# Patient Record
Sex: MALE | Race: ASIAN | NOT HISPANIC OR LATINO | URBAN - METROPOLITAN AREA
[De-identification: names, ages, dates, MRNs, and addresses within clinical notes are randomized per-mention and may not be internally consistent; named-entity substitution may affect disease eponyms.]

---

## 2019-09-01 ENCOUNTER — OUTPATIENT (OUTPATIENT)
Dept: OUTPATIENT SERVICES | Facility: HOSPITAL | Age: 54
LOS: 1 days | End: 2019-09-01
Payer: MEDICARE

## 2019-09-01 PROCEDURE — G9001: CPT

## 2019-09-06 ENCOUNTER — INPATIENT (INPATIENT)
Facility: HOSPITAL | Age: 54
LOS: 2 days | Discharge: HOME HEALTH | End: 2019-09-09
Attending: HOSPITALIST | Admitting: HOSPITALIST
Payer: MEDICARE

## 2019-09-06 VITALS
HEART RATE: 88 BPM | RESPIRATION RATE: 18 BRPM | SYSTOLIC BLOOD PRESSURE: 125 MMHG | OXYGEN SATURATION: 98 % | TEMPERATURE: 98 F | DIASTOLIC BLOOD PRESSURE: 77 MMHG

## 2019-09-06 DIAGNOSIS — I95.1 ORTHOSTATIC HYPOTENSION: ICD-10-CM

## 2019-09-06 DIAGNOSIS — J90 PLEURAL EFFUSION, NOT ELSEWHERE CLASSIFIED: ICD-10-CM

## 2019-09-06 DIAGNOSIS — K66.8 OTHER SPECIFIED DISORDERS OF PERITONEUM: ICD-10-CM

## 2019-09-06 DIAGNOSIS — I10 ESSENTIAL (PRIMARY) HYPERTENSION: ICD-10-CM

## 2019-09-06 DIAGNOSIS — I25.10 ATHEROSCLEROTIC HEART DISEASE OF NATIVE CORONARY ARTERY WITHOUT ANGINA PECTORIS: ICD-10-CM

## 2019-09-06 DIAGNOSIS — Z95.1 PRESENCE OF AORTOCORONARY BYPASS GRAFT: Chronic | ICD-10-CM

## 2019-09-06 DIAGNOSIS — N18.6 END STAGE RENAL DISEASE: ICD-10-CM

## 2019-09-06 DIAGNOSIS — E11.9 TYPE 2 DIABETES MELLITUS WITHOUT COMPLICATIONS: ICD-10-CM

## 2019-09-06 DIAGNOSIS — D64.9 ANEMIA, UNSPECIFIED: ICD-10-CM

## 2019-09-06 DIAGNOSIS — R42 DIZZINESS AND GIDDINESS: ICD-10-CM

## 2019-09-06 DIAGNOSIS — R74.8 ABNORMAL LEVELS OF OTHER SERUM ENZYMES: ICD-10-CM

## 2019-09-06 DIAGNOSIS — Z29.9 ENCOUNTER FOR PROPHYLACTIC MEASURES, UNSPECIFIED: ICD-10-CM

## 2019-09-06 LAB
ALBUMIN SERPL ELPH-MCNC: 3.4 G/DL — SIGNIFICANT CHANGE UP (ref 3.3–5)
ALP SERPL-CCNC: 52 U/L — SIGNIFICANT CHANGE UP (ref 40–120)
ALT FLD-CCNC: 12 U/L — SIGNIFICANT CHANGE UP (ref 4–41)
ANION GAP SERPL CALC-SCNC: 17 MMO/L — HIGH (ref 7–14)
APTT BLD: 36.1 SEC — SIGNIFICANT CHANGE UP (ref 27.5–36.3)
AST SERPL-CCNC: 24 U/L — SIGNIFICANT CHANGE UP (ref 4–40)
BASE EXCESS BLDV CALC-SCNC: 2.5 MMOL/L — SIGNIFICANT CHANGE UP
BILIRUB SERPL-MCNC: 0.4 MG/DL — SIGNIFICANT CHANGE UP (ref 0.2–1.2)
BLOOD GAS VENOUS - CREATININE: 9.67 MG/DL — HIGH (ref 0.5–1.3)
BLOOD GAS VENOUS - FIO2: 21 — SIGNIFICANT CHANGE UP
BUN SERPL-MCNC: 49 MG/DL — HIGH (ref 7–23)
CALCIUM SERPL-MCNC: 9.6 MG/DL — SIGNIFICANT CHANGE UP (ref 8.4–10.5)
CHLORIDE BLDV-SCNC: 101 MMOL/L — SIGNIFICANT CHANGE UP (ref 96–108)
CHLORIDE SERPL-SCNC: 98 MMOL/L — SIGNIFICANT CHANGE UP (ref 98–107)
CO2 SERPL-SCNC: 23 MMOL/L — SIGNIFICANT CHANGE UP (ref 22–31)
CREAT SERPL-MCNC: 9.68 MG/DL — HIGH (ref 0.5–1.3)
GAS PNL BLDV: 137 MMOL/L — SIGNIFICANT CHANGE UP (ref 136–146)
GLUCOSE BLDV-MCNC: 57 MG/DL — LOW (ref 70–99)
GLUCOSE SERPL-MCNC: 84 MG/DL — SIGNIFICANT CHANGE UP (ref 70–99)
HCO3 BLDV-SCNC: 25 MMOL/L — SIGNIFICANT CHANGE UP (ref 20–27)
HCT VFR BLD CALC: 32.1 % — LOW (ref 39–50)
HCT VFR BLDV CALC: 31.2 % — LOW (ref 39–51)
HGB BLD-MCNC: 10.1 G/DL — LOW (ref 13–17)
HGB BLDV-MCNC: 10.1 G/DL — LOW (ref 13–17)
INR BLD: 1.08 — SIGNIFICANT CHANGE UP (ref 0.88–1.17)
LACTATE BLDV-MCNC: 1.1 MMOL/L — SIGNIFICANT CHANGE UP (ref 0.5–2)
MAGNESIUM SERPL-MCNC: 2.3 MG/DL — SIGNIFICANT CHANGE UP (ref 1.6–2.6)
MCHC RBC-ENTMCNC: 28.9 PG — SIGNIFICANT CHANGE UP (ref 27–34)
MCHC RBC-ENTMCNC: 31.5 % — LOW (ref 32–36)
MCV RBC AUTO: 91.7 FL — SIGNIFICANT CHANGE UP (ref 80–100)
NRBC # FLD: 0 K/UL — SIGNIFICANT CHANGE UP (ref 0–0)
PCO2 BLDV: 49 MMHG — SIGNIFICANT CHANGE UP (ref 41–51)
PH BLDV: 7.36 PH — SIGNIFICANT CHANGE UP (ref 7.32–7.43)
PLATELET # BLD AUTO: 336 K/UL — SIGNIFICANT CHANGE UP (ref 150–400)
PMV BLD: 9.6 FL — SIGNIFICANT CHANGE UP (ref 7–13)
PO2 BLDV: 28 MMHG — LOW (ref 35–40)
POTASSIUM BLDV-SCNC: 3.5 MMOL/L — SIGNIFICANT CHANGE UP (ref 3.4–4.5)
POTASSIUM SERPL-MCNC: 3.9 MMOL/L — SIGNIFICANT CHANGE UP (ref 3.5–5.3)
POTASSIUM SERPL-SCNC: 3.9 MMOL/L — SIGNIFICANT CHANGE UP (ref 3.5–5.3)
PROT SERPL-MCNC: 7.3 G/DL — SIGNIFICANT CHANGE UP (ref 6–8.3)
PROTHROM AB SERPL-ACNC: 12 SEC — SIGNIFICANT CHANGE UP (ref 9.8–13.1)
RBC # BLD: 3.5 M/UL — LOW (ref 4.2–5.8)
RBC # FLD: 15.6 % — HIGH (ref 10.3–14.5)
SAO2 % BLDV: 41.9 % — LOW (ref 60–85)
SODIUM SERPL-SCNC: 138 MMOL/L — SIGNIFICANT CHANGE UP (ref 135–145)
TROPONIN T, HIGH SENSITIVITY: 275 NG/L — CRITICAL HIGH (ref ?–14)
TROPONIN T, HIGH SENSITIVITY: 286 NG/L — CRITICAL HIGH (ref ?–14)
WBC # BLD: 9.65 K/UL — SIGNIFICANT CHANGE UP (ref 3.8–10.5)
WBC # FLD AUTO: 9.65 K/UL — SIGNIFICANT CHANGE UP (ref 3.8–10.5)

## 2019-09-06 PROCEDURE — 71250 CT THORAX DX C-: CPT | Mod: 26

## 2019-09-06 PROCEDURE — 71045 X-RAY EXAM CHEST 1 VIEW: CPT | Mod: 26

## 2019-09-06 PROCEDURE — 99223 1ST HOSP IP/OBS HIGH 75: CPT | Mod: GC

## 2019-09-06 RX ORDER — FERROUS SULFATE 325(65) MG
325 TABLET ORAL DAILY
Refills: 0 | Status: DISCONTINUED | OUTPATIENT
Start: 2019-09-06 | End: 2019-09-09

## 2019-09-06 RX ORDER — SEVELAMER CARBONATE 2400 MG/1
800 POWDER, FOR SUSPENSION ORAL
Refills: 0 | Status: DISCONTINUED | OUTPATIENT
Start: 2019-09-06 | End: 2019-09-06

## 2019-09-06 RX ORDER — INFLUENZA VIRUS VACCINE 15; 15; 15; 15 UG/.5ML; UG/.5ML; UG/.5ML; UG/.5ML
0.5 SUSPENSION INTRAMUSCULAR ONCE
Refills: 0 | Status: DISCONTINUED | OUTPATIENT
Start: 2019-09-06 | End: 2019-09-09

## 2019-09-06 RX ORDER — DEXTROSE 50 % IN WATER 50 %
12.5 SYRINGE (ML) INTRAVENOUS ONCE
Refills: 0 | Status: DISCONTINUED | OUTPATIENT
Start: 2019-09-06 | End: 2019-09-09

## 2019-09-06 RX ORDER — ASPIRIN/CALCIUM CARB/MAGNESIUM 324 MG
81 TABLET ORAL DAILY
Refills: 0 | Status: DISCONTINUED | OUTPATIENT
Start: 2019-09-06 | End: 2019-09-09

## 2019-09-06 RX ORDER — SODIUM CHLORIDE 9 MG/ML
1000 INJECTION, SOLUTION INTRAVENOUS
Refills: 0 | Status: DISCONTINUED | OUTPATIENT
Start: 2019-09-06 | End: 2019-09-09

## 2019-09-06 RX ORDER — ATORVASTATIN CALCIUM 80 MG/1
40 TABLET, FILM COATED ORAL DAILY
Refills: 0 | Status: DISCONTINUED | OUTPATIENT
Start: 2019-09-06 | End: 2019-09-06

## 2019-09-06 RX ORDER — DEXTROSE 50 % IN WATER 50 %
15 SYRINGE (ML) INTRAVENOUS ONCE
Refills: 0 | Status: DISCONTINUED | OUTPATIENT
Start: 2019-09-06 | End: 2019-09-09

## 2019-09-06 RX ORDER — SEVELAMER CARBONATE 2400 MG/1
800 POWDER, FOR SUSPENSION ORAL
Refills: 0 | Status: DISCONTINUED | OUTPATIENT
Start: 2019-09-06 | End: 2019-09-09

## 2019-09-06 RX ORDER — FENOFIBRATE,MICRONIZED 130 MG
145 CAPSULE ORAL DAILY
Refills: 0 | Status: DISCONTINUED | OUTPATIENT
Start: 2019-09-06 | End: 2019-09-09

## 2019-09-06 RX ORDER — GLUCAGON INJECTION, SOLUTION 0.5 MG/.1ML
1 INJECTION, SOLUTION SUBCUTANEOUS ONCE
Refills: 0 | Status: DISCONTINUED | OUTPATIENT
Start: 2019-09-06 | End: 2019-09-09

## 2019-09-06 RX ORDER — INSULIN LISPRO 100/ML
VIAL (ML) SUBCUTANEOUS
Refills: 0 | Status: DISCONTINUED | OUTPATIENT
Start: 2019-09-06 | End: 2019-09-07

## 2019-09-06 RX ORDER — DEXTROSE 50 % IN WATER 50 %
25 SYRINGE (ML) INTRAVENOUS ONCE
Refills: 0 | Status: DISCONTINUED | OUTPATIENT
Start: 2019-09-06 | End: 2019-09-09

## 2019-09-06 RX ORDER — FOLIC ACID 0.8 MG
1 TABLET ORAL DAILY
Refills: 0 | Status: DISCONTINUED | OUTPATIENT
Start: 2019-09-06 | End: 2019-09-09

## 2019-09-06 RX ORDER — ACETAMINOPHEN 500 MG
650 TABLET ORAL EVERY 4 HOURS
Refills: 0 | Status: DISCONTINUED | OUTPATIENT
Start: 2019-09-06 | End: 2019-09-09

## 2019-09-06 RX ORDER — SODIUM CHLORIDE 9 MG/ML
500 INJECTION, SOLUTION INTRAVENOUS ONCE
Refills: 0 | Status: COMPLETED | OUTPATIENT
Start: 2019-09-06 | End: 2019-09-06

## 2019-09-06 RX ORDER — DEXTROSE 50 % IN WATER 50 %
50 SYRINGE (ML) INTRAVENOUS ONCE
Refills: 0 | Status: COMPLETED | OUTPATIENT
Start: 2019-09-06 | End: 2019-09-06

## 2019-09-06 RX ORDER — INSULIN GLARGINE 100 [IU]/ML
70 INJECTION, SOLUTION SUBCUTANEOUS AT BEDTIME
Refills: 0 | Status: DISCONTINUED | OUTPATIENT
Start: 2019-09-06 | End: 2019-09-06

## 2019-09-06 RX ORDER — CARVEDILOL PHOSPHATE 80 MG/1
6.25 CAPSULE, EXTENDED RELEASE ORAL EVERY 12 HOURS
Refills: 0 | Status: DISCONTINUED | OUTPATIENT
Start: 2019-09-06 | End: 2019-09-09

## 2019-09-06 RX ORDER — NORTRIPTYLINE HYDROCHLORIDE 10 MG/1
25 CAPSULE ORAL AT BEDTIME
Refills: 0 | Status: DISCONTINUED | OUTPATIENT
Start: 2019-09-06 | End: 2019-09-09

## 2019-09-06 RX ORDER — CALCITRIOL 0.5 UG/1
0.5 CAPSULE ORAL DAILY
Refills: 0 | Status: DISCONTINUED | OUTPATIENT
Start: 2019-09-06 | End: 2019-09-09

## 2019-09-06 RX ORDER — HEPARIN SODIUM 5000 [USP'U]/ML
5000 INJECTION INTRAVENOUS; SUBCUTANEOUS EVERY 12 HOURS
Refills: 0 | Status: DISCONTINUED | OUTPATIENT
Start: 2019-09-06 | End: 2019-09-07

## 2019-09-06 RX ORDER — ATORVASTATIN CALCIUM 80 MG/1
40 TABLET, FILM COATED ORAL AT BEDTIME
Refills: 0 | Status: DISCONTINUED | OUTPATIENT
Start: 2019-09-06 | End: 2019-09-09

## 2019-09-06 RX ADMIN — ATORVASTATIN CALCIUM 40 MILLIGRAM(S): 80 TABLET, FILM COATED ORAL at 23:36

## 2019-09-06 RX ADMIN — NORTRIPTYLINE HYDROCHLORIDE 25 MILLIGRAM(S): 10 CAPSULE ORAL at 23:37

## 2019-09-06 RX ADMIN — Medication 50 MILLILITER(S): at 19:29

## 2019-09-06 RX ADMIN — SODIUM CHLORIDE 500 MILLILITER(S): 9 INJECTION, SOLUTION INTRAVENOUS at 16:05

## 2019-09-06 RX ADMIN — SODIUM CHLORIDE 500 MILLILITER(S): 9 INJECTION, SOLUTION INTRAVENOUS at 17:25

## 2019-09-06 NOTE — H&P ADULT - PROBLEM SELECTOR PLAN 1
BPs on admission: 135/82 BPs on admission: (Supine) 135/82 HR 90s; (Sitting) 132/77 HR 90s; (Standing) 83/59. Drop of >20 systolic consistent with orthostatic hypotension. Started after being discharged from [facility] after CABG. DDx includes volume depletion vs medications (unclear why pt was prescribed 2 Beta blockers) vs autonomic failure (pt states he has been eating less than usual) vs fluid shifts from PD vs less likely endocrine etiology such as low cortisol or thyroid dysfunction. If no improvement in BPs, can consider trial of midodrine or fludrocortisone.  - Will monitor on Telemetry and obtain repeat orthostatic BP measurements. EKG with no ischemic changes. Trop T 275--->286 likely 2/2 demand ischemia.  - Given pleural effusions on CT chest, judicious use of fluids  - Holding home labetalol, coreg, norvasc  - obtain TSH, AM cortisol  - encourage leg elevation BPs on admission: (Supine) 135/82 HR 90s; (Sitting) 132/77 HR 90s; (Standing) 83/59. Drop of >20 systolic consistent with orthostatic hypotension. Started after being discharged from [facility] after CABG. DDx includes volume depletion vs medications (unclear why pt was prescribed 2 Beta blockers) vs autonomic failure vs fluid shifts from PD vs less likely endocrine etiology such as low cortisol or thyroid dysfunction. If no improvement in BPs, can consider trial of midodrine or fludrocortisone.  - Will monitor on Telemetry and obtain repeat orthostatic BP measurements. EKG with no ischemic changes. Trop T 275--->286 likely 2/2 demand ischemia.  - Renal c/s'ed for management of PD while inpatient  - Given small pleural effusions on CT chest, judicious use of fluids.  - Holding home labetalol, coreg, norvasc  - obtain TSH, AM cortisol  - encourage leg elevation BPs on admission: (Supine) 135/82 HR 90s; (Sitting) 132/77 HR 90s; (Standing) 83/59. Drop of >20 systolic consistent with orthostatic hypotension. Started after being discharged from [facility] after CABG. DDx includes volume depletion vs medications (unclear why pt was prescribed 2 Beta blockers and nortriptyline is known to cause orthostatic hypotension however has been on this medication for a while) vs autonomic failure vs fluid shifts from PD vs less likely endocrine etiology such as low cortisol or thyroid dysfunction. If no improvement in BPs, can consider trial of midodrine or fludrocortisone.  - Will monitor on Telemetry and obtain repeat orthostatic BP measurements. EKG with no ischemic changes. Trop T 275--->286 likely 2/2 demand ischemia.  - Renal c/s'ed for management of PD while inpatient  - Given small pleural effusions on CT chest, judicious use of fluids.  - Holding home labetalol, coreg, norvasc  - obtain TSH, AM cortisol  - encourage leg elevation BPs on admission: (Supine) 135/82 HR 90s; (Sitting) 132/77 HR 90s; (Standing) 83/59. Drop of >20 systolic consistent with orthostatic hypotension. Started after being discharged from [facility] after CABG. DDx includes volume depletion vs medications (unclear why pt was prescribed 2 Beta blockers and nortriptyline is known to cause orthostatic hypotension however has been on this medication for a while) vs autonomic failure vs fluid shifts from PD vs less likely endocrine etiology such as low cortisol or thyroid dysfunction. If no improvement in BPs, can consider trial of midodrine or fludrocortisone. Likely 2/2 deconditioning.  - Will monitor on Telemetry and obtain repeat orthostatic BP measurements. EKG with no ischemic changes.   - Renal c/s'ed for management of PD while inpatient  - Given small pleural effusions on CT chest, judicious use of fluids.  - Holding home labetalol and norvasc  - obtain TSH, AM cortisol  - encourage leg elevation BPs on admission: (Supine) 135/82 HR 90s; (Sitting) 132/77 HR 90s; (Standing) 83/59. Drop of >20 systolic consistent with orthostatic hypotension. dizziness started after discharged from [facility] after CABG. DDx includes  medication induced hypotension (unclear why on 2 Beta blockers) vs volume depletion vs nortriptyline side-effect (however has been on this medication for a while) vs fluid shifts from PD vs less likely endocrine etiology such as low cortisol or thyroid dysfunction.   - Will monitor on Telemetry r/o arrhythmia  obtain repeat orthostatic BP measurements  - EKG with no acute changes  - hold Amlodipine 10mg and hold Labetalol  - reduce Carvedilol from 12.5mg bid to 6.25mg bid - increase if BP permits   - obtain TSH, AM cortisol  - leg elevation

## 2019-09-06 NOTE — H&P ADULT - PROBLEM SELECTOR PLAN 3
ESRD on PD daily.  - Sevelamer 800mg PO TID  - Calcitriol 0.5 mg qD ESRD on PD daily. Nephrology to see patient.  - Sevelamer 800mg PO TID  - Calcitriol 0.5 mg qD ESRD on PD daily  - Renal c/s'ed for management of PD while inpatient  - Given small pleural effusions on CT chest, judicious use of fluids.  - Sevelamer 800mg PO TID  - Calcitriol 0.5 mg qD

## 2019-09-06 NOTE — H&P ADULT - PROBLEM SELECTOR PLAN 4
- WIll hold Labetalol, norvasc, and coreg - WIll hold Labetalol, norvasc,   - Coreg 6.125mg PO BID (half dose) Trop T 275--->286. Likely 2/2 ESRD. There is also the possibility of stress cardiomyopathy.  - obtain TTE to evaluate for post-operative changes since CABG

## 2019-09-06 NOTE — ED PROVIDER NOTE - NS ED ROS FT
Gen: No fever, normal appetite  Eyes: No eye irritation or discharge  ENT: No ear pain, congestion, sore throat  Resp: No cough or trouble breathing  Cardiovascular: No chest pain or palpitation, + orthostatic hypotn   Gastroenteric: No nausea/vomiting, diarrhea, constipation  :  No change in urine output; no dysuria  MS: No joint or muscle pain  Skin: No rashes  Neuro: No headache; no abnormal movements  Remainder negative, except as per the HPI

## 2019-09-06 NOTE — H&P ADULT - PROBLEM SELECTOR PROBLEM 6
CAD, multiple vessel Type 2 diabetes mellitus without complication, with long-term current use of insulin HTN (hypertension)

## 2019-09-06 NOTE — H&P ADULT - PROBLEM SELECTOR PROBLEM 5
Type 2 diabetes mellitus CAD, multiple vessel Type 2 diabetes mellitus with hypoglycemia without coma, with long-term current use of insulin

## 2019-09-06 NOTE — H&P ADULT - PROBLEM SELECTOR PLAN 6
- s/p CABG  - c/w asa - s/p CABG Aug/2019  - c/w asa  - holding BBs As per patient, takes Lantus 80 units qHS.  - Will hold Lantus dose tonight and check FS in the AM.   - Endocrine c/s in the AM regarding high dose insulin. - WIll hold Labetalol, norvasc as above  - Coreg 6.125mg PO BID (half dose for now  -BP checks q4h

## 2019-09-06 NOTE — H&P ADULT - NSICDXPASTMEDICALHX_GEN_ALL_CORE_FT
PAST MEDICAL HISTORY:  CAD, multiple vessel     Diabetes     ESRD (end stage renal disease) on dialysis     HTN (hypertension)

## 2019-09-06 NOTE — ED ADULT NURSE REASSESSMENT NOTE - NS ED NURSE REASSESS COMMENT FT1
Pt appeared diaphoretic and pale upon checking vital signs. FS 37. Pt given 1 amp dextrose and tray of food. Repeat

## 2019-09-06 NOTE — ED PROVIDER NOTE - PHYSICAL EXAMINATION
Gen: AAOX3, NAD   Head: NCAT  ENT: Airway patent, moist mucous membranes, nasal passageways clear, no pharyngeal erythema or exudates, uvula midline, no cervical lymphadenopathy  Cardiac: Normal rate, normal rhythm, no murmurs/rubs/gallops appreciated  Respiratory: Lungs CTA B/L  Gastrointestinal: +BS, Abdomen soft, nontender, nondistended, no rebound, no guarding, PD catheter in place   MSK: No gross abnormalities, FROM of all four extremities, no edema  HEME: Extremities warm, pulses intact and symmetrical in all four extremities  Skin: No rashes, no lesions  Neuro: No gross neurologic deficits, CN II-XII intact, no facial asymmetry, no dysarthria, dysdiadochokinesia, strength equal in all four extremities, no gait abnormality Gen: AAOX3, NAD   Head: NCAT  ENT: Airway patent, moist mucous membranes, nasal passageways clear, no JVD  Cardiac: Normal rate, normal rhythm, no murmurs/rubs/gallops appreciated  Respiratory: Lungs CTA B/L  Gastrointestinal: Abdomen soft, nontender, nondistended, no rebound, no guarding, PD catheter in place   MSK: No gross abnormalities, FROM of all four extremities, no edema  HEME: Extremities warm, pulses intact and symmetrical in all four extremities  Skin: No rashes, no lesions  Neuro: No gross neurologic deficits

## 2019-09-06 NOTE — H&P ADULT - NSHPREVIEWOFSYSTEMS_GEN_ALL_CORE
CONSTITUTIONAL: No weakness, fevers or chills  EYES/ENT: No visual changes;  No vertigo or throat pain   NECK: No pain or stiffness  RESPIRATORY: No cough, wheezing, hemoptysis; No shortness of breath  CARDIOVASCULAR: No chest pain or palpitations  GASTROINTESTINAL: No abdominal or epigastric pain. No nausea, vomiting, or hematemesis; No diarrhea or constipation. No melena or hematochezia.  GENITOURINARY: No dysuria, frequency or hematuria  NEUROLOGICAL: No numbness or weakness  SKIN: No itching, rashes CONSTITUTIONAL: +Dizziness  EYES/ENT: No visual changes;  No vertigo or throat pain   NECK: No pain or stiffness  RESPIRATORY: No cough, wheezing, hemoptysis; No shortness of breath  CARDIOVASCULAR: No chest pain or palpitations  GASTROINTESTINAL: No abdominal or epigastric pain. No nausea, vomiting, or hematemesis; No diarrhea or constipation. No melena or hematochezia.  GENITOURINARY: No dysuria, frequency or hematuria  NEUROLOGICAL: No numbness or weakness  SKIN: No itching, rashes CONSTITUTIONAL: +Dizziness  EYES/ENT: No visual changes;  No vertigo or throat pain   NECK: No pain or stiffness  RESPIRATORY: No cough, wheezing, hemoptysis; No shortness of breath  CARDIOVASCULAR: No chest pain or palpitations, (+) light-headedness   GASTROINTESTINAL: No abdominal or epigastric pain. No nausea, vomiting, or hematemesis; No diarrhea or constipation. No melena or hematochezia.  GENITOURINARY: No dysuria, frequency or hematuria  NEUROLOGICAL: No numbness or weakness  SKIN: No itching, rashes

## 2019-09-06 NOTE — ED ADULT NURSE NOTE - CHIEF COMPLAINT QUOTE
Patient brought to ER from home by EMS for intermittent orthostatic changes in blood pressure noted by VNS. Pt had CABG last month in [facility]. Pt is not c/o chest pain or shortness of breath.  Upgraded after EKG Reading by MD Attending: Abnormal EKG

## 2019-09-06 NOTE — ED ADULT NURSE NOTE - OBJECTIVE STATEMENT
54yom A&ox4 amb presents to ed c/o low BP. pt is 1 month s/p CABG, reports since yesterday pts BP has been systolic 80s. pt states when it is this low he feels dizzy. pt states he only feels dizzy when he gets up and walks, feels better once he sits down. pt currently normotensive in ED. denies cp, sob, dizziness, lightheadedness, n/v/d, fever/chills. CABG site healing well. no drainage noted. breathing even/unlabored. abdomen soft, nontender, nondistended. skin warm, dry, and intact. 20g iv lock placed to R hand. labs sent. nsr on cardiac monitor.

## 2019-09-06 NOTE — H&P ADULT - LYMPHATIC
posterior cervical R/anterior cervical R/anterior cervical L/supraclavicular L/supraclavicular R/posterior cervical L

## 2019-09-06 NOTE — ED PROVIDER NOTE - CLINICAL SUMMARY MEDICAL DECISION MAKING FREE TEXT BOX
54M s/p cabg, recent discharge, orthostatic hypotension, likely medication related, will eval for signs of infection though no clinical signs of infection.

## 2019-09-06 NOTE — H&P ADULT - PROBLEM SELECTOR PLAN 9
AOCD, MCV 91.7, hb 10.1  - c/w iron supplementation AOCD, MCV 91.7, hb 10.1, likely multifactorial due to recent blood loss and ESRD  monitor Hgb daily  -avoid Heparin sc for dvt ppx given h/o recent transfusion  - c/w iron supplementation  - outpt f/u

## 2019-09-06 NOTE — ED PROVIDER NOTE - PMH
CAD, multiple vessel    Diabetes    ESRD (end stage renal disease) on dialysis    HTN (hypertension)

## 2019-09-06 NOTE — H&P ADULT - HISTORY OF PRESENT ILLNESS
57 YOM with PMHx of HTN, T2DM, ESRD on PD daily, recent CABG on 8/9/19 at [place]. Pt states he was admitted at [facility] for 20 days after getting CABG procedure and said it was uneventful with no complications. He was then discharged home. About 3-4 days ago, he started getting dizzy/lightheaded and measured his BP which was in the 80s/60s. The next day, it was even lower at 70s/60s (measured by sitting down). He lives with his wife and daughter and does not work any longer. He denies CP, SOB, headaches. No fevers or chills. He has been on PD for the past 2 years and does not recall having any issues with his BP and has been tolerating it well.    VS:  98.7 F, HR 82, /84, RR 18, SpO2 100% on RA  Given D50x1, LR 500cc x2 56yo Male with MHx of HTN, Type 2 DM, ESRD on PD daily, recent CABG on 8/9/19 at [place]. Pt states he was admitted at [place] for 20 days after getting CABG procedure and said it was uneventful with no complications. He was then discharged home. About 3-4 days ago, he started getting dizzy/lightheaded and measured his BP which was in the 80s/60s. The next day, it was even lower at 70s/60s (measured by sitting down). He lives with his wife and daughter and does not work any longer. He denies CP, SOB, headaches. No fevers or chills. He has been on PD for the past 2 years and does not recall having any issues with his BP and has been tolerating it well.    VS:  98.7 F, HR 82, /84, RR 18, SpO2 100% on RA  Given D50x1, LR 500cc x2 56yo Male, ambulatory with a walker post CABG, with MHx of HTN, Type 2 DM, ESRD on PD daily, recent CABG on 8/9/19 at [place] - c/b acute anemia was transfused "5 bags of blood", had EGD - "nothing" was found. Pt states he was admitted at [place] for 20 days after getting CABG procedure and said it was uneventful with no complications. He was then discharged home. About 3-4 days ago, he started getting dizzy/lightheaded and measured his BP which was in the 80s/60s. The next day, it was even lower at 70s/60s (measured by sitting down). He lives with his wife and daughter and does not work any longer. He denies CP, SOB, headaches. No fevers or chills. He has been on PD for the past 2 years and does not recall having any issues with his BP and has been tolerating it well.    VS:  98.7 F, HR 82, /84, RR 18, SpO2 100% on RA  Given D50x1, LR 500cc x2 56yo Male, ambulatory with a walker post CABG, with MHx of HTN, Type 2 DM, ESRD on PD daily, recent CABG on 8/9/19 at [place] - c/b acute anemia was transfused "5 bags of blood", had EGD - "nothing" was found. Pt states he was admitted at [place] for 20 days after getting CABG procedure and said it was uneventful with no complications. He was then discharged home. About 3-4 days ago, he started getting dizzy/lightheaded and measured his BP which was in the 80s/60s. The next day, it was even lower at 70s/60s (measured by sitting down). He lives with his wife and daughter and does not work any longer. Says that his BP medications were changed recently, however cannot recall which one or which one was added; He denies CP, SOB, headaches. No fevers or chills. He has been on PD for the past 2 years and does not recall having any issues with his BP and has been tolerating it well. Of note, recently a trial of lower Lantus regimen was tried, 20units BID, (instead of 80units daily), however, resulted in BG in 350s range and the pt returned to 80 units daily;    VS:  98.7 F, HR 82, /84, RR 18, SpO2 100% on RA  Given D50x1, LR 500cc x2

## 2019-09-06 NOTE — ED ADULT TRIAGE NOTE - CHIEF COMPLAINT QUOTE
Patient brought to ER from home by EMS for intermittent orthostatic changes in blood pressure noted by VNS. Pt had CABG last month in [facility]. Pt is not c/o chest pain or shortness of breath. Patient brought to ER from home by EMS for intermittent orthostatic changes in blood pressure noted by VNS. Pt had CABG last month in [facility]. Pt is not c/o chest pain or shortness of breath.  Upgraded after EKG Reading by MD Attending: Abnormal EKG

## 2019-09-06 NOTE — ED PROVIDER NOTE - ATTENDING CONTRIBUTION TO CARE
54M hx HTN, DM, ESRD on PD daily, CABG at [place] 8/9/19 p/w orthostatic hypotension, noted x 2 days at home, where he noted weakness and dizziness benjamin on standing and noted pressures drop to 80s when he is sitting up. Sent to ED by home care nurse. he denies fever, vomiting, SOB, CP, abdo pains, His PD has gone normally with clear fluid return. On exam: + orthostatic BP drops from 127 systolic to 80s systolic on standing, afebrile rectally, lungs clear, heart sounds normal, abdomen soft non tender, ext normal, neuro non focal. IMP: Orthostatic hypotension in 54M hx HTN, DM, ESRD on PD daily, CABG at [place] 8/9/19. ? etiology ? dehydration ? infectious, ? med related, ? cardiac. EKG shows SR with ST depression laterally. No old EKG available. Plan: EKG Monitor, labs, UA, CXR, careful hydration. Signed over to Dr Cross at 4:10pm

## 2019-09-06 NOTE — H&P ADULT - ASSESSMENT
57 YOM with PMHx of HTN, T2DM, ESRD on PD daily, recent CABG on 8/9/19 coming in with orthostatic hypotension 58yo Male, ambulatory with a walker post CABG, with MHx of HTN, Type 2 DM, ESRD on PD daily, recent CABG on 8/9/19 at [place] - c/b acute blood loss anemia requring transfusion PRBC; Pt a/w orthostatic hypotension and an episode of hypoglycemia in ED to 30s;

## 2019-09-06 NOTE — H&P ADULT - PROBLEM SELECTOR PLAN 7
AOCD, MCV 91.7, hb 10.1  - c/w iron supplementation Trop T 275--->286. Likely 2/2 ESRD. There is also the possibility of stress cardiomyopathy.  - obtain TTE to evaluate for post-operative changes since CABG - s/p CABG Aug/2019  - c/w asa  - holding Labetalol and Norvasc

## 2019-09-06 NOTE — H&P ADULT - NSHPLABSRESULTS_GEN_ALL_CORE
10.1   9.65  )-----------( 336      ( 06 Sep 2019 15:30 )             32.1     09-06    138  |  98  |  49<H>  ----------------------------<  84  3.9   |  23  |  9.68<H>    Ca    9.6      06 Sep 2019 15:30  Mg     2.3     09-06    TPro  7.3  /  Alb  3.4  /  TBili  0.4  /  DBili  x   /  AST  24  /  ALT  12  /  AlkPhos  52  09-06    Trop T: 275-->286    < from: Xray Chest 1 View AP/PA (09.06.19 @ 15:21) >    IMPRESSION:  Underinflated lungs. Appearance of vague increased markings and patchy   opacity in left base suggesting underlying airspace consolidation,   however, component of infection/pneumonia in the proper clinical context   cannot be excluded, PA and lateral views of the chest in deep inspiration   and with more optimal positioning suggested for follow-up when clinically   feasible.    Clear visualized lungs. No pleural effusions or pneumothorax.    Sternal wires and multiple mediastinal surgical clips present. Slightly   prominent appearing cardiac and mediastinal silhouettes however   inaccurately assessed on this projection.    Trachea midline.    Unremarkable osseous structures.    < from: CT Chest No Cont (09.06.19 @ 18:03) >    IMPRESSION:     Bilateral pleural effusions with associated atelectasis.    Pneumoperitoneum with ascites.    EKG: NSR, T wave inversion in lead II, no prior EKG to compare 10.1   9.65  )-----------( 336      ( 06 Sep 2019 15:30 )             32.1     09-06    138  |  98  |  49<H>  ----------------------------<  84  3.9   |  23  |  9.68<H>    Ca    9.6      06 Sep 2019 15:30  Mg     2.3     09-06    TPro  7.3  /  Alb  3.4  /  TBili  0.4  /  DBili  x   /  AST  24  /  ALT  12  /  AlkPhos  52  09-06    Trop T: 275-->286      XR CHEST AP OR PA 1V   PROCEDURE DATE: Sep 6 2019   INTERPRETATION: CLINICAL INDICATION: chest pain   EXAM:   Portable upright frontal chest from 9/6/2019 at 1521. No prior chest x-ray   available at this institution for comparison.   Rotated left.   IMPRESSION:   Underinflated lungs. Appearance of vague increased markings and patchy   opacity in left base suggesting underlying airspace consolidation, however,   component of infection/pneumonia in the proper clinical context cannot be   excluded, PA and lateral views of the chest in deep inspiration and with   more optimal positioning suggested for follow-up when clinically feasible.   Clear visualized lungs. No pleural effusions or pneumothorax.   Sternal wires and multiple mediastinal surgical clips present. Slightly   prominent appearing cardiac and mediastinal silhouettes however inaccurately   assessed on this projection.   Trachea midline.   Unremarkable osseous structures.         CT CHEST   PROCEDURE DATE: Sep 6 2019   INTERPRETATION: CLINICAL INFORMATION: Chest pain. Evaluate left lower lung   opacity. Status post CABG on 8/9/2019.   COMPARISON: Correlate with same-day chest x-ray   PROCEDURE:   CT of the Chest was performed without intravenous contrast.   Sagittal and coronal reformats were performed.   FINDINGS:   LUNGS AND AIRWAYS: Bilateral lower lobe subsegmental compressive   atelectasis. Patent central airways.   PLEURA: Bilateral, left greater than right, small pleural effusions.   MEDIASTINUM AND RYAN: No lymphadenopathy.   VESSELS: Atherosclerotic calcification of the aorta.   HEART: Status post CABG. Heart size is enlarged. No pericardial effusion.   CHEST WALL AND LOWER NECK: Sternotomy. Thyroid gland is within normal limits.   VISUALIZED UPPER ABDOMEN: Pneumoperitoneum is present. Partially imaged   perihepatic and perisplenic ascites. Thickened appearance of the stomach due   to underdistention. Layering biliary sludge versus small stones in the   gallbladder. Scattered hepatic calcified granulomas. Punctate non-obstructing   left renal calculi.   BONES: Degenerative changes of the spine.   IMPRESSION:   Bilateral pleural effusions with associated atelectasis.   Pneumoperitoneum with ascites.     EKG, 9/6, NSR, 90bpm, T wave inversion in lead I, aVL, V5-6, no acute Tw changes - my reading

## 2019-09-06 NOTE — H&P ADULT - PROBLEM SELECTOR PLAN 8
Diet: Renal  DVT: Heparin subQ    Polo Collins MD PGY-2  Department of Internal Medicine  Pager: [phone] (NS) /[phone] (JACK) Diet: Renal restrictions  DVT: Heparin subQ    Polo Collins MD PGY-2  Department of Internal Medicine  Pager: [phone] (NS) /[phone] (JACK) On chest CT, b/l small pleural effusions with atelectasis likely 2/2 deconditioning from prolonged hospital course at [place].  - PT eval

## 2019-09-06 NOTE — H&P ADULT - NSHPSOCIALHISTORY_GEN_ALL_CORE
Lives at home with wife and daughter, no longer working. No EtOH or cigarette use history. Lives at home with wife and daughter, no longer working. No EtOH use   Former smoker, 1PPD x 5 years

## 2019-09-06 NOTE — H&P ADULT - PROBLEM SELECTOR PLAN 5
As per patient, takes Lantus 80 units qHS  - Will give 70 units given episode of hypoglycemia  - low SSI - s/p CABG Aug/2019  - c/w asa  - holding Labetalol and Norvasc As per patient, takes Lantus 80 units qHS. BG in ED dropped to 30s;  - Will hold Lantus dose tonight and check FS in the AM.   - Endocrine c/s in the AM regarding high dose insulin.  - would restart at 60-70units daily given prior h/o hyperglycemia while on 20u BID As per patient, takes Lantus 80 units qHS. BG in ED dropped to 30s;  - Will hold home dose of Lantus dose tonight   - Endocrine c/s in the AM regarding high dose insulin.  - will restart at 30units bid given prior h/o hyperglycemia while on 20u BID  -moderate SS

## 2019-09-06 NOTE — ED PROVIDER NOTE - OBJECTIVE STATEMENT
54M hx HTN, DM, ESRD on PD daily, CABG at [facility] 8/9/19 p/w orthostatic hypotension, noted x 2 days at home, wherein pressures drop to 80s when he is sitting up. No other 54M hx HTN, DM, ESRD on PD daily, CABG at [facility] 8/9/19 p/w orthostatic hypotension, noted x 2 days at home, wherein pressures drop to 80s when he is sitting up. No other complaints, no chest pain, sob , diarrhea, abd pain or changes in diasylate color, no fevers or cough. Notes he was discharged from [facility] on tuesday, denies complications during admission at [facility] such as fever or difficulty breathing 54M hx HTN, DM, ESRD on PD daily, CABG at [facility] 8/9/19 p/w orthostatic hypotension, noted x 2 days at home, wherein pressures drop to 80s when he is sitting up. No other complaints, no chest pain, sob , diarrhea, abd pain or changes in diasylate color, no fevers or cough. Notes he was discharged from [facility] on tuesday, denies complications during admission at [facility] such as fever or difficulty breathing. He held his labetalol dose today and was still symptomatic.

## 2019-09-06 NOTE — ED PROVIDER NOTE - PROGRESS NOTE DETAILS
[facility]agan PGY3: Orthostatic hypotn: supine 135/82 HR90s, sitting 132/77 HR 90s, standing 83/59 [place] AGUILLON: Patient signed out by Dr. Dale pending labs and likely admission. Patient with DM, HTN, ESRD hx of recent CABG here for orthostatic hypotension. CT Chest done and shows bilateral pleural effusions with associated atelectasis and Pneumoperitoneum with ascites. (Patient with daily PD). Patient to be admitted for dizziness, orthostatic hypotension. Of note, patient was noted to be hypoglycemic while in ED, was given 1 amp of D50 and then food to eat. On my reassessment, he stated he was feeling better. Hagan PGY3: was called by radiology- pt with pneumoperitoneum and ascites on ct, correlates with daily PD use; pt without abd pain, comfortable, abd soft, ntnd without rebound or guarding on reassessment.

## 2019-09-06 NOTE — H&P ADULT - NSHPPHYSICALEXAM_GEN_ALL_CORE
Vital Signs Last 24 Hrs  T(C): 36.7 (06 Sep 2019 15:59), Max: 37.1 (06 Sep 2019 15:33)  T(F): 98 (06 Sep 2019 15:59), Max: 98.7 (06 Sep 2019 15:33)  HR: 87 (06 Sep 2019 19:23) (82 - 88)  BP: 165/84 (06 Sep 2019 19:23) (117/69 - 165/84)  RR: 18 (06 Sep 2019 19:23) (16 - 18)  SpO2: 100% (06 Sep 2019 19:23) (98% - 100%)    GENERAL: NAD  HEAD:  Atraumatic, Normocephalic  EYES: EOMI, PERRLA, conjunctiva and sclera clear  NECK: Supple, No JVD  CHEST/LUNG: Decreased BS in b/l lower lung fields  HEART: Regular rate and rhythm; normal S1 S2,  No murmurs, rubs, or gallops  ABDOMEN: Soft, Nontender, Nondistended; Bowel sounds normal  EXTREMITIES:  2+ Peripheral Pulses, No clubbing, cyanosis, or edema  PSYCH: AAOx3  NEUROLOGY: non-focal  SKIN: chronic venous stasis in b/l LE Vital Signs Last 24 Hrs  T(C): 36.7 (06 Sep 2019 15:59), Max: 37.1 (06 Sep 2019 15:33)  T(F): 98 (06 Sep 2019 15:59), Max: 98.7 (06 Sep 2019 15:33)  HR: 87 (06 Sep 2019 19:23) (82 - 88)  BP: 165/84 (06 Sep 2019 19:23) (117/69 - 165/84)  RR: 18 (06 Sep 2019 19:23) (16 - 18)  SpO2: 100% (06 Sep 2019 19:23) (98% - 100%)    GENERAL: NAD  HEAD:  Atraumatic, Normocephalic  EYES: EOMI, PERRLA, conjunctiva and sclera clear  NECK: Supple, No JVD  CHEST/LUNG: Surgical scars in the chest wall; decreased BS in b/l lower lung fields  HEART: Regular rate and rhythm; normal S1 S2,  No murmurs, rubs, or gallops  ABDOMEN: Soft, Nontender, Nondistended; Bowel sounds normal  EXTREMITIES:  2+ Peripheral Pulses, No clubbing, cyanosis, or edema  PSYCH: AAOx3  NEUROLOGY: non-focal  SKIN: chronic venous stasis in b/l LE Vital Signs Last 24 Hrs  T(C): 36.7 (06 Sep 2019 15:59), Max: 37.1 (06 Sep 2019 15:33)  T(F): 98 (06 Sep 2019 15:59), Max: 98.7 (06 Sep 2019 15:33)  HR: 87 (06 Sep 2019 19:23) (82 - 88)  BP: 165/84 (06 Sep 2019 19:23) (117/69 - 165/84)  RR: 18 (06 Sep 2019 19:23) (16 - 18)  SpO2: 100% (06 Sep 2019 19:23) (98% - 100%)

## 2019-09-06 NOTE — H&P ADULT - PROBLEM SELECTOR PLAN 2
CT chest showing b/l pleural effusions w/atelectasis and pneumoperitoneum with ascites. Can be 2/2 recent CABG procedure.  - Low threshold for surgery c/s if HD unstable CT chest showing b/l pleural effusions w/atelectasis and pneumoperitoneum with ascites. Can be 2/2 recent CABG procedure.  - Surgery consulted given pneumoperitoneum on CT scan CT chest showing b/l pleural effusions w/atelectasis and pneumoperitoneum with ascites. Can be 2/2 recent CABG procedure vs PD catheter.  - Surgery consulted given pneumoperitoneum on CT scan. They think it is related to the daily PD catheter use.

## 2019-09-07 DIAGNOSIS — D64.9 ANEMIA, UNSPECIFIED: ICD-10-CM

## 2019-09-07 DIAGNOSIS — E78.5 HYPERLIPIDEMIA, UNSPECIFIED: ICD-10-CM

## 2019-09-07 DIAGNOSIS — I10 ESSENTIAL (PRIMARY) HYPERTENSION: ICD-10-CM

## 2019-09-07 DIAGNOSIS — E11.649 TYPE 2 DIABETES MELLITUS WITH HYPOGLYCEMIA WITHOUT COMA: ICD-10-CM

## 2019-09-07 LAB
ANION GAP SERPL CALC-SCNC: 16 MMO/L — HIGH (ref 7–14)
BUN SERPL-MCNC: 51 MG/DL — HIGH (ref 7–23)
CALCIUM SERPL-MCNC: 9.8 MG/DL — SIGNIFICANT CHANGE UP (ref 8.4–10.5)
CHLORIDE SERPL-SCNC: 95 MMOL/L — LOW (ref 98–107)
CO2 SERPL-SCNC: 27 MMOL/L — SIGNIFICANT CHANGE UP (ref 22–31)
CORTIS SERPL-MCNC: 29.8 UG/DL — HIGH (ref 2.7–18.4)
CREAT SERPL-MCNC: 9.72 MG/DL — HIGH (ref 0.5–1.3)
GLUCOSE BLDC GLUCOMTR-MCNC: 282 MG/DL — HIGH (ref 70–99)
GLUCOSE SERPL-MCNC: 309 MG/DL — HIGH (ref 70–99)
HBA1C BLD-MCNC: 6.1 % — HIGH (ref 4–5.6)
HBV SURFACE AG SER-ACNC: NEGATIVE — SIGNIFICANT CHANGE UP
HCT VFR BLD CALC: 33.8 % — LOW (ref 39–50)
HCV AB S/CO SERPL IA: 0.23 S/CO — SIGNIFICANT CHANGE UP (ref 0–0.99)
HCV AB SERPL-IMP: SIGNIFICANT CHANGE UP
HGB BLD-MCNC: 10.7 G/DL — LOW (ref 13–17)
MCHC RBC-ENTMCNC: 29 PG — SIGNIFICANT CHANGE UP (ref 27–34)
MCHC RBC-ENTMCNC: 31.7 % — LOW (ref 32–36)
MCV RBC AUTO: 91.6 FL — SIGNIFICANT CHANGE UP (ref 80–100)
NRBC # FLD: 0 K/UL — SIGNIFICANT CHANGE UP (ref 0–0)
PLATELET # BLD AUTO: 362 K/UL — SIGNIFICANT CHANGE UP (ref 150–400)
PMV BLD: 9.6 FL — SIGNIFICANT CHANGE UP (ref 7–13)
POTASSIUM SERPL-MCNC: 4.3 MMOL/L — SIGNIFICANT CHANGE UP (ref 3.5–5.3)
POTASSIUM SERPL-SCNC: 4.3 MMOL/L — SIGNIFICANT CHANGE UP (ref 3.5–5.3)
RBC # BLD: 3.69 M/UL — LOW (ref 4.2–5.8)
RBC # FLD: 15.3 % — HIGH (ref 10.3–14.5)
SODIUM SERPL-SCNC: 138 MMOL/L — SIGNIFICANT CHANGE UP (ref 135–145)
TSH SERPL-MCNC: 2.79 UIU/ML — SIGNIFICANT CHANGE UP (ref 0.27–4.2)
WBC # BLD: 7.74 K/UL — SIGNIFICANT CHANGE UP (ref 3.8–10.5)
WBC # FLD AUTO: 7.74 K/UL — SIGNIFICANT CHANGE UP (ref 3.8–10.5)

## 2019-09-07 PROCEDURE — 90947 DIALYSIS REPEATED EVAL: CPT | Mod: GC

## 2019-09-07 PROCEDURE — 99233 SBSQ HOSP IP/OBS HIGH 50: CPT

## 2019-09-07 PROCEDURE — 99222 1ST HOSP IP/OBS MODERATE 55: CPT

## 2019-09-07 RX ORDER — INSULIN LISPRO 100/ML
VIAL (ML) SUBCUTANEOUS
Refills: 0 | Status: DISCONTINUED | OUTPATIENT
Start: 2019-09-07 | End: 2019-09-07

## 2019-09-07 RX ORDER — INSULIN LISPRO 100/ML
VIAL (ML) SUBCUTANEOUS AT BEDTIME
Refills: 0 | Status: DISCONTINUED | OUTPATIENT
Start: 2019-09-07 | End: 2019-09-07

## 2019-09-07 RX ORDER — DEXTROSE 50 % IN WATER 50 %
25 SYRINGE (ML) INTRAVENOUS ONCE
Refills: 0 | Status: COMPLETED | OUTPATIENT
Start: 2019-09-07 | End: 2019-09-07

## 2019-09-07 RX ORDER — INSULIN GLARGINE 100 [IU]/ML
20 INJECTION, SOLUTION SUBCUTANEOUS AT BEDTIME
Refills: 0 | Status: DISCONTINUED | OUTPATIENT
Start: 2019-09-07 | End: 2019-09-07

## 2019-09-07 RX ORDER — INSULIN GLARGINE 100 [IU]/ML
30 INJECTION, SOLUTION SUBCUTANEOUS
Refills: 0 | Status: DISCONTINUED | OUTPATIENT
Start: 2019-09-07 | End: 2019-09-07

## 2019-09-07 RX ORDER — INSULIN LISPRO 100/ML
VIAL (ML) SUBCUTANEOUS
Refills: 0 | Status: DISCONTINUED | OUTPATIENT
Start: 2019-09-07 | End: 2019-09-09

## 2019-09-07 RX ORDER — INSULIN LISPRO 100/ML
VIAL (ML) SUBCUTANEOUS AT BEDTIME
Refills: 0 | Status: DISCONTINUED | OUTPATIENT
Start: 2019-09-07 | End: 2019-09-09

## 2019-09-07 RX ORDER — GENTAMICIN SULFATE 0.1 %
1 OINTMENT (GRAM) TOPICAL ONCE
Refills: 0 | Status: COMPLETED | OUTPATIENT
Start: 2019-09-07 | End: 2019-09-09

## 2019-09-07 RX ADMIN — ATORVASTATIN CALCIUM 40 MILLIGRAM(S): 80 TABLET, FILM COATED ORAL at 21:17

## 2019-09-07 RX ADMIN — NORTRIPTYLINE HYDROCHLORIDE 25 MILLIGRAM(S): 10 CAPSULE ORAL at 21:17

## 2019-09-07 RX ADMIN — Medication 4: at 13:34

## 2019-09-07 RX ADMIN — CALCITRIOL 0.5 MICROGRAM(S): 0.5 CAPSULE ORAL at 13:33

## 2019-09-07 RX ADMIN — Medication 1 MILLIGRAM(S): at 13:33

## 2019-09-07 RX ADMIN — Medication 3: at 17:40

## 2019-09-07 RX ADMIN — Medication 1 TABLET(S): at 13:32

## 2019-09-07 RX ADMIN — Medication 81 MILLIGRAM(S): at 13:33

## 2019-09-07 RX ADMIN — Medication 145 MILLIGRAM(S): at 13:33

## 2019-09-07 RX ADMIN — SEVELAMER CARBONATE 800 MILLIGRAM(S): 2400 POWDER, FOR SUSPENSION ORAL at 18:52

## 2019-09-07 RX ADMIN — Medication 325 MILLIGRAM(S): at 13:33

## 2019-09-07 RX ADMIN — CARVEDILOL PHOSPHATE 6.25 MILLIGRAM(S): 80 CAPSULE, EXTENDED RELEASE ORAL at 06:24

## 2019-09-07 RX ADMIN — Medication 25 GRAM(S): at 09:00

## 2019-09-07 RX ADMIN — CARVEDILOL PHOSPHATE 6.25 MILLIGRAM(S): 80 CAPSULE, EXTENDED RELEASE ORAL at 18:52

## 2019-09-07 RX ADMIN — SEVELAMER CARBONATE 800 MILLIGRAM(S): 2400 POWDER, FOR SUSPENSION ORAL at 09:09

## 2019-09-07 RX ADMIN — SEVELAMER CARBONATE 800 MILLIGRAM(S): 2400 POWDER, FOR SUSPENSION ORAL at 13:32

## 2019-09-07 NOTE — CONSULT NOTE ADULT - PROBLEM SELECTOR RECOMMENDATION 3
Patient with anemia in the setting of ESRD. Hemoglobin at target range. Will need to determine if patient receives SHANIKA. Monitor hemoglobin.

## 2019-09-07 NOTE — PHYSICAL THERAPY INITIAL EVALUATION ADULT - PATIENT PROFILE REVIEW, REHAB EVAL
yes/PT orders received: ambulate with assistance. Consult with DINORA JONES, pt may participate in PT evaluation.

## 2019-09-07 NOTE — PROVIDER CONTACT NOTE (OTHER) - ASSESSMENT
pt given 4Oz apple juice, test repeated bg now 60 but pt becoming diaphoretic and more lethargic, PA advised

## 2019-09-07 NOTE — PROGRESS NOTE ADULT - SUBJECTIVE AND OBJECTIVE BOX
Patient is a 54y old  Male who presents with a chief complaint of Dizziness, hypotension (07 Sep 2019 11:15)      SUBJECTIVE / OVERNIGHT EVENTS: No acute events overnight. No events on tele. Denies chest pain, SOB, abd pain, N/V/D. Dizziness improved.    MEDICATIONS  (STANDING):  aspirin  chewable 81 milliGRAM(s) Oral daily  atorvastatin 40 milliGRAM(s) Oral at bedtime  calcitriol   Capsule 0.5 MICROGram(s) Oral daily  carvedilol 6.25 milliGRAM(s) Oral every 12 hours  dextrose 5%. 1000 milliLiter(s) (50 mL/Hr) IV Continuous <Continuous>  dextrose 50% Injectable 12.5 Gram(s) IV Push once  dextrose 50% Injectable 25 Gram(s) IV Push once  dextrose 50% Injectable 25 Gram(s) IV Push once  fenofibrate Tablet 145 milliGRAM(s) Oral daily  ferrous    sulfate 325 milliGRAM(s) Oral daily  folic acid 1 milliGRAM(s) Oral daily  gentamicin 0.1% Cream 1 Application(s) Topical once  influenza   Vaccine 0.5 milliLiter(s) IntraMuscular once  insulin glargine Injectable (LANTUS) 20 Unit(s) SubCutaneous at bedtime  insulin lispro (HumaLOG) corrective regimen sliding scale   SubCutaneous three times a day before meals  insulin lispro (HumaLOG) corrective regimen sliding scale   SubCutaneous at bedtime  multivitamin 1 Tablet(s) Oral daily  nortriptyline 25 milliGRAM(s) Oral at bedtime  sevelamer carbonate 800 milliGRAM(s) Oral three times a day with meals    MEDICATIONS  (PRN):  acetaminophen   Tablet .. 650 milliGRAM(s) Oral every 4 hours PRN Mild Pain (1 - 3)  dextrose 40% Gel 15 Gram(s) Oral once PRN Blood Glucose LESS THAN 70 milliGRAM(s)/deciliter  glucagon  Injectable 1 milliGRAM(s) IntraMuscular once PRN Glucose LESS THAN 70 milligrams/deciliter      T(C): 36.8 (09-07-19 @ 14:26), Max: 37.1 (09-06-19 @ 15:33)  HR: 96 (09-07-19 @ 14:26) (82 - 98)  BP: 135/87 (09-07-19 @ 14:26) (117/69 - 165/84)  RR: 18 (09-07-19 @ 14:26) (16 - 19)  SpO2: 98% (09-07-19 @ 10:23) (98% - 100%)  CAPILLARY BLOOD GLUCOSE      POCT Blood Glucose.: 204 mg/dL (07 Sep 2019 13:05)  POCT Blood Glucose.: 181 mg/dL (07 Sep 2019 10:07)  POCT Blood Glucose.: 60 mg/dL (07 Sep 2019 09:00)  POCT Blood Glucose.: 44 mg/dL (07 Sep 2019 08:39)  POCT Blood Glucose.: 39 mg/dL (07 Sep 2019 08:36)  POCT Blood Glucose.: 198 mg/dL (06 Sep 2019 22:52)  POCT Blood Glucose.: 178 mg/dL (06 Sep 2019 21:08)  POCT Blood Glucose.: 133 mg/dL (06 Sep 2019 19:45)  POCT Blood Glucose.: 37 mg/dL (06 Sep 2019 19:24)    I&O's Summary    07 Sep 2019 07:01  -  07 Sep 2019 14:33  --------------------------------------------------------  IN: 0 mL / OUT: 475 mL / NET: -475 mL        PHYSICAL EXAM:  GENERAL: no apparent distress, on room air  EYES: sclera clear b/l  CHEST/LUNG: Clear to auscultation bilaterally; No wheezing or crackles  HEART: s1/s2, RRR, no murmurs appreciated  ABDOMEN: Soft, Nontender, distended; + PD catheter in lower abd C/D/I  EXTREMITIES:  WWP, no edema, dark scaly skin lower legs (per patient from his renal failure)  NEUROLOGY: awake, alert, responds to Qs appropriately, no sensory or motor deficits, 5/5 muscle strength equal in all extremities, CN 2-12 grossly intact  PSYCH: calm, cooperative  SKIN: No rashes or lesions    LABS:                        10.1   9.65  )-----------( 336      ( 06 Sep 2019 15:30 )             32.1     09-06    138  |  98  |  49<H>  ----------------------------<  84  3.9   |  23  |  9.68<H>    Ca    9.6      06 Sep 2019 15:30  Mg     2.3     09-06    TPro  7.3  /  Alb  3.4  /  TBili  0.4  /  DBili  x   /  AST  24  /  ALT  12  /  AlkPhos  52  09-06    PT/INR - ( 06 Sep 2019 15:30 )   PT: 12.0 SEC;   INR: 1.08          PTT - ( 06 Sep 2019 15:30 )  PTT:36.1 SEC          RADIOLOGY & ADDITIONAL TESTS:

## 2019-09-07 NOTE — CONSULT NOTE ADULT - ATTENDING COMMENTS
Patient examined and ROS reviewed. A case of ESRD admitted with orthostatic hypotension, anemia and confusion. Advised to initiate PD,

## 2019-09-07 NOTE — CONSULT NOTE ADULT - SUBJECTIVE AND OBJECTIVE BOX
[facility] DIVISION OF KIDNEY DISEASES AND HYPERTENSION -- [phone]  -- INITIAL CONSULT NOTE  --------------------------------------------------------------------------------  HPI: 57-year-old male with medical history of HTN, Type 2 DM, ESRD on PD daily, recent CABG on 8/9/19 at [facility] admitted with orthostatic hypotension. Nephrology consulted for ESRD management. Pt. was seen and examined at bedside. Pt. seems confused and unable to provide ROS. History obtain over phone from wife. Pt. ESRD on PD since 1.5 year ago, CCPD 8 hr, 2.5% and 2 L fill. Pt. goes to [facility] and follows with Dr Rodriguez. Pt. noted to be confused, awake, offers no complains.     PAST HISTORY  --------------------------------------------------------------------------------  PAST MEDICAL & SURGICAL HISTORY:  HTN (hypertension)  ESRD (end stage renal disease) on dialysis  CAD, multiple vessel  Diabetes  S/P CABG (coronary artery bypass graft)    FAMILY HISTORY:  FH: diabetes mellitus: father  FHx: lung cancer: bother    PAST SOCIAL HISTORY:  Unable to obtain    ALLERGIES & MEDICATIONS  --------------------------------------------------------------------------------  Allergies    No Known Allergies    Intolerances      Standing Inpatient Medications  aspirin  chewable 81 milliGRAM(s) Oral daily  atorvastatin 40 milliGRAM(s) Oral at bedtime  calcitriol   Capsule 0.5 MICROGram(s) Oral daily  carvedilol 6.25 milliGRAM(s) Oral every 12 hours  dextrose 5%. 1000 milliLiter(s) IV Continuous <Continuous>  dextrose 50% Injectable 12.5 Gram(s) IV Push once  dextrose 50% Injectable 25 Gram(s) IV Push once  dextrose 50% Injectable 25 Gram(s) IV Push once  fenofibrate Tablet 145 milliGRAM(s) Oral daily  ferrous    sulfate 325 milliGRAM(s) Oral daily  folic acid 1 milliGRAM(s) Oral daily  gentamicin 0.1% Cream 1 Application(s) Topical once  influenza   Vaccine 0.5 milliLiter(s) IntraMuscular once  insulin glargine Injectable (LANTUS) 20 Unit(s) SubCutaneous at bedtime  insulin lispro (HumaLOG) corrective regimen sliding scale   SubCutaneous three times a day before meals  insulin lispro (HumaLOG) corrective regimen sliding scale   SubCutaneous at bedtime  multivitamin 1 Tablet(s) Oral daily  nortriptyline 25 milliGRAM(s) Oral at bedtime  sevelamer carbonate 800 milliGRAM(s) Oral three times a day with meals    PRN Inpatient Medications  acetaminophen   Tablet .. 650 milliGRAM(s) Oral every 4 hours PRN  dextrose 40% Gel 15 Gram(s) Oral once PRN  glucagon  Injectable 1 milliGRAM(s) IntraMuscular once PRN      REVIEW OF SYSTEMS  --------------------------------------------------------------------------------  Gen: No fevers/chills  Skin: No rashes  Head/Eyes/Ears: Normal hearing,   Respiratory: No dyspnea, cough  CV: No chest pain  GI: No abdominal pain, diarrhea  : No dysuria, hematuria  MSK: No  edema  Heme: No easy bruising or bleeding  Psych: No significant depression    All other systems were reviewed and are negative, except as noted.    VITALS/PHYSICAL EXAM  --------------------------------------------------------------------------------  T(C): 36.7 (09-07-19 @ 06:24), Max: 37.1 (09-06-19 @ 15:33)  HR: 89 (09-07-19 @ 06:24) (82 - 98)  BP: 155/90 (09-07-19 @ 06:24) (117/69 - 165/84)  RR: 19 (09-07-19 @ 06:24) (16 - 19)  SpO2: 99% (09-07-19 @ 06:24) (98% - 100%)  Wt(kg): --  Height (cm): 170.2 (09-06-19 @ 22:24)      Physical Exam:  	Gen: NAD  	HEENT: MMM  	Pulm: CTA B/L  	CV: S1S2  	Abd: Soft, +BS  PD catheter + LQ  	Ext: No LE edema B/L  	Neuro: Awake  	Skin: Warm and dry    LABS/STUDIES  --------------------------------------------------------------------------------              10.1   9.65  >-----------<  336      [09-06-19 @ 15:30]              32.1     138  |  98  |  49  ----------------------------<  84      [09-06-19 @ 15:30]  3.9   |  23  |  9.68        Ca     9.6     [09-06-19 @ 15:30]      Mg     2.3     [09-06-19 @ 15:30]    TPro  7.3  /  Alb  3.4  /  TBili  0.4  /  DBili  x   /  AST  24  /  ALT  12  /  AlkPhos  52  [09-06-19 @ 15:30]    PT/INR: PT 12.0 , INR 1.08       [09-06-19 @ 15:30]  PTT: 36.1       [09-06-19 @ 15:30]      Creatinine Trend:  SCr 9.68 [09-06 @ 15:30]        HbA1c 6.1      [09-07-19 @ 06:00]  TSH 2.79      [09-07-19 @ 06:00]

## 2019-09-07 NOTE — PROGRESS NOTE ADULT - PROBLEM SELECTOR PLAN 3
CT chest showing b/l pleural effusions w/atelectasis and pneumoperitoneum with ascites. Can be 2/2 recent CABG procedure vs PD catheter.  - Surgery consulted given pneumoperitoneum on CT scan. They think it is related to the daily PD catheter use.

## 2019-09-07 NOTE — CONSULT NOTE ADULT - PROBLEM SELECTOR RECOMMENDATION 9
Pt. with ESRD on CCPD at home. Pt. clinically orthostatic on admission. Pt. currently on 8 hr, 2.5% and 2 L fill. Will do CAPD with 3 exchanges 2 L with 1.5 % dianeal today. Monitor BP and labs.

## 2019-09-07 NOTE — CONSULT NOTE ADULT - SUBJECTIVE AND OBJECTIVE BOX
HPI:  58yo Male, ambulatory with a walker post CABG, with MHx of HTN, Type 2 DM, ESRD on PD daily, recent CABG on 8/9/19 at [place] - c/b acute anemia was transfused "5 bags of blood", had EGD - "nothing" was found. Pt states he was admitted at [facility] for 20 days after getting CABG procedure and said it was uneventful with no complications. He was then discharged home. About 3-4 days ago, he started getting dizzy/lightheaded and measured his BP which was in the 80s/60s. The next day, it was even lower at 70s/60s (measured by sitting down). He lives with his wife and daughter and does not work any longer. Says that his BP medications were changed recently, however cannot recall which one or which one was added; He denies CP, SOB, headaches. No fevers or chills. He has been on PD for the past 2 years and does not recall having any issues with his BP and has been tolerating it well.    Endo consulted for DM2 w/ hypoglycemia. He was dx w/ DM2 about 2 years while applying for insurance. He follows [facility] w/ an endo in [place] (?Dr. Villegas). Last appt. last week. Home regimen is Toujeo 80u qhs. After recent hospitalization it was decreased to 20u but he states he increased it back because FS's were running high. On 80u, he reports FS's range from 120-180. Last hypoglycemic episode >1 mth ago. He watches his diet and states he eats mainly fish and vegetables. He has complications of retinopathy and neuropathy and follows up w/ optho and podiatry.      PAST MEDICAL & SURGICAL HISTORY:  HTN (hypertension)  ESRD (end stage renal disease) on dialysis  CAD, multiple vessel  Diabetes  S/P CABG (coronary artery bypass graft)      FAMILY HISTORY:  FH: diabetes mellitus: father  FHx: lung cancer: bother      Social History:  No toxic habits    Outpatient Medications:  Toujeo 90u qhs    MEDICATIONS  (STANDING):  aspirin  chewable 81 milliGRAM(s) Oral daily  atorvastatin 40 milliGRAM(s) Oral at bedtime  calcitriol   Capsule 0.5 MICROGram(s) Oral daily  carvedilol 6.25 milliGRAM(s) Oral every 12 hours  dextrose 5%. 1000 milliLiter(s) (50 mL/Hr) IV Continuous <Continuous>  dextrose 50% Injectable 12.5 Gram(s) IV Push once  dextrose 50% Injectable 25 Gram(s) IV Push once  dextrose 50% Injectable 25 Gram(s) IV Push once  fenofibrate Tablet 145 milliGRAM(s) Oral daily  ferrous    sulfate 325 milliGRAM(s) Oral daily  folic acid 1 milliGRAM(s) Oral daily  gentamicin 0.1% Cream 1 Application(s) Topical once  influenza   Vaccine 0.5 milliLiter(s) IntraMuscular once  insulin glargine Injectable (LANTUS) 20 Unit(s) SubCutaneous at bedtime  insulin lispro (HumaLOG) corrective regimen sliding scale   SubCutaneous three times a day before meals  insulin lispro (HumaLOG) corrective regimen sliding scale   SubCutaneous at bedtime  multivitamin 1 Tablet(s) Oral daily  nortriptyline 25 milliGRAM(s) Oral at bedtime  sevelamer carbonate 800 milliGRAM(s) Oral three times a day with meals    MEDICATIONS  (PRN):  acetaminophen   Tablet .. 650 milliGRAM(s) Oral every 4 hours PRN Mild Pain (1 - 3)  dextrose 40% Gel 15 Gram(s) Oral once PRN Blood Glucose LESS THAN 70 milliGRAM(s)/deciliter  glucagon  Injectable 1 milliGRAM(s) IntraMuscular once PRN Glucose LESS THAN 70 milligrams/deciliter      Allergies    No Known Allergies    Review of Systems:  Constitutional: No fever,+dizziness  Eyes: No blurry vision  Neuro: No tremors  HEENT: No pain  Cardiovascular: No chest pain, palpitations  Respiratory: No SOB, no cough  GI: No nausea, vomiting, abdominal pain  : No dysuria  Skin: no rash  Psych: no depression  Endocrine: no polyuria, polydipsia  Hem/lymph: no swelling  Osteoporosis: no fractures    PHYSICAL EXAM:  VITALS: T(C): 36.8 (09-07-19 @ 14:26)  T(F): 98.2 (09-07-19 @ 14:26), Max: 98.4 (09-07-19 @ 10:23)  HR: 96 (09-07-19 @ 14:26) (87 - 98)  BP: 135/87 (09-07-19 @ 14:26) (135/87 - 165/84)  RR:  (18 - 19)  SpO2:  (98% - 100%)  Wt(kg): --  GENERAL: NAD, well-groomed, well-developed  EYES: No proptosis, anicteric  HEENT:  Atraumatic, Normocephalic, moist mucous membranes  RESPIRATORY: Clear to auscultation bilaterally  CARDIOVASCULAR: Regular rate and rhythm  GI: Soft, nontender, non distended, normal bowel sounds  SKIN: Dry, intact  MUSCULOSKELETAL: Full range of motion  NEURO: no tremor  PSYCH: Alert and oriented x 3, normal affect, normal mood    POCT Blood Glucose.: 204 mg/dL (09-07-19 @ 13:05)  POCT Blood Glucose.: 181 mg/dL (09-07-19 @ 10:07)  POCT Blood Glucose.: 60 mg/dL (09-07-19 @ 09:00)  POCT Blood Glucose.: 44 mg/dL (09-07-19 @ 08:39)  POCT Blood Glucose.: 39 mg/dL (09-07-19 @ 08:36)  POCT Blood Glucose.: 198 mg/dL (09-06-19 @ 22:52)  POCT Blood Glucose.: 178 mg/dL (09-06-19 @ 21:08)  POCT Blood Glucose.: 133 mg/dL (09-06-19 @ 19:45)  POCT Blood Glucose.: 37 mg/dL (09-06-19 @ 19:24)                            10.1   9.65  )-----------( 336      ( 06 Sep 2019 15:30 )             32.1       09-06    138  |  98  |  49<H>  ----------------------------<  84  3.9   |  23  |  9.68<H>    EGFR if : 6   EGFR if non : 5     Ca    9.6      09-06  Mg     2.3     09-06    TPro  7.3  /  Alb  3.4  /  TBili  0.4  /  DBili  x   /  AST  24  /  ALT  12  /  AlkPhos  52  09-06      Thyroid Function Tests:  09-07 @ 06:00 TSH 2.79 FreeT4 -- T3 -- Anti TPO -- Anti Thyroglobulin Ab -- TSI --      Hemoglobin A1C, Whole Blood: 6.1 % <H> [4.0 - 5.6] (09-07-19 @ 06:00)          Radiology:

## 2019-09-07 NOTE — PHYSICAL THERAPY INITIAL EVALUATION ADULT - PERTINENT HX OF CURRENT PROBLEM, REHAB EVAL
Patient is a 54 year old male admitted to [facility] on 9/6 with hypotension and dizziness. PMH: HTN, Type 2 DM, ESRD on PD daily, recent CABG on 8/9/19 at [place] - c/b acute anemia.

## 2019-09-07 NOTE — PHYSICAL THERAPY INITIAL EVALUATION ADULT - ADDITIONAL COMMENTS
Patient reports he lives with his wife and daughter in a private house, 4-5 steps to enter. Patient reports he was previously independent in all ADLs and ambulated with a cane.    Patient was left sitting at the edge of the bed, all lines/tubes intact and call ta within reach, DINORA ayala

## 2019-09-07 NOTE — PROGRESS NOTE ADULT - PROBLEM SELECTOR PLAN 1
A1C: 6.1, As per patient, takes Lantus 80 U qHS. Still hypoglycemic this AM  Check CORAZON Ab, Cpeptide r/o type 1 diabetes  Decrease Lantus to 20 U qhs, low correction SSI, Endo consult

## 2019-09-07 NOTE — PROGRESS NOTE ADULT - ASSESSMENT
58yo Male, ambulatory with a walker post CABG, PMHx of HTN, Type 2 DM, ESRD on PD daily, recent CABG on 8/9/19 at [place] c/b acute blood loss anemia requiring PRBC. A/w orthostatic hypotension and an episode of hypoglycemia in ED to 30s;

## 2019-09-07 NOTE — CONSULT NOTE ADULT - PROBLEM SELECTOR RECOMMENDATION 9
Hba1c 6.1, although pt. s/p blood transfusions over past mth so unreliable.  He has had recurrent hypoglycemia. Admits to poor PO intake because he does not like the hospital food.  For now would d/c all insulin and monitor on low dose SS only. If hypoglycemia recurs off insulin >24-36h, will consider further w/u. (BP currently stable, Na 138).

## 2019-09-07 NOTE — CONSULT NOTE ADULT - ASSESSMENT
58 yo man w/ DM2, CAD s/p CABG,  HTN, ESRD on PD daily, a/w dizziness/lightheadedness due to hypotension, also w/ episodes of hypoglycemia.

## 2019-09-08 LAB
ANION GAP SERPL CALC-SCNC: 17 MMO/L — HIGH (ref 7–14)
BASOPHILS # BLD AUTO: 0.07 K/UL — SIGNIFICANT CHANGE UP (ref 0–0.2)
BASOPHILS NFR BLD AUTO: 0.8 % — SIGNIFICANT CHANGE UP (ref 0–2)
BUN SERPL-MCNC: 52 MG/DL — HIGH (ref 7–23)
CALCIUM SERPL-MCNC: 8.9 MG/DL — SIGNIFICANT CHANGE UP (ref 8.4–10.5)
CHLORIDE SERPL-SCNC: 98 MMOL/L — SIGNIFICANT CHANGE UP (ref 98–107)
CO2 SERPL-SCNC: 24 MMOL/L — SIGNIFICANT CHANGE UP (ref 22–31)
CREAT SERPL-MCNC: 9.09 MG/DL — HIGH (ref 0.5–1.3)
EOSINOPHIL # BLD AUTO: 0.62 K/UL — HIGH (ref 0–0.5)
EOSINOPHIL NFR BLD AUTO: 7.2 % — HIGH (ref 0–6)
GLUCOSE BLDC GLUCOMTR-MCNC: 151 MG/DL — HIGH (ref 70–99)
GLUCOSE BLDC GLUCOMTR-MCNC: 171 MG/DL — HIGH (ref 70–99)
GLUCOSE BLDC GLUCOMTR-MCNC: 175 MG/DL — HIGH (ref 70–99)
GLUCOSE BLDC GLUCOMTR-MCNC: 212 MG/DL — HIGH (ref 70–99)
GLUCOSE SERPL-MCNC: 206 MG/DL — HIGH (ref 70–99)
HCT VFR BLD CALC: 29.3 % — LOW (ref 39–50)
HGB BLD-MCNC: 9.2 G/DL — LOW (ref 13–17)
IMM GRANULOCYTES NFR BLD AUTO: 0.3 % — SIGNIFICANT CHANGE UP (ref 0–1.5)
LYMPHOCYTES # BLD AUTO: 1.82 K/UL — SIGNIFICANT CHANGE UP (ref 1–3.3)
LYMPHOCYTES # BLD AUTO: 21 % — SIGNIFICANT CHANGE UP (ref 13–44)
MAGNESIUM SERPL-MCNC: 2.1 MG/DL — SIGNIFICANT CHANGE UP (ref 1.6–2.6)
MCHC RBC-ENTMCNC: 29.1 PG — SIGNIFICANT CHANGE UP (ref 27–34)
MCHC RBC-ENTMCNC: 31.4 % — LOW (ref 32–36)
MCV RBC AUTO: 92.7 FL — SIGNIFICANT CHANGE UP (ref 80–100)
MONOCYTES # BLD AUTO: 0.95 K/UL — HIGH (ref 0–0.9)
MONOCYTES NFR BLD AUTO: 11 % — SIGNIFICANT CHANGE UP (ref 2–14)
NEUTROPHILS # BLD AUTO: 5.17 K/UL — SIGNIFICANT CHANGE UP (ref 1.8–7.4)
NEUTROPHILS NFR BLD AUTO: 59.7 % — SIGNIFICANT CHANGE UP (ref 43–77)
NRBC # FLD: 0 K/UL — SIGNIFICANT CHANGE UP (ref 0–0)
PLATELET # BLD AUTO: 318 K/UL — SIGNIFICANT CHANGE UP (ref 150–400)
PMV BLD: 9.8 FL — SIGNIFICANT CHANGE UP (ref 7–13)
POTASSIUM SERPL-MCNC: 3.8 MMOL/L — SIGNIFICANT CHANGE UP (ref 3.5–5.3)
POTASSIUM SERPL-SCNC: 3.8 MMOL/L — SIGNIFICANT CHANGE UP (ref 3.5–5.3)
RBC # BLD: 3.16 M/UL — LOW (ref 4.2–5.8)
RBC # FLD: 15 % — HIGH (ref 10.3–14.5)
SODIUM SERPL-SCNC: 139 MMOL/L — SIGNIFICANT CHANGE UP (ref 135–145)
WBC # BLD: 8.66 K/UL — SIGNIFICANT CHANGE UP (ref 3.8–10.5)
WBC # FLD AUTO: 8.66 K/UL — SIGNIFICANT CHANGE UP (ref 3.8–10.5)

## 2019-09-08 PROCEDURE — 99233 SBSQ HOSP IP/OBS HIGH 50: CPT

## 2019-09-08 PROCEDURE — 99232 SBSQ HOSP IP/OBS MODERATE 35: CPT | Mod: GC

## 2019-09-08 RX ORDER — AMLODIPINE BESYLATE 2.5 MG/1
5 TABLET ORAL DAILY
Refills: 0 | Status: DISCONTINUED | OUTPATIENT
Start: 2019-09-08 | End: 2019-09-09

## 2019-09-08 RX ORDER — INSULIN GLARGINE 100 [IU]/ML
20 INJECTION, SOLUTION SUBCUTANEOUS AT BEDTIME
Refills: 0 | Status: DISCONTINUED | OUTPATIENT
Start: 2019-09-08 | End: 2019-09-09

## 2019-09-08 RX ADMIN — SEVELAMER CARBONATE 800 MILLIGRAM(S): 2400 POWDER, FOR SUSPENSION ORAL at 09:04

## 2019-09-08 RX ADMIN — Medication 145 MILLIGRAM(S): at 12:13

## 2019-09-08 RX ADMIN — Medication 1: at 12:58

## 2019-09-08 RX ADMIN — SEVELAMER CARBONATE 800 MILLIGRAM(S): 2400 POWDER, FOR SUSPENSION ORAL at 12:13

## 2019-09-08 RX ADMIN — NORTRIPTYLINE HYDROCHLORIDE 25 MILLIGRAM(S): 10 CAPSULE ORAL at 21:43

## 2019-09-08 RX ADMIN — Medication 1: at 09:04

## 2019-09-08 RX ADMIN — SEVELAMER CARBONATE 800 MILLIGRAM(S): 2400 POWDER, FOR SUSPENSION ORAL at 18:01

## 2019-09-08 RX ADMIN — Medication 1 MILLIGRAM(S): at 12:13

## 2019-09-08 RX ADMIN — Medication 325 MILLIGRAM(S): at 12:13

## 2019-09-08 RX ADMIN — CARVEDILOL PHOSPHATE 6.25 MILLIGRAM(S): 80 CAPSULE, EXTENDED RELEASE ORAL at 06:42

## 2019-09-08 RX ADMIN — ATORVASTATIN CALCIUM 40 MILLIGRAM(S): 80 TABLET, FILM COATED ORAL at 21:43

## 2019-09-08 RX ADMIN — CARVEDILOL PHOSPHATE 6.25 MILLIGRAM(S): 80 CAPSULE, EXTENDED RELEASE ORAL at 18:01

## 2019-09-08 RX ADMIN — Medication 1 TABLET(S): at 12:13

## 2019-09-08 RX ADMIN — INSULIN GLARGINE 20 UNIT(S): 100 INJECTION, SOLUTION SUBCUTANEOUS at 21:43

## 2019-09-08 RX ADMIN — Medication 81 MILLIGRAM(S): at 12:13

## 2019-09-08 RX ADMIN — CALCITRIOL 0.5 MICROGRAM(S): 0.5 CAPSULE ORAL at 12:13

## 2019-09-08 NOTE — PROGRESS NOTE ADULT - PROBLEM SELECTOR PLAN 3
Patient with anemia in the setting of ESRD. Hemoglobin below target range. Will need to determine if patient receives SHANIKA. Monitor hemoglobin.

## 2019-09-08 NOTE — PROGRESS NOTE ADULT - SUBJECTIVE AND OBJECTIVE BOX
Follow-up on: T2DM follow up    Subjective:    MEDICATIONS  (STANDING):  amLODIPine   Tablet 5 milliGRAM(s) Oral daily  aspirin  chewable 81 milliGRAM(s) Oral daily  atorvastatin 40 milliGRAM(s) Oral at bedtime  calcitriol   Capsule 0.5 MICROGram(s) Oral daily  carvedilol 6.25 milliGRAM(s) Oral every 12 hours  dextrose 5%. 1000 milliLiter(s) (50 mL/Hr) IV Continuous <Continuous>  dextrose 50% Injectable 12.5 Gram(s) IV Push once  dextrose 50% Injectable 25 Gram(s) IV Push once  dextrose 50% Injectable 25 Gram(s) IV Push once  fenofibrate Tablet 145 milliGRAM(s) Oral daily  ferrous    sulfate 325 milliGRAM(s) Oral daily  folic acid 1 milliGRAM(s) Oral daily  gentamicin 0.1% Cream 1 Application(s) Topical once  influenza   Vaccine 0.5 milliLiter(s) IntraMuscular once  insulin lispro (HumaLOG) corrective regimen sliding scale   SubCutaneous three times a day before meals  insulin lispro (HumaLOG) corrective regimen sliding scale   SubCutaneous at bedtime  multivitamin 1 Tablet(s) Oral daily  nortriptyline 25 milliGRAM(s) Oral at bedtime  sevelamer carbonate 800 milliGRAM(s) Oral three times a day with meals    MEDICATIONS  (PRN):  acetaminophen   Tablet .. 650 milliGRAM(s) Oral every 4 hours PRN Mild Pain (1 - 3)  dextrose 40% Gel 15 Gram(s) Oral once PRN Blood Glucose LESS THAN 70 milliGRAM(s)/deciliter  glucagon  Injectable 1 milliGRAM(s) IntraMuscular once PRN Glucose LESS THAN 70 milligrams/deciliter      PHYSICAL EXAM:  VITALS: T(C): 36.7 (09-08-19 @ 13:02)  T(F): 98 (09-08-19 @ 13:02), Max: 98.7 (09-08-19 @ 03:00)  HR: 93 (09-08-19 @ 13:02) (93 - 100)  BP: 146/93 (09-08-19 @ 13:02) (135/87 - 172/96)  RR:  (17 - 19)  SpO2:  (98% - 100%)  Wt(kg): --  GENERAL: NAD, well-groomed, well-developed  RESPIRATORY: Clear to auscultation bilaterally; No rales, rhonchi, wheezing, or rubs  CARDIOVASCULAR: Regular rate and rhythm; No murmurs; no peripheral edema  GI: Soft, nontender, non distended, normal bowel sounds  Skin: chronic skin changes in bilateral lower extremities.       POCT Blood Glucose.: 151 mg/dL (09-08-19 @ 12:51)  POCT Blood Glucose.: 175 mg/dL (09-08-19 @ 08:55)  POCT Blood Glucose.: 212 mg/dL (09-08-19 @ 06:53)  POCT Blood Glucose.: 282 mg/dL (09-07-19 @ 17:32)  POCT Blood Glucose.: 204 mg/dL (09-07-19 @ 13:05)  POCT Blood Glucose.: 181 mg/dL (09-07-19 @ 10:07)  POCT Blood Glucose.: 60 mg/dL (09-07-19 @ 09:00)  POCT Blood Glucose.: 44 mg/dL (09-07-19 @ 08:39)  POCT Blood Glucose.: 39 mg/dL (09-07-19 @ 08:36)  POCT Blood Glucose.: 198 mg/dL (09-06-19 @ 22:52)  POCT Blood Glucose.: 178 mg/dL (09-06-19 @ 21:08)  POCT Blood Glucose.: 133 mg/dL (09-06-19 @ 19:45)  POCT Blood Glucose.: 37 mg/dL (09-06-19 @ 19:24)    09-08    139  |  98  |  52<H>  ----------------------------<  206<H>  3.8   |  24  |  9.09<H>    EGFR if : 7   EGFR if non : 6     Ca    8.9      09-08  Mg     2.1     09-08    TPro  7.3  /  Alb  3.4  /  TBili  0.4  /  DBili  x   /  AST  24  /  ALT  12  /  AlkPhos  52  09-06          Thyroid Function Tests:  09-07 @ 06:00 TSH 2.79       Hemoglobin A1C, Whole Blood: 6.1 % <H> [4.0 - 5.6] (09-07-19 @ 06:00) Follow-up on: T2DM follow up    Subjective: Patient says doing fine. Appetite is good. Denies any complaints.     MEDICATIONS  (STANDING):  amLODIPine   Tablet 5 milliGRAM(s) Oral daily  aspirin  chewable 81 milliGRAM(s) Oral daily  atorvastatin 40 milliGRAM(s) Oral at bedtime  calcitriol   Capsule 0.5 MICROGram(s) Oral daily  carvedilol 6.25 milliGRAM(s) Oral every 12 hours  dextrose 5%. 1000 milliLiter(s) (50 mL/Hr) IV Continuous <Continuous>  dextrose 50% Injectable 12.5 Gram(s) IV Push once  dextrose 50% Injectable 25 Gram(s) IV Push once  dextrose 50% Injectable 25 Gram(s) IV Push once  fenofibrate Tablet 145 milliGRAM(s) Oral daily  ferrous    sulfate 325 milliGRAM(s) Oral daily  folic acid 1 milliGRAM(s) Oral daily  gentamicin 0.1% Cream 1 Application(s) Topical once  influenza   Vaccine 0.5 milliLiter(s) IntraMuscular once  insulin lispro (HumaLOG) corrective regimen sliding scale   SubCutaneous three times a day before meals  insulin lispro (HumaLOG) corrective regimen sliding scale   SubCutaneous at bedtime  multivitamin 1 Tablet(s) Oral daily  nortriptyline 25 milliGRAM(s) Oral at bedtime  sevelamer carbonate 800 milliGRAM(s) Oral three times a day with meals    MEDICATIONS  (PRN):  acetaminophen   Tablet .. 650 milliGRAM(s) Oral every 4 hours PRN Mild Pain (1 - 3)  dextrose 40% Gel 15 Gram(s) Oral once PRN Blood Glucose LESS THAN 70 milliGRAM(s)/deciliter  glucagon  Injectable 1 milliGRAM(s) IntraMuscular once PRN Glucose LESS THAN 70 milligrams/deciliter      PHYSICAL EXAM:  VITALS: T(C): 36.7 (09-08-19 @ 13:02)  T(F): 98 (09-08-19 @ 13:02), Max: 98.7 (09-08-19 @ 03:00)  HR: 93 (09-08-19 @ 13:02) (93 - 100)  BP: 146/93 (09-08-19 @ 13:02) (135/87 - 172/96)  RR:  (17 - 19)  SpO2:  (98% - 100%)  Wt(kg): --  GENERAL: NAD, well-groomed, well-developed  RESPIRATORY: Clear to auscultation bilaterally; No rales, rhonchi, wheezing, or rubs  CARDIOVASCULAR: Regular rate and rhythm; No murmurs; no peripheral edema  GI: Soft, nontender, non distended, normal bowel sounds  Skin: chronic skin changes in bilateral lower extremities.       POCT Blood Glucose.: 151 mg/dL (09-08-19 @ 12:51)  POCT Blood Glucose.: 175 mg/dL (09-08-19 @ 08:55)  POCT Blood Glucose.: 212 mg/dL (09-08-19 @ 06:53)  POCT Blood Glucose.: 282 mg/dL (09-07-19 @ 17:32)  POCT Blood Glucose.: 204 mg/dL (09-07-19 @ 13:05)  POCT Blood Glucose.: 181 mg/dL (09-07-19 @ 10:07)  POCT Blood Glucose.: 60 mg/dL (09-07-19 @ 09:00)  POCT Blood Glucose.: 44 mg/dL (09-07-19 @ 08:39)  POCT Blood Glucose.: 39 mg/dL (09-07-19 @ 08:36)  POCT Blood Glucose.: 198 mg/dL (09-06-19 @ 22:52)  POCT Blood Glucose.: 178 mg/dL (09-06-19 @ 21:08)  POCT Blood Glucose.: 133 mg/dL (09-06-19 @ 19:45)  POCT Blood Glucose.: 37 mg/dL (09-06-19 @ 19:24)    09-08    139  |  98  |  52<H>  ----------------------------<  206<H>  3.8   |  24  |  9.09<H>    EGFR if : 7   EGFR if non : 6     Ca    8.9      09-08  Mg     2.1     09-08    TPro  7.3  /  Alb  3.4  /  TBili  0.4  /  DBili  x   /  AST  24  /  ALT  12  /  AlkPhos  52  09-06          Thyroid Function Tests:  09-07 @ 06:00 TSH 2.79       Hemoglobin A1C, Whole Blood: 6.1 % <H> [4.0 - 5.6] (09-07-19 @ 06:00)

## 2019-09-08 NOTE — PROGRESS NOTE ADULT - SUBJECTIVE AND OBJECTIVE BOX
[facility] DIVISION OF KIDNEY DISEASES AND HYPERTENSION -- FOLLOW UP NOTE  --------------------------------------------------------------------------------  HPI: 57-year-old male with medical history of HTN, Type 2 DM, ESRD on PD daily, recent CABG on 8/9/19 at [place] admitted with orthostatic hypotension. Nephrology consulted for ESRD management.     Pt. was seen and examined at bedside. Tolerated PD well yesterday.     PAST HISTORY  --------------------------------------------------------------------------------  No significant changes to PMH, PSH, FHx, SHx, unless otherwise noted    ALLERGIES & MEDICATIONS  --------------------------------------------------------------------------------  Allergies    No Known Allergies    Intolerances      Standing Inpatient Medications  amLODIPine   Tablet 5 milliGRAM(s) Oral daily  aspirin  chewable 81 milliGRAM(s) Oral daily  atorvastatin 40 milliGRAM(s) Oral at bedtime  calcitriol   Capsule 0.5 MICROGram(s) Oral daily  carvedilol 6.25 milliGRAM(s) Oral every 12 hours  dextrose 5%. 1000 milliLiter(s) IV Continuous <Continuous>  dextrose 50% Injectable 12.5 Gram(s) IV Push once  dextrose 50% Injectable 25 Gram(s) IV Push once  dextrose 50% Injectable 25 Gram(s) IV Push once  fenofibrate Tablet 145 milliGRAM(s) Oral daily  ferrous    sulfate 325 milliGRAM(s) Oral daily  folic acid 1 milliGRAM(s) Oral daily  gentamicin 0.1% Cream 1 Application(s) Topical once  influenza   Vaccine 0.5 milliLiter(s) IntraMuscular once  insulin lispro (HumaLOG) corrective regimen sliding scale   SubCutaneous three times a day before meals  insulin lispro (HumaLOG) corrective regimen sliding scale   SubCutaneous at bedtime  multivitamin 1 Tablet(s) Oral daily  nortriptyline 25 milliGRAM(s) Oral at bedtime  sevelamer carbonate 800 milliGRAM(s) Oral three times a day with meals    PRN Inpatient Medications  acetaminophen   Tablet .. 650 milliGRAM(s) Oral every 4 hours PRN  dextrose 40% Gel 15 Gram(s) Oral once PRN  glucagon  Injectable 1 milliGRAM(s) IntraMuscular once PRN      REVIEW OF SYSTEMS  --------------------------------------------------------------------------------  Gen: No fevers/chills  Skin: No rashes  Head/Eyes/Ears: Normal hearing,   Respiratory: No dyspnea, cough  CV: No chest pain  GI: No abdominal pain, diarrhea  : No dysuria, hematuria  MSK: No  edema  Heme: No easy bruising or bleeding  Psych: No significant depression      All other systems were reviewed and are negative, except as noted.    VITALS/PHYSICAL EXAM  --------------------------------------------------------------------------------  T(C): 36.6 (09-08-19 @ 09:06), Max: 37.1 (09-08-19 @ 03:00)  HR: 94 (09-08-19 @ 09:06) (94 - 100)  BP: 172/96 (09-08-19 @ 05:16) (135/87 - 172/96)  RR: 18 (09-08-19 @ 09:06) (17 - 19)  SpO2: 98% (09-08-19 @ 09:06) (98% - 100%)  Wt(kg): --  Height (cm): 170.2 (09-06-19 @ 22:24)  Weight (kg): 77.1 (09-06-19 @ 22:24)  BMI (kg/m2): 26.6 (09-06-19 @ 22:24)  BSA (m2): 1.89 (09-06-19 @ 22:24)      09-07-19 @ 07:01  -  09-08-19 @ 07:00  --------------------------------------------------------  IN: 6000 mL / OUT: 8175 mL / NET: -2175 mL        Physical Exam:  	Gen: NAD  	HEENT: MMM  	Pulm: CTA B/L  	CV: S1S2  	Abd: Soft, +BS   	Ext: No LE edema B/L  	Neuro: Awake  	Skin: Warm and dry  	Vascular access:      LABS/STUDIES  --------------------------------------------------------------------------------              9.2    8.66  >-----------<  318      [09-08-19 @ 06:07]              29.3     139  |  98  |  52  ----------------------------<  206      [09-08-19 @ 06:07]  3.8   |  24  |  9.09        Ca     8.9     [09-08-19 @ 06:07]      Mg     2.1     [09-08-19 @ 06:07]    TPro  7.3  /  Alb  3.4  /  TBili  0.4  /  DBili  x   /  AST  24  /  ALT  12  /  AlkPhos  52  [09-06-19 @ 15:30]    PT/INR: PT 12.0 , INR 1.08       [09-06-19 @ 15:30]  PTT: 36.1       [09-06-19 @ 15:30]      Creatinine Trend:  SCr 9.09 [09-08 @ 06:07]  SCr 9.72 [09-07 @ 16:00]  SCr 9.68 [09-06 @ 15:30] [facility] DIVISION OF KIDNEY DISEASES AND HYPERTENSION -- FOLLOW UP NOTE  --------------------------------------------------------------------------------  HPI: 57-year-old male with medical history of HTN, Type 2 DM, ESRD on PD daily, recent CABG on 8/9/19 at [place] admitted with orthostatic hypotension. Nephrology consulted for ESRD management.     Pt. was seen and examined at bedside. Tolerated PD well yesterday. No complains today. Persistently orthostatic.     PAST HISTORY  --------------------------------------------------------------------------------  No significant changes to PMH, PSH, FHx, SHx, unless otherwise noted    ALLERGIES & MEDICATIONS  --------------------------------------------------------------------------------  Allergies    No Known Allergies    Intolerances      Standing Inpatient Medications  amLODIPine   Tablet 5 milliGRAM(s) Oral daily  aspirin  chewable 81 milliGRAM(s) Oral daily  atorvastatin 40 milliGRAM(s) Oral at bedtime  calcitriol   Capsule 0.5 MICROGram(s) Oral daily  carvedilol 6.25 milliGRAM(s) Oral every 12 hours  dextrose 5%. 1000 milliLiter(s) IV Continuous <Continuous>  dextrose 50% Injectable 12.5 Gram(s) IV Push once  dextrose 50% Injectable 25 Gram(s) IV Push once  dextrose 50% Injectable 25 Gram(s) IV Push once  fenofibrate Tablet 145 milliGRAM(s) Oral daily  ferrous    sulfate 325 milliGRAM(s) Oral daily  folic acid 1 milliGRAM(s) Oral daily  gentamicin 0.1% Cream 1 Application(s) Topical once  influenza   Vaccine 0.5 milliLiter(s) IntraMuscular once  insulin lispro (HumaLOG) corrective regimen sliding scale   SubCutaneous three times a day before meals  insulin lispro (HumaLOG) corrective regimen sliding scale   SubCutaneous at bedtime  multivitamin 1 Tablet(s) Oral daily  nortriptyline 25 milliGRAM(s) Oral at bedtime  sevelamer carbonate 800 milliGRAM(s) Oral three times a day with meals    PRN Inpatient Medications  acetaminophen   Tablet .. 650 milliGRAM(s) Oral every 4 hours PRN  dextrose 40% Gel 15 Gram(s) Oral once PRN  glucagon  Injectable 1 milliGRAM(s) IntraMuscular once PRN      REVIEW OF SYSTEMS  --------------------------------------------------------------------------------  Gen: No fevers/chills  Skin: No rashes  Head/Eyes/Ears: Normal hearing,   Respiratory: No dyspnea, cough  CV: No chest pain  GI: No abdominal pain, diarrhea  : No dysuria, hematuria  MSK: No  edema  Heme: No easy bruising or bleeding  Psych: No significant depression      All other systems were reviewed and are negative, except as noted.    VITALS/PHYSICAL EXAM  --------------------------------------------------------------------------------  T(C): 36.6 (09-08-19 @ 09:06), Max: 37.1 (09-08-19 @ 03:00)  HR: 94 (09-08-19 @ 09:06) (94 - 100)  BP: 172/96 (09-08-19 @ 05:16) (135/87 - 172/96)  RR: 18 (09-08-19 @ 09:06) (17 - 19)  SpO2: 98% (09-08-19 @ 09:06) (98% - 100%)  Wt(kg): --  Height (cm): 170.2 (09-06-19 @ 22:24)  Weight (kg): 77.1 (09-06-19 @ 22:24)  BMI (kg/m2): 26.6 (09-06-19 @ 22:24)  BSA (m2): 1.89 (09-06-19 @ 22:24)      09-07-19 @ 07:01  -  09-08-19 @ 07:00  --------------------------------------------------------  IN: 6000 mL / OUT: 8175 mL / NET: -2175 mL        Physical Exam:  	Gen: NAD  	HEENT: MMM  	Pulm: CTA B/L  	CV: S1S2  	Abd: Soft, +BS  PD catheter + LQ  	Ext: No LE edema B/L  	Neuro: Awake  	Skin: Warm and dry    LABS/STUDIES  --------------------------------------------------------------------------------              9.2    8.66  >-----------<  318      [09-08-19 @ 06:07]              29.3     139  |  98  |  52  ----------------------------<  206      [09-08-19 @ 06:07]  3.8   |  24  |  9.09        Ca     8.9     [09-08-19 @ 06:07]      Mg     2.1     [09-08-19 @ 06:07]    TPro  7.3  /  Alb  3.4  /  TBili  0.4  /  DBili  x   /  AST  24  /  ALT  12  /  AlkPhos  52  [09-06-19 @ 15:30]    PT/INR: PT 12.0 , INR 1.08       [09-06-19 @ 15:30]  PTT: 36.1       [09-06-19 @ 15:30]      Creatinine Trend:  SCr 9.09 [09-08 @ 06:07]  SCr 9.72 [09-07 @ 16:00]  SCr 9.68 [09-06 @ 15:30]

## 2019-09-08 NOTE — PROGRESS NOTE ADULT - SUBJECTIVE AND OBJECTIVE BOX
Patient is a 54y old  Male who presents with a chief complaint of Dizziness, hypotension (07 Sep 2019 15:43)      SUBJECTIVE / OVERNIGHT EVENTS: No acute events overnight. Denies chest pain, dizziness, N/V/D    MEDICATIONS  (STANDING):  aspirin  chewable 81 milliGRAM(s) Oral daily  atorvastatin 40 milliGRAM(s) Oral at bedtime  calcitriol   Capsule 0.5 MICROGram(s) Oral daily  carvedilol 6.25 milliGRAM(s) Oral every 12 hours  dextrose 5%. 1000 milliLiter(s) (50 mL/Hr) IV Continuous <Continuous>  dextrose 50% Injectable 12.5 Gram(s) IV Push once  dextrose 50% Injectable 25 Gram(s) IV Push once  dextrose 50% Injectable 25 Gram(s) IV Push once  fenofibrate Tablet 145 milliGRAM(s) Oral daily  ferrous    sulfate 325 milliGRAM(s) Oral daily  folic acid 1 milliGRAM(s) Oral daily  gentamicin 0.1% Cream 1 Application(s) Topical once  influenza   Vaccine 0.5 milliLiter(s) IntraMuscular once  insulin lispro (HumaLOG) corrective regimen sliding scale   SubCutaneous three times a day before meals  insulin lispro (HumaLOG) corrective regimen sliding scale   SubCutaneous at bedtime  multivitamin 1 Tablet(s) Oral daily  nortriptyline 25 milliGRAM(s) Oral at bedtime  sevelamer carbonate 800 milliGRAM(s) Oral three times a day with meals    MEDICATIONS  (PRN):  acetaminophen   Tablet .. 650 milliGRAM(s) Oral every 4 hours PRN Mild Pain (1 - 3)  dextrose 40% Gel 15 Gram(s) Oral once PRN Blood Glucose LESS THAN 70 milliGRAM(s)/deciliter  glucagon  Injectable 1 milliGRAM(s) IntraMuscular once PRN Glucose LESS THAN 70 milligrams/deciliter      T(C): 36.6 (09-08-19 @ 09:06), Max: 37.1 (09-08-19 @ 03:00)  HR: 94 (09-08-19 @ 09:06) (94 - 100)  BP: 172/96 (09-08-19 @ 05:16) (135/87 - 172/96)  RR: 18 (09-08-19 @ 09:06) (17 - 19)  SpO2: 98% (09-08-19 @ 09:06) (98% - 100%)  CAPILLARY BLOOD GLUCOSE      POCT Blood Glucose.: 175 mg/dL (08 Sep 2019 08:55)  POCT Blood Glucose.: 212 mg/dL (08 Sep 2019 06:53)  POCT Blood Glucose.: 282 mg/dL (07 Sep 2019 17:32)  POCT Blood Glucose.: 204 mg/dL (07 Sep 2019 13:05)    I&O's Summary    07 Sep 2019 07:01  -  08 Sep 2019 07:00  --------------------------------------------------------  IN: 6000 mL / OUT: 8175 mL / NET: -2175 mL        PHYSICAL EXAM:  GENERAL: no apparent distress, on room air, eating breakfast  EYES: sclera clear b/l  CHEST/LUNG: Clear to auscultation bilaterally; No wheezing or crackles  HEART: s1/s2, RRR, no murmurs appreciated  ABDOMEN: Soft, Nontender, distended; + PD catheter in lower abd C/D/I  EXTREMITIES:  WWP, no edema, dark scaly skin lower legs (per patient from his renal failure)  NEUROLOGY: awake, alert, responds to Qs appropriately, no gross deficits  PSYCH: calm, cooperative  SKIN: dry scaly skin lower legs b/l    LABS:                        9.2    8.66  )-----------( 318      ( 08 Sep 2019 06:07 )             29.3     09-08    139  |  98  |  52<H>  ----------------------------<  206<H>  3.8   |  24  |  9.09<H>    Ca    8.9      08 Sep 2019 06:07  Mg     2.1     09-08    TPro  7.3  /  Alb  3.4  /  TBili  0.4  /  DBili  x   /  AST  24  /  ALT  12  /  AlkPhos  52  09-06    PT/INR - ( 06 Sep 2019 15:30 )   PT: 12.0 SEC;   INR: 1.08          PTT - ( 06 Sep 2019 15:30 )  PTT:36.1 SEC          RADIOLOGY & ADDITIONAL TESTS:

## 2019-09-08 NOTE — PROGRESS NOTE ADULT - ASSESSMENT
56 yo man w/ DM2, CAD s/p CABG,  HTN, ESRD on PD daily, a/w dizziness/lightheadedness due to hypotension, also w/ episodes of hypoglycemia.

## 2019-09-08 NOTE — PROGRESS NOTE ADULT - PROBLEM SELECTOR PLAN 1
Pt. with ESRD on CCPD at home. Pt. clinically orthostatic. Tolerated PD well yesterday. No plan for PD today. Will resume PD tomorrow. Monitor BP and labs.

## 2019-09-08 NOTE — PROGRESS NOTE ADULT - PROBLEM SELECTOR PLAN 1
- Hba1c 6.1, but unreliable given history of anemia and recent blood transfusions over past month  - No more hypoglycemia today.   - AM  off basal insulin, patient is on Toujeo 80 units qhs at home, would recommend to start Lantus 20 units at bedtime today  - Continue with mild Humalog correctional scale and low bedtime scale.   - Monitor FS before meals and at bedtime  - Goal -180  - Discharge plan: TBD based on the hospital course. - Hba1c 6.1, but unreliable given history of anemia and recent blood transfusions over past month  - No more hypoglycemia today.   - AM  off basal insulin, patient is on Toujeo 80 units qhs at home, would recommend to start Lantus 20 units at bedtime today. Discussed with the PA.   - Continue with mild Humalog correctional scale and low bedtime scale.   - Monitor FS before meals and at bedtime  - Goal -180  - Discharge plan: TBD based on the hospital course.

## 2019-09-08 NOTE — PROGRESS NOTE ADULT - ASSESSMENT
58yo Male, ambulatory with a walker post CABG, PMHx of HTN, Type 2 DM, ESRD on PD daily, recent CABG on 8/9/19 at [place] c/b acute blood loss anemia requiring PRBC. A/w orthostatic hypotension and an episode of hypoglycemia in ED to 30s;

## 2019-09-09 ENCOUNTER — TRANSCRIPTION ENCOUNTER (OUTPATIENT)
Age: 54
End: 2019-09-09

## 2019-09-09 VITALS
DIASTOLIC BLOOD PRESSURE: 92 MMHG | RESPIRATION RATE: 18 BRPM | HEART RATE: 98 BPM | TEMPERATURE: 98 F | OXYGEN SATURATION: 100 % | SYSTOLIC BLOOD PRESSURE: 158 MMHG

## 2019-09-09 LAB
ANION GAP SERPL CALC-SCNC: 16 MMO/L — HIGH (ref 7–14)
BASOPHILS # BLD AUTO: 0.06 K/UL — SIGNIFICANT CHANGE UP (ref 0–0.2)
BASOPHILS NFR BLD AUTO: 0.7 % — SIGNIFICANT CHANGE UP (ref 0–2)
BUN SERPL-MCNC: 59 MG/DL — HIGH (ref 7–23)
CALCIUM SERPL-MCNC: 9.4 MG/DL — SIGNIFICANT CHANGE UP (ref 8.4–10.5)
CHLORIDE SERPL-SCNC: 97 MMOL/L — LOW (ref 98–107)
CO2 SERPL-SCNC: 25 MMOL/L — SIGNIFICANT CHANGE UP (ref 22–31)
CREAT SERPL-MCNC: 9.93 MG/DL — HIGH (ref 0.5–1.3)
EOSINOPHIL # BLD AUTO: 0.85 K/UL — HIGH (ref 0–0.5)
EOSINOPHIL NFR BLD AUTO: 9.5 % — HIGH (ref 0–6)
GLUCOSE BLDC GLUCOMTR-MCNC: 367 MG/DL — HIGH (ref 70–99)
GLUCOSE SERPL-MCNC: 178 MG/DL — HIGH (ref 70–99)
HCT VFR BLD CALC: 30.4 % — LOW (ref 39–50)
HGB BLD-MCNC: 9.6 G/DL — LOW (ref 13–17)
IMM GRANULOCYTES NFR BLD AUTO: 0.2 % — SIGNIFICANT CHANGE UP (ref 0–1.5)
LYMPHOCYTES # BLD AUTO: 1.52 K/UL — SIGNIFICANT CHANGE UP (ref 1–3.3)
LYMPHOCYTES # BLD AUTO: 16.9 % — SIGNIFICANT CHANGE UP (ref 13–44)
MAGNESIUM SERPL-MCNC: 2.2 MG/DL — SIGNIFICANT CHANGE UP (ref 1.6–2.6)
MCHC RBC-ENTMCNC: 29.2 PG — SIGNIFICANT CHANGE UP (ref 27–34)
MCHC RBC-ENTMCNC: 31.6 % — LOW (ref 32–36)
MCV RBC AUTO: 92.4 FL — SIGNIFICANT CHANGE UP (ref 80–100)
MONOCYTES # BLD AUTO: 1.1 K/UL — HIGH (ref 0–0.9)
MONOCYTES NFR BLD AUTO: 12.2 % — SIGNIFICANT CHANGE UP (ref 2–14)
NEUTROPHILS # BLD AUTO: 5.43 K/UL — SIGNIFICANT CHANGE UP (ref 1.8–7.4)
NEUTROPHILS NFR BLD AUTO: 60.5 % — SIGNIFICANT CHANGE UP (ref 43–77)
NRBC # FLD: 0 K/UL — SIGNIFICANT CHANGE UP (ref 0–0)
PLATELET # BLD AUTO: 318 K/UL — SIGNIFICANT CHANGE UP (ref 150–400)
PMV BLD: 9.4 FL — SIGNIFICANT CHANGE UP (ref 7–13)
POTASSIUM SERPL-MCNC: 4.6 MMOL/L — SIGNIFICANT CHANGE UP (ref 3.5–5.3)
POTASSIUM SERPL-SCNC: 4.6 MMOL/L — SIGNIFICANT CHANGE UP (ref 3.5–5.3)
RBC # BLD: 3.29 M/UL — LOW (ref 4.2–5.8)
RBC # FLD: 14.6 % — HIGH (ref 10.3–14.5)
SODIUM SERPL-SCNC: 138 MMOL/L — SIGNIFICANT CHANGE UP (ref 135–145)
WBC # BLD: 8.98 K/UL — SIGNIFICANT CHANGE UP (ref 3.8–10.5)
WBC # FLD AUTO: 8.98 K/UL — SIGNIFICANT CHANGE UP (ref 3.8–10.5)

## 2019-09-09 PROCEDURE — 99232 SBSQ HOSP IP/OBS MODERATE 35: CPT

## 2019-09-09 PROCEDURE — 99239 HOSP IP/OBS DSCHRG MGMT >30: CPT

## 2019-09-09 PROCEDURE — 93306 TTE W/DOPPLER COMPLETE: CPT | Mod: 26

## 2019-09-09 PROCEDURE — 90945 DIALYSIS ONE EVALUATION: CPT | Mod: GC

## 2019-09-09 RX ORDER — AMLODIPINE BESYLATE 2.5 MG/1
1 TABLET ORAL
Qty: 0 | Refills: 0 | DISCHARGE

## 2019-09-09 RX ORDER — NORTRIPTYLINE HYDROCHLORIDE 10 MG/1
1 CAPSULE ORAL
Qty: 0 | Refills: 0 | DISCHARGE
Start: 2019-09-09

## 2019-09-09 RX ORDER — FENOFIBRATE,MICRONIZED 130 MG
1 CAPSULE ORAL
Qty: 0 | Refills: 0 | DISCHARGE

## 2019-09-09 RX ORDER — AMLODIPINE BESYLATE 2.5 MG/1
1 TABLET ORAL
Qty: 30 | Refills: 0
Start: 2019-09-09 | End: 2019-10-08

## 2019-09-09 RX ORDER — INSULIN GLARGINE 100 [IU]/ML
60 INJECTION, SOLUTION SUBCUTANEOUS
Qty: 0 | Refills: 0 | DISCHARGE
Start: 2019-09-09 | End: 2019-10-08

## 2019-09-09 RX ORDER — INSULIN GLARGINE 100 [IU]/ML
40 INJECTION, SOLUTION SUBCUTANEOUS AT BEDTIME
Refills: 0 | Status: DISCONTINUED | OUTPATIENT
Start: 2019-09-09 | End: 2019-09-09

## 2019-09-09 RX ORDER — INSULIN GLARGINE 100 [IU]/ML
80 INJECTION, SOLUTION SUBCUTANEOUS
Qty: 0 | Refills: 0 | DISCHARGE
Start: 2019-09-09 | End: 2019-10-08

## 2019-09-09 RX ORDER — LABETALOL HCL 100 MG
1 TABLET ORAL
Qty: 0 | Refills: 0 | DISCHARGE

## 2019-09-09 RX ORDER — CARVEDILOL PHOSPHATE 80 MG/1
1 CAPSULE, EXTENDED RELEASE ORAL
Qty: 60 | Refills: 0
Start: 2019-09-09 | End: 2019-10-08

## 2019-09-09 RX ORDER — NORTRIPTYLINE HYDROCHLORIDE 10 MG/1
1 CAPSULE ORAL
Qty: 0 | Refills: 0 | DISCHARGE

## 2019-09-09 RX ORDER — INSULIN GLARGINE 100 [IU]/ML
50 INJECTION, SOLUTION SUBCUTANEOUS
Qty: 1 | Refills: 0
Start: 2019-09-09 | End: 2019-10-08

## 2019-09-09 RX ORDER — INSULIN GLARGINE 100 [IU]/ML
80 INJECTION, SOLUTION SUBCUTANEOUS
Qty: 0 | Refills: 0 | DISCHARGE

## 2019-09-09 RX ORDER — FENOFIBRATE,MICRONIZED 130 MG
1 CAPSULE ORAL
Qty: 30 | Refills: 0
Start: 2019-09-09 | End: 2019-10-08

## 2019-09-09 RX ORDER — CARVEDILOL PHOSPHATE 80 MG/1
1 CAPSULE, EXTENDED RELEASE ORAL
Qty: 0 | Refills: 0 | DISCHARGE

## 2019-09-09 RX ADMIN — Medication 145 MILLIGRAM(S): at 11:00

## 2019-09-09 RX ADMIN — CALCITRIOL 0.5 MICROGRAM(S): 0.5 CAPSULE ORAL at 11:00

## 2019-09-09 RX ADMIN — AMLODIPINE BESYLATE 5 MILLIGRAM(S): 2.5 TABLET ORAL at 05:56

## 2019-09-09 RX ADMIN — Medication 1: at 13:36

## 2019-09-09 RX ADMIN — Medication 1 TABLET(S): at 11:00

## 2019-09-09 RX ADMIN — Medication 1 APPLICATION(S): at 13:03

## 2019-09-09 RX ADMIN — SEVELAMER CARBONATE 800 MILLIGRAM(S): 2400 POWDER, FOR SUSPENSION ORAL at 13:36

## 2019-09-09 RX ADMIN — CARVEDILOL PHOSPHATE 6.25 MILLIGRAM(S): 80 CAPSULE, EXTENDED RELEASE ORAL at 05:55

## 2019-09-09 RX ADMIN — Medication 1: at 18:24

## 2019-09-09 RX ADMIN — NORTRIPTYLINE HYDROCHLORIDE 25 MILLIGRAM(S): 10 CAPSULE ORAL at 21:19

## 2019-09-09 RX ADMIN — ATORVASTATIN CALCIUM 40 MILLIGRAM(S): 80 TABLET, FILM COATED ORAL at 21:19

## 2019-09-09 RX ADMIN — Medication 1 MILLIGRAM(S): at 11:00

## 2019-09-09 RX ADMIN — Medication 325 MILLIGRAM(S): at 11:00

## 2019-09-09 RX ADMIN — SEVELAMER CARBONATE 800 MILLIGRAM(S): 2400 POWDER, FOR SUSPENSION ORAL at 08:43

## 2019-09-09 RX ADMIN — Medication 5: at 08:43

## 2019-09-09 RX ADMIN — Medication 81 MILLIGRAM(S): at 11:00

## 2019-09-09 RX ADMIN — SEVELAMER CARBONATE 800 MILLIGRAM(S): 2400 POWDER, FOR SUSPENSION ORAL at 18:23

## 2019-09-09 RX ADMIN — CARVEDILOL PHOSPHATE 6.25 MILLIGRAM(S): 80 CAPSULE, EXTENDED RELEASE ORAL at 18:23

## 2019-09-09 NOTE — ADVANCED PRACTICE NURSE CONSULT - RECOMMEDATIONS
Topical Recommendations     Left anterior lower leg ulcer: Clean with NS. Pat dry. Apply Liquid barrier film to periwound skin. Cover with silicone foam with borders. Change every other day.     Post wound care: Apply Lac Hydrin cream (Attractain) to lower legs, apply daily.     Please contact Wound Care Service Line if we can be of further assistance (ext [phone]).

## 2019-09-09 NOTE — PROGRESS NOTE ADULT - PROBLEM SELECTOR PLAN 3
Patient with anemia in the setting of ESRD. Hemoglobin below target range. Will need to determine if patient receives SHANIKA. Monitor hemoglobin Patient with anemia in the setting of ESRD. Hemoglobin below target range but acceptable

## 2019-09-09 NOTE — PROGRESS NOTE ADULT - PROBLEM SELECTOR PLAN 8
On chest CT, b/l small pleural effusions with atelectasis likely 2/2 deconditioning from prolonged hospital course at [place].  - PT eval, incentive spirometry
On chest CT, b/l small pleural effusions with atelectasis likely 2/2 deconditioning from prolonged hospital course at [place].  - PT eval, incentive spirometry
On chest CT, b/l small pleural effusions with atelectasis likely 2/2 deconditioning from prolonged hospital course at [place].  - PT eval, incentive spirometry

## 2019-09-09 NOTE — DISCHARGE NOTE PROVIDER - HOSPITAL COURSE
58 y/o male, ambulatory with a walker post CABG, PMHx of HTN, Type 2 DM, ESRD on PD daily, recent CABG on 8/9/19 at [facility] complicated by acute blood loss anemia requiring PRBC. Patient admitted with orthostatic hypotension and an episode of hypoglycemia in ED to 30s.         1. DM2: A1C of 6.1 without hypoglycemia episodes during inpatient stay. Seen by endocrinology who recommended changing regimen to Toujeo at bedtime.         , FS elevated.     f/u Endo recs for discharge.    f/u CORAZON Ab, Cpeptide.          Problem/Plan - 2:    ·  Problem: Orthostatic hypotension.  Plan: medication induced hypotension (was on 2 Beta blockers) vs volume depletion vs nortriptyline side-effect (however has been on this medication for a while) vs fluid shifts from PD vs less likely endocrine etiology such as low cortisol or thyroid dysfunction.     -tele monitoring, repeat orthostatic BP measurements: still orthostatic: asymptomatic today, pt refusing compression stockings despite explaining benefits    - EKG with no acute changes, hold Labetalol    - reduced Carvedilol from 12.5mg bid to 6.25mg bid.    - TSH WNL, cortisol elevated.          Problem/Plan - 3:    ·  Problem: Pneumoperitoneum of unknown etiology.  Plan: CT chest showing b/l pleural effusions w/atelectasis and pneumoperitoneum with ascites. Can be 2/2 recent CABG procedure vs PD catheter.    - Surgery consulted given pneumoperitoneum on CT scan. They think it is related to the daily PD catheter use.          Problem/Plan - 4:    ·  Problem: ESRD (end stage renal disease) on dialysis.  Plan: ESRD on PD daily, renal consulted with skin changes of lower legs    - Given small pleural effusions on CT chest, judicious use of fluids.    - Sevelamer 800mg PO TID, Calcitriol 0.5 mg qD.          Problem/Plan - 5:    ·  Problem: Troponin level elevated.  Plan: Trop T 275--->286. Likely 2/2 ESRD, ?stress cardiomyopathy.    - obtain TTE to evaluate for post-operative changes since CABG, If no acute findings, will dc and have him follow up with cardiology outpt.          Problem/Plan - 6:    Problem: HTN (hypertension). Plan: - WIll hold Labetalol, norvasc 5mg    - Coreg 6.125mg PO BID.         Problem/Plan - 7:    ·  Problem: CAD, multiple vessel.  Plan: - s/p CABG Aug/2019, c/w asa    - holding Labetalol.          Problem/Plan - 8:    ·  Problem: Bilateral pleural effusion.  Plan: On chest CT, b/l small pleural effusions with atelectasis likely 2/2 deconditioning from prolonged hospital course at [place].    - PT eval, incentive spirometry.          Problem/Plan - 9:    ·  Problem: Normocytic anemia.  Plan: AOCD, likely multifactorial due to recent blood loss and ESRD    - c/w iron supplementation.          Problem/Plan - 10:    Problem: Preventive measure. Plan; Diet: Renal restrictions    DVT: SCD, no Heparin due to recent blood loss anemia req. likely 5 units of blood    PT eval: no needs.        1. Mitral annular calcification, otherwise normal mitral    valve. Mild mitral regurgitation.    2. Calcified trileaflet aortic valve with normal opening.    Mild aortic regurgitation.    3. Increased relative wall thickness with normal left    ventricular mass index, consistent with concentric left    ventricular remodeling.    4. Mild segmental left ventricular systolic dysfunction.    Inferior wall hypokinesis. Septal motion is consistent with    cardiac surgery.    5. Normal right ventricular size and function. 56 y/o male, ambulatory with a walker post CABG, PMHx of HTN, Type 2 DM, ESRD on PD daily, recent CABG on 8/9/19 at [place] complicated by acute blood loss anemia requiring PRBC. Patient admitted with orthostatic hypotension and an episode of hypoglycemia in ED to 30s.         1. DM2: A1C of 6.1 without hypoglycemia episodes during inpatient stay. Seen by endocrinology who recommended changing regimen to Toujeo at bedtime.         2. Orthostatic hypotension: Patient was admitted with orthostatic hypotension thought to be due to medication induced vs volume depletion vs fluid shifts from PD. Patient asymptomatic, will send home on compression stockings. EKG without acute changes, Labetalol held. Coreg dose adjusted. TSH normal.         3. Pneumoperitoneum: CT chest showing b/l pleural effusions w/atelectasis and pneumoperitoneum with ascites. Surgery consulted and believe pneumoperitoneum likely due to daily PD catheter use. No acute intervention.         4. ESRD: On daily PD. Continue home regimen.          5. Elevated troponin: Underwent echo which showed: 1. Mitral annular calcification, otherwise normal mitral valve. Mild mitral regurgitation. 2. Calcified trileaflet aortic valve with normal opening. Mild aortic regurgitation. 3. Increased relative wall thickness with normal left ventricular mass index, consistent with concentric left ventricular remodeling. 4. Mild segmental left ventricular systolic dysfunction. Inferior wall hypokinesis. Septal motion is consistent with cardiac surgery. 5. Normal right ventricular size and function. Patient to follow up with outpatient cardiologist.         6. HTN: Continue medications at adjusted dates.         7. CAD: s/p CABG Aug/2019, continue aspirin         8. Bilateral pleural effusions: On chest CT, b/l small pleural effusions with atelectasis likely 2/2 deconditioning from prolonged hospital course at [facility]. Continue incentive spirometry.         9. Anemia: Likely multifactorial due to recent blood loss and ESRD, continue iron supplementation.         Patient seen and evaluated, no acute somatic complaints. Reviewed discharge medications with patient; All new medications requiring new prescriptions were sent to the pharmacy of patient's choice. Reviewed need for prescription for previous home medications and new prescriptions sent if requested. Medically cleared/stable for discharge as per Dr. Blanco with close outpatient follow up within 1-2 weeks of discharge. Patient understands and agrees with plan of care.

## 2019-09-09 NOTE — PROGRESS NOTE ADULT - SUBJECTIVE AND OBJECTIVE BOX
Patient is a 54y old  Male who presents with a chief complaint of Dizziness, hypotension (08 Sep 2019 13:47)      SUBJECTIVE / OVERNIGHT EVENTS: Pt denies dizziness or lightheadedness No CP or SOB    MEDICATIONS  (STANDING):  amLODIPine   Tablet 5 milliGRAM(s) Oral daily  aspirin  chewable 81 milliGRAM(s) Oral daily  atorvastatin 40 milliGRAM(s) Oral at bedtime  calcitriol   Capsule 0.5 MICROGram(s) Oral daily  carvedilol 6.25 milliGRAM(s) Oral every 12 hours  dextrose 5%. 1000 milliLiter(s) (50 mL/Hr) IV Continuous <Continuous>  dextrose 50% Injectable 12.5 Gram(s) IV Push once  dextrose 50% Injectable 25 Gram(s) IV Push once  dextrose 50% Injectable 25 Gram(s) IV Push once  fenofibrate Tablet 145 milliGRAM(s) Oral daily  ferrous    sulfate 325 milliGRAM(s) Oral daily  folic acid 1 milliGRAM(s) Oral daily  gentamicin 0.1% Cream 1 Application(s) Topical once  influenza   Vaccine 0.5 milliLiter(s) IntraMuscular once  insulin glargine Injectable (LANTUS) 20 Unit(s) SubCutaneous at bedtime  insulin lispro (HumaLOG) corrective regimen sliding scale   SubCutaneous three times a day before meals  insulin lispro (HumaLOG) corrective regimen sliding scale   SubCutaneous at bedtime  multivitamin 1 Tablet(s) Oral daily  nortriptyline 25 milliGRAM(s) Oral at bedtime  sevelamer carbonate 800 milliGRAM(s) Oral three times a day with meals    MEDICATIONS  (PRN):  acetaminophen   Tablet .. 650 milliGRAM(s) Oral every 4 hours PRN Mild Pain (1 - 3)  dextrose 40% Gel 15 Gram(s) Oral once PRN Blood Glucose LESS THAN 70 milliGRAM(s)/deciliter  glucagon  Injectable 1 milliGRAM(s) IntraMuscular once PRN Glucose LESS THAN 70 milligrams/deciliter      T(C): 37.1 (19 @ 08:00), Max: 37.1 (19 @ 08:00)  HR: 92 (19 @ 08:00) (92 - 94)  BP: 159/93 (19 @ 08:00) (141/74 - 159/93)  RR: 18 (19 @ 08:00) (17 - 18)  SpO2: 100% (19 @ 05:52) (98% - 100%)  CAPILLARY BLOOD GLUCOSE      POCT Blood Glucose.: 367 mg/dL (09 Sep 2019 08:36)  POCT Blood Glucose.: 171 mg/dL (08 Sep 2019 21:42)  POCT Blood Glucose.: 130 mg/dL (08 Sep 2019 17:44)  POCT Blood Glucose.: 151 mg/dL (08 Sep 2019 12:51)    I&O's Summary    08 Sep 2019 07:01  -  09 Sep 2019 07:00  --------------------------------------------------------  IN: 0 mL / OUT: 300 mL / NET: -300 mL    09 Sep 2019 07:01  -  09 Sep 2019 11:36  --------------------------------------------------------  IN: 2000 mL / OUT: 500 mL / NET: 1500 mL        PHYSICAL EXAM:  GENERAL: NAD,   HEAD:  Normocephalic  EYES: EOMI,  conjunctiva and sclera clear  NECK: Supple,   CHEST/LUNG: Clear to auscultation bilaterally; No wheeze  HEART:  s1 s2 + Regular rate and rhythm;   ABDOMEN: Soft, Nontender, Nondistended; Bowel sounds present  EXTREMITIES:   No clubbing, cyanosis  NEURO: AAOx3      LABS:                        9.6    8.98  )-----------( 318      ( 09 Sep 2019 08:52 )             30.4     09    138  |  97<L>  |  59<H>  ----------------------------<  178<H>  4.6   |  25  |  9.93<H>    Ca    9.4      09 Sep 2019 08:52  Mg     2.2                 Jason Blanco MD  Hospitalist  P/ [phone]

## 2019-09-09 NOTE — PROGRESS NOTE ADULT - PROBLEM SELECTOR PLAN 10
Diet: Renal restrictions  DVT: SCD, no Heparin due to recent blood loss anemia req. likely 5 units of blood  PT eval
Diet: Renal restrictions  DVT: SCD, no Heparin due to recent blood loss anemia req. likely 5 units of blood  PT eval: no needs
Diet: Renal restrictions  DVT: SCD, no Heparin due to recent blood loss anemia req. likely 5 units of blood  PT eval: no needs

## 2019-09-09 NOTE — ADVANCED PRACTICE NURSE CONSULT - ASSESSMENT
A&Ox4, out of bed with one person assist, scattered areas of hyperpigmentation and hypopigmentation in all extremities, scattered areas of ecchymosis on bilateral upper extremities.    Bilateral lower extremities with scattered areas of hyper and hypopigmentation. Lichenification. Severe Dry, flaky skin. Thin, shiny, taught skin. Good toes hygiene. 2+ DP/PT pulses. Biphasic doppler sounds. Scattered intact scabs in left lower leg.     Left anterior lower leg with a open ulcer (patient reports from scratching) measuring 1.5cmx0.6cmx0.1cm. Tissue type exposing 100% pink dermis. Scant serous drainage. No odor. Periwound skin with hyperpigmentation and dry flaky skin. No increased warmth, no erythema no induration. Goals of care: moisturize skin, maintain a moist environment for wound healing. A&Ox4, out of bed with one person assist, scattered areas of hyperpigmentation and hypopigmentation in all extremities, scattered areas of ecchymosis on bilateral upper extremities.    Vascular: Bilateral lower extremities with scattered areas of hyper and hypopigmentation. Lichenification. Severe Dry, flaky skin. Thin, shiny, taught skin. No edema. Good toes hygiene. 2+ DP/PT pulses. Biphasic doppler sounds. Scattered intact scabs in left lower leg.     Left anterior lower leg with a open ulcer (patient reports from scratching) measuring 1.5cmx0.6cmx0.1cm. Tissue type exposing 100% pink dermis. Scant serous drainage. No odor. Periwound skin with hyperpigmentation and dry flaky skin. No increased warmth, no erythema no induration. Goals of care: moisturize skin, maintain a moist environment for wound healing.

## 2019-09-09 NOTE — PROGRESS NOTE ADULT - PROBLEM SELECTOR PLAN 5
Trop T 275--->286. Likely 2/2 ESRD, ?stress cardiomyopathy.  - obtain TTE to evaluate for post-operative changes since CABG
Trop T 275--->286. Likely 2/2 ESRD, ?stress cardiomyopathy.  - obtain TTE to evaluate for post-operative changes since CABG
Trop T 275--->286. Likely 2/2 ESRD, ?stress cardiomyopathy.  - obtain TTE to evaluate for post-operative changes since CABG, If no acute findings, will dc and have him follow up with cardiology outpt

## 2019-09-09 NOTE — DISCHARGE NOTE PROVIDER - CARE PROVIDER_API CALL
Mey Jimenez)  EndocrinologyMetabDiabetes; Internal Medicine  [address] [place] [address]  Phone: [phone]  Fax: [phone]  Follow Up Time:     DR CADENA (CARDIOLOGIST),   Phone: (   )    -  Fax: (   )    -  Follow Up Time:     Jeremy Green)  Internal Medicine; Nephrology  [address] [facility], [address] [place] [address]  Phone: [phone]  Fax: [phone]  Follow Up Time:

## 2019-09-09 NOTE — PROGRESS NOTE ADULT - PROBLEM SELECTOR PLAN 4
ESRD on PD daily, renal consulted with skin changes of lower legs  - Given small pleural effusions on CT chest, judicious use of fluids.  - Sevelamer 800mg PO TID, Calcitriol 0.5 mg qD

## 2019-09-09 NOTE — DISCHARGE NOTE PROVIDER - NSDCCPCAREPLAN_GEN_ALL_CORE_FT
PRINCIPAL DISCHARGE DIAGNOSIS  Diagnosis: Dizziness  Assessment and Plan of Treatment: Patient was admitted with orthostatic hypotension thought to be due to medication induced vs volume depletion vs fluid shifts from PD. Patient asymptomatic, will send home on compression stockings. EKG without acute changes, Labetalol held. Coreg dose adjusted. TSH normal.      SECONDARY DISCHARGE DIAGNOSES  Diagnosis: Bilateral pleural effusion  Assessment and Plan of Treatment: On chest CT, b/l small pleural effusions with atelectasis likely 2/2 deconditioning from prolonged hospital course at [place]. Continue incentive spirometry.    Diagnosis: Elevated troponin  Assessment and Plan of Treatment: Underwent echo which showed: 1. Mitral annular calcification, otherwise normal mitral valve. Mild mitral regurgitation. 2. Calcified trileaflet aortic valve with normal opening. Mild aortic regurgitation. 3. Increased relative wall thickness with normal left ventricular mass index, consistent with concentric left ventricular remodeling. 4. Mild segmental left ventricular systolic dysfunction. Inferior wall hypokinesis. Septal motion is consistent with cardiac surgery. 5. Normal right ventricular size and function. Patient to follow up with outpatient cardiologist.    Diagnosis: Essential hypertension  Assessment and Plan of Treatment: Low sodium and fat diet, continue anti-hypertensive medications, and follow up with primary care physician.    Diagnosis: CAD, multiple vessel  Assessment and Plan of Treatment: s/p CABG Aug/2019, continue aspirin    Diagnosis: ESRD (end stage renal disease) on dialysis  Assessment and Plan of Treatment: Please follow up with your nephrologist for management, and continue your schedule hemodialysis.    Diagnosis: Pneumoperitoneum of unknown etiology  Assessment and Plan of Treatment: CT chest showing b/l pleural effusions w/atelectasis and pneumoperitoneum with ascites. Surgery consulted and believe pneumoperitoneum likely due to daily PD catheter use. No acute intervention.    Diagnosis: Anemia  Assessment and Plan of Treatment: Likely multifactorial due to recent blood loss and ESRD, continue iron supplementation.    Diagnosis: Type 2 diabetes mellitus with hypoglycemia without coma, with long-term current use of insulin  Assessment and Plan of Treatment: A1C of 6.1 without hypoglycemia episodes during inpatient stay. Seen by endocrinology who recommended changing regimen to Toujeo at bedtime.

## 2019-09-09 NOTE — PROGRESS NOTE ADULT - PROBLEM SELECTOR PLAN 6
- WIll hold Labetalol, norvasc as above  - Coreg 6.125mg PO BID
- WIll hold Labetalol, norvasc 5mg  - Coreg 6.125mg PO BID
- WIll hold Labetalol, norvasc as above  - Coreg 6.125mg PO BID

## 2019-09-09 NOTE — ADVANCED PRACTICE NURSE CONSULT - REASON FOR CONSULT
Patient seen on skin care rounds after wound care referral received for assessment of skin impairment and recommendations of topical management. Chart reviewed: Jono 21, WBC 8.98, Hemoglobin A1C 6.1%, BMI 26.6kg/m2. Patient reports he sees a dermatologist for "severe dry skin" in legs. Patient reports pruritus and scratching sometimes. Patient H/O of ambulatory with a walker post CABG, with MHx of HTN, Type 2 DM, ESRD on PD daily, recent CABG on 8/9/19 at [place] - c/b acute anemia was transfused "5 bags of blood", had EGD - "nothing" was found. Pt states he was admitted at [facility] for 20 days after getting CABG procedure and said it was uneventful with no complications. He was then discharged home. About 3-4 days ago, he started getting dizzy/lightheaded and measured his BP which was in the 80s/60s. The next day, it was even lower at 70s/60s (measured by sitting down). He lives with his wife and daughter and does not work any longer. Says that his BP medications were changed recently, however cannot recall which one or which one was added; He denies CP, SOB, headaches. No fevers or chills. He has been on PD for the past 2 years and does not recall having any issues with his BP and has been tolerating it well. Of note, recently a trial of lower Lantus regimen was tried, 20units BID, (instead of 80units daily), however, resulted in BG in 350s range and the pt returned to 80 units daily. Patient has being seen by endocrine for Tyoe 2 DM with hypoglycemia and nephrology for CKD.

## 2019-09-09 NOTE — PROGRESS NOTE ADULT - PROBLEM SELECTOR PLAN 2
medication induced hypotension (unclear why on 2 Beta blockers) vs volume depletion vs nortriptyline side-effect (however has been on this medication for a while) vs fluid shifts from PD vs less likely endocrine etiology such as low cortisol or thyroid dysfunction.   -tele monitoring, repeat orthostatic BP measurements  - EKG with no acute changes, holding Amlodipine 10mg and hold Labetalol  - reduced Carvedilol from 12.5mg bid to 6.25mg bid  - TSH WNL, cortisol elevated  - compression stockings
Pt.  with persistent orthostatic hypotension. Consider holding amlodipine. Repeat orthostatic blood pressure measurement later this afternoon.
medication induced hypotension (was on 2 Beta blockers) vs volume depletion vs nortriptyline side-effect (however has been on this medication for a while) vs fluid shifts from PD vs less likely endocrine etiology such as low cortisol or thyroid dysfunction.   -tele monitoring, repeat orthostatic BP measurements: still orthostatic: asymptomatic today  - EKG with no acute changes, holding Amlodipine 10mg and hold Labetalol  - reduced Carvedilol from 12.5mg bid to 6.25mg bid. AM . Will start Norvasc 5mg  - TSH WNL, cortisol elevated  - compression stockings
medication induced hypotension (was on 2 Beta blockers) vs volume depletion vs nortriptyline side-effect (however has been on this medication for a while) vs fluid shifts from PD vs less likely endocrine etiology such as low cortisol or thyroid dysfunction.   -tele monitoring, repeat orthostatic BP measurements: still orthostatic: asymptomatic today, pt refusing compression stockings despite explaining benefits  - EKG with no acute changes, hold Labetalol  - reduced Carvedilol from 12.5mg bid to 6.25mg bid.  - TSH WNL, cortisol elevated
Pt.  with persistent orthostatic hypotension. Consider holding amlodipine.

## 2019-09-09 NOTE — PROGRESS NOTE ADULT - PROBLEM SELECTOR PROBLEM 1
ESRD (end stage renal disease) on dialysis
Type 2 diabetes mellitus with hypoglycemia without coma, with long-term current use of insulin
ESRD (end stage renal disease) on dialysis
Type 2 diabetes mellitus with hypoglycemia without coma, with long-term current use of insulin
Type 2 diabetes mellitus with hypoglycemia without coma, with long-term current use of insulin

## 2019-09-09 NOTE — PROGRESS NOTE ADULT - PROBLEM SELECTOR PLAN 1
Pt. with ESRD on CCPD at home. Pt. clinically remains orthostatic. Tolerated PD well on 9/7/19. Plan to resume CAPD today with 1.5% dianeal solution and no overnight fill. Monitor BP and labs

## 2019-09-09 NOTE — PROGRESS NOTE ADULT - SUBJECTIVE AND OBJECTIVE BOX
[facility] DIVISION OF KIDNEY DISEASES AND HYPERTENSION -- FOLLOW UP NOTE  --------------------------------------------------------------------------------  HPI: 57-year-old male with medical history of HTN, Type 2 DM, ESRD on PD daily, recent CABG on 8/9/19 at [place] admitted with orthostatic hypotension. Nephrology consulted for ESRD management.     Pt. was seen and examined at bedside. Tolerated PD well on 9/7/19. Remains orthostatic, however without complaints.      PAST HISTORY  --------------------------------------------------------------------------------  No significant changes to PMH, PSH, FHx, SHx, unless otherwise noted    ALLERGIES & MEDICATIONS  --------------------------------------------------------------------------------  Allergies    No Known Allergies    Intolerances    Standing Inpatient Medications  amLODIPine   Tablet 5 milliGRAM(s) Oral daily  aspirin  chewable 81 milliGRAM(s) Oral daily  atorvastatin 40 milliGRAM(s) Oral at bedtime  calcitriol   Capsule 0.5 MICROGram(s) Oral daily  carvedilol 6.25 milliGRAM(s) Oral every 12 hours  dextrose 5%. 1000 milliLiter(s) IV Continuous <Continuous>  dextrose 50% Injectable 12.5 Gram(s) IV Push once  dextrose 50% Injectable 25 Gram(s) IV Push once  dextrose 50% Injectable 25 Gram(s) IV Push once  fenofibrate Tablet 145 milliGRAM(s) Oral daily  ferrous    sulfate 325 milliGRAM(s) Oral daily  folic acid 1 milliGRAM(s) Oral daily  gentamicin 0.1% Cream 1 Application(s) Topical once  influenza   Vaccine 0.5 milliLiter(s) IntraMuscular once  insulin glargine Injectable (LANTUS) 20 Unit(s) SubCutaneous at bedtime  insulin lispro (HumaLOG) corrective regimen sliding scale   SubCutaneous three times a day before meals  insulin lispro (HumaLOG) corrective regimen sliding scale   SubCutaneous at bedtime  multivitamin 1 Tablet(s) Oral daily  nortriptyline 25 milliGRAM(s) Oral at bedtime  sevelamer carbonate 800 milliGRAM(s) Oral three times a day with meals    REVIEW OF SYSTEMS  --------------------------------------------------------------------------------  Gen: No fevers/chills  Skin: No rashes  Head/Eyes/Ears: Normal hearing,   Respiratory: No dyspnea, cough  CV: No chest pain  GI: No abdominal pain, diarrhea  : No dysuria, hematuria  MSK: No  edema  Heme: No easy bruising or bleeding  Psych: No significant depression    All other systems were reviewed and are negative, except as noted.    VITALS/PHYSICAL EXAM  --------------------------------------------------------------------------------  T(C): 37.1 (09-09-19 @ 08:00), Max: 37.1 (09-09-19 @ 08:00)  HR: 92 (09-09-19 @ 08:00) (92 - 94)  BP: 159/93 (09-09-19 @ 08:00) (141/74 - 159/93)  RR: 18 (09-09-19 @ 08:00) (17 - 18)  SpO2: 100% (09-09-19 @ 05:52) (98% - 100%)  Wt(kg): --    09-08-19 @ 07:01  -  09-09-19 @ 07:00  --------------------------------------------------------  IN: 0 mL / OUT: 300 mL / NET: -300 mL    09-09-19 @ 07:01  -  09-09-19 @ 12:11  --------------------------------------------------------  IN: 2000 mL / OUT: 500 mL / NET: 1500 mL    Physical Exam:  	Gen: NAD  	HEENT: MMM  	Pulm: CTA B/L  	CV: S1S2  	Abd: Soft, +BS  PD catheter + LQ  	Ext: No LE edema B/L  	Neuro: Awake  	Skin: Warm and dry    LABS/STUDIES  --------------------------------------------------------------------------------              9.6    8.98  >-----------<  318      [09-09-19 @ 08:52]              30.4     138  |  97  |  59  ----------------------------<  178      [09-09-19 @ 08:52]  4.6   |  25  |  9.93        Ca     9.4     [09-09-19 @ 08:52]      Mg     2.2     [09-09-19 @ 08:52]    Creatinine Trend:  SCr 9.93 [09-09 @ 08:52]  SCr 9.09 [09-08 @ 06:07]  SCr 9.72 [09-07 @ 16:00]  SCr 9.68 [09-06 @ 15:30]

## 2019-09-09 NOTE — PROGRESS NOTE ADULT - PROBLEM SELECTOR PLAN 9
AOCD, likely multifactorial due to recent blood loss and ESRD  - c/w iron supplementation  - check CBC today
AOCD, likely multifactorial due to recent blood loss and ESRD  - c/w iron supplementation
AOCD, likely multifactorial due to recent blood loss and ESRD  - c/w iron supplementation

## 2019-09-09 NOTE — DISCHARGE NOTE NURSING/CASE MANAGEMENT/SOCIAL WORK - PATIENT PORTAL LINK FT
You can access the FollowMyHealth Patient Portal offered by [facility] by registering at the following website: [email or web address]. By joining [facility]’s FollowMyHealth portal, you will also be able to view your health information using other applications (apps) compatible with our system.

## 2019-09-09 NOTE — PROGRESS NOTE ADULT - PROBLEM SELECTOR PROBLEM 2
Orthostatic hypotension
Essential hypertension
Orthostatic hypotension
Orthostatic hypotension
Essential hypertension

## 2019-09-09 NOTE — DISCHARGE NOTE PROVIDER - NSDCFUADDAPPT_GEN_ALL_CORE_FT
Follow up with PCP within 1-2 weeks of discharge.   Follow up with endocrinology within 1-2 weeks of discharge.   Follow up with cardiology within 1-2 weeks of discharge.   Follow up with nephrology within 1-2 weeks of discharge.

## 2019-09-09 NOTE — DISCHARGE NOTE PROVIDER - PROVIDER TOKENS
PROVIDER:[TOKEN:[[id number]]],FREE:[LAST:[DR CADENA (CARDIOLOGIST)],PHONE:[(   )    -],FAX:[(   )    -]],PROVIDER:[TOKEN:[[id number]]]

## 2019-09-09 NOTE — PROGRESS NOTE ADULT - REASON FOR ADMISSION
Dizziness, hypotension

## 2019-09-09 NOTE — PROGRESS NOTE ADULT - PROBLEM SELECTOR PLAN 7
- s/p CABG Aug/2019, c/w asa  - holding Labetalol and Norvasc
- s/p CABG Aug/2019, c/w asa  - holding Labetalol
- s/p CABG Aug/2019, c/w asa  - holding Labetalol

## 2019-09-09 NOTE — DISCHARGE NOTE PROVIDER - CARE PROVIDERS DIRECT ADDRESSES
,[email or web address]

## 2019-09-09 NOTE — PROGRESS NOTE ADULT - PROBLEM SELECTOR PROBLEM 3
Pneumoperitoneum of unknown etiology
Anemia
Pneumoperitoneum of unknown etiology
Pneumoperitoneum of unknown etiology
Anemia

## 2019-09-09 NOTE — PROGRESS NOTE ADULT - ASSESSMENT
56yo Male, ambulatory with a walker post CABG, PMHx of HTN, Type 2 DM, ESRD on PD daily, recent CABG on 8/9/19 at [facility] c/b acute blood loss anemia requiring PRBC. A/w orthostatic hypotension and an episode of hypoglycemia in ED to 30s;

## 2019-09-09 NOTE — PROGRESS NOTE ADULT - ASSESSMENT
56 yo man w/ DM2, CAD s/p CABG,  HTN, ESRD on PD daily, a/w dizziness/lightheadedness due to hypotension, also w/ episodes of hypoglycemia.    1) DM2    - Hba1c 6.1, but unreliable given history of anemia and recent blood transfusions over past month  - No more hypoglycemia inpatient.   - For discharge home today.  Patient reports hypoglycemia at home after taking Toujeo 80 units qhs and not eating in AM. But also previously had hyperglycemia when taking Toujeo 20 units qhs at home and his outside endocrinologist had recommended resuming 80 units. Now admitted for hypoglycemia after taking 80 units. It seems that 80 units is too much and 20 units is too little.  Will plan for discharge on Toujeo 50 units qhs. Follow up with Dr. Villegas, outside endocrinologist.  Also discussed with patient alernative option of basal bolus plan which he will discuss with his doctor as outpatient if unable to control glucose with once daily basal insulin.    If patient remains inpatient can order Lantus 40 units qhs and continue Humalog low correction scales.  BG target 100-180 mg/dl. 58 yo man w/ DM2, CAD s/p CABG,  HTN, ESRD on PD daily, a/w dizziness/lightheadedness due to hypotension, also w/ episodes of hypoglycemia.    1) DM2    - Hba1c 6.1, but unreliable given history of anemia and recent blood transfusions over past month  - No more hypoglycemia inpatient.   - For discharge home today.  Patient reports hypoglycemia at home after taking Toujeo 80 units qhs and not eating in AM. But also previously had hyperglycemia when taking Toujeo 20 units qhs at home and his outside endocrinologist had recommended resuming 80 units. Now admitted for hypoglycemia after taking 80 units. It seems that 80 units is too much and 20 units is too little.  Will plan for discharge on Toujeo 50 units qhs. Follow up with Dr. Villegas, outside endocrinologist.  Also discussed with patient alernative option of basal bolus plan which he will discuss with his doctor as outpatient if unable to control glucose with once daily basal insulin.    If patient remains inpatient can order Lantus 40 units qhs and continue Humalog low correction scales.  BG target 100-180 mg/dl.  Also recommend change diet to include carb consistent diet.

## 2019-09-09 NOTE — PROGRESS NOTE ADULT - SUBJECTIVE AND OBJECTIVE BOX
Chief Complaint: Hypoglycemia    History: Tolerating po. No hypoglycemia.    MEDICATIONS  (STANDING):  amLODIPine   Tablet 5 milliGRAM(s) Oral daily  aspirin  chewable 81 milliGRAM(s) Oral daily  atorvastatin 40 milliGRAM(s) Oral at bedtime  calcitriol   Capsule 0.5 MICROGram(s) Oral daily  carvedilol 6.25 milliGRAM(s) Oral every 12 hours  dextrose 5%. 1000 milliLiter(s) (50 mL/Hr) IV Continuous <Continuous>  dextrose 50% Injectable 12.5 Gram(s) IV Push once  dextrose 50% Injectable 25 Gram(s) IV Push once  dextrose 50% Injectable 25 Gram(s) IV Push once  fenofibrate Tablet 145 milliGRAM(s) Oral daily  ferrous    sulfate 325 milliGRAM(s) Oral daily  folic acid 1 milliGRAM(s) Oral daily  influenza   Vaccine 0.5 milliLiter(s) IntraMuscular once  insulin glargine Injectable (LANTUS) 20 Unit(s) SubCutaneous at bedtime  insulin lispro (HumaLOG) corrective regimen sliding scale   SubCutaneous three times a day before meals  insulin lispro (HumaLOG) corrective regimen sliding scale   SubCutaneous at bedtime  multivitamin 1 Tablet(s) Oral daily  nortriptyline 25 milliGRAM(s) Oral at bedtime  sevelamer carbonate 800 milliGRAM(s) Oral three times a day with meals    MEDICATIONS  (PRN):  acetaminophen   Tablet .. 650 milliGRAM(s) Oral every 4 hours PRN Mild Pain (1 - 3)  dextrose 40% Gel 15 Gram(s) Oral once PRN Blood Glucose LESS THAN 70 milliGRAM(s)/deciliter  glucagon  Injectable 1 milliGRAM(s) IntraMuscular once PRN Glucose LESS THAN 70 milligrams/deciliter      Allergies    No Known Allergies    Intolerances      Review of Systems:    ALL OTHER SYSTEMS REVIEWED AND NEGATIVE      PHYSICAL EXAM:  VITALS: T(C): 36.7 (09-09-19 @ 12:50)  T(F): 98.1 (09-09-19 @ 12:50), Max: 98.8 (09-09-19 @ 08:00)  HR: 92 (09-09-19 @ 12:50) (92 - 94)  BP: 161/94 (09-09-19 @ 12:50) (141/74 - 161/94)  RR:  (16 - 18)  SpO2:  (98% - 100%)  Wt(kg): --  GENERAL: NAD, well-groomed, well-developed  EYES: No proptosis, no lid lag, anicteric  HEENT:  Atraumatic, Normocephalic, moist mucous membranes  NEURO: extraocular movements intact, no tremor  PSYCH: Alert and oriented x 3, normal affect, normal mood      CAPILLARY BLOOD GLUCOSE      POCT Blood Glucose.: 177 mg/dL (09 Sep 2019 13:35)  POCT Blood Glucose.: 367 mg/dL (09 Sep 2019 08:36)  POCT Blood Glucose.: 171 mg/dL (08 Sep 2019 21:42)  POCT Blood Glucose.: 130 mg/dL (08 Sep 2019 17:44)      09-09    138  |  97<L>  |  59<H>  ----------------------------<  178<H>  4.6   |  25  |  9.93<H>    EGFR if : 6   EGFR if non : 5     Ca    9.4      09-09  Mg     2.2     09-09    TPro  7.3  /  Alb  3.4  /  TBili  0.4  /  DBili  x   /  AST  24  /  ALT  12  /  AlkPhos  52  09-06          Thyroid Function Tests:  09-07 @ 06:00 TSH 2.79 FreeT4 -- T3 -- Anti TPO -- Anti Thyroglobulin Ab -- TSI --      Hemoglobin A1C, Whole Blood: 6.1 % <H> [4.0 - 5.6] (09-07-19 @ 06:00)

## 2019-09-10 ENCOUNTER — INBOUND DOCUMENT (OUTPATIENT)
Age: 54
End: 2019-09-10

## 2019-09-10 DIAGNOSIS — Z71.89 OTHER SPECIFIED COUNSELING: ICD-10-CM

## 2019-09-10 PROBLEM — Z00.00 ENCOUNTER FOR PREVENTIVE HEALTH EXAMINATION: Status: ACTIVE | Noted: 2019-09-10

## 2019-09-12 LAB — GAD65 AB SER-MCNC: 0.17 NMOL/L — HIGH

## 2019-09-17 LAB — SULFONYLUREA SERPL-MCNC: SIGNIFICANT CHANGE UP

## 2020-01-01 ENCOUNTER — TRANSCRIPTION ENCOUNTER (OUTPATIENT)
Age: 55
End: 2020-01-01

## 2020-01-01 ENCOUNTER — APPOINTMENT (OUTPATIENT)
Dept: CARDIOTHORACIC SURGERY | Facility: CLINIC | Age: 55
End: 2020-01-01
Payer: MEDICARE

## 2020-01-01 ENCOUNTER — RESULT REVIEW (OUTPATIENT)
Age: 55
End: 2020-01-01

## 2020-01-01 ENCOUNTER — NON-APPOINTMENT (OUTPATIENT)
Age: 55
End: 2020-01-01

## 2020-01-01 ENCOUNTER — APPOINTMENT (OUTPATIENT)
Dept: CARDIOTHORACIC SURGERY | Facility: HOSPITAL | Age: 55
End: 2020-01-01

## 2020-01-01 ENCOUNTER — INPATIENT (INPATIENT)
Facility: HOSPITAL | Age: 55
LOS: 9 days | Discharge: HOME | DRG: 314 | End: 2021-01-08
Attending: INTERNAL MEDICINE | Admitting: INTERNAL MEDICINE
Payer: MEDICARE

## 2020-01-01 ENCOUNTER — INPATIENT (INPATIENT)
Facility: HOSPITAL | Age: 55
LOS: 7 days | Discharge: HOME HEALTH | DRG: 314 | End: 2020-12-22
Attending: INTERNAL MEDICINE | Admitting: INTERNAL MEDICINE
Payer: MEDICARE

## 2020-01-01 ENCOUNTER — APPOINTMENT (OUTPATIENT)
Dept: DISASTER EMERGENCY | Facility: CLINIC | Age: 55
End: 2020-01-01

## 2020-01-01 ENCOUNTER — APPOINTMENT (OUTPATIENT)
Dept: CARDIOTHORACIC SURGERY | Facility: CLINIC | Age: 55
End: 2020-01-01

## 2020-01-01 ENCOUNTER — APPOINTMENT (OUTPATIENT)
Dept: CARDIOLOGY | Facility: CLINIC | Age: 55
End: 2020-01-01
Payer: MEDICARE

## 2020-01-01 ENCOUNTER — FORM ENCOUNTER (OUTPATIENT)
Age: 55
End: 2020-01-01

## 2020-01-01 ENCOUNTER — INPATIENT (INPATIENT)
Facility: HOSPITAL | Age: 55
LOS: 10 days | Discharge: HOME | DRG: 268 | End: 2020-12-11
Attending: THORACIC SURGERY (CARDIOTHORACIC VASCULAR SURGERY) | Admitting: THORACIC SURGERY (CARDIOTHORACIC VASCULAR SURGERY)
Payer: MEDICARE

## 2020-01-01 VITALS
HEART RATE: 83 BPM | TEMPERATURE: 98 F | SYSTOLIC BLOOD PRESSURE: 104 MMHG | OXYGEN SATURATION: 97 % | DIASTOLIC BLOOD PRESSURE: 68 MMHG

## 2020-01-01 VITALS
HEIGHT: 67 IN | DIASTOLIC BLOOD PRESSURE: 71 MMHG | RESPIRATION RATE: 16 BRPM | SYSTOLIC BLOOD PRESSURE: 102 MMHG | WEIGHT: 167.99 LBS | OXYGEN SATURATION: 99 % | HEART RATE: 98 BPM | TEMPERATURE: 100 F

## 2020-01-01 VITALS
WEIGHT: 179.9 LBS | HEART RATE: 80 BPM | SYSTOLIC BLOOD PRESSURE: 99 MMHG | OXYGEN SATURATION: 100 % | TEMPERATURE: 98 F | HEIGHT: 67 IN | DIASTOLIC BLOOD PRESSURE: 71 MMHG | RESPIRATION RATE: 16 BRPM

## 2020-01-01 VITALS
SYSTOLIC BLOOD PRESSURE: 125 MMHG | BODY MASS INDEX: 27.32 KG/M2 | WEIGHT: 170 LBS | HEIGHT: 66 IN | OXYGEN SATURATION: 100 % | HEART RATE: 77 BPM | DIASTOLIC BLOOD PRESSURE: 78 MMHG

## 2020-01-01 VITALS
OXYGEN SATURATION: 98 % | SYSTOLIC BLOOD PRESSURE: 135 MMHG | HEIGHT: 66.93 IN | WEIGHT: 171.96 LBS | TEMPERATURE: 98.7 F | HEART RATE: 80 BPM | BODY MASS INDEX: 26.99 KG/M2 | DIASTOLIC BLOOD PRESSURE: 80 MMHG

## 2020-01-01 VITALS
DIASTOLIC BLOOD PRESSURE: 86 MMHG | OXYGEN SATURATION: 98 % | HEART RATE: 112 BPM | TEMPERATURE: 98 F | RESPIRATION RATE: 16 BRPM | SYSTOLIC BLOOD PRESSURE: 162 MMHG

## 2020-01-01 VITALS
OXYGEN SATURATION: 100 % | RESPIRATION RATE: 18 BRPM | SYSTOLIC BLOOD PRESSURE: 139 MMHG | HEART RATE: 87 BPM | TEMPERATURE: 98 F | DIASTOLIC BLOOD PRESSURE: 84 MMHG

## 2020-01-01 DIAGNOSIS — E11.22 TYPE 2 DIABETES MELLITUS WITH DIABETIC CHRONIC KIDNEY DISEASE: ICD-10-CM

## 2020-01-01 DIAGNOSIS — I25.10 ATHEROSCLEROTIC HEART DISEASE OF NATIVE CORONARY ARTERY WITHOUT ANGINA PECTORIS: ICD-10-CM

## 2020-01-01 DIAGNOSIS — Z01.818 ENCOUNTER FOR OTHER PREPROCEDURAL EXAMINATION: ICD-10-CM

## 2020-01-01 DIAGNOSIS — E11.65 TYPE 2 DIABETES MELLITUS WITH HYPERGLYCEMIA: ICD-10-CM

## 2020-01-01 DIAGNOSIS — I10 ESSENTIAL (PRIMARY) HYPERTENSION: ICD-10-CM

## 2020-01-01 DIAGNOSIS — Z95.1 PRESENCE OF AORTOCORONARY BYPASS GRAFT: Chronic | ICD-10-CM

## 2020-01-01 DIAGNOSIS — R50.9 FEVER, UNSPECIFIED: ICD-10-CM

## 2020-01-01 DIAGNOSIS — E78.3 HYPERCHYLOMICRONEMIA: ICD-10-CM

## 2020-01-01 DIAGNOSIS — Z87.891 PERSONAL HISTORY OF NICOTINE DEPENDENCE: ICD-10-CM

## 2020-01-01 DIAGNOSIS — I95.9 HYPOTENSION, UNSPECIFIED: ICD-10-CM

## 2020-01-01 DIAGNOSIS — Z80.1 FAMILY HISTORY OF MALIGNANT NEOPLASM OF TRACHEA, BRONCHUS AND LUNG: ICD-10-CM

## 2020-01-01 DIAGNOSIS — E11.628 TYPE 2 DIABETES MELLITUS WITH OTHER SKIN COMPLICATIONS: ICD-10-CM

## 2020-01-01 DIAGNOSIS — E78.5 HYPERLIPIDEMIA, UNSPECIFIED: ICD-10-CM

## 2020-01-01 DIAGNOSIS — Z29.9 ENCOUNTER FOR PROPHYLACTIC MEASURES, UNSPECIFIED: ICD-10-CM

## 2020-01-01 DIAGNOSIS — Z78.9 OTHER SPECIFIED HEALTH STATUS: ICD-10-CM

## 2020-01-01 DIAGNOSIS — N18.6 END STAGE RENAL DISEASE: ICD-10-CM

## 2020-01-01 DIAGNOSIS — Z86.79 PERSONAL HISTORY OF OTHER DISEASES OF THE CIRCULATORY SYSTEM: ICD-10-CM

## 2020-01-01 DIAGNOSIS — E78.49 OTHER HYPERLIPIDEMIA: ICD-10-CM

## 2020-01-01 DIAGNOSIS — E11.9 TYPE 2 DIABETES MELLITUS WITHOUT COMPLICATIONS: ICD-10-CM

## 2020-01-01 LAB
A1C WITH ESTIMATED AVERAGE GLUCOSE RESULT: 7.8 % — HIGH (ref 4–5.6)
ALBUMIN SERPL ELPH-MCNC: 2.9 G/DL — LOW (ref 3.3–5)
ALBUMIN SERPL ELPH-MCNC: 3.3 G/DL — SIGNIFICANT CHANGE UP (ref 3.3–5)
ALBUMIN SERPL ELPH-MCNC: 3.4 G/DL — SIGNIFICANT CHANGE UP (ref 3.3–5)
ALBUMIN SERPL ELPH-MCNC: 3.4 G/DL — SIGNIFICANT CHANGE UP (ref 3.3–5)
ALBUMIN SERPL ELPH-MCNC: 3.7 G/DL — SIGNIFICANT CHANGE UP (ref 3.3–5)
ALP SERPL-CCNC: 45 U/L — SIGNIFICANT CHANGE UP (ref 40–120)
ALP SERPL-CCNC: 45 U/L — SIGNIFICANT CHANGE UP (ref 40–120)
ALP SERPL-CCNC: 46 U/L — SIGNIFICANT CHANGE UP (ref 40–120)
ALP SERPL-CCNC: 53 U/L — SIGNIFICANT CHANGE UP (ref 40–120)
ALP SERPL-CCNC: 54 U/L — SIGNIFICANT CHANGE UP (ref 40–120)
ALP SERPL-CCNC: 55 U/L — SIGNIFICANT CHANGE UP (ref 40–120)
ALP SERPL-CCNC: 74 U/L — SIGNIFICANT CHANGE UP (ref 40–120)
ALP SERPL-CCNC: 88 U/L — SIGNIFICANT CHANGE UP (ref 40–120)
ALT FLD-CCNC: 10 U/L — SIGNIFICANT CHANGE UP (ref 10–45)
ALT FLD-CCNC: 101 U/L — HIGH (ref 10–45)
ALT FLD-CCNC: 101 U/L — HIGH (ref 10–45)
ALT FLD-CCNC: 13 U/L — SIGNIFICANT CHANGE UP (ref 10–45)
ALT FLD-CCNC: 13 U/L — SIGNIFICANT CHANGE UP (ref 10–45)
ALT FLD-CCNC: 17 U/L — SIGNIFICANT CHANGE UP (ref 10–45)
ALT FLD-CCNC: 28 U/L — SIGNIFICANT CHANGE UP (ref 10–45)
ALT FLD-CCNC: 93 U/L — HIGH (ref 10–45)
ANION GAP SERPL CALC-SCNC: 15 MMOL/L — SIGNIFICANT CHANGE UP (ref 5–17)
ANION GAP SERPL CALC-SCNC: 15 MMOL/L — SIGNIFICANT CHANGE UP (ref 5–17)
ANION GAP SERPL CALC-SCNC: 17 MMOL/L — SIGNIFICANT CHANGE UP (ref 5–17)
ANION GAP SERPL CALC-SCNC: 18 MMOL/L — HIGH (ref 5–17)
ANION GAP SERPL CALC-SCNC: 19 MMOL/L — HIGH (ref 5–17)
ANION GAP SERPL CALC-SCNC: 20 MMOL/L — HIGH (ref 5–17)
ANION GAP SERPL CALC-SCNC: 21 MMOL/L — HIGH (ref 5–17)
ANION GAP SERPL CALC-SCNC: 22 MMOL/L — HIGH (ref 5–17)
ANION GAP SERPL CALC-SCNC: 22 MMOL/L — HIGH (ref 5–17)
ANION GAP SERPL CALC-SCNC: 23 MMOL/L — HIGH (ref 5–17)
ANISOCYTOSIS BLD QL: SIGNIFICANT CHANGE UP
ANISOCYTOSIS BLD QL: SLIGHT — SIGNIFICANT CHANGE UP
APTT BLD: 29.9 SEC — SIGNIFICANT CHANGE UP (ref 27.5–35.5)
APTT BLD: 33 SEC — SIGNIFICANT CHANGE UP (ref 27.5–35.5)
APTT BLD: 33.5 SEC — SIGNIFICANT CHANGE UP (ref 27.5–35.5)
APTT BLD: 36.3 SEC — HIGH (ref 27.5–35.5)
AST SERPL-CCNC: 120 U/L — HIGH (ref 10–40)
AST SERPL-CCNC: 19 U/L — SIGNIFICANT CHANGE UP (ref 10–40)
AST SERPL-CCNC: 26 U/L — SIGNIFICANT CHANGE UP (ref 10–40)
AST SERPL-CCNC: 40 U/L — SIGNIFICANT CHANGE UP (ref 10–40)
AST SERPL-CCNC: 50 U/L — HIGH (ref 10–40)
AST SERPL-CCNC: 50 U/L — HIGH (ref 10–40)
AST SERPL-CCNC: 53 U/L — HIGH (ref 10–40)
AST SERPL-CCNC: 86 U/L — HIGH (ref 10–40)
B PERT IGG+IGM PNL SER: CLEAR — SIGNIFICANT CHANGE UP
BASE EXCESS BLDV CALC-SCNC: -2.3 MMOL/L — LOW (ref -2–2)
BASE EXCESS BLDV CALC-SCNC: -2.5 MMOL/L — LOW (ref -2–2)
BASE EXCESS BLDV CALC-SCNC: -4.1 MMOL/L — LOW (ref -2–2)
BASE EXCESS BLDV CALC-SCNC: -4.1 MMOL/L — LOW (ref -2–2)
BASE EXCESS BLDV CALC-SCNC: -4.5 MMOL/L — LOW (ref -2–2)
BASE EXCESS BLDV CALC-SCNC: -5.4 MMOL/L — LOW (ref -2–2)
BASE EXCESS BLDV CALC-SCNC: 2.7 MMOL/L — HIGH (ref -2–2)
BASE EXCESS BLDV CALC-SCNC: 3.4 MMOL/L — HIGH (ref -2–2)
BASE EXCESS BLDV CALC-SCNC: 3.7 MMOL/L — HIGH (ref -2–2)
BASE EXCESS BLDV CALC-SCNC: 3.9 MMOL/L — HIGH (ref -2–2)
BASOPHILS # BLD AUTO: 0 K/UL — SIGNIFICANT CHANGE UP (ref 0–0.2)
BASOPHILS # BLD AUTO: 0.04 K/UL — SIGNIFICANT CHANGE UP (ref 0–0.2)
BASOPHILS # BLD AUTO: 0.06 K/UL — SIGNIFICANT CHANGE UP (ref 0–0.2)
BASOPHILS NFR BLD AUTO: 0 % — SIGNIFICANT CHANGE UP (ref 0–2)
BASOPHILS NFR BLD AUTO: 0.2 % — SIGNIFICANT CHANGE UP (ref 0–2)
BASOPHILS NFR BLD AUTO: 0.6 % — SIGNIFICANT CHANGE UP (ref 0–2)
BILIRUB SERPL-MCNC: 0.3 MG/DL — SIGNIFICANT CHANGE UP (ref 0.2–1.2)
BILIRUB SERPL-MCNC: 0.4 MG/DL — SIGNIFICANT CHANGE UP (ref 0.2–1.2)
BILIRUB SERPL-MCNC: 0.5 MG/DL — SIGNIFICANT CHANGE UP (ref 0.2–1.2)
BILIRUB SERPL-MCNC: 0.6 MG/DL — SIGNIFICANT CHANGE UP (ref 0.2–1.2)
BLD GP AB SCN SERPL QL: NEGATIVE — SIGNIFICANT CHANGE UP
BUN SERPL-MCNC: 40 MG/DL — HIGH (ref 7–23)
BUN SERPL-MCNC: 44 MG/DL — HIGH (ref 7–23)
BUN SERPL-MCNC: 44 MG/DL — HIGH (ref 7–23)
BUN SERPL-MCNC: 48 MG/DL — HIGH (ref 7–23)
BUN SERPL-MCNC: 48 MG/DL — HIGH (ref 7–23)
BUN SERPL-MCNC: 52 MG/DL — HIGH (ref 7–23)
BUN SERPL-MCNC: 53 MG/DL — HIGH (ref 7–23)
BUN SERPL-MCNC: 53 MG/DL — HIGH (ref 7–23)
BUN SERPL-MCNC: 55 MG/DL — HIGH (ref 7–23)
BUN SERPL-MCNC: 56 MG/DL — HIGH (ref 7–23)
BUN SERPL-MCNC: 57 MG/DL — HIGH (ref 7–23)
BUN SERPL-MCNC: 57 MG/DL — HIGH (ref 7–23)
BUN SERPL-MCNC: 58 MG/DL — HIGH (ref 7–23)
BUN SERPL-MCNC: 62 MG/DL — HIGH (ref 7–23)
BUN SERPL-MCNC: 63 MG/DL — HIGH (ref 7–23)
BUN SERPL-MCNC: 63 MG/DL — HIGH (ref 7–23)
BUN SERPL-MCNC: 64 MG/DL — HIGH (ref 7–23)
BUN SERPL-MCNC: 65 MG/DL — HIGH (ref 7–23)
BUN SERPL-MCNC: 66 MG/DL — HIGH (ref 7–23)
BUN SERPL-MCNC: 67 MG/DL — HIGH (ref 7–23)
BUN SERPL-MCNC: 71 MG/DL — HIGH (ref 7–23)
BUN SERPL-MCNC: 74 MG/DL — HIGH (ref 7–23)
BUN SERPL-MCNC: 75 MG/DL — HIGH (ref 7–23)
BUN SERPL-MCNC: 76 MG/DL — HIGH (ref 7–23)
BURR CELLS BLD QL SMEAR: PRESENT — SIGNIFICANT CHANGE UP
CA-I SERPL-SCNC: 0.92 MMOL/L — LOW (ref 1.12–1.3)
CA-I SERPL-SCNC: 1.04 MMOL/L — LOW (ref 1.12–1.3)
CA-I SERPL-SCNC: 1.04 MMOL/L — LOW (ref 1.12–1.3)
CA-I SERPL-SCNC: 1.08 MMOL/L — LOW (ref 1.12–1.3)
CA-I SERPL-SCNC: 1.09 MMOL/L — LOW (ref 1.12–1.3)
CA-I SERPL-SCNC: 1.11 MMOL/L — LOW (ref 1.12–1.3)
CA-I SERPL-SCNC: 1.12 MMOL/L — SIGNIFICANT CHANGE UP (ref 1.12–1.3)
CALCIUM SERPL-MCNC: 8 MG/DL — LOW (ref 8.4–10.5)
CALCIUM SERPL-MCNC: 8 MG/DL — LOW (ref 8.4–10.5)
CALCIUM SERPL-MCNC: 8.1 MG/DL — LOW (ref 8.4–10.5)
CALCIUM SERPL-MCNC: 8.1 MG/DL — LOW (ref 8.4–10.5)
CALCIUM SERPL-MCNC: 8.3 MG/DL — LOW (ref 8.4–10.5)
CALCIUM SERPL-MCNC: 8.4 MG/DL — SIGNIFICANT CHANGE UP (ref 8.4–10.5)
CALCIUM SERPL-MCNC: 8.5 MG/DL — SIGNIFICANT CHANGE UP (ref 8.4–10.5)
CALCIUM SERPL-MCNC: 8.6 MG/DL — SIGNIFICANT CHANGE UP (ref 8.4–10.5)
CALCIUM SERPL-MCNC: 8.6 MG/DL — SIGNIFICANT CHANGE UP (ref 8.4–10.5)
CALCIUM SERPL-MCNC: 8.7 MG/DL — SIGNIFICANT CHANGE UP (ref 8.4–10.5)
CALCIUM SERPL-MCNC: 8.8 MG/DL — SIGNIFICANT CHANGE UP (ref 8.4–10.5)
CALCIUM SERPL-MCNC: 9 MG/DL — SIGNIFICANT CHANGE UP (ref 8.4–10.5)
CALCIUM SERPL-MCNC: 9.1 MG/DL — SIGNIFICANT CHANGE UP (ref 8.4–10.5)
CALCIUM SERPL-MCNC: 9.2 MG/DL — SIGNIFICANT CHANGE UP (ref 8.4–10.5)
CALCIUM SERPL-MCNC: 9.3 MG/DL — SIGNIFICANT CHANGE UP (ref 8.4–10.5)
CALCIUM SERPL-MCNC: 9.5 MG/DL — SIGNIFICANT CHANGE UP (ref 8.4–10.5)
CHLORIDE BLDV-SCNC: 95 MMOL/L — LOW (ref 96–108)
CHLORIDE BLDV-SCNC: 95 MMOL/L — LOW (ref 96–108)
CHLORIDE BLDV-SCNC: 96 MMOL/L — SIGNIFICANT CHANGE UP (ref 96–108)
CHLORIDE BLDV-SCNC: 97 MMOL/L — SIGNIFICANT CHANGE UP (ref 96–108)
CHLORIDE BLDV-SCNC: 97 MMOL/L — SIGNIFICANT CHANGE UP (ref 96–108)
CHLORIDE BLDV-SCNC: 98 MMOL/L — SIGNIFICANT CHANGE UP (ref 96–108)
CHLORIDE BLDV-SCNC: 98 MMOL/L — SIGNIFICANT CHANGE UP (ref 96–108)
CHLORIDE SERPL-SCNC: 89 MMOL/L — LOW (ref 96–108)
CHLORIDE SERPL-SCNC: 89 MMOL/L — LOW (ref 96–108)
CHLORIDE SERPL-SCNC: 90 MMOL/L — LOW (ref 96–108)
CHLORIDE SERPL-SCNC: 90 MMOL/L — LOW (ref 96–108)
CHLORIDE SERPL-SCNC: 91 MMOL/L — LOW (ref 96–108)
CHLORIDE SERPL-SCNC: 92 MMOL/L — LOW (ref 96–108)
CHLORIDE SERPL-SCNC: 93 MMOL/L — LOW (ref 96–108)
CHLORIDE SERPL-SCNC: 94 MMOL/L — LOW (ref 96–108)
CHLORIDE SERPL-SCNC: 95 MMOL/L — LOW (ref 96–108)
CHLORIDE SERPL-SCNC: 97 MMOL/L — SIGNIFICANT CHANGE UP (ref 96–108)
CHLORIDE SERPL-SCNC: 97 MMOL/L — SIGNIFICANT CHANGE UP (ref 96–108)
CHLORIDE SERPL-SCNC: 98 MMOL/L — SIGNIFICANT CHANGE UP (ref 96–108)
CK MB BLD-MCNC: 11.5 % — HIGH (ref 0–3.5)
CK MB CFR SERPL CALC: 3.3 NG/ML — SIGNIFICANT CHANGE UP (ref 0–6.7)
CK MB CFR SERPL CALC: 3.4 NG/ML — SIGNIFICANT CHANGE UP (ref 0–6.7)
CK MB CFR SERPL CALC: 55.8 NG/ML — HIGH (ref 0–6.7)
CK SERPL-CCNC: 48 U/L — SIGNIFICANT CHANGE UP (ref 30–200)
CK SERPL-CCNC: 487 U/L — HIGH (ref 30–200)
CK SERPL-CCNC: 50 U/L — SIGNIFICANT CHANGE UP (ref 30–200)
CO2 BLDV-SCNC: 21 MMOL/L — LOW (ref 22–30)
CO2 BLDV-SCNC: 22 MMOL/L — SIGNIFICANT CHANGE UP (ref 22–30)
CO2 BLDV-SCNC: 24 MMOL/L — SIGNIFICANT CHANGE UP (ref 22–30)
CO2 BLDV-SCNC: 24 MMOL/L — SIGNIFICANT CHANGE UP (ref 22–30)
CO2 BLDV-SCNC: 29 MMOL/L — SIGNIFICANT CHANGE UP (ref 22–30)
CO2 BLDV-SCNC: 30 MMOL/L — SIGNIFICANT CHANGE UP (ref 22–30)
CO2 BLDV-SCNC: 30 MMOL/L — SIGNIFICANT CHANGE UP (ref 22–30)
CO2 BLDV-SCNC: 31 MMOL/L — HIGH (ref 22–30)
CO2 SERPL-SCNC: 17 MMOL/L — LOW (ref 22–31)
CO2 SERPL-SCNC: 18 MMOL/L — LOW (ref 22–31)
CO2 SERPL-SCNC: 19 MMOL/L — LOW (ref 22–31)
CO2 SERPL-SCNC: 19 MMOL/L — LOW (ref 22–31)
CO2 SERPL-SCNC: 20 MMOL/L — LOW (ref 22–31)
CO2 SERPL-SCNC: 21 MMOL/L — LOW (ref 22–31)
CO2 SERPL-SCNC: 22 MMOL/L — SIGNIFICANT CHANGE UP (ref 22–31)
CO2 SERPL-SCNC: 23 MMOL/L — SIGNIFICANT CHANGE UP (ref 22–31)
CO2 SERPL-SCNC: 24 MMOL/L — SIGNIFICANT CHANGE UP (ref 22–31)
CO2 SERPL-SCNC: 25 MMOL/L — SIGNIFICANT CHANGE UP (ref 22–31)
CO2 SERPL-SCNC: 26 MMOL/L — SIGNIFICANT CHANGE UP (ref 22–31)
CO2 SERPL-SCNC: 27 MMOL/L — SIGNIFICANT CHANGE UP (ref 22–31)
COLOR FLD: SIGNIFICANT CHANGE UP
CORTIS AM PEAK SERPL-MCNC: 10.3 UG/DL — SIGNIFICANT CHANGE UP (ref 6–18.4)
CREAT SERPL-MCNC: 10.3 MG/DL — HIGH (ref 0.5–1.3)
CREAT SERPL-MCNC: 10.67 MG/DL — HIGH (ref 0.5–1.3)
CREAT SERPL-MCNC: 10.84 MG/DL — HIGH (ref 0.5–1.3)
CREAT SERPL-MCNC: 11.67 MG/DL — HIGH (ref 0.5–1.3)
CREAT SERPL-MCNC: 11.72 MG/DL — HIGH (ref 0.5–1.3)
CREAT SERPL-MCNC: 11.85 MG/DL — HIGH (ref 0.5–1.3)
CREAT SERPL-MCNC: 11.92 MG/DL — HIGH (ref 0.5–1.3)
CREAT SERPL-MCNC: 12.02 MG/DL — HIGH (ref 0.5–1.3)
CREAT SERPL-MCNC: 12.19 MG/DL — HIGH (ref 0.5–1.3)
CREAT SERPL-MCNC: 12.26 MG/DL — HIGH (ref 0.5–1.3)
CREAT SERPL-MCNC: 12.41 MG/DL — HIGH (ref 0.5–1.3)
CREAT SERPL-MCNC: 12.61 MG/DL — HIGH (ref 0.5–1.3)
CREAT SERPL-MCNC: 12.64 MG/DL — HIGH (ref 0.5–1.3)
CREAT SERPL-MCNC: 13.08 MG/DL — HIGH (ref 0.5–1.3)
CREAT SERPL-MCNC: 13.17 MG/DL — HIGH (ref 0.5–1.3)
CREAT SERPL-MCNC: 13.74 MG/DL — HIGH (ref 0.5–1.3)
CREAT SERPL-MCNC: 13.75 MG/DL — HIGH (ref 0.5–1.3)
CREAT SERPL-MCNC: 14.31 MG/DL — HIGH (ref 0.5–1.3)
CREAT SERPL-MCNC: 7.24 MG/DL — HIGH (ref 0.5–1.3)
CREAT SERPL-MCNC: 7.26 MG/DL — HIGH (ref 0.5–1.3)
CREAT SERPL-MCNC: 8.19 MG/DL — HIGH (ref 0.5–1.3)
CREAT SERPL-MCNC: 8.57 MG/DL — HIGH (ref 0.5–1.3)
CREAT SERPL-MCNC: 9.51 MG/DL — HIGH (ref 0.5–1.3)
CREAT SERPL-MCNC: 9.59 MG/DL — HIGH (ref 0.5–1.3)
CREAT SERPL-MCNC: 9.79 MG/DL — HIGH (ref 0.5–1.3)
CREAT SERPL-MCNC: 9.9 MG/DL — HIGH (ref 0.5–1.3)
CRP SERPL-MCNC: 1.75 MG/DL — HIGH (ref 0–0.4)
CULTURE RESULTS: SIGNIFICANT CHANGE UP
DACRYOCYTES BLD QL SMEAR: SLIGHT — SIGNIFICANT CHANGE UP
DACRYOCYTES BLD QL SMEAR: SLIGHT — SIGNIFICANT CHANGE UP
ELLIPTOCYTES BLD QL SMEAR: SLIGHT — SIGNIFICANT CHANGE UP
ELLIPTOCYTES BLD QL SMEAR: SLIGHT — SIGNIFICANT CHANGE UP
EOSINOPHIL # BLD AUTO: 0.11 K/UL — SIGNIFICANT CHANGE UP (ref 0–0.5)
EOSINOPHIL # BLD AUTO: 0.13 K/UL — SIGNIFICANT CHANGE UP (ref 0–0.5)
EOSINOPHIL # BLD AUTO: 0.44 K/UL — SIGNIFICANT CHANGE UP (ref 0–0.5)
EOSINOPHIL NFR BLD AUTO: 0.8 % — SIGNIFICANT CHANGE UP (ref 0–6)
EOSINOPHIL NFR BLD AUTO: 0.9 % — SIGNIFICANT CHANGE UP (ref 0–6)
EOSINOPHIL NFR BLD AUTO: 4.1 % — SIGNIFICANT CHANGE UP (ref 0–6)
ESTIMATED AVERAGE GLUCOSE: 177 MG/DL — HIGH (ref 68–114)
FIBRINOGEN PPP-MCNC: 604 MG/DL — HIGH (ref 290–520)
FLUID INTAKE SUBSTANCE CLASS: SIGNIFICANT CHANGE UP
FLUID SEGMENTED GRANULOCYTES: 16 % — SIGNIFICANT CHANGE UP
GAS PNL BLDA: SIGNIFICANT CHANGE UP
GAS PNL BLDV: 128 MMOL/L — LOW (ref 135–145)
GAS PNL BLDV: 133 MMOL/L — LOW (ref 135–145)
GAS PNL BLDV: 133 MMOL/L — LOW (ref 135–145)
GAS PNL BLDV: 135 MMOL/L — SIGNIFICANT CHANGE UP (ref 135–145)
GAS PNL BLDV: 135 MMOL/L — SIGNIFICANT CHANGE UP (ref 135–145)
GAS PNL BLDV: 136 MMOL/L — SIGNIFICANT CHANGE UP (ref 135–145)
GAS PNL BLDV: 136 MMOL/L — SIGNIFICANT CHANGE UP (ref 135–145)
GAS PNL BLDV: SIGNIFICANT CHANGE UP
GLUCOSE BLDC GLUCOMTR-MCNC: 101 MG/DL — HIGH (ref 70–99)
GLUCOSE BLDC GLUCOMTR-MCNC: 102 MG/DL — HIGH (ref 70–99)
GLUCOSE BLDC GLUCOMTR-MCNC: 103 MG/DL — HIGH (ref 70–99)
GLUCOSE BLDC GLUCOMTR-MCNC: 104 MG/DL — HIGH (ref 70–99)
GLUCOSE BLDC GLUCOMTR-MCNC: 105 MG/DL — HIGH (ref 70–99)
GLUCOSE BLDC GLUCOMTR-MCNC: 109 MG/DL — HIGH (ref 70–99)
GLUCOSE BLDC GLUCOMTR-MCNC: 110 MG/DL — HIGH (ref 70–99)
GLUCOSE BLDC GLUCOMTR-MCNC: 111 MG/DL — HIGH (ref 70–99)
GLUCOSE BLDC GLUCOMTR-MCNC: 112 MG/DL — HIGH (ref 70–99)
GLUCOSE BLDC GLUCOMTR-MCNC: 115 MG/DL — HIGH (ref 70–99)
GLUCOSE BLDC GLUCOMTR-MCNC: 116 MG/DL — HIGH (ref 70–99)
GLUCOSE BLDC GLUCOMTR-MCNC: 117 MG/DL — HIGH (ref 70–99)
GLUCOSE BLDC GLUCOMTR-MCNC: 118 MG/DL — HIGH (ref 70–99)
GLUCOSE BLDC GLUCOMTR-MCNC: 120 MG/DL — HIGH (ref 70–99)
GLUCOSE BLDC GLUCOMTR-MCNC: 122 MG/DL — HIGH (ref 70–99)
GLUCOSE BLDC GLUCOMTR-MCNC: 124 MG/DL — HIGH (ref 70–99)
GLUCOSE BLDC GLUCOMTR-MCNC: 124 MG/DL — HIGH (ref 70–99)
GLUCOSE BLDC GLUCOMTR-MCNC: 128 MG/DL — HIGH (ref 70–99)
GLUCOSE BLDC GLUCOMTR-MCNC: 128 MG/DL — HIGH (ref 70–99)
GLUCOSE BLDC GLUCOMTR-MCNC: 132 MG/DL — HIGH (ref 70–99)
GLUCOSE BLDC GLUCOMTR-MCNC: 134 MG/DL — HIGH (ref 70–99)
GLUCOSE BLDC GLUCOMTR-MCNC: 134 MG/DL — HIGH (ref 70–99)
GLUCOSE BLDC GLUCOMTR-MCNC: 136 MG/DL — HIGH (ref 70–99)
GLUCOSE BLDC GLUCOMTR-MCNC: 138 MG/DL — HIGH (ref 70–99)
GLUCOSE BLDC GLUCOMTR-MCNC: 138 MG/DL — HIGH (ref 70–99)
GLUCOSE BLDC GLUCOMTR-MCNC: 139 MG/DL — HIGH (ref 70–99)
GLUCOSE BLDC GLUCOMTR-MCNC: 139 MG/DL — HIGH (ref 70–99)
GLUCOSE BLDC GLUCOMTR-MCNC: 140 MG/DL — HIGH (ref 70–99)
GLUCOSE BLDC GLUCOMTR-MCNC: 141 MG/DL — HIGH (ref 70–99)
GLUCOSE BLDC GLUCOMTR-MCNC: 143 MG/DL — HIGH (ref 70–99)
GLUCOSE BLDC GLUCOMTR-MCNC: 145 MG/DL — HIGH (ref 70–99)
GLUCOSE BLDC GLUCOMTR-MCNC: 146 MG/DL — HIGH (ref 70–99)
GLUCOSE BLDC GLUCOMTR-MCNC: 147 MG/DL — HIGH (ref 70–99)
GLUCOSE BLDC GLUCOMTR-MCNC: 149 MG/DL — HIGH (ref 70–99)
GLUCOSE BLDC GLUCOMTR-MCNC: 152 MG/DL — HIGH (ref 70–99)
GLUCOSE BLDC GLUCOMTR-MCNC: 153 MG/DL — HIGH (ref 70–99)
GLUCOSE BLDC GLUCOMTR-MCNC: 154 MG/DL — HIGH (ref 70–99)
GLUCOSE BLDC GLUCOMTR-MCNC: 156 MG/DL — HIGH (ref 70–99)
GLUCOSE BLDC GLUCOMTR-MCNC: 156 MG/DL — HIGH (ref 70–99)
GLUCOSE BLDC GLUCOMTR-MCNC: 159 MG/DL — HIGH (ref 70–99)
GLUCOSE BLDC GLUCOMTR-MCNC: 160 MG/DL — HIGH (ref 70–99)
GLUCOSE BLDC GLUCOMTR-MCNC: 162 MG/DL — HIGH (ref 70–99)
GLUCOSE BLDC GLUCOMTR-MCNC: 162 MG/DL — HIGH (ref 70–99)
GLUCOSE BLDC GLUCOMTR-MCNC: 165 MG/DL — HIGH (ref 70–99)
GLUCOSE BLDC GLUCOMTR-MCNC: 166 MG/DL — HIGH (ref 70–99)
GLUCOSE BLDC GLUCOMTR-MCNC: 168 MG/DL — HIGH (ref 70–99)
GLUCOSE BLDC GLUCOMTR-MCNC: 168 MG/DL — HIGH (ref 70–99)
GLUCOSE BLDC GLUCOMTR-MCNC: 169 MG/DL — HIGH (ref 70–99)
GLUCOSE BLDC GLUCOMTR-MCNC: 171 MG/DL — HIGH (ref 70–99)
GLUCOSE BLDC GLUCOMTR-MCNC: 177 MG/DL — HIGH (ref 70–99)
GLUCOSE BLDC GLUCOMTR-MCNC: 179 MG/DL — HIGH (ref 70–99)
GLUCOSE BLDC GLUCOMTR-MCNC: 181 MG/DL — HIGH (ref 70–99)
GLUCOSE BLDC GLUCOMTR-MCNC: 181 MG/DL — HIGH (ref 70–99)
GLUCOSE BLDC GLUCOMTR-MCNC: 182 MG/DL — HIGH (ref 70–99)
GLUCOSE BLDC GLUCOMTR-MCNC: 182 MG/DL — HIGH (ref 70–99)
GLUCOSE BLDC GLUCOMTR-MCNC: 183 MG/DL — HIGH (ref 70–99)
GLUCOSE BLDC GLUCOMTR-MCNC: 183 MG/DL — HIGH (ref 70–99)
GLUCOSE BLDC GLUCOMTR-MCNC: 185 MG/DL — HIGH (ref 70–99)
GLUCOSE BLDC GLUCOMTR-MCNC: 187 MG/DL — HIGH (ref 70–99)
GLUCOSE BLDC GLUCOMTR-MCNC: 188 MG/DL — HIGH (ref 70–99)
GLUCOSE BLDC GLUCOMTR-MCNC: 189 MG/DL — HIGH (ref 70–99)
GLUCOSE BLDC GLUCOMTR-MCNC: 190 MG/DL — HIGH (ref 70–99)
GLUCOSE BLDC GLUCOMTR-MCNC: 194 MG/DL — HIGH (ref 70–99)
GLUCOSE BLDC GLUCOMTR-MCNC: 194 MG/DL — HIGH (ref 70–99)
GLUCOSE BLDC GLUCOMTR-MCNC: 195 MG/DL — HIGH (ref 70–99)
GLUCOSE BLDC GLUCOMTR-MCNC: 195 MG/DL — HIGH (ref 70–99)
GLUCOSE BLDC GLUCOMTR-MCNC: 199 MG/DL — HIGH (ref 70–99)
GLUCOSE BLDC GLUCOMTR-MCNC: 205 MG/DL — HIGH (ref 70–99)
GLUCOSE BLDC GLUCOMTR-MCNC: 208 MG/DL — HIGH (ref 70–99)
GLUCOSE BLDC GLUCOMTR-MCNC: 210 MG/DL — HIGH (ref 70–99)
GLUCOSE BLDC GLUCOMTR-MCNC: 212 MG/DL — HIGH (ref 70–99)
GLUCOSE BLDC GLUCOMTR-MCNC: 216 MG/DL — HIGH (ref 70–99)
GLUCOSE BLDC GLUCOMTR-MCNC: 217 MG/DL — HIGH (ref 70–99)
GLUCOSE BLDC GLUCOMTR-MCNC: 219 MG/DL — HIGH (ref 70–99)
GLUCOSE BLDC GLUCOMTR-MCNC: 219 MG/DL — HIGH (ref 70–99)
GLUCOSE BLDC GLUCOMTR-MCNC: 220 MG/DL — HIGH (ref 70–99)
GLUCOSE BLDC GLUCOMTR-MCNC: 223 MG/DL — HIGH (ref 70–99)
GLUCOSE BLDC GLUCOMTR-MCNC: 226 MG/DL — HIGH (ref 70–99)
GLUCOSE BLDC GLUCOMTR-MCNC: 231 MG/DL — HIGH (ref 70–99)
GLUCOSE BLDC GLUCOMTR-MCNC: 234 MG/DL — HIGH (ref 70–99)
GLUCOSE BLDC GLUCOMTR-MCNC: 238 MG/DL — HIGH (ref 70–99)
GLUCOSE BLDC GLUCOMTR-MCNC: 253 MG/DL — HIGH (ref 70–99)
GLUCOSE BLDC GLUCOMTR-MCNC: 259 MG/DL — HIGH (ref 70–99)
GLUCOSE BLDC GLUCOMTR-MCNC: 267 MG/DL — HIGH (ref 70–99)
GLUCOSE BLDC GLUCOMTR-MCNC: 270 MG/DL — HIGH (ref 70–99)
GLUCOSE BLDC GLUCOMTR-MCNC: 277 MG/DL — HIGH (ref 70–99)
GLUCOSE BLDC GLUCOMTR-MCNC: 280 MG/DL — HIGH (ref 70–99)
GLUCOSE BLDC GLUCOMTR-MCNC: 280 MG/DL — HIGH (ref 70–99)
GLUCOSE BLDC GLUCOMTR-MCNC: 309 MG/DL — HIGH (ref 70–99)
GLUCOSE BLDC GLUCOMTR-MCNC: 323 MG/DL — HIGH (ref 70–99)
GLUCOSE BLDC GLUCOMTR-MCNC: 389 MG/DL — HIGH (ref 70–99)
GLUCOSE BLDC GLUCOMTR-MCNC: 49 MG/DL — CRITICAL LOW (ref 70–99)
GLUCOSE BLDC GLUCOMTR-MCNC: 53 MG/DL — CRITICAL LOW (ref 70–99)
GLUCOSE BLDC GLUCOMTR-MCNC: 56 MG/DL — LOW (ref 70–99)
GLUCOSE BLDC GLUCOMTR-MCNC: 56 MG/DL — LOW (ref 70–99)
GLUCOSE BLDC GLUCOMTR-MCNC: 57 MG/DL — LOW (ref 70–99)
GLUCOSE BLDC GLUCOMTR-MCNC: 60 MG/DL — LOW (ref 70–99)
GLUCOSE BLDC GLUCOMTR-MCNC: 64 MG/DL — LOW (ref 70–99)
GLUCOSE BLDC GLUCOMTR-MCNC: 65 MG/DL — LOW (ref 70–99)
GLUCOSE BLDC GLUCOMTR-MCNC: 67 MG/DL — LOW (ref 70–99)
GLUCOSE BLDC GLUCOMTR-MCNC: 73 MG/DL — SIGNIFICANT CHANGE UP (ref 70–99)
GLUCOSE BLDC GLUCOMTR-MCNC: 74 MG/DL — SIGNIFICANT CHANGE UP (ref 70–99)
GLUCOSE BLDC GLUCOMTR-MCNC: 75 MG/DL — SIGNIFICANT CHANGE UP (ref 70–99)
GLUCOSE BLDC GLUCOMTR-MCNC: 85 MG/DL — SIGNIFICANT CHANGE UP (ref 70–99)
GLUCOSE BLDC GLUCOMTR-MCNC: 91 MG/DL — SIGNIFICANT CHANGE UP (ref 70–99)
GLUCOSE BLDC GLUCOMTR-MCNC: 91 MG/DL — SIGNIFICANT CHANGE UP (ref 70–99)
GLUCOSE BLDC GLUCOMTR-MCNC: 93 MG/DL — SIGNIFICANT CHANGE UP (ref 70–99)
GLUCOSE BLDC GLUCOMTR-MCNC: 94 MG/DL — SIGNIFICANT CHANGE UP (ref 70–99)
GLUCOSE BLDC GLUCOMTR-MCNC: 94 MG/DL — SIGNIFICANT CHANGE UP (ref 70–99)
GLUCOSE BLDC GLUCOMTR-MCNC: 95 MG/DL — SIGNIFICANT CHANGE UP (ref 70–99)
GLUCOSE BLDC GLUCOMTR-MCNC: 96 MG/DL — SIGNIFICANT CHANGE UP (ref 70–99)
GLUCOSE BLDC GLUCOMTR-MCNC: 97 MG/DL — SIGNIFICANT CHANGE UP (ref 70–99)
GLUCOSE BLDC GLUCOMTR-MCNC: 98 MG/DL — SIGNIFICANT CHANGE UP (ref 70–99)
GLUCOSE BLDC GLUCOMTR-MCNC: 98 MG/DL — SIGNIFICANT CHANGE UP (ref 70–99)
GLUCOSE BLDV-MCNC: 166 MG/DL — HIGH (ref 70–99)
GLUCOSE BLDV-MCNC: 208 MG/DL — HIGH (ref 70–99)
GLUCOSE BLDV-MCNC: 228 MG/DL — HIGH (ref 70–99)
GLUCOSE BLDV-MCNC: 234 MG/DL — HIGH (ref 70–99)
GLUCOSE BLDV-MCNC: 243 MG/DL — HIGH (ref 70–99)
GLUCOSE BLDV-MCNC: 260 MG/DL — HIGH (ref 70–99)
GLUCOSE BLDV-MCNC: 65 MG/DL — LOW (ref 70–99)
GLUCOSE BLDV-MCNC: 67 MG/DL — LOW (ref 70–99)
GLUCOSE SERPL-MCNC: 100 MG/DL — HIGH (ref 70–99)
GLUCOSE SERPL-MCNC: 101 MG/DL — HIGH (ref 70–99)
GLUCOSE SERPL-MCNC: 115 MG/DL — HIGH (ref 70–99)
GLUCOSE SERPL-MCNC: 115 MG/DL — HIGH (ref 70–99)
GLUCOSE SERPL-MCNC: 122 MG/DL — HIGH (ref 70–99)
GLUCOSE SERPL-MCNC: 123 MG/DL — HIGH (ref 70–99)
GLUCOSE SERPL-MCNC: 134 MG/DL — HIGH (ref 70–99)
GLUCOSE SERPL-MCNC: 135 MG/DL — HIGH (ref 70–99)
GLUCOSE SERPL-MCNC: 151 MG/DL — HIGH (ref 70–99)
GLUCOSE SERPL-MCNC: 168 MG/DL — HIGH (ref 70–99)
GLUCOSE SERPL-MCNC: 171 MG/DL — HIGH (ref 70–99)
GLUCOSE SERPL-MCNC: 176 MG/DL — HIGH (ref 70–99)
GLUCOSE SERPL-MCNC: 180 MG/DL — HIGH (ref 70–99)
GLUCOSE SERPL-MCNC: 184 MG/DL — HIGH (ref 70–99)
GLUCOSE SERPL-MCNC: 208 MG/DL — HIGH (ref 70–99)
GLUCOSE SERPL-MCNC: 211 MG/DL — HIGH (ref 70–99)
GLUCOSE SERPL-MCNC: 216 MG/DL — HIGH (ref 70–99)
GLUCOSE SERPL-MCNC: 220 MG/DL — HIGH (ref 70–99)
GLUCOSE SERPL-MCNC: 221 MG/DL — HIGH (ref 70–99)
GLUCOSE SERPL-MCNC: 232 MG/DL — HIGH (ref 70–99)
GLUCOSE SERPL-MCNC: 254 MG/DL — HIGH (ref 70–99)
GLUCOSE SERPL-MCNC: 268 MG/DL — HIGH (ref 70–99)
GLUCOSE SERPL-MCNC: 38 MG/DL — CRITICAL LOW (ref 70–99)
GLUCOSE SERPL-MCNC: 49 MG/DL — CRITICAL LOW (ref 70–99)
GLUCOSE SERPL-MCNC: 68 MG/DL — LOW (ref 70–99)
GLUCOSE SERPL-MCNC: 97 MG/DL — SIGNIFICANT CHANGE UP (ref 70–99)
GRAM STN FLD: SIGNIFICANT CHANGE UP
HAV IGM SER-ACNC: SIGNIFICANT CHANGE UP
HBV CORE IGM SER-ACNC: SIGNIFICANT CHANGE UP
HBV SURFACE AG SER-ACNC: SIGNIFICANT CHANGE UP
HCO3 BLDV-SCNC: 20 MMOL/L — LOW (ref 21–29)
HCO3 BLDV-SCNC: 20 MMOL/L — LOW (ref 21–29)
HCO3 BLDV-SCNC: 21 MMOL/L — SIGNIFICANT CHANGE UP (ref 21–29)
HCO3 BLDV-SCNC: 21 MMOL/L — SIGNIFICANT CHANGE UP (ref 21–29)
HCO3 BLDV-SCNC: 23 MMOL/L — SIGNIFICANT CHANGE UP (ref 21–29)
HCO3 BLDV-SCNC: 23 MMOL/L — SIGNIFICANT CHANGE UP (ref 21–29)
HCO3 BLDV-SCNC: 28 MMOL/L — SIGNIFICANT CHANGE UP (ref 21–29)
HCO3 BLDV-SCNC: 28 MMOL/L — SIGNIFICANT CHANGE UP (ref 21–29)
HCO3 BLDV-SCNC: 29 MMOL/L — SIGNIFICANT CHANGE UP (ref 21–29)
HCO3 BLDV-SCNC: 29 MMOL/L — SIGNIFICANT CHANGE UP (ref 21–29)
HCT VFR BLD CALC: 27.2 % — LOW (ref 39–50)
HCT VFR BLD CALC: 28.2 % — LOW (ref 39–50)
HCT VFR BLD CALC: 28.4 % — LOW (ref 39–50)
HCT VFR BLD CALC: 29.3 % — LOW (ref 39–50)
HCT VFR BLD CALC: 29.6 % — LOW (ref 39–50)
HCT VFR BLD CALC: 29.8 % — LOW (ref 39–50)
HCT VFR BLD CALC: 30.2 % — LOW (ref 39–50)
HCT VFR BLD CALC: 30.4 % — LOW (ref 39–50)
HCT VFR BLD CALC: 30.9 % — LOW (ref 39–50)
HCT VFR BLD CALC: 32.2 % — LOW (ref 39–50)
HCT VFR BLD CALC: 32.6 % — LOW (ref 39–50)
HCT VFR BLD CALC: 33.4 % — LOW (ref 39–50)
HCT VFR BLD CALC: 33.4 % — LOW (ref 39–50)
HCT VFR BLD CALC: 33.5 % — LOW (ref 39–50)
HCT VFR BLD CALC: 33.9 % — LOW (ref 39–50)
HCT VFR BLD CALC: 34.2 % — LOW (ref 39–50)
HCT VFR BLD CALC: 34.3 % — LOW (ref 39–50)
HCT VFR BLD CALC: 34.5 % — LOW (ref 39–50)
HCT VFR BLD CALC: 34.6 % — LOW (ref 39–50)
HCT VFR BLD CALC: 34.7 % — LOW (ref 39–50)
HCT VFR BLD CALC: 35.4 % — LOW (ref 39–50)
HCT VFR BLD CALC: 36.1 % — LOW (ref 39–50)
HCT VFR BLD CALC: 36.7 % — LOW (ref 39–50)
HCT VFR BLDA CALC: 19 % — CRITICAL LOW (ref 39–50)
HCT VFR BLDA CALC: 30 % — LOW (ref 39–50)
HCT VFR BLDA CALC: 31 % — LOW (ref 39–50)
HCT VFR BLDA CALC: 31 % — LOW (ref 39–50)
HCT VFR BLDA CALC: 32 % — LOW (ref 39–50)
HCT VFR BLDA CALC: 32 % — LOW (ref 39–50)
HCT VFR BLDA CALC: 36 % — LOW (ref 39–50)
HCV AB S/CO SERPL IA: 0.06 S/CO — SIGNIFICANT CHANGE UP (ref 0–0.99)
HCV AB SERPL-IMP: SIGNIFICANT CHANGE UP
HGB BLD CALC-MCNC: 10 G/DL — LOW (ref 13–17)
HGB BLD CALC-MCNC: 10.1 G/DL — LOW (ref 13–17)
HGB BLD CALC-MCNC: 10.2 G/DL — LOW (ref 13–17)
HGB BLD CALC-MCNC: 10.4 G/DL — LOW (ref 13–17)
HGB BLD CALC-MCNC: 11.6 G/DL — LOW (ref 13–17)
HGB BLD CALC-MCNC: 6.1 G/DL — CRITICAL LOW (ref 13–17)
HGB BLD CALC-MCNC: 9.7 G/DL — LOW (ref 13–17)
HGB BLD-MCNC: 10.1 G/DL — LOW (ref 13–17)
HGB BLD-MCNC: 10.2 G/DL — LOW (ref 13–17)
HGB BLD-MCNC: 10.2 G/DL — LOW (ref 13–17)
HGB BLD-MCNC: 10.4 G/DL — LOW (ref 13–17)
HGB BLD-MCNC: 10.4 G/DL — LOW (ref 13–17)
HGB BLD-MCNC: 10.6 G/DL — LOW (ref 13–17)
HGB BLD-MCNC: 10.7 G/DL — LOW (ref 13–17)
HGB BLD-MCNC: 10.8 G/DL — LOW (ref 13–17)
HGB BLD-MCNC: 10.8 G/DL — LOW (ref 13–17)
HGB BLD-MCNC: 11.1 G/DL — LOW (ref 13–17)
HGB BLD-MCNC: 11.2 G/DL — LOW (ref 13–17)
HGB BLD-MCNC: 8.7 G/DL — LOW (ref 13–17)
HGB BLD-MCNC: 8.8 G/DL — LOW (ref 13–17)
HGB BLD-MCNC: 9.1 G/DL — LOW (ref 13–17)
HGB BLD-MCNC: 9.1 G/DL — LOW (ref 13–17)
HGB BLD-MCNC: 9.3 G/DL — LOW (ref 13–17)
HGB BLD-MCNC: 9.5 G/DL — LOW (ref 13–17)
HGB BLD-MCNC: 9.5 G/DL — LOW (ref 13–17)
HGB BLD-MCNC: 9.6 G/DL — LOW (ref 13–17)
HGB BLD-MCNC: 9.6 G/DL — LOW (ref 13–17)
HGB BLD-MCNC: 9.9 G/DL — LOW (ref 13–17)
HIV 1 & 2 AB SERPL IA.RAPID: SIGNIFICANT CHANGE UP
HIV 1+2 AB+HIV1 P24 AG SERPL QL IA: SIGNIFICANT CHANGE UP
HOROWITZ INDEX BLDV+IHG-RTO: 32 — SIGNIFICANT CHANGE UP
HOROWITZ INDEX BLDV+IHG-RTO: 40 — SIGNIFICANT CHANGE UP
HYPOCHROMIA BLD QL: SLIGHT — SIGNIFICANT CHANGE UP
IMM GRANULOCYTES NFR BLD AUTO: 1 % — SIGNIFICANT CHANGE UP (ref 0–1.5)
IMM GRANULOCYTES NFR BLD AUTO: 1.4 % — SIGNIFICANT CHANGE UP (ref 0–1.5)
INR BLD: 1.11 RATIO — SIGNIFICANT CHANGE UP (ref 0.88–1.16)
INR BLD: 1.17 RATIO — HIGH (ref 0.88–1.16)
INR BLD: 1.18 RATIO — HIGH (ref 0.88–1.16)
INR BLD: 1.2 RATIO — HIGH (ref 0.88–1.16)
LACTATE BLDV-MCNC: 1.5 MMOL/L — SIGNIFICANT CHANGE UP (ref 0.7–2)
LACTATE BLDV-MCNC: 1.7 MMOL/L — SIGNIFICANT CHANGE UP (ref 0.7–2)
LACTATE BLDV-MCNC: 2.1 MMOL/L — HIGH (ref 0.7–2)
LACTATE BLDV-MCNC: 2.6 MMOL/L — HIGH (ref 0.7–2)
LACTATE BLDV-MCNC: 2.7 MMOL/L — HIGH (ref 0.7–2)
LACTATE SERPL-SCNC: 1 MMOL/L — SIGNIFICANT CHANGE UP (ref 0.7–2)
LACTATE SERPL-SCNC: 2.3 MMOL/L — HIGH (ref 0.7–2)
LYMPHOCYTES # BLD AUTO: 0.96 K/UL — LOW (ref 1–3.3)
LYMPHOCYTES # BLD AUTO: 1.45 K/UL — SIGNIFICANT CHANGE UP (ref 1–3.3)
LYMPHOCYTES # BLD AUTO: 1.56 K/UL — SIGNIFICANT CHANGE UP (ref 1–3.3)
LYMPHOCYTES # BLD AUTO: 13.6 % — SIGNIFICANT CHANGE UP (ref 13–44)
LYMPHOCYTES # BLD AUTO: 7.9 % — LOW (ref 13–44)
LYMPHOCYTES # BLD AUTO: 9.5 % — LOW (ref 13–44)
LYMPHOCYTES # FLD: 21 % — SIGNIFICANT CHANGE UP
MACROCYTES BLD QL: SIGNIFICANT CHANGE UP
MACROCYTES BLD QL: SLIGHT — SIGNIFICANT CHANGE UP
MAGNESIUM SERPL-MCNC: 2.3 MG/DL — SIGNIFICANT CHANGE UP (ref 1.6–2.6)
MAGNESIUM SERPL-MCNC: 2.4 MG/DL — SIGNIFICANT CHANGE UP (ref 1.6–2.6)
MAGNESIUM SERPL-MCNC: 2.7 MG/DL — HIGH (ref 1.6–2.6)
MAGNESIUM SERPL-MCNC: 2.8 MG/DL — HIGH (ref 1.6–2.6)
MAGNESIUM SERPL-MCNC: 2.9 MG/DL — HIGH (ref 1.6–2.6)
MAGNESIUM SERPL-MCNC: 3 MG/DL — HIGH (ref 1.6–2.6)
MAGNESIUM SERPL-MCNC: 3.3 MG/DL — HIGH (ref 1.6–2.6)
MANUAL SMEAR VERIFICATION: SIGNIFICANT CHANGE UP
MANUAL SMEAR VERIFICATION: SIGNIFICANT CHANGE UP
MCHC RBC-ENTMCNC: 28 PG — SIGNIFICANT CHANGE UP (ref 27–34)
MCHC RBC-ENTMCNC: 28.4 PG — SIGNIFICANT CHANGE UP (ref 27–34)
MCHC RBC-ENTMCNC: 28.6 PG — SIGNIFICANT CHANGE UP (ref 27–34)
MCHC RBC-ENTMCNC: 28.6 PG — SIGNIFICANT CHANGE UP (ref 27–34)
MCHC RBC-ENTMCNC: 28.8 PG — SIGNIFICANT CHANGE UP (ref 27–34)
MCHC RBC-ENTMCNC: 28.9 PG — SIGNIFICANT CHANGE UP (ref 27–34)
MCHC RBC-ENTMCNC: 28.9 PG — SIGNIFICANT CHANGE UP (ref 27–34)
MCHC RBC-ENTMCNC: 29.1 PG — SIGNIFICANT CHANGE UP (ref 27–34)
MCHC RBC-ENTMCNC: 29.2 PG — SIGNIFICANT CHANGE UP (ref 27–34)
MCHC RBC-ENTMCNC: 29.2 PG — SIGNIFICANT CHANGE UP (ref 27–34)
MCHC RBC-ENTMCNC: 29.3 PG — SIGNIFICANT CHANGE UP (ref 27–34)
MCHC RBC-ENTMCNC: 29.5 PG — SIGNIFICANT CHANGE UP (ref 27–34)
MCHC RBC-ENTMCNC: 29.6 PG — SIGNIFICANT CHANGE UP (ref 27–34)
MCHC RBC-ENTMCNC: 29.6 PG — SIGNIFICANT CHANGE UP (ref 27–34)
MCHC RBC-ENTMCNC: 29.7 PG — SIGNIFICANT CHANGE UP (ref 27–34)
MCHC RBC-ENTMCNC: 29.8 PG — SIGNIFICANT CHANGE UP (ref 27–34)
MCHC RBC-ENTMCNC: 29.9 GM/DL — LOW (ref 32–36)
MCHC RBC-ENTMCNC: 29.9 PG — SIGNIFICANT CHANGE UP (ref 27–34)
MCHC RBC-ENTMCNC: 29.9 PG — SIGNIFICANT CHANGE UP (ref 27–34)
MCHC RBC-ENTMCNC: 30.1 GM/DL — LOW (ref 32–36)
MCHC RBC-ENTMCNC: 30.1 PG — SIGNIFICANT CHANGE UP (ref 27–34)
MCHC RBC-ENTMCNC: 30.2 GM/DL — LOW (ref 32–36)
MCHC RBC-ENTMCNC: 30.4 GM/DL — LOW (ref 32–36)
MCHC RBC-ENTMCNC: 30.4 GM/DL — LOW (ref 32–36)
MCHC RBC-ENTMCNC: 30.5 GM/DL — LOW (ref 32–36)
MCHC RBC-ENTMCNC: 30.7 GM/DL — LOW (ref 32–36)
MCHC RBC-ENTMCNC: 30.9 GM/DL — LOW (ref 32–36)
MCHC RBC-ENTMCNC: 30.9 GM/DL — LOW (ref 32–36)
MCHC RBC-ENTMCNC: 31.1 GM/DL — LOW (ref 32–36)
MCHC RBC-ENTMCNC: 31.5 GM/DL — LOW (ref 32–36)
MCHC RBC-ENTMCNC: 31.6 GM/DL — LOW (ref 32–36)
MCHC RBC-ENTMCNC: 31.7 GM/DL — LOW (ref 32–36)
MCHC RBC-ENTMCNC: 31.7 GM/DL — LOW (ref 32–36)
MCHC RBC-ENTMCNC: 31.8 GM/DL — LOW (ref 32–36)
MCHC RBC-ENTMCNC: 31.9 GM/DL — LOW (ref 32–36)
MCHC RBC-ENTMCNC: 31.9 GM/DL — LOW (ref 32–36)
MCHC RBC-ENTMCNC: 32 GM/DL — SIGNIFICANT CHANGE UP (ref 32–36)
MCHC RBC-ENTMCNC: 32.4 GM/DL — SIGNIFICANT CHANGE UP (ref 32–36)
MCV RBC AUTO: 91.3 FL — SIGNIFICANT CHANGE UP (ref 80–100)
MCV RBC AUTO: 91.6 FL — SIGNIFICANT CHANGE UP (ref 80–100)
MCV RBC AUTO: 91.6 FL — SIGNIFICANT CHANGE UP (ref 80–100)
MCV RBC AUTO: 91.8 FL — SIGNIFICANT CHANGE UP (ref 80–100)
MCV RBC AUTO: 92.8 FL — SIGNIFICANT CHANGE UP (ref 80–100)
MCV RBC AUTO: 93 FL — SIGNIFICANT CHANGE UP (ref 80–100)
MCV RBC AUTO: 93.4 FL — SIGNIFICANT CHANGE UP (ref 80–100)
MCV RBC AUTO: 93.6 FL — SIGNIFICANT CHANGE UP (ref 80–100)
MCV RBC AUTO: 93.7 FL — SIGNIFICANT CHANGE UP (ref 80–100)
MCV RBC AUTO: 93.7 FL — SIGNIFICANT CHANGE UP (ref 80–100)
MCV RBC AUTO: 94.1 FL — SIGNIFICANT CHANGE UP (ref 80–100)
MCV RBC AUTO: 94.1 FL — SIGNIFICANT CHANGE UP (ref 80–100)
MCV RBC AUTO: 94.2 FL — SIGNIFICANT CHANGE UP (ref 80–100)
MCV RBC AUTO: 94.2 FL — SIGNIFICANT CHANGE UP (ref 80–100)
MCV RBC AUTO: 94.7 FL — SIGNIFICANT CHANGE UP (ref 80–100)
MCV RBC AUTO: 95.1 FL — SIGNIFICANT CHANGE UP (ref 80–100)
MCV RBC AUTO: 95.6 FL — SIGNIFICANT CHANGE UP (ref 80–100)
MCV RBC AUTO: 95.7 FL — SIGNIFICANT CHANGE UP (ref 80–100)
MCV RBC AUTO: 95.8 FL — SIGNIFICANT CHANGE UP (ref 80–100)
MCV RBC AUTO: 96 FL — SIGNIFICANT CHANGE UP (ref 80–100)
MCV RBC AUTO: 96.1 FL — SIGNIFICANT CHANGE UP (ref 80–100)
MCV RBC AUTO: 96.5 FL — SIGNIFICANT CHANGE UP (ref 80–100)
MCV RBC AUTO: 96.6 FL — SIGNIFICANT CHANGE UP (ref 80–100)
MICROCYTES BLD QL: SLIGHT — SIGNIFICANT CHANGE UP
MICROCYTES BLD QL: SLIGHT — SIGNIFICANT CHANGE UP
MONOCYTES # BLD AUTO: 1.34 K/UL — HIGH (ref 0–0.9)
MONOCYTES # BLD AUTO: 1.49 K/UL — HIGH (ref 0–0.9)
MONOCYTES # BLD AUTO: 1.59 K/UL — HIGH (ref 0–0.9)
MONOCYTES NFR BLD AUTO: 12.3 % — SIGNIFICANT CHANGE UP (ref 2–14)
MONOCYTES NFR BLD AUTO: 15 % — HIGH (ref 2–14)
MONOCYTES NFR BLD AUTO: 8.2 % — SIGNIFICANT CHANGE UP (ref 2–14)
MONOS+MACROS # FLD: 63 % — SIGNIFICANT CHANGE UP
NEUTROPHILS # BLD AUTO: 13.06 K/UL — HIGH (ref 1.8–7.4)
NEUTROPHILS # BLD AUTO: 6.98 K/UL — SIGNIFICANT CHANGE UP (ref 1.8–7.4)
NEUTROPHILS # BLD AUTO: 9.55 K/UL — HIGH (ref 1.8–7.4)
NEUTROPHILS NFR BLD AUTO: 65.7 % — SIGNIFICANT CHANGE UP (ref 43–77)
NEUTROPHILS NFR BLD AUTO: 78.9 % — HIGH (ref 43–77)
NEUTROPHILS NFR BLD AUTO: 79.9 % — HIGH (ref 43–77)
NRBC # BLD: 0 /100 WBCS — SIGNIFICANT CHANGE UP (ref 0–0)
OTHER CELLS CSF MANUAL: 10 ML/DL — LOW (ref 18–22)
OTHER CELLS CSF MANUAL: 10 ML/DL — LOW (ref 18–22)
OTHER CELLS CSF MANUAL: 7 ML/DL — LOW (ref 18–22)
PA ADP PRP-ACNC: 302 PRU — SIGNIFICANT CHANGE UP (ref 194–417)
PCO2 BLDV: 39 MMHG — SIGNIFICANT CHANGE UP (ref 35–50)
PCO2 BLDV: 39 MMHG — SIGNIFICANT CHANGE UP (ref 35–50)
PCO2 BLDV: 41 MMHG — SIGNIFICANT CHANGE UP (ref 35–50)
PCO2 BLDV: 42 MMHG — SIGNIFICANT CHANGE UP (ref 35–50)
PCO2 BLDV: 43 MMHG — SIGNIFICANT CHANGE UP (ref 35–50)
PCO2 BLDV: 44 MMHG — SIGNIFICANT CHANGE UP (ref 35–50)
PCO2 BLDV: 44 MMHG — SIGNIFICANT CHANGE UP (ref 35–50)
PCO2 BLDV: 47 MMHG — SIGNIFICANT CHANGE UP (ref 35–50)
PCO2 BLDV: 48 MMHG — SIGNIFICANT CHANGE UP (ref 35–50)
PCO2 BLDV: 52 MMHG — HIGH (ref 35–50)
PH BLDV: 7.32 — LOW (ref 7.35–7.45)
PH BLDV: 7.32 — LOW (ref 7.35–7.45)
PH BLDV: 7.33 — LOW (ref 7.35–7.45)
PH BLDV: 7.34 — LOW (ref 7.35–7.45)
PH BLDV: 7.37 — SIGNIFICANT CHANGE UP (ref 7.35–7.45)
PH BLDV: 7.39 — SIGNIFICANT CHANGE UP (ref 7.35–7.45)
PH BLDV: 7.4 — SIGNIFICANT CHANGE UP (ref 7.35–7.45)
PH BLDV: 7.42 — SIGNIFICANT CHANGE UP (ref 7.35–7.45)
PHOSPHATE SERPL-MCNC: 4.9 MG/DL — HIGH (ref 2.5–4.5)
PHOSPHATE SERPL-MCNC: 6.1 MG/DL — HIGH (ref 2.5–4.5)
PHOSPHATE SERPL-MCNC: 6.3 MG/DL — HIGH (ref 2.5–4.5)
PHOSPHATE SERPL-MCNC: 6.4 MG/DL — HIGH (ref 2.5–4.5)
PHOSPHATE SERPL-MCNC: 6.6 MG/DL — HIGH (ref 2.5–4.5)
PHOSPHATE SERPL-MCNC: 8.5 MG/DL — HIGH (ref 2.5–4.5)
PHOSPHATE SERPL-MCNC: 8.6 MG/DL — HIGH (ref 2.5–4.5)
PLAT MORPH BLD: NORMAL — SIGNIFICANT CHANGE UP
PLAT MORPH BLD: NORMAL — SIGNIFICANT CHANGE UP
PLATELET # BLD AUTO: 128 K/UL — LOW (ref 150–400)
PLATELET # BLD AUTO: 131 K/UL — LOW (ref 150–400)
PLATELET # BLD AUTO: 131 K/UL — LOW (ref 150–400)
PLATELET # BLD AUTO: 139 K/UL — LOW (ref 150–400)
PLATELET # BLD AUTO: 150 K/UL — SIGNIFICANT CHANGE UP (ref 150–400)
PLATELET # BLD AUTO: 158 K/UL — SIGNIFICANT CHANGE UP (ref 150–400)
PLATELET # BLD AUTO: 174 K/UL — SIGNIFICANT CHANGE UP (ref 150–400)
PLATELET # BLD AUTO: 176 K/UL — SIGNIFICANT CHANGE UP (ref 150–400)
PLATELET # BLD AUTO: 187 K/UL — SIGNIFICANT CHANGE UP (ref 150–400)
PLATELET # BLD AUTO: 205 K/UL — SIGNIFICANT CHANGE UP (ref 150–400)
PLATELET # BLD AUTO: 207 K/UL — SIGNIFICANT CHANGE UP (ref 150–400)
PLATELET # BLD AUTO: 209 K/UL — SIGNIFICANT CHANGE UP (ref 150–400)
PLATELET # BLD AUTO: 216 K/UL — SIGNIFICANT CHANGE UP (ref 150–400)
PLATELET # BLD AUTO: 216 K/UL — SIGNIFICANT CHANGE UP (ref 150–400)
PLATELET # BLD AUTO: 220 K/UL — SIGNIFICANT CHANGE UP (ref 150–400)
PLATELET # BLD AUTO: 225 K/UL — SIGNIFICANT CHANGE UP (ref 150–400)
PLATELET # BLD AUTO: 225 K/UL — SIGNIFICANT CHANGE UP (ref 150–400)
PLATELET # BLD AUTO: 234 K/UL — SIGNIFICANT CHANGE UP (ref 150–400)
PLATELET # BLD AUTO: 240 K/UL — SIGNIFICANT CHANGE UP (ref 150–400)
PLATELET # BLD AUTO: 301 K/UL — SIGNIFICANT CHANGE UP (ref 150–400)
PLATELET # BLD AUTO: 319 K/UL — SIGNIFICANT CHANGE UP (ref 150–400)
PLATELET # BLD AUTO: 338 K/UL — SIGNIFICANT CHANGE UP (ref 150–400)
PLATELET # BLD AUTO: SIGNIFICANT CHANGE UP K/UL (ref 150–400)
PLATELET CLUMP BLD QL SMEAR: ABNORMAL
PLATELET MAPPING ACTF MAX AMPLITUDE: 29.9 MM (ref 2–19)
PLATELET MAPPING ADP MAXIMUM AMPLITUDE: 66.7 MM — SIGNIFICANT CHANGE UP (ref 45–69)
PLATELET MAPPING ADP PERCENT INHIBITION: 9.6 % — SIGNIFICANT CHANGE UP (ref 0–17)
PLATELET MAPPING HKH MAXIMUM AMPLITUDE: 70.6 MM (ref 53–68)
PO2 BLDV: 24 MMHG — LOW (ref 25–45)
PO2 BLDV: 26 MMHG — SIGNIFICANT CHANGE UP (ref 25–45)
PO2 BLDV: 28 MMHG — SIGNIFICANT CHANGE UP (ref 25–45)
PO2 BLDV: 32 MMHG — SIGNIFICANT CHANGE UP (ref 25–45)
PO2 BLDV: 42 MMHG — SIGNIFICANT CHANGE UP (ref 25–45)
PO2 BLDV: 43 MMHG — SIGNIFICANT CHANGE UP (ref 25–45)
PO2 BLDV: 44 MMHG — SIGNIFICANT CHANGE UP (ref 25–45)
PO2 BLDV: 46 MMHG — HIGH (ref 25–45)
PO2 BLDV: 47 MMHG — HIGH (ref 25–45)
PO2 BLDV: 58 MMHG — HIGH (ref 25–45)
POIKILOCYTOSIS BLD QL AUTO: SLIGHT — SIGNIFICANT CHANGE UP
POLYCHROMASIA BLD QL SMEAR: SLIGHT — SIGNIFICANT CHANGE UP
POLYCHROMASIA BLD QL SMEAR: SLIGHT — SIGNIFICANT CHANGE UP
POTASSIUM BLDV-SCNC: 3.3 MMOL/L — LOW (ref 3.5–5.3)
POTASSIUM BLDV-SCNC: 3.4 MMOL/L — LOW (ref 3.5–5.3)
POTASSIUM BLDV-SCNC: 3.7 MMOL/L — SIGNIFICANT CHANGE UP (ref 3.5–5.3)
POTASSIUM BLDV-SCNC: 3.7 MMOL/L — SIGNIFICANT CHANGE UP (ref 3.5–5.3)
POTASSIUM BLDV-SCNC: 4.1 MMOL/L — SIGNIFICANT CHANGE UP (ref 3.5–5.3)
POTASSIUM BLDV-SCNC: 5 MMOL/L — SIGNIFICANT CHANGE UP (ref 3.5–5.3)
POTASSIUM BLDV-SCNC: 5 MMOL/L — SIGNIFICANT CHANGE UP (ref 3.5–5.3)
POTASSIUM BLDV-SCNC: 8.4 MMOL/L — CRITICAL HIGH (ref 3.5–5.3)
POTASSIUM SERPL-MCNC: 3.5 MMOL/L — SIGNIFICANT CHANGE UP (ref 3.5–5.3)
POTASSIUM SERPL-MCNC: 3.6 MMOL/L — SIGNIFICANT CHANGE UP (ref 3.5–5.3)
POTASSIUM SERPL-MCNC: 3.7 MMOL/L — SIGNIFICANT CHANGE UP (ref 3.5–5.3)
POTASSIUM SERPL-MCNC: 3.7 MMOL/L — SIGNIFICANT CHANGE UP (ref 3.5–5.3)
POTASSIUM SERPL-MCNC: 3.8 MMOL/L — SIGNIFICANT CHANGE UP (ref 3.5–5.3)
POTASSIUM SERPL-MCNC: 3.9 MMOL/L — SIGNIFICANT CHANGE UP (ref 3.5–5.3)
POTASSIUM SERPL-MCNC: 4.1 MMOL/L — SIGNIFICANT CHANGE UP (ref 3.5–5.3)
POTASSIUM SERPL-MCNC: 4.2 MMOL/L — SIGNIFICANT CHANGE UP (ref 3.5–5.3)
POTASSIUM SERPL-MCNC: 4.4 MMOL/L — SIGNIFICANT CHANGE UP (ref 3.5–5.3)
POTASSIUM SERPL-MCNC: 4.4 MMOL/L — SIGNIFICANT CHANGE UP (ref 3.5–5.3)
POTASSIUM SERPL-MCNC: 4.5 MMOL/L — SIGNIFICANT CHANGE UP (ref 3.5–5.3)
POTASSIUM SERPL-MCNC: 4.6 MMOL/L — SIGNIFICANT CHANGE UP (ref 3.5–5.3)
POTASSIUM SERPL-MCNC: 4.6 MMOL/L — SIGNIFICANT CHANGE UP (ref 3.5–5.3)
POTASSIUM SERPL-MCNC: 4.7 MMOL/L — SIGNIFICANT CHANGE UP (ref 3.5–5.3)
POTASSIUM SERPL-MCNC: 4.8 MMOL/L — SIGNIFICANT CHANGE UP (ref 3.5–5.3)
POTASSIUM SERPL-MCNC: 4.8 MMOL/L — SIGNIFICANT CHANGE UP (ref 3.5–5.3)
POTASSIUM SERPL-MCNC: 4.9 MMOL/L — SIGNIFICANT CHANGE UP (ref 3.5–5.3)
POTASSIUM SERPL-MCNC: 5.3 MMOL/L — SIGNIFICANT CHANGE UP (ref 3.5–5.3)
POTASSIUM SERPL-MCNC: 5.4 MMOL/L — HIGH (ref 3.5–5.3)
POTASSIUM SERPL-MCNC: 5.7 MMOL/L — HIGH (ref 3.5–5.3)
POTASSIUM SERPL-MCNC: 6.3 MMOL/L — CRITICAL HIGH (ref 3.5–5.3)
POTASSIUM SERPL-MCNC: 7.1 MMOL/L — CRITICAL HIGH (ref 3.5–5.3)
POTASSIUM SERPL-SCNC: 3.5 MMOL/L — SIGNIFICANT CHANGE UP (ref 3.5–5.3)
POTASSIUM SERPL-SCNC: 3.6 MMOL/L — SIGNIFICANT CHANGE UP (ref 3.5–5.3)
POTASSIUM SERPL-SCNC: 3.7 MMOL/L — SIGNIFICANT CHANGE UP (ref 3.5–5.3)
POTASSIUM SERPL-SCNC: 3.7 MMOL/L — SIGNIFICANT CHANGE UP (ref 3.5–5.3)
POTASSIUM SERPL-SCNC: 3.8 MMOL/L — SIGNIFICANT CHANGE UP (ref 3.5–5.3)
POTASSIUM SERPL-SCNC: 3.9 MMOL/L — SIGNIFICANT CHANGE UP (ref 3.5–5.3)
POTASSIUM SERPL-SCNC: 4.1 MMOL/L — SIGNIFICANT CHANGE UP (ref 3.5–5.3)
POTASSIUM SERPL-SCNC: 4.2 MMOL/L — SIGNIFICANT CHANGE UP (ref 3.5–5.3)
POTASSIUM SERPL-SCNC: 4.4 MMOL/L — SIGNIFICANT CHANGE UP (ref 3.5–5.3)
POTASSIUM SERPL-SCNC: 4.4 MMOL/L — SIGNIFICANT CHANGE UP (ref 3.5–5.3)
POTASSIUM SERPL-SCNC: 4.5 MMOL/L — SIGNIFICANT CHANGE UP (ref 3.5–5.3)
POTASSIUM SERPL-SCNC: 4.6 MMOL/L — SIGNIFICANT CHANGE UP (ref 3.5–5.3)
POTASSIUM SERPL-SCNC: 4.6 MMOL/L — SIGNIFICANT CHANGE UP (ref 3.5–5.3)
POTASSIUM SERPL-SCNC: 4.7 MMOL/L — SIGNIFICANT CHANGE UP (ref 3.5–5.3)
POTASSIUM SERPL-SCNC: 4.8 MMOL/L — SIGNIFICANT CHANGE UP (ref 3.5–5.3)
POTASSIUM SERPL-SCNC: 4.8 MMOL/L — SIGNIFICANT CHANGE UP (ref 3.5–5.3)
POTASSIUM SERPL-SCNC: 4.9 MMOL/L — SIGNIFICANT CHANGE UP (ref 3.5–5.3)
POTASSIUM SERPL-SCNC: 5.3 MMOL/L — SIGNIFICANT CHANGE UP (ref 3.5–5.3)
POTASSIUM SERPL-SCNC: 5.4 MMOL/L — HIGH (ref 3.5–5.3)
POTASSIUM SERPL-SCNC: 5.7 MMOL/L — HIGH (ref 3.5–5.3)
POTASSIUM SERPL-SCNC: 6.3 MMOL/L — CRITICAL HIGH (ref 3.5–5.3)
POTASSIUM SERPL-SCNC: 7.1 MMOL/L — CRITICAL HIGH (ref 3.5–5.3)
PROT SERPL-MCNC: 5.1 G/DL — LOW (ref 6–8.3)
PROT SERPL-MCNC: 5.5 G/DL — LOW (ref 6–8.3)
PROT SERPL-MCNC: 5.5 G/DL — LOW (ref 6–8.3)
PROT SERPL-MCNC: 5.6 G/DL — LOW (ref 6–8.3)
PROT SERPL-MCNC: 5.7 G/DL — LOW (ref 6–8.3)
PROT SERPL-MCNC: 5.8 G/DL — LOW (ref 6–8.3)
PROT SERPL-MCNC: 6.2 G/DL — SIGNIFICANT CHANGE UP (ref 6–8.3)
PROT SERPL-MCNC: 7 G/DL — SIGNIFICANT CHANGE UP (ref 6–8.3)
PROTHROM AB SERPL-ACNC: 13.3 SEC — SIGNIFICANT CHANGE UP (ref 10.6–13.6)
PROTHROM AB SERPL-ACNC: 13.9 SEC — HIGH (ref 10.6–13.6)
PROTHROM AB SERPL-ACNC: 14 SEC — HIGH (ref 10.6–13.6)
PROTHROM AB SERPL-ACNC: 14.3 SEC — HIGH (ref 10.6–13.6)
RAPID RVP RESULT: SIGNIFICANT CHANGE UP
RBC # BLD: 2.97 M/UL — LOW (ref 4.2–5.8)
RBC # BLD: 3.04 M/UL — LOW (ref 4.2–5.8)
RBC # BLD: 3.08 M/UL — LOW (ref 4.2–5.8)
RBC # BLD: 3.11 M/UL — LOW (ref 4.2–5.8)
RBC # BLD: 3.19 M/UL — LOW (ref 4.2–5.8)
RBC # BLD: 3.19 M/UL — LOW (ref 4.2–5.8)
RBC # BLD: 3.2 M/UL — LOW (ref 4.2–5.8)
RBC # BLD: 3.23 M/UL — LOW (ref 4.2–5.8)
RBC # BLD: 3.3 M/UL — LOW (ref 4.2–5.8)
RBC # BLD: 3.42 M/UL — LOW (ref 4.2–5.8)
RBC # BLD: 3.46 M/UL — LOW (ref 4.2–5.8)
RBC # BLD: 3.48 M/UL — LOW (ref 4.2–5.8)
RBC # BLD: 3.5 M/UL — LOW (ref 4.2–5.8)
RBC # BLD: 3.53 M/UL — LOW (ref 4.2–5.8)
RBC # BLD: 3.55 M/UL — LOW (ref 4.2–5.8)
RBC # BLD: 3.57 M/UL — LOW (ref 4.2–5.8)
RBC # BLD: 3.57 M/UL — LOW (ref 4.2–5.8)
RBC # BLD: 3.64 M/UL — LOW (ref 4.2–5.8)
RBC # BLD: 3.65 M/UL — LOW (ref 4.2–5.8)
RBC # BLD: 3.77 M/UL — LOW (ref 4.2–5.8)
RBC # BLD: 3.78 M/UL — LOW (ref 4.2–5.8)
RBC # BLD: 3.84 M/UL — LOW (ref 4.2–5.8)
RBC # BLD: 3.88 M/UL — LOW (ref 4.2–5.8)
RBC # FLD: 15.5 % — HIGH (ref 10.3–14.5)
RBC # FLD: 15.7 % — HIGH (ref 10.3–14.5)
RBC # FLD: 15.8 % — HIGH (ref 10.3–14.5)
RBC # FLD: 16.1 % — HIGH (ref 10.3–14.5)
RBC # FLD: 16.1 % — HIGH (ref 10.3–14.5)
RBC # FLD: 16.3 % — HIGH (ref 10.3–14.5)
RBC # FLD: 16.4 % — HIGH (ref 10.3–14.5)
RBC # FLD: 16.6 % — HIGH (ref 10.3–14.5)
RBC # FLD: 16.7 % — HIGH (ref 10.3–14.5)
RBC # FLD: 16.8 % — HIGH (ref 10.3–14.5)
RBC # FLD: 16.8 % — HIGH (ref 10.3–14.5)
RBC # FLD: 16.9 % — HIGH (ref 10.3–14.5)
RBC # FLD: 17 % — HIGH (ref 10.3–14.5)
RBC # FLD: 17.2 % — HIGH (ref 10.3–14.5)
RBC # FLD: 17.2 % — HIGH (ref 10.3–14.5)
RBC # FLD: 17.8 % — HIGH (ref 10.3–14.5)
RBC # FLD: 18.2 % — HIGH (ref 10.3–14.5)
RBC # FLD: 18.6 % — HIGH (ref 10.3–14.5)
RBC BLD AUTO: ABNORMAL
RBC BLD AUTO: ABNORMAL
RCV VOL RI: 9 /UL — HIGH (ref 0–0)
RH IG SCN BLD-IMP: POSITIVE — SIGNIFICANT CHANGE UP
SAO2 % BLDV: 32 % — LOW (ref 67–88)
SAO2 % BLDV: 36 % — LOW (ref 67–88)
SAO2 % BLDV: 44 % — LOW (ref 67–88)
SAO2 % BLDV: 52 % — LOW (ref 67–88)
SAO2 % BLDV: 68 % — SIGNIFICANT CHANGE UP (ref 67–88)
SAO2 % BLDV: 72 % — SIGNIFICANT CHANGE UP (ref 67–88)
SAO2 % BLDV: 74 % — SIGNIFICANT CHANGE UP (ref 67–88)
SAO2 % BLDV: 75 % — SIGNIFICANT CHANGE UP (ref 67–88)
SAO2 % BLDV: 76 % — SIGNIFICANT CHANGE UP (ref 67–88)
SAO2 % BLDV: 88 % — SIGNIFICANT CHANGE UP (ref 67–88)
SARS-COV-2 IGG SERPL QL IA: NEGATIVE — SIGNIFICANT CHANGE UP
SARS-COV-2 IGM SERPL IA-ACNC: 0.06 INDEX — SIGNIFICANT CHANGE UP
SARS-COV-2 N GENE NPH QL NAA+PROBE: NOT DETECTED
SARS-COV-2 RNA SPEC QL NAA+PROBE: SIGNIFICANT CHANGE UP
SCHISTOCYTES BLD QL AUTO: SLIGHT — SIGNIFICANT CHANGE UP
SMUDGE CELLS # BLD: PRESENT — SIGNIFICANT CHANGE UP
SODIUM SERPL-SCNC: 130 MMOL/L — LOW (ref 135–145)
SODIUM SERPL-SCNC: 130 MMOL/L — LOW (ref 135–145)
SODIUM SERPL-SCNC: 131 MMOL/L — LOW (ref 135–145)
SODIUM SERPL-SCNC: 132 MMOL/L — LOW (ref 135–145)
SODIUM SERPL-SCNC: 132 MMOL/L — LOW (ref 135–145)
SODIUM SERPL-SCNC: 133 MMOL/L — LOW (ref 135–145)
SODIUM SERPL-SCNC: 134 MMOL/L — LOW (ref 135–145)
SODIUM SERPL-SCNC: 135 MMOL/L — SIGNIFICANT CHANGE UP (ref 135–145)
SODIUM SERPL-SCNC: 137 MMOL/L — SIGNIFICANT CHANGE UP (ref 135–145)
SODIUM SERPL-SCNC: 137 MMOL/L — SIGNIFICANT CHANGE UP (ref 135–145)
SODIUM SERPL-SCNC: 138 MMOL/L — SIGNIFICANT CHANGE UP (ref 135–145)
SODIUM SERPL-SCNC: 139 MMOL/L — SIGNIFICANT CHANGE UP (ref 135–145)
SODIUM SERPL-SCNC: 139 MMOL/L — SIGNIFICANT CHANGE UP (ref 135–145)
SPECIMEN SOURCE: SIGNIFICANT CHANGE UP
SURGICAL PATHOLOGY STUDY: SIGNIFICANT CHANGE UP
TARGETS BLD QL SMEAR: SLIGHT — SIGNIFICANT CHANGE UP
TOTAL NUCLEATED CELL COUNT, BODY FLUID: 14 /UL — SIGNIFICANT CHANGE UP
TROPONIN T, HIGH SENSITIVITY RESULT: 1366 NG/L — HIGH (ref 0–51)
TROPONIN T, HIGH SENSITIVITY RESULT: 796 NG/L — HIGH (ref 0–51)
TROPONIN T, HIGH SENSITIVITY RESULT: 796 NG/L — HIGH (ref 0–51)
TROPONIN T, HIGH SENSITIVITY RESULT: 812 NG/L — HIGH (ref 0–51)
TROPONIN T, HIGH SENSITIVITY RESULT: 852 NG/L — HIGH (ref 0–51)
TUBE TYPE: SIGNIFICANT CHANGE UP
VANCOMYCIN FLD-MCNC: 16.6 UG/ML — SIGNIFICANT CHANGE UP
WBC # BLD: 10.63 K/UL — HIGH (ref 3.8–10.5)
WBC # BLD: 10.77 K/UL — HIGH (ref 3.8–10.5)
WBC # BLD: 10.78 K/UL — HIGH (ref 3.8–10.5)
WBC # BLD: 11.65 K/UL — HIGH (ref 3.8–10.5)
WBC # BLD: 12.11 K/UL — HIGH (ref 3.8–10.5)
WBC # BLD: 12.78 K/UL — HIGH (ref 3.8–10.5)
WBC # BLD: 12.99 K/UL — HIGH (ref 3.8–10.5)
WBC # BLD: 13.46 K/UL — HIGH (ref 3.8–10.5)
WBC # BLD: 13.81 K/UL — HIGH (ref 3.8–10.5)
WBC # BLD: 13.81 K/UL — HIGH (ref 3.8–10.5)
WBC # BLD: 14.26 K/UL — HIGH (ref 3.8–10.5)
WBC # BLD: 14.37 K/UL — HIGH (ref 3.8–10.5)
WBC # BLD: 15.63 K/UL — HIGH (ref 3.8–10.5)
WBC # BLD: 16.36 K/UL — HIGH (ref 3.8–10.5)
WBC # BLD: 16.46 K/UL — HIGH (ref 3.8–10.5)
WBC # BLD: 16.73 K/UL — HIGH (ref 3.8–10.5)
WBC # BLD: 17.55 K/UL — HIGH (ref 3.8–10.5)
WBC # BLD: 18.17 K/UL — HIGH (ref 3.8–10.5)
WBC # BLD: 19.7 K/UL — HIGH (ref 3.8–10.5)
WBC # BLD: 7.49 K/UL — SIGNIFICANT CHANGE UP (ref 3.8–10.5)
WBC # BLD: 8.48 K/UL — SIGNIFICANT CHANGE UP (ref 3.8–10.5)
WBC # BLD: 9.21 K/UL — SIGNIFICANT CHANGE UP (ref 3.8–10.5)
WBC # BLD: 9.98 K/UL — SIGNIFICANT CHANGE UP (ref 3.8–10.5)
WBC # FLD AUTO: 10.63 K/UL — HIGH (ref 3.8–10.5)
WBC # FLD AUTO: 10.77 K/UL — HIGH (ref 3.8–10.5)
WBC # FLD AUTO: 10.78 K/UL — HIGH (ref 3.8–10.5)
WBC # FLD AUTO: 11.65 K/UL — HIGH (ref 3.8–10.5)
WBC # FLD AUTO: 12.11 K/UL — HIGH (ref 3.8–10.5)
WBC # FLD AUTO: 12.78 K/UL — HIGH (ref 3.8–10.5)
WBC # FLD AUTO: 12.99 K/UL — HIGH (ref 3.8–10.5)
WBC # FLD AUTO: 13.46 K/UL — HIGH (ref 3.8–10.5)
WBC # FLD AUTO: 13.81 K/UL — HIGH (ref 3.8–10.5)
WBC # FLD AUTO: 13.81 K/UL — HIGH (ref 3.8–10.5)
WBC # FLD AUTO: 14.26 K/UL — HIGH (ref 3.8–10.5)
WBC # FLD AUTO: 14.37 K/UL — HIGH (ref 3.8–10.5)
WBC # FLD AUTO: 15.63 K/UL — HIGH (ref 3.8–10.5)
WBC # FLD AUTO: 16.36 K/UL — HIGH (ref 3.8–10.5)
WBC # FLD AUTO: 16.46 K/UL — HIGH (ref 3.8–10.5)
WBC # FLD AUTO: 16.73 K/UL — HIGH (ref 3.8–10.5)
WBC # FLD AUTO: 17.55 K/UL — HIGH (ref 3.8–10.5)
WBC # FLD AUTO: 18.17 K/UL — HIGH (ref 3.8–10.5)
WBC # FLD AUTO: 19.7 K/UL — HIGH (ref 3.8–10.5)
WBC # FLD AUTO: 7.49 K/UL — SIGNIFICANT CHANGE UP (ref 3.8–10.5)
WBC # FLD AUTO: 8.48 K/UL — SIGNIFICANT CHANGE UP (ref 3.8–10.5)
WBC # FLD AUTO: 9.21 K/UL — SIGNIFICANT CHANGE UP (ref 3.8–10.5)
WBC # FLD AUTO: 9.98 K/UL — SIGNIFICANT CHANGE UP (ref 3.8–10.5)

## 2020-01-01 PROCEDURE — 99291 CRITICAL CARE FIRST HOUR: CPT

## 2020-01-01 PROCEDURE — 85610 PROTHROMBIN TIME: CPT

## 2020-01-01 PROCEDURE — 99232 SBSQ HOSP IP/OBS MODERATE 35: CPT | Mod: GC

## 2020-01-01 PROCEDURE — 99204 OFFICE O/P NEW MOD 45 MIN: CPT

## 2020-01-01 PROCEDURE — 93306 TTE W/DOPPLER COMPLETE: CPT | Mod: 26

## 2020-01-01 PROCEDURE — 99232 SBSQ HOSP IP/OBS MODERATE 35: CPT

## 2020-01-01 PROCEDURE — P9047: CPT

## 2020-01-01 PROCEDURE — 88300 SURGICAL PATH GROSS: CPT | Mod: 26

## 2020-01-01 PROCEDURE — 99153 MOD SED SAME PHYS/QHP EA: CPT

## 2020-01-01 PROCEDURE — 93455 CORONARY ART/GRFT ANGIO S&I: CPT | Mod: 26,59

## 2020-01-01 PROCEDURE — 93010 ELECTROCARDIOGRAM REPORT: CPT

## 2020-01-01 PROCEDURE — 73620 X-RAY EXAM OF FOOT: CPT | Mod: 26,LT

## 2020-01-01 PROCEDURE — 99261: CPT

## 2020-01-01 PROCEDURE — 88300 SURGICAL PATH GROSS: CPT

## 2020-01-01 PROCEDURE — 86923 COMPATIBILITY TEST ELECTRIC: CPT

## 2020-01-01 PROCEDURE — 99223 1ST HOSP IP/OBS HIGH 75: CPT

## 2020-01-01 PROCEDURE — 99285 EMERGENCY DEPT VISIT HI MDM: CPT | Mod: CS,GC

## 2020-01-01 PROCEDURE — 85576 BLOOD PLATELET AGGREGATION: CPT

## 2020-01-01 PROCEDURE — 99291 CRITICAL CARE FIRST HOUR: CPT | Mod: 25

## 2020-01-01 PROCEDURE — 99222 1ST HOSP IP/OBS MODERATE 55: CPT

## 2020-01-01 PROCEDURE — 85014 HEMATOCRIT: CPT

## 2020-01-01 PROCEDURE — 85025 COMPLETE CBC W/AUTO DIFF WBC: CPT

## 2020-01-01 PROCEDURE — 86703 HIV-1/HIV-2 1 RESULT ANTBDY: CPT

## 2020-01-01 PROCEDURE — 82330 ASSAY OF CALCIUM: CPT

## 2020-01-01 PROCEDURE — 71045 X-RAY EXAM CHEST 1 VIEW: CPT | Mod: 26

## 2020-01-01 PROCEDURE — 86891 AUTOLOGOUS BLOOD OP SALVAGE: CPT

## 2020-01-01 PROCEDURE — 82435 ASSAY OF BLOOD CHLORIDE: CPT

## 2020-01-01 PROCEDURE — 93000 ELECTROCARDIOGRAM COMPLETE: CPT

## 2020-01-01 PROCEDURE — 83605 ASSAY OF LACTIC ACID: CPT

## 2020-01-01 PROCEDURE — 70450 CT HEAD/BRAIN W/O DYE: CPT | Mod: 26

## 2020-01-01 PROCEDURE — 85027 COMPLETE CBC AUTOMATED: CPT

## 2020-01-01 PROCEDURE — 85018 HEMOGLOBIN: CPT

## 2020-01-01 PROCEDURE — 82553 CREATINE MB FRACTION: CPT

## 2020-01-01 PROCEDURE — 99223 1ST HOSP IP/OBS HIGH 75: CPT | Mod: GC

## 2020-01-01 PROCEDURE — C9600: CPT | Mod: LC

## 2020-01-01 PROCEDURE — 85396 CLOTTING ASSAY WHOLE BLOOD: CPT

## 2020-01-01 PROCEDURE — 92928 PRQ TCAT PLMT NTRAC ST 1 LES: CPT | Mod: LC

## 2020-01-01 PROCEDURE — 80048 BASIC METABOLIC PNL TOTAL CA: CPT

## 2020-01-01 PROCEDURE — P9016: CPT

## 2020-01-01 PROCEDURE — C1894: CPT

## 2020-01-01 PROCEDURE — C1725: CPT

## 2020-01-01 PROCEDURE — 97116 GAIT TRAINING THERAPY: CPT

## 2020-01-01 PROCEDURE — 99222 1ST HOSP IP/OBS MODERATE 55: CPT | Mod: GC

## 2020-01-01 PROCEDURE — 33315 EXPLORATORY HEART SURGERY: CPT

## 2020-01-01 PROCEDURE — 80053 COMPREHEN METABOLIC PANEL: CPT

## 2020-01-01 PROCEDURE — C1887: CPT

## 2020-01-01 PROCEDURE — 85730 THROMBOPLASTIN TIME PARTIAL: CPT

## 2020-01-01 PROCEDURE — 87635 SARS-COV-2 COVID-19 AMP PRB: CPT

## 2020-01-01 PROCEDURE — 94002 VENT MGMT INPAT INIT DAY: CPT

## 2020-01-01 PROCEDURE — 99233 SBSQ HOSP IP/OBS HIGH 50: CPT

## 2020-01-01 PROCEDURE — 84132 ASSAY OF SERUM POTASSIUM: CPT

## 2020-01-01 PROCEDURE — 97530 THERAPEUTIC ACTIVITIES: CPT

## 2020-01-01 PROCEDURE — C1751: CPT

## 2020-01-01 PROCEDURE — 97112 NEUROMUSCULAR REEDUCATION: CPT

## 2020-01-01 PROCEDURE — P9045: CPT

## 2020-01-01 PROCEDURE — 86900 BLOOD TYPING SEROLOGIC ABO: CPT

## 2020-01-01 PROCEDURE — C1884: CPT

## 2020-01-01 PROCEDURE — 80074 ACUTE HEPATITIS PANEL: CPT

## 2020-01-01 PROCEDURE — 36430 TRANSFUSION BLD/BLD COMPNT: CPT

## 2020-01-01 PROCEDURE — 84484 ASSAY OF TROPONIN QUANT: CPT

## 2020-01-01 PROCEDURE — 71250 CT THORAX DX C-: CPT | Mod: 26

## 2020-01-01 PROCEDURE — 93010 ELECTROCARDIOGRAM REPORT: CPT | Mod: GC

## 2020-01-01 PROCEDURE — 82803 BLOOD GASES ANY COMBINATION: CPT

## 2020-01-01 PROCEDURE — C1889: CPT

## 2020-01-01 PROCEDURE — C1769: CPT

## 2020-01-01 PROCEDURE — 85384 FIBRINOGEN ACTIVITY: CPT

## 2020-01-01 PROCEDURE — 99214 OFFICE O/P EST MOD 30 MIN: CPT

## 2020-01-01 PROCEDURE — 93010 ELECTROCARDIOGRAM REPORT: CPT | Mod: 77

## 2020-01-01 PROCEDURE — 86901 BLOOD TYPING SEROLOGIC RH(D): CPT

## 2020-01-01 PROCEDURE — C1874: CPT

## 2020-01-01 PROCEDURE — 99152 MOD SED SAME PHYS/QHP 5/>YRS: CPT

## 2020-01-01 PROCEDURE — 82947 ASSAY GLUCOSE BLOOD QUANT: CPT

## 2020-01-01 PROCEDURE — 97162 PT EVAL MOD COMPLEX 30 MIN: CPT

## 2020-01-01 PROCEDURE — 97165 OT EVAL LOW COMPLEX 30 MIN: CPT

## 2020-01-01 PROCEDURE — 82962 GLUCOSE BLOOD TEST: CPT

## 2020-01-01 PROCEDURE — 83036 HEMOGLOBIN GLYCOSYLATED A1C: CPT

## 2020-01-01 PROCEDURE — 93455 CORONARY ART/GRFT ANGIO S&I: CPT | Mod: 59

## 2020-01-01 PROCEDURE — 84100 ASSAY OF PHOSPHORUS: CPT

## 2020-01-01 PROCEDURE — 36800 INSERTION OF CANNULA: CPT

## 2020-01-01 PROCEDURE — 87389 HIV-1 AG W/HIV-1&-2 AB AG IA: CPT

## 2020-01-01 PROCEDURE — 83735 ASSAY OF MAGNESIUM: CPT

## 2020-01-01 PROCEDURE — 82550 ASSAY OF CK (CPK): CPT

## 2020-01-01 PROCEDURE — 71045 X-RAY EXAM CHEST 1 VIEW: CPT

## 2020-01-01 PROCEDURE — 93005 ELECTROCARDIOGRAM TRACING: CPT

## 2020-01-01 PROCEDURE — 86850 RBC ANTIBODY SCREEN: CPT

## 2020-01-01 PROCEDURE — 99285 EMERGENCY DEPT VISIT HI MDM: CPT | Mod: CS

## 2020-01-01 PROCEDURE — 84295 ASSAY OF SERUM SODIUM: CPT

## 2020-01-01 RX ORDER — ATORVASTATIN CALCIUM 80 MG/1
1 TABLET, FILM COATED ORAL
Qty: 0 | Refills: 0 | DISCHARGE

## 2020-01-01 RX ORDER — CHLORHEXIDINE GLUCONATE 213 G/1000ML
5 SOLUTION TOPICAL
Refills: 0 | Status: DISCONTINUED | OUTPATIENT
Start: 2020-01-01 | End: 2020-01-01

## 2020-01-01 RX ORDER — INSULIN LISPRO 100/ML
4 VIAL (ML) SUBCUTANEOUS
Refills: 0 | Status: DISCONTINUED | OUTPATIENT
Start: 2020-01-01 | End: 2020-01-01

## 2020-01-01 RX ORDER — ALBUMIN HUMAN 25 %
250 VIAL (ML) INTRAVENOUS ONCE
Refills: 0 | Status: COMPLETED | OUTPATIENT
Start: 2020-01-01 | End: 2020-01-01

## 2020-01-01 RX ORDER — INSULIN GLARGINE 100 [IU]/ML
12 INJECTION, SOLUTION SUBCUTANEOUS AT BEDTIME
Refills: 0 | Status: DISCONTINUED | OUTPATIENT
Start: 2020-01-01 | End: 2020-01-01

## 2020-01-01 RX ORDER — INSULIN LISPRO 100/ML
VIAL (ML) SUBCUTANEOUS
Refills: 0 | Status: DISCONTINUED | OUTPATIENT
Start: 2020-01-01 | End: 2020-01-01

## 2020-01-01 RX ORDER — NORTRIPTYLINE HYDROCHLORIDE 10 MG/1
1 CAPSULE ORAL
Qty: 0 | Refills: 0 | DISCHARGE
Start: 2020-01-01

## 2020-01-01 RX ORDER — DEXTROSE 50 % IN WATER 50 %
15 SYRINGE (ML) INTRAVENOUS ONCE
Refills: 0 | Status: DISCONTINUED | OUTPATIENT
Start: 2020-01-01 | End: 2020-01-01

## 2020-01-01 RX ORDER — INSULIN GLARGINE 100 [IU]/ML
6 INJECTION, SOLUTION SUBCUTANEOUS AT BEDTIME
Refills: 0 | Status: DISCONTINUED | OUTPATIENT
Start: 2020-01-01 | End: 2020-01-01

## 2020-01-01 RX ORDER — LOSARTAN POTASSIUM 25 MG/1
25 TABLET, FILM COATED ORAL DAILY
Qty: 90 | Refills: 0 | Status: DISCONTINUED | COMMUNITY
Start: 2020-01-01 | End: 2020-01-01

## 2020-01-01 RX ORDER — LIRAGLUTIDE 6 MG/ML
18 INJECTION SUBCUTANEOUS DAILY
Refills: 0 | Status: ACTIVE | COMMUNITY
Start: 2020-01-01

## 2020-01-01 RX ORDER — DEXTROSE 50 % IN WATER 50 %
12.5 SYRINGE (ML) INTRAVENOUS ONCE
Refills: 0 | Status: DISCONTINUED | OUTPATIENT
Start: 2020-01-01 | End: 2020-01-01

## 2020-01-01 RX ORDER — PANTOPRAZOLE 40 MG/1
40 TABLET, DELAYED RELEASE ORAL DAILY
Qty: 30 | Refills: 0 | Status: ACTIVE | COMMUNITY
Start: 2020-01-01

## 2020-01-01 RX ORDER — INSULIN LISPRO 100/ML
9 VIAL (ML) SUBCUTANEOUS ONCE
Refills: 0 | Status: COMPLETED | OUTPATIENT
Start: 2020-01-01 | End: 2020-01-01

## 2020-01-01 RX ORDER — SODIUM CHLORIDE 9 MG/ML
1000 INJECTION, SOLUTION INTRAVENOUS
Refills: 0 | Status: DISCONTINUED | OUTPATIENT
Start: 2020-01-01 | End: 2021-01-01

## 2020-01-01 RX ORDER — DEXTROSE 50 % IN WATER 50 %
25 SYRINGE (ML) INTRAVENOUS ONCE
Refills: 0 | Status: DISCONTINUED | OUTPATIENT
Start: 2020-01-01 | End: 2020-01-01

## 2020-01-01 RX ORDER — DEXTROSE 50 % IN WATER 50 %
50 SYRINGE (ML) INTRAVENOUS ONCE
Refills: 0 | Status: COMPLETED | OUTPATIENT
Start: 2020-01-01 | End: 2020-01-01

## 2020-01-01 RX ORDER — SODIUM CHLORIDE 9 MG/ML
500 INJECTION INTRAMUSCULAR; INTRAVENOUS; SUBCUTANEOUS ONCE
Refills: 0 | Status: COMPLETED | OUTPATIENT
Start: 2020-01-01 | End: 2020-01-01

## 2020-01-01 RX ORDER — INFLUENZA VIRUS VACCINE 15; 15; 15; 15 UG/.5ML; UG/.5ML; UG/.5ML; UG/.5ML
0.5 SUSPENSION INTRAMUSCULAR ONCE
Refills: 0 | Status: DISCONTINUED | OUTPATIENT
Start: 2020-01-01 | End: 2020-01-01

## 2020-01-01 RX ORDER — INSULIN LISPRO 100/ML
16 VIAL (ML) SUBCUTANEOUS
Refills: 0 | Status: DISCONTINUED | OUTPATIENT
Start: 2020-01-01 | End: 2020-01-01

## 2020-01-01 RX ORDER — CADEXOMER IODINE 0.9 %
1 PADS, MEDICATED (EA) TOPICAL DAILY
Refills: 0 | Status: DISCONTINUED | OUTPATIENT
Start: 2020-01-01 | End: 2021-01-01

## 2020-01-01 RX ORDER — CHLORHEXIDINE GLUCONATE 4 %
325 (65 FE) LIQUID (ML) TOPICAL 3 TIMES DAILY
Qty: 9 | Refills: 5 | Status: ACTIVE | COMMUNITY

## 2020-01-01 RX ORDER — INSULIN LISPRO 100/ML
7 VIAL (ML) SUBCUTANEOUS
Refills: 0 | Status: DISCONTINUED | OUTPATIENT
Start: 2020-01-01 | End: 2020-01-01

## 2020-01-01 RX ORDER — GABAPENTIN 400 MG/1
100 CAPSULE ORAL EVERY 8 HOURS
Refills: 0 | Status: DISCONTINUED | OUTPATIENT
Start: 2020-01-01 | End: 2020-01-01

## 2020-01-01 RX ORDER — GENTAMICIN SULFATE 0.1 %
1 OINTMENT (GRAM) TOPICAL
Qty: 1 | Refills: 0

## 2020-01-01 RX ORDER — METOPROLOL TARTRATE 50 MG
0.5 TABLET ORAL
Qty: 30 | Refills: 0

## 2020-01-01 RX ORDER — PANTOPRAZOLE SODIUM 20 MG/1
40 TABLET, DELAYED RELEASE ORAL
Refills: 0 | Status: DISCONTINUED | OUTPATIENT
Start: 2020-01-01 | End: 2020-01-01

## 2020-01-01 RX ORDER — SEVELAMER CARBONATE 2400 MG/1
1 POWDER, FOR SUSPENSION ORAL
Qty: 0 | Refills: 0 | DISCHARGE
Start: 2020-01-01

## 2020-01-01 RX ORDER — DOBUTAMINE HCL 250MG/20ML
2.5 VIAL (ML) INTRAVENOUS
Qty: 500 | Refills: 0 | Status: DISCONTINUED | OUTPATIENT
Start: 2020-01-01 | End: 2020-01-01

## 2020-01-01 RX ORDER — DEXTROSE 50 % IN WATER 50 %
15 SYRINGE (ML) INTRAVENOUS ONCE
Refills: 0 | Status: COMPLETED | OUTPATIENT
Start: 2020-01-01 | End: 2020-01-01

## 2020-01-01 RX ORDER — FERROUS SULFATE 325(65) MG
1 TABLET ORAL
Qty: 0 | Refills: 0 | DISCHARGE

## 2020-01-01 RX ORDER — CEFEPIME 1 G/1
1000 INJECTION, POWDER, FOR SOLUTION INTRAMUSCULAR; INTRAVENOUS EVERY 24 HOURS
Refills: 0 | Status: DISCONTINUED | OUTPATIENT
Start: 2020-01-01 | End: 2020-01-01

## 2020-01-01 RX ORDER — OXYCODONE HYDROCHLORIDE 5 MG/1
1 TABLET ORAL
Qty: 0 | Refills: 0 | DISCHARGE

## 2020-01-01 RX ORDER — INSULIN LISPRO 100/ML
12 VIAL (ML) SUBCUTANEOUS
Refills: 0 | Status: DISCONTINUED | OUTPATIENT
Start: 2020-01-01 | End: 2020-01-01

## 2020-01-01 RX ORDER — DEXTROSE 50 % IN WATER 50 %
25 SYRINGE (ML) INTRAVENOUS
Refills: 0 | Status: DISCONTINUED | OUTPATIENT
Start: 2020-01-01 | End: 2020-01-01

## 2020-01-01 RX ORDER — GLUCAGON INJECTION, SOLUTION 0.5 MG/.1ML
1 INJECTION, SOLUTION SUBCUTANEOUS ONCE
Refills: 0 | Status: DISCONTINUED | OUTPATIENT
Start: 2020-01-01 | End: 2021-01-01

## 2020-01-01 RX ORDER — ERYTHROPOIETIN 10000 [IU]/ML
10000 INJECTION, SOLUTION INTRAVENOUS; SUBCUTANEOUS
Qty: 0 | Refills: 0 | DISCHARGE
Start: 2020-01-01

## 2020-01-01 RX ORDER — SEVELAMER CARBONATE 2400 MG/1
800 POWDER, FOR SUSPENSION ORAL
Refills: 0 | Status: DISCONTINUED | OUTPATIENT
Start: 2020-01-01 | End: 2020-01-01

## 2020-01-01 RX ORDER — MUPIROCIN 20 MG/G
1 OINTMENT TOPICAL ONCE
Refills: 0 | Status: COMPLETED | OUTPATIENT
Start: 2020-01-01 | End: 2020-01-01

## 2020-01-01 RX ORDER — MIDODRINE HYDROCHLORIDE 2.5 MG/1
5 TABLET ORAL THREE TIMES A DAY
Refills: 0 | Status: DISCONTINUED | OUTPATIENT
Start: 2020-01-01 | End: 2020-01-01

## 2020-01-01 RX ORDER — INSULIN GLARGINE 100 [IU]/ML
16 INJECTION, SOLUTION SUBCUTANEOUS AT BEDTIME
Refills: 0 | Status: DISCONTINUED | OUTPATIENT
Start: 2020-01-01 | End: 2020-01-01

## 2020-01-01 RX ORDER — CLOPIDOGREL BISULFATE 75 MG/1
75 TABLET, FILM COATED ORAL DAILY
Refills: 0 | Status: DISCONTINUED | OUTPATIENT
Start: 2020-01-01 | End: 2020-01-01

## 2020-01-01 RX ORDER — INSULIN LISPRO 100/ML
10 VIAL (ML) SUBCUTANEOUS
Qty: 0 | Refills: 0 | DISCHARGE

## 2020-01-01 RX ORDER — CALCIUM GLUCONATE 100 MG/ML
1 VIAL (ML) INTRAVENOUS ONCE
Refills: 0 | Status: COMPLETED | OUTPATIENT
Start: 2020-01-01 | End: 2020-01-01

## 2020-01-01 RX ORDER — FOLIC ACID 0.8 MG
1 TABLET ORAL DAILY
Refills: 0 | Status: DISCONTINUED | OUTPATIENT
Start: 2020-01-01 | End: 2021-01-01

## 2020-01-01 RX ORDER — ACETAMINOPHEN 500 MG
975 TABLET ORAL ONCE
Refills: 0 | Status: COMPLETED | OUTPATIENT
Start: 2020-01-01 | End: 2020-01-01

## 2020-01-01 RX ORDER — FOLIC ACID 0.8 MG
1 TABLET ORAL
Qty: 0 | Refills: 0 | DISCHARGE

## 2020-01-01 RX ORDER — MIDODRINE HYDROCHLORIDE 2.5 MG/1
10 TABLET ORAL ONCE
Refills: 0 | Status: COMPLETED | OUTPATIENT
Start: 2020-01-01 | End: 2020-01-01

## 2020-01-01 RX ORDER — ASPIRIN/CALCIUM CARB/MAGNESIUM 324 MG
81 TABLET ORAL DAILY
Refills: 0 | Status: DISCONTINUED | OUTPATIENT
Start: 2020-01-01 | End: 2020-01-01

## 2020-01-01 RX ORDER — INSULIN LISPRO 100/ML
6 VIAL (ML) SUBCUTANEOUS
Refills: 0 | Status: DISCONTINUED | OUTPATIENT
Start: 2020-01-01 | End: 2020-01-01

## 2020-01-01 RX ORDER — FOLIC ACID 0.8 MG
1 TABLET ORAL DAILY
Refills: 0 | Status: DISCONTINUED | OUTPATIENT
Start: 2020-01-01 | End: 2020-01-01

## 2020-01-01 RX ORDER — INSULIN GLARGINE 100 [IU]/ML
5 INJECTION, SOLUTION SUBCUTANEOUS ONCE
Refills: 0 | Status: COMPLETED | OUTPATIENT
Start: 2020-01-01 | End: 2020-01-01

## 2020-01-01 RX ORDER — LACTULOSE 10 G/15ML
20 SOLUTION ORAL
Refills: 0 | Status: DISCONTINUED | COMMUNITY
Start: 2020-01-01 | End: 2020-01-01

## 2020-01-01 RX ORDER — TICAGRELOR 90 MG/1
1 TABLET ORAL
Qty: 60 | Refills: 0
Start: 2020-01-01 | End: 2021-01-01

## 2020-01-01 RX ORDER — ACETAMINOPHEN 500 MG
650 TABLET ORAL EVERY 6 HOURS
Refills: 0 | Status: DISCONTINUED | OUTPATIENT
Start: 2020-01-01 | End: 2020-01-01

## 2020-01-01 RX ORDER — METOPROLOL TARTRATE 50 MG
50 TABLET ORAL DAILY
Refills: 0 | Status: DISCONTINUED | OUTPATIENT
Start: 2020-01-01 | End: 2020-01-01

## 2020-01-01 RX ORDER — POLYETHYLENE GLYCOL 3350 17 G/17G
17 POWDER, FOR SOLUTION ORAL DAILY
Refills: 0 | Status: DISCONTINUED | OUTPATIENT
Start: 2020-01-01 | End: 2020-01-01

## 2020-01-01 RX ORDER — INSULIN LISPRO 100/ML
VIAL (ML) SUBCUTANEOUS AT BEDTIME
Refills: 0 | Status: DISCONTINUED | OUTPATIENT
Start: 2020-01-01 | End: 2020-01-01

## 2020-01-01 RX ORDER — DEXTROSE 50 % IN WATER 50 %
12.5 SYRINGE (ML) INTRAVENOUS ONCE
Refills: 0 | Status: DISCONTINUED | OUTPATIENT
Start: 2020-01-01 | End: 2021-01-01

## 2020-01-01 RX ORDER — HEPARIN SODIUM 5000 [USP'U]/ML
5000 INJECTION INTRAVENOUS; SUBCUTANEOUS EVERY 12 HOURS
Refills: 0 | Status: DISCONTINUED | OUTPATIENT
Start: 2020-01-01 | End: 2020-01-01

## 2020-01-01 RX ORDER — SEVELAMER CARBONATE 2400 MG/1
1 POWDER, FOR SUSPENSION ORAL
Qty: 0 | Refills: 0 | DISCHARGE

## 2020-01-01 RX ORDER — CHOLECALCIFEROL (VITAMIN D3) 125 MCG
1 CAPSULE ORAL
Qty: 0 | Refills: 0 | DISCHARGE

## 2020-01-01 RX ORDER — DEXTROSE 50 % IN WATER 50 %
25 SYRINGE (ML) INTRAVENOUS ONCE
Refills: 0 | Status: DISCONTINUED | OUTPATIENT
Start: 2020-01-01 | End: 2021-01-01

## 2020-01-01 RX ORDER — MIDODRINE HYDROCHLORIDE 2.5 MG/1
20 TABLET ORAL THREE TIMES A DAY
Refills: 0 | Status: DISCONTINUED | OUTPATIENT
Start: 2020-01-01 | End: 2021-01-01

## 2020-01-01 RX ORDER — CALCITRIOL 0.5 UG/1
1 CAPSULE ORAL
Qty: 0 | Refills: 0 | DISCHARGE

## 2020-01-01 RX ORDER — INSULIN GLARGINE 100 [IU]/ML
18 INJECTION, SOLUTION SUBCUTANEOUS AT BEDTIME
Refills: 0 | Status: DISCONTINUED | OUTPATIENT
Start: 2020-01-01 | End: 2020-01-01

## 2020-01-01 RX ORDER — METOPROLOL TARTRATE 50 MG
2.5 TABLET ORAL EVERY 6 HOURS
Refills: 0 | Status: DISCONTINUED | OUTPATIENT
Start: 2020-01-01 | End: 2020-01-01

## 2020-01-01 RX ORDER — GABAPENTIN 400 MG/1
1 CAPSULE ORAL
Qty: 30 | Refills: 0
Start: 2020-01-01 | End: 2021-01-01

## 2020-01-01 RX ORDER — HEPARIN SODIUM 5000 [USP'U]/ML
5000 INJECTION INTRAVENOUS; SUBCUTANEOUS EVERY 8 HOURS
Refills: 0 | Status: DISCONTINUED | OUTPATIENT
Start: 2020-01-01 | End: 2020-01-01

## 2020-01-01 RX ORDER — NORTRIPTYLINE HYDROCHLORIDE 10 MG/1
25 CAPSULE ORAL AT BEDTIME
Refills: 0 | Status: DISCONTINUED | OUTPATIENT
Start: 2020-01-01 | End: 2020-01-01

## 2020-01-01 RX ORDER — ASPIRIN ENTERIC COATED TABLETS 81 MG 81 MG/1
81 TABLET, DELAYED RELEASE ORAL
Qty: 30 | Refills: 0 | Status: ACTIVE | COMMUNITY

## 2020-01-01 RX ORDER — DEXMEDETOMIDINE HYDROCHLORIDE IN 0.9% SODIUM CHLORIDE 4 UG/ML
0.25 INJECTION INTRAVENOUS
Qty: 200 | Refills: 0 | Status: DISCONTINUED | OUTPATIENT
Start: 2020-01-01 | End: 2020-01-01

## 2020-01-01 RX ORDER — SODIUM CHLORIDE 9 MG/ML
1000 INJECTION INTRAMUSCULAR; INTRAVENOUS; SUBCUTANEOUS
Refills: 0 | Status: DISCONTINUED | OUTPATIENT
Start: 2020-01-01 | End: 2021-01-01

## 2020-01-01 RX ORDER — PANTOPRAZOLE SODIUM 20 MG/1
40 TABLET, DELAYED RELEASE ORAL DAILY
Refills: 0 | Status: DISCONTINUED | OUTPATIENT
Start: 2020-01-01 | End: 2020-01-01

## 2020-01-01 RX ORDER — CEFEPIME 1 G/1
INJECTION, POWDER, FOR SOLUTION INTRAMUSCULAR; INTRAVENOUS
Refills: 0 | Status: DISCONTINUED | OUTPATIENT
Start: 2020-01-01 | End: 2020-01-01

## 2020-01-01 RX ORDER — PANTOPRAZOLE SODIUM 20 MG/1
1 TABLET, DELAYED RELEASE ORAL
Qty: 30 | Refills: 0
Start: 2020-01-01 | End: 2021-01-01

## 2020-01-01 RX ORDER — GABAPENTIN 100 MG/1
100 CAPSULE ORAL
Refills: 0 | Status: ACTIVE | COMMUNITY
Start: 2020-01-01

## 2020-01-01 RX ORDER — OXYCODONE HYDROCHLORIDE 5 MG/1
1 TABLET ORAL
Qty: 20 | Refills: 0
Start: 2020-01-01 | End: 2020-01-01

## 2020-01-01 RX ORDER — ACETAMINOPHEN 500 MG
1000 TABLET ORAL ONCE
Refills: 0 | Status: COMPLETED | OUTPATIENT
Start: 2020-01-01 | End: 2020-01-01

## 2020-01-01 RX ORDER — METOPROLOL TARTRATE 50 MG
1 TABLET ORAL
Qty: 0 | Refills: 0 | DISCHARGE

## 2020-01-01 RX ORDER — ERYTHROPOIETIN 10000 [IU]/ML
10000 INJECTION, SOLUTION INTRAVENOUS; SUBCUTANEOUS ONCE
Refills: 0 | Status: COMPLETED | OUTPATIENT
Start: 2020-01-01 | End: 2020-01-01

## 2020-01-01 RX ORDER — DEXTROSE 50 % IN WATER 50 %
12.5 SYRINGE (ML) INTRAVENOUS ONCE
Refills: 0 | Status: COMPLETED | OUTPATIENT
Start: 2020-01-01 | End: 2020-01-01

## 2020-01-01 RX ORDER — FERROUS SULFATE 325(65) MG
325 TABLET ORAL DAILY
Refills: 0 | Status: DISCONTINUED | OUTPATIENT
Start: 2020-01-01 | End: 2021-01-01

## 2020-01-01 RX ORDER — ATORVASTATIN CALCIUM 80 MG/1
80 TABLET, FILM COATED ORAL AT BEDTIME
Refills: 0 | Status: DISCONTINUED | OUTPATIENT
Start: 2020-01-01 | End: 2021-01-01

## 2020-01-01 RX ORDER — SODIUM CHLORIDE 9 MG/ML
250 INJECTION INTRAMUSCULAR; INTRAVENOUS; SUBCUTANEOUS
Refills: 0 | Status: COMPLETED | OUTPATIENT
Start: 2020-01-01 | End: 2020-01-01

## 2020-01-01 RX ORDER — ERYTHROPOIETIN 10000 [IU]/ML
10000 INJECTION, SOLUTION INTRAVENOUS; SUBCUTANEOUS
Refills: 0 | Status: DISCONTINUED | OUTPATIENT
Start: 2020-01-01 | End: 2020-01-01

## 2020-01-01 RX ORDER — TICAGRELOR 60 MG/1
60 TABLET ORAL TWICE DAILY
Refills: 0 | Status: ACTIVE | COMMUNITY
Start: 2020-01-01

## 2020-01-01 RX ORDER — ASPIRIN/CALCIUM CARB/MAGNESIUM 324 MG
300 TABLET ORAL ONCE
Refills: 0 | Status: COMPLETED | OUTPATIENT
Start: 2020-01-01 | End: 2020-01-01

## 2020-01-01 RX ORDER — VANCOMYCIN HCL 1 G
1000 VIAL (EA) INTRAVENOUS ONCE
Refills: 0 | Status: COMPLETED | OUTPATIENT
Start: 2020-01-01 | End: 2020-01-01

## 2020-01-01 RX ORDER — INSULIN GLARGINE 100 [IU]/ML
20 INJECTION, SOLUTION SUBCUTANEOUS AT BEDTIME
Refills: 0 | Status: DISCONTINUED | OUTPATIENT
Start: 2020-01-01 | End: 2020-01-01

## 2020-01-01 RX ORDER — ATORVASTATIN CALCIUM 80 MG/1
80 TABLET, FILM COATED ORAL AT BEDTIME
Refills: 0 | Status: DISCONTINUED | OUTPATIENT
Start: 2020-01-01 | End: 2020-01-01

## 2020-01-01 RX ORDER — OXYCODONE HYDROCHLORIDE 5 MG/1
10 TABLET ORAL EVERY 6 HOURS
Refills: 0 | Status: DISCONTINUED | OUTPATIENT
Start: 2020-01-01 | End: 2020-01-01

## 2020-01-01 RX ORDER — SODIUM CHLORIDE 9 MG/ML
1000 INJECTION, SOLUTION INTRAVENOUS
Refills: 0 | Status: DISCONTINUED | OUTPATIENT
Start: 2020-01-01 | End: 2020-01-01

## 2020-01-01 RX ORDER — LIRAGLUTIDE 6 MG/ML
1.2 INJECTION SUBCUTANEOUS
Qty: 36 | Refills: 0
Start: 2020-01-01 | End: 2021-01-01

## 2020-01-01 RX ORDER — PANTOPRAZOLE SODIUM 20 MG/1
40 TABLET, DELAYED RELEASE ORAL
Refills: 0 | Status: DISCONTINUED | OUTPATIENT
Start: 2020-01-01 | End: 2021-01-01

## 2020-01-01 RX ORDER — HEPARIN SODIUM 5000 [USP'U]/ML
5000 INJECTION INTRAVENOUS; SUBCUTANEOUS EVERY 12 HOURS
Refills: 0 | Status: DISCONTINUED | OUTPATIENT
Start: 2020-01-01 | End: 2021-01-01

## 2020-01-01 RX ORDER — GLUCAGON INJECTION, SOLUTION 0.5 MG/.1ML
1 INJECTION, SOLUTION SUBCUTANEOUS ONCE
Refills: 0 | Status: DISCONTINUED | OUTPATIENT
Start: 2020-01-01 | End: 2020-01-01

## 2020-01-01 RX ORDER — TICAGRELOR 90 MG/1
90 TABLET ORAL EVERY 12 HOURS
Refills: 0 | Status: DISCONTINUED | OUTPATIENT
Start: 2020-01-01 | End: 2020-01-01

## 2020-01-01 RX ORDER — INSULIN GLARGINE 100 [IU]/ML
2 INJECTION, SOLUTION SUBCUTANEOUS ONCE
Refills: 0 | Status: COMPLETED | OUTPATIENT
Start: 2020-01-01 | End: 2020-01-01

## 2020-01-01 RX ORDER — INSULIN LISPRO 100/ML
VIAL (ML) SUBCUTANEOUS EVERY 6 HOURS
Refills: 0 | Status: DISCONTINUED | OUTPATIENT
Start: 2020-01-01 | End: 2020-01-01

## 2020-01-01 RX ORDER — ASPIRIN/CALCIUM CARB/MAGNESIUM 324 MG
1 TABLET ORAL
Qty: 0 | Refills: 0 | DISCHARGE

## 2020-01-01 RX ORDER — PANTOPRAZOLE SODIUM 20 MG/1
1 TABLET, DELAYED RELEASE ORAL
Qty: 0 | Refills: 0 | DISCHARGE

## 2020-01-01 RX ORDER — SODIUM CHLORIDE 9 MG/ML
1000 INJECTION INTRAMUSCULAR; INTRAVENOUS; SUBCUTANEOUS
Refills: 0 | Status: DISCONTINUED | OUTPATIENT
Start: 2020-01-01 | End: 2020-01-01

## 2020-01-01 RX ORDER — ONDANSETRON 8 MG/1
4 TABLET, FILM COATED ORAL ONCE
Refills: 0 | Status: COMPLETED | OUTPATIENT
Start: 2020-01-01 | End: 2020-01-01

## 2020-01-01 RX ORDER — LIRAGLUTIDE 6 MG/ML
1.2 INJECTION SUBCUTANEOUS
Qty: 36 | Refills: 0

## 2020-01-01 RX ORDER — INSULIN GLARGINE 100 [IU]/ML
10 INJECTION, SOLUTION SUBCUTANEOUS AT BEDTIME
Refills: 0 | Status: DISCONTINUED | OUTPATIENT
Start: 2020-01-01 | End: 2020-01-01

## 2020-01-01 RX ORDER — NORTRIPTYLINE HYDROCHLORIDE 10 MG/1
1 CAPSULE ORAL
Qty: 0 | Refills: 0 | DISCHARGE

## 2020-01-01 RX ORDER — INSULIN LISPRO 100/ML
18 VIAL (ML) SUBCUTANEOUS
Refills: 0 | Status: DISCONTINUED | OUTPATIENT
Start: 2020-01-01 | End: 2020-01-01

## 2020-01-01 RX ORDER — INSULIN HUMAN 100 [IU]/ML
10 INJECTION, SOLUTION SUBCUTANEOUS ONCE
Refills: 0 | Status: COMPLETED | OUTPATIENT
Start: 2020-01-01 | End: 2020-01-01

## 2020-01-01 RX ORDER — VANCOMYCIN HCL 1 G
750 VIAL (EA) INTRAVENOUS ONCE
Refills: 0 | Status: COMPLETED | OUTPATIENT
Start: 2020-01-01 | End: 2020-01-01

## 2020-01-01 RX ORDER — INSULIN LISPRO 100/ML
VIAL (ML) SUBCUTANEOUS AT BEDTIME
Refills: 0 | Status: DISCONTINUED | OUTPATIENT
Start: 2020-01-01 | End: 2021-01-01

## 2020-01-01 RX ORDER — DEXTROSE 50 % IN WATER 50 %
15 SYRINGE (ML) INTRAVENOUS ONCE
Refills: 0 | Status: DISCONTINUED | OUTPATIENT
Start: 2020-01-01 | End: 2021-01-01

## 2020-01-01 RX ORDER — DEXTROSE 50 % IN WATER 50 %
50 SYRINGE (ML) INTRAVENOUS
Refills: 0 | Status: DISCONTINUED | OUTPATIENT
Start: 2020-01-01 | End: 2020-01-01

## 2020-01-01 RX ORDER — GENTAMICIN SULFATE 0.1 %
1 OINTMENT (GRAM) TOPICAL
Refills: 0 | Status: DISCONTINUED | OUTPATIENT
Start: 2020-01-01 | End: 2020-01-01

## 2020-01-01 RX ORDER — CEFEPIME 1 G/1
500 INJECTION, POWDER, FOR SOLUTION INTRAMUSCULAR; INTRAVENOUS EVERY 24 HOURS
Refills: 0 | Status: DISCONTINUED | OUTPATIENT
Start: 2020-01-01 | End: 2020-01-01

## 2020-01-01 RX ORDER — ACETAMINOPHEN 500 MG
650 TABLET ORAL ONCE
Refills: 0 | Status: COMPLETED | OUTPATIENT
Start: 2020-01-01 | End: 2020-01-01

## 2020-01-01 RX ORDER — INSULIN GLARGINE 100 [IU]/ML
80 INJECTION, SOLUTION SUBCUTANEOUS
Qty: 0 | Refills: 0 | DISCHARGE

## 2020-01-01 RX ORDER — INSULIN LISPRO 100/ML
VIAL (ML) SUBCUTANEOUS
Refills: 0 | Status: DISCONTINUED | OUTPATIENT
Start: 2020-01-01 | End: 2021-01-01

## 2020-01-01 RX ORDER — METOPROLOL TARTRATE 50 MG
12.5 TABLET ORAL
Refills: 0 | Status: DISCONTINUED | OUTPATIENT
Start: 2020-01-01 | End: 2020-01-01

## 2020-01-01 RX ORDER — ERYTHROPOIETIN 10000 [IU]/ML
0 INJECTION, SOLUTION INTRAVENOUS; SUBCUTANEOUS
Qty: 0 | Refills: 0 | DISCHARGE

## 2020-01-01 RX ORDER — INSULIN LISPRO 100/ML
10 VIAL (ML) SUBCUTANEOUS
Refills: 0 | Status: DISCONTINUED | OUTPATIENT
Start: 2020-01-01 | End: 2020-01-01

## 2020-01-01 RX ORDER — FERROUS SULFATE 325(65) MG
325 TABLET ORAL THREE TIMES A DAY
Refills: 0 | Status: DISCONTINUED | OUTPATIENT
Start: 2020-01-01 | End: 2020-01-01

## 2020-01-01 RX ORDER — INSULIN GLARGINE 300 U/ML
300 INJECTION, SOLUTION SUBCUTANEOUS
Refills: 0 | Status: ACTIVE | COMMUNITY

## 2020-01-01 RX ORDER — LIRAGLUTIDE 6 MG/ML
18 INJECTION SUBCUTANEOUS
Qty: 5 | Refills: 3 | Status: DISCONTINUED | COMMUNITY
End: 2020-01-01

## 2020-01-01 RX ORDER — CEFEPIME 1 G/1
500 INJECTION, POWDER, FOR SOLUTION INTRAMUSCULAR; INTRAVENOUS ONCE
Refills: 0 | Status: COMPLETED | OUTPATIENT
Start: 2020-01-01 | End: 2020-01-01

## 2020-01-01 RX ORDER — INSULIN GLARGINE 100 [IU]/ML
70 INJECTION, SOLUTION SUBCUTANEOUS
Qty: 0 | Refills: 0 | DISCHARGE

## 2020-01-01 RX ORDER — GENTAMICIN SULFATE 1 MG/G
0.1 CREAM TOPICAL TWICE DAILY
Refills: 0 | Status: ACTIVE | COMMUNITY
Start: 2020-01-01

## 2020-01-01 RX ORDER — TICAGRELOR 90 MG/1
90 TABLET ORAL EVERY 12 HOURS
Refills: 0 | Status: DISCONTINUED | OUTPATIENT
Start: 2020-01-01 | End: 2021-01-01

## 2020-01-01 RX ORDER — ATORVASTATIN CALCIUM 80 MG/1
1 TABLET, FILM COATED ORAL
Qty: 30 | Refills: 0
Start: 2020-01-01 | End: 2021-01-01

## 2020-01-01 RX ORDER — ASPIRIN/CALCIUM CARB/MAGNESIUM 324 MG
81 TABLET ORAL DAILY
Refills: 0 | Status: DISCONTINUED | OUTPATIENT
Start: 2020-01-01 | End: 2021-01-01

## 2020-01-01 RX ORDER — TICAGRELOR 90 MG/1
1 TABLET ORAL
Qty: 60 | Refills: 0

## 2020-01-01 RX ORDER — INSULIN LISPRO 100/ML
11 VIAL (ML) SUBCUTANEOUS
Refills: 0 | Status: DISCONTINUED | OUTPATIENT
Start: 2020-01-01 | End: 2020-01-01

## 2020-01-01 RX ORDER — INSULIN LISPRO 100 [IU]/ML
100 INJECTION, SOLUTION INTRAVENOUS; SUBCUTANEOUS TWICE DAILY
Refills: 0 | Status: DISCONTINUED | COMMUNITY
Start: 2020-01-01 | End: 2020-01-01

## 2020-01-01 RX ORDER — CEFUROXIME AXETIL 250 MG
1500 TABLET ORAL EVERY 24 HOURS
Refills: 0 | Status: COMPLETED | OUTPATIENT
Start: 2020-01-01 | End: 2020-01-01

## 2020-01-01 RX ORDER — FERROUS SULFATE 325(65) MG
325 TABLET ORAL DAILY
Refills: 0 | Status: DISCONTINUED | OUTPATIENT
Start: 2020-01-01 | End: 2020-01-01

## 2020-01-01 RX ORDER — INSULIN GLARGINE 100 [IU]/ML
100 INJECTION, SOLUTION SUBCUTANEOUS AT BEDTIME
Refills: 0 | Status: DISCONTINUED | COMMUNITY
End: 2020-01-01

## 2020-01-01 RX ORDER — MIDODRINE HYDROCHLORIDE 2.5 MG/1
10 TABLET ORAL THREE TIMES A DAY
Refills: 0 | Status: DISCONTINUED | OUTPATIENT
Start: 2020-01-01 | End: 2020-01-01

## 2020-01-01 RX ORDER — METOPROLOL TARTRATE 50 MG
12.5 TABLET ORAL
Qty: 750 | Refills: 0
Start: 2020-01-01 | End: 2021-01-01

## 2020-01-01 RX ORDER — ERGOCALCIFEROL 1.25 MG/1
1.25 MG CAPSULE, LIQUID FILLED ORAL
Qty: 8 | Refills: 0 | Status: ACTIVE | COMMUNITY

## 2020-01-01 RX ORDER — INSULIN LISPRO 100/ML
14 VIAL (ML) SUBCUTANEOUS
Refills: 0 | Status: DISCONTINUED | OUTPATIENT
Start: 2020-01-01 | End: 2020-01-01

## 2020-01-01 RX ORDER — SODIUM CHLORIDE 9 MG/ML
250 INJECTION INTRAMUSCULAR; INTRAVENOUS; SUBCUTANEOUS ONCE
Refills: 0 | Status: COMPLETED | OUTPATIENT
Start: 2020-01-01 | End: 2020-01-01

## 2020-01-01 RX ORDER — METOPROLOL TARTRATE 50 MG
25 TABLET ORAL
Refills: 0 | Status: DISCONTINUED | OUTPATIENT
Start: 2020-01-01 | End: 2020-01-01

## 2020-01-01 RX ORDER — GENTAMICIN SULFATE 0.1 %
1 OINTMENT (GRAM) TOPICAL ONCE
Refills: 0 | Status: DISCONTINUED | OUTPATIENT
Start: 2020-01-01 | End: 2020-01-01

## 2020-01-01 RX ORDER — SODIUM BICARBONATE 1 MEQ/ML
50 SYRINGE (ML) INTRAVENOUS ONCE
Refills: 0 | Status: COMPLETED | OUTPATIENT
Start: 2020-01-01 | End: 2020-01-01

## 2020-01-01 RX ORDER — OXYCODONE HYDROCHLORIDE 5 MG/1
5 TABLET ORAL EVERY 6 HOURS
Refills: 0 | Status: DISCONTINUED | OUTPATIENT
Start: 2020-01-01 | End: 2020-01-01

## 2020-01-01 RX ORDER — GABAPENTIN 400 MG/1
1 CAPSULE ORAL
Qty: 0 | Refills: 0 | DISCHARGE

## 2020-01-01 RX ORDER — SENNA PLUS 8.6 MG/1
2 TABLET ORAL AT BEDTIME
Refills: 0 | Status: DISCONTINUED | OUTPATIENT
Start: 2020-01-01 | End: 2020-01-01

## 2020-01-01 RX ORDER — SEVELAMER CARBONATE 2400 MG/1
800 POWDER, FOR SUSPENSION ORAL
Refills: 0 | Status: DISCONTINUED | OUTPATIENT
Start: 2020-01-01 | End: 2021-01-01

## 2020-01-01 RX ORDER — PIPERACILLIN AND TAZOBACTAM 4; .5 G/20ML; G/20ML
3.38 INJECTION, POWDER, LYOPHILIZED, FOR SOLUTION INTRAVENOUS ONCE
Refills: 0 | Status: COMPLETED | OUTPATIENT
Start: 2020-01-01 | End: 2020-01-01

## 2020-01-01 RX ORDER — ATORVASTATIN CALCIUM 80 MG/1
0.5 TABLET, FILM COATED ORAL
Qty: 0 | Refills: 0 | DISCHARGE

## 2020-01-01 RX ORDER — GABAPENTIN 400 MG/1
100 CAPSULE ORAL DAILY
Refills: 0 | Status: DISCONTINUED | OUTPATIENT
Start: 2020-01-01 | End: 2020-01-01

## 2020-01-01 RX ORDER — INSULIN HUMAN 100 [IU]/ML
2 INJECTION, SOLUTION SUBCUTANEOUS
Qty: 100 | Refills: 0 | Status: DISCONTINUED | OUTPATIENT
Start: 2020-01-01 | End: 2020-01-01

## 2020-01-01 RX ORDER — HUMAN INSULIN 100 [IU]/ML
12 INJECTION, SUSPENSION SUBCUTANEOUS AT BEDTIME
Refills: 0 | Status: DISCONTINUED | OUTPATIENT
Start: 2020-01-01 | End: 2020-01-01

## 2020-01-01 RX ORDER — MEPERIDINE HYDROCHLORIDE 50 MG/ML
25 INJECTION INTRAMUSCULAR; INTRAVENOUS; SUBCUTANEOUS ONCE
Refills: 0 | Status: DISCONTINUED | OUTPATIENT
Start: 2020-01-01 | End: 2020-01-01

## 2020-01-01 RX ORDER — NOREPINEPHRINE BITARTRATE/D5W 8 MG/250ML
0.01 PLASTIC BAG, INJECTION (ML) INTRAVENOUS
Qty: 8 | Refills: 0 | Status: DISCONTINUED | OUTPATIENT
Start: 2020-01-01 | End: 2020-01-01

## 2020-01-01 RX ORDER — INSULIN GLARGINE 100 [IU]/ML
8 INJECTION, SOLUTION SUBCUTANEOUS AT BEDTIME
Refills: 0 | Status: DISCONTINUED | OUTPATIENT
Start: 2020-01-01 | End: 2021-01-01

## 2020-01-01 RX ORDER — CHLORHEXIDINE GLUCONATE 213 G/1000ML
15 SOLUTION TOPICAL EVERY 12 HOURS
Refills: 0 | Status: DISCONTINUED | OUTPATIENT
Start: 2020-01-01 | End: 2020-01-01

## 2020-01-01 RX ORDER — TICAGRELOR 90 MG/1
60 TABLET ORAL EVERY 12 HOURS
Refills: 0 | Status: DISCONTINUED | OUTPATIENT
Start: 2020-01-01 | End: 2020-01-01

## 2020-01-01 RX ORDER — INSULIN LISPRO 100/ML
4 VIAL (ML) SUBCUTANEOUS ONCE
Refills: 0 | Status: COMPLETED | OUTPATIENT
Start: 2020-01-01 | End: 2020-01-01

## 2020-01-01 RX ORDER — ERYTHROPOIETIN 10000 [IU]/ML
4000 INJECTION, SOLUTION INTRAVENOUS; SUBCUTANEOUS ONCE
Refills: 0 | Status: DISCONTINUED | OUTPATIENT
Start: 2020-01-01 | End: 2020-01-01

## 2020-01-01 RX ORDER — NORTRIPTYLINE HYDROCHLORIDE 25 MG/1
25 CAPSULE ORAL
Refills: 0 | Status: ACTIVE | COMMUNITY

## 2020-01-01 RX ADMIN — TICAGRELOR 60 MILLIGRAM(S): 90 TABLET ORAL at 06:21

## 2020-01-01 RX ADMIN — Medication 325 MILLIGRAM(S): at 13:00

## 2020-01-01 RX ADMIN — Medication 325 MILLIGRAM(S): at 12:46

## 2020-01-01 RX ADMIN — Medication 125 MILLILITER(S): at 19:49

## 2020-01-01 RX ADMIN — PANTOPRAZOLE SODIUM 40 MILLIGRAM(S): 20 TABLET, DELAYED RELEASE ORAL at 05:49

## 2020-01-01 RX ADMIN — INSULIN HUMAN 10 UNIT(S): 100 INJECTION, SOLUTION SUBCUTANEOUS at 03:34

## 2020-01-01 RX ADMIN — Medication 325 MILLIGRAM(S): at 13:23

## 2020-01-01 RX ADMIN — Medication 2: at 11:58

## 2020-01-01 RX ADMIN — Medication 6 UNIT(S): at 13:54

## 2020-01-01 RX ADMIN — Medication 4: at 21:21

## 2020-01-01 RX ADMIN — HEPARIN SODIUM 5000 UNIT(S): 5000 INJECTION INTRAVENOUS; SUBCUTANEOUS at 05:03

## 2020-01-01 RX ADMIN — Medication 2: at 17:22

## 2020-01-01 RX ADMIN — HEPARIN SODIUM 5000 UNIT(S): 5000 INJECTION INTRAVENOUS; SUBCUTANEOUS at 17:32

## 2020-01-01 RX ADMIN — SEVELAMER CARBONATE 800 MILLIGRAM(S): 2400 POWDER, FOR SUSPENSION ORAL at 17:22

## 2020-01-01 RX ADMIN — Medication 2: at 17:57

## 2020-01-01 RX ADMIN — Medication 2: at 07:54

## 2020-01-01 RX ADMIN — INSULIN GLARGINE 6 UNIT(S): 100 INJECTION, SOLUTION SUBCUTANEOUS at 22:15

## 2020-01-01 RX ADMIN — INSULIN GLARGINE 18 UNIT(S): 100 INJECTION, SOLUTION SUBCUTANEOUS at 21:20

## 2020-01-01 RX ADMIN — Medication 1 MILLIGRAM(S): at 12:00

## 2020-01-01 RX ADMIN — MIDODRINE HYDROCHLORIDE 5 MILLIGRAM(S): 2.5 TABLET ORAL at 12:59

## 2020-01-01 RX ADMIN — Medication 1 APPLICATION(S): at 06:19

## 2020-01-01 RX ADMIN — HEPARIN SODIUM 5000 UNIT(S): 5000 INJECTION INTRAVENOUS; SUBCUTANEOUS at 21:13

## 2020-01-01 RX ADMIN — GABAPENTIN 100 MILLIGRAM(S): 400 CAPSULE ORAL at 12:28

## 2020-01-01 RX ADMIN — PANTOPRAZOLE SODIUM 40 MILLIGRAM(S): 20 TABLET, DELAYED RELEASE ORAL at 05:58

## 2020-01-01 RX ADMIN — Medication 12.5 MILLIGRAM(S): at 18:23

## 2020-01-01 RX ADMIN — ATORVASTATIN CALCIUM 80 MILLIGRAM(S): 80 TABLET, FILM COATED ORAL at 21:12

## 2020-01-01 RX ADMIN — Medication 325 MILLIGRAM(S): at 21:58

## 2020-01-01 RX ADMIN — GABAPENTIN 100 MILLIGRAM(S): 400 CAPSULE ORAL at 11:41

## 2020-01-01 RX ADMIN — Medication 1000 MILLIGRAM(S): at 03:15

## 2020-01-01 RX ADMIN — CEFEPIME 100 MILLIGRAM(S): 1 INJECTION, POWDER, FOR SOLUTION INTRAMUSCULAR; INTRAVENOUS at 18:48

## 2020-01-01 RX ADMIN — CEFEPIME 100 MILLIGRAM(S): 1 INJECTION, POWDER, FOR SOLUTION INTRAMUSCULAR; INTRAVENOUS at 17:34

## 2020-01-01 RX ADMIN — Medication 25 MILLIGRAM(S): at 05:41

## 2020-01-01 RX ADMIN — Medication 325 MILLIGRAM(S): at 14:00

## 2020-01-01 RX ADMIN — Medication 11 UNIT(S): at 11:47

## 2020-01-01 RX ADMIN — Medication 1 MILLIGRAM(S): at 12:50

## 2020-01-01 RX ADMIN — Medication 2: at 21:24

## 2020-01-01 RX ADMIN — Medication 12 UNIT(S): at 17:34

## 2020-01-01 RX ADMIN — Medication 81 MILLIGRAM(S): at 18:29

## 2020-01-01 RX ADMIN — Medication 1 MILLIGRAM(S): at 12:33

## 2020-01-01 RX ADMIN — Medication 325 MILLIGRAM(S): at 13:04

## 2020-01-01 RX ADMIN — Medication 12.5 MILLIGRAM(S): at 05:00

## 2020-01-01 RX ADMIN — Medication 7 UNIT(S): at 07:46

## 2020-01-01 RX ADMIN — Medication 4: at 21:58

## 2020-01-01 RX ADMIN — HEPARIN SODIUM 5000 UNIT(S): 5000 INJECTION INTRAVENOUS; SUBCUTANEOUS at 05:00

## 2020-01-01 RX ADMIN — Medication 81 MILLIGRAM(S): at 12:21

## 2020-01-01 RX ADMIN — Medication 18 UNIT(S): at 12:32

## 2020-01-01 RX ADMIN — Medication 81 MILLIGRAM(S): at 12:50

## 2020-01-01 RX ADMIN — INSULIN GLARGINE 10 UNIT(S): 100 INJECTION, SOLUTION SUBCUTANEOUS at 22:46

## 2020-01-01 RX ADMIN — ATORVASTATIN CALCIUM 80 MILLIGRAM(S): 80 TABLET, FILM COATED ORAL at 21:13

## 2020-01-01 RX ADMIN — SEVELAMER CARBONATE 800 MILLIGRAM(S): 2400 POWDER, FOR SUSPENSION ORAL at 10:17

## 2020-01-01 RX ADMIN — Medication 7 UNIT(S): at 07:54

## 2020-01-01 RX ADMIN — SEVELAMER CARBONATE 800 MILLIGRAM(S): 2400 POWDER, FOR SUSPENSION ORAL at 17:31

## 2020-01-01 RX ADMIN — SEVELAMER CARBONATE 800 MILLIGRAM(S): 2400 POWDER, FOR SUSPENSION ORAL at 17:14

## 2020-01-01 RX ADMIN — HEPARIN SODIUM 5000 UNIT(S): 5000 INJECTION INTRAVENOUS; SUBCUTANEOUS at 21:33

## 2020-01-01 RX ADMIN — GABAPENTIN 100 MILLIGRAM(S): 400 CAPSULE ORAL at 12:59

## 2020-01-01 RX ADMIN — SENNA PLUS 2 TABLET(S): 8.6 TABLET ORAL at 21:26

## 2020-01-01 RX ADMIN — SEVELAMER CARBONATE 800 MILLIGRAM(S): 2400 POWDER, FOR SUSPENSION ORAL at 11:58

## 2020-01-01 RX ADMIN — HEPARIN SODIUM 5000 UNIT(S): 5000 INJECTION INTRAVENOUS; SUBCUTANEOUS at 05:05

## 2020-01-01 RX ADMIN — ATORVASTATIN CALCIUM 80 MILLIGRAM(S): 80 TABLET, FILM COATED ORAL at 21:20

## 2020-01-01 RX ADMIN — Medication 2: at 08:15

## 2020-01-01 RX ADMIN — SODIUM CHLORIDE 250 MILLILITER(S): 9 INJECTION INTRAMUSCULAR; INTRAVENOUS; SUBCUTANEOUS at 23:44

## 2020-01-01 RX ADMIN — GABAPENTIN 100 MILLIGRAM(S): 400 CAPSULE ORAL at 13:18

## 2020-01-01 RX ADMIN — Medication 1 MILLIGRAM(S): at 12:35

## 2020-01-01 RX ADMIN — PANTOPRAZOLE SODIUM 40 MILLIGRAM(S): 20 TABLET, DELAYED RELEASE ORAL at 05:03

## 2020-01-01 RX ADMIN — TICAGRELOR 90 MILLIGRAM(S): 90 TABLET ORAL at 12:35

## 2020-01-01 RX ADMIN — Medication 6 UNIT(S): at 10:07

## 2020-01-01 RX ADMIN — SODIUM CHLORIDE 500 MILLILITER(S): 9 INJECTION INTRAMUSCULAR; INTRAVENOUS; SUBCUTANEOUS at 11:00

## 2020-01-01 RX ADMIN — HEPARIN SODIUM 5000 UNIT(S): 5000 INJECTION INTRAVENOUS; SUBCUTANEOUS at 13:09

## 2020-01-01 RX ADMIN — TICAGRELOR 60 MILLIGRAM(S): 90 TABLET ORAL at 18:27

## 2020-01-01 RX ADMIN — HEPARIN SODIUM 5000 UNIT(S): 5000 INJECTION INTRAVENOUS; SUBCUTANEOUS at 13:23

## 2020-01-01 RX ADMIN — Medication 1 APPLICATION(S): at 06:21

## 2020-01-01 RX ADMIN — GABAPENTIN 100 MILLIGRAM(S): 400 CAPSULE ORAL at 07:56

## 2020-01-01 RX ADMIN — HEPARIN SODIUM 5000 UNIT(S): 5000 INJECTION INTRAVENOUS; SUBCUTANEOUS at 18:42

## 2020-01-01 RX ADMIN — HEPARIN SODIUM 5000 UNIT(S): 5000 INJECTION INTRAVENOUS; SUBCUTANEOUS at 06:20

## 2020-01-01 RX ADMIN — Medication 11 UNIT(S): at 11:57

## 2020-01-01 RX ADMIN — SEVELAMER CARBONATE 800 MILLIGRAM(S): 2400 POWDER, FOR SUSPENSION ORAL at 10:22

## 2020-01-01 RX ADMIN — SODIUM CHLORIDE 500 MILLILITER(S): 9 INJECTION INTRAMUSCULAR; INTRAVENOUS; SUBCUTANEOUS at 13:11

## 2020-01-01 RX ADMIN — INSULIN GLARGINE 16 UNIT(S): 100 INJECTION, SOLUTION SUBCUTANEOUS at 21:39

## 2020-01-01 RX ADMIN — GABAPENTIN 100 MILLIGRAM(S): 400 CAPSULE ORAL at 12:33

## 2020-01-01 RX ADMIN — ATORVASTATIN CALCIUM 80 MILLIGRAM(S): 80 TABLET, FILM COATED ORAL at 21:39

## 2020-01-01 RX ADMIN — NORTRIPTYLINE HYDROCHLORIDE 25 MILLIGRAM(S): 10 CAPSULE ORAL at 22:04

## 2020-01-01 RX ADMIN — Medication 100 GRAM(S): at 02:00

## 2020-01-01 RX ADMIN — ATORVASTATIN CALCIUM 80 MILLIGRAM(S): 80 TABLET, FILM COATED ORAL at 22:31

## 2020-01-01 RX ADMIN — HEPARIN SODIUM 5000 UNIT(S): 5000 INJECTION INTRAVENOUS; SUBCUTANEOUS at 04:53

## 2020-01-01 RX ADMIN — HEPARIN SODIUM 5000 UNIT(S): 5000 INJECTION INTRAVENOUS; SUBCUTANEOUS at 17:55

## 2020-01-01 RX ADMIN — SEVELAMER CARBONATE 800 MILLIGRAM(S): 2400 POWDER, FOR SUSPENSION ORAL at 12:34

## 2020-01-01 RX ADMIN — ERYTHROPOIETIN 10000 UNIT(S): 10000 INJECTION, SOLUTION INTRAVENOUS; SUBCUTANEOUS at 12:58

## 2020-01-01 RX ADMIN — HEPARIN SODIUM 5000 UNIT(S): 5000 INJECTION INTRAVENOUS; SUBCUTANEOUS at 05:30

## 2020-01-01 RX ADMIN — Medication 1 MILLIGRAM(S): at 12:59

## 2020-01-01 RX ADMIN — SEVELAMER CARBONATE 800 MILLIGRAM(S): 2400 POWDER, FOR SUSPENSION ORAL at 11:33

## 2020-01-01 RX ADMIN — Medication 12.5 MILLIGRAM(S): at 05:48

## 2020-01-01 RX ADMIN — Medication 325 MILLIGRAM(S): at 21:32

## 2020-01-01 RX ADMIN — Medication 50 MILLIGRAM(S): at 05:03

## 2020-01-01 RX ADMIN — ATORVASTATIN CALCIUM 80 MILLIGRAM(S): 80 TABLET, FILM COATED ORAL at 21:21

## 2020-01-01 RX ADMIN — Medication 81 MILLIGRAM(S): at 12:33

## 2020-01-01 RX ADMIN — Medication 2: at 21:41

## 2020-01-01 RX ADMIN — GABAPENTIN 100 MILLIGRAM(S): 400 CAPSULE ORAL at 10:27

## 2020-01-01 RX ADMIN — Medication 81 MILLIGRAM(S): at 12:00

## 2020-01-01 RX ADMIN — HEPARIN SODIUM 5000 UNIT(S): 5000 INJECTION INTRAVENOUS; SUBCUTANEOUS at 06:11

## 2020-01-01 RX ADMIN — Medication 2: at 13:53

## 2020-01-01 RX ADMIN — NORTRIPTYLINE HYDROCHLORIDE 25 MILLIGRAM(S): 10 CAPSULE ORAL at 21:21

## 2020-01-01 RX ADMIN — HEPARIN SODIUM 5000 UNIT(S): 5000 INJECTION INTRAVENOUS; SUBCUTANEOUS at 17:31

## 2020-01-01 RX ADMIN — Medication 81 MILLIGRAM(S): at 08:00

## 2020-01-01 RX ADMIN — INSULIN GLARGINE 18 UNIT(S): 100 INJECTION, SOLUTION SUBCUTANEOUS at 22:11

## 2020-01-01 RX ADMIN — TICAGRELOR 60 MILLIGRAM(S): 90 TABLET ORAL at 05:48

## 2020-01-01 RX ADMIN — Medication 325 MILLIGRAM(S): at 12:28

## 2020-01-01 RX ADMIN — Medication 12.5 MILLIGRAM(S): at 05:29

## 2020-01-01 RX ADMIN — Medication 975 MILLIGRAM(S): at 10:58

## 2020-01-01 RX ADMIN — GABAPENTIN 100 MILLIGRAM(S): 400 CAPSULE ORAL at 12:00

## 2020-01-01 RX ADMIN — PANTOPRAZOLE SODIUM 40 MILLIGRAM(S): 20 TABLET, DELAYED RELEASE ORAL at 05:05

## 2020-01-01 RX ADMIN — Medication 4: at 09:36

## 2020-01-01 RX ADMIN — Medication 325 MILLIGRAM(S): at 21:12

## 2020-01-01 RX ADMIN — SEVELAMER CARBONATE 800 MILLIGRAM(S): 2400 POWDER, FOR SUSPENSION ORAL at 11:48

## 2020-01-01 RX ADMIN — PANTOPRAZOLE SODIUM 40 MILLIGRAM(S): 20 TABLET, DELAYED RELEASE ORAL at 06:21

## 2020-01-01 RX ADMIN — Medication 4: at 07:45

## 2020-01-01 RX ADMIN — SEVELAMER CARBONATE 800 MILLIGRAM(S): 2400 POWDER, FOR SUSPENSION ORAL at 11:51

## 2020-01-01 RX ADMIN — Medication 25 MILLIGRAM(S): at 06:09

## 2020-01-01 RX ADMIN — HEPARIN SODIUM 5000 UNIT(S): 5000 INJECTION INTRAVENOUS; SUBCUTANEOUS at 18:29

## 2020-01-01 RX ADMIN — Medication 81 MILLIGRAM(S): at 12:46

## 2020-01-01 RX ADMIN — MIDODRINE HYDROCHLORIDE 5 MILLIGRAM(S): 2.5 TABLET ORAL at 06:25

## 2020-01-01 RX ADMIN — MIDODRINE HYDROCHLORIDE 20 MILLIGRAM(S): 2.5 TABLET ORAL at 17:31

## 2020-01-01 RX ADMIN — NORTRIPTYLINE HYDROCHLORIDE 25 MILLIGRAM(S): 10 CAPSULE ORAL at 21:41

## 2020-01-01 RX ADMIN — Medication 25 MILLIGRAM(S): at 06:55

## 2020-01-01 RX ADMIN — Medication 81 MILLIGRAM(S): at 11:34

## 2020-01-01 RX ADMIN — Medication 2: at 11:50

## 2020-01-01 RX ADMIN — HEPARIN SODIUM 5000 UNIT(S): 5000 INJECTION INTRAVENOUS; SUBCUTANEOUS at 22:31

## 2020-01-01 RX ADMIN — Medication 12.5 GRAM(S): at 22:04

## 2020-01-01 RX ADMIN — Medication 1 MILLIGRAM(S): at 07:47

## 2020-01-01 RX ADMIN — SEVELAMER CARBONATE 800 MILLIGRAM(S): 2400 POWDER, FOR SUSPENSION ORAL at 16:45

## 2020-01-01 RX ADMIN — Medication 81 MILLIGRAM(S): at 13:19

## 2020-01-01 RX ADMIN — GABAPENTIN 100 MILLIGRAM(S): 400 CAPSULE ORAL at 11:51

## 2020-01-01 RX ADMIN — HEPARIN SODIUM 5000 UNIT(S): 5000 INJECTION INTRAVENOUS; SUBCUTANEOUS at 13:04

## 2020-01-01 RX ADMIN — Medication 1: at 12:46

## 2020-01-01 RX ADMIN — Medication 4 UNIT(S): at 18:56

## 2020-01-01 RX ADMIN — Medication 1 MILLIGRAM(S): at 18:29

## 2020-01-01 RX ADMIN — SEVELAMER CARBONATE 800 MILLIGRAM(S): 2400 POWDER, FOR SUSPENSION ORAL at 13:18

## 2020-01-01 RX ADMIN — SEVELAMER CARBONATE 800 MILLIGRAM(S): 2400 POWDER, FOR SUSPENSION ORAL at 13:22

## 2020-01-01 RX ADMIN — PANTOPRAZOLE SODIUM 40 MILLIGRAM(S): 20 TABLET, DELAYED RELEASE ORAL at 06:11

## 2020-01-01 RX ADMIN — Medication 325 MILLIGRAM(S): at 05:41

## 2020-01-01 RX ADMIN — TICAGRELOR 90 MILLIGRAM(S): 90 TABLET ORAL at 05:05

## 2020-01-01 RX ADMIN — MIDODRINE HYDROCHLORIDE 10 MILLIGRAM(S): 2.5 TABLET ORAL at 14:40

## 2020-01-01 RX ADMIN — CEFEPIME 100 MILLIGRAM(S): 1 INJECTION, POWDER, FOR SOLUTION INTRAMUSCULAR; INTRAVENOUS at 17:32

## 2020-01-01 RX ADMIN — Medication 25 MILLIGRAM(S): at 13:37

## 2020-01-01 RX ADMIN — SEVELAMER CARBONATE 800 MILLIGRAM(S): 2400 POWDER, FOR SUSPENSION ORAL at 17:34

## 2020-01-01 RX ADMIN — Medication 4: at 16:08

## 2020-01-01 RX ADMIN — Medication 9 UNIT(S): at 12:49

## 2020-01-01 RX ADMIN — HEPARIN SODIUM 5000 UNIT(S): 5000 INJECTION INTRAVENOUS; SUBCUTANEOUS at 13:38

## 2020-01-01 RX ADMIN — GABAPENTIN 100 MILLIGRAM(S): 400 CAPSULE ORAL at 12:13

## 2020-01-01 RX ADMIN — Medication 6 UNIT(S): at 10:08

## 2020-01-01 RX ADMIN — Medication 15 GRAM(S): at 21:31

## 2020-01-01 RX ADMIN — TICAGRELOR 60 MILLIGRAM(S): 90 TABLET ORAL at 18:24

## 2020-01-01 RX ADMIN — ERYTHROPOIETIN 10000 UNIT(S): 10000 INJECTION, SOLUTION INTRAVENOUS; SUBCUTANEOUS at 10:54

## 2020-01-01 RX ADMIN — Medication 250 MILLIGRAM(S): at 18:30

## 2020-01-01 RX ADMIN — MIDODRINE HYDROCHLORIDE 20 MILLIGRAM(S): 2.5 TABLET ORAL at 12:24

## 2020-01-01 RX ADMIN — TICAGRELOR 90 MILLIGRAM(S): 90 TABLET ORAL at 05:08

## 2020-01-01 RX ADMIN — HEPARIN SODIUM 5000 UNIT(S): 5000 INJECTION INTRAVENOUS; SUBCUTANEOUS at 21:39

## 2020-01-01 RX ADMIN — TICAGRELOR 60 MILLIGRAM(S): 90 TABLET ORAL at 17:31

## 2020-01-01 RX ADMIN — Medication 6 UNIT(S): at 21:17

## 2020-01-01 RX ADMIN — MIDODRINE HYDROCHLORIDE 5 MILLIGRAM(S): 2.5 TABLET ORAL at 18:45

## 2020-01-01 RX ADMIN — Medication 1 MILLIGRAM(S): at 12:20

## 2020-01-01 RX ADMIN — OXYCODONE HYDROCHLORIDE 10 MILLIGRAM(S): 5 TABLET ORAL at 21:35

## 2020-01-01 RX ADMIN — SEVELAMER CARBONATE 800 MILLIGRAM(S): 2400 POWDER, FOR SUSPENSION ORAL at 17:08

## 2020-01-01 RX ADMIN — MIDODRINE HYDROCHLORIDE 5 MILLIGRAM(S): 2.5 TABLET ORAL at 18:23

## 2020-01-01 RX ADMIN — PIPERACILLIN AND TAZOBACTAM 200 GRAM(S): 4; .5 INJECTION, POWDER, LYOPHILIZED, FOR SOLUTION INTRAVENOUS at 14:51

## 2020-01-01 RX ADMIN — HEPARIN SODIUM 5000 UNIT(S): 5000 INJECTION INTRAVENOUS; SUBCUTANEOUS at 13:19

## 2020-01-01 RX ADMIN — MIDODRINE HYDROCHLORIDE 20 MILLIGRAM(S): 2.5 TABLET ORAL at 22:35

## 2020-01-01 RX ADMIN — Medication 650 MILLIGRAM(S): at 00:13

## 2020-01-01 RX ADMIN — Medication 1 MILLIGRAM(S): at 12:13

## 2020-01-01 RX ADMIN — HEPARIN SODIUM 5000 UNIT(S): 5000 INJECTION INTRAVENOUS; SUBCUTANEOUS at 05:48

## 2020-01-01 RX ADMIN — Medication 325 MILLIGRAM(S): at 14:23

## 2020-01-01 RX ADMIN — SENNA PLUS 2 TABLET(S): 8.6 TABLET ORAL at 21:53

## 2020-01-01 RX ADMIN — ATORVASTATIN CALCIUM 80 MILLIGRAM(S): 80 TABLET, FILM COATED ORAL at 21:02

## 2020-01-01 RX ADMIN — HEPARIN SODIUM 5000 UNIT(S): 5000 INJECTION INTRAVENOUS; SUBCUTANEOUS at 21:18

## 2020-01-01 RX ADMIN — Medication 325 MILLIGRAM(S): at 05:08

## 2020-01-01 RX ADMIN — OXYCODONE HYDROCHLORIDE 5 MILLIGRAM(S): 5 TABLET ORAL at 00:20

## 2020-01-01 RX ADMIN — Medication 325 MILLIGRAM(S): at 12:50

## 2020-01-01 RX ADMIN — SODIUM CHLORIDE 500 MILLILITER(S): 9 INJECTION INTRAMUSCULAR; INTRAVENOUS; SUBCUTANEOUS at 20:48

## 2020-01-01 RX ADMIN — NORTRIPTYLINE HYDROCHLORIDE 25 MILLIGRAM(S): 10 CAPSULE ORAL at 21:59

## 2020-01-01 RX ADMIN — Medication 2: at 17:28

## 2020-01-01 RX ADMIN — Medication 7 UNIT(S): at 16:44

## 2020-01-01 RX ADMIN — PANTOPRAZOLE SODIUM 40 MILLIGRAM(S): 20 TABLET, DELAYED RELEASE ORAL at 06:55

## 2020-01-01 RX ADMIN — SEVELAMER CARBONATE 800 MILLIGRAM(S): 2400 POWDER, FOR SUSPENSION ORAL at 18:27

## 2020-01-01 RX ADMIN — Medication 1 MILLIGRAM(S): at 13:23

## 2020-01-01 RX ADMIN — NORTRIPTYLINE HYDROCHLORIDE 25 MILLIGRAM(S): 10 CAPSULE ORAL at 21:12

## 2020-01-01 RX ADMIN — Medication 18 UNIT(S): at 07:57

## 2020-01-01 RX ADMIN — HEPARIN SODIUM 5000 UNIT(S): 5000 INJECTION INTRAVENOUS; SUBCUTANEOUS at 17:58

## 2020-01-01 RX ADMIN — Medication 12.5 MILLIGRAM(S): at 18:42

## 2020-01-01 RX ADMIN — OXYCODONE HYDROCHLORIDE 5 MILLIGRAM(S): 5 TABLET ORAL at 04:00

## 2020-01-01 RX ADMIN — ATORVASTATIN CALCIUM 80 MILLIGRAM(S): 80 TABLET, FILM COATED ORAL at 21:53

## 2020-01-01 RX ADMIN — Medication 6 UNIT(S): at 18:27

## 2020-01-01 RX ADMIN — Medication 5.78 MICROGRAM(S)/KG/MIN: at 20:32

## 2020-01-01 RX ADMIN — Medication 1 MILLIGRAM(S): at 07:56

## 2020-01-01 RX ADMIN — Medication 1 MILLIGRAM(S): at 11:34

## 2020-01-01 RX ADMIN — Medication 50 MILLILITER(S): at 02:00

## 2020-01-01 RX ADMIN — HEPARIN SODIUM 5000 UNIT(S): 5000 INJECTION INTRAVENOUS; SUBCUTANEOUS at 14:22

## 2020-01-01 RX ADMIN — Medication 1 APPLICATION(S): at 18:37

## 2020-01-01 RX ADMIN — CHLORHEXIDINE GLUCONATE 5 MILLILITER(S): 213 SOLUTION TOPICAL at 01:30

## 2020-01-01 RX ADMIN — Medication 81 MILLIGRAM(S): at 12:13

## 2020-01-01 RX ADMIN — HUMAN INSULIN 12 UNIT(S): 100 INJECTION, SUSPENSION SUBCUTANEOUS at 21:29

## 2020-01-01 RX ADMIN — HEPARIN SODIUM 5000 UNIT(S): 5000 INJECTION INTRAVENOUS; SUBCUTANEOUS at 13:51

## 2020-01-01 RX ADMIN — Medication 25 MILLIGRAM(S): at 05:57

## 2020-01-01 RX ADMIN — HEPARIN SODIUM 5000 UNIT(S): 5000 INJECTION INTRAVENOUS; SUBCUTANEOUS at 05:08

## 2020-01-01 RX ADMIN — TICAGRELOR 60 MILLIGRAM(S): 90 TABLET ORAL at 06:11

## 2020-01-01 RX ADMIN — Medication 2: at 12:57

## 2020-01-01 RX ADMIN — INSULIN GLARGINE 12 UNIT(S): 100 INJECTION, SOLUTION SUBCUTANEOUS at 22:05

## 2020-01-01 RX ADMIN — TICAGRELOR 90 MILLIGRAM(S): 90 TABLET ORAL at 06:08

## 2020-01-01 RX ADMIN — Medication 12 UNIT(S): at 08:24

## 2020-01-01 RX ADMIN — Medication 81 MILLIGRAM(S): at 11:51

## 2020-01-01 RX ADMIN — NORTRIPTYLINE HYDROCHLORIDE 25 MILLIGRAM(S): 10 CAPSULE ORAL at 21:26

## 2020-01-01 RX ADMIN — Medication 1 MILLIGRAM(S): at 12:25

## 2020-01-01 RX ADMIN — SEVELAMER CARBONATE 800 MILLIGRAM(S): 2400 POWDER, FOR SUSPENSION ORAL at 08:24

## 2020-01-01 RX ADMIN — PANTOPRAZOLE SODIUM 40 MILLIGRAM(S): 20 TABLET, DELAYED RELEASE ORAL at 06:08

## 2020-01-01 RX ADMIN — OXYCODONE HYDROCHLORIDE 5 MILLIGRAM(S): 5 TABLET ORAL at 23:30

## 2020-01-01 RX ADMIN — SEVELAMER CARBONATE 800 MILLIGRAM(S): 2400 POWDER, FOR SUSPENSION ORAL at 17:15

## 2020-01-01 RX ADMIN — INSULIN GLARGINE 12 UNIT(S): 100 INJECTION, SOLUTION SUBCUTANEOUS at 21:53

## 2020-01-01 RX ADMIN — Medication 325 MILLIGRAM(S): at 06:08

## 2020-01-01 RX ADMIN — Medication 6: at 12:49

## 2020-01-01 RX ADMIN — Medication 11 UNIT(S): at 07:54

## 2020-01-01 RX ADMIN — Medication 325 MILLIGRAM(S): at 05:46

## 2020-01-01 RX ADMIN — Medication 6 UNIT(S): at 09:36

## 2020-01-01 RX ADMIN — HEPARIN SODIUM 5000 UNIT(S): 5000 INJECTION INTRAVENOUS; SUBCUTANEOUS at 13:22

## 2020-01-01 RX ADMIN — SEVELAMER CARBONATE 800 MILLIGRAM(S): 2400 POWDER, FOR SUSPENSION ORAL at 08:16

## 2020-01-01 RX ADMIN — Medication 2: at 10:06

## 2020-01-01 RX ADMIN — TICAGRELOR 90 MILLIGRAM(S): 90 TABLET ORAL at 16:52

## 2020-01-01 RX ADMIN — Medication 325 MILLIGRAM(S): at 12:57

## 2020-01-01 RX ADMIN — SEVELAMER CARBONATE 800 MILLIGRAM(S): 2400 POWDER, FOR SUSPENSION ORAL at 09:32

## 2020-01-01 RX ADMIN — SEVELAMER CARBONATE 800 MILLIGRAM(S): 2400 POWDER, FOR SUSPENSION ORAL at 08:19

## 2020-01-01 RX ADMIN — Medication 12.5 MILLIGRAM(S): at 17:31

## 2020-01-01 RX ADMIN — MIDODRINE HYDROCHLORIDE 5 MILLIGRAM(S): 2.5 TABLET ORAL at 12:28

## 2020-01-01 RX ADMIN — Medication 7 UNIT(S): at 16:08

## 2020-01-01 RX ADMIN — MIDODRINE HYDROCHLORIDE 5 MILLIGRAM(S): 2.5 TABLET ORAL at 06:20

## 2020-01-01 RX ADMIN — PANTOPRAZOLE SODIUM 40 MILLIGRAM(S): 20 TABLET, DELAYED RELEASE ORAL at 05:08

## 2020-01-01 RX ADMIN — Medication 11 UNIT(S): at 08:05

## 2020-01-01 RX ADMIN — SODIUM CHLORIDE 500 MILLILITER(S): 9 INJECTION INTRAMUSCULAR; INTRAVENOUS; SUBCUTANEOUS at 16:20

## 2020-01-01 RX ADMIN — Medication 25 MILLIGRAM(S): at 06:11

## 2020-01-01 RX ADMIN — GABAPENTIN 100 MILLIGRAM(S): 400 CAPSULE ORAL at 11:34

## 2020-01-01 RX ADMIN — INSULIN GLARGINE 8 UNIT(S): 100 INJECTION, SOLUTION SUBCUTANEOUS at 22:41

## 2020-01-01 RX ADMIN — Medication 12.5 MILLIGRAM(S): at 06:21

## 2020-01-01 RX ADMIN — Medication 1: at 06:19

## 2020-01-01 RX ADMIN — MIDODRINE HYDROCHLORIDE 5 MILLIGRAM(S): 2.5 TABLET ORAL at 11:34

## 2020-01-01 RX ADMIN — HEPARIN SODIUM 5000 UNIT(S): 5000 INJECTION INTRAVENOUS; SUBCUTANEOUS at 05:37

## 2020-01-01 RX ADMIN — TICAGRELOR 60 MILLIGRAM(S): 90 TABLET ORAL at 18:42

## 2020-01-01 RX ADMIN — Medication 6 UNIT(S): at 09:32

## 2020-01-01 RX ADMIN — Medication 4 UNIT(S): at 22:03

## 2020-01-01 RX ADMIN — Medication 0: at 21:18

## 2020-01-01 RX ADMIN — ATORVASTATIN CALCIUM 80 MILLIGRAM(S): 80 TABLET, FILM COATED ORAL at 22:04

## 2020-01-01 RX ADMIN — Medication 125 MILLILITER(S): at 20:32

## 2020-01-01 RX ADMIN — SEVELAMER CARBONATE 800 MILLIGRAM(S): 2400 POWDER, FOR SUSPENSION ORAL at 17:33

## 2020-01-01 RX ADMIN — MIDODRINE HYDROCHLORIDE 5 MILLIGRAM(S): 2.5 TABLET ORAL at 05:29

## 2020-01-01 RX ADMIN — Medication 81 MILLIGRAM(S): at 12:57

## 2020-01-01 RX ADMIN — Medication 2: at 11:47

## 2020-01-01 RX ADMIN — Medication 4 UNIT(S): at 13:17

## 2020-01-01 RX ADMIN — Medication 25 MILLIGRAM(S): at 17:08

## 2020-01-01 RX ADMIN — SODIUM CHLORIDE 10 MILLILITER(S): 9 INJECTION INTRAMUSCULAR; INTRAVENOUS; SUBCUTANEOUS at 19:25

## 2020-01-01 RX ADMIN — CHLORHEXIDINE GLUCONATE 5 MILLILITER(S): 213 SOLUTION TOPICAL at 18:05

## 2020-01-01 RX ADMIN — GABAPENTIN 100 MILLIGRAM(S): 400 CAPSULE ORAL at 18:29

## 2020-01-01 RX ADMIN — Medication 81 MILLIGRAM(S): at 11:41

## 2020-01-01 RX ADMIN — Medication 100 MILLIGRAM(S): at 14:52

## 2020-01-01 RX ADMIN — Medication 4: at 11:38

## 2020-01-01 RX ADMIN — Medication 3: at 08:41

## 2020-01-01 RX ADMIN — Medication 25 MILLIGRAM(S): at 05:08

## 2020-01-01 RX ADMIN — Medication 18 UNIT(S): at 17:07

## 2020-01-01 RX ADMIN — SODIUM CHLORIDE 500 MILLILITER(S): 9 INJECTION INTRAMUSCULAR; INTRAVENOUS; SUBCUTANEOUS at 10:59

## 2020-01-01 RX ADMIN — Medication 18 UNIT(S): at 18:38

## 2020-01-01 RX ADMIN — Medication 16 UNIT(S): at 11:57

## 2020-01-01 RX ADMIN — Medication 1 APPLICATION(S): at 17:59

## 2020-01-01 RX ADMIN — Medication 1: at 11:58

## 2020-01-01 RX ADMIN — Medication 325 MILLIGRAM(S): at 06:11

## 2020-01-01 RX ADMIN — ATORVASTATIN CALCIUM 80 MILLIGRAM(S): 80 TABLET, FILM COATED ORAL at 21:58

## 2020-01-01 RX ADMIN — HEPARIN SODIUM 5000 UNIT(S): 5000 INJECTION INTRAVENOUS; SUBCUTANEOUS at 06:04

## 2020-01-01 RX ADMIN — TICAGRELOR 60 MILLIGRAM(S): 90 TABLET ORAL at 17:34

## 2020-01-01 RX ADMIN — Medication 100 MILLIGRAM(S): at 15:04

## 2020-01-01 RX ADMIN — Medication 325 MILLIGRAM(S): at 13:39

## 2020-01-01 RX ADMIN — Medication 325 MILLIGRAM(S): at 13:35

## 2020-01-01 RX ADMIN — Medication 1: at 17:52

## 2020-01-01 RX ADMIN — MIDODRINE HYDROCHLORIDE 20 MILLIGRAM(S): 2.5 TABLET ORAL at 04:53

## 2020-01-01 RX ADMIN — SEVELAMER CARBONATE 800 MILLIGRAM(S): 2400 POWDER, FOR SUSPENSION ORAL at 09:36

## 2020-01-01 RX ADMIN — OXYCODONE HYDROCHLORIDE 10 MILLIGRAM(S): 5 TABLET ORAL at 13:55

## 2020-01-01 RX ADMIN — Medication 3: at 17:06

## 2020-01-01 RX ADMIN — SEVELAMER CARBONATE 800 MILLIGRAM(S): 2400 POWDER, FOR SUSPENSION ORAL at 08:00

## 2020-01-01 RX ADMIN — HEPARIN SODIUM 5000 UNIT(S): 5000 INJECTION INTRAVENOUS; SUBCUTANEOUS at 13:47

## 2020-01-01 RX ADMIN — TICAGRELOR 60 MILLIGRAM(S): 90 TABLET ORAL at 06:20

## 2020-01-01 RX ADMIN — Medication 1 MILLIGRAM(S): at 21:12

## 2020-01-01 RX ADMIN — NORTRIPTYLINE HYDROCHLORIDE 25 MILLIGRAM(S): 10 CAPSULE ORAL at 22:37

## 2020-01-01 RX ADMIN — Medication 11 UNIT(S): at 16:54

## 2020-01-01 RX ADMIN — Medication 16 UNIT(S): at 08:40

## 2020-01-01 RX ADMIN — TICAGRELOR 90 MILLIGRAM(S): 90 TABLET ORAL at 05:47

## 2020-01-01 RX ADMIN — SEVELAMER CARBONATE 800 MILLIGRAM(S): 2400 POWDER, FOR SUSPENSION ORAL at 12:50

## 2020-01-01 RX ADMIN — Medication 1 MILLIGRAM(S): at 11:52

## 2020-01-01 RX ADMIN — Medication 50 MILLIGRAM(S): at 06:11

## 2020-01-01 RX ADMIN — INSULIN GLARGINE 5 UNIT(S): 100 INJECTION, SOLUTION SUBCUTANEOUS at 22:33

## 2020-01-01 RX ADMIN — SEVELAMER CARBONATE 800 MILLIGRAM(S): 2400 POWDER, FOR SUSPENSION ORAL at 12:59

## 2020-01-01 RX ADMIN — PANTOPRAZOLE SODIUM 40 MILLIGRAM(S): 20 TABLET, DELAYED RELEASE ORAL at 04:53

## 2020-01-01 RX ADMIN — INSULIN GLARGINE 10 UNIT(S): 100 INJECTION, SOLUTION SUBCUTANEOUS at 21:45

## 2020-01-01 RX ADMIN — HEPARIN SODIUM 5000 UNIT(S): 5000 INJECTION INTRAVENOUS; SUBCUTANEOUS at 06:56

## 2020-01-01 RX ADMIN — HEPARIN SODIUM 5000 UNIT(S): 5000 INJECTION INTRAVENOUS; SUBCUTANEOUS at 17:34

## 2020-01-01 RX ADMIN — ATORVASTATIN CALCIUM 80 MILLIGRAM(S): 80 TABLET, FILM COATED ORAL at 21:26

## 2020-01-01 RX ADMIN — HEPARIN SODIUM 5000 UNIT(S): 5000 INJECTION INTRAVENOUS; SUBCUTANEOUS at 05:41

## 2020-01-01 RX ADMIN — Medication 1 APPLICATION(S): at 18:24

## 2020-01-01 RX ADMIN — Medication 1 MILLIGRAM(S): at 12:28

## 2020-01-01 RX ADMIN — GABAPENTIN 100 MILLIGRAM(S): 400 CAPSULE ORAL at 13:23

## 2020-01-01 RX ADMIN — ATORVASTATIN CALCIUM 80 MILLIGRAM(S): 80 TABLET, FILM COATED ORAL at 22:16

## 2020-01-01 RX ADMIN — SEVELAMER CARBONATE 800 MILLIGRAM(S): 2400 POWDER, FOR SUSPENSION ORAL at 12:24

## 2020-01-01 RX ADMIN — HEPARIN SODIUM 5000 UNIT(S): 5000 INJECTION INTRAVENOUS; SUBCUTANEOUS at 21:58

## 2020-01-01 RX ADMIN — PANTOPRAZOLE SODIUM 40 MILLIGRAM(S): 20 TABLET, DELAYED RELEASE ORAL at 18:26

## 2020-01-01 RX ADMIN — INSULIN GLARGINE 2 UNIT(S): 100 INJECTION, SOLUTION SUBCUTANEOUS at 22:03

## 2020-01-01 RX ADMIN — Medication 25 MILLIGRAM(S): at 21:18

## 2020-01-01 RX ADMIN — Medication 6: at 09:32

## 2020-01-01 RX ADMIN — INSULIN GLARGINE 12 UNIT(S): 100 INJECTION, SOLUTION SUBCUTANEOUS at 00:25

## 2020-01-01 RX ADMIN — TICAGRELOR 90 MILLIGRAM(S): 90 TABLET ORAL at 06:54

## 2020-01-01 RX ADMIN — Medication 1 MILLIGRAM(S): at 13:17

## 2020-01-01 RX ADMIN — Medication 6 UNIT(S): at 12:57

## 2020-01-01 RX ADMIN — Medication 25 MILLIGRAM(S): at 21:22

## 2020-01-01 RX ADMIN — GABAPENTIN 100 MILLIGRAM(S): 400 CAPSULE ORAL at 12:57

## 2020-01-01 RX ADMIN — PANTOPRAZOLE SODIUM 40 MILLIGRAM(S): 20 TABLET, DELAYED RELEASE ORAL at 13:22

## 2020-01-01 RX ADMIN — SEVELAMER CARBONATE 800 MILLIGRAM(S): 2400 POWDER, FOR SUSPENSION ORAL at 12:13

## 2020-01-01 RX ADMIN — Medication 8: at 16:44

## 2020-01-01 RX ADMIN — Medication 25 MILLIGRAM(S): at 19:03

## 2020-01-01 RX ADMIN — SEVELAMER CARBONATE 800 MILLIGRAM(S): 2400 POWDER, FOR SUSPENSION ORAL at 07:47

## 2020-01-01 RX ADMIN — TICAGRELOR 90 MILLIGRAM(S): 90 TABLET ORAL at 04:53

## 2020-01-01 RX ADMIN — ATORVASTATIN CALCIUM 80 MILLIGRAM(S): 80 TABLET, FILM COATED ORAL at 22:35

## 2020-01-01 RX ADMIN — Medication 400 MILLIGRAM(S): at 03:00

## 2020-01-01 RX ADMIN — TICAGRELOR 60 MILLIGRAM(S): 90 TABLET ORAL at 22:16

## 2020-01-01 RX ADMIN — ATORVASTATIN CALCIUM 80 MILLIGRAM(S): 80 TABLET, FILM COATED ORAL at 21:04

## 2020-01-01 RX ADMIN — SEVELAMER CARBONATE 800 MILLIGRAM(S): 2400 POWDER, FOR SUSPENSION ORAL at 08:43

## 2020-01-01 RX ADMIN — Medication 18 UNIT(S): at 12:20

## 2020-01-01 RX ADMIN — Medication 12 UNIT(S): at 17:15

## 2020-01-01 RX ADMIN — TICAGRELOR 90 MILLIGRAM(S): 90 TABLET ORAL at 05:57

## 2020-01-01 RX ADMIN — Medication 12.5 MILLIGRAM(S): at 17:34

## 2020-01-01 RX ADMIN — Medication 81 MILLIGRAM(S): at 13:23

## 2020-01-01 RX ADMIN — Medication 1 MILLIGRAM(S): at 12:57

## 2020-01-01 RX ADMIN — SODIUM CHLORIDE 500 MILLILITER(S): 9 INJECTION INTRAMUSCULAR; INTRAVENOUS; SUBCUTANEOUS at 09:17

## 2020-01-01 RX ADMIN — SEVELAMER CARBONATE 800 MILLIGRAM(S): 2400 POWDER, FOR SUSPENSION ORAL at 17:51

## 2020-01-01 RX ADMIN — Medication 4: at 17:34

## 2020-01-01 RX ADMIN — Medication 250 MILLIGRAM(S): at 22:39

## 2020-01-01 RX ADMIN — Medication 325 MILLIGRAM(S): at 12:21

## 2020-01-01 RX ADMIN — SODIUM CHLORIDE 1000 MILLILITER(S): 9 INJECTION INTRAMUSCULAR; INTRAVENOUS; SUBCUTANEOUS at 17:31

## 2020-01-01 RX ADMIN — Medication 2: at 10:08

## 2020-01-01 RX ADMIN — INSULIN HUMAN 2 UNIT(S)/HR: 100 INJECTION, SOLUTION SUBCUTANEOUS at 20:00

## 2020-01-01 RX ADMIN — ERYTHROPOIETIN 10000 UNIT(S): 10000 INJECTION, SOLUTION INTRAVENOUS; SUBCUTANEOUS at 18:57

## 2020-01-01 RX ADMIN — CEFEPIME 100 MILLIGRAM(S): 1 INJECTION, POWDER, FOR SOLUTION INTRAMUSCULAR; INTRAVENOUS at 22:35

## 2020-01-01 RX ADMIN — MIDODRINE HYDROCHLORIDE 5 MILLIGRAM(S): 2.5 TABLET ORAL at 18:27

## 2020-01-01 RX ADMIN — SEVELAMER CARBONATE 800 MILLIGRAM(S): 2400 POWDER, FOR SUSPENSION ORAL at 10:07

## 2020-01-01 RX ADMIN — MIDODRINE HYDROCHLORIDE 5 MILLIGRAM(S): 2.5 TABLET ORAL at 12:57

## 2020-01-01 RX ADMIN — Medication 125 MILLILITER(S): at 23:15

## 2020-01-01 RX ADMIN — Medication 12.5 MILLIGRAM(S): at 06:20

## 2020-01-01 RX ADMIN — Medication 1 MILLIGRAM(S): at 11:41

## 2020-01-01 RX ADMIN — Medication 81 MILLIGRAM(S): at 12:28

## 2020-01-01 RX ADMIN — SEVELAMER CARBONATE 800 MILLIGRAM(S): 2400 POWDER, FOR SUSPENSION ORAL at 18:40

## 2020-01-01 RX ADMIN — CEFEPIME 100 MILLIGRAM(S): 1 INJECTION, POWDER, FOR SOLUTION INTRAMUSCULAR; INTRAVENOUS at 18:30

## 2020-01-01 RX ADMIN — CHLORHEXIDINE GLUCONATE 15 MILLILITER(S): 213 SOLUTION TOPICAL at 01:30

## 2020-01-01 RX ADMIN — MIDODRINE HYDROCHLORIDE 5 MILLIGRAM(S): 2.5 TABLET ORAL at 06:21

## 2020-01-01 RX ADMIN — Medication 1 APPLICATION(S): at 12:25

## 2020-01-01 RX ADMIN — Medication 7 UNIT(S): at 11:39

## 2020-01-01 RX ADMIN — HEPARIN SODIUM 5000 UNIT(S): 5000 INJECTION INTRAVENOUS; SUBCUTANEOUS at 18:23

## 2020-01-01 RX ADMIN — Medication 12.5 MILLIGRAM(S): at 18:27

## 2020-01-01 RX ADMIN — HEPARIN SODIUM 5000 UNIT(S): 5000 INJECTION INTRAVENOUS; SUBCUTANEOUS at 18:27

## 2020-01-01 RX ADMIN — ATORVASTATIN CALCIUM 80 MILLIGRAM(S): 80 TABLET, FILM COATED ORAL at 22:09

## 2020-01-01 RX ADMIN — Medication 3: at 17:15

## 2020-01-01 RX ADMIN — SEVELAMER CARBONATE 800 MILLIGRAM(S): 2400 POWDER, FOR SUSPENSION ORAL at 09:27

## 2020-01-01 RX ADMIN — Medication 325 MILLIGRAM(S): at 21:20

## 2020-01-01 RX ADMIN — CLOPIDOGREL BISULFATE 75 MILLIGRAM(S): 75 TABLET, FILM COATED ORAL at 11:41

## 2020-01-01 RX ADMIN — TICAGRELOR 90 MILLIGRAM(S): 90 TABLET ORAL at 17:22

## 2020-01-01 RX ADMIN — Medication 1 APPLICATION(S): at 18:45

## 2020-01-01 RX ADMIN — Medication 325 MILLIGRAM(S): at 11:35

## 2020-01-01 RX ADMIN — Medication 1 APPLICATION(S): at 05:29

## 2020-01-01 RX ADMIN — TICAGRELOR 90 MILLIGRAM(S): 90 TABLET ORAL at 17:08

## 2020-01-01 RX ADMIN — Medication 325 MILLIGRAM(S): at 13:09

## 2020-01-01 RX ADMIN — SEVELAMER CARBONATE 800 MILLIGRAM(S): 2400 POWDER, FOR SUSPENSION ORAL at 08:05

## 2020-01-01 RX ADMIN — Medication 18 UNIT(S): at 12:15

## 2020-01-01 RX ADMIN — Medication 325 MILLIGRAM(S): at 21:13

## 2020-01-01 RX ADMIN — Medication 7 UNIT(S): at 17:29

## 2020-01-01 RX ADMIN — HEPARIN SODIUM 5000 UNIT(S): 5000 INJECTION INTRAVENOUS; SUBCUTANEOUS at 21:11

## 2020-01-01 RX ADMIN — TICAGRELOR 90 MILLIGRAM(S): 90 TABLET ORAL at 17:13

## 2020-01-01 RX ADMIN — SEVELAMER CARBONATE 800 MILLIGRAM(S): 2400 POWDER, FOR SUSPENSION ORAL at 12:57

## 2020-01-01 RX ADMIN — CEFEPIME 100 MILLIGRAM(S): 1 INJECTION, POWDER, FOR SOLUTION INTRAMUSCULAR; INTRAVENOUS at 22:33

## 2020-01-01 RX ADMIN — Medication 325 MILLIGRAM(S): at 13:47

## 2020-01-01 RX ADMIN — INSULIN GLARGINE 12 UNIT(S): 100 INJECTION, SOLUTION SUBCUTANEOUS at 21:24

## 2020-01-01 RX ADMIN — Medication 325 MILLIGRAM(S): at 21:02

## 2020-01-01 RX ADMIN — GABAPENTIN 100 MILLIGRAM(S): 400 CAPSULE ORAL at 12:21

## 2020-01-01 RX ADMIN — Medication 325 MILLIGRAM(S): at 05:57

## 2020-01-01 RX ADMIN — TICAGRELOR 60 MILLIGRAM(S): 90 TABLET ORAL at 17:58

## 2020-01-01 RX ADMIN — MUPIROCIN 1 APPLICATION(S): 20 OINTMENT TOPICAL at 18:33

## 2020-01-01 RX ADMIN — Medication 325 MILLIGRAM(S): at 05:05

## 2020-01-01 RX ADMIN — Medication 325 MILLIGRAM(S): at 13:18

## 2020-01-01 RX ADMIN — Medication 81 MILLIGRAM(S): at 13:17

## 2020-01-01 RX ADMIN — Medication 16 UNIT(S): at 17:22

## 2020-01-01 RX ADMIN — INSULIN GLARGINE 8 UNIT(S): 100 INJECTION, SOLUTION SUBCUTANEOUS at 22:08

## 2020-01-01 RX ADMIN — ATORVASTATIN CALCIUM 80 MILLIGRAM(S): 80 TABLET, FILM COATED ORAL at 21:41

## 2020-01-01 RX ADMIN — Medication 25 MILLIGRAM(S): at 17:15

## 2020-01-01 RX ADMIN — Medication 6 UNIT(S): at 12:46

## 2020-01-01 RX ADMIN — Medication 325 MILLIGRAM(S): at 22:31

## 2020-01-01 RX ADMIN — PANTOPRAZOLE SODIUM 40 MILLIGRAM(S): 20 TABLET, DELAYED RELEASE ORAL at 05:48

## 2020-01-01 RX ADMIN — HEPARIN SODIUM 5000 UNIT(S): 5000 INJECTION INTRAVENOUS; SUBCUTANEOUS at 17:33

## 2020-01-01 RX ADMIN — ERYTHROPOIETIN 10000 UNIT(S): 10000 INJECTION, SOLUTION INTRAVENOUS; SUBCUTANEOUS at 18:26

## 2020-01-01 RX ADMIN — Medication 16 UNIT(S): at 11:51

## 2020-01-01 RX ADMIN — HEPARIN SODIUM 5000 UNIT(S): 5000 INJECTION INTRAVENOUS; SUBCUTANEOUS at 06:09

## 2020-01-01 RX ADMIN — Medication 125 MILLILITER(S): at 20:35

## 2020-01-01 RX ADMIN — Medication 81 MILLIGRAM(S): at 12:24

## 2020-01-01 RX ADMIN — ATORVASTATIN CALCIUM 80 MILLIGRAM(S): 80 TABLET, FILM COATED ORAL at 21:33

## 2020-01-01 RX ADMIN — INSULIN GLARGINE 12 UNIT(S): 100 INJECTION, SOLUTION SUBCUTANEOUS at 22:31

## 2020-01-01 RX ADMIN — Medication 325 MILLIGRAM(S): at 12:25

## 2020-01-01 RX ADMIN — OXYCODONE HYDROCHLORIDE 5 MILLIGRAM(S): 5 TABLET ORAL at 04:30

## 2020-01-01 RX ADMIN — SODIUM CHLORIDE 500 MILLILITER(S): 9 INJECTION INTRAMUSCULAR; INTRAVENOUS; SUBCUTANEOUS at 17:52

## 2020-01-01 RX ADMIN — Medication 1.45 MICROGRAM(S)/KG/MIN: at 19:24

## 2020-01-01 RX ADMIN — SEVELAMER CARBONATE 800 MILLIGRAM(S): 2400 POWDER, FOR SUSPENSION ORAL at 10:09

## 2020-01-01 RX ADMIN — NORTRIPTYLINE HYDROCHLORIDE 25 MILLIGRAM(S): 10 CAPSULE ORAL at 22:16

## 2020-01-01 RX ADMIN — HEPARIN SODIUM 5000 UNIT(S): 5000 INJECTION INTRAVENOUS; SUBCUTANEOUS at 21:20

## 2020-01-01 RX ADMIN — SEVELAMER CARBONATE 800 MILLIGRAM(S): 2400 POWDER, FOR SUSPENSION ORAL at 16:55

## 2020-01-01 RX ADMIN — Medication 2: at 21:17

## 2020-01-01 RX ADMIN — NORTRIPTYLINE HYDROCHLORIDE 25 MILLIGRAM(S): 10 CAPSULE ORAL at 21:53

## 2020-01-01 RX ADMIN — OXYCODONE HYDROCHLORIDE 10 MILLIGRAM(S): 5 TABLET ORAL at 21:02

## 2020-01-01 RX ADMIN — INSULIN GLARGINE 20 UNIT(S): 100 INJECTION, SOLUTION SUBCUTANEOUS at 21:18

## 2020-01-01 RX ADMIN — Medication 2: at 14:47

## 2020-01-01 RX ADMIN — MIDODRINE HYDROCHLORIDE 10 MILLIGRAM(S): 2.5 TABLET ORAL at 12:35

## 2020-01-01 RX ADMIN — TICAGRELOR 60 MILLIGRAM(S): 90 TABLET ORAL at 05:29

## 2020-01-01 RX ADMIN — INSULIN GLARGINE 12 UNIT(S): 100 INJECTION, SOLUTION SUBCUTANEOUS at 22:37

## 2020-01-01 RX ADMIN — Medication 25 MILLIGRAM(S): at 17:10

## 2020-01-01 RX ADMIN — Medication 2: at 18:55

## 2020-01-01 RX ADMIN — Medication 1: at 12:24

## 2020-01-01 RX ADMIN — Medication 325 MILLIGRAM(S): at 06:04

## 2020-01-01 RX ADMIN — Medication 2: at 12:53

## 2020-01-01 RX ADMIN — TICAGRELOR 90 MILLIGRAM(S): 90 TABLET ORAL at 17:15

## 2020-01-01 RX ADMIN — PANTOPRAZOLE SODIUM 40 MILLIGRAM(S): 20 TABLET, DELAYED RELEASE ORAL at 06:20

## 2020-01-01 RX ADMIN — HEPARIN SODIUM 5000 UNIT(S): 5000 INJECTION INTRAVENOUS; SUBCUTANEOUS at 05:46

## 2020-01-01 RX ADMIN — HEPARIN SODIUM 5000 UNIT(S): 5000 INJECTION INTRAVENOUS; SUBCUTANEOUS at 05:58

## 2020-01-01 RX ADMIN — SENNA PLUS 2 TABLET(S): 8.6 TABLET ORAL at 22:05

## 2020-01-01 RX ADMIN — CLOPIDOGREL BISULFATE 75 MILLIGRAM(S): 75 TABLET, FILM COATED ORAL at 07:47

## 2020-01-01 RX ADMIN — SEVELAMER CARBONATE 800 MILLIGRAM(S): 2400 POWDER, FOR SUSPENSION ORAL at 11:54

## 2020-01-01 RX ADMIN — Medication 25 MILLIGRAM(S): at 05:47

## 2020-01-01 RX ADMIN — Medication 6 UNIT(S): at 17:35

## 2020-01-01 RX ADMIN — MIDODRINE HYDROCHLORIDE 10 MILLIGRAM(S): 2.5 TABLET ORAL at 21:04

## 2020-01-01 RX ADMIN — Medication 81 MILLIGRAM(S): at 07:46

## 2020-01-01 RX ADMIN — TICAGRELOR 90 MILLIGRAM(S): 90 TABLET ORAL at 22:35

## 2020-01-01 RX ADMIN — OXYCODONE HYDROCHLORIDE 5 MILLIGRAM(S): 5 TABLET ORAL at 23:50

## 2020-01-01 RX ADMIN — TICAGRELOR 90 MILLIGRAM(S): 90 TABLET ORAL at 16:55

## 2020-01-01 RX ADMIN — Medication 25 MILLIGRAM(S): at 17:13

## 2020-01-01 RX ADMIN — ATORVASTATIN CALCIUM 80 MILLIGRAM(S): 80 TABLET, FILM COATED ORAL at 21:18

## 2020-01-01 RX ADMIN — GABAPENTIN 100 MILLIGRAM(S): 400 CAPSULE ORAL at 12:51

## 2020-01-01 RX ADMIN — Medication 25 MILLIGRAM(S): at 05:05

## 2020-01-01 RX ADMIN — TICAGRELOR 90 MILLIGRAM(S): 90 TABLET ORAL at 17:31

## 2020-01-01 RX ADMIN — TICAGRELOR 60 MILLIGRAM(S): 90 TABLET ORAL at 05:03

## 2020-01-01 RX ADMIN — PANTOPRAZOLE SODIUM 40 MILLIGRAM(S): 20 TABLET, DELAYED RELEASE ORAL at 13:19

## 2020-01-01 RX ADMIN — OXYCODONE HYDROCHLORIDE 5 MILLIGRAM(S): 5 TABLET ORAL at 23:00

## 2020-01-01 RX ADMIN — HEPARIN SODIUM 5000 UNIT(S): 5000 INJECTION INTRAVENOUS; SUBCUTANEOUS at 21:02

## 2020-01-01 RX ADMIN — CLOPIDOGREL BISULFATE 75 MILLIGRAM(S): 75 TABLET, FILM COATED ORAL at 09:49

## 2020-01-01 RX ADMIN — Medication 325 MILLIGRAM(S): at 21:18

## 2020-01-01 RX ADMIN — SEVELAMER CARBONATE 800 MILLIGRAM(S): 2400 POWDER, FOR SUSPENSION ORAL at 18:42

## 2020-01-01 RX ADMIN — GABAPENTIN 100 MILLIGRAM(S): 400 CAPSULE ORAL at 07:47

## 2020-01-01 RX ADMIN — Medication 300 MILLIGRAM(S): at 02:05

## 2020-01-01 RX ADMIN — TICAGRELOR 60 MILLIGRAM(S): 90 TABLET ORAL at 05:00

## 2020-01-01 RX ADMIN — ONDANSETRON 4 MILLIGRAM(S): 8 TABLET, FILM COATED ORAL at 08:21

## 2020-01-01 RX ADMIN — SEVELAMER CARBONATE 800 MILLIGRAM(S): 2400 POWDER, FOR SUSPENSION ORAL at 07:56

## 2020-01-01 RX ADMIN — Medication 1: at 17:13

## 2020-01-01 RX ADMIN — SEVELAMER CARBONATE 800 MILLIGRAM(S): 2400 POWDER, FOR SUSPENSION ORAL at 12:20

## 2020-01-01 RX ADMIN — SODIUM CHLORIDE 100 MILLILITER(S): 9 INJECTION INTRAMUSCULAR; INTRAVENOUS; SUBCUTANEOUS at 13:50

## 2020-01-01 RX ADMIN — MIDODRINE HYDROCHLORIDE 5 MILLIGRAM(S): 2.5 TABLET ORAL at 17:34

## 2020-01-01 RX ADMIN — Medication 325 MILLIGRAM(S): at 21:39

## 2020-01-01 RX ADMIN — Medication 50 MILLIEQUIVALENT(S): at 02:00

## 2020-01-01 RX ADMIN — Medication 1: at 17:48

## 2020-01-01 RX ADMIN — Medication 325 MILLIGRAM(S): at 06:55

## 2020-01-01 RX ADMIN — Medication 4 UNIT(S): at 10:23

## 2020-01-01 RX ADMIN — Medication 81 MILLIGRAM(S): at 12:59

## 2020-01-01 RX ADMIN — HEPARIN SODIUM 5000 UNIT(S): 5000 INJECTION INTRAVENOUS; SUBCUTANEOUS at 05:29

## 2020-01-01 RX ADMIN — TICAGRELOR 60 MILLIGRAM(S): 90 TABLET ORAL at 17:33

## 2020-01-01 RX ADMIN — Medication 14 UNIT(S): at 09:26

## 2020-01-01 RX ADMIN — Medication 1 APPLICATION(S): at 18:22

## 2020-01-01 RX ADMIN — PANTOPRAZOLE SODIUM 40 MILLIGRAM(S): 20 TABLET, DELAYED RELEASE ORAL at 05:30

## 2020-01-01 RX ADMIN — Medication 1: at 12:31

## 2020-01-01 RX ADMIN — Medication 2: at 09:03

## 2020-01-01 RX ADMIN — HEPARIN SODIUM 5000 UNIT(S): 5000 INJECTION INTRAVENOUS; SUBCUTANEOUS at 13:39

## 2020-01-01 RX ADMIN — Medication 325 MILLIGRAM(S): at 12:32

## 2020-01-01 RX ADMIN — Medication 325 MILLIGRAM(S): at 18:29

## 2020-01-01 RX ADMIN — Medication 650 MILLIGRAM(S): at 00:45

## 2020-01-01 RX ADMIN — OXYCODONE HYDROCHLORIDE 10 MILLIGRAM(S): 5 TABLET ORAL at 14:15

## 2020-07-31 NOTE — PROGRESS NOTE ADULT - PROBLEM SELECTOR PLAN 3
Awake alert oriented NAD    Denies CNS ENT CP GI  RHEUM OR DERM SX  Past Medical History:   Diagnosis Date    Diabetes mellitus     Hypertension      Past Surgical History:   Procedure Laterality Date    INCISION AND DRAINAGE Left 7/25/2020    Procedure: INCISION AND DRAINAGE;  Surgeon: Lion Durham MD;  Location: [facility];  Service: General;  Laterality: Left;  perineal and upper thigh     Review of patient's allergies indicates:  No Known Allergies    Current Facility-Administered Medications   Medication    0.45% NaCl infusion    acetaminophen tablet 650 mg    amLODIPine tablet 10 mg    atorvastatin tablet 20 mg    ceFAZolin (ANCEF) 1 gram in dextrose 5 % 50 mL IVPB (premix)    cloNIDine tablet 0.2 mg    dextrose 50% injection 12.5 g    dextrose 50% injection 25 g    diphenhydrAMINE capsule 25 mg    famotidine tablet 20 mg    glucagon (human recombinant) injection 1 mg    glucose chewable tablet 16 g    glucose chewable tablet 24 g    heparin (porcine) injection 5,000 Units    insulin aspart U-100 pen 0-5 Units    insulin aspart U-100 pen 10 Units    insulin detemir U-100 pen 30 Units    morphine injection 2 mg    ondansetron injection 4 mg    oxyCODONE-acetaminophen 5-325 mg per tablet 1 tablet    pantoprazole EC tablet 40 mg    promethazine (PHENERGAN) 6.25 mg in dextrose 5 % 50 mL IVPB    senna-docusate 8.6-50 mg per tablet 1 tablet    sodium chloride 0.9% flush 10 mL    sodium chloride 0.9% flush 10 mL       LABS    Recent Results (from the past 24 hour(s))   POCT glucose    Collection Time: 07/30/20 11:34 AM   Result Value Ref Range    POCT Glucose 116 (H) 70 - 110 mg/dL   POCT glucose    Collection Time: 07/30/20  5:02 PM   Result Value Ref Range    POCT Glucose 186 (H) 70 - 110 mg/dL   POCT glucose    Collection Time: 07/30/20  8:23 PM   Result Value Ref Range    POCT Glucose 229 (H) 70 - 110 mg/dL   CBC auto differential    Collection Time: 07/31/20  3:17 AM   Result  Value Ref Range    WBC 13.06 (H) 3.90 - 12.70 K/uL    RBC 3.33 (L) 4.60 - 6.20 M/uL    Hemoglobin 9.3 (L) 14.0 - 18.0 g/dL    Hematocrit 27.6 (L) 40.0 - 54.0 %    Mean Corpuscular Volume 83 82 - 98 fL    Mean Corpuscular Hemoglobin 27.9 27.0 - 31.0 pg    Mean Corpuscular Hemoglobin Conc 33.7 32.0 - 36.0 g/dL    RDW 12.8 11.5 - 14.5 %    Platelets 422 (H) 150 - 350 K/uL    MPV 10.0 9.2 - 12.9 fL    Immature Granulocytes 1.9 (H) 0.0 - 0.5 %    Gran # (ANC) 7.8 (H) 1.8 - 7.7 K/uL    Immature Grans (Abs) 0.25 (H) 0.00 - 0.04 K/uL    Lymph # 3.4 1.0 - 4.8 K/uL    Mono # 1.3 (H) 0.3 - 1.0 K/uL    Eos # 0.3 0.0 - 0.5 K/uL    Baso # 0.04 0.00 - 0.20 K/uL    nRBC 0 0 /100 WBC    Gran% 60.0 38.0 - 73.0 %    Lymph% 26.1 18.0 - 48.0 %    Mono% 9.6 4.0 - 15.0 %    Eosinophil% 2.1 0.0 - 8.0 %    Basophil% 0.3 0.0 - 1.9 %    Differential Method Automated    Basic metabolic panel    Collection Time: 07/31/20  3:17 AM   Result Value Ref Range    Sodium 139 136 - 145 mmol/L    Potassium 3.7 3.5 - 5.1 mmol/L    Chloride 111 (H) 95 - 110 mmol/L    CO2 21 (L) 23 - 29 mmol/L    Glucose 259 (H) 70 - 110 mg/dL    BUN, Bld 41 (H) 6 - 20 mg/dL    Creatinine 2.9 (H) 0.5 - 1.4 mg/dL    Calcium 7.9 (L) 8.7 - 10.5 mg/dL    Anion Gap 7 (L) 8 - 16 mmol/L    eGFR if African American 31 (A) >60 mL/min/1.73 m^2    eGFR if non African American 27 (A) >60 mL/min/1.73 m^2   POCT glucose    Collection Time: 07/31/20  7:06 AM   Result Value Ref Range    POCT Glucose 259 (H) 70 - 110 mg/dL   ]    I/O last 3 completed shifts:  In: 4220.4 [P.O.:720; I.V.:3300.4; IV Piggyback:200]  Out: 4250 [Urine:4250]    Vitals:    07/31/20 0216 07/31/20 0310 07/31/20 0709 07/31/20 0910   BP:  (!) 154/83 131/72    Pulse:  99 85    Resp: 20 19 18 18   Temp:  96.9 °F (36.1 °C) 97.9 °F (36.6 °C)    TempSrc:  Oral Oral    SpO2:  98% 100%    Weight:       Height:           No Jvd, Thyromegaly or Lymphadenopathy  Lungs: Fairly clear anteriorly and laterally  Cor: RRR no G or  rubs  Abd: Soft benign good bowel sounds non tender  Ext: No E C C    A)    NORAH secondary to multiple causes: Out pt aggressive use of NSAIDs, infection, CT abd/pelvis with IV contrast, dm, htn  Creat finally down some more, great uo     Renal wise ok to dc      DM  HTN  L thigh infection     P)     Renal Diet  Adjust all meds to the degree of renal fx  Close follow up I/O and weights  Maintain Hydration   Out pt follow up in 1-2 weeks   CT chest showing b/l pleural effusions w/atelectasis and pneumoperitoneum with ascites. Can be 2/2 recent CABG procedure vs PD catheter.  - Surgery consulted given pneumoperitoneum on CT scan. They think it is related to the daily PD catheter use.

## 2020-08-25 PROBLEM — I25.10 ATHEROSCLEROTIC HEART DISEASE OF NATIVE CORONARY ARTERY WITHOUT ANGINA PECTORIS: Chronic | Status: ACTIVE | Noted: 2019-09-06

## 2020-08-25 PROBLEM — N18.6 END STAGE RENAL DISEASE: Chronic | Status: ACTIVE | Noted: 2019-09-06

## 2020-08-25 PROBLEM — I10 ESSENTIAL (PRIMARY) HYPERTENSION: Chronic | Status: ACTIVE | Noted: 2019-09-06

## 2020-08-25 PROBLEM — E11.9 TYPE 2 DIABETES MELLITUS WITHOUT COMPLICATIONS: Chronic | Status: ACTIVE | Noted: 2019-09-06

## 2020-09-08 PROBLEM — Z87.891 FORMER SMOKER: Status: ACTIVE | Noted: 2020-01-01

## 2020-09-08 PROBLEM — Z78.9 SOCIAL ALCOHOL USE: Status: ACTIVE | Noted: 2020-01-01

## 2020-09-08 PROBLEM — Z86.79 HISTORY OF HYPERTENSION: Status: RESOLVED | Noted: 2020-01-01 | Resolved: 2020-01-01

## 2020-09-09 PROBLEM — Z80.1 FAMILY HISTORY OF LUNG CANCER: Status: ACTIVE | Noted: 2020-01-01

## 2020-09-09 PROBLEM — E78.5 HYPERLIPIDEMIA: Status: ACTIVE | Noted: 2020-01-01

## 2020-09-18 NOTE — HISTORY OF PRESENT ILLNESS
[FreeTextEntry1] : This is a 55 year old male with past medical history of ESRD on PD for 3 years ,  T2DM, CAD S/P CABG X 4 ( LIMA to LAD, SVG to Diag, OM1,PDA) on 8/8/19 in [facility] ( prolonged hospital course for anemia, all work up negative ) with Dr. Rawls , Hypertension, Hyperlipidemia with complaints of fatigue, occasional dyspnea on exertion, PND  . He can walk only up to 0.5 a block and need to stop due to shortness of breath. He uses cane to walk due to weakness and balance issues on lower extremities . His cardiologist did a nuclear stress test which was abnormal. 8/24/20 Cardiac catheterization revealed distal Lt main 50% stenosis, proximal to mid % stenosis, proximal first diagonal 80% stenosis, mid OM2 70% stenosis, proximal Ramus 70% stenosis , proximal % stenosis . Coronary grafts: LIMA artery graft attached to distal LAD is patent. SVG graft originating from aorta to distal RCA totally occluded. SVG to OM-1 totally occluded, SVG to first diagonal totally occluded.  He is here for surgical consultation and management for CAD. Denies any chest pain, palpitations, Dizziness or pedal edema. \par

## 2020-09-18 NOTE — PHYSICAL EXAM
[General Appearance - Alert] : alert [General Appearance - Well Nourished] : well nourished [General Appearance - In No Acute Distress] : in no acute distress [General Appearance - Well Developed] : well developed [Sclera] : the sclera and conjunctiva were normal [PERRL With Normal Accommodation] : pupils were equal in size, round, and reactive to light [Hearing Threshold Finger Rub Not [place]] : hearing was normal [Outer Ear] : the ears and nose were normal in appearance [Neck Appearance] : the appearance of the neck was normal [Both Tympanic Membranes Were Examined] : both tympanic membranes were normal [] : the neck was supple [Respiration, Rhythm And Depth] : normal respiratory rhythm and effort [Auscultation Breath Sounds / Voice Sounds] : lungs were clear to auscultation bilaterally [Apical Impulse] : the apical impulse was normal [Heart Rate And Rhythm] : heart rate was normal and rhythm regular [Heart Sounds] : normal S1 and S2 [Examination Of The Chest] : the chest was normal in appearance [2+] : right 2+ [Breast Appearance] : normal in appearance [Bowel Sounds] : normal bowel sounds [Abdomen Soft] : soft [No CVA Tenderness] : no ~M costovertebral angle tenderness [Abnormal Walk] : normal gait [Involuntary Movements] : no involuntary movements were seen [Skin Color & Pigmentation] : normal skin color and pigmentation [Skin Turgor] : normal skin turgor [No Focal Deficits] : no focal deficits [Oriented To Time, Place, And Person] : oriented to person, place, and time [Impaired Insight] : insight and judgment were intact [Mood] : the mood was normal [Affect] : the affect was normal [Memory Recent] : recent memory was not impaired [Memory Remote] : remote memory was not impaired [FreeTextEntry1] : Deferred

## 2020-09-18 NOTE — DATA REVIEWED
[FreeTextEntry1] : 8/24/20 Cardiac catheterization revealed distal Lt main 50% stenosis, proximal to mid % stenosis, proximal first diagonal 80% stenosis, mid OM2 70% stenosis, proximal Ramus 70% stenosis , proximal % stenosis .  \par -Coronary grafts: LIMA artery graft attached to distal LAD is patent. SVG graft originating from aorta to distal RCA totally occluded. SVG to OM-1 totally occluded, SVG to first diagonal totally occluded. \par \par 7/8/20 Nuclear stress test revealed a large transmural lateral wall defect from apex to the basal inferior wall with minimal byron-infarct ischemia. Wall motion study shows inferior wall akinesis, apical severe hypokinesis , septal severe hypokinesis and severe lateral hypokinesis. EF 27%. Large lateral wall transmural myocardial infarction versus hibernating myocardium with minimal byron-infarct ischemia. \par

## 2020-09-18 NOTE — REVIEW OF SYSTEMS
[Feeling Tired] : feeling tired [Shortness Of Breath] : shortness of breath [SOB on Exertion] : shortness of breath during exertion [Negative] : Endocrine [PND] : PND [Chest Pain] : no chest pain [Palpitations] : no palpitations [Lower Ext Edema] : no extremity edema [Dizziness] : no dizziness [Fainting] : no fainting [Easy Bleeding] : no tendency for easy bleeding [Easy Bruising] : no tendency for easy bruising

## 2020-09-18 NOTE — ASSESSMENT
[FreeTextEntry1] : This is a 55 year old male with past medical history of ESRD on PD for 3 years ,  T2DM, CAD S/P CABG X 4 ( LIMA to LAD, SVG to Diag, OM1,PDA) on 8/8/19 in [facility] ( prolonged hospital course for anemia, all work up negative ) with Dr. Rawls , Hypertension, Hyperlipidemia with complaints of fatigue, occasional dyspnea on exertion, PND  . He can walk only up to 0.5 a block and need to stop due to shortness of breath. He uses cane to walk due to weakness and balance issues on lower extremities . His cardiologist did a nuclear stress test which was abnormal. 8/24/20 Cardiac catheterization revealed distal Lt main 50% stenosis, proximal to mid % stenosis, proximal first diagonal 80% stenosis, mid OM2 70% stenosis, proximal Ramus 70% stenosis , proximal % stenosis . Coronary grafts: LIMA artery graft attached to distal LAD is patent. SVG graft originating from aorta to distal RCA totally occluded. SVG to OM-1 totally occluded, SVG to first diagonal totally occluded.  He is here for surgical consultation and management for CAD. Denies any chest pain, palpitations, Dizziness or pedal edema. \par \par I have reviewed the cardiac imaging and explained to the patient. 8/24/20 Cardiac catheterization revealed distal Lt main 50% stenosis, proximal to mid % stenosis, proximal first diagonal 80% stenosis, mid OM2 70% stenosis, proximal Ramus 70% stenosis , proximal % stenosis .  \par -Coronary grafts: LIMA artery graft attached to distal LAD is patent. SVG graft originating from aorta to distal RCA totally occluded. SVG to OM-1 totally occluded, SVG to first diagonal totally occluded. \par \par 7/8/20 Nuclear stress test revealed a large transmural lateral wall defect from apex to the basal inferior wall with minimal byron-infarct ischemia. Wall motion study shows inferior wall akinesis, apical severe hypokinesis , septal severe hypokinesis and severe lateral hypokinesis. EF 27%. Large lateral wall transmural myocardial infarction versus hibernating myocardium with minimal byron-infarct ischemia. \par \par He is a high risk candidate for open heart surgery. He may be a candidate for PCI intervention. I will discuss with Cardiologist regarding the PCI. \par \par Plan:\par 1. Will discuss with Interventional cardiologist regarding PCI and will call back with the recommendations\par 2. Continue current medication regimen\par 3. Follow up with Cardiologist and PCP \par 5. May return to clinic on PRN basis\par \par ADDENDUM--I have discussed cardiac cath findings in detail.  I think redo CABG would be at increased risk with questionable benefit.  Will have cath reviewed by interventional cardiology for possible PCI.

## 2020-09-18 NOTE — CONSULT LETTER
[Please see my note below.] : Please see my note below. [Courtesy Letter:] : I had the pleasure of seeing your patient, [unfilled], in my office today. [Consult Closing:] : Thank you very much for allowing me to participate in the care of this patient.  If you have any questions, please do not hesitate to contact me. [Sincerely,] : Sincerely, [FreeTextEntry2] : Dr. FRANK SQUIRES [FreeTextEntry3] : Raffi Butterfield MD\par Director\par [facility]\par Professor \par Cardiovascular & Thoracic Surgery\par [employer].\par \par

## 2020-09-30 NOTE — PHYSICAL EXAM
[Normal Appearance] : normal appearance [Well Groomed] : well groomed [No Deformities] : no deformities [Normal Conjunctiva] : the conjunctiva exhibited no abnormalities [Eyelids - No Xanthelasma] : the eyelids demonstrated no xanthelasmas [Normal Oral Mucosa] : normal oral mucosa [No Oral Pallor] : no oral pallor [No Oral Cyanosis] : no oral cyanosis [Normal Jugular Venous A Waves Present] : normal jugular venous A waves present [Normal Jugular Venous V Waves Present] : normal jugular venous V waves present [No Jugular Venous [place] A Waves] : no jugular venous [place] A waves [Exaggerated Use Of Accessory Muscles For Inspiration] : no accessory muscle use [Murmurs] : no murmurs present [Abdomen Tenderness] : non-tender [Abdomen Mass (___ Cm)] : no abdominal mass palpated [Gait - Sufficient For Exercise Testing] : the gait was sufficient for exercise testing [Nail Clubbing] : no clubbing of the fingernails [Cyanosis, Localized] : no localized cyanosis [Petechial Hemorrhages (___cm)] : no petechial hemorrhages [General Appearance - Alert] : alert [General Appearance - In No Acute Distress] : in no acute distress [General Appearance - Well Nourished] : well nourished [General Appearance - Well Developed] : well developed [Sclera] : the sclera and conjunctiva were normal [PERRL With Normal Accommodation] : pupils were equal in size, round, and reactive to light [Outer Ear] : the ears and nose were normal in appearance [Hearing Threshold Finger Rub Not [place]] : hearing was normal [Both Tympanic Membranes Were Examined] : both tympanic membranes were normal [Neck Appearance] : the appearance of the neck was normal [] : no respiratory distress [Respiration, Rhythm And Depth] : normal respiratory rhythm and effort [Auscultation Breath Sounds / Voice Sounds] : lungs were clear to auscultation bilaterally [Apical Impulse] : the apical impulse was normal [Heart Rate And Rhythm] : heart rate was normal and rhythm regular [Heart Sounds] : normal S1 and S2 [Examination Of The Chest] : the chest was normal in appearance [2+] : left 2+ [Breast Appearance] : normal in appearance [Bowel Sounds] : normal bowel sounds [Abdomen Soft] : soft [FreeTextEntry1] : Deferred [No CVA Tenderness] : no ~M costovertebral angle tenderness [Abnormal Walk] : normal gait [Involuntary Movements] : no involuntary movements were seen [Skin Color & Pigmentation] : normal skin color and pigmentation [Skin Turgor] : normal skin turgor [No Focal Deficits] : no focal deficits [Oriented To Time, Place, And Person] : oriented to person, place, and time [Impaired Insight] : insight and judgment were intact [Affect] : the affect was normal [Mood] : the mood was normal [Memory Recent] : recent memory was not impaired [Memory Remote] : remote memory was not impaired

## 2020-09-30 NOTE — CONSULT LETTER
[Dear  ___] : Dear  [unfilled], [Courtesy Letter:] : I had the pleasure of seeing your patient, [unfilled], in my office today. [Please see my note below.] : Please see my note below. [Consult Closing:] : Thank you very much for allowing me to participate in the care of this patient.  If you have any questions, please do not hesitate to contact me. [Sincerely,] : Sincerely, [FreeTextEntry2] : Dr. FRANK SQUIRES [FreeTextEntry3] : Raffi Butterfield MD\par Director\par [facility]\par Professor \par Cardiovascular & Thoracic Surgery\par [employer].\par \par

## 2020-09-30 NOTE — REASON FOR VISIT
[Follow-Up - Clinic] : a clinic follow-up of [Coronary Artery Disease] : coronary artery disease [CABG Follow-up] : bypass graft [Consultation] : a consultation visit [FreeTextEntry1] : CAD

## 2020-09-30 NOTE — HISTORY OF PRESENT ILLNESS
[FreeTextEntry1] : This is a 55 year old male with past medical history of ESRD on PD for 3 years ,  T2DM, CAD S/P CABG X 4 ( LIMA to LAD, SVG to Diag, OM1,PDA) on 8/8/19 in [facility] ( prolonged hospital course for anemia, all work up negative ) with Dr. Rawls , Hypertension, Hyperlipidemia with complaints of fatigue, occasional dyspnea on exertion, PND  . He can walk only up to 0.5 a block and need to stop due to shortness of breath. He uses cane to walk due to weakness and balance issues on lower extremities . His cardiologist did a nuclear stress test which was abnormal. 8/24/20 Cardiac catheterization revealed distal Lt main 50% stenosis, proximal to mid % stenosis, proximal first diagonal 80% stenosis, mid OM2 70% stenosis, proximal Ramus 70% stenosis , proximal % stenosis . Coronary grafts: LIMA artery graft attached to distal LAD is patent. SVG graft originating from aorta to distal RCA totally occluded. SVG to OM-1 totally occluded, SVG to first diagonal totally occluded.   \par \par Saw film with Dr. Butterfield and will attempt to stent the native.. \par

## 2020-09-30 NOTE — REVIEW OF SYSTEMS
[Feeling Tired] : feeling tired [Chest Pain] : no chest pain [Palpitations] : no palpitations [Lower Ext Edema] : no extremity edema [Shortness Of Breath] : shortness of breath [SOB on Exertion] : shortness of breath during exertion [PND] : PND [Dizziness] : no dizziness [Fainting] : no fainting [Easy Bleeding] : no tendency for easy bleeding [Easy Bruising] : no tendency for easy bruising [Negative] : Heme/Lymph

## 2020-11-26 PROBLEM — Z01.818 PREOP TESTING: Status: ACTIVE | Noted: 2020-01-01

## 2020-11-30 NOTE — BRIEF OPERATIVE NOTE - OPERATION/FINDINGS
Right axillary and right femoral cannulation. Redo sternotomy. Recover of fractured left heart catheterization angioplasty balloon and wire via transverse aortotomy.

## 2020-11-30 NOTE — PROGRESS NOTE ADULT - SUBJECTIVE AND OBJECTIVE BOX
BELLA MARTINS  MRN-[id number]  Patient is a 55y old  Male who presents with a chief complaint of CAD.  HPI:  Narrative: This is a 54y/o male with no known implantable devices noted and COVID 19 negative with PMHX of  CAD, s/p CABG x 4 ( LIMA to LAD, SVG to Diagonal , OM1 , PDA on 8/8/19 , HTN, HLD, ESRD on PD for 3 years, and T2DM on insulin compliant with meds uncomplicated does not recall last hgb AIC + diabetic neuropathy with PVD  . Pt presents with complaints of increasing SOB , dizziness and mild chest pain with exertion .  Pt had nuclear stress test which was abnormal. Cardiac catheterization revealed distal left main 50% stenosis, Proximal to mid % stenosis, proximal first diagonal 80% stenosis, mid OM2 70& stenosis, proximal Ramus 70% stenosis, Prox % stenosis, Coronary grafts LIMA artery graft attached to distal LAD is patent . SVG graft originating from aorta to distal RCA totally occluded SVG to Om1 totally occluded, SVG to first diagonal totally occluded.  Film reviewed with Dr. Butterfield . Pt presents  today for C and attempt to stent the native. Currently Cp free no sob no lightheadedness or dizziness noted. Pt uses cane to walk due to weakness and balance issues on lower extremities.    Hospital course:  11/30 [facility] with stent deployed. Recover of fractured left heart catheterization angioplasty balloon and wire via transverse aortotomy.    Today:    REVIEW OF SYSTEMS:  Unable to obtain, pt is intubated.     Physical Exam:  Vital Signs Last 24 Hrs  T(C): 34.9 (30 Nov 2020 19:10), Max: 36.8 (30 Nov 2020 08:10)  T(F): 94.8 (30 Nov 2020 19:10), Max: 98.2 (30 Nov 2020 08:10)  HR: 100 (30 Nov 2020 22:00) (88 - 112)  BP: 138/82 (30 Nov 2020 14:25) (132/88 - 162/86)  BP(mean): 111 (30 Nov 2020 08:28) (111 - 111)  RR: 0 (30 Nov 2020 22:00) (0 - 19)  SpO2: 100% (30 Nov 2020 22:00) (95% - 100%)  Gen: NAD, intubated  CNS: non focal 	  Neck: no JVD  RES : + ETT midline. clear , no wheezing    Chest:   + chest tubes                     CVS: Regular  rhythm. Normal S1/S2  Abd: Soft, non-distended. Bowel sounds present.  Skin: No rash.  Ext:  no edema, A Line    ============================I/O===========================   I&O's Detail    30 Nov 2020 07:01  -  30 Nov 2020 22:01  --------------------------------------------------------  IN:    Albumin 5%  - 250 mL: 750 mL    DOBUTamine: 11.6 mL    Insulin: 13 mL    IV PiggyBack: 100 mL    sodium chloride 0.9%: 30 mL  Total IN: 904.6 mL    OUT:    Chest Tube (mL): 20 mL    Chest Tube (mL): 50 mL    Norepinephrine: 0 mL  Total OUT: 70 mL    Total NET: 834.6 mL        ============================ LABS =========================                        9.6    16.36 )-----------( 150      ( 30 Nov 2020 19:49 )             30.2     11-30    137  |  98  |  57<H>  ----------------------------<  232<H>  3.9   |  20<L>  |  10.84<H>    Ca    9.3      30 Nov 2020 19:50  Phos  6.4     11-30  Mg     3.3     11-30    TPro  5.1<L>  /  Alb  2.9<L>  /  TBili  0.5  /  DBili  x   /  AST  40  /  ALT  13  /  AlkPhos  54  11-30    LIVER FUNCTIONS - ( 30 Nov 2020 19:50 )  Alb: 2.9 g/dL / Pro: 5.1 g/dL / ALK PHOS: 54 U/L / ALT: 13 U/L / AST: 40 U/L / GGT: x           PT/INR - ( 30 Nov 2020 19:49 )   PT: 14.3 sec;   INR: 1.20 ratio         PTT - ( 30 Nov 2020 19:49 )  PTT:33.5 sec  ABG - ( 30 Nov 2020 21:02 )  pH, Arterial: 7.36  pH, Blood: x     /  pCO2: 33    /  pO2: 109   / HCO3: 18    / Base Excess: -5.8  /  SaO2: 98        ======================Micro/Rad/Cardio=================  Culture: Reviewed   CXR: Reviewed  Echo: Reviewed  ======================================================  PAST MEDICAL & SURGICAL HISTORY:  HTN (hypertension)    ESRD (end stage renal disease) on dialysis    CAD, multiple vessel    Diabetes    S/P CABG (coronary artery bypass graft)      ====================ASSESSMENT ==============  CAD S/P LHC with stent deployed on 11/30  S/P Recover of fractured left heart catheterization angioplasty balloon and wire via transverse aortotomy  Acute blood loss anemia     Plan:  ====================== NEUROLOGY=====================  Off sedation, assess neuro status per ICU protocol.   Analgesics as below;    acetaminophen  IVPB .. 1000 milliGRAM(s) IV Intermittent once  aspirin Suppository 300 milliGRAM(s) Rectal once  meperidine     Injectable 25 milliGRAM(s) IV Push once  ==================== RESPIRATORY======================  On full vent support, requiring close monitoring of respiratory rate, breathing pattern, pulse oximetry monitoring, and intermittent blood gas analysis. Will consider weaning once patient is more stable.     Mechanical Ventilation:  Mode: AC/ CMV (Assist Control/ Continuous Mandatory Ventilation)  RR (machine): 18  TV (machine): 500  FiO2: 40  PEEP: 5  ITime: 1  MAP: 12  PIP: 27      ====================CARDIOVASCULAR==================  LHC 11/30: LM:   --  Distal left main: There was a 70 % stenosis. LAD:   --  Mid LAD: There was a 100 % stenosis. CX:   --  OM1: There was a 90 % stenosis. RCA:   --  Ostial RCA: There was a 100 % stenosis. The stent was deployed without difficulty. On removal of the balloon, the shaft broke and the tip of the balloon remained in the vessel. Several attempts were made to snare the balloon unsuccessfully.  S/P Recover of fractured left heart catheterization angioplasty balloon and wire via transverse aortotomy  Inotropic support with IV Dobutamine infusion  Pressor support with IV Levophed infusion for hypotension  Monitor hemodynamics, assess perfusion indices  Monitor continuous telemetry    DOBUTamine Infusion 2.5 MICROgram(s)/kG/Min (5.78 mL/Hr) IV Continuous <Continuous>  norepinephrine Infusion 0.01 MICROgram(s)/kG/Min (1.45 mL/Hr) IV Continuous <Continuous>  ===================HEMATOLOGIC/ONC ===================  Acute blood loss anemia S/P 2u PRBC transfused intraoperatively  Monitor H/H, PLTs closely. Transfuse PRN. Monitor chest tube outputs.    ===================== RENAL =========================  Continue to monitor I/Os, BUN/Creatinine, and urine output.   Goal net even fluid balance. Replete lytes PRN. Keep K> 4 and Mg >2.     ==================== GASTROINTESTINAL===================  NPO after recent procedure, advance diet as tolerated.     =======================    ENDOCRINE  =====================  History of Type 2 Diabetes Mellitus, requiring glucose control with insulin gtt, titrate as per insulin drip protocol along with hourly glucose checks.     insulin regular Infusion 2 Unit(s)/Hr (2 mL/Hr) IV Continuous <Continuous>  ========================INFECTIOUS DISEASE================  Afebrile. Leukocytosis with WBC 16.36, likely reactive.  Monitor for fever, trend WBC. Monitor off abx.       Patient requires continuous monitoring with bedside rhythm monitoring, arterial line, pulse oximetry, ventilator monitoring; intermittent blood gas analysis. Care plan discussed with ICU care team. Patient remains critical; required more than usual ICU care.     By signing my name below, I, Misti Valencia, attest that this documentation has been prepared under the direction and in the presence of Jon Roy MD.  Electronically signed: Baron James, 11-30-20 @ 22:01    I, Jon oRy, personally performed the services described in this documentation. all medical record entries made by the baron were at my direction and in my presence. I have reviewed the chart and agree that the record reflects my personal performance and is accurate and complete  Electronically signed: Misti Valencia 11-30-20 @ 22:01

## 2020-11-30 NOTE — H&P CARDIOLOGY - FAMILY HISTORY
Sibling  Still living? No  FHx: lung cancer, Age at diagnosis: Age Unknown  FH: diabetes mellitus, Age at diagnosis: Age Unknown

## 2020-11-30 NOTE — PRE-ANESTHESIA EVALUATION ADULT - NSDENTALSD_ENT_ALL_CORE
bloody mouth and teeth, missing several teeth lower right row, poor dentition, denies loose teeth/missing teeth missing teeth/bloody mouth and teeth, missing several teeth lower right row & top right row, poor dentition, denies loose teeth

## 2020-11-30 NOTE — PRE-ANESTHESIA EVALUATION ADULT - NSANTHADDINFOFT_GEN_ALL_CORE
I have spoken with Dr. Arango who describes an inflated angioplasty balloon in the patient circumflex artery.  This is an emergency and must be done asap.  I have spoke with the patient in his current state after his procedure.  I explained the risks and procedures and obtained consent and the patient will be having this emergency surgery.

## 2020-11-30 NOTE — H&P CARDIOLOGY - CIGARETTE, PACK YRS
MEDICATION PRIOR AUTHORIZATION NEEDED:    1. Name of Medication: Breo ellipta 200-25 mcg    2. Requested By (Name of Pharmacy): [facility]     3. Is insurance on file current? yes    4. What is the name & phone number of the 3rd party payor? Lauren [phone]   6

## 2020-11-30 NOTE — PRE-ANESTHESIA EVALUATION ADULT - LAST ECHOCARDIOGRAM
Calcified mitral valve with minimal MR. Minimal AI. LVH, concentric LV remodelling. Segmental LV WMA. Inferior wall hypokinetic. See EMR for full report.

## 2020-11-30 NOTE — PRE-ANESTHESIA EVALUATION ADULT - NSANTHPMHFT_GEN_ALL_CORE
This is a 56y/o male with no known implantable devices noted and COVID 19 negative with PMHX of  CAD, s/p CABG x 4 ( LIMA to LAD, SVG to Diagonal , OM1 , PDA on 8/8/19 at [facility]) , HTN, HLD, ESRD on PD for 3 years, and T2DM on insulin compliant with meds(does not recall last hgb AIC +) c/b diabetic neuropathy with PVD. Pt uses cane to walk due to weakness and balance issues on lower extremities.   Pt presents with complaints of increasing SOB , dizziness and mild chest pain with exertion .  Pt had nuclear stress test which was abnormal. Cardiac catheterization revealed distal left main 50% stenosis, Proximal to mid % stenosis, proximal first diagonal 80% stenosis, mid OM2 70& stenosis, proximal Ramus 70% stenosis, Prox % stenosis, Coronary grafts LIMA artery graft attached to distal LAD is patent . SVG graft originating from aorta to distal RCA totally occluded SVG to Om1 totally occluded, SVG to first diagonal totally occluded.  Film reviewed with Dr. Butterfield . Pt presents  today for LHC and attempt to stent the native. Cath c/b rupture of angioplasty balloon and retained foreign body (balloon) in coronaries.   Pt **emergently** transferred to OR for sternotomy for removal of foreign body and possible revascularization/CABG.

## 2020-11-30 NOTE — PRE-ANESTHESIA EVALUATION ADULT - NSANTHAIRWAYFT_ENT_ALL_CORE
MP 3, large tongue, adequate mouth opening, thick neck, short TMD, neck extension wnl. mouth very bloody; pt unsure why. MP 3, large tongue, adequate mouth opening, thick neck, short TMD, neck extension wnl. mouth very bloody; pt unsure why.    bloody mouth and teeth, missing several teeth lower right row & top right row, poor dentition

## 2020-12-01 NOTE — AIRWAY REMOVAL NOTE  ADULT & PEDS - ARTIFICAL AIRWAY REMOVAL COMMENTS
Written order for extubation verified. The patient was identified by full name and birth date compared to the identification band. Present during the procedure was DINORA March

## 2020-12-01 NOTE — CONSULT NOTE ADULT - ASSESSMENT
54y/o male with no known implantable devices noted and COVID 19 negative with PMHX of  CAD, s/p CABG x 4 ( LIMA to LAD, SVG to Diagonal , OM1 , PDA on 19 , HTN, HLD, ESRD on PD for 3 years sp fractured balloon sp aortotomy   Mild metabolic acidosis     1 Renal - He is on  gtt and will need to be transitioned to PD   He does not seem to be overtly volume overloaded at present   I suspect will only need to 2-3 HD sessions and then will transition back to PD    2 Anemia- Retacrit at HD    3 [facility]S- gtt at present and this can likely be dc'd soon    [facility]   [phone]

## 2020-12-01 NOTE — PROCEDURE NOTE - NSPROCDETAILS_GEN_ALL_CORE
lumen(s) aspirated and flushed/guidewire recovered/sterile dressing applied/ultrasound guidance/sterile technique, catheter placed

## 2020-12-01 NOTE — CONSULT NOTE ADULT - SUBJECTIVE AND OBJECTIVE BOX
NEPHROLOGY - NSN    Patient seen and examined.    HPI:  Narrative: This is a 56y/o male with no known implantable devices noted and COVID 19 negative with PMHX of  CAD, s/p CABG x 4 ( LIMA to LAD, SVG to Diagonal , OM1 , PDA on 8/8/19 , HTN, HLD, ESRD on PD for 3 years, and T2DM on insulin compliant with meds uncomplicated does not recall last hgb AIC + diabetic neuropathy with PVD  . Pt presents with complaints of increasing SOB , dizziness and mild chest pain with exertion .  Pt had nuclear stress test which was abnormal. Cardiac catheterization revealed distal left main 50% stenosis, Proximal to mid % stenosis, proximal first diagonal 80% stenosis, mid OM2 70& stenosis, proximal Ramus 70% stenosis, Prox % stenosis, Coronary grafts LIMA artery graft attached to distal LAD is patent . SVG graft originating from aorta to distal RCA totally occluded SVG to Om1 totally occluded, SVG to first diagonal totally occluded.  Film reviewed with Dr. Butterfield . Pt presents  today for LHC and attempt to stent the native. Currently Cp free no sob no lightheadedness or dizziness noted. Pt uses cane to walk due to weakness and balance issues on lower extremities.  CAD S/P LHC with stent deployed on 11/30  S/P Recover of fractured left heart catheterization angioplasty balloon and wire via transverse aortotomy    Symptoms: chest pain        Angina (Class): II       Ischemic Symptoms:     Heart Failure:        Systolic/Diastolic/Combined:        NYHA Class (within 2 weeks):     Assessment of LVEF:< from: Transthoracic Echocardiogram (09.09.19 @ 12:33) >  CONCLUSIONS:  1. Mitral annular calcification, otherwise normal mitral  valve. Mild mitral regurgitation.  2. Calcified trileaflet aortic valve with normal opening.  Mild aortic regurgitation.  3. Increased relative wall thickness with normal left  ventricular mass index, consistent with concentric left  ventricular remodeling.  4. Mild segmental left ventricular systolic dysfunction.  Inferior wall hypokinesis. Septal motion is consistent with  cardiac surgery.  5. Normal right ventricular size and function.  ------------------------------------------------------------------------    < end of copied text >         EF:        Assessed by:        Date:     Prior Cardiac Interventions:       PCI's:        CABG: x4 8/8/2019     Noninvasive Testing:   Stress Test: Date:        Protocol:        Duration of Exercise:        Symptoms:        EKG Changes:        DTS:        Myocardial Imaging:        Risk Assessment:     Antianginal Therapies:        Beta Blockers:  Metoprolol ER       Calcium Channel Blockers:        Long Acting Nitrates:        Ranexa:     Associated Risk Factors:        Cerebrovascular Disease: N/A       Chronic Lung Disease: N/A       Peripheral Arterial Disease: N/A       Chronic Kidney Disease (if yes, what is GFR): YES        Uncontrolled Diabetes (if yes, what is HgbA1C or FBS): does not recall last hgb AIC        Poorly Controlled Hypertension (if yes, what is SBP): N/A       Morbid Obesity (if yes, what is BMI): N/A       History of Recent Ventricular Arrhythmia: N/A       Inability to Ambulate Safely: N/A       Need for Therapeutic Anticoagulation: N/A       Antiplatelet or Contrast Allergy: N/A (30 Nov 2020 08:28)      PAST MEDICAL & SURGICAL HISTORY:  HTN (hypertension)    ESRD (end stage renal disease) on dialysis    CAD, multiple vessel    Diabetes    S/P CABG (coronary artery bypass graft)        MEDICATIONS  (STANDING):  cefuroxime  IVPB 1500 milliGRAM(s) IV Intermittent every 24 hours  chlorhexidine 0.12% Liquid 5 milliLiter(s) Oral Mucosa two times a day  dextrose 50% Injectable 50 milliLiter(s) IV Push every 15 minutes  dextrose 50% Injectable 25 milliLiter(s) IV Push every 15 minutes  DOBUTamine Infusion 2.5 MICROgram(s)/kG/Min (5.78 mL/Hr) IV Continuous <Continuous>  insulin regular Infusion 2 Unit(s)/Hr (2 mL/Hr) IV Continuous <Continuous>  meperidine     Injectable 25 milliGRAM(s) IV Push once  norepinephrine Infusion 0.01 MICROgram(s)/kG/Min (1.45 mL/Hr) IV Continuous <Continuous>  sodium chloride 0.9%. 1000 milliLiter(s) (10 mL/Hr) IV Continuous <Continuous>      Allergies    doxazosin (Other)    Intolerances        SOCIAL HISTORY:  Denies alcohol abuse, drug abuse or tobacco usage.     FAMILY HISTORY:  FH: diabetes mellitus (Sibling)  father    FHx: lung cancer (Sibling)  bother        VITALS:  T(C): 36.1 (12-01-20 @ 07:00), Max: 36.7 (11-30-20 @ 13:30)  HR: 103 (12-01-20 @ 09:00) (88 - 112)  BP: 138/82 (11-30-20 @ 14:25) (132/88 - 138/82)  RR: 17 (12-01-20 @ 09:00) (0 - 22)  SpO2: 100% (12-01-20 @ 09:00) (95% - 100%)    REVIEW OF SYSTEMS:  Denies any nausea, vomiting, diarrhea, fever or chills. Denies chest pain, SOB, focal weakness, hematuria or dysuria. Good oral intake and denies fatigue or weakness. All other pertinent systems are reviewed and are negative.    PHYSICAL EXAM:  Constitutional: NAD  HEENT: EOMI  Neck:  No JVD, supple   Respiratory: CTA B/L  Cardiovascular: S1 and S2, RRR  Gastrointestinal: + BS, soft, NT, ND  Extremities: No peripheral edema, + peripheral pulses  Neurological: A/O x 3, CN2-12 intact  Psychiatric: Normal mood, normal affect  : No Johnson  Skin: No rashes, C/D/I  Access: Not applicable    I and O's:    11-30 @ 07:01  -  12-01 @ 07:00  --------------------------------------------------------  IN: 1982 mL / OUT: 160 mL / NET: 1822 mL    12-01 @ 07:01  -  12-01 @ 09:29  --------------------------------------------------------  IN: 30.8 mL / OUT: 20 mL / NET: 10.8 mL      Height (cm): 170.2 (11-30 @ 14:25)  Weight (kg): 77.1 (11-30 @ 14:25)  BMI (kg/m2): 26.6 (11-30 @ 14:25)  BSA (m2): 1.89 (11-30 @ 14:25)    LABS:                        9.3    9.21  )-----------( 131      ( 01 Dec 2020 01:04 )             29.3     12-01    134<L>  |  94<L>  |  55<H>  ----------------------------<  184<H>  4.8   |  17<L>  |  10.30<H>    Ca    8.8      01 Dec 2020 01:04  Phos  4.9     12-01  Mg     3.0     12-01    TPro  5.6<L>  /  Alb  3.7  /  TBili  0.6  /  DBili  x   /  AST  50<H>  /  ALT  13  /  AlkPhos  45  12-01      URINE:      RADIOLOGY & ADDITIONAL STUDIES:   < from: Xray Chest 1 View- PORTABLE-Routine (12.01.20 @ 03:34) >    EXAM:  XR CHEST PORTABLE ROUTINE 1V                            PROCEDURE DATE:  12/01/2020            INTERPRETATION:  A single chest x-ray was obtained on December 1, 2020.    Indication: Status post cardiac surgery.    Impression:    The heart isenlarged. The lungs are clear. No pleural effusion. No pneumothorax. All life supporting devices are in good position and unchanged when compared to previous study done November 30, 2020. At 6:48 PM. Status post sternotomy.                SHIRLEY LAMA MD; Attending Radiologist  This document has been electronically signed. Dec  1 2020  8:51AM    < end of copied text >   NEPHROLOGY - NSN    Patient seen and examined.    HPI:  Narrative: This is a 54y/o male with no known implantable devices noted and COVID 19 negative with PMHX of  CAD, s/p CABG x 4 ( LIMA to LAD, SVG to Diagonal , OM1 , PDA on 19 , HTN, HLD, ESRD on PD for 3 years, and T2DM on insulin compliant with meds uncomplicated does not recall last hgb AIC + diabetic neuropathy with PVD  . Pt presents with complaints of increasing SOB , dizziness and mild chest pain with exertion .  Pt had nuclear stress test which was abnormal. Cardiac catheterization revealed distal left main 50% stenosis, Proximal to mid % stenosis, proximal first diagonal 80% stenosis, mid OM2 70& stenosis, proximal Ramus 70% stenosis, Prox % stenosis, Coronary grafts LIMA artery graft attached to distal LAD is patent . SVG graft originating from aorta to distal RCA totally occluded SVG to Om1 totally occluded, SVG to first diagonal totally occluded.  Film reviewed with Dr. Butterfield . Pt presents  today for LHC and attempt to stent the native. Currently Cp free no sob no lightheadedness or dizziness noted. Pt uses cane to walk due to weakness and balance issues on lower extremities.  CAD S/P LHC with stent deployed on   S/P Recover of fractured left heart catheterization angioplasty balloon and wire via transverse aortotomy  Pt is currently on  gtt at present     Symptoms: chest pain        Angina (Class): II       Ischemic Symptoms:     Heart Failure:        Systolic/Diastolic/Combined:        NYHA Class (within 2 weeks):     Assessment of LVEF:< from: Transthoracic Echocardiogram (19 @ 12:33) >  CONCLUSIONS:  1. Mitral annular calcification, otherwise normal mitral  valve. Mild mitral regurgitation.  2. Calcified trileaflet aortic valve with normal opening.  Mild aortic regurgitation.  3. Increased relative wall thickness with normal left  ventricular mass index, consistent with concentric left  ventricular remodeling.  4. Mild segmental left ventricular systolic dysfunction.  Inferior wall hypokinesis. Septal motion is consistent with  cardiac surgery.  5. Normal right ventricular size and function.  ------------------------------------------------------------------------    < end of copied text >         EF:        Assessed by:        Date:     Prior Cardiac Interventions:       PCI's:        CABG: x4 2019     Noninvasive Testing:   Stress Test: Date:        Protocol:        Duration of Exercise:        Symptoms:        EKG Changes:        DTS:        Myocardial Imaging:        Risk Assessment:     Antianginal Therapies:        Beta Blockers:  Metoprolol ER       Calcium Channel Blockers:        Long Acting Nitrates:        Ranexa:     Associated Risk Factors:        Cerebrovascular Disease: N/A       Chronic Lung Disease: N/A       Peripheral Arterial Disease: N/A       Chronic Kidney Disease (if yes, what is GFR): YES        Uncontrolled Diabetes (if yes, what is HgbA1C or FBS): does not recall last hgb AIC        Poorly Controlled Hypertension (if yes, what is SBP): N/A       Morbid Obesity (if yes, what is BMI): N/A       History of Recent Ventricular Arrhythmia: N/A       Inability to Ambulate Safely: N/A       Need for Therapeutic Anticoagulation: N/A       Antiplatelet or Contrast Allergy: N/A (2020 08:28)      PAST MEDICAL & SURGICAL HISTORY:  HTN (hypertension)    ESRD (end stage renal disease) on dialysis    CAD, multiple vessel    Diabetes    S/P CABG (coronary artery bypass graft)        MEDICATIONS  (STANDING):  cefuroxime  IVPB 1500 milliGRAM(s) IV Intermittent every 24 hours  chlorhexidine 0.12% Liquid 5 milliLiter(s) Oral Mucosa two times a day  dextrose 50% Injectable 50 milliLiter(s) IV Push every 15 minutes  dextrose 50% Injectable 25 milliLiter(s) IV Push every 15 minutes  DOBUTamine Infusion 2.5 MICROgram(s)/kG/Min (5.78 mL/Hr) IV Continuous <Continuous>  insulin regular Infusion 2 Unit(s)/Hr (2 mL/Hr) IV Continuous <Continuous>  meperidine     Injectable 25 milliGRAM(s) IV Push once  norepinephrine Infusion 0.01 MICROgram(s)/kG/Min (1.45 mL/Hr) IV Continuous <Continuous>  sodium chloride 0.9%. 1000 milliLiter(s) (10 mL/Hr) IV Continuous <Continuous>      Allergies    doxazosin (Other)    Intolerances        SOCIAL HISTORY:  Denies alcohol abuse, drug abuse or tobacco usage.     FAMILY HISTORY:  FH: diabetes mellitus (Sibling)  father    FHx: lung cancer (Sibling)  bother        VITALS:  T(C): 36.1 (20 @ 07:00), Max: 36.7 (20 @ 13:30)  HR: 103 (20 @ 09:00) (88 - 112)  BP: 138/82 (20 @ 14:25) (132/88 - 138/82)  RR: 17 (20 @ 09:00) (0 - 22)  SpO2: 100% (20 @ 09:00) (95% - 100%)    REVIEW OF SYSTEMS:  Denies any nausea, vomiting, diarrhea, fever or chills  All other pertinent systems are reviewed and are negative.    PHYSICAL EXAM:  Constitutional: NAD  HEENT: EOMI  Neck:  No JVD, supple   Respiratory: CTA B/L  Cardiovascular: S1 and S2, RRR  Gastrointestinal: + BS, soft, NT, ND + PD catheter   Extremities: No peripheral edema, + peripheral pulses  Neurological: A/O x 3, CN2-12 intact  Psychiatric: Normal mood, normal affect  : No Johnson  Skin: No rashes, C/D/I  Access: Not applicable    I and O's:     @ :  -   @ 07:00  --------------------------------------------------------  IN: 1982 mL / OUT: 160 mL / NET: 1822 mL     @ :  -   @ 09:29  --------------------------------------------------------  IN: 30.8 mL / OUT: 20 mL / NET: 10.8 mL      Height (cm): 170.2 ( 14:25)  Weight (kg): 77.1 ( 14:25)  BMI (kg/m2): 26.6 ( 14:25)  BSA (m2): 1.89 ( 14:25)    LABS:                        9.3    9.21  )-----------( 131      ( 01 Dec 2020 01:04 )             29.3     12-    134<L>  |  94<L>  |  55<H>  ----------------------------<  184<H>  4.8   |  17<L>  |  10.30<H>    Ca    8.8      01 Dec 2020 01:04  Phos  4.9     12  Mg     3.0         TPro  5.6<L>  /  Alb  3.7  /  TBili  0.6  /  DBili  x   /  AST  50<H>  /  ALT  13  /  AlkPhos  45        URINE:      RADIOLOGY & ADDITIONAL STUDIES:   < from: Xray Chest 1 View- PORTABLE-Routine (20 @ 03:34) >    EXAM:  XR CHEST PORTABLE ROUTINE 1V                            PROCEDURE DATE:  2020            INTERPRETATION:  A single chest x-ray was obtained on 2020.    Indication: Status post cardiac surgery.    Impression:    The heart isenlarged. The lungs are clear. No pleural effusion. No pneumothorax. All life supporting devices are in good position and unchanged when compared to previous study done 2020. At 6:48 PM. Status post sternotomy.                SHIRLEY LAMA MD; Attending Radiologist  This document has been electronically signed. Dec  1 2020  8:51AM    < end of copied text >

## 2020-12-01 NOTE — PROGRESS NOTE ADULT - SUBJECTIVE AND OBJECTIVE BOX
BELLA MARTINS  MRN-[id number]  Patient is a 55y old  Male who presents with a chief complaint of CAD (30 Nov 2020 22:01)    HPI:  This is a 54y/o male with no known implantable devices noted and COVID 19 negative with PMHX of  CAD, s/p CABG x 4 ( LIMA to LAD, SVG to Diagonal , OM1 , PDA on 8/8/19 , HTN, HLD, ESRD on PD for 3 years, and T2DM on insulin compliant with meds uncomplicated does not recall last hgb AIC + diabetic neuropathy with PVD  . Pt presents with complaints of increasing SOB , dizziness and mild chest pain with exertion .  Pt had nuclear stress test which was abnormal. Cardiac catheterization revealed distal left main 50% stenosis, Proximal to mid % stenosis, proximal first diagonal 80% stenosis, mid OM2 70& stenosis, proximal Ramus 70% stenosis, Prox % stenosis, Coronary grafts LIMA artery graft attached to distal LAD is patent . SVG graft originating from aorta to distal RCA totally occluded SVG to Om1 totally occluded, SVG to first diagonal totally occluded.  Film reviewed with Dr. Butterfield . Pt presents  today for [facility] and attempt to stent the native. Currently Cp free no sob no lightheadedness or dizziness noted. Pt uses cane to walk due to weakness and balance issues on lower extremities.(30 Nov 2020 08:28)      Surgery/Hospital Course:  11/30 [facility] with stent deployed. Recover of fractured left heart catheterization angioplasty balloon and wire via transverse aortotomy.    Today:  Extubated overnight     ICU Vital Signs Last 24 Hrs  T(C): 36.1 (01 Dec 2020 07:00), Max: 36.8 (30 Nov 2020 08:10)  T(F): 97 (01 Dec 2020 07:00), Max: 98.2 (30 Nov 2020 08:10)  HR: 100 (01 Dec 2020 07:15) (88 - 112)  BP: 138/82 (30 Nov 2020 14:25) (132/88 - 162/86)  BP(mean): 111 (30 Nov 2020 08:28) (111 - 111)  ABP: 139/84 (01 Dec 2020 07:15) (112/81 - 156/74)  ABP(mean): 103 (01 Dec 2020 07:15) (77 - 122)  RR: 16 (01 Dec 2020 07:15) (0 - 22)  SpO2: 100% (01 Dec 2020 07:15) (95% - 100%)      Physical Exam:  Gen:  Awake, alert   CNS: non focal 	  Neck: no JVD  RES : clear , no wheezing              CVS: Regular  rhythm. Normal S1/S2  Chest: +chest tubes   Abd: Soft, non-distended. Bowel sounds present.  Skin: No rash.  Ext:  no edema    ============================I/O===========================   I&O's Detail    30 Nov 2020 07:01  -  01 Dec 2020 07:00  --------------------------------------------------------  IN:    Albumin 5%  - 250 mL: 1250 mL    DOBUTamine: 58 mL    Insulin: 54 mL    IV PiggyBack: 200 mL    PRBCs (Packed Red Blood Cells): 300 mL    sodium chloride 0.9%: 120 mL  Total IN: 1982 mL    OUT:    Chest Tube (mL): 60 mL    Chest Tube (mL): 100 mL    Norepinephrine: 0 mL  Total OUT: 160 mL    Total NET: 1822 mL        ============================ LABS =========================                        9.3    9.21  )-----------( 131      ( 01 Dec 2020 01:04 )             29.3     12-01    134<L>  |  94<L>  |  55<H>  ----------------------------<  184<H>  4.8   |  17<L>  |  10.30<H>    Ca    8.8      01 Dec 2020 01:04  Phos  4.9     12-01  Mg     3.0     12-01    TPro  5.6<L>  /  Alb  3.7  /  TBili  0.6  /  DBili  x   /  AST  50<H>  /  ALT  13  /  AlkPhos  45  12-01    LIVER FUNCTIONS - ( 01 Dec 2020 01:04 )  Alb: 3.7 g/dL / Pro: 5.6 g/dL / ALK PHOS: 45 U/L / ALT: 13 U/L / AST: 50 U/L / GGT: x           PT/INR - ( 30 Nov 2020 19:49 )   PT: 14.3 sec;   INR: 1.20 ratio         PTT - ( 30 Nov 2020 19:49 )  PTT:33.5 sec  ABG - ( 01 Dec 2020 06:03 )  pH, Arterial: 7.31  pH, Blood: x     /  pCO2: 43    /  pO2: 111   / HCO3: 21    / Base Excess: -4.4  /  SaO2: 98                  ======================Micro/Rad/Cardio=================  CXR: Reviewed  Echo:Reviewed  ======================================================  PAST MEDICAL & SURGICAL HISTORY:  HTN (hypertension)    ESRD (end stage renal disease) on dialysis    CAD, multiple vessel    Diabetes    S/P CABG (coronary artery bypass graft)      ====================ASSESSMENT ==============  CAD S/P LHC with stent deployed on 11/30  S/P Recover of fractured left heart catheterization angioplasty balloon and wire via transverse aortotomy  Acute blood loss anemia   ESRD   Diabetes mellitus type 2     Plan:  ====================== NEUROLOGY=====================  Continue monitoring neuro status   Analgesia with Meperidine     meperidine     Injectable 25 milliGRAM(s) IV Push once    ==================== RESPIRATORY======================  Extubated overnight, comfortable on RA   Encourage incentive spirometry, continue pulse ox monitoring, follow ABGs     ====================CARDIOVASCULAR==================  [facility] 11/30: LM:   --  Distal left main: There was a 70 % stenosis. LAD:   --  Mid LAD: There was a 100 % stenosis. CX:   --  OM1: There was a 90 % stenosis. RCA:   --  Ostial RCA: There was a 100 % stenosis.   The stent was deployed without difficulty. On removal of the balloon, the shaft broke and the tip of the balloon remained in the vessel. Several attempts were made to snare the balloon unsuccessfully.  S/P Recover of fractured left heart catheterization angioplasty balloon and wire via transverse aortotomy  IV Dobutamine for inotropic support   On pressor support with IV Levophed   Continue invasive hemodynamic monitoring     DOBUTamine Infusion 2.5 MICROgram(s)/kG/Min (5.78 mL/Hr) IV Continuous <Continuous>  norepinephrine Infusion 0.01 MICROgram(s)/kG/Min (1.45 mL/Hr) IV Continuous <Continuous>    ===================HEMATOLOGIC/ONC ===================  Acute blood loss anemia, monitor H&H/Plts and transfuse prn   Maintain and monitor chest tube output      ===================== RENAL =========================  Hx of ESRD, continue monitoring urine output, I&OS, BUN/Cr   Monitor and replete lytes prn     ==================== GASTROINTESTINAL===================  Tolerating regular PO diet     sodium chloride 0.9%. 1000 milliLiter(s) (10 mL/Hr) IV Continuous <Continuous>    =======================    ENDOCRINE  =====================  Hx of DM2, continue glycemic control with insulin drip     dextrose 50% Injectable 50 milliLiter(s) IV Push every 15 minutes  dextrose 50% Injectable 25 milliLiter(s) IV Push every 15 minutes  insulin regular Infusion 2 Unit(s)/Hr (2 mL/Hr) IV Continuous <Continuous>    ========================INFECTIOUS DISEASE================  Perioperative coverage with Cefuroxime     cefuroxime  IVPB 1500 milliGRAM(s) IV Intermittent every 24 hours      Patient requires continuous monitoring with bedside rhythm monitoring, pulse ox monitoring, and intermittent blood gas analysis. Care plan discussed with ICU care team. Patient remained critical and at risk for life threatening decompensation.     By signing my name below, I, Valery Tavares, attest that this documentation has been prepared under the direction and in the presence of Sebastian Samson MD   Electronically signed: Chauncey Vee, 12-01-20 @ 07:24    I, Sebastian Samson, personally performed the services described in this documentation. all medical record entries made by the scribe were at my direction and in my presence. I have reviewed the chart and agree that the record reflects my personal performance and is accurate and complete  Electronically signed: Sebastian Samson MD

## 2020-12-01 NOTE — PROGRESS NOTE ADULT - SUBJECTIVE AND OBJECTIVE BOX
BELLA MARTINS  MRN-[id number]  Patient is a 55y old  Male who presents with a chief complaint of HD (01 Dec 2020 09:28)    HPI:  Narrative: This is a 56y/o male with no known implantable devices noted and COVID 19 negative with PMHX of  CAD, s/p CABG x 4 ( LIMA to LAD, SVG to Diagonal , OM1 , PDA on 8/8/19 , HTN, HLD, ESRD on PD for 3 years, and T2DM on insulin compliant with meds uncomplicated does not recall last hgb AIC + diabetic neuropathy with PVD  . Pt presents with complaints of increasing SOB , dizziness and mild chest pain with exertion .  Pt had nuclear stress test which was abnormal. Cardiac catheterization revealed distal left main 50% stenosis, Proximal to mid % stenosis, proximal first diagonal 80% stenosis, mid OM2 70& stenosis, proximal Ramus 70% stenosis, Prox % stenosis, Coronary grafts LIMA artery graft attached to distal LAD is patent . SVG graft originating from aorta to distal RCA totally occluded SVG to Om1 totally occluded, SVG to first diagonal totally occluded.  Film reviewed with Dr. Butterfield . Pt presents  today for [facility] and attempt to stent the native. Currently Cp free no sob no lightheadedness or dizziness noted. Pt uses cane to walk due to weakness and balance issues on lower extremities. (30 Nov 2020 08:28)      Surgery/Hospital course:  11/30 [facility] with stent deployed. Recover of fractured left heart catheterization angioplasty balloon and wire via transverse aortotomy.   12/1 Extubated    Today/Overnight:  Extubated    Vital Signs Last 24 Hrs  T(C): 36.5 (01 Dec 2020 18:20), Max: 36.5 (01 Dec 2020 18:20)  T(F): 97.7 (01 Dec 2020 18:20), Max: 97.7 (01 Dec 2020 18:20)  HR: 84 (01 Dec 2020 19:00) (77 - 105)  BP: --  BP(mean): --  RR: 16 (01 Dec 2020 19:00) (0 - 124)  SpO2: 100% (01 Dec 2020 19:00) (96% - 100%)  ============================I/O===========================  I&O's Detail    30 Nov 2020 07:01  -  01 Dec 2020 07:00  --------------------------------------------------------  IN:    Albumin 5%  - 250 mL: 1250 mL    DOBUTamine: 58 mL    Insulin: 54 mL    IV PiggyBack: 200 mL    PRBCs (Packed Red Blood Cells): 300 mL    sodium chloride 0.9%: 120 mL  Total IN: 1982 mL    OUT:    Chest Tube (mL): 60 mL    Chest Tube (mL): 100 mL    Norepinephrine: 0 mL  Total OUT: 160 mL    Total NET: 1822 mL      01 Dec 2020 07:01  -  01 Dec 2020 19:50  --------------------------------------------------------  IN:    DOBUTamine: 8.7 mL    Insulin: 18 mL    IV PiggyBack: 100 mL    Oral Fluid: 270 mL    sodium chloride 0.9%: 130 mL  Total IN: 526.7 mL    OUT:    Chest Tube (mL): 90 mL    Chest Tube (mL): 65 mL    Norepinephrine: 0 mL  Total OUT: 155 mL    Total NET: 371.7 mL        ============================ LABS =========================                        9.3    9.21  )-----------( 131      ( 01 Dec 2020 01:04 )             29.3     12-01    134<L>  |  94<L>  |  55<H>  ----------------------------<  184<H>  4.8   |  17<L>  |  10.30<H>    Ca    8.8      01 Dec 2020 01:04  Phos  4.9     12-01  Mg     3.0     12-01    TPro  5.6<L>  /  Alb  3.7  /  TBili  0.6  /  DBili  x   /  AST  50<H>  /  ALT  13  /  AlkPhos  45  12-01    LIVER FUNCTIONS - ( 01 Dec 2020 01:04 )  Alb: 3.7 g/dL / Pro: 5.6 g/dL / ALK PHOS: 45 U/L / ALT: 13 U/L / AST: 50 U/L / GGT: x           PT/INR - ( 30 Nov 2020 19:49 )   PT: 14.3 sec;   INR: 1.20 ratio         PTT - ( 30 Nov 2020 19:49 )  PTT:33.5 sec  ABG - ( 01 Dec 2020 06:03 )  pH, Arterial: 7.31  pH, Blood: x     /  pCO2: 43    /  pO2: 111   / HCO3: 21    / Base Excess: -4.4  /  SaO2: 98          ======================Micro/Rad/Cardio=================  CXR: Reviewed  Echo: Reviewed  ======================================================  PAST MEDICAL & SURGICAL HISTORY:  HTN (hypertension)    ESRD (end stage renal disease) on dialysis    CAD, multiple vessel    Diabetes    S/P CABG (coronary artery bypass graft)      ========================ASSESSMENT ================  CAD S/P LHC with stent deployed on 11/30  S/P Recover of fractured left heart catheterization angioplasty balloon and wire via transverse aortotomy  Acute blood loss anemia   ESRD   Diabetes mellitus type 2     Plan:  ====================== NEUROLOGY=====================  Assess and monitor neuro status as per protocol  Continue with PO Nortriptyline     nortriptyline 25 milliGRAM(s) Oral at bedtime    ==================== RESPIRATORY======================  Extubated today, on supplemental O2 via 3L NC, SpO2 100%  Continue to monitor SpO2 via pulse oximetry, follow ABGs    ====================CARDIOVASCULAR==================  S/P recovery of fractured left heart catheterization angioplasty balloon and wire via transverse aortotomy  Inotropic support with IV Dobutamine gtt  ASA/Lipitor for history CAD  Blood pressure support with PO Metoprolol  Continue monitoring hemodynamics    aspirin  chewable 81 milliGRAM(s) Oral daily  atorvastatin 80 milliGRAM(s) Oral at bedtime  DOBUTamine Infusion 2.5 MICROgram(s)/kG/Min (5.78 mL/Hr) IV Continuous <Continuous>  metoprolol tartrate 25 milliGRAM(s) Oral two times a day    ===================HEMATOLOGIC/ONC ===================  Anemia, H/H 9.3/29.3.   Thrombocytopenia with platelet count at 131k.   Continue monitoring H&H, PLTs.     VTE prophylaxis, heparin   Injectable 5000 Unit(s) SubCutaneous every 8 hours    ===================== RENAL =========================  History of ESRD  Continue to monitor electrolytes, I/Os, BUN/Cr, and urine output.     ==================== GASTROINTESTINAL===================  Tolerating regular diet.     ferrous    sulfate 325 milliGRAM(s) Oral three times a day  folic acid 1 milliGRAM(s) Oral daily  GI prophylaxis, pantoprazole    Tablet 40 milliGRAM(s) Oral before breakfast  sodium chloride 0.9%. 1000 milliLiter(s) (10 mL/Hr) IV Continuous <Continuous>    =======================    ENDOCRINE  =====================  History of type 2 diabetes mellitus, requiring glucose control with IV Insulin infusion, Admelog sliding scale and Glucagon  Continue to monitor blood glucose levels    dextrose 40% Gel 15 Gram(s) Oral once  dextrose 50% Injectable 50 milliLiter(s) IV Push every 15 minutes  dextrose 50% Injectable 25 milliLiter(s) IV Push every 15 minutes  dextrose 50% Injectable 25 Gram(s) IV Push once  dextrose 50% Injectable 12.5 Gram(s) IV Push once  dextrose 50% Injectable 25 Gram(s) IV Push once  glucagon  Injectable 1 milliGRAM(s) IntraMuscular once  insulin lispro (ADMELOG) corrective regimen sliding scale   SubCutaneous Before meals and at bedtime  insulin regular Infusion 2 Unit(s)/Hr (2 mL/Hr) IV Continuous <Continuous>    ========================INFECTIOUS DISEASE================  Continue Cefuroxime for perioperative antibiotic coverage.    cefuroxime  IVPB 1500 milliGRAM(s) IV Intermittent every 24 hours      Patient requires continuous monitoring with bedside rhythm monitoring, pulse oximetry monitoring, and continuous central venous and arterial pressure monitoring; and intermittent blood gas analysis.  Care plan discussed with ICU care team.    Patient remained critical, at risk for life threatening decompensation.   I have spent 35 minutes providing acute care with multiple re-evaluations throughout the evening.     By signing my name below, I, Ciro Gautam, attest that this documentation has been prepared under the direction and in the presence of Fred Lemon NP.  Electronically signed: Baron Alcala, 12-01-20 @ 19:50    I, Fred Lemon NP, personally performed the services described in this documentation. All medical record entries made by the baron were at my direction and in my presence. I have reviewed the chart and agree that the record reflects my personal performance and is accurate and complete  Electronically signed: Fred Lemon NP, 12-01-20 @ 19:50       BELLA MARTINS  MRN-[id number]  Patient is a 55y old  Male who presents with a chief complaint of HD (01 Dec 2020 09:28)    HPI:  Narrative: This is a 54y/o male with no known implantable devices noted and COVID 19 negative with PMHX of  CAD, s/p CABG x 4 ( LIMA to LAD, SVG to Diagonal , OM1 , PDA on 8/8/19 , HTN, HLD, ESRD on PD for 3 years, and T2DM on insulin compliant with meds uncomplicated does not recall last hgb AIC + diabetic neuropathy with PVD  . Pt presents with complaints of increasing SOB , dizziness and mild chest pain with exertion .  Pt had nuclear stress test which was abnormal. Cardiac catheterization revealed distal left main 50% stenosis, Proximal to mid % stenosis, proximal first diagonal 80% stenosis, mid OM2 70& stenosis, proximal Ramus 70% stenosis, Prox % stenosis, Coronary grafts LIMA artery graft attached to distal LAD is patent . SVG graft originating from aorta to distal RCA totally occluded SVG to Om1 totally occluded, SVG to first diagonal totally occluded.  Film reviewed with Dr. Butterfield . Pt presents  today for [facility] and attempt to stent the native. Currently Cp free no sob no lightheadedness or dizziness noted. Pt uses cane to walk due to weakness and balance issues on lower extremities. (30 Nov 2020 08:28)      Surgery/Hospital course:  11/30 [facility] with stent deployed. Recover of fractured left heart catheterization angioplasty balloon and wire via transverse aortotomy.   12/1 Extubated    Today/Overnight:  pt dialysised for 1000cc, he is weaned off pressors ,   chest tube with min output,   he is started on Lopressor and Hemodynamics stable.   tele stable , no arrythmia     Vital Signs Last 24 Hrs  T(C): 36.5 (01 Dec 2020 18:20), Max: 36.5 (01 Dec 2020 18:20)  T(F): 97.7 (01 Dec 2020 18:20), Max: 97.7 (01 Dec 2020 18:20)  HR: 84 (01 Dec 2020 19:00) (77 - 105)  BP: --  BP(mean): --  RR: 16 (01 Dec 2020 19:00) (0 - 124)  SpO2: 100% (01 Dec 2020 19:00) (96% - 100%)  ============================I/O===========================  I&O's Detail    30 Nov 2020 07:01  -  01 Dec 2020 07:00  --------------------------------------------------------  IN:    Albumin 5%  - 250 mL: 1250 mL    DOBUTamine: 58 mL    Insulin: 54 mL    IV PiggyBack: 200 mL    PRBCs (Packed Red Blood Cells): 300 mL    sodium chloride 0.9%: 120 mL  Total IN: 1982 mL    OUT:    Chest Tube (mL): 60 mL    Chest Tube (mL): 100 mL    Norepinephrine: 0 mL  Total OUT: 160 mL    Total NET: 1822 mL      01 Dec 2020 07:01  -  01 Dec 2020 19:50  --------------------------------------------------------  IN:    DOBUTamine: 8.7 mL    Insulin: 18 mL    IV PiggyBack: 100 mL    Oral Fluid: 270 mL    sodium chloride 0.9%: 130 mL  Total IN: 526.7 mL    OUT:    Chest Tube (mL): 90 mL    Chest Tube (mL): 65 mL    Norepinephrine: 0 mL  Total OUT: 155 mL    Total NET: 371.7 mL        ============================ LABS =========================                        9.3    9.21  )-----------( 131      ( 01 Dec 2020 01:04 )             29.3     12-01    134<L>  |  94<L>  |  55<H>  ----------------------------<  184<H>  4.8   |  17<L>  |  10.30<H>    Ca    8.8      01 Dec 2020 01:04  Phos  4.9     12-01  Mg     3.0     12-01    TPro  5.6<L>  /  Alb  3.7  /  TBili  0.6  /  DBili  x   /  AST  50<H>  /  ALT  13  /  AlkPhos  45  12-01    LIVER FUNCTIONS - ( 01 Dec 2020 01:04 )  Alb: 3.7 g/dL / Pro: 5.6 g/dL / ALK PHOS: 45 U/L / ALT: 13 U/L / AST: 50 U/L / GGT: x           PT/INR - ( 30 Nov 2020 19:49 )   PT: 14.3 sec;   INR: 1.20 ratio         PTT - ( 30 Nov 2020 19:49 )  PTT:33.5 sec  ABG - ( 01 Dec 2020 06:03 )  pH, Arterial: 7.31  pH, Blood: x     /  pCO2: 43    /  pO2: 111   / HCO3: 21    / Base Excess: -4.4  /  SaO2: 98          ======================Micro/Rad/Cardio=================  CXR: Reviewed  Echo: Reviewed  ======================================================  PAST MEDICAL & SURGICAL HISTORY:  HTN (hypertension)    ESRD (end stage renal disease) on dialysis    CAD, multiple vessel    Diabetes    S/P CABG (coronary artery bypass graft)      ========================ASSESSMENT ================  CAD S/P LHC with stent deployed on 11/30  S/P Recover of fractured left heart catheterization angioplasty balloon and wire via transverse aortotomy  Acute blood loss anemia   ESRD   Diabetes mellitus type 2     Plan:  ====================== NEUROLOGY=====================  Assess and monitor neuro status as per protocol  Continue with PO Nortriptyline     nortriptyline 25 milliGRAM(s) Oral at bedtime    ==================== RESPIRATORY======================  Extubated today, on supplemental O2 via 3L NC, SpO2 100%  Continue to monitor SpO2 via pulse oximetry, follow ABGs    ====================CARDIOVASCULAR==================  S/P recovery of fractured left heart catheterization angioplasty balloon and wire via transverse aortotomy  Inotropic support with IV Dobutamine gtt  ASA/Lipitor for history CAD  Blood pressure support with PO Metoprolol  Continue monitoring hemodynamics    aspirin  chewable 81 milliGRAM(s) Oral daily  atorvastatin 80 milliGRAM(s) Oral at bedtime  DOBUTamine Infusion 2.5 MICROgram(s)/kG/Min (5.78 mL/Hr) IV Continuous <Continuous>  metoprolol tartrate 25 milliGRAM(s) Oral two times a day    ===================HEMATOLOGIC/ONC ===================  Anemia, H/H 9.3/29.3.   Thrombocytopenia with platelet count at 131k.   Continue monitoring H&H, PLTs.     VTE prophylaxis, heparin   Injectable 5000 Unit(s) SubCutaneous every 8 hours    ===================== RENAL =========================  History of ESRD  Continue to monitor electrolytes, I/Os, BUN/Cr, and urine output.     ==================== GASTROINTESTINAL===================  Tolerating regular diet.     ferrous    sulfate 325 milliGRAM(s) Oral three times a day  folic acid 1 milliGRAM(s) Oral daily  GI prophylaxis, pantoprazole    Tablet 40 milliGRAM(s) Oral before breakfast  sodium chloride 0.9%. 1000 milliLiter(s) (10 mL/Hr) IV Continuous <Continuous>    =======================    ENDOCRINE  =====================  History of type 2 diabetes mellitus, requiring glucose control with IV Insulin infusion, Admelog sliding scale and Glucagon  Continue to monitor blood glucose levels    dextrose 40% Gel 15 Gram(s) Oral once  dextrose 50% Injectable 50 milliLiter(s) IV Push every 15 minutes  dextrose 50% Injectable 25 milliLiter(s) IV Push every 15 minutes  dextrose 50% Injectable 25 Gram(s) IV Push once  dextrose 50% Injectable 12.5 Gram(s) IV Push once  dextrose 50% Injectable 25 Gram(s) IV Push once  glucagon  Injectable 1 milliGRAM(s) IntraMuscular once  insulin lispro (ADMELOG) corrective regimen sliding scale   SubCutaneous Before meals and at bedtime  insulin regular Infusion 2 Unit(s)/Hr (2 mL/Hr) IV Continuous <Continuous>    ========================INFECTIOUS DISEASE================  Continue Cefuroxime for perioperative antibiotic coverage.    cefuroxime  IVPB 1500 milliGRAM(s) IV Intermittent every 24 hours      Patient requires continuous monitoring with bedside rhythm monitoring, pulse oximetry monitoring, and continuous central venous and arterial pressure monitoring; and intermittent blood gas analysis.  Care plan discussed with ICU care team.    Patient remained critical, at risk for life threatening decompensation.   I have spent 35 minutes providing acute care with multiple re-evaluations throughout the evening.     By signing my name below, I, Ciro Gautam, attest that this documentation has been prepared under the direction and in the presence of Fred Lemon NP.  Electronically signed: Chauncey Alcala, 12-01-20 @ 19:50    I, Fred Lemon NP, personally performed the services described in this documentation. All medical record entries made by the kingaibtayo were at my direction and in my presence. I have reviewed the chart and agree that the record reflects my personal performance and is accurate and complete  Electronically signed: Fred Lemon NP, 12-01-20 @ 19:50

## 2020-12-02 NOTE — PROGRESS NOTE ADULT - SUBJECTIVE AND OBJECTIVE BOX
BELLA MARTINS  MRN-[id number]  Patient is a 55y old  Male who presents with a chief complaint of CAD (01 Dec 2020 19:49)    HPI:  Narrative: This is a 54y/o male with no known implantable devices noted and COVID 19 negative with PMHX of  CAD, s/p CABG x 4 ( LIMA to LAD, SVG to Diagonal , OM1 , PDA on 8/8/19 , HTN, HLD, ESRD on PD for 3 years, and T2DM on insulin compliant with meds uncomplicated does not recall last hgb AIC + diabetic neuropathy with PVD  . Pt presents with complaints of increasing SOB , dizziness and mild chest pain with exertion .  Pt had nuclear stress test which was abnormal. Cardiac catheterization revealed distal left main 50% stenosis, Proximal to mid % stenosis, proximal first diagonal 80% stenosis, mid OM2 70& stenosis, proximal Ramus 70% stenosis, Prox % stenosis, Coronary grafts LIMA artery graft attached to distal LAD is patent . SVG graft originating from aorta to distal RCA totally occluded SVG to Om1 totally occluded, SVG to first diagonal totally occluded.  Film reviewed with Dr. Butterfield . Pt presents  today for [facility] and attempt to stent the native. Currently Cp free no sob no lightheadedness or dizziness noted. Pt uses cane to walk due to weakness and balance issues on lower extremities.(30 Nov 2020 08:28)      Surgery/Hospital Course:  11/30 [facility] with stent deployed. Recover of fractured left heart catheterization angioplasty balloon and wire via transverse aortotomy.  12/1 extubated     Today:  No acute events     ICU Vital Signs Last 24 Hrs  T(C): 37.1 (02 Dec 2020 04:00), Max: 37.1 (01 Dec 2020 20:20)  T(F): 98.8 (02 Dec 2020 04:00), Max: 98.8 (02 Dec 2020 00:00)  HR: 74 (02 Dec 2020 06:00) (73 - 105)  BP: 96/69 (02 Dec 2020 07:00) (96/69 - 103/58)  BP(mean): 79 (02 Dec 2020 07:00) (70 - 79)  ABP: 95/52 (02 Dec 2020 03:00) (95/52 - 168/88)  ABP(mean): 66 (02 Dec 2020 03:00) (66 - 117)  RR: 24 (02 Dec 2020 06:00) (11 - 124)  SpO2: 99% (02 Dec 2020 06:00) (96% - 100%)      Physical Exam:  Gen:  Awake, alert   CNS: non focal, lightly sedated  	  Neck: no JVD  RES : clear , no wheezing              CVS: Regular  rhythm. Normal S1/S2  Chest: +chest tubes   Abd: Soft, non-distended. Bowel sounds present.  Skin: No rash.  Ext:  no edema      ============================I/O===========================   I&O's Detail    01 Dec 2020 07:01  -  02 Dec 2020 07:00  --------------------------------------------------------  IN:    Dexmedetomidine: 202.3 mL    DOBUTamine: 8.7 mL    Insulin: 18 mL    IV PiggyBack: 100 mL    Oral Fluid: 330 mL    sodium chloride 0.9%: 230 mL  Total IN: 889 mL    OUT:    Chest Tube (mL): 100 mL    Chest Tube (mL): 75 mL    Norepinephrine: 0 mL    Other (mL): 0 mL  Total OUT: 175 mL    Total NET: 714 mL        ============================ LABS =========================                        9.1    18.17 )-----------( 128      ( 02 Dec 2020 00:21 )             28.4     12-02    134<L>  |  97  |  57<H>  ----------------------------<  176<H>  5.7<H>   |  17<L>  |  9.79<H>    Ca    8.6      02 Dec 2020 00:21  Phos  6.1     12-02  Mg     2.8     12-02    TPro  5.5<L>  /  Alb  3.4  /  TBili  0.5  /  DBili  x   /  AST  50<H>  /  ALT  17  /  AlkPhos  45  12-02    LIVER FUNCTIONS - ( 02 Dec 2020 00:21 )  Alb: 3.4 g/dL / Pro: 5.5 g/dL / ALK PHOS: 45 U/L / ALT: 17 U/L / AST: 50 U/L / GGT: x           PT/INR - ( 02 Dec 2020 00:21 )   PT: 13.9 sec;   INR: 1.17 ratio         PTT - ( 02 Dec 2020 00:21 )  PTT:29.9 sec  ABG - ( 02 Dec 2020 00:14 )  pH, Arterial: 7.43  pH, Blood: x     /  pCO2: 29    /  pO2: 104   / HCO3: 19    / Base Excess: -3.9  /  SaO2: 98                  ======================Micro/Rad/Cardio=================  CXR: Reviewed  Echo:Reviewed  ======================================================  PAST MEDICAL & SURGICAL HISTORY:  HTN (hypertension)    ESRD (end stage renal disease) on dialysis    CAD, multiple vessel    Diabetes    S/P CABG (coronary artery bypass graft)      ====================ASSESSMENT ==============  CAD S/P LHC with stent deployed on 11/30  S/P Recover of fractured left heart catheterization angioplasty balloon and wire via transverse aortotomy  Acute blood loss anemia   ESRD   Diabetes mellitus type 2     Plan:  ====================== NEUROLOGY=====================  Lightly sedated with IV Precedex   C/w Pamelor     dexMEDEtomidine Infusion 0.25 MICROgram(s)/kG/Hr (4.82 mL/Hr) IV Continuous <Continuous>  nortriptyline 25 milliGRAM(s) Oral at bedtime    ==================== RESPIRATORY======================  Stable on RA   Encourage incentive spirometry, continue pulse ox monitoring, follow ABGs     ====================CARDIOVASCULAR==================  S/P recovery of fractured left heart catheterization angioplasty balloon and wire via transverse aortotomy  Continue invasive hemodynamic monitoring   Inotropic support with IV Dobutamine   Lopressor for blood pressure support   ASA/Lipitor for CAD     aspirin  chewable 81 milliGRAM(s) Oral daily  atorvastatin 80 milliGRAM(s) Oral at bedtime  DOBUTamine Infusion 2.5 MICROgram(s)/kG/Min (5.78 mL/Hr) IV Continuous <Continuous>  metoprolol tartrate 25 milliGRAM(s) Oral two times a day    ===================HEMATOLOGIC/ONC ===================  Anemia, monitor H&H/Plts     VTE prophylaxis, heparin   Injectable 5000 Unit(s) SubCutaneous every 8 hours    ===================== RENAL =========================  Hx of ESRD, on PD, s/p dialysis yesterday   Continue monitoring urine output, I&OS, BUN/Cr     ==================== GASTROINTESTINAL===================  Tolerating regular PO diet     ferrous    sulfate 325 milliGRAM(s) Oral three times a day  folic acid 1 milliGRAM(s) Oral daily  GI prophylaxis, pantoprazole    Tablet 40 milliGRAM(s) Oral before breakfast  sodium chloride 0.9%. 1000 milliLiter(s) (10 mL/Hr) IV Continuous <Continuous>    =======================    ENDOCRINE  =====================  Hx of DM2, continue glucose control with insulin drip, admelog sliding scale, and glucagon     glucagon  Injectable 1 milliGRAM(s) IntraMuscular once  insulin lispro (ADMELOG) corrective regimen sliding scale   SubCutaneous Before meals and at bedtime  insulin regular Infusion 2 Unit(s)/Hr (2 mL/Hr) IV Continuous <Continuous>    ========================INFECTIOUS DISEASE================  Perioperative coverage with cefuroxime     cefuroxime  IVPB 1500 milliGRAM(s) IV Intermittent every 24 hours      Patient requires continuous monitoring with bedside rhythm monitoring, pulse ox monitoring, and intermittent blood gas analysis. Care plan discussed with ICU care team. Patient remained critical and at risk for life threatening decompensation.     By signing my name below, I, Valery Tavares, attest that this documentation has been prepared under the direction and in the presence of Sebastian Samson MD   Electronically signed: Chauncey Vee, 12-02-20 @ 07:27    I, Sebastian Samson, personally performed the services described in this documentation. all medical record entries made by the kingaibtayo were at my direction and in my presence. I have reviewed the chart and agree that the record reflects my personal performance and is accurate and complete  Electronically signed: Sebastian Sasmon MD

## 2020-12-02 NOTE — DIETITIAN INITIAL EVALUATION ADULT. - ETIOLOGY
inability to meet estimated needs 2/2 altered mental status increased physiological demand to promote wound healing

## 2020-12-02 NOTE — PROGRESS NOTE ADULT - ASSESSMENT
56y/o male with no known implantable devices noted and COVID 19 negative with PMHX of  CAD, s/p CABG x 4 ( LIMA to LAD, SVG to Diagonal , OM1 , PDA on 19 , HTN, HLD, ESRD on PD for 3 years sp fractured balloon sp aortotomy   Mild metabolic acidosis   Hyperkalemia     1 Renal -HD today which will correct potassium and acidosis   He does not seem to be overtly volume overloaded at present   I suspect will only need to 2-3 HD sessions and then will transition back to PD    2 Anemia- Retacrit at HD    3 CVS- gtt at present and this can likely be dc'd soon    Sayed [facility]   [phone] 54y/o male with no known implantable devices noted and COVID 19 negative with PMHX of  CAD, s/p CABG x 4 ( LIMA to LAD, SVG to Diagonal , OM1 , PDA on 19 , HTN, HLD, ESRD on PD for 3 years sp fractured balloon sp aortotomy   Mild metabolic acidosis   Hyperkalemia     1 Renal -HD today which will correct potassium and acidosis   He does not seem to be overtly volume overloaded at present   I suspect will only need to 2-3 HD sessions and then will transition back to PD    2 Anemia- Retacrit at HD    3 CVS-Off  gtt    4 Psych- On Precedex at present and likely sundowning     Sayed [facility]   [phone]

## 2020-12-02 NOTE — PROGRESS NOTE ADULT - SUBJECTIVE AND OBJECTIVE BOX
NEPHROLOGY-[facility] [phone]        Patient seen and examined in bed.  He was in good spirits   On  gtt         MEDICATIONS  (STANDING):  aspirin  chewable 81 milliGRAM(s) Oral daily  atorvastatin 80 milliGRAM(s) Oral at bedtime  cefuroxime  IVPB 1500 milliGRAM(s) IV Intermittent every 24 hours  chlorhexidine 0.12% Liquid 5 milliLiter(s) Oral Mucosa two times a day  dexMEDEtomidine Infusion 0.25 MICROgram(s)/kG/Hr (4.82 mL/Hr) IV Continuous <Continuous>  DOBUTamine Infusion 2.5 MICROgram(s)/kG/Min (5.78 mL/Hr) IV Continuous <Continuous>  ferrous    sulfate 325 milliGRAM(s) Oral three times a day  folic acid 1 milliGRAM(s) Oral daily  glucagon  Injectable 1 milliGRAM(s) IntraMuscular once  heparin   Injectable 5000 Unit(s) SubCutaneous every 8 hours  insulin lispro (ADMELOG) corrective regimen sliding scale   SubCutaneous Before meals and at bedtime  insulin regular Infusion 2 Unit(s)/Hr (2 mL/Hr) IV Continuous <Continuous>  metoprolol tartrate 25 milliGRAM(s) Oral two times a day  nortriptyline 25 milliGRAM(s) Oral at bedtime  pantoprazole    Tablet 40 milliGRAM(s) Oral before breakfast  sodium chloride 0.9%. 1000 milliLiter(s) (10 mL/Hr) IV Continuous <Continuous>      VITAL:  T(C): , Max: 37.1 (20 @ 20:20)  T(F): , Max: 98.8 (20 @ 00:00)  HR: 69 (20 @ 09:00)  BP: 95/68 (20 @ 08:00)  BP(mean): 77 (20 @ 08:00)  RR: 15 (20 @ 09:00)  SpO2: 100% (20 @ 09:00)  Wt(kg): --    I and O's:     @ 07:01  -   @ 07:00  --------------------------------------------------------  IN: 889 mL / OUT: 175 mL / NET: 714 mL     @ 07:01  -   @ 09:55  --------------------------------------------------------  IN: 48.2 mL / OUT: 30 mL / NET: 18.2 mL          PHYSICAL EXAM:    Constitutional: NAD  Neck:  No JVD  Respiratory: CTAB/L  Cardiovascular: S1 and S2  Gastrointestinal: BS+, soft, NT/ND + PD catheter   Extremities: No peripheral edema  Neurological: A/O x 3, no focal deficits  Psychiatric: Normal mood, normal affect  : No Johnson  Skin: No rashes  Access: Not applicable    LABS:                        9.1    18.17 )-----------( 128      ( 02 Dec 2020 00:21 )             28.4         134<L>  |  97  |  57<H>  ----------------------------<  176<H>  5.7<H>   |  17<L>  |  9.79<H>    Ca    8.6      02 Dec 2020 00:21  Phos  6.1       Mg     2.8         TPro  5.5<L>  /  Alb  3.4  /  TBili  0.5  /  DBili  x   /  AST  50<H>  /  ALT  17  /  AlkPhos  45            Urine Studies:          RADIOLOGY & ADDITIONAL STUDIES:             [facility]ROLOGY[facility] [phone]        Patient seen and examined in bed.  He was in good spirits   Off   gtt but he is confused and on a 1:1   on precedex       MEDICATIONS  (STANDING):  aspirin  chewable 81 milliGRAM(s) Oral daily  atorvastatin 80 milliGRAM(s) Oral at bedtime  cefuroxime  IVPB 1500 milliGRAM(s) IV Intermittent every 24 hours  chlorhexidine 0.12% Liquid 5 milliLiter(s) Oral Mucosa two times a day  dexMEDEtomidine Infusion 0.25 MICROgram(s)/kG/Hr (4.82 mL/Hr) IV Continuous <Continuous>  DOBUTamine Infusion 2.5 MICROgram(s)/kG/Min (5.78 mL/Hr) IV Continuous <Continuous>  ferrous    sulfate 325 milliGRAM(s) Oral three times a day  folic acid 1 milliGRAM(s) Oral daily  glucagon  Injectable 1 milliGRAM(s) IntraMuscular once  heparin   Injectable 5000 Unit(s) SubCutaneous every 8 hours  insulin lispro (ADMELOG) corrective regimen sliding scale   SubCutaneous Before meals and at bedtime  insulin regular Infusion 2 Unit(s)/Hr (2 mL/Hr) IV Continuous <Continuous>  metoprolol tartrate 25 milliGRAM(s) Oral two times a day  nortriptyline 25 milliGRAM(s) Oral at bedtime  pantoprazole    Tablet 40 milliGRAM(s) Oral before breakfast  sodium chloride 0.9%. 1000 milliLiter(s) (10 mL/Hr) IV Continuous <Continuous>      VITAL:  T(C): , Max: 37.1 (20 @ 20:20)  T(F): , Max: 98.8 (20 @ 00:00)  HR: 69 (20 @ 09:00)  BP: 95/68 (20 @ 08:00)  BP(mean): 77 (20 @ 08:00)  RR: 15 (20 @ 09:00)  SpO2: 100% (20 @ 09:00)  Wt(kg): --    I and O's:     @ 07:01  -   @ 07:00  --------------------------------------------------------  IN: 889 mL / OUT: 175 mL / NET: 714 mL     @ 07:01  -   @ 09:55  --------------------------------------------------------  IN: 48.2 mL / OUT: 30 mL / NET: 18.2 mL          PHYSICAL EXAM:    Constitutional: confused   Neck:  No JVD  Respiratory: CTAB/L  Cardiovascular: S1 and S2  Gastrointestinal: BS+, soft, NT/ND + PD catheter   Extremities: No peripheral edema  Neurological: A/O x 3, no focal deficits  Psychiatric: confused   : No Johnson  Skin: No rashes  Access: Not applicable    LABS:                        9.1    18.17 )-----------( 128      ( 02 Dec 2020 00:21 )             28.4         134<L>  |  97  |  57<H>  ----------------------------<  176<H>  5.7<H>   |  17<L>  |  9.79<H>    Ca    8.6      02 Dec 2020 00:21  Phos  6.1       Mg     2.8         TPro  5.5<L>  /  Alb  3.4  /  TBili  0.5  /  DBili  x   /  AST  50<H>  /  ALT  17  /  AlkPhos  45            Urine Studies:          RADIOLOGY & ADDITIONAL STUDIES:

## 2020-12-02 NOTE — DIETITIAN INITIAL EVALUATION ADULT. - ORAL INTAKE PTA/DIET HISTORY
Per chart, pt is confused and disoriented. Pt seen sleeping, per PCA, RD interview inappropriate at time of visit. Unable to obtain subjective information to assess nutrition/weight history at this time. Per chart, pt with hx of DM and ESRD on PD for 3 years. Per chart, DM is medically managed with Toujeo (80 units at bedtime), Humalog (10 units twice daily). Recent HbA1c of 7.8% (12/1) indicates poorly controlled sugars PTA.

## 2020-12-02 NOTE — PHYSICAL THERAPY INITIAL EVALUATION ADULT - PLANNED THERAPY INTERVENTIONS, PT EVAL
bed mobility training/gait training/1. GOAL: In 4 weeks, pt will be able to megan 4 steps w/ CGA./transfer training

## 2020-12-02 NOTE — DIETITIAN INITIAL EVALUATION ADULT. - OTHER INFO
Per chart, NKFA and PTA vitamin supplementation includes vitamin D3, multivitamin, folic acid, ferrous sulfate. Weight history unavailable; current weight 77.1 kg (11/30). Per PCA, pt with minimal PO intake today, 0-25% of meals. Flowsheets indicate 25% of 1 meal since admission. No nausea/vomiting, diarrhea/constipation noted today. Last BM 11/29 (3 days ago). Chewing/swallowing ability unclear per PCA.

## 2020-12-02 NOTE — DIETITIAN INITIAL EVALUATION ADULT. - PERTINENT MEDS FT
MEDICATIONS  (STANDING):  aspirin  chewable 81 milliGRAM(s) Oral daily  atorvastatin 80 milliGRAM(s) Oral at bedtime  cefuroxime  IVPB 1500 milliGRAM(s) IV Intermittent every 24 hours  chlorhexidine 0.12% Liquid 5 milliLiter(s) Oral Mucosa two times a day  dexMEDEtomidine Infusion 0.25 MICROgram(s)/kG/Hr (4.82 mL/Hr) IV Continuous <Continuous>  DOBUTamine Infusion 2.5 MICROgram(s)/kG/Min (5.78 mL/Hr) IV Continuous <Continuous>  ferrous    sulfate 325 milliGRAM(s) Oral three times a day  folic acid 1 milliGRAM(s) Oral daily  glucagon  Injectable 1 milliGRAM(s) IntraMuscular once  heparin   Injectable 5000 Unit(s) SubCutaneous every 8 hours  insulin lispro (ADMELOG) corrective regimen sliding scale   SubCutaneous Before meals and at bedtime  insulin regular Infusion 2 Unit(s)/Hr (2 mL/Hr) IV Continuous <Continuous>  metoprolol tartrate 25 milliGRAM(s) Oral two times a day  nortriptyline 25 milliGRAM(s) Oral at bedtime  pantoprazole    Tablet 40 milliGRAM(s) Oral before breakfast  sodium chloride 0.9%. 1000 milliLiter(s) (10 mL/Hr) IV Continuous <Continuous>    MEDICATIONS  (PRN):

## 2020-12-02 NOTE — DIETITIAN INITIAL EVALUATION ADULT. - PERTINENT LABORATORY DATA
Labs: 12-02 @ 00:21: Sodium 134<L>, Potassium 5.7<H>, Calcium 8.6, Magnesium 2.8<H>, Phosphorus 6.1<H>, BUN 57<H>, Creatinine 9.79<H>, Glucose 176<H>, Alk Phos 45, ALT/SGPT 17, AST/SGOT 50<H>, Albumin 3.4, Prealbumin --, Total Bilirubin 0.5, Hemoglobin 9.1<L>, Hematocrit 28.4<L>, Ferritin --, C-Reactive Protein --, Creatine Kinase <<27>        POCT Blood Glucose.: 194 mg/dL (12-02-20 @ 12:39)  POCT Blood Glucose.: 183 mg/dL (12-02-20 @ 08:23)  POCT Blood Glucose.: 171 mg/dL (12-01-20 @ 21:18)  POCT Blood Glucose.: 111 mg/dL (12-01-20 @ 16:38)  POCT Blood Glucose.: 136 mg/dL (12-01-20 @ 14:07)    HBA1c 7.8% (12/1)

## 2020-12-02 NOTE — PHYSICAL THERAPY INITIAL EVALUATION ADULT - ADDITIONAL COMMENTS
Pt lives in a pvt home w/ spouse & daughters, 4 steps to enter. PTA pt reports being independent w/ ambulation & owns cane & RW. Per chart: HHA 4 hr/ 4 days a week to assist w/ ADL's as needed.

## 2020-12-02 NOTE — PHYSICAL THERAPY INITIAL EVALUATION ADULT - PERTINENT HX OF CURRENT PROBLEM, REHAB EVAL
55 y.o. M PMHX CAD, s/p CABG x 4 (8/8/19) , HTN, HLD, ESRD & T2DM, + diabetic neuropathy w/ PVD. Pt c/o inc'ing SOB , dizziness & mild CP w/ exertion.  Nuclear stress test which was abnormal. Cardiac catheterization revealed SVG graft originating from aorta to distal RCA totally occluded SVG to Om1 totally occluded, SVG to first diagonal totally occluded. Presents for LHC & attempt to stent the native.

## 2020-12-02 NOTE — DIETITIAN INITIAL EVALUATION ADULT. - REASON FOR ADMISSION
Per chart, pt is a 55 year old male with PMH of CAD, s/p CABGx4, HTN, HLD, ESRD on PD for 3 years, T2DM on insulin, presented for [facility]. S/P recovery of fractured left heart catheterization angioplasty balloon and wire via transverse aortotomy. Requiring dobutamine at this time. On 1:1 for confusion. Receiving dialysis at time of visit. Per chart, pt is a 55 year old male with PMH of CAD, s/p CABGx4, HTN, HLD, ESRD on PD for 3 years, T2DM on insulin, presented for [facility]. S/P recovery of fractured left heart catheterization angioplasty balloon and wire via transverse aortotomy. Requiring dobutamine at this time. On 1:1. Receiving dialysis at time of visit. Blood sugars presently addressed with insulin gtt. Noted elevated potassium/phosphorus.

## 2020-12-02 NOTE — PHYSICAL THERAPY INITIAL EVALUATION ADULT - DID THE PATIENT HAVE SURGERY?
LHC with stent deployed. Recover of fractured left heart catheterization angioplasty balloon and wire via transverse aortotomy/yes

## 2020-12-02 NOTE — DIETITIAN INITIAL EVALUATION ADULT. - ADD RECOMMEND
Change diet to Consistent Carbohydrate, Renal with Nepro supplement 1x/day. RD to obtain subjective information/provide nutrition education as able/appropriate. Monitor tolerance to diet prescription, nutritional intake, GI function, weight trends, labs and skin integrity.

## 2020-12-03 NOTE — PROGRESS NOTE ADULT - SUBJECTIVE AND OBJECTIVE BOX
CRITICAL CARE ATTENDING - CTICU    MEDICATIONS  (STANDING):  aspirin  chewable 81 milliGRAM(s) Oral daily  atorvastatin 80 milliGRAM(s) Oral at bedtime  dexMEDEtomidine Infusion 0.25 MICROgram(s)/kG/Hr (4.82 mL/Hr) IV Continuous <Continuous>  ferrous    sulfate 325 milliGRAM(s) Oral three times a day  folic acid 1 milliGRAM(s) Oral daily  glucagon  Injectable 1 milliGRAM(s) IntraMuscular once  heparin   Injectable 5000 Unit(s) SubCutaneous every 8 hours  insulin lispro (ADMELOG) corrective regimen sliding scale   SubCutaneous Before meals and at bedtime  insulin regular Infusion 2 Unit(s)/Hr (2 mL/Hr) IV Continuous <Continuous>  metoprolol tartrate 25 milliGRAM(s) Oral two times a day  nortriptyline 25 milliGRAM(s) Oral at bedtime  pantoprazole  Injectable 40 milliGRAM(s) IV Push daily  sodium chloride 0.9%. 1000 milliLiter(s) (10 mL/Hr) IV Continuous <Continuous>                                    9.6    16.46 )-----------( clumped.    ( 03 Dec 2020 00:22 )             30.4       12    135  |  95<L>  |  71<H>  ----------------------------<  211<H>  6.3<HH>   |  19<L>  |  9.90<H>    Ca    8.8      03 Dec 2020 02:20  Phos  8.6     12  Mg     2.9         TPro  5.8<L>  /  Alb  3.4  /  TBili  0.4  /  DBili  x   /  AST  86<H>  /  ALT  101<H>  /  AlkPhos  55  12      PT/INR - ( 02 Dec 2020 00:21 )   PT: 13.9 sec;   INR: 1.17 ratio         PTT - ( 02 Dec 2020 00:21 )  PTT:29.9 sec        Daily     Daily Weight in k.8 (03 Dec 2020 05:20)       @ 07:01  -   @ 07:00  --------------------------------------------------------  IN: 1188.5 mL / OUT: 1920 mL / NET: -731.5 mL        Critically Ill patient  : [ ] preoperative ,   [x ] post operative    Requires :  [x ] Arterial Line   [ x] Central Line  [ ] PA catheter  [ ] IABP  [ ] ECMO  [ ] LVAD  [ ] Ventilator  [x ] pacemaker [ ] Impella.                      [ x] ABG's     [x ] Pulse Oxymetry Monitoring  Bedside evaluation , monitoring , treatment of hemodynamics , fluids , IVP/ IVCD meds.        Diagnosis:     POD 3 - LHC with stent deployed. Recover of fractured left heart catheterization angioplasty balloon and wire via transverse aortotomy    Chest tube drainage     Requires chest PT, pulmonary toilet, suctioning to maintain SaO2,  patent airway and treat atelectasis.     Sedated with IVCD Precedex     Temporary pacemaker (TPM) interrogation and setting.     DM type 2 - IVCD insulin     ESRD     ECG         By signing my name below, I, Valery Tavares, attest that this documentation has been prepared under the direction and in the presence of Deni Melendrez MD.   Electronically Signed: Chauncey Vee. 20 @ 07:22    Discussed with CT surgeon, Physician's Assistant - Nurse Practitioner- Critical care medicine team.   Dicussed at  AM / PM rounds.   Chart, labs , films reviewed.    Total Time: CRITICAL CARE ATTENDING - CTICU    MEDICATIONS  (STANDING):  aspirin  chewable 81 milliGRAM(s) Oral daily  atorvastatin 80 milliGRAM(s) Oral at bedtime  dexMEDEtomidine Infusion 0.25 MICROgram(s)/kG/Hr (4.82 mL/Hr) IV Continuous <Continuous>  ferrous    sulfate 325 milliGRAM(s) Oral three times a day  folic acid 1 milliGRAM(s) Oral daily  glucagon  Injectable 1 milliGRAM(s) IntraMuscular once  heparin   Injectable 5000 Unit(s) SubCutaneous every 8 hours  insulin lispro (ADMELOG) corrective regimen sliding scale   SubCutaneous Before meals and at bedtime  insulin regular Infusion 2 Unit(s)/Hr (2 mL/Hr) IV Continuous <Continuous>  metoprolol tartrate 25 milliGRAM(s) Oral two times a day  nortriptyline 25 milliGRAM(s) Oral at bedtime  pantoprazole  Injectable 40 milliGRAM(s) IV Push daily  sodium chloride 0.9%. 1000 milliLiter(s) (10 mL/Hr) IV Continuous <Continuous>                                    9.6    16.46 )-----------( clumped.    ( 03 Dec 2020 00:22 )             30.4       12    135  |  95<L>  |  71<H>  ----------------------------<  211<H>  6.3<HH>   |  19<L>  |  9.90<H>    Ca    8.8      03 Dec 2020 02:20  Phos  8.6     12  Mg     2.9         TPro  5.8<L>  /  Alb  3.4  /  TBili  0.4  /  DBili  x   /  AST  86<H>  /  ALT  101<H>  /  AlkPhos  55  12      PT/INR - ( 02 Dec 2020 00:21 )   PT: 13.9 sec;   INR: 1.17 ratio         PTT - ( 02 Dec 2020 00:21 )  PTT:29.9 sec        Daily     Daily Weight in k.8 (03 Dec 2020 05:20)       @ 07:01  -   @ 07:00  --------------------------------------------------------  IN: 1188.5 mL / OUT: 1920 mL / NET: -731.5 mL        Critically Ill patient  : [ ] preoperative ,   [x ] post operative    Requires :  [x ] Arterial Line   [ x] Central Line  [ ] PA catheter  [ ] IABP  [ ] ECMO  [ ] LVAD  [ ] Ventilator  [ ] pacemaker [ ] Impella.                      [ x] ABG's     [x ] Pulse Oxymetry Monitoring  Bedside evaluation , monitoring , treatment of hemodynamics , fluids , IVP/ IVCD meds.        Diagnosis:     POD 3 - LHC with stent deployed. Recover of fractured left heart catheterization angioplasty balloon and wire via transverse aortotomy             - Sternotomy     Chest tube drainage     Requires chest PT, pulmonary toilet, suctioning to maintain SaO2,  patent airway and treat atelectasis.     Sedated with IVCD Precedex      DM type 2 - IVCD insulin     ESRD - emergent Dialysis last night     Hyperkalemia    ECG     CHF- acute [ x]   chronic [x ]    systolic [ x]   diatolic [ ]          - Echo- EF -  25%           [ ] RV dysfunction          - Cxr-cardiomegally, edema          - Clinical-  [ ]inotropes   [x ]pressors   [ ]diuresis   [ ]IABP   [ ]ECMO   [ ]LVAD   [ ]Respiratory Failure                    -         By signing my name below, I, Valery Tavares, attest that this documentation has been prepared under the direction and in the presence of Deni Melendrez MD.   Electronically Signed: Chauncey Vee. 20 @ 07:22    I, Deni Melendrez, personally performed the services described in this documentation. All medical record entries made by the scribe were at my direction and in my presence. I have reviewed the chart and agree that the record reflects my personal performance and is accurate and complete.   Deni Melendrez MD.     Discussed with CT surgeon, Physician's Assistant - Nurse Practitioner- Critical care medicine team.   Dicussed at  AM / PM rounds.   Chart, labs , films reviewed.    Total Time: 30 min

## 2020-12-03 NOTE — PROGRESS NOTE ADULT - SUBJECTIVE AND OBJECTIVE BOX
NEPHROLOGY-[facility] [phone]        Patient seen and examined in bed.  He was in good spirits and more alert  HD this am for hyperkalemia         MEDICATIONS  (STANDING):  aspirin  chewable 81 milliGRAM(s) Oral daily  atorvastatin 80 milliGRAM(s) Oral at bedtime  clopidogrel Tablet 75 milliGRAM(s) Oral daily  dexMEDEtomidine Infusion 0.25 MICROgram(s)/kG/Hr (4.82 mL/Hr) IV Continuous <Continuous>  ferrous    sulfate 325 milliGRAM(s) Oral three times a day  folic acid 1 milliGRAM(s) Oral daily  gabapentin 100 milliGRAM(s) Oral daily  glucagon  Injectable 1 milliGRAM(s) IntraMuscular once  heparin   Injectable 5000 Unit(s) SubCutaneous every 8 hours  insulin glargine Injectable (LANTUS) 10 Unit(s) SubCutaneous at bedtime  insulin lispro (ADMELOG) corrective regimen sliding scale   SubCutaneous Before meals and at bedtime  insulin lispro Injectable (ADMELOG) 7 Unit(s) SubCutaneous three times a day before meals  metoprolol tartrate 25 milliGRAM(s) Oral two times a day  nortriptyline 25 milliGRAM(s) Oral at bedtime  pantoprazole    Tablet 40 milliGRAM(s) Oral before breakfast  sodium chloride 0.9%. 1000 milliLiter(s) (10 mL/Hr) IV Continuous <Continuous>      VITAL:  T(C): , Max: 37.1 (12-03-20 @ 07:50)  T(F): , Max: 98.8 (12-03-20 @ 07:50)  HR: 115 (12-03-20 @ 11:17)  BP: 134/78 (12-03-20 @ 11:17)  BP(mean): 99 (12-03-20 @ 11:17)  RR: 18 (12-03-20 @ 11:17)  SpO2: 100% (12-03-20 @ 11:17)  Wt(kg): --    I and O's:    12-02 @ 07:01  -  12-03 @ 07:00  --------------------------------------------------------  IN: 1188.5 mL / OUT: 1920 mL / NET: -731.5 mL    12-03 @ 07:01  -  12-03 @ 11:27  --------------------------------------------------------  IN: 0 mL / OUT: 10 mL / NET: -10 mL          PHYSICAL EXAM:    Constitutional: NAD  Neck:  No JVD  Respiratory: CTAB/L  Cardiovascular: S1 and S2  Gastrointestinal: BS+, soft, NT/ND + PD catheter   Extremities: No peripheral edema  Neurological: A/O x 3, no focal deficits  Psychiatric: Normal mood, normal affect  : No Johnson  Skin: No rashes  Access: [facility]     LABS:                        10.2   19.70 )-----------( x        ( 03 Dec 2020 11:00 )             33.5     12-03    133<L>  |  92<L>  |  48<H>  ----------------------------<  208<H>  4.5   |  22  |  7.24<H>    Ca    9.0      03 Dec 2020 11:01  Phos  8.6     12-03  Mg     2.9     12-03    TPro  5.8<L>  /  Alb  3.4  /  TBili  0.4  /  DBili  x   /  AST  86<H>  /  ALT  101<H>  /  AlkPhos  55  12-03          Urine Studies:          RADIOLOGY & ADDITIONAL STUDIES:

## 2020-12-03 NOTE — PROGRESS NOTE ADULT - ASSESSMENT
56y/o male with no known implantable devices noted and COVID 19 negative with PMHX of  CAD, s/p CABG x 4 ( LIMA to LAD, SVG to Diagonal , OM1 , PDA on 19 , HTN, HLD, ESRD on PD for 3 years sp fractured balloon sp aortotomy   Mild metabolic acidosis   Hyperkalemia     1 Renal -HD done earlier and another session in am    I suspect   will transition back to PD in 24-48 hours   Start Renvela again for increased Phos     2 Anemia- Retacrit at HD    3 CVS-Off  gtt    4 Psych- Improved mental status     Sayed [facility]   [phone]

## 2020-12-04 NOTE — PROGRESS NOTE ADULT - SUBJECTIVE AND OBJECTIVE BOX
Subjective:    Telemetry:      Vital Signs Last 24 Hrs  T(C): 36.5 (20 @ 13:20), Max: 36.7 (20 @ 00:00)  T(F): 97.7 (20 @ 13:20), Max: 98 (20 @ 00:00)  HR: 82 (20 @ 17:41) (72 - 98)  BP: 125/82 (20 @ 17:41) (92/55 - 125/82)  RR: 11 (20 @ 17:41) (11 - 26)  SpO2: 100% (20 @ 17:41) (82% - 100%)                    @ 07:01  -   @ 07:00  --------------------------------------------------------  IN: 436.4 mL / OUT: 60 mL / NET: 376.4 mL     07:01  -   @ 17:54  --------------------------------------------------------  IN: 200 mL / OUT: 1000 mL / NET: -800 mL          Daily     Daily Weight in k.1 (04 Dec 2020 13:20)        CAPILLARY BLOOD GLUCOSE  159 (04 Dec 2020 17:00)  166 (04 Dec 2020 15:00)  162 (03 Dec 2020 21:00)      POCT Blood Glucose.: 159 mg/dL (04 Dec 2020 16:56)  POCT Blood Glucose.: 166 mg/dL (04 Dec 2020 15:15)  POCT Blood Glucose.: 94 mg/dL (04 Dec 2020 11:22)  POCT Blood Glucose.: 128 mg/dL (04 Dec 2020 07:50)  POCT Blood Glucose.: 162 mg/dL (03 Dec 2020 21:32)          Drains:     MS         [  ] Drainage:                 L Pleural  [  ]  Drainage:                R Pleural  [  ]  Drainage:    Pacing Wires        [  ]   Settings:                                  Isolated  [  ]    Coumadin    [ ] YES          [  ]      NO         Reason:                                 9.9    16.73 )-----------( 131      ( 04 Dec 2020 03:36 )             32.2       12-04    135  |  95<L>  |  53<H>  ----------------------------<  135<H>  4.9   |  25  |  8.19<H>    Ca    8.7      04 Dec 2020 03:36  Phos  6.3     12-  Mg     2.7         TPro  5.5<L>  /  Alb  3.3  /  TBili  0.3  /  DBili  x   /  AST  53<H>  /  ALT  93<H>  /  AlkPhos  53            PHYSICAL EXAM:    Neurology: alert and oriented x 3, nonfocal, no gross deficits    CV:    Sternal Wound: CDI, Stable    Lungs:    Abdomen: soft, nontender, nondistended, (+) bowel sounds, (+) BM    :               Extremities:               aspirin  chewable 81 milliGRAM(s) Oral daily  atorvastatin 80 milliGRAM(s) Oral at bedtime  clopidogrel Tablet 75 milliGRAM(s) Oral daily  ferrous    sulfate 325 milliGRAM(s) Oral three times a day  folic acid 1 milliGRAM(s) Oral daily  gabapentin 100 milliGRAM(s) Oral daily  glucagon  Injectable 1 milliGRAM(s) IntraMuscular once  heparin   Injectable 5000 Unit(s) SubCutaneous every 8 hours  insulin glargine Injectable (LANTUS) 10 Unit(s) SubCutaneous at bedtime  insulin lispro (ADMELOG) corrective regimen sliding scale   SubCutaneous Before meals and at bedtime  insulin lispro Injectable (ADMELOG) 7 Unit(s) SubCutaneous three times a day before meals  metoprolol tartrate 25 milliGRAM(s) Oral two times a day  oxyCODONE    IR 5 milliGRAM(s) Oral every 6 hours PRN  oxyCODONE    IR 10 milliGRAM(s) Oral every 6 hours PRN  pantoprazole    Tablet 40 milliGRAM(s) Oral before breakfast  sevelamer carbonate 800 milliGRAM(s) Oral three times a day with meals  sodium chloride 0.9%. 1000 milliLiter(s) IV Continuous <Continuous>                    Physical Therapy Rec:   Home  [  ]   Home w/ PT  [  ]  Rehab  [  ]    Discussed with Cardiothoracic Team at AM rounds. Subjective: "I'm okay." Denies chest pain, SOB, palpitations, headache, dizziness, fever, chills or n/v/d.     Telemetry: SR 80s     Vital Signs Last 24 Hrs  T(C): 36.5 (20 @ 13:20), Max: 36.7 (20 @ 00:00)  T(F): 97.7 (20 @ 13:20), Max: 98 (20 @ 00:00)  HR: 82 (20 @ 17:41) (72 - 98)  BP: 125/82 (20 @ 17:41) (92/55 - 125/82)  RR: 11 (20 @ 17:41) (11 - 26)  SpO2: 100% (20 @ 17:41) (82% - 100%)              @ 07:01  -   @ 07:00  --------------------------------------------------------  IN: 436.4 mL / OUT: 60 mL / NET: 376.4 mL     07:01  -   @ 17:54  --------------------------------------------------------  IN: 200 mL / OUT: 1000 mL / NET: -800 mL      Daily Weight in k.1 (04 Dec 2020 13:20)      CAPILLARY BLOOD GLUCOSE  159 (04 Dec 2020 17:00)  166 (04 Dec 2020 15:00)  162 (03 Dec 2020 21:00)      POCT Blood Glucose.: 159 mg/dL (04 Dec 2020 16:56)  POCT Blood Glucose.: 166 mg/dL (04 Dec 2020 15:15)  POCT Blood Glucose.: 94 mg/dL (04 Dec 2020 11:22)  POCT Blood Glucose.: 128 mg/dL (04 Dec 2020 07:50)  POCT Blood Glucose.: 162 mg/dL (03 Dec 2020 21:32)                             9.9    16.73 )-----------( 131      ( 04 Dec 2020 03:36 )             32.2     12-04    135  |  95<L>  |  53<H>  ----------------------------<  135<H>  4.9   |  25  |  8.19<H>    Ca    8.7      04 Dec 2020 03:36  Phos  6.3     12-04  Mg     2.7     12-04    TPro  5.5<L>  /  Alb  3.3  /  TBili  0.3  /  DBili  x   /  AST  53<H>  /  ALT  93<H>  /  AlkPhos  53  1204      PHYSICAL EXAM:    Neurology: alert and oriented x 3, nonfocal, no gross deficits    CV: RRR, +S1/S2, no murmur appreciated     Sternal Wound: MSI w/ Prevena in place - CDI, Stable    Lungs: CTA b/l, no wheezes, rales or rhonchi     Abdomen: (+) peritoneal dialysis catheter w/ dressing in place - CDI. abd softly distended, nontender, (+) bowel sounds, (-) BM, (+) flatus     : anuric on dialysis               Extremities: L femoral shiley removal site w/ minimal oozing - sandbag in place, soft, no hematoma.            aspirin  chewable 81 milliGRAM(s) Oral daily  atorvastatin 80 milliGRAM(s) Oral at bedtime  clopidogrel Tablet 75 milliGRAM(s) Oral daily  ferrous    sulfate 325 milliGRAM(s) Oral three times a day  folic acid 1 milliGRAM(s) Oral daily  gabapentin 100 milliGRAM(s) Oral daily  glucagon  Injectable 1 milliGRAM(s) IntraMuscular once  heparin   Injectable 5000 Unit(s) SubCutaneous every 8 hours  insulin glargine Injectable (LANTUS) 10 Unit(s) SubCutaneous at bedtime  insulin lispro (ADMELOG) corrective regimen sliding scale   SubCutaneous Before meals and at bedtime  insulin lispro Injectable (ADMELOG) 7 Unit(s) SubCutaneous three times a day before meals  metoprolol tartrate 25 milliGRAM(s) Oral two times a day  oxyCODONE    IR 5 milliGRAM(s) Oral every 6 hours PRN  oxyCODONE    IR 10 milliGRAM(s) Oral every 6 hours PRN  pantoprazole    Tablet 40 milliGRAM(s) Oral before breakfast  sevelamer carbonate 800 milliGRAM(s) Oral three times a day with meals  sodium chloride 0.9%. 1000 milliLiter(s) IV Continuous <Continuous>                    Physical Therapy Rec:   Home  [  ]   Home w/ PT  [  ]  Rehab  [  ]    Discussed with Cardiothoracic Team at AM rounds. Subjective: "I'm okay." Denies chest pain, SOB, palpitations, headache, dizziness, fever, chills or n/v/d.     Telemetry: SR 80s     Vital Signs Last 24 Hrs  T(C): 36.5 (20 @ 13:20), Max: 36.7 (20 @ 00:00)  T(F): 97.7 (20 @ 13:20), Max: 98 (20 @ 00:00)  HR: 82 (20 @ 17:41) (72 - 98)  BP: 125/82 (20 @ 17:41) (92/55 - 125/82)  RR: 11 (20 @ 17:41) (11 - 26)  SpO2: 100% (20 @ 17:41) (82% - 100%)              @ 07:01  -   @ 07:00  --------------------------------------------------------  IN: 436.4 mL / OUT: 60 mL / NET: 376.4 mL     07:01  -   @ 17:54  --------------------------------------------------------  IN: 200 mL / OUT: 1000 mL / NET: -800 mL      Daily Weight in k.1 (04 Dec 2020 13:20)      CAPILLARY BLOOD GLUCOSE  159 (04 Dec 2020 17:00)  166 (04 Dec 2020 15:00)  162 (03 Dec 2020 21:00)      POCT Blood Glucose.: 159 mg/dL (04 Dec 2020 16:56)  POCT Blood Glucose.: 166 mg/dL (04 Dec 2020 15:15)  POCT Blood Glucose.: 94 mg/dL (04 Dec 2020 11:22)  POCT Blood Glucose.: 128 mg/dL (04 Dec 2020 07:50)  POCT Blood Glucose.: 162 mg/dL (03 Dec 2020 21:32)                             9.9    16.73 )-----------( 131      ( 04 Dec 2020 03:36 )             32.2     12-04    135  |  95<L>  |  53<H>  ----------------------------<  135<H>  4.9   |  25  |  8.19<H>    Ca    8.7      04 Dec 2020 03:36  Phos  6.3     12-04  Mg     2.7     12-04    TPro  5.5<L>  /  Alb  3.3  /  TBili  0.3  /  DBili  x   /  AST  53<H>  /  ALT  93<H>  /  AlkPhos  53  1204      PHYSICAL EXAM:    Neurology: alert and oriented x 3, nonfocal, no gross deficits    CV: RRR, +S1/S2, no murmur appreciated     Sternal Wound: MSI w/ Prevena in place - CDI, Stable    Lungs: CTA b/l, no wheezes, rales or rhonchi     Abdomen: (+) peritoneal dialysis catheter w/ dressing in place - CDI. abd softly distended, nontender, (+) bowel sounds, (-) BM, (+) flatus     : anuric on dialysis               Extremities: L femoral shiley removal site w/ minimal oozing - sandbag in place, soft, no hematoma.            aspirin  chewable 81 milliGRAM(s) Oral daily  atorvastatin 80 milliGRAM(s) Oral at bedtime  clopidogrel Tablet 75 milliGRAM(s) Oral daily  ferrous sulfate 325 milliGRAM(s) Oral three times a day  folic acid 1 milliGRAM(s) Oral daily  gabapentin 100 milliGRAM(s) Oral daily  glucagon  Injectable 1 milliGRAM(s) IntraMuscular once  heparin Injectable 5000 Unit(s) SubCutaneous every 8 hours  insulin glargine Injectable (LANTUS) 10 Unit(s) SubCutaneous at bedtime  insulin lispro (ADMELOG) corrective regimen sliding scale   SubCutaneous Before meals and at bedtime  insulin lispro Injectable (ADMELOG) 7 Unit(s) SubCutaneous three times a day before meals  metoprolol tartrate 25 milliGRAM(s) Oral two times a day  oxyCODONE IR 5 milliGRAM(s) Oral every 6 hours PRN  oxyCODONE IR 10 milliGRAM(s) Oral every 6 hours PRN  pantoprazole Tablet 40 milliGRAM(s) Oral before breakfast  sevelamer carbonate 800 milliGRAM(s) Oral three times a day with meals  sodium chloride 0.9%. 1000 milliLiter(s) IV Continuous <Continuous>      Physical Therapy Rec:  TBD    Discussed with Cardiothoracic Team at AM rounds.

## 2020-12-04 NOTE — PROGRESS NOTE ADULT - ASSESSMENT
56y/o male with no known implantable devices noted and COVID 19 negative with PMHX of  CAD, s/p CABG x 4 ( LIMA to LAD, SVG to Diagonal , OM1 , PDA on 19 , HTN, HLD, ESRD on PD for 3 years sp fractured balloon sp aortotomy   Mild metabolic acidosis   Hyperkalemia     1 Renal -HD today and then dc the shiley and return to PD for sat ;  Renvela again for increased Phos     2 Anemia- Retacrit at HD    3 CVS-Off  gtt    4 Psych- Improved mental status     Sayed [facility]   [phone]

## 2020-12-04 NOTE — PROGRESS NOTE ADULT - SUBJECTIVE AND OBJECTIVE BOX
NEPHROLOGY-[facility] [phone]        Patient seen and examined in bed.  He had an uneventful night         MEDICATIONS  (STANDING):  aspirin  chewable 81 milliGRAM(s) Oral daily  atorvastatin 80 milliGRAM(s) Oral at bedtime  clopidogrel Tablet 75 milliGRAM(s) Oral daily  epoetin martine-epbx (RETACRIT) Injectable 72344 Unit(s) IV Push once  ferrous    sulfate 325 milliGRAM(s) Oral three times a day  folic acid 1 milliGRAM(s) Oral daily  gabapentin 100 milliGRAM(s) Oral daily  glucagon  Injectable 1 milliGRAM(s) IntraMuscular once  heparin   Injectable 5000 Unit(s) SubCutaneous every 8 hours  insulin glargine Injectable (LANTUS) 10 Unit(s) SubCutaneous at bedtime  insulin lispro (ADMELOG) corrective regimen sliding scale   SubCutaneous Before meals and at bedtime  insulin lispro Injectable (ADMELOG) 7 Unit(s) SubCutaneous three times a day before meals  metoprolol tartrate 25 milliGRAM(s) Oral two times a day  pantoprazole    Tablet 40 milliGRAM(s) Oral before breakfast  sodium chloride 0.9%. 1000 milliLiter(s) (10 mL/Hr) IV Continuous <Continuous>      VITAL:  T(C): , Max: 37.2 (12-03-20 @ 16:00)  T(F): , Max: 99 (12-03-20 @ 16:00)  HR: 81 (12-04-20 @ 08:00)  BP: 104/70 (12-04-20 @ 08:00)  BP(mean): 80 (12-04-20 @ 08:00)  RR: 16 (12-04-20 @ 08:00)  SpO2: 99% (12-04-20 @ 08:00)  Wt(kg): --    I and O's:    12-03 @ 07:01  -  12-04 @ 07:00  --------------------------------------------------------  IN: 436.4 mL / OUT: 60 mL / NET: 376.4 mL    12-04 @ 07:01  -  12-04 @ 09:06  --------------------------------------------------------  IN: 100 mL / OUT: 0 mL / NET: 100 mL          PHYSICAL EXAM:    Constitutional: NAD  Neck:  No JVD  Respiratory: CTAB/L  Cardiovascular: S1 and S2  Gastrointestinal: BS+, soft, NT/ND  Extremities: No peripheral edema  Neurological: A/O x 3, no focal deficits  Psychiatric: Normal mood, normal affect  : No Johnson  Skin: No rashes  Access: [facility]     LABS:                        9.9    16.73 )-----------( 131      ( 04 Dec 2020 03:36 )             32.2     12-04    135  |  95<L>  |  53<H>  ----------------------------<  135<H>  4.9   |  25  |  8.19<H>    Ca    8.7      04 Dec 2020 03:36  Phos  6.3     12-04  Mg     2.7     12-04    TPro  5.5<L>  /  Alb  3.3  /  TBili  0.3  /  DBili  x   /  AST  53<H>  /  ALT  93<H>  /  AlkPhos  53  12-04          Urine Studies:          RADIOLOGY & ADDITIONAL STUDIES:

## 2020-12-04 NOTE — PROGRESS NOTE ADULT - PROBLEM SELECTOR PLAN 1
s/p recovery of fractured [facility] angioplasty balloon and fractured wire   Continue post-op care   Maintain Prevena dressing to MSI   Chest PT, encourage incentive spirometry   Increase activity as tolerated, encourage ambulation  Pain control, wound care

## 2020-12-04 NOTE — PROGRESS NOTE ADULT - PROBLEM SELECTOR PLAN 3
Renal following   HD today for 1000cc, L jasmyn jacinto d/c'd   Pt to be resumed as per renal  Continue Renvela 800 mg TID   Continue Retacrit w/ HD  Check lytes daily

## 2020-12-04 NOTE — PROGRESS NOTE ADULT - PROBLEM SELECTOR PLAN 2
s/p stent to OM1 11/30   Continue ASA 81 mg QD, Plavix 75 mg QD, Lopressor 25 mg BID & Atorvastatin 80 s/p stent 11/30   Continue ASA 81 mg QD, Plavix 75 mg QD, Lopressor 25 mg BID & Atorvastatin 80

## 2020-12-04 NOTE — PROGRESS NOTE ADULT - ASSESSMENT
54 y/o M w/ PMHX of CAD s/p CABG x 4 (LIMA to LAD, SVG to Diagonal, OM1, PDA on 8/8/19), HTN, HLD, ESRD on PD for 3 years, T2DM on insulin and diabetic neuropathy with PVD  . Pt presents with complaints of increasing SOB, dizziness and mild chest pain with exertion.  Pt had abnormal nuclear stress test and subsequent cardiac catheterization revealed distal left main 50% stenosis, Proximal to mid % stenosis, proximal first diagonal 80% stenosis, mid OM2 70& stenosis, proximal Ramus 70% stenosis, Prox % stenosis. Film was reviewed with Dr. Butterfield and pt presented for [facility] on 11/30 to attempt to stent the LAD. The LAD   the angioplasty balloon and wire  left heart catheterization angioplasty balloon and wire fractured 56 y/o M w/ PMHX of CAD s/p CABG x 4 (LIMA to LAD, SVG to Diagonal, OM1, PDA on 19), HTN, HLD, ESRD on PD for 3 years, T2DM on insulin and diabetic neuropathy with PVD  . Pt presents with complaints of increasing SOB, dizziness and mild chest pain with exertion.  Pt had abnormal nuclear stress test and subsequent cardiac catheterization revealed multivessel CAD with in-graft re-stenosis. Film was reviewed with Dr. Butterfield and pt presented for [facility] on  to attempt to stent the native arteries. OM1 was successfully stented, but on removal of the balloon, the wire fractured and the balloon remained stuck in the L main. Pt was subsequently taken to the OR for removal of the angioplasty balloon and fractured wire via transverse aortotomy.     : s/p recovery of fractured [facility] angioplasty balloon and fractured sire via transverse aortotomy. Received 2u PRBC intra-op. Required  and Levo gtts post-op.   : Extubated 0420. Pressors and inotropes weaned off. Dialyzed for 1000cc. Chest tubes with minimal output. Started on Lopressor. HD stable.   :   12/3: HD for hyperkalemia.   : R pleural and med CTs d/c'd - post-removal CXR showed no PTX. L groin shiley d/c'd as pt is to resume PD on Monday    56 y/o M w/ PMHX of CAD s/p CABG x 4 (LIMA to LAD, SVG to Diagonal, OM1, PDA on 19), HTN, HLD, ESRD on PD for 3 years, T2DM on insulin and diabetic neuropathy with PVD  . Pt presents with complaints of increasing SOB, dizziness and mild chest pain with exertion.  Pt had abnormal nuclear stress test and subsequent cardiac catheterization revealed multivessel CAD with in-graft re-stenosis. Film was reviewed with Dr. Butterfield and pt presented for [facility] on  to attempt to stent the native arteries. OM1 was successfully stented, but on removal of the balloon, the wire fractured and the balloon remained stuck in the L main. Pt was subsequently taken to the OR for removal of the angioplasty balloon and fractured wire via transverse aortotomy.     : s/p recovery of fractured [facility] angioplasty balloon and fractured sire via transverse aortotomy. Received 2u PRBC intra-op. Required  and Levo gtts post-op.   : Extubated 0420. Pressors and inotropes weaned off. Dialyzed for 1000cc. Chest tubes with minimal output. Started on Lopressor. HD stable.   : Dialyzed for 1000cc.   12/3: Plavix initiated.   : R pleural and med CTs d/c'd - post-removal CXR showed no PTX. L groin shiley d/c'd as pt is to resume PD on Monday - minimal oozing from site, resolved s/p sandbag, no hematoma noted, will continue to monitor. Will continue current medication regimen.    54 y/o M w/ PMHX of CAD s/p CABG x 4 (LIMA to LAD, SVG to Diagonal, OM1, PDA on 19), HTN, HLD, ESRD on PD for 3 years, T2DM on insulin and diabetic neuropathy with PVD  . Pt presents with complaints of increasing SOB, dizziness and mild chest pain with exertion.  Pt had abnormal nuclear stress test and subsequent cardiac catheterization revealed multivessel CAD with in-graft re-stenosis. Film was reviewed with Dr. Butterfield and pt presented for [facility] on  to attempt to stent the native arteries. OM1 was successfully stented, but on removal of the balloon, the wire fractured and the balloon remained stuck in the L main. Pt was subsequently taken to the OR for removal of the angioplasty balloon and fractured wire via transverse aortotomy.     : s/p recovery of fractured [facility] angioplasty balloon and fractured wire via transverse aortotomy. Received 2u PRBC intra-op. Required  and Levo gtts post-op.   : Extubated 0420. Pressors and inotropes weaned off. Dialyzed for 1000cc. Chest tubes with minimal output. Started on Lopressor. HD stable.   : Dialyzed for 1000cc.   12/3: Plavix initiated.   : R pleural and med CTs d/c'd - post-removal CXR showed no PTX. HD today for 1000cc. L groin shiley d/c'd as pt is to resume PD - minimal oozing from site, resolved s/p sandbag, no hematoma noted, will continue to monitor. Will continue current medication regimen.    54 y/o M w/ PMHX of CAD s/p CABG x 4 (LIMA to LAD, SVG to Diagonal, OM1, PDA on 19), HTN, HLD, ESRD on PD for 3 years, T2DM on insulin and diabetic neuropathy with PVD  . Pt presents with complaints of increasing SOB, dizziness and mild chest pain with exertion.  Pt had abnormal nuclear stress test and subsequent cardiac catheterization revealed multivessel CAD with in-graft re-stenosis. Film was reviewed with Dr. Butterfield and pt presented for [facility] on  to attempt to stent the native arteries. Stent was successfully deployed, but on removal of the balloon, the wire fractured and the balloon remained stuck in the L main. Pt was subsequently taken to the OR for removal of the angioplasty balloon and fractured wire via transverse aortotomy.     : s/p recovery of fractured [facility] angioplasty balloon and fractured wire via transverse aortotomy. Received 2u PRBC intra-op. Required  and Levo gtts post-op.   : Extubated 0420. Pressors and inotropes weaned off. Dialyzed for 1000cc. Chest tubes with minimal output. Started on Lopressor. HD stable.   : Dialyzed for 1000cc.   12/3: Plavix initiated.   : R pleural and med CTs d/c'd - post-removal CXR showed no PTX. HD today for 1000cc. L groin shiley d/c'd as pt is to resume PD - minimal oozing from site, resolved s/p sandbag, no hematoma noted, will continue to monitor. Will continue current medication regimen.

## 2020-12-04 NOTE — PROGRESS NOTE ADULT - SUBJECTIVE AND OBJECTIVE BOX
CRITICAL CARE ATTENDING - CTICU    MEDICATIONS  (STANDING):  aspirin  chewable 81 milliGRAM(s) Oral daily  atorvastatin 80 milliGRAM(s) Oral at bedtime  clopidogrel Tablet 75 milliGRAM(s) Oral daily  epoetin martine-epbx (RETACRIT) Injectable 84739 Unit(s) IV Push once  ferrous    sulfate 325 milliGRAM(s) Oral three times a day  folic acid 1 milliGRAM(s) Oral daily  gabapentin 100 milliGRAM(s) Oral daily  glucagon  Injectable 1 milliGRAM(s) IntraMuscular once  heparin   Injectable 5000 Unit(s) SubCutaneous every 8 hours  insulin glargine Injectable (LANTUS) 10 Unit(s) SubCutaneous at bedtime  insulin lispro (ADMELOG) corrective regimen sliding scale   SubCutaneous Before meals and at bedtime  insulin lispro Injectable (ADMELOG) 7 Unit(s) SubCutaneous three times a day before meals  metoprolol tartrate 25 milliGRAM(s) Oral two times a day  pantoprazole    Tablet 40 milliGRAM(s) Oral before breakfast  sodium chloride 0.9%. 1000 milliLiter(s) (10 mL/Hr) IV Continuous <Continuous>                                    9.9    16.73 )-----------( 131      ( 04 Dec 2020 03:36 )             32.2       12-04    135  |  95<L>  |  53<H>  ----------------------------<  135<H>  4.9   |  25  |  8.19<H>    Ca    8.7      04 Dec 2020 03:36  Phos  6.3     12  Mg     2.7         TPro  5.5<L>  /  Alb  3.3  /  TBili  0.3  /  DBili  x   /  AST  53<H>  /  ALT  93<H>  /  AlkPhos  53                Daily     Daily Weight in k.1 (04 Dec 2020 00:00)      -03 @ 07:01  -   @ 07:00  --------------------------------------------------------  IN: 436.4 mL / OUT: 60 mL / NET: 376.4 mL        Critically Ill patient  : [ ] preoperative ,   [x ] post operative    Requires :  [x ] Arterial Line   [ x] Central Line  [ ] PA catheter  [ ] IABP  [ ] ECMO  [ ] LVAD  [ ] Ventilator  [ ] pacemaker [ ] Impella.                      [ x] ABG's     [x ] Pulse Oxymetry Monitoring  Bedside evaluation , monitoring , treatment of hemodynamics , fluids , IVP/ IVCD meds.        Diagnosis:     POD 4 - LHC with stent deployed. Recover of fractured left heart catheterization angioplasty balloon and wire via transverse aortotomy             - Sternotomy     Chest tube drainage     Requires chest PT, pulmonary toilet, suctioning to maintain SaO2,  patent airway and treat atelectasis.     DM type 2     ESRD     [ ] Hemodialysis [ ] Hemofiltration:    [ ] negative fluid balance [ ] even fluid balance [ ] positive fluid balance, in a hemodynamically unstable patient.     Hyperkalemia    ECG     CHF- acute [ x]   chronic [x ]    systolic [ x]   diatolic [ ]          - Echo- EF -  25%           [ ] RV dysfunction          - Cxr-cardiomegally, edema          - Clinical-  [ ]inotropes   [ ]pressors   [ ]diuresis   [ ]IABP   [ ]ECMO   [ ]LVAD   [ ]Respiratory Failure      Thrombocytopenia             By signing my name below, I, Valery Tavares, attest that this documentation has been prepared under the direction and in the presence of Deni Melendrez MD.   Electronically Signed: Chauncey Vee. 20 @ 07:25    Discussed with CT surgeon, Physician's Assistant - Nurse Practitioner- Critical care medicine team.   Dicussed at  AM / PM rounds.   Chart, labs , films reviewed.    Total Time: CRITICAL CARE ATTENDING - CTICU    MEDICATIONS  (STANDING):  aspirin  chewable 81 milliGRAM(s) Oral daily  atorvastatin 80 milliGRAM(s) Oral at bedtime  clopidogrel Tablet 75 milliGRAM(s) Oral daily  epoetin martine-epbx (RETACRIT) Injectable 99073 Unit(s) IV Push once  ferrous    sulfate 325 milliGRAM(s) Oral three times a day  folic acid 1 milliGRAM(s) Oral daily  gabapentin 100 milliGRAM(s) Oral daily  glucagon  Injectable 1 milliGRAM(s) IntraMuscular once  heparin   Injectable 5000 Unit(s) SubCutaneous every 8 hours  insulin glargine Injectable (LANTUS) 10 Unit(s) SubCutaneous at bedtime  insulin lispro (ADMELOG) corrective regimen sliding scale   SubCutaneous Before meals and at bedtime  insulin lispro Injectable (ADMELOG) 7 Unit(s) SubCutaneous three times a day before meals  metoprolol tartrate 25 milliGRAM(s) Oral two times a day  pantoprazole    Tablet 40 milliGRAM(s) Oral before breakfast  sodium chloride 0.9%. 1000 milliLiter(s) (10 mL/Hr) IV Continuous <Continuous>                                    9.9    16.73 )-----------( 131      ( 04 Dec 2020 03:36 )             32.2       12-04    135  |  95<L>  |  53<H>  ----------------------------<  135<H>  4.9   |  25  |  8.19<H>    Ca    8.7      04 Dec 2020 03:36  Phos  6.3     12  Mg     2.7         TPro  5.5<L>  /  Alb  3.3  /  TBili  0.3  /  DBili  x   /  AST  53<H>  /  ALT  93<H>  /  AlkPhos  53                Daily     Daily Weight in k.1 (04 Dec 2020 00:00)      -03 @ 07:01  -   @ 07:00  --------------------------------------------------------  IN: 436.4 mL / OUT: 60 mL / NET: 376.4 mL        Critically Ill patient  : [ ] preoperative ,   [x ] post operative    Requires :  [x ] Arterial Line   [ x] Central Line  [ ] PA catheter  [ ] IABP  [ ] ECMO  [ ] LVAD  [ ] Ventilator  [ ] pacemaker [ ] Impella.                      [ x] ABG's     [x ] Pulse Oxymetry Monitoring  Bedside evaluation , monitoring , treatment of hemodynamics , fluids , IVP/ IVCD meds.        Diagnosis:     POD 4 - LHC with stent deployed. Recover of fractured left heart catheterization angioplasty balloon and wire via transverse aortotomy             - Sternotomy     Chest tube drainage     Requires chest PT, pulmonary toilet, suctioning to maintain SaO2,  patent airway and treat atelectasis.     DM type 2     ESRD     [x ] Hemodialysis [ ] Hemofiltration:    [x ] negative fluid balance [ ] even fluid balance [ ] positive fluid balance, in a hemodynamically unstable patient.     Hyperkalemia    ECG     CHF- acute [ x]   chronic [x ]    systolic [ x]   diatolic [ ]          - Echo- EF -  25%           [ ] RV dysfunction          - Cxr-cardiomegally, edema          - Clinical-  [ ]inotropes   [ ]pressors   [ ]diuresis   [ ]IABP   [ ]ECMO   [ ]LVAD   [ ]Respiratory Failure      Thrombocytopenia     Stents    DM - Lantus + Admelog                By signing my name below, I, Valery Tavares, attest that this documentation has been prepared under the direction and in the presence of Deni Melendrez MD.   Electronically Signed: Chauncey Vee. 20 @ 07:25    IDeni, personally performed the services described in this documentation. All medical record entries made by the scribe were at my direction and in my presence. I have reviewed the chart and agree that the record reflects my personal performance and is accurate and complete.   Deni Melendrez MD.     Discussed with CT surgeon, Physician's Assistant - Nurse Practitioner- Critical care medicine team.   Dicussed at  AM / PM rounds.   Chart, labs , films reviewed.    Total Time: 20 min

## 2020-12-04 NOTE — PROGRESS NOTE ADULT - PROBLEM SELECTOR PLAN 4
Called patient to set up Botox.  She reports she works 10 days on and 10 days off.  While attempting to schedule patient reports she will call back to schedule as she would like this in [facility] however dates offered in February were not working with her schedule.    Continue Lantus 10u qhs & Admelog 7u TID premeal   Check FS TID premeal/qhs and cover w/ ISS prn   Consistent carbohydrate diet

## 2020-12-05 NOTE — PROGRESS NOTE ADULT - ASSESSMENT
54 y/o M w/ PMHX of CAD s/p CABG x 4 (LIMA to LAD, SVG to Diagonal, OM1, PDA on 19), HTN, HLD, ESRD on PD for 3 years, T2DM on insulin and diabetic neuropathy with PVD  . Pt presents with complaints of increasing SOB, dizziness and mild chest pain with exertion.  Pt had abnormal nuclear stress test and subsequent cardiac catheterization revealed multivessel CAD with in-graft re-stenosis. Film was reviewed with Dr. Butterfield and pt presented for [facility] on  to attempt to stent the native arteries. Stent was successfully deployed, but on removal of the balloon, the wire fractured and the balloon remained stuck in the L main. Pt was subsequently taken to the OR for removal of the angioplasty balloon and fractured wire via transverse aortotomy.     : s/p recovery of fractured [facility] angioplasty balloon and fractured wire via transverse aortotomy. Received 2u PRBC intra-op. Required  and Levo gtts post-op.   : Extubated 0420. Pressors and inotropes weaned off. Dialyzed for 1000cc. Chest tubes with minimal output. Started on Lopressor. HD stable.   : Dialyzed for 1000cc.   12/3: Plavix initiated.   : R pleural and med CTs d/c'd - post-removal CXR showed no PTX. HD today for 1000cc. L groin shiley d/c'd as pt is to resume PD - minimal oozing from site, resolved s/p sandbag, no hematoma noted, will continue to monitor. Will continue current medication regimen.    54 y/o M w/ PMHX of CAD s/p CABG x 4 (LIMA to LAD, SVG to Diagonal, OM1, PDA on 19), HTN, HLD, ESRD on PD for 3 years, T2DM on insulin and diabetic neuropathy with PVD  . Pt presents with complaints of increasing SOB, dizziness and mild chest pain with exertion.  Pt had abnormal nuclear stress test and subsequent cardiac catheterization revealed multivessel CAD with in-graft re-stenosis. Film was reviewed with Dr. Butterfield and pt presented for [facility] on  to attempt to stent the native arteries. Stent was successfully deployed, but on removal of the balloon, the wire fractured and the balloon remained stuck in the L main. Pt was subsequently taken to the OR for removal of the angioplasty balloon and fractured wire via transverse aortotomy.   : s/p recovery of fractured [facility] angioplasty balloon and fractured wire via transverse aortotomy. Received 2u PRBC intra-op. Required  and Levo gtts post-op.   : Extubated 0420. Pressors and inotropes weaned off. Dialyzed for 1000cc. Chest tubes with minimal output. Started on Lopressor. HD stable.   : Dialyzed for 1000cc.   12/3: Plavix initiated.   : R pleural and med CTs d/c'd - post-removal CXR showed no PTX. HD today for 1000cc. L groin shiley d/c'd as pt is to resume PD - minimal oozing from site, resolved s/p sandbag, no hematoma noted, will continue to monitor. Will continue current medication regimen.    VSS: PD today as per nephrology; left groin cdi armen- neg bleedin/ hematoma noted at site; endo consulted for uncontrolled dm; p2y12 302- d/w Dr. Butterfield d/c plavix and change to brilinta

## 2020-12-05 NOTE — PATIENT PROFILE ADULT - TOBACCO USE
Assessment/Plan:    Abdominal pain, chronic, right lower quadrant    Ultrasound CT were both negative  Does have an appointment with Urology and is going be seeing Gastroenterology another 2 weeks  Has tried Bentyl and simethicone without relief  He has also tried naproxen not helped  Will try Pepcid today due to the fact that he is having epigastric tenderness as well and does have a history of hoarse voice which she is seeing GI for period also due to complaints increased gas and bloating will try this         Problem List Items Addressed This Visit        Cardiovascular and Mediastinum    Benign essential hypertension    Relevant Orders    Lipid panel    Comprehensive metabolic panel       Other    Enlarged prostate without lower urinary tract symptoms (luts)    Relevant Orders    PSA Total, Diagnostic    Abdominal pain, chronic, right lower quadrant       Ultrasound CT were both negative  Does have an appointment with Urology and is going be seeing Gastroenterology another 2 weeks  Has tried Bentyl and simethicone without relief  He has also tried naproxen not helped  Will try Pepcid today due to the fact that he is having epigastric tenderness as well and does have a history of hoarse voice which she is seeing GI for period also due to complaints increased gas and bloating will try this           Other Visit Diagnoses     Needs flu shot    -  Primary    Relevant Orders    influenza vaccine, [id number], quadrivalent, recombinant, PF, 0 5 mL, for patients 18 yr+ (FLUBLOK) (Completed)    Epigastric pain        Relevant Medications    famotidine (PEPCID) 20 mg tablet    Bloating        Relevant Medications    famotidine (PEPCID) 20 mg tablet    Right lower quadrant abdominal pain        Relevant Medications    famotidine (PEPCID) 20 mg tablet            Subjective:      Patient ID: Daisy Rey is a 60 y o  male  HPI  59 y/o M here for right lower quadrant pain  He has been having it for last 6 months  He has been seen and   In the emergency department for it  He was recently seen at [facility] last month and did have a CT which was unremarkable  He did have a trace amount of blood in his urine  He does have an appointment with Urology tomorrow and does have a consultation with Gastroenterology in another 2 weeks  He denies any changes with bowel movement  Denies any problems urinating  No fevers or chills  He denies any acid reflux  He states that the symptoms do get worse with food he does get lot of gases  Cannot say which specific foods have caused the symptoms  The following portions of the patient's history were reviewed and updated as appropriate:   He  has a past medical history of History of chronic constipation, History of neck pain (11/21/2017), and Hypertension  He   Patient Active Problem List    Diagnosis Date Noted    Abdominal pain, chronic, right lower quadrant 05/13/2019    Right-sided low back pain with right-sided sciatica 06/28/2018    Hoarseness of voice 05/17/2018    Acute pain of both knees 01/24/2018    Chronic tension headache 01/24/2018    Left shoulder pain 06/07/2017    Benign colon polyp 03/24/2017    Enlarged prostate without lower urinary tract symptoms (luts) 07/18/2016    Varicocele 01/12/2016    Mild vitamin D deficiency 06/10/2015    Impingement syndrome, shoulder, left 05/16/2014    Pain in joint of right hip 02/04/2014    Microscopic hematuria 11/19/2013    Renal calculus, right 11/19/2013    Atypical chest pain 06/19/2013    Benign essential hypertension 10/10/2012    Abnormal glucose 10/10/2012     He  has a past surgical history that includes Cystoscopy (05/29/2014) and Tooth extraction  His family history includes Arthritis in his family; Cancer in his family and sister; Diabetes in his family and paternal grandmother  He  reports that he has quit smoking  His smoking use included cigarettes   He has never used smokeless tobacco  He reports that he drinks alcohol  He reports that he does not use drugs  Current Outpatient Medications   Medication Sig Dispense Refill    lisinopril (ZESTRIL) 5 mg tablet TAKE 1 TABLET BY MOUTH EVERY DAY 90 tablet 1    naproxen (NAPROSYN) 500 mg tablet Take 1 tablet (500 mg total) by mouth 2 (two) times a day with meals 10 tablet 0    famotidine (PEPCID) 20 mg tablet Take 1 tablet (20 mg total) by mouth 2 (two) times a day 30 tablet 0    tamsulosin (FLOMAX) 0 4 mg Take 1 capsule (0 4 mg total) by mouth daily with dinner (Patient not taking: Reported on 10/22/2019) 90 capsule 0     No current facility-administered medications for this visit  Current Outpatient Medications on File Prior to Visit   Medication Sig    lisinopril (ZESTRIL) 5 mg tablet TAKE 1 TABLET BY MOUTH EVERY DAY    naproxen (NAPROSYN) 500 mg tablet Take 1 tablet (500 mg total) by mouth 2 (two) times a day with meals    tamsulosin (FLOMAX) 0 4 mg Take 1 capsule (0 4 mg total) by mouth daily with dinner (Patient not taking: Reported on 10/22/2019)    [DISCONTINUED] acetaminophen (TYLENOL) 650 mg CR tablet Take 1 tablet (650 mg total) by mouth every 8 (eight) hours as needed for mild pain or moderate pain (Patient not taking: Reported on 6/10/2019)    [DISCONTINUED] dicyclomine (BENTYL) 10 mg capsule Take 1 capsule (10 mg total) by mouth 4 (four) times a day (before meals and at bedtime) (Patient not taking: Reported on 6/10/2019)     No current facility-administered medications on file prior to visit  He has No Known Allergies       Review of Systems   Constitutional: Negative for activity change, appetite change, fatigue, fever and unexpected weight change  HENT: Positive for voice change  Negative for ear pain, hearing loss and sore throat  Eyes: Negative for visual disturbance  Respiratory: Negative for cough and wheezing  Cardiovascular: Negative for chest pain     Gastrointestinal: Positive for abdominal distention and abdominal pain  Negative for constipation, diarrhea and vomiting  Genitourinary: Negative for difficulty urinating, dysuria, flank pain and frequency  Musculoskeletal: Negative for arthralgias, back pain and myalgias  Skin: Negative for rash  Neurological: Negative for dizziness and headaches  Psychiatric/Behavioral: Negative for behavioral problems  Objective:      /72 (BP Location: Right arm, Patient Position: Sitting, Cuff Size: Standard)   Pulse 72   Temp 98 °F (36 7 °C) (Temporal)   Resp 18   Ht 5' 3" (1 6 m)   Wt 70 3 kg (155 lb)   SpO2 99%   BMI 27 46 kg/m²          Physical Exam   Constitutional: He is oriented to person, place, and time  He appears well-developed and well-nourished  HENT:   Head: Normocephalic and atraumatic  Right Ear: External ear normal    Left Ear: External ear normal    Nose: Nose normal    Mouth/Throat: Oropharynx is clear and moist    Eyes: Conjunctivae are normal    Neck: Normal range of motion  Neck supple  No thyromegaly present  Cardiovascular: Normal rate, regular rhythm and normal heart sounds  No murmur heard  Pulmonary/Chest: Effort normal and breath sounds normal  No respiratory distress  Abdominal: Soft  Bowel sounds are normal  He exhibits no mass  There is no tenderness (epigastric area and RLQ mildly)  There is no guarding  Musculoskeletal: Normal range of motion  Lymphadenopathy:     He has no cervical adenopathy  Neurological: He is alert and oriented to person, place, and time  Skin: Skin is warm  Psychiatric: He has a normal mood and affect  His behavior is normal    Nursing note and vitals reviewed  Former smoker

## 2020-12-05 NOTE — PROGRESS NOTE ADULT - SUBJECTIVE AND OBJECTIVE BOX
VITAL SIGNS    Telemetry:    Vital Signs Last 24 Hrs  T(C): 36.4 (20 @ 07:06), Max: 36.7 (20 @ 10:20)  T(F): 97.5 (20 @ 07:06), Max: 98 (20 @ 10:20)  HR: 73 (20 @ 07:06) (73 - 83)  BP: 109/64 (20 @ 07:06) (92/55 - 141/87)  RR: 18 (20 @ 07:06) (11 - 26)  SpO2: 100% (20 @ 07:06) (82% - 100%)             07:01  -   07:00  --------------------------------------------------------  IN: 200 mL / OUT: 1000 mL / NET: -800 mL       Daily     Daily Weight in k.7 (05 Dec 2020 05:00)  Admit Wt: Drug Dosing Weight  Height (cm): 170.2 (2020 14:25)  Weight (kg): 77.1 (2020 14:25)  BMI (kg/m2): 26.6 (2020 14:25)  BSA (m2): 1.89 (2020 14:25)      CAPILLARY BLOOD GLUCOSE  159 (04 Dec 2020 17:00)  166 (04 Dec 2020 15:00)      POCT Blood Glucose.: 234 mg/dL (05 Dec 2020 07:24)  POCT Blood Glucose.: 177 mg/dL (04 Dec 2020 21:25)  POCT Blood Glucose.: 159 mg/dL (04 Dec 2020 16:56)  POCT Blood Glucose.: 166 mg/dL (04 Dec 2020 15:15)  POCT Blood Glucose.: 94 mg/dL (04 Dec 2020 11:22)          aspirin  chewable 81 milliGRAM(s) Oral daily  atorvastatin 80 milliGRAM(s) Oral at bedtime  clopidogrel Tablet 75 milliGRAM(s) Oral daily  ferrous    sulfate 325 milliGRAM(s) Oral three times a day  folic acid 1 milliGRAM(s) Oral daily  gabapentin 100 milliGRAM(s) Oral daily  gentamicin 0.1% Ointment 1 Application(s) Topical once  glucagon  Injectable 1 milliGRAM(s) IntraMuscular once  heparin   Injectable 5000 Unit(s) SubCutaneous every 8 hours  insulin glargine Injectable (LANTUS) 10 Unit(s) SubCutaneous at bedtime  insulin lispro (ADMELOG) corrective regimen sliding scale   SubCutaneous Before meals and at bedtime  insulin lispro Injectable (ADMELOG) 7 Unit(s) SubCutaneous three times a day before meals  metoprolol tartrate 25 milliGRAM(s) Oral two times a day  oxyCODONE    IR 5 milliGRAM(s) Oral every 6 hours PRN  oxyCODONE    IR 10 milliGRAM(s) Oral every 6 hours PRN  pantoprazole    Tablet 40 milliGRAM(s) Oral before breakfast  sevelamer carbonate 800 milliGRAM(s) Oral three times a day with meals  sodium chloride 0.9%. 1000 milliLiter(s) IV Continuous <Continuous>      PHYSICAL EXAM    Subjective: "Hi.   Neurology: alert and oriented x 3, nonfocal, no gross deficits  CV : tele:  RSR  Sternal Wound :  CDI with dressing , Stable  Lungs: clear. RR easy, unlabored   Abdomen: soft, nontender, nondistended, positive bowel sounds, bowel movement   Neg N/V/D   :  pt voiding without difficulty   Extremities:   MASON; edema, neg calf tenderness.   PPP bilaterally      PW:  Chest tubes:                 VITAL SIGNS    Telemetry:  rsr 1st 70-80  Vital Signs Last 24 Hrs  T(C): 36.4 (20 @ 07:06), Max: 36.7 (20 @ 10:20)  T(F): 97.5 (20 @ 07:06), Max: 98 (20 @ 10:20)  HR: 73 (20 @ 07:06) (73 - 83)  BP: 109/64 (20 @ 07:06) (92/55 - 141/87)  RR: 18 (20 @ 07:06) (11 - 26)  SpO2: 100% (20 @ 07:06) (82% - 100%)             07:01  -   07:00  --------------------------------------------------------  IN: 200 mL / OUT: 1000 mL / NET: -800 mL       Daily     Daily Weight in k.7 (05 Dec 2020 05:00)  Admit Wt: Drug Dosing Weight  Height (cm): 170.2 (2020 14:25)  Weight (kg): 77.1 (2020 14:25)  BMI (kg/m2): 26.6 (2020 14:25)  BSA (m2): 1.89 (2020 14:25)      CAPILLARY BLOOD GLUCOSE  159 (04 Dec 2020 17:00)  166 (04 Dec 2020 15:00)      POCT Blood Glucose.: 234 mg/dL (05 Dec 2020 07:24)  POCT Blood Glucose.: 177 mg/dL (04 Dec 2020 21:25)  POCT Blood Glucose.: 159 mg/dL (04 Dec 2020 16:56)  POCT Blood Glucose.: 166 mg/dL (04 Dec 2020 15:15)  POCT Blood Glucose.: 94 mg/dL (04 Dec 2020 11:22)          aspirin  chewable 81 milliGRAM(s) Oral daily  atorvastatin 80 milliGRAM(s) Oral at bedtime  clopidogrel Tablet 75 milliGRAM(s) Oral daily  ferrous    sulfate 325 milliGRAM(s) Oral three times a day  folic acid 1 milliGRAM(s) Oral daily  gabapentin 100 milliGRAM(s) Oral daily  gentamicin 0.1% Ointment 1 Application(s) Topical once  glucagon  Injectable 1 milliGRAM(s) IntraMuscular once  heparin   Injectable 5000 Unit(s) SubCutaneous every 8 hours  insulin glargine Injectable (LANTUS) 10 Unit(s) SubCutaneous at bedtime  insulin lispro (ADMELOG) corrective regimen sliding scale   SubCutaneous Before meals and at bedtime  insulin lispro Injectable (ADMELOG) 7 Unit(s) SubCutaneous three times a day before meals  metoprolol tartrate 25 milliGRAM(s) Oral two times a day  oxyCODONE    IR 5 milliGRAM(s) Oral every 6 hours PRN  oxyCODONE    IR 10 milliGRAM(s) Oral every 6 hours PRN  pantoprazole    Tablet 40 milliGRAM(s) Oral before breakfast  sevelamer carbonate 800 milliGRAM(s) Oral three times a day with meals  sodium chloride 0.9%. 1000 milliLiter(s) IV Continuous <Continuous>      PHYSICAL EXAM    Subjective: "Hi."   Neurology: alert and oriented x 3, nonfocal, no gross deficits  CV : tele:  RSR 1st 70-80   Sternal Wound :  CDI w/ prevena   Lungs: clear. RR easy, unlabored   Abdomen: soft, nontender, nondistended, positive bowel sounds, + bowel movement   Neg N/V/D  :  +PD catheter cdi with dressing  Extremities:  MASON; trace LE edema, neg calf tenderness.   PPP bilaterally; bilateral groin sites cdi armen- neg hematoma/ bleeding noted

## 2020-12-05 NOTE — CHART NOTE - NSCHARTNOTEFT_GEN_A_CORE
54y/o male with no known implantable devices noted and COVID 19 negative with PMHX of  CAD, s/p CABG x 4 ( LIMA to LAD, SVG to Diagonal , OM1 , PDA on 8/8/19 , HTN, HLD, ESRD on PD for 3 years, and T2DM on insulin compliant with meds uncomplicated does not recall last hgb AIC + diabetic neuropathy with PVD  . Pt presents with complaints of increasing SOB , dizziness and mild chest pain with exertion.       Recommend increase Lantus to 18units qhs (patient will likely need more basal insulin he is on tuojeo 80units qhs at home, however as patient is on dialysis will cautiously titrate insulin)   Can give Lantus 20units qhs instead if qh BG>300  Recommend in Admelog to 11units premeal   Recommend low correctional premeal and qhs  FS goal 140-180  FS premeal and qhs   Please check 2am FS and cover with low correctional scale     official consult to follow tmw    Tre Aguilar MD  Endocrine Fellow   Pager

## 2020-12-05 NOTE — PROGRESS NOTE ADULT - PROBLEM SELECTOR PLAN 4
Continue Lantus 10u qhs & Admelog 7u TID premeal   Check FS TID premeal/qhs and cover w/ ISS prn   Consistent carbohydrate diet Continue Lantus 10u qhs & Admelog 7u TID premeal   Check FS TID premeal/qhs and cover w/ ISS prn   Consistent carbohydrate diet  endo consulted today

## 2020-12-05 NOTE — PROVIDER CONTACT NOTE (OTHER) - BACKGROUND
pt w/ PMH HTN, HLD, ESRD on PD for 3 years, PVD, and T2DM on insulin, A1C 7.8pt s/p LHC, ruptured balloon, RTOR to recover fractured [facility] angioplasty balloon and wire via aortotomy/sternotomy. pt w/ PMH HTN, HLD, ESRD on PD for 3 years, PVD, and T2DM on insulin, A1C 7.8. pt s/p LHC, ruptured balloon, RTOR to recover fractured C angioplasty balloon and wire via aortotomy/sternotomy.

## 2020-12-05 NOTE — PROVIDER CONTACT NOTE (OTHER) - RECOMMENDATIONS
per endocrine note, give 20 units langus if FS > 300. per endocrine note, give 20 units lantus if FS > 300.

## 2020-12-05 NOTE — PROGRESS NOTE ADULT - PROBLEM SELECTOR PLAN 2
s/p stent 11/30   Continue ASA 81 mg QD, Plavix 75 mg QD, Lopressor 25 mg BID & Atorvastatin 80 s/p stent 11/30   Continue ASA 81 mg QD, Lopressor 25 mg BID & Atorvastatin 80  p2y12 302- d/w Dr. gregg; d/c plavix and change to brilinta

## 2020-12-05 NOTE — PROGRESS NOTE ADULT - PROBLEM SELECTOR PLAN 1
s/p recovery of fractured [facility] angioplasty balloon and fractured wire   Continue post-op care   Maintain Prevena dressing to MSI   Chest PT, encourage incentive spirometry   Increase activity as tolerated, encourage ambulation  Pain control, wound care s/p recovery of fractured [facility] angioplasty balloon and fractured wire   Continue post-op care   Maintain Prevena dressing to MSI   Chest PT, encourage incentive spirometry   Increase activity as tolerated, encourage ambulation  Pain control, wound care  PD as per renal  Discharge planning - home when stable

## 2020-12-05 NOTE — PROGRESS NOTE ADULT - PROBLEM SELECTOR PLAN 3
Renal following   HD today for 1000cc, L jasmyn jacinto d/c'd   Pt to be resumed as per renal  Continue Renvela 800 mg TID   Continue Retacrit w/ HD  Check lytes daily Renal following   HD today for 1000cc, L jasmyn jacinto d/c'd 12/4 in ctu   Continue Renvela 800 mg TID   Continue Retacrit w/ HD  Check bmp today  PD today 12/5 as per renal

## 2020-12-05 NOTE — PROGRESS NOTE ADULT - SUBJECTIVE AND OBJECTIVE BOX
Patient seen and examined  No complaints  Seen during PD fluid administration    REVIEW OF SYSTEMS:  As per HPI, otherwise 8 full 10 ROS were unremarkable    MEDICATIONS  (STANDING):  aspirin  chewable 81 milliGRAM(s) Oral daily  atorvastatin 80 milliGRAM(s) Oral at bedtime  clopidogrel Tablet 75 milliGRAM(s) Oral daily  ferrous    sulfate 325 milliGRAM(s) Oral three times a day  folic acid 1 milliGRAM(s) Oral daily  gabapentin 100 milliGRAM(s) Oral daily  gentamicin 0.1% Ointment 1 Application(s) Topical once  glucagon  Injectable 1 milliGRAM(s) IntraMuscular once  heparin   Injectable 5000 Unit(s) SubCutaneous every 8 hours  influenza   Vaccine 0.5 milliLiter(s) IntraMuscular once  insulin glargine Injectable (LANTUS) 10 Unit(s) SubCutaneous at bedtime  insulin lispro (ADMELOG) corrective regimen sliding scale   SubCutaneous Before meals and at bedtime  insulin lispro Injectable (ADMELOG) 7 Unit(s) SubCutaneous three times a day before meals  metoprolol tartrate 25 milliGRAM(s) Oral two times a day  pantoprazole    Tablet 40 milliGRAM(s) Oral before breakfast  sevelamer carbonate 800 milliGRAM(s) Oral three times a day with meals  sodium chloride 0.9%. 1000 milliLiter(s) (10 mL/Hr) IV Continuous <Continuous>      VITAL:  T(C): , Max: 36.7 (12-04-20 @ 17:46)  T(F): , Max: 98 (12-04-20 @ 17:46)  HR: 73 (12-05-20 @ 07:06)  BP: 109/64 (12-05-20 @ 07:06)  BP(mean): 82 (12-05-20 @ 07:06)  RR: 18 (12-05-20 @ 07:06)  SpO2: 100% (12-05-20 @ 07:06)  Wt(kg): --    I and O's:    12-04 @ 07:01  -  12-05 @ 07:00  --------------------------------------------------------  IN: 200 mL / OUT: 1000 mL / NET: -800 mL          PHYSICAL EXAM:    Constitutional: NAD  HEENT: PERRLA, EOMI,  MMM  Neck: No LAD, No JVD  Respiratory: CTAB  Cardiovascular: S1 and S2  Gastrointestinal: BS+, soft, NT/ND  Extremities: No peripheral edema  Neurological: A/O x 3, no focal deficits  Psychiatric: Normal mood, normal affect  : No Johnson  Skin: No rashes  Access: PD catheter, shiley is out     LABS:                        10.6   13.46 )-----------( 158      ( 05 Dec 2020 08:30 )             34.5     12-05    131<L>  |  92<L>  |  63<H>  ----------------------------<  220<H>  5.3   |  21<L>  |  8.57<H>    Ca    8.4      05 Dec 2020 08:30  Phos  6.3     12-04  Mg     2.8     12-05    TPro  5.5<L>  /  Alb  3.3  /  TBili  0.3  /  DBili  x   /  AST  53<H>  /  ALT  93<H>  /  AlkPhos  53  12-04      Assessment and Plan:   · Assessment	   56y/o male with no known implantable devices noted and COVID 19 negative with PMHX of  CAD, s/p CABG x 4 ( LIMA to LAD, SVG to Diagonal , OM1 , PDA on 8/8/19 , HTN, HLD, ESRD on PD for 3 years sp fractured balloon sp aortotomy   Mild metabolic acidosis   Hyperkalemia     1 Renal -To start PD today. shiley catheter is out.  Renvela again for increased Phos     2 Anemia- Retacrit at HD    4 Psych- Improved mental status     Kriss Salas MD  [facility]  Cell: [phone]

## 2020-12-06 NOTE — PROGRESS NOTE ADULT - PROBLEM SELECTOR PLAN 3
Renal following   HD today for 1000cc, L jasmyn jacinto d/c'd 12/4 in ctu   Continue Renvela 800 mg TID   Continue Retacrit w/ HD  Check bmp today  PD today 12/5 as per renal

## 2020-12-06 NOTE — PROGRESS NOTE ADULT - PROBLEM SELECTOR PLAN 1
s/p recovery of fractured [facility] angioplasty balloon and fractured wire   Continue post-op care   Maintain Prevena dressing to MSI   Chest PT, encourage incentive spirometry   Increase activity as tolerated, encourage ambulation  Pain control, wound care  PD as per renal  Discharge planning - home when stable

## 2020-12-06 NOTE — PROGRESS NOTE ADULT - PROBLEM SELECTOR PLAN 4
increase Lantus 18u qhs & Admelog 1u TID premeal   Check FS TID premeal/qhs and cover w/ ISS prn   Consistent carbohydrate diet  endo consulted today

## 2020-12-06 NOTE — PROGRESS NOTE ADULT - PROBLEM SELECTOR PLAN 2
s/p stent 11/30   Continue ASA 81 mg QD, Lopressor 25 mg BID & Atorvastatin 80  p2y12 302- d/w Dr. gregg; d/c plavix and change to brilinta

## 2020-12-06 NOTE — PROGRESS NOTE ADULT - SUBJECTIVE AND OBJECTIVE BOX
VITAL SIGNS-Telemetry:  SR 78 (60-80)  Vital Signs Last 24 Hrs  T(C): 36.7 (20 @ 02:54), Max: 36.8 (20 @ 15:25)  T(F): 98.1 (20 @ 02:54), Max: 98.2 (20 @ 15:25)  HR: 76 (20 @ 04:00) (64 - 78)  BP: 116/75 (20 @ 04:00) (92/57 - 135/79)  RR: 18 (20 @ 04:00) (16 - 18)  SpO2: 100% (20 @ 04:00) (95% - 100%)          @ 07:01  -   @ 07:00  --------------------------------------------------------  IN: 200 mL / OUT: 1000 mL / NET: -800 mL     @ 07:01  -   @ 06:32  --------------------------------------------------------  IN: 39359 mL / OUT: 84743 mL / NET: 500 mL    Daily     Daily Weight in k.3 (05 Dec 2020 18:20)    CAPILLARY BLOOD GLUCOSE  POCT Blood Glucose.: 219 mg/dL (06 Dec 2020 02:34)  POCT Blood Glucose.: 323 mg/dL (05 Dec 2020 21:18)  POCT Blood Glucose.: 309 mg/dL (05 Dec 2020 16:39)  POCT Blood Glucose.: 238 mg/dL (05 Dec 2020 11:25)  POCT Blood Glucose.: 234 mg/dL (05 Dec 2020 07:24)     PHYSICAL EXAM:  Neurology: alert and oriented x 3, nonfocal, no gross deficits  CV : S1S2  Sternal Wound :  CDI , Stable  Lungs: CTA  Abdomen: soft, nontender, nondistended, positive bowel sounds, last bowel movement         Extremities:     no edema, no calf tenderness- l groin ecchymotic    aspirin  chewable 81 milliGRAM(s) Oral daily  atorvastatin 80 milliGRAM(s) Oral at bedtime  ferrous    sulfate 325 milliGRAM(s) Oral three times a day  folic acid 1 milliGRAM(s) Oral daily  gabapentin 100 milliGRAM(s) Oral daily  gentamicin 0.1% Ointment 1 Application(s) Topical once  glucagon  Injectable 1 milliGRAM(s) IntraMuscular once  heparin   Injectable 5000 Unit(s) SubCutaneous every 8 hours  influenza   Vaccine 0.5 milliLiter(s) IntraMuscular once  insulin glargine Injectable (LANTUS) 18 Unit(s) SubCutaneous at bedtime  insulin lispro (ADMELOG) corrective regimen sliding scale   SubCutaneous three times a day before meals  insulin lispro Injectable (ADMELOG) 11 Unit(s) SubCutaneous three times a day before meals  metoprolol tartrate 25 milliGRAM(s) Oral two times a day  oxyCODONE    IR 5 milliGRAM(s) Oral every 6 hours PRN  oxyCODONE    IR 10 milliGRAM(s) Oral every 6 hours PRN  pantoprazole    Tablet 40 milliGRAM(s) Oral before breakfast  sevelamer carbonate 800 milliGRAM(s) Oral three times a day with meals  sodium chloride 0.9%. 1000 milliLiter(s) IV Continuous <Continuous>  ticagrelor 90 milliGRAM(s) Oral every 12 hours    Physical Therapy Rec:   Home  [ x ]   Home w/ PT  [  ]  Rehab  [  ]  Discussed with Cardiothoracic Team at AM rounds. VITAL SIGNS-Telemetry:  SR 78 (60-80)  Vital Signs Last 24 Hrs  T(C): 36.7 (20 @ 02:54), Max: 36.8 (20 @ 15:25)  T(F): 98.1 (20 @ 02:54), Max: 98.2 (20 @ 15:25)  HR: 76 (20 @ 04:00) (64 - 78)  BP: 116/75 (20 @ 04:00) (92/57 - 135/79)  RR: 18 (20 @ 04:00) (16 - 18)  SpO2: 100% (20 @ 04:00) (95% - 100%)          @ 07:01  -   @ 07:00  --------------------------------------------------------  IN: 200 mL / OUT: 1000 mL / NET: -800 mL     @ 07:01  -   @ 06:32  --------------------------------------------------------  IN: 94687 mL / OUT: 08569 mL / NET: 500 mL    Daily     Daily Weight in k.3 (05 Dec 2020 18:20)    CAPILLARY BLOOD GLUCOSE  POCT Blood Glucose.: 219 mg/dL (06 Dec 2020 02:34)  POCT Blood Glucose.: 323 mg/dL (05 Dec 2020 21:18)  POCT Blood Glucose.: 309 mg/dL (05 Dec 2020 16:39)  POCT Blood Glucose.: 238 mg/dL (05 Dec 2020 11:25)  POCT Blood Glucose.: 234 mg/dL (05 Dec 2020 07:24)     PHYSICAL EXAM:  Neurology: alert and oriented x 3, nonfocal, no gross deficits  CV : S1S2  Sternal Wound :  CDI , Stable PREVENA DRESSING  in place  Lungs: CTA  Abdomen: soft, nontender, nondistended, positive bowel sounds, last bowel movement       bm x 2 PD cath dressing cdi  Extremities:     no edema, no calf tenderness- l groin ecchymotic -hyperpigmentation of LE's     aspirin  chewable 81 milliGRAM(s) Oral daily  atorvastatin 80 milliGRAM(s) Oral at bedtime  ferrous    sulfate 325 milliGRAM(s) Oral three times a day  folic acid 1 milliGRAM(s) Oral daily  gabapentin 100 milliGRAM(s) Oral daily  gentamicin 0.1% Ointment 1 Application(s) Topical once  glucagon  Injectable 1 milliGRAM(s) IntraMuscular once  heparin   Injectable 5000 Unit(s) SubCutaneous every 8 hours  influenza   Vaccine 0.5 milliLiter(s) IntraMuscular once  insulin glargine Injectable (LANTUS) 18 Unit(s) SubCutaneous at bedtime  insulin lispro (ADMELOG) corrective regimen sliding scale   SubCutaneous three times a day before meals  insulin lispro Injectable (ADMELOG) 11 Unit(s) SubCutaneous three times a day before meals  metoprolol tartrate 25 milliGRAM(s) Oral two times a day  oxyCODONE    IR 5 milliGRAM(s) Oral every 6 hours PRN  oxyCODONE    IR 10 milliGRAM(s) Oral every 6 hours PRN  pantoprazole    Tablet 40 milliGRAM(s) Oral before breakfast  sevelamer carbonate 800 milliGRAM(s) Oral three times a day with meals  sodium chloride 0.9%. 1000 milliLiter(s) IV Continuous <Continuous>  ticagrelor 90 milliGRAM(s) Oral every 12 hours    Physical Therapy Rec:   Home  [ x ]   Home w/ PT  [  ]  Rehab  [  ]  Discussed with Cardiothoracic Team at AM rounds.

## 2020-12-06 NOTE — CONSULT NOTE ADULT - SUBJECTIVE AND OBJECTIVE BOX
HPI: 54yo M with history of CAD, s/p CABG x 4 ( LIMA to LAD, SVG to Diagonal , OM1 , PDA on 8/8/19 , HTN, HLD, and T2DM c/b ESRD on PD, DM retinopathy, diabetic neuropathy with PVD presented with complaints of increasing SOB , dizziness and mild chest pain with exertion. Stent was successfully deployed, but on removal of the balloon, the wire fractured and the balloon remained stuck in the L main. Pt was subsequently taken to the OR for removal of the angioplasty balloon and fractured wire via transverse aortotomy.   Endocrine consult requested for management of DM2    Endocrine History:  Diagnosed with DM2 about 5 years ago when applying for life insurance.  Reports following with outpt endo Dr Figueroa, does not know most recent A1c.  Patient reports current regimen is Tuojeo 80units qhs and Victoza daily.   Patient does note report hypoglycemia or symptoms of hypoglycemia.   Has history of DM retinopathy s/p injection, ESRD on PD and CAD. Does not report neuropathy.       PAST MEDICAL & SURGICAL HISTORY:  HTN (hypertension)  ESRD (end stage renal disease) on dialysis  CAD, multiple vessel  Diabetes Mellitus 2  S/P CABG (coronary artery bypass graft)      FAMILY HISTORY:  FH: diabetes mellitus in father    Social History:  History of alcohol use. Does not report alcohol use.     Outpatient Medications:  Home Medications:  aspirin 81 mg oral tablet: 1 tab(s) orally once a day (30 Nov 2020 08:38)  atorvastatin 40 mg oral tablet: 0.5 tab(s) orally once a day (30 Nov 2020 14:46)  ferrous sulfate 324 mg (65 mg elemental iron) oral tablet: 1 tab(s) orally 3 times a day (30 Nov 2020 14:46)  folic acid 1 mg oral tablet: 1 tab(s) orally once a day (30 Nov 2020 08:38)  HumaLOG KwikPen 100 units/mL injectable solution: 10 unit(s) injectable 2 times a day (30 Nov 2020 14:46)  metoprolol succinate 50 mg oral tablet, extended release: 1 tab(s) orally once a day (30 Nov 2020 14:46)  Multiple Vitamins oral tablet: 1 tab(s) orally once a day (30 Nov 2020 14:46)  nortriptyline 25 mg oral capsule: 1 cap(s) orally once a day (at bedtime) (30 Nov 2020 08:38)  sevelamer hydrochloride 800 mg oral tablet: 1 tab(s) orally 3 times a day (with meals) (30 Nov 2020 08:38)  Toujeo SoloStar 300 units/mL subcutaneous solution: 80 unit(s) subcutaneous once a day (at bedtime) (30 Nov 2020 14:46)  Vitamin D3 50,000 intl units (1250 mcg) oral capsule: 1 tab(s) orally once a week (30 Nov 2020 14:46)    MEDICATIONS  (STANDING):  aspirin  chewable 81 milliGRAM(s) Oral daily  atorvastatin 80 milliGRAM(s) Oral at bedtime  ferrous    sulfate 325 milliGRAM(s) Oral three times a day  folic acid 1 milliGRAM(s) Oral daily  gabapentin 100 milliGRAM(s) Oral daily  gentamicin 0.1% Ointment 1 Application(s) Topical once  glucagon  Injectable 1 milliGRAM(s) IntraMuscular once  heparin   Injectable 5000 Unit(s) SubCutaneous every 8 hours  influenza   Vaccine 0.5 milliLiter(s) IntraMuscular once  insulin glargine Injectable (LANTUS) 18 Unit(s) SubCutaneous at bedtime  insulin lispro (ADMELOG) corrective regimen sliding scale   SubCutaneous three times a day before meals  insulin lispro Injectable (ADMELOG) 11 Unit(s) SubCutaneous three times a day before meals  metoprolol tartrate 25 milliGRAM(s) Oral two times a day  pantoprazole    Tablet 40 milliGRAM(s) Oral before breakfast  sevelamer carbonate 800 milliGRAM(s) Oral three times a day with meals  sodium chloride 0.9%. 1000 milliLiter(s) (10 mL/Hr) IV Continuous <Continuous>  ticagrelor 90 milliGRAM(s) Oral every 12 hours    MEDICATIONS  (PRN):  oxyCODONE    IR 5 milliGRAM(s) Oral every 6 hours PRN Moderate Pain (4 - 6)  oxyCODONE    IR 10 milliGRAM(s) Oral every 6 hours PRN Severe Pain (7 - 10)    Allergies  doxazosin (Other)    Review of Systems:  Constitutional: No fever  Eyes: No blurry vision  Neuro: No tremors  HEENT: No pain  Cardiovascular: + chest pain, palpitations  Respiratory: +SOB, no cough  GI: No nausea, vomiting, abdominal pain  : No dysuria  Skin: no rash  Psych: no depression  Endocrine: no polyuria, polydipsia  Hem/lymph: no swelling  Osteoporosis: no fractures    ALL OTHER SYSTEMS REVIEWED AND NEGATIVE      PHYSICAL EXAM:  VITALS: T(C): 36.7 (12-06-20 @ 11:08)  T(F): 98 (12-06-20 @ 11:08), Max: 98.2 (12-05-20 @ 15:25)  HR: 67 (12-06-20 @ 11:08) (64 - 78)  BP: 105/58 (12-06-20 @ 11:08) (92/57 - 135/79)  RR:  (16 - 18)  SpO2:  (96% - 100%)  Wt(kg): 77kg   GENERAL: NAD, well-groomed, well-developed  EYES: No proptosis, anicteric  HEENT:  Atraumatic, Normocephalic, moist mucous membranes  THYROID: Normal size, no palpable nodules, nontender   RESPIRATORY: Clear to auscultation bilaterally; No rales, rhonchi, wheezing  CARDIOVASCULAR: Regular rate and rhythm; No murmurs; no peripheral edema, insertion site coved with gauze and dressing   GI: Soft, nontender, non distended, normal bowel sounds  SKIN: Dry, intact, No rashes or lesions  MUSCULOSKELETAL: Full range of motion, normal strength  NEURO: sensation intact, extraocular movements intact, no tremor  PSYCH: Alert and oriented x 3,  reactive affect     POCT Blood Glucose.: 195 mg/dL (12-06-20 @ 11:29)A11 +2  POCT Blood Glucose.: 181 mg/dL (12-06-20 @ 07:36) A11+2  POCT Blood Glucose.: 219 mg/dL (12-06-20 @ 02:34)  POCT Blood Glucose.: 323 mg/dL (12-05-20 @ 21:18) L20 H4   POCT Blood Glucose.: 309 mg/dL (12-05-20 @ 16:39) A7+8  POCT Blood Glucose.: 238 mg/dL (12-05-20 @ 11:25) A7+4  POCT Blood Glucose.: 234 mg/dL (12-05-20 @ 07:24) A7+4  POCT Blood Glucose.: 177 mg/dL (12-04-20 @ 21:25)  POCT Blood Glucose.: 159 mg/dL (12-04-20 @ 16:56)  POCT Blood Glucose.: 166 mg/dL (12-04-20 @ 15:15)  POCT Blood Glucose.: 94 mg/dL (12-04-20 @ 11:22)  POCT Blood Glucose.: 128 mg/dL (12-04-20 @ 07:50)  POCT Blood Glucose.: 162 mg/dL (12-03-20 @ 21:32)  POCT Blood Glucose.: 205 mg/dL (12-03-20 @ 16:02)                            10.4   13.81 )-----------( 174      ( 06 Dec 2020 06:05 )             32.6       12-06    135  |  94<L>  |  64<H>  ----------------------------<  180<H>  4.8   |  23  |  9.51<H>    EGFR if : 6<L>  EGFR if non : 6<L>    Ca    8.5      12-06  Mg     2.8     12-05  Phos  6.3     12-04    TPro  5.5<L>  /  Alb  3.3  /  TBili  0.3  /  DBili  x   /  AST  53<H>  /  ALT  93<H>  /  AlkPhos  53  12-04

## 2020-12-06 NOTE — PROGRESS NOTE ADULT - ASSESSMENT
54 y/o M w/ PMHX of CAD s/p CABG x 4 (LIMA to LAD, SVG to Diagonal, OM1, PDA on 19), HTN, HLD, ESRD on PD for 3 years, T2DM on insulin and diabetic neuropathy with PVD  . Pt presents with complaints of increasing SOB, dizziness and mild chest pain with exertion.  Pt had abnormal nuclear stress test and subsequent cardiac catheterization revealed multivessel CAD with in-graft re-stenosis. Film was reviewed with Dr. Butterfield and pt presented for [facility] on  to attempt to stent the native arteries. Stent was successfully deployed, but on removal of the balloon, the wire fractured and the balloon remained stuck in the L main. Pt was subsequently taken to the OR for removal of the angioplasty balloon and fractured wire via transverse aortotomy.   : s/p recovery of fractured [facility] angioplasty balloon and fractured wire via transverse aortotomy. Received 2u PRBC intra-op. Required  and Levo gtts post-op.   : Extubated 0420. Pressors and inotropes weaned off. Dialyzed for 1000cc. Chest tubes with minimal output. Started on Lopressor. HD stable.   : Dialyzed for 1000cc.   12/3: Plavix initiated.   : R pleural and med CTs d/c'd - post-removal CXR showed no PTX. HD today for 1000cc. L groin shiley d/c'd as pt is to resume PD - minimal oozing from site, resolved s/p sandbag, no hematoma noted, will continue to monitor. Will continue current medication regimen.    VSS: PD today as per nephrology; left groin cdi armen- neg bleedin/ hematoma noted at site; endo consulted for uncontrolled dm; p2y12 302- d/w Dr. Butterfield d/c plavix and change to brilinta    VSS PD . hyperglycemic last evening - lantus & pre meal increased - will monitor closely

## 2020-12-06 NOTE — CONSULT NOTE ADULT - ASSESSMENT
6yo M with history of CAD, s/p CABG x 4 ( LIMA to LAD, SVG to Diagonal , OM1 , PDA on 8/8/19 , HTN, HLD, and T2DM c/b ESRD on PD, DM retinopathy, diabetic neuropathy with PVD presented with complaints of increasing SOB , dizziness and mild chest pain with exertion. Endocrine Consult requested for management of DM2.       uncontrolled DM2   A1c 7.8% in setting of ESRD and anemia less reliable, however patient reports elevated AM fasting BG at home   Patient reports poor appetite as the food is not appetizing   FS goal 100-180  FS premeal and qhs   Recommend Lantus 20units qhs   Recommend Admelog 11units premeal, hold if NPO  Recommend low correctional premeal and qhs   Recommend follow up with outpt Endo on Discharge. Continue basal/bolus insulin on discharge, will reassess dose     HTN   will defer management to primary team     HLD   Continue statin       Discussed with Dr Magdaleno Trevino-Marilynn Aguilar MD  Endocrine Fellow   Pager        6yo M with history of CAD, s/p CABG x 4 ( LIMA to LAD, SVG to Diagonal , OM1 , PDA on 8/8/19 , HTN, HLD, and T2DM c/b ESRD on PD, DM retinopathy, diabetic neuropathy with PVD presented with complaints of increasing SOB , dizziness and mild chest pain with exertion. Endocrine Consult requested for management of DM2.       uncontrolled DM2   A1c 7.8% in setting of ESRD and anemia less reliable, however patient reports elevated AM fasting BG at home   Patient reports poor appetite as the food is not appetizing   FS goal 100-180  FS premeal and qhs   Recommend Lantus 20units qhs   Recommend Admelog 11units premeal, hold if NPO  Recommend mod correctional premeal and qhs   Recommend follow up with outpt Endo on Discharge. Continue basal/bolus insulin on discharge, will reassess dose     HTN   will defer management to primary team     HLD   Continue statin       Discussed with Dr Magdaleno Trevino-Marilynn Aguilar MD  Endocrine Fellow   Pager

## 2020-12-06 NOTE — PROGRESS NOTE ADULT - ASSESSMENT
Denies sob, pain, n/v/d.  PD yesterday with net 500ml fluid removal    Review of systems: AS per HPI, otherwise ROS unremarkable.     aspirin  chewable 81 milliGRAM(s) Oral daily  atorvastatin 80 milliGRAM(s) Oral at bedtime  ferrous    sulfate 325 milliGRAM(s) Oral three times a day  folic acid 1 milliGRAM(s) Oral daily  gabapentin 100 milliGRAM(s) Oral daily  gentamicin 0.1% Ointment 1 Application(s) Topical once  glucagon  Injectable 1 milliGRAM(s) IntraMuscular once  heparin   Injectable 5000 Unit(s) SubCutaneous every 8 hours  influenza   Vaccine 0.5 milliLiter(s) IntraMuscular once  insulin glargine Injectable (LANTUS) 20 Unit(s) SubCutaneous at bedtime  insulin lispro (ADMELOG) corrective regimen sliding scale   SubCutaneous three times a day before meals  insulin lispro (ADMELOG) corrective regimen sliding scale   SubCutaneous at bedtime  insulin lispro Injectable (ADMELOG) 11 Unit(s) SubCutaneous three times a day before meals  metoprolol tartrate 25 milliGRAM(s) Oral two times a day  oxyCODONE    IR 5 milliGRAM(s) Oral every 6 hours PRN  oxyCODONE    IR 10 milliGRAM(s) Oral every 6 hours PRN  pantoprazole    Tablet 40 milliGRAM(s) Oral before breakfast  sevelamer carbonate 800 milliGRAM(s) Oral three times a day with meals  sodium chloride 0.9%. 1000 milliLiter(s) IV Continuous <Continuous>  ticagrelor 90 milliGRAM(s) Oral every 12 hours      VITAL:  T(C): , Max: 36.7 (12-05-20 @ 23:09)  T(F): , Max: 98.1 (12-06-20 @ 02:54)  HR: 68 (12-06-20 @ 15:12)  BP: 111/64 (12-06-20 @ 15:12)  BP(mean): 82 (12-06-20 @ 15:12)  RR: 19 (12-06-20 @ 15:12)  SpO2: 100% (12-06-20 @ 15:12)  Wt(kg): --    12-05-20 @ 07:01  -  12-06-20 @ 07:00  --------------------------------------------------------  IN: 52914 mL / OUT: 37887 mL / NET: 500 mL    12-06-20 @ 07:01  -  12-06-20 @ 18:11  --------------------------------------------------------  IN: 480 mL / OUT: 0 mL / NET: 480 mL        PHYSICAL EXAM:  Constitutional: NAD  HEENT: PERRLA, EOMI,  MMM  Neck: No LAD, No JVD  Respiratory: CTAB  Cardiovascular: S1 and S2  Gastrointestinal: BS+, soft, NT/ND  Extremities: No peripheral edema  Neurological: A/O x 3, no focal deficits  Psychiatric: Normal mood, normal affect  : No Johnson  Skin: No rashes  Access: PD catheter    LABS:                          10.4   13.81 )-----------( 174      ( 06 Dec 2020 06:05 )             32.6     Na(135)/K(4.8)/Cl(94)/HCO3(23)/BUN(64)/Cr(9.51)Glu(180)/Ca(8.5)/Mg(--)/PO4(--)    12-06 @ 06:05  Na(131)/K(5.3)/Cl(92)/HCO3(21)/BUN(63)/Cr(8.57)Glu(220)/Ca(8.4)/Mg(2.8)/PO4(--)    12-05 @ 08:30  Na(135)/K(4.9)/Cl(95)/HCO3(25)/BUN(53)/Cr(8.19)Glu(135)/Ca(8.7)/Mg(2.7)/PO4(6.3)    12-04 @ 03:36            ASSESSMENT/PLAN     56y/o male with no known implantable devices noted and COVID 19 negative with PMHX of  CAD, s/p CABG x 4 ( LIMA to LAD, SVG to Diagonal , OM1 , PDA on 8/8/19 , HTN, HLD, ESRD on PD for 3 years sp fractured balloon sp aortotomy   Mild metabolic acidosis - resolving/ resolved s/p PD yesterday  Hyperkalemia     1 Renal - PD yesterday with 500ml fluid reoved .  s/p  removal of shiley     2 Hyperkalemia- resolving    3 Hyperphosphatemia - Renvela 800mg TID with meals    4 Anemia-  hgb slight trending down s/p Retacrit with PD    5 Lytes- controlled    6  Psych- Improved mental status     Modesto Wayne NP-BC  [employer]  [phone]

## 2020-12-07 NOTE — PROGRESS NOTE ADULT - PROBLEM SELECTOR PLAN 1
FS premeal and qhs   Change Lantus to 18units qhs   Change Admelog 12units premeal, hold if NPO  C/w mod correctional premeal and qhs since pt getting PD during the day for now  Please review use pof insulin pen with pt.   Discharge:   Recommend follow up with outpt Endo on Discharge. Dr Villegas in [place]  Continue basal/bolus insulin on discharge, will reassess doses depending on PD timing and BG at time of discharge.   -Plan discussed with pt/team.  Contact info: [phone] (24/7). pager [phone] FS premeal and qhs   Change Lantus to 18units qhs   Change Admelog 12units premeal, hold if NPO  C/w low dose Admelog correctional premeal and qhs for now  Please review use pof insulin pen with pt.   Discharge:   Recommend follow up with outpt Endo on Discharge. Dr Villegas in [place]  Continue basal/bolus insulin on discharge versus basal/ Victoza daily >explained to pt need to take it every day.  will reassess doses depending on PD timing and BG at time of discharge.   -Plan discussed with pt/team.  Contact info: [phone] (24/7). pager [phone] FS premeal and qhs and 2am   Change Lantus to 18units qhs   Change Admelog 12units premeal, hold if NPO  C/w low dose Admelog correctional premeal and qhs for now  Please review use pof insulin pen with pt.   Discharge:   Recommend follow up with outpt Endo on Discharge. Dr Villegas in [place]  Continue basal/bolus insulin on discharge versus basal/ Victoza daily >explained to pt need to take it every day.  will reassess doses depending on PD timing and BG at time of discharge.   -Plan discussed with pt/team.  Contact info: [phone] (24/7). pager [phone]

## 2020-12-07 NOTE — PROGRESS NOTE ADULT - SUBJECTIVE AND OBJECTIVE BOX
DIABETES FOLLOW UP NOTE: Saw pt earlier today  INTERVAL HX: 56yo M w/h/o uncontrolled T2DM on Toujeo and PRN Victoza(only takes it if FBG >200s). DM c/b CAD, s/p CABG x 4,  ESRD on PD, DM retinopathy, diabetic neuropathy with PVD. Also h/o HTN, HLD. Here with SOB , dizziness and mild chest pain with exertion. S/p HC with stent placement on OM with wire fractured when trying to remove balloon. Pt was subsequently taken to the OR for removal of the angioplasty balloon and fractured wire via transverse aortotomy. Endocrine consult requested for management of DM2. Pt reports tolerating POs with variable BG while on present insulin doses. Noted  by POC but 68 by BMP earlier today and then hyperglycemic ac lunch after first PD exchange. Denies s/sx of hypoglycemia. Pt reports getting PD overnight at home but here getting it during the day. Pt confirms taking Toujeo 80 units q hs at initiation of PD at home. Here requiring lesser amount since pt is not getting PD at  night. Feels well and has no complaints.        Review of Systems:  General: As above  Cardiovascular: No chest pain, palpitations  Respiratory: No SOB, no cough  GI: No nausea, vomiting, abdominal pain  Endocrine: no polyuria, polydipsia or S&Sx of hypoglycemia    Allergies    doxazosin (Other)    Intolerances      MEDICATIONS:  atorvastatin 80 milliGRAM(s) Oral at bedtime  insulin glargine Injectable (LANTUS) 18 Unit(s) SubCutaneous at bedtime  insulin lispro (ADMELOG) corrective regimen sliding scale   SubCutaneous at bedtime  insulin lispro (ADMELOG) corrective regimen sliding scale   SubCutaneous three times a day before meals  insulin lispro Injectable (ADMELOG) 11 Unit(s) SubCutaneous three times a day before meals    PHYSICAL EXAM:  VITALS: T(C): 36.3 (12-07-20 @ 15:05)  T(F): 97.3 (12-07-20 @ 15:05), Max: 98 (12-06-20 @ 19:15)  HR: 69 (12-07-20 @ 15:05) (64 - 77)  BP: 108/63 (12-07-20 @ 15:05) (106/62 - 127/73)  RR:  (17 - 19)  SpO2:  (94% - 100%)  Wt(kg): --  GENERAL: Male laying in bed in NAD  Chest: Sternal incision with dressing D&I  Abdomen: Soft, nontender, non distended. PD cath in place D&I  Extremities: Warm, trace edema in LEs  NEURO: A&O X3    LABS:  POCT Blood Glucose.: 220 mg/dL (12-07-20 @ 11:23)  POCT Blood Glucose.: 104 mg/dL (12-07-20 @ 07:35)  POCT Blood Glucose.: 122 mg/dL (12-06-20 @ 21:17)  POCT Blood Glucose.: 139 mg/dL (12-06-20 @ 16:50)  POCT Blood Glucose.: 195 mg/dL (12-06-20 @ 11:29)  POCT Blood Glucose.: 181 mg/dL (12-06-20 @ 07:36)  POCT Blood Glucose.: 219 mg/dL (12-06-20 @ 02:34)  POCT Blood Glucose.: 323 mg/dL (12-05-20 @ 21:18)  POCT Blood Glucose.: 309 mg/dL (12-05-20 @ 16:39)  POCT Blood Glucose.: 238 mg/dL (12-05-20 @ 11:25)  POCT Blood Glucose.: 234 mg/dL (12-05-20 @ 07:24)  POCT Blood Glucose.: 177 mg/dL (12-04-20 @ 21:25)  POCT Blood Glucose.: 159 mg/dL (12-04-20 @ 16:56)                            10.4   15.63 )-----------( 176      ( 07 Dec 2020 06:28 )             34.2       12-07    132<L>  |  95<L>  |  76<H>  ----------------------------<  68<L>  5.4<H>   |  17<L>  |  10.67<H>    EGFR if non : 5<L>    Ca    8.3<L>      12-07  Mg     2.8     12-05       A1C with Estimated Average Glucose Result: 7.8 % (12-01-20 @ 03:13) Skewed due to anemia of chronic disease      Estimated Average Glucose: 177 mg/dL (12-01-20 @ 03:13)

## 2020-12-07 NOTE — PROGRESS NOTE ADULT - PROBLEM SELECTOR PLAN 1
s/p recovery of fractured [facility] angioplasty balloon and fractured wire   Continue post-op care   Maintain Prevena dressing to MSI   Chest PT, encourage incentive spirometry   Increase activity as tolerated, encourage ambulation  Pain control, wound care  PD as per renal  Discharge planning - home when stable

## 2020-12-07 NOTE — PROGRESS NOTE ADULT - ASSESSMENT
56y/o male with no known implantable devices noted and COVID 19 negative with PMHX of  CAD, s/p CABG x 4 ( LIMA to LAD, SVG to Diagonal , OM1 , PDA on 8/8/19 , HTN, HLD, ESRD on PD for 3 years sp fractured balloon sp aortotomy   Mild metabolic acidosis -    Hyperkalemia     1 Renal - PD to resume today which will correct the potassium and HCO3     2 Hyperphosphatemia - Renvela 800mg TID with meals    3 Anemia-  hgb slight trending down s/p Retacrit with PD    4  Psych- Improved mental status     DC planning for am ?    Sayed Suyapa   [employer]  [phone]

## 2020-12-07 NOTE — PROGRESS NOTE ADULT - SUBJECTIVE AND OBJECTIVE BOX
NEPHROLOGY[facility] [phone]        Patient seen and examined         MEDICATIONS  (STANDING):  aspirin  chewable 81 milliGRAM(s) Oral daily  atorvastatin 80 milliGRAM(s) Oral at bedtime  ferrous    sulfate 325 milliGRAM(s) Oral three times a day  folic acid 1 milliGRAM(s) Oral daily  gabapentin 100 milliGRAM(s) Oral daily  gentamicin 0.1% Ointment 1 Application(s) Topical once  glucagon  Injectable 1 milliGRAM(s) IntraMuscular once  heparin   Injectable 5000 Unit(s) SubCutaneous every 8 hours  influenza   Vaccine 0.5 milliLiter(s) IntraMuscular once  insulin glargine Injectable (LANTUS) 18 Unit(s) SubCutaneous at bedtime  insulin lispro (ADMELOG) corrective regimen sliding scale   SubCutaneous at bedtime  insulin lispro (ADMELOG) corrective regimen sliding scale   SubCutaneous three times a day before meals  insulin lispro Injectable (ADMELOG) 12 Unit(s) SubCutaneous three times a day before meals  metoprolol tartrate 25 milliGRAM(s) Oral two times a day  pantoprazole    Tablet 40 milliGRAM(s) Oral before breakfast  sevelamer carbonate 800 milliGRAM(s) Oral three times a day with meals  sodium chloride 0.9%. 1000 milliLiter(s) (10 mL/Hr) IV Continuous <Continuous>  ticagrelor 90 milliGRAM(s) Oral every 12 hours      VITAL:  T(C): , Max: 37 (12-07-20 @ 16:15)  T(F): , Max: 98.6 (12-07-20 @ 16:15)  HR: 68 (12-07-20 @ 16:32)  BP: 125/72 (12-07-20 @ 16:32)  BP(mean): 92 (12-07-20 @ 16:32)  RR: 18 (12-07-20 @ 16:30)  SpO2: 100% (12-07-20 @ 16:32)  Wt(kg): --    I and O's:    12-06 @ 07:01  -  12-07 @ 07:00  --------------------------------------------------------  IN: 700 mL / OUT: 0 mL / NET: 700 mL    12-07 @ 07:01  -  12-07 @ 17:39  --------------------------------------------------------  IN: 7980 mL / OUT: 6151 mL / NET: 1829 mL          PHYSICAL EXAM:    Constitutional: NAD  Neck:  No JVD  Respiratory: CTAB/L  Cardiovascular: S1 and S2  Gastrointestinal: BS+, soft, NT/ND  Extremities: No peripheral edema  Neurological: A/O x 3, no focal deficits  Psychiatric: Normal mood, normal affect  : No Johnson  Skin: No rashes  Access: Not applicable    LABS:                        10.4   15.63 )-----------( 176      ( 07 Dec 2020 06:28 )             34.2     12-07    132<L>  |  95<L>  |  76<H>  ----------------------------<  68<L>  5.4<H>   |  17<L>  |  10.67<H>    Ca    8.3<L>      07 Dec 2020 06:28            Urine Studies:          RADIOLOGY & ADDITIONAL STUDIES:

## 2020-12-07 NOTE — PROGRESS NOTE ADULT - PROBLEM SELECTOR PLAN 3
Renal following   HD for 1000cc, L femoral ophelia d/c'd 12/4 in ctu -now on PD  Continue Renvela 800 mg TID   Continue Retacrit w/ HD  Check bmp today  PD today 12/5 as per renal

## 2020-12-07 NOTE — PROGRESS NOTE ADULT - PROBLEM SELECTOR PLAN 4
increase Lantus 18u qhs & Admelog 1u TID premeal   Check FS TID premeal/qhs and cover w/ ISS prn   Consistent carbohydrate diet  endo consulted

## 2020-12-07 NOTE — PROGRESS NOTE ADULT - SUBJECTIVE AND OBJECTIVE BOX
im ok    VITAL SIGNS    Telemetry:  nsr 60-80    Vital Signs Last 24 Hrs  T(C): 36.6 (12-07-20 @ 07:08), Max: 36.7 (12-06-20 @ 11:08)  T(F): 97.8 (12-07-20 @ 07:08), Max: 98 (12-06-20 @ 11:08)  HR: 64 (12-07-20 @ 07:08) (64 - 73)  BP: 106/62 (12-07-20 @ 07:08) (105/58 - 127/73)  RR: 18 (12-07-20 @ 07:08) (18 - 19)  SpO2: 100% (12-07-20 @ 07:08) (100% - 100%)                   12-06 @ 07:01  -  12-07 @ 07:00  --------------------------------------------------------  IN: 700 mL / OUT: 0 mL / NET: 700 mL          Daily     Daily             CAPILLARY BLOOD GLUCOSE      POCT Blood Glucose.: 104 mg/dL (07 Dec 2020 07:35)  POCT Blood Glucose.: 122 mg/dL (06 Dec 2020 21:17)  POCT Blood Glucose.: 139 mg/dL (06 Dec 2020 16:50)  POCT Blood Glucose.: 195 mg/dL (06 Dec 2020 11:29)                    Coumadin    [ ] YES          [ x ]      NO                                   PHYSICAL EXAM        Neurology: alert and oriented x 3, nonfocal, no gross deficits  CV : s1 s2 RRR  Sternal Wound :  CDI , Stable  Lungs: cta  Abdomen: soft, nontender, nondistended, positive bowel sounds, last bowel movement                      :    voiding    Extremities:    -  edema   /  -   calve tenderness ,   bilat groin dressings cdi. LE pigmentation changes            aspirin  chewable 81 milliGRAM(s) Oral daily  atorvastatin 80 milliGRAM(s) Oral at bedtime  ferrous    sulfate 325 milliGRAM(s) Oral three times a day  folic acid 1 milliGRAM(s) Oral daily  gabapentin 100 milliGRAM(s) Oral daily  gentamicin 0.1% Ointment 1 Application(s) Topical once  glucagon  Injectable 1 milliGRAM(s) IntraMuscular once  heparin   Injectable 5000 Unit(s) SubCutaneous every 8 hours  influenza   Vaccine 0.5 milliLiter(s) IntraMuscular once  insulin glargine Injectable (LANTUS) 20 Unit(s) SubCutaneous at bedtime  insulin lispro (ADMELOG) corrective regimen sliding scale   SubCutaneous three times a day before meals  insulin lispro (ADMELOG) corrective regimen sliding scale   SubCutaneous at bedtime  insulin lispro Injectable (ADMELOG) 11 Unit(s) SubCutaneous three times a day before meals  metoprolol tartrate 25 milliGRAM(s) Oral two times a day  oxyCODONE    IR 5 milliGRAM(s) Oral every 6 hours PRN  oxyCODONE    IR 10 milliGRAM(s) Oral every 6 hours PRN  pantoprazole    Tablet 40 milliGRAM(s) Oral before breakfast  sevelamer carbonate 800 milliGRAM(s) Oral three times a day with meals  sodium chloride 0.9%. 1000 milliLiter(s) IV Continuous <Continuous>  ticagrelor 90 milliGRAM(s) Oral every 12 hours                    Physical Therapy Rec:   Home  [  ]   Home w/ PT  [  ]  Rehab  [  ]  Discussed with Cardiothoracic Team at AM rounds.

## 2020-12-07 NOTE — PROGRESS NOTE ADULT - ASSESSMENT
56yo M w/h/o uncontrolled T2DM on Toujeo and PRN Victoza(only takes it if FBG >200s). DM c/b CAD, s/p CABG x 4,  ESRD on PD, DM retinopathy, diabetic neuropathy with PVD. Also h/o HTN, HLD. Here with SOB , dizziness and mild chest pain with exertion. S/p HC with stent placement on OM with wire fractured when trying to remove balloon. Pt was subsequently taken to the OR for removal of the angioplasty balloon and fractured wire via transverse aortotomy. Endocrine consult requested for management of DM2. Pt reports tolerating POs with variable BG while on present insulin doses. Fasting BG <100s and then rebound hyperglycemia after 1st PD exchange. Pt getting PD during the day while in hospital and at night at home requiring high basal insulin doses at home (80 units). Will decrease Lantus dose and increase premeal insulin doses to keep BG at goal of 100s to 180s while in hospital.    Pt denies having any hypoglycemia at home while on Toujeo 80 units.     Spent 25 minutes assessing pt/labs/meds and discussing plan of care with primary team. Moderate complexity encounter on pt with uncontrolled T2DM and multiple DM complications and comorbidities.

## 2020-12-07 NOTE — PROGRESS NOTE ADULT - ASSESSMENT
56 y/o M w/ PMHX of CAD s/p CABG x 4 (LIMA to LAD, SVG to Diagonal, OM1, PDA on 19), HTN, HLD, ESRD on PD for 3 years, T2DM on insulin and diabetic neuropathy with PVD  . Pt presents with complaints of increasing SOB, dizziness and mild chest pain with exertion.  Pt had abnormal nuclear stress test and subsequent cardiac catheterization revealed multivessel CAD with in-graft re-stenosis. Film was reviewed with Dr. Butterfield and pt presented for [facility] on  to attempt to stent the native arteries. Stent was successfully deployed, but on removal of the balloon, the wire fractured and the balloon remained stuck in the L main. Pt was subsequently taken to the OR for removal of the angioplasty balloon and fractured wire via transverse aortotomy.   : s/p Right axillary and right femoral cannulation. Redo sternotomy. Recover of fractured left heart catheterization angioplasty balloon and wire via transverse aortotomy.   Received 2u PRBC intra-op. Required  and Levo gtts post-op.   : Extubated 0420. Pressors and inotropes weaned off. Dialyzed for 1000cc. Chest tubes with minimal output. Started on Lopressor. HD stable.   : Dialyzed for 1000cc.   12/3: Plavix initiated.   : R pleural and med CTs d/c'd - post-removal CXR showed no PTX. HD today for 1000cc. L groin shiley d/c'd as pt is to resume PD - minimal oozing from site, resolved s/p sandbag, no hematoma noted, will continue to monitor. Will continue current medication regimen.    VSS: PD today as per nephrology; left groin cdi armen- neg bleedin/ hematoma noted at site; endo consulted for uncontrolled dm; p2y12 302- d/w Dr. Butterfield d/c plavix and change to brilinta    VSS PD . hyperglycemic last evening - lantus & pre meal increased - will monitor closely   PD this am.  Glucose control improving, he was on insulin preoperatively  Transfer to floor

## 2020-12-08 NOTE — PROGRESS NOTE ADULT - ASSESSMENT
56yo M w/h/o uncontrolled T2DM on Toujeo and PRN Victoza(only takes it if FBG >200s). DM c/b CAD, s/p CABG x 4,  ESRD on PD, DM retinopathy, diabetic neuropathy with PVD. Also h/o HTN, HLD. Here with SOB , dizziness and mild chest pain with exertion. S/p HC with stent placement on OM with wire fractured when trying to remove balloon. Pt was subsequently taken to the OR for removal of the angioplasty balloon and fractured wire via transverse aortotomy. Endocrine consult requested for management of DM2.Tolerating POs with daytime hyperglycemia while on PD exchanges. Noted BG in 90s overnight. No hypoglycemia. Will continue to decrease Lantus dose at night and increase day time Admelog when pt is getting PD exchanges.  BG at goal of 100s to 180s while in hospital.    Pt denies having any hypoglycemia at home while on Toujeo 80 units q hs because he does his PD overnight.     Spent 25 minutes assessing pt/labs/meds and discussing plan of care with primary team. Moderate complexity encounter on pt with uncontrolled T2DM and multiple DM complications and comorbidities. Adjusting insulin

## 2020-12-08 NOTE — PROGRESS NOTE ADULT - ASSESSMENT
54y/o male with no known implantable devices noted and COVID 19 negative with PMHX of  CAD, s/p CABG x 4 ( LIMA to LAD, SVG to Diagonal , OM1 , PDA on 8/8/19 , HTN, HLD, ESRD on PD for 3 years sp fractured balloon sp aortotomy   Mild metabolic acidosis -    Hyperkalemia     1 Renal - PD to resume today and he will go back to the cycler if he leaves today   May leave him full in the night for more clearances    2 Hyperphosphatemia - Renvela 800mg TID with meals    3 Anemia-  at goal     4  Psych- Improved mental status     DC planning to subacute rehab possibly    Sayed Zazom  [phone]

## 2020-12-08 NOTE — PROGRESS NOTE ADULT - ASSESSMENT
54 y/o M w/ PMHX of CAD s/p CABG x 4 (LIMA to LAD, SVG to Diagonal, OM1, PDA on 19), HTN, HLD, ESRD on PD for 3 years, T2DM on insulin and diabetic neuropathy with PVD  . Pt presents with complaints of increasing SOB, dizziness and mild chest pain with exertion.  Pt had abnormal nuclear stress test and subsequent cardiac catheterization revealed multivessel CAD with in-graft re-stenosis. Film was reviewed with Dr. Butterfield and pt presented for [facility] on  to attempt to stent the native arteries. Stent was successfully deployed, but on removal of the balloon, the wire fractured and the balloon remained stuck in the L main. Pt was subsequently taken to the OR for removal of the angioplasty balloon and fractured wire via transverse aortotomy.   : s/p Right axillary and right femoral cannulation. Redo sternotomy. Recover of fractured left heart catheterization angioplasty balloon and wire via transverse aortotomy.   Received 2u PRBC intra-op. Required  and Levo gtts post-op.   : Extubated 0420. Pressors and inotropes weaned off. Dialyzed for 1000cc. Chest tubes with minimal output. Started on Lopressor. HD stable.   : Dialyzed for 1000cc.   12/3: Plavix initiated.   : R pleural and med CTs d/c'd - post-removal CXR showed no PTX. HD today for 1000cc. L groin shiley d/c'd as pt is to resume PD - minimal oozing from site, resolved s/p sandbag, no hematoma noted, will continue to monitor. Will continue current medication regimen.    VSS: PD today as per nephrology; left groin cdi armen- neg bleedin/ hematoma noted at site; endo consulted for uncontrolled dm; p2y12 302- d/w Dr. Butterfield d/c plavix and change to brilinta    VSS PD . hyperglycemic last evening - lantus & pre meal increased - will monitor closely   PD this am.  Glucose control improving, he was on insulin preoperatively  Transfer to floor   Daily PD.  Physical therapy-subacute rehab planning

## 2020-12-08 NOTE — PROGRESS NOTE ADULT - SUBJECTIVE AND OBJECTIVE BOX
NEPHROLOGY-[facility] [phone]        Patient seen and examined in bed.  He was in good spirits         MEDICATIONS  (STANDING):  aspirin  chewable 81 milliGRAM(s) Oral daily  atorvastatin 80 milliGRAM(s) Oral at bedtime  ferrous    sulfate 325 milliGRAM(s) Oral three times a day  folic acid 1 milliGRAM(s) Oral daily  gabapentin 100 milliGRAM(s) Oral daily  gentamicin 0.1% Ointment 1 Application(s) Topical once  glucagon  Injectable 1 milliGRAM(s) IntraMuscular once  heparin   Injectable 5000 Unit(s) SubCutaneous every 8 hours  influenza   Vaccine 0.5 milliLiter(s) IntraMuscular once  insulin glargine Injectable (LANTUS) 18 Unit(s) SubCutaneous at bedtime  insulin lispro (ADMELOG) corrective regimen sliding scale   SubCutaneous at bedtime  insulin lispro (ADMELOG) corrective regimen sliding scale   SubCutaneous three times a day before meals  insulin lispro Injectable (ADMELOG) 12 Unit(s) SubCutaneous three times a day before meals  metoprolol tartrate 25 milliGRAM(s) Oral two times a day  pantoprazole    Tablet 40 milliGRAM(s) Oral before breakfast  sevelamer carbonate 800 milliGRAM(s) Oral three times a day with meals  sodium chloride 0.9%. 1000 milliLiter(s) (10 mL/Hr) IV Continuous <Continuous>  ticagrelor 90 milliGRAM(s) Oral every 12 hours      VITAL:  T(C): , Max: 37 (12-07-20 @ 16:15)  T(F): , Max: 98.6 (12-07-20 @ 16:15)  HR: 76 (12-08-20 @ 08:20)  BP: 123/61 (12-08-20 @ 08:20)  BP(mean): 98 (12-08-20 @ 05:38)  RR: 18 (12-08-20 @ 08:20)  SpO2: 96% (12-08-20 @ 08:20)  Wt(kg): --    I and O's:    12-07 @ 07:01  -  12-08 @ 07:00  --------------------------------------------------------  IN: 7980 mL / OUT: 9151 mL / NET: -1171 mL          PHYSICAL EXAM:    Constitutional: NAD  Neck:  No JVD  Respiratory: CTAB/L  Cardiovascular: S1 and S2  Gastrointestinal: BS+, soft, NT/ND  Extremities: No peripheral edema  Neurological: A/O x 3, no focal deficits  Psychiatric: Normal mood, normal affect  : No Johnson  Skin: No rashes  Access: Not applicable    LABS:                        10.6   14.26 )-----------( 209      ( 08 Dec 2020 06:59 )             33.4     12-08    133<L>  |  94<L>  |  74<H>  ----------------------------<  97  4.4   |  19<L>  |  11.67<H>    Ca    8.3<L>      08 Dec 2020 06:59            Urine Studies:          RADIOLOGY & ADDITIONAL STUDIES:

## 2020-12-08 NOTE — PROGRESS NOTE ADULT - SUBJECTIVE AND OBJECTIVE BOX
VITAL SIGNS    Telemetry:      Vital Signs Last 24 Hrs  T(C): 36.7 (20 @ 03:06), Max: 37 (20 @ 16:15)  T(F): 98.1 (20 @ 03:06), Max: 98.6 (20 @ 16:15)  HR: 79 (20 @ 05:38) (66 - 79)  BP: 132/76 (20 @ 05:38) (108/63 - 132/76)  RR: 18 (20 @ 05:38) (17 - 18)  SpO2: 99% (20 @ 05:38) (94% - 100%)                    07:01  -   07:00  --------------------------------------------------------  IN: 7980 mL / OUT: 9151 mL / NET: -1171 mL          Daily     Daily Weight in k (07 Dec 2020 19:50)            CAPILLARY BLOOD GLUCOSE      POCT Blood Glucose.: 91 mg/dL (08 Dec 2020 02:11)  POCT Blood Glucose.: 154 mg/dL (07 Dec 2020 21:45)  POCT Blood Glucose.: 259 mg/dL (07 Dec 2020 16:57)  POCT Blood Glucose.: 389 mg/dL (07 Dec 2020 16:55)  POCT Blood Glucose.: 220 mg/dL (07 Dec 2020 11:23)            Drains:     MS         [  ] Drainage:                 L Pleural  [  ]  Drainage:                R Pleural  [  ]  Drainage:    Pacing Wires        [  ]   Settings:                                  Isolated  [  ]    Coumadin    [ ] YES          [  ]      NO                                   PHYSICAL EXAM        Neurology: alert and oriented x 3, nonfocal, no gross deficits  CV : s1 s2 RRR  Sternal Wound :  CDI , Stable  Lungs: cta  Abdomen: soft, nontender, nondistended, positive bowel sounds, last bowel movement                       chest tubes  :    voiding / keys - sbd         Extremities:      edema   /  -   calve tenderness ,    L leg  /  R leg  incisions cdi          aspirin  chewable 81 milliGRAM(s) Oral daily  atorvastatin 80 milliGRAM(s) Oral at bedtime  ferrous    sulfate 325 milliGRAM(s) Oral three times a day  folic acid 1 milliGRAM(s) Oral daily  gabapentin 100 milliGRAM(s) Oral daily  gentamicin 0.1% Ointment 1 Application(s) Topical once  glucagon  Injectable 1 milliGRAM(s) IntraMuscular once  heparin   Injectable 5000 Unit(s) SubCutaneous every 8 hours  influenza   Vaccine 0.5 milliLiter(s) IntraMuscular once  insulin glargine Injectable (LANTUS) 18 Unit(s) SubCutaneous at bedtime  insulin lispro (ADMELOG) corrective regimen sliding scale   SubCutaneous at bedtime  insulin lispro (ADMELOG) corrective regimen sliding scale   SubCutaneous three times a day before meals  insulin lispro Injectable (ADMELOG) 12 Unit(s) SubCutaneous three times a day before meals  metoprolol tartrate 25 milliGRAM(s) Oral two times a day  oxyCODONE    IR 5 milliGRAM(s) Oral every 6 hours PRN  oxyCODONE    IR 10 milliGRAM(s) Oral every 6 hours PRN  pantoprazole    Tablet 40 milliGRAM(s) Oral before breakfast  sevelamer carbonate 800 milliGRAM(s) Oral three times a day with meals  sodium chloride 0.9%. 1000 milliLiter(s) IV Continuous <Continuous>  ticagrelor 90 milliGRAM(s) Oral every 12 hours                    Physical Therapy Rec:   Home  [  ]   Home w/ PT  [  ]  Rehab  [  ]  Discussed with Cardiothoracic Team at AM rounds. im ok    VITAL SIGNS    Telemetry:  nsr 60-80    Vital Signs Last 24 Hrs  T(C): 36.7 (20 @ 03:06), Max: 37 (20 @ 16:15)  T(F): 98.1 (20 @ 03:06), Max: 98.6 (20 @ 16:15)  HR: 79 (20 @ 05:38) (66 - 79)  BP: 132/76 (20 @ 05:38) (108/63 - 132/76)  RR: 18 (20 @ 05:38) (17 - 18)  SpO2: 99% (20 @ 05:38) (94% - 100%)                    @ 07:01  -   07:00  --------------------------------------------------------  IN: 7980 mL / OUT: 9151 mL / NET: -1171 mL          Daily     Daily Weight in k (07 Dec 2020 19:50)            CAPILLARY BLOOD GLUCOSE      POCT Blood Glucose.: 91 mg/dL (08 Dec 2020 02:11)  POCT Blood Glucose.: 154 mg/dL (07 Dec 2020 21:45)  POCT Blood Glucose.: 259 mg/dL (07 Dec 2020 16:57)  POCT Blood Glucose.: 389 mg/dL (07 Dec 2020 16:55)  POCT Blood Glucose.: 220 mg/dL (07 Dec 2020 11:23)            Drains:       Pacing Wires         Coumadin    [ ] YES          [n  ]      NO                                   PHYSICAL EXAM        Neurology: alert and oriented x 3, nonfocal, no gross deficits  CV : s1 s2 RRR  Sternal Wound :  CDI  provena  R sc inc staples cdi  Lungs: cta  Abdomen: soft, nontender, nondistended, positive bowel sounds, last bowel movement +, dialysis catheter dressing cdi                     :    voiding / PD  Extremities:    trace  edema   /  -   calve tenderness ,    Bilateral groins cdi, R groin stitch in place  Le pigmentation changes        aspirin  chewable 81 milliGRAM(s) Oral daily  atorvastatin 80 milliGRAM(s) Oral at bedtime  ferrous    sulfate 325 milliGRAM(s) Oral three times a day  folic acid 1 milliGRAM(s) Oral daily  gabapentin 100 milliGRAM(s) Oral daily  gentamicin 0.1% Ointment 1 Application(s) Topical once  glucagon  Injectable 1 milliGRAM(s) IntraMuscular once  heparin   Injectable 5000 Unit(s) SubCutaneous every 8 hours  influenza   Vaccine 0.5 milliLiter(s) IntraMuscular once  insulin glargine Injectable (LANTUS) 18 Unit(s) SubCutaneous at bedtime  insulin lispro (ADMELOG) corrective regimen sliding scale   SubCutaneous at bedtime  insulin lispro (ADMELOG) corrective regimen sliding scale   SubCutaneous three times a day before meals  insulin lispro Injectable (ADMELOG) 12 Unit(s) SubCutaneous three times a day before meals  metoprolol tartrate 25 milliGRAM(s) Oral two times a day  oxyCODONE    IR 5 milliGRAM(s) Oral every 6 hours PRN  oxyCODONE    IR 10 milliGRAM(s) Oral every 6 hours PRN  pantoprazole    Tablet 40 milliGRAM(s) Oral before breakfast  sevelamer carbonate 800 milliGRAM(s) Oral three times a day with meals  sodium chloride 0.9%. 1000 milliLiter(s) IV Continuous <Continuous>  ticagrelor 90 milliGRAM(s) Oral every 12 hours                    Physical Therapy Rec:   Home  [  ]   Home w/ PT  [  ]  Rehab  [  ]  Discussed with Cardiothoracic Team at AM rounds.

## 2020-12-08 NOTE — PROGRESS NOTE ADULT - SUBJECTIVE AND OBJECTIVE BOX
DIABETES FOLLOW UP NOTE: Saw pt earlier today  INTERVAL HX: 56yo M w/h/o uncontrolled T2DM on Toujeo and PRN Victoza(only takes it if FBG >200s). DM c/b CAD, s/p CABG x 4,  ESRD on PD, DM retinopathy, diabetic neuropathy with PVD. Also h/o HTN, HLD. Here with SOB, dizziness and mild chest pain with exertion. S/p HC with stent placement on OM with wire fractured when trying to remove balloon. Pt was subsequently taken to the OR for removal of the angioplasty balloon and fractured wire via transverse aortotomy. Endocrine consult requested for management of DM2. Tolerating POs with daytime hyperglycemia while on PD exchanges. Noted BG in 90s overnight. No hypoglycemia.       Review of Systems:  General: As above  Cardiovascular: No chest pain, palpitations  Respiratory: No SOB, no cough  GI: No nausea, vomiting, abdominal pain  Endocrine: No polyuria, polydipsia or S&Sx of hypoglycemia    Allergies    doxazosin (Other)    Intolerances      MEDICATIONS:  atorvastatin 80 milliGRAM(s) Oral at bedtime  insulin glargine Injectable (LANTUS) 16 Unit(s) SubCutaneous at bedtime  insulin lispro (ADMELOG) corrective regimen sliding scale   SubCutaneous at bedtime  insulin lispro (ADMELOG) corrective regimen sliding scale   SubCutaneous three times a day before meals  insulin lispro Injectable (ADMELOG) 16 Unit(s) SubCutaneous three times a day before meals      PHYSICAL EXAM:  VITALS: T(C): 36.6 (12-08-20 @ 15:03)  T(F): 97.8 (12-08-20 @ 15:03), Max: 98.6 (12-07-20 @ 16:15)  HR: 69 (12-08-20 @ 15:03) (66 - 79)  BP: 110/68 (12-08-20 @ 15:03) (104/60 - 132/76)  RR:  (18 - 18)  SpO2:  (96% - 100%)  Wt(kg): --  GENERAL: Male sitting in chair in NAD  Abdomen: Soft, nontender, non distended, central adiposity  Extremities: Warm, trace edema in LEs, + chronic vascular changes in LEs noted darker discoloration and skin dryness.    NEURO: A&O X3    LABS:  POCT Blood Glucose.: 195 mg/dL (12-08-20 @ 11:34)  POCT Blood Glucose.: 110 mg/dL (12-08-20 @ 08:02)  POCT Blood Glucose.: 91 mg/dL (12-08-20 @ 02:11)  POCT Blood Glucose.: 154 mg/dL (12-07-20 @ 21:45)  POCT Blood Glucose.: 259 mg/dL (12-07-20 @ 16:57)  POCT Blood Glucose.: 389 mg/dL (12-07-20 @ 16:55)  POCT Blood Glucose.: 220 mg/dL (12-07-20 @ 11:23)  POCT Blood Glucose.: 104 mg/dL (12-07-20 @ 07:35)  POCT Blood Glucose.: 122 mg/dL (12-06-20 @ 21:17)  POCT Blood Glucose.: 139 mg/dL (12-06-20 @ 16:50)  POCT Blood Glucose.: 195 mg/dL (12-06-20 @ 11:29)  POCT Blood Glucose.: 181 mg/dL (12-06-20 @ 07:36)  POCT Blood Glucose.: 219 mg/dL (12-06-20 @ 02:34)  POCT Blood Glucose.: 323 mg/dL (12-05-20 @ 21:18)  POCT Blood Glucose.: 309 mg/dL (12-05-20 @ 16:39)                            10.6   14.26 )-----------( 209      ( 08 Dec 2020 06:59 )             33.4       12-08    133<L>  |  94<L>  |  74<H>  ----------------------------<  97  4.4   |  19<L>  |  11.67<H>    EGFR if non : 4<L>    Ca    8.3<L>      12-08    A1C with Estimated Average Glucose Result: 7.8 % (12-01-20 @ 03:13)      Estimated Average Glucose: 177 mg/dL (12-01-20 @ 03:13)

## 2020-12-08 NOTE — PROGRESS NOTE ADULT - PROBLEM SELECTOR PLAN 1
s/p recovery of fractured [facility] angioplasty balloon and fractured wire   Continue post-op care   Maintain Prevena dressing to MSI   Chest PT, encourage incentive spirometry   Increase activity as tolerated, encourage ambulation  Pain control, wound care  PD as per renal  Discharge planning - home when stable

## 2020-12-08 NOTE — PROGRESS NOTE ADULT - PROBLEM SELECTOR PLAN 1
FS premeal and qhs and 2am   Change Lantus to 16 units qhs   Change Admelog 16 units premeal, hold if NPO  C/w low dose Admelog correctional premeal and qhs for now  Please review use pof insulin pen with pt.   Discharge:   Recommend follow up with outpt Endo on Discharge. Dr Villegas in [place]  Continue basal/bolus insulin on discharge versus basal/ Victoza daily >explained to pt need to take it every day (GLP1r agonist had not been study on pts with PD but benefits had been documented on pts with ESRD) . Will reassess Toujeo doses depending on PD timing and BG at time of discharge.   -Plan discussed with pt/team.  Contact info: [phone] (24/7). pager [phone]

## 2020-12-09 NOTE — PROVIDER CONTACT NOTE (CRITICAL VALUE NOTIFICATION) - BACKGROUND
56 y/o M w/ PMHX of CAD s/p CABG x 4 (LIMA to LAD, SVG to Diagonal, OM1, PDA on 8/8/19), HTN, HLD, ESRD on PD for 3 years, T2DM on insulin and diabetic neuropathy with PVD s/p.  removal of the angioplasty balloon and fractured wire via transverse aortotomy.

## 2020-12-09 NOTE — PROGRESS NOTE ADULT - ASSESSMENT
56yo M w/h/o uncontrolled T2DM on Toujeo and PRN Victoza(only takes it if FBG >200s). DM c/b CAD, s/p CABG x 4,  ESRD on PD, DM retinopathy, diabetic neuropathy with PVD. Also h/o HTN, HLD. Here with SOB , dizziness and mild chest pain with exertion. S/p HC with stent placement on OM with wire fractured when trying to remove balloon. Pt was subsequently taken to the OR for removal of the angioplasty balloon and fractured wire via transverse aortotomy. Endocrine consult requested for management of DM2. Tolerating POs with daytime hyperglycemia while on PD exchanges. Pt with hypoglycemia this am while on lower Lantus dose. Will continue to decrease Lantus dose at night and increase day time Admelog as needed while on daytime PD exchanges.  BG at goal of 100s to 180s while in hospital.    Pt denies having any hypoglycemia at home while on Toujeo 80 units q hs because he does his PD overnight.     Spent 25 minutes assessing pt/labs/meds and discussing plan of care with primary team. Moderate complexity encounter on pt with uncontrolled T2DM and multiple DM complications and comorbidities. Adjusting insulin

## 2020-12-09 NOTE — PROGRESS NOTE ADULT - ASSESSMENT
56 y/o M w/ PMHX of CAD s/p CABG x 4 (LIMA to LAD, SVG to Diagonal, OM1, PDA on 19), HTN, HLD, ESRD on PD for 3 years, T2DM on insulin and diabetic neuropathy with PVD  . Pt presents with complaints of increasing SOB, dizziness and mild chest pain with exertion.  Pt had abnormal nuclear stress test and subsequent cardiac catheterization revealed multivessel CAD with in-graft re-stenosis. Film was reviewed with Dr. Butterfield and pt presented for [facility] on  to attempt to stent the native arteries. Stent was successfully deployed, but on removal of the balloon, the wire fractured and the balloon remained stuck in the L main. Pt was subsequently taken to the OR for removal of the angioplasty balloon and fractured wire via transverse aortotomy.   : s/p Right axillary and right femoral cannulation. Redo sternotomy. Recover of fractured left heart catheterization angioplasty balloon and wire via transverse aortotomy.   Received 2u PRBC intra-op. Required  and Levo gtts post-op.   : Extubated 0420. Pressors and inotropes weaned off. Dialyzed for 1000cc. Chest tubes with minimal output. Started on Lopressor. HD stable.   : Dialyzed for 1000cc.   12/3: Plavix initiated.   : R pleural and med CTs d/c'd - post-removal CXR showed no PTX. HD today for 1000cc. L groin shiley d/c'd as pt is to resume PD - minimal oozing from site, resolved s/p sandbag, no hematoma noted, will continue to monitor. Will continue current medication regimen.    VSS: PD today as per nephrology; left groin cdi armen- neg bleedin/ hematoma noted at site; endo consulted for uncontrolled dm; p2y12 302- d/w Dr. Butterfield d/c plavix and change to brilinta    VSS PD . hyperglycemic last evening - lantus & pre meal increased - will monitor closely   PD this am.  Glucose control improving, he was on insulin preoperatively  Transfer to floor   Daily PD.  Physical therapy-subacute rehab planning   Pt is not eligible for rehab placement d/t PD.  Daily PT, OT consult.  D/c planning for Friday.

## 2020-12-09 NOTE — OCCUPATIONAL THERAPY INITIAL EVALUATION ADULT - PERTINENT HX OF CURRENT PROBLEM, REHAB EVAL
54y/o male with no known implantable devices noted and COVID 19 negative with PMHX of  CAD, s/p CABG x 4 ( LIMA to LAD, SVG to Diagonal , OM1 , PDA on 8/8/19 , HTN, HLD, ESRD on PD for 3 years, and T2DM on insulin compliant with meds uncomplicated does not recall last hgb AIC + diabetic neuropathy with PVD.

## 2020-12-09 NOTE — OCCUPATIONAL THERAPY INITIAL EVALUATION ADULT - ASR WT BEARING STATUS EVAL
no weight-bearing restrictions/Con't: Pt presents with complaints of increasing SOB , dizziness and mild chest pain with exertion .  Pt had nuclear stress test which was abnormal. Cardiac catheterization revealed distal left main 50% stenosis, Proximal to mid % stenosis, proximal first diagonal 80% stenosis, mid OM2 70& stenosis, proximal Ramus 70% stenosis, Prox % stenosis, Coronary grafts LIMA artery graft attached to distal LAD is patent . SVG graft originating from aorta to distal RCA totally occluded SVG to Om1 totally occluded, SVG to first diagonal totally occluded.  Film reviewed with Dr. Butterfield. Pt presents  today for [facility] and attempt to stent the native.  Pt uses cane to walk due to weakness and balance issues on lower extremities. Xray Chest(12/5):(-) ECG(12/3): ST & T WAVE ABNORMALITY, CONSIDER LATERAL ISCHEMIA Brief Op(11/30): Right axillary and right femoral cannulation. Redo sternotomy. Recover of fractured left heart catheterization angioplasty balloon and wire via transverse aortotomy.

## 2020-12-09 NOTE — PROGRESS NOTE ADULT - SUBJECTIVE AND OBJECTIVE BOX
im ok  VITAL SIGNS    Telemetry:  nsr 70    Vital Signs Last 24 Hrs  T(C): 36.7 (20 @ 10:03), Max: 36.8 (20 @ 09:40)  T(F): 98 (20 @ 10:03), Max: 98.2 (20 @ 09:40)  HR: 93 (20 @ 10:03) (69 - 93)  BP: 113/89 (20 @ 10:03) (98/59 - 131/79)  RR: 18 (20 @ 10:03) (16 - 18)  SpO2: 100% (20 @ 10:03) (96% - 100%)                    @ 07:01  -   @ 07:00  --------------------------------------------------------  IN: 63250 mL / OUT: 9600 mL / NET: 820 mL     @ 07:01  -   @ 11:12  --------------------------------------------------------  IN: 2500 mL / OUT: 0 mL / NET: 2500 mL          Daily     Daily Weight in k.2 (09 Dec 2020 10:10)            CAPILLARY BLOOD GLUCOSE      POCT Blood Glucose.: 208 mg/dL (09 Dec 2020 11:04)  POCT Blood Glucose.: 280 mg/dL (09 Dec 2020 08:36)  POCT Blood Glucose.: 98 mg/dL (09 Dec 2020 08:08)  POCT Blood Glucose.: 60 mg/dL (09 Dec 2020 07:50)  POCT Blood Glucose.: 67 mg/dL (09 Dec 2020 07:49)  POCT Blood Glucose.: 145 mg/dL (09 Dec 2020 02:19)  POCT Blood Glucose.: 212 mg/dL (08 Dec 2020 21:30)  POCT Blood Glucose.: 226 mg/dL (08 Dec 2020 16:53)  POCT Blood Glucose.: 195 mg/dL (08 Dec 2020 11:34)            Drains:         Pacing Wires            Coumadin    [ ] YES          [ x ]      NO                                   PHYSICAL EXAM  Neurology: alert and oriented x 3, nonfocal, no gross deficits  CV : s1 s2 RRR  Sternal Wound :  CDI  provena  R sc inc staples cdi  Lungs: cta  Abdomen: soft, nontender, nondistended, positive bowel sounds, last bowel movement +, dialysis catheter dressing cdi                     :    voiding / PD  Extremities:    trace  edema   /  -   calve tenderness ,    Bilateral groins cdi, R groin stitch in place  Le pigmentation changes              aspirin  chewable 81 milliGRAM(s) Oral daily  atorvastatin 80 milliGRAM(s) Oral at bedtime  ferrous    sulfate 325 milliGRAM(s) Oral three times a day  folic acid 1 milliGRAM(s) Oral daily  gabapentin 100 milliGRAM(s) Oral daily  gentamicin 0.1% Ointment 1 Application(s) Topical once  glucagon  Injectable 1 milliGRAM(s) IntraMuscular once  heparin   Injectable 5000 Unit(s) SubCutaneous every 8 hours  influenza   Vaccine 0.5 milliLiter(s) IntraMuscular once  insulin glargine Injectable (LANTUS) 12 Unit(s) SubCutaneous at bedtime  insulin lispro (ADMELOG) corrective regimen sliding scale   SubCutaneous at bedtime  insulin lispro (ADMELOG) corrective regimen sliding scale   SubCutaneous three times a day before meals  insulin lispro Injectable (ADMELOG) 16 Unit(s) SubCutaneous three times a day before meals  metoprolol tartrate 25 milliGRAM(s) Oral two times a day  oxyCODONE    IR 5 milliGRAM(s) Oral every 6 hours PRN  oxyCODONE    IR 10 milliGRAM(s) Oral every 6 hours PRN  pantoprazole    Tablet 40 milliGRAM(s) Oral before breakfast  sevelamer carbonate 800 milliGRAM(s) Oral three times a day with meals  sodium chloride 0.9%. 1000 milliLiter(s) IV Continuous <Continuous>  ticagrelor 90 milliGRAM(s) Oral every 12 hours                    Physical Therapy Rec:   Home  [  ]   Home w/ PT  [  ]  Rehab  [  ]  Discussed with Cardiothoracic Team at AM rounds.

## 2020-12-09 NOTE — PROGRESS NOTE ADULT - SUBJECTIVE AND OBJECTIVE BOX
DIABETES FOLLOW UP NOTE: Saw pt earlier today  INTERVAL HX: 56yo M w/h/o uncontrolled T2DM on Toujeo and PRN Victoza(only takes it if FBG >200s). DM c/b CAD, s/p CABG x 4,  ESRD on PD, DM retinopathy, diabetic neuropathy with PVD. Also h/o HTN, HLD. Here with SOB , dizziness and mild chest pain with exertion. S/p HC with stent placement on OM with wire fractured when trying to remove balloon. Pt was subsequently taken to the OR for removal of the angioplasty balloon and fractured wire via transverse aortotomy. Endocrine consult requested for management of DM2. Tolerating POs with daytime hyperglycemia while on PD exchanges. Pt with BG in 60s by POC and 40s by BMP this am. Pt feeling tired and sleepy but no other symptoms.     Review of Systems:  General: As above  Cardiovascular: No chest pain, palpitations  Respiratory: No SOB, no cough  GI: No nausea, vomiting, abdominal pain  Endocrine: Hypoglycemia as above    Allergies    doxazosin (Other)    Intolerances      MEDICATIONS :  atorvastatin 80 milliGRAM(s) Oral at bedtime  insulin glargine Injectable (LANTUS) 12 Unit(s) SubCutaneous at bedtime  insulin lispro (ADMELOG) corrective regimen sliding scale   SubCutaneous at bedtime  insulin lispro (ADMELOG) corrective regimen sliding scale   SubCutaneous three times a day before meals  insulin lispro Injectable (ADMELOG) 16 Unit(s) SubCutaneous three times a day before meals      PHYSICAL EXAM:  VITALS: T(C): 36.7 (12-09-20 @ 10:03)  T(F): 98 (12-09-20 @ 10:03), Max: 98.2 (12-09-20 @ 09:40)  HR: 94 (12-09-20 @ 11:40) (69 - 97)  BP: 90/57 (12-09-20 @ 11:06) (90/57 - 130/73)  RR:  (16 - 18)  SpO2:  (96% - 100%)  Wt(kg): --  GENERAL: Male laying in bed in NAD  Abdomen: Soft, nontender, non distended, central adiposity. PD in place D&I  Extremities: Warm, trace edema in LEs, + chronic vascular changes in LEs noted darker discoloration and skin dryness.    NEURO: A&O X3      LABS:  POCT Blood Glucose.: 208 mg/dL (12-09-20 @ 11:04)  POCT Blood Glucose.: 280 mg/dL (12-09-20 @ 08:36)  POCT Blood Glucose.: 98 mg/dL (12-09-20 @ 08:08)  POCT Blood Glucose.: 60 mg/dL (12-09-20 @ 07:50)  POCT Blood Glucose.: 67 mg/dL (12-09-20 @ 07:49)  POCT Blood Glucose.: 145 mg/dL (12-09-20 @ 02:19)  POCT Blood Glucose.: 212 mg/dL (12-08-20 @ 21:30)  POCT Blood Glucose.: 226 mg/dL (12-08-20 @ 16:53)  POCT Blood Glucose.: 195 mg/dL (12-08-20 @ 11:34)  POCT Blood Glucose.: 110 mg/dL (12-08-20 @ 08:02)  POCT Blood Glucose.: 91 mg/dL (12-08-20 @ 02:11)  POCT Blood Glucose.: 154 mg/dL (12-07-20 @ 21:45)  POCT Blood Glucose.: 259 mg/dL (12-07-20 @ 16:57)  POCT Blood Glucose.: 389 mg/dL (12-07-20 @ 16:55)  POCT Blood Glucose.: 220 mg/dL (12-07-20 @ 11:23)  POCT Blood Glucose.: 104 mg/dL (12-07-20 @ 07:35)  POCT Blood Glucose.: 122 mg/dL (12-06-20 @ 21:17)  POCT Blood Glucose.: 139 mg/dL (12-06-20 @ 16:50)                            10.8   14.37 )-----------( 207      ( 09 Dec 2020 06:38 )             34.3       12-09    138  |  94<L>  |  75<H>  ----------------------------<  49<LL>  3.8   |  22  |  11.92<H>      EGFR if non : 4<L>    Ca    8.1<L>      12-09      A1C with Estimated Average Glucose Result: 7.8 % (12-01-20 @ 03:13)      Estimated Average Glucose: 177 mg/dL (12-01-20 @ 03:13)

## 2020-12-09 NOTE — OCCUPATIONAL THERAPY INITIAL EVALUATION ADULT - LIVES WITH, PROFILE
Pt lives in a pvt home w/ spouse & daughters +4STE. Pt was independent w/ ADLs and IADLS PTA, has a walk-in shower w/ no DME, owns a cane & RW. As per chart, pt has a HHA 4 hr/ 4 days a week to assist w/ ADL's as needed.

## 2020-12-09 NOTE — PROGRESS NOTE ADULT - PROBLEM SELECTOR PLAN 1
FS premeal and qhs and 2am   Changed Lantus to 12 units qhs   Change Admelog to 18 units premeal, hold if NPO or not getting daytime PD  C/w low dose Admelog correctional premeal and qhs for now  Please review use pof insulin pen with pt.   Discharge:   Recommend follow up with outpt Endo on Discharge. Dr Villegas in [place]  Continue basal/bolus insulin on discharge versus basal/ Victoza daily >explained to pt need to take it every day (GLP1r agonist had not been studied on pts with PD but benefits had been documented on pts with ESRD) . Will reassess Toujeo doses depending on PD timing and BG at time of discharge.   -Plan discussed with pt/team.  Contact info: [phone] (24/7). pager [phone]

## 2020-12-09 NOTE — PROGRESS NOTE ADULT - SUBJECTIVE AND OBJECTIVE BOX
[facility] [phone]        Patient seen and examined in bed.  He was in great spirits         MEDICATIONS  (STANDING):  aspirin  chewable 81 milliGRAM(s) Oral daily  atorvastatin 80 milliGRAM(s) Oral at bedtime  ferrous    sulfate 325 milliGRAM(s) Oral three times a day  folic acid 1 milliGRAM(s) Oral daily  gabapentin 100 milliGRAM(s) Oral daily  gentamicin 0.1% Ointment 1 Application(s) Topical once  glucagon  Injectable 1 milliGRAM(s) IntraMuscular once  heparin   Injectable 5000 Unit(s) SubCutaneous every 8 hours  influenza   Vaccine 0.5 milliLiter(s) IntraMuscular once  insulin glargine Injectable (LANTUS) 12 Unit(s) SubCutaneous at bedtime  insulin lispro (ADMELOG) corrective regimen sliding scale   SubCutaneous at bedtime  insulin lispro (ADMELOG) corrective regimen sliding scale   SubCutaneous three times a day before meals  insulin lispro Injectable (ADMELOG) 16 Unit(s) SubCutaneous three times a day before meals  metoprolol tartrate 25 milliGRAM(s) Oral two times a day  pantoprazole    Tablet 40 milliGRAM(s) Oral before breakfast  sevelamer carbonate 800 milliGRAM(s) Oral three times a day with meals  sodium chloride 0.9%. 1000 milliLiter(s) (10 mL/Hr) IV Continuous <Continuous>  ticagrelor 90 milliGRAM(s) Oral every 12 hours      VITAL:  T(C): , Max: 36.8 (12-08-20 @ 11:00)  T(F): , Max: 98.2 (12-08-20 @ 11:00)  HR: 93 (12-09-20 @ 10:03)  BP: 113/89 (12-09-20 @ 10:03)  BP(mean): 95 (12-09-20 @ 03:39)  RR: 18 (12-09-20 @ 10:03)  SpO2: 100% (12-09-20 @ 10:03)  Wt(kg): --    I and O's:    12-08 @ 07:01  -  12-09 @ 07:00  --------------------------------------------------------  IN: 90849 mL / OUT: 9600 mL / NET: 820 mL    12-09 @ 07:01  -  12-09 @ 10:51  --------------------------------------------------------  IN: 2500 mL / OUT: 0 mL / NET: 2500 mL          PHYSICAL EXAM:    Constitutional: NAD  Neck:  No JVD  Respiratory: CTAB/L  Cardiovascular: S1 and S2  Gastrointestinal: BS+, soft, NT/ND + PD   Extremities: No peripheral edema  Neurological: A/O x 3, no focal deficits  Psychiatric: Normal mood, normal affect  : No Johnson  Skin: No rashes  Access: Not applicable    LABS:                        10.8   14.37 )-----------( 207      ( 09 Dec 2020 06:38 )             34.3     12-09    138  |  94<L>  |  75<H>  ----------------------------<  49<LL>  3.8   |  22  |  11.92<H>    Ca    8.1<L>      09 Dec 2020 06:39            Urine Studies:          RADIOLOGY & ADDITIONAL STUDIES:

## 2020-12-09 NOTE — CHART NOTE - NSCHARTNOTEFT_GEN_A_CORE
Pt seen for consult: diet education. Pt reports fair-good po intake at this time, able to eat most of meal but admits he has an appetite mostly or starch and veggies and has lost his taste for meats and fish. He also reports avoiding eggs from his diet 2/2 cholesterol content. Diet recall reveals pt has 1-2 larger meals in the day of noodles and veggie in broth or rice with veggie (bok marj), snacks on fruits (banana, orange or apple)but diet overall lacking in high biological protein content and lacks variety in general. Pt aware of basic PD education and states he has a list of foods at home that he is allowed to eat or should avoid. Knows to limit beans in diet for phosphorous content but also states he is limiting broccoli intake as well. Pt reports inconsistent timing of his meals at home 2/2 his wife's schedule. He also voiced concerns re: glycemic management and asks if he should eat less to help control blood sugar.    Pt is a 55 year old male with [facility] Nephrology following. On Renvela 800mg TID with meals for hyperphosphatemia.    Source: Patient [x]    Family [ ]     other [x] EMR    Diet:     Patient reports [ ] nausea  [ ] vomiting [ ] diarrhea [ ] constipation  [ ]chewing problems [ ] swallowing issues  [ ] other:     PO intake:  < 50% [ ] 50-75% [ ]   % [ ]  other :    Source for PO intake [ ] Patient [ ] family [ ] chart [ ] staff [ ] other    Enteral /Parenteral Nutrition:       Current Weight: Weight (kg): 77.4 (12-06 @ 07:17)  % Weight Change    Pertinent Medications: MEDICATIONS  (STANDING):  aspirin  chewable 81 milliGRAM(s) Oral daily  atorvastatin 80 milliGRAM(s) Oral at bedtime  ferrous    sulfate 325 milliGRAM(s) Oral three times a day  folic acid 1 milliGRAM(s) Oral daily  gabapentin 100 milliGRAM(s) Oral daily  gentamicin 0.1% Ointment 1 Application(s) Topical once  glucagon  Injectable 1 milliGRAM(s) IntraMuscular once  heparin   Injectable 5000 Unit(s) SubCutaneous every 8 hours  influenza   Vaccine 0.5 milliLiter(s) IntraMuscular once  insulin glargine Injectable (LANTUS) 12 Unit(s) SubCutaneous at bedtime  insulin lispro (ADMELOG) corrective regimen sliding scale   SubCutaneous at bedtime  insulin lispro (ADMELOG) corrective regimen sliding scale   SubCutaneous three times a day before meals  insulin lispro Injectable (ADMELOG) 16 Unit(s) SubCutaneous three times a day before meals  metoprolol tartrate 25 milliGRAM(s) Oral two times a day  pantoprazole    Tablet 40 milliGRAM(s) Oral before breakfast  sevelamer carbonate 800 milliGRAM(s) Oral three times a day with meals  sodium chloride 0.9%. 1000 milliLiter(s) (10 mL/Hr) IV Continuous <Continuous>  ticagrelor 90 milliGRAM(s) Oral every 12 hours    MEDICATIONS  (PRN):  oxyCODONE    IR 5 milliGRAM(s) Oral every 6 hours PRN Moderate Pain (4 - 6)  oxyCODONE    IR 10 milliGRAM(s) Oral every 6 hours PRN Severe Pain (7 - 10)    Pertinent Labs:  12-09 Na138 mmol/L Glu 49 mg/dL<LL> K+ 3.8 mmol/L Cr  11.92 mg/dL<H> BUN 75 mg/dL<H> 12-04 Phos 6.3 mg/dL<H> 12-04 Alb 3.3 g/dL      Skin:     Estimated Needs:   [ ] no change since previous assessment  [ ] recalculated:       Previous Nutrition Diagnosis:     [ ] Inadequate Energy Intake [ ]Inadequate Oral Intake [ ] Excessive Energy Intake     [ ] Underweight [ ] Increased Nutrient Needs [ ] Overweight/Obesity     [ ] Altered GI Function [ ] Unintended Weight Loss [ ] Food & Nutrition Related Knowledge Deficit [ ] Malnutrition          Nutrition Diagnosis is [ ] ongoing  [ ] resolved [ ] not applicable          New Nutrition Diagnosis: [ ] not applicable    [ ] Inadequate Protein Energy Intake [ ]Inadequate Oral Intake [ ] Excessive Energy Intake     [ ] Underweight [ ] Increased Nutrient Needs [ ] Overweight/Obesity     [ ] Altered GI Function [ ] Unintended Weight Loss [ ] Food & Nutrition Related Knowledge Deficit[ ] Limited Adherence to nutrition related recommendations [ ] Malnutrition  [ ] other: Free text       Related to:      As evidenced by:      Interventions:     Recommend    [ ] Change Diet To:    [ ] Nutrition Supplement    [ ] Nutrition Support    [ ] Other:        Monitoring and Evaluation:     [ ] PO intake [ ] Tolerance to diet prescription [ ] weights [ ] follow up per protocol    [ ] other: Pt seen for consult: diet education, son request to be educated as well. Pt reports fair-good po intake at this time, able to eat most of meal but admits he has an appetite mostly or starch and veggies and has lost his taste for meats and fish. He also reports avoiding eggs from his diet 2/2 cholesterol content. Diet recall reveals pt has 1-2 larger meals in the day of noodles and veggie in broth or rice with veggie (bok marj), snacks on fruits (orange or apple) but diet overall lacking in high biological protein content and lacks variety in general. Pt aware of basic PD education and states he has a list of foods at home that he is allowed to eat or should avoid. Knows to limit beans in diet for phosphorous content but also states he is limiting broccoli and banana intake as well. Pt reports inconsistent timing of his meals at home 2/2 his wife's schedule. He also voiced concerns re: glycemic management and asks if he should eat less to help control blood sugar.    Pt is a 55 year old male with PMH CAD s/p CABG x 4 (LIMA to LAD, SVG to Diagonal, OM1, PDA on 8/8/19), HTN, HLD, ESRD on PD for 3 years, T2DM on insulin and diabetic neuropathy with PVD. Pt presents with complaints of increasing SOB, dizziness and mild chest pain with exertion.  Pt had abnormal nuclear stress test and subsequent cardiac catheterization revealed multivessel CAD. Now s/p stent 11/30. Nephrology following. On Renvela 800mg TID with meals for hyperphosphatemia. Hypoglycemic noted. Endo following for glycemic control, insulin adjusted.     Source: Patient [x]    Family [ ]     other [x] EMR    Diet: Consistent CHO Renal, No Snacks, DASH/TLC  Nepro x 2 daily (850 kcal, 38 grams)    Patient reports [ ] nausea  [ ] vomiting [ ] diarrhea [ ] constipation  [ ]chewing problems [ ] swallowing issues  [x] other: aversion to meats/chicken/fish    PO intake:  < 50% [ ] 50-75% [x]   % [ ]  other :    Source for PO intake [x] Patient [ ] family [ ] chart [ ] staff [ ] other    Enteral /Parenteral Nutrition: n/a    Current Weight: 165.7 pounds (current, standing, +1 generalized edema, +2 B/L foot edema). UBW per pt 170 pounds with no recent significant changes that he is aware of.    Pertinent Medications: MEDICATIONS  (STANDING):  aspirin  chewable 81 milliGRAM(s) Oral daily  atorvastatin 80 milliGRAM(s) Oral at bedtime  ferrous    sulfate 325 milliGRAM(s) Oral three times a day  folic acid 1 milliGRAM(s) Oral daily  gabapentin 100 milliGRAM(s) Oral daily  gentamicin 0.1% Ointment 1 Application(s) Topical once  glucagon  Injectable 1 milliGRAM(s) IntraMuscular once  heparin   Injectable 5000 Unit(s) SubCutaneous every 8 hours  influenza   Vaccine 0.5 milliLiter(s) IntraMuscular once  insulin glargine Injectable (LANTUS) 12 Unit(s) SubCutaneous at bedtime  insulin lispro (ADMELOG) corrective regimen sliding scale   SubCutaneous at bedtime  insulin lispro (ADMELOG) corrective regimen sliding scale   SubCutaneous three times a day before meals  insulin lispro Injectable (ADMELOG) 16 Unit(s) SubCutaneous three times a day before meals  metoprolol tartrate 25 milliGRAM(s) Oral two times a day  pantoprazole    Tablet 40 milliGRAM(s) Oral before breakfast  sevelamer carbonate 800 milliGRAM(s) Oral three times a day with meals  sodium chloride 0.9%. 1000 milliLiter(s) (10 mL/Hr) IV Continuous <Continuous>  ticagrelor 90 milliGRAM(s) Oral every 12 hours    MEDICATIONS  (PRN):  oxyCODONE    IR 5 milliGRAM(s) Oral every 6 hours PRN Moderate Pain (4 - 6)  oxyCODONE    IR 10 milliGRAM(s) Oral every 6 hours PRN Severe Pain (7 - 10)    Pertinent Labs:  12-09 Na138 mmol/L Glu 49 mg/dL<LL> K+ 3.8 mmol/L Cr  11.92 mg/dL<H> BUN 75 mg/dL<H> 12-04 Phos 6.3 mg/dL<H> 12-04 Alb 3.3 g/dL  CAPILLARY BLOOD GLUCOSE  POCT Blood Glucose.: 208 mg/dL (09 Dec 2020 11:04)  POCT Blood Glucose.: 280 mg/dL (09 Dec 2020 08:36)  POCT Blood Glucose.: 98 mg/dL (09 Dec 2020 08:08)  POCT Blood Glucose.: 60 mg/dL (09 Dec 2020 07:50)  POCT Blood Glucose.: 67 mg/dL (09 Dec 2020 07:49)  POCT Blood Glucose.: 145 mg/dL (09 Dec 2020 02:19)  POCT Blood Glucose.: 212 mg/dL (08 Dec 2020 21:30)  POCT Blood Glucose.: 226 mg/dL (08 Dec 2020 16:53)  POCT Blood Glucose.: 195 mg/dL (08 Dec 2020 11:34)      Skin: No pressure injuries       Estimated Needs:   [x] no change since previous assessment  [ ] recalculated:       Previous Nutrition Diagnosis: Inadequate protein-energy intake, Increased nutrient needs         Nutrition Diagnosis is [x] ongoing  [ ] resolved [ ] not applicable          New Nutrition Diagnosis: [x] not applicable        Interventions:     1) Recommend liberalize diet to low phosphorous as K+ and Na not typically restricted in PD patients  2) Educated pt on PD and DM diet basics. Expressed concern that pt is not consuming enough high biological value proteins. Discussed more mixed macronutrient meals to promote adequate protein intake, discussed health benefits of eggs and less of a concern for impact on cholesterol; pt amenable to include eggs in diet. Clarified PD dietary recommendations and encouraged pt to consume more varied vegetables and fruits that contain potassium, encouraged pt to consume more regular meal times 2/2 hypoglycemia risk with meal skipping. Attempted to call son but he did not answer, left message.  3) Endo and Nephrology continue to follow     Monitoring and Evaluation:     [x] PO intake [x] Tolerance to diet prescription [x] weights [x] follow up per protocol    [x] other: RD remains available: Esther Denis MS, RDN, CDN, CDE, [facility] #[phone] Pt seen for consult: diet education, son request to be educated as well. Pt reports fair-good po intake at this time, able to eat most of meal but admits he has an appetite mostly for starch and veggies and has lost his taste for meats and fish. He also reports avoiding eggs from his diet 2/2 cholesterol content. Diet recall reveals pt has 1-2 larger meals in the day of noodles and veggie in broth or rice with veggie (bok marj), snacks on fruits (orange or apple) but diet overall lacking in high biological protein content and lacks variety in general. Pt aware of basic PD education and states he has a list of foods at home that he is allowed to eat or should avoid. Knows to limit beans in diet for phosphorous content but also states he is limiting broccoli and banana intake as well. Pt reports inconsistent timing of his meals at home 2/2 his wife's schedule. He also voiced concerns re: glycemic management and asks if he should eat less to help control blood sugar.    Pt is a 55 year old male with PMH CAD s/p CABG x 4 (LIMA to LAD, SVG to Diagonal, OM1, PDA on 8/8/19), HTN, HLD, ESRD on PD for 3 years, T2DM on insulin and diabetic neuropathy with PVD. Pt presents with complaints of increasing SOB, dizziness and mild chest pain with exertion.  Pt had abnormal nuclear stress test and subsequent cardiac catheterization revealed multivessel CAD. Now s/p stent 11/30. Nephrology following. On Renvela 800mg TID with meals for hyperphosphatemia. Hypoglycemic noted. Endo following for glycemic control, insulin adjusted.     Source: Patient [x]    Family [ ]     other [x] EMR    Diet: Consistent CHO Renal, No Snacks, DASH/TLC  Nepro x 2 daily (850 kcal, 38 grams)    Patient reports [ ] nausea  [ ] vomiting [ ] diarrhea [ ] constipation  [ ]chewing problems [ ] swallowing issues  [x] other: aversion to meats/chicken/fish    PO intake meals:  < 50% [ ] 50-75% [x]   % [ ]  other :  PO intake supplements: inconsistent intake of Nepro, ~1 container daily    Source for PO intake [x] Patient [ ] family [ ] chart [ ] staff [ ] other    Enteral /Parenteral Nutrition: n/a    Current Weight: 165.7 pounds (current, standing, +1 generalized edema, +2 B/L foot edema). UBW per pt 170 pounds with no recent significant changes that he is aware of.    Pertinent Medications: MEDICATIONS  (STANDING):  aspirin  chewable 81 milliGRAM(s) Oral daily  atorvastatin 80 milliGRAM(s) Oral at bedtime  ferrous    sulfate 325 milliGRAM(s) Oral three times a day  folic acid 1 milliGRAM(s) Oral daily  gabapentin 100 milliGRAM(s) Oral daily  gentamicin 0.1% Ointment 1 Application(s) Topical once  glucagon  Injectable 1 milliGRAM(s) IntraMuscular once  heparin   Injectable 5000 Unit(s) SubCutaneous every 8 hours  influenza   Vaccine 0.5 milliLiter(s) IntraMuscular once  insulin glargine Injectable (LANTUS) 12 Unit(s) SubCutaneous at bedtime  insulin lispro (ADMELOG) corrective regimen sliding scale   SubCutaneous at bedtime  insulin lispro (ADMELOG) corrective regimen sliding scale   SubCutaneous three times a day before meals  insulin lispro Injectable (ADMELOG) 16 Unit(s) SubCutaneous three times a day before meals  metoprolol tartrate 25 milliGRAM(s) Oral two times a day  pantoprazole    Tablet 40 milliGRAM(s) Oral before breakfast  sevelamer carbonate 800 milliGRAM(s) Oral three times a day with meals  sodium chloride 0.9%. 1000 milliLiter(s) (10 mL/Hr) IV Continuous <Continuous>  ticagrelor 90 milliGRAM(s) Oral every 12 hours    MEDICATIONS  (PRN):  oxyCODONE    IR 5 milliGRAM(s) Oral every 6 hours PRN Moderate Pain (4 - 6)  oxyCODONE    IR 10 milliGRAM(s) Oral every 6 hours PRN Severe Pain (7 - 10)    Pertinent Labs:  12-09 Na138 mmol/L Glu 49 mg/dL<LL> K+ 3.8 mmol/L Cr  11.92 mg/dL<H> BUN 75 mg/dL<H> 12-04 Phos 6.3 mg/dL<H> 12-04 Alb 3.3 g/dL  CAPILLARY BLOOD GLUCOSE  POCT Blood Glucose.: 208 mg/dL (09 Dec 2020 11:04)  POCT Blood Glucose.: 280 mg/dL (09 Dec 2020 08:36)  POCT Blood Glucose.: 98 mg/dL (09 Dec 2020 08:08)  POCT Blood Glucose.: 60 mg/dL (09 Dec 2020 07:50)  POCT Blood Glucose.: 67 mg/dL (09 Dec 2020 07:49)  POCT Blood Glucose.: 145 mg/dL (09 Dec 2020 02:19)  POCT Blood Glucose.: 212 mg/dL (08 Dec 2020 21:30)  POCT Blood Glucose.: 226 mg/dL (08 Dec 2020 16:53)  POCT Blood Glucose.: 195 mg/dL (08 Dec 2020 11:34)      Skin: No pressure injuries       Estimated Needs:   [x] no change since previous assessment  [ ] recalculated:       Previous Nutrition Diagnosis: Inadequate protein-energy intake, Increased nutrient needs         Nutrition Diagnosis is [x] ongoing  [ ] resolved [ ] not applicable          New Nutrition Diagnosis: [x] not applicable        Interventions:     1) Recommend liberalize diet to low phosphorous as K+ and Na not typically restricted in PD patients  -May continue nepro to supplement meals  2) Educated pt on PD and DM diet basics. Expressed concern that pt is not consuming enough high biological value proteins. Discussed more mixed macronutrient meals to promote adequate protein intake, discussed health benefits of eggs and less of a concern for impact on cholesterol; pt amenable to include eggs in diet. Clarified PD dietary recommendations and encouraged pt to consume more varied vegetables and fruits that contain potassium, encouraged pt to consume more regular meal times 2/2 hypoglycemia risk with meal skipping. Attempted to call son but he did not answer, left message.  3) Endo and Nephrology continue to follow     Monitoring and Evaluation:     [x] PO intake [x] Tolerance to diet prescription [x] weights [x] follow up per protocol    [x] other: RD remains available: Esther Denis MS, RDN, CDN, CDE, [facility]. #[phone] Pt seen for consult: diet education, son request to be educated as well. Pt reports fair-good po intake at this time, able to eat most of meal but admits he has an appetite mostly for starch and veggies and has lost his taste for meats and fish. He also reports avoiding eggs from his diet 2/2 cholesterol content. Diet recall reveals pt has 1-2 larger meals in the day of noodles and veggie in broth or rice with veggie (bok marj), snacks on fruits (orange or apple) but diet overall lacking in high biological protein content and lacks variety in general. Pt aware of basic PD education and states he has a list of foods at home that he is allowed to eat or should avoid. Knows to limit beans in diet for phosphorous content but also states he is limiting broccoli and banana intake as well. Pt reports inconsistent timing of his meals at home 2/2 his wife's schedule. He also voiced concerns re: glycemic management and asks if he should eat less to help control blood sugar.    Pt is a 55 year old male with PMH CAD s/p CABG x 4 (LIMA to LAD, SVG to Diagonal, OM1, PDA on 8/8/19), HTN, HLD, ESRD on PD for 3 years, T2DM on insulin and diabetic neuropathy with PVD. Pt presents with complaints of increasing SOB, dizziness and mild chest pain with exertion.  Pt had abnormal nuclear stress test and subsequent cardiac catheterization revealed multivessel CAD. Now s/p stent 11/30. Nephrology following. On Renvela 800mg TID with meals for hyperphosphatemia. Hypoglycemic noted. Endo following for glycemic control, insulin adjusted.     Source: Patient [x]    Family [ ]     other [x] EMR    Diet: Consistent CHO Renal, No Snacks, DASH/TLC  Nepro x 2 daily (850 kcal, 38 grams)    Patient reports [ ] nausea  [ ] vomiting [ ] diarrhea [ ] constipation  [ ]chewing problems [ ] swallowing issues  [x] other: aversion to meats/chicken/fish    PO intake meals:  < 50% [ ] 50-75% [x]   % [ ]  other :  PO intake supplements: inconsistent intake of Nepro, ~1 container daily    Source for PO intake [x] Patient [ ] family [ ] chart [ ] staff [ ] other    Enteral /Parenteral Nutrition: n/a    Current Weight: 165.7 pounds (current, standing, +1 generalized edema, +2 B/L foot edema). UBW per pt 170 pounds with no recent significant changes that he is aware of.    Pertinent Medications: MEDICATIONS  (STANDING):  aspirin  chewable 81 milliGRAM(s) Oral daily  atorvastatin 80 milliGRAM(s) Oral at bedtime  ferrous    sulfate 325 milliGRAM(s) Oral three times a day  folic acid 1 milliGRAM(s) Oral daily  gabapentin 100 milliGRAM(s) Oral daily  gentamicin 0.1% Ointment 1 Application(s) Topical once  glucagon  Injectable 1 milliGRAM(s) IntraMuscular once  heparin   Injectable 5000 Unit(s) SubCutaneous every 8 hours  influenza   Vaccine 0.5 milliLiter(s) IntraMuscular once  insulin glargine Injectable (LANTUS) 12 Unit(s) SubCutaneous at bedtime  insulin lispro (ADMELOG) corrective regimen sliding scale   SubCutaneous at bedtime  insulin lispro (ADMELOG) corrective regimen sliding scale   SubCutaneous three times a day before meals  insulin lispro Injectable (ADMELOG) 16 Unit(s) SubCutaneous three times a day before meals  metoprolol tartrate 25 milliGRAM(s) Oral two times a day  pantoprazole    Tablet 40 milliGRAM(s) Oral before breakfast  sevelamer carbonate 800 milliGRAM(s) Oral three times a day with meals  sodium chloride 0.9%. 1000 milliLiter(s) (10 mL/Hr) IV Continuous <Continuous>  ticagrelor 90 milliGRAM(s) Oral every 12 hours    MEDICATIONS  (PRN):  oxyCODONE    IR 5 milliGRAM(s) Oral every 6 hours PRN Moderate Pain (4 - 6)  oxyCODONE    IR 10 milliGRAM(s) Oral every 6 hours PRN Severe Pain (7 - 10)    Pertinent Labs:  12-09 Na138 mmol/L Glu 49 mg/dL<LL> K+ 3.8 mmol/L Cr  11.92 mg/dL<H> BUN 75 mg/dL<H> 12-04 Phos 6.3 mg/dL<H> 12-04 Alb 3.3 g/dL  CAPILLARY BLOOD GLUCOSE  POCT Blood Glucose.: 208 mg/dL (09 Dec 2020 11:04)  POCT Blood Glucose.: 280 mg/dL (09 Dec 2020 08:36)  POCT Blood Glucose.: 98 mg/dL (09 Dec 2020 08:08)  POCT Blood Glucose.: 60 mg/dL (09 Dec 2020 07:50)  POCT Blood Glucose.: 67 mg/dL (09 Dec 2020 07:49)  POCT Blood Glucose.: 145 mg/dL (09 Dec 2020 02:19)  POCT Blood Glucose.: 212 mg/dL (08 Dec 2020 21:30)  POCT Blood Glucose.: 226 mg/dL (08 Dec 2020 16:53)  POCT Blood Glucose.: 195 mg/dL (08 Dec 2020 11:34)      Skin: No pressure injuries       Estimated Needs:   [x] no change since previous assessment  [ ] recalculated:       Previous Nutrition Diagnosis: Inadequate protein-energy intake, Increased nutrient needs         Nutrition Diagnosis is [x] ongoing  [ ] resolved [ ] not applicable          New Nutrition Diagnosis: [x] not applicable        Interventions:     1) Recommend liberalize diet to low phosphorous as K+ and Na not typically restricted in PD patients  -May continue Nepro to supplement meals  2) Educated pt on PD and DM diet basics. Expressed concern that pt is not consuming enough high biological value proteins. Discussed more mixed macronutrient meals to promote adequate protein intake, discussed health benefits of eggs and less of a concern for impact on cholesterol; pt amenable to include eggs in diet. Clarified PD dietary recommendations and encouraged pt to consume more varied vegetables and fruits that contain potassium, encouraged pt to consume more regular meal times 2/2 hypoglycemia risk with meal skipping. Attempted to call son but he did not answer, left message.  Addendum 12/9/20 at 1:35 pm - son and wife called back, educated pt and spouce re: above  3) Endo and Nephrology continue to follow       Monitoring and Evaluation:     [x] PO intake [x] Tolerance to diet prescription [x] weights [x] follow up per protocol    [x] other: RD remains available: Esther Denis MS, RDN, CDN, CDE, [facility]. #[phone]

## 2020-12-09 NOTE — PROGRESS NOTE ADULT - PROBLEM SELECTOR PLAN 1
s/p recovery of fractured [facility] angioplasty balloon and fractured wire   Continue post-op care   Maintain Prevena dressing to MSI   Chest PT, encourage incentive spirometry   Increase activity as tolerated, encourage ambulation  Pain control, wound care  PD as per renal  Discharge planning - home when stable

## 2020-12-10 NOTE — PROGRESS NOTE ADULT - PROBLEM SELECTOR PLAN 1
s/p recovery of fractured [facility] angioplasty balloon and fractured wire   Continue post-op care   Continue with Chest PT, encourage incentive spirometry   Increase activity as tolerated, encourage ambulation  Pain control, wound care  PD as per renal  Discharge planning - home when stable

## 2020-12-10 NOTE — PROGRESS NOTE ADULT - PROBLEM SELECTOR PLAN 5
DVT ppx w/ Heparin SQ   GI ppx w/ Protonix

## 2020-12-10 NOTE — PROGRESS NOTE ADULT - ASSESSMENT
54yo M w/h/o uncontrolled T2DM on Toujeo and PRN Victoza(only takes it if FBG >200s). DM c/b CAD, s/p CABG x 4,  ESRD on PD, DM retinopathy, diabetic neuropathy with PVD. Also h/o HTN, HLD. Here with SOB , dizziness and mild chest pain with exertion. S/p HC with stent placement on OM with wire fractured when trying to remove balloon. Pt was subsequently taken to the OR for removal of the angioplasty balloon and fractured wire via transverse aortotomy. Endocrine consult requested for management of DM2. BG values tightly controlled today. BG values variable depending on PO intake and timing of PD exchanges. BG goal (100-180mg/dl).

## 2020-12-10 NOTE — PROGRESS NOTE ADULT - SUBJECTIVE AND OBJECTIVE BOX
NEPHROLOGY-NS[facility] [phone]        Patient seen and examined in bed.  He was dwelling this am from last night         MEDICATIONS  (STANDING):  aspirin  chewable 81 milliGRAM(s) Oral daily  atorvastatin 80 milliGRAM(s) Oral at bedtime  ferrous    sulfate 325 milliGRAM(s) Oral three times a day  folic acid 1 milliGRAM(s) Oral daily  gabapentin 100 milliGRAM(s) Oral daily  gentamicin 0.1% Ointment 1 Application(s) Topical once  glucagon  Injectable 1 milliGRAM(s) IntraMuscular once  heparin   Injectable 5000 Unit(s) SubCutaneous every 8 hours  influenza   Vaccine 0.5 milliLiter(s) IntraMuscular once  insulin glargine Injectable (LANTUS) 12 Unit(s) SubCutaneous at bedtime  insulin lispro (ADMELOG) corrective regimen sliding scale   SubCutaneous at bedtime  insulin lispro (ADMELOG) corrective regimen sliding scale   SubCutaneous three times a day before meals  insulin lispro Injectable (ADMELOG) 18 Unit(s) SubCutaneous three times a day before meals  metoprolol tartrate 25 milliGRAM(s) Oral two times a day  pantoprazole    Tablet 40 milliGRAM(s) Oral before breakfast  sevelamer carbonate 800 milliGRAM(s) Oral three times a day with meals  sodium chloride 0.9%. 1000 milliLiter(s) (10 mL/Hr) IV Continuous <Continuous>  ticagrelor 90 milliGRAM(s) Oral every 12 hours      VITAL:  T(C): , Max: 36.8 (12-10-20 @ 09:00)  T(F): , Max: 98.2 (12-10-20 @ 09:00)  HR: 75 (12-10-20 @ 09:35)  BP: 100/67 (12-10-20 @ 09:35)  BP(mean): 68 (12-09-20 @ 11:06)  RR: 18 (12-10-20 @ 09:00)  SpO2: 99% (12-10-20 @ 09:35)  Wt(kg): --    I and O's:    12-09 @ 07:01  -  12-10 @ 07:00  --------------------------------------------------------  IN: 26339 mL / OUT: 9000 mL / NET: 4240 mL    12-10 @ 07:01  -  12-10 @ 10:33  --------------------------------------------------------  IN: 2760 mL / OUT: 3200 mL / NET: -440 mL          PHYSICAL EXAM:    Constitutional: NAD  Neck:  No JVD  Respiratory: CTAB/L  Cardiovascular: S1 and S2  Gastrointestinal: BS+, soft, NT/ND  Extremities: No peripheral edema  Neurological: A/O x 3, no focal deficits  Psychiatric: Normal mood, normal affect  : No Johnson  Skin: No rashes  Access: Not applicable    LABS:                        11.2   12.78 )-----------( 205      ( 10 Dec 2020 06:14 )             36.7     12-10    139  |  95<L>  |  66<H>  ----------------------------<  115<H>  4.6   |  22  |  11.72<H>    Ca    8.4      10 Dec 2020 06:14            Urine Studies:          RADIOLOGY & ADDITIONAL STUDIES:          < from: Xray Chest 1 View- PORTABLE-Routine (Xray Chest 1 View- PORTABLE-Routine in AM.) (12.05.20 @ 09:08) >    EXAM:  XR CHEST PORTABLE ROUTINE 1V                            PROCEDURE DATE:  12/05/2020            INTERPRETATION:  Chest radiograph (one view)     CPT 49694    CLINICAL INFORMATION:  Status post cardiothoracic surgery.  Follow-up. No additionalinformation is provided.    TECHNIQUE:  Single frontal view of the chest was obtained.    FINDINGS:  Prior study dated 12/4/2020 was available for review.    The lungs are clear.  No pleural abnormality is seen.    The heart is prominent in size. Thepatient is status post median sternotomy and coronary artery bypass graft. Surgical staples are seen overlying the right axillary region.      IMPRESSION: No gross consolidation is seen. Status post CABG.                LUCIA KRUGER MD; Attending Radiologist  This document has been electronically signed. Dec  5 2020  1:56PM    < end of copied text >

## 2020-12-10 NOTE — PROGRESS NOTE ADULT - ASSESSMENT
54y/o male with no known implantable devices noted and COVID 19 negative with PMHX of  CAD, s/p CABG x 4 ( LIMA to LAD, SVG to Diagonal , OM1 , PDA on 8/8/19 , HTN, HLD, ESRD on PD for 3 years sp fractured balloon sp aortotomy   Hypotension - new     1 Renal - Hold PD this am and may resume but with 1.5% instead of 2.5%     2 Hyperphosphatemia - Renvela 800mg TID with meals    3 Anemia-  at goal     4  CVS- NS Bolus 500cc X 1 now over an hour and then repeat the BP        Sayed [facility]   [place]  [phone]

## 2020-12-10 NOTE — PROGRESS NOTE ADULT - PROBLEM SELECTOR PLAN 2
C/w atorvastatin 80 milliGRAM(s) Oral at bedtime  F/u levels as out pt.     pager: [phone]   office:  [phone] (M-F 9a-5pm)               [phone] (nights/weekends)    -I spent a total time of 25 mins with the patient at bedside/on unit of which more than 50% of time was spent on counseling/coordination of care.

## 2020-12-10 NOTE — PROGRESS NOTE ADULT - SUBJECTIVE AND OBJECTIVE BOX
VITAL SIGNS    Subjective: "I'm feeling ok." Denies CP, palpitation, SOB, ROLDAN, HA, dizziness, N/V/D, fever or chills.  No acute event noted overnight.     Telemetry: NSR 70's       Vital Signs Last 24 Hrs  T(C): 36.4 (12-10-20 @ 14:40), Max: 36.8 (12-10-20 @ 09:00)  T(F): 97.5 (12-10-20 @ 14:40), Max: 98.2 (12-10-20 @ 09:00)  HR: 88 (12-10-20 @ 14:40) (75 - 108)  BP: 97/58 (12-10-20 @ 14:40) (88/58 - 118/81)  RR: 18 (12-10-20 @ 14:40) (18 - 98)  SpO2: 97% (12-10-20 @ 14:40) (95% - 100%)            07:01  -  12-10 @ 07:00  --------------------------------------------------------  IN: 84807 mL / OUT: 9000 mL / NET: 4240 mL    12-10 @ 07:01  -  12-10 @ 15:29  --------------------------------------------------------  IN: 6000 mL / OUT: 6000 mL / NET: 0 mL    Daily     Daily Weight in k.9 (10 Dec 2020 07:19)    CAPILLARY BLOOD GLUCOSE    POCT Blood Glucose.: 74 mg/dL (10 Dec 2020 11:44)  POCT Blood Glucose.: 138 mg/dL (10 Dec 2020 07:55)  POCT Blood Glucose.: 219 mg/dL (10 Dec 2020 01:49)  POCT Blood Glucose.: 166 mg/dL (09 Dec 2020 22:18)  POCT Blood Glucose.: 253 mg/dL (09 Dec 2020 16:39)      PHYSICAL EXAM    Neurology: alert and oriented x 3, nonfocal, no gross deficits    CV: (+) S1 and S2, No murmurs, rubs, gallops or clicks     Sternal Wound: MSI -->CDI , sternum stable; Right mid axillary incision with staples --> YOSEF C/D/I     Lungs: CTA B/L     Abdomen: soft, nontender, (+) distended, positive bowel sounds, (+) Flatus; (+) BM; peritoneal HD cath with occlusive dressing --> C/D/I      : Bladder non tender; non palpable              Extremities:  B/L LE (+) trace  edema; negative calf tenderness; (+) 2 DP palpable        aspirin chewable 81 milliGRAM(s) Oral daily  atorvastatin 80 milliGRAM(s) Oral at bedtime  ferrous sulfate 325 milliGRAM(s) Oral three times a day  folic acid 1 milliGRAM(s) Oral daily  gabapentin 100 milliGRAM(s) Oral daily  gentamicin 0.1% Ointment 1 Application(s) Topical once  glucagon  Injectable 1 milliGRAM(s) IntraMuscular once  heparin Injectable 5000 Unit(s) SubCutaneous every 8 hours  influenza  Vaccine 0.5 milliLiter(s) IntraMuscular once  insulin glargine Injectable (LANTUS) 12 Unit(s) SubCutaneous at bedtime  insulin lispro (ADMELOG) corrective regimen sliding scale SubCutaneous at bedtime  insulin lispro (ADMELOG) corrective regimen sliding scale SubCutaneous three times a day before meals  metoprolol tartrate 25 milliGRAM(s) Oral two times a day  oxyCODONE IR 5 milliGRAM(s) Oral every 6 hours PRN  oxyCODONE IR 10 milliGRAM(s) Oral every 6 hours PRN  pantoprazole Tablet 40 milliGRAM(s) Oral before breakfast  sevelamer carbonate 800 milliGRAM(s) Oral three times a day with meals  ticagrelor 90 milliGRAM(s) Oral every 12 hours    Physical Therapy Rec:   Home  [  ]   Home w/ PT  [ X ]  Rehab  [  ]    Discussed with Cardiothoracic Team at AM rounds.

## 2020-12-10 NOTE — DISCHARGE NOTE NURSING/CASE MANAGEMENT/SOCIAL WORK - PATIENT PORTAL LINK FT
You can access the FollowMyHealth Patient Portal offered by [facility] by registering at the following website: [email or web address]. By joining [employer]’s FollowMyHealth portal, you will also be able to view your health information using other applications (apps) compatible with our system.

## 2020-12-10 NOTE — PROVIDER CONTACT NOTE (OTHER) - ACTION/TREATMENT ORDERED:
cc bolus
Premeal insulin & sliding scale given. Continue to monitor. No new orders @ this time.
additional 2 units lantus ordered, additional 4 units admelog. given per order. per MEGA Hoffmann, recheck FS after peritoneal dialysis & assess need for insulin gtt.

## 2020-12-10 NOTE — PROGRESS NOTE ADULT - SUBJECTIVE AND OBJECTIVE BOX
Diabetes Follow up note:    Chief complaint: T2DM    Interval Hx: Pre-lunch glucose 74mg/dl today. Pt seen at bedside during lunchtime. Reports good appetite but does not eat all of his tray. Independent with insulin use at home.     Review of Systems:  General: no complaints.   GI: Tolerating POs. Denies N/V/D/Abd pain  CV: Denies CP/SOB  ENDO: No S&Sx of hypoglycemia    MEDS:  atorvastatin 80 milliGRAM(s) Oral at bedtime  insulin glargine Injectable (LANTUS) 12 Unit(s) SubCutaneous at bedtime  insulin lispro (ADMELOG) corrective regimen sliding scale   SubCutaneous at bedtime  insulin lispro (ADMELOG) corrective regimen sliding scale   SubCutaneous three times a day before meals  insulin lispro Injectable (ADMELOG) 18 Unit(s) SubCutaneous three times a day before meals      Allergies    doxazosin (Other)      PE:  General: Male sitting in chair. NAD.   Vital Signs Last 24 Hrs  T(C): 36.3 (10 Dec 2020 12:57), Max: 36.8 (10 Dec 2020 09:00)  T(F): 97.4 (10 Dec 2020 12:57), Max: 98.2 (10 Dec 2020 09:00)  HR: 84 (10 Dec 2020 12:57) (75 - 108)  BP: 98/66 (10 Dec 2020 12:57) (88/58 - 118/81)  BP(mean): 77 (10 Dec 2020 12:57) (77 - 77)  RR: 18 (10 Dec 2020 12:57) (18 - 18)  SpO2: 100% (10 Dec 2020 12:57) (95% - 100%)  Abd: Soft, NT,ND, PD in place D&I  Extremities: Warm. Trace edema in LEs  Neuro: A&O X3    LABS:  POCT Blood Glucose.: 74 mg/dL (12-10-20 @ 11:44)  POCT Blood Glucose.: 138 mg/dL (12-10-20 @ 07:55)  POCT Blood Glucose.: 219 mg/dL (12-10-20 @ 01:49)  POCT Blood Glucose.: 166 mg/dL (12-09-20 @ 22:18)  POCT Blood Glucose.: 253 mg/dL (12-09-20 @ 16:39)  POCT Blood Glucose.: 208 mg/dL (12-09-20 @ 11:04)  POCT Blood Glucose.: 280 mg/dL (12-09-20 @ 08:36)  POCT Blood Glucose.: 98 mg/dL (12-09-20 @ 08:08)  POCT Blood Glucose.: 60 mg/dL (12-09-20 @ 07:50)  POCT Blood Glucose.: 67 mg/dL (12-09-20 @ 07:49)  POCT Blood Glucose.: 145 mg/dL (12-09-20 @ 02:19)  POCT Blood Glucose.: 212 mg/dL (12-08-20 @ 21:30)  POCT Blood Glucose.: 226 mg/dL (12-08-20 @ 16:53)  POCT Blood Glucose.: 195 mg/dL (12-08-20 @ 11:34)  POCT Blood Glucose.: 110 mg/dL (12-08-20 @ 08:02)  POCT Blood Glucose.: 91 mg/dL (12-08-20 @ 02:11)  POCT Blood Glucose.: 154 mg/dL (12-07-20 @ 21:45)  POCT Blood Glucose.: 259 mg/dL (12-07-20 @ 16:57)  POCT Blood Glucose.: 389 mg/dL (12-07-20 @ 16:55)                            11.2   12.78 )-----------( 205      ( 10 Dec 2020 06:14 )             36.7       12-10    139  |  95<L>  |  66<H>  ----------------------------<  115<H>  4.6   |  22  |  11.72<H>    Ca    8.4      10 Dec 2020 06:14      A1C with Estimated Average Glucose Result: 7.8 % (12-01-20 @ 03:13)          Contact number: darren [phone] or [phone]

## 2020-12-10 NOTE — PROGRESS NOTE ADULT - PROBLEM SELECTOR PLAN 1
-test BG AC/HS/2AM  -c/w Lantus 12 units QHS  -Adjust Admelog 14-18-18 w/meals. Can reduce dose if BG less than 100mg/dl  C/w low dose Admelog correctional premeal and qhs for now  Please review use of insulin pen with pt.   Discharge:   Recommend follow up with outpt Endo on Discharge. Dr Villegas in [place]  Continue basal/bolus insulin on discharge versus basal/ Victoza daily >explained to pt need to take it every day (GLP1r agonist had not been studied on pts with PD but benefits had been documented on pts with ESRD) . Will reassess Toujeo doses depending on PD timing and BG at time of discharge.

## 2020-12-10 NOTE — PROGRESS NOTE ADULT - ASSESSMENT
54 y/o M w/ PMHX of CAD s/p CABG x 4 (LIMA to LAD, SVG to Diagonal, OM1, PDA on 19), HTN, HLD, ESRD on PD for 3 years, T2DM on insulin and diabetic neuropathy with PVD  . Pt presents with complaints of increasing SOB, dizziness and mild chest pain with exertion.  Pt had abnormal nuclear stress test and subsequent cardiac catheterization revealed multivessel CAD with in-graft re-stenosis. Film was reviewed with Dr. Butterfield and pt presented for [facility] on  to attempt to stent the native arteries. Stent was successfully deployed, but on removal of the balloon, the wire fractured and the balloon remained stuck in the L main. Pt was subsequently taken to the OR for removal of the angioplasty balloon and fractured wire via transverse aortotomy.   : s/p Right axillary and right femoral cannulation. Redo sternotomy. Recover of fractured left heart catheterization angioplasty balloon and wire via transverse aortotomy.   Received 2u PRBC intra-op. Required  and Levo gtts post-op.   : Extubated 0420. Pressors and inotropes weaned off. Dialyzed for 1000cc. Chest tubes with minimal output. Started on Lopressor. HD stable.   : Dialyzed for 1000cc.   12/3: Plavix initiated.   : R pleural and med CTs d/c'd - post-removal CXR showed no PTX. HD today for 1000cc. L groin shiley d/c'd as pt is to resume PD - minimal oozing from site, resolved s/p sandbag, no hematoma noted, will continue to monitor. Will continue current medication regimen.    VSS: PD today as per nephrology; left groin cdi armen- neg bleeding / hematoma noted at site; endo consulted for uncontrolled dm; p2y12 302- d/w Dr. Butterfield d/c Plavix and change to brilinta    VSS PD . hyperglycemic last evening - Lantus & pre meal increased - will monitor closely   PD this am.  Glucose control improving, he was on insulin preoperatively  Transfer to floor   Daily PD.  Physical therapy-subacute rehab planning   Pt is not eligible for rehab placement d/t PD.  Daily PT, OT consult.  D/c planning for Friday.  12/10 Hypotensive this morning during PD.  cc IVB given --> now normotensive.  Continue with current medication regimen. Plan to D/C home with PT in AM.

## 2020-12-11 NOTE — PROGRESS NOTE ADULT - ASSESSMENT
54y/o male with no known implantable devices noted and COVID 19 negative with PMHX of  CAD, s/p CABG x 4 ( LIMA to LAD, SVG to Diagonal , OM1 , PDA on 8/8/19 , HTN, HLD, ESRD on PD for 3 years sp fractured balloon sp aortotomy       1 Renal - Drain PD fluid this am and keep dry.  When pt goes home he will start his cycler at night      2 Hyperphosphatemia - Renvela 800mg TID with meals    3 Anemia-  at goal     4  CVS- BP stable at present     Home with Home PT        Sayed [facility]  [phone]

## 2020-12-11 NOTE — DISCHARGE NOTE PROVIDER - NSDCFUADDAPPT_GEN_ALL_CORE_FT
1. Follow up with Dr. Arango on Thursday 12/17/20 at 2:00 pm, please call the [facility] office to confirm your appointment at [phone].   2. Please schedule an appointment with your PCP Dr. Villegas in 1-2 weeks for post op follow up and blood glucose management, call the office to schedule an appointment.  3. Please schedule an appointment with your Cardiologist in 1-2 weeks, please call the office to schedule an appointment.    1. Follow up with Dr. Arango on Thursday 12/17/20 at 2:00 pm, please call the [facility] office to confirm your appointment at [phone].   2. Please schedule an appointment with your PCP Dr. Villegas in 1-2 weeks for post op follow up and blood glucose management, call the office to schedule an appointment.  3. Please schedule an appointment with your Cardiologist Dr. Edward in 1-2 weeks, please call the office to schedule an appointment.

## 2020-12-11 NOTE — DISCHARGE NOTE PROVIDER - CARE PROVIDERS DIRECT ADDRESSES
,[email or web address]

## 2020-12-11 NOTE — DISCHARGE NOTE PROVIDER - PROVIDER TOKENS
PROVIDER:[TOKEN:[[id number]],SCHEDULEDAPPT:[12/17/2020],SCHEDULEDAPPTTIME:[02:00 PM]],PROVIDER:[TOKEN:[[id number]],FOLLOWUP:[Routine]] PROVIDER:[TOKEN:[[id number]],SCHEDULEDAPPT:[12/17/2020],SCHEDULEDAPPTTIME:[02:00 PM]],PROVIDER:[TOKEN:[[id number]],FOLLOWUP:[Routine]],PROVIDER:[TOKEN:[[id number]],FOLLOWUP:[1 week],ESTABLISHEDPATIENT:[T]] PROVIDER:[TOKEN:[[id number]],SCHEDULEDAPPT:[12/17/2020],SCHEDULEDAPPTTIME:[02:00 PM]],PROVIDER:[TOKEN:[[id number]],FOLLOWUP:[Routine]],PROVIDER:[TOKEN:[[id number]],FOLLOWUP:[1 week],ESTABLISHEDPATIENT:[T]],PROVIDER:[TOKEN:[[id number]],FOLLOWUP:[1 week]]

## 2020-12-11 NOTE — PROGRESS NOTE ADULT - PROBLEM SELECTOR PROBLEM 2
CAD, multiple vessel
Hyperlipidemia, unspecified hyperlipidemia type
CAD, multiple vessel

## 2020-12-11 NOTE — DISCHARGE NOTE PROVIDER - NSDCMRMEDTOKEN_GEN_ALL_CORE_FT
aspirin 81 mg oral tablet: 1 tab(s) orally once a day  atorvastatin 80 mg oral tablet: 1 tab(s) orally once a day (at bedtime)  ferrous sulfate 324 mg (65 mg elemental iron) oral tablet: 1 tab(s) orally 3 times a day  folic acid 1 mg oral tablet: 1 tab(s) orally once a day  gabapentin 100 mg oral capsule: 1 cap(s) orally once a day  HumaLOG KwikPen 100 units/mL injectable solution: 10 unit(s) injectable 2 times a day  metoprolol succinate 50 mg oral tablet, extended release: 1 tab(s) orally once a day  Multiple Vitamins oral tablet: 1 tab(s) orally once a day  nortriptyline 25 mg oral capsule: 1 cap(s) orally once a day (at bedtime)  oxyCODONE 10 mg oral tablet: 1 tab(s) orally every 6 hours, As needed, Severe Pain (7 - 10) MDD:4  pantoprazole 40 mg oral delayed release tablet: 1 tab(s) orally once a day (before a meal)  sevelamer hydrochloride 800 mg oral tablet: 1 tab(s) orally 3 times a day (with meals)  ticagrelor 90 mg oral tablet: 1 tab(s) orally every 12 hours  Toujeo SoloStar 300 units/mL subcutaneous solution: 80 unit(s) subcutaneous once a day (at bedtime)  Vitamin D3 50,000 intl units (1250 mcg) oral capsule: 1 tab(s) orally once a week   aspirin 81 mg oral tablet: 1 tab(s) orally once a day  atorvastatin 80 mg oral tablet: 1 tab(s) orally once a day (at bedtime)  ferrous sulfate 324 mg (65 mg elemental iron) oral tablet: 1 tab(s) orally 3 times a day  folic acid 1 mg oral tablet: 1 tab(s) orally once a day  gabapentin 100 mg oral capsule: 1 cap(s) orally once a day  metoprolol succinate 50 mg oral tablet, extended release: 1 tab(s) orally once a day  Multiple Vitamins oral tablet: 1 tab(s) orally once a day  nortriptyline 25 mg oral capsule: 1 cap(s) orally once a day (at bedtime)  oxyCODONE 10 mg oral tablet: 1 tab(s) orally every 6 hours, As needed, Severe Pain (7 - 10) MDD:4  pantoprazole 40 mg oral delayed release tablet: 1 tab(s) orally once a day (before a meal)  sevelamer hydrochloride 800 mg oral tablet: 1 tab(s) orally 3 times a day (with meals)  ticagrelor 90 mg oral tablet: 1 tab(s) orally every 12 hours  Toujeo SoloStar 300 units/mL subcutaneous solution: 60 unit(s) subcutaneous once a day (at bedtime)  Victoza 18 mg/3 mL subcutaneous solution: 1.2 milligram(s) subcutaneously once a day   Vitamin D3 50,000 intl units (1250 mcg) oral capsule: 1 tab(s) orally once a week

## 2020-12-11 NOTE — PROGRESS NOTE ADULT - PROBLEM SELECTOR PLAN 1
-test BG AC/HS/2AM  -c/w Lantus 12 units QHS  -Adjust Admelog 10-18-18 w/meals. Can reduce dose if BG less than 100mg/dl  C/w low dose Admelog correctional premeal and qhs for now  Please review use of insulin pen with pt.   Discharge:   Recommend follow up with outpt Endo on Discharge. Dr Villegas in [place]  C/w Toujeo insulin but decrease home dose from 80 units to 60 units at night. Pt using around 60 units dose during the day while getting PD at daytime in hospital. Pt instructed to check BG at 2am tonight. If hyperglycemic while on new Toujeo dose can go back to preadmission dosing.   -C/w Victoza 1.2mg daily >explained to pt need to take it every day (GLP1r agonist had not been studied on pts with PD but benefits had been documented on pts with ESRD) .   -pt to f/u with his endocrinologist upon discharge  -Plan discussed with pt/team.  Contact info: [phone] (24/7). pager [phone]

## 2020-12-11 NOTE — DISCHARGE NOTE PROVIDER - CARE PROVIDER_API CALL
Pillo Arango)  Surgery; Thoracic and Cardiac Surgery  [address] [place] [address]  Phone: [phone]  Fax: [phone]  Scheduled Appointment: 12/17/2020 02:00 PM    Danyel Dale)  Internal Medicine; Nephrology  [address] [facility], [address]  [place] [address]  Phone: [phone]  Fax: [phone]  Follow Up Time: Routine   Pillo Arango)  Surgery; Thoracic and Cardiac Surgery  [address] [place] [address]  Phone: [phone]  Fax: [phone]  Scheduled Appointment: 12/17/2020 02:00 PM    Danyel Dale)  Internal Medicine; Nephrology  [address] [facility], [address]  [place] [address]  Phone: [phone]  Fax: [phone]  Follow Up Time: Routine    BRENDA OCHOA  Internal Medicine  [address] [place] [address]  Phone: [phone]  Fax: [phone]  Established Patient  Follow Up Time: 1 week   Pillo Arango)  Surgery; Thoracic and Cardiac Surgery  [address] [place] [address]  Phone: [phone]  Fax: [phone]  Scheduled Appointment: 12/17/2020 02:00 PM    Danyel Dale)  Internal Medicine; Nephrology  [address] [place] [address], [address]  [place] [address]  Phone: [phone]  Fax: [phone]  Follow Up Time: Routine    BRENDA OCHOA  Internal Medicine  [address] [place] [address]  Phone: [phone]  Fax: [phone]  Established Patient  Follow Up Time: 1 week    George Edward  CARDIOVASCULAR DISEASE  [address] [place] [address]  Phone: [phone]  Fax: [phone]  Follow Up Time: 1 week

## 2020-12-11 NOTE — PROGRESS NOTE ADULT - SUBJECTIVE AND OBJECTIVE BOX
DIABETES FOLLOW UP NOTE: Saw pt earlier today  INTERVAL HX: 54yo M w/h/o uncontrolled T2DM on Toujeo and PRN Victoza(only takes it if FBG >200s). DM c/b CAD, s/p CABG x 4,  ESRD on PD, DM retinopathy, diabetic neuropathy with PVD. Also h/o HTN, HLD. Here with SOB , dizziness and mild chest pain with exertion. S/p HC with stent placement on OM with wire fractured when trying to remove balloon. Pt was subsequently taken to the OR for removal of the angioplasty balloon and fractured wire via transverse aortotomy. Endocrine consult requested for management of DM2. BG values remain variable between 70s to low 200s in the last 24 hours. FBG >200s today and low 100s yesterday even though pt received same insulin doses. No hypoglycemia and reports tolerating POs. Premeal insulin not given this am with BG still at goal ac lunch. Getting PD exchanges throughout the day. Per primary team pt going home today.     Review of Systems:  General: As above  Cardiovascular: No chest pain, palpitations  Respiratory: No SOB, no cough  GI: No nausea, vomiting, abdominal pain  Endocrine: No polyuria, polydipsia or S&Sx of hypoglycemia    Allergies    doxazosin (Other)    Intolerances      MEDICATIONS:  atorvastatin 80 milliGRAM(s) Oral at bedtime  insulin glargine Injectable (LANTUS) 12 Unit(s) SubCutaneous at bedtime  insulin lispro (ADMELOG) corrective regimen sliding scale   SubCutaneous at bedtime  insulin lispro (ADMELOG) corrective regimen sliding scale   SubCutaneous three times a day before meals  insulin lispro Injectable (ADMELOG) 14 Unit(s) SubCutaneous before breakfast  insulin lispro Injectable (ADMELOG) 18 Unit(s) SubCutaneous before lunch  insulin lispro Injectable (ADMELOG) 18 Unit(s) SubCutaneous before dinner    PHYSICAL EXAM:  VITALS: T(C): 36.8 (12-11-20 @ 12:36)  T(F): 98.2 (12-11-20 @ 12:36), Max: 98.8 (12-11-20 @ 09:21)  HR: 83 (12-11-20 @ 12:36) (74 - 94)  BP: 104/68 (12-11-20 @ 12:36) (96/69 - 116/74)  RR:  (16 - 18)  SpO2:  (97% - 100%)  Wt(kg): --  GENERAL: Male laying in bed in NAD  Chest: Sternal incision haling. R upper chest incision with staples D&I  Abdomen: Soft, nontender, non distended, central adiposity. PD in place D&I  Extremities: Warm, trace edema in LEs, + chronic vascular changes in LEs noted darker discoloration and skin dryness.    NEURO: A&O X3    LABS:  POCT Blood Glucose.: 182 mg/dL (12-11-20 @ 11:37)  POCT Blood Glucose.: 223 mg/dL (12-11-20 @ 07:57)  POCT Blood Glucose.: 185 mg/dL (12-11-20 @ 03:34)  POCT Blood Glucose.: 188 mg/dL (12-10-20 @ 21:27)  POCT Blood Glucose.: 199 mg/dL (12-10-20 @ 16:29)  POCT Blood Glucose.: 74 mg/dL (12-10-20 @ 11:44)  POCT Blood Glucose.: 138 mg/dL (12-10-20 @ 07:55)  POCT Blood Glucose.: 219 mg/dL (12-10-20 @ 01:49)  POCT Blood Glucose.: 166 mg/dL (12-09-20 @ 22:18)  POCT Blood Glucose.: 253 mg/dL (12-09-20 @ 16:39)  POCT Blood Glucose.: 208 mg/dL (12-09-20 @ 11:04)  POCT Blood Glucose.: 280 mg/dL (12-09-20 @ 08:36)  POCT Blood Glucose.: 98 mg/dL (12-09-20 @ 08:08)  POCT Blood Glucose.: 60 mg/dL (12-09-20 @ 07:50)  POCT Blood Glucose.: 67 mg/dL (12-09-20 @ 07:49)  POCT Blood Glucose.: 145 mg/dL (12-09-20 @ 02:19)  POCT Blood Glucose.: 212 mg/dL (12-08-20 @ 21:30)  POCT Blood Glucose.: 226 mg/dL (12-08-20 @ 16:53)                            10.1   11.65 )-----------( 187      ( 11 Dec 2020 07:30 )             33.4       12-11    135  |  93<L>  |  63<H>  ----------------------------<  151<H>  4.1   |  25  |  12.26<H>      EGFR if non : 4<L>    Ca    8.0<L>      12-11     A1C with Estimated Average Glucose Result: 7.8 % (12-01-20 @ 03:13)      Estimated Average Glucose: 177 mg/dL (12-01-20 @ 03:13)

## 2020-12-11 NOTE — DISCHARGE NOTE PROVIDER - HOSPITAL COURSE
54 y/o M w/ PMHX of CAD s/p CABG x 4 (LIMA to LAD, SVG to Diagonal, OM1, PDA on 19), HTN, HLD, ESRD on PD for 3 years, T2DM on insulin and diabetic neuropathy with PVD  . Pt presents with complaints of increasing SOB, dizziness and mild chest pain with exertion.  Pt had abnormal nuclear stress test and subsequent cardiac catheterization revealed multivessel CAD with in-graft re-stenosis. Film was reviewed with Dr. Butterfield and pt presented for [facility] on  to attempt to stent the native arteries. Stent was successfully deployed, but on removal of the balloon, the wire fractured and the balloon remained stuck in the L main. Pt was subsequently taken to the OR for removal of the angioplasty balloon and fractured wire via transverse aortotomy.   : s/p Right axillary and right femoral cannulation. Redo sternotomy. Recover of fractured left heart catheterization angioplasty balloon and wire via transverse aortotomy.   Received 2u PRBC intra-op. Required  and Levo gtts post-op.   : Extubated 0420. Pressors and inotropes weaned off. Dialyzed for 1000cc. Chest tubes with minimal output. Started on Lopressor. HD stable.   : Dialyzed for 1000cc.   12/3: Plavix initiated.   : R pleural and med CTs d/c'd - post-removal CXR showed no PTX. HD today for 1000cc. L groin shiley d/c'd as pt is to resume PD - minimal oozing from site, resolved s/p sandbag, no hematoma noted, will continue to monitor. Will continue current medication regimen.    VSS: PD today as per nephrology; left groin cdi armen- neg bleeding / hematoma noted at site; endo consulted for uncontrolled dm; p2y12 302- d/w Dr. Butterfield d/c Plavix and change to brilinta    VSS PD . hyperglycemic last evening - Lantus & pre meal increased - will monitor closely   PD this am.  Glucose control improving, he was on insulin preoperatively  Transfer to floor   Daily PD.  Physical therapy-subacute rehab planning   Pt is not eligible for rehab placement d/t PD.  Daily PT, OT consult.  D/c planning for Friday.  12/10 Hypotensive this morning during PD.  cc IVB given --> now normotensive.  Continue with current medication regimen. Plan to D/C home with PT in AM.    VVS; Patient medically cleared for discharge home with PT today per Dr. Arango

## 2020-12-11 NOTE — DISCHARGE NOTE PROVIDER - NSDCPNSUBOBJ_GEN_ALL_CORE
VITAL SIGNS    Subjective: Denies CP, palpitation, SOB, ROLDAN, HA, dizziness, N/V/D, fever or chills.  No acute event noted overnight.     Telemetry: NSR       Vital Signs Last 24 Hrs  T(C): 37.1 (20 @ 09:21), Max: 37.1 (20 @ 09:21)  T(F): 98.8 (20:21), Max: 98.8 (20 09:21)  HR: 80 (20:32) (74 - 94)  BP: 100/66 (20:32) (96/69 - 116/74)  RR: 18 (20:21) (16 - 98)  SpO2: 100% (20:21) (95% - 100%)           12-10 @ 07:01  -   @ 07:00  --------------------------------------------------------  IN: 8500 mL / OUT: 6401 mL / NET: 2099 mL     @ 07:01  -   @ 10:03  --------------------------------------------------------  IN: 260 mL / OUT: 2000 mL / NET: -1740 mL    Daily     Daily Weight in k.6 (11 Dec 2020 07:15)    CAPILLARY BLOOD GLUCOSE    POCT Blood Glucose.: 223 mg/dL (11 Dec 2020 07:57)  POCT Blood Glucose.: 185 mg/dL (11 Dec 2020 03:34)  POCT Blood Glucose.: 188 mg/dL (10 Dec 2020 21:27)  POCT Blood Glucose.: 199 mg/dL (10 Dec 2020 16:29)  POCT Blood Glucose.: 74 mg/dL (10 Dec 2020 11:44)          PHYSICAL EXAM    Neurology: alert and oriented x 3, nonfocal, no gross deficits    CV: (+) S1 and S2, No murmurs, rubs, gallops or clicks     Sternal Wound: MSI -->CDI, sternum stable    Lungs: CTA B/L     Abdomen: soft, nontender, distended, positive bowel sounds, (+) Flatus; (+) BM; peritoneal HD Cath --> C/D/I      :  Bladder non palpable; non distended                Extremities:  B/L LE (+) 1 edema; negative calf tenderness; (+) 2 DP palpable        aspirin  chewable 81 milliGRAM(s) Oral daily  atorvastatin 80 milliGRAM(s) Oral at bedtime  ferrous sulfate 325 milliGRAM(s) Oral three times a day  folic acid 1 milliGRAM(s) Oral daily  gabapentin 100 milliGRAM(s) Oral daily  gentamicin 0.1% Ointment 1 Application(s) Topical once  glucagon  Injectable 1 milliGRAM(s) IntraMuscular once  heparin   Injectable 5000 Unit(s) SubCutaneous every 8 hours  influenza   Vaccine 0.5 milliLiter(s) IntraMuscular once  insulin glargine Injectable (LANTUS) 12 Unit(s) SubCutaneous at bedtime  insulin lispro (ADMELOG) corrective regimen sliding scale   SubCutaneous at bedtime  insulin lispro (ADMELOG) corrective regimen sliding scale   SubCutaneous three times a day before meals  insulin lispro Injectable (ADMELOG) 14 Unit(s) SubCutaneous before breakfast  insulin lispro Injectable (ADMELOG) 18 Unit(s) SubCutaneous before lunch  insulin lispro Injectable (ADMELOG) 18 Unit(s) SubCutaneous before dinner  metoprolol tartrate 25 milliGRAM(s) Oral two times a day  oxyCODONE IR 5 milliGRAM(s) Oral every 6 hours PRN  oxyCODONE  IR 10 milliGRAM(s) Oral every 6 hours PRN  pantoprazole Tablet 40 milliGRAM(s) Oral before breakfast  sevelamer carbonate 800 milliGRAM(s) Oral three times a day with meals  sodium chloride 0.9%. 1000 milliLiter(s) IV Continuous <Continuous>  ticagrelor 90 milliGRAM(s) Oral every 12 hours    Spent 45 min face to face encounter with patient and discharge note.

## 2020-12-11 NOTE — DISCHARGE NOTE PROVIDER - NSDCACTIVITY_GEN_ALL_CORE
Walking - Outdoors allowed/Stairs allowed/Walking - Indoors allowed/No heavy lifting/straining/Do not drive or operate machinery/Sex allowed/Showering allowed/Do not make important decisions

## 2020-12-11 NOTE — PROGRESS NOTE ADULT - REASON FOR ADMISSION
CAD
SOB
recovery of fractured C angioplasty balloon and wire
CP/SOB
CP/SOB
SOB/CP
SOB/CP
Cardiac surgery
sob

## 2020-12-11 NOTE — PROGRESS NOTE ADULT - PROBLEM SELECTOR PROBLEM 1
Foreign body, intracardiac
Uncontrolled type 2 diabetes mellitus with ESRD (end-stage renal disease)
Foreign body, intracardiac

## 2020-12-11 NOTE — DISCHARGE NOTE PROVIDER - NSDCHHHOMEBOUNDOTHER_GEN_ALL_CORE_FT
s/p 11/30  Right axillary and right femoral cannulation. Redo sternotomy. Recover of fractured left heart catheterization angioplasty balloon and wire via transverse aortotomy.

## 2020-12-11 NOTE — PROGRESS NOTE ADULT - SUBJECTIVE AND OBJECTIVE BOX
NEPHROLOGY-[facility] [phone]        Patient seen and examined in the chair.  He felt well         MEDICATIONS  (STANDING):  aspirin  chewable 81 milliGRAM(s) Oral daily  atorvastatin 80 milliGRAM(s) Oral at bedtime  ferrous    sulfate 325 milliGRAM(s) Oral three times a day  folic acid 1 milliGRAM(s) Oral daily  gabapentin 100 milliGRAM(s) Oral daily  gentamicin 0.1% Ointment 1 Application(s) Topical once  glucagon  Injectable 1 milliGRAM(s) IntraMuscular once  heparin   Injectable 5000 Unit(s) SubCutaneous every 8 hours  influenza   Vaccine 0.5 milliLiter(s) IntraMuscular once  insulin glargine Injectable (LANTUS) 12 Unit(s) SubCutaneous at bedtime  insulin lispro (ADMELOG) corrective regimen sliding scale   SubCutaneous at bedtime  insulin lispro (ADMELOG) corrective regimen sliding scale   SubCutaneous three times a day before meals  metoprolol tartrate 25 milliGRAM(s) Oral two times a day  pantoprazole    Tablet 40 milliGRAM(s) Oral before breakfast  sevelamer carbonate 800 milliGRAM(s) Oral three times a day with meals  sodium chloride 0.9%. 1000 milliLiter(s) (10 mL/Hr) IV Continuous <Continuous>  ticagrelor 90 milliGRAM(s) Oral every 12 hours      VITAL:  T(C): , Max: 37 (12-11-20 @ 04:50)  T(F): , Max: 98.6 (12-11-20 @ 04:50)  HR: 94 (12-11-20 @ 04:50)  BP: 116/74 (12-11-20 @ 04:50)  BP(mean): 87 (12-10-20 @ 20:56)  RR: 18 (12-11-20 @ 04:50)  SpO2: 99% (12-11-20 @ 04:50)  Wt(kg): --    I and O's:    12-10 @ 07:01  -  12-11 @ 07:00  --------------------------------------------------------  IN: 8500 mL / OUT: 6401 mL / NET: 2099 mL          PHYSICAL EXAM:    Constitutional: NAD  Neck:  No JVD  Respiratory: CTAB/L  Cardiovascular: S1 and S2  Gastrointestinal: BS+, soft, NT/ND  Extremities: No peripheral edema  Neurological: A/O x 3, no focal deficits  Psychiatric: Normal mood, normal affect  : No Johnson  Skin: No rashes  Access: Not applicable    LABS:                        11.2   12.78 )-----------( 205      ( 10 Dec 2020 06:14 )             36.7     12-10    139  |  95<L>  |  66<H>  ----------------------------<  115<H>  4.6   |  22  |  11.72<H>    Ca    8.4      10 Dec 2020 06:14            Urine Studies:          RADIOLOGY & ADDITIONAL STUDIES:

## 2020-12-11 NOTE — DISCHARGE NOTE PROVIDER - NSDCCPCAREPLAN_GEN_ALL_CORE_FT
PRINCIPAL DISCHARGE DIAGNOSIS  Diagnosis: Status post cardiac surgery  Assessment and Plan of Treatment: s/p 11/30 Right axillary and right femoral cannulation. Redo sternotomy. Recover of fractured left heart catheterization angioplasty balloon and wire via transverse aortotomy.   1. Daily Shower  2. Weight yourself daily and notify any weight gain greater than 2-3 pounds in 24 hours.  3. Regular Renal / diabetic diet - low fat, low cholesterol, no added salt.  4. Cleanse Midsternal incision and leg incision daily while showering with warm water and mild soap, pat dry and maintain open to air.   5. Follow Cardiac Surgery Do's and Don'ts discharge instructions.   6. No driving until cleared by MD.   7. No heavy lifting nothing greater than 5 pounds until cleared by MD.   8. Call / Notify MD any fever greater than 101.0  9. Increase Activity as tolerated.  10. Continue on current medication regimen as prescribed      SECONDARY DISCHARGE DIAGNOSES  Diagnosis: DM type 2, not at goal  Assessment and Plan of Treatment:   1. Recommend follow up with outpt Endo on Discharge. Dr Villegas in [place]  2. Continue basal/bolus insulin on discharge   3. Accuchecks 4 times a day before meals and at bedtime. Please maintain record log and bring log to follow up appointment with Dr. Villegas      Diagnosis: ESRD on peritoneal dialysis  Assessment and Plan of Treatment:   1. Follow up with Nephrologist per routine   2. Renal - Drain PD fluid this am and keep dry.  When pt goes home he will start his cycler at night   3. Continue on Renvela 800 mg TID with meals       PRINCIPAL DISCHARGE DIAGNOSIS  Diagnosis: Status post cardiac surgery  Assessment and Plan of Treatment: s/p 11/30 Right axillary and right femoral cannulation. Redo sternotomy. Recover of fractured left heart catheterization angioplasty balloon and wire via transverse aortotomy.   1. Daily Shower  2. Weight yourself daily and notify any weight gain greater than 2-3 pounds in 24 hours.  3. Regular Renal / diabetic diet - low fat, low cholesterol, no added salt.  4. Cleanse Midsternal incision and leg incision daily while showering with warm water and mild soap, pat dry and maintain open to air.   5. Follow Cardiac Surgery Do's and Don'ts discharge instructions.   6. No driving until cleared by MD.   7. No heavy lifting nothing greater than 5 pounds until cleared by MD.   8. Call / Notify MD any fever greater than 101.0  9. Increase Activity as tolerated.  10. Continue on current medication regimen as prescribed      SECONDARY DISCHARGE DIAGNOSES  Diagnosis: DM type 2, not at goal  Assessment and Plan of Treatment: 1. Recommend follow up with outpt Endo on Discharge. Dr Villegas in [place]  2. Continue Victoza to be taken daily and Toujeo start with 60 units at bedtime.   3. Accuchecks 4 times a day before meals and at bedtime. Please maintain record log and bring log to follow up appointment with Dr. Villegas      Diagnosis: ESRD on peritoneal dialysis  Assessment and Plan of Treatment:   1. Follow up with Nephrologist per routine   2. Renal - Drain PD fluid this am and keep dry.  When pt goes home he will start his cycler at night   3. Continue on Renvela 800 mg TID with meals

## 2020-12-14 NOTE — CONSULT NOTE ADULT - ASSESSMENT
54 yo male patient with stable left hallux laceration wound to subQ  - Pt seen and evaluated   - Afebrile, WBC 12  - Left dorsal hallux IPJ laceration wound to subQ not probing to bone stable without any drainage or purulence, no signs of acute infection.   - Tendon intact and neurovascular intact  - Applied mupirocin daily  - Stable from podiatry standpoint and follow up info with Dr. Fátima Mcrae in office upon discharge in paperwork  - Discussed with attending

## 2020-12-14 NOTE — CHART NOTE - NSCHARTNOTEFT_GEN_A_CORE
54 y/o M w/ PMHX of CAD s/p CABG x 4 (LIMA to LAD, SVG to Diagonal, OM1, PDA on 19), HTN, HLD, ESRD on PD for 3 years, T2DM on insulin and diabetic neuropathy with PVD. Pt presented for LHC on  to attempt to stent the native arteries. Stent was successfully deployed, but on removal of the balloon, the wire fractured and the balloon remained stuck in the L main. Pt was subsequently taken to the OR for removal of the angioplasty balloon and fractured wire via transverse aortotomy. : s/p Right axillary and right femoral cannulation. Redo sternotomy. Recover of fractured left heart catheterization angioplasty balloon and wire via transverse aortotomy. Received 2u PRBC intra-op. Required  and Levo gtts post-op. Patient was discharged on 20.     Patient now presents to the ED with due to episode of fever this morning Tm 101.5F with dizziness upon standing/sitting.   Patient states he felt cold and lightheaded, wife noted low BP. Denies any HA, cp, cough, abd pain, n/v/d. Denies sick contacts. Last PD last night  In the ED, patient was pan cultured, CXR done, Limited bedside echo r/o pericardial effusion. Small fluid bolus given. CT surgery was consulted given recent history of open sternotomy. COVID PCR pending.     Patient seen in the ED lying comfortably in bed, without complaints of chest pain, SOB, chills, cough, headache, or n/v/d abdominal pain.   Patient non-septic looking, MSI and R axillary cannulation sites look c/d/i no signs of drainage or infection. Patient vitals are stable, SR 90's, /80, saturating well O2 98% on RA, with low grade temp of 100.8. Blood cultures sent. Patient states he feels okay when lying down but gets dizzy if standing up or sitting up.     ICU Vital Signs Last 24 Hrs  T(C): 38.2 (14 Dec 2020 10:30), Max: 38.2 (14 Dec 2020 10:30)  T(F): 100.8 (14 Dec 2020 10:30), Max: 100.8 (14 Dec 2020 10:30)  HR: 94 (14 Dec 2020 12:36) (94 - 98)  BP: 108/73 (14 Dec 2020 12:36) (102/71 - 125/78)  BP(mean): --  ABP: --  ABP(mean): --  RR: 16 (14 Dec 2020 12:36) (16 - 16)  SpO2: 100% (14 Dec 2020 12:36) (97% - 100%)                          10.2   12.11 )-----------( 216      ( 14 Dec 2020 10:54 )             33.9   12-14    137  |  93<L>  |  52<H>  ----------------------------<  254<H>  4.1   |  25  |  12.41<H>    Ca    8.8      14 Dec 2020 10:54    TPro  6.2  /  Alb  3.3  /  TBili  0.5  /  DBili  x   /  AST  26  /  ALT  28  /  AlkPhos  74  12-14      General: WN/WD NAD  Neurology: A&Ox3, nonfocal, MASON x 4  Head:  Normocephalic, atraumatic  ENT:  Mucosa moist, no ulcerations  Neck:  Supple, no sinuses or palpable masses  Lymphatic:  No palpable cervical, supraclavicular, axillary or inguinal adenopathy  Respiratory: CTA B/L  CV: RRR, S1S2, no murmur  Abdominal: Soft, NT, ND no palpable mass  MSK: No edema, + peripheral pulses, FROM all 4 extremity  Incisions: intact, no erythema or drainage    Plan:   - Orthostatic BP   - Renal re-consult   - F/u pan cultures, R/O COVID   - Admit to Medicine as per CT surgeon Dr. Arango 54 y/o M w/ PMHX of CAD s/p CABG x 4 (LIMA to LAD, SVG to Diagonal, OM1, PDA on 19), HTN, HLD, ESRD on PD for 3 years, T2DM on insulin and diabetic neuropathy with PVD. Pt presented for LHC on  to attempt to stent the native arteries. Stent was successfully deployed, but on removal of the balloon, the wire fractured and the balloon remained stuck in the L main. Pt was subsequently taken to the OR for removal of the angioplasty balloon and fractured wire via transverse aortotomy. : s/p Right axillary and right femoral cannulation. Redo sternotomy. Recover of fractured left heart catheterization angioplasty balloon and wire via transverse aortotomy. Received 2u PRBC intra-op. Required  and Levo gtts post-op. Patient was discharged on 20.     Patient now presents to the ED with due to episode of fever this morning Tm 101.5F with dizziness upon standing/sitting.   Patient states he felt cold and lightheaded, wife noted low BP. Denies any HA, cp, cough, abd pain, n/v/d. Denies sick contacts. Last PD last night  In the ED, patient was pan cultured, CXR done, Limited bedside echo r/o pericardial effusion. Small fluid bolus given. CT surgery was consulted given recent history of open sternotomy. COVID PCR pending.     Patient seen in the ED lying comfortably in bed, without complaints of chest pain, SOB, chills, cough, headache, or n/v/d abdominal pain.   Patient non-septic looking, MSI and R axillary cannulation sites look c/d/i no signs of drainage or infection. Patient vitals are stable, SR 90's, /80, saturating well O2 98% on RA, with low grade temp of 100.8. Blood cultures sent. Patient states he feels okay when lying down but gets dizzy if standing up or sitting up.     ICU Vital Signs Last 24 Hrs  T(C): 38.2 (14 Dec 2020 10:30), Max: 38.2 (14 Dec 2020 10:30)  T(F): 100.8 (14 Dec 2020 10:30), Max: 100.8 (14 Dec 2020 10:30)  HR: 94 (14 Dec 2020 12:36) (94 - 98)  BP: 108/73 (14 Dec 2020 12:36) (102/71 - 125/78)  BP(mean): --  ABP: --  ABP(mean): --  RR: 16 (14 Dec 2020 12:36) (16 - 16)  SpO2: 100% (14 Dec 2020 12:36) (97% - 100%)                          10.2   12.11 )-----------( 216      ( 14 Dec 2020 10:54 )             33.9   12-14    137  |  93<L>  |  52<H>  ----------------------------<  254<H>  4.1   |  25  |  12.41<H>    Ca    8.8      14 Dec 2020 10:54    TPro  6.2  /  Alb  3.3  /  TBili  0.5  /  DBili  x   /  AST  26  /  ALT  28  /  AlkPhos  74  12-14      General: WN/WD NAD  Neurology: A&Ox3, nonfocal, MASON x 4  Head:  Normocephalic, atraumatic  ENT:  Mucosa moist, no ulcerations  Neck:  Supple, no sinuses or palpable masses  Lymphatic:  No palpable cervical, supraclavicular, axillary or inguinal adenopathy  Respiratory: CTA B/L  CV: RRR, S1S2, no murmur  Abdominal: Soft, NT, ND no palpable mass  MSK: No edema, + peripheral pulses, FROM all 4 extremity  Incisions: intact, no erythema or drainage    Plan:   - Orthostatic BP   - Renal re-consult   - F/u pan cultures, R/O COVID   - Admit to Medicine as per CT surgeon Dr. Arango  - CTS to follow 56 y/o M w/ PMHX of CAD s/p CABG x 4 (LIMA to LAD, SVG to Diagonal, OM1, PDA on 19), HTN, HLD, ESRD on PD for 3 years, T2DM on insulin and diabetic neuropathy with PVD. Pt presented for LHC on  to attempt to stent the native arteries. Stent was successfully deployed, but on removal of the balloon, the wire fractured and the balloon remained stuck in the L main. Pt was subsequently taken to the OR for removal of the angioplasty balloon and fractured wire via transverse aortotomy. : s/p Right axillary and right femoral cannulation. Redo sternotomy. Recover of fractured left heart catheterization angioplasty balloon and wire via transverse aortotomy. Received 2u PRBC intra-op. Required  and Levo gtts post-op. Patient was discharged on 20.     Patient now presents to the ED with episode of fever this morning Tm 101.5F with dizziness upon standing/sitting.   Patient states he felt cold and lightheaded, wife noted low BP. Denies any HA, cp, cough, abd pain, n/v/d. Denies sick contacts. Last PD last night  In the ED, patient was pan cultured, CXR done, Limited bedside echo r/o pericardial effusion. Small fluid bolus given. CT surgery was consulted given recent history of open sternotomy. COVID PCR pending.     Patient seen in the ED lying comfortably in bed, without complaints of chest pain, SOB, chills, cough, headache, or n/v/d abdominal pain.   Patient non-septic looking, MSI and R axillary cannulation sites look c/d/i no signs of drainage or infection. Patient vitals are stable, SR 90's, /80, saturating well O2 98% on RA, with low grade temp of 100.8. Blood cultures sent. Patient states he feels okay when lying down but gets dizzy if standing up or sitting up.     ICU Vital Signs Last 24 Hrs  T(C): 38.2 (14 Dec 2020 10:30), Max: 38.2 (14 Dec 2020 10:30)  T(F): 100.8 (14 Dec 2020 10:30), Max: 100.8 (14 Dec 2020 10:30)  HR: 94 (14 Dec 2020 12:36) (94 - 98)  BP: 108/73 (14 Dec 2020 12:36) (102/71 - 125/78)  BP(mean): --  ABP: --  ABP(mean): --  RR: 16 (14 Dec 2020 12:36) (16 - 16)  SpO2: 100% (14 Dec 2020 12:36) (97% - 100%)                          10.2   12.11 )-----------( 216      ( 14 Dec 2020 10:54 )             33.9   12-14    137  |  93<L>  |  52<H>  ----------------------------<  254<H>  4.1   |  25  |  12.41<H>    Ca    8.8      14 Dec 2020 10:54    TPro  6.2  /  Alb  3.3  /  TBili  0.5  /  DBili  x   /  AST  26  /  ALT  28  /  AlkPhos  74  12-14      General: WN/WD NAD  Neurology: A&Ox3, nonfocal, MASON x 4  Head:  Normocephalic, atraumatic  ENT:  Mucosa moist, no ulcerations  Neck:  Supple, no sinuses or palpable masses  Lymphatic:  No palpable cervical, supraclavicular, axillary or inguinal adenopathy  Respiratory: CTA B/L  CV: RRR, S1S2, no murmur  Abdominal: Soft, NT, ND no palpable mass  MSK: No edema, + peripheral pulses, FROM all 4 extremity  Incisions: intact, no erythema or drainage    Plan:   - Orthostatic BP   - Renal re-consult   - F/u pan cultures, R/O COVID   - Admit to Medicine as per CT surgeon Dr. Arango  - CTS to follow

## 2020-12-14 NOTE — ED CLERICAL - NS ED CLERK NOTE PRE-ARRIVAL INFORMATION; ADDITIONAL PRE-ARRIVAL INFORMATION
This patient is enrolled in the [facility] program and has undergone a cardiac surgery procedure within the last 30 days and has active care navigation. This patient can be followed up by the care navigation team within 24 hours. To arrange close follow-up or to obtain additional clinical information about this patient, please call the contact number above. Please call the cardiac surgery team once patient is registered at [phone] for consultation PRIOR to disposition decision.  The patient recently underwent a cardiac surgery procedure and the team can assist in acute medical management.

## 2020-12-14 NOTE — H&P ADULT - NSHPREVIEWOFSYSTEMS_GEN_ALL_CORE
REVIEW OF SYSTEMS:    CONSTITUTIONAL: + weakness, + fevers +no chills  EYES/ENT: No visual changes;  No vertigo or throat pain   NECK: No pain or stiffness  RESPIRATORY: No cough, wheezing, hemoptysis; No shortness of breath  CARDIOVASCULAR: No chest pain or palpitations  GASTROINTESTINAL: No abdominal or epigastric pain. No nausea, vomiting, or hematemesis; No diarrhea or constipation. No melena or hematochezia.  GENITOURINARY: No dysuria, frequency or hematuria  NEUROLOGICAL: No numbness or weakness  SKIN: No itching, burning, rashes, or lesions   All other review of systems is negative unless indicated above.

## 2020-12-14 NOTE — CONSULT NOTE ADULT - SUBJECTIVE AND OBJECTIVE BOX
Podiatry pager #: [phone]/ [phone]    Patient is a 55y old  Male who presents with a chief complaint of fever (14 Dec 2020 15:41)      HPI:    54 y/o M w/ PMHX of CAD s/p CABG x 4 (LIMA to LAD, SVG to Diagonal, OM1, PDA on 8/8/19), HTN, HLD, ESRD on PD for 3 years, T2DM on insulin had abnormal stress test and subsequent cardiac catheterization revealed multivessel CAD with in-graft re-stenosis. Pt had LHC on 11/30 Stent was successfully deployed, but on removal of the balloon, the wire fractured and the balloon remained stuck in the L main. Pt was subsequently taken to the OR for removal of the angioplasty balloon and fractured wire via transverse aortotomy. On 11/30: s/p Right axillary and right femoral cannulation. Redo sternotomy. Recover of fractured left heart catheterization angioplasty balloon and wire via transverse aortotomy. Patient today presents with fever and dizziness this morning. States he felt cold and lightheaded, wife noted low BP. Denies any HA, cp, abd pain, n/v/d.     PAST MEDICAL & SURGICAL HISTORY:  HTN (hypertension)    ESRD (end stage renal disease) on dialysis    CAD, multiple vessel    Diabetes    S/P CABG (coronary artery bypass graft)        MEDICATIONS  (STANDING):  cefepime   IVPB 1000 milliGRAM(s) IV Intermittent every 24 hours  vancomycin  IVPB 1000 milliGRAM(s) IV Intermittent once    MEDICATIONS  (PRN):      Allergies    doxazosin (Other)    Intolerances        VITALS:    Vital Signs Last 24 Hrs  T(C): 36.8 (14 Dec 2020 16:41), Max: 38.2 (14 Dec 2020 10:30)  T(F): 98.2 (14 Dec 2020 16:41), Max: 100.8 (14 Dec 2020 10:30)  HR: 95 (14 Dec 2020 16:41) (93 - 98)  BP: 119/76 (14 Dec 2020 16:41) (102/71 - 125/78)  BP(mean): --  RR: 18 (14 Dec 2020 16:41) (16 - 18)  SpO2: 99% (14 Dec 2020 16:41) (97% - 100%)    LABS:                          10.2   12.11 )-----------( 216      ( 14 Dec 2020 10:54 )             33.9       12-14    137  |  93<L>  |  52<H>  ----------------------------<  254<H>  4.1   |  25  |  12.41<H>    Ca    8.8      14 Dec 2020 10:54    TPro  6.2  /  Alb  3.3  /  TBili  0.5  /  DBili  x   /  AST  26  /  ALT  28  /  AlkPhos  74  12-14      CAPILLARY BLOOD GLUCOSE          PT/INR - ( 14 Dec 2020 10:54 )   PT: 13.3 sec;   INR: 1.11 ratio         PTT - ( 14 Dec 2020 10:54 )  PTT:33.0 sec    LOWER EXTREMITY PHYSICAL EXAM:    Vasular: DP/PT 2/4, B/L, CFT <3 seconds B/L, Temperature gradient WNL B/L.   Neuro: Epicritic sensation diminished to the level of hallux  Musculoskeletal/Ortho: Left hallux DF/PF ROM intact  Skin:  Left dorsal hallux IPJ laceration wound to subQ not probing to bone stable without any drainage or purulence, no signs of acute infection.     RADIOLOGY & ADDITIONAL STUDIES:

## 2020-12-14 NOTE — DISCHARGE NOTE PROVIDER - NSDCMRMEDTOKEN_GEN_ALL_CORE_FT
aspirin 81 mg oral tablet: 1 tab(s) orally once a day    Note:Recent D/C 3 days ago med on discharge paper but unable to confirm with family.  atorvastatin 80 mg oral tablet: 1 tab(s) orally once a day (at bedtime)    Note:Recent D/C 3 days ago med on discharge paper but unable to confirm with family.  ferrous sulfate 324 mg (65 mg elemental iron) oral tablet: 1 tab(s) orally 3 times a day    Note:Recent D/C 3 days ago med on discharge paper but unable to confirm with family.  folic acid 1 mg oral tablet: 1 tab(s) orally once a day    Note:Recent D/C 3 days ago med on discharge paper but unable to confirm with family. No record with pharmacy.  gabapentin 100 mg oral capsule: 1 cap(s) orally once a day  metoprolol succinate 50 mg oral tablet, extended release: 1 tab(s) orally once a day    Note:Recent D/C 3 days ago med on discharge paper but unable to confirm with family.  Nephplex Rx oral tablet: 1 tab(s) orally once a day    Note:Recent D/C 3 days ago med on discharge paper but unable to confirm with family.  nortriptyline 25 mg oral capsule: 1 cap(s) orally once a day (at bedtime)    Note:Recent D/C 3 days ago med on discharge paper but unable to confirm with family.  oxyCODONE 10 mg oral tablet: 1 tab(s) orally every 6 hours, As needed, Severe Pain (7 - 10) MDD:4    Note:Recent D/C 3 days ago med on discharge paper but unable to confirm with family.  pantoprazole 40 mg oral delayed release tablet: 1 tab(s) orally once a day (before a meal)    Note:Recent D/C 3 days ago med on discharge paper but unable to confirm with family.  sevelamer carbonate 800 mg oral tablet: 1 tab(s) orally 3 times a day (with meals)    Note:Recent D/C 3 days ago med on discharge paper but unable to confirm with family.  ticagrelor 90 mg oral tablet: 1 tab(s) orally every 12 hours    Note:Recent D/C 3 days ago med on discharge paper but unable to confirm with family.  Toujeo SoloStar 300 units/mL subcutaneous solution: 60 unit(s) subcutaneous once a day (at bedtime)    Note:Recent D/C 3 days ago med on discharge paper but unable to confirm with family.  Victoza 18 mg/3 mL subcutaneous solution: 1.2 milligram(s) subcutaneously once a day   Vitamin D3 50,000 intl units (1250 mcg) oral capsule: 1 tab(s) orally once a week    Note:no record with  Recent D/C 3 days ago med on discharge paper but unable to confirm with family.   aspirin 81 mg oral delayed release tablet: 1 tab(s) orally once a day  atorvastatin 80 mg oral tablet: 1 tab(s) orally once a day (at bedtime)  epoetin martine: 92976 unit(s) subcutaneous once a weekby Nephrologist  ferrous sulfate 325 mg (65 mg elemental iron) oral tablet: 1 tab(s) orally once a day  folic acid 1 mg oral tablet: 1 tab(s) orally once a day  gabapentin 100 mg oral capsule: 1 cap(s) orally once a day  gentamicin 0.1% topical cream: 1 application topically 2 times a day  metoprolol: 12.5 milligram(s) orally every 12 hours   midodrine 5 mg oral tablet: 1 tab(s) orally 3 times a day  nortriptyline 25 mg oral capsule: 1 cap(s) orally once a day (at bedtime)  pantoprazole 40 mg oral delayed release tablet: 1 tab(s) orally once a day (before a meal)  sevelamer carbonate 800 mg oral tablet: 1 tab(s) orally 3 times a day (with meals)  ticagrelor 60 mg oral tablet: 1 tab(s) orally every 12 hours   Toujeo SoloStar 300 units/mL subcutaneous solution: 70 unit(s) subcutaneous once a day (at bedtime)  Victoza 18 mg/3 mL subcutaneous solution: 1.2 milligram(s) subcutaneously once a day

## 2020-12-14 NOTE — ED PROVIDER NOTE - PHYSICAL EXAMINATION
RGUJRAL   Patient is awake, alert and oriented x 3.  Patient is well appearing and in no acute distress.  NCAT, PERRL 3mm b/l   Neck is supple, No LAD.  Lungs are CTA B/L,+S1S2 no murmurs,  Sternotomy site intact no discharge  PD catheter no surrounding erythema.   Abdomen:Soft nd/nt+bs no rebound or guarding.  Extremity no edema or calf tender.  Skin with no rash.  Neuro CN3-12 intact. Strength 5/5 in upper and lower extremities. Nml Sensation.

## 2020-12-14 NOTE — DISCHARGE NOTE PROVIDER - HOSPITAL COURSE
54 yo M hx ESRD, DM, on PD , CAD, underwent cardiac cath and stent and wire broke in heart, s/p sternotomy, discharge last week, readmitted with dizziness, transient fever,   PLaced on Cefepime cultures remained negative , remained HD stable and asymptomatic discharged home   s 56 yo M hx ESRD, DM, on PD , CAD, underwent cardiac cath and stent and wire broke in heart, s/p sternotomy, discharge last week, readmitted with dizziness, transient fever,   PLaced on Cefepime cultures remained negative , remained HD stable and asymptomatic. Cards/EP  consulted for new findings on echo.  Question of ICD eval for primary prevention in setting of worsening LV function (EF 32% here, known to be reduced last month during cath/cardiac surgery admission; worsened compared to 2019). He does not report issues with ADL's at home prior to being admitted here at this time or prior to his last admission.  ********* 56 yo M hx ESRD, DM, on PD , CAD, underwent cardiac cath and stent and wire broke in heart, s/p sternotomy, discharge last week, readmitted with dizziness, transient fever,   PLaced on Cefepime cultures remained negative , remained HD stable and asymptomatic. Cards/EP  consulted for new findings on echo.  Question of ICD eval for primary prevention in setting of worsening LV function (EF 32% here, known to be reduced last month during cath/cardiac surgery admission; worsened compared to 2019). He does not report issues with ADL's at home prior to being admitted here at this time or prior to his last admission. Eval by EP indicates no need for AICD at this time and cleared for D/C. ID eval no signs of infection, cleared for D/C off antibiotics. Followed by Nephrology Dr Danyel Dale to continue home PD. Seen by Podiatry for Left dorsal hallux IPJ laceration wound to subQ not probing to bone stable without any drainage or purulence, no signs of acute infection. Tendon intact and neurovascular intact, Applied mupirocin daily with bandaid, Stable from podiatry standpoint. Followed by [place] [facility] for management of DMT2.    Stable for D/C home with F/U with PMD Dr Franklin Villegas within 1 week. F/U with Cardiology Dr Best within 2 weeks. F/U with Nephrology Dr Dale within 1 week. F/U with Podiatry Dr. Thor LEE within 1 week of discharge. F/U with Endocrine within 2 weeks of discharge.

## 2020-12-14 NOTE — ED PROVIDER NOTE - CLINICAL SUMMARY MEDICAL DECISION MAKING FREE TEXT BOX
54 y/o M w/ PMHX of CAD s/p CABG x 4 (LIMA to LAD, SVG to Diagonal, OM1, PDA on 8/8/19), HTN, HLD, ESRD on PD for 3 years, T2DM on insulin had abnormal stress test and subsequent cardiac catheterization revealed multivessel CAD with in-graft re-stenosis. Pt had LHC on 11/30 Stent was successfully deployed, but on removal of the balloon, the wire fractured and the balloon remained stuck in the L main. Pt was subsequently taken to the OR for removal of the angioplasty balloon and fractured wire via transverse aortotomy. On 11/30: s/p Right axillary and right femoral cannulation. Redo sternotomy. Recover of fractured left heart catheterization angioplasty balloon and wire via transverse aortotomy. Patient today presents with fever and dizziness this morning. States he felt cold and lightheaded, wife noted low BP. Denies any HA, cp, abd pain, n/v/d.   Check labs, pan culture, xray to eval for symptoms. Small fluid bolus w re eval due to recent history. Last PD last night. CT surgery consulted.

## 2020-12-14 NOTE — DISCHARGE NOTE PROVIDER - NSDCCPCAREPLAN_GEN_ALL_CORE_FT
PRINCIPAL DISCHARGE DIAGNOSIS  Diagnosis: Febrile illness  Assessment and Plan of Treatment: resolved    blood c/s negative      SECONDARY DISCHARGE DIAGNOSES  Diagnosis: CAD (coronary artery disease)  Assessment and Plan of Treatment: Coronary artery disease is a condition where the arteries the supply the heart muscle get clogges with fatty deposits & puts you at risk for a heart attack  Call your doctor if you have any new pain, pressure, or discomfort in the center of your chest, pain, tingling or discomfort in arms, back, neck, jaw, or stomach, shortness of breath, nausea, vomiting, burping or heartburn, sweating, cold and clammy skin, racing or abnormal heartbeat for more than 10 minutes or if they keep coming & going.  Call 911 and do not tr to get to hospital by care  You can help yourself with lefestyle changes (quitting smoking if you smoke), eat lots of fruits & vegetables & low fat dairy products, not a lot of meat & fatty foods, walk or some form of physical activity most days of the week, lose weight if you are overweight  Take your cardiac medication as prescribed to lower cholesterol, to lower blood pressure, aspirin to prevent blood clots, and diabetes control  Make sure to keep appointments with doctor for cardiac follow up care      Diagnosis: Essential hypertension  Assessment and Plan of Treatment: Follow up with your medical doctor to establish long term blood pressure treatment goals.      Diagnosis: Type 2 diabetes mellitus with hyperglycemia, with long-term current use of insulin  Assessment and Plan of Treatment: HgA1C this admission 7.8  Make sure you get your HgA1c checked every three months.  If you take oral diabetes medications, check your blood glucose two times a day.  If you take insulin, check your blood glucose before meals and at bedtime.  It's important not to skip any meals.  Keep a log of your blood glucose results and always take it with you to your doctor appointments.  Keep a list of your current medications including injectables and over the counter medications and bring this medication list with you to all your doctor appointments.  If you have not seen your opthalmologist this year call for appointment.  Check your feet daily for redness, sores, or openings. Do not self treat. If no improvement in two days call your primary care physician for an appointment.  Low blood sugar (hypoglycemia) is a blood sugar below 70mg/dl. Check your blood sugar if you feel signs/symptoms of hypoglycemia. If your blood sugar is below 70 take 15 grams of carbohydrates (ex 4 oz of apple juice, 3-4 glucosr tablets, or 4-6 oz of regular soda) wait 15 minutes and repeat blood sugar to make sure it comes up above 70.  If your blood sugar is above 70 and you are due for a meal, have a meal.  If you are not due for a meal have a snack.  This snack helps keeps your blood sugar at a safe range.       PRINCIPAL DISCHARGE DIAGNOSIS  Diagnosis: Febrile illness  Assessment and Plan of Treatment: resolved    blood c/s negative      SECONDARY DISCHARGE DIAGNOSES  Diagnosis: CAD (coronary artery disease)  Assessment and Plan of Treatment: Coronary artery disease is a condition where the arteries the supply the heart muscle get clogges with fatty deposits & puts you at risk for a heart attack  Call your doctor if you have any new pain, pressure, or discomfort in the center of your chest, pain, tingling or discomfort in arms, back, neck, jaw, or stomach, shortness of breath, nausea, vomiting, burping or heartburn, sweating, cold and clammy skin, racing or abnormal heartbeat for more than 10 minutes or if they keep coming & going.  Call 911 and do not tr to get to hospital by care  You can help yourself with lefestyle changes (quitting smoking if you smoke), eat lots of fruits & vegetables & low fat dairy products, not a lot of meat & fatty foods, walk or some form of physical activity most days of the week, lose weight if you are overweight  Take your cardiac medication as prescribed to lower cholesterol, to lower blood pressure, aspirin to prevent blood clots, and diabetes control  Follow up with your Primary MD and your Cardiologist within 1 week. Call for appointments.  Make sure to keep appointments with doctor for cardiac follow up care      Diagnosis: Essential hypertension  Assessment and Plan of Treatment: Follow up with your medical doctor to establish long term blood pressure treatment goals.      Diagnosis: Type 2 diabetes mellitus with hyperglycemia, with long-term current use of insulin  Assessment and Plan of Treatment: HgA1C this admission 7.8  Make sure you get your HgA1c checked every three months.  If you take oral diabetes medications, check your blood glucose two times a day.  If you take insulin, check your blood glucose before meals and at bedtime.  It's important not to skip any meals.  Keep a log of your blood glucose results and always take it with you to your doctor appointments.  Keep a list of your current medications including injectables and over the counter medications and bring this medication list with you to all your doctor appointments.  If you have not seen your opthalmologist this year call for appointment.  Check your feet daily for redness, sores, or openings. Do not self treat. If no improvement in two days call your primary care physician for an appointment.  Low blood sugar (hypoglycemia) is a blood sugar below 70mg/dl. Check your blood sugar if you feel signs/symptoms of hypoglycemia. If your blood sugar is below 70 take 15 grams of carbohydrates (ex 4 oz of apple juice, 3-4 glucosr tablets, or 4-6 oz of regular soda) wait 15 minutes and repeat blood sugar to make sure it comes up above 70.  If your blood sugar is above 70 and you are due for a meal, have a meal.  If you are not due for a meal have a snack.  This snack helps keeps your blood sugar at a safe range.  Follow up with your Endocrinologist Dr Villegas within 1-2 weeks. Call for appointment

## 2020-12-14 NOTE — H&P ADULT - HISTORY OF PRESENT ILLNESS
55y male with esrd on PD for 5 years, dm poorly controlled, cad prior cabg. He underwent cardiac cath and angioplasty about 2 weeks ago due to cp and abnormal ETT with cad. He had a break of the wire so underwent emergency sternotomy, axilla and groin cannulation and the wire was recovered. He had HD briefly then back to PD of late. He was sent home on 12/11 feeling fine. Today he returns for dizziness upon standing, vomited once and fever. He felt fine yesterday, the PD fluid is clear and catheter works well, no cp , sob, cough, wound drainage, abdo pain, back pain, ha, sore throat. No sick contacts. He hit his left great toe recently and has a wound there.

## 2020-12-14 NOTE — CONSULT NOTE ADULT - SUBJECTIVE AND OBJECTIVE BOX
Patient is a 55y old  Male who presents with a chief complaint of fever, weakness (14 Dec 2020 16:57)      HPI:   55y male with esrd on PD for 5 years, dm poorly controlled, cad prior cabg. He underwent cardiac cath and angioplasty about 2 weeks ago due to cp and abnormal ETT with cad. He had a break of the wire so underwent emergency sternotomy, axilla and groin cannulation and the wire was recovered. He had HD briefly then back to PD of late. He was sent home on 12/11 feeling fine. Today he returns for dizziness upon standing, vomited once and fever. He hit his left great toe recently and has a wound there.    We are consulted for PD maintenance. Patient is currently on CCPD doing  2.5 % dianeal exchanges at home. He filled fluid in his abdomen last night and has not drained since then. The PD fluid is clear, not cloudy and catheter works well, no cp, sob, cough, wound drainage, abdo pain, back pain, ha, sore throat. No sick contacts.      PAST MEDICAL & SURGICAL HISTORY:  HTN (hypertension)    ESRD (end stage renal disease) on dialysis    CAD, multiple vessel    Diabetes    S/P CABG (coronary artery bypass graft)        MEDICATIONS  (STANDING):  aspirin enteric coated 81 milliGRAM(s) Oral daily  atorvastatin 80 milliGRAM(s) Oral at bedtime  cefepime   IVPB 1000 milliGRAM(s) IV Intermittent every 24 hours  dextrose 40% Gel 15 Gram(s) Oral once  dextrose 5%. 1000 milliLiter(s) (50 mL/Hr) IV Continuous <Continuous>  dextrose 5%. 1000 milliLiter(s) (100 mL/Hr) IV Continuous <Continuous>  dextrose 50% Injectable 25 Gram(s) IV Push once  dextrose 50% Injectable 12.5 Gram(s) IV Push once  dextrose 50% Injectable 25 Gram(s) IV Push once  ferrous    sulfate 325 milliGRAM(s) Oral daily  folic acid 1 milliGRAM(s) Oral daily  gabapentin 100 milliGRAM(s) Oral daily  glucagon  Injectable 1 milliGRAM(s) IntraMuscular once  heparin   Injectable 5000 Unit(s) SubCutaneous every 12 hours  insulin lispro (ADMELOG) corrective regimen sliding scale   SubCutaneous three times a day before meals  insulin lispro (ADMELOG) corrective regimen sliding scale   SubCutaneous at bedtime  insulin lispro Injectable (ADMELOG) 14 Unit(s) SubCutaneous before breakfast  insulin lispro Injectable (ADMELOG) 18 Unit(s) SubCutaneous before lunch  insulin lispro Injectable (ADMELOG) 18 Unit(s) SubCutaneous before dinner  insulin NPH human recombinant 12 Unit(s) SubCutaneous at bedtime  metoprolol succinate ER 50 milliGRAM(s) Oral daily  mupirocin 2% Ointment 1 Application(s) Topical once  nortriptyline 25 milliGRAM(s) Oral at bedtime  pantoprazole    Tablet 40 milliGRAM(s) Oral before breakfast  sevelamer carbonate 800 milliGRAM(s) Oral three times a day with meals  ticagrelor 60 milliGRAM(s) Oral every 12 hours  vancomycin  IVPB 1000 milliGRAM(s) IV Intermittent once      Allergies    doxazosin (Other)    Intolerances        SOCIAL HISTORY:  Denies ETOh,Smoking,     FAMILY HISTORY:  FH: diabetes mellitus (Sibling)  father    FHx: lung cancer (Sibling)  bother        REVIEW OF SYSTEMS:  CONSTITUTIONAL: No weakness, fevers or chills  EYES/ENT: No visual changes;  No vertigo or throat pain   NECK: No pain or stiffness  RESPIRATORY: No cough, wheezing, hemoptysis; No shortness of breath  CARDIOVASCULAR: No chest pain or palpitations  GASTROINTESTINAL: No abdominal or epigastric pain. No nausea, vomiting, or hematemesis; No diarrhea or constipation. No melena or hematochezia.  GENITOURINARY: No dysuria, frequency or hematuria  NEUROLOGICAL: No numbness or weakness  SKIN: No itching, burning, rashes, or lesions   All other review of systems is negative unless indicated above.    VITAL:  T(C): , Max: 38.2 (12-14-20 @ 10:30)  T(F): , Max: 100.8 (12-14-20 @ 10:30)  HR: 95 (12-14-20 @ 16:41)  BP: 119/76 (12-14-20 @ 16:41)  BP(mean): --  RR: 18 (12-14-20 @ 16:41)  SpO2: 99% (12-14-20 @ 16:41)  Wt(kg): --    PHYSICAL EXAM:  Constitutional: NAD, Alert  HEENT: NCAT, MMM  Neck: Supple, No JVD  Respiratory: CTA-b/l  Cardiovascular: RRR s1s2, no m/r/g  Gastrointestinal: soft -  PD catheter in situ with crusting, scab formation  Extremities: No peripheral edema b/l      LABS:                        10.2   12.11 )-----------( 216      ( 14 Dec 2020 10:54 )             33.9     Na(137)/K(4.1)/Cl(93)/HCO3(25)/BUN(52)/Cr(12.41)Glu(254)/Ca(8.8)/Mg(--)/PO4(--)    12-14 @ 10:54            IMAGING:    ASSESSMENT:   55y male with esrd on PD for 5 years, dm poorly controlled, cad prior cabg presents with dizziness and found to be hypotensive     ESRD on CCPD at home   Anemia of ESRD  Hypotension  Mild leukocytosis  Lytes acceptable  Left foot wound     PLan   Will resume manual exchanges in hospital - 4-   1.5 %  exchanges Q6 H ( given low blood pressures now)   Send peritoneal fluid for cell count and cytology   Exit site care with mupirocin   Bowel regimen   Dose med for Cr Cl< 10 ml/min  Renvela 800 mg TID with meals   s/p vanco and zosyn  in the ER    Thank you for involving [facility] in this patient's care.    With warm regards,    Samia Dangelo MD   [facility]  Office: [phone]           Patient is a 55y old  Male who presents with a chief complaint of fever, weakness (14 Dec 2020 16:57)      HPI:   55y male with esrd on PD for 5 years, dm poorly controlled, cad prior cabg. He underwent cardiac cath and angioplasty about 2 weeks ago due to cp and abnormal ETT with cad. He had a break of the wire so underwent emergency sternotomy, axilla and groin cannulation and the wire was recovered. He had HD briefly then back to PD of late. He was sent home on 12/11 feeling fine. Today he returns for dizziness upon standing, vomited once and fever. He hit his left great toe recently and has a wound there.    We are consulted for PD maintenance. Patient is currently on CCPD doing  2.5 % dianeal exchanges at home. He filled fluid in his abdomen last night and has not drained since then. The PD fluid is clear, not cloudy and catheter works well, no cp, sob, cough, wound drainage, abdo pain, back pain, ha, sore throat. No sick contacts.      PAST MEDICAL & SURGICAL HISTORY:  HTN (hypertension)    ESRD (end stage renal disease) on dialysis    CAD, multiple vessel    Diabetes    S/P CABG (coronary artery bypass graft)        MEDICATIONS  (STANDING):  aspirin enteric coated 81 milliGRAM(s) Oral daily  atorvastatin 80 milliGRAM(s) Oral at bedtime  cefepime   IVPB 1000 milliGRAM(s) IV Intermittent every 24 hours  dextrose 40% Gel 15 Gram(s) Oral once  dextrose 5%. 1000 milliLiter(s) (50 mL/Hr) IV Continuous <Continuous>  dextrose 5%. 1000 milliLiter(s) (100 mL/Hr) IV Continuous <Continuous>  dextrose 50% Injectable 25 Gram(s) IV Push once  dextrose 50% Injectable 12.5 Gram(s) IV Push once  dextrose 50% Injectable 25 Gram(s) IV Push once  ferrous    sulfate 325 milliGRAM(s) Oral daily  folic acid 1 milliGRAM(s) Oral daily  gabapentin 100 milliGRAM(s) Oral daily  glucagon  Injectable 1 milliGRAM(s) IntraMuscular once  heparin   Injectable 5000 Unit(s) SubCutaneous every 12 hours  insulin lispro (ADMELOG) corrective regimen sliding scale   SubCutaneous three times a day before meals  insulin lispro (ADMELOG) corrective regimen sliding scale   SubCutaneous at bedtime  insulin lispro Injectable (ADMELOG) 14 Unit(s) SubCutaneous before breakfast  insulin lispro Injectable (ADMELOG) 18 Unit(s) SubCutaneous before lunch  insulin lispro Injectable (ADMELOG) 18 Unit(s) SubCutaneous before dinner  insulin NPH human recombinant 12 Unit(s) SubCutaneous at bedtime  metoprolol succinate ER 50 milliGRAM(s) Oral daily  mupirocin 2% Ointment 1 Application(s) Topical once  nortriptyline 25 milliGRAM(s) Oral at bedtime  pantoprazole    Tablet 40 milliGRAM(s) Oral before breakfast  sevelamer carbonate 800 milliGRAM(s) Oral three times a day with meals  ticagrelor 60 milliGRAM(s) Oral every 12 hours  vancomycin  IVPB 1000 milliGRAM(s) IV Intermittent once      Allergies    doxazosin (Other)    Intolerances        SOCIAL HISTORY:  Denies ETOh,Smoking,     FAMILY HISTORY:  FH: diabetes mellitus (Sibling)  father    FHx: lung cancer (Sibling)  bother        REVIEW OF SYSTEMS:  CONSTITUTIONAL: No weakness, fevers or chills  EYES/ENT: No visual changes;  No vertigo or throat pain   NECK: No pain or stiffness  RESPIRATORY: No cough, wheezing, hemoptysis; No shortness of breath  CARDIOVASCULAR: No chest pain or palpitations  GASTROINTESTINAL: No abdominal or epigastric pain. No nausea, vomiting, or hematemesis; No diarrhea or constipation. No melena or hematochezia.  GENITOURINARY: No dysuria, frequency or hematuria  NEUROLOGICAL: No numbness or weakness  SKIN: No itching, burning, rashes, or lesions   All other review of systems is negative unless indicated above.    VITAL:  T(C): , Max: 38.2 (12-14-20 @ 10:30)  T(F): , Max: 100.8 (12-14-20 @ 10:30)  HR: 95 (12-14-20 @ 16:41)  BP: 119/76 (12-14-20 @ 16:41)  BP(mean): --  RR: 18 (12-14-20 @ 16:41)  SpO2: 99% (12-14-20 @ 16:41)  Wt(kg): --    PHYSICAL EXAM:  Constitutional: NAD, Alert  HEENT: NCAT, MMM  Neck: Supple, No JVD  Respiratory: CTA-b/l  Cardiovascular: RRR s1s2, no m/r/g  Gastrointestinal: soft -  PD catheter in situ with crusting, scab formation  Extremities: No peripheral edema b/l      LABS:                        10.2   12.11 )-----------( 216      ( 14 Dec 2020 10:54 )             33.9     Na(137)/K(4.1)/Cl(93)/HCO3(25)/BUN(52)/Cr(12.41)Glu(254)/Ca(8.8)/Mg(--)/PO4(--)    12-14 @ 10:54            IMAGING:    ASSESSMENT:   55y male with esrd on PD for 5 years, dm poorly controlled, cad prior cabg presents with dizziness and found to be hypotensive     ESRD on CCPD at home   Anemia of ESRD  Hypotension  Mild leukocytosis  Lytes acceptable  Left foot wound     PLan   s/p NS in ER  Will resume manual exchanges in hospital -4 exchanges- Dianeal   1.5 %  exchange Q6 H ( given low blood pressures now)    Send peritoneal fluid for cell count, culture, gram stain  Exit site care with mupirocin   Bowel regimen   Dose med for Cr Cl< 10 ml/min  Renvela 800 mg TID with meals   s/p vanco and zosyn  in the ER    Thank you for involving [facility] in this patient's care.    With warm regards,    Samia Dangelo MD   [facility]  Office: [phone]

## 2020-12-14 NOTE — ED ADULT NURSE NOTE - OBJECTIVE STATEMENT
54 y/o male PMHX, HTN, CKD, Peritoneal Dialysis, CAD, CABG open heart surgery DCed 11/30. BIBA, as per EMS pt wife states pt is hypotensive and dizzy. pt states he feels dizzy, oral temp 100.1, rectal 100.8 pt states he did not take anything for fever. pt denies any other symptoms other than feeling dizzy. pt denies any CP or SOB, chest rise symmetrical, breathing unlabored, denies abdominal tenderness, denies any urinary symptoms, denies chills, nausea, vomiting, diarrhea.  pt has skin discoloration on bilateral lower extremities, pt states he feels dizzy when sitting up and would rather lay down flat. pt placed on cardiac monitor, call bell in reach, safety precautions in place.

## 2020-12-14 NOTE — H&P ADULT - NSICDXFAMILYHX_GEN_ALL_CORE_FT
FAMILY HISTORY:  Sibling  Still living? No  FH: diabetes mellitus, Age at diagnosis: Age Unknown  FHx: lung cancer, Age at diagnosis: Age Unknown

## 2020-12-14 NOTE — ED ADULT NURSE NOTE - NSIMPLEMENTINTERV_GEN_ALL_ED
Implemented All Fall Risk Interventions:  [place] to call system. Call bell, personal items and telephone within reach. Instruct patient to call for assistance. Room bathroom lighting operational. Non-slip footwear when patient is off stretcher. Physically safe environment: no spills, clutter or unnecessary equipment. Stretcher in lowest position, wheels locked, appropriate side rails in place. Provide visual cue, wrist band, yellow gown, etc. Monitor gait and stability. Monitor for mental status changes and reorient to person, place, and time. Review medications for side effects contributing to fall risk. Reinforce activity limits and safety measures with patient and family.

## 2020-12-14 NOTE — CONSULT NOTE ADULT - SUBJECTIVE AND OBJECTIVE BOX
HPI:   Patient is a 55y male with esrd on PD for 5 years, dm poorly controlled, cad prior cabg. He underwent cardiac cath and angioplasty about 2 weeks ago due to cp and abnormal ETT with cad. He had a break of the wire so underwent emergency sternotomy, axilla and groin cannulation and the wire was recovered. He had HD briefly then back to PD of late. He was sent home on 12/11 feeling fine. Today he returns for dizziness upon standing, vomited once and fever. He felt fine yesterday, the PD fluid is clear and catheter works well, no cp , sob, cough, wound drainage, abdo pain, back pain, ha, sore throat. No sick contacts. He hit his left great toe recently and has a wound there.     REVIEW OF SYSTEMS:  All other review of systems negative (Comprehensive ROS)    PAST MEDICAL & SURGICAL HISTORY:  HTN (hypertension)    ESRD (end stage renal disease) on dialysis    CAD, multiple vessel    Diabetes    S/P CABG (coronary artery bypass graft)        Allergies    doxazosin (Other)    Intolerances        Antimicrobials Day #  :1  vancomycin  IVPB 1000 milliGRAM(s) IV Intermittent once    Other Medications:      FAMILY HISTORY:  FH: diabetes mellitus (Sibling)  father    FHx: lung cancer (Sibling)  bother        SOCIAL HISTORY:  Smoking: [ ]Yes [ ]No  ETOH: [ ]Yes [ ]No  Drug Use: [ ]Yes [ ]No   [ ] Single[ ]    T(F): 100.8 (12-14-20 @ 10:30), Max: 100.8 (12-14-20 @ 10:30)  HR: 93 (12-14-20 @ 14:45)  BP: 113/78 (12-14-20 @ 14:45)  RR: 18 (12-14-20 @ 14:45)  SpO2: 100% (12-14-20 @ 14:45)  Wt(kg): --    PHYSICAL EXAM:  General: alert, no acute distress, chronically ill appearing  Eyes:  anicteric, no conjunctival injection, no discharge  Oropharynx: no lesions or injection 	  Neck: supple, without adenopathy  Lungs: basilar rales to auscultation    sternal wound is healing, bone stable, no drainage  right chest wound clean  Heart: regular rate and rhythm; no murmur, rubs or gallops  Abdomen: soft, nondistended, nontender, without mass or organomegaly. PD catheter site no redness , no drainage, no tenderness  Skin: no lesions  Extremities: right distal 1/2 forefoot is red and warm, left great toe with purulent wound and red no odor, no crepitus  back no ulcers  scrotum no swelling  Neurologic: alert, oriented, moves all extremities    LAB RESULTS:                        10.2   12.11 )-----------( 216      ( 14 Dec 2020 10:54 )             33.9     12-14    137  |  93<L>  |  52<H>  ----------------------------<  254<H>  4.1   |  25  |  12.41<H>    Ca    8.8      14 Dec 2020 10:54    TPro  6.2  /  Alb  3.3  /  TBili  0.5  /  DBili  x   /  AST  26  /  ALT  28  /  AlkPhos  74  12-14    LIVER FUNCTIONS - ( 14 Dec 2020 10:54 )  Alb: 3.3 g/dL / Pro: 6.2 g/dL / ALK PHOS: 74 U/L / ALT: 28 U/L / AST: 26 U/L / GGT: x               MICROBIOLOGY:  RECENT CULTURES:  iSARS-CoV-2: NotDetec: This Respiratory Panel uses polymerase chain reaction (PCR) to detect for < from: Xray Chest 1 View AP/PA (12.14.20 @ 11:26) >  EXAM:  XR CHEST AP OR PA 1V                            PROCEDURE DATE:  12/14/2020            INTERPRETATION:  A single chest x-ray was obtained on December 14, 2020.    Indication: Fever. Rule out pneumonia.    Impression:    The heart is enlarged. The lungs are clear. No pleural effusion. No pneumothorax. Status post sternotomy. Metallic clips are seen in the soft tissue of the area of the scapula.    < end of copied text >          Impression:  Patient with dm, cad, esrd on PD , recent cath and the wire broke, had sternotomy and axilla and groin cannulated and wire removed 2 weeks ago. Did well post op. SUgars not well controlled. Went home 3 days ago. Returns today with dizziness, vomited once, fever. He does have an infected looking left great toe and some early cellulitis of the right foot as possible sources. Could have catheter associated peritonitis though no rico symptoms or cloudy urine. Has some rales so pneumonia possible too. His sternum and axilla and groin wound look clean. No other source is overtly apparent.     Recommendations:  continue zosyn, vanco by level  f/u cultures  podiatry evaluation of feet  send PD fluid for culture and cell count  ct chest  further w/u and tx to be determined

## 2020-12-14 NOTE — DISCHARGE NOTE PROVIDER - CARE PROVIDERS DIRECT ADDRESSES
,DirectAddress_Unknown ,DirectAddress_Unknown,[email or web address],DirectAddress_Unknown,DirectAddress_Unknown

## 2020-12-14 NOTE — DISCHARGE NOTE PROVIDER - PROVIDER TOKENS
PROVIDER:[TOKEN:[[id number]]] PROVIDER:[TOKEN:[[id number]]],PROVIDER:[TOKEN:[[id number]],FOLLOWUP:[2 weeks]],FREE:[LAST:[Dr Mcrae],PHONE:[(   )    -],FAX:[(   )    -],ADDRESS:[Podiatry  [place]: [phone]  OR  [place] [phone]],FOLLOWUP:[1 week]],PROVIDER:[TOKEN:[[id number]],FOLLOWUP:[2 weeks]]

## 2020-12-14 NOTE — DISCHARGE NOTE PROVIDER - CARE PROVIDER_API CALL
GABRIELA Leburn  Internal Medicine  [address] [place] [address], [place] [address]  Phone: [phone]  Fax: [phone]  Follow Up Time:    BRENDA OCHOA  Internal Medicine  [address] [address]  Phone: [phone]  Fax: [phone]  Follow Up Time:     Danyel Dale (MD)  Internal Medicine; Nephrology  [address] [facility], [address]  [place] [address]  Phone: [phone]  Fax: [phone]  Follow Up Time: 2 weeks    Dr Mcrae,   Podiatry  [place]: [phone]  OR  [place] [phone]  Phone: (   )    -  Fax: (   )    -  Follow Up Time: 1 week    Beltran Best  CARDIOVASCULAR DISEASE  [address] [facility], [address]  [place] [address]  Phone: [phone]  Fax: [phone]  Follow Up Time: 2 weeks

## 2020-12-14 NOTE — DISCHARGE NOTE PROVIDER - INSTRUCTIONS
Diabetic consistent carbohydrates with evening snack. Low salt. Renal restrictions no concentrated potassium no concentrated Phos

## 2020-12-14 NOTE — H&P ADULT - NSHPPHYSICALEXAM_GEN_ALL_CORE
PHYSICAL EXAMINATION:  Vital Signs Last 24 Hrs  T(C): 36.8 (14 Dec 2020 16:41), Max: 38.2 (14 Dec 2020 10:30)  T(F): 98.2 (14 Dec 2020 16:41), Max: 100.8 (14 Dec 2020 10:30)  HR: 95 (14 Dec 2020 16:41) (93 - 98)  BP: 119/76 (14 Dec 2020 16:41) (102/71 - 125/78)  BP(mean): --  RR: 18 (14 Dec 2020 16:41) (16 - 18)  SpO2: 99% (14 Dec 2020 16:41) (97% - 100%)  CAPILLARY BLOOD GLUCOSE          GENERAL: NAD  HEAD:  atraumatic, normocephalic  EYES: sclera anicteric  ENMT: mucous membranes moist  NECK: supple, No JVD  CHEST/LUNG: clear to auscultation bilaterally; no rales, rhonchi, or wheezing b/l Wound healing  HEART: normal S1, S2  ABDOMEN: BS+, soft, ND, NT PD catheter  EXTREMITIES: wound L foot   NEURO: awake, alert, interactive; moves all extremities  SKIN: no rashes or lesions

## 2020-12-14 NOTE — H&P ADULT - NSHPLABSRESULTS_GEN_ALL_CORE
10.2   12.11 )-----------( 216      ( 14 Dec 2020 10:54 )             33.9       12-14    137  |  93<L>  |  52<H>  ----------------------------<  254<H>  4.1   |  25  |  12.41<H>    Ca    8.8      14 Dec 2020 10:54    TPro  6.2  /  Alb  3.3  /  TBili  0.5  /  DBili  x   /  AST  26  /  ALT  28  /  AlkPhos  74  12-14                  PT/INR - ( 14 Dec 2020 10:54 )   PT: 13.3 sec;   INR: 1.11 ratio         PTT - ( 14 Dec 2020 10:54 )  PTT:33.0 sec      < from: Xray Chest 1 View AP/PA (12.14.20 @ 11:26) >    Impression:    The heart is enlarged. The lungs are clear. No pleural effusion. No pneumothorax. Status post sternotomy. Metallic clips are seen in the soft tissue of the area of the scapula.    < end of copied text >

## 2020-12-14 NOTE — DISCHARGE NOTE PROVIDER - NSDCFUADDINST_GEN_ALL_CORE_FT
Podiatry Discharge Instructions:  Follow up: Please follow up with Dr. Fátima Mcrae within 1 week of discharge from the hospital, please call [phone] for appointment and discuss that you recently were seen in the hospital.  Wound Care: Please make daily dressing change to left hallux wound with mupirocin and bandaid.  Antibiotics: Please continue as instructed.

## 2020-12-14 NOTE — H&P ADULT - ASSESSMENT
55 m with    Fever  - panculture  - empiric antibiotics  - ID evaluation  - CT chest  - Peritoneal fluid culture and cell count    Foot wound  - local care  - Podiatry evaluation    Diabetes Mellitus  - BS control  - ADA diet     CAD  - continue Rx    Depression  - continue Rx    ESRD  - PD  - Nephrology evaluation    DVT prophylaxis     Further action as per clinical course     Pipe Beck MD pager [phone]

## 2020-12-14 NOTE — H&P ADULT - NSHPSOCIALHISTORY_GEN_ALL_CORE
Social History:    Marital Status:  ( x  )    (   ) Single    (   )    (  )   Occupation:   Lives with: (  ) alone  (  ) children   (  x) spouse   (  ) parents  (  ) other    Substance Use (street drugs): ( x ) never used  (  ) other:  Tobacco Usage:  ( x  ) never smoked   (   ) former smoker   (   ) current smoker  (     ) pack years  (        ) last cigarette date  Alcohol Usage: denies    (     ) Advanced Directives: (     ) None    (      ) DNR    (     ) DNI    (     ) Health Care Proxy:

## 2020-12-15 NOTE — PROGRESS NOTE ADULT - SUBJECTIVE AND OBJECTIVE BOX
Podiatry pager #: [phone]/ [phone]    Patient is a 55y old  Male who presents with a chief complaint of fever (14 Dec 2020 15:41)      HPI:    56 y/o M w/ PMHX of CAD s/p CABG x 4 (LIMA to LAD, SVG to Diagonal, OM1, PDA on 8/8/19), HTN, HLD, ESRD on PD for 3 years, T2DM on insulin had abnormal stress test and subsequent cardiac catheterization revealed multivessel CAD with in-graft re-stenosis. Pt had LHC on 11/30 Stent was successfully deployed, but on removal of the balloon, the wire fractured and the balloon remained stuck in the L main. Pt was subsequently taken to the OR for removal of the angioplasty balloon and fractured wire via transverse aortotomy. On 11/30: s/p Right axillary and right femoral cannulation. Redo sternotomy. Recover of fractured left heart catheterization angioplasty balloon and wire via transverse aortotomy. Patient today presents with fever and dizziness this morning. States he felt cold and lightheaded, wife noted low BP. Denies any HA, cp, abd pain, n/v/d.     PAST MEDICAL & SURGICAL HISTORY:  HTN (hypertension)    ESRD (end stage renal disease) on dialysis    CAD, multiple vessel    Diabetes    S/P CABG (coronary artery bypass graft)        MEDICATIONS  (STANDING):  cefepime   IVPB 1000 milliGRAM(s) IV Intermittent every 24 hours  vancomycin  IVPB 1000 milliGRAM(s) IV Intermittent once    MEDICATIONS  (PRN):      Allergies    doxazosin (Other)    Intolerances        VITALS:    Vital Signs Last 24 Hrs  T(C): 36.8 (14 Dec 2020 16:41), Max: 38.2 (14 Dec 2020 10:30)  T(F): 98.2 (14 Dec 2020 16:41), Max: 100.8 (14 Dec 2020 10:30)  HR: 95 (14 Dec 2020 16:41) (93 - 98)  BP: 119/76 (14 Dec 2020 16:41) (102/71 - 125/78)  BP(mean): --  RR: 18 (14 Dec 2020 16:41) (16 - 18)  SpO2: 99% (14 Dec 2020 16:41) (97% - 100%)    LABS:                          10.2   12.11 )-----------( 216      ( 14 Dec 2020 10:54 )             33.9       12-14    137  |  93<L>  |  52<H>  ----------------------------<  254<H>  4.1   |  25  |  12.41<H>    Ca    8.8      14 Dec 2020 10:54    TPro  6.2  /  Alb  3.3  /  TBili  0.5  /  DBili  x   /  AST  26  /  ALT  28  /  AlkPhos  74  12-14      CAPILLARY BLOOD GLUCOSE          PT/INR - ( 14 Dec 2020 10:54 )   PT: 13.3 sec;   INR: 1.11 ratio         PTT - ( 14 Dec 2020 10:54 )  PTT:33.0 sec    LOWER EXTREMITY PHYSICAL EXAM:    Vasular: DP/PT 2/4, B/L, CFT <3 seconds B/L, Temperature gradient WNL B/L.   Neuro: Epicritic sensation diminished to the level of hallux  Musculoskeletal/Ortho: Left hallux DF/PF ROM intact  Skin:  Left dorsal hallux IPJ laceration wound to subQ not probing to bone stable without any drainage or purulence, no signs of acute infection.     RADIOLOGY & ADDITIONAL STUDIES:

## 2020-12-15 NOTE — CONSULT NOTE ADULT - ASSESSMENT
54 y/o M w/ PMHX of CAD s/p CABG x 4 2019, HTN, HLD, ESRD on PD for 3 years, T2DM on insulin and diabetic neuropathy with PVD, recent admission for PCI complicated by broken wire during removal of balloon s/p sternotomy and successful retrieval and CTU stay requiring pressor, discharged to home now presenting with likely sepsis/unclear source, on empiric abx. MICU consulted for hypotension.    RECOMMENDATIONS:  -Agree with cautious fluid resuscitation. Would give second bolus of 250cc IVF (total of 500cc in two boluses).  -Would give 250cc 5% albumin   -Reasonable to give midodrine 10mg x1 although patient's BP appears to have recovered after IVF    Patient does not require MICU at this time.    Please reconsult PRN.

## 2020-12-15 NOTE — CONSULT NOTE ADULT - SUBJECTIVE AND OBJECTIVE BOX
HPI: INCOMPLETE NOTE.   55y male with esrd on PD for 5 years, dm poorly controlled, cad prior cabg. He underwent cardiac cath and angioplasty about 2 weeks ago due to cp and abnormal ETT with cad. He had a break of the wire so underwent emergency sternotomy, axilla and groin cannulation and the wire was recovered. He had HD briefly then back to PD of late. He was sent home on 12/11 feeling fine. Today he returns for dizziness upon standing, vomited once and fever. He felt fine yesterday, the PD fluid is clear and catheter works well, no cp , sob, cough, wound drainage, abdo pain, back pain, ha, sore throat. No sick contacts. He hit his left great toe recently and has a wound there.  (14 Dec 2020 16:57)    ROS: All negative except as mentioned in HPI.    PAST MEDICAL & SURGICAL HISTORY:  HTN (hypertension)    ESRD (end stage renal disease) on dialysis    CAD, multiple vessel    Diabetes    S/P CABG (coronary artery bypass graft)    MEDICATIONS  (STANDING):  aspirin enteric coated 81 milliGRAM(s) Oral daily  atorvastatin 80 milliGRAM(s) Oral at bedtime  cefepime   IVPB 1000 milliGRAM(s) IV Intermittent every 24 hours  dextrose 40% Gel 15 Gram(s) Oral once  dextrose 5%. 1000 milliLiter(s) (50 mL/Hr) IV Continuous <Continuous>  dextrose 5%. 1000 milliLiter(s) (100 mL/Hr) IV Continuous <Continuous>  dextrose 50% Injectable 25 Gram(s) IV Push once  dextrose 50% Injectable 12.5 Gram(s) IV Push once  dextrose 50% Injectable 25 Gram(s) IV Push once  ferrous    sulfate 325 milliGRAM(s) Oral daily  folic acid 1 milliGRAM(s) Oral daily  gabapentin 100 milliGRAM(s) Oral daily  gentamicin 0.1% Cream 1 Application(s) Topical two times a day  glucagon  Injectable 1 milliGRAM(s) IntraMuscular once  heparin   Injectable 5000 Unit(s) SubCutaneous every 12 hours  insulin lispro (ADMELOG) corrective regimen sliding scale   SubCutaneous three times a day before meals  insulin lispro (ADMELOG) corrective regimen sliding scale   SubCutaneous at bedtime  insulin lispro Injectable (ADMELOG) 14 Unit(s) SubCutaneous before breakfast  insulin lispro Injectable (ADMELOG) 18 Unit(s) SubCutaneous before lunch  insulin lispro Injectable (ADMELOG) 18 Unit(s) SubCutaneous before dinner  insulin NPH human recombinant 12 Unit(s) SubCutaneous at bedtime  metoprolol succinate ER 50 milliGRAM(s) Oral daily  midodrine. 10 milliGRAM(s) Oral once  nortriptyline 25 milliGRAM(s) Oral at bedtime  pantoprazole    Tablet 40 milliGRAM(s) Oral before breakfast  sevelamer carbonate 800 milliGRAM(s) Oral three times a day with meals  sodium chloride 0.9% Bolus 250 milliLiter(s) IV Bolus once  ticagrelor 60 milliGRAM(s) Oral every 12 hours    MEDICATIONS  (PRN):  acetaminophen   Tablet .. 650 milliGRAM(s) Oral every 6 hours PRN Temp greater or equal to 38.5C (101.3F), Mild Pain (1 - 3)  oxyCODONE    IR 10 milliGRAM(s) Oral every 6 hours PRN Severe Pain (7 - 10)    Allergies    doxazosin (Other)    Vital Signs Last 24 Hrs  T(C): 37.4 (15 Dec 2020 18:50), Max: 37.4 (15 Dec 2020 18:50)  T(F): 99.4 (15 Dec 2020 18:50), Max: 99.4 (15 Dec 2020 18:50)  HR: 90 (15 Dec 2020 17:30) (84 - 106)  BP: 104/64 (15 Dec 2020 18:35) (90/52 - 138/86)  RR: 18 (15 Dec 2020 17:30) (16 - 18)  SpO2: 98% (15 Dec 2020 17:30) (97% - 100%)    Exam:   MS:   CN:    Motor:   Sensory:   Reflexes:  Coordination:  Gait: Deferred.    LABS:                          10.2   12.11 )-----------( 216      ( 14 Dec 2020 10:54 )             33.9     12-15    135  |  95<L>  |  56<H>  ----------------------------<  122<H>  4.1   |  23  |  12.64<H>    Ca    8.3<L>      15 Dec 2020 06:21    TPro  6.2  /  Alb  3.3  /  TBili  0.5  /  DBili  x   /  AST  26  /  ALT  28  /  AlkPhos  74  12-14    PT/INR - ( 14 Dec 2020 10:54 )   PT: 13.3 sec;   INR: 1.11 ratio       PTT - ( 14 Dec 2020 10:54 )  PTT:33.0 sec           HPI: 56 yo male with a PMHx of ESRD (on PD for 5 years), uncontrolled DM, CAD (s/p CABG), and s/p recent cardiac cath with angioplasty 2 weeks ago due to chest pain and abnormal exercise treadmill test c/b break of the wire requiring emergency sternotomy with axilla and groin cannulation with recovery of the wire. He was discharged home on 12/11, returning to [facility] on 12/14 for dizziness upon standing, nausea/vomiting, and fever. Neurology was consulted by primary team for transient episode of lethargy today. Primary team reported patient had episode of hypotension at the time. Documented BP at 1800 is 90/52. Primary team reports patient was still following simple commands and had no focal neurologic deficits. Patient was given IVF NS 250ml bolus by primary team. After 250ml bolus BP improved to 104/64. On exam, patient states he is tired but is alert and oriented to self, location, month, year, president, and situation. Exam is nonfocal. Creatinine 12.64. Patient is anuric. Patient denies HA, dizziness, numbness/tingling, weakness.    ROS: All negative except as mentioned in HPI.    PAST MEDICAL & SURGICAL HISTORY:  HTN (hypertension)    ESRD (end stage renal disease) on dialysis    CAD, multiple vessel    Diabetes    S/P CABG (coronary artery bypass graft)    MEDICATIONS  (STANDING):  aspirin enteric coated 81 milliGRAM(s) Oral daily  atorvastatin 80 milliGRAM(s) Oral at bedtime  cefepime   IVPB 1000 milliGRAM(s) IV Intermittent every 24 hours  dextrose 40% Gel 15 Gram(s) Oral once  dextrose 5%. 1000 milliLiter(s) (50 mL/Hr) IV Continuous <Continuous>  dextrose 5%. 1000 milliLiter(s) (100 mL/Hr) IV Continuous <Continuous>  dextrose 50% Injectable 25 Gram(s) IV Push once  dextrose 50% Injectable 12.5 Gram(s) IV Push once  dextrose 50% Injectable 25 Gram(s) IV Push once  ferrous    sulfate 325 milliGRAM(s) Oral daily  folic acid 1 milliGRAM(s) Oral daily  gabapentin 100 milliGRAM(s) Oral daily  gentamicin 0.1% Cream 1 Application(s) Topical two times a day  glucagon  Injectable 1 milliGRAM(s) IntraMuscular once  heparin   Injectable 5000 Unit(s) SubCutaneous every 12 hours  insulin lispro (ADMELOG) corrective regimen sliding scale   SubCutaneous three times a day before meals  insulin lispro (ADMELOG) corrective regimen sliding scale   SubCutaneous at bedtime  insulin lispro Injectable (ADMELOG) 14 Unit(s) SubCutaneous before breakfast  insulin lispro Injectable (ADMELOG) 18 Unit(s) SubCutaneous before lunch  insulin lispro Injectable (ADMELOG) 18 Unit(s) SubCutaneous before dinner  insulin NPH human recombinant 12 Unit(s) SubCutaneous at bedtime  metoprolol succinate ER 50 milliGRAM(s) Oral daily  midodrine. 10 milliGRAM(s) Oral once  nortriptyline 25 milliGRAM(s) Oral at bedtime  pantoprazole    Tablet 40 milliGRAM(s) Oral before breakfast  sevelamer carbonate 800 milliGRAM(s) Oral three times a day with meals  sodium chloride 0.9% Bolus 250 milliLiter(s) IV Bolus once  ticagrelor 60 milliGRAM(s) Oral every 12 hours    MEDICATIONS  (PRN):  acetaminophen   Tablet .. 650 milliGRAM(s) Oral every 6 hours PRN Temp greater or equal to 38.5C (101.3F), Mild Pain (1 - 3)  oxyCODONE    IR 10 milliGRAM(s) Oral every 6 hours PRN Severe Pain (7 - 10)    Allergies    doxazosin (Other)    Vital Signs Last 24 Hrs  T(C): 37.4 (15 Dec 2020 18:50), Max: 37.4 (15 Dec 2020 18:50)  T(F): 99.4 (15 Dec 2020 18:50), Max: 99.4 (15 Dec 2020 18:50)  HR: 90 (15 Dec 2020 17:30) (84 - 106)  BP: 104/64 (15 Dec 2020 18:35) (90/52 - 138/86)  RR: 18 (15 Dec 2020 17:30) (16 - 18)  SpO2: 98% (15 Dec 2020 17:30) (97% - 100%)    General Exam:  Constitutional: Alert, lying in bed. NAD. Tired, but not lethargic.    Neuro Exam:   MS: Alert, eyes open spontaneously. Oriented to self, location, month, year, president, situation. Able to repeat and name objects. Follows all commands.  CN: PERRL. BTT intact b/l. EOMI, no nystagmus. Face symmetric. Tongue midline.  Motor: All extremities antigravity, no drift.  Sensory: Intact to LT x4.  Coordination: No dysmetria on FTN or HTS b/l.  Gait: Deferred.    LABS:                          10.2   12.11 )-----------( 216      ( 14 Dec 2020 10:54 )             33.9     12-15    135  |  95<L>  |  56<H>  ----------------------------<  122<H>  4.1   |  23  |  12.64<H>    Ca    8.3<L>      15 Dec 2020 06:21    TPro  6.2  /  Alb  3.3  /  TBili  0.5  /  DBili  x   /  AST  26  /  ALT  28  /  AlkPhos  74  12-14    PT/INR - ( 14 Dec 2020 10:54 )   PT: 13.3 sec;   INR: 1.11 ratio       PTT - ( 14 Dec 2020 10:54 )  PTT:33.0 sec

## 2020-12-15 NOTE — PROGRESS NOTE ADULT - ASSESSMENT
55 m with    Fever  - empiric antibiotics  - ID evaluation  - CT chest  - Peritoneal fluid culture and cell count    Foot wound  - local care  - Podiatry evaluation    Diabetes Mellitus  - BS control  - ADA diet     CAD  - continue Rx    Depression  - continue Rx    ESRD  - PD  - Nephrology evaluation    DVT prophylaxis     Pipe Beck MD pager [phone]

## 2020-12-15 NOTE — CONSULT NOTE ADULT - ASSESSMENT
Assessment: INCOMPLETE NOTE.    Recommendations:  [] Would cautiously provide fluids to address hypotension as patient became lethargic when BP dropped.  [] Sepsis workup: would send STAT CBC, CMP, Lactate, Ammonia, CXR   Assessment: 54 yo male with a PMHx of ESRD (on PD for 5 years), uncontrolled DM, CAD (s/p CABG), and s/p recent cardiac cath with angioplasty 2 weeks ago due to chest pain and abnormal exercise treadmill test c/b break of the wire requiring emergency sternotomy with axilla and groin cannulation with recovery of the wire. He was discharged home on 12/11, returning to [facility] on 12/14 for dizziness upon standing, nausea/vomiting, and fever. Neurology was consulted by primary team for transient episode of lethargy today. Primary team reported patient had episode of hypotension at the time. Documented BP at 1800 is 90/52. After 250ml bolus BP improved to 104/64. On exam, patient states he is tired but is alert and oriented to self, location, month, year, president, and situation. Exam is nonfocal. Episode of lethargy likely multifactorial in this patient with hypotensive episode, ESRD with Cr 12.64, and likely sepsis of unclear source. Low suspicion for primary neurologic cause.    Recommendations:  [] Would cautiously provide fluids to address hypotensive episodes. Would not exceed total of 500ml IVF resuscitation at this time.  [] Continue with sepsis workup: would send CBC, CMP, Lactate, Ammonia, CXR.  [] Would not obtain neuro imaging at this time. HOWEVER IF PATIENT DEVELOPS NEW FOCAL NEUROLOGIC DEFICITS, THEN WOULD OBTAIN STAT CTH W/O CONTRAST.  [] PD per nephrology.  [] Rest of care per primary team.  [] Neurology to sign off in AM. Please reconsult PRN or call [place] [phone] with any questions.    Case to be discussed with neurology attending Dr. Navas.

## 2020-12-15 NOTE — CHART NOTE - NSCHARTNOTEFT_GEN_A_CORE
From day team, patient hypotensive and moderately lethargic. S/p 250 NS bolus with mild elevation of BP. /64 and maintaining.   Pt seen at bedside with cold extremities and lethargic. AXOX3. However, able to speak without slurred speech and able to follow commands.     Vital Signs Last 24 Hrs  T(C): 36.6 (15 Dec 2020 21:00), Max: 37.4 (15 Dec 2020 18:50)  T(F): 97.8 (15 Dec 2020 21:00), Max: 99.4 (15 Dec 2020 18:50)  HR: 73 (15 Dec 2020 21:00) (73 - 106)  BP: 94/62 (15 Dec 2020 21:00) (90/52 - 138/86)  BP(mean): --  RR: 18 (15 Dec 2020 21:00) (16 - 18)  SpO2: 100% (15 Dec 2020 21:00) (97% - 100%)                          9.1    17.55 )-----------( 225      ( 15 Dec 2020 20:50 )             29.6   12-15    135  |  95<L>  |  56<H>  ----------------------------<  122<H>  4.1   |  23  |  12.64<H>    Ca    8.3<L>      15 Dec 2020 06:21    TPro  6.2  /  Alb  3.3  /  TBili  0.5  /  DBili  x   /  AST  26  /  ALT  28  /  AlkPhos  74  12-14      < from: Xray Chest 1 View AP/PA (12.14.20 @ 11:26) >    Impression:    The heart is enlarged. The lungs are clear. No pleural effusion. No pneumothorax. Status post sternotomy. Metallic clips are seen in the soft tissue of the area of the scapula.    < end of copied text >      < from: CT Chest No Cont (12.14.20 @ 18:21) >    IMPRESSION:    1.  No pneumonia    2.  Posterior to the posterior table of the sternum, at the level of the third sternal wire is a 3.0 x 1.5 cm fluid collection (50 Hounsfield units) (3, 56) containing a small focus of air.    < end of copied text >          HPI:  56 y/o M w/ PMHX of CAD s/p CABG x 4 2019, HTN, HLD, ESRD on PD for 3 years, T2DM on insulin and diabetic neuropathy with PVD, recent admission for PCI complicated by broken wire during removal of balloon s/p sternotomy and successful retrieval and CTU stay requiring pressor, discharged to home now presenting with likely sepsis/unclear source, on empiric abx.      A/P  1)Hypotension  - MICU recommends additional 250 bolus, midodrine 10mg x1, and 250cc albumin x1  - Scheduled to receive vanco after NS and albumin boluses  - Attempting 2nd IV access in meantime  - will c/w monitoring overnight     Karissa Tiwari PA-C  [id number]

## 2020-12-15 NOTE — PROGRESS NOTE ADULT - SUBJECTIVE AND OBJECTIVE BOX
Patient is a 55y old  Male who presents with a chief complaint of fever, weakness (15 Dec 2020 16:13)      SUBJECTIVE / OVERNIGHT EVENTS: No new complaints.   Review of Systems  chest pain no  palpitations no  sob no  nausea no  headache no    MEDICATIONS  (STANDING):  aspirin enteric coated 81 milliGRAM(s) Oral daily  atorvastatin 80 milliGRAM(s) Oral at bedtime  cefepime   IVPB 1000 milliGRAM(s) IV Intermittent every 24 hours  dextrose 40% Gel 15 Gram(s) Oral once  dextrose 5%. 1000 milliLiter(s) (50 mL/Hr) IV Continuous <Continuous>  dextrose 5%. 1000 milliLiter(s) (100 mL/Hr) IV Continuous <Continuous>  dextrose 50% Injectable 25 Gram(s) IV Push once  dextrose 50% Injectable 12.5 Gram(s) IV Push once  dextrose 50% Injectable 25 Gram(s) IV Push once  ferrous    sulfate 325 milliGRAM(s) Oral daily  folic acid 1 milliGRAM(s) Oral daily  gabapentin 100 milliGRAM(s) Oral daily  gentamicin 0.1% Cream 1 Application(s) Topical two times a day  glucagon  Injectable 1 milliGRAM(s) IntraMuscular once  heparin   Injectable 5000 Unit(s) SubCutaneous every 12 hours  insulin lispro (ADMELOG) corrective regimen sliding scale   SubCutaneous three times a day before meals  insulin lispro (ADMELOG) corrective regimen sliding scale   SubCutaneous at bedtime  insulin lispro Injectable (ADMELOG) 14 Unit(s) SubCutaneous before breakfast  insulin lispro Injectable (ADMELOG) 18 Unit(s) SubCutaneous before lunch  insulin lispro Injectable (ADMELOG) 18 Unit(s) SubCutaneous before dinner  insulin NPH human recombinant 12 Unit(s) SubCutaneous at bedtime  metoprolol succinate ER 50 milliGRAM(s) Oral daily  nortriptyline 25 milliGRAM(s) Oral at bedtime  pantoprazole    Tablet 40 milliGRAM(s) Oral before breakfast  sevelamer carbonate 800 milliGRAM(s) Oral three times a day with meals  ticagrelor 60 milliGRAM(s) Oral every 12 hours    MEDICATIONS  (PRN):  acetaminophen   Tablet .. 650 milliGRAM(s) Oral every 6 hours PRN Temp greater or equal to 38.5C (101.3F), Mild Pain (1 - 3)  oxyCODONE    IR 10 milliGRAM(s) Oral every 6 hours PRN Severe Pain (7 - 10)      Vital Signs Last 24 Hrs  T(C): 37.4 (15 Dec 2020 18:50), Max: 37.4 (15 Dec 2020 18:50)  T(F): 99.4 (15 Dec 2020 18:50), Max: 99.4 (15 Dec 2020 18:50)  HR: 90 (15 Dec 2020 17:30) (84 - 106)  BP: 104/64 (15 Dec 2020 18:35) (90/52 - 138/86)  BP(mean): --  RR: 18 (15 Dec 2020 17:30) (16 - 18)  SpO2: 98% (15 Dec 2020 17:30) (97% - 100%)    PHYSICAL EXAM:  GENERAL: NAD, well-developed  HEAD:  Atraumatic, Normocephalic  EYES: EOMI, PERRLA, conjunctiva and sclera clear  NECK: Supple, No JVD  CHEST/LUNG: Clear to auscultation bilaterally; No wheeze Wound healing  HEART: Regular rate and rhythm; No murmurs, rubs, or gallops  ABDOMEN: Soft, Nontender, Nondistended; Bowel sounds present PD cath  EXTREMITIES:  2+ Peripheral Pulses, No clubbing, cyanosis, or edema  PSYCH: AAOx3  NEUROLOGY: non-focal  SKIN: No rashes or lesions    LABS:                        10.2   12.11 )-----------( 216      ( 14 Dec 2020 10:54 )             33.9     12-15    135  |  95<L>  |  56<H>  ----------------------------<  122<H>  4.1   |  23  |  12.64<H>    Ca    8.3<L>      15 Dec 2020 06:21    TPro  6.2  /  Alb  3.3  /  TBili  0.5  /  DBili  x   /  AST  26  /  ALT  28  /  AlkPhos  74  12-14    PT/INR - ( 14 Dec 2020 10:54 )   PT: 13.3 sec;   INR: 1.11 ratio         PTT - ( 14 Dec 2020 10:54 )  PTT:33.0 sec          Culture - Blood (collected 14 Dec 2020 16:33)  Source: .Blood Blood-Peripheral  Preliminary Report (15 Dec 2020 17:02):    No growth to date.    Culture - Blood (collected 14 Dec 2020 16:33)  Source: .Blood Blood-Peripheral  Preliminary Report (15 Dec 2020 17:02):    No growth to date.        RADIOLOGY & ADDITIONAL TESTS:    Imaging Personally Reviewed:    Consultant(s) Notes Reviewed:      Care Discussed with Consultants/Other Providers:

## 2020-12-15 NOTE — PROGRESS NOTE ADULT - SUBJECTIVE AND OBJECTIVE BOX
Dizziness much improved this am  Eating breakfast   PD fluid negative for peritonitis   Resumed manual exchanges  last night     VITAL:  T(C): , Max: 37.2 (12-15-20 @ 01:00)  T(F): , Max: 98.9 (12-15-20 @ 01:00)  HR: 96 (12-15-20 @ 13:00)  BP: 131/82 (12-15-20 @ 13:00)  BP(mean): --  RR: 18 (12-15-20 @ 13:00)  SpO2: 99% (12-15-20 @ 13:00)  Wt(kg): --      PHYSICAL EXAM:  General: Alerted, oriented  HEENT: MMM  Lungs:CTA-b/l  Heart: RRR S1S2  Abdomen: Soft, NTND  Ext: No  pedal edema b/l, r leg bandages   : no keys      LABS:                        10.2   12.11 )-----------( 216      ( 14 Dec 2020 10:54 )             33.9     Na(135)/K(4.1)/Cl(95)/HCO3(23)/BUN(56)/Cr(12.64)Glu(122)/Ca(8.3)/Mg(--)/PO4(--)    12-15 @ 06:21  Na(137)/K(4.1)/Cl(93)/HCO3(25)/BUN(52)/Cr(12.41)Glu(254)/Ca(8.8)/Mg(--)/PO4(--)    12-14 @ 10:54       55y male with esrd on PD for 5 years, dm poorly controlled, cad prior cabg presents with dizziness and found to be hypotensive     ESRD on CCPD at home   Anemia of ESRD  Hypotension- resolved   Mild leukocytosis  Lytes acceptable  Left foot wound     PLan   Defer fluids today   Stop manual PD exchanges. Resume PD via the cycler- Called patients PD nurse for PD prescription   PD prescription - Dianeal 2.5% 5000 ml, Total fill volume 10L, each fill 2 L, 4 exchanges over 8 hours   Exit site care with mupirocin   Bowel regimen   Dose med for Cr Cl< 10 ml/min  Renvela 800 mg TID with meals  Blood cultures       Samia Dangelo MD  [facility]  Office: [phone]

## 2020-12-15 NOTE — PROGRESS NOTE ADULT - ASSESSMENT
54 yo male patient with stable left hallux laceration wound to subQ  - Pt seen and evaluated   - Left dorsal hallux IPJ laceration wound to subQ not probing to bone stable without any drainage or purulence, no signs of acute infection.   - Tendon intact and neurovascular intact  - Applied mupirocin daily with bandaid  - Stable from podiatry standpoint    Follow up with Dr. Thor LEE within 1 week of discharge.  Call for appointment   [place] office: [phone]  [place] office: [phone]

## 2020-12-15 NOTE — CONSULT NOTE ADULT - SUBJECTIVE AND OBJECTIVE BOX
Author:   Pranay High MD  Internal Medicine, PGY2  [phone]    Patient:  BELLA MARTINS  [id number]    CHIEF COMPLAINT:    HPI:  55y male with esrd on PD for 5 years, dm poorly controlled, cad prior cabg. He underwent cardiac cath and angioplasty about 2 weeks ago due to cp and abnormal ETT with cad. He had a break of the wire so underwent emergency sternotomy, axilla and groin cannulation and the wire was recovered. He had HD briefly then back to PD of late. He was sent home on 12/11 feeling fine. Today he returns for dizziness upon standing, vomited once and fever. He felt fine yesterday, the PD fluid is clear and catheter works well, no cp , sob, cough, wound drainage, abdo pain, back pain, ha, sore throat. No sick contacts. He hit his left great toe recently and has a wound there.  (14 Dec 2020 16:57)  Patient became hypotensive to 90s/60s, with lethargy.       PAST MEDICAL & SURGICAL HISTORY:  HTN (hypertension)    ESRD (end stage renal disease) on dialysis    CAD, multiple vessel    Diabetes    S/P CABG (coronary artery bypass graft)        FAMILY HISTORY:  FH: diabetes mellitus (Sibling)  father    FHx: lung cancer (Sibling)  bother        SOCIAL HISTORY:    Allergies    doxazosin (Other)    Intolerances        HOME MEDICATIONS:    REVIEW OF SYSTEMS:  [ ] Unable to assess ROS because ______  CONSTITUTIONAL: No fever, chills, night sweats, or fatigue  EYES: No eye pain, visual disturbances, or discharge  ENMT:  No difficulty hearing, tinnitus, vertigo; No sinus or throat pain  NECK: No pain or stiffness  BREASTS: No pain, masses, or nipple discharge  RESPIRATORY: No cough, wheezing, or hemoptysis; No shortness of breath  CARDIOVASCULAR: No chest pain, palpitations, dizziness, or leg swelling  GASTROINTESTINAL: No abdominal or epigastric pain. No nausea, vomiting, or hematemesis; No diarrhea or constipation. No melena or hematochezia.  GENITOURINARY: No dysuria, frequency, hematuria, or incontinence  NEUROLOGICAL: No headaches, memory loss, loss of strength, numbness, or tremors  SKIN: No itching, burning, rashes, or lesions   LYMPH NODES: No enlarged glands  ENDOCRINE: No heat or cold intolerance; No hair loss  MUSCULOSKELETAL: No joint pain or swelling; No muscle, back, or extremity pain  PSYCHIATRIC: No depression, anxiety, mood swings, or difficulty sleeping  HEME/LYMPH: No easy bruising, or bleeding gums  ALLERGY AND IMMUNOLOGIC: No hives or eczema    OBJECTIVE:  T(F): 99.4 (12-15-20 @ 18:50), Max: 99.4 (12-15-20 @ 18:50)  HR: 90 (12-15-20 @ 17:30) (84 - 106)  BP: 104/64 (12-15-20 @ 18:35) (90/52 - 138/86)  BP(mean): --  ABP: --  ABP(mean): --  RR: 18 (12-15-20 @ 17:30) (16 - 18)  SpO2: 98% (12-15-20 @ 17:30) (97% - 100%)    I/O Summary 24H    IN: 240 mL / OUT: 0 mL / NET: 240 mL      CAPILLARY BLOOD GLUCOSE      POCT Blood Glucose.: 139 mg/dL (15 Dec 2020 17:34)      PHYSICAL EXAM:  GENERAL: NAD, lying in bed comfortably  HEAD:  Atraumatic, Normocephalic  EYES: EOMI, PERRLA, conjunctiva and sclera clear  ENT: Moist mucous membranes  NECK: Supple, No JVD  CHEST/LUNG: Clear to auscultation bilaterally; No rales, rhonchi, wheezing, or rubs. Unlabored respirations; Multiple well healed incisions  HEART: Regular rate and rhythm; No murmurs, rubs, or gallops  ABDOMEN: Bowel sounds present; Soft, Nontender, Nondistended. No hepatomegaly, peritoneal dialysis catheter in place  EXTREMITIES:  2+ Peripheral Pulses, brisk capillary refill. No clubbing, cyanosis, or edema  NERVOUS SYSTEM:  Alert & Oriented X4, lethargic but arousable to voice, follows commands  MSK: FROM all 4 extremities, full and equal strength  SKIN: No rashes or lesions    HOSPITAL MEDICATIONS:  MEDICATIONS  (STANDING):  albumin human  5% IVPB 250 milliLiter(s) IV Intermittent once  aspirin enteric coated 81 milliGRAM(s) Oral daily  atorvastatin 80 milliGRAM(s) Oral at bedtime  cefepime   IVPB 1000 milliGRAM(s) IV Intermittent every 24 hours  dextrose 40% Gel 15 Gram(s) Oral once  dextrose 5%. 1000 milliLiter(s) (50 mL/Hr) IV Continuous <Continuous>  dextrose 5%. 1000 milliLiter(s) (100 mL/Hr) IV Continuous <Continuous>  dextrose 50% Injectable 25 Gram(s) IV Push once  dextrose 50% Injectable 12.5 Gram(s) IV Push once  dextrose 50% Injectable 25 Gram(s) IV Push once  ferrous    sulfate 325 milliGRAM(s) Oral daily  folic acid 1 milliGRAM(s) Oral daily  gabapentin 100 milliGRAM(s) Oral daily  gentamicin 0.1% Cream 1 Application(s) Topical two times a day  glucagon  Injectable 1 milliGRAM(s) IntraMuscular once  heparin   Injectable 5000 Unit(s) SubCutaneous every 12 hours  insulin lispro (ADMELOG) corrective regimen sliding scale   SubCutaneous three times a day before meals  insulin lispro (ADMELOG) corrective regimen sliding scale   SubCutaneous at bedtime  insulin lispro Injectable (ADMELOG) 14 Unit(s) SubCutaneous before breakfast  insulin lispro Injectable (ADMELOG) 18 Unit(s) SubCutaneous before lunch  insulin lispro Injectable (ADMELOG) 18 Unit(s) SubCutaneous before dinner  insulin NPH human recombinant 12 Unit(s) SubCutaneous at bedtime  metoprolol succinate ER 50 milliGRAM(s) Oral daily  midodrine. 10 milliGRAM(s) Oral once  nortriptyline 25 milliGRAM(s) Oral at bedtime  pantoprazole    Tablet 40 milliGRAM(s) Oral before breakfast  sevelamer carbonate 800 milliGRAM(s) Oral three times a day with meals  sodium chloride 0.9% Bolus 250 milliLiter(s) IV Bolus once  ticagrelor 60 milliGRAM(s) Oral every 12 hours  vancomycin  IVPB 750 milliGRAM(s) IV Intermittent once    MEDICATIONS  (PRN):  acetaminophen   Tablet .. 650 milliGRAM(s) Oral every 6 hours PRN Temp greater or equal to 38.5C (101.3F), Mild Pain (1 - 3)  oxyCODONE    IR 10 milliGRAM(s) Oral every 6 hours PRN Severe Pain (7 - 10)      LABS:  CBC 12-14-20 @ 10:54                        10.2   12.11 )-----------( 216                   33.9       Hgb trend: 10.2 <--   WBC trend: 12.11 <--     CMP 12-15-20 @ 06:21    135  |  95<L>  |  56<H>  ----------------------------<  122<H>  4.1   |  23  |  12.64<H>    Ca    8.3<L>      12-15-20 @ 06:21    TPro  6.2  /  Alb  3.3  /  TBili  0.5  /  DBili  x   /  AST  26  /  ALT  28  /  AlkPhos  74  12-14      Serum Cr trend: 12.64 <-- , 12.41 <--   PT/INR - ( 14 Dec 2020 10:54 )   PT: 13.3 sec;   INR: 1.11 ratio         PTT - ( 14 Dec 2020 10:54 ):33.0 sec    ABG Trend:         MICROBIOLOGY:       RADIOLOGY:  [ ] Reviewed and interpreted by me    EKG:

## 2020-12-15 NOTE — CHART NOTE - NSCHARTNOTEFT_GEN_A_CORE
MEDICINE NP     BELLA MARTINS  55y Male  Patient is a 55y old  Male who presents with a chief complaint of fever, weakness (15 Dec 2020 16:13)       Event Summary:  Patient seen at bedside with staff RN for episode of hypotension, Patient lethargic but can follow simple commands  BP 92/60  Temp 99 rectal       Vital Signs Last 24 Hrs  T(C): 36.7 (15 Dec 2020 17:30), Max: 37.2 (15 Dec 2020 01:00)  T(F): 98 (15 Dec 2020 17:30), Max: 98.9 (15 Dec 2020 01:00)  HR: 90 (15 Dec 2020 17:30) (84 - 106)  BP: 100/80 (15 Dec 2020 17:30) (100/80 - 138/86)  BP(mean): --  RR: 18 (15 Dec 2020 17:30) (16 - 18)  SpO2: 98% (15 Dec 2020 17:30) (97% - 100%)     55y male with ESRD on PD for 5 years, poorly controlled, DMT2, CAD s/p CABG, presents with c/o dizziness and found to be hypotensive.  Patient was recently discharged from hospital 3 days ago.   He underwent cardiac cath and angioplasty about 2 weeks ago due to cp and abnormal ETT with cad.   He had a break of the wire so underwent emergency sternotomy, axilla and groin cannulation and the wire was recovered. He had HD briefly then back to PD of late.   Patient was discharged in stable condition on 12/11.     1. Hypotension   - NS bolus of 250 cc   - patient on PD - per nephrology will not drain at this time    2. Mental status changes   - Neuro consult   - CT head     Discussed with Dr. Kiran Macedo, DNP-C  Medicine Department MEDICINE NP     BELLA MARTINS  55y Male  Patient is a 55y old  Male who presents with a chief complaint of fever, weakness (15 Dec 2020 16:13)       Event Summary:  Patient seen at bedside with staff RN for episode of hypotension, Patient lethargic but can follow simple commands  BP 92/60  Temp 99 rectal       Vital Signs Last 24 Hrs  T(C): 36.7 (15 Dec 2020 17:30), Max: 37.2 (15 Dec 2020 01:00)  T(F): 98 (15 Dec 2020 17:30), Max: 98.9 (15 Dec 2020 01:00)  HR: 90 (15 Dec 2020 17:30) (84 - 106)  BP: 100/80 (15 Dec 2020 17:30) (100/80 - 138/86)  BP(mean): --  RR: 18 (15 Dec 2020 17:30) (16 - 18)  SpO2: 98% (15 Dec 2020 17:30) (97% - 100%)    Neuro: lethargic , following simple command. EMIGDIO  Resp:  Lungs with decrease BS at bases   CV:  S1S2   GI :  soft , + BS non tender nondistended , PD catheter for PD   : on PD      55y male with ESRD on PD for 5 years, poorly controlled, DMT2, CAD s/p CABG, presents with c/o dizziness and found to be hypotensive.  Patient was recently discharged from hospital 3 days ago.   He underwent cardiac cath and angioplasty about 2 weeks ago due to cp and abnormal ETT with cad.   He had a break of the wire so underwent emergency sternotomy, axilla and groin cannulation and the wire was recovered. He had HD briefly then back to PD of late.   Patient was discharged in stable condition on 12/11.     1. Hypotension   - NS bolus of 250 cc   - patient on PD - per nephrology will not drain at this time  - place pt on telemetry   - CBC stat    2. Mental status changes   - Neuro consult   - CT head to r/o CVA    BP after NS bolus: 104/64,    Discussed with Dr. Beck the above agree with neuro consult and head CT, as well as to call CTS.   CT surgery call spoke with PA, states team has signed off should call MICU consult.  Spoke with neurology above assessment, patient with MS changes due to hypotension, suggest getting Chest xray, BMP, Lactate, CBC, and states CT of head would be of no use in this situation   best to control hypotension.    Spoke with MICU consult, reviewed case with MICU Resident, suggest starting Midodrine 10 mg po x1, given another 250 cc NS bolus, goal to increase BP, patient on Cefepime IVSS,  will give Vancomycin 750 mg IVSS x 1     Report given to oncoming team     Maura Macedo, DNP-C  Medicine Department

## 2020-12-15 NOTE — PROGRESS NOTE ADULT - SUBJECTIVE AND OBJECTIVE BOX
CC: f/u for fever    Patient reports feels poorly    REVIEW OF SYSTEMS:  All other review of systems negative (Comprehensive ROS)    Antimicrobials Day #  :2  cefepime   IVPB 1000 milliGRAM(s) IV Intermittent every 24 hours  vancomycin  IVPB 750 milliGRAM(s) IV Intermittent once    Other Medications Reviewed    T(F): 99.4 (12-15-20 @ 18:50), Max: 99.4 (12-15-20 @ 18:50)  HR: 90 (12-15-20 @ 17:30)  BP: 104/64 (12-15-20 @ 18:35)  RR: 18 (12-15-20 @ 17:30)  SpO2: 98% (12-15-20 @ 17:30)  Wt(kg): --    PHYSICAL EXAM:  General: alert, no acute distress, chronically ill appearing  Eyes:  anicteric, no conjunctival injection, no discharge  Oropharynx: no lesions or injection 	  Neck: supple, without adenopathy  Lungs: clear to auscultation  Heart: regular rate and rhythm; no murmur, rubs or gallops  Abdomen: soft, nondistended, nontender, without mass or organomegaly  Skin: no lesions  Extremities: no clubbing, cyanosis, or edema. toe wound is dry  Neurologic: alert, oriented, moves all extremities    LAB RESULTS:                        10.2   12.11 )-----------( 216      ( 14 Dec 2020 10:54 )             33.9     12-15    135  |  95<L>  |  56<H>  ----------------------------<  122<H>  4.1   |  23  |  12.64<H>    Ca    8.3<L>      15 Dec 2020 06:21    TPro  6.2  /  Alb  3.3  /  TBili  0.5  /  DBili  x   /  AST  26  /  ALT  28  /  AlkPhos  74  12-14    LIVER FUNCTIONS - ( 14 Dec 2020 10:54 )  Alb: 3.3 g/dL / Pro: 6.2 g/dL / ALK PHOS: 74 U/L / ALT: 28 U/L / AST: 26 U/L / GGT: x             Cell Count, Body Fluid (12.15.20 @ 02:20)   Monocyte/Macrophage Count - Body Fluid: 63 %   Fluid Segmented Granulocytes: 16 %   Fluid Type: Peritoneal fl   Body Fluid Appearance: Clear   BF Lymphocytes: 21 %   Tube Type: Lavender   Color - Body Fluid: Straw   Total Nucleated Cell Count, Body Fluid: 14: Peritoneal/Pleural/ Pericardial Body Fluid Types   Total Nucleated cells: <500/uL   Neutrophils: <25%   Synovial Body Fluid Type   Total Nucleated cells: <150/uL   Neutrophils: <25%   Lymphocytes: <75%   Moncytes/Macrophages:   Please note reference range updated effective Nov 26, 2019 /uL   Total RBC Count: 9 /uL MICROBIOLOGY:  RECENT CULTURES:  12-15 @ 04:26 .Smear Other       No polymorphonuclear leukocytes seen per low power field  No organisms seen per oil power field    12-14 @ 16:33 .Blood Blood-Peripheral     No growth to date.          RADIOLOGY REVIEWED:    < from: CT Chest No Cont (12.14.20 @ 18:21) >  EXAM:  CT CHEST                            PROCEDURE DATE:  12/14/2020            INTERPRETATION:  CLINICAL INFORMATION: Fever, recent sternotomy, status post cardiac cath and angioplasty about 2 weeks ago due to chest pain, complicated by a break of the wire and subsequent emergency sternotomy,  axilla and groin cannulation and the wire was recovered.    COMPARISON: Chest CT 9/6/2019 chest radiograph 12/5/2020    TECHNIQUE: Unenhanced helical images were obtained of the chest. Coronal and sagittal images were reconstructed. Maximum intensity projection images were generated.    FINDINGS:    Lungs And Airways: Small secretions within the trachea.  Scattered areas of linear atelectasis. Subsegmental dependent atelectasis.    Pleura:No pneumothorax.  Trace right pleural effusion.    Mediastinum: A 1.0 cm right upper paratracheal lymph node. The visualized portion of the thyroid gland is unremarkable.    Heart and Vasculature: There is cardiomegaly.  There is trace pericardial fluid. Status post CABG. Coronary artery disease with calcified plaque involving the left anterior descending, ICA. RCA stent.    The main pulmonary artery is normal in course and caliber.  Left-sided aortic arch and left-sided descending thoracic aorta. The ascending aorta and descending aorta is normal in course and caliber. Atheromatous disease of the aorta.    Upper Abdomen: Small ascites. Scattered calcified hepatic granulomas    Bones And Soft Tissues: Status post median sternotomy. The sternotomy has likely been revised since 9/6/2019. There are 5 sternal wires. Posterior to the posterior table of the sternum, at the level of the third sternal wire is a 3.0 x 1.5 cm fluid collection (50 Hounsfield units) (3, 56) containing a small focus of air.    There is no distraction of the sternotomy margins. No fracture sternal wire.  Degenerative change of the spine.    IMPRESSION:    1.  No pneumonia    2.  Posterior to the posterior table of the sternum, at the level of the third sternal wire is a 3.0 x 1.5 cm fluid collection (50 Hounsfield units) (3, 56) containing a small focus of air.        < end of copied text >            Assessment:  Patient with esrd on PD , dm, cad, had cardiac cath and wire for stent broke in vessel s/p sternotomy, returns within a couple of days for fever, hypotension, fever, so far no source of intection apparent. Podiatry eval of left great toe lac appreciated. So far bc no growth. Sternum looks good, ct chest looks typical post op sternotomy. PD fluid does not look infected by cell count but await cultures  Plan:  continue cefepime and vanco by level  f/u cultures  check am cortisol level  supportive care

## 2020-12-16 NOTE — PROGRESS NOTE ADULT - SUBJECTIVE AND OBJECTIVE BOX
CC: f/u for fever    Patient reports feels better today  REVIEW OF SYSTEMS:  All other review of systems negative (Comprehensive ROS)    Antimicrobials Day #  :3  cefepime   IVPB 1000 milliGRAM(s) IV Intermittent every 24 hours    Other Medications Reviewed    T(F): 97.9 (12-16-20 @ 17:00), Max: 99.4 (12-16-20 @ 13:00)  HR: 79 (12-16-20 @ 18:30)  BP: 100/67 (12-16-20 @ 18:30)  RR: 18 (12-16-20 @ 17:00)  SpO2: 99% (12-16-20 @ 17:00)  Wt(kg): --    PHYSICAL EXAM:  General: alert, no acute distress  Eyes:  anicteric, no conjunctival injection, no discharge  Oropharynx: no lesions or injection 	  Neck: supple, without adenopathy  Lungs: clear to auscultation  Heart: regular rate and rhythm; no murmur, rubs or gallops  Abdomen: soft, nondistended, nontender, without mass or organomegaly  Skin: no lesions  Extremities: no clubbing, cyanosis, or edema  Neurologic: alert, oriented, moves all extremities. left toe wound clean and dry, faint red right forefoot  sternal wound intact and clean, chest wound clean and healed  LAB RESULTS:                        9.5    13.81 )-----------( 220      ( 16 Dec 2020 06:14 )             29.8     12-16    130<L>  |  89<L>  |  52<H>  ----------------------------<  268<H>  4.1   |  23  |  11.85<H>    Ca    8.0<L>      16 Dec 2020 06:14  Phos  6.6     12-16  Mg     2.3     12-16          MICROBIOLOGY:  RECENT CULTURES:  12-15 @ 04:26 .Smear Other     No growth to date.    No polymorphonuclear leukocytes seen per low power field  No organisms seen per oil power field    12-14 @ 16:33 .Blood Blood-Peripheral     No growth to date.          RADIOLOGY REVIEWED:    < from: CT Chest No Cont (12.14.20 @ 18:21) >  EXAM:  CT CHEST                            PROCEDURE DATE:  12/14/2020            INTERPRETATION:  CLINICAL INFORMATION: Fever, recent sternotomy, status post cardiac cath and angioplasty about 2 weeks ago due to chest pain, complicated by a break of the wire and subsequent emergency sternotomy,  axilla and groin cannulation and the wire was recovered.    COMPARISON: Chest CT 9/6/2019 chest radiograph 12/5/2020    TECHNIQUE: Unenhanced helical images were obtained of the chest. Coronal and sagittal images were reconstructed. Maximum intensity projection images were generated.    FINDINGS:    Lungs And Airways: Small secretions within the trachea.  Scattered areas of linear atelectasis. Subsegmental dependent atelectasis.    Pleura:No pneumothorax.  Trace right pleural effusion.    Mediastinum: A 1.0 cm right upper paratracheal lymph node. The visualized portion of the thyroid gland is unremarkable.    Heart and Vasculature: There is cardiomegaly.  There is trace pericardial fluid. Status post CABG. Coronary artery disease with calcified plaque involving the left anterior descending, ICA. RCA stent.    The main pulmonary artery is normal in course and caliber.  Left-sided aortic arch and left-sided descending thoracic aorta. The ascending aorta and descending aorta is normal in course and caliber. Atheromatous disease of the aorta.    Upper Abdomen: Small ascites. Scattered calcified hepatic granulomas    Bones And Soft Tissues: Status post median sternotomy. The sternotomy has likely been revised since 9/6/2019. There are 5 sternal wires. Posterior to the posterior table of the sternum, at the level of the third sternal wire is a 3.0 x 1.5 cm fluid collection (50 Hounsfield units) (3, 56) containing a small focus of air.    There is no distraction of the sternotomy margins. No fracture sternal wire.  Degenerative change of the spine.    IMPRESSION:    1.  No pneumonia    2.  Posterior to the posterior table of the sternum, at the level of the third sternal wire is a 3.0 x 1.5 cm fluid collection (50 Hounsfield units) (3, 56) containing a small focus of air.              KATIE HAQ MD; Resident Radiology  This document has been electronically signed.    < end of copied text >            Assessment:  ESRD, DM, on PD , CAD, underwent cardiac cath and stent and wire broke in heart, s/p sternotomy, discharge last week, back within a couple of days with dizziness. had a transient fever, so far pd fluid and bc neg, any redness of feet and wounds are now clean. Cause of presentation not totally clear. cortisol level nl  Plan:  will complete 5 d of empiric cefepime for sepsis of unclear etiology so far  f/u final cultures  local wound care to toes

## 2020-12-16 NOTE — CONSULT NOTE ADULT - PROBLEM SELECTOR RECOMMENDATION 9
T2DM uncontrolled. Suspect frequent hypoglycemic events at home due to aggressive sliding scale. Variable glucoses here while on PD.   -Recommend lantus 12 units qhs   -recommend decreasing admelog to 6 units tidac   -CC diet     discharge  Basal+victoza   f/u Dr. Villegas

## 2020-12-16 NOTE — CONSULT NOTE ADULT - SUBJECTIVE AND OBJECTIVE BOX
HPI:   55y male with esrd on PD for 5 years, T2DM, cad prior cabg. He underwent cardiac cath and angioplasty about 2 weeks ago due to cp and abnormal ETT with cad. Admitted for fever and dizziness.     Endocrine History:  Diagnosed with DM2 about 2015. Hgb a1c of 7.8 on toujeo 60 units qhs and humalog sliding scale (150-200 - 8units,  200-250 -14 units, >250 - 20 units). Did not start victoza at home. Variable sugars at home from 100s-300s. No hypoglycemic events   Reports following with outpt endo Dr Figueroa. Has history of DM retinopathy s/p injection, ESRD on PD and CAD. Does not report neuropathy.     PAST MEDICAL & SURGICAL HISTORY:  HTN (hypertension)    ESRD (end stage renal disease) on dialysis    CAD, multiple vessel    Diabetes    S/P CABG (coronary artery bypass graft)        FAMILY HISTORY:  FH: diabetes mellitus (Sibling)  father    FHx: lung cancer (Sibling)  bother        Social History:    Outpatient Medications: Home Medications:  aspirin 81 mg oral tablet: 1 tab(s) orally once a day    Note:Recent D/C 3 days ago med on discharge paper but unable to confirm with family. (14 Dec 2020 15:23)  ferrous sulfate 324 mg (65 mg elemental iron) oral tablet: 1 tab(s) orally 3 times a day    Note:Recent D/C 3 days ago med on discharge paper but unable to confirm with family. (14 Dec 2020 15:23)  folic acid 1 mg oral tablet: 1 tab(s) orally once a day    Note:Recent D/C 3 days ago med on discharge paper but unable to confirm with family. No record with pharmacy. (14 Dec 2020 15:24)  metoprolol succinate 50 mg oral tablet, extended release: 1 tab(s) orally once a day    Note:Recent D/C 3 days ago med on discharge paper but unable to confirm with family. (14 Dec 2020 15:24)  Nephplex Rx oral tablet: 1 tab(s) orally once a day    Note:Recent D/C 3 days ago med on discharge paper but unable to confirm with family. (14 Dec 2020 15:32)  nortriptyline 25 mg oral capsule: 1 cap(s) orally once a day (at bedtime)    Note:Recent D/C 3 days ago med on discharge paper but unable to confirm with family. (14 Dec 2020 15:27)  sevelamer carbonate 800 mg oral tablet: 1 tab(s) orally 3 times a day (with meals)    Note:Recent D/C 3 days ago med on discharge paper but unable to confirm with family. (14 Dec 2020 15:32)  Toujeo SoloStar 300 units/mL subcutaneous solution: 60 unit(s) subcutaneous once a day (at bedtime)    Note:Recent D/C 3 days ago med on discharge paper but unable to confirm with family. (14 Dec 2020 15:30)  Vitamin D3 50,000 intl units (1250 mcg) oral capsule: 1 tab(s) orally once a week    Note:no record with  Recent D/C 3 days ago med on discharge paper but unable to confirm with family. (14 Dec 2020 15:31)      MEDICATIONS  (STANDING):  aspirin enteric coated 81 milliGRAM(s) Oral daily  atorvastatin 80 milliGRAM(s) Oral at bedtime  cefepime   IVPB 1000 milliGRAM(s) IV Intermittent every 24 hours  dextrose 40% Gel 15 Gram(s) Oral once  dextrose 5%. 1000 milliLiter(s) (50 mL/Hr) IV Continuous <Continuous>  dextrose 5%. 1000 milliLiter(s) (100 mL/Hr) IV Continuous <Continuous>  dextrose 50% Injectable 25 Gram(s) IV Push once  dextrose 50% Injectable 12.5 Gram(s) IV Push once  dextrose 50% Injectable 25 Gram(s) IV Push once  ferrous    sulfate 325 milliGRAM(s) Oral daily  folic acid 1 milliGRAM(s) Oral daily  gabapentin 100 milliGRAM(s) Oral daily  gentamicin 0.1% Cream 1 Application(s) Topical two times a day  glucagon  Injectable 1 milliGRAM(s) IntraMuscular once  heparin   Injectable 5000 Unit(s) SubCutaneous every 12 hours  insulin glargine Injectable (LANTUS) 12 Unit(s) SubCutaneous at bedtime  insulin lispro (ADMELOG) corrective regimen sliding scale   SubCutaneous three times a day before meals  insulin lispro (ADMELOG) corrective regimen sliding scale   SubCutaneous at bedtime  insulin lispro Injectable (ADMELOG) 14 Unit(s) SubCutaneous before breakfast  insulin lispro Injectable (ADMELOG) 18 Unit(s) SubCutaneous before lunch  insulin lispro Injectable (ADMELOG) 12 Unit(s) SubCutaneous before dinner  metoprolol tartrate 12.5 milliGRAM(s) Oral two times a day  nortriptyline 25 milliGRAM(s) Oral at bedtime  pantoprazole    Tablet 40 milliGRAM(s) Oral before breakfast  senna 2 Tablet(s) Oral at bedtime  sevelamer carbonate 800 milliGRAM(s) Oral three times a day with meals  ticagrelor 60 milliGRAM(s) Oral every 12 hours    MEDICATIONS  (PRN):  acetaminophen   Tablet .. 650 milliGRAM(s) Oral every 6 hours PRN Temp greater or equal to 38.5C (101.3F), Mild Pain (1 - 3)  bisacodyl 5 milliGRAM(s) Oral every 12 hours PRN Constipation  oxyCODONE    IR 10 milliGRAM(s) Oral every 6 hours PRN Severe Pain (7 - 10)  polyethylene glycol 3350 17 Gram(s) Oral daily PRN Constipation      Allergies    doxazosin (Other)    Intolerances      Review of Systems:  Constitutional: No fever, chills   Neuro: No tremors, headache   Cardiovascular: No chest pain, palpitations  Respiratory: No SOB, no cough  GI: No nausea, vomiting, abdominal pain  : No dysuria, polyuria   Skin: no rash, ulcers   Psych: no depression, anxiety   Endocrine: no polyphagia, polydipsia     ALL OTHER SYSTEMS REVIEWED AND NEGATIVE        PHYSICAL EXAM:  VITALS: T(C): 36.6 (12-16-20 @ 17:00)  T(F): 97.9 (12-16-20 @ 17:00), Max: 99.4 (12-15-20 @ 18:50)  HR: 78 (12-16-20 @ 17:20) (73 - 90)  BP: 93/64 (12-16-20 @ 17:20) (93/64 - 133/83)  RR:  (16 - 18)  SpO2:  (99% - 100%)  Wt(kg): --  GENERAL: NAD, well-groomed, well-developed  EYES: No proptosis, anicteric  HEENT:  Atraumatic, Normocephalic, moist mucous membranes  THYROID: Normal size, no palpable nodules  RESPIRATORY: Clear to auscultation bilaterally; No rales, rhonchi, wheezing  CARDIOVASCULAR: Regular rate and rhythm; No murmurs  GI: Soft, nontender, non distended, normal bowel sounds  SKIN: Dry, intact, No rashes or lesions  NEURO: AOx3, moves all extremities spontaneously   PSYCH: Reactive affect, euthymic mood    POCT Blood Glucose.: 210 mg/dL (12-16-20 @ 16:50)H12+4  POCT Blood Glucose.: 280 mg/dL (12-16-20 @ 12:27)H+6  POCT Blood Glucose.: 267 mg/dL (12-16-20 @ 08:41)H0  POCT Blood Glucose.: 277 mg/dL (12-16-20 @ 01:20)  POCT Blood Glucose.: 101 mg/dL (12-15-20 @ 22:16)  POCT Blood Glucose.: 53 mg/dL (12-15-20 @ 21:57)  POCT Blood Glucose.: 57 mg/dL (12-15-20 @ 21:50)  POCT Blood Glucose.: 56 mg/dL (12-15-20 @ 21:49)  POCT Blood Glucose.: 73 mg/dL (12-15-20 @ 21:46)  POCT Blood Glucose.: 95 mg/dL (12-15-20 @ 21:42)  POCT Blood Glucose.: 181 mg/dL (12-15-20 @ 21:00)  POCT Blood Glucose.: 139 mg/dL (12-15-20 @ 17:34)H0  POCT Blood Glucose.: 112 mg/dL (12-15-20 @ 12:03)H18   POCT Blood Glucose.: 152 mg/dL (12-15-20 @ 09:23)H14  POCT Blood Glucose.: 105 mg/dL (12-15-20 @ 08:45)  POCT Blood Glucose.: 102 mg/dL (12-14-20 @ 23:18)N12  POCT Blood Glucose.: 64 mg/dL (12-14-20 @ 22:52)  POCT Blood Glucose.: 65 mg/dL (12-14-20 @ 22:51)  POCT Blood Glucose.: 128 mg/dL (12-14-20 @ 21:14)  POCT Blood Glucose.: 143 mg/dL (12-14-20 @ 18:36)                            9.5    13.81 )-----------( 220      ( 16 Dec 2020 06:14 )             29.8       12-16    130<L>  |  89<L>  |  52<H>  ----------------------------<  268<H>  4.1   |  23  |  11.85<H>    EGFR if : 5<L>  EGFR if non : 4<L>    Ca    8.0<L>      12-16  Mg     2.3     12-16  Phos  6.6     12-16    TPro  6.2  /  Alb  3.3  /  TBili  0.5  /  DBili  x   /  AST  26  /  ALT  28  /  AlkPhos  74  12-14      Thyroid Function Tests:          Hemoglobin A1C     Radiology:

## 2020-12-16 NOTE — CONSULT NOTE ADULT - PROBLEM SELECTOR RECOMMENDATION 3
continue lipitor. CINDY Chong MD, Endocrine Fellow   Pager  from 9-5PM. After hours and on weekends please call [phone]

## 2020-12-16 NOTE — PROGRESS NOTE ADULT - SUBJECTIVE AND OBJECTIVE BOX
More alert this am  Unable to start PD via cycler due to hypotension  Continuing manual PD exchanges       VITAL:  T(C): , Max: 37.4 (12-15-20 @ 18:50)  T(F): , Max: 99.4 (12-15-20 @ 18:50)  HR: 88 (12-16-20 @ 13:00)  BP: 126/83 (12-16-20 @ 13:00)  BP(mean): --  RR: 18 (12-16-20 @ 13:00)  SpO2: 100% (12-16-20 @ 13:00)  Wt(kg): --      PHYSICAL EXAM:  General: Alerted, oriented  HEENT: MMM  Lungs:CTA-b/l  Heart: RRR S1S2  Abdomen: Soft, PD catheter in situ   Ext: no pedal edema b/l  : no keys      LABS:                        9.5    13.81 )-----------( 220      ( 16 Dec 2020 06:14 )             29.8     Na(130)/K(4.1)/Cl(89)/HCO3(23)/BUN(52)/Cr(11.85)Glu(268)/Ca(8.0)/Mg(2.3)/PO4(6.6)    12-16 @ 06:14  Na(133)/K(3.6)/Cl(92)/HCO3(23)/BUN(52)/Cr(12.19)Glu(38)/Ca(8.4)/Mg(--)/PO4(--)    12-15 @ 20:50  Na(135)/K(4.1)/Cl(95)/HCO3(23)/BUN(56)/Cr(12.64)Glu(122)/Ca(8.3)/Mg(--)/PO4(--)    12-15 @ 06:21  Na(137)/K(4.1)/Cl(93)/HCO3(25)/BUN(52)/Cr(12.41)Glu(254)/Ca(8.8)/Mg(--)/PO4(--)    12-14 @ 10:54      55y male with esrd on PD for 5 years, dm poorly controlled, cad prior cabg presents with dizziness and found to be hypotensive     ESRD on CCPD at home now on manual exchanges due to hypotension  Anemia of ESRD  Hypotension-   Mild leukocytosis  Lytes acceptable  Left foot wound     PLan   Defer fluids today   Continue manual PD exchanges 1.5 % - 4 exchanges Q6 H   Exit site care with mupirocin   Bowel regimen   Dose med for Cr Cl< 10 ml/min  Renvela 800 mg TID with meals  PLease document daily weights   Blood and dialysate cultures negative so far       Samia Dangelo MD  [facility]  Office: [phone]

## 2020-12-16 NOTE — PROGRESS NOTE ADULT - ASSESSMENT
55 m with    Fever  - empiric antibiotics  - ID follow  - CT surgery evaluation for fluid collection at sternal wound  - Peritoneal fluid culture and cell count    Foot wound  - local care  - Podiatry evaluation    Diabetes Mellitus / Hypoglycemia   - BS control  - ADA diet   - Endocrine evaluation     CAD  - continue Rx    Depression  - continue Rx    Mental status change  - Neuro evaluation noted  - probably related to hypoglycemia     ESRD  - PD  - Nephrology follow    DVT prophylaxis     Pipe Beck MD pager [phone]

## 2020-12-16 NOTE — CONSULT NOTE ADULT - ASSESSMENT
55y male with esrd on PD for 5 years, T2DM, cad prior cabg. He underwent cardiac cath and angioplasty about 2 weeks ago due to cp and abnormal ETT with cad. Admitted for fever and dizziness.

## 2020-12-16 NOTE — PROGRESS NOTE ADULT - SUBJECTIVE AND OBJECTIVE BOX
Patient is a 55y old  Male who presents with a chief complaint of fever, weakness (15 Dec 2020 20:56)      SUBJECTIVE / OVERNIGHT EVENTS: Comfortable without new complaints. Events noted. Feels cold.  Review of Systems  chest pain no  palpitations no  sob no  nausea no  headache no    MEDICATIONS  (STANDING):  aspirin enteric coated 81 milliGRAM(s) Oral daily  atorvastatin 80 milliGRAM(s) Oral at bedtime  cefepime   IVPB 1000 milliGRAM(s) IV Intermittent every 24 hours  dextrose 40% Gel 15 Gram(s) Oral once  dextrose 5%. 1000 milliLiter(s) (50 mL/Hr) IV Continuous <Continuous>  dextrose 5%. 1000 milliLiter(s) (100 mL/Hr) IV Continuous <Continuous>  dextrose 50% Injectable 25 Gram(s) IV Push once  dextrose 50% Injectable 12.5 Gram(s) IV Push once  dextrose 50% Injectable 25 Gram(s) IV Push once  ferrous    sulfate 325 milliGRAM(s) Oral daily  folic acid 1 milliGRAM(s) Oral daily  gabapentin 100 milliGRAM(s) Oral daily  gentamicin 0.1% Cream 1 Application(s) Topical two times a day  glucagon  Injectable 1 milliGRAM(s) IntraMuscular once  heparin   Injectable 5000 Unit(s) SubCutaneous every 12 hours  insulin lispro (ADMELOG) corrective regimen sliding scale   SubCutaneous three times a day before meals  insulin lispro (ADMELOG) corrective regimen sliding scale   SubCutaneous at bedtime  insulin lispro Injectable (ADMELOG) 14 Unit(s) SubCutaneous before breakfast  insulin lispro Injectable (ADMELOG) 18 Unit(s) SubCutaneous before lunch  insulin lispro Injectable (ADMELOG) 18 Unit(s) SubCutaneous before dinner  insulin NPH human recombinant 12 Unit(s) SubCutaneous at bedtime  metoprolol succinate ER 50 milliGRAM(s) Oral daily  nortriptyline 25 milliGRAM(s) Oral at bedtime  pantoprazole    Tablet 40 milliGRAM(s) Oral before breakfast  sevelamer carbonate 800 milliGRAM(s) Oral three times a day with meals  ticagrelor 60 milliGRAM(s) Oral every 12 hours    MEDICATIONS  (PRN):  acetaminophen   Tablet .. 650 milliGRAM(s) Oral every 6 hours PRN Temp greater or equal to 38.5C (101.3F), Mild Pain (1 - 3)  oxyCODONE    IR 10 milliGRAM(s) Oral every 6 hours PRN Severe Pain (7 - 10)      Vital Signs Last 24 Hrs  T(C): 37.4 (16 Dec 2020 13:00), Max: 37.4 (15 Dec 2020 18:50)  T(F): 99.4 (16 Dec 2020 13:00), Max: 99.4 (15 Dec 2020 18:50)  HR: 88 (16 Dec 2020 13:00) (73 - 90)  BP: 126/83 (16 Dec 2020 13:00) (90/52 - 133/83)  BP(mean): --  RR: 18 (16 Dec 2020 13:00) (16 - 18)  SpO2: 100% (16 Dec 2020 13:00) (98% - 100%)    PHYSICAL EXAM:  GENERAL: NAD  HEAD:  Atraumatic, Normocephalic  EYES: EOMI, PERRLA, conjunctiva and sclera clear  NECK: Supple, No JVD  CHEST/LUNG: Clear to auscultation bilaterally; No wheeze Chest wound healing.  HEART: Regular rate and rhythm; No murmurs, rubs, or gallops  ABDOMEN: Soft, Nontender, Nondistended; Bowel sounds present  EXTREMITIES:  2+ Peripheral Pulses, No clubbing, cyanosis, or edema  PSYCH: AAOx3  NEUROLOGY: non-focal  SKIN: No rashes or lesions    LABS:                        9.5    13.81 )-----------( 220      ( 16 Dec 2020 06:14 )             29.8     12-16    130<L>  |  89<L>  |  52<H>  ----------------------------<  268<H>  4.1   |  23  |  11.85<H>    Ca    8.0<L>      16 Dec 2020 06:14  Phos  6.6     12-16  Mg     2.3     12-16                Culture - Dialysis Fluid (collected 15 Dec 2020 04:26)  Source: .Peritoneal Dialysis Fluid Peritoneal Dialysis Fluid  Preliminary Report (16 Dec 2020 08:20):    No growth to date.    Culture - Blood (collected 14 Dec 2020 16:33)  Source: .Blood Blood-Peripheral  Preliminary Report (15 Dec 2020 17:02):    No growth to date.    Culture - Blood (collected 14 Dec 2020 16:33)  Source: .Blood Blood-Peripheral  Preliminary Report (15 Dec 2020 17:02):    No growth to date.        RADIOLOGY & ADDITIONAL TESTS:    Imaging Personally Reviewed:  < from: CT Chest No Cont (12.14.20 @ 18:21) >  IMPRESSION:    1.  No pneumonia    2.  Posterior to the posterior table of the sternum, at the level of the third sternal wire is a 3.0 x 1.5 cm fluid collection (50 Hounsfield units) (3, 56) containing a small focus of air.    < end of copied text >    Consultant(s) Notes Reviewed:      Care Discussed with Consultants/Other Providers:

## 2020-12-17 NOTE — PROGRESS NOTE ADULT - SUBJECTIVE AND OBJECTIVE BOX
Chief Complaint: T2DM     History: PD held today for hypotension.     MEDICATIONS  (STANDING):  aspirin enteric coated 81 milliGRAM(s) Oral daily  atorvastatin 80 milliGRAM(s) Oral at bedtime  cefepime   IVPB 1000 milliGRAM(s) IV Intermittent every 24 hours  dextrose 40% Gel 15 Gram(s) Oral once  dextrose 5%. 1000 milliLiter(s) (50 mL/Hr) IV Continuous <Continuous>  dextrose 5%. 1000 milliLiter(s) (100 mL/Hr) IV Continuous <Continuous>  dextrose 50% Injectable 25 Gram(s) IV Push once  dextrose 50% Injectable 12.5 Gram(s) IV Push once  dextrose 50% Injectable 25 Gram(s) IV Push once  epoetin martine-epbx (RETACRIT) Injectable 35963 Unit(s) IV Push every 7 days  ferrous    sulfate 325 milliGRAM(s) Oral daily  folic acid 1 milliGRAM(s) Oral daily  gabapentin 100 milliGRAM(s) Oral daily  gentamicin 0.1% Cream 1 Application(s) Topical two times a day  glucagon  Injectable 1 milliGRAM(s) IntraMuscular once  heparin   Injectable 5000 Unit(s) SubCutaneous every 12 hours  insulin glargine Injectable (LANTUS) 6 Unit(s) SubCutaneous at bedtime  insulin lispro (ADMELOG) corrective regimen sliding scale   SubCutaneous three times a day before meals  insulin lispro (ADMELOG) corrective regimen sliding scale   SubCutaneous at bedtime  insulin lispro Injectable (ADMELOG) 6 Unit(s) SubCutaneous three times a day before meals  metoprolol tartrate 12.5 milliGRAM(s) Oral two times a day  nortriptyline 25 milliGRAM(s) Oral at bedtime  pantoprazole    Tablet 40 milliGRAM(s) Oral before breakfast  senna 2 Tablet(s) Oral at bedtime  sevelamer carbonate 800 milliGRAM(s) Oral three times a day with meals  ticagrelor 60 milliGRAM(s) Oral every 12 hours    MEDICATIONS  (PRN):  acetaminophen   Tablet .. 650 milliGRAM(s) Oral every 6 hours PRN Temp greater or equal to 38.5C (101.3F), Mild Pain (1 - 3)  bisacodyl 5 milliGRAM(s) Oral every 12 hours PRN Constipation  oxyCODONE    IR 10 milliGRAM(s) Oral every 6 hours PRN Severe Pain (7 - 10)  polyethylene glycol 3350 17 Gram(s) Oral daily PRN Constipation      Allergies    doxazosin (Other)    Intolerances    ROS: All other systems reviewed and negative    PHYSICAL EXAM:  VITALS: T(C): 36.6 (12-17-20 @ 12:49)  T(F): 97.8 (12-17-20 @ 12:49), Max: 98.6 (12-17-20 @ 09:00)  HR: 79 (12-17-20 @ 12:49) (77 - 86)  BP: 111/74 (12-17-20 @ 12:49) (93/64 - 126/82)  RR:  (16 - 18)  SpO2:  (95% - 100%)  Wt(kg): --  GENERAL: NAD, well-groomed, well-developed  EYES: No proptosis,  anicteric  HEENT:  Atraumatic, Normocephalic, moist mucous membranes  RESPIRATORY: Clear to auscultation bilaterally; No rales, rhonchi, wheezing, or rubs  CARDIOVASCULAR: Regular rate and rhythm; No murmurs  GI: Soft, nontender, non distended, normal bowel sounds  SKIN: Dry, intact, No rashes or lesions  MUSCULOSKELETAL: Full range of motion, normal strength  NEURO: AOx3, moves all extremities spontaenuously   PSYCH:  reactive affect, euthymic mood      POCT Blood Glucose.: 103 mg/dL (12-17-20 @ 14:54)  POCT Blood Glucose.: 216 mg/dL (12-17-20 @ 09:24)6+4  POCT Blood Glucose.: 112 mg/dL (12-17-20 @ 01:54)  POCT Blood Glucose.: 109 mg/dL (12-16-20 @ 21:51)  POCT Blood Glucose.: 74 mg/dL (12-16-20 @ 21:50)  POCT Blood Glucose.: 74 mg/dL (12-16-20 @ 21:14)L5  POCT Blood Glucose.: 210 mg/dL (12-16-20 @ 16:50)H12+4  POCT Blood Glucose.: 280 mg/dL (12-16-20 @ 12:27)H+6  POCT Blood Glucose.: 267 mg/dL (12-16-20 @ 08:41)H0  POCT Blood Glucose.: 277 mg/dL (12-16-20 @ 01:20)  POCT Blood Glucose.: 101 mg/dL (12-15-20 @ 22:16)  POCT Blood Glucose.: 53 mg/dL (12-15-20 @ 21:57)  POCT Blood Glucose.: 57 mg/dL (12-15-20 @ 21:50)  POCT Blood Glucose.: 56 mg/dL (12-15-20 @ 21:49)  POCT Blood Glucose.: 73 mg/dL (12-15-20 @ 21:46)  POCT Blood Glucose.: 95 mg/dL (12-15-20 @ 21:42)  POCT Blood Glucose.: 181 mg/dL (12-15-20 @ 21:00)  POCT Blood Glucose.: 139 mg/dL (12-15-20 @ 17:34)H0  POCT Blood Glucose.: 112 mg/dL (12-15-20 @ 12:03)H18   POCT Blood Glucose.: 152 mg/dL (12-15-20 @ 09:23)H14  POCT Blood Glucose.: 105 mg/dL (12-15-20 @ 08:45)  POCT Blood Glucose.: 102 mg/dL (12-14-20 @ 23:18)N12      12-17    134<L>  |  94<L>  |  53<H>  ----------------------------<  134<H>  4.2   |  22  |  12.61<H>    EGFR if : 5<L>  EGFR if non : 4<L>    Ca    8.1<L>      12-17  Mg     2.4     12-17  Phos  6.6     12-16            Thyroid Function Tests:

## 2020-12-17 NOTE — PROGRESS NOTE ADULT - SUBJECTIVE AND OBJECTIVE BOX
Hypotensive overnight requiring fluid bolus   PD held        VITAL:  T(C): , Max: 37.4 (12-16-20 @ 13:00)  T(F): , Max: 99.4 (12-16-20 @ 13:00)  HR: 77 (12-17-20 @ 10:45)  BP: 114/75 (12-17-20 @ 10:45)  BP(mean): --  RR: 16 (12-17-20 @ 10:45)  SpO2: 97% (12-17-20 @ 10:45)  Wt(kg): --      PHYSICAL EXAM:  General: Alerted, oriented  HEENT: MMM  Lungs:CTA-b/l  Heart: RRR S1S2  Abdomen: Soft, PD cathter in situ   Ext: no pedal edema b/l  : no keys      LABS:                        8.8    10.78 )-----------( 240      ( 17 Dec 2020 06:16 )             27.2     Na(134)/K(4.2)/Cl(94)/HCO3(22)/BUN(53)/Cr(12.61)Glu(134)/Ca(8.1)/Mg(2.4)/PO4(--)    12-17 @ 06:16  Na(130)/K(4.1)/Cl(89)/HCO3(23)/BUN(52)/Cr(11.85)Glu(268)/Ca(8.0)/Mg(2.3)/PO4(6.6)    12-16 @ 06:14  Na(133)/K(3.6)/Cl(92)/HCO3(23)/BUN(52)/Cr(12.19)Glu(38)/Ca(8.4)/Mg(--)/PO4(--)    12-15 @ 20:50  Na(135)/K(4.1)/Cl(95)/HCO3(23)/BUN(56)/Cr(12.64)Glu(122)/Ca(8.3)/Mg(--)/PO4(--)    12-15 @ 06:21      55y male with esrd on PD for 5 years, dm poorly controlled, cad prior cabg presents with dizziness and found to be hypotensive     ESRD on CCPD at home now on manual exchanges due to hypotension  Anemia of ESRD  Hypotension- unclear etiology   Mild leukocytosis  Lytes acceptable  Left foot wound   Blood and dialysate cultures negative so far     PLan   Defer fluids today   Continue holding PD for today  Exit site care with mupirocin   Bowel regimen   Dose med for Cr Cl< 10 ml/min  Renvela 800 mg TID with meals  PLease document daily weights   Check Iron studies, ferritin  Start Retacrit 10K units once weekly   Repeat ECHO   Could Mental status changes- lethargy be related to cefepime??       Samia Dangelo MD  [facility]  Office: [phone]

## 2020-12-17 NOTE — PHYSICAL THERAPY INITIAL EVALUATION ADULT - ADDITIONAL COMMENTS
Patient lives in pvt house with spouse, 4 steps to enter.  Can function on one level. Patient ambulated with straight cane or rw independent.

## 2020-12-17 NOTE — PROGRESS NOTE ADULT - ASSESSMENT
55y male with esrd on PD for 5 years, T2DM, cad prior cabg. He underwent cardiac cath and angioplasty about 2 weeks ago due to cp and abnormal ETT with cad. Admitted for fever and dizziness.     Problem/Recommendation - 1:  Problem: Type 2 diabetes mellitus with hyperglycemia, with long-term current use of insulin. Recommendation: T2DM uncontrolled. Suspect frequent hypoglycemic events at home due to aggressive sliding scale. Variable glucoses here while on PD, now pd taken off    -Adjust lantus to 6 units qhs   -Decrease admelog to 4 units tidac   -CC diet     Discharge  Basal+victoza   f/u Dr. Villegas.    Problem/Recommendation - 2:  Problem: Essential hypertension.  Recommendation: hypotensive, sepsis w/u.     Problem/Recommendation - 3:  Problem: Other hyperlipidemia.  Recommendation: continue lipitor. LDL     Sukhi Chong MD, Endocrine Fellow   Pager  from 9-5PM. After hours and on weekends please call [phone]

## 2020-12-17 NOTE — PROGRESS NOTE ADULT - ASSESSMENT
55 m with    Fever  - empiric antibiotics Cefepime 4/5  - ID follow  - CT surgery evaluation for fluid collection at sternal wound   - Peritoneal fluid culture and cell count    Foot wound  - local care  - Podiatry evaluation    Diabetes Mellitus / Hypoglycemia   - BS control  - ADA diet   - Endocrine evaluation     CAD  - continue Rx    Depression  - continue Rx    Mental status change  - Neuro evaluation noted  - probably related to hypoglycemia     ESRD  - PD  - Nephrology follow    DVT prophylaxis     Pipe Beck MD pager [phone]

## 2020-12-17 NOTE — PROGRESS NOTE ADULT - ASSESSMENT
56 yo M hx ESRD, DM, on PD , CAD, underwent cardiac cath and stent and wire broke in heart, s/p sternotomy, discharge last week, readmitted with dizziness, transient fever, so far pd fluid and bc neg  Plan:  will complete 5 d of empiric cefepime as planned, day 4/5  f/u final cultures  local wound care to toes

## 2020-12-17 NOTE — PHYSICAL THERAPY INITIAL EVALUATION ADULT - PLANNED THERAPY INTERVENTIONS, PT EVAL
GOAL: Pt will negotiate 5 steps  up and down using HR support, independent  within 2-3 weeks./balance training/bed mobility training/gait training/strengthening/transfer training

## 2020-12-17 NOTE — PROGRESS NOTE ADULT - SUBJECTIVE AND OBJECTIVE BOX
CC: f/u for    Patient reports    REVIEW OF SYSTEMS: no fevers, no chills, no nausea, no vomiting, no abd pain, no resp symptoms  All other review of systems negative (Comprehensive ROS)    Antimicrobials Day #    cefepime   IVPB 1000 milliGRAM(s) IV Intermittent every 24 hours    Other Medications Reviewed    T(F): 98.6 (12-17-20 @ 09:00), Max: 99.4 (12-16-20 @ 13:00)  HR: 77 (12-17-20 @ 10:45)  BP: 114/75 (12-17-20 @ 10:45)  RR: 16 (12-17-20 @ 10:45)  SpO2: 97% (12-17-20 @ 10:45)    PHYSICAL EXAM:  General: alert, no acute distress  Eyes:  anicteric, no conjunctival injection, no discharge  Oropharynx: no lesions or injection 	  Neck: supple, without adenopathy  Lungs: clear to auscultation  Heart: regular rate and rhythm; no murmur, rubs or gallops  Abdomen: soft, nondistended, nontender, without mass or organomegaly  Skin: no lesions  Extremities: no clubbing, cyanosis, or edema  Neurologic: alert, oriented, moves all extremities    LAB RESULTS:                        8.8    10.78 )-----------( 240      ( 17 Dec 2020 06:16 )             27.2     12-17    134<L>  |  94<L>  |  53<H>  ----------------------------<  134<H>  4.2   |  22  |  12.61<H>    Ca    8.1<L>      17 Dec 2020 06:16  Phos  6.6     12-16  Mg     2.4     12-17            MICROBIOLOGY REVIEWED:  Culture - Dialysis Fluid . (12.15.20 @ 04:26)   Specimen Source: .Peritoneal Dialysis Fluid Peritoneal Dialysis Fluid   Culture Results:   No growth to date. Culture - Blood (12.14.20 @ 16:33)   Specimen Source: .Blood Blood-Peripheral   Culture Results:   No growth to dateCulture - Blood (12.14.20 @ 16:33)   Specimen Source: .Blood Blood-Peripheral   Culture Results:   No growth to date  RADIOLOGY REVIEWED:     CC: f/u for fever    Patient reports feels better, no new c/o    REVIEW OF SYSTEMS: no fevers, no chills, no nausea, no vomiting, no abd pain, no resp symptoms  All other review of systems negative (Comprehensive ROS)    Antimicrobials Day # 4  cefepime   IVPB 1000 milliGRAM(s) IV Intermittent every 24 hours    Other Medications Reviewed    T(F): 98.6 (12-17-20 @ 09:00), Max: 99.4 (12-16-20 @ 13:00)  HR: 77 (12-17-20 @ 10:45)  BP: 114/75 (12-17-20 @ 10:45)  RR: 16 (12-17-20 @ 10:45)  SpO2: 97% (12-17-20 @ 10:45)`    PHYSICAL EXAM:  General: alert, no acute distress  Eyes:  anicteric, no conjunctival injection, no discharge  Oropharynx: no lesions or injection 	  Neck: supple, without adenopathy  Lungs: clear to auscultation  Heart: regular rate and rhythm; no murmur, rubs or gallops  Abdomen: soft, nondistended, nontender, without mass or organomegaly  Skin: no lesions  Extremities: no clubbing, cyanosis, or edema  Neurologic: alert, oriented, moves all extremities. left toe wound clean and dry, faint red right forefoot  sternal wound intact and clean, chest wound clean and healed    LAB RESULTS:                        8.8    10.78 )-----------( 240      ( 17 Dec 2020 06:16 )             27.2     12-17    134<L>  |  94<L>  |  53<H>  ----------------------------<  134<H>  4.2   |  22  |  12.61<H>    Ca    8.1<L>      17 Dec 2020 06:16  Phos  6.6     12-16  Mg     2.4     12-17            MICROBIOLOGY REVIEWED:  Culture - Dialysis Fluid . (12.15.20 @ 04:26)   Specimen Source: .Peritoneal Dialysis Fluid Peritoneal Dialysis Fluid   Culture Results:   No growth to date. Culture - Blood (12.14.20 @ 16:33)   Specimen Source: .Blood Blood-Peripheral   Culture Results:   No growth to dateCulture - Blood (12.14.20 @ 16:33)   Specimen Source: .Blood Blood-Peripheral   Culture Results:   No growth to date  RADIOLOGY REVIEWED:

## 2020-12-17 NOTE — PROGRESS NOTE ADULT - SUBJECTIVE AND OBJECTIVE BOX
Patient is a 55y old  Male who presents with a chief complaint of fever, weakness (17 Dec 2020 14:53)      SUBJECTIVE / OVERNIGHT EVENTS: feels better  Review of Systems  chest pain no  palpitations no  sob no  nausea no  headache no    MEDICATIONS  (STANDING):  aspirin enteric coated 81 milliGRAM(s) Oral daily  atorvastatin 80 milliGRAM(s) Oral at bedtime  cefepime   IVPB 1000 milliGRAM(s) IV Intermittent every 24 hours  dextrose 40% Gel 15 Gram(s) Oral once  dextrose 5%. 1000 milliLiter(s) (50 mL/Hr) IV Continuous <Continuous>  dextrose 5%. 1000 milliLiter(s) (100 mL/Hr) IV Continuous <Continuous>  dextrose 50% Injectable 25 Gram(s) IV Push once  dextrose 50% Injectable 12.5 Gram(s) IV Push once  dextrose 50% Injectable 25 Gram(s) IV Push once  epoetin martine-epbx (RETACRIT) Injectable 43859 Unit(s) IV Push every 7 days  ferrous    sulfate 325 milliGRAM(s) Oral daily  folic acid 1 milliGRAM(s) Oral daily  gabapentin 100 milliGRAM(s) Oral daily  gentamicin 0.1% Cream 1 Application(s) Topical two times a day  glucagon  Injectable 1 milliGRAM(s) IntraMuscular once  heparin   Injectable 5000 Unit(s) SubCutaneous every 12 hours  insulin glargine Injectable (LANTUS) 6 Unit(s) SubCutaneous at bedtime  insulin lispro (ADMELOG) corrective regimen sliding scale   SubCutaneous three times a day before meals  insulin lispro (ADMELOG) corrective regimen sliding scale   SubCutaneous at bedtime  insulin lispro Injectable (ADMELOG) 4 Unit(s) SubCutaneous three times a day before meals  metoprolol tartrate 12.5 milliGRAM(s) Oral two times a day  nortriptyline 25 milliGRAM(s) Oral at bedtime  pantoprazole    Tablet 40 milliGRAM(s) Oral before breakfast  senna 2 Tablet(s) Oral at bedtime  sevelamer carbonate 800 milliGRAM(s) Oral three times a day with meals  ticagrelor 60 milliGRAM(s) Oral every 12 hours    MEDICATIONS  (PRN):  acetaminophen   Tablet .. 650 milliGRAM(s) Oral every 6 hours PRN Temp greater or equal to 38.5C (101.3F), Mild Pain (1 - 3)  bisacodyl 5 milliGRAM(s) Oral every 12 hours PRN Constipation  oxyCODONE    IR 10 milliGRAM(s) Oral every 6 hours PRN Severe Pain (7 - 10)  polyethylene glycol 3350 17 Gram(s) Oral daily PRN Constipation      Vital Signs Last 24 Hrs  T(C): 36.2 (17 Dec 2020 17:00), Max: 37 (17 Dec 2020 09:00)  T(F): 97.2 (17 Dec 2020 17:00), Max: 98.6 (17 Dec 2020 09:00)  HR: 87 (17 Dec 2020 17:00) (77 - 90)  BP: 123/81 (17 Dec 2020 17:00) (99/62 - 126/82)  BP(mean): --  RR: 18 (17 Dec 2020 17:00) (16 - 18)  SpO2: 100% (17 Dec 2020 17:00) (95% - 100%)    PHYSICAL EXAM:  GENERAL: NAD  HEAD:  Atraumatic, Normocephalic  EYES: EOMI, PERRLA, conjunctiva and sclera clear  NECK: Supple, No JVD  CHEST/LUNG: Clear to auscultation bilaterally; No wheeze Wound clean  HEART: Regular rate and rhythm; No murmurs, rubs, or gallops  ABDOMEN: Soft, Nontender, Nondistended; Bowel sounds present PD cath  EXTREMITIES:  2+ Peripheral Pulses, No clubbing, cyanosis, or edema  PSYCH: AAOx3  NEUROLOGY: non-focal  SKIN: No rashes or lesions    LABS:                        8.8    10.78 )-----------( 240      ( 17 Dec 2020 06:16 )             27.2     12-17    134<L>  |  94<L>  |  53<H>  ----------------------------<  134<H>  4.2   |  22  |  12.61<H>    Ca    8.1<L>      17 Dec 2020 06:16  Phos  6.6     12-16  Mg     2.4     12-17                Culture - Dialysis Fluid (collected 15 Dec 2020 04:26)  Source: .Peritoneal Dialysis Fluid Peritoneal Dialysis Fluid  Preliminary Report (16 Dec 2020 08:20):    No growth to date.        RADIOLOGY & ADDITIONAL TESTS:    Imaging Personally Reviewed:    Consultant(s) Notes Reviewed:      Care Discussed with Consultants/Other Providers:

## 2020-12-17 NOTE — DISCHARGE NOTE NURSING/CASE MANAGEMENT/SOCIAL WORK - PATIENT PORTAL LINK FT
You can access the FollowMyHealth Patient Portal offered by [facility] by registering at the following website: [email or web address]. By joining [employer]’s FollowMyHealth portal, you will also be able to view your health information using other applications (apps) compatible with our system.

## 2020-12-17 NOTE — PHYSICAL THERAPY INITIAL EVALUATION ADULT - DID THE PATIENT HAVE SURGERY?
Blepharitis  Introduction  Blepharitis means swollen eyelids.  Follow these instructions at home:  Pay attention to any changes in how you look or feel. Follow these instructions to help with your condition:  Keeping Clean  · Wash your hands often.  · Wash your eyelids with warm water, or wash them with warm water that is mixed with little bit of baby shampoo. Do this 2 or more times per day.  · Wash your face and eyebrows at least once a day.  · Use a clean towel each time you dry your eyelids. Do not use the towel to clean or dry other areas of your body. Do not share your towel with anyone.  General instructions  · Avoid wearing makeup until you get better. Do not share makeup with anyone.  · Avoid rubbing your eyes.  · Put a warm compress on your eyes 2 times per day for 10 minutes at a time or as told by your doctor.  · If you were told to use an medicated cream or eye drops, use the medicine as told by your doctor. Do not stop using the medicine even if you feel better.  · Keep all follow-up visits as told by your doctor. This is important.  Contact a doctor if:  · Your eyelids feel hot.  · You have blisters on your eyelids.  · You have a rash on your eyelids.  · The swelling does not go away in 2-4 days.  · The swelling gets worse.  Get help right away if:  · You have pain that gets worse.  · You have pain that spreads to other parts of your face.  · You have redness that gets worse.  · You have redness that spreads to other parts of your face.  · Your vision changes.  · You have pain when you look at lights or things that move.  · You have a fever.  This information is not intended to replace advice given to you by your health care provider. Make sure you discuss any questions you have with your health care provider.  Document Released: 09/26/2009 Document Revised: 05/25/2017 Document Reviewed: 04/11/2016  © 2017 [employer]    
n/a

## 2020-12-17 NOTE — PHYSICAL THERAPY INITIAL EVALUATION ADULT - PERTINENT HX OF CURRENT PROBLEM, REHAB EVAL
55y old  Male who presents with a chief complaint of fever, weakness. PMHX of CAD s/p CABG x 4 (LIMA to LAD, SVG to Diagonal, OM1, PDA on 8/8/19), HTN, HLD, ESRD on PD for 3 years, 55y male with esrd on PD for 5 years, dm poorly controlled, cad prior cabg. He underwent cardiac cath and angioplasty about 2 weeks ago due to cp and abnormal ETT with cad. He had a break of the wire so underwent emergency sternotomy, axilla and groin cannulation and the wire was recovered. He had HD briefly then back to PD of late. He was sent home on 12/11 feeling fine. 12/14 he returns for dizziness upon standing, vomited once and fever.

## 2020-12-18 NOTE — CONSULT NOTE ADULT - SUBJECTIVE AND OBJECTIVE BOX
CARDIOLOGY CONSULT - Dr. Best         HPI:  55y male with esrd on PD for 5 years, dm poorly controlled, CAD, CABG () presented to the ED for dizziness, fever, and weakness. Pt presented for LHC on  to attempt to stent the native arteries. Stent was successfully deployed, but on removal of the balloon, the wire fractured and the balloon remained stuck in the L main. S/P OR for removal of the angioplasty balloon and fractured wire via transverse aortotomy. : s/p Right axillary and right femoral cannulation. Redo sternotomy. Recover of fractured left heart catheterization angioplasty balloon and wire via transverse aortotomy. Received 2u PRBC intra-op. Required  and Levo gtts post-op.  Pt admitted for sepsis w hypotension. Cardiology consult for new findings on echo.       PAST MEDICAL & SURGICAL HISTORY:  HTN (hypertension)  ESRD (end stage renal disease) on dialysis  CAD, multiple vessel  Diabetes  S/P CABG (coronary artery bypass graft)        PREVIOUS DIAGNOSTIC TESTING:    [x ] Echocardiogram:  Transthoracic Echocardiogram (20 @ 12:31)   EF (Sarmiento Rule): 32 %Doppler Peak Velocity (m/sec):  AoV=1.6  ------------------------------------------------------------------------  Observations:  Mitral Valve: Mitral annular calcification.   Tethered  mitral valve leaflets. Moderate mitral regurgitation. Peak  mitral valve gradient equals 8 mm Hg, mean transmitral  valve gradient equals 3 mm Hg, consistent with mild mitral  stenosis.  Aortic Valve/Aorta: Calcified trileaflet aortic valve with  normal opening. Peak transaortic valve gradient equals 10  mm Hg, mean transaortic valve gradient equals 7 mm Hg,  aortic valve velocity time integral equals 29 cm, estimated  aortic valve area equals 2.2 sqcm. Mild aortic  regurgitation.  Peak left ventricular outflow tract  gradient equals 3 mm Hg, mean gradient is equal to 1 mm Hg,  LVOT velocity time integral equals 13 cm.  Aortic Root: 3.4 cm.  LVOT diameter: 2.5 cm.  Left Atrium: Severely dilated left atrium.  LA volume index  = 54 cc/m2.  Left Ventricle: Severe global left ventricular systolic  dysfunction.   Endocardial visualization enhanced with  intravenous injection of Ultrasonic Enhancing Agent  (Definity). No left ventricular thrombus. Mild left  ventricular enlargement.  Right Heart: Normal right atrium. Normal right ventricular  size with decreased right ventricular systolic function.  Normal tricuspid valve. Mild-moderate tricuspid  regurgitation. Normal pulmonic valve.  Pericardium/Pleura: Normal pericardium with no pericardial  effusion.  Hemodynamic: Estimated right ventricular systolic pressure  equals 53 mm Hg, assuming right atrial pressure equals 12  mm Hg, consistent with moderate pulmonary hypertension.  ------------------------------------------------------------------------  Conclusions:  1. Moderate mitral regurgitation.  2. Severely dilated left atrium.  LA volume index = 54  cc/m2.  3. Mild left ventricular enlargement.  4. Severe global left ventricular systolic dysfunction.  Endocardial visualization enhanced with intravenous  injection of Ultrasonic Enhancing Agent (Definity). No left  ventricular thrombus.  5. Estimated pulmonary artery systolic pressure equals 53  mm Hg, assuming right atrial pressure equals 12 mm Hg,  consistent with moderate pulmonary pressures.    [x ]  Catheterization:  Cardiac Cath Lab - Adult (20 @ 09:43)   CORONARY VESSELS: The coronary circulation is right dominant.  LM:   --  Distal left main: There was a 70 % stenosis.  LAD:   --  Mid LAD: There was a 100 % stenosis.  CX:   --  OM1: There was a 90 % stenosis.  RCA:   --  Ostial RCA: There was a 100 % stenosis.  GRAFTS:   --  Graft to the mid LAD: The graft was a LIMA. Graft angiography  showed minor luminal irregularities.  COMPLICATIONS: The stent was deployed without difficulty. On removal of the  balloon, the shaft broke and the tip of the balloon remained in the  vessel. Several attempts were made to snare the balloon unsuccessfully.  Dr. Verdugo was notified who will take the patient to the operating room.    [ ] Stress Test:  	      MEDICATIONS:  Home Medications:  aspirin 81 mg oral tablet: 1 tab(s) orally once a day    Note:Recent D/C 3 days ago med on discharge paper but unable to confirm with family. (14 Dec 2020 15:23)  ferrous sulfate 324 mg (65 mg elemental iron) oral tablet: 1 tab(s) orally 3 times a day    Note:Recent D/C 3 days ago med on discharge paper but unable to confirm with family. (14 Dec 2020 15:23)  folic acid 1 mg oral tablet: 1 tab(s) orally once a day    Note:Recent D/C 3 days ago med on discharge paper but unable to confirm with family. No record with pharmacy. (14 Dec 2020 15:24)  metoprolol succinate 50 mg oral tablet, extended release: 1 tab(s) orally once a day    Note:Recent D/C 3 days ago med on discharge paper but unable to confirm with family. (14 Dec 2020 15:24)  Nephplex Rx oral tablet: 1 tab(s) orally once a day    Note:Recent D/C 3 days ago med on discharge paper but unable to confirm with family. (14 Dec 2020 15:32)  nortriptyline 25 mg oral capsule: 1 cap(s) orally once a day (at bedtime)    Note:Recent D/C 3 days ago med on discharge paper but unable to confirm with family. (14 Dec 2020 15:27)  sevelamer carbonate 800 mg oral tablet: 1 tab(s) orally 3 times a day (with meals)    Note:Recent D/C 3 days ago med on discharge paper but unable to confirm with family. (14 Dec 2020 15:32)  Toujeo SoloStar 300 units/mL subcutaneous solution: 60 unit(s) subcutaneous once a day (at bedtime)    Note:Recent D/C 3 days ago med on discharge paper but unable to confirm with family. (14 Dec 2020 15:30)  Vitamin D3 50,000 intl units (1250 mcg) oral capsule: 1 tab(s) orally once a week    Note:no record with  Recent D/C 3 days ago med on discharge paper but unable to confirm with family. (14 Dec 2020 15:31)      MEDICATIONS  (STANDING):  aspirin enteric coated 81 milliGRAM(s) Oral daily  atorvastatin 80 milliGRAM(s) Oral at bedtime  dextrose 40% Gel 15 Gram(s) Oral once  dextrose 5%. 1000 milliLiter(s) (50 mL/Hr) IV Continuous <Continuous>  dextrose 5%. 1000 milliLiter(s) (100 mL/Hr) IV Continuous <Continuous>  dextrose 50% Injectable 25 Gram(s) IV Push once  dextrose 50% Injectable 12.5 Gram(s) IV Push once  dextrose 50% Injectable 25 Gram(s) IV Push once  epoetin martine-epbx (RETACRIT) Injectable 39045 Unit(s) SubCutaneous every 7 days  ferrous    sulfate 325 milliGRAM(s) Oral daily  folic acid 1 milliGRAM(s) Oral daily  gabapentin 100 milliGRAM(s) Oral daily  gentamicin 0.1% Cream 1 Application(s) Topical two times a day  glucagon  Injectable 1 milliGRAM(s) IntraMuscular once  heparin   Injectable 5000 Unit(s) SubCutaneous every 12 hours  insulin glargine Injectable (LANTUS) 12 Unit(s) SubCutaneous at bedtime  insulin lispro (ADMELOG) corrective regimen sliding scale   SubCutaneous three times a day before meals  insulin lispro (ADMELOG) corrective regimen sliding scale   SubCutaneous at bedtime  insulin lispro Injectable (ADMELOG) 6 Unit(s) SubCutaneous three times a day before meals  metoprolol tartrate 12.5 milliGRAM(s) Oral two times a day  midodrine. 5 milliGRAM(s) Oral three times a day  nortriptyline 25 milliGRAM(s) Oral at bedtime  pantoprazole    Tablet 40 milliGRAM(s) Oral before breakfast  senna 2 Tablet(s) Oral at bedtime  sevelamer carbonate 800 milliGRAM(s) Oral three times a day with meals  ticagrelor 60 milliGRAM(s) Oral every 12 hours      FAMILY HISTORY:  FH: diabetes mellitus (Sibling)  father    FHx: lung cancer (Sibling)  bother        SOCIAL HISTORY:    [x ] Non-smoker  [ ] Smoker  [ ] Alcohol    Allergies    doxazosin (Other)    Intolerances    	    REVIEW OF SYSTEMS:  CONSTITUTIONAL: No fever, weight loss, or fatigue  EYES: No eye pain, visual disturbances, or discharge  ENMT:  No difficulty hearing, tinnitus, vertigo; No sinus or throat pain  NECK: No pain or stiffness  RESPIRATORY: No cough, wheezing, chills or hemoptysis; No Shortness of Breath  CARDIOVASCULAR: No chest pain, palpitations, passing out, dizziness, or leg swelling  GASTROINTESTINAL: No abdominal or epigastric pain. No nausea, vomiting, or hematemesis; No diarrhea or constipation. No melena or hematochezia.  GENITOURINARY: No dysuria, frequency, hematuria, or incontinence  NEUROLOGICAL: No headaches, memory loss, loss of strength, numbness, or tremors  SKIN: No itching, burning, rashes, or lesions   	    [ x] All others negative	see hpi   [ ] Unable to obtain    PHYSICAL EXAM:  T(C): 36.9 (20 @ 12:49), Max: 37.4 (20 @ 01:00)  HR: 93 (20 @ 12:49) (73 - 95)  BP: 130/81 (20 @ 12:49) (108/71 - 130/81)  RR: 18 (20 @ 12:49) (18 - 18)  SpO2: 100% (20 @ 12:49) (98% - 100%)  Wt(kg): --  I&O's Summary    17 Dec 2020 07:01  -  18 Dec 2020 07:00  --------------------------------------------------------  IN: 530 mL / OUT: 0 mL / NET: 530 mL        Appearance: Normal	  Psychiatry: A & O x 3, Mood & affect appropriate  HEENT:   Normal oral mucosa, PERRL, EOMI	  Lymphatic: No lymphadenopathy  Cardiovascular: Normal S1 S2,RRR, No JVD, No murmurs  Respiratory: Lungs clear to auscultation	  Gastrointestinal:  Soft, Non-tender, + BS	  Skin: No rashes, No ecchymoses, No cyanosis	  Neurologic: Non-focal  Extremities: Normal range of motion, No clubbing, cyanosis or edema  Vascular: Peripheral pulses palpable 2+ bilaterally    TELEMETRY: SR 1st AVB 70-90s	    ECG:  	  RADIOLOGY:  Xray Chest 1 View AP/PA (20 @ 11:26)   Impression:  The heart is enlarged. The lungs are clear. No pleural effusion. No pneumothorax. Status post sternotomy. Metallic clips are seen in the soft tissue of the area of the scapula.    CT Chest No Cont (20 @ 18:21)   FINDINGS:  Lungs And Airways: Small secretions within the trachea.  Scattered areas of linear atelectasis. Subsegmental dependent atelectasis.  Pleura:No pneumothorax.  Trace right pleural effusion.  Mediastinum: A 1.0 cm right upper paratracheal lymph node. The visualized portion of the thyroid gland is unremarkable.  Heart and Vasculature: There is cardiomegaly.  There is trace pericardial fluid. Status post CABG. Coronary artery disease with calcified plaque involving the left anterior descending, ICA. RCA stent.  The main pulmonary artery is normal in course and caliber.  Left-sided aortic arch and left-sided descending thoracic aorta. The ascending aorta and descending aorta is normal in course and caliber. Atheromatous disease of the aorta.  Upper Abdomen: Small ascites. Scattered calcified hepatic granulomas  Bones And Soft Tissues: Status post median sternotomy. The sternotomy has likely been revised since 2019. There are 5 sternal wires. Posterior to the posterior table of the sternum, at the level of the third sternal wire is a 3.0 x 1.5 cm fluid collection (50 Hounsfield units) (3, 56) containing a small focus of air.  There is no distraction of the sternotomy margins. No fracture sternal wire.  Degenerative change of the spine.    IMPRESSION:  1.  No pneumonia  2.  Posterior to the posterior table of the sternum, at the level of the third sternal wire is a 3.0 x 1.5 cm fluid collection (50 Hounsfield units) (3, 56) containing a small focus of air.    OTHER: 	  	  LABS:	 	    CARDIAC MARKERS:                                  9.5    8.48  )-----------( 234      ( 18 Dec 2020 06:18 )             30.9     12-18    130<L>  |  91<L>  |  65<H>  ----------------------------<  115<H>  4.7   |  21<L>  |  13.75<H>    Ca    8.6      18 Dec 2020 06:17  Mg     2.4     12-17        proBNP:   Lipid Profile:   HgA1c:   TSH:

## 2020-12-18 NOTE — PROGRESS NOTE ADULT - ASSESSMENT
55y male with esrd on PD for 5 years, T2DM, cad prior cabg. He underwent cardiac cath and angioplasty about 2 weeks ago due to cp and abnormal ETT with cad. Admitted for fever and dizziness.     Problem/Recommendation - 1:  Problem: Type 2 diabetes mellitus with hyperglycemia, with long-term current use of insulin. Recommendation: T2DM uncontrolled. Suspect frequent hypoglycemic events at home due to aggressive sliding scale. Variable glucoses here while on PD, now pd taken off    -Adjust lantus to 12 units qhs   -Decrease admelog to 6 units tidac   -CC diet     Discharge  Basal+victoza   f/u Dr. Villegas.    Problem/Recommendation - 2:  Problem: Essential hypertension.  Recommendation: hypotensive, sepsis w/u.     Problem/Recommendation - 3:  Problem: Other hyperlipidemia.  Recommendation: continue lipitor. LDL     Sukhi Chong MD, Endocrine Fellow   Pager  from 9-5PM. After hours and on weekends please call [phone]      55y male with esrd on PD for 5 years, T2DM, cad prior cabg. He underwent cardiac cath and angioplasty about 2 weeks ago due to cp and abnormal ETT with cad. Admitted for fever and dizziness.     Problem/Recommendation - 1:  Problem: Type 2 diabetes mellitus with hyperglycemia, with long-term current use of insulin. Recommendation: T2DM uncontrolled. Suspect frequent hypoglycemic events at home due to aggressive sliding scale. Variable glucoses here while on PD, now pd back to q6h manual with plan to make it gradually to o/n cycling which is his home regimen.   -Adjust lantus to 12 units qhs   -adjust admelog to 6 units tidac   -CC diet     Discharge  Basal+victoza   f/u Dr. Villegas.    Problem/Recommendation - 2:  Problem: Essential hypertension.  Recommendation: hypotensive, sepsis w/u.     Problem/Recommendation - 3:  Problem: Other hyperlipidemia.  Recommendation: continue lipitor. CINDY Chong MD, Endocrine Fellow   Pager  from 9-5PM. After hours and on weekends please call [phone]

## 2020-12-18 NOTE — CONSULT NOTE ADULT - ASSESSMENT
echo 12/17/20: EF 32%, mod MR, Severe global left ventricular systolic dysfunction, moderate pulmonary pressures    a/p  55y male with esrd on PD for 5 years, dm poorly controlled, CAD, CABG (2020) presented to the ED for dizziness, fever, and weakness. Cardiology c/s for new findings on echo    1. Ischemic Cardiomyopathy  -CV stable - no active cp, sob  -Echo with severe global lv sys dysfx, ef 32%  -c/w bb to improve LV outflow, mido for bp support  -ace/arb when ok with renal    2. CAD  -CV stable  -c/w asa, Brilinta, bb, Lipitor     3. Fever r/o sepsis  -iv abx per id  -CT chest noted  with fluid collection - cts eval     4. ESRD  -on PD  -renal f/u    dvt ppx

## 2020-12-18 NOTE — PROGRESS NOTE ADULT - ASSESSMENT
56 yo M hx ESRD, DM, on PD , CAD, underwent cardiac cath and stent and wire broke in heart, s/p sternotomy, discharge last week, readmitted with dizziness, transient fever, so far pd fluid and bc neg  Plan:  cefepime dc/d  f/u final cultures--no growth to date  local wound care to toes, podiatry f/u appreciated  d/w renal attending

## 2020-12-18 NOTE — PROGRESS NOTE ADULT - SUBJECTIVE AND OBJECTIVE BOX
CC: f/u for fever    Patient reports feels better, no new c/o    REVIEW OF SYSTEMS: no fevers, no chills, no nausea, no vomiting, no abd pain, no resp symptoms  All other review of systems negative (Comprehensive ROS)    Antimicrobials Day #d/cd    Other Medications Reviewed    Vital Signs Last 24 Hrs  T(C): 36.9 (18 Dec 2020 12:49), Max: 37.4 (18 Dec 2020 01:00)  T(F): 98.4 (18 Dec 2020 12:49), Max: 99.4 (18 Dec 2020 01:00)  HR: 93 (18 Dec 2020 12:49) (73 - 95)  BP: 130/81 (18 Dec 2020 12:49) (108/71 - 130/81)  BP(mean): --  ABP: --  ABP(mean): --  RR: 18 (18 Dec 2020 12:49) (18 - 18)  SpO2: 100% (18 Dec 2020 12:49) (98% - 100%)      PHYSICAL EXAM:  General: alert, no acute distress  Eyes:  anicteric, no conjunctival injection, no discharge  Oropharynx: no lesions or injection 	  Neck: supple, without adenopathy  Lungs: clear to auscultation  Heart: regular rate and rhythm; no murmur, rubs or gallops  Abdomen: soft, nondistended, nontender, without mass or organomegaly  Skin: no lesions  Extremities: no clubbing, cyanosis, or edema  Neurologic: alert, oriented, moves all extremities. left toe wound clean and dry, faint red right forefoot  sternal wound intact and clean, chest wound clean and healed    LAB RESULTS:                                   9.5    8.48  )-----------( 234      ( 18 Dec 2020 06:18 )             30.9   12-18    130<L>  |  91<L>  |  65<H>  ----------------------------<  115<H>  4.7   |  21<L>  |  13.75<H>    Ca    8.6      18 Dec 2020 06:17  Mg     2.4     12-17                MICROBIOLOGY REVIEWED:      Culture - Dialysis Fluid . (12.15.20 @ 04:26)   Specimen Source: .Peritoneal Dialysis Fluid Peritoneal Dialysis Fluid   Culture Results:   No growth to date. Culture - Blood (12.14.20 @ 16:33)   Specimen Source: .Blood Blood-Peripheral   Culture Results:   No growth to dateCulture - Blood (12.14.20 @ 16:33)   Specimen Source: .Blood Blood-Peripheral   Culture Results:   No growth to date  RADIOLOGY REVIEWED:

## 2020-12-18 NOTE — PROGRESS NOTE ADULT - ASSESSMENT
55 m with    Fever  - empiric antibiotics Cefepime 5/5  - ID follow  - CT surgery evaluation for fluid collection at sternal wound   - Peritoneal fluid culture and cell count    Foot wound  - local care  - Podiatry evaluation    Diabetes Mellitus / Hypoglycemia   - BS control  - ADA diet   - Endocrine evaluation      CAD  - continue Rx    Depression  - continue Rx    Mental status change  - Neuro evaluation noted  - probably related to hypoglycemia     ESRD  - PD  - Nephrology follow    DVT prophylaxis     PT    DCP home    Pipe Beck MD pager [phone]

## 2020-12-18 NOTE — PROGRESS NOTE ADULT - SUBJECTIVE AND OBJECTIVE BOX
Chief Complaint: T2DM     History: Endorses good appetite , PD restarted today q6h    MEDICATIONS  (STANDING):  aspirin enteric coated 81 milliGRAM(s) Oral daily  atorvastatin 80 milliGRAM(s) Oral at bedtime  dextrose 40% Gel 15 Gram(s) Oral once  dextrose 5%. 1000 milliLiter(s) (50 mL/Hr) IV Continuous <Continuous>  dextrose 5%. 1000 milliLiter(s) (100 mL/Hr) IV Continuous <Continuous>  dextrose 50% Injectable 25 Gram(s) IV Push once  dextrose 50% Injectable 12.5 Gram(s) IV Push once  dextrose 50% Injectable 25 Gram(s) IV Push once  epoetin martine-epbx (RETACRIT) Injectable 92746 Unit(s) SubCutaneous every 7 days  ferrous    sulfate 325 milliGRAM(s) Oral daily  folic acid 1 milliGRAM(s) Oral daily  gabapentin 100 milliGRAM(s) Oral daily  gentamicin 0.1% Cream 1 Application(s) Topical two times a day  glucagon  Injectable 1 milliGRAM(s) IntraMuscular once  heparin   Injectable 5000 Unit(s) SubCutaneous every 12 hours  insulin glargine Injectable (LANTUS) 6 Unit(s) SubCutaneous at bedtime  insulin lispro (ADMELOG) corrective regimen sliding scale   SubCutaneous three times a day before meals  insulin lispro (ADMELOG) corrective regimen sliding scale   SubCutaneous at bedtime  insulin lispro Injectable (ADMELOG) 4 Unit(s) SubCutaneous three times a day before meals  metoprolol tartrate 12.5 milliGRAM(s) Oral two times a day  midodrine. 5 milliGRAM(s) Oral three times a day  nortriptyline 25 milliGRAM(s) Oral at bedtime  pantoprazole    Tablet 40 milliGRAM(s) Oral before breakfast  senna 2 Tablet(s) Oral at bedtime  sevelamer carbonate 800 milliGRAM(s) Oral three times a day with meals  ticagrelor 60 milliGRAM(s) Oral every 12 hours    MEDICATIONS  (PRN):  acetaminophen   Tablet .. 650 milliGRAM(s) Oral every 6 hours PRN Temp greater or equal to 38.5C (101.3F), Mild Pain (1 - 3)  bisacodyl 5 milliGRAM(s) Oral every 12 hours PRN Constipation  oxyCODONE    IR 10 milliGRAM(s) Oral every 6 hours PRN Severe Pain (7 - 10)  polyethylene glycol 3350 17 Gram(s) Oral daily PRN Constipation      Allergies    doxazosin (Other)    Intolerances        ROS: All other systems reviewed and negative    PHYSICAL EXAM:  VITALS: T(C): 36.9 (12-18-20 @ 12:49)  T(F): 98.4 (12-18-20 @ 12:49), Max: 99.4 (12-18-20 @ 01:00)  HR: 93 (12-18-20 @ 12:49) (73 - 95)  BP: 130/81 (12-18-20 @ 12:49) (108/71 - 130/81)  RR:  (18 - 18)  SpO2:  (98% - 100%)  Wt(kg): --  GENERAL: NAD, well-groomed, well-developed  EYES: No proptosis,  anicteric  HEENT:  Atraumatic, Normocephalic, moist mucous membranes  RESPIRATORY: Clear to auscultation bilaterally; No rales, rhonchi, wheezing, or rubs  CARDIOVASCULAR: Regular rate and rhythm; No murmurs  NEURO: AOx3, moves all extremities spontaenuously   PSYCH:  reactive affect, euthymic mood      POCT Blood Glucose.: 149 mg/dL (12-18-20 @ 12:37)H4+0  POCT Blood Glucose.: 118 mg/dL (12-18-20 @ 10:21)H4+0  POCT Blood Glucose.: 132 mg/dL (12-17-20 @ 21:53)L6  POCT Blood Glucose.: 169 mg/dL (12-17-20 @ 18:54)H4+2  POCT Blood Glucose.: 103 mg/dL (12-17-20 @ 14:54)0  POCT Blood Glucose.: 216 mg/dL (12-17-20 @ 09:24)H6+4  POCT Blood Glucose.: 112 mg/dL (12-17-20 @ 01:54)  POCT Blood Glucose.: 109 mg/dL (12-16-20 @ 21:51)  POCT Blood Glucose.: 74 mg/dL (12-16-20 @ 21:50)  POCT Blood Glucose.: 74 mg/dL (12-16-20 @ 21:14)L5  POCT Blood Glucose.: 210 mg/dL (12-16-20 @ 16:50)H12+4  POCT Blood Glucose.: 280 mg/dL (12-16-20 @ 12:27)H+6  POCT Blood Glucose.: 267 mg/dL (12-16-20 @ 08:41)H0  POCT Blood Glucose.: 277 mg/dL (12-16-20 @ 01:20)  POCT Blood Glucose.: 101 mg/dL (12-15-20 @ 22:16)  POCT Blood Glucose.: 53 mg/dL (12-15-20 @ 21:57)  POCT Blood Glucose.: 57 mg/dL (12-15-20 @ 21:50)  POCT Blood Glucose.: 56 mg/dL (12-15-20 @ 21:49)  POCT Blood Glucose.: 73 mg/dL (12-15-20 @ 21:46)  POCT Blood Glucose.: 95 mg/dL (12-15-20 @ 21:42)  POCT Blood Glucose.: 181 mg/dL (12-15-20 @ 21:00)  POCT Blood Glucose.: 139 mg/dL (12-15-20 @ 17:34)H0  POCT Blood Glucose.: 112 mg/dL (12-15-20 @ 12:03)H18   POCT Blood Glucose.: 152 mg/dL (12-15-20 @ 09:23)H14  POCT Blood Glucose.: 105 mg/dL (12-15-20 @ 08:45)  POCT Blood Glucose.: 102 mg/dL (12-14-20 @ 23:18)N12    12-18    130<L>  |  91<L>  |  65<H>  ----------------------------<  115<H>  4.7   |  21<L>  |  13.75<H>    EGFR if : 4<L>  EGFR if non : 4<L>    Ca    8.6      12-18  Mg     2.4     12-17  Phos  6.6     12-16            Thyroid Function Tests:

## 2020-12-18 NOTE — CONSULT NOTE ADULT - ATTENDING COMMENTS
55M Hx ESRD on PD x 3 yr, T2DM on Insulin, Diabetic Neuropathy, PVD, CAD s/p CABG x 4, recent Post Cardiac Angioplasty & Stents @[facility] 11/20 complicated with Broken Balloon Cathter Tip s/p Emergent Sternotomy Retrival, Prolonged CTU Stay and DC home admitted yesterday for Febrile Sepsis and Hypotension.   - Altered Mental Status and Lethargy but A&O x 3 Non-Focal Finding pending CTH  - Sepsis Associated Hypotension responsive to 250cc IVF to continue IVF resuscitation   - Likely Hypovolemia and Sepsis rather than Cardiac/ ACS event   - Agreeable with [employer] ABx Coverage pending C&S   - PD Fluid Negative Study   - 5% Albumin 250-500cc IV Bolus and add Midodrine 10 mg TID
Agree with above NP note.  Patient is a 55 year old man with esrd on PD for 5 years, dm poorly controlled, CAD, CABG, recent pci to om complicated by retained stent balloon requiring CTS for removal now returns with dizziness, fever, and weakness. Cardiology c/s for new findings on echo    #Sepsis, fver  -iv abx per medicine  -surgery eval for chest collection    #CAD, s/p CABG, s/p PCI  -stable, no acs  -cont asa, brilinta    #Chronic systolic HF, ischemic cmp  -known severe LV dysfxn, similar during last admit with intraop eleonora  -cont bb as ordered  -low dose ace/arb if no renal CI  -re eval ef in 2 mos to help guide ICD therapy  -volume removal with PD
Pt is a 56 yo male with a PMHx of ESRD (on PD for 5 years), uncontrolled DM, CAD (s/p CABG), and s/p recent cardiac cath with angioplasty 2 weeks ago requiring emergency sternotomy for wire recovery. He returned to [facility] on 12/14 after discharge on 12/11 due to dizziness upon standing, nausea/vomiting, and fever and lethargy likely associated with hypotension. BP fluctuant with low <90/55. After 250ml bolus BP improved to 104/64 with improvement in lethargy by report.   On exam, patient is alert and oriented to self, location, month, year, president, and situation.   Exam is nonfocal.     Assessment:  Episode of lethargy likely multifactorial in this patient with hypotensive episode, ESRD with Cr 12.64, and likely sepsis of unclear source.     continue BP control, treatment of ESRD and infection as per primary team.  hold on further imaging or eeg at this time.
Agree with assessment and plan as above by Dr. Chong. Reviewed all pertinent labs, glucose values, and imaging studies. Modifications made as indicated above. Insulin doses and requirements fluctuating likely due to renal disease and intermittent PD with dextrose load. Decrease Admelog to 6 units given low glucose values and change from NPH to basal insulin as PD is throughout the day. Once we have a better assessment a]of PD timing can adjust regimen further.    Juliocesar Cohen D.O  [phone]

## 2020-12-18 NOTE — PROGRESS NOTE ADULT - SUBJECTIVE AND OBJECTIVE BOX
Podiatry pager #: [phone]/ [phone]    Patient is a 55y old  Male who presents with a chief complaint of fever (14 Dec 2020 15:41)      HPI:    54 y/o M w/ PMHX of CAD s/p CABG x 4 (LIMA to LAD, SVG to Diagonal, OM1, PDA on 8/8/19), HTN, HLD, ESRD on PD for 3 years, T2DM on insulin had abnormal stress test and subsequent cardiac catheterization revealed multivessel CAD with in-graft re-stenosis. Pt had LHC on 11/30 Stent was successfully deployed, but on removal of the balloon, the wire fractured and the balloon remained stuck in the L main. Pt was subsequently taken to the OR for removal of the angioplasty balloon and fractured wire via transverse aortotomy. On 11/30: s/p Right axillary and right femoral cannulation. Redo sternotomy. Recover of fractured left heart catheterization angioplasty balloon and wire via transverse aortotomy. Patient today presents with fever and dizziness this morning. States he felt cold and lightheaded, wife noted low BP. Denies any HA, cp, abd pain, n/v/d.     PAST MEDICAL & SURGICAL HISTORY:  HTN (hypertension)    ESRD (end stage renal disease) on dialysis    CAD, multiple vessel    Diabetes    S/P CABG (coronary artery bypass graft)        MEDICATIONS  (STANDING):  cefepime   IVPB 1000 milliGRAM(s) IV Intermittent every 24 hours  vancomycin  IVPB 1000 milliGRAM(s) IV Intermittent once    MEDICATIONS  (PRN):      Allergies    doxazosin (Other)    Intolerances        VITALS:    Vital Signs Last 24 Hrs  T(C): 36.8 (14 Dec 2020 16:41), Max: 38.2 (14 Dec 2020 10:30)  T(F): 98.2 (14 Dec 2020 16:41), Max: 100.8 (14 Dec 2020 10:30)  HR: 95 (14 Dec 2020 16:41) (93 - 98)  BP: 119/76 (14 Dec 2020 16:41) (102/71 - 125/78)  BP(mean): --  RR: 18 (14 Dec 2020 16:41) (16 - 18)  SpO2: 99% (14 Dec 2020 16:41) (97% - 100%)    LABS:                          10.2   12.11 )-----------( 216      ( 14 Dec 2020 10:54 )             33.9       12-14    137  |  93<L>  |  52<H>  ----------------------------<  254<H>  4.1   |  25  |  12.41<H>    Ca    8.8      14 Dec 2020 10:54    TPro  6.2  /  Alb  3.3  /  TBili  0.5  /  DBili  x   /  AST  26  /  ALT  28  /  AlkPhos  74  12-14      CAPILLARY BLOOD GLUCOSE          PT/INR - ( 14 Dec 2020 10:54 )   PT: 13.3 sec;   INR: 1.11 ratio         PTT - ( 14 Dec 2020 10:54 )  PTT:33.0 sec    LOWER EXTREMITY PHYSICAL EXAM:    Vasular: DP/PT 2/4, B/L, CFT <3 seconds B/L, Temperature gradient WNL B/L.   Neuro: Epicritic sensation diminished to the level of hallux  Musculoskeletal/Ortho: Left hallux DF/PF ROM intact  Skin:  Left dorsal hallux IPJ laceration wound to subQ not probing to bone stable without any drainage or purulence, no signs of acute infection.     RADIOLOGY & ADDITIONAL STUDIES:

## 2020-12-18 NOTE — PROGRESS NOTE ADULT - SUBJECTIVE AND OBJECTIVE BOX
Feels better, will resume PD       VITAL:  T(C): , Max: 37.4 (12-18-20 @ 01:00)  T(F): , Max: 99.4 (12-18-20 @ 01:00)  HR: 93 (12-18-20 @ 12:49)  BP: 130/81 (12-18-20 @ 12:49)  BP(mean): --  RR: 18 (12-18-20 @ 12:49)  SpO2: 100% (12-18-20 @ 12:49)  Wt(kg): --      PHYSICAL EXAM:  General: Alerted, oriented  HEENT: MMM  Lungs:CTA-b/l  Heart: RRR S1S2  Abdomen: Soft, NTND  Ext: no pedal edema b/l  : no keys      LABS:                        9.5    8.48  )-----------( 234      ( 18 Dec 2020 06:18 )             30.9     Na(130)/K(4.7)/Cl(91)/HCO3(21)/BUN(65)/Cr(13.75)Glu(115)/Ca(8.6)/Mg(--)/PO4(--)    12-18 @ 06:17  Na(134)/K(4.2)/Cl(94)/HCO3(22)/BUN(53)/Cr(12.61)Glu(134)/Ca(8.1)/Mg(2.4)/PO4(--)    12-17 @ 06:16  Na(130)/K(4.1)/Cl(89)/HCO3(23)/BUN(52)/Cr(11.85)Glu(268)/Ca(8.0)/Mg(2.3)/PO4(6.6)    12-16 @ 06:14  Na(133)/K(3.6)/Cl(92)/HCO3(23)/BUN(52)/Cr(12.19)Glu(38)/Ca(8.4)/Mg(--)/PO4(--)    12-15 @ 20:50      55y male with esrd on PD for 5 years, dm poorly controlled, cad prior cabg presents with dizziness and found to be hypotensive     ESRD on CCPD at home now on manual exchanges due to hypotension  Anemia of ESRD  Hypotension- unclear etiology   Mild leukocytosis  Lytes acceptable  Left foot wound   Blood and dialysate cultures negative so far   Could Mental status changes- lethargy be related to cefepime?? now discontinued    PLan   Resume PD -   1.5% Q6 H manual exchanges today   Please dont drain him if BP < 90 systolic   Midodrine 5 mg TID   Exit site care with mupirocin   Bowel regimen   Dose med for Cr Cl< 10 ml/min  Renvela 800 mg TID with meals  PLease document daily weights    Retacrit 10K units once weekly   cefepime discontinued   Cardiology input for hypotension       Samia Dangelo MD  [facility]  Office: [phone]

## 2020-12-18 NOTE — PROGRESS NOTE ADULT - ASSESSMENT
56 yo male patient with stable left hallux laceration wound to subQ  - Pt seen and evaluated   - Left dorsal hallux IPJ laceration wound to subQ not probing to bone stable without any drainage or purulence, no signs of acute infection.   - Tendon intact and neurovascular intact  - Applied mupirocin daily with bandaid  - Stable from podiatry standpoint    Follow up with Dr. Thor LEE within 1 week of discharge.  Call for appointment   [place] office: [phone]  [place] office: [phone]

## 2020-12-18 NOTE — PROGRESS NOTE ADULT - SUBJECTIVE AND OBJECTIVE BOX
Patient is a 55y old  Male who presents with a chief complaint of fever, weakness (18 Dec 2020 14:59)      SUBJECTIVE / OVERNIGHT EVENTS: No new complaints.   Review of Systems  chest pain no  palpitations no  sob no  nausea no  headache no    MEDICATIONS  (STANDING):  aspirin enteric coated 81 milliGRAM(s) Oral daily  atorvastatin 80 milliGRAM(s) Oral at bedtime  dextrose 40% Gel 15 Gram(s) Oral once  dextrose 5%. 1000 milliLiter(s) (50 mL/Hr) IV Continuous <Continuous>  dextrose 5%. 1000 milliLiter(s) (100 mL/Hr) IV Continuous <Continuous>  dextrose 50% Injectable 25 Gram(s) IV Push once  dextrose 50% Injectable 12.5 Gram(s) IV Push once  dextrose 50% Injectable 25 Gram(s) IV Push once  epoetin martine-epbx (RETACRIT) Injectable 42814 Unit(s) SubCutaneous every 7 days  ferrous    sulfate 325 milliGRAM(s) Oral daily  folic acid 1 milliGRAM(s) Oral daily  gabapentin 100 milliGRAM(s) Oral daily  gentamicin 0.1% Cream 1 Application(s) Topical two times a day  glucagon  Injectable 1 milliGRAM(s) IntraMuscular once  heparin   Injectable 5000 Unit(s) SubCutaneous every 12 hours  insulin glargine Injectable (LANTUS) 12 Unit(s) SubCutaneous at bedtime  insulin lispro (ADMELOG) corrective regimen sliding scale   SubCutaneous three times a day before meals  insulin lispro (ADMELOG) corrective regimen sliding scale   SubCutaneous at bedtime  insulin lispro Injectable (ADMELOG) 6 Unit(s) SubCutaneous three times a day before meals  metoprolol tartrate 12.5 milliGRAM(s) Oral two times a day  midodrine. 5 milliGRAM(s) Oral three times a day  nortriptyline 25 milliGRAM(s) Oral at bedtime  pantoprazole    Tablet 40 milliGRAM(s) Oral before breakfast  senna 2 Tablet(s) Oral at bedtime  sevelamer carbonate 800 milliGRAM(s) Oral three times a day with meals  ticagrelor 60 milliGRAM(s) Oral every 12 hours    MEDICATIONS  (PRN):  acetaminophen   Tablet .. 650 milliGRAM(s) Oral every 6 hours PRN Temp greater or equal to 38.5C (101.3F), Mild Pain (1 - 3)  bisacodyl 5 milliGRAM(s) Oral every 12 hours PRN Constipation  oxyCODONE    IR 10 milliGRAM(s) Oral every 6 hours PRN Severe Pain (7 - 10)  polyethylene glycol 3350 17 Gram(s) Oral daily PRN Constipation      Vital Signs Last 24 Hrs  T(C): 36.7 (18 Dec 2020 21:14), Max: 37.4 (18 Dec 2020 01:00)  T(F): 98 (18 Dec 2020 21:14), Max: 99.4 (18 Dec 2020 01:00)  HR: 88 (18 Dec 2020 21:14) (73 - 95)  BP: 135/78 (18 Dec 2020 21:14) (108/71 - 135/78)  BP(mean): --  RR: 18 (18 Dec 2020 21:14) (18 - 18)  SpO2: 94% (18 Dec 2020 21:14) (94% - 100%)    PHYSICAL EXAM:  GENERAL: NAD  HEAD:  Atraumatic, Normocephalic  EYES: EOMI, PERRLA, conjunctiva and sclera clear  NECK: Supple, No JVD  CHEST/LUNG: Clear to auscultation bilaterally; No wheeze Sternal wound clean healing.  HEART: Regular rate and rhythm; No murmurs, rubs, or gallops  ABDOMEN: Soft, Nontender, Nondistended; Bowel sounds present  EXTREMITIES:  2+ Peripheral Pulses, No clubbing, cyanosis, or edema  PSYCH: AAOx3  NEUROLOGY: non-focal  SKIN: No rashes or lesions    LABS:                        9.5    8.48  )-----------( 234      ( 18 Dec 2020 06:18 )             30.9     12-18    130<L>  |  91<L>  |  65<H>  ----------------------------<  115<H>  4.7   |  21<L>  |  13.75<H>    Ca    8.6      18 Dec 2020 06:17  Mg     2.4     12-17                  RADIOLOGY & ADDITIONAL TESTS:    Imaging Personally Reviewed:    Consultant(s) Notes Reviewed:      Care Discussed with Consultants/Other Providers:

## 2020-12-19 NOTE — PROGRESS NOTE ADULT - ASSESSMENT
55 m with    Fever  - s/p empiric antibiotics Cefepime 5/5  - ID follow  - CT surgery evaluation for fluid collection at sternal wound     Foot wound  - local care    Diabetes Mellitus / Hypoglycemia   - BS control  - ADA diet   - Endocrine follow    CAD  - continue Rx    Depression  - continue Rx    Mental status change  - Neuro evaluation noted  - probably related to hypoglycemia     ESRD  - PD  - Nephrology follow    DVT prophylaxis     PT    DCP home in progress    Pipe Beck MD pager [phone]

## 2020-12-19 NOTE — PROGRESS NOTE ADULT - ASSESSMENT
echo 12/17/20: EF 32%, mod MR, Severe global left ventricular systolic dysfunction, moderate pulmonary pressures    a/p  55y male with esrd on PD for 5 years, dm poorly controlled, CAD, s/p CABG, s/p PCI to OM c/b by failure to remove stent balloon requiring open heart surgery for removal, admitted with dizziness, fever, and weakness. Cardiology c/s for new findings on echo    1. Ischemic Cardiomyopathy. chronic systolic HF  -cv stable  -volume status stable   -Echo with severe global lv sys dysfx, ef 32%  -not new, intraop eleonora with similar LV dysfxn  -continue metoprolol as ordered  -contineu midodrine as needed for bp support  -eventual low dose ace/arb when ok with renal  -eps eval called for icd assessment      2. CAD, s/p CABG, s/p PCI, s/p redo open heart for stent balloon retrieval   -CV stable  -c/w asa, Brilinta, bb, Lipitor     3. Fever r/o sepsis  -iv abx per id  -CT chest noted  with fluid collection - cts eval   -f/u cultures    4. ESRD  -on PD  -renal f/u    dvt ppx

## 2020-12-19 NOTE — PROGRESS NOTE ADULT - SUBJECTIVE AND OBJECTIVE BOX
CC: f/u for  fever  Patient reports he wants to go home on monday    REVIEW OF SYSTEMS:  All other review of systems negative (Comprehensive ROS)    Antimicrobials Day #  :    Other Medications Reviewed    T(F): 98.5 (12-19-20 @ 21:05), Max: 98.6 (12-19-20 @ 14:42)  HR: 86 (12-19-20 @ 21:05)  BP: 106/68 (12-19-20 @ 21:05)  RR: 18 (12-19-20 @ 21:05)  SpO2: 100% (12-19-20 @ 21:05)  Wt(kg): --    PHYSICAL EXAM:  General: alert, no acute distress  Eyes:  anicteric, no conjunctival injection, no discharge  Oropharynx: no lesions or injection 	  Neck: supple, without adenopathy  Lungs: clear to auscultation  Heart: regular rate and rhythm; no murmur, rubs or gallops  Abdomen: soft, nondistended, nontender, without mass or organomegaly  Skin: no lesions  Extremities: no clubbing, cyanosis, or edema  Neurologic: alert, oriented, moves all extremities  sternotomy intact   LAB RESULTS:                        8.7    7.49  )-----------( 216      ( 19 Dec 2020 06:26 )             28.2     12-19    131<L>  |  89<L>  |  62<H>  ----------------------------<  123<H>  4.6   |  22  |  13.08<H>    Ca    8.5      19 Dec 2020 06:26          MICROBIOLOGY:  RECENT CULTURES:  12-15 @ 04:26 .Smear Other     No growth to date.    No polymorphonuclear leukocytes seen per low power field  No organisms seen per oil power field        RADIOLOGY REVIEWED:      < from: CT Chest No Cont (12.14.20 @ 18:21) >    1.  No pneumonia    2.  Posterior to the posterior table of the sternum, at the level of the third sternal wire is a 3.0 x 1.5 cm fluid collection (50 Hounsfield units) (3, 56) containing a small focus of air.            < end of copied text >          Assessment:  patient s/p recent cath and stent, wire broke, removed via sternotomy, went home , came back within a couple of days with fever. Is on PD, fluid not infected, blood cx neg, ct chest unrevealing except for small post op collection as expected. No infection  apparent, fever resolved.   Plan:  monitor off antibiotics  no ID objection to discharge home

## 2020-12-19 NOTE — PROGRESS NOTE ADULT - SUBJECTIVE AND OBJECTIVE BOX
Much more alert today, sitting on the chair  Manual PD 1.5% q6 H       VITAL:  T(C): , Max: 37 (12-19-20 @ 14:42)  T(F): , Max: 98.6 (12-19-20 @ 14:42)  HR: 86 (12-19-20 @ 17:00)  BP: 109/70 (12-19-20 @ 17:00)  BP(mean): --  RR: 18 (12-19-20 @ 17:00)  SpO2: 98% (12-19-20 @ 17:00)  Wt(kg): --      PHYSICAL EXAM:  General: Alerted, oriented  HEENT: MMM  Lungs:CTA-b/l  Heart: RRR S1S2  Abdomen: Soft, NTND, PD catheter in situ   Ext: no pedal edema b/l  : no keys      LABS:                        8.7    7.49  )-----------( 216      ( 19 Dec 2020 06:26 )             28.2     Na(131)/K(4.6)/Cl(89)/HCO3(22)/BUN(62)/Cr(13.08)Glu(123)/Ca(8.5)/Mg(--)/PO4(--)    12-19 @ 06:26  Na(130)/K(4.7)/Cl(91)/HCO3(21)/BUN(65)/Cr(13.75)Glu(115)/Ca(8.6)/Mg(--)/PO4(--)    12-18 @ 06:17  Na(134)/K(4.2)/Cl(94)/HCO3(22)/BUN(53)/Cr(12.61)Glu(134)/Ca(8.1)/Mg(2.4)/PO4(--)    12-17 @ 06:16        55y male with esrd on PD for 5 years, dm poorly controlled, cad prior cabg presents with dizziness and found to be hypotensive     ESRD on CCPD at home now on manual exchanges due to hypotension  Anemia of ESRD  Hypotension- unclear etiology   Mild leukocytosis  Lytes acceptable  Left foot wound   Blood and dialysate cultures negative so far   Could Mental status changes- lethargy be related to cefepime?? now discontinued  schemic Cardiomyopathy. chronic systolic HF  -CT chest noted  with fluid collection    PLan   Continue 1.5% Q6 H manual exchanges today   Please dont drain him if BP < 90 systolic   Midodrine 5 mg TID   Exit site care with mupirocin   Bowel regimen   Dose med for Cr Cl< 10 ml/min  Renvela 800 mg TID with meals  PLease document daily weights    Retacrit 10K units once weekly   cefepime discontinued       Samia Dangelo MD  [facility]  Office: [phone]

## 2020-12-19 NOTE — PROGRESS NOTE ADULT - SUBJECTIVE AND OBJECTIVE BOX
Patient is a 55y old  Male who presents with a chief complaint of fever, weakness (19 Dec 2020 11:16)      SUBJECTIVE / OVERNIGHT EVENTS: Comfortable without new complaints.   Review of Systems  chest pain no  palpitations no  sob no  nausea no  headache no    MEDICATIONS  (STANDING):  aspirin enteric coated 81 milliGRAM(s) Oral daily  atorvastatin 80 milliGRAM(s) Oral at bedtime  dextrose 40% Gel 15 Gram(s) Oral once  dextrose 5%. 1000 milliLiter(s) (50 mL/Hr) IV Continuous <Continuous>  dextrose 5%. 1000 milliLiter(s) (100 mL/Hr) IV Continuous <Continuous>  dextrose 50% Injectable 25 Gram(s) IV Push once  dextrose 50% Injectable 12.5 Gram(s) IV Push once  dextrose 50% Injectable 25 Gram(s) IV Push once  epoetin martine-epbx (RETACRIT) Injectable 70688 Unit(s) SubCutaneous every 7 days  ferrous    sulfate 325 milliGRAM(s) Oral daily  folic acid 1 milliGRAM(s) Oral daily  gabapentin 100 milliGRAM(s) Oral daily  gentamicin 0.1% Cream 1 Application(s) Topical two times a day  glucagon  Injectable 1 milliGRAM(s) IntraMuscular once  heparin   Injectable 5000 Unit(s) SubCutaneous every 12 hours  insulin glargine Injectable (LANTUS) 12 Unit(s) SubCutaneous at bedtime  insulin lispro (ADMELOG) corrective regimen sliding scale   SubCutaneous at bedtime  insulin lispro (ADMELOG) corrective regimen sliding scale   SubCutaneous three times a day before meals  insulin lispro Injectable (ADMELOG) 6 Unit(s) SubCutaneous three times a day before meals  metoprolol tartrate 12.5 milliGRAM(s) Oral two times a day  midodrine. 5 milliGRAM(s) Oral three times a day  nortriptyline 25 milliGRAM(s) Oral at bedtime  pantoprazole    Tablet 40 milliGRAM(s) Oral before breakfast  senna 2 Tablet(s) Oral at bedtime  sevelamer carbonate 800 milliGRAM(s) Oral three times a day with meals  ticagrelor 60 milliGRAM(s) Oral every 12 hours    MEDICATIONS  (PRN):  acetaminophen   Tablet .. 650 milliGRAM(s) Oral every 6 hours PRN Temp greater or equal to 38.5C (101.3F), Mild Pain (1 - 3)  bisacodyl 5 milliGRAM(s) Oral every 12 hours PRN Constipation  oxyCODONE    IR 10 milliGRAM(s) Oral every 6 hours PRN Severe Pain (7 - 10)  polyethylene glycol 3350 17 Gram(s) Oral daily PRN Constipation      Vital Signs Last 24 Hrs  T(C): 36.8 (19 Dec 2020 17:00), Max: 37 (19 Dec 2020 14:42)  T(F): 98.3 (19 Dec 2020 17:00), Max: 98.6 (19 Dec 2020 14:42)  HR: 86 (19 Dec 2020 17:00) (86 - 96)  BP: 109/70 (19 Dec 2020 17:00) (109/70 - 135/78)  BP(mean): --  RR: 18 (19 Dec 2020 17:00) (18 - 18)  SpO2: 98% (19 Dec 2020 17:00) (94% - 100%)    PHYSICAL EXAM:  GENERAL: NAD, well-developed  HEAD:  Atraumatic, Normocephalic  EYES: EOMI, PERRLA, conjunctiva and sclera clear  NECK: Supple, No JVD  CHEST/LUNG: Clear to auscultation bilaterally; No wheeze Sternal incision healing  HEART: Regular rate and rhythm; No murmurs, rubs, or gallops  ABDOMEN: Soft, Nontender, Nondistended; Bowel sounds present PD catheter.  EXTREMITIES:  2+ Peripheral Pulses, No clubbing, cyanosis, or edema  PSYCH: AAOx3  NEUROLOGY: non-focal  SKIN: No rashes or lesions    LABS:                        8.7    7.49  )-----------( 216      ( 19 Dec 2020 06:26 )             28.2     12-19    131<L>  |  89<L>  |  62<H>  ----------------------------<  123<H>  4.6   |  22  |  13.08<H>    Ca    8.5      19 Dec 2020 06:26                  RADIOLOGY & ADDITIONAL TESTS:    Imaging Personally Reviewed:    Consultant(s) Notes Reviewed:      Care Discussed with Consultants/Other Providers:

## 2020-12-20 NOTE — PROVIDER CONTACT NOTE (OTHER) - BACKGROUND
Pt in for hypotension and fevers, history of diabetes and esrd
Admitted for sepsis w/ hypotension, AMS, & hypoglycemia PMH: ESRD on Peritoneal Dialysis. last manual exchange done 12/16/20 5 PM.
Pt admitted with fevers due to unspecified condition.
pt p/w hypotension, fevers  PMHx DM, ESRD on PD, CAD
pt p/w hypotension, fevers  PMHx DM, ESRD on PD, CAD

## 2020-12-20 NOTE — PROGRESS NOTE ADULT - SUBJECTIVE AND OBJECTIVE BOX
CARDIOLOGY FOLLOW UP NOTE - DR. SMITH    Subjective:    denies chest pain, dyspnea, palpitations, dizziness  ROS: otherwise negative   overnight events:      PHYSICAL EXAM:  T(C): 36.8 (20 @ 12:45), Max: 37.3 (20 @ 09:00)  HR: 84 (20 @ 12:45) (84 - 100)  BP: 105/67 (20 @ 12:45) (90/55 - 116/73)  RR: 18 (20 @ 12:45) (18 - 18)  SpO2: 100% (20 @ 12:45) (97% - 100%)  Wt(kg): --  I&O's Summary    19 Dec 2020 07:  -  20 Dec 2020 07:00  --------------------------------------------------------  IN: 1200 mL / OUT: 0 mL / NET: 1200 mL    20 Dec 2020 07:01  -  20 Dec 2020 14:51  --------------------------------------------------------  IN: 2120 mL / OUT: 2400 mL / NET: -280 mL      Daily     Daily Weight in k (20 Dec 2020 12:45)    Appearance: Normal	  Cardiovascular: Normal S1 S2,RRR, No JVD, No murmurs  Respiratory: Lungs clear to auscultation	  Gastrointestinal:  Soft, Non-tender, + BS	  Extremities: Normal range of motion, No clubbing, cyanosis or edema      Home Medications:  aspirin 81 mg oral tablet: 1 tab(s) orally once a day    Note:Recent D/C 3 days ago med on discharge paper but unable to confirm with family. (14 Dec 2020 15:23)  ferrous sulfate 324 mg (65 mg elemental iron) oral tablet: 1 tab(s) orally 3 times a day    Note:Recent D/C 3 days ago med on discharge paper but unable to confirm with family. (14 Dec 2020 15:23)  folic acid 1 mg oral tablet: 1 tab(s) orally once a day    Note:Recent D/C 3 days ago med on discharge paper but unable to confirm with family. No record with pharmacy. (14 Dec 2020 15:24)  metoprolol succinate 50 mg oral tablet, extended release: 1 tab(s) orally once a day    Note:Recent D/C 3 days ago med on discharge paper but unable to confirm with family. (14 Dec 2020 15:24)  Nephplex Rx oral tablet: 1 tab(s) orally once a day    Note:Recent D/C 3 days ago med on discharge paper but unable to confirm with family. (14 Dec 2020 15:32)  nortriptyline 25 mg oral capsule: 1 cap(s) orally once a day (at bedtime)    Note:Recent D/C 3 days ago med on discharge paper but unable to confirm with family. (14 Dec 2020 15:27)  sevelamer carbonate 800 mg oral tablet: 1 tab(s) orally 3 times a day (with meals)    Note:Recent D/C 3 days ago med on discharge paper but unable to confirm with family. (14 Dec 2020 15:32)  Toujeo SoloStar 300 units/mL subcutaneous solution: 60 unit(s) subcutaneous once a day (at bedtime)    Note:Recent D/C 3 days ago med on discharge paper but unable to confirm with family. (14 Dec 2020 15:30)  Vitamin D3 50,000 intl units (1250 mcg) oral capsule: 1 tab(s) orally once a week    Note:no record with  Recent D/C 3 days ago med on discharge paper but unable to confirm with family. (14 Dec 2020 15:31)      MEDICATIONS  (STANDING):  aspirin enteric coated 81 milliGRAM(s) Oral daily  atorvastatin 80 milliGRAM(s) Oral at bedtime  dextrose 40% Gel 15 Gram(s) Oral once  dextrose 5%. 1000 milliLiter(s) (50 mL/Hr) IV Continuous <Continuous>  dextrose 5%. 1000 milliLiter(s) (100 mL/Hr) IV Continuous <Continuous>  dextrose 50% Injectable 25 Gram(s) IV Push once  dextrose 50% Injectable 12.5 Gram(s) IV Push once  dextrose 50% Injectable 25 Gram(s) IV Push once  epoetin martine-epbx (RETACRIT) Injectable 02407 Unit(s) SubCutaneous every 7 days  ferrous    sulfate 325 milliGRAM(s) Oral daily  folic acid 1 milliGRAM(s) Oral daily  gabapentin 100 milliGRAM(s) Oral daily  gentamicin 0.1% Cream 1 Application(s) Topical two times a day  glucagon  Injectable 1 milliGRAM(s) IntraMuscular once  heparin   Injectable 5000 Unit(s) SubCutaneous every 12 hours  insulin glargine Injectable (LANTUS) 12 Unit(s) SubCutaneous at bedtime  insulin lispro (ADMELOG) corrective regimen sliding scale   SubCutaneous three times a day before meals  insulin lispro (ADMELOG) corrective regimen sliding scale   SubCutaneous at bedtime  insulin lispro Injectable (ADMELOG) 6 Unit(s) SubCutaneous three times a day before meals  metoprolol tartrate 12.5 milliGRAM(s) Oral two times a day  midodrine. 5 milliGRAM(s) Oral three times a day  nortriptyline 25 milliGRAM(s) Oral at bedtime  pantoprazole    Tablet 40 milliGRAM(s) Oral before breakfast  senna 2 Tablet(s) Oral at bedtime  sevelamer carbonate 800 milliGRAM(s) Oral three times a day with meals  ticagrelor 60 milliGRAM(s) Oral every 12 hours      TELEMETRY: 	    ECG:  	  RADIOLOGY:   DIAGNOSTIC TESTING:  [ ] Echocardiogram:  [ ] Catheterization:  [ ] Stress Test:    OTHER: 	    LABS:	 	    CARDIAC MARKERS:                                8.7    7.49  )-----------( 216      ( 19 Dec 2020 06:26 )             28.2         131<L>  |  89<L>  |  62<H>  ----------------------------<  123<H>  4.6   |  22  |  13.08<H>    Ca    8.5      19 Dec 2020 06:26      proBNP:     Lipid Profile:   HgA1c:     Creatinine, Serum: 13.08 mg/dL (20 @ 06:26)  Creatinine, Serum: 13.75 mg/dL (20 @ 06:17)

## 2020-12-20 NOTE — PROGRESS NOTE ADULT - SUBJECTIVE AND OBJECTIVE BOX
Patient is a 55y old  Male who presents with a chief complaint of fever, weakness (20 Dec 2020 14:51)      SUBJECTIVE / OVERNIGHT EVENTS: Comfortable without new complaints.   Review of Systems  chest pain no  palpitations no  sob no  nausea no  headache no    MEDICATIONS  (STANDING):  aspirin enteric coated 81 milliGRAM(s) Oral daily  atorvastatin 80 milliGRAM(s) Oral at bedtime  dextrose 40% Gel 15 Gram(s) Oral once  dextrose 5%. 1000 milliLiter(s) (50 mL/Hr) IV Continuous <Continuous>  dextrose 5%. 1000 milliLiter(s) (100 mL/Hr) IV Continuous <Continuous>  dextrose 50% Injectable 25 Gram(s) IV Push once  dextrose 50% Injectable 12.5 Gram(s) IV Push once  dextrose 50% Injectable 25 Gram(s) IV Push once  epoetin martine-epbx (RETACRIT) Injectable 85023 Unit(s) SubCutaneous every 7 days  ferrous    sulfate 325 milliGRAM(s) Oral daily  folic acid 1 milliGRAM(s) Oral daily  gabapentin 100 milliGRAM(s) Oral daily  gentamicin 0.1% Cream 1 Application(s) Topical two times a day  glucagon  Injectable 1 milliGRAM(s) IntraMuscular once  heparin   Injectable 5000 Unit(s) SubCutaneous every 12 hours  insulin glargine Injectable (LANTUS) 12 Unit(s) SubCutaneous at bedtime  insulin lispro (ADMELOG) corrective regimen sliding scale   SubCutaneous three times a day before meals  insulin lispro (ADMELOG) corrective regimen sliding scale   SubCutaneous at bedtime  insulin lispro Injectable (ADMELOG) 6 Unit(s) SubCutaneous three times a day before meals  metoprolol tartrate 12.5 milliGRAM(s) Oral two times a day  midodrine. 5 milliGRAM(s) Oral three times a day  nortriptyline 25 milliGRAM(s) Oral at bedtime  pantoprazole    Tablet 40 milliGRAM(s) Oral before breakfast  senna 2 Tablet(s) Oral at bedtime  sevelamer carbonate 800 milliGRAM(s) Oral three times a day with meals  ticagrelor 60 milliGRAM(s) Oral every 12 hours    MEDICATIONS  (PRN):  acetaminophen   Tablet .. 650 milliGRAM(s) Oral every 6 hours PRN Temp greater or equal to 38.5C (101.3F), Mild Pain (1 - 3)  bisacodyl 5 milliGRAM(s) Oral every 12 hours PRN Constipation  oxyCODONE    IR 10 milliGRAM(s) Oral every 6 hours PRN Severe Pain (7 - 10)  polyethylene glycol 3350 17 Gram(s) Oral daily PRN Constipation      Vital Signs Last 24 Hrs  T(C): 36.6 (20 Dec 2020 17:14), Max: 37.3 (20 Dec 2020 09:00)  T(F): 97.9 (20 Dec 2020 17:14), Max: 99.2 (20 Dec 2020 09:00)  HR: 83 (20 Dec 2020 17:14) (83 - 100)  BP: 102/65 (20 Dec 2020 17:14) (90/55 - 116/73)  BP(mean): 80 (20 Dec 2020 12:45) (66 - 87)  RR: 18 (20 Dec 2020 17:14) (18 - 18)  SpO2: 99% (20 Dec 2020 17:14) (97% - 100%)    PHYSICAL EXAM:  GENERAL: NAD, well-developed  HEAD:  Atraumatic, Normocephalic  EYES: EOMI, PERRLA, conjunctiva and sclera clear  NECK: Supple, No JVD  CHEST/LUNG: Clear to auscultation bilaterally; No wheeze Wound healing.  HEART: Regular rate and rhythm; No murmurs, rubs, or gallops  ABDOMEN: Soft, Nontender, Nondistended; Bowel sounds present  EXTREMITIES:  2+ Peripheral Pulses, No clubbing, cyanosis, or edema  PSYCH: AAOx3  NEUROLOGY: non-focal  SKIN: No rashes or lesions    LABS:                        8.7    7.49  )-----------( 216      ( 19 Dec 2020 06:26 )             28.2     12-19    131<L>  |  89<L>  |  62<H>  ----------------------------<  123<H>  4.6   |  22  |  13.08<H>    Ca    8.5      19 Dec 2020 06:26                  RADIOLOGY & ADDITIONAL TESTS:    Imaging Personally Reviewed:    Consultant(s) Notes Reviewed:      Care Discussed with Consultants/Other Providers:

## 2020-12-20 NOTE — PROGRESS NOTE ADULT - ASSESSMENT
55 m with    Fever  - s/p empiric antibiotics Cefepime 5/5  - ID follow    Foot wound  - local care    Diabetes Mellitus / Hypoglycemia   - BS control  - ADA diet   - Endocrine follow    CAD  - continue Rx    Depression  - continue Rx    Mental status change  - Neuro evaluation noted  - probably related to hypoglycemia     ESRD  - PD  - Nephrology follow    DVT prophylaxis     PT    DCP home in progress    Pipe Beck MD pager [phone]

## 2020-12-20 NOTE — PROVIDER CONTACT NOTE (OTHER) - ACTION/TREATMENT ORDERED:
Continue same dialyses order from yesterday. MD to evaluate pt at later time and place dialyses order into system when by a computer.
Drain pt's PD fluid and leave empty. Hold PD for today, continue to monitor.
hold off on any pd exchanges tonight will re-eval tomorrow in the morning during rounds, will contiune to monitor
OK to drain patient now, put pt on cycler tonight
give 250cc bolus, PD per nephrology

## 2020-12-20 NOTE — PROGRESS NOTE ADULT - SUBJECTIVE AND OBJECTIVE BOX
Patient seen and examined    REVIEW OF SYSTEMS:  As per HPI, otherwise 8 full 10 ROS were unremarkable    MEDICATIONS  (STANDING):  aspirin enteric coated 81 milliGRAM(s) Oral daily  atorvastatin 80 milliGRAM(s) Oral at bedtime  dextrose 40% Gel 15 Gram(s) Oral once  dextrose 5%. 1000 milliLiter(s) (50 mL/Hr) IV Continuous <Continuous>  dextrose 5%. 1000 milliLiter(s) (100 mL/Hr) IV Continuous <Continuous>  dextrose 50% Injectable 25 Gram(s) IV Push once  dextrose 50% Injectable 12.5 Gram(s) IV Push once  dextrose 50% Injectable 25 Gram(s) IV Push once  epoetin martine-epbx (RETACRIT) Injectable 15436 Unit(s) SubCutaneous every 7 days  ferrous    sulfate 325 milliGRAM(s) Oral daily  folic acid 1 milliGRAM(s) Oral daily  gabapentin 100 milliGRAM(s) Oral daily  gentamicin 0.1% Cream 1 Application(s) Topical two times a day  glucagon  Injectable 1 milliGRAM(s) IntraMuscular once  heparin   Injectable 5000 Unit(s) SubCutaneous every 12 hours  insulin glargine Injectable (LANTUS) 12 Unit(s) SubCutaneous at bedtime  insulin lispro (ADMELOG) corrective regimen sliding scale   SubCutaneous three times a day before meals  insulin lispro (ADMELOG) corrective regimen sliding scale   SubCutaneous at bedtime  insulin lispro Injectable (ADMELOG) 6 Unit(s) SubCutaneous three times a day before meals  metoprolol tartrate 12.5 milliGRAM(s) Oral two times a day  midodrine. 5 milliGRAM(s) Oral three times a day  nortriptyline 25 milliGRAM(s) Oral at bedtime  pantoprazole    Tablet 40 milliGRAM(s) Oral before breakfast  senna 2 Tablet(s) Oral at bedtime  sevelamer carbonate 800 milliGRAM(s) Oral three times a day with meals  ticagrelor 60 milliGRAM(s) Oral every 12 hours      VITAL:  T(C): , Max: 37.3 (12-20-20 @ 09:00)  T(F): , Max: 99.2 (12-20-20 @ 09:00)  HR: 100 (12-20-20 @ 09:00)  BP: 116/73 (12-20-20 @ 09:00)  BP(mean): 87 (12-20-20 @ 09:00)  RR: 18 (12-20-20 @ 09:00)  SpO2: 99% (12-20-20 @ 09:00)  Wt(kg): --    I and O's:    12-19 @ 07:01  -  12-20 @ 07:00  --------------------------------------------------------  IN: 1200 mL / OUT: 0 mL / NET: 1200 mL          PHYSICAL EXAM:    Constitutional: NAD  HEENT: PERRLA, EOMI,  MMM  Neck: No LAD, No JVD  Respiratory: CTAB  Cardiovascular: S1 and S2  Gastrointestinal: BS+, soft, NT/ND  Extremities: No peripheral edema  Neurological: A/O x 3, no focal deficits  Psychiatric: Normal mood, normal affect  : No Johnson  Skin: No rashes  Access: Not applicable    LABS:                        8.7    7.49  )-----------( 216      ( 19 Dec 2020 06:26 )             28.2     12-19    131<L>  |  89<L>  |  62<H>  ----------------------------<  123<H>  4.6   |  22  |  13.08<H>    Ca    8.5      19 Dec 2020 06:26        Assessment:  55y male with esrd on PD for 5 years, dm poorly controlled, cad prior cabg presents with dizziness and found to be hypotensive     ESRD on CCPD at home now on manual exchanges due to hypotension  Anemia of ESRD; Hgb trending down though stable as of late  Hypotension- unclear etiology:  BPs acceptable   Lytes acceptable as of late; repeat pending    Could Mental status changes- lethargy be related to cefepime?? now discontinued  Ischemic Cardiomyopathy. chronic systolic HF      Plan   - Continue 1.5% Q6 H manual exchanges today              -Please dont drain him if BP < 90 systolic   - Midodrine 5 mg TID for hemodynamic support  Exit site care with mupirocin   Bowel regimen   Dose med for Cr Cl< 10 ml/min  Renvela 800 mg TID with meals  PLease document daily weights    Retacrit 10K units once weekly   cefepime discontinued      Patient seen and examined in bed, no new events.  Manual PD 1.5% q6 H     REVIEW OF SYSTEMS:  As per HPI, otherwise 8 full 10 ROS were unremarkable    MEDICATIONS  (STANDING):  aspirin enteric coated 81 milliGRAM(s) Oral daily  atorvastatin 80 milliGRAM(s) Oral at bedtime  dextrose 40% Gel 15 Gram(s) Oral once  dextrose 5%. 1000 milliLiter(s) (50 mL/Hr) IV Continuous <Continuous>  dextrose 5%. 1000 milliLiter(s) (100 mL/Hr) IV Continuous <Continuous>  dextrose 50% Injectable 25 Gram(s) IV Push once  dextrose 50% Injectable 12.5 Gram(s) IV Push once  dextrose 50% Injectable 25 Gram(s) IV Push once  epoetin martine-epbx (RETACRIT) Injectable 07803 Unit(s) SubCutaneous every 7 days  ferrous    sulfate 325 milliGRAM(s) Oral daily  folic acid 1 milliGRAM(s) Oral daily  gabapentin 100 milliGRAM(s) Oral daily  gentamicin 0.1% Cream 1 Application(s) Topical two times a day  glucagon  Injectable 1 milliGRAM(s) IntraMuscular once  heparin   Injectable 5000 Unit(s) SubCutaneous every 12 hours  insulin glargine Injectable (LANTUS) 12 Unit(s) SubCutaneous at bedtime  insulin lispro (ADMELOG) corrective regimen sliding scale   SubCutaneous three times a day before meals  insulin lispro (ADMELOG) corrective regimen sliding scale   SubCutaneous at bedtime  insulin lispro Injectable (ADMELOG) 6 Unit(s) SubCutaneous three times a day before meals  metoprolol tartrate 12.5 milliGRAM(s) Oral two times a day  midodrine. 5 milliGRAM(s) Oral three times a day  nortriptyline 25 milliGRAM(s) Oral at bedtime  pantoprazole    Tablet 40 milliGRAM(s) Oral before breakfast  senna 2 Tablet(s) Oral at bedtime  sevelamer carbonate 800 milliGRAM(s) Oral three times a day with meals  ticagrelor 60 milliGRAM(s) Oral every 12 hours      VITAL:  T(C): , Max: 37.3 (12-20-20 @ 09:00)  T(F): , Max: 99.2 (12-20-20 @ 09:00)  HR: 100 (12-20-20 @ 09:00)  BP: 116/73 (12-20-20 @ 09:00)  BP(mean): 87 (12-20-20 @ 09:00)  RR: 18 (12-20-20 @ 09:00)  SpO2: 99% (12-20-20 @ 09:00)  Wt(kg): --    I and O's:    12-19 @ 07:01  -  12-20 @ 07:00  --------------------------------------------------------  IN: 1200 mL / OUT: 0 mL / NET: 1200 mL          PHYSICAL EXAM:    Constitutional: NAD  HEENT: PERRLA, EOMI,  MMM  Neck: No LAD, No JVD  Respiratory: CTAB  Cardiovascular: S1 and S2  Gastrointestinal: BS+, soft, NT/ND  Extremities: No peripheral edema  Neurological: A/O x 3, no focal deficits  Psychiatric: Normal mood, normal affect  : No Johnson  Skin: No rashes  Access: PD catheter     LABS:                        8.7    7.49  )-----------( 216      ( 19 Dec 2020 06:26 )             28.2     12-19    131<L>  |  89<L>  |  62<H>  ----------------------------<  123<H>  4.6   |  22  |  13.08<H>    Ca    8.5      19 Dec 2020 06:26        Assessment:  55y male with esrd on PD for 5 years, dm poorly controlled, cad prior cabg presents with dizziness and found to be hypotensive     ESRD on CCPD at home now on manual exchanges due to hypotension  Anemia of ESRD; Hgb trending down though stable as of late  Hypotension- unclear etiology:  BPs acceptable   Lytes acceptable as of late; repeat pending  Ischemic Cardiomyopathy. chronic systolic HF; weight trending down     Plan   - Continue 1.5% Q6 H manual exchanges today              -Please dont drain him if BP < 90 systolic   - Midodrine 5 mg TID for hemodynamic support  - CT chest noted with fluid collection; would favor CTS eval (Discussed with Dr. Dangelo)  - Renvela 800 mg TID with meals  - Retacrit 10K units once weekly   - Exit site care with mupirocin   - Bowel regimen per primary team  - Daily weights   - Dose med for Cr Cl< 10 ml/min

## 2020-12-20 NOTE — PROVIDER CONTACT NOTE (OTHER) - RECOMMENDATIONS
Need PD order for today, will continue to monitor
Place new dialyses order with today's date into system.
drain patient now, put pt on cycler tonight at [phone]
give bolus
hold off on pd

## 2020-12-20 NOTE — PROVIDER CONTACT NOTE (OTHER) - SITUATION
PD order
pt filled w/ 2L 2.5% @ 1230 - to go on cycler tonight. should pt be drained now?
pt lethargic - able to awaken long enough to follow commands however falls back asleep right after. , /70 electronic, 92/60 manual - pt states they feel like they have low blood pressure
Pt new dialyses order needed for today's dialyses.
pt in for hypotension fevers, on manual pd for hypotension, was as low as 93/63 for exchange new order to re-eval at 9pm for pd, new BP was 99/62 after a 250cc bolus

## 2020-12-20 NOTE — PROVIDER CONTACT NOTE (OTHER) - ASSESSMENT
VS as charted.
Pt A&Ox4, VSS as charted.
pt A/Ox4, VS as charted.
pt alert and orientated BP 99/62
pt lethargic but arousable for short period, states they feel like they have low BP. VS as charted. pt due to be drained to be put on cycler tonight - nephrology called

## 2020-12-20 NOTE — PROVIDER CONTACT NOTE (OTHER) - REASON
pt filled w/ 2L 2.5% @ 1230 - to go on cycler tonight. should pt be drained now?
pt lethargic - able to awaken to follow commands however falls back asleep
Pt PD order for tonight
New Dialyses order needed for today 12/20/2020
PD order

## 2020-12-21 NOTE — PROGRESS NOTE ADULT - ASSESSMENT
54 yo M w/h/o uncontrolled T2DM on Toujeo 60 units at hs prior PD and Victoza daily PTA. Pt reports his BG overnight were in low 200s while on Toujeo 60 units. DM c/b CAD, s/p CABG x 4,  ESRD on PD, DM retinopathy, diabetic neuropathy with PVD. Also h/o HTN, HLD and recent admission with SOB , dizziness and mild chest pain with exertion> s/p HC with stent placement on OM with wire fractured when trying to remove balloon requiring OR for removal of the angioplasty balloon and fractured wire via transverse aortotomy. Pt was discharged home and now presents with  fever/dizziness. Getting PD exchanges q6h during this admission. Endocrine consult requested for management of DM2. Pt reports tolerating POs with BG levels variable due to variable PO intake as well as timing of PD exchanges. No hypoglycemia. BG 90s to 200s in last 24hours. Will increase basal insulin slowly since pt is not getting PD over night but q6h exchanges. BG goal 100s to 180s     Spent > 25 minutes assessing pt/labs/meds and discussing plan of care with primary team. Moderate complexity encounter on pt with uncontrolled T2DM and multiple DM complications and comorbidities. Adjusting insulin

## 2020-12-21 NOTE — CONSULT NOTE ADULT - ASSESSMENT
55M w/ prior CABG, recent LHC w/ PCI to OM1 requiring cardiac surgery due to balloon shaft fracture (11/30), ESRD on PD, DM. Patient admitted for dizziness, emesis and fever. EP called for question of ICD for primary prevention in setting of severely reduced EF and ischemic cardiomyopathy. Given that he is currently HDS, tele showing sinus w/ narrow QRS 55M w/ prior CABG, recent C w/ PCI to OM1 requiring cardiac surgery due to balloon shaft fracture (11/30), ESRD on PD, DM. Patient admitted for dizziness, emesis and fever. EP called for question of ICD for primary prevention in setting of severely reduced EF and ischemic cardiomyopathy. Given that he is currently HDS, tele showing sinus w/ narrow QRS, had revascularization less than 90 days ago, and does not have NYHA class II or III symptoms, he will likely first benefit from GDMT prior to ICD.  - continue heart failure management per primary cardiologist  - keep NPO tonight  - will discuss w/ EP attending    Channing Harris MD  Cardiology Fellow PGY-4  [facility]    Notes are not final until signed by attending  For all consults and questions:  [email or web address]   Login: [email or web address]   55M w/ prior CABG, recent LHC w/ PCI to OM1 requiring cardiac surgery due to balloon shaft fracture (11/30), ESRD on PD, DM. Patient admitted for dizziness, emesis and fever. EP called for question of ICD for primary prevention in setting of severely reduced EF and ischemic cardiomyopathy. Given that he is currently HDS, tele showing sinus w/ narrow QRS, had revascularization less than 90 days ago, and does not have NYHA class II or III symptoms, he will likely first benefit from GDMT prior to ICD.  - continue heart failure management per primary cardiologist  - no role for ICD at this time    Channing Harris MD  Cardiology Fellow PGY-4  [facility]    Notes are not final until signed by attending  For all consults and questions:  [email or web address]   Login: [email or web address]

## 2020-12-21 NOTE — DIETITIAN INITIAL EVALUATION ADULT. - REASON FOR ADMISSION
55y old Male with pmhx of PD x5 years, DM2, HTN, s/p CABG, who presents with a chief complaint of fever and weakness.

## 2020-12-21 NOTE — CONSULT NOTE ADULT - CONSULT REASON
ESRD on PD
Hypotension
Left hallux laceration wound
Lethargy
fever
ICD eval
ischemic cardiomyopathy
T2DM

## 2020-12-21 NOTE — CONSULT NOTE ADULT - CONSULT REQUESTED DATE/TIME
14-Dec-2020 15:42
14-Dec-2020 17:09
14-Dec-2020 18:20
15-Dec-2020 19:30
15-Dec-2020 20:40
18-Dec-2020 15:00
21-Dec-2020 18:15
16-Dec-2020 18:28

## 2020-12-21 NOTE — DIETITIAN INITIAL EVALUATION ADULT. - PHYSCIAL ASSESSMENT
overweight Nutrition focused physical exam not indicated.  Skin: free of pressure injuries, noted ulcers on right 5th finger, and left 2nd, 3rd, and 5th finger.

## 2020-12-21 NOTE — DIETITIAN INITIAL EVALUATION ADULT. - ADD RECOMMEND
1. Continue current diet order of Renal with no protein restriction, Consistent CHO with evening snack 2. Recommend changing diet consistency to soft as pt reported difficulty chewing hard/tough foods 3. Provided verbal education on Consistent carbohydrate diet, portion sizes, and increased protein/energy needs for PD, and heart healthy diet 4. Pt made aware that RD remains available for further questions.

## 2020-12-21 NOTE — CONSULT NOTE ADULT - REASON FOR ADMISSION
fever, weakness
fever
fever, weakness

## 2020-12-21 NOTE — PROGRESS NOTE ADULT - SUBJECTIVE AND OBJECTIVE BOX
CARDIOLOGY FOLLOW UP - Dr. Best    CC no cp or sob   + cough      PHYSICAL EXAM:  T(C): 36.8 (12-21-20 @ 12:00), Max: 36.9 (12-20-20 @ 18:30)  HR: 82 (12-21-20 @ 12:00) (77 - 89)  BP: 125/77 (12-21-20 @ 12:00) (102/65 - 125/77)  RR: 18 (12-21-20 @ 12:00) (18 - 18)  SpO2: 100% (12-21-20 @ 12:00) (96% - 100%)  Wt(kg): --  I&O's Summary    20 Dec 2020 07:01  -  21 Dec 2020 07:00  --------------------------------------------------------  IN: 4720 mL / OUT: 4700 mL / NET: 20 mL    21 Dec 2020 07:01  -  21 Dec 2020 12:27  --------------------------------------------------------  IN: 120 mL / OUT: 0 mL / NET: 120 mL        Appearance: Normal	  Cardiovascular: Normal S1 S2,RRR,  Respiratory:  crackles at base   Gastrointestinal:  Soft, Non-tender, + BS	  Extremities: Normal range of motion, No clubbing, cyanosis or edema      Home Medications:  aspirin 81 mg oral tablet: 1 tab(s) orally once a day    Note:Recent D/C 3 days ago med on discharge paper but unable to confirm with family. (14 Dec 2020 15:23)  ferrous sulfate 324 mg (65 mg elemental iron) oral tablet: 1 tab(s) orally 3 times a day    Note:Recent D/C 3 days ago med on discharge paper but unable to confirm with family. (14 Dec 2020 15:23)  folic acid 1 mg oral tablet: 1 tab(s) orally once a day    Note:Recent D/C 3 days ago med on discharge paper but unable to confirm with family. No record with pharmacy. (14 Dec 2020 15:24)  metoprolol succinate 50 mg oral tablet, extended release: 1 tab(s) orally once a day    Note:Recent D/C 3 days ago med on discharge paper but unable to confirm with family. (14 Dec 2020 15:24)  Nephplex Rx oral tablet: 1 tab(s) orally once a day    Note:Recent D/C 3 days ago med on discharge paper but unable to confirm with family. (14 Dec 2020 15:32)  nortriptyline 25 mg oral capsule: 1 cap(s) orally once a day (at bedtime)    Note:Recent D/C 3 days ago med on discharge paper but unable to confirm with family. (14 Dec 2020 15:27)  sevelamer carbonate 800 mg oral tablet: 1 tab(s) orally 3 times a day (with meals)    Note:Recent D/C 3 days ago med on discharge paper but unable to confirm with family. (14 Dec 2020 15:32)  Toujeo SoloStar 300 units/mL subcutaneous solution: 60 unit(s) subcutaneous once a day (at bedtime)    Note:Recent D/C 3 days ago med on discharge paper but unable to confirm with family. (14 Dec 2020 15:30)  Vitamin D3 50,000 intl units (1250 mcg) oral capsule: 1 tab(s) orally once a week    Note:no record with  Recent D/C 3 days ago med on discharge paper but unable to confirm with family. (14 Dec 2020 15:31)      MEDICATIONS  (STANDING):  aspirin enteric coated 81 milliGRAM(s) Oral daily  atorvastatin 80 milliGRAM(s) Oral at bedtime  dextrose 40% Gel 15 Gram(s) Oral once  dextrose 5%. 1000 milliLiter(s) (50 mL/Hr) IV Continuous <Continuous>  dextrose 5%. 1000 milliLiter(s) (100 mL/Hr) IV Continuous <Continuous>  dextrose 50% Injectable 25 Gram(s) IV Push once  dextrose 50% Injectable 12.5 Gram(s) IV Push once  dextrose 50% Injectable 25 Gram(s) IV Push once  epoetin martine-epbx (RETACRIT) Injectable 25525 Unit(s) SubCutaneous every 7 days  ferrous    sulfate 325 milliGRAM(s) Oral daily  folic acid 1 milliGRAM(s) Oral daily  gabapentin 100 milliGRAM(s) Oral daily  gentamicin 0.1% Cream 1 Application(s) Topical two times a day  glucagon  Injectable 1 milliGRAM(s) IntraMuscular once  heparin   Injectable 5000 Unit(s) SubCutaneous every 12 hours  insulin glargine Injectable (LANTUS) 12 Unit(s) SubCutaneous at bedtime  insulin lispro (ADMELOG) corrective regimen sliding scale   SubCutaneous three times a day before meals  insulin lispro (ADMELOG) corrective regimen sliding scale   SubCutaneous at bedtime  insulin lispro Injectable (ADMELOG) 6 Unit(s) SubCutaneous three times a day before meals  metoprolol tartrate 12.5 milliGRAM(s) Oral two times a day  midodrine. 5 milliGRAM(s) Oral three times a day  nortriptyline 25 milliGRAM(s) Oral at bedtime  pantoprazole    Tablet 40 milliGRAM(s) Oral before breakfast  senna 2 Tablet(s) Oral at bedtime  sevelamer carbonate 800 milliGRAM(s) Oral three times a day with meals  ticagrelor 60 milliGRAM(s) Oral every 12 hours      TELEMETRY: 	    ECG:  	  RADIOLOGY:   DIAGNOSTIC TESTING:  [ ] Echocardiogram:  [ ]  Catheterization:  [ ] Stress Test:    OTHER: 	    CT Chest No Cont (12.14.20 @ 18:21) >    IMPRESSION:    1.  No pneumonia    2.  Posterior to the posterior table of the sternum, at the level of the third sternal wire is a 3.0 x 1.5 cm fluid collection (50 Hounsfield units) (3, 56) containing a small focus of air.            LABS:

## 2020-12-21 NOTE — PROGRESS NOTE ADULT - SUBJECTIVE AND OBJECTIVE BOX
DIABETES FOLLOW UP NOTE: Saw pt earlier today  INTERVAL HX: 54 yo M w/h/o uncontrolled T2DM on Toujeo 60 units at hs prior PD and Victoza daily PTA. Pt reports his BG overnight were in low 200s while on Toujeo 60 units. DM c/b CAD, s/p CABG x 4,  ESRD on PD, DM retinopathy, diabetic neuropathy with PVD. Also h/o HTN, HLD and recent admission with SOB , dizziness and mild chest pain with exertion> s/p HC with stent placement on OM with wire fractured when trying to remove balloon requiring OR for removal of the angioplasty balloon and fractured wire via transverse aortotomy. Pt was discharged home and now presents with  fever/dizziness. Getting PD exchanges q6h during this admission. Endocrine consult requested for management of DM2. Pt reports tolerating POs with BG levels variable due to variable PO intake as well as timing of PD exchanges. No hypoglycemia. BG 90s to 2oos in last 24hours.        Review of Systems:  General: As above  Cardiovascular: No chest pain, palpitations  Respiratory: No SOB, no cough  GI: No nausea, vomiting, abdominal pain  Endocrine: no  S&Sx of hypoglycemia    Allergies    doxazosin (Other)    Intolerances      MEDICATIONS:  atorvastatin 80 milliGRAM(s) Oral at bedtime  insulin glargine Injectable (LANTUS) 12 Unit(s) SubCutaneous at bedtime  insulin lispro (ADMELOG) corrective regimen sliding scale   SubCutaneous three times a day before meals  insulin lispro (ADMELOG) corrective regimen sliding scale   SubCutaneous at bedtime  insulin lispro Injectable (ADMELOG) 6 Unit(s) SubCutaneous three times a day before meals    PHYSICAL EXAM:  VITALS: T(C): 36.8 (12-21-20 @ 12:00)  T(F): 98.2 (12-21-20 @ 12:00), Max: 98.4 (12-20-20 @ 18:30)  HR: 82 (12-21-20 @ 12:00) (77 - 89)  BP: 125/77 (12-21-20 @ 12:00) (102/65 - 125/77)  RR:  (18 - 18)  SpO2:  (96% - 100%)  Wt(kg): --  GENERAL: Male laying in bed in NAD  Chest: Sternal incision healing D&I. R upper chest incision with staples D&I  Abdomen: Soft, nontender, non distended, central adiposity. PD in place D&I  Extremities: Warm, NO edema in LEs, + chronic vascular changes in LEs noted darker discoloration and skin dryness.    NEURO: A&O X3    LABS:  POCT Blood Glucose.: 190 mg/dL (12-21-20 @ 10:04)  POCT Blood Glucose.: 165 mg/dL (12-20-20 @ 21:45)  POCT Blood Glucose.: 97 mg/dL (12-20-20 @ 17:31)  POCT Blood Glucose.: 153 mg/dL (12-20-20 @ 12:49)  POCT Blood Glucose.: 270 mg/dL (12-20-20 @ 09:13)  POCT Blood Glucose.: 117 mg/dL (12-19-20 @ 22:32)  POCT Blood Glucose.: 91 mg/dL (12-19-20 @ 21:18)  POCT Blood Glucose.: 96 mg/dL (12-19-20 @ 19:30)  POCT Blood Glucose.: 94 mg/dL (12-19-20 @ 18:21)  POCT Blood Glucose.: 182 mg/dL (12-19-20 @ 13:51)  POCT Blood Glucose.: 194 mg/dL (12-19-20 @ 09:47)  POCT Blood Glucose.: 179 mg/dL (12-18-20 @ 21:15)                            8.7    7.49  )-----------( 216      ( 19 Dec 2020 06:26 )             28.2       12-19    131<L>  |  89<L>  |  62<H>  ----------------------------<  123<H>  4.6   |  22  |  13.08<H>    EGFR if non : 4<L>    Ca    8.5      12-19    A1C with Estimated Average Glucose Result: 7.8 % (12-01-20 @ 03:13)      Estimated Average Glucose: 177 mg/dL (12-01-20 @ 03:13)

## 2020-12-21 NOTE — PROGRESS NOTE ADULT - ASSESSMENT
echo 12/17/20: EF 32%, mod MR, Severe global left ventricular systolic dysfunction, moderate pulmonary pressures  introp eleonora 11/30/20: mild to mod MR, < from: Intra-Operative Transesophageal Echo (11.30.20 @ 17:02) >  Severe global left ventricular systolic dysfunction. Inferior and inferolateral akinesis.  severe hypokinesis of other segments.  Severe global hypokinesia.  Normal left ventricular internal dimensions and wall thicknesses. Moderate diastolic dysfunction (Stage II).      a/p  55y male with esrd on PD for 5 years, dm poorly controlled, CAD, s/p CABG, s/p PCI to OM c/b by failure to remove stent balloon requiring open heart surgery for removal, admitted with dizziness, fever, and weakness. Cardiology c/s for new findings on echo    1. Ischemic Cardiomyopathy. chronic systolic HF  -cv stable  -Echo with severe global lv sys dysfx, ef 32%  -not new, intraop eleonora with similar LV dysfxn  -continue metoprolol as ordered  -continue midodrine as needed for bp support  -eventual low dose ace/arb when ok with renal  -eps eval for icd assessment      2. CAD, s/p CABG, s/p PCI, s/p redo open heart for stent balloon retrieval   -CV stable  -c/w asa, Brilinta, bb, Lipitor     3. Fever r/o sepsis  -s/p abx per id  -CT chest 12/14 noted  with fluid collection - cts eval ?, med f/u    4. ESRD  -on PD  -renal f/u    dvt ppx

## 2020-12-21 NOTE — DIETITIAN INITIAL EVALUATION ADULT. - PERTINENT MEDS FT
Heparin, Lantus, Insulin Lispro, Dulcolax, Miralax, Senna, Ferrous Sulfate, Folic Acid, Protonix, Renvela

## 2020-12-21 NOTE — DIETITIAN INITIAL EVALUATION ADULT. - OTHER INFO
Pt reports NKFA. Denies swallowing difficulties, however has trouble chewing hard/tough foods. Reports good appetite at home. States no "specific diet" restrictions PTA. Diet recall shows diet which includes a lot of CHO, fried foods, moderate amounts of salt, and moderate amounts of protein, and minimal amounts of vegetables. Pt denies any snacking and reports not eating any sweets. Fluid intake consists mainly of water and one black coffee in the AM. Pt checks BGL at home which he states is "usually high," but would not specify numbers. A1C of 7.8 per chart.     Pt denies any N/V/C/D. Last BM 12/17. Noted "not so good" appetite since admission, however observed empty breakfast tray during interview. Pt reported decreased appetite may be 2/2 not liking the food here.  Discussed importance of proper glycemic control, including portion size and consistent carbohydrate diet. Spoke with pt about the importance of protein and adequate calorie intake on PD. Also discussed importance of a heart healthy diet 2/2 s/p CABG. Pt stated he has no desire to change his eating habits at home. Offered to provide written material, pt rejected offer as he "has the papers at home."  Pt with no nutritional questions or concerns at this time. Pt made aware that RD remains available for further questions. Pt reports NKFA. Denies swallowing difficulties, however has trouble chewing hard/tough foods. Reports good appetite at home. States no "specific diet" restrictions PTA. Diet recall shows diet which includes a lot of CHO, fried foods, moderate amounts of salt, and moderate amounts of protein, and minimal amounts of vegetables. Pt denies any snacking and reports not eating any sweets. Fluid intake consists mainly of water and one black coffee in the AM. Pt checks BG levels at home which he states is "usually high," but would not specify numbers. A1C of 7.8 per chart.     Pt denied any recent weight change. Most recent weight as per chart on 12/20 is 167.9 pounds. Last admission (12/02) pt weighed 169.2 pounds. Pt standing weight during admission are relatively stable.      Pt denies any N/V/C/D. Last BM 12/17. Noted "not so good" appetite since admission, however observed empty breakfast tray during interview. Pt reported decreased appetite may be 2/2 not liking the food here.    Discussed importance of proper glycemic control, including portion size and consistent carbohydrate diet. Spoke with pt about the importance of protein and adequate calorie intake on PD. Also discussed importance of a heart healthy diet 2/2 s/p CABG. Pt stated he has no desire to change his eating habits at home. Offered to provide written material, pt rejected offer as he "has the papers at home."  Pt with no nutritional questions or concerns at this time. Pt made aware that RD remains available for further questions.

## 2020-12-21 NOTE — PROGRESS NOTE ADULT - SUBJECTIVE AND OBJECTIVE BOX
Patient is a 55y old  Male who presents with a chief complaint of fever, weakness (21 Dec 2020 12:27)      SUBJECTIVE / OVERNIGHT EVENTS: Comfortable without new complaints.   Review of Systems  chest pain no  palpitations no  sob no  nausea no  headache no    MEDICATIONS  (STANDING):  aspirin enteric coated 81 milliGRAM(s) Oral daily  atorvastatin 80 milliGRAM(s) Oral at bedtime  dextrose 40% Gel 15 Gram(s) Oral once  dextrose 5%. 1000 milliLiter(s) (50 mL/Hr) IV Continuous <Continuous>  dextrose 5%. 1000 milliLiter(s) (100 mL/Hr) IV Continuous <Continuous>  dextrose 50% Injectable 25 Gram(s) IV Push once  dextrose 50% Injectable 12.5 Gram(s) IV Push once  dextrose 50% Injectable 25 Gram(s) IV Push once  epoetin martine-epbx (RETACRIT) Injectable 05227 Unit(s) SubCutaneous every 7 days  ferrous    sulfate 325 milliGRAM(s) Oral daily  folic acid 1 milliGRAM(s) Oral daily  gabapentin 100 milliGRAM(s) Oral daily  gentamicin 0.1% Cream 1 Application(s) Topical two times a day  glucagon  Injectable 1 milliGRAM(s) IntraMuscular once  heparin   Injectable 5000 Unit(s) SubCutaneous every 12 hours  insulin glargine Injectable (LANTUS) 12 Unit(s) SubCutaneous at bedtime  insulin lispro (ADMELOG) corrective regimen sliding scale   SubCutaneous three times a day before meals  insulin lispro (ADMELOG) corrective regimen sliding scale   SubCutaneous at bedtime  insulin lispro Injectable (ADMELOG) 6 Unit(s) SubCutaneous three times a day before meals  metoprolol tartrate 12.5 milliGRAM(s) Oral two times a day  midodrine. 5 milliGRAM(s) Oral three times a day  nortriptyline 25 milliGRAM(s) Oral at bedtime  pantoprazole    Tablet 40 milliGRAM(s) Oral before breakfast  senna 2 Tablet(s) Oral at bedtime  sevelamer carbonate 800 milliGRAM(s) Oral three times a day with meals  ticagrelor 60 milliGRAM(s) Oral every 12 hours    MEDICATIONS  (PRN):  acetaminophen   Tablet .. 650 milliGRAM(s) Oral every 6 hours PRN Temp greater or equal to 38.5C (101.3F), Mild Pain (1 - 3)  bisacodyl 5 milliGRAM(s) Oral every 12 hours PRN Constipation  oxyCODONE    IR 10 milliGRAM(s) Oral every 6 hours PRN Severe Pain (7 - 10)  polyethylene glycol 3350 17 Gram(s) Oral daily PRN Constipation      Vital Signs Last 24 Hrs  T(C): 36.8 (21 Dec 2020 12:00), Max: 36.9 (20 Dec 2020 18:30)  T(F): 98.2 (21 Dec 2020 12:00), Max: 98.4 (20 Dec 2020 18:30)  HR: 82 (21 Dec 2020 12:00) (77 - 89)  BP: 125/77 (21 Dec 2020 12:00) (102/65 - 125/77)  BP(mean): 92 (20 Dec 2020 19:15) (88 - 92)  RR: 18 (21 Dec 2020 12:00) (18 - 18)  SpO2: 100% (21 Dec 2020 12:00) (96% - 100%)    PHYSICAL EXAM:  GENERAL: NAD, well-developed  HEAD:  Atraumatic, Normocephalic  EYES: EOMI, PERRLA, conjunctiva and sclera clear  NECK: Supple, No JVD  CHEST/LUNG: Clear to auscultation bilaterally; No wheeze Sternal wound healing.  HEART: Regular rate and rhythm; No murmurs, rubs, or gallops  ABDOMEN: Soft, Nontender, Nondistended; Bowel sounds present  EXTREMITIES:  2+ Peripheral Pulses, No clubbing, cyanosis, or edema  PSYCH: AAOx3  NEUROLOGY: non-focal  SKIN: No rashes or lesions    LABS:                      RADIOLOGY & ADDITIONAL TESTS:    Imaging Personally Reviewed:    Consultant(s) Notes Reviewed:      Care Discussed with Consultants/Other Providers:

## 2020-12-21 NOTE — PROGRESS NOTE ADULT - ASSESSMENT
55y male with esrd on PD for 5 years, dm poorly controlled, cad prior cabg presents with dizziness     ESRD on CCPD at home now on manual exchanges due to hypotension  Anemia of ESRD; Hgb trending down though stable as of late  Hypotension- unclear etiology:  BPs acceptable   Lytes acceptable as of late; repeat pending  Ischemic Cardiomyopathy. chronic systolic HF; weight trending down     Plan   - Continue 1.5% Q6 H manual exchanges today   - Midodrine 5 mg TID for hemodynamic support  - Renvela 800 mg TID with meals  - Retacrit 10K units once weekly   - Exit site care with mupirocin   - Bowel regimen per primary team  - Daily weights   - Dose med for Cr Cl< 10 ml/min  -CTS eval noted.      [place] planning      [employer]   [phone]

## 2020-12-21 NOTE — DIETITIAN INITIAL EVALUATION ADULT. - DIET TYPE
soft/consistent carbohydrate (evening snack)/renal replacement pts:no protein restr,no conc K & phos, low sodium

## 2020-12-21 NOTE — CHART NOTE - NSCHARTNOTEFT_GEN_A_CORE
55y male with esrd on PD for 5 years, dm poorly controlled,CAD s/p CABG x 4 8/8/19  He underwent cardiac cath and angioplasty about 2 weeks ago due to cp and abnormal ETT with cad. Stent was successfully deployed, but on removal of the balloon, the wire fractured and the balloon remained stuck in the L main. Pt was subsequently taken to the OR for removal of the angioplasty balloon and fractured wire via transverse aortotomy. 11/30: s/p Right axillary and right femoral cannulation. Redo sternotomy. Recover of fractured left heart catheterization angioplasty balloon and wire via transverse aortotomy. . He was sent home on 12/11 feeling fine. Readmitted 12/14  for dizziness upon standing, vomited once and fever. CTS called to evaluate CT findings of suspected collection parasternum  Dr Solorzano reviewed images, no surgical indications, exam unremarkable. Discussed with Dr Kuhn Cleared for discharge from CTS perspective

## 2020-12-21 NOTE — CONSULT NOTE ADULT - SUBJECTIVE AND OBJECTIVE BOX
Patient seen and evaluated at bedside    Reason for consult: ICD eval    HPI:  55M w/ prior CABG, recent LHC w/ PCI to OM1 requiring cardiac surgery due to balloon shaft fracture (11/30), ESRD on PD, DM. He was sent home on 12/11, but returned on returned for dizziness, emesis and fever. He has been managed for sepsis here. EP was called for question of ICD eval for primary prevention in setting of worsening LV function (EF 32% here, known to be reduced last month during cath/cardiac surgery admission; worsened compared to 2019). He does not have is    PMHx:   HTN (hypertension)    ESRD (end stage renal disease) on dialysis    CAD, multiple vessel    Diabetes        PSHx:   S/P CABG (coronary artery bypass graft)    No significant past surgical history        Allergies:  doxazosin (Other)      Home Meds:    Current Medications:   acetaminophen   Tablet .. 650 milliGRAM(s) Oral every 6 hours PRN  aspirin enteric coated 81 milliGRAM(s) Oral daily  atorvastatin 80 milliGRAM(s) Oral at bedtime  bisacodyl 5 milliGRAM(s) Oral every 12 hours PRN  dextrose 40% Gel 15 Gram(s) Oral once  dextrose 5%. 1000 milliLiter(s) IV Continuous <Continuous>  dextrose 5%. 1000 milliLiter(s) IV Continuous <Continuous>  dextrose 50% Injectable 25 Gram(s) IV Push once  dextrose 50% Injectable 12.5 Gram(s) IV Push once  dextrose 50% Injectable 25 Gram(s) IV Push once  epoetin martine-epbx (RETACRIT) Injectable 26227 Unit(s) SubCutaneous every 7 days  ferrous    sulfate 325 milliGRAM(s) Oral daily  folic acid 1 milliGRAM(s) Oral daily  gabapentin 100 milliGRAM(s) Oral daily  gentamicin 0.1% Cream 1 Application(s) Topical two times a day  glucagon  Injectable 1 milliGRAM(s) IntraMuscular once  heparin   Injectable 5000 Unit(s) SubCutaneous every 12 hours  insulin glargine Injectable (LANTUS) 12 Unit(s) SubCutaneous at bedtime  insulin lispro (ADMELOG) corrective regimen sliding scale   SubCutaneous three times a day before meals  insulin lispro (ADMELOG) corrective regimen sliding scale   SubCutaneous at bedtime  insulin lispro Injectable (ADMELOG) 6 Unit(s) SubCutaneous three times a day before meals  metoprolol tartrate 12.5 milliGRAM(s) Oral two times a day  midodrine. 5 milliGRAM(s) Oral three times a day  nortriptyline 25 milliGRAM(s) Oral at bedtime  oxyCODONE    IR 10 milliGRAM(s) Oral every 6 hours PRN  pantoprazole    Tablet 40 milliGRAM(s) Oral before breakfast  polyethylene glycol 3350 17 Gram(s) Oral daily PRN  senna 2 Tablet(s) Oral at bedtime  sevelamer carbonate 800 milliGRAM(s) Oral three times a day with meals  ticagrelor 60 milliGRAM(s) Oral every 12 hours      FAMILY HISTORY:  FH: diabetes mellitus (Sibling)  father    FHx: lung cancer (Sibling)  bother        Social History:  Smoking History:  Alcohol Use:  Drug Use:    Review of Systems:  REVIEW OF SYSTEMS:  CONSTITUTIONAL: No weakness, fevers or chills  EYES/ENT: No visual changes;  No dysphagia  NECK: No pain or stiffness  RESPIRATORY: No cough, wheezing, hemoptysis; No shortness of breath  CARDIOVASCULAR: No chest pain or palpitations; No lower extremity edema  GASTROINTESTINAL: No abdominal or epigastric pain. No nausea, vomiting, or hematemesis; No diarrhea or constipation. No melena or hematochezia.  BACK: No back pain  GENITOURINARY: No dysuria, frequency or hematuria  NEUROLOGICAL: No numbness or weakness  SKIN: No itching, burning, rashes, or lesions   All other review of systems is negative unless indicated above.    [ ] All other systems negative  [ ] Unable to assess ROS due to    Physical Exam:  T(F): 98.1 (12-21), Max: 98.4 (12-20)  HR: 85 (12-21) (77 - 89)  BP: 105/64 (12-21) (105/64 - 125/77)  RR: 18 (12-21)  SpO2: 98% (12-21)  GENERAL: No acute distress, well-developed  HEAD:  Atraumatic, Normocephalic  ENT: EOMI, PERRLA, conjunctiva and sclera clear, Neck supple, No JVD, moist mucosa  CHEST/LUNG: Clear to auscultation bilaterally; No wheeze, equal breath sounds bilaterally   BACK: No spinal tenderness  HEART: Regular rate and rhythm; No murmurs, rubs, or gallops  ABDOMEN: Soft, Nontender, Nondistended; Bowel sounds present  EXTREMITIES:  No clubbing, cyanosis, or edema  PSYCH: Nl behavior, nl affect  NEUROLOGY: AAOx3, non-focal, cranial nerves intact  SKIN: Normal color, No rashes or lesions  LINES:    Cardiovascular Diagnostic Testing:    ECG: Personally reviewed:    Echo: Personally reviewed:    Stress Testing:    Cath:    Imaging:    CXR: Personally reviewed    Labs: Personally reviewed                   Patient seen and evaluated at bedside    Reason for consult: ICD eval    HPI:  55M w/ prior CABG, recent LHC w/ PCI to OM1 requiring cardiac surgery due to balloon shaft fracture (), ESRD on PD, DM. He was sent home on , but returned on returned for dizziness, emesis and fever. He has been managed for sepsis here. EP was called for question of ICD eval for primary prevention in setting of worsening LV function (EF 32% here, known to be reduced last month during cath/cardiac surgery admission; worsened compared to 2019). He does not report issues with ADL's at home prior to being admitted here at this time or prior to his last admission. He denied cp, sob, lightheadedness, palpitations, PND or syncope. However, he currently has diffuse weakness and needs assistance with ADLs.    PMHx:   HTN (hypertension)    ESRD (end stage renal disease) on dialysis    CAD, multiple vessel    Diabetes        PSHx:   S/P CABG (coronary artery bypass graft)    Open heart surgery for balloon retrieval       Allergies:  doxazosin (Other)      Home Meds:    Current Medications:   acetaminophen   Tablet .. 650 milliGRAM(s) Oral every 6 hours PRN  aspirin enteric coated 81 milliGRAM(s) Oral daily  atorvastatin 80 milliGRAM(s) Oral at bedtime  bisacodyl 5 milliGRAM(s) Oral every 12 hours PRN  dextrose 40% Gel 15 Gram(s) Oral once  dextrose 5%. 1000 milliLiter(s) IV Continuous <Continuous>  dextrose 5%. 1000 milliLiter(s) IV Continuous <Continuous>  dextrose 50% Injectable 25 Gram(s) IV Push once  dextrose 50% Injectable 12.5 Gram(s) IV Push once  dextrose 50% Injectable 25 Gram(s) IV Push once  epoetin martine-epbx (RETACRIT) Injectable 68624 Unit(s) SubCutaneous every 7 days  ferrous    sulfate 325 milliGRAM(s) Oral daily  folic acid 1 milliGRAM(s) Oral daily  gabapentin 100 milliGRAM(s) Oral daily  gentamicin 0.1% Cream 1 Application(s) Topical two times a day  glucagon  Injectable 1 milliGRAM(s) IntraMuscular once  heparin   Injectable 5000 Unit(s) SubCutaneous every 12 hours  insulin glargine Injectable (LANTUS) 12 Unit(s) SubCutaneous at bedtime  insulin lispro (ADMELOG) corrective regimen sliding scale   SubCutaneous three times a day before meals  insulin lispro (ADMELOG) corrective regimen sliding scale   SubCutaneous at bedtime  insulin lispro Injectable (ADMELOG) 6 Unit(s) SubCutaneous three times a day before meals  metoprolol tartrate 12.5 milliGRAM(s) Oral two times a day  midodrine. 5 milliGRAM(s) Oral three times a day  nortriptyline 25 milliGRAM(s) Oral at bedtime  oxyCODONE    IR 10 milliGRAM(s) Oral every 6 hours PRN  pantoprazole    Tablet 40 milliGRAM(s) Oral before breakfast  polyethylene glycol 3350 17 Gram(s) Oral daily PRN  senna 2 Tablet(s) Oral at bedtime  sevelamer carbonate 800 milliGRAM(s) Oral three times a day with meals  ticagrelor 60 milliGRAM(s) Oral every 12 hours      FAMILY HISTORY:  FH: diabetes mellitus (Sibling)  father    FHx: lung cancer (Sibling)  bother        Social History:  Smoking History: NA  Alcohol Use:  Drug Use:    Review of Systems:  REVIEW OF SYSTEMS:  CONSTITUTIONAL: No weakness, fevers or chills  EYES/ENT: No visual changes;  No dysphagia  NECK: No pain or stiffness  RESPIRATORY: No cough, wheezing, hemoptysis; No shortness of breath  CARDIOVASCULAR: No chest pain or palpitations; No lower extremity edema  GASTROINTESTINAL: No abdominal or epigastric pain. No nausea, vomiting, or hematemesis; No diarrhea or constipation. No melena or hematochezia.  BACK: No back pain  GENITOURINARY: No dysuria, frequency or hematuria  NEUROLOGICAL: No numbness or weakness  SKIN: No itching, burning, rashes, or lesions   All other review of systems is negative unless indicated above.    [ x] All other systems negative  [ ] Unable to assess ROS due to    Physical Exam:  T(F): 98.1 (12-21), Max: 98.4 (12-20)  HR: 85 (12-21) (77 - 89)  BP: 105/64 (12-21) (105/64 - 125/77)  RR: 18 (12-21)  SpO2: 98% (12-21)  GENERAL: No acute distress, well-developed  HEAD:  Atraumatic, Normocephalic  ENT: EOMI, PERRLA, conjunctiva and sclera clear, Neck supple, No JVD, moist mucosa  CHEST/LUNG: Clear to auscultation bilaterally; No wheeze, equal breath sounds bilaterally; sternotomy wound c/d/i  BACK: No spinal tenderness  HEART: Regular rate and rhythm; No murmurs, rubs, or gallops  ABDOMEN: Soft, Nontender, Nondistended; Bowel sounds present; PD site covered in dressing, but area around c/d/i  EXTREMITIES:  No clubbing, cyanosis, or edema  PSYCH: Nl behavior, nl affect  NEUROLOGY: AAOx3, non-focal, cranial nerves intact  SKIN: Normal color, No rashes or lesions  LINES:    Cardiovascular Diagnostic Testing:    ECG: Personally reviewed:  2020: sinus w/ lateral ST depressions  2020: junctional  tele: sinus    < from: Transthoracic Echocardiogram (20 @ 12:31) >  Conclusions:  1. Moderate mitral regurgitation.  2. Severely dilated left atrium.  LA volume index = 54  cc/m2.  3. Mild left ventricular enlargement.  4. Severe global left ventricular systolic dysfunction.  Endocardial visualization enhanced with intravenous  injection of Ultrasonic Enhancing Agent (Definity). No left  ventricular thrombus.  5. Estimated pulmonary artery systolic pressure equals 53  mm Hg, assuming right atrial pressure equals 12 mm Hg,  consistent with moderate pulmonary pressures.  *** No previous Echo exam.  ------------------------------------------------------------------------  Confirmed on  2020 - 15:36:17 by JANN Boyce    < end of copied text >  < from: Cardiac Cath Lab - Adult (20 @ 09:43) >  PROCEDURE:  --  Left coronary angiography.  --  Right coronary angiography.  --  Bypass graft angiography.  --  Sonosite - Diagnostic.  --  Sheath Exchange for Intervention.  --  Intervention on OM1: drug-eluting stent.  TECHNIQUE: Oxygen 2 L/min. The risks and alternatives of the procedures and  conscious sedation were explained to the patient and informed consent was  obtained. Cardiac catheterization performed electively. Coronary  intervention performed electively.  Local anesthetic given. Right femoral artery access. Left coronary artery  angiography. The vessel was injected utilizing a catheter. Right coronary  artery angiography. The vessel was injected utilizing a catheter. Graft  angiography. The vessel was injected utilizing a catheter. Sonosite -  Diagnostic. RADIATION EXPOSURE: 7.4 min. A successful drug-eluting stent  was performed on the 90 % lesion in the 1st obtuse marginal. Following  intervention there was a 20 % residual stenosis. According to the ACC/AHA  classification system, this lesion was a type C lesion. There was SHAMIKA 3  flow before the procedure and SHAMIKA 3 flow after the procedure. There was  no dissection. Vessel setup was performed. A 6FR EBU 3.5 LAUNCHER guiding  catheter was used to intubate the vessel. Vessel setup was performed. A  WHISPER 190CM WIRE wire was used to cross the lesion. Vessel setup was  performed. A WIGGLE 300CM WIRE wire was used to cross the lesion. Balloon  angioplasty was performed, using a 1.5 X 10 SPRINTER OTW balloon, with 6  inflations and a maximum inflation pressure of 20 keren. Balloon angioplasty  was performed, using a 2.0 X 20 EUPHORA balloon, with 3 inflations and a  maximum inflation pressure of 20 keren. Balloon angioplasty was performed,  using a 2.5 X 30 EUPHORA balloon, with 2 inflations and a maximum  inflation pressure of 20 keren. A 2.75 X 38 MICHAEL drug-eluting stent was  placed across the lesion and deployed at a maximum inflation pressure of  20 keren. Balloon angioplasty was performed, using a 1.5 X 20 EUPHORA  balloon, with 1 inflations and a maximum inflation pressure of 22 keren.  Sheath Exchange for Intervention.  CONTRAST GIVEN: Omnipaque 120 ml. Omnipaque 150 ml.  MEDICATIONS GIVEN: Fentanyl, 25 mcg, IV. Midazolam, 1 mg, IV. Midazolam, 1  mg, IV. Fentanyl, 25 mcg, IV. Heparin, 7000 units, IV. Heparin, 2000  units, IV. Heparin, 2000 units, IV. Heparin, 2000 units, IV.  CORONARY VESSELS: The coronary circulation is right dominant.  LM:   --  Distal left main: There was a 70 % stenosis.  LAD:   --  Mid LAD: There was a 100 % stenosis.  CX:   --  OM1: There was a 90 % stenosis.  RCA:   --  Ostial RCA: There was a 100 % stenosis.  GRAFTS:   --  Graft to the mid LAD: The graft was a LIMA. Graft angiography  showed minor luminal irregularities.  COMPLICATIONS: The stent was deployed without difficulty. On removal of the  balloon, the shaft broke and the tip of the balloon remained in the  vessel. Several attempts were made to snare the balloon unsuccessfully.  Dr. Verdugo was notifed who will take the patient to the operating room.  DIAGNOSTIC RECOMMENDATIONS: The patient should continue with the present  medications.  The patients vessel was stented without difficulty On removal of the  balloon, the shaft broke with the baloon stuck in the left main. All  attempts to snare the balloon out failed and the patient was taken to the  OR by Dr. Arango to remove the balloon  Prepared and signed by  George Edward M.D.  Signed 2020 10:57:46  HEMODYNAMIC TABLES  Pressures:  Baseline  Pressures:  - HR: 102  Pressures:  - Rhythm:  Pressures:  -- Aortic Pressure (S/D/M): 122/78/97  Pressures:  Intervention  Pressures:  -HR: 102  Pressures:  - Rhythm:  Pressures:  -- Aortic Pressure (S/D/M): 131/75/98  Outputs:  Baseline  Outputs:  -- CALCULATIONS[age]  Outputs:  -- CALCULATIONS: Body Surface Area: 1.89  Outputs:  -- CALCULATIONS: Height in cm: 170.00  Outputs:  -- CALCULATIONS: Sex: Male  Outputs:  -- CALCULATIONS: Weight in k.10    < end of copied text >    Labs: Personally reviewed                   Patient seen and evaluated at bedside    Reason for consult: ICD eval    HPI:  55M w/ prior CABG, recent LHC w/ PCI to OM1 requiring cardiac surgery due to balloon shaft fracture (), ESRD on PD, DM. He was sent home on , but returned on returned for dizziness, emesis and fever. He has been managed for sepsis here. EP was called for question of ICD eval for primary prevention in setting of worsening LV function (EF 32% here, known to be reduced last month during cath/cardiac surgery admission; worsened compared to 2019). He does not report issues with ADL's at home prior to being admitted here at this time or prior to his last admission. He denied cp, sob, lightheadedness, palpitations, PND or syncope. However, he currently has diffuse weakness and needs assistance with ADLs.    PMHx:   HTN (hypertension)    ESRD (end stage renal disease) on dialysis    CAD, multiple vessel    Diabetes        PSHx:   S/P CABG (coronary artery bypass graft)    Open heart surgery for balloon retrieval       Allergies:  doxazosin (Other)      Home Meds:    Current Medications:   acetaminophen   Tablet .. 650 milliGRAM(s) Oral every 6 hours PRN  aspirin enteric coated 81 milliGRAM(s) Oral daily  atorvastatin 80 milliGRAM(s) Oral at bedtime  bisacodyl 5 milliGRAM(s) Oral every 12 hours PRN  dextrose 40% Gel 15 Gram(s) Oral once  dextrose 5%. 1000 milliLiter(s) IV Continuous <Continuous>  dextrose 5%. 1000 milliLiter(s) IV Continuous <Continuous>  dextrose 50% Injectable 25 Gram(s) IV Push once  dextrose 50% Injectable 12.5 Gram(s) IV Push once  dextrose 50% Injectable 25 Gram(s) IV Push once  epoetin martine-epbx (RETACRIT) Injectable 57106 Unit(s) SubCutaneous every 7 days  ferrous    sulfate 325 milliGRAM(s) Oral daily  folic acid 1 milliGRAM(s) Oral daily  gabapentin 100 milliGRAM(s) Oral daily  gentamicin 0.1% Cream 1 Application(s) Topical two times a day  glucagon  Injectable 1 milliGRAM(s) IntraMuscular once  heparin   Injectable 5000 Unit(s) SubCutaneous every 12 hours  insulin glargine Injectable (LANTUS) 12 Unit(s) SubCutaneous at bedtime  insulin lispro (ADMELOG) corrective regimen sliding scale   SubCutaneous three times a day before meals  insulin lispro (ADMELOG) corrective regimen sliding scale   SubCutaneous at bedtime  insulin lispro Injectable (ADMELOG) 6 Unit(s) SubCutaneous three times a day before meals  metoprolol tartrate 12.5 milliGRAM(s) Oral two times a day  midodrine. 5 milliGRAM(s) Oral three times a day  nortriptyline 25 milliGRAM(s) Oral at bedtime  oxyCODONE    IR 10 milliGRAM(s) Oral every 6 hours PRN  pantoprazole    Tablet 40 milliGRAM(s) Oral before breakfast  polyethylene glycol 3350 17 Gram(s) Oral daily PRN  senna 2 Tablet(s) Oral at bedtime  sevelamer carbonate 800 milliGRAM(s) Oral three times a day with meals  ticagrelor 60 milliGRAM(s) Oral every 12 hours      FAMILY HISTORY:  FH: diabetes mellitus (Sibling)  father    FHx: lung cancer (Sibling)  bother        Social History:  Smoking History: NA  Alcohol Use:  Drug Use:    Review of Systems:  REVIEW OF SYSTEMS:  CONSTITUTIONAL: No weakness, fevers or chills  EYES/ENT: No visual changes;  No dysphagia  NECK: No pain or stiffness  RESPIRATORY: No cough, wheezing, hemoptysis; No shortness of breath  CARDIOVASCULAR: No chest pain or palpitations; No lower extremity edema  GASTROINTESTINAL: No abdominal or epigastric pain. No nausea, vomiting, or hematemesis; No diarrhea or constipation. No melena or hematochezia.  BACK: No back pain  GENITOURINARY: No dysuria, frequency or hematuria  NEUROLOGICAL: No numbness or weakness  SKIN: No itching, burning, rashes, or lesions   All other review of systems is negative unless indicated above.    [ x] All other systems negative  [ ] Unable to assess ROS due to    Physical Exam:  T(F): 98.1 (12-21), Max: 98.4 (12-20)  HR: 85 (12-21) (77 - 89)  BP: 105/64 (12-21) (105/64 - 125/77)  RR: 18 (12-21)  SpO2: 98% (12-21)  GENERAL: No acute distress, well-developed  HEAD:  Atraumatic, Normocephalic  ENT: EOMI, PERRLA, conjunctiva and sclera clear, Neck supple, No JVD, moist mucosa  CHEST/LUNG: Clear to auscultation bilaterally; No wheeze, equal breath sounds bilaterally; sternotomy wound c/d/i  BACK: No spinal tenderness  HEART: Regular rate and rhythm; No murmurs, rubs, or gallops  ABDOMEN: Soft, Nontender, Nondistended; Bowel sounds present; PD site covered in dressing, but area around c/d/i  EXTREMITIES:  No clubbing, cyanosis, or edema  PSYCH: Nl behavior, nl affect  NEUROLOGY: AAOx3, non-focal, cranial nerves intact  SKIN: Normal color, No rashes or lesions  LINES:    Cardiovascular Diagnostic Testing:    ECG: Personally reviewed:  2020: sinus w/ lateral ST depressions  2020: junctional;   tele: sinus    < from: Transthoracic Echocardiogram (20 @ 12:31) >  Conclusions:  1. Moderate mitral regurgitation.  2. Severely dilated left atrium.  LA volume index = 54  cc/m2.  3. Mild left ventricular enlargement.  4. Severe global left ventricular systolic dysfunction.  Endocardial visualization enhanced with intravenous  injection of Ultrasonic Enhancing Agent (Definity). No left  ventricular thrombus.  5. Estimated pulmonary artery systolic pressure equals 53  mm Hg, assuming right atrial pressure equals 12 mm Hg,  consistent with moderate pulmonary pressures.  *** No previous Echo exam.  ------------------------------------------------------------------------  Confirmed on  2020 - 15:36:17 by JANN Boyce    < end of copied text >  < from: Cardiac Cath Lab - Adult (20 @ 09:43) >  PROCEDURE:  --  Left coronary angiography.  --  Right coronary angiography.  --  Bypass graft angiography.  --  Sonosite - Diagnostic.  --  Sheath Exchange for Intervention.  --  Intervention on OM1: drug-eluting stent.  TECHNIQUE: Oxygen 2 L/min. The risks and alternatives of the procedures and  conscious sedation were explained to the patient and informed consent was  obtained. Cardiac catheterization performed electively. Coronary  intervention performed electively.  Local anesthetic given. Right femoral artery access. Left coronary artery  angiography. The vessel was injected utilizing a catheter. Right coronary  artery angiography. The vessel was injected utilizing a catheter. Graft  angiography. The vessel was injected utilizing a catheter. Sonosite -  Diagnostic. RADIATION EXPOSURE: 7.4 min. A successful drug-eluting stent  was performed on the 90 % lesion in the 1st obtuse marginal. Following  intervention there was a 20 % residual stenosis. According to the ACC/AHA  classification system, this lesion was a type C lesion. There was SHAMIKA 3  flow before the procedure and SHAMIKA 3 flow after the procedure. There was  no dissection. Vessel setup was performed. A 6FR EBU 3.5 LAUNCHER guiding  catheter was used to intubate the vessel. Vessel setup was performed. A  WHISPER 190CM WIRE wire was used to cross the lesion. Vessel setup was  performed. A WIGGLE 300CM WIRE wire was used to cross the lesion. Balloon  angioplasty was performed, using a 1.5 X 10 SPRINTER OTW balloon, with 6  inflations and a maximum inflation pressure of 20 keren. Balloon angioplasty  was performed, using a 2.0 X 20 EUPHORA balloon, with 3 inflations and a  maximum inflation pressure of 20 keren. Balloon angioplasty was performed,  using a 2.5 X 30 EUPHORA balloon, with 2 inflations and a maximum  inflation pressure of 20 keren. A 2.75 X 38 MICHAEL drug-eluting stent was  placed across the lesion and deployed at a maximum inflation pressure of  20 keren. Balloon angioplasty was performed, using a 1.5 X 20 EUPHORA  balloon, with 1 inflations and a maximum inflation pressure of 22 keren.  Sheath Exchange for Intervention.  CONTRAST GIVEN: Omnipaque 120 ml. Omnipaque 150 ml.  MEDICATIONS GIVEN: Fentanyl, 25 mcg, IV. Midazolam, 1 mg, IV. Midazolam, 1  mg, IV. Fentanyl, 25 mcg, IV. Heparin, 7000 units, IV. Heparin, 2000  units, IV. Heparin, 2000 units, IV. Heparin, 2000 units, IV.  CORONARY VESSELS: The coronary circulation is right dominant.  LM:   --  Distal left main: There was a 70 % stenosis.  LAD:   --  Mid LAD: There was a 100 % stenosis.  CX:   --  OM1: There was a 90 % stenosis.  RCA:   --  Ostial RCA: There was a 100 % stenosis.  GRAFTS:   --  Graft to the mid LAD: The graft was a LIMA. Graft angiography  showed minor luminal irregularities.  COMPLICATIONS: The stent was deployed without difficulty. On removal of the  balloon, the shaft broke and the tip of the balloon remained in the  vessel. Several attempts were made to snare the balloon unsuccessfully.  Dr. Verdugo was notifed who will take the patient to the operating room.  DIAGNOSTIC RECOMMENDATIONS: The patient should continue with the present  medications.  The patients vessel was stented without difficulty On removal of the  balloon, the shaft broke with the baloon stuck in the left main. All  attempts to snare the balloon out failed and the patient was taken to the  OR by Dr. Arango to remove the balloon  Prepared and signed by  George Edward M.D.  Signed 2020 10:57:46  HEMODYNAMIC TABLES  Pressures:  Baseline  Pressures:  - HR: 102  Pressures:  - Rhythm:  Pressures:  -- Aortic Pressure (S/D/M): 122/78/97  Pressures:  Intervention  Pressures:  -HR: 102  Pressures:  - Rhythm:  Pressures:  -- Aortic Pressure (S/D/M): 131/75/98  Outputs:  Baseline  Outputs:  -- CALCULATIONS[age]  Outputs:  -- CALCULATIONS: Body Surface Area: 1.89  Outputs:  -- CALCULATIONS: Height in cm: 170.00  Outputs:  -- CALCULATIONS: Sex: Male  Outputs:  -- CALCULATIONS: Weight in k.10    < end of copied text >    Labs: Personally reviewed                   Patient seen and evaluated at bedside    Reason for consult: ICD eval    HPI:  55M w/ prior CABG, recent LHC w/ PCI to OM1 requiring cardiac surgery due to balloon shaft fracture (), ESRD on PD, DM. He was sent home on , but returned on returned for dizziness, emesis and fever. He has been managed for sepsis here. EP was called for question of ICD eval for primary prevention in setting of worsening LV function (EF 32% here, known to be reduced last month during cath/cardiac surgery admission; worsened compared to 2019). He does not report issues with ADL's at home prior to being admitted here at this time or prior to his last admission. He denied cp, sob, lightheadedness, palpitations, PND or syncope. However, he currently has diffuse weakness and needs assistance with ADLs.    PMHx:   HTN (hypertension)    ESRD (end stage renal disease) on dialysis    CAD, multiple vessel    Diabetes        PSHx:   S/P CABG (coronary artery bypass graft)    Open heart surgery for balloon retrieval       Allergies:  doxazosin (Other)      Home Meds:    Current Medications:   acetaminophen   Tablet .. 650 milliGRAM(s) Oral every 6 hours PRN  aspirin enteric coated 81 milliGRAM(s) Oral daily  atorvastatin 80 milliGRAM(s) Oral at bedtime  bisacodyl 5 milliGRAM(s) Oral every 12 hours PRN  dextrose 40% Gel 15 Gram(s) Oral once  dextrose 5%. 1000 milliLiter(s) IV Continuous <Continuous>  dextrose 5%. 1000 milliLiter(s) IV Continuous <Continuous>  dextrose 50% Injectable 25 Gram(s) IV Push once  dextrose 50% Injectable 12.5 Gram(s) IV Push once  dextrose 50% Injectable 25 Gram(s) IV Push once  epoetin martine-epbx (RETACRIT) Injectable 50605 Unit(s) SubCutaneous every 7 days  ferrous    sulfate 325 milliGRAM(s) Oral daily  folic acid 1 milliGRAM(s) Oral daily  gabapentin 100 milliGRAM(s) Oral daily  gentamicin 0.1% Cream 1 Application(s) Topical two times a day  glucagon  Injectable 1 milliGRAM(s) IntraMuscular once  heparin   Injectable 5000 Unit(s) SubCutaneous every 12 hours  insulin glargine Injectable (LANTUS) 12 Unit(s) SubCutaneous at bedtime  insulin lispro (ADMELOG) corrective regimen sliding scale   SubCutaneous three times a day before meals  insulin lispro (ADMELOG) corrective regimen sliding scale   SubCutaneous at bedtime  insulin lispro Injectable (ADMELOG) 6 Unit(s) SubCutaneous three times a day before meals  metoprolol tartrate 12.5 milliGRAM(s) Oral two times a day  midodrine. 5 milliGRAM(s) Oral three times a day  nortriptyline 25 milliGRAM(s) Oral at bedtime  oxyCODONE    IR 10 milliGRAM(s) Oral every 6 hours PRN  pantoprazole    Tablet 40 milliGRAM(s) Oral before breakfast  polyethylene glycol 3350 17 Gram(s) Oral daily PRN  senna 2 Tablet(s) Oral at bedtime  sevelamer carbonate 800 milliGRAM(s) Oral three times a day with meals  ticagrelor 60 milliGRAM(s) Oral every 12 hours      FAMILY HISTORY:  FH: diabetes mellitus (Sibling)  father    FHx: lung cancer (Sibling)  bother        Social History:  Smoking History: NA  Alcohol Use:  Drug Use:    Review of Systems:  REVIEW OF SYSTEMS:  CONSTITUTIONAL: No weakness, fevers or chills  EYES/ENT: No visual changes;  No dysphagia  NECK: No pain or stiffness  RESPIRATORY: No cough, wheezing, hemoptysis; No shortness of breath  CARDIOVASCULAR: No chest pain or palpitations; No lower extremity edema  GASTROINTESTINAL: No abdominal or epigastric pain. No nausea, vomiting, or hematemesis; No diarrhea or constipation. No melena or hematochezia.  BACK: No back pain  GENITOURINARY: No dysuria, frequency or hematuria  NEUROLOGICAL: No numbness or weakness  SKIN: No itching, burning, rashes, or lesions   All other review of systems is negative unless indicated above.    [ x] All other systems negative  [ ] Unable to assess ROS due to    Physical Exam:  T(F): 98.1 (12-21), Max: 98.4 (12-20)  HR: 85 (12-21) (77 - 89)  BP: 105/64 (12-21) (105/64 - 125/77)  RR: 18 (12-21)  SpO2: 98% (12-21)  GENERAL: No acute distress, well-developed  HEAD:  Atraumatic, Normocephalic  ENT: EOMI, PERRLA, conjunctiva and sclera clear, Neck supple, No JVD, moist mucosa  CHEST/LUNG: Clear to auscultation bilaterally; No wheeze, equal breath sounds bilaterally; sternotomy wound c/d/i  BACK: No spinal tenderness  HEART: Regular rate and rhythm; No murmurs, rubs, or gallops  ABDOMEN: Soft, Nontender, Nondistended; Bowel sounds present; PD site covered in dressing, but area around c/d/i  EXTREMITIES:  No clubbing, cyanosis, or edema  PSYCH: Nl behavior, nl affect  NEUROLOGY: AAOx3, non-focal, cranial nerves intact  SKIN: Normal color, No rashes or lesions  LINES:    Cardiovascular Diagnostic Testing:    ECG: Personally reviewed:  2020: sinus w/ lateral ST depressions (V4-V6)  2020: junctional;   tele: sinus    < from: Transthoracic Echocardiogram (20 @ 12:31) >  Conclusions:  1. Moderate mitral regurgitation.  2. Severely dilated left atrium.  LA volume index = 54  cc/m2.  3. Mild left ventricular enlargement.  4. Severe global left ventricular systolic dysfunction.  Endocardial visualization enhanced with intravenous  injection of Ultrasonic Enhancing Agent (Definity). No left  ventricular thrombus.  5. Estimated pulmonary artery systolic pressure equals 53  mm Hg, assuming right atrial pressure equals 12 mm Hg,  consistent with moderate pulmonary pressures.  *** No previous Echo exam.  ------------------------------------------------------------------------  Confirmed on  2020 - 15:36:17 by JANN Boyce    < end of copied text >  < from: Cardiac Cath Lab - Adult (20 @ 09:43) >  PROCEDURE:  --  Left coronary angiography.  --  Right coronary angiography.  --  Bypass graft angiography.  --  Sonosite - Diagnostic.  --  Sheath Exchange for Intervention.  --  Intervention on OM1: drug-eluting stent.  TECHNIQUE: Oxygen 2 L/min. The risks and alternatives of the procedures and  conscious sedation were explained to the patient and informed consent was  obtained. Cardiac catheterization performed electively. Coronary  intervention performed electively.  Local anesthetic given. Right femoral artery access. Left coronary artery  angiography. The vessel was injected utilizing a catheter. Right coronary  artery angiography. The vessel was injected utilizing a catheter. Graft  angiography. The vessel was injected utilizing a catheter. Sonosite -  Diagnostic. RADIATION EXPOSURE: 7.4 min. A successful drug-eluting stent  was performed on the 90 % lesion in the 1st obtuse marginal. Following  intervention there was a 20 % residual stenosis. According to the ACC/AHA  classification system, this lesion was a type C lesion. There was SHAMIKA 3  flow before the procedure and SHAMIKA 3 flow after the procedure. There was  no dissection. Vessel setup was performed. A 6FR EBU 3.5 LAUNCHER guiding  catheter was used to intubate the vessel. Vessel setup was performed. A  WHISPER 190CM WIRE wire was used to cross the lesion. Vessel setup was  performed. A WIGGLE 300CM WIRE wire was used to cross the lesion. Balloon  angioplasty was performed, using a 1.5 X 10 SPRINTER OTW balloon, with 6  inflations and a maximum inflation pressure of 20 keren. Balloon angioplasty  was performed, using a 2.0 X 20 EUPHORA balloon, with 3 inflations and a  maximum inflation pressure of 20 keren. Balloon angioplasty was performed,  using a 2.5 X 30 EUPHORA balloon, with 2 inflations and a maximum  inflation pressure of 20 keren. A 2.75 X 38 MICHAEL drug-eluting stent was  placed across the lesion and deployed at a maximum inflation pressure of  20 keren. Balloon angioplasty was performed, using a 1.5 X 20 EUPHORA  balloon, with 1 inflations and a maximum inflation pressure of 22 keren.  Sheath Exchange for Intervention.  CONTRAST GIVEN: Omnipaque 120 ml. Omnipaque 150 ml.  MEDICATIONS GIVEN: Fentanyl, 25 mcg, IV. Midazolam, 1 mg, IV. Midazolam, 1  mg, IV. Fentanyl, 25 mcg, IV. Heparin, 7000 units, IV. Heparin, 2000  units, IV. Heparin, 2000 units, IV. Heparin, 2000 units, IV.  CORONARY VESSELS: The coronary circulation is right dominant.  LM:   --  Distal left main: There was a 70 % stenosis.  LAD:   --  Mid LAD: There was a 100 % stenosis.  CX:   --  OM1: There was a 90 % stenosis.  RCA:   --  Ostial RCA: There was a 100 % stenosis.  GRAFTS:   --  Graft to the mid LAD: The graft was a LIMA. Graft angiography  showed minor luminal irregularities.  COMPLICATIONS: The stent was deployed without difficulty. On removal of the  balloon, the shaft broke and the tip of the balloon remained in the  vessel. Several attempts were made to snare the balloon unsuccessfully.  Dr. Verdugo was notifed who will take the patient to the operating room.  DIAGNOSTIC RECOMMENDATIONS: The patient should continue with the present  medications.  The patients vessel was stented without difficulty On removal of the  balloon, the shaft broke with the baloon stuck in the left main. All  attempts to snare the balloon out failed and the patient was taken to the  OR by Dr. Arango to remove the balloon  Prepared and signed by  George Edward M.D.  Signed 2020 10:57:46  HEMODYNAMIC TABLES  Pressures:  Baseline  Pressures:  - HR: 102  Pressures:  - Rhythm:  Pressures:  -- Aortic Pressure (S/D/M): 122/78/97  Pressures:  Intervention  Pressures:  -HR: 102  Pressures:  - Rhythm:  Pressures:  -- Aortic Pressure (S/D/M): 131/75/98  Outputs:  Baseline  Outputs:  -- CALCULATIONS[age]  Outputs:  -- CALCULATIONS: Body Surface Area: 1.89  Outputs:  -- CALCULATIONS: Height in cm: 170.00  Outputs:  -- CALCULATIONS: Sex: Male  Outputs:  -- CALCULATIONS: Weight in k.10    < end of copied text >    Labs: Personally reviewed

## 2020-12-21 NOTE — PROGRESS NOTE ADULT - SUBJECTIVE AND OBJECTIVE BOX
[facility] [phone]        Patient seen and examined in bed.  He was in good spirits         MEDICATIONS  (STANDING):  aspirin enteric coated 81 milliGRAM(s) Oral daily  atorvastatin 80 milliGRAM(s) Oral at bedtime  dextrose 40% Gel 15 Gram(s) Oral once  dextrose 5%. 1000 milliLiter(s) (50 mL/Hr) IV Continuous <Continuous>  dextrose 5%. 1000 milliLiter(s) (100 mL/Hr) IV Continuous <Continuous>  dextrose 50% Injectable 25 Gram(s) IV Push once  dextrose 50% Injectable 12.5 Gram(s) IV Push once  dextrose 50% Injectable 25 Gram(s) IV Push once  epoetin martine-epbx (RETACRIT) Injectable 39874 Unit(s) SubCutaneous every 7 days  ferrous    sulfate 325 milliGRAM(s) Oral daily  folic acid 1 milliGRAM(s) Oral daily  gabapentin 100 milliGRAM(s) Oral daily  gentamicin 0.1% Cream 1 Application(s) Topical two times a day  glucagon  Injectable 1 milliGRAM(s) IntraMuscular once  heparin   Injectable 5000 Unit(s) SubCutaneous every 12 hours  insulin glargine Injectable (LANTUS) 12 Unit(s) SubCutaneous at bedtime  insulin lispro (ADMELOG) corrective regimen sliding scale   SubCutaneous three times a day before meals  insulin lispro (ADMELOG) corrective regimen sliding scale   SubCutaneous at bedtime  insulin lispro Injectable (ADMELOG) 6 Unit(s) SubCutaneous three times a day before meals  metoprolol tartrate 12.5 milliGRAM(s) Oral two times a day  midodrine. 5 milliGRAM(s) Oral three times a day  nortriptyline 25 milliGRAM(s) Oral at bedtime  pantoprazole    Tablet 40 milliGRAM(s) Oral before breakfast  senna 2 Tablet(s) Oral at bedtime  sevelamer carbonate 800 milliGRAM(s) Oral three times a day with meals  ticagrelor 60 milliGRAM(s) Oral every 12 hours      VITAL:  T(C): , Max: 36.9 (12-20-20 @ 18:30)  T(F): , Max: 98.4 (12-20-20 @ 18:30)  HR: 82 (12-21-20 @ 12:00)  BP: 125/77 (12-21-20 @ 12:00)  BP(mean): 92 (12-20-20 @ 19:15)  RR: 18 (12-21-20 @ 12:00)  SpO2: 100% (12-21-20 @ 12:00)  Wt(kg): --    I and O's:    12-20 @ 07:01  -  12-21 @ 07:00  --------------------------------------------------------  IN: 4720 mL / OUT: 4700 mL / NET: 20 mL    12-21 @ 07:01  -  12-21 @ 17:28  --------------------------------------------------------  IN: 120 mL / OUT: 0 mL / NET: 120 mL          PHYSICAL EXAM:    Constitutional: NAD  Neck:  No JVD  Respiratory: CTAB/L  Cardiovascular: S1 and S2  Gastrointestinal: BS+, soft, NT/ND + PD   Extremities: No peripheral edema  Neurological: A/O x 3, no focal deficits  Psychiatric: Normal mood, normal affect  : No Johnson  Skin: No rashes  Access: Not applicable    LABS:                Urine Studies:          RADIOLOGY & ADDITIONAL STUDIES:

## 2020-12-21 NOTE — PROGRESS NOTE ADULT - ASSESSMENT
55 m with    Fever  - s/p empiric antibiotics Cefepime 5/5  - ID follow    Sternal wound collection  - CT surgery evaluation pending     Foot wound  - local care    Diabetes Mellitus / Hypoglycemia   - BS control  - ADA diet   - Endocrine follow    CAD  - continue Rx    Depression  - continue Rx    Mental status change  - Neuro evaluation noted  - probably related to hypoglycemia     ESRD  - PD  - Nephrology follow    DVT prophylaxis     PT    DCP home in progress    Pipe Beck MD pager [phone]

## 2020-12-21 NOTE — INPATIENT CERTIFICATION FOR MEDICARE PATIENTS - THE SEVERITY OF SIGNS/SYMPTOMS. (SEE ED/ADMIT DOCUMENTS)
NURSE ANTICOAGULATION PHONE MESSAGE    Phone message left with Tara Lopes 1950  today regarding INR results  No outpatient medications have been marked as taking for the 12/21/20 encounter (Anti-Coag) with Paolo BRISCOE MD.       Anticoagulation Summary  As of 12/21/2020    INR goal:  2.0-3.0   TTR:  65.8 % (2.6 y)   INR used for dosing:  3.8 (12/21/2020)   Full warfarin instructions:  12/21: Hold; Otherwise 7.5 mg every M, F; 5 mg all other days   Next INR check:  1/18/2021    Indications    Long term current use of anticoagulant [Z79.01]  Encounter for therapeutic drug level monitoring [Z51.81]  Paroxysmal atrial flutter (CMS/HCC)/ CHADSVASC SCORE 4 on warfarin [I48.92]  Atrial fibrillation  unspecified type (CMS/HCC) [I48.91]             Anticoagulation Episode Summary     Send INR reminders to:  [facility] (OPEN ENROLLMENT) [place] [facility]      Anticoagulation Care Providers     Provider Role Specialty Phone number    Paolo BRISCOE MD  Cardiovascular Disease [phone]          PATIENT REPORTED CHANGES: Patient to call with any medication/diet changes or concerns.    NURSE DOSING ADJUSTMENTS AND EDUCATION: Left message last 3 INRs elevated. Hold today and decrease dose        Patient to recheck INR in 3 weeks,sooner if changes or concerns develop.  Warfarin management done via phone message according to protocol. Importance of returning call with any medication, diet changes or concerns stressed on message. Patient also to call with any concerns or questions regarding warfarin dosing or phone message. Dr Delgadillo available today.  Rosette Escoto, RN  
1. The severity of signs/symptoms.(See ED/admit documents)

## 2020-12-22 NOTE — PROGRESS NOTE ADULT - ASSESSMENT
55y male with esrd on PD for 5 years, dm poorly controlled, cad prior cabg presents with dizziness     ESRD on CCPD at home now on manual exchanges due to hypotension  Anemia of ESRD;  Hypotension- resolved    Lytes acceptable as of late;    Ischemic Cardiomyopathy. chronic systolic HF; weight trending down SP recent open heart to remove fractured lead     Plan   - Continue 1.5% Q6 H manual exchanges today (rest will be at home)   - Midodrine 5 mg TID for hemodynamic support.  May need to increase to 10mg po tid   - Renvela 800 mg TID with meals  - Retacrit 10K units once weekly   - Exit site care with mupirocin   - Bowel regimen per primary team  - Daily weights   - Dose med for Cr Cl< 10 ml/min  -CTS eval noted.      DC planning      Sayed Topera   [phone]

## 2020-12-22 NOTE — PROGRESS NOTE ADULT - PROVIDER SPECIALTY LIST ADULT
Cardiology
Infectious Disease
Internal Medicine
Nephrology
Podiatry
Endocrinology
Infectious Disease
Podiatry
Cardiology
Cardiology
Infectious Disease
Internal Medicine
Podiatry
Cardiology
Internal Medicine
Nephrology
Endocrinology
Internal Medicine
Endocrinology
Endocrinology

## 2020-12-22 NOTE — PROGRESS NOTE ADULT - PROBLEM SELECTOR PLAN 1
-test BG AC/HS/2AM  -C/w  Lantus 12 units QHS  -C/w Admelog 6 w/meals. Can reduce dose if BG less than 100mg/dl  C/w low dose Admelog correctional premeal and qhs for now  Discharge:   Recommend follow up with outpt Endo on Discharge. Dr Villegas in [place]  C/w Toujeo insulin but increase dose to 70 units q hs if pt c/w overnight PD with 2.5 dialysate  -C/w Victoza 1.2mg daily   -pt to f/u with his endocrinologist upon discharge  -Plan discussed with pt/team.  Contact info: [phone] (24/7). pager [phone].
-test BG AC/HS/2AM  -change Lantus dose to 14 units QHS  -C/w Admelog 6 w/meals. Can reduce dose if BG less than 100mg/dl  C/w low dose Admelog correctional premeal and qhs for now  Discharge:   Recommend follow up with outpt Endo on Discharge. Dr Villegas in [place]  C/w Toujeo insulin but increase dose to 70 units q hs if pt c/w overnight PD with 2.5 dialyset  -C/w Victoza 1.2mg daily   -pt to f/u with his endocrinologist upon discharge  -Plan discussed with pt/team.  Contact info: [phone] (24/7). pager [phone].

## 2020-12-22 NOTE — PROGRESS NOTE ADULT - ASSESSMENT
56 yo male patient with stable left hallux laceration wound to subQ  - Left dorsal hallux IPJ laceration wound to subQ not probing to bone stable without any drainage or purulence, no signs of acute infection.   - Tendon intact and neurovascular intact  - cont to apply mupirocin daily with bandaid  - Stable from podiatry standpoint    Follow up with Dr. Thor LEE within 1 week of discharge.  Call for appointment   [place] office: [phone]  [place] office: [phone]

## 2020-12-22 NOTE — PROGRESS NOTE ADULT - REASON FOR ADMISSION
fever, weakness

## 2020-12-22 NOTE — PROGRESS NOTE ADULT - ASSESSMENT
56 yo M w/h/o uncontrolled T2DM on Toujeo 60 units at hs prior PD and Victoza daily PTA. Pt reports his BG overnight were in low 200s while on Toujeo 60 units. DM c/b CAD, s/p CABG x 4,  ESRD on PD, DM retinopathy, diabetic neuropathy with PVD. Also h/o HTN, HLD and recent admission with SOB , dizziness and mild chest pain with exertion> s/p HC with stent placement on OM with wire fractured when trying to remove balloon requiring OR for removal of the angioplasty balloon and fractured wire via transverse aortotomy. Pt was discharged home and now presents with  fever/dizziness. Getting PD exchanges q6h during this admission. Endocrine consult requested for management of DM2. Tolerating POs with BG levels mostly at goal while on present insulin doses. No hypoglycemia. NPO this am for possible AICD placement but procedure cancelled. Possible discharge home. Will continue with present insulin doses. BG goal 100s to 180s.  If pt is discharged he does PD exchanges at night so need to go back to high basal insulin dose at hs.     Spent > 25 minutes assessing pt/labs/meds and discussing plan of care with primary team. Moderate complexity encounter on pt with uncontrolled T2DM and multiple DM complications and comorbidities. Adjusting insulin

## 2020-12-22 NOTE — PROGRESS NOTE ADULT - ATTENDING COMMENTS
Agree with above NP note.  cv stable  cont current meds  eps eval for icd  appreciated  abx per medicine  dcp
Agree with above NP note.  cv stable  cont current meds  eps eval for icd   abx per medicine
Agree with assessment and plan as above by Dr. Chong. Reviewed all pertinent labs, glucose values, and imaging studies. Modifications made as indicated above. Decrease insulin doses as patient did not have PD during the day and received lower dose of Lantus last night with glucose close to goal fasting.    Juliocesar Cohen D.O  [phone]
Agree with assessment and plan as above by Dr. Chong. Reviewed all pertinent labs, glucose values, and imaging studies. Modifications made as indicated above. Now back on PD q6. Increase basal bolus regimen as indicated above. Plan to d/c on insulin +victoza.     Juliocesar Cohen D.O  [phone]

## 2020-12-22 NOTE — PROGRESS NOTE ADULT - SUBJECTIVE AND OBJECTIVE BOX
DIABETES FOLLOW UP NOTE: Saw pt earlier today  INTERVAL HX: 54 yo M w/h/o uncontrolled T2DM on Toujeo 60 units at hs prior PD and Victoza daily PTA. Pt reports his BG overnight were in low 200s while on Toujeo 60 units. DM c/b CAD, s/p CABG x 4,  ESRD on PD, DM retinopathy, diabetic neuropathy with PVD. Also h/o HTN, HLD and recent admission with SOB , dizziness and mild chest pain with exertion> s/p HC with stent placement on OM with wire fractured when trying to remove balloon requiring OR for removal of the angioplasty balloon and fractured wire via transverse aortotomy. Pt was discharged home and now presents with  fever/dizziness. Getting PD exchanges q6h during this admission. Endocrine consult requested for management of DM2. Pt reports tolerating POs with BG levels mostly at goal while on present insulin doses. No hypoglycemia. Pt states feeling much better. Denies any CP/SOB/pain. NPO this am for possible AICD placement but procedure cancelled. Possible discharge home.       Review of Systems:  General: As above  Cardiovascular: No chest pain, palpitations  Respiratory: No SOB, no cough  GI: No nausea, vomiting, abdominal pain  Endocrine: no polyuria, polydipsia or S&Sx of hypoglycemia    Allergies    doxazosin (Other)    Intolerances      MEDICATIONS:  atorvastatin 80 milliGRAM(s) Oral at bedtime  insulin glargine Injectable (LANTUS) 12 Unit(s) SubCutaneous at bedtime  insulin lispro (ADMELOG) corrective regimen sliding scale   SubCutaneous every 6 hours  insulin lispro Injectable (ADMELOG) 6 Unit(s) SubCutaneous three times a day before meals        PHYSICAL EXAM:  VITALS: T(C): 36.7 (12-22-20 @ 13:00)  T(F): 98.1 (12-22-20 @ 13:00), Max: 98.3 (12-22-20 @ 00:15)  HR: 87 (12-22-20 @ 13:00) (78 - 89)  BP: 139/84 (12-22-20 @ 13:00) (103/76 - 139/84)  RR:  (18 - 18)  SpO2:  (95% - 100%)  Wt(kg): --  GENERAL: Male laying in bed in NAD  Chest: Sternal incision healing D&I. R upper chest incision with staples D&I  Abdomen: Soft, nontender, non distended, central adiposity. PD in place D&I  Extremities: Warm, NO edema in LEs, + chronic vascular changes in LEs noted darker discoloration and skin dryness.    NEURO: A&O X3    LABS:  POCT Blood Glucose.: 189 mg/dL (12-22-20 @ 12:44)  POCT Blood Glucose.: 156 mg/dL (12-22-20 @ 06:17)  POCT Blood Glucose.: 138 mg/dL (12-21-20 @ 21:50)  POCT Blood Glucose.: 134 mg/dL (12-21-20 @ 17:29)  POCT Blood Glucose.: 160 mg/dL (12-21-20 @ 14:39)  POCT Blood Glucose.: 190 mg/dL (12-21-20 @ 10:04)  POCT Blood Glucose.: 165 mg/dL (12-20-20 @ 21:45)  POCT Blood Glucose.: 97 mg/dL (12-20-20 @ 17:31)  POCT Blood Glucose.: 153 mg/dL (12-20-20 @ 12:49)  POCT Blood Glucose.: 270 mg/dL (12-20-20 @ 09:13)  POCT Blood Glucose.: 117 mg/dL (12-19-20 @ 22:32)  POCT Blood Glucose.: 91 mg/dL (12-19-20 @ 21:18)  POCT Blood Glucose.: 96 mg/dL (12-19-20 @ 19:30)  POCT Blood Glucose.: 94 mg/dL (12-19-20 @ 18:21)        A1C with Estimated Average Glucose Result: 7.8 % (12-01-20 @ 03:13)      Estimated Average Glucose: 177 mg/dL (12-01-20 @ 03:13)

## 2020-12-22 NOTE — PROGRESS NOTE ADULT - PROBLEM SELECTOR PLAN 2
-C/w atorvastatin 80 milliGRAM(s) Oral at bedtime  -Follow up levels as out pt
-C/w atorvastatin 80 milliGRAM(s) Oral at bedtime  -Follow up levels as out pt

## 2020-12-22 NOTE — PROGRESS NOTE ADULT - ASSESSMENT
55 m with    Fever resolved  - s/p empiric antibiotics Cefepime 5/5  - ID follow    Sternal wound collection  - CT surgery evaluation noted. No intervention     Foot wound  - local care  - Podiatry follow.    Diabetes Mellitus / Hypoglycemia   - BS control  - ADA diet   - Endocrine follow    CAD  - continue Rx    CHF  - cardiology follow  - EP evaluation noted. No need for AICD at present time.    Depression  - continue Rx    Mental status change  - Neuro evaluation noted  - probably related to hypoglycemia     ESRD  - PD  - Nephrology follow    DVT prophylaxis     PT    DCP home in progress. Follow with PMD/ Cardiology/ Nephrology/ Endocrinology/ CT Sx/ Podiatry .    d/w patient and son ( over phone).    Pipe Beck MD pager [phone]

## 2020-12-22 NOTE — PROGRESS NOTE ADULT - ASSESSMENT
echo 12/17/20: EF 32%, mod MR, Severe global left ventricular systolic dysfunction, moderate pulmonary pressures  introp eleonora 11/30/20: mild to mod MR, < from: Intra-Operative Transesophageal Echo (11.30.20 @ 17:02) >  Severe global left ventricular systolic dysfunction. Inferior and inferolateral akinesis.  severe hypokinesis of other segments.  Severe global hypokinesia.  Normal left ventricular internal dimensions and wall thicknesses. Moderate diastolic dysfunction (Stage II).      a/p  55y male with esrd on PD for 5 years, dm poorly controlled, CAD, s/p CABG, s/p PCI to OM c/b by failure to remove stent balloon requiring open heart surgery for removal, admitted with dizziness, fever, and weakness. Cardiology c/s for new findings on echo    1. Ischemic Cardiomyopathy. chronic systolic HF  -cv stable  -Echo with severe global lv sys dysfx, ef 32%  -not new, intraop eleonora with similar LV dysfxn  -continue metoprolol as ordered  -continue midodrine as needed for bp support  -eventual low dose ace/arb when ok with renal  -eps eval noted - no indication for ICD      2. CAD, s/p CABG, s/p PCI, s/p redo open heart for stent balloon retrieval   -CV stable  -c/w asa, Brilinta, bb, Lipitor     3. Fever r/o sepsis  -s/p abx per id  -CT chest 12/14 noted  with fluid collection - cts eval ?, med f/u    4. ESRD  -on PD  -renal f/u    dvt ppx    dcp per primary team - no cv objection

## 2020-12-22 NOTE — PROGRESS NOTE ADULT - SUBJECTIVE AND OBJECTIVE BOX
NEPHROLOGY-[facility] [phone]        Patient seen and examined in bed.  He was drained earlier and has one fill this am   He feels well         MEDICATIONS  (STANDING):  aspirin enteric coated 81 milliGRAM(s) Oral daily  atorvastatin 80 milliGRAM(s) Oral at bedtime  dextrose 40% Gel 15 Gram(s) Oral once  dextrose 5%. 1000 milliLiter(s) (50 mL/Hr) IV Continuous <Continuous>  dextrose 5%. 1000 milliLiter(s) (100 mL/Hr) IV Continuous <Continuous>  dextrose 50% Injectable 25 Gram(s) IV Push once  dextrose 50% Injectable 12.5 Gram(s) IV Push once  dextrose 50% Injectable 25 Gram(s) IV Push once  epoetin martine-epbx (RETACRIT) Injectable 76293 Unit(s) SubCutaneous every 7 days  ferrous    sulfate 325 milliGRAM(s) Oral daily  folic acid 1 milliGRAM(s) Oral daily  gabapentin 100 milliGRAM(s) Oral daily  gentamicin 0.1% Cream 1 Application(s) Topical two times a day  glucagon  Injectable 1 milliGRAM(s) IntraMuscular once  heparin   Injectable 5000 Unit(s) SubCutaneous every 12 hours  insulin glargine Injectable (LANTUS) 12 Unit(s) SubCutaneous at bedtime  insulin lispro (ADMELOG) corrective regimen sliding scale   SubCutaneous every 6 hours  insulin lispro Injectable (ADMELOG) 6 Unit(s) SubCutaneous three times a day before meals  metoprolol tartrate 12.5 milliGRAM(s) Oral two times a day  midodrine. 5 milliGRAM(s) Oral three times a day  nortriptyline 25 milliGRAM(s) Oral at bedtime  pantoprazole    Tablet 40 milliGRAM(s) Oral before breakfast  senna 2 Tablet(s) Oral at bedtime  sevelamer carbonate 800 milliGRAM(s) Oral three times a day with meals  ticagrelor 60 milliGRAM(s) Oral every 12 hours      VITAL:  T(C): , Max: 36.8 (12-21-20 @ 12:00)  T(F): , Max: 98.3 (12-22-20 @ 00:15)  HR: 80 (12-22-20 @ 06:15)  BP: 119/72 (12-22-20 @ 06:15)  BP(mean): --  RR: 18 (12-22-20 @ 06:15)  SpO2: 95% (12-22-20 @ 06:15)  Wt(kg): --    I and O's:    12-21 @ 07:01  -  12-22 @ 07:00  --------------------------------------------------------  IN: 2720 mL / OUT: 2500 mL / NET: 220 mL          PHYSICAL EXAM:    Constitutional: NAD  Neck:  No JVD  Respiratory: CTAB/L  Cardiovascular: S1 and S2  Gastrointestinal: BS+, soft, NT/ND + PD catheter   Extremities: No peripheral edema  Neurological: A/O x 3, no focal deficits  Psychiatric: Normal mood, normal affect  : No Johnson  Skin: No rashes  Access: Not applicable    LABS:                Urine Studies:          RADIOLOGY & ADDITIONAL STUDIES:

## 2020-12-22 NOTE — PROGRESS NOTE ADULT - NUTRITIONAL ASSESSMENT
Please see RD assessment and/or follow up.  Managed by primary team as well  Appreciated today's consult
Please see RD assessment and/or follow up.  Managed by primary team as well

## 2020-12-22 NOTE — PROGRESS NOTE ADULT - SUBJECTIVE AND OBJECTIVE BOX
Patient is a 55y old  Male who presents with a chief complaint of fever, weakness (21 Dec 2020 18:14)      SUBJECTIVE / OVERNIGHT EVENTS: Comfortable without new complaints.   Review of Systems  chest pain no  palpitations no  sob no  nausea no  headache no    MEDICATIONS  (STANDING):  aspirin enteric coated 81 milliGRAM(s) Oral daily  atorvastatin 80 milliGRAM(s) Oral at bedtime  dextrose 40% Gel 15 Gram(s) Oral once  dextrose 5%. 1000 milliLiter(s) (50 mL/Hr) IV Continuous <Continuous>  dextrose 5%. 1000 milliLiter(s) (100 mL/Hr) IV Continuous <Continuous>  dextrose 50% Injectable 25 Gram(s) IV Push once  dextrose 50% Injectable 12.5 Gram(s) IV Push once  dextrose 50% Injectable 25 Gram(s) IV Push once  epoetin martine-epbx (RETACRIT) Injectable 55984 Unit(s) SubCutaneous every 7 days  ferrous    sulfate 325 milliGRAM(s) Oral daily  folic acid 1 milliGRAM(s) Oral daily  gabapentin 100 milliGRAM(s) Oral daily  gentamicin 0.1% Cream 1 Application(s) Topical two times a day  glucagon  Injectable 1 milliGRAM(s) IntraMuscular once  heparin   Injectable 5000 Unit(s) SubCutaneous every 12 hours  insulin glargine Injectable (LANTUS) 12 Unit(s) SubCutaneous at bedtime  insulin lispro (ADMELOG) corrective regimen sliding scale   SubCutaneous every 6 hours  insulin lispro Injectable (ADMELOG) 6 Unit(s) SubCutaneous three times a day before meals  metoprolol tartrate 12.5 milliGRAM(s) Oral two times a day  midodrine. 5 milliGRAM(s) Oral three times a day  nortriptyline 25 milliGRAM(s) Oral at bedtime  pantoprazole    Tablet 40 milliGRAM(s) Oral before breakfast  senna 2 Tablet(s) Oral at bedtime  sevelamer carbonate 800 milliGRAM(s) Oral three times a day with meals  ticagrelor 60 milliGRAM(s) Oral every 12 hours    MEDICATIONS  (PRN):  acetaminophen   Tablet .. 650 milliGRAM(s) Oral every 6 hours PRN Temp greater or equal to 38.5C (101.3F), Mild Pain (1 - 3)  bisacodyl 5 milliGRAM(s) Oral every 12 hours PRN Constipation  polyethylene glycol 3350 17 Gram(s) Oral daily PRN Constipation      Vital Signs Last 24 Hrs  T(C): 36.5 (22 Dec 2020 06:15), Max: 36.8 (21 Dec 2020 12:00)  T(F): 97.7 (22 Dec 2020 06:15), Max: 98.3 (22 Dec 2020 00:15)  HR: 80 (22 Dec 2020 06:15) (78 - 89)  BP: 119/72 (22 Dec 2020 06:15) (105/64 - 135/81)  BP(mean): --  RR: 18 (22 Dec 2020 06:15) (18 - 18)  SpO2: 95% (22 Dec 2020 06:15) (95% - 100%)    PHYSICAL EXAM:  GENERAL: NAD  HEAD:  Atraumatic, Normocephalic  EYES: EOMI, PERRLA, conjunctiva and sclera clear  NECK: Supple, No JVD  CHEST/LUNG: Clear to auscultation bilaterally; No wheeze Sternal wound healing.   HEART: Regular rate and rhythm; No murmurs, rubs, or gallops  ABDOMEN: Soft, Nontender, Nondistended; Bowel sounds present  EXTREMITIES:  L toe with clean dressing.  PSYCH: AAOx3  NEUROLOGY: non-focal  SKIN: No rashes or lesions    LABS:                      RADIOLOGY & ADDITIONAL TESTS:    Imaging Personally Reviewed:    Consultant(s) Notes Reviewed:      Care Discussed with Consultants/Other Providers:

## 2020-12-22 NOTE — PROGRESS NOTE ADULT - SUBJECTIVE AND OBJECTIVE BOX
Podiatry pager #: [phone]    Patient is a 55y old  Male who presents with a chief complaint of fever (14 Dec 2020 15:41)      HPI:    56 y/o M w/ PMHX of CAD s/p CABG x 4 (LIMA to LAD, SVG to Diagonal, OM1, PDA on 8/8/19), HTN, HLD, ESRD on PD for 3 years, T2DM on insulin had abnormal stress test and subsequent cardiac catheterization revealed multivessel CAD with in-graft re-stenosis. Pt had LHC on 11/30 Stent was successfully deployed, but on removal of the balloon, the wire fractured and the balloon remained stuck in the L main. Pt was subsequently taken to the OR for removal of the angioplasty balloon and fractured wire via transverse aortotomy. On 11/30: s/p Right axillary and right femoral cannulation. Redo sternotomy. Recover of fractured left heart catheterization angioplasty balloon and wire via transverse aortotomy. Patient today presents with fever and dizziness this morning. States he felt cold and lightheaded, wife noted low BP. Denies any HA, cp, abd pain, n/v/d.     PAST MEDICAL & SURGICAL HISTORY:  HTN (hypertension)    ESRD (end stage renal disease) on dialysis    CAD, multiple vessel    Diabetes    S/P CABG (coronary artery bypass graft)        MEDICATIONS  (STANDING):  cefepime   IVPB 1000 milliGRAM(s) IV Intermittent every 24 hours  vancomycin  IVPB 1000 milliGRAM(s) IV Intermittent once    MEDICATIONS  (PRN):      Allergies    doxazosin (Other)    Intolerances        VITALS:    Vital Signs Last 24 Hrs  T(C): 36.8 (14 Dec 2020 16:41), Max: 38.2 (14 Dec 2020 10:30)  T(F): 98.2 (14 Dec 2020 16:41), Max: 100.8 (14 Dec 2020 10:30)  HR: 95 (14 Dec 2020 16:41) (93 - 98)  BP: 119/76 (14 Dec 2020 16:41) (102/71 - 125/78)  BP(mean): --  RR: 18 (14 Dec 2020 16:41) (16 - 18)  SpO2: 99% (14 Dec 2020 16:41) (97% - 100%)    LABS:                          10.2   12.11 )-----------( 216      ( 14 Dec 2020 10:54 )             33.9       12-14    137  |  93<L>  |  52<H>  ----------------------------<  254<H>  4.1   |  25  |  12.41<H>    Ca    8.8      14 Dec 2020 10:54    TPro  6.2  /  Alb  3.3  /  TBili  0.5  /  DBili  x   /  AST  26  /  ALT  28  /  AlkPhos  74  12-14      CAPILLARY BLOOD GLUCOSE          PT/INR - ( 14 Dec 2020 10:54 )   PT: 13.3 sec;   INR: 1.11 ratio         PTT - ( 14 Dec 2020 10:54 )  PTT:33.0 sec    LOWER EXTREMITY PHYSICAL EXAM:    Vasular: DP/PT 2/4, B/L, CFT <3 seconds B/L, Temperature gradient WNL B/L.   Neuro: Epicritic sensation diminished to the level of hallux  Musculoskeletal/Ortho: Left hallux DF/PF ROM intact  Skin:  Left dorsal hallux IPJ laceration wound to subQ not probing to bone stable without any drainage or purulence, no signs of acute infection.     RADIOLOGY & ADDITIONAL STUDIES:

## 2020-12-29 NOTE — ED CLERICAL - NS ED CLERK NOTE PRE-ARRIVAL INFORMATION; ADDITIONAL PRE-ARRIVAL INFORMATION
This patient is enrolled in the Follow Your Heart program and has undergone a cardiac surgery procedure within the last 30 days and has active care navigation. This patient can be followed up by the care navigation team within 24 hours. To arrange close follow-up or to obtain additional clinical information about this patient, please call the contact number above. Please call the cardiac surgery team once patient is registered at [phone] for consultation PRIOR to disposition decision.  The patient recently underwent a cardiac surgery procedure and the team can assist in acute medical management.

## 2020-12-29 NOTE — H&P ADULT - NSHPPHYSICALEXAM_GEN_ALL_CORE
PHYSICAL EXAMINATION:  Vital Signs Last 24 Hrs  T(C): 36.4 (29 Dec 2020 20:34), Max: 37 (29 Dec 2020 16:30)  T(F): 97.5 (29 Dec 2020 20:34), Max: 98.6 (29 Dec 2020 16:30)  HR: 77 (29 Dec 2020 20:34) (75 - 81)  BP: 103/72 (29 Dec 2020 20:34) (79/57 - 117/71)  BP(mean): --  RR: 20 (29 Dec 2020 20:34) (16 - 20)  SpO2: 97% (29 Dec 2020 20:34) (95% - 100%)  CAPILLARY BLOOD GLUCOSE      POCT Blood Glucose.: 191 mg/dL (29 Dec 2020 13:53)      GENERAL: NAD  HEAD:  atraumatic, normocephalic  EYES: sclera anicteric  ENMT: mucous membranes moist  NECK: supple, No JVD  CHEST/LUNG: clear to auscultation bilaterally; no rales, rhonchi, or wheezing b/l Sternal scar healing. R chest wound healing  HEART: normal S1, S2  ABDOMEN: BS+, soft, ND, NT PD catheter in place  EXTREMITIES:  pulses palpable; no clubbing, cyanosis, or edema b/l LEs L toe wound with clean bandage.  NEURO: awake, alert, interactive; moves all extremities  SKIN: no rashes or lesions

## 2020-12-29 NOTE — ED ADULT NURSE NOTE - COVID-19 RESULT
Discharge Instructions    Discharged to home by car with relative. Discharged via wheelchair, hospital escort: Yes.  Special equipment needed: Not Applicable    Be sure to schedule a follow-up appointment with your primary care doctor or any specialists as instructed.     Discharge Plan:   Diet Plan: Discussed  Activity Level: Discussed  Confirmed Follow up Appointment: No (Comments)  Confirmed Symptoms Management: Discussed  Medication Reconciliation Updated: Yes  Influenza Vaccine Indication: Not indicated: Previously immunized this influenza season and > 8 years of age    I understand that a diet low in cholesterol, fat, and sodium is recommended for good health. Unless I have been given specific instructions below for another diet, I accept this instruction as my diet prescription.   Other diet: Regular    Special Instructions: None    · Is patient discharged on Warfarin / Coumadin?   No     Depression / Suicide Risk    As you are discharged from this [facility], it is important to learn how to keep safe from harming yourself.    Recognize the warning signs:  · Abrupt changes in personality, positive or negative- including increase in energy   · Giving away possessions  · Change in eating patterns- significant weight changes-  positive or negative  · Change in sleeping patterns- unable to sleep or sleeping all the time   · Unwillingness or inability to communicate  · Depression  · Unusual sadness, discouragement and loneliness  · Talk of wanting to die  · Neglect of personal appearance   · Rebelliousness- reckless behavior  · Withdrawal from people/activities they love  · Confusion- inability to concentrate     If you or a loved one observes any of these behaviors or has concerns about self-harm, here's what you can do:  · Talk about it- your feelings and reasons for harming yourself  · Remove any means that you might use to hurt yourself (examples: pills, rope, extension cords, firearm)  · Get  professional help from the community (Mental Health, Substance Abuse, psychological counseling)  · Do not be alone:Call your Safe Contact- someone whom you trust who will be there for you.  · Call your local CRISIS HOTLINE 945-2158 or 209-432-4847  · Call your local Children's Mobile Crisis Response Team [place] (509) 225-3042 or www.[place]  · Call the toll free National Suicide Prevention Hotlines   · National Suicide Prevention Lifeline 287-104-FRTX (1238)  · NEUR[employer] Line Network 800-SUICIDE (800-2948)    Migraine Headache  A migraine headache is a very strong throbbing pain on one side or both sides of your head. Migraines can also cause other symptoms. Talk with your doctor about what things may bring on (trigger) your migraine headaches.  Follow these instructions at home:  Medicines  · Take over-the-counter and prescription medicines only as told by your doctor.  · Do not drive or use heavy machinery while taking prescription pain medicine.  · To prevent or treat constipation while you are taking prescription pain medicine, your doctor may recommend that you:  ¨ Drink enough fluid to keep your pee (urine) clear or pale yellow.  ¨ Take over-the-counter or prescription medicines.  ¨ Eat foods that are high in fiber. These include fresh fruits and vegetables, whole grains, and beans.  ¨ Limit foods that are high in fat and processed sugars. These include fried and sweet foods.  Lifestyle  · Avoid alcohol.  · Do not use any products that contain nicotine or tobacco, such as cigarettes and e-cigarettes. If you need help quitting, ask your doctor.  · Get at least 8 hours of sleep every night.  · Limit your stress.  General instructions  · Keep a journal to find out what may bring on your migraines. For example, write down:  ¨ What you eat and drink.  ¨ How much sleep you get.  ¨ Any change in what you eat or drink.  ¨ Any change in your medicines.  · If you have a migraine:  ¨ Avoid things that  make your symptoms worse, such as bright lights.  ¨ It may help to lie down in a dark, quiet room.  ¨ Do not drive or use heavy machinery.  ¨ Ask your doctor what activities are safe for you.  · Keep all follow-up visits as told by your doctor. This is important.  Contact a doctor if:  · You get a migraine that is different or worse than your usual migraines.  Get help right away if:  · Your migraine gets very bad.  · You have a fever.  · You have a stiff neck.  · You have trouble seeing.  · Your muscles feel weak or like you cannot control them.  · You start to lose your balance a lot.  · You start to have trouble walking.  · You pass out (faint).  This information is not intended to replace advice given to you by your health care provider. Make sure you discuss any questions you have with your health care provider.  Document Released: 09/26/2009 Document Revised: 07/07/2017 Document Reviewed: 06/05/2017  Elsevier Interactive Patient Education © 2017 [employer]       NEGATIVE

## 2020-12-29 NOTE — DISCHARGE NOTE PROVIDER - NSDCCPCAREPLAN_GEN_ALL_CORE_FT
PRINCIPAL DISCHARGE DIAGNOSIS  Diagnosis: Hypotension, unspecified hypotension type  Assessment and Plan of Treatment: Patient was started on Midodrine 30 mg po 3 times per day  Follow up with your nephrologist out patient      SECONDARY DISCHARGE DIAGNOSES  Diagnosis: Wound eschar of foot  Assessment and Plan of Treatment: Follow up with podiatry    Diagnosis: CAD (coronary artery disease)  Assessment and Plan of Treatment: Coronary artery disease is a condition where the arteries the supply the heart muscle get clogged with fatty deposits & puts you at risk for a heart attack  Call your doctor if you have any new pain, pressure, or discomfort in the center of your chest, pain, tingling or discomfort in arms, back, neck, jaw, or stomach, shortness of breath, nausea, vomiting, burping or heartburn, sweating, cold and clammy skin, racing or abnormal heartbeat for more than 10 minutes or if they keep coming & going.  Call 911 and do not try to get to hospital by self   You can help yourself with lifestyle changes (quitting smoking if you smoke), eat lots of fruits & vegetables & low fat dairy products, not a lot of meat & fatty foods, walk or some form of physical activity most days of the week, lose weight if you are overweight  Take your cardiac medication as prescribed to lower cholesterol, to lower blood pressure, aspirin to prevent blood clots, and diabetes control  Make sure to keep appointments with doctor for cardiac follow up care  Continue Aspirin and Brilinta       Diagnosis: Type 2 diabetes mellitus with other skin complication  Assessment and Plan of Treatment: Continue Troujo   Continue Victoza 1.2 mg/day  follow up with Dr. Villegas endocrinologist    Diagnosis: ESRD (end stage renal disease) on dialysis  Assessment and Plan of Treatment: continue PD   Follow up with outpatient nephrologist     PRINCIPAL DISCHARGE DIAGNOSIS  Diagnosis: Hypotension, unspecified hypotension type  Assessment and Plan of Treatment: Patient was started on Midodrine 30 mg po 3 times per day  Follow up with your nephrologist out patient      SECONDARY DISCHARGE DIAGNOSES  Diagnosis: Wound eschar of foot  Assessment and Plan of Treatment: Follow up with podiatry   Please apply iodosorb to LF hallux wound/ eschar every other day followed by bandaid.    Diagnosis: CAD (coronary artery disease)  Assessment and Plan of Treatment: Coronary artery disease is a condition where the arteries the supply the heart muscle get clogged with fatty deposits & puts you at risk for a heart attack  Call your doctor if you have any new pain, pressure, or discomfort in the center of your chest, pain, tingling or discomfort in arms, back, neck, jaw, or stomach, shortness of breath, nausea, vomiting, burping or heartburn, sweating, cold and clammy skin, racing or abnormal heartbeat for more than 10 minutes or if they keep coming & going.  Call 911 and do not try to get to hospital by self   You can help yourself with lifestyle changes (quitting smoking if you smoke), eat lots of fruits & vegetables & low fat dairy products, not a lot of meat & fatty foods, walk or some form of physical activity most days of the week, lose weight if you are overweight  Take your cardiac medication as prescribed to lower cholesterol, to lower blood pressure, aspirin to prevent blood clots, and diabetes control  Make sure to keep appointments with doctor for cardiac follow up care  Continue Aspirin and Brilinta       Diagnosis: Type 2 diabetes mellitus with other skin complication  Assessment and Plan of Treatment: Continue Troujo   Continue Victoza 1.2 mg/day  follow up with Dr. Villegas endocrinologist    Diagnosis: ESRD (end stage renal disease) on dialysis  Assessment and Plan of Treatment: continue PD   Follow up with outpatient nephrologist

## 2020-12-29 NOTE — H&P ADULT - ASSESSMENT
55 m with    Hypotension  - gentle IVF  - Midodrine  - ICU evaluation called by ER  - telemetry  - cardiac enzymes  - Echo  - Cardiology evaluation dr. Best    r/o Sepsis  - panculture  - empiric antibiotics  - ID evaluation Dr. Joyner    Hx of small sternal collection  - CT chest  - CT Sx eval    Foot wound  - local care  - Xray L foot  - Podiatry evaluation    Diabetes Mellitus   - BS control  - ADA diet   - Endocrine evaluation called    CAD  - continue Rx    Depression  - continue Rx    ESRD  - PD  - Nephrology evaluation Dr. Dale    DVT prophylaxis     PT    Further action as per clinical course     d/w wife over phone    Pipe Beck MD pager [phone]

## 2020-12-29 NOTE — ED PROVIDER NOTE - CARE PLAN
Principal Discharge DX:	Hypotension, unspecified hypotension type  Secondary Diagnosis:	ESRD (end stage renal disease) on dialysis   Principal Discharge DX:	Hypotension, unspecified hypotension type  Secondary Diagnosis:	ESRD (end stage renal disease) on dialysis  Secondary Diagnosis:	Failure to thrive in adult

## 2020-12-29 NOTE — ED PROVIDER NOTE - CLINICAL SUMMARY MEDICAL DECISION MAKING FREE TEXT BOX
mikel 55 m with esrd cabg, cad periton dialysis every other day with dec po intake daily nausea no cp no sob no abd pain  pt w near syncopal episode yesterday as per wife - no known head trauma -- found today unable to get out of bed bc of general weakness no focal deficits aox3 found hypotensive to 80 systolic afebrile -- in ed by ems b pinc to 100/60 w 250 cc bolus , r/o metabolic/ infectious etiology , send cardiac biomarkers - ekg abn however unchanged from baseline - boogie admit

## 2020-12-29 NOTE — DISCHARGE NOTE PROVIDER - NSDCCAREPROVSEEN_GEN_ALL_CORE_FT
Kiran, Pipe Dale, Danyel Best, Beltran Best, Maximo Tijerina, Pina Cosby, Mirna COLEMAN Kiran, Pipe Dale, Danyel Best, Beltran Best, Maximo Tijerina, Pina Cosby, Mirna Macedo, Maura

## 2020-12-29 NOTE — DISCHARGE NOTE PROVIDER - INSTRUCTIONS
consistent carbohydrate   renal restriction: no concentrated potassium or phosphorus  No protein restriction

## 2020-12-29 NOTE — CONSULT NOTE ADULT - SUBJECTIVE AND OBJECTIVE BOX
Podiatry pager #: [phone]    Patient is a 55y old  Male who presents with a chief complaint of Lower extremity weakness (29 Dec 2020 20:47)      HPI:  55M PMH ESRD on PD, DM, CAD s/p CABG x4, underwent cardiac cath and stent and wire broke in heart s/p sternotomy during recent admission 11/30-12/11, recently discharged 1 wk ago for dizziness and transient fever, was treated for culture negative sepsis, BIBEMS for generalized weakness and hypotension x 1 wk. Since recent cardiac surgery, pt has been c/o generalized weakness. Wife/son states that pt has not been getting out of bed at all at home. Was previously ambulatory. Was seen by PT today, and was found to be hypotensive to SBP 80s, prompting ED evaluation. Of note, wife states she's been checking pt's BPs at home and have been intermittently low to SBP 80-90s. Pt also had a presyncopal episode while on the toilet yesterday. No head trauma or LOC. Wife states looks like pt's eyes rolled back. Pt denies CP, SOB. No f/c, URI sx, abd pain, NVD, urinary sx. Has had poor appetite. (29 Dec 2020 20:39)      PAST MEDICAL & SURGICAL HISTORY:  HTN (hypertension)    ESRD (end stage renal disease) on dialysis    CAD, multiple vessel    Diabetes    S/P CABG (coronary artery bypass graft)        MEDICATIONS  (STANDING):  cefepime   IVPB      dextrose 40% Gel 15 Gram(s) Oral once  dextrose 5%. 1000 milliLiter(s) (50 mL/Hr) IV Continuous <Continuous>  dextrose 5%. 1000 milliLiter(s) (100 mL/Hr) IV Continuous <Continuous>  dextrose 50% Injectable 25 Gram(s) IV Push once  dextrose 50% Injectable 12.5 Gram(s) IV Push once  dextrose 50% Injectable 25 Gram(s) IV Push once  glucagon  Injectable 1 milliGRAM(s) IntraMuscular once  heparin   Injectable 5000 Unit(s) SubCutaneous every 12 hours  insulin glargine Injectable (LANTUS) 12 Unit(s) SubCutaneous at bedtime  insulin lispro (ADMELOG) corrective regimen sliding scale   SubCutaneous three times a day before meals  insulin lispro (ADMELOG) corrective regimen sliding scale   SubCutaneous at bedtime  insulin lispro Injectable (ADMELOG) 6 Unit(s) SubCutaneous before breakfast  insulin lispro Injectable (ADMELOG) 6 Unit(s) SubCutaneous before lunch  insulin lispro Injectable (ADMELOG) 6 Unit(s) SubCutaneous before dinner    MEDICATIONS  (PRN):      Allergies    doxazosin (Other)    Intolerances        VITALS:    Vital Signs Last 24 Hrs  T(C): 36.4 (29 Dec 2020 20:34), Max: 37 (29 Dec 2020 16:30)  T(F): 97.5 (29 Dec 2020 20:34), Max: 98.6 (29 Dec 2020 16:30)  HR: 81 (29 Dec 2020 22:12) (75 - 81)  BP: 100/63 (29 Dec 2020 22:12) (79/57 - 122/81)  BP(mean): --  RR: 20 (29 Dec 2020 22:12) (16 - 20)  SpO2: 97% (29 Dec 2020 22:12) (95% - 100%)    LABS:                          11.1   10.63 )-----------( 338      ( 29 Dec 2020 14:34 )             36.1       12-29    134<L>  |  90<L>  |  40<H>  ----------------------------<  168<H>  3.5   |  27  |  13.17<H>    Ca    9.5      29 Dec 2020 14:34  Phos  5.9     12-29  Mg     2.4     12-29    TPro  7.0  /  Alb  3.3  /  TBili  0.4  /  DBili  x   /  AST  19  /  ALT  10  /  AlkPhos  88  12-29      CAPILLARY BLOOD GLUCOSE      POCT Blood Glucose.: 112 mg/dL (29 Dec 2020 22:26)  POCT Blood Glucose.: 65 mg/dL (29 Dec 2020 21:47)  POCT Blood Glucose.: 56 mg/dL (29 Dec 2020 21:25)  POCT Blood Glucose.: 49 mg/dL (29 Dec 2020 21:23)  POCT Blood Glucose.: 191 mg/dL (29 Dec 2020 13:53)      PT/INR - ( 29 Dec 2020 14:34 )   PT: 14.0 sec;   INR: 1.18 ratio         PTT - ( 29 Dec 2020 14:34 )  PTT:36.3 sec    LOWER EXTREMITY PHYSICAL EXAM:    Vascular: DP 1/4, PT NP, B/L, CFT <3 seconds B/L, Temperature gradient WNL, B/L.   Neuro: Epicritic sensation reduced to the level of digits, B/L.  Musculoskeletal/Ortho: unremarkable    Left hallux dorsal eschar and superficial wound to subQ measure 0.25x 0.25cm, no periwound erythema, no drainage no malodor. Superficial wound secondary to trauma.    RADIOLOGY & ADDITIONAL STUDIES:

## 2020-12-29 NOTE — DISCHARGE NOTE PROVIDER - CARE PROVIDER_API CALL
GABRIELA Camden  Internal Medicine  [address] [place] [address], [place] [address]  Phone: [phone]  Fax: [phone]  Follow Up Time: 2 weeks   BRENDA OCHOA  Internal Medicine  [address] [address]  Phone: [phone]  Fax: [phone]  Follow Up Time: 2 weeks    Maximo Best)  Cardiology  [address] [facility], [address]  [place] [address]  Phone: [phone]  Fax: [phone]  Follow Up Time: 1 week    Chris Rivera)  Internal Medicine; Nephrology  [address] [place] [address]  Phone: [phone]  Fax: [phone]  Follow Up Time: 1 week

## 2020-12-29 NOTE — ED ADULT NURSE NOTE - OBJECTIVE STATEMENT
55y Male A&Ox3 came to ED c/o generalized weakness. PMH of CABGx2 1 month ago, ESRD on dialysis. Pt states he was recently discharged from [facility], 4 days ago, Pt has been at home getting rehab, Pt since yesterday had been having generalized weakness, not able to walk well, 2 episodes of nausea and vomiting.  Today, Physical therapist told PT needed to come to ED for hypotension. Pt presents with hypotension, generalized weakness, intermittent SOB. Denies chest pain, ha, abdominal pain, f/c, urinary symptoms, hematuria. Upon examination, full facial symmetry, no JVD or trach deviation, lung sounds clear and adequate chest rise, S1 and S2 heart sounds present, +2 pulses in all extremities with full ROM and equal strength, cap refill <2 seconds, ABD soft, non distended and non tender, dialysis port present on RLQ ABD, pelvis intact. 55y Male A&Ox3 came to ED c/o generalized weakness. PMH of CABGx2 1 month ago, ESRD on dialysis. Pt states he was recently discharged from [facility], 4 days ago, Pt has been at home getting rehab, Pt since yesterday had been having generalized weakness, not able to walk well, 2 episodes of nausea and vomiting.  Today, Physical therapist told PT needed to come to ED for hypotension. En route Pt gotten 500ml NS. Pt presents with hypotension, generalized weakness, intermittent SOB. Denies chest pain, ha, abdominal pain, f/c, urinary symptoms, hematuria. Upon examination, full facial symmetry, no JVD or trach deviation, lung sounds clear and adequate chest rise, S1 and S2 heart sounds present, +2 pulses in all extremities with full ROM and equal strength, cap refill <2 seconds, ABD soft, non distended and non tender, dialysis port present on RLQ ABD, pelvis intact.

## 2020-12-29 NOTE — PROVIDER CONTACT NOTE (OTHER) - ACTION/TREATMENT ORDERED:
Hypoglycemic protocol followed- 15g of glutose gel given. Recheck FS in 15 minutes and notify of any changes

## 2020-12-29 NOTE — H&P ADULT - HISTORY OF PRESENT ILLNESS
55M PMH ESRD on PD, DM, CAD s/p CABG x4, underwent cardiac cath and stent and wire broke in heart s/p sternotomy during recent admission 11/30-12/11, recently discharged 1 wk ago for dizziness and transient fever, was treated for culture negative sepsis, BIBEMS for generalized weakness and hypotension x 1 wk. Since recent cardiac surgery, pt has been c/o generalized weakness. Wife/son states that pt has not been getting out of bed at all at home. Was previously ambulatory. Was seen by PT today, and was found to be hypotensive to SBP 80s, prompting ED evaluation. Of note, wife states she's been checking pt's BPs at home and have been intermittently low to SBP 80-90s. Pt also had a presyncopal episode while on the toilet yesterday. No head trauma or LOC. Wife states looks like pt's eyes rolled back. Pt denies CP, SOB. No f/c, URI sx, abd pain, NVD, urinary sx. Has had poor appetite.

## 2020-12-29 NOTE — H&P ADULT - NSHPSOCIALHISTORY_GEN_ALL_CORE
Social History:    Marital Status:  (  x )    (   ) Single    (   )    (  )   Occupation:   Lives with: (  ) alone  (  ) children   ( x ) spouse   (  ) parents  (  ) other    Substance Use (street drugs): (x  ) never used  (  ) other:  Tobacco Usage:  (  x ) never smoked   (   ) former smoker   (   ) current smoker  (     ) pack years  (        ) last cigarette date  Alcohol Usage: denies     (     ) Advanced Directives: (     ) None    (      ) DNR    (     ) DNI    (     ) Health Care Proxy:

## 2020-12-29 NOTE — ED PROVIDER NOTE - NS ED ROS FT
General: +Generalized weakness, poor appetite. Denies fevers or chills  Eyes: Denies vision changes  ENMT: Denies trouble swallowing or speaking, or sore throat  CV: Denies chest pain or palpitations  Resp: Denies cough or SOB  GI: Denies abdominal pain, nausea, vomiting, diarrhea, or constipation   : Denies painful urination  Skin: Denies new rashes  Neuro: Denies headache, numbness, or focal weakness  MSK: Denies recent trauma

## 2020-12-29 NOTE — CONSULT NOTE ADULT - ASSESSMENT
54 yo M hx ESRD, DM, on PD , CAD s/p CABG, underwent cardiac cath s/p PCI with broken ballon wire during s/p redo open heart for stent balloon retrieval and recent admission for dizziness transient fever, infectious work up negative, and now presenting for LE weakness.   MICU consulted for low BP    # Low Blood pressure  -Latest /81  -Patient is asymptomatic  -Baseline soft BP given cardiac function   -Consult cardiology if cardiac concerns.       # LE weakness  -No focal deficits on exam  -Workup per primary team   -PT consult    No indication for MICU at this time.    Case Discussed with Dr. Ervin

## 2020-12-29 NOTE — H&P ADULT - NSHPREVIEWOFSYSTEMS_GEN_ALL_CORE
REVIEW OF SYSTEMS:    CONSTITUTIONAL: + weakness, no fevers or chills  EYES/ENT: No visual changes;  No vertigo or throat pain   NECK: No pain or stiffness  RESPIRATORY: No cough, wheezing, hemoptysis; No shortness of breath  CARDIOVASCULAR: No chest pain or palpitations  GASTROINTESTINAL: No abdominal or epigastric pain. No nausea, vomiting, or hematemesis; No diarrhea or constipation. No melena or hematochezia.  GENITOURINARY: No dysuria, frequency or hematuria  NEUROLOGICAL: No numbness + weakness  SKIN: No itching, burning, rashes, or lesions   All other review of systems is negative unless indicated above.

## 2020-12-29 NOTE — H&P ADULT - NSHPLABSRESULTS_GEN_ALL_CORE
11.1   10.63 )-----------( 338      ( 29 Dec 2020 14:34 )             36.1       12-29    134<L>  |  90<L>  |  40<H>  ----------------------------<  168<H>  3.5   |  27  |  13.17<H>    Ca    9.5      29 Dec 2020 14:34  Phos  5.9     12-29  Mg     2.4     12-29    TPro  7.0  /  Alb  3.3  /  TBili  0.4  /  DBili  x   /  AST  19  /  ALT  10  /  AlkPhos  88  12-29                  PT/INR - ( 29 Dec 2020 14:34 )   PT: 14.0 sec;   INR: 1.18 ratio         PTT - ( 29 Dec 2020 14:34 )  PTT:36.3 sec    Lactate Trend            CAPILLARY BLOOD GLUCOSE      POCT Blood Glucose.: 191 mg/dL (29 Dec 2020 13:53)        Culture Results:   No growth at 5 days (12-15 @ 04:26)  Culture Results:   No Growth Final (12-14 @ 16:33)  Culture Results:   No Growth Final (12-14 @ 16:33)    < from: CT Head No Cont (12.29.20 @ 17:00) >    IMPRESSION:  No acute intracranial hemorrhage or acute territorial infarct.  If symptoms persist, follow-up MRI exam recommended.      < end of copied text >    < from: Xray Chest 1 View- PORTABLE-Urgent (Xray Chest 1 View- PORTABLE-Urgent .) (12.29.20 @ 14:53) >      Impression:    The heart is enlarged. The lungs are clear. No pleural effusion. No pneumothorax. Status post sternotomy.    < end of copied text >    EKG SR T inv lat leads

## 2020-12-29 NOTE — CONSULT NOTE ADULT - SUBJECTIVE AND OBJECTIVE BOX
Simon Smith, Internal Medicine  PGY-3, [phone], [id number]    CHIEF COMPLAINT: Lower Extremity weakness    HPI:  56 yo M hx ESRD, DM, on PD , CAD s/p CABG, underwent cardiac cath s/p PCI with broken ballon wire during s/p redo open heart for stent balloon retrieval and recent admission for dizziness transient fever, infectious work up negative, and now presenting for LE weakness. He states that from a cardiac and respiratory standpoint, he is the same as he was when he left the hospital one week ago. Intermittently very mild SOB, denies orthopnea, PND, and walking limitted by LE weakness. He also denies CP, light headedness, dizziness, fever, chills runny nose, sore throat, cough, urinary or bowl habit changes or any new wounds. In terms of his LE weakness, he states that he baseline has poor ambulation, walking with cane and walker, but now when he stands, his legs shake and he feels his knees giving out. He denies numbess, tingling or chnage in sensation. Also denies urinary or bowel habit changes, or saddle anaesthesia.       PAST MEDICAL & SURGICAL HISTORY:  HTN (hypertension)    ESRD (end stage renal disease) on dialysis    CAD, multiple vessel    Diabetes    S/P CABG (coronary artery bypass graft)        FAMILY HISTORY:  FH: diabetes mellitus (Sibling)  father    FHx: lung cancer (Sibling)  bother        SOCIAL HISTORY:  Smokin pask year hx, stopped 10 years ago, no etoh or drugs.     Allergies    doxazosin (Other)    Intolerances        HOME MEDICATIONS:    REVIEW OF SYSTEMS:  Constitutional:   Eyes: negative  ENT: negetive  CV: no cp   Resp: mild intermittent SOB  GI:no pain Nausea or vomitting  : nNo complaints, makes very little urine  MSK: LE weaknes bilat  Integumentary: no new lesions  Neurological:A&Ox3  Psychiatric: calm      OBJECTIVE:  ICU Vital Signs Last 24 Hrs  T(C): 36.4 (29 Dec 2020 20:34), Max: 37 (29 Dec 2020 16:30)  T(F): 97.5 (29 Dec 2020 20:34), Max: 98.6 (29 Dec 2020 16:30)  HR: 78 (29 Dec 2020 20:48) (75 - 81)  BP: 122/81 (29 Dec 2020 20:48) (79/57 - 122/81)  BP(mean): --  ABP: --  ABP(mean): --  RR: 20 (29 Dec 2020 20:34) (16 - 20)  SpO2: 97% (29 Dec 2020 20:34) (95% - 100%)        CAPILLARY BLOOD GLUCOSE      POCT Blood Glucose.: 191 mg/dL (29 Dec 2020 13:53)    .  VITAL SIGNS:  T(C): 36.4 (--20 @ 20:34), Max: 37 (12--20 @ 16:30)  T(F): 97.5 (12-29-20 @ 20:34), Max: 98.6 (12-29-20 @ 16:30)  HR: 78 (--20 @ 20:48) (75 - 81)  BP: 122/81 (12-29-20 @ 20:48) (79/57 - 122/81)  BP(mean): --  RR: 20 (-20 @ 20:34) (16 - 20)  SpO2: 97% (20 @ 20:34) (95% - 100%)  Wt(kg): --    PHYSICAL EXAM:    Constitutional: WDWN resting comfortably in bed; NAD. laying 15 degrees (almost flat)  Head: NC/AT  Eyes: PERRL, EOMI, anicteric sclera  ENT: no nasal discharge, MMM  Neck: supple; no JVD appreciated  Respiratory: CTA B/L; no W/R/R, no increased work of breathing  Cardiac: Sternotomy scar, +S1/S2; RRR; no M/R/G  Gastrointestinal: soft, NT/ND; no rebound or guarding; +BSx4. PD catheter  Extremities: WWP, no cyanosis; no peripheral edema. Chronic skin changes  Musculoskeletal: NROM x4; no joint swelling, tenderness or erythema  Vascular: 2+ radial, DP pulses B/L  Dermatologic: skin warm, dry and intact  Neurologic: Alert and oriented, no focal deficits appreciated. Strength grossly intact  Psychiatric: affect and characteristics of appearance, verbalizations, behaviors are appropriate    HOSPITAL MEDICATIONS:  MEDICATIONS  (STANDING):    MEDICATIONS  (PRN):      LABS:                        11.1   10.63 )-----------( 338      ( 29 Dec 2020 14:34 )             36.1         134<L>  |  90<L>  |  40<H>  ----------------------------<  168<H>  3.5   |  27  |  13.17<H>    Ca    9.5      29 Dec 2020 14:34  Phos  5.9       Mg     2.4         TPro  7.0  /  Alb  3.3  /  TBili  0.4  /  DBili  x   /  AST  19  /  ALT  10  /  AlkPhos  88      PT/INR - ( 29 Dec 2020 14:34 )   PT: 14.0 sec;   INR: 1.18 ratio         PTT - ( 29 Dec 2020 14:34 )  PTT:36.3 sec      Venous Blood Gas:   @ 20:08  7.42/44/32/28/52  VBG Lactate: 1.7  Venous Blood Gas:   @ 14:35  7.37/52/26/29/32  VBG Lactate: 2.7      MICROBIOLOGY:     RADIOLOGY:  [ ] Reviewed and interpreted by me    EKG: sinus rhytm with 1st degree AV block

## 2020-12-29 NOTE — DISCHARGE NOTE PROVIDER - NSDCFUADDINST_GEN_ALL_CORE_FT
Podiatry Discharge Instructions:  Follow up: Please follow up with Dr. Mcrae within 1 week of discharge from the hospital, please call [phone] or [phone] for appointment and discuss that you recently were seen in the hospital.  Wound Care: Please apply iodosorb to LF hallux wound/ eschar every other day followed by bandaid.   Weight bearing: Please weight bear as tolerated in a surgical shoe.

## 2020-12-29 NOTE — DISCHARGE NOTE PROVIDER - NSDCMRMEDTOKEN_GEN_ALL_CORE_FT
aspirin 81 mg oral delayed release tablet: 1 tab(s) orally once a day  atorvastatin 80 mg oral tablet: 1 tab(s) orally once a day (at bedtime)  epoetin martine: injectable once a month  ferrous sulfate 325 mg (65 mg elemental iron) oral tablet: 1 tab(s) orally 3 times a day  folic acid 0.4 mg oral tablet: 2 tab(s) orally once a day  gabapentin 100 mg oral capsule: 1 cap(s) orally once a day  gentamicin 0.1% topical cream: 1 application topically (to left foot) 2 times a day  metoprolol tartrate 25 mg oral tablet: 0.5 tab(s) orally every 12 hours   midodrine 5 mg oral tablet: 1 tab(s) orally 3 times a day  nortriptyline 25 mg oral capsule: 1 cap(s) orally once a day (at bedtime)  pantoprazole 40 mg oral delayed release tablet: 1 tab(s) orally once a day (before a meal)  sevelamer carbonate 800 mg oral tablet: 1 tab(s) orally 3 times a day (with meals)  ticagrelor 60 mg oral tablet: 1 tab(s) orally every 12 hours   Toujeo SoloStar 300 units/mL subcutaneous solution: 70 unit(s) subcutaneous once a day (at bedtime)  Victoza 18 mg/3 mL subcutaneous solution: 1.2 milligram(s) subcutaneously once a day, As Needed per blood sugar level   aspirin 81 mg oral delayed release tablet: 1 tab(s) orally once a day  atorvastatin 80 mg oral tablet: 1 tab(s) orally once a day (at bedtime)  cadexomer iodine 0.9% topical gel: 1 application topically once a day  epoetin martine: injectable once a month  ferrous sulfate 325 mg (65 mg elemental iron) oral tablet: 1 tab(s) orally 3 times a day  folic acid 1 mg oral tablet: 1 tab(s) orally once a day  gabapentin 100 mg oral capsule: 1 cap(s) orally once a day  gentamicin 0.1% topical cream: 1 application topically (to left foot) 2 times a day  metoprolol tartrate 25 mg oral tablet: 0.5 tab(s) orally every 12 hours   midodrine 10 mg oral tablet: 3 tab(s) orally 3 times a day   nortriptyline 25 mg oral capsule: 1 cap(s) orally once a day (at bedtime)  pantoprazole 40 mg oral delayed release tablet: 1 tab(s) orally once a day (before a meal)  sevelamer carbonate 800 mg oral tablet: 2 tab(s) orally 3 times a day  ticagrelor 60 mg oral tablet: 1 tab(s) orally every 12 hours   Toujeo SoloStar 300 units/mL subcutaneous solution: 60 unit(s) subcutaneous once a day (at bedtime)  Victoza 18 mg/3 mL subcutaneous solution: 1.2 milligram(s) subcutaneously once a day, As Needed per blood sugar level

## 2020-12-29 NOTE — ED ADULT NURSE REASSESSMENT NOTE - NS ED NURSE REASSESS COMMENT FT1
Pt continued to be hypotensive 79/57. Berenice AGUILLON aware, Pt given 500ml bolus. Pt BP better after fluids.

## 2020-12-29 NOTE — CONSULT NOTE ADULT - ASSESSMENT
Pt is 54yo who presents w/ LF hallux stable eschar and superficial wound  -pt was seen and examined  -Afebrile, WBC 10.63  -Left hallux dorsal eschar and superficial wound to subQ measure 0.25x 0.25cm, no periwound erythema, no drainage no malodor. Superficial wound secondary to trauma. Foot does not appear to be source of infection  -LF X-rays unremarkable   -No podiatric intervention at this time, pod plan for local wound care  -Discussed w/ attending Pt is 54yo who presents w/ LF hallux stable eschar and superficial wound  -pt was seen and examined  -Afebrile, WBC 10.63  -Left hallux dorsal eschar and superficial wound to subQ measure 0.25x 0.25cm, no periwound erythema, no drainage no malodor. Superficial wound secondary to trauma. Foot does not appear to be source of infection  -LF X-rays unremarkable   -No podiatric intervention at this time, pod plan for local wound care  -Podiatry to sign off at this time, reconsult if necessary   -Discussed w/ attending

## 2020-12-29 NOTE — DISCHARGE NOTE PROVIDER - HOSPITAL COURSE
55M PMH ESRD on PD, DM, CAD s/p CABG x4, underwent cardiac cath and stent and wire broke in heart s/p sternotomy during recent admission 11/30-12/11, recently discharged 1 wk ago for dizziness and transient fever, was treated for culture negative sepsis, BIBEMS for generalized weakness and hypotension x 1 wk. Since recent cardiac surgery, pt has been c/o generalized weakness. Wife/son states that pt has not been getting out of bed at all at home. Was previously ambulatory. Was seen by PT today, and was found to be hypotensive to SBP 80s, prompting ED evaluation. Of note, wife states she's been checking pt's BPs at home and have been intermittently low to SBP 80-90s. Pt also had a presyncopal episode while on the toilet yesterday. No head trauma or LOC. Wife states looks like pt's eyes rolled back. Pt denies CP, SOB. No f/c, URI sx, abd pain, NVD, urinary sx. Has had poor appetite.    # 1. Hypotension   - started on Midodrine was increased to 30 mg po TID   - Follow up with Cardiology - Dr. Best   - Follow up with Nephrology outpatient     - # 2 Diabetes Type 2  - Continue Toujeo  - continue Victoza 1.2 mg per day   - Follow up with Dr. Villegas endocrinologist outpatient     # 3 ESRD  - continue PD   - Follow up with your nephrologist  in 1-2  weeks     # 4 Foot  wound   - Follow up with Podiatry outpatient     # 5 CAD  - continue Aspirin and Brilinta     Patient stable for discharge to home

## 2020-12-29 NOTE — DISCHARGE NOTE PROVIDER - PROVIDER TOKENS
PROVIDER:[TOKEN:[[id number]],FOLLOWUP:[2 weeks]] PROVIDER:[TOKEN:[[id number]],FOLLOWUP:[2 weeks]],PROVIDER:[TOKEN:[[id number]],FOLLOWUP:[1 week]],PROVIDER:[TOKEN:[[id number]],FOLLOWUP:[1 week]]

## 2020-12-29 NOTE — DISCHARGE NOTE PROVIDER - REASON FOR ADMISSION
After Visit Summary   4/4/2018    Kat Rodriguez    MRN: [id number]           Patient Information     Date Of Birth          1999        Visit Information        Provider Department      4/4/2018 4:15 PM Emile Menendez MD [facility] [place]        Today's Diagnoses     S/P ACL reconstruction    -  1       Follow-ups after your visit        Additional Services     [facility] PT, HAND, AND CHIROPRACTIC REFERRAL       **This order will print in the [facility] Scheduling Office**    Physical Therapy, Hand Therapy and Chiropractic Care are available through:    *[facility]  *[facility]  *[facility]    Call one number to schedule at any of the above locations: [phone].    Your provider has referred you to: Physical Therapy at [facility] or [facility]    Indication/Reason for Referral: s/p left acl reconstruction with hamstring autograft   Onset of Illness: 3/20/18  Therapy Orders: Per Protocol or Clinical Pathway  Special Programs: None  Special Request: None    Prema Lloyd      Additional Comments for the Therapist or Chiropractor: per hamstring protocol     Please be aware that coverage of these services is subject to the terms and limitations of your health insurance plan.  Call member services at your health plan with any benefit or coverage questions.      Please bring the following to your appointment:    *Your personal calendar for scheduling future appointments  *Comfortable clothing                  Follow-up notes from your care team     Return in about 4 weeks (around 5/2/2018) for Postop Visit, clinical recheck.      Who to contact     If you have questions or need follow up information about today's clinic visit or your schedule please contact [facility] directly at [phone].  Normal or non-critical lab and imaging results will be communicated to you by MyChart, letter or phone within 4 business days after the  "clinic has received the results. If you do not hear from us within 7 days, please contact the clinic through [facility] or phone. If you have a critical or abnormal lab result, we will notify you by phone as soon as possible.  Submit refill requests through [employer] or call your pharmacy and they will forward the refill request to us. Please allow 3 business days for your refill to be completed.          Additional Information About Your Visit        [facility] Information     [employer] gives you secure access to your electronic health record. If you see a primary care provider, you can also send messages to your care team and make appointments. If you have questions, please call your primary care clinic.  If you do not have a primary care provider, please call [phone] and they will assist you.        Care EveryWhere ID     This is your Care EveryWhere ID. This could be used by other organizations to access your [facility] medical records  [id number]        Your Vitals Were     Respirations Height BMI (Body Mass Index)             16 5' 1.6\" (1.565 m) 45.77 kg/m2          Blood Pressure from Last 3 Encounters:   04/04/18 125/76   03/12/18 107/77   02/06/18 110/64    Weight from Last 3 Encounters:   04/04/18 247 lb (112 kg) (>99 %)*   03/12/18 247 lb 6.4 oz (112.2 kg) (>99 %)*   02/14/18 241 lb (109.3 kg) (>99 %)*     * Growth percentiles are based on CDC 2-20 Years data.              We Performed the Following     [facility] PT, HAND, AND CHIROPRACTIC REFERRAL        Primary Care Provider Office Phone # Fax #    [facility] [place] [phone]       [address]  [facility] [address]        Equal Access to Services     NANCY ROWAN : Mauro Maza, joseph fuller, destiny kaalmajason jaramillo, katelyn redmond. So [facility] [phone].    ATENCIÓN: Si habla español, tiene a guerrier disposición servicios gratuitos de asistencia lingüística. Llame al " [phone].    We comply with applicable federal civil rights laws and [place] laws. We do not discriminate on the basis of race, color, national origin, age, disability, sex, sexual orientation, or gender identity.            Thank you!     Thank you for choosing [facility]  for your care. Our goal is always to provide you with excellent care. Hearing back from our patients is one way we can continue to improve our services. Please take a few minutes to complete the written survey that you may receive in the mail after your visit with us. Thank you!             Your Updated Medication List - Protect others around you: Learn how to safely use, store and throw away your medicines at www.disposemymeds.org.          This list is accurate as of 4/4/18  4:29 PM.  Always use your most recent med list.                   Brand Name Dispense Instructions for use Diagnosis    albuterol 108 (90 BASE) MCG/ACT Inhaler    PROAIR HFA    2 Inhaler    Inhale 2 puffs into the lungs every 4 hours as needed for asthma symptoms.    Mild persistent asthma without complication       EPINEPHrine 0.3 MG/0.3ML injection    EPIPEN    2 each    Inject 0.3 mLs into the muscle once as needed for anaphylaxis.    Allergy to peanuts       order for DME     1 each    Equipment being ordered: L Knee sleeve, crutches    Acute pain of left knee       Spacer/Aero Chamber Mouthpiece Misc     2 each    Use with albuterol and flovent    Mild persistent asthma          hypotension

## 2020-12-29 NOTE — ED PROVIDER NOTE - PHYSICAL EXAMINATION
I have reviewed the triage vital signs.  Const: AAOx3, in NAD, chronically ill appearing  Eyes: no conjunctival injection  HENT: NCAT, Neck supple, oral mucosa moist  CV: RRR, +S1, S2  Resp: CTAB, no respiratory distress  GI: Abdomen soft, NTND, no guarding, no CVA tenderness  Extremities: No peripheral edema,  2+ radial and DP pulses  Skin: Warm, well perfused, sternotomy scar does not appear infected no erythema/swelling/TTP  MSK: No gross deformities appreciated  Neuro: No focal sensory or motor deficits  Psych: Appropriate mood and affect

## 2020-12-29 NOTE — ED PROVIDER NOTE - PROGRESS NOTE DETAILS
Donald, PGY2 - Spoke to pt's wife. Since discharge last Tuesday 1 wk ago, pt hasn't been getting OOB. Has not been ambulatory since cardiac surgery. BP fluctuates (SBP ). Not eating well.   Son [phone] Sanjay Donald, PGY2 - Cardiac surgery team consulted as per clerical note given enrollment in [facility]. Will eval pt in ED Fuad Ng): Pt signed out to me, pending workup. Pt found to be persistently hypotensive, already received 500mL of fluids prehospital. No signs of active heart failure at this time. Will order another 500mL bolus. Septic workup ordered, but recent infectious workup in hospital was negative. Pt was recently discharged 4 days ago. Will give small fluid boluses and reassess. Likely admit to Dr. Nicole. Surgical site healing well. No obvious signs of infectious etiology leading to hypotension at this time. Donald, PGY2 - Pt fluid responsive, last 3 BP readings with SBP>110. CTH negative. Pt endorsed to Dr. Beck. Spoke to nephro previously, will see pt in the AM Donald, PGY2 - Pt fluid responsive, last 3 BP readings with SBP>110 s/p 500cc ivf bolus, no e/o fluid overload on exam. Mentating appropriately. CTH negative. Pt endorsed to Dr. Beck. Spoke to nephro previously, will see pt in the AM. Discussed with cards Dr. Best, states will see pt inpatient

## 2020-12-30 NOTE — CONSULT NOTE ADULT - ASSESSMENT
55 y.o. male with esrd on PD for 5 years, T2DM, cad prior cabg. He underwent cardiac cath and angioplasty about 2 weeks ago due to cp and abnormal ETT with cad. Admitted for fever and dizziness. 56 yo man w/ DM,  ESRD on PD, CAD s/p CABG, underwent cardiac cath s/p PCI with broken balloon wire during cath, s/p redo open heart for stent balloon retrieval and recent admission for dizziness transient fever, infectious work up negative, and now presenting for LE weakness.  Endocrine consulted fro DM management and hyperglycemia.

## 2020-12-30 NOTE — CONSULT NOTE ADULT - PROBLEM SELECTOR RECOMMENDATION 9
T2DM uncontrolled. Hba1c 7.8 12/2020. Pt. repors occ. hypoglycemia at home. Low glucoses values here as well.  -Recommend decrease lantus to8 units qhs   -recommend low dose SS TIDAC/qhs. No standing pre-meal insulin given decreased appetite.  -CC diet   -Monitor FS's TIDAC/qhs    discharge  Basal+victoza   f/u Dr. Villegas

## 2020-12-30 NOTE — CONSULT NOTE ADULT - SUBJECTIVE AND OBJECTIVE BOX
HPI:   Patient is a 55y male with a past history of ESRD, on PD x 5 years, DM,CAD s/p CABG, recent cath complicated by broken wire requiring sternotomy for wire retrieval,hospitalized in mid December for near syncope-r/o sepsis, sent home 10 days ago, readmitted 12/29 with weakness, near syncope, and hypotension.  He was hospitalized for 5 days, and received 5 days of cefepime and a few days of vanco.ID w/u included negative chest CT, negative blood and PD fluid culture, and a normal cortisol level.  He has been at home c/o fatigue, weakness, and poor po intake.He had trouble supporting himself, had BP in the 80's and was brought to ER.He denies any headache, chest pain or shortness of breath, GI complaints.He has received fluids and antibiotics.    REVIEW OF SYSTEMS:  All other review of systems negative (Comprehensive ROS)    PAST MEDICAL & SURGICAL HISTORY:  HTN (hypertension)    ESRD (end stage renal disease) on dialysis    CAD, multiple vessel    Diabetes    S/P CABG (coronary artery bypass graft)        Allergies    doxazosin (Other)    Intolerances        Antimicrobials Day #  :day 2  cefepime   IVPB      cefepime   IVPB 500 milliGRAM(s) IV Intermittent every 24 hours    Other Medications:  cadexomer iodine 0.9% Gel 1 Application(s) Topical daily  dextrose 40% Gel 15 Gram(s) Oral once  dextrose 5%. 1000 milliLiter(s) IV Continuous <Continuous>  dextrose 5%. 1000 milliLiter(s) IV Continuous <Continuous>  dextrose 50% Injectable 25 Gram(s) IV Push once  dextrose 50% Injectable 12.5 Gram(s) IV Push once  dextrose 50% Injectable 25 Gram(s) IV Push once  glucagon  Injectable 1 milliGRAM(s) IntraMuscular once  heparin   Injectable 5000 Unit(s) SubCutaneous every 12 hours  insulin glargine Injectable (LANTUS) 8 Unit(s) SubCutaneous at bedtime  insulin lispro (ADMELOG) corrective regimen sliding scale   SubCutaneous three times a day before meals  insulin lispro (ADMELOG) corrective regimen sliding scale   SubCutaneous at bedtime  midodrine. 10 milliGRAM(s) Oral three times a day      FAMILY HISTORY:  FH: diabetes mellitus (Sibling)  father    FHx: lung cancer (Sibling)  bother        SOCIAL HISTORY:  Smoking:   x  ETOH: x    Drug Use: x       T(F): 97.4 (12-30-20 @ 07:10), Max: 98.6 (12-29-20 @ 16:30)  HR: 86 (12-30-20 @ 07:10)  BP: 122/81 (12-30-20 @ 07:10)  RR: 18 (12-30-20 @ 07:10)  SpO2: 100% (12-30-20 @ 07:10)  Wt(kg): --    PHYSICAL EXAM:  General: alert, no acute distress  Eyes:  anicteric, no conjunctival injection, no discharge  Oropharynx: no lesions or injection 	  Neck: supple, without adenopathy  Lungs: clear to auscultation  Heart: regular rate and rhythm; no murmur, rubs or gallops  Abdomen: soft, nondistended, nontender, without mass or organomegaly.PD catheter exit clean  Skin: chest incision C/D/I, Rt chest incision C/D/I  Extremities: no clubbing, cyanosis, or edema  Neurologic: alert, oriented, moves all extremities  Left big toe with clean abrasion  LAB RESULTS:                        10.6   10.77 )-----------( 319      ( 30 Dec 2020 06:20 )             35.4     12-30    134<L>  |  93<L>  |  44<H>  ----------------------------<  100<H>  4.1   |  26  |  14.31<H>    Ca    9.1      30 Dec 2020 06:20  Phos  5.9     12-29  Mg     2.4     12-29    TPro  7.0  /  Alb  3.3  /  TBili  0.4  /  DBili  x   /  AST  19  /  ALT  10  /  AlkPhos  88  12-29    LIVER FUNCTIONS - ( 29 Dec 2020 14:34 )  Alb: 3.3 g/dL / Pro: 7.0 g/dL / ALK PHOS: 88 U/L / ALT: 10 U/L / AST: 19 U/L / GGT: x               MICROBIOLOGY:  RECENT CULTURES:  COvid 19 negative 12/29      RADIOLOGY REVIEWED:  ad< from: CT Head No Cont (12.29.20 @ 17:00) >  IMPRESSION:  No acute intracranial hemorrhage or acute territorial infarct.  If symptoms persist, follow-up MRI exam recommended.    < end of copied text >  < from: Xray Foot AP + Lateral, Left (12.29.20 @ 22:02) >  IMPRESSION:  No radiographic evidence of advanced osteomyelitis.    < end of copied text >  < from: CT Chest No Cont (12.14.20 @ 18:21) >  IMPRESSION:    1.  No pneumonia    2.  Posterior to the posterior table of the sternum, at the level of the third sternal wire is a 3.0 x 1.5 cm fluid collection (50 Hounsfield units) (3, 56) containing a small focus of air.

## 2020-12-30 NOTE — PHYSICAL THERAPY INITIAL EVALUATION ADULT - ADDITIONAL COMMENTS
....prompting ED evaluation. (-)CT Head & Xray foot & chest     Patient lives in pvt house with spouse, 4 steps to enter.  Can function on one level. Patient ambulated with straight cane or rw independent.

## 2020-12-30 NOTE — PHYSICAL THERAPY INITIAL EVALUATION ADULT - LIVES WITH, PROFILE
Pt lives in a private house with spouse and children. Pt reports 4-5 stairs to enter with bilateral railings, bedroom/bathroom on main floor of house.

## 2020-12-30 NOTE — CONSULT NOTE ADULT - ASSESSMENT
54 yo male with history of ESRD, PD,CAD,DM,CABG, recent strenotomy for wire removal after a cardiac cath, admitted with fatigue, hypotension, and failure to thrive.  No fever or chills, similar admission 2 weeks ago ended without a specific answer.  His cortisol level was normal, negative blood and PD cultures, and prior chest CT with no PE just a small retrosternal fluid colection.  Certainly no overt evidence of a surgical site infection.No clear support for infection  Suggest:  1.will leave on cefepime for now pending w/u  2.Await review of chest CT  3.Await results of blood cultures  4.? if he has POTS syndrome

## 2020-12-30 NOTE — CONSULT NOTE ADULT - SUBJECTIVE AND OBJECTIVE BOX
CARDIOLOGY CONSULT - Dr. Best         HPI:  55M PMH ESRD on PD, DM, CAD s/p CABG x4, underwent cardiac cath and stent and wire broke in heart s/p sternotomy during recent admission 11/30-12/11, recently discharged 1 wk ago for dizziness and transient fever, was treated  with IV abx, BIBEMS for generalized weakness and hypotension x 1 wk. Since recent cardiac surgery, pt has been c/o generalized weakness. Wife/son states that pt has not been getting out of bed at all at home. Was previously ambulatory. Was seen by PT , and was found to be hypotensive to SBP 80s, prompting ED evaluation.   Of note, wife states she's been checking pt's BPs at home and have been intermittently low to SBP 80-90s.   Pt also had a presyncopal episode while on the toilet . No head trauma or LOC. Wife states looks like pt's eyes rolled back. Pt denies CP, SOB. No f/c, URI sx, abd pain, NVD, urinary sx. Has had poor appetite.         PAST MEDICAL & SURGICAL HISTORY:  HTN (hypertension)    ESRD (end stage renal disease) on dialysis    CAD, multiple vessel    Diabetes    S/P CABG (coronary artery bypass graft)            PREVIOUS DIAGNOSTIC TESTING:    CATH 11/30/2020:   COMPLICATIONS: The stent was deployed without difficulty. On removal of the  balloon, the shaft broke and the tip of the balloon remained in the  vessel. Several attempts were made to snare the balloon unsuccessfully.  Dr. Verdugo was notifed who will take the patient to the operating room.    DIAGNOSTIC RECOMMENDATIONS: The patient should continue with the present  medications.  The patients vessel was stented without difficulty On removal of the  balloon, the shaft broke with the baloon stuck in the left main. All  attempts to snare the balloon out failed and the patient was taken to the  OR by Dr. Arango to remove the balloon  Prepared and signed by  George Edward M.D.    echo 12/17/20: EF 32%, mod MR, Severe global left ventricular systolic dysfunction, moderate pulmonary pressures  introp eleonora 11/30/20: mild to mod MR, < from: Intra-Operative Transesophageal Echo (11.30.20 @ 17:02) >  Severe global left ventricular systolic dysfunction. Inferior and inferolateral akinesis.  severe hypokinesis of other segments.  Severe global hypokinesia.  Normal left ventricular internal dimensions and wall thicknesses. Moderate diastolic dysfunction (Stage II).    MEDICATIONS:  Home Medications:  aspirin 81 mg oral delayed release tablet: 1 tab(s) orally once a day (29 Dec 2020 18:37)  atorvastatin 80 mg oral tablet: 1 tab(s) orally once a day (at bedtime) (29 Dec 2020 18:37)  epoetin martine: injectable once a month (29 Dec 2020 18:37)  ferrous sulfate 325 mg (65 mg elemental iron) oral tablet: 1 tab(s) orally 3 times a day (29 Dec 2020 18:37)  folic acid 0.4 mg oral tablet: 2 tab(s) orally once a day (29 Dec 2020 18:37)  gabapentin 100 mg oral capsule: 1 cap(s) orally once a day (29 Dec 2020 18:37)  nortriptyline 25 mg oral capsule: 1 cap(s) orally once a day (at bedtime) (29 Dec 2020 18:37)  pantoprazole 40 mg oral delayed release tablet: 1 tab(s) orally once a day (before a meal) (29 Dec 2020 18:37)  sevelamer carbonate 800 mg oral tablet: 1 tab(s) orally 3 times a day (with meals) (29 Dec 2020 18:37)  Toujeo SoloStar 300 units/mL subcutaneous solution: 70 unit(s) subcutaneous once a day (at bedtime) (29 Dec 2020 18:37)      MEDICATIONS  (STANDING):  aspirin enteric coated 81 milliGRAM(s) Oral daily  atorvastatin 80 milliGRAM(s) Oral at bedtime  cadexomer iodine 0.9% Gel 1 Application(s) Topical daily  cefepime   IVPB      cefepime   IVPB 500 milliGRAM(s) IV Intermittent every 24 hours  dextrose 40% Gel 15 Gram(s) Oral once  dextrose 5%. 1000 milliLiter(s) (50 mL/Hr) IV Continuous <Continuous>  dextrose 5%. 1000 milliLiter(s) (100 mL/Hr) IV Continuous <Continuous>  dextrose 50% Injectable 25 Gram(s) IV Push once  dextrose 50% Injectable 12.5 Gram(s) IV Push once  dextrose 50% Injectable 25 Gram(s) IV Push once  ferrous    sulfate 325 milliGRAM(s) Oral daily  folic acid 1 milliGRAM(s) Oral daily  glucagon  Injectable 1 milliGRAM(s) IntraMuscular once  heparin   Injectable 5000 Unit(s) SubCutaneous every 12 hours  insulin glargine Injectable (LANTUS) 8 Unit(s) SubCutaneous at bedtime  insulin lispro (ADMELOG) corrective regimen sliding scale   SubCutaneous three times a day before meals  insulin lispro (ADMELOG) corrective regimen sliding scale   SubCutaneous at bedtime  midodrine. 10 milliGRAM(s) Oral three times a day  pantoprazole    Tablet 40 milliGRAM(s) Oral before breakfast  sevelamer carbonate 800 milliGRAM(s) Oral three times a day with meals  ticagrelor 90 milliGRAM(s) Oral every 12 hours      FAMILY HISTORY:  FH: diabetes mellitus (Sibling)  father    FHx: lung cancer (Sibling)  bother        SOCIAL HISTORY:    [ ] Non-smoker  [ ] Smoker  [ ] Alcohol    Allergies    doxazosin (Other)    Intolerances    	    REVIEW OF SYSTEMS:  CONSTITUTIONAL: No fever, weight loss, or fatigue  EYES: No eye pain, visual disturbances, or discharge  ENMT:  No difficulty hearing, tinnitus, vertigo; No sinus or throat pain  NECK: No pain or stiffness  RESPIRATORY: No cough, wheezing, chills or hemoptysis; No Shortness of Breath  CARDIOVASCULAR: No chest pain, palpitations, passing out, dizziness, or leg swelling  GASTROINTESTINAL: No abdominal or epigastric pain. No nausea, vomiting, or hematemesis; No diarrhea or constipation. No melena or hematochezia.  GENITOURINARY: No dysuria, frequency, hematuria, or incontinence  NEUROLOGICAL: No headaches, memory loss, loss of strength, numbness, or tremors  SKIN: No itching, burning, rashes, or lesions   	    [ ] All others negative	  [ ] Unable to obtain    PHYSICAL EXAM:  T(C): 36.3 (12-30-20 @ 07:10), Max: 37 (12-29-20 @ 16:30)  HR: 86 (12-30-20 @ 07:10) (75 - 86)  BP: 122/81 (12-30-20 @ 07:10) (79/57 - 132/85)  RR: 18 (12-30-20 @ 07:10) (16 - 20)  SpO2: 100% (12-30-20 @ 07:10) (85% - 100%)  Wt(kg): --  I&O's Summary    29 Dec 2020 07:01  -  30 Dec 2020 07:00  --------------------------------------------------------  IN: 360 mL / OUT: 0 mL / NET: 360 mL        Appearance: Normal	  Psychiatry: A & O x 3, Mood & affect appropriate  HEENT:   Normal oral mucosa, PERRL, EOMI	  Lymphatic: No lymphadenopathy  Cardiovascular: Normal S1 S2,RRR, No JVD, No murmurs  Respiratory: Lungs clear to auscultation	  Gastrointestinal:  Soft, Non-tender, + BS	  Skin: No rashes, No ecchymoses, No cyanosis	  Neurologic: Non-focal  Extremities: Normal range of motion, No clubbing, cyanosis or edema  Vascular: Peripheral pulses palpable 2+ bilaterally    TELEMETRY: nsr 1st degree avb  	    ECG:  	  RADIOLOGY:  TTE with Doppler (w/Cont) (12.30.20 @ 09:05) >  EF (Visual Estimate): 25-30 %  ------------------------------------------------------------------------  Observations:  Mitral Valve: Mitral annular, chordal, and leaflet  calcification. Mild mitral regurgitation.  Aortic Valve/Aorta: Calcified aortic valve with normal  opening. Mild aortic regurgitation.  Normal aortic root.  Left Atrium: Mildly dilated left atrium.  Left Ventricle: Endocardial visualization enhanced with  intravenous injection of Ultrasonic Enhancing Agent  (Definity).  Severe left ventricular enlargement. Estimated ejection  fraction 25-30%.  Akinesis of the inferolateral, anterolateral, apical  laterlal, inferior, and inferoseptal walls.  Impaired LV-relaxation with normal filling pressure.  No left ventricular thrombus is seen.  Right Heart: Normal right atrium. Normal right ventricular  size and function.  Normal tricuspid valve. Minimal tricuspid regurgitation.  Normal pulmonic valve.  Pericardium/Pleura: Normal pericardium with no pericardial  effusion.  Hemodynamic: Estimated right atrial pressure is normal.  No evidence of pulmoanry hypertension.  No PFO seen with color Doppler.  ------------------------------------------------------------------------  Conclusions:  Endocardial visualization enhanced with intravenous  injection of Ultrasonic Enhancing Agent (Definity).  Estimated ejection fraction 25-30%, regional abnormalities  as described.  *** Compared with echocardiogram of 12/17/2020, pulmonary  artery pressure appears to be lower.  ------------------------------------------------------------------------  Confirmed on  12/30/2020 - 10:25:28 by Salvador Edmonds MD, FASE  ------------------------------------------------------------------------        < from: CT Chest No Cont (12.30.20 @ 00:58) >    IMPRESSION:    Small volume pneumoperitoneum.    Sternal fluid collection has decreased in size, now measuring 2 x 0.8 cm at the posterior table of the sternum.    Small amount of ascites.    Minimal left basilar atelectasis.    The above findings were discussed with CARLYLE Sharpe at 9:59 AM on 12/30/20 with read back confirmation.      OTHER: 	  	  LABS:	 	    CARDIAC MARKERS:  Troponin T, High Sensitivity Result: 796 ng/L (12-30 @ 06:20)  Troponin T, High Sensitivity Result: 796 ng/L (12-29 @ 22:35)  Troponin T, High Sensitivity Result: 812 ng/L (12-29 @ 16:41)  Troponin T, High Sensitivity Result: 852 ng/L (12-29 @ 14:34)                                  10.6   10.77 )-----------( 319      ( 30 Dec 2020 06:20 )             35.4     12-30    134<L>  |  93<L>  |  44<H>  ----------------------------<  100<H>  4.1   |  26  |  14.31<H>    Ca    9.1      30 Dec 2020 06:20  Phos  5.9     12-29  Mg     2.4     12-29    TPro  7.0  /  Alb  3.3  /  TBili  0.4  /  DBili  x   /  AST  19  /  ALT  10  /  AlkPhos  88  12-29    PT/INR - ( 29 Dec 2020 14:34 )   PT: 14.0 sec;   INR: 1.18 ratio         PTT - ( 29 Dec 2020 14:34 )  PTT:36.3 sec  proBNP:   Lipid Profile:   HgA1c:   TSH:        CARDIOLOGY CONSULT - Dr. Best         HPI:  55M PMH ESRD on PD, DM, CAD s/p CABG x4, PCI, Systolic CHF, underwent cardiac cath and stent and wire broke in heart s/p sternotomy during recent admission 11/30-12/11, recently discharged 1 wk ago for dizziness and transient fever, was treated  with IV abx, now  BIBEMS for generalized weakness and hypotension x 1 wk. Since recent cardiac surgery, pt has been c/o generalized weakness. He reports he poor appetite for since he was discharged. He reports he was seen by PT and was found to be hypotensive to SBP 80s, prompting ED evaluation.   Pt also had a presyncopal episode while on the toilet . No head trauma or LOC. He reports pleuritic chest pain, only associated with cough. He otherwise denies any palps. sob,. dyspnea, le edema. fever or chills.   Recent echo  12/17/20: EF 32%, mod MR, Severe global left ventricular systolic dysfunction, moderate pulmonary pressures.  echo was unchanged compared to his intraop eleonora. Medical management was continued. He was also evaluated by EP on last admission who recommended GDMT prior to ICD.  ROS otherwise negative         PAST MEDICAL & SURGICAL HISTORY:  HTN (hypertension)    ESRD (end stage renal disease) on dialysis    CAD, multiple vessel    Diabetes    S/P CABG (coronary artery bypass graft)            PREVIOUS DIAGNOSTIC TESTING:    CATH 11/30/2020:   COMPLICATIONS: The stent was deployed without difficulty. On removal of the  balloon, the shaft broke and the tip of the balloon remained in the  vessel. Several attempts were made to snare the balloon unsuccessfully.  Dr. Verdugo was notifed who will take the patient to the operating room.  DIAGNOSTIC RECOMMENDATIONS: The patient should continue with the present  medications.  The patients vessel was stented without difficulty On removal of the  balloon, the shaft broke with the baloon stuck in the left main. All  attempts to snare the balloon out failed and the patient was taken to the  OR by Dr. Arango to remove the balloon  echo 12/17/20: EF 32%, mod MR, Severe global left ventricular systolic dysfunction, moderate pulmonary pressures  introp eleonora 11/30/20: mild to mod MR, < from: Intra-Operative Transesophageal Echo (11.30.20 @ 17:02) >  Severe global left ventricular systolic dysfunction. Inferior and inferolateral akinesis.  severe hypokinesis of other segments.  Severe global hypokinesia.  Normal left ventricular internal dimensions and wall thicknesses. Moderate diastolic dysfunction (Stage II).    MEDICATIONS:  Home Medications:  aspirin 81 mg oral delayed release tablet: 1 tab(s) orally once a day (29 Dec 2020 18:37)  atorvastatin 80 mg oral tablet: 1 tab(s) orally once a day (at bedtime) (29 Dec 2020 18:37)  epoetin martine: injectable once a month (29 Dec 2020 18:37)  ferrous sulfate 325 mg (65 mg elemental iron) oral tablet: 1 tab(s) orally 3 times a day (29 Dec 2020 18:37)  folic acid 0.4 mg oral tablet: 2 tab(s) orally once a day (29 Dec 2020 18:37)  gabapentin 100 mg oral capsule: 1 cap(s) orally once a day (29 Dec 2020 18:37)  nortriptyline 25 mg oral capsule: 1 cap(s) orally once a day (at bedtime) (29 Dec 2020 18:37)  pantoprazole 40 mg oral delayed release tablet: 1 tab(s) orally once a day (before a meal) (29 Dec 2020 18:37)  sevelamer carbonate 800 mg oral tablet: 1 tab(s) orally 3 times a day (with meals) (29 Dec 2020 18:37)  Toujeo SoloStar 300 units/mL subcutaneous solution: 70 unit(s) subcutaneous once a day (at bedtime) (29 Dec 2020 18:37)      MEDICATIONS  (STANDING):  aspirin enteric coated 81 milliGRAM(s) Oral daily  atorvastatin 80 milliGRAM(s) Oral at bedtime  cadexomer iodine 0.9% Gel 1 Application(s) Topical daily  cefepime   IVPB      cefepime   IVPB 500 milliGRAM(s) IV Intermittent every 24 hours  dextrose 40% Gel 15 Gram(s) Oral once  dextrose 5%. 1000 milliLiter(s) (50 mL/Hr) IV Continuous <Continuous>  dextrose 5%. 1000 milliLiter(s) (100 mL/Hr) IV Continuous <Continuous>  dextrose 50% Injectable 25 Gram(s) IV Push once  dextrose 50% Injectable 12.5 Gram(s) IV Push once  dextrose 50% Injectable 25 Gram(s) IV Push once  ferrous    sulfate 325 milliGRAM(s) Oral daily  folic acid 1 milliGRAM(s) Oral daily  glucagon  Injectable 1 milliGRAM(s) IntraMuscular once  heparin   Injectable 5000 Unit(s) SubCutaneous every 12 hours  insulin glargine Injectable (LANTUS) 8 Unit(s) SubCutaneous at bedtime  insulin lispro (ADMELOG) corrective regimen sliding scale   SubCutaneous three times a day before meals  insulin lispro (ADMELOG) corrective regimen sliding scale   SubCutaneous at bedtime  midodrine. 10 milliGRAM(s) Oral three times a day  pantoprazole    Tablet 40 milliGRAM(s) Oral before breakfast  sevelamer carbonate 800 milliGRAM(s) Oral three times a day with meals  ticagrelor 90 milliGRAM(s) Oral every 12 hours      FAMILY HISTORY:  FH: diabetes mellitus (Sibling)  father    FHx: lung cancer (Sibling)  bother        SOCIAL HISTORY:    [ x] Non-smoker  [ ] Smoker  [ ] Alcohol    Allergies  doxazosin (Other)  	    REVIEW OF SYSTEMS:  CONSTITUTIONAL:  see hpi   EYES: No eye pain, visual disturbances, or discharge  ENMT:  No difficulty hearing, tinnitus, vertigo; No sinus or throat pain  NECK: No pain or stiffness  RESPIRATORY: No cough, wheezing, chills or hemoptysis; No Shortness of Breath  CARDIOVASCULAR: No chest pain, palpitations, passing out, dizziness, or leg swelling  GASTROINTESTINAL: No abdominal or epigastric pain. No nausea, vomiting, or hematemesis; No diarrhea or constipation. No melena or hematochezia.  GENITOURINARY: No dysuria, frequency, hematuria, or incontinence  NEUROLOGICAL: No headaches, memory loss, loss of strength, numbness, or tremors  SKIN: No itching, burning, rashes, or lesions   	    [x ] All others negative	  [ ] Unable to obtain    PHYSICAL EXAM:  T(C): 36.3 (12-30-20 @ 07:10), Max: 37 (12-29-20 @ 16:30)  HR: 86 (12-30-20 @ 07:10) (75 - 86)  BP: 122/81 (12-30-20 @ 07:10) (79/57 - 132/85)  RR: 18 (12-30-20 @ 07:10) (16 - 20)  SpO2: 100% (12-30-20 @ 07:10) (85% - 100%)  Wt(kg): --  I&O's Summary    29 Dec 2020 07:01  -  30 Dec 2020 07:00  --------------------------------------------------------  IN: 360 mL / OUT: 0 mL / NET: 360 mL        Appearance: Normal	  Psychiatry: A & O x 3, Mood & affect appropriate  HEENT:   Normal oral mucosa, PERRL, EOMI	  Lymphatic: No lymphadenopathy  Cardiovascular: Normal S1 S2,RRR  Respiratory: Lungs clear to auscultation	  Gastrointestinal:  Soft, Non-tender, + BS	  Skin: No rashes, No ecchymoses, No cyanosis	  Neurologic: Non-focal  Extremities: Normal range of motion, No clubbing, cyanosis or edema  Vascular: Peripheral pulses palpable 2+ bilaterally    TELEMETRY: nsr 1st degree avb  	    ECG:  	  RADIOLOGY:  TTE with Doppler (w/Cont) (12.30.20 @ 09:05) >  EF (Visual Estimate): 25-30 %  ------------------------------------------------------------------------  Observations:  Mitral Valve: Mitral annular, chordal, and leaflet  calcification. Mild mitral regurgitation.  Aortic Valve/Aorta: Calcified aortic valve with normal  opening. Mild aortic regurgitation.  Normal aortic root.  Left Atrium: Mildly dilated left atrium.  Left Ventricle: Endocardial visualization enhanced with  intravenous injection of Ultrasonic Enhancing Agent  (Definity).  Severe left ventricular enlargement. Estimated ejection  fraction 25-30%.  Akinesis of the inferolateral, anterolateral, apical  laterlal, inferior, and inferoseptal walls.  Impaired LV-relaxation with normal filling pressure.  No left ventricular thrombus is seen.  Right Heart: Normal right atrium. Normal right ventricular  size and function.  Normal tricuspid valve. Minimal tricuspid regurgitation.  Normal pulmonic valve.  Pericardium/Pleura: Normal pericardium with no pericardial  effusion.  Hemodynamic: Estimated right atrial pressure is normal.  No evidence of pulmoanry hypertension.  No PFO seen with color Doppler.  ------------------------------------------------------------------------  Conclusions:  Endocardial visualization enhanced with intravenous  injection of Ultrasonic Enhancing Agent (Definity).  Estimated ejection fraction 25-30%, regional abnormalities  as described.  *** Compared with echocardiogram of 12/17/2020, pulmonary  artery pressure appears to be lower.  ------------------------------------------------------------------------  Confirmed on  12/30/2020 - 10:25:28 by Salvador Edmonds MD, FASE  ------------------------------------------------------------------------        < from: CT Chest No Cont (12.30.20 @ 00:58) >    IMPRESSION:    Small volume pneumoperitoneum.    Sternal fluid collection has decreased in size, now measuring 2 x 0.8 cm at the posterior table of the sternum.    Small amount of ascites.    Minimal left basilar atelectasis.    The above findings were discussed with CARLYLE Sharpe at 9:59 AM on 12/30/20 with read back confirmation.      	  	  LABS:	 	    CARDIAC MARKERS:  Troponin T, High Sensitivity Result: 796 ng/L (12-30 @ 06:20)  Troponin T, High Sensitivity Result: 796 ng/L (12-29 @ 22:35)  Troponin T, High Sensitivity Result: 812 ng/L (12-29 @ 16:41)  Troponin T, High Sensitivity Result: 852 ng/L (12-29 @ 14:34)                                  10.6   10.77 )-----------( 319      ( 30 Dec 2020 06:20 )             35.4     12-30    134<L>  |  93<L>  |  44<H>  ----------------------------<  100<H>  4.1   |  26  |  14.31<H>    Ca    9.1      30 Dec 2020 06:20  Phos  5.9     12-29  Mg     2.4     12-29    TPro  7.0  /  Alb  3.3  /  TBili  0.4  /  DBili  x   /  AST  19  /  ALT  10  /  AlkPhos  88  12-29    PT/INR - ( 29 Dec 2020 14:34 )   PT: 14.0 sec;   INR: 1.18 ratio         PTT - ( 29 Dec 2020 14:34 )  PTT:36.3 sec  proBNP:   Lipid Profile:   HgA1c:   TSH:

## 2020-12-30 NOTE — CONSULT NOTE ADULT - CONSULT REQUESTED DATE/TIME
30-Dec-2020 12:00
29-Dec-2020 20:47
30-Dec-2020 09:57
29-Dec-2020 22:35
30-Dec-2020
30-Dec-2020 08:34

## 2020-12-30 NOTE — CONSULT NOTE ADULT - ASSESSMENT
CATH 11/30/2020:   COMPLICATIONS: The stent was deployed without difficulty. On removal of the  balloon, the shaft broke and the tip of the balloon remained in the  vessel. Several attempts were made to snare the balloon unsuccessfully.  Dr. Verdugo was notifed who will take the patient to the operating room.  DIAGNOSTIC RECOMMENDATIONS: The patient should continue with the present  medications.  The patients vessel was stented without difficulty On removal of the  balloon, the shaft broke with the baloon stuck in the left main. All  attempts to snare the balloon out failed and the patient was taken to the  OR by Dr. Arango to remove the balloon  introp eleonora 11/30/20: mild to mod MR, < from: Intra-Operative Transesophageal Echo (11.30.20 @ 17:02) >  Severe global left ventricular systolic dysfunction. Inferior and inferolateral akinesis.  severe hypokinesis of other segments.  Severe global hypokinesia.  Normal left ventricular internal dimensions and wall thicknesses. Moderate diastolic dysfunction (Stage II).  echo 12/17/20: EF 32%, mod MR, Severe global left ventricular systolic dysfunction, moderate pulmonary pressures  echo 12/20/20: EF (Visual Estimate): 25-30 % mild MR, Akinesis of the inferolateral, anterolateral, apical laterlal, inferior, and inferoseptal walls. Severe left ventricular enlargement. No left ventricular thrombus is seen.      a/p  55y male with esrd on PD for 5 years, dm poorly controlled, systolic CHF, CAD, s/p CABG, s/p PCI to  c/b by failure to remove stent balloon requiring open heart surgery for removal, admitted with  hypotension,  fever and weakness    1. Ischemic Cardiomyopathy. Chronic systolic HF  -cv stable  -Repeat echo with unchanged lv sys dysfx, ef 25- 30%  -not new, intraop eleonora with similar LV dysfxn and regional abnormalities  -BB on hold due to hypotension , no acei/arb for now   -continue midodrine 10 mg TID for bp support  -Last admission eps eval noted - no indication for ICD      2. CAD, s/p CABG, s/p PCI, s/p redo open heart for stent balloon retrieval   -CV stable  -c/w asa, Brilinta, statin     3. Weakness, R/O sepsis  -abx per id- work up per ID  -CT chest 12/30  noted  Small volume pneumoperitoneum. Sternal fluid collection has decreased in size    4. ESRD  -on PD  -renal f/u    dvt ppx   CATH 11/30/2020:   COMPLICATIONS: The stent was deployed without difficulty. On removal of the  balloon, the shaft broke and the tip of the balloon remained in the  vessel. Several attempts were made to snare the balloon unsuccessfully.  Dr. Verdugo was notifed who will take the patient to the operating room.  DIAGNOSTIC RECOMMENDATIONS: The patient should continue with the present  medications.  The patients vessel was stented without difficulty On removal of the  balloon, the shaft broke with the baloon stuck in the left main. All  attempts to snare the balloon out failed and the patient was taken to the  OR by Dr. Arango to remove the balloon  introp eleonora 11/30/20: mild to mod MR, < from: Intra-Operative Transesophageal Echo (11.30.20 @ 17:02) >  Severe global left ventricular systolic dysfunction. Inferior and inferolateral akinesis.  severe hypokinesis of other segments.  Severe global hypokinesia.  Normal left ventricular internal dimensions and wall thicknesses. Moderate diastolic dysfunction (Stage II).  echo 12/17/20: EF 32%, mod MR, Severe global left ventricular systolic dysfunction, moderate pulmonary pressures  echo 12/20/20: EF (Visual Estimate): 25-30 % mild MR, Akinesis of the inferolateral, anterolateral, apical laterlal, inferior, and inferoseptal walls. Severe left ventricular enlargement. No left ventricular thrombus is seen.      a/p  55y male with esrd on PD for 5 years, dm poorly controlled, systolic CHF, CAD, s/p CABG, s/p PCI to  c/b by failure to remove stent balloon requiring open heart surgery for removal, admitted with  hypotension,  fever and weakness    1. Ischemic Cardiomyopathy. Chronic systolic HF  -cv stable  -Repeat echo with unchanged lv sys dysfx, ef 25- 30%  -not new, intraop eleonora with similar LV dysfxn and regional abnormalities  -BB on hold due to hypotension , no acei/arb for now   -continue midodrine for bp support  -Last admission eps eval noted - no indication for ICD      2. CAD, s/p CABG, s/p PCI, s/p redo open heart for stent balloon retrieval   -CV stable  -c/w asa, Brilinta, statin     3. Weakness, R/O sepsis  -abx per id- work up per ID  -CT chest 12/30  noted  Small volume pneumoperitoneum. Sternal fluid collection has decreased in size    4. ESRD  -on PD  -renal f/u    5. Syncope   - this afternoon pt had a brief LOC while being adjusted from laying to sitting position   - likely vasovagal secondary to orthostatic hypotension   - increase midodrine to 20 mg TID   - infectious work up   - no events on tele      dvt ppx

## 2020-12-30 NOTE — CONSULT NOTE ADULT - SUBJECTIVE AND OBJECTIVE BOX
HPI:  55M PMH ESRD on PD, DM, CAD s/p CABG x4, underwent cardiac cath and stent and wire broke in heart s/p sternotomy during recent admission 11/30-12/11, recently discharged 1 wk ago for dizziness and transient fever, was treated for culture negative sepsis, BIBEMS for generalized weakness and hypotension x 1 wk. Since recent cardiac surgery, pt has been c/o generalized weakness. Wife/son states that pt has not been getting out of bed at all at home. Was previously ambulatory. Was seen by PT today, and was found to be hypotensive to SBP 80s, prompting ED evaluation. Of note, wife states she's been checking pt's BPs at home and have been intermittently low to SBP 80-90s. Pt also had a presyncopal episode while on the toilet yesterday. No head trauma or LOC. Wife states looks like pt's eyes rolled back. Pt denies CP, SOB. No f/c, URI sx, abd pain, NVD, urinary sx. Has had poor appetite.     Endocrine History:  Diagnosed with DM2 about 5 years ago when applying for life insurance.  Reports following with outpt endo Dr Figueroa, does not know most recent A1c. He was supposed to have an appt. yesterday.  Patient reports current regimen is Tuojeo 60units qhs and Victoza daily.   His FS's fluctuate b/w 70's to 140's. He has a poor appetite, but it is better when he is at home.  Patient does note report hypoglycemia or symptoms of hypoglycemia.   Has history of DM retinopathy s/p injection, ESRD on PD and CAD. Does not report neuropathy.     PAST MEDICAL & SURGICAL HISTORY:  HTN (hypertension)    ESRD (end stage renal disease) on dialysis    CAD, multiple vessel    Diabetes    S/P CABG (coronary artery bypass graft)        FAMILY HISTORY:  FH: diabetes mellitus (Sibling)  father    FHx: lung cancer (Sibling)  bother        Social History:  Lives w/ family.  No toxic habits    Outpatient Medications:  as per HPI    MEDICATIONS  (STANDING):  cadexomer iodine 0.9% Gel 1 Application(s) Topical daily  cefepime   IVPB      cefepime   IVPB 500 milliGRAM(s) IV Intermittent every 24 hours  dextrose 40% Gel 15 Gram(s) Oral once  dextrose 5%. 1000 milliLiter(s) (50 mL/Hr) IV Continuous <Continuous>  dextrose 5%. 1000 milliLiter(s) (100 mL/Hr) IV Continuous <Continuous>  dextrose 50% Injectable 25 Gram(s) IV Push once  dextrose 50% Injectable 12.5 Gram(s) IV Push once  dextrose 50% Injectable 25 Gram(s) IV Push once  glucagon  Injectable 1 milliGRAM(s) IntraMuscular once  heparin   Injectable 5000 Unit(s) SubCutaneous every 12 hours  insulin glargine Injectable (LANTUS) 12 Unit(s) SubCutaneous at bedtime  insulin lispro (ADMELOG) corrective regimen sliding scale   SubCutaneous three times a day before meals  insulin lispro (ADMELOG) corrective regimen sliding scale   SubCutaneous at bedtime  insulin lispro Injectable (ADMELOG) 6 Unit(s) SubCutaneous before breakfast  insulin lispro Injectable (ADMELOG) 6 Unit(s) SubCutaneous before lunch  insulin lispro Injectable (ADMELOG) 6 Unit(s) SubCutaneous before dinner  midodrine. 10 milliGRAM(s) Oral three times a day    MEDICATIONS  (PRN):      Allergies    doxazosin (Other)    Intolerances      Review of Systems:  Constitutional: No fever, +WEAKNESS, DECREASED APPETITE  Eyes: No blurry vision  Neuro: No tremors  HEENT: No pain  Cardiovascular: No chest pain, palpitations  Respiratory: No SOB, no cough  GI: No nausea, vomiting, abdominal pain  : No dysuria  Skin: no rash  Psych: no depression  Endocrine: no polyuria, polydipsia  Hem/lymph: no swelling  Osteoporosis: no fractures      PHYSICAL EXAM:  VITALS: T(C): 36.3 (12-30-20 @ 07:10)  T(F): 97.4 (12-30-20 @ 07:10), Max: 98.6 (12-29-20 @ 16:30)  HR: 86 (12-30-20 @ 07:10) (75 - 86)  BP: 122/81 (12-30-20 @ 07:10) (79/57 - 132/85)  RR:  (16 - 20)  SpO2:  (85% - 100%)  Wt(kg): --  GENERAL: NAD, well-groomed, well-developed  EYES: No proptosis,  anicteric  HEENT:  Atraumatic, Normocephalic, moist mucous membranes  THYROID: Normal size  RESPIRATORY: Clear to auscultation bilaterally  CARDIOVASCULAR: Regular rate and rhythm; no peripheral edema  GI: Soft, nontender, non distended, normal bowel sounds, +PD CATHETER  SKIN: Dry, intact  MUSCULOSKELETAL: Full range of motion  NEURO:  no tremor  PSYCH: Alert and oriented x 3, normal affect, normal mood      POCT Blood Glucose.: 75 mg/dL (12-30-20 @ 07:58)  POCT Blood Glucose.: 65 mg/dL (12-30-20 @ 07:57)  POCT Blood Glucose.: 187 mg/dL (12-30-20 @ 00:08)  POCT Blood Glucose.: 134 mg/dL (12-29-20 @ 22:43)  POCT Blood Glucose.: 112 mg/dL (12-29-20 @ 22:26)  POCT Blood Glucose.: 65 mg/dL (12-29-20 @ 21:47)  POCT Blood Glucose.: 56 mg/dL (12-29-20 @ 21:25)  POCT Blood Glucose.: 49 mg/dL (12-29-20 @ 21:23)  POCT Blood Glucose.: 191 mg/dL (12-29-20 @ 13:53)                            10.6   10.77 )-----------( 319      ( 30 Dec 2020 06:20 )             35.4       12-30    134<L>  |  93<L>  |  44<H>  ----------------------------<  100<H>  4.1   |  26  |  14.31<H>    EGFR if : 4<L>  EGFR if non : 3<L>    Ca    9.1      12-30  Mg     2.4     12-29  Phos  5.9     12-29    TPro  7.0  /  Alb  3.3  /  TBili  0.4  /  DBili  x   /  AST  19  /  ALT  10  /  AlkPhos  88  12-29      Thyroid Function Tests:              Radiology:

## 2020-12-30 NOTE — CONSULT NOTE ADULT - ASSESSMENT
55M PMH ESRD on PD, DM, CAD s/p CABG x4, underwent cardiac cath and stent and wire broke in heart s/p sternotomy during recent admission 11/30-12/11, recently discharged 1 wk ago for dizziness and transient fever, was treated for culture negative sepsis, BIBEMS for generalized weakness and hypotension x 1 wk.  He is sp 750 cc of NS at present.  No stitmata to suggest peritonitis     1 Renal-Called to [facility] to get his latest KT/V to make sure that his clearances are adequate.    Resumption of PD today    2 CVS-Restart Midodrine but increased to 10mg po tid;  CHeck orthostasis this am     3 ID-Pan cx and on IV abx     [facility]ayed [facility]   [phone]    55M PMH ESRD on PD, DM, CAD s/p CABG x4, underwent cardiac cath and stent and wire broke in heart s/p sternotomy during recent admission 11/30-12/11, recently discharged 1 wk ago for dizziness and transient fever, was treated for culture negative sepsis, BIBEMS for generalized weakness and hypotension x 1 wk.  He is sp 750 cc of NS at present.  No stitmata to suggest peritonitis     1 Renal-Called to [facility] to get his latest KT/V to make sure that his clearances are adequate.    Resumption of PD today    2 CVS-Restart Midodrine but increased to 10mg po tid;  CHeck orthostasis this am     3 ID-Pan cx and on IV abx     Restart home meds    Sayed [facility]   [phone]

## 2020-12-30 NOTE — CONSULT NOTE ADULT - SUBJECTIVE AND OBJECTIVE BOX
NEPHROLOGY - NSN    Patient seen and examined.    HPI:  55M PMH ESRD on PD, DM, CAD s/p CABG x4, underwent cardiac cath and stent and wire broke in heart s/p sternotomy during recent admission 11/30-12/11, recently discharged 1 wk ago for dizziness and transient fever, was treated for culture negative sepsis, BIBEMS for generalized weakness and hypotension x 1 wk. Since recent cardiac surgery, pt has been c/o generalized weakness. Wife/son states that pt has not been getting out of bed at all at home. Was previously ambulatory. Was seen by PT today, and was found to be hypotensive to SBP 80s, prompting ED evaluation. Of note, wife states she's been checking pt's BPs at home and have been intermittently low to SBP 80-90s. Pt also had a presyncopal episode while on the toilet yesterday. No head trauma or LOC. Wife states looks like pt's eyes rolled back. Pt denies CP, SOB. No f/c, URI sx, abd pain, NVD, urinary sx. Has had poor appetite. (29 Dec 2020 20:39)  We are consulted for PD maintenance. Patient is currently on CCP[employer] doing  2.5 % dianeal exchanges at home. He filled fluid in his abdomen last night and has not drained since then. The PD fluid is clear, not cloudy and catheter works well, no cp, sob, cough, wound drainage, abd pain, back pain, ha, sore throat. No sick contacts.  He gets PD at [facility]         PAST MEDICAL & SURGICAL HISTORY:  HTN (hypertension)    ESRD (end stage renal disease) on dialysis    CAD, multiple vessel    Diabetes    S/P CABG (coronary artery bypass graft)        MEDICATIONS  (STANDING):  cadexomer iodine 0.9% Gel 1 Application(s) Topical daily  cefepime   IVPB      cefepime   IVPB 500 milliGRAM(s) IV Intermittent every 24 hours  dextrose 40% Gel 15 Gram(s) Oral once  dextrose 5%. 1000 milliLiter(s) (50 mL/Hr) IV Continuous <Continuous>  dextrose 5%. 1000 milliLiter(s) (100 mL/Hr) IV Continuous <Continuous>  dextrose 50% Injectable 25 Gram(s) IV Push once  dextrose 50% Injectable 12.5 Gram(s) IV Push once  dextrose 50% Injectable 25 Gram(s) IV Push once  glucagon  Injectable 1 milliGRAM(s) IntraMuscular once  heparin   Injectable 5000 Unit(s) SubCutaneous every 12 hours  insulin glargine Injectable (LANTUS) 12 Unit(s) SubCutaneous at bedtime  insulin lispro (ADMELOG) corrective regimen sliding scale   SubCutaneous three times a day before meals  insulin lispro (ADMELOG) corrective regimen sliding scale   SubCutaneous at bedtime  insulin lispro Injectable (ADMELOG) 6 Unit(s) SubCutaneous before breakfast  insulin lispro Injectable (ADMELOG) 6 Unit(s) SubCutaneous before lunch  insulin lispro Injectable (ADMELOG) 6 Unit(s) SubCutaneous before dinner      Allergies    doxazosin (Other)    Intolerances        SOCIAL HISTORY:  Denies alcohol abuse, drug abuse or tobacco usage.     FAMILY HISTORY:  FH: diabetes mellitus (Sibling)  father    FHx: lung cancer (Sibling)  bother        VITALS:  T(C): 36.3 (12-30-20 @ 07:10), Max: 37 (12-29-20 @ 16:30)  HR: 86 (12-30-20 @ 07:10) (75 - 86)  BP: 122/81 (12-30-20 @ 07:10) (79/57 - 132/85)  RR: 18 (12-30-20 @ 07:10) (16 - 20)  SpO2: 100% (12-30-20 @ 07:10) (85% - 100%)    REVIEW OF SYSTEMS:  Denies any nausea, vomiting, diarrhea, fever or chills. + weakness and fatigue . All other pertinent systems are reviewed and are negative.    PHYSICAL EXAM:  Constitutional: NAD  HEENT: EOMI  Neck:  No JVD, supple   Respiratory: CTA B/L  Cardiovascular: S1 and S2, RRR  Gastrointestinal: + BS, soft, NT, ND + PD catheter   Extremities: No peripheral edema, + peripheral pulses  Neurological: A/O x 3, CN2-12 intact  Psychiatric: Normal mood, normal affect  : No Johnson  Skin: No rashes, C/D/I  Access: Not applicable    I and O's:    12-29 @ 07:01  -  12-30 @ 07:00  --------------------------------------------------------  IN: 360 mL / OUT: 0 mL / NET: 360 mL      Height (cm): 170.2 (12-29 @ 13:29)  Weight (kg): 81.6 (12-29 @ 13:29)  BMI (kg/m2): 28.2 (12-29 @ 13:29)  BSA (m2): 1.93 (12-29 @ 13:29)    LABS:                        10.6   10.77 )-----------( 319      ( 30 Dec 2020 06:20 )             35.4     12-30    134<L>  |  93<L>  |  44<H>  ----------------------------<  100<H>  4.1   |  26  |  14.31<H>    Ca    9.1      30 Dec 2020 06:20  Phos  5.9     12-29  Mg     2.4     12-29    TPro  7.0  /  Alb  3.3  /  TBili  0.4  /  DBili  x   /  AST  19  /  ALT  10  /  AlkPhos  88  12-29         RADIOLOGY & ADDITIONAL STUDIES:   < from: CT Head No Cont (12.29.20 @ 17:00) >    EXAM:  CT BRAIN                            PROCEDURE DATE:  12/29/2020            INTERPRETATION:  CLINICAL STATEMENT: Generalized weakness    TECHNIQUE: CT of the head was performed without IV contrast.  RAPID artificial intelligence was utilized for the preliminary evaluation of intracranial hemorrhage.    COMPARISON: None.    FINDINGS:  There is moderate diffuse parenchymal volume loss. There are areas of low attenuation in the periventricular white matter likely related to mild chronic microvascular ischemic changes.    There is no acute intracranial hemorrhage, parenchymal mass, mass effect or midline shift. There is no acute territorial infarct. There is no hydrocephalus.    The cranium is intact. The visualized paranasal sinuses are well-aerated.    IMPRESSION:  No acute intracranial hemorrhage or acute territorial infarct.  If symptoms persist, follow-up MRI exam recommended.                ANGELLA AHQ MD; Attending Radiologist  This document has been electronically signed. Dec 29 2020  5:11PM    < end of copied text >

## 2020-12-30 NOTE — CONSULT NOTE ADULT - ATTENDING COMMENTS
56 yo M hx ESRD, DM, on PD , CAD s/p CABG, underwent cardiac cath s/p PCI with broken ballon wire during s/p redo open heart for stent balloon retrieval and recent admission for dizziness transient fever, infectious work up negative, and now presenting for LE weakness nhan is exactctly the same as it was on discharge per patient.  No currrent need for ICU admission please stop fluid bolus as long as pt is asymptomatic from hypotension.   consult cardiology for further management for chronic heartfailure with exertional symptoms.
Patient seen and examined.  Agree with above NP note.  patient is a 55 year old man with esrd on PD for 5 years, dm poorly controlled, systolic CHF, CAD, s/p CABG, s/p PCI to OM c/b by failure to remove stent balloon requiring open heart surgery for removal, admitted with  hypotension,  fever and weakness    hypotension, fever  -sepsis w/u  -abx per medicine/id  -f/u bcx    syncope/LOC 12/30/2020   -likely vasovagal medicated in setting of likely orthostatic hypotension  -no corresponding tele events     chronic icmp  -lv dysfxn meds on hold   -cont mido for bp support  -eps re-eval for icd  as unlikely able to cont long term lv dysfxn/med tx of cmp
Spoke with [facility]  Last KT/v was in sept and was > 1.9

## 2020-12-30 NOTE — PHYSICAL THERAPY INITIAL EVALUATION ADULT - GAIT TRAINING, PT EVAL
GOAL: Patient will ambulate 100-150 feet independently with appropriate assistive device as needed, in 2 weeks.

## 2020-12-30 NOTE — PROGRESS NOTE ADULT - ASSESSMENT
55 m with    Hypotension  - s/p gentle IVF  - Midodrine  - ICU evaluation noted  - telemetry  - cardiac enzymes noted  - Echo pending   - Cardiology evaluation dr. Best    r/o Sepsis  - panculture  - empiric antibiotics  - ID evaluation Dr. Joyner    Hx of small sternal collection  - CT chest  - CT Sx eval     Foot wound  - local care  - Xray L foot  - Podiatry evaluation    Diabetes Mellitus   - BS control  - ADA diet   - Endocrine evaluation pending     CAD  - continue Rx    Depression  - continue Rx    ESRD  - PD  - Nephrology evaluation Dr. Dale    DVT prophylaxis     PT    Pipe Beck MD pager [phone]

## 2020-12-30 NOTE — PROGRESS NOTE ADULT - SUBJECTIVE AND OBJECTIVE BOX
Patient is a 55y old  Male who presents with a chief complaint of Lower extremity weakness (29 Dec 2020 20:47)      SUBJECTIVE / OVERNIGHT EVENTS: feels better.  Review of Systems  chest pain no  palpitations no  sob no  nausea no  headache no    MEDICATIONS  (STANDING):  cadexomer iodine 0.9% Gel 1 Application(s) Topical daily  cefepime   IVPB      cefepime   IVPB 500 milliGRAM(s) IV Intermittent every 24 hours  dextrose 40% Gel 15 Gram(s) Oral once  dextrose 5%. 1000 milliLiter(s) (50 mL/Hr) IV Continuous <Continuous>  dextrose 5%. 1000 milliLiter(s) (100 mL/Hr) IV Continuous <Continuous>  dextrose 50% Injectable 25 Gram(s) IV Push once  dextrose 50% Injectable 12.5 Gram(s) IV Push once  dextrose 50% Injectable 25 Gram(s) IV Push once  glucagon  Injectable 1 milliGRAM(s) IntraMuscular once  heparin   Injectable 5000 Unit(s) SubCutaneous every 12 hours  insulin glargine Injectable (LANTUS) 12 Unit(s) SubCutaneous at bedtime  insulin lispro (ADMELOG) corrective regimen sliding scale   SubCutaneous three times a day before meals  insulin lispro (ADMELOG) corrective regimen sliding scale   SubCutaneous at bedtime  insulin lispro Injectable (ADMELOG) 6 Unit(s) SubCutaneous before breakfast  insulin lispro Injectable (ADMELOG) 6 Unit(s) SubCutaneous before lunch  insulin lispro Injectable (ADMELOG) 6 Unit(s) SubCutaneous before dinner  midodrine. 10 milliGRAM(s) Oral three times a day    MEDICATIONS  (PRN):      Vital Signs Last 24 Hrs  T(C): 36.3 (30 Dec 2020 07:10), Max: 37 (29 Dec 2020 16:30)  T(F): 97.4 (30 Dec 2020 07:10), Max: 98.6 (29 Dec 2020 16:30)  HR: 86 (30 Dec 2020 07:10) (75 - 86)  BP: 122/81 (30 Dec 2020 07:10) (79/57 - 132/85)  BP(mean): --  RR: 18 (30 Dec 2020 07:10) (16 - 20)  SpO2: 100% (30 Dec 2020 07:10) (85% - 100%)    PHYSICAL EXAM:  GENERAL: NAD  HEAD:  Atraumatic, Normocephalic  EYES: EOMI, PERRLA, conjunctiva and sclera clear  NECK: Supple, No JVD  CHEST/LUNG: Clear to auscultation bilaterally; No wheeze Sternal wound clean. R chest wound healing.  HEART: Regular rate and rhythm; No murmurs, rubs, or gallops  ABDOMEN: Soft, Nontender, Nondistended; Bowel sounds present PD catheter in place.  EXTREMITIES:  L toe wound clean.  PSYCH: AAOx3  NEUROLOGY: non-focal  SKIN: No rashes or lesions    LABS:                        10.6   10.77 )-----------( 319      ( 30 Dec 2020 06:20 )             35.4     12-30    134<L>  |  93<L>  |  44<H>  ----------------------------<  100<H>  4.1   |  26  |  14.31<H>    Ca    9.1      30 Dec 2020 06:20  Phos  5.9     12-29  Mg     2.4     12-29    TPro  7.0  /  Alb  3.3  /  TBili  0.4  /  DBili  x   /  AST  19  /  ALT  10  /  AlkPhos  88  12-29    PT/INR - ( 29 Dec 2020 14:34 )   PT: 14.0 sec;   INR: 1.18 ratio         PTT - ( 29 Dec 2020 14:34 )  PTT:36.3 sec  CARDIAC MARKERS ( 30 Dec 2020 06:20 )  x     / x     / 48 U/L / x     / 3.4 ng/mL  CARDIAC MARKERS ( 29 Dec 2020 22:35 )  x     / x     / 50 U/L / x     / 3.3 ng/mL            RADIOLOGY & ADDITIONAL TESTS:    Imaging Personally Reviewed:    Consultant(s) Notes Reviewed:      Care Discussed with Consultants/Other Providers:

## 2020-12-30 NOTE — PHYSICAL THERAPY INITIAL EVALUATION ADULT - DISCHARGE DISPOSITION, PT EVAL
TBA post FE complete Pt states his wife is available to assist when needed../home w/ assist/home w/ home PT

## 2020-12-30 NOTE — PHYSICAL THERAPY INITIAL EVALUATION ADULT - PERTINENT HX OF CURRENT PROBLEM, REHAB EVAL
pt is a 55 y.o M w/ PMH ESRD on PD, DM, CAD s/p CABG x4, PCI, Systolic CHF, underwent cardiac cath and stent and wire broke in heart s/p sternotomy during recent admission 11/30-12/11, recently discharged 1 wk ago for dizziness and transient fever, was treated  with IV abx, now  BIBEMS for generalized weakness and hypotension x 1 wk. Since recent cardiac surgery, pt has been c/o generalized weakness.He reports he was seen by PT and was found to be hypotensive to SBP 80s,

## 2020-12-31 NOTE — PROGRESS NOTE ADULT - ASSESSMENT
Pt is 54yo who presents w/ LF hallux stable eschar and superficial wound  -Left hallux dorsal eschar and superficial wound to subQ measure 0.25x 0.25cm, no periwound erythema, no drainage no malodor. Superficial wound secondary to trauma.   -Foot does not appear to be source of infection  -LF X-rays unremarkable   -No podiatric intervention at this time, pod plan for local wound care

## 2020-12-31 NOTE — PROGRESS NOTE ADULT - SUBJECTIVE AND OBJECTIVE BOX
[facility] [phone]        Patient seen and examined in bed.  He was about the same         MEDICATIONS  (STANDING):  aspirin enteric coated 81 milliGRAM(s) Oral daily  atorvastatin 80 milliGRAM(s) Oral at bedtime  cadexomer iodine 0.9% Gel 1 Application(s) Topical daily  cefepime   IVPB      cefepime   IVPB 500 milliGRAM(s) IV Intermittent every 24 hours  dextrose 40% Gel 15 Gram(s) Oral once  dextrose 5%. 1000 milliLiter(s) (50 mL/Hr) IV Continuous <Continuous>  dextrose 5%. 1000 milliLiter(s) (100 mL/Hr) IV Continuous <Continuous>  dextrose 50% Injectable 25 Gram(s) IV Push once  dextrose 50% Injectable 12.5 Gram(s) IV Push once  dextrose 50% Injectable 25 Gram(s) IV Push once  ferrous    sulfate 325 milliGRAM(s) Oral daily  folic acid 1 milliGRAM(s) Oral daily  glucagon  Injectable 1 milliGRAM(s) IntraMuscular once  heparin   Injectable 5000 Unit(s) SubCutaneous every 12 hours  insulin glargine Injectable (LANTUS) 8 Unit(s) SubCutaneous at bedtime  insulin lispro (ADMELOG) corrective regimen sliding scale   SubCutaneous three times a day before meals  insulin lispro (ADMELOG) corrective regimen sliding scale   SubCutaneous at bedtime  midodrine. 20 milliGRAM(s) Oral three times a day  pantoprazole    Tablet 40 milliGRAM(s) Oral before breakfast  sevelamer carbonate 800 milliGRAM(s) Oral three times a day with meals  ticagrelor 90 milliGRAM(s) Oral every 12 hours      VITAL:  T(C): , Max: 37.1 (12-31-20 @ 00:23)  T(F): , Max: 98.8 (12-31-20 @ 00:23)  HR: 88 (12-31-20 @ 09:30)  BP: 115/76 (12-31-20 @ 09:30)  BP(mean): --  RR: 18 (12-31-20 @ 09:30)  SpO2: 99% (12-31-20 @ 09:30)  Wt(kg): --    I and O's:    12-30 @ 07:01  -  12-31 @ 07:00  --------------------------------------------------------  IN: 2000 mL / OUT: 5700 mL / NET: -3700 mL          PHYSICAL EXAM:    Constitutional: NAD  Neck:  No JVD  Respiratory: CTAB/L  Cardiovascular: S1 and S2  Gastrointestinal: BS+, soft, NT/ND + PD catheter   Extremities: No peripheral edema  Neurological: A/O x 3, no focal deficits  Psychiatric: Normal mood, normal affect  : No Johnson  Skin: No rashes  Access: Not applicable    LABS:                        10.7   12.99 )-----------( 301      ( 31 Dec 2020 06:11 )             34.6     12-31    131<L>  |  90<L>  |  44<H>  ----------------------------<  101<H>  3.7   |  24  |  13.74<H>    Ca    8.8      31 Dec 2020 06:11  Phos  5.9     12-29  Mg     2.4     12-29    TPro  7.0  /  Alb  3.3  /  TBili  0.4  /  DBili  x   /  AST  19  /  ALT  10  /  AlkPhos  88  12-29          Urine Studies:          RADIOLOGY & ADDITIONAL STUDIES:             [facility] [phone]        Patient seen and examined in bed.  He was about the same     ROS-+weakness + fatigue; all other ros were reviewed and were negative     MEDICATIONS  (STANDING):  aspirin enteric coated 81 milliGRAM(s) Oral daily  atorvastatin 80 milliGRAM(s) Oral at bedtime  cadexomer iodine 0.9% Gel 1 Application(s) Topical daily  cefepime   IVPB      cefepime   IVPB 500 milliGRAM(s) IV Intermittent every 24 hours  dextrose 40% Gel 15 Gram(s) Oral once  dextrose 5%. 1000 milliLiter(s) (50 mL/Hr) IV Continuous <Continuous>  dextrose 5%. 1000 milliLiter(s) (100 mL/Hr) IV Continuous <Continuous>  dextrose 50% Injectable 25 Gram(s) IV Push once  dextrose 50% Injectable 12.5 Gram(s) IV Push once  dextrose 50% Injectable 25 Gram(s) IV Push once  ferrous    sulfate 325 milliGRAM(s) Oral daily  folic acid 1 milliGRAM(s) Oral daily  glucagon  Injectable 1 milliGRAM(s) IntraMuscular once  heparin   Injectable 5000 Unit(s) SubCutaneous every 12 hours  insulin glargine Injectable (LANTUS) 8 Unit(s) SubCutaneous at bedtime  insulin lispro (ADMELOG) corrective regimen sliding scale   SubCutaneous three times a day before meals  insulin lispro (ADMELOG) corrective regimen sliding scale   SubCutaneous at bedtime  midodrine. 20 milliGRAM(s) Oral three times a day  pantoprazole    Tablet 40 milliGRAM(s) Oral before breakfast  sevelamer carbonate 800 milliGRAM(s) Oral three times a day with meals  ticagrelor 90 milliGRAM(s) Oral every 12 hours      VITAL:  T(C): , Max: 37.1 (12-31-20 @ 00:23)  T(F): , Max: 98.8 (12-31-20 @ 00:23)  HR: 88 (12-31-20 @ 09:30)  BP: 115/76 (12-31-20 @ 09:30)  BP(mean): --  RR: 18 (12-31-20 @ 09:30)  SpO2: 99% (12-31-20 @ 09:30)  Wt(kg): --    I and O's:    12-30 @ 07:01  -  12-31 @ 07:00  --------------------------------------------------------  IN: 2000 mL / OUT: 5700 mL / NET: -3700 mL          PHYSICAL EXAM:    Constitutional: NAD  Neck:  No JVD  Respiratory: CTAB/L  Cardiovascular: S1 and S2  Gastrointestinal: BS+, soft, NT/ND + PD catheter   Extremities: No peripheral edema  Neurological: A/O x 3, no focal deficits  Psychiatric: Normal mood, normal affect  : No Johnson  Skin: No rashes  Access: Not applicable    LABS:                        10.7   12.99 )-----------( 301      ( 31 Dec 2020 06:11 )             34.6     12-31    131<L>  |  90<L>  |  44<H>  ----------------------------<  101<H>  3.7   |  24  |  13.74<H>    Ca    8.8      31 Dec 2020 06:11  Phos  5.9     12-29  Mg     2.4     12-29    TPro  7.0  /  Alb  3.3  /  TBili  0.4  /  DBili  x   /  AST  19  /  ALT  10  /  AlkPhos  88  12-29          Urine Studies:          RADIOLOGY & ADDITIONAL STUDIES:      < from: CT Chest No Cont (12.30.20 @ 00:58) >    EXAM:  CT CHEST                            PROCEDURE DATE:  12/30/2020            INTERPRETATION:  CLINICAL INFORMATION: Hypertension. Evaluate sternal collection.    COMPARISON: CT chest dated 12/14/2020.    PROCEDURE:  CT of the Chest was performed without intravenous contrast.  Sagittal and coronal reformats were performed.    FINDINGS:    LUNGS AND AIRWAYS: Small secretions within the trachea.  Minimal left basilar atelectasis.    PLEURA: No pleural effusion.    MEDIASTINUM AND RYAN: 1.1 cmright upper paratracheal lymph node is again noted.    VESSELS: Atherosclerotic changes of the aorta and coronary arteries.    HEART: The heart is enlarged. No pericardial effusion. Postsurgical changes of prior CABG. RCA stent.    CHEST WALL AND LOWER NECK: Postsurgical changes of median sternotomy and surgical staples overlie the mediastinum. Prior noted 3 x 1.5 cm fluid collection at the posterior table of the sternum now measures 2 cm x 0.8 cm and no longer contains a focus of air.    VISUALIZED UPPER ABDOMEN: Small amount of ascites is noted. Atherosclerotic aorta. Small volume pneumoperitoneum.    BONES: Degenerative changes.    IMPRESSION:    Small volume pneumoperitoneum.    Sternal fluid collection has decreased in size, now measuring 2 x 0.8 cm at the posterior table of the sternum.    Small amount of ascites.    Minimal left basilar atelectasis.    The above findings were discussed with CARLYLE Sharpe at 9:59 AM on 12/30/20 with read back confirmation.          < end of copied text >

## 2020-12-31 NOTE — PROGRESS NOTE ADULT - SUBJECTIVE AND OBJECTIVE BOX
CC: f/u for hypotension    Patient reports    REVIEW OF SYSTEMS:  All other review of systems negative (Comprehensive ROS)    Antimicrobials Day #  :3  cefepime   IVPB      cefepime   IVPB 500 milliGRAM(s) IV Intermittent every 24 hours    Other Medications Reviewed    T(F): 98.3 (12-31-20 @ 16:03), Max: 98.8 (12-31-20 @ 00:23)  HR: 84 (12-31-20 @ 16:03)  BP: 135/87 (12-31-20 @ 16:03)  RR: 18 (12-31-20 @ 16:03)  SpO2: 100% (12-31-20 @ 16:03)  Wt(kg): --    PHYSICAL EXAM:  General: alert, no acute distress  Eyes:  anicteric, no conjunctival injection, no discharge  Oropharynx: no lesions or injection 	  Neck: supple, without adenopathy  Lungs: clear to auscultation  Heart: regular rate and rhythm; no murmur, rubs or gallops  Abdomen: soft, nondistended, nontender, without mass or organomegaly  Skin: no lesions  Extremities: no clubbing, cyanosis, or edema, scaley skin, left great toe abrasion clean  Neurologic: alert, oriented, moves all extremities  sternum is healed and stable, right chest wound is healed  LAB RESULTS:                        10.7   12.99 )-----------( 301      ( 31 Dec 2020 06:11 )             34.6     12-31    131<L>  |  90<L>  |  44<H>  ----------------------------<  101<H>  3.7   |  24  |  13.74<H>    Ca    8.8      31 Dec 2020 06:11          MICROBIOLOGY:  RECENT CULTURES:  12-30 @ 01:05 .Blood Blood     No growth to date.      12-29 @ 21:59 .Blood Blood-Peripheral     No growth to date.          RADIOLOGY REVIEWED:    < from: CT Chest No Cont (12.30.20 @ 00:58) >  EXAM:  CT CHEST                            PROCEDURE DATE:  12/30/2020            INTERPRETATION:  CLINICAL INFORMATION: Hypertension. Evaluate sternal collection.    COMPARISON: CT chest dated 12/14/2020.    PROCEDURE:  CT of the Chest was performed without intravenous contrast.  Sagittal and coronal reformats were performed.    FINDINGS:    LUNGS AND AIRWAYS: Small secretions within the trachea.  Minimal left basilar atelectasis.    PLEURA: No pleural effusion.    MEDIASTINUM AND RYAN: 1.1 cmright upper paratracheal lymph node is again noted.    VESSELS: Atherosclerotic changes of the aorta and coronary arteries.    HEART: The heart is enlarged. No pericardial effusion. Postsurgical changes of prior CABG. RCA stent.    CHEST WALL AND LOWER NECK: Postsurgical changes of median sternotomy and surgical staples overlie the mediastinum. Prior noted 3 x 1.5 cm fluid collection at the posterior table of the sternum now measures 2 cm x 0.8 cm and no longer contains a focus of air.    VISUALIZED UPPER ABDOMEN: Small amount of ascites is noted. Atherosclerotic aorta. Small volume pneumoperitoneum.    BONES: Degenerative changes.    IMPRESSION:    Small volume pneumoperitoneum.    Sternal fluid collection has decreased in size, now measuring 2 x 0.8 cm at the posterior table of the sternum.    Small amount of ascites.    Minimal left basilar atelectasis.    The above findings were discussed with CARLYLE Sharpe at 9:59 AM on 12/30/20 with read back confirmation.      < end of copied text >  < from: Xray Foot AP + Lateral, Left (12.29.20 @ 22:02) >  XAM:  FOOT 2VIEWS LT                            PROCEDURE DATE:  12/29/2020            INTERPRETATION:  CLINICAL INDICATION: Left foot pain and swelling. Wound over the left hallux. Question osteomyelitis.  TECHNIQUE: AP and lateral views of the left foot.    COMPARISON: None available.    FINDINGS:    The reported soft tissue wound is not well visualized. No cortical destruction or periosteal new bone formation. No acute fracture. No dislocation. Cartilage spaces are maintained. Normal alignment of the tarsometatarsal joints. No abnormal soft tissue swelling. Vascular calcifications. Small plantar calcaneal heel spur. No tracking subcutaneous emphysema. No radiopaque foreign body.        IMPRESSION:  No radiographic evidence of advanced osteomyelitis.      < from: CT Head No Cont (12.29.20 @ 17:00) >  XAM:  CT BRAIN                            PROCEDURE DATE:  12/29/2020            INTERPRETATION:  CLINICAL STATEMENT: Generalized weakness    TECHNIQUE: CT of the head was performed without IV contrast.  RAPID artificial intelligence was utilized for the preliminary evaluation of intracranial hemorrhage.    COMPARISON: None.    FINDINGS:  There is moderate diffuse parenchymal volume loss. There are areas of low attenuation in the periventricular white matter likely related to mild chronic microvascular ischemic changes.    There is no acute intracranial hemorrhage, parenchymal mass, mass effect or midline shift. There is no acute territorial infarct. There is no hydrocephalus.    The cranium is intact. The visualized paranasal sinuses are well-aerated.    IMPRESSION:  No acute    < end of copied text >      < end of copied text >            Assessment:  ESRD, DM, cad. recent sternotomy for broken wire during cardiac cath, recent stay for transient fever and low bp, no infection found, returns with FTT, hypotension. No fever, no leukocytosis, no infection apparent on exam, pd catheter not tender and no report of cloudy fluid and recent cx neg. Dont find support for infection as explanation for low bp at present. On midocrine, maybe diabetic autonomic neuropathy? maybe he is inadequately dialyzed?  Plan:  Follow up final blood cx  send pd fluid for cx and cell ct  will hold further antibiotics for now

## 2020-12-31 NOTE — PROGRESS NOTE ADULT - ASSESSMENT
CATH 11/30/2020:  COMPLICATIONS: The stent was deployed without difficulty. On removal of the  balloon, the shaft broke and the tip of the balloon remained in the vessel. Several attempts were made to snare the balloon unsuccessfully. Dr. Verdugo was notifed who will take the patient to the operating room.  DIAGNOSTIC RECOMMENDATIONS: The patient should continue with the present medications. The patients vessel was stented without difficulty On removal of the balloon, the shaft broke with the baloon stuck in the left main. All attempts to snare the balloon out failed and the patient was taken to the OR by Dr. Arango to remove the balloon  introp eleonora 11/30/20: mild to mod MR, < from: Intra-Operative Transesophageal Echo (11.30.20 @ 17:02) >  Severe global left ventricular systolic dysfunction. Inferior and inferolateral akinesis.  severe hypokinesis of other segments.  Severe global hypokinesia.  Normal left ventricular internal dimensions and wall thicknesses. Moderate diastolic dysfunction (Stage II).  echo 12/17/20: EF 32%, mod MR, Severe global left ventricular systolic dysfunction, moderate pulmonary pressures  echo 12/20/20: EF (Visual Estimate): 25-30 % mild MR, Akinesis of the inferolateral, anterolateral, apical laterlal, inferior, and inferoseptal walls. Severe left ventricular enlargement. No left ventricular thrombus is seen.      a/p  55y male with esrd on PD for 5 years, dm poorly controlled, systolic CHF, CAD, s/p CABG, s/p PCI to OM c/b by failure to remove stent balloon requiring open heart surgery for removal, admitted with  hypotension,  fever and weakness    1. Ischemic Cardiomyopathy. Chronic systolic HF  -cv stable  -Repeat echo with unchanged lv sys dysfx, ef 25- 30%  -not new, intraop eleonora with similar LV dysfxn and regional abnormalities  -BB on hold due to orthostatic hypotension , no acei/arb for now   -continue midodrine for bp support  -EP f/u     2. CAD, s/p CABG, s/p PCI, s/p redo open heart for stent balloon retrieval   -CV stable  -c/w asa, Brilinta, statin     3. Weakness, R/O sepsis  -abx per id- work up per ID, bc 12/30 neg  -CT chest 12/30  noted  Small volume pneumoperitoneum. Sternal fluid collection has decreased in size    4. ESRD  -on PD  -renal f/u    5. Syncope   - 12/30 brief LOC while being adjusted from laying to sitting position   - likely vasovagal secondary to orthostatic hypotension, no events on tele  - c/w midodrine to 20 mg TID   - infectious work up   - repeat orthostatic BP    dvt ppx

## 2020-12-31 NOTE — PROGRESS NOTE ADULT - ASSESSMENT
55 y.o. male with esrd on PD for 5 years, T2DM, cad prior cabg. He underwent cardiac cath and angioplasty about 2 weeks ago due to cp and abnormal ETT with cad. Admitted for fever and dizziness. 56 yo man w/ DM,  ESRD on PD, CAD s/p CABG, underwent cardiac cath s/p PCI with broken balloon wire during cath, s/p redo open heart for stent balloon retrieval and recent admission for dizziness transient fever, infectious work up negative, and now presenting for LE weakness.  Endocrine consulted fro DM management and hyperglycemia.    1.  Type 2 diabetes mellitus with hyperglycemia, with long-term current use of insulin.  Glucose currently at goal   -T2DM uncontrolled. Hba1c 7.8 12/2020. Pt. repors occ. hypoglycemia at home. Low glucoses values here as well.  -Recommend decrease lantus to 8 units qhs   -recommend low dose SS TIDAC/qhs. No standing pre-meal insulin given decreased appetite.  -CC diet   -Monitor FS's TIDAC/qhs    discharge  Basal+victoza   f/u Dr. Villegas.    2.  Essential hypertension.  Recommendation: BP variable, w/ hypotension on admission, but improving.     3.  Other hyperlipidemia.  Recommendation: on lipitor at home, restart if no contraindications  f/u w/ cardiology.

## 2020-12-31 NOTE — PROGRESS NOTE ADULT - SUBJECTIVE AND OBJECTIVE BOX
Contact info:   Shravan López MD  pager [phone], please provide 10 digit call back number.   Please note that this patient may be followed by another provider tomorrow.   If no answer or after hours, please contact [phone]    History:    Interval History/Subjective:    MEDICATIONS  (STANDING):  aspirin enteric coated 81 milliGRAM(s) Oral daily  atorvastatin 80 milliGRAM(s) Oral at bedtime  cadexomer iodine 0.9% Gel 1 Application(s) Topical daily  cefepime   IVPB      cefepime   IVPB 500 milliGRAM(s) IV Intermittent every 24 hours  dextrose 40% Gel 15 Gram(s) Oral once  dextrose 5%. 1000 milliLiter(s) (50 mL/Hr) IV Continuous <Continuous>  dextrose 5%. 1000 milliLiter(s) (100 mL/Hr) IV Continuous <Continuous>  dextrose 50% Injectable 25 Gram(s) IV Push once  dextrose 50% Injectable 12.5 Gram(s) IV Push once  dextrose 50% Injectable 25 Gram(s) IV Push once  ferrous    sulfate 325 milliGRAM(s) Oral daily  folic acid 1 milliGRAM(s) Oral daily  glucagon  Injectable 1 milliGRAM(s) IntraMuscular once  heparin   Injectable 5000 Unit(s) SubCutaneous every 12 hours  insulin glargine Injectable (LANTUS) 8 Unit(s) SubCutaneous at bedtime  insulin lispro (ADMELOG) corrective regimen sliding scale   SubCutaneous three times a day before meals  insulin lispro (ADMELOG) corrective regimen sliding scale   SubCutaneous at bedtime  midodrine. 20 milliGRAM(s) Oral three times a day  pantoprazole    Tablet 40 milliGRAM(s) Oral before breakfast  sevelamer carbonate 800 milliGRAM(s) Oral three times a day with meals  ticagrelor 90 milliGRAM(s) Oral every 12 hours    MEDICATIONS  (PRN):      Allergies    doxazosin (Other)    Intolerances      Review of Systems:  Constitutional: No fever  Eyes: No blurry vision  Neuro: No tremors  HEENT: No pain  Cardiovascular: No chest pain, palpitations  Respiratory: No SOB, no cough  GI: No nausea, vomiting, abdominal pain  : No dysuria  Skin: no rash  Psych: no depression  Endocrine: no polyuria, polydipsia  Hem/lymph: no swelling    ALL OTHER SYSTEMS REVIEWED AND NEGATIVE    PHYSICAL EXAM:  VITALS: T(C): 36.4 (12-31-20 @ 09:30)  T(F): 97.5 (12-31-20 @ 09:30), Max: 98.8 (12-31-20 @ 00:23)  HR: 88 (12-31-20 @ 09:30) (88 - 96)  BP: 115/76 (12-31-20 @ 09:30) (92/57 - 137/79)  RR:  (18 - 19)  SpO2:  (99% - 100%)  Wt(kg): --  GENERAL: NAD, well-groomed, well-developed  EYES: No proptosis, no lid lag, anicteric  HEENT:  Atraumatic, Normocephalic, moist mucous membranes  THYROID: Normal size, no palpable nodules  RESPIRATORY: Clear to auscultation bilaterally; No rales, rhonchi, wheezing, or rubs  CARDIOVASCULAR: Regular rate and rhythm; No murmurs; no peripheral edema  GI: Soft, nontender, non distended, normal bowel sounds  SKIN: Dry, intact, No rashes or lesions  MUSCULOSKELETAL: Full range of motion, normal strength  NEURO: sensation intact, extraocular movements intact, no tremor, normal reflexes  PSYCH: Alert and oriented x 3, normal affect, normal mood  CUSHING'S SIGNS: no striae    POCT Blood Glucose.: 168 mg/dL (12-31-20 @ 11:56)  POCT Blood Glucose.: 93 mg/dL (12-31-20 @ 08:14)  POCT Blood Glucose.: 124 mg/dL (12-30-20 @ 22:40)  POCT Blood Glucose.: 85 mg/dL (12-30-20 @ 21:04)  POCT Blood Glucose.: 166 mg/dL (12-30-20 @ 17:18)  POCT Blood Glucose.: 98 mg/dL (12-30-20 @ 12:08)  POCT Blood Glucose.: 75 mg/dL (12-30-20 @ 07:58)  POCT Blood Glucose.: 65 mg/dL (12-30-20 @ 07:57)  POCT Blood Glucose.: 187 mg/dL (12-30-20 @ 00:08)  POCT Blood Glucose.: 134 mg/dL (12-29-20 @ 22:43)  POCT Blood Glucose.: 112 mg/dL (12-29-20 @ 22:26)  POCT Blood Glucose.: 65 mg/dL (12-29-20 @ 21:47)  POCT Blood Glucose.: 56 mg/dL (12-29-20 @ 21:25)  POCT Blood Glucose.: 49 mg/dL (12-29-20 @ 21:23)  POCT Blood Glucose.: 191 mg/dL (12-29-20 @ 13:53)      12-31    131<L>  |  90<L>  |  44<H>  ----------------------------<  101<H>  3.7   |  24  |  13.74<H>    EGFR if : 4<L>  EGFR if non : 4<L>    Ca    8.8      12-31  Mg     2.4     12-29  Phos  5.9     12-29    TPro  7.0  /  Alb  3.3  /  TBili  0.4  /  DBili  x   /  AST  19  /  ALT  10  /  AlkPhos  88  12-29        Thyroid Function Tests:                     Contact info:   Shravan López MD  pager [phone], please provide 10 digit call back number.   Please note that this patient may be followed by another provider tomorrow.   If no answer or after hours, please contact [phone]    Interval History/Subjective: No acute event overnight. Patient reports feeling dizzy when standing.     MEDICATIONS  (STANDING):  aspirin enteric coated 81 milliGRAM(s) Oral daily  atorvastatin 80 milliGRAM(s) Oral at bedtime  cadexomer iodine 0.9% Gel 1 Application(s) Topical daily  cefepime   IVPB      cefepime   IVPB 500 milliGRAM(s) IV Intermittent every 24 hours  dextrose 40% Gel 15 Gram(s) Oral once  dextrose 5%. 1000 milliLiter(s) (50 mL/Hr) IV Continuous <Continuous>  dextrose 5%. 1000 milliLiter(s) (100 mL/Hr) IV Continuous <Continuous>  dextrose 50% Injectable 25 Gram(s) IV Push once  dextrose 50% Injectable 12.5 Gram(s) IV Push once  dextrose 50% Injectable 25 Gram(s) IV Push once  ferrous    sulfate 325 milliGRAM(s) Oral daily  folic acid 1 milliGRAM(s) Oral daily  glucagon  Injectable 1 milliGRAM(s) IntraMuscular once  heparin   Injectable 5000 Unit(s) SubCutaneous every 12 hours  insulin glargine Injectable (LANTUS) 8 Unit(s) SubCutaneous at bedtime  insulin lispro (ADMELOG) corrective regimen sliding scale   SubCutaneous three times a day before meals  insulin lispro (ADMELOG) corrective regimen sliding scale   SubCutaneous at bedtime  midodrine. 20 milliGRAM(s) Oral three times a day  pantoprazole    Tablet 40 milliGRAM(s) Oral before breakfast  sevelamer carbonate 800 milliGRAM(s) Oral three times a day with meals  ticagrelor 90 milliGRAM(s) Oral every 12 hours    MEDICATIONS  (PRN):      Allergies    doxazosin (Other)    Intolerances      Review of Systems:  Constitutional: +dizziness.    Eyes: No blurry vision  Neuro: No tremors  HEENT: No pain  Cardiovascular: No chest pain, palpitations  Respiratory: No SOB, no cough  GI: No nausea, vomiting, abdominal pain  : No dysuria  Skin: no rash  Psych: no depression  Endocrine: no polyuria, polydipsia  Hem/lymph: no swelling    ALL OTHER SYSTEMS REVIEWED AND NEGATIVE    PHYSICAL EXAM:  VITALS: T(C): 36.4 (12-31-20 @ 09:30)  T(F): 97.5 (12-31-20 @ 09:30), Max: 98.8 (12-31-20 @ 00:23)  HR: 88 (12-31-20 @ 09:30) (88 - 96)  BP: 115/76 (12-31-20 @ 09:30) (92/57 - 137/79)  RR:  (18 - 19)  SpO2:  (99% - 100%)  Wt(kg): --  GENERAL: NAD, well-groomed, well-developed  EYES: No proptosis, no lid lag, anicteric  HEENT:  Atraumatic, Normocephalic, moist mucous membranes  THYROID: Normal size, no palpable nodules  RESPIRATORY: Clear to auscultation bilaterally; No rales, rhonchi, wheezing, or rubs  CARDIOVASCULAR: Regular rate and rhythm; No murmurs; no peripheral edema  GI: Soft, nontender, non distended, normal bowel sounds  SKIN: Dry, intact, No rashes or lesions  MUSCULOSKELETAL: Full range of motion, normal strength  NEURO: sensation intact, extraocular movements intact, no tremor, normal reflexes  PSYCH: Alert and oriented x 3, normal affect, normal mood  CUSHING'S SIGNS: no striae    CAPILLARY BLOOD GLUCOSE  POCT Blood Glucose.: 168 mg/dL (12-31-20 @ 11:56)  POCT Blood Glucose.: 93 mg/dL (12-31-20 @ 08:14)  POCT Blood Glucose.: 124 mg/dL (12-30-20 @ 22:40)  POCT Blood Glucose.: 85 mg/dL (12-30-20 @ 21:04)  POCT Blood Glucose.: 166 mg/dL (12-30-20 @ 17:18)  POCT Blood Glucose.: 98 mg/dL (12-30-20 @ 12:08)  POCT Blood Glucose.: 75 mg/dL (12-30-20 @ 07:58)  POCT Blood Glucose.: 65 mg/dL (12-30-20 @ 07:57)  POCT Blood Glucose.: 187 mg/dL (12-30-20 @ 00:08)  POCT Blood Glucose.: 134 mg/dL (12-29-20 @ 22:43)  POCT Blood Glucose.: 112 mg/dL (12-29-20 @ 22:26)  POCT Blood Glucose.: 65 mg/dL (12-29-20 @ 21:47)  POCT Blood Glucose.: 56 mg/dL (12-29-20 @ 21:25)  POCT Blood Glucose.: 49 mg/dL (12-29-20 @ 21:23)  POCT Blood Glucose.: 191 mg/dL (12-29-20 @ 13:53)      12-31    131<L>  |  90<L>  |  44<H>  ----------------------------<  101<H>  3.7   |  24  |  13.74<H>    EGFR if : 4<L>  EGFR if non : 4<L>    Ca    8.8      12-31  Mg     2.4     12-29  Phos  5.9     12-29    TPro  7.0  /  Alb  3.3  /  TBili  0.4  /  DBili  x   /  AST  19  /  ALT  10  /  AlkPhos  88  12-29    Thyroid Function Tests:

## 2020-12-31 NOTE — PROGRESS NOTE ADULT - SUBJECTIVE AND OBJECTIVE BOX
CARDIOLOGY FOLLOW UP - Dr. Best    CC no cp or sob    oob to bathroom today, reports to feel better, mild dizziness otherwise no acute events     PHYSICAL EXAM:  T(C): 36.6 (12-31-20 @ 12:23), Max: 37.1 (12-31-20 @ 00:23)  HR: 90 (12-31-20 @ 12:23) (88 - 96)  BP: 148/87 (12-31-20 @ 12:23) (92/57 - 148/87)  RR: 18 (12-31-20 @ 12:23) (18 - 19)  SpO2: 94% (12-31-20 @ 12:23) (94% - 100%)  Wt(kg): --  I&O's Summary    30 Dec 2020 07:01  -  31 Dec 2020 07:00  --------------------------------------------------------  IN: 2000 mL / OUT: 5700 mL / NET: -3700 mL        Appearance: Normal	  Cardiovascular: Normal S1 S2,RRR, No JVD, No murmurs  Respiratory: Lungs clear to auscultation	  Gastrointestinal:  Soft, Non-tender, + BS	  Extremities: Normal range of motion, No clubbing, cyanosis or edema      Home Medications:  aspirin 81 mg oral delayed release tablet: 1 tab(s) orally once a day (29 Dec 2020 18:37)  atorvastatin 80 mg oral tablet: 1 tab(s) orally once a day (at bedtime) (29 Dec 2020 18:37)  epoetin martine: injectable once a month (29 Dec 2020 18:37)  ferrous sulfate 325 mg (65 mg elemental iron) oral tablet: 1 tab(s) orally 3 times a day (29 Dec 2020 18:37)  folic acid 0.4 mg oral tablet: 2 tab(s) orally once a day (29 Dec 2020 18:37)  gabapentin 100 mg oral capsule: 1 cap(s) orally once a day (29 Dec 2020 18:37)  nortriptyline 25 mg oral capsule: 1 cap(s) orally once a day (at bedtime) (29 Dec 2020 18:37)  pantoprazole 40 mg oral delayed release tablet: 1 tab(s) orally once a day (before a meal) (29 Dec 2020 18:37)  sevelamer carbonate 800 mg oral tablet: 1 tab(s) orally 3 times a day (with meals) (29 Dec 2020 18:37)  Toujeo SoloStar 300 units/mL subcutaneous solution: 70 unit(s) subcutaneous once a day (at bedtime) (29 Dec 2020 18:37)      MEDICATIONS  (STANDING):  aspirin enteric coated 81 milliGRAM(s) Oral daily  atorvastatin 80 milliGRAM(s) Oral at bedtime  cadexomer iodine 0.9% Gel 1 Application(s) Topical daily  cefepime   IVPB      cefepime   IVPB 500 milliGRAM(s) IV Intermittent every 24 hours  dextrose 40% Gel 15 Gram(s) Oral once  dextrose 5%. 1000 milliLiter(s) (50 mL/Hr) IV Continuous <Continuous>  dextrose 5%. 1000 milliLiter(s) (100 mL/Hr) IV Continuous <Continuous>  dextrose 50% Injectable 25 Gram(s) IV Push once  dextrose 50% Injectable 12.5 Gram(s) IV Push once  dextrose 50% Injectable 25 Gram(s) IV Push once  ferrous    sulfate 325 milliGRAM(s) Oral daily  folic acid 1 milliGRAM(s) Oral daily  glucagon  Injectable 1 milliGRAM(s) IntraMuscular once  heparin   Injectable 5000 Unit(s) SubCutaneous every 12 hours  insulin glargine Injectable (LANTUS) 8 Unit(s) SubCutaneous at bedtime  insulin lispro (ADMELOG) corrective regimen sliding scale   SubCutaneous three times a day before meals  insulin lispro (ADMELOG) corrective regimen sliding scale   SubCutaneous at bedtime  midodrine. 20 milliGRAM(s) Oral three times a day  pantoprazole    Tablet 40 milliGRAM(s) Oral before breakfast  sevelamer carbonate 800 milliGRAM(s) Oral three times a day with meals  ticagrelor 90 milliGRAM(s) Oral every 12 hours      TELEMETRY: 	    ECG:  	  RADIOLOGY:   DIAGNOSTIC TESTING:  [ ] Echocardiogram:  [ ]  Catheterization:  [ ] Stress Test:    OTHER: 	    LABS:	 	    Creatine Kinase, Serum: 48 U/L [30 - 200] (12-30 @ 06:20)  CKMB Units: 3.4 ng/mL [0.0 - 6.7] (12-30 @ 06:20)  Troponin T, High Sensitivity Result: 796 ng/L [0 - 51] (12-30 @ 06:20)  Creatine Kinase, Serum: 50 U/L [30 - 200] (12-29 @ 22:35)  CKMB Units: 3.3 ng/mL [0.0 - 6.7] (12-29 @ 22:35)  Troponin T, High Sensitivity Result: 796 ng/L [0 - 51] (12-29 @ 22:35)  Troponin T, High Sensitivity Result: 812 ng/L [0 - 51] (12-29 @ 16:41)  Troponin T, High Sensitivity Result: 852 ng/L [0 - 51] (12-29 @ 14:34)                          10.7   12.99 )-----------( 301      ( 31 Dec 2020 06:11 )             34.6     12-31    131<L>  |  90<L>  |  44<H>  ----------------------------<  101<H>  3.7   |  24  |  13.74<H>    Ca    8.8      31 Dec 2020 06:11  Phos  5.9     12-29  Mg     2.4     12-29    TPro  7.0  /  Alb  3.3  /  TBili  0.4  /  DBili  x   /  AST  19  /  ALT  10  /  AlkPhos  88  12-29    PT/INR - ( 29 Dec 2020 14:34 )   PT: 14.0 sec;   INR: 1.18 ratio         PTT - ( 29 Dec 2020 14:34 )  PTT:36.3 sec         No

## 2020-12-31 NOTE — PROVIDER CONTACT NOTE (OTHER) - ACTION/TREATMENT ORDERED:
As per Dr. Gonzales, Drain pt at 2200 and leave pt dry overnight and will f/u in the morning As per Dr. Gonzales, Drain pt at 2200 and leave pt dry overnight and will f/u in the morning  f/u with Dr. Dangelo with a 2nd call to start PD early - As per Nazario Dangelo, fill with 1.5%, 2L b/w 5-6 am

## 2020-12-31 NOTE — PROGRESS NOTE ADULT - SUBJECTIVE AND OBJECTIVE BOX
Patient seen and evaluated at bedside    Chief Complaint:    HPI:    55M PMH ESRD on PD, DM, CAD s/p CABG x4, PCI, [id number], underwent cardiac cath and stent and wire broke in heart s/p sternotomy during recent admission 11/30-12/11, recently discharged 1 wk ago for dizziness and transient fever, was treated  with IV abx, now  BIBEMS for generalized weakness and hypotension x 1 wk. Since recent cardiac surgery, pt has been c/o generalized weakness. He reports he poor appetite for since he was discharged. He reports he was seen by PT and was found to be hypotensive to SBP 80s, prompting ED evaluation.   Pt also had a presyncopal episode while on the toilet . No head trauma or LOC. He reports pleuritic chest pain, only associated with cough. He otherwise denies any palps. sob,. dyspnea, le edema. fever or chills.   Recent echo  12/17/20: EF 32%, mod MR, Severe global left ventricular systolic dysfunction, moderate pulmonary pressures.  echo was unchanged compared to his intraop eleonora. Medical management was continued. He was also evaluated by EP on last admission who recommended GDMT prior to ICD.    Repeat TTE 12/20 Estimated ejection fraction 25-30%, regional abnormalities    as described.  Home meds  aspirin 81 mg oral delayed release tablet: 1 tab(s) orally once a day (29 Dec 2020 18:37)  atorvastatin 80 mg oral tablet: 1 tab(s) orally once a day (at bedtime) (29 Dec 2020 18:37)  epoetin martine: injectable once a month (29 Dec 2020 18:37)  ferrous sulfate 325 mg (65 mg elemental iron) oral tablet: 1 tab(s) orally 3 times a day (29 Dec 2020 18:37)  folic acid 0.4 mg oral tablet: 2 tab(s) orally once a day (29 Dec 2020 18:37)  gabapentin 100 mg oral capsule: 1 cap(s) orally once a day (29 Dec 2020 18:37)  nortriptyline 25 mg oral capsule: 1 cap(s) orally once a day (at bedtime) (29 Dec 2020 18:37)  pantoprazole 40 mg oral delayed release tablet: 1 tab(s) orally once a day (before a meal) (29 Dec 2020 18:37)  sevelamer carbonate 800 mg oral tablet: 1 tab(s) orally 3 times a day (with meals) (29 Dec 2020 18:37)  Toujeo SoloStar 300 units/mL subcutaneous solution: 70 unit(s) subcutaneous once a day (at bedtime) (29 Dec 2020 18:37)    Cardiac Hx  [facility] 11/30/2020:   COMPLICATIONS: The stent was deployed without difficulty. On removal of the  balloon, the shaft broke and the tip of the balloon remained in the  vessel. Several attempts were made to snare the balloon unsuccessfully.  Dr. Verdugo was notifed who will take the patient to the operating room.  DIAGNOSTIC RECOMMENDATIONS: The patient should continue with the present  medications.  The patients vessel was stented without difficulty On removal of the  balloon, the shaft broke with the baloon stuck in the left main. All  attempts to snare the balloon out failed and the patient was taken to the  OR by Dr. Arango to remove the balloon  introp eleonora 11/30/20: mild to mod MR, < from: Intra-Operative Transesophageal Echo (11.30.20 @ 17:02) >  Severe global left ventricular systolic dysfunction. Inferior and inferolateral akinesis.  severe hypokinesis of other segments.  Severe global hypokinesia.  Normal left ventricular internal dimensions and wall thicknesses. Moderate diastolic dysfunction (Stage II).  echo 12/17/20: EF 32%, mod MR, Severe global left ventricular systolic dysfunction, moderate pulmonary pressures  echo 12/20/20: EF (Visual Estimate): 25-30 % mild MR, Akinesis of the inferolateral, anterolateral, apical laterlal, inferior, and inferoseptal walls. Severe left ventricular enlargement. No left ventricular thrombus is seen.  PMHx:   HTN (hypertension)    ESRD (end stage renal disease) on dialysis    CAD, multiple vessel    Diabetes        PSHx:   S/P CABG (coronary artery bypass graft)    No significant past surgical history        Allergies:  doxazosin (Other)      Home Meds:    Current Medications:   aspirin enteric coated 81 milliGRAM(s) Oral daily  atorvastatin 80 milliGRAM(s) Oral at bedtime  cadexomer iodine 0.9% Gel 1 Application(s) Topical daily  cefepime   IVPB      cefepime   IVPB 500 milliGRAM(s) IV Intermittent every 24 hours  dextrose 40% Gel 15 Gram(s) Oral once  dextrose 5%. 1000 milliLiter(s) IV Continuous <Continuous>  dextrose 5%. 1000 milliLiter(s) IV Continuous <Continuous>  dextrose 50% Injectable 25 Gram(s) IV Push once  dextrose 50% Injectable 12.5 Gram(s) IV Push once  dextrose 50% Injectable 25 Gram(s) IV Push once  ferrous    sulfate 325 milliGRAM(s) Oral daily  folic acid 1 milliGRAM(s) Oral daily  glucagon  Injectable 1 milliGRAM(s) IntraMuscular once  heparin   Injectable 5000 Unit(s) SubCutaneous every 12 hours  insulin glargine Injectable (LANTUS) 8 Unit(s) SubCutaneous at bedtime  insulin lispro (ADMELOG) corrective regimen sliding scale   SubCutaneous three times a day before meals  insulin lispro (ADMELOG) corrective regimen sliding scale   SubCutaneous at bedtime  midodrine. 20 milliGRAM(s) Oral three times a day  pantoprazole    Tablet 40 milliGRAM(s) Oral before breakfast  sevelamer carbonate 800 milliGRAM(s) Oral three times a day with meals  ticagrelor 90 milliGRAM(s) Oral every 12 hours      FAMILY HISTORY:  FH: diabetes mellitus (Sibling)  father    FHx: lung cancer (Sibling)  bother        Social History:  Smoking History:  Alcohol Use:  Drug Use:    REVIEW OF SYSTEMS:  Constitutional:     [ ] negative [ ] fevers [ ] chills [ ] weight loss [ ] weight gain  HEENT:                  [ ] negative [ ] dry eyes [ ] eye irritation [ ] postnasal drip [ ] nasal congestion  CV:                         [ ] negative  [ ] chest pain [ ] orthopnea [ ] palpitations [ ] murmur  Resp:                     [ ] negative [ ] cough [ ] shortness of breath [ ] dyspnea [ ] wheezing [ ] sputum [ ]hemoptysis  GI:                          [ ] negative [ ] nausea [ ] vomiting [ ] diarrhea [ ] constipation [ ] abd pain [ ] dysphagia   :                        [ ] negative [ ] dysuria [ ] nocturia [ ] hematuria [ ] increased urinary frequency  Musculoskeletal: [ ] negative [ ] back pain [ ] myalgias [ ] arthralgias [ ] fracture  Skin:                       [ ] negative [ ] rash [ ] itch  Neurological:        [ ] negative [ ] headache [ ] dizziness [ ] syncope [ ] weakness [ ] numbness  Psychiatric:           [ ] negative [ ] anxiety [ ] depression  Endocrine:            [ ] negative [ ] diabetes [ ] thyroid problem  Heme/Lymph:      [ ] negative [ ] anemia [ ] bleeding problem  Allergic/Immune: [ ] negative [ ] itchy eyes [ ] nasal discharge [ ] hives [ ] angioedema    [ ] All other systems negative  [ ] Unable to assess ROS due to      Physical Exam:  T(F): 97.6 (12-31), Max: 98.8 (12-31)  HR: 92 (12-31) (89 - 96)  BP: 129/82 (12-31) (92/57 - 137/79)  RR: 18 (12-31)  SpO2: 100% (12-31)  GENERAL: No acute distress, well-developed  HEAD:  Atraumatic, Normocephalic  ENT: EOMI, PERRLA, conjunctiva and sclera clear, Neck supple, No JVD, moist mucosa  CHEST/LUNG: Clear to auscultation bilaterally; No wheeze, equal breath sounds bilaterally   BACK: No spinal tenderness  HEART: Regular rate and rhythm; No murmurs, rubs, or gallops  ABDOMEN: Soft, Nontender, Nondistended; Bowel sounds present  EXTREMITIES:  No clubbing, cyanosis, or edema  PSYCH: Nl behavior, nl affect  NEUROLOGY: AAOx3, non-focal, cranial nerves intact  SKIN: Normal color, No rashes or lesions  LINES:    Cardiovascular Diagnostic Testing:    ECG: Personally reviewed:    Echo: Personally reviewed:    Stress Testing:    Cath:    Imaging:    CXR: Personally reviewed    Labs: Personally reviewed                        10.7   12.99 )-----------( 301      ( 31 Dec 2020 06:11 )             34.6     12-31    131<L>  |  90<L>  |  44<H>  ----------------------------<  101<H>  3.7   |  24  |  13.74<H>    Ca    8.8      31 Dec 2020 06:11  Phos  5.9     12-29  Mg     2.4     12-29    TPro  7.0  /  Alb  3.3  /  TBili  0.4  /  DBili  x   /  AST  19  /  ALT  10  /  AlkPhos  88  12-29    PT/INR - ( 29 Dec 2020 14:34 )   PT: 14.0 sec;   INR: 1.18 ratio         PTT - ( 29 Dec 2020 14:34 )  PTT:36.3 sec

## 2020-12-31 NOTE — PROGRESS NOTE ADULT - ASSESSMENT
55M w/ prior CABG, recent LHC w/ PCI to OM1 requiring cardiac surgery due to balloon shaft fracture (11/30), ESRD on PD, DM. Patient admitted for dizziness, emesis and fever. EP called for question of ICD for primary prevention in setting of severely reduced EF and ischemic cardiomyopathy. Given that he is currently HDS, tele showing sinus w/ narrow QRS, had revascularization less than 90 days ago, and does not have NYHA class II or III symptoms, he will likely first benefit from GDMT prior to ICD. 55M w/ prior CABG, recent LHC w/ PCI to OM1 requiring cardiac surgery due to balloon shaft fracture (11/30), ESRD on PD, DM. Patient admitted for dizziness, emesis and fever and then discharged. Readmitted yesterday for hypotension during PT at home and positive orthostatic vitals with dizziness.    -No events on tele overnight or during his episodes of dizziness.  Remains in sinus with HRs 80s with occasional PVCs  -orthostatic vitals are positive and dizziness occurs occasionally when he stands from a seated position with no changes on tele  -please treat orthostatic hypotension 55M w/ prior CABG, recent LHC w/ PCI to OM1 requiring cardiac surgery due to balloon shaft fracture (11/30), ESRD on PD, DM. Patient admitted for dizziness, emesis and fever and then discharged. Readmitted yesterday for hypotension during PT at home and positive orthostatic vitals with dizziness.    -No events on tele overnight or during his episodes of dizziness.  Remains in sinus with HRs 80s with occasional PVCs  -orthostatic vitals are positive and dizziness occurs occasionally when he stands from a seated position with no changes on tele  -orthostatic hypotension being treated

## 2020-12-31 NOTE — PROGRESS NOTE ADULT - ASSESSMENT
55 m with    Hypotension  - s/p gentle IVF  - Midodrine  - telemetry  - Echo pending   - Cardiology follow     r/o Sepsis  - panculture  - empiric antibiotics  - ID follow    Hx of small sternal collection  - CT chest  - CT Sx eval     Foot wound  - local care  - Xray L foot  - Podiatry evaluation noted    Diabetes Mellitus   - BS control  - ADA diet   - Endocrine follow     CAD  - continue Rx    Depression  - continue Rx    ESRD  - PD  - Nephrology follow    DVT prophylaxis     PT    Pipe Beck MD pager [phone]

## 2020-12-31 NOTE — PROGRESS NOTE ADULT - SUBJECTIVE AND OBJECTIVE BOX
Podiatry pager #: [phone]/ [phone]    Patient is a 55y old  Male who presents with a chief complaint of Lower extremity weakness (29 Dec 2020 20:47)      HPI:  55M PMH ESRD on PD, DM, CAD s/p CABG x4, underwent cardiac cath and stent and wire broke in heart s/p sternotomy during recent admission 11/30-12/11, recently discharged 1 wk ago for dizziness and transient fever, was treated for culture negative sepsis, BIBEMS for generalized weakness and hypotension x 1 wk. Since recent cardiac surgery, pt has been c/o generalized weakness. Wife/son states that pt has not been getting out of bed at all at home. Was previously ambulatory. Was seen by PT today, and was found to be hypotensive to SBP 80s, prompting ED evaluation. Of note, wife states she's been checking pt's BPs at home and have been intermittently low to SBP 80-90s. Pt also had a presyncopal episode while on the toilet yesterday. No head trauma or LOC. Wife states looks like pt's eyes rolled back. Pt denies CP, SOB. No f/c, URI sx, abd pain, NVD, urinary sx. Has had poor appetite. (29 Dec 2020 20:39)      PAST MEDICAL & SURGICAL HISTORY:  HTN (hypertension)    ESRD (end stage renal disease) on dialysis    CAD, multiple vessel    Diabetes    S/P CABG (coronary artery bypass graft)        MEDICATIONS  (STANDING):  cefepime   IVPB      dextrose 40% Gel 15 Gram(s) Oral once  dextrose 5%. 1000 milliLiter(s) (50 mL/Hr) IV Continuous <Continuous>  dextrose 5%. 1000 milliLiter(s) (100 mL/Hr) IV Continuous <Continuous>  dextrose 50% Injectable 25 Gram(s) IV Push once  dextrose 50% Injectable 12.5 Gram(s) IV Push once  dextrose 50% Injectable 25 Gram(s) IV Push once  glucagon  Injectable 1 milliGRAM(s) IntraMuscular once  heparin   Injectable 5000 Unit(s) SubCutaneous every 12 hours  insulin glargine Injectable (LANTUS) 12 Unit(s) SubCutaneous at bedtime  insulin lispro (ADMELOG) corrective regimen sliding scale   SubCutaneous three times a day before meals  insulin lispro (ADMELOG) corrective regimen sliding scale   SubCutaneous at bedtime  insulin lispro Injectable (ADMELOG) 6 Unit(s) SubCutaneous before breakfast  insulin lispro Injectable (ADMELOG) 6 Unit(s) SubCutaneous before lunch  insulin lispro Injectable (ADMELOG) 6 Unit(s) SubCutaneous before dinner    MEDICATIONS  (PRN):      Allergies    doxazosin (Other)    Intolerances        VITALS:    Vital Signs Last 24 Hrs  T(C): 36.4 (29 Dec 2020 20:34), Max: 37 (29 Dec 2020 16:30)  T(F): 97.5 (29 Dec 2020 20:34), Max: 98.6 (29 Dec 2020 16:30)  HR: 81 (29 Dec 2020 22:12) (75 - 81)  BP: 100/63 (29 Dec 2020 22:12) (79/57 - 122/81)  BP(mean): --  RR: 20 (29 Dec 2020 22:12) (16 - 20)  SpO2: 97% (29 Dec 2020 22:12) (95% - 100%)    LABS:                          11.1   10.63 )-----------( 338      ( 29 Dec 2020 14:34 )             36.1       12-29    134<L>  |  90<L>  |  40<H>  ----------------------------<  168<H>  3.5   |  27  |  13.17<H>    Ca    9.5      29 Dec 2020 14:34  Phos  5.9     12-29  Mg     2.4     12-29    TPro  7.0  /  Alb  3.3  /  TBili  0.4  /  DBili  x   /  AST  19  /  ALT  10  /  AlkPhos  88  12-29      CAPILLARY BLOOD GLUCOSE      POCT Blood Glucose.: 112 mg/dL (29 Dec 2020 22:26)  POCT Blood Glucose.: 65 mg/dL (29 Dec 2020 21:47)  POCT Blood Glucose.: 56 mg/dL (29 Dec 2020 21:25)  POCT Blood Glucose.: 49 mg/dL (29 Dec 2020 21:23)  POCT Blood Glucose.: 191 mg/dL (29 Dec 2020 13:53)      PT/INR - ( 29 Dec 2020 14:34 )   PT: 14.0 sec;   INR: 1.18 ratio         PTT - ( 29 Dec 2020 14:34 )  PTT:36.3 sec    LOWER EXTREMITY PHYSICAL EXAM:    Vascular: DP 1/4, PT NP, B/L, CFT <3 seconds B/L, Temperature gradient WNL, B/L.   Neuro: Epicritic sensation reduced to the level of digits, B/L.  Musculoskeletal/Ortho: unremarkable    Left hallux dorsal eschar and superficial wound to subQ measure 0.25x 0.25cm, no periwound erythema, no drainage no malodor. Superficial wound secondary to trauma.    RADIOLOGY & ADDITIONAL STUDIES:

## 2020-12-31 NOTE — PROGRESS NOTE ADULT - SUBJECTIVE AND OBJECTIVE BOX
Patient is a 55y old  Male who presents with a chief complaint of hypotension (30 Dec 2020 09:56)      SUBJECTIVE / OVERNIGHT EVENTS: Comfortable without new complaints. Was hypotensive in am.  Review of Systems  chest pain no  palpitations no  sob no  nausea no  headache no    MEDICATIONS  (STANDING):  aspirin enteric coated 81 milliGRAM(s) Oral daily  atorvastatin 80 milliGRAM(s) Oral at bedtime  cadexomer iodine 0.9% Gel 1 Application(s) Topical daily  cefepime   IVPB      cefepime   IVPB 500 milliGRAM(s) IV Intermittent every 24 hours  dextrose 40% Gel 15 Gram(s) Oral once  dextrose 5%. 1000 milliLiter(s) (50 mL/Hr) IV Continuous <Continuous>  dextrose 5%. 1000 milliLiter(s) (100 mL/Hr) IV Continuous <Continuous>  dextrose 50% Injectable 25 Gram(s) IV Push once  dextrose 50% Injectable 12.5 Gram(s) IV Push once  dextrose 50% Injectable 25 Gram(s) IV Push once  ferrous    sulfate 325 milliGRAM(s) Oral daily  folic acid 1 milliGRAM(s) Oral daily  glucagon  Injectable 1 milliGRAM(s) IntraMuscular once  heparin   Injectable 5000 Unit(s) SubCutaneous every 12 hours  insulin glargine Injectable (LANTUS) 8 Unit(s) SubCutaneous at bedtime  insulin lispro (ADMELOG) corrective regimen sliding scale   SubCutaneous three times a day before meals  insulin lispro (ADMELOG) corrective regimen sliding scale   SubCutaneous at bedtime  midodrine. 20 milliGRAM(s) Oral three times a day  pantoprazole    Tablet 40 milliGRAM(s) Oral before breakfast  sevelamer carbonate 800 milliGRAM(s) Oral three times a day with meals  sodium chloride 0.9%. 1000 milliLiter(s) (100 mL/Hr) IV Continuous <Continuous>  ticagrelor 90 milliGRAM(s) Oral every 12 hours    MEDICATIONS  (PRN):      Vital Signs Last 24 Hrs  T(C): 36.8 (31 Dec 2020 16:03), Max: 37.1 (31 Dec 2020 00:23)  T(F): 98.3 (31 Dec 2020 16:03), Max: 98.8 (31 Dec 2020 00:23)  HR: 84 (31 Dec 2020 16:03) (84 - 96)  BP: 135/87 (31 Dec 2020 16:03) (104/69 - 148/87)  BP(mean): --  RR: 18 (31 Dec 2020 16:03) (18 - 19)  SpO2: 100% (31 Dec 2020 16:03) (94% - 100%)    PHYSICAL EXAM:  GENERAL: NAD  HEAD:  Atraumatic, Normocephalic  EYES: EOMI, PERRLA, conjunctiva and sclera clear  NECK: Supple, No JVD  CHEST/LUNG: Clear to auscultation bilaterally; No wheeze  HEART: Regular rate and rhythm; No murmurs, rubs, or gallops  ABDOMEN: Soft, Nontender, Nondistended; Bowel sounds present  EXTREMITIES:  2+ Peripheral Pulses, No clubbing, cyanosis, or edema L toe with clean wound.  PSYCH: AAOx3  NEUROLOGY: non-focal  SKIN: No rashes or lesions    LABS:                        10.7   12.99 )-----------( 301      ( 31 Dec 2020 06:11 )             34.6     12-31    131<L>  |  90<L>  |  44<H>  ----------------------------<  101<H>  3.7   |  24  |  13.74<H>    Ca    8.8      31 Dec 2020 06:11        CARDIAC MARKERS ( 30 Dec 2020 06:20 )  x     / x     / 48 U/L / x     / 3.4 ng/mL  CARDIAC MARKERS ( 29 Dec 2020 22:35 )  x     / x     / 50 U/L / x     / 3.3 ng/mL          Culture - Blood (collected 30 Dec 2020 01:05)  Source: .Blood Blood  Preliminary Report (31 Dec 2020 02:02):    No growth to date.    Culture - Blood (collected 29 Dec 2020 21:59)  Source: .Blood Blood-Peripheral  Preliminary Report (30 Dec 2020 22:02):    No growth to date.    Culture - Blood (collected 29 Dec 2020 21:59)  Source: .Blood Blood-Peripheral  Preliminary Report (30 Dec 2020 22:02):    No growth to date.      < from: TTE with Doppler (w/Cont) (12.30.20 @ 09:05) >  Conclusions:  Endocardial visualization enhanced with intravenous  injection of Ultrasonic Enhancing Agent (Definity).  Estimated ejection fraction 25-30%, regional abnormalities  as described.  *** Compared with echocardiogram of 12/17/2020, pulmonary  artery pressure appears to be lower.    < end of copied text >    RADIOLOGY & ADDITIONAL TESTS:    Imaging Personally Reviewed:    < from: CT Chest No Cont (12.30.20 @ 00:58) >  IMPRESSION:    Small volume pneumoperitoneum.    Sternal fluid collection has decreased in size, now measuring 2 x 0.8 cm at the posterior table of the sternum.    Small amount of ascites.    Minimal left basilar atelectasis.    < end of copied text >  Consultant(s) Notes Reviewed:      Care Discussed with Consultants/Other Providers:

## 2020-12-31 NOTE — PROVIDER CONTACT NOTE (OTHER) - RECOMMENDATIONS
To go accordingly with the 4 exchanges and leave dry overnight. will need to get a headstart and start filling pt in the morning and Dr. Dangelo agrees to fill pt b/w 5-6 A.M and will order dwell time

## 2020-12-31 NOTE — PROGRESS NOTE ADULT - ASSESSMENT
55M PMH ESRD on PD, DM, CAD s/p CABG x4, underwent cardiac cath and stent and wire broke in heart s/p sternotomy during recent admission 11/30-12/11, recently discharged 1 wk ago for dizziness and transient fever, was treated for culture negative sepsis, BIBEMS for generalized weakness and hypotension x 1 wk.   No stitmata to suggest peritonitis     1 Renal- Resumption of PD today    2 CVS-  [id number] po tid;  CHeck orthostasis this am     3 ID-Pan cx and on IV abx          Sayed [facility]   [phone]

## 2021-01-01 ENCOUNTER — NON-APPOINTMENT (OUTPATIENT)
Age: 56
End: 2021-01-01

## 2021-01-01 ENCOUNTER — TRANSCRIPTION ENCOUNTER (OUTPATIENT)
Age: 56
End: 2021-01-01

## 2021-01-01 ENCOUNTER — APPOINTMENT (OUTPATIENT)
Dept: CARDIOTHORACIC SURGERY | Facility: CLINIC | Age: 56
End: 2021-01-01
Payer: MEDICARE

## 2021-01-01 ENCOUNTER — INPATIENT (INPATIENT)
Facility: HOSPITAL | Age: 56
LOS: 36 days | DRG: 871 | End: 2021-08-12
Attending: INTERNAL MEDICINE | Admitting: STUDENT IN AN ORGANIZED HEALTH CARE EDUCATION/TRAINING PROGRAM
Payer: MEDICARE

## 2021-01-01 ENCOUNTER — APPOINTMENT (OUTPATIENT)
Dept: ELECTROPHYSIOLOGY | Facility: CLINIC | Age: 56
End: 2021-01-01
Payer: MEDICARE

## 2021-01-01 VITALS
SYSTOLIC BLOOD PRESSURE: 155 MMHG | OXYGEN SATURATION: 100 % | HEART RATE: 108 BPM | DIASTOLIC BLOOD PRESSURE: 92 MMHG | TEMPERATURE: 98 F | RESPIRATION RATE: 18 BRPM

## 2021-01-01 VITALS
OXYGEN SATURATION: 96 % | HEART RATE: 93 BPM | DIASTOLIC BLOOD PRESSURE: 67 MMHG | SYSTOLIC BLOOD PRESSURE: 88 MMHG | RESPIRATION RATE: 20 BRPM | TEMPERATURE: 98 F

## 2021-01-01 VITALS
HEIGHT: 67 IN | OXYGEN SATURATION: 100 % | TEMPERATURE: 98 F | SYSTOLIC BLOOD PRESSURE: 99 MMHG | HEART RATE: 112 BPM | RESPIRATION RATE: 19 BRPM | DIASTOLIC BLOOD PRESSURE: 70 MMHG

## 2021-01-01 VITALS
RESPIRATION RATE: 16 BRPM | DIASTOLIC BLOOD PRESSURE: 80 MMHG | HEIGHT: 66 IN | SYSTOLIC BLOOD PRESSURE: 125 MMHG | TEMPERATURE: 98.2 F | HEART RATE: 78 BPM | OXYGEN SATURATION: 100 %

## 2021-01-01 VITALS
HEART RATE: 97 BPM | SYSTOLIC BLOOD PRESSURE: 132 MMHG | WEIGHT: 143.3 LBS | BODY MASS INDEX: 23.03 KG/M2 | HEIGHT: 66 IN | OXYGEN SATURATION: 100 % | DIASTOLIC BLOOD PRESSURE: 82 MMHG

## 2021-01-01 DIAGNOSIS — I95.9 HYPOTENSION, UNSPECIFIED: ICD-10-CM

## 2021-01-01 DIAGNOSIS — G93.41 METABOLIC ENCEPHALOPATHY: ICD-10-CM

## 2021-01-01 DIAGNOSIS — Z71.89 OTHER SPECIFIED COUNSELING: ICD-10-CM

## 2021-01-01 DIAGNOSIS — K13.79 OTHER LESIONS OF ORAL MUCOSA: ICD-10-CM

## 2021-01-01 DIAGNOSIS — N18.6 END STAGE RENAL DISEASE: ICD-10-CM

## 2021-01-01 DIAGNOSIS — Z51.5 ENCOUNTER FOR PALLIATIVE CARE: ICD-10-CM

## 2021-01-01 DIAGNOSIS — I25.10 ATHEROSCLEROTIC HEART DISEASE OF NATIVE CORONARY ARTERY W/OUT ANGINA PECTORIS: ICD-10-CM

## 2021-01-01 DIAGNOSIS — I50.84 END STAGE HEART FAILURE: ICD-10-CM

## 2021-01-01 DIAGNOSIS — A41.9 SEPSIS, UNSPECIFIED ORGANISM: ICD-10-CM

## 2021-01-01 DIAGNOSIS — R04.0 EPISTAXIS: ICD-10-CM

## 2021-01-01 DIAGNOSIS — R52 PAIN, UNSPECIFIED: ICD-10-CM

## 2021-01-01 DIAGNOSIS — Z99.2 END STAGE RENAL DISEASE: ICD-10-CM

## 2021-01-01 DIAGNOSIS — J38.7 OTHER DISEASES OF LARYNX: ICD-10-CM

## 2021-01-01 DIAGNOSIS — R53.2 FUNCTIONAL QUADRIPLEGIA: ICD-10-CM

## 2021-01-01 DIAGNOSIS — E11.9 TYPE 2 DIABETES MELLITUS WITHOUT COMPLICATIONS: ICD-10-CM

## 2021-01-01 DIAGNOSIS — I50.1 LEFT VENTRICULAR FAILURE, UNSPECIFIED: ICD-10-CM

## 2021-01-01 DIAGNOSIS — I10 ESSENTIAL (PRIMARY) HYPERTENSION: ICD-10-CM

## 2021-01-01 DIAGNOSIS — R13.10 DYSPHAGIA, UNSPECIFIED: ICD-10-CM

## 2021-01-01 DIAGNOSIS — Z95.1 PRESENCE OF AORTOCORONARY BYPASS GRAFT: Chronic | ICD-10-CM

## 2021-01-01 DIAGNOSIS — L29.9 PRURITUS, UNSPECIFIED: ICD-10-CM

## 2021-01-01 DIAGNOSIS — E78.5 HYPERLIPIDEMIA, UNSPECIFIED: ICD-10-CM

## 2021-01-01 DIAGNOSIS — Z09 ENCOUNTER FOR FOLLOW-UP EXAMINATION AFTER COMPLETED TREATMENT FOR CONDITIONS OTHER THAN MALIGNANT NEOPLASM: ICD-10-CM

## 2021-01-01 DIAGNOSIS — I25.5 ISCHEMIC CARDIOMYOPATHY: ICD-10-CM

## 2021-01-01 DIAGNOSIS — R63.0 ANOREXIA: ICD-10-CM

## 2021-01-01 LAB
A1C WITH ESTIMATED AVERAGE GLUCOSE RESULT: 6.1 % — HIGH (ref 4–5.6)
ALBUMIN SERPL ELPH-MCNC: 1.8 G/DL — LOW (ref 3.3–5)
ALBUMIN SERPL ELPH-MCNC: 1.9 G/DL — LOW (ref 3.3–5)
ALBUMIN SERPL ELPH-MCNC: 1.9 G/DL — LOW (ref 3.3–5)
ALBUMIN SERPL ELPH-MCNC: 2 G/DL — LOW (ref 3.3–5)
ALBUMIN SERPL ELPH-MCNC: 2 G/DL — LOW (ref 3.3–5)
ALBUMIN SERPL ELPH-MCNC: 2.1 G/DL — LOW (ref 3.3–5)
ALBUMIN SERPL ELPH-MCNC: 2.2 G/DL — LOW (ref 3.3–5)
ALBUMIN SERPL ELPH-MCNC: 2.2 G/DL — LOW (ref 3.3–5)
ALBUMIN SERPL ELPH-MCNC: 2.3 G/DL — LOW (ref 3.3–5)
ALBUMIN SERPL ELPH-MCNC: 2.4 G/DL — LOW (ref 3.3–5)
ALBUMIN SERPL ELPH-MCNC: 2.4 G/DL — LOW (ref 3.3–5)
ALBUMIN SERPL ELPH-MCNC: 2.6 G/DL — LOW (ref 3.3–5)
ALBUMIN SERPL ELPH-MCNC: 2.6 G/DL — LOW (ref 3.3–5)
ALP SERPL-CCNC: 105 U/L — SIGNIFICANT CHANGE UP (ref 40–120)
ALP SERPL-CCNC: 111 U/L — SIGNIFICANT CHANGE UP (ref 40–120)
ALP SERPL-CCNC: 112 U/L — SIGNIFICANT CHANGE UP (ref 40–120)
ALP SERPL-CCNC: 113 U/L — SIGNIFICANT CHANGE UP (ref 40–120)
ALP SERPL-CCNC: 114 U/L — SIGNIFICANT CHANGE UP (ref 40–120)
ALP SERPL-CCNC: 117 U/L — SIGNIFICANT CHANGE UP (ref 40–120)
ALP SERPL-CCNC: 118 U/L — SIGNIFICANT CHANGE UP (ref 40–120)
ALP SERPL-CCNC: 122 U/L — HIGH (ref 40–120)
ALP SERPL-CCNC: 126 U/L — HIGH (ref 40–120)
ALP SERPL-CCNC: 129 U/L — HIGH (ref 40–120)
ALP SERPL-CCNC: 129 U/L — HIGH (ref 40–120)
ALP SERPL-CCNC: 131 U/L — HIGH (ref 40–120)
ALP SERPL-CCNC: 142 U/L — HIGH (ref 40–120)
ALP SERPL-CCNC: 78 U/L — SIGNIFICANT CHANGE UP (ref 40–120)
ALP SERPL-CCNC: 83 U/L — SIGNIFICANT CHANGE UP (ref 40–120)
ALT FLD-CCNC: 11 U/L — SIGNIFICANT CHANGE UP (ref 10–45)
ALT FLD-CCNC: 11 U/L — SIGNIFICANT CHANGE UP (ref 10–45)
ALT FLD-CCNC: 12 U/L — SIGNIFICANT CHANGE UP (ref 10–45)
ALT FLD-CCNC: 13 U/L — SIGNIFICANT CHANGE UP (ref 10–45)
ALT FLD-CCNC: 13 U/L — SIGNIFICANT CHANGE UP (ref 10–45)
ALT FLD-CCNC: 15 U/L — SIGNIFICANT CHANGE UP (ref 10–45)
ALT FLD-CCNC: 21 U/L — SIGNIFICANT CHANGE UP (ref 10–45)
ALT FLD-CCNC: 22 U/L — SIGNIFICANT CHANGE UP (ref 10–45)
ALT FLD-CCNC: 31 U/L — SIGNIFICANT CHANGE UP (ref 10–45)
ALT FLD-CCNC: 8 U/L — LOW (ref 10–45)
AMMONIA BLD-MCNC: 16 UMOL/L — SIGNIFICANT CHANGE UP (ref 11–55)
ANION GAP SERPL CALC-SCNC: 13 MMOL/L — SIGNIFICANT CHANGE UP (ref 5–17)
ANION GAP SERPL CALC-SCNC: 15 MMOL/L — SIGNIFICANT CHANGE UP (ref 5–17)
ANION GAP SERPL CALC-SCNC: 16 MMOL/L — SIGNIFICANT CHANGE UP (ref 5–17)
ANION GAP SERPL CALC-SCNC: 17 MMOL/L — SIGNIFICANT CHANGE UP (ref 5–17)
ANION GAP SERPL CALC-SCNC: 18 MMOL/L — HIGH (ref 5–17)
ANION GAP SERPL CALC-SCNC: 19 MMOL/L — HIGH (ref 5–17)
ANION GAP SERPL CALC-SCNC: 20 MMOL/L — HIGH (ref 5–17)
ANION GAP SERPL CALC-SCNC: 20 MMOL/L — HIGH (ref 5–17)
ANION GAP SERPL CALC-SCNC: 21 MMOL/L — HIGH (ref 5–17)
ANION GAP SERPL CALC-SCNC: 22 MMOL/L — HIGH (ref 5–17)
ANION GAP SERPL CALC-SCNC: 22 MMOL/L — HIGH (ref 5–17)
APTT BLD: 37.2 SEC — HIGH (ref 27.5–35.5)
AST SERPL-CCNC: 14 U/L — SIGNIFICANT CHANGE UP (ref 10–40)
AST SERPL-CCNC: 17 U/L — SIGNIFICANT CHANGE UP (ref 10–40)
AST SERPL-CCNC: 18 U/L — SIGNIFICANT CHANGE UP (ref 10–40)
AST SERPL-CCNC: 18 U/L — SIGNIFICANT CHANGE UP (ref 10–40)
AST SERPL-CCNC: 19 U/L — SIGNIFICANT CHANGE UP (ref 10–40)
AST SERPL-CCNC: 21 U/L — SIGNIFICANT CHANGE UP (ref 10–40)
AST SERPL-CCNC: 22 U/L — SIGNIFICANT CHANGE UP (ref 10–40)
AST SERPL-CCNC: 24 U/L — SIGNIFICANT CHANGE UP (ref 10–40)
AST SERPL-CCNC: 25 U/L — SIGNIFICANT CHANGE UP (ref 10–40)
AST SERPL-CCNC: 26 U/L — SIGNIFICANT CHANGE UP (ref 10–40)
AST SERPL-CCNC: 27 U/L — SIGNIFICANT CHANGE UP (ref 10–40)
AST SERPL-CCNC: 29 U/L — SIGNIFICANT CHANGE UP (ref 10–40)
BASE EXCESS BLDA CALC-SCNC: 0.5 MMOL/L — SIGNIFICANT CHANGE UP (ref -2–2)
BASE EXCESS BLDA CALC-SCNC: 2.6 MMOL/L — HIGH (ref -2–2)
BASE EXCESS BLDA CALC-SCNC: 3.5 MMOL/L — HIGH (ref -2–2)
BASE EXCESS BLDV CALC-SCNC: 1.1 MMOL/L — SIGNIFICANT CHANGE UP (ref -2–2)
BASOPHILS # BLD AUTO: 0.05 K/UL — SIGNIFICANT CHANGE UP (ref 0–0.2)
BASOPHILS # BLD AUTO: 0.05 K/UL — SIGNIFICANT CHANGE UP (ref 0–0.2)
BASOPHILS # BLD AUTO: 0.06 K/UL — SIGNIFICANT CHANGE UP (ref 0–0.2)
BASOPHILS # BLD AUTO: 0.07 K/UL — SIGNIFICANT CHANGE UP (ref 0–0.2)
BASOPHILS # BLD AUTO: 0.08 K/UL — SIGNIFICANT CHANGE UP (ref 0–0.2)
BASOPHILS # BLD AUTO: 0.13 K/UL — SIGNIFICANT CHANGE UP (ref 0–0.2)
BASOPHILS NFR BLD AUTO: 0.5 % — SIGNIFICANT CHANGE UP (ref 0–2)
BASOPHILS NFR BLD AUTO: 0.6 % — SIGNIFICANT CHANGE UP (ref 0–2)
BASOPHILS NFR BLD AUTO: 0.8 % — SIGNIFICANT CHANGE UP (ref 0–2)
BASOPHILS NFR BLD AUTO: 0.9 % — SIGNIFICANT CHANGE UP (ref 0–2)
BASOPHILS NFR BLD AUTO: 1 % — SIGNIFICANT CHANGE UP (ref 0–2)
BILIRUB SERPL-MCNC: 0.4 MG/DL — SIGNIFICANT CHANGE UP (ref 0.2–1.2)
BILIRUB SERPL-MCNC: 0.5 MG/DL — SIGNIFICANT CHANGE UP (ref 0.2–1.2)
BILIRUB SERPL-MCNC: 0.5 MG/DL — SIGNIFICANT CHANGE UP (ref 0.2–1.2)
BILIRUB SERPL-MCNC: 0.6 MG/DL — SIGNIFICANT CHANGE UP (ref 0.2–1.2)
BILIRUB SERPL-MCNC: 0.6 MG/DL — SIGNIFICANT CHANGE UP (ref 0.2–1.2)
BILIRUB SERPL-MCNC: 0.7 MG/DL — SIGNIFICANT CHANGE UP (ref 0.2–1.2)
BILIRUB SERPL-MCNC: 0.8 MG/DL — SIGNIFICANT CHANGE UP (ref 0.2–1.2)
BILIRUB SERPL-MCNC: 0.9 MG/DL — SIGNIFICANT CHANGE UP (ref 0.2–1.2)
BLD GP AB SCN SERPL QL: NEGATIVE — SIGNIFICANT CHANGE UP
BUN SERPL-MCNC: 31 MG/DL — HIGH (ref 7–23)
BUN SERPL-MCNC: 32 MG/DL — HIGH (ref 7–23)
BUN SERPL-MCNC: 33 MG/DL — HIGH (ref 7–23)
BUN SERPL-MCNC: 35 MG/DL — HIGH (ref 7–23)
BUN SERPL-MCNC: 36 MG/DL — HIGH (ref 7–23)
BUN SERPL-MCNC: 37 MG/DL — HIGH (ref 7–23)
BUN SERPL-MCNC: 38 MG/DL — HIGH (ref 7–23)
BUN SERPL-MCNC: 41 MG/DL — HIGH (ref 7–23)
BUN SERPL-MCNC: 42 MG/DL — HIGH (ref 7–23)
BUN SERPL-MCNC: 43 MG/DL — HIGH (ref 7–23)
BUN SERPL-MCNC: 43 MG/DL — HIGH (ref 7–23)
BUN SERPL-MCNC: 44 MG/DL — HIGH (ref 7–23)
BUN SERPL-MCNC: 44 MG/DL — HIGH (ref 7–23)
BUN SERPL-MCNC: 45 MG/DL — HIGH (ref 7–23)
BUN SERPL-MCNC: 45 MG/DL — HIGH (ref 7–23)
BUN SERPL-MCNC: 46 MG/DL — HIGH (ref 7–23)
BUN SERPL-MCNC: 47 MG/DL — HIGH (ref 7–23)
BUN SERPL-MCNC: 48 MG/DL — HIGH (ref 7–23)
BUN SERPL-MCNC: 50 MG/DL — HIGH (ref 7–23)
BUN SERPL-MCNC: 50 MG/DL — HIGH (ref 7–23)
BUN SERPL-MCNC: 51 MG/DL — HIGH (ref 7–23)
BUN SERPL-MCNC: 52 MG/DL — HIGH (ref 7–23)
BUN SERPL-MCNC: 58 MG/DL — HIGH (ref 7–23)
BUN SERPL-MCNC: 59 MG/DL — HIGH (ref 7–23)
BUN SERPL-MCNC: 60 MG/DL — HIGH (ref 7–23)
BUN SERPL-MCNC: 63 MG/DL — HIGH (ref 7–23)
BUN SERPL-MCNC: 73 MG/DL — HIGH (ref 7–23)
BUN SERPL-MCNC: 76 MG/DL — HIGH (ref 7–23)
BUN SERPL-MCNC: 80 MG/DL — HIGH (ref 7–23)
CA-I SERPL-SCNC: 1.08 MMOL/L — LOW (ref 1.12–1.3)
CALCIUM SERPL-MCNC: 7.7 MG/DL — LOW (ref 8.4–10.5)
CALCIUM SERPL-MCNC: 8.1 MG/DL — LOW (ref 8.4–10.5)
CALCIUM SERPL-MCNC: 8.2 MG/DL — LOW (ref 8.4–10.5)
CALCIUM SERPL-MCNC: 8.3 MG/DL — LOW (ref 8.4–10.5)
CALCIUM SERPL-MCNC: 8.4 MG/DL — SIGNIFICANT CHANGE UP (ref 8.4–10.5)
CALCIUM SERPL-MCNC: 8.5 MG/DL — SIGNIFICANT CHANGE UP (ref 8.4–10.5)
CALCIUM SERPL-MCNC: 8.6 MG/DL — SIGNIFICANT CHANGE UP (ref 8.4–10.5)
CALCIUM SERPL-MCNC: 8.7 MG/DL — SIGNIFICANT CHANGE UP (ref 8.4–10.5)
CALCIUM SERPL-MCNC: 8.8 MG/DL — SIGNIFICANT CHANGE UP (ref 8.4–10.5)
CALCIUM SERPL-MCNC: 8.9 MG/DL — SIGNIFICANT CHANGE UP (ref 8.4–10.5)
CALCIUM SERPL-MCNC: 9 MG/DL — SIGNIFICANT CHANGE UP (ref 8.4–10.5)
CALCIUM SERPL-MCNC: 9.1 MG/DL — SIGNIFICANT CHANGE UP (ref 8.4–10.5)
CALCIUM SERPL-MCNC: 9.1 MG/DL — SIGNIFICANT CHANGE UP (ref 8.4–10.5)
CALCIUM SERPL-MCNC: 9.2 MG/DL — SIGNIFICANT CHANGE UP (ref 8.4–10.5)
CHLORIDE BLDV-SCNC: 99 MMOL/L — SIGNIFICANT CHANGE UP (ref 96–108)
CHLORIDE SERPL-SCNC: 88 MMOL/L — LOW (ref 96–108)
CHLORIDE SERPL-SCNC: 89 MMOL/L — LOW (ref 96–108)
CHLORIDE SERPL-SCNC: 90 MMOL/L — LOW (ref 96–108)
CHLORIDE SERPL-SCNC: 91 MMOL/L — LOW (ref 96–108)
CHLORIDE SERPL-SCNC: 92 MMOL/L — LOW (ref 96–108)
CHLORIDE SERPL-SCNC: 93 MMOL/L — LOW (ref 96–108)
CHLORIDE SERPL-SCNC: 94 MMOL/L — LOW (ref 96–108)
CHLORIDE SERPL-SCNC: 95 MMOL/L — LOW (ref 96–108)
CHLORIDE SERPL-SCNC: 96 MMOL/L — SIGNIFICANT CHANGE UP (ref 96–108)
CHLORIDE SERPL-SCNC: 97 MMOL/L — SIGNIFICANT CHANGE UP (ref 96–108)
CO2 BLDA-SCNC: 26 MMOL/L — SIGNIFICANT CHANGE UP (ref 22–30)
CO2 BLDA-SCNC: 27 MMOL/L — SIGNIFICANT CHANGE UP (ref 22–30)
CO2 BLDA-SCNC: 29 MMOL/L — SIGNIFICANT CHANGE UP (ref 22–30)
CO2 BLDV-SCNC: 27 MMOL/L — SIGNIFICANT CHANGE UP (ref 22–30)
CO2 SERPL-SCNC: 18 MMOL/L — LOW (ref 22–31)
CO2 SERPL-SCNC: 19 MMOL/L — LOW (ref 22–31)
CO2 SERPL-SCNC: 20 MMOL/L — LOW (ref 22–31)
CO2 SERPL-SCNC: 21 MMOL/L — LOW (ref 22–31)
CO2 SERPL-SCNC: 22 MMOL/L — SIGNIFICANT CHANGE UP (ref 22–31)
CO2 SERPL-SCNC: 23 MMOL/L — SIGNIFICANT CHANGE UP (ref 22–31)
CO2 SERPL-SCNC: 24 MMOL/L — SIGNIFICANT CHANGE UP (ref 22–31)
CO2 SERPL-SCNC: 25 MMOL/L — SIGNIFICANT CHANGE UP (ref 22–31)
CO2 SERPL-SCNC: 26 MMOL/L — SIGNIFICANT CHANGE UP (ref 22–31)
CORTIS AM PEAK SERPL-MCNC: 12.5 UG/DL — SIGNIFICANT CHANGE UP (ref 6–18.4)
COVID-19 SPIKE DOMAIN AB INTERP: POSITIVE
COVID-19 SPIKE DOMAIN ANTIBODY RESULT: >250 U/ML — HIGH
CREAT SERPL-MCNC: 10.01 MG/DL — HIGH (ref 0.5–1.3)
CREAT SERPL-MCNC: 10.03 MG/DL — HIGH (ref 0.5–1.3)
CREAT SERPL-MCNC: 10.07 MG/DL — HIGH (ref 0.5–1.3)
CREAT SERPL-MCNC: 10.09 MG/DL — HIGH (ref 0.5–1.3)
CREAT SERPL-MCNC: 10.17 MG/DL — HIGH (ref 0.5–1.3)
CREAT SERPL-MCNC: 10.24 MG/DL — HIGH (ref 0.5–1.3)
CREAT SERPL-MCNC: 10.37 MG/DL — HIGH (ref 0.5–1.3)
CREAT SERPL-MCNC: 10.42 MG/DL — HIGH (ref 0.5–1.3)
CREAT SERPL-MCNC: 10.5 MG/DL — HIGH (ref 0.5–1.3)
CREAT SERPL-MCNC: 10.56 MG/DL — HIGH (ref 0.5–1.3)
CREAT SERPL-MCNC: 10.6 MG/DL — HIGH (ref 0.5–1.3)
CREAT SERPL-MCNC: 10.78 MG/DL — HIGH (ref 0.5–1.3)
CREAT SERPL-MCNC: 11.32 MG/DL — HIGH (ref 0.5–1.3)
CREAT SERPL-MCNC: 11.33 MG/DL — HIGH (ref 0.5–1.3)
CREAT SERPL-MCNC: 12 MG/DL — HIGH (ref 0.5–1.3)
CREAT SERPL-MCNC: 12.04 MG/DL — HIGH (ref 0.5–1.3)
CREAT SERPL-MCNC: 12.44 MG/DL — HIGH (ref 0.5–1.3)
CREAT SERPL-MCNC: 12.98 MG/DL — HIGH (ref 0.5–1.3)
CREAT SERPL-MCNC: 13.11 MG/DL — HIGH (ref 0.5–1.3)
CREAT SERPL-MCNC: 13.23 MG/DL — HIGH (ref 0.5–1.3)
CREAT SERPL-MCNC: 13.57 MG/DL — HIGH (ref 0.5–1.3)
CREAT SERPL-MCNC: 13.88 MG/DL — HIGH (ref 0.5–1.3)
CREAT SERPL-MCNC: 7.45 MG/DL — HIGH (ref 0.5–1.3)
CREAT SERPL-MCNC: 7.74 MG/DL — HIGH (ref 0.5–1.3)
CREAT SERPL-MCNC: 7.88 MG/DL — HIGH (ref 0.5–1.3)
CREAT SERPL-MCNC: 8.22 MG/DL — HIGH (ref 0.5–1.3)
CREAT SERPL-MCNC: 8.48 MG/DL — HIGH (ref 0.5–1.3)
CREAT SERPL-MCNC: 8.73 MG/DL — HIGH (ref 0.5–1.3)
CREAT SERPL-MCNC: 8.86 MG/DL — HIGH (ref 0.5–1.3)
CREAT SERPL-MCNC: 9.01 MG/DL — HIGH (ref 0.5–1.3)
CREAT SERPL-MCNC: 9.2 MG/DL — HIGH (ref 0.5–1.3)
CREAT SERPL-MCNC: 9.24 MG/DL — HIGH (ref 0.5–1.3)
CREAT SERPL-MCNC: 9.28 MG/DL — HIGH (ref 0.5–1.3)
CREAT SERPL-MCNC: 9.37 MG/DL — HIGH (ref 0.5–1.3)
CREAT SERPL-MCNC: 9.37 MG/DL — HIGH (ref 0.5–1.3)
CREAT SERPL-MCNC: 9.38 MG/DL — HIGH (ref 0.5–1.3)
CREAT SERPL-MCNC: 9.54 MG/DL — HIGH (ref 0.5–1.3)
CREAT SERPL-MCNC: 9.57 MG/DL — HIGH (ref 0.5–1.3)
CREAT SERPL-MCNC: 9.59 MG/DL — HIGH (ref 0.5–1.3)
CREAT SERPL-MCNC: 9.65 MG/DL — HIGH (ref 0.5–1.3)
CREAT SERPL-MCNC: 9.67 MG/DL — HIGH (ref 0.5–1.3)
CREAT SERPL-MCNC: 9.79 MG/DL — HIGH (ref 0.5–1.3)
CREAT SERPL-MCNC: 9.86 MG/DL — HIGH (ref 0.5–1.3)
CREAT SERPL-MCNC: 9.95 MG/DL — HIGH (ref 0.5–1.3)
CULTURE RESULTS: NO GROWTH — SIGNIFICANT CHANGE UP
CULTURE RESULTS: NO GROWTH — SIGNIFICANT CHANGE UP
CULTURE RESULTS: SIGNIFICANT CHANGE UP
EOSINOPHIL # BLD AUTO: 0.04 K/UL — SIGNIFICANT CHANGE UP (ref 0–0.5)
EOSINOPHIL # BLD AUTO: 0.21 K/UL — SIGNIFICANT CHANGE UP (ref 0–0.5)
EOSINOPHIL # BLD AUTO: 0.24 K/UL — SIGNIFICANT CHANGE UP (ref 0–0.5)
EOSINOPHIL # BLD AUTO: 0.31 K/UL — SIGNIFICANT CHANGE UP (ref 0–0.5)
EOSINOPHIL # BLD AUTO: 0.36 K/UL — SIGNIFICANT CHANGE UP (ref 0–0.5)
EOSINOPHIL # BLD AUTO: 0.42 K/UL — SIGNIFICANT CHANGE UP (ref 0–0.5)
EOSINOPHIL # BLD AUTO: 0.44 K/UL — SIGNIFICANT CHANGE UP (ref 0–0.5)
EOSINOPHIL # BLD AUTO: 0.52 K/UL — HIGH (ref 0–0.5)
EOSINOPHIL # BLD AUTO: 0.9 K/UL — HIGH (ref 0–0.5)
EOSINOPHIL NFR BLD AUTO: 0.4 % — SIGNIFICANT CHANGE UP (ref 0–6)
EOSINOPHIL NFR BLD AUTO: 2.3 % — SIGNIFICANT CHANGE UP (ref 0–6)
EOSINOPHIL NFR BLD AUTO: 2.6 % — SIGNIFICANT CHANGE UP (ref 0–6)
EOSINOPHIL NFR BLD AUTO: 3 % — SIGNIFICANT CHANGE UP (ref 0–6)
EOSINOPHIL NFR BLD AUTO: 3 % — SIGNIFICANT CHANGE UP (ref 0–6)
EOSINOPHIL NFR BLD AUTO: 3.5 % — SIGNIFICANT CHANGE UP (ref 0–6)
EOSINOPHIL NFR BLD AUTO: 5 % — SIGNIFICANT CHANGE UP (ref 0–6)
EOSINOPHIL NFR BLD AUTO: 5.5 % — SIGNIFICANT CHANGE UP (ref 0–6)
EOSINOPHIL NFR BLD AUTO: 9.1 % — HIGH (ref 0–6)
ESTIMATED AVERAGE GLUCOSE: 128 MG/DL — HIGH (ref 68–114)
FOLATE SERPL-MCNC: >20 NG/ML — SIGNIFICANT CHANGE UP
GAS PNL BLDA: SIGNIFICANT CHANGE UP
GAS PNL BLDA: SIGNIFICANT CHANGE UP
GAS PNL BLDV: 133 MMOL/L — LOW (ref 135–145)
GAS PNL BLDV: SIGNIFICANT CHANGE UP
GAS PNL BLDV: SIGNIFICANT CHANGE UP
GLUCOSE BLDC GLUCOMTR-MCNC: 100 MG/DL — HIGH (ref 70–99)
GLUCOSE BLDC GLUCOMTR-MCNC: 101 MG/DL — HIGH (ref 70–99)
GLUCOSE BLDC GLUCOMTR-MCNC: 101 MG/DL — HIGH (ref 70–99)
GLUCOSE BLDC GLUCOMTR-MCNC: 102 MG/DL — HIGH (ref 70–99)
GLUCOSE BLDC GLUCOMTR-MCNC: 102 MG/DL — HIGH (ref 70–99)
GLUCOSE BLDC GLUCOMTR-MCNC: 107 MG/DL — HIGH (ref 70–99)
GLUCOSE BLDC GLUCOMTR-MCNC: 107 MG/DL — HIGH (ref 70–99)
GLUCOSE BLDC GLUCOMTR-MCNC: 108 MG/DL — HIGH (ref 70–99)
GLUCOSE BLDC GLUCOMTR-MCNC: 108 MG/DL — HIGH (ref 70–99)
GLUCOSE BLDC GLUCOMTR-MCNC: 111 MG/DL — HIGH (ref 70–99)
GLUCOSE BLDC GLUCOMTR-MCNC: 112 MG/DL — HIGH (ref 70–99)
GLUCOSE BLDC GLUCOMTR-MCNC: 114 MG/DL — HIGH (ref 70–99)
GLUCOSE BLDC GLUCOMTR-MCNC: 114 MG/DL — HIGH (ref 70–99)
GLUCOSE BLDC GLUCOMTR-MCNC: 115 MG/DL — HIGH (ref 70–99)
GLUCOSE BLDC GLUCOMTR-MCNC: 115 MG/DL — HIGH (ref 70–99)
GLUCOSE BLDC GLUCOMTR-MCNC: 116 MG/DL — HIGH (ref 70–99)
GLUCOSE BLDC GLUCOMTR-MCNC: 117 MG/DL — HIGH (ref 70–99)
GLUCOSE BLDC GLUCOMTR-MCNC: 117 MG/DL — HIGH (ref 70–99)
GLUCOSE BLDC GLUCOMTR-MCNC: 118 MG/DL — HIGH (ref 70–99)
GLUCOSE BLDC GLUCOMTR-MCNC: 119 MG/DL — HIGH (ref 70–99)
GLUCOSE BLDC GLUCOMTR-MCNC: 120 MG/DL — HIGH (ref 70–99)
GLUCOSE BLDC GLUCOMTR-MCNC: 121 MG/DL — HIGH (ref 70–99)
GLUCOSE BLDC GLUCOMTR-MCNC: 121 MG/DL — HIGH (ref 70–99)
GLUCOSE BLDC GLUCOMTR-MCNC: 122 MG/DL — HIGH (ref 70–99)
GLUCOSE BLDC GLUCOMTR-MCNC: 124 MG/DL — HIGH (ref 70–99)
GLUCOSE BLDC GLUCOMTR-MCNC: 126 MG/DL — HIGH (ref 70–99)
GLUCOSE BLDC GLUCOMTR-MCNC: 127 MG/DL — HIGH (ref 70–99)
GLUCOSE BLDC GLUCOMTR-MCNC: 128 MG/DL — HIGH (ref 70–99)
GLUCOSE BLDC GLUCOMTR-MCNC: 131 MG/DL — HIGH (ref 70–99)
GLUCOSE BLDC GLUCOMTR-MCNC: 133 MG/DL — HIGH (ref 70–99)
GLUCOSE BLDC GLUCOMTR-MCNC: 134 MG/DL — HIGH (ref 70–99)
GLUCOSE BLDC GLUCOMTR-MCNC: 136 MG/DL — HIGH (ref 70–99)
GLUCOSE BLDC GLUCOMTR-MCNC: 138 MG/DL — HIGH (ref 70–99)
GLUCOSE BLDC GLUCOMTR-MCNC: 139 MG/DL — HIGH (ref 70–99)
GLUCOSE BLDC GLUCOMTR-MCNC: 139 MG/DL — HIGH (ref 70–99)
GLUCOSE BLDC GLUCOMTR-MCNC: 141 MG/DL — HIGH (ref 70–99)
GLUCOSE BLDC GLUCOMTR-MCNC: 142 MG/DL — HIGH (ref 70–99)
GLUCOSE BLDC GLUCOMTR-MCNC: 143 MG/DL — HIGH (ref 70–99)
GLUCOSE BLDC GLUCOMTR-MCNC: 143 MG/DL — HIGH (ref 70–99)
GLUCOSE BLDC GLUCOMTR-MCNC: 144 MG/DL — HIGH (ref 70–99)
GLUCOSE BLDC GLUCOMTR-MCNC: 145 MG/DL — HIGH (ref 70–99)
GLUCOSE BLDC GLUCOMTR-MCNC: 146 MG/DL — HIGH (ref 70–99)
GLUCOSE BLDC GLUCOMTR-MCNC: 147 MG/DL — HIGH (ref 70–99)
GLUCOSE BLDC GLUCOMTR-MCNC: 147 MG/DL — HIGH (ref 70–99)
GLUCOSE BLDC GLUCOMTR-MCNC: 148 MG/DL — HIGH (ref 70–99)
GLUCOSE BLDC GLUCOMTR-MCNC: 149 MG/DL — HIGH (ref 70–99)
GLUCOSE BLDC GLUCOMTR-MCNC: 150 MG/DL — HIGH (ref 70–99)
GLUCOSE BLDC GLUCOMTR-MCNC: 151 MG/DL — HIGH (ref 70–99)
GLUCOSE BLDC GLUCOMTR-MCNC: 152 MG/DL — HIGH (ref 70–99)
GLUCOSE BLDC GLUCOMTR-MCNC: 153 MG/DL — HIGH (ref 70–99)
GLUCOSE BLDC GLUCOMTR-MCNC: 154 MG/DL — HIGH (ref 70–99)
GLUCOSE BLDC GLUCOMTR-MCNC: 156 MG/DL — HIGH (ref 70–99)
GLUCOSE BLDC GLUCOMTR-MCNC: 157 MG/DL — HIGH (ref 70–99)
GLUCOSE BLDC GLUCOMTR-MCNC: 158 MG/DL — HIGH (ref 70–99)
GLUCOSE BLDC GLUCOMTR-MCNC: 159 MG/DL — HIGH (ref 70–99)
GLUCOSE BLDC GLUCOMTR-MCNC: 159 MG/DL — HIGH (ref 70–99)
GLUCOSE BLDC GLUCOMTR-MCNC: 160 MG/DL — HIGH (ref 70–99)
GLUCOSE BLDC GLUCOMTR-MCNC: 162 MG/DL — HIGH (ref 70–99)
GLUCOSE BLDC GLUCOMTR-MCNC: 162 MG/DL — HIGH (ref 70–99)
GLUCOSE BLDC GLUCOMTR-MCNC: 163 MG/DL — HIGH (ref 70–99)
GLUCOSE BLDC GLUCOMTR-MCNC: 164 MG/DL — HIGH (ref 70–99)
GLUCOSE BLDC GLUCOMTR-MCNC: 165 MG/DL — HIGH (ref 70–99)
GLUCOSE BLDC GLUCOMTR-MCNC: 165 MG/DL — HIGH (ref 70–99)
GLUCOSE BLDC GLUCOMTR-MCNC: 166 MG/DL — HIGH (ref 70–99)
GLUCOSE BLDC GLUCOMTR-MCNC: 168 MG/DL — HIGH (ref 70–99)
GLUCOSE BLDC GLUCOMTR-MCNC: 170 MG/DL — HIGH (ref 70–99)
GLUCOSE BLDC GLUCOMTR-MCNC: 170 MG/DL — HIGH (ref 70–99)
GLUCOSE BLDC GLUCOMTR-MCNC: 171 MG/DL — HIGH (ref 70–99)
GLUCOSE BLDC GLUCOMTR-MCNC: 171 MG/DL — HIGH (ref 70–99)
GLUCOSE BLDC GLUCOMTR-MCNC: 172 MG/DL — HIGH (ref 70–99)
GLUCOSE BLDC GLUCOMTR-MCNC: 174 MG/DL — HIGH (ref 70–99)
GLUCOSE BLDC GLUCOMTR-MCNC: 175 MG/DL — HIGH (ref 70–99)
GLUCOSE BLDC GLUCOMTR-MCNC: 177 MG/DL — HIGH (ref 70–99)
GLUCOSE BLDC GLUCOMTR-MCNC: 179 MG/DL — HIGH (ref 70–99)
GLUCOSE BLDC GLUCOMTR-MCNC: 179 MG/DL — HIGH (ref 70–99)
GLUCOSE BLDC GLUCOMTR-MCNC: 182 MG/DL — HIGH (ref 70–99)
GLUCOSE BLDC GLUCOMTR-MCNC: 182 MG/DL — HIGH (ref 70–99)
GLUCOSE BLDC GLUCOMTR-MCNC: 183 MG/DL — HIGH (ref 70–99)
GLUCOSE BLDC GLUCOMTR-MCNC: 184 MG/DL — HIGH (ref 70–99)
GLUCOSE BLDC GLUCOMTR-MCNC: 184 MG/DL — HIGH (ref 70–99)
GLUCOSE BLDC GLUCOMTR-MCNC: 185 MG/DL — HIGH (ref 70–99)
GLUCOSE BLDC GLUCOMTR-MCNC: 185 MG/DL — HIGH (ref 70–99)
GLUCOSE BLDC GLUCOMTR-MCNC: 186 MG/DL — HIGH (ref 70–99)
GLUCOSE BLDC GLUCOMTR-MCNC: 186 MG/DL — HIGH (ref 70–99)
GLUCOSE BLDC GLUCOMTR-MCNC: 188 MG/DL — HIGH (ref 70–99)
GLUCOSE BLDC GLUCOMTR-MCNC: 189 MG/DL — HIGH (ref 70–99)
GLUCOSE BLDC GLUCOMTR-MCNC: 190 MG/DL — HIGH (ref 70–99)
GLUCOSE BLDC GLUCOMTR-MCNC: 190 MG/DL — HIGH (ref 70–99)
GLUCOSE BLDC GLUCOMTR-MCNC: 193 MG/DL — HIGH (ref 70–99)
GLUCOSE BLDC GLUCOMTR-MCNC: 195 MG/DL — HIGH (ref 70–99)
GLUCOSE BLDC GLUCOMTR-MCNC: 195 MG/DL — HIGH (ref 70–99)
GLUCOSE BLDC GLUCOMTR-MCNC: 196 MG/DL — HIGH (ref 70–99)
GLUCOSE BLDC GLUCOMTR-MCNC: 197 MG/DL — HIGH (ref 70–99)
GLUCOSE BLDC GLUCOMTR-MCNC: 199 MG/DL — HIGH (ref 70–99)
GLUCOSE BLDC GLUCOMTR-MCNC: 200 MG/DL — HIGH (ref 70–99)
GLUCOSE BLDC GLUCOMTR-MCNC: 200 MG/DL — HIGH (ref 70–99)
GLUCOSE BLDC GLUCOMTR-MCNC: 202 MG/DL — HIGH (ref 70–99)
GLUCOSE BLDC GLUCOMTR-MCNC: 203 MG/DL — HIGH (ref 70–99)
GLUCOSE BLDC GLUCOMTR-MCNC: 205 MG/DL — HIGH (ref 70–99)
GLUCOSE BLDC GLUCOMTR-MCNC: 205 MG/DL — HIGH (ref 70–99)
GLUCOSE BLDC GLUCOMTR-MCNC: 206 MG/DL — HIGH (ref 70–99)
GLUCOSE BLDC GLUCOMTR-MCNC: 208 MG/DL — HIGH (ref 70–99)
GLUCOSE BLDC GLUCOMTR-MCNC: 209 MG/DL — HIGH (ref 70–99)
GLUCOSE BLDC GLUCOMTR-MCNC: 210 MG/DL — HIGH (ref 70–99)
GLUCOSE BLDC GLUCOMTR-MCNC: 211 MG/DL — HIGH (ref 70–99)
GLUCOSE BLDC GLUCOMTR-MCNC: 213 MG/DL — HIGH (ref 70–99)
GLUCOSE BLDC GLUCOMTR-MCNC: 220 MG/DL — HIGH (ref 70–99)
GLUCOSE BLDC GLUCOMTR-MCNC: 222 MG/DL — HIGH (ref 70–99)
GLUCOSE BLDC GLUCOMTR-MCNC: 226 MG/DL — HIGH (ref 70–99)
GLUCOSE BLDC GLUCOMTR-MCNC: 227 MG/DL — HIGH (ref 70–99)
GLUCOSE BLDC GLUCOMTR-MCNC: 230 MG/DL — HIGH (ref 70–99)
GLUCOSE BLDC GLUCOMTR-MCNC: 233 MG/DL — HIGH (ref 70–99)
GLUCOSE BLDC GLUCOMTR-MCNC: 237 MG/DL — HIGH (ref 70–99)
GLUCOSE BLDC GLUCOMTR-MCNC: 238 MG/DL — HIGH (ref 70–99)
GLUCOSE BLDC GLUCOMTR-MCNC: 238 MG/DL — HIGH (ref 70–99)
GLUCOSE BLDC GLUCOMTR-MCNC: 240 MG/DL — HIGH (ref 70–99)
GLUCOSE BLDC GLUCOMTR-MCNC: 240 MG/DL — HIGH (ref 70–99)
GLUCOSE BLDC GLUCOMTR-MCNC: 242 MG/DL — HIGH (ref 70–99)
GLUCOSE BLDC GLUCOMTR-MCNC: 243 MG/DL — HIGH (ref 70–99)
GLUCOSE BLDC GLUCOMTR-MCNC: 245 MG/DL — HIGH (ref 70–99)
GLUCOSE BLDC GLUCOMTR-MCNC: 245 MG/DL — HIGH (ref 70–99)
GLUCOSE BLDC GLUCOMTR-MCNC: 248 MG/DL — HIGH (ref 70–99)
GLUCOSE BLDC GLUCOMTR-MCNC: 249 MG/DL — HIGH (ref 70–99)
GLUCOSE BLDC GLUCOMTR-MCNC: 259 MG/DL — HIGH (ref 70–99)
GLUCOSE BLDC GLUCOMTR-MCNC: 276 MG/DL — HIGH (ref 70–99)
GLUCOSE BLDC GLUCOMTR-MCNC: 277 MG/DL — HIGH (ref 70–99)
GLUCOSE BLDC GLUCOMTR-MCNC: 278 MG/DL — HIGH (ref 70–99)
GLUCOSE BLDC GLUCOMTR-MCNC: 66 MG/DL — LOW (ref 70–99)
GLUCOSE BLDC GLUCOMTR-MCNC: 67 MG/DL — LOW (ref 70–99)
GLUCOSE BLDC GLUCOMTR-MCNC: 75 MG/DL — SIGNIFICANT CHANGE UP (ref 70–99)
GLUCOSE BLDC GLUCOMTR-MCNC: 76 MG/DL — SIGNIFICANT CHANGE UP (ref 70–99)
GLUCOSE BLDC GLUCOMTR-MCNC: 80 MG/DL — SIGNIFICANT CHANGE UP (ref 70–99)
GLUCOSE BLDC GLUCOMTR-MCNC: 81 MG/DL — SIGNIFICANT CHANGE UP (ref 70–99)
GLUCOSE BLDC GLUCOMTR-MCNC: 84 MG/DL — SIGNIFICANT CHANGE UP (ref 70–99)
GLUCOSE BLDC GLUCOMTR-MCNC: 88 MG/DL — SIGNIFICANT CHANGE UP (ref 70–99)
GLUCOSE BLDC GLUCOMTR-MCNC: 92 MG/DL — SIGNIFICANT CHANGE UP (ref 70–99)
GLUCOSE BLDC GLUCOMTR-MCNC: 95 MG/DL — SIGNIFICANT CHANGE UP (ref 70–99)
GLUCOSE BLDC GLUCOMTR-MCNC: 96 MG/DL — SIGNIFICANT CHANGE UP (ref 70–99)
GLUCOSE BLDC GLUCOMTR-MCNC: 97 MG/DL — SIGNIFICANT CHANGE UP (ref 70–99)
GLUCOSE BLDC GLUCOMTR-MCNC: 98 MG/DL — SIGNIFICANT CHANGE UP (ref 70–99)
GLUCOSE BLDV-MCNC: 134 MG/DL — HIGH (ref 70–99)
GLUCOSE SERPL-MCNC: 106 MG/DL — HIGH (ref 70–99)
GLUCOSE SERPL-MCNC: 107 MG/DL — HIGH (ref 70–99)
GLUCOSE SERPL-MCNC: 110 MG/DL — HIGH (ref 70–99)
GLUCOSE SERPL-MCNC: 112 MG/DL — HIGH (ref 70–99)
GLUCOSE SERPL-MCNC: 113 MG/DL — HIGH (ref 70–99)
GLUCOSE SERPL-MCNC: 113 MG/DL — HIGH (ref 70–99)
GLUCOSE SERPL-MCNC: 121 MG/DL — HIGH (ref 70–99)
GLUCOSE SERPL-MCNC: 124 MG/DL — HIGH (ref 70–99)
GLUCOSE SERPL-MCNC: 126 MG/DL — HIGH (ref 70–99)
GLUCOSE SERPL-MCNC: 129 MG/DL — HIGH (ref 70–99)
GLUCOSE SERPL-MCNC: 131 MG/DL — HIGH (ref 70–99)
GLUCOSE SERPL-MCNC: 132 MG/DL — HIGH (ref 70–99)
GLUCOSE SERPL-MCNC: 133 MG/DL — HIGH (ref 70–99)
GLUCOSE SERPL-MCNC: 134 MG/DL — HIGH (ref 70–99)
GLUCOSE SERPL-MCNC: 135 MG/DL — HIGH (ref 70–99)
GLUCOSE SERPL-MCNC: 138 MG/DL — HIGH (ref 70–99)
GLUCOSE SERPL-MCNC: 139 MG/DL — HIGH (ref 70–99)
GLUCOSE SERPL-MCNC: 140 MG/DL — HIGH (ref 70–99)
GLUCOSE SERPL-MCNC: 140 MG/DL — HIGH (ref 70–99)
GLUCOSE SERPL-MCNC: 146 MG/DL — HIGH (ref 70–99)
GLUCOSE SERPL-MCNC: 147 MG/DL — HIGH (ref 70–99)
GLUCOSE SERPL-MCNC: 148 MG/DL — HIGH (ref 70–99)
GLUCOSE SERPL-MCNC: 149 MG/DL — HIGH (ref 70–99)
GLUCOSE SERPL-MCNC: 151 MG/DL — HIGH (ref 70–99)
GLUCOSE SERPL-MCNC: 154 MG/DL — HIGH (ref 70–99)
GLUCOSE SERPL-MCNC: 157 MG/DL — HIGH (ref 70–99)
GLUCOSE SERPL-MCNC: 159 MG/DL — HIGH (ref 70–99)
GLUCOSE SERPL-MCNC: 159 MG/DL — HIGH (ref 70–99)
GLUCOSE SERPL-MCNC: 160 MG/DL — HIGH (ref 70–99)
GLUCOSE SERPL-MCNC: 163 MG/DL — HIGH (ref 70–99)
GLUCOSE SERPL-MCNC: 167 MG/DL — HIGH (ref 70–99)
GLUCOSE SERPL-MCNC: 168 MG/DL — HIGH (ref 70–99)
GLUCOSE SERPL-MCNC: 170 MG/DL — HIGH (ref 70–99)
GLUCOSE SERPL-MCNC: 173 MG/DL — HIGH (ref 70–99)
GLUCOSE SERPL-MCNC: 179 MG/DL — HIGH (ref 70–99)
GLUCOSE SERPL-MCNC: 192 MG/DL — HIGH (ref 70–99)
GLUCOSE SERPL-MCNC: 194 MG/DL — HIGH (ref 70–99)
GLUCOSE SERPL-MCNC: 199 MG/DL — HIGH (ref 70–99)
GLUCOSE SERPL-MCNC: 256 MG/DL — HIGH (ref 70–99)
GLUCOSE SERPL-MCNC: 72 MG/DL — SIGNIFICANT CHANGE UP (ref 70–99)
GLUCOSE SERPL-MCNC: 82 MG/DL — SIGNIFICANT CHANGE UP (ref 70–99)
GLUCOSE SERPL-MCNC: 84 MG/DL — SIGNIFICANT CHANGE UP (ref 70–99)
GLUCOSE SERPL-MCNC: 91 MG/DL — SIGNIFICANT CHANGE UP (ref 70–99)
GLUCOSE SERPL-MCNC: 98 MG/DL — SIGNIFICANT CHANGE UP (ref 70–99)
HCO3 BLDA-SCNC: 25 MMOL/L — SIGNIFICANT CHANGE UP (ref 21–29)
HCO3 BLDA-SCNC: 26 MMOL/L — SIGNIFICANT CHANGE UP (ref 21–29)
HCO3 BLDA-SCNC: 28 MMOL/L — SIGNIFICANT CHANGE UP (ref 21–29)
HCO3 BLDV-SCNC: 26 MMOL/L — SIGNIFICANT CHANGE UP (ref 21–29)
HCT VFR BLD CALC: 29 % — LOW (ref 39–50)
HCT VFR BLD CALC: 29.8 % — LOW (ref 39–50)
HCT VFR BLD CALC: 31 % — LOW (ref 39–50)
HCT VFR BLD CALC: 31.7 % — LOW (ref 39–50)
HCT VFR BLD CALC: 31.8 % — LOW (ref 39–50)
HCT VFR BLD CALC: 32 % — LOW (ref 39–50)
HCT VFR BLD CALC: 33.3 % — LOW (ref 39–50)
HCT VFR BLD CALC: 35.7 % — LOW (ref 39–50)
HCT VFR BLD CALC: 35.9 % — LOW (ref 39–50)
HCT VFR BLD CALC: 38.3 % — LOW (ref 39–50)
HCT VFR BLD CALC: 38.4 % — LOW (ref 39–50)
HCT VFR BLD CALC: 39.1 % — SIGNIFICANT CHANGE UP (ref 39–50)
HCT VFR BLD CALC: 39.1 % — SIGNIFICANT CHANGE UP (ref 39–50)
HCT VFR BLD CALC: 39.8 % — SIGNIFICANT CHANGE UP (ref 39–50)
HCT VFR BLD CALC: 39.9 % — SIGNIFICANT CHANGE UP (ref 39–50)
HCT VFR BLD CALC: 40 % — SIGNIFICANT CHANGE UP (ref 39–50)
HCT VFR BLD CALC: 40.4 % — SIGNIFICANT CHANGE UP (ref 39–50)
HCT VFR BLD CALC: 40.5 % — SIGNIFICANT CHANGE UP (ref 39–50)
HCT VFR BLD CALC: 40.5 % — SIGNIFICANT CHANGE UP (ref 39–50)
HCT VFR BLD CALC: 40.6 % — SIGNIFICANT CHANGE UP (ref 39–50)
HCT VFR BLD CALC: 40.7 % — SIGNIFICANT CHANGE UP (ref 39–50)
HCT VFR BLD CALC: 41.1 % — SIGNIFICANT CHANGE UP (ref 39–50)
HCT VFR BLD CALC: 41.2 % — SIGNIFICANT CHANGE UP (ref 39–50)
HCT VFR BLD CALC: 41.7 % — SIGNIFICANT CHANGE UP (ref 39–50)
HCT VFR BLD CALC: 41.9 % — SIGNIFICANT CHANGE UP (ref 39–50)
HCT VFR BLD CALC: 42.5 % — SIGNIFICANT CHANGE UP (ref 39–50)
HCT VFR BLD CALC: 43 % — SIGNIFICANT CHANGE UP (ref 39–50)
HCT VFR BLD CALC: 43.4 % — SIGNIFICANT CHANGE UP (ref 39–50)
HCT VFR BLD CALC: 43.5 % — SIGNIFICANT CHANGE UP (ref 39–50)
HCT VFR BLD CALC: 43.5 % — SIGNIFICANT CHANGE UP (ref 39–50)
HCT VFR BLD CALC: 47.3 % — SIGNIFICANT CHANGE UP (ref 39–50)
HCT VFR BLDA CALC: 35 % — LOW (ref 39–50)
HGB BLD CALC-MCNC: 11.5 G/DL — LOW (ref 13–17)
HGB BLD-MCNC: 10.2 G/DL — LOW (ref 13–17)
HGB BLD-MCNC: 10.4 G/DL — LOW (ref 13–17)
HGB BLD-MCNC: 10.7 G/DL — LOW (ref 13–17)
HGB BLD-MCNC: 11.4 G/DL — LOW (ref 13–17)
HGB BLD-MCNC: 11.8 G/DL — LOW (ref 13–17)
HGB BLD-MCNC: 11.8 G/DL — LOW (ref 13–17)
HGB BLD-MCNC: 12 G/DL — LOW (ref 13–17)
HGB BLD-MCNC: 12.1 G/DL — LOW (ref 13–17)
HGB BLD-MCNC: 12.2 G/DL — LOW (ref 13–17)
HGB BLD-MCNC: 12.3 G/DL — LOW (ref 13–17)
HGB BLD-MCNC: 12.3 G/DL — LOW (ref 13–17)
HGB BLD-MCNC: 12.5 G/DL — LOW (ref 13–17)
HGB BLD-MCNC: 12.5 G/DL — LOW (ref 13–17)
HGB BLD-MCNC: 12.7 G/DL — LOW (ref 13–17)
HGB BLD-MCNC: 12.8 G/DL — LOW (ref 13–17)
HGB BLD-MCNC: 12.9 G/DL — LOW (ref 13–17)
HGB BLD-MCNC: 13.1 G/DL — SIGNIFICANT CHANGE UP (ref 13–17)
HGB BLD-MCNC: 13.1 G/DL — SIGNIFICANT CHANGE UP (ref 13–17)
HGB BLD-MCNC: 13.8 G/DL — SIGNIFICANT CHANGE UP (ref 13–17)
HGB BLD-MCNC: 9.1 G/DL — LOW (ref 13–17)
HGB BLD-MCNC: 9.2 G/DL — LOW (ref 13–17)
HGB BLD-MCNC: 9.5 G/DL — LOW (ref 13–17)
HGB BLD-MCNC: 9.8 G/DL — LOW (ref 13–17)
HOROWITZ INDEX BLDA+IHG-RTO: 21 — SIGNIFICANT CHANGE UP
HOROWITZ INDEX BLDA+IHG-RTO: 24 — SIGNIFICANT CHANGE UP
IMM GRANULOCYTES NFR BLD AUTO: 0.2 % — SIGNIFICANT CHANGE UP (ref 0–1.5)
IMM GRANULOCYTES NFR BLD AUTO: 0.4 % — SIGNIFICANT CHANGE UP (ref 0–1.5)
IMM GRANULOCYTES NFR BLD AUTO: 0.4 % — SIGNIFICANT CHANGE UP (ref 0–1.5)
IMM GRANULOCYTES NFR BLD AUTO: 0.5 % — SIGNIFICANT CHANGE UP (ref 0–1.5)
IMM GRANULOCYTES NFR BLD AUTO: 0.6 % — SIGNIFICANT CHANGE UP (ref 0–1.5)
IMM GRANULOCYTES NFR BLD AUTO: 0.7 % — SIGNIFICANT CHANGE UP (ref 0–1.5)
INR BLD: 1.26 RATIO — HIGH (ref 0.88–1.16)
LACTATE BLDA-MCNC: 2.9 MMOL/L — HIGH (ref 0.7–2)
LACTATE BLDV-MCNC: 4.6 MMOL/L — CRITICAL HIGH (ref 0.7–2)
LACTATE SERPL-SCNC: 1.8 MMOL/L — SIGNIFICANT CHANGE UP (ref 0.7–2)
LYMPHOCYTES # BLD AUTO: 0.65 K/UL — LOW (ref 1–3.3)
LYMPHOCYTES # BLD AUTO: 0.94 K/UL — LOW (ref 1–3.3)
LYMPHOCYTES # BLD AUTO: 1.08 K/UL — SIGNIFICANT CHANGE UP (ref 1–3.3)
LYMPHOCYTES # BLD AUTO: 1.12 K/UL — SIGNIFICANT CHANGE UP (ref 1–3.3)
LYMPHOCYTES # BLD AUTO: 1.17 K/UL — SIGNIFICANT CHANGE UP (ref 1–3.3)
LYMPHOCYTES # BLD AUTO: 1.2 K/UL — SIGNIFICANT CHANGE UP (ref 1–3.3)
LYMPHOCYTES # BLD AUTO: 1.35 K/UL — SIGNIFICANT CHANGE UP (ref 1–3.3)
LYMPHOCYTES # BLD AUTO: 1.37 K/UL — SIGNIFICANT CHANGE UP (ref 1–3.3)
LYMPHOCYTES # BLD AUTO: 1.42 K/UL — SIGNIFICANT CHANGE UP (ref 1–3.3)
LYMPHOCYTES # BLD AUTO: 10.9 % — LOW (ref 13–44)
LYMPHOCYTES # BLD AUTO: 11.1 % — LOW (ref 13–44)
LYMPHOCYTES # BLD AUTO: 11.2 % — LOW (ref 13–44)
LYMPHOCYTES # BLD AUTO: 11.3 % — LOW (ref 13–44)
LYMPHOCYTES # BLD AUTO: 12.1 % — LOW (ref 13–44)
LYMPHOCYTES # BLD AUTO: 14.9 % — SIGNIFICANT CHANGE UP (ref 13–44)
LYMPHOCYTES # BLD AUTO: 17.1 % — SIGNIFICANT CHANGE UP (ref 13–44)
LYMPHOCYTES # BLD AUTO: 6.2 % — LOW (ref 13–44)
LYMPHOCYTES # BLD AUTO: 8.6 % — LOW (ref 13–44)
MAGNESIUM SERPL-MCNC: 1.8 MG/DL — SIGNIFICANT CHANGE UP (ref 1.6–2.6)
MAGNESIUM SERPL-MCNC: 1.9 MG/DL — SIGNIFICANT CHANGE UP (ref 1.6–2.6)
MAGNESIUM SERPL-MCNC: 2 MG/DL — SIGNIFICANT CHANGE UP (ref 1.6–2.6)
MAGNESIUM SERPL-MCNC: 2.1 MG/DL — SIGNIFICANT CHANGE UP (ref 1.6–2.6)
MAGNESIUM SERPL-MCNC: 2.2 MG/DL — SIGNIFICANT CHANGE UP (ref 1.6–2.6)
MAGNESIUM SERPL-MCNC: 2.3 MG/DL — SIGNIFICANT CHANGE UP (ref 1.6–2.6)
MAGNESIUM SERPL-MCNC: 2.4 MG/DL — SIGNIFICANT CHANGE UP (ref 1.6–2.6)
MAGNESIUM SERPL-MCNC: 2.6 MG/DL — SIGNIFICANT CHANGE UP (ref 1.6–2.6)
MANUAL SMEAR VERIFICATION: SIGNIFICANT CHANGE UP
MCHC RBC-ENTMCNC: 27.9 PG — SIGNIFICANT CHANGE UP (ref 27–34)
MCHC RBC-ENTMCNC: 28.1 PG — SIGNIFICANT CHANGE UP (ref 27–34)
MCHC RBC-ENTMCNC: 28.1 PG — SIGNIFICANT CHANGE UP (ref 27–34)
MCHC RBC-ENTMCNC: 28.2 PG — SIGNIFICANT CHANGE UP (ref 27–34)
MCHC RBC-ENTMCNC: 28.4 PG — SIGNIFICANT CHANGE UP (ref 27–34)
MCHC RBC-ENTMCNC: 28.4 PG — SIGNIFICANT CHANGE UP (ref 27–34)
MCHC RBC-ENTMCNC: 28.5 PG — SIGNIFICANT CHANGE UP (ref 27–34)
MCHC RBC-ENTMCNC: 28.5 PG — SIGNIFICANT CHANGE UP (ref 27–34)
MCHC RBC-ENTMCNC: 28.7 PG — SIGNIFICANT CHANGE UP (ref 27–34)
MCHC RBC-ENTMCNC: 28.8 PG — SIGNIFICANT CHANGE UP (ref 27–34)
MCHC RBC-ENTMCNC: 28.8 PG — SIGNIFICANT CHANGE UP (ref 27–34)
MCHC RBC-ENTMCNC: 28.9 PG — SIGNIFICANT CHANGE UP (ref 27–34)
MCHC RBC-ENTMCNC: 29 PG — SIGNIFICANT CHANGE UP (ref 27–34)
MCHC RBC-ENTMCNC: 29 PG — SIGNIFICANT CHANGE UP (ref 27–34)
MCHC RBC-ENTMCNC: 29.1 GM/DL — LOW (ref 32–36)
MCHC RBC-ENTMCNC: 29.1 GM/DL — LOW (ref 32–36)
MCHC RBC-ENTMCNC: 29.1 PG — SIGNIFICANT CHANGE UP (ref 27–34)
MCHC RBC-ENTMCNC: 29.2 GM/DL — LOW (ref 32–36)
MCHC RBC-ENTMCNC: 29.2 PG — SIGNIFICANT CHANGE UP (ref 27–34)
MCHC RBC-ENTMCNC: 29.2 PG — SIGNIFICANT CHANGE UP (ref 27–34)
MCHC RBC-ENTMCNC: 29.3 GM/DL — LOW (ref 32–36)
MCHC RBC-ENTMCNC: 29.3 PG — SIGNIFICANT CHANGE UP (ref 27–34)
MCHC RBC-ENTMCNC: 29.4 GM/DL — LOW (ref 32–36)
MCHC RBC-ENTMCNC: 29.4 PG — SIGNIFICANT CHANGE UP (ref 27–34)
MCHC RBC-ENTMCNC: 29.5 GM/DL — LOW (ref 32–36)
MCHC RBC-ENTMCNC: 29.5 GM/DL — LOW (ref 32–36)
MCHC RBC-ENTMCNC: 29.6 GM/DL — LOW (ref 32–36)
MCHC RBC-ENTMCNC: 29.6 PG — SIGNIFICANT CHANGE UP (ref 27–34)
MCHC RBC-ENTMCNC: 29.7 GM/DL — LOW (ref 32–36)
MCHC RBC-ENTMCNC: 29.8 GM/DL — LOW (ref 32–36)
MCHC RBC-ENTMCNC: 30 GM/DL — LOW (ref 32–36)
MCHC RBC-ENTMCNC: 30.1 GM/DL — LOW (ref 32–36)
MCHC RBC-ENTMCNC: 30.1 GM/DL — LOW (ref 32–36)
MCHC RBC-ENTMCNC: 30.2 GM/DL — LOW (ref 32–36)
MCHC RBC-ENTMCNC: 30.3 GM/DL — LOW (ref 32–36)
MCHC RBC-ENTMCNC: 30.4 GM/DL — LOW (ref 32–36)
MCHC RBC-ENTMCNC: 30.6 GM/DL — LOW (ref 32–36)
MCHC RBC-ENTMCNC: 30.8 GM/DL — LOW (ref 32–36)
MCHC RBC-ENTMCNC: 30.9 GM/DL — LOW (ref 32–36)
MCHC RBC-ENTMCNC: 31.4 GM/DL — LOW (ref 32–36)
MCHC RBC-ENTMCNC: 31.6 GM/DL — LOW (ref 32–36)
MCHC RBC-ENTMCNC: 31.9 GM/DL — LOW (ref 32–36)
MCHC RBC-ENTMCNC: 32.1 GM/DL — SIGNIFICANT CHANGE UP (ref 32–36)
MCV RBC AUTO: 100.3 FL — HIGH (ref 80–100)
MCV RBC AUTO: 89.8 FL — SIGNIFICANT CHANGE UP (ref 80–100)
MCV RBC AUTO: 90.3 FL — SIGNIFICANT CHANGE UP (ref 80–100)
MCV RBC AUTO: 90.9 FL — SIGNIFICANT CHANGE UP (ref 80–100)
MCV RBC AUTO: 91.2 FL — SIGNIFICANT CHANGE UP (ref 80–100)
MCV RBC AUTO: 92.3 FL — SIGNIFICANT CHANGE UP (ref 80–100)
MCV RBC AUTO: 92.8 FL — SIGNIFICANT CHANGE UP (ref 80–100)
MCV RBC AUTO: 94.2 FL — SIGNIFICANT CHANGE UP (ref 80–100)
MCV RBC AUTO: 94.5 FL — SIGNIFICANT CHANGE UP (ref 80–100)
MCV RBC AUTO: 94.6 FL — SIGNIFICANT CHANGE UP (ref 80–100)
MCV RBC AUTO: 94.7 FL — SIGNIFICANT CHANGE UP (ref 80–100)
MCV RBC AUTO: 95.2 FL — SIGNIFICANT CHANGE UP (ref 80–100)
MCV RBC AUTO: 95.2 FL — SIGNIFICANT CHANGE UP (ref 80–100)
MCV RBC AUTO: 95.3 FL — SIGNIFICANT CHANGE UP (ref 80–100)
MCV RBC AUTO: 95.3 FL — SIGNIFICANT CHANGE UP (ref 80–100)
MCV RBC AUTO: 95.4 FL — SIGNIFICANT CHANGE UP (ref 80–100)
MCV RBC AUTO: 95.5 FL — SIGNIFICANT CHANGE UP (ref 80–100)
MCV RBC AUTO: 95.7 FL — SIGNIFICANT CHANGE UP (ref 80–100)
MCV RBC AUTO: 95.8 FL — SIGNIFICANT CHANGE UP (ref 80–100)
MCV RBC AUTO: 96.1 FL — SIGNIFICANT CHANGE UP (ref 80–100)
MCV RBC AUTO: 96.5 FL — SIGNIFICANT CHANGE UP (ref 80–100)
MCV RBC AUTO: 96.5 FL — SIGNIFICANT CHANGE UP (ref 80–100)
MCV RBC AUTO: 96.6 FL — SIGNIFICANT CHANGE UP (ref 80–100)
MCV RBC AUTO: 96.9 FL — SIGNIFICANT CHANGE UP (ref 80–100)
MCV RBC AUTO: 97.1 FL — SIGNIFICANT CHANGE UP (ref 80–100)
MCV RBC AUTO: 97.3 FL — SIGNIFICANT CHANGE UP (ref 80–100)
MCV RBC AUTO: 97.7 FL — SIGNIFICANT CHANGE UP (ref 80–100)
MCV RBC AUTO: 97.8 FL — SIGNIFICANT CHANGE UP (ref 80–100)
MCV RBC AUTO: 98 FL — SIGNIFICANT CHANGE UP (ref 80–100)
MCV RBC AUTO: 98.1 FL — SIGNIFICANT CHANGE UP (ref 80–100)
MCV RBC AUTO: 98.2 FL — SIGNIFICANT CHANGE UP (ref 80–100)
MCV RBC AUTO: 98.4 FL — SIGNIFICANT CHANGE UP (ref 80–100)
MCV RBC AUTO: 99.3 FL — SIGNIFICANT CHANGE UP (ref 80–100)
MONOCYTES # BLD AUTO: 0.99 K/UL — HIGH (ref 0–0.9)
MONOCYTES # BLD AUTO: 1.08 K/UL — HIGH (ref 0–0.9)
MONOCYTES # BLD AUTO: 1.15 K/UL — HIGH (ref 0–0.9)
MONOCYTES # BLD AUTO: 1.18 K/UL — HIGH (ref 0–0.9)
MONOCYTES # BLD AUTO: 1.27 K/UL — HIGH (ref 0–0.9)
MONOCYTES # BLD AUTO: 1.3 K/UL — HIGH (ref 0–0.9)
MONOCYTES # BLD AUTO: 1.36 K/UL — HIGH (ref 0–0.9)
MONOCYTES # BLD AUTO: 1.94 K/UL — HIGH (ref 0–0.9)
MONOCYTES # BLD AUTO: 2.16 K/UL — HIGH (ref 0–0.9)
MONOCYTES NFR BLD AUTO: 11 % — SIGNIFICANT CHANGE UP (ref 2–14)
MONOCYTES NFR BLD AUTO: 11.9 % — SIGNIFICANT CHANGE UP (ref 2–14)
MONOCYTES NFR BLD AUTO: 12.8 % — SIGNIFICANT CHANGE UP (ref 2–14)
MONOCYTES NFR BLD AUTO: 13.6 % — SIGNIFICANT CHANGE UP (ref 2–14)
MONOCYTES NFR BLD AUTO: 14.2 % — HIGH (ref 2–14)
MONOCYTES NFR BLD AUTO: 15.4 % — HIGH (ref 2–14)
MONOCYTES NFR BLD AUTO: 15.5 % — HIGH (ref 2–14)
MONOCYTES NFR BLD AUTO: 17.2 % — HIGH (ref 2–14)
MONOCYTES NFR BLD AUTO: 9.5 % — SIGNIFICANT CHANGE UP (ref 2–14)
NEUTROPHILS # BLD AUTO: 10.07 K/UL — HIGH (ref 1.8–7.4)
NEUTROPHILS # BLD AUTO: 4.82 K/UL — SIGNIFICANT CHANGE UP (ref 1.8–7.4)
NEUTROPHILS # BLD AUTO: 5.95 K/UL — SIGNIFICANT CHANGE UP (ref 1.8–7.4)
NEUTROPHILS # BLD AUTO: 6.16 K/UL — SIGNIFICANT CHANGE UP (ref 1.8–7.4)
NEUTROPHILS # BLD AUTO: 6.29 K/UL — SIGNIFICANT CHANGE UP (ref 1.8–7.4)
NEUTROPHILS # BLD AUTO: 6.57 K/UL — SIGNIFICANT CHANGE UP (ref 1.8–7.4)
NEUTROPHILS # BLD AUTO: 7.87 K/UL — HIGH (ref 1.8–7.4)
NEUTROPHILS # BLD AUTO: 8.48 K/UL — HIGH (ref 1.8–7.4)
NEUTROPHILS # BLD AUTO: 8.69 K/UL — HIGH (ref 1.8–7.4)
NEUTROPHILS NFR BLD AUTO: 61.1 % — SIGNIFICANT CHANGE UP (ref 43–77)
NEUTROPHILS NFR BLD AUTO: 64.6 % — SIGNIFICANT CHANGE UP (ref 43–77)
NEUTROPHILS NFR BLD AUTO: 66.4 % — SIGNIFICANT CHANGE UP (ref 43–77)
NEUTROPHILS NFR BLD AUTO: 68.9 % — SIGNIFICANT CHANGE UP (ref 43–77)
NEUTROPHILS NFR BLD AUTO: 70.2 % — SIGNIFICANT CHANGE UP (ref 43–77)
NEUTROPHILS NFR BLD AUTO: 70.3 % — SIGNIFICANT CHANGE UP (ref 43–77)
NEUTROPHILS NFR BLD AUTO: 72.4 % — SIGNIFICANT CHANGE UP (ref 43–77)
NEUTROPHILS NFR BLD AUTO: 75.3 % — SIGNIFICANT CHANGE UP (ref 43–77)
NEUTROPHILS NFR BLD AUTO: 81.3 % — HIGH (ref 43–77)
NRBC # BLD: 0 /100 WBCS — SIGNIFICANT CHANGE UP (ref 0–0)
PCO2 BLDA: 37 MMHG — SIGNIFICANT CHANGE UP (ref 32–46)
PCO2 BLDA: 41 MMHG — SIGNIFICANT CHANGE UP (ref 32–46)
PCO2 BLDA: 42 MMHG — SIGNIFICANT CHANGE UP (ref 32–46)
PCO2 BLDV: 42 MMHG — SIGNIFICANT CHANGE UP (ref 35–50)
PH BLDA: 7.39 — SIGNIFICANT CHANGE UP (ref 7.35–7.45)
PH BLDA: 7.44 — SIGNIFICANT CHANGE UP (ref 7.35–7.45)
PH BLDA: 7.46 — HIGH (ref 7.35–7.45)
PH BLDV: 7.4 — SIGNIFICANT CHANGE UP (ref 7.35–7.45)
PHOSPHATE SERPL-MCNC: 4.6 MG/DL — HIGH (ref 2.5–4.5)
PHOSPHATE SERPL-MCNC: 4.7 MG/DL — HIGH (ref 2.5–4.5)
PHOSPHATE SERPL-MCNC: 4.7 MG/DL — HIGH (ref 2.5–4.5)
PHOSPHATE SERPL-MCNC: 4.8 MG/DL — HIGH (ref 2.5–4.5)
PHOSPHATE SERPL-MCNC: 4.9 MG/DL — HIGH (ref 2.5–4.5)
PHOSPHATE SERPL-MCNC: 5 MG/DL — HIGH (ref 2.5–4.5)
PHOSPHATE SERPL-MCNC: 5 MG/DL — HIGH (ref 2.5–4.5)
PHOSPHATE SERPL-MCNC: 5.3 MG/DL — HIGH (ref 2.5–4.5)
PHOSPHATE SERPL-MCNC: 5.3 MG/DL — HIGH (ref 2.5–4.5)
PHOSPHATE SERPL-MCNC: 5.4 MG/DL — HIGH (ref 2.5–4.5)
PHOSPHATE SERPL-MCNC: 5.7 MG/DL — HIGH (ref 2.5–4.5)
PHOSPHATE SERPL-MCNC: 5.8 MG/DL — HIGH (ref 2.5–4.5)
PHOSPHATE SERPL-MCNC: 5.8 MG/DL — HIGH (ref 2.5–4.5)
PHOSPHATE SERPL-MCNC: 6.1 MG/DL — HIGH (ref 2.5–4.5)
PHOSPHATE SERPL-MCNC: 6.2 MG/DL — HIGH (ref 2.5–4.5)
PHOSPHATE SERPL-MCNC: 6.2 MG/DL — HIGH (ref 2.5–4.5)
PHOSPHATE SERPL-MCNC: 6.3 MG/DL — HIGH (ref 2.5–4.5)
PHOSPHATE SERPL-MCNC: 6.4 MG/DL — HIGH (ref 2.5–4.5)
PHOSPHATE SERPL-MCNC: 6.6 MG/DL — HIGH (ref 2.5–4.5)
PHOSPHATE SERPL-MCNC: 6.6 MG/DL — HIGH (ref 2.5–4.5)
PHOSPHATE SERPL-MCNC: 6.7 MG/DL — HIGH (ref 2.5–4.5)
PLAT MORPH BLD: NORMAL — SIGNIFICANT CHANGE UP
PLATELET # BLD AUTO: 192 K/UL — SIGNIFICANT CHANGE UP (ref 150–400)
PLATELET # BLD AUTO: 196 K/UL — SIGNIFICANT CHANGE UP (ref 150–400)
PLATELET # BLD AUTO: 201 K/UL — SIGNIFICANT CHANGE UP (ref 150–400)
PLATELET # BLD AUTO: 206 K/UL — SIGNIFICANT CHANGE UP (ref 150–400)
PLATELET # BLD AUTO: 206 K/UL — SIGNIFICANT CHANGE UP (ref 150–400)
PLATELET # BLD AUTO: 207 K/UL — SIGNIFICANT CHANGE UP (ref 150–400)
PLATELET # BLD AUTO: 208 K/UL — SIGNIFICANT CHANGE UP (ref 150–400)
PLATELET # BLD AUTO: 210 K/UL — SIGNIFICANT CHANGE UP (ref 150–400)
PLATELET # BLD AUTO: 211 K/UL — SIGNIFICANT CHANGE UP (ref 150–400)
PLATELET # BLD AUTO: 220 K/UL — SIGNIFICANT CHANGE UP (ref 150–400)
PLATELET # BLD AUTO: 229 K/UL — SIGNIFICANT CHANGE UP (ref 150–400)
PLATELET # BLD AUTO: 233 K/UL — SIGNIFICANT CHANGE UP (ref 150–400)
PLATELET # BLD AUTO: 236 K/UL — SIGNIFICANT CHANGE UP (ref 150–400)
PLATELET # BLD AUTO: 240 K/UL — SIGNIFICANT CHANGE UP (ref 150–400)
PLATELET # BLD AUTO: 240 K/UL — SIGNIFICANT CHANGE UP (ref 150–400)
PLATELET # BLD AUTO: 242 K/UL — SIGNIFICANT CHANGE UP (ref 150–400)
PLATELET # BLD AUTO: 245 K/UL — SIGNIFICANT CHANGE UP (ref 150–400)
PLATELET # BLD AUTO: 247 K/UL — SIGNIFICANT CHANGE UP (ref 150–400)
PLATELET # BLD AUTO: 248 K/UL — SIGNIFICANT CHANGE UP (ref 150–400)
PLATELET # BLD AUTO: 249 K/UL — SIGNIFICANT CHANGE UP (ref 150–400)
PLATELET # BLD AUTO: 250 K/UL — SIGNIFICANT CHANGE UP (ref 150–400)
PLATELET # BLD AUTO: 252 K/UL — SIGNIFICANT CHANGE UP (ref 150–400)
PLATELET # BLD AUTO: 252 K/UL — SIGNIFICANT CHANGE UP (ref 150–400)
PLATELET # BLD AUTO: 254 K/UL — SIGNIFICANT CHANGE UP (ref 150–400)
PLATELET # BLD AUTO: 256 K/UL — SIGNIFICANT CHANGE UP (ref 150–400)
PLATELET # BLD AUTO: 258 K/UL — SIGNIFICANT CHANGE UP (ref 150–400)
PLATELET # BLD AUTO: 258 K/UL — SIGNIFICANT CHANGE UP (ref 150–400)
PLATELET # BLD AUTO: 260 K/UL — SIGNIFICANT CHANGE UP (ref 150–400)
PLATELET # BLD AUTO: 265 K/UL — SIGNIFICANT CHANGE UP (ref 150–400)
PLATELET # BLD AUTO: 268 K/UL — SIGNIFICANT CHANGE UP (ref 150–400)
PLATELET # BLD AUTO: 277 K/UL — SIGNIFICANT CHANGE UP (ref 150–400)
PLATELET # BLD AUTO: 279 K/UL — SIGNIFICANT CHANGE UP (ref 150–400)
PLATELET # BLD AUTO: 280 K/UL — SIGNIFICANT CHANGE UP (ref 150–400)
PLATELET # BLD AUTO: 290 K/UL — SIGNIFICANT CHANGE UP (ref 150–400)
PLATELET # BLD AUTO: 301 K/UL — SIGNIFICANT CHANGE UP (ref 150–400)
PO2 BLDA: 104 MMHG — SIGNIFICANT CHANGE UP (ref 74–108)
PO2 BLDA: 33 MMHG — CRITICAL LOW (ref 74–108)
PO2 BLDA: 58 MMHG — LOW (ref 74–108)
PO2 BLDV: 38 MMHG — SIGNIFICANT CHANGE UP (ref 25–45)
POTASSIUM BLDV-SCNC: 3.5 MMOL/L — SIGNIFICANT CHANGE UP (ref 3.5–5.3)
POTASSIUM SERPL-MCNC: 3 MMOL/L — LOW (ref 3.5–5.3)
POTASSIUM SERPL-MCNC: 3.1 MMOL/L — LOW (ref 3.5–5.3)
POTASSIUM SERPL-MCNC: 3.2 MMOL/L — LOW (ref 3.5–5.3)
POTASSIUM SERPL-MCNC: 3.2 MMOL/L — LOW (ref 3.5–5.3)
POTASSIUM SERPL-MCNC: 3.4 MMOL/L — LOW (ref 3.5–5.3)
POTASSIUM SERPL-MCNC: 3.5 MMOL/L — SIGNIFICANT CHANGE UP (ref 3.5–5.3)
POTASSIUM SERPL-MCNC: 3.6 MMOL/L — SIGNIFICANT CHANGE UP (ref 3.5–5.3)
POTASSIUM SERPL-MCNC: 3.7 MMOL/L — SIGNIFICANT CHANGE UP (ref 3.5–5.3)
POTASSIUM SERPL-MCNC: 3.8 MMOL/L — SIGNIFICANT CHANGE UP (ref 3.5–5.3)
POTASSIUM SERPL-MCNC: 3.9 MMOL/L — SIGNIFICANT CHANGE UP (ref 3.5–5.3)
POTASSIUM SERPL-MCNC: 3.9 MMOL/L — SIGNIFICANT CHANGE UP (ref 3.5–5.3)
POTASSIUM SERPL-MCNC: 4 MMOL/L — SIGNIFICANT CHANGE UP (ref 3.5–5.3)
POTASSIUM SERPL-MCNC: 4.1 MMOL/L — SIGNIFICANT CHANGE UP (ref 3.5–5.3)
POTASSIUM SERPL-MCNC: 4.2 MMOL/L — SIGNIFICANT CHANGE UP (ref 3.5–5.3)
POTASSIUM SERPL-MCNC: 4.3 MMOL/L — SIGNIFICANT CHANGE UP (ref 3.5–5.3)
POTASSIUM SERPL-MCNC: 4.5 MMOL/L — SIGNIFICANT CHANGE UP (ref 3.5–5.3)
POTASSIUM SERPL-MCNC: 4.7 MMOL/L — SIGNIFICANT CHANGE UP (ref 3.5–5.3)
POTASSIUM SERPL-MCNC: 5.4 MMOL/L — HIGH (ref 3.5–5.3)
POTASSIUM SERPL-MCNC: 5.6 MMOL/L — HIGH (ref 3.5–5.3)
POTASSIUM SERPL-MCNC: 6.7 MMOL/L — CRITICAL HIGH (ref 3.5–5.3)
POTASSIUM SERPL-SCNC: 3 MMOL/L — LOW (ref 3.5–5.3)
POTASSIUM SERPL-SCNC: 3.1 MMOL/L — LOW (ref 3.5–5.3)
POTASSIUM SERPL-SCNC: 3.2 MMOL/L — LOW (ref 3.5–5.3)
POTASSIUM SERPL-SCNC: 3.2 MMOL/L — LOW (ref 3.5–5.3)
POTASSIUM SERPL-SCNC: 3.4 MMOL/L — LOW (ref 3.5–5.3)
POTASSIUM SERPL-SCNC: 3.5 MMOL/L — SIGNIFICANT CHANGE UP (ref 3.5–5.3)
POTASSIUM SERPL-SCNC: 3.6 MMOL/L — SIGNIFICANT CHANGE UP (ref 3.5–5.3)
POTASSIUM SERPL-SCNC: 3.7 MMOL/L — SIGNIFICANT CHANGE UP (ref 3.5–5.3)
POTASSIUM SERPL-SCNC: 3.8 MMOL/L — SIGNIFICANT CHANGE UP (ref 3.5–5.3)
POTASSIUM SERPL-SCNC: 3.9 MMOL/L — SIGNIFICANT CHANGE UP (ref 3.5–5.3)
POTASSIUM SERPL-SCNC: 3.9 MMOL/L — SIGNIFICANT CHANGE UP (ref 3.5–5.3)
POTASSIUM SERPL-SCNC: 4 MMOL/L — SIGNIFICANT CHANGE UP (ref 3.5–5.3)
POTASSIUM SERPL-SCNC: 4.1 MMOL/L — SIGNIFICANT CHANGE UP (ref 3.5–5.3)
POTASSIUM SERPL-SCNC: 4.2 MMOL/L — SIGNIFICANT CHANGE UP (ref 3.5–5.3)
POTASSIUM SERPL-SCNC: 4.3 MMOL/L — SIGNIFICANT CHANGE UP (ref 3.5–5.3)
POTASSIUM SERPL-SCNC: 4.5 MMOL/L — SIGNIFICANT CHANGE UP (ref 3.5–5.3)
POTASSIUM SERPL-SCNC: 4.7 MMOL/L — SIGNIFICANT CHANGE UP (ref 3.5–5.3)
POTASSIUM SERPL-SCNC: 5.4 MMOL/L — HIGH (ref 3.5–5.3)
POTASSIUM SERPL-SCNC: 5.6 MMOL/L — HIGH (ref 3.5–5.3)
POTASSIUM SERPL-SCNC: 6.7 MMOL/L — CRITICAL HIGH (ref 3.5–5.3)
PROCALCITONIN SERPL-MCNC: 0.83 NG/ML — HIGH (ref 0.02–0.1)
PROT SERPL-MCNC: 6 G/DL — SIGNIFICANT CHANGE UP (ref 6–8.3)
PROT SERPL-MCNC: 6.1 G/DL — SIGNIFICANT CHANGE UP (ref 6–8.3)
PROT SERPL-MCNC: 6.2 G/DL — SIGNIFICANT CHANGE UP (ref 6–8.3)
PROT SERPL-MCNC: 6.2 G/DL — SIGNIFICANT CHANGE UP (ref 6–8.3)
PROT SERPL-MCNC: 6.3 G/DL — SIGNIFICANT CHANGE UP (ref 6–8.3)
PROT SERPL-MCNC: 6.4 G/DL — SIGNIFICANT CHANGE UP (ref 6–8.3)
PROT SERPL-MCNC: 6.4 G/DL — SIGNIFICANT CHANGE UP (ref 6–8.3)
PROT SERPL-MCNC: 6.5 G/DL — SIGNIFICANT CHANGE UP (ref 6–8.3)
PROT SERPL-MCNC: 6.6 G/DL — SIGNIFICANT CHANGE UP (ref 6–8.3)
PROTHROM AB SERPL-ACNC: 15 SEC — HIGH (ref 10.6–13.6)
RBC # BLD: 3.23 M/UL — LOW (ref 4.2–5.8)
RBC # BLD: 3.23 M/UL — LOW (ref 4.2–5.8)
RBC # BLD: 3.27 M/UL — LOW (ref 4.2–5.8)
RBC # BLD: 3.41 M/UL — LOW (ref 4.2–5.8)
RBC # BLD: 3.51 M/UL — LOW (ref 4.2–5.8)
RBC # BLD: 3.52 M/UL — LOW (ref 4.2–5.8)
RBC # BLD: 3.56 M/UL — LOW (ref 4.2–5.8)
RBC # BLD: 3.59 M/UL — LOW (ref 4.2–5.8)
RBC # BLD: 3.65 M/UL — LOW (ref 4.2–5.8)
RBC # BLD: 4.02 M/UL — LOW (ref 4.2–5.8)
RBC # BLD: 4.06 M/UL — LOW (ref 4.2–5.8)
RBC # BLD: 4.08 M/UL — LOW (ref 4.2–5.8)
RBC # BLD: 4.08 M/UL — LOW (ref 4.2–5.8)
RBC # BLD: 4.11 M/UL — LOW (ref 4.2–5.8)
RBC # BLD: 4.13 M/UL — LOW (ref 4.2–5.8)
RBC # BLD: 4.15 M/UL — LOW (ref 4.2–5.8)
RBC # BLD: 4.2 M/UL — SIGNIFICANT CHANGE UP (ref 4.2–5.8)
RBC # BLD: 4.21 M/UL — SIGNIFICANT CHANGE UP (ref 4.2–5.8)
RBC # BLD: 4.25 M/UL — SIGNIFICANT CHANGE UP (ref 4.2–5.8)
RBC # BLD: 4.26 M/UL — SIGNIFICANT CHANGE UP (ref 4.2–5.8)
RBC # BLD: 4.29 M/UL — SIGNIFICANT CHANGE UP (ref 4.2–5.8)
RBC # BLD: 4.3 M/UL — SIGNIFICANT CHANGE UP (ref 4.2–5.8)
RBC # BLD: 4.32 M/UL — SIGNIFICANT CHANGE UP (ref 4.2–5.8)
RBC # BLD: 4.33 M/UL — SIGNIFICANT CHANGE UP (ref 4.2–5.8)
RBC # BLD: 4.34 M/UL — SIGNIFICANT CHANGE UP (ref 4.2–5.8)
RBC # BLD: 4.37 M/UL — SIGNIFICANT CHANGE UP (ref 4.2–5.8)
RBC # BLD: 4.43 M/UL — SIGNIFICANT CHANGE UP (ref 4.2–5.8)
RBC # BLD: 4.43 M/UL — SIGNIFICANT CHANGE UP (ref 4.2–5.8)
RBC # BLD: 4.44 M/UL — SIGNIFICANT CHANGE UP (ref 4.2–5.8)
RBC # BLD: 4.44 M/UL — SIGNIFICANT CHANGE UP (ref 4.2–5.8)
RBC # BLD: 4.45 M/UL — SIGNIFICANT CHANGE UP (ref 4.2–5.8)
RBC # BLD: 4.47 M/UL — SIGNIFICANT CHANGE UP (ref 4.2–5.8)
RBC # BLD: 4.9 M/UL — SIGNIFICANT CHANGE UP (ref 4.2–5.8)
RBC # FLD: 16.6 % — HIGH (ref 10.3–14.5)
RBC # FLD: 16.7 % — HIGH (ref 10.3–14.5)
RBC # FLD: 16.9 % — HIGH (ref 10.3–14.5)
RBC # FLD: 17 % — HIGH (ref 10.3–14.5)
RBC # FLD: 17.2 % — HIGH (ref 10.3–14.5)
RBC # FLD: 17.4 % — HIGH (ref 10.3–14.5)
RBC # FLD: 17.4 % — HIGH (ref 10.3–14.5)
RBC # FLD: 17.5 % — HIGH (ref 10.3–14.5)
RBC # FLD: 17.7 % — HIGH (ref 10.3–14.5)
RBC # FLD: 17.8 % — HIGH (ref 10.3–14.5)
RBC # FLD: 18 % — HIGH (ref 10.3–14.5)
RBC # FLD: 18.4 % — HIGH (ref 10.3–14.5)
RBC # FLD: 19 % — HIGH (ref 10.3–14.5)
RBC # FLD: 19.5 % — HIGH (ref 10.3–14.5)
RBC # FLD: 19.7 % — HIGH (ref 10.3–14.5)
RBC # FLD: 19.8 % — HIGH (ref 10.3–14.5)
RBC # FLD: 19.9 % — HIGH (ref 10.3–14.5)
RBC # FLD: 20 % — HIGH (ref 10.3–14.5)
RBC # FLD: 20.1 % — HIGH (ref 10.3–14.5)
RBC # FLD: 20.1 % — HIGH (ref 10.3–14.5)
RBC # FLD: 20.2 % — HIGH (ref 10.3–14.5)
RBC # FLD: 20.3 % — HIGH (ref 10.3–14.5)
RBC # FLD: 20.4 % — HIGH (ref 10.3–14.5)
RBC # FLD: 20.4 % — HIGH (ref 10.3–14.5)
RBC # FLD: 20.5 % — HIGH (ref 10.3–14.5)
RBC # FLD: 20.6 % — HIGH (ref 10.3–14.5)
RBC # FLD: 20.6 % — HIGH (ref 10.3–14.5)
RBC # FLD: 20.7 % — HIGH (ref 10.3–14.5)
RBC # FLD: 20.8 % — HIGH (ref 10.3–14.5)
RBC BLD AUTO: SIGNIFICANT CHANGE UP
RH IG SCN BLD-IMP: POSITIVE — SIGNIFICANT CHANGE UP
SAO2 % BLDA: 54 % — LOW (ref 92–96)
SAO2 % BLDA: 89 % — LOW (ref 92–96)
SAO2 % BLDA: 98 % — HIGH (ref 92–96)
SAO2 % BLDV: 60 % — LOW (ref 67–88)
SARS-COV-2 IGG+IGM SERPL QL IA: >250 U/ML — HIGH
SARS-COV-2 IGG+IGM SERPL QL IA: POSITIVE
SARS-COV-2 RNA SPEC QL NAA+PROBE: SIGNIFICANT CHANGE UP
SODIUM SERPL-SCNC: 127 MMOL/L — LOW (ref 135–145)
SODIUM SERPL-SCNC: 128 MMOL/L — LOW (ref 135–145)
SODIUM SERPL-SCNC: 128 MMOL/L — LOW (ref 135–145)
SODIUM SERPL-SCNC: 129 MMOL/L — LOW (ref 135–145)
SODIUM SERPL-SCNC: 130 MMOL/L — LOW (ref 135–145)
SODIUM SERPL-SCNC: 131 MMOL/L — LOW (ref 135–145)
SODIUM SERPL-SCNC: 132 MMOL/L — LOW (ref 135–145)
SODIUM SERPL-SCNC: 133 MMOL/L — LOW (ref 135–145)
SODIUM SERPL-SCNC: 134 MMOL/L — LOW (ref 135–145)
SODIUM SERPL-SCNC: 134 MMOL/L — LOW (ref 135–145)
SODIUM SERPL-SCNC: 135 MMOL/L — SIGNIFICANT CHANGE UP (ref 135–145)
SODIUM SERPL-SCNC: 136 MMOL/L — SIGNIFICANT CHANGE UP (ref 135–145)
SODIUM SERPL-SCNC: 137 MMOL/L — SIGNIFICANT CHANGE UP (ref 135–145)
SODIUM SERPL-SCNC: 137 MMOL/L — SIGNIFICANT CHANGE UP (ref 135–145)
SPECIMEN SOURCE: SIGNIFICANT CHANGE UP
TROPONIN T, HIGH SENSITIVITY RESULT: 468 NG/L — HIGH (ref 0–51)
URATE SERPL-MCNC: 7.2 MG/DL — SIGNIFICANT CHANGE UP (ref 3.4–8.8)
VANCOMYCIN FLD-MCNC: 10.2 UG/ML — SIGNIFICANT CHANGE UP
VIT B1 SERPL-MCNC: SIGNIFICANT CHANGE UP NMOL/L
VIT B12 SERPL-MCNC: >2000 PG/ML — HIGH (ref 232–1245)
WBC # BLD: 10.01 K/UL — SIGNIFICANT CHANGE UP (ref 3.8–10.5)
WBC # BLD: 10.06 K/UL — SIGNIFICANT CHANGE UP (ref 3.8–10.5)
WBC # BLD: 10.43 K/UL — SIGNIFICANT CHANGE UP (ref 3.8–10.5)
WBC # BLD: 10.44 K/UL — SIGNIFICANT CHANGE UP (ref 3.8–10.5)
WBC # BLD: 10.44 K/UL — SIGNIFICANT CHANGE UP (ref 3.8–10.5)
WBC # BLD: 10.47 K/UL — SIGNIFICANT CHANGE UP (ref 3.8–10.5)
WBC # BLD: 10.69 K/UL — HIGH (ref 3.8–10.5)
WBC # BLD: 10.74 K/UL — HIGH (ref 3.8–10.5)
WBC # BLD: 11.16 K/UL — HIGH (ref 3.8–10.5)
WBC # BLD: 11.24 K/UL — HIGH (ref 3.8–10.5)
WBC # BLD: 11.72 K/UL — HIGH (ref 3.8–10.5)
WBC # BLD: 12.61 K/UL — HIGH (ref 3.8–10.5)
WBC # BLD: 13.91 K/UL — HIGH (ref 3.8–10.5)
WBC # BLD: 15.17 K/UL — HIGH (ref 3.8–10.5)
WBC # BLD: 7.26 K/UL — SIGNIFICANT CHANGE UP (ref 3.8–10.5)
WBC # BLD: 7.9 K/UL — SIGNIFICANT CHANGE UP (ref 3.8–10.5)
WBC # BLD: 7.97 K/UL — SIGNIFICANT CHANGE UP (ref 3.8–10.5)
WBC # BLD: 8.46 K/UL — SIGNIFICANT CHANGE UP (ref 3.8–10.5)
WBC # BLD: 8.46 K/UL — SIGNIFICANT CHANGE UP (ref 3.8–10.5)
WBC # BLD: 8.55 K/UL — SIGNIFICANT CHANGE UP (ref 3.8–10.5)
WBC # BLD: 8.79 K/UL — SIGNIFICANT CHANGE UP (ref 3.8–10.5)
WBC # BLD: 8.8 K/UL — SIGNIFICANT CHANGE UP (ref 3.8–10.5)
WBC # BLD: 8.84 K/UL — SIGNIFICANT CHANGE UP (ref 3.8–10.5)
WBC # BLD: 8.91 K/UL — SIGNIFICANT CHANGE UP (ref 3.8–10.5)
WBC # BLD: 8.96 K/UL — SIGNIFICANT CHANGE UP (ref 3.8–10.5)
WBC # BLD: 8.99 K/UL — SIGNIFICANT CHANGE UP (ref 3.8–10.5)
WBC # BLD: 9.1 K/UL — SIGNIFICANT CHANGE UP (ref 3.8–10.5)
WBC # BLD: 9.25 K/UL — SIGNIFICANT CHANGE UP (ref 3.8–10.5)
WBC # BLD: 9.36 K/UL — SIGNIFICANT CHANGE UP (ref 3.8–10.5)
WBC # BLD: 9.54 K/UL — SIGNIFICANT CHANGE UP (ref 3.8–10.5)
WBC # BLD: 9.54 K/UL — SIGNIFICANT CHANGE UP (ref 3.8–10.5)
WBC # BLD: 9.85 K/UL — SIGNIFICANT CHANGE UP (ref 3.8–10.5)
WBC # BLD: 9.89 K/UL — SIGNIFICANT CHANGE UP (ref 3.8–10.5)
WBC # BLD: 9.9 K/UL — SIGNIFICANT CHANGE UP (ref 3.8–10.5)
WBC # BLD: 9.95 K/UL — SIGNIFICANT CHANGE UP (ref 3.8–10.5)
WBC # FLD AUTO: 10.01 K/UL — SIGNIFICANT CHANGE UP (ref 3.8–10.5)
WBC # FLD AUTO: 10.06 K/UL — SIGNIFICANT CHANGE UP (ref 3.8–10.5)
WBC # FLD AUTO: 10.43 K/UL — SIGNIFICANT CHANGE UP (ref 3.8–10.5)
WBC # FLD AUTO: 10.44 K/UL — SIGNIFICANT CHANGE UP (ref 3.8–10.5)
WBC # FLD AUTO: 10.44 K/UL — SIGNIFICANT CHANGE UP (ref 3.8–10.5)
WBC # FLD AUTO: 10.47 K/UL — SIGNIFICANT CHANGE UP (ref 3.8–10.5)
WBC # FLD AUTO: 10.69 K/UL — HIGH (ref 3.8–10.5)
WBC # FLD AUTO: 10.74 K/UL — HIGH (ref 3.8–10.5)
WBC # FLD AUTO: 11.16 K/UL — HIGH (ref 3.8–10.5)
WBC # FLD AUTO: 11.24 K/UL — HIGH (ref 3.8–10.5)
WBC # FLD AUTO: 11.72 K/UL — HIGH (ref 3.8–10.5)
WBC # FLD AUTO: 12.61 K/UL — HIGH (ref 3.8–10.5)
WBC # FLD AUTO: 13.91 K/UL — HIGH (ref 3.8–10.5)
WBC # FLD AUTO: 15.17 K/UL — HIGH (ref 3.8–10.5)
WBC # FLD AUTO: 7.26 K/UL — SIGNIFICANT CHANGE UP (ref 3.8–10.5)
WBC # FLD AUTO: 7.9 K/UL — SIGNIFICANT CHANGE UP (ref 3.8–10.5)
WBC # FLD AUTO: 7.97 K/UL — SIGNIFICANT CHANGE UP (ref 3.8–10.5)
WBC # FLD AUTO: 8.46 K/UL — SIGNIFICANT CHANGE UP (ref 3.8–10.5)
WBC # FLD AUTO: 8.46 K/UL — SIGNIFICANT CHANGE UP (ref 3.8–10.5)
WBC # FLD AUTO: 8.55 K/UL — SIGNIFICANT CHANGE UP (ref 3.8–10.5)
WBC # FLD AUTO: 8.79 K/UL — SIGNIFICANT CHANGE UP (ref 3.8–10.5)
WBC # FLD AUTO: 8.8 K/UL — SIGNIFICANT CHANGE UP (ref 3.8–10.5)
WBC # FLD AUTO: 8.84 K/UL — SIGNIFICANT CHANGE UP (ref 3.8–10.5)
WBC # FLD AUTO: 8.91 K/UL — SIGNIFICANT CHANGE UP (ref 3.8–10.5)
WBC # FLD AUTO: 8.96 K/UL — SIGNIFICANT CHANGE UP (ref 3.8–10.5)
WBC # FLD AUTO: 8.99 K/UL — SIGNIFICANT CHANGE UP (ref 3.8–10.5)
WBC # FLD AUTO: 9.1 K/UL — SIGNIFICANT CHANGE UP (ref 3.8–10.5)
WBC # FLD AUTO: 9.25 K/UL — SIGNIFICANT CHANGE UP (ref 3.8–10.5)
WBC # FLD AUTO: 9.36 K/UL — SIGNIFICANT CHANGE UP (ref 3.8–10.5)
WBC # FLD AUTO: 9.54 K/UL — SIGNIFICANT CHANGE UP (ref 3.8–10.5)
WBC # FLD AUTO: 9.54 K/UL — SIGNIFICANT CHANGE UP (ref 3.8–10.5)
WBC # FLD AUTO: 9.85 K/UL — SIGNIFICANT CHANGE UP (ref 3.8–10.5)
WBC # FLD AUTO: 9.89 K/UL — SIGNIFICANT CHANGE UP (ref 3.8–10.5)
WBC # FLD AUTO: 9.9 K/UL — SIGNIFICANT CHANGE UP (ref 3.8–10.5)
WBC # FLD AUTO: 9.95 K/UL — SIGNIFICANT CHANGE UP (ref 3.8–10.5)

## 2021-01-01 PROCEDURE — 82533 TOTAL CORTISOL: CPT

## 2021-01-01 PROCEDURE — 83036 HEMOGLOBIN GLYCOSYLATED A1C: CPT

## 2021-01-01 PROCEDURE — 96374 THER/PROPH/DIAG INJ IV PUSH: CPT

## 2021-01-01 PROCEDURE — 84145 PROCALCITONIN (PCT): CPT

## 2021-01-01 PROCEDURE — 72070 X-RAY EXAM THORAC SPINE 2VWS: CPT

## 2021-01-01 PROCEDURE — 70450 CT HEAD/BRAIN W/O DYE: CPT | Mod: 26

## 2021-01-01 PROCEDURE — 85027 COMPLETE CBC AUTOMATED: CPT

## 2021-01-01 PROCEDURE — 93005 ELECTROCARDIOGRAM TRACING: CPT

## 2021-01-01 PROCEDURE — 74018 RADEX ABDOMEN 1 VIEW: CPT

## 2021-01-01 PROCEDURE — 97162 PT EVAL MOD COMPLEX 30 MIN: CPT

## 2021-01-01 PROCEDURE — 99231 SBSQ HOSP IP/OBS SF/LOW 25: CPT

## 2021-01-01 PROCEDURE — 99233 SBSQ HOSP IP/OBS HIGH 50: CPT | Mod: GC

## 2021-01-01 PROCEDURE — 95816 EEG AWAKE AND DROWSY: CPT | Mod: 26

## 2021-01-01 PROCEDURE — 71045 X-RAY EXAM CHEST 1 VIEW: CPT | Mod: 26

## 2021-01-01 PROCEDURE — 83735 ASSAY OF MAGNESIUM: CPT

## 2021-01-01 PROCEDURE — 82550 ASSAY OF CK (CPK): CPT

## 2021-01-01 PROCEDURE — 73562 X-RAY EXAM OF KNEE 3: CPT | Mod: 26,RT

## 2021-01-01 PROCEDURE — 72070 X-RAY EXAM THORAC SPINE 2VWS: CPT | Mod: 26

## 2021-01-01 PROCEDURE — 80048 BASIC METABOLIC PNL TOTAL CA: CPT

## 2021-01-01 PROCEDURE — 93312 ECHO TRANSESOPHAGEAL: CPT

## 2021-01-01 PROCEDURE — 93306 TTE W/DOPPLER COMPLETE: CPT

## 2021-01-01 PROCEDURE — 93306 TTE W/DOPPLER COMPLETE: CPT | Mod: 26

## 2021-01-01 PROCEDURE — 97110 THERAPEUTIC EXERCISES: CPT

## 2021-01-01 PROCEDURE — 82565 ASSAY OF CREATININE: CPT

## 2021-01-01 PROCEDURE — 82947 ASSAY GLUCOSE BLOOD QUANT: CPT

## 2021-01-01 PROCEDURE — 97116 GAIT TRAINING THERAPY: CPT

## 2021-01-01 PROCEDURE — 84100 ASSAY OF PHOSPHORUS: CPT

## 2021-01-01 PROCEDURE — 87070 CULTURE OTHR SPECIMN AEROBIC: CPT

## 2021-01-01 PROCEDURE — 70553 MRI BRAIN STEM W/O & W/DYE: CPT | Mod: 26

## 2021-01-01 PROCEDURE — 70544 MR ANGIOGRAPHY HEAD W/O DYE: CPT | Mod: 26,59

## 2021-01-01 PROCEDURE — 84295 ASSAY OF SERUM SODIUM: CPT

## 2021-01-01 PROCEDURE — 85730 THROMBOPLASTIN TIME PARTIAL: CPT

## 2021-01-01 PROCEDURE — 85610 PROTHROMBIN TIME: CPT

## 2021-01-01 PROCEDURE — 97161 PT EVAL LOW COMPLEX 20 MIN: CPT

## 2021-01-01 PROCEDURE — 99232 SBSQ HOSP IP/OBS MODERATE 35: CPT

## 2021-01-01 PROCEDURE — 82140 ASSAY OF AMMONIA: CPT

## 2021-01-01 PROCEDURE — 73630 X-RAY EXAM OF FOOT: CPT

## 2021-01-01 PROCEDURE — 71250 CT THORAX DX C-: CPT

## 2021-01-01 PROCEDURE — 93000 ELECTROCARDIOGRAM COMPLETE: CPT

## 2021-01-01 PROCEDURE — 80053 COMPREHEN METABOLIC PANEL: CPT

## 2021-01-01 PROCEDURE — 99232 SBSQ HOSP IP/OBS MODERATE 35: CPT | Mod: GC

## 2021-01-01 PROCEDURE — 86769 SARS-COV-2 COVID-19 ANTIBODY: CPT

## 2021-01-01 PROCEDURE — 99233 SBSQ HOSP IP/OBS HIGH 50: CPT

## 2021-01-01 PROCEDURE — 73620 X-RAY EXAM OF FOOT: CPT

## 2021-01-01 PROCEDURE — 99497 ADVNCD CARE PLAN 30 MIN: CPT

## 2021-01-01 PROCEDURE — P9047: CPT

## 2021-01-01 PROCEDURE — 97535 SELF CARE MNGMENT TRAINING: CPT

## 2021-01-01 PROCEDURE — C8929: CPT

## 2021-01-01 PROCEDURE — 86900 BLOOD TYPING SEROLOGIC ABO: CPT

## 2021-01-01 PROCEDURE — 82962 GLUCOSE BLOOD TEST: CPT

## 2021-01-01 PROCEDURE — 82330 ASSAY OF CALCIUM: CPT

## 2021-01-01 PROCEDURE — 70553 MRI BRAIN STEM W/O & W/DYE: CPT

## 2021-01-01 PROCEDURE — 0225U NFCT DS DNA&RNA 21 SARSCOV2: CPT

## 2021-01-01 PROCEDURE — 97530 THERAPEUTIC ACTIVITIES: CPT

## 2021-01-01 PROCEDURE — 84132 ASSAY OF SERUM POTASSIUM: CPT

## 2021-01-01 PROCEDURE — 93312 ECHO TRANSESOPHAGEAL: CPT | Mod: 26

## 2021-01-01 PROCEDURE — A9585: CPT

## 2021-01-01 PROCEDURE — U0005: CPT

## 2021-01-01 PROCEDURE — 93923 UPR/LXTR ART STDY 3+ LVLS: CPT | Mod: 26

## 2021-01-01 PROCEDURE — 87040 BLOOD CULTURE FOR BACTERIA: CPT

## 2021-01-01 PROCEDURE — 71045 X-RAY EXAM CHEST 1 VIEW: CPT

## 2021-01-01 PROCEDURE — 72100 X-RAY EXAM L-S SPINE 2/3 VWS: CPT

## 2021-01-01 PROCEDURE — 85018 HEMOGLOBIN: CPT

## 2021-01-01 PROCEDURE — 76604 US EXAM CHEST: CPT | Mod: 26,GC

## 2021-01-01 PROCEDURE — 82803 BLOOD GASES ANY COMBINATION: CPT

## 2021-01-01 PROCEDURE — 70544 MR ANGIOGRAPHY HEAD W/O DYE: CPT

## 2021-01-01 PROCEDURE — 74176 CT ABD & PELVIS W/O CONTRAST: CPT | Mod: 26

## 2021-01-01 PROCEDURE — 87075 CULTR BACTERIA EXCEPT BLOOD: CPT

## 2021-01-01 PROCEDURE — 99222 1ST HOSP IP/OBS MODERATE 55: CPT | Mod: GC

## 2021-01-01 PROCEDURE — 83605 ASSAY OF LACTIC ACID: CPT

## 2021-01-01 PROCEDURE — 99291 CRITICAL CARE FIRST HOUR: CPT | Mod: 25

## 2021-01-01 PROCEDURE — 87635 SARS-COV-2 COVID-19 AMP PRB: CPT

## 2021-01-01 PROCEDURE — 73562 X-RAY EXAM OF KNEE 3: CPT

## 2021-01-01 PROCEDURE — 99285 EMERGENCY DEPT VISIT HI MDM: CPT | Mod: 25

## 2021-01-01 PROCEDURE — 93308 TTE F-UP OR LMTD: CPT | Mod: 26,GC

## 2021-01-01 PROCEDURE — 31575 DIAGNOSTIC LARYNGOSCOPY: CPT

## 2021-01-01 PROCEDURE — 87205 SMEAR GRAM STAIN: CPT

## 2021-01-01 PROCEDURE — 85025 COMPLETE CBC W/AUTO DIFF WBC: CPT

## 2021-01-01 PROCEDURE — 99222 1ST HOSP IP/OBS MODERATE 55: CPT

## 2021-01-01 PROCEDURE — 99215 OFFICE O/P EST HI 40 MIN: CPT

## 2021-01-01 PROCEDURE — 72148 MRI LUMBAR SPINE W/O DYE: CPT | Mod: 26

## 2021-01-01 PROCEDURE — 83880 ASSAY OF NATRIURETIC PEPTIDE: CPT

## 2021-01-01 PROCEDURE — 99291 CRITICAL CARE FIRST HOUR: CPT | Mod: CS,GC

## 2021-01-01 PROCEDURE — 96375 TX/PRO/DX INJ NEW DRUG ADDON: CPT

## 2021-01-01 PROCEDURE — 92610 EVALUATE SWALLOWING FUNCTION: CPT

## 2021-01-01 PROCEDURE — 86850 RBC ANTIBODY SCREEN: CPT

## 2021-01-01 PROCEDURE — 82746 ASSAY OF FOLIC ACID SERUM: CPT

## 2021-01-01 PROCEDURE — 84550 ASSAY OF BLOOD/URIC ACID: CPT

## 2021-01-01 PROCEDURE — P9045: CPT

## 2021-01-01 PROCEDURE — 82435 ASSAY OF BLOOD CHLORIDE: CPT

## 2021-01-01 PROCEDURE — 99221 1ST HOSP IP/OBS SF/LOW 40: CPT

## 2021-01-01 PROCEDURE — 74176 CT ABD & PELVIS W/O CONTRAST: CPT

## 2021-01-01 PROCEDURE — 99024 POSTOP FOLLOW-UP VISIT: CPT

## 2021-01-01 PROCEDURE — 92526 ORAL FUNCTION THERAPY: CPT

## 2021-01-01 PROCEDURE — 86901 BLOOD TYPING SEROLOGIC RH(D): CPT

## 2021-01-01 PROCEDURE — 93925 LOWER EXTREMITY STUDY: CPT | Mod: 26

## 2021-01-01 PROCEDURE — 72100 X-RAY EXAM L-S SPINE 2/3 VWS: CPT | Mod: 26

## 2021-01-01 PROCEDURE — 89051 BODY FLUID CELL COUNT: CPT

## 2021-01-01 PROCEDURE — 84425 ASSAY OF VITAMIN B-1: CPT

## 2021-01-01 PROCEDURE — U0003: CPT

## 2021-01-01 PROCEDURE — 93925 LOWER EXTREMITY STUDY: CPT

## 2021-01-01 PROCEDURE — 99498 ADVNCD CARE PLAN ADDL 30 MIN: CPT

## 2021-01-01 PROCEDURE — 70450 CT HEAD/BRAIN W/O DYE: CPT

## 2021-01-01 PROCEDURE — 99232 SBSQ HOSP IP/OBS MODERATE 35: CPT | Mod: 25

## 2021-01-01 PROCEDURE — 72146 MRI CHEST SPINE W/O DYE: CPT

## 2021-01-01 PROCEDURE — 80202 ASSAY OF VANCOMYCIN: CPT

## 2021-01-01 PROCEDURE — 76377 3D RENDER W/INTRP POSTPROCES: CPT

## 2021-01-01 PROCEDURE — 93010 ELECTROCARDIOGRAM REPORT: CPT

## 2021-01-01 PROCEDURE — ZZZZZ: CPT

## 2021-01-01 PROCEDURE — 85014 HEMATOCRIT: CPT

## 2021-01-01 PROCEDURE — 95816 EEG AWAKE AND DROWSY: CPT

## 2021-01-01 PROCEDURE — 99284 EMERGENCY DEPT VISIT MOD MDM: CPT | Mod: 25

## 2021-01-01 PROCEDURE — 93923 UPR/LXTR ART STDY 3+ LVLS: CPT

## 2021-01-01 PROCEDURE — 86140 C-REACTIVE PROTEIN: CPT

## 2021-01-01 PROCEDURE — 82607 VITAMIN B-12: CPT

## 2021-01-01 PROCEDURE — 99222 1ST HOSP IP/OBS MODERATE 55: CPT | Mod: 25

## 2021-01-01 PROCEDURE — 84484 ASSAY OF TROPONIN QUANT: CPT

## 2021-01-01 PROCEDURE — 73630 X-RAY EXAM OF FOOT: CPT | Mod: 26,RT

## 2021-01-01 PROCEDURE — 71250 CT THORAX DX C-: CPT | Mod: 26

## 2021-01-01 PROCEDURE — 99291 CRITICAL CARE FIRST HOUR: CPT

## 2021-01-01 PROCEDURE — 97166 OT EVAL MOD COMPLEX 45 MIN: CPT

## 2021-01-01 PROCEDURE — 72146 MRI CHEST SPINE W/O DYE: CPT | Mod: 26

## 2021-01-01 PROCEDURE — 76377 3D RENDER W/INTRP POSTPROCES: CPT | Mod: 26

## 2021-01-01 PROCEDURE — 74018 RADEX ABDOMEN 1 VIEW: CPT | Mod: 26

## 2021-01-01 PROCEDURE — 99223 1ST HOSP IP/OBS HIGH 75: CPT

## 2021-01-01 PROCEDURE — 72148 MRI LUMBAR SPINE W/O DYE: CPT

## 2021-01-01 PROCEDURE — 82553 CREATINE MB FRACTION: CPT

## 2021-01-01 RX ORDER — HYDROMORPHONE HYDROCHLORIDE 2 MG/ML
0.2 INJECTION INTRAMUSCULAR; INTRAVENOUS; SUBCUTANEOUS
Refills: 0 | Status: DISCONTINUED | OUTPATIENT
Start: 2021-01-01 | End: 2021-01-01

## 2021-01-01 RX ORDER — GENTAMICIN SULFATE 0.1 %
1 OINTMENT (GRAM) TOPICAL
Refills: 0 | Status: DISCONTINUED | OUTPATIENT
Start: 2021-01-01 | End: 2021-01-01

## 2021-01-01 RX ORDER — PANTOPRAZOLE SODIUM 20 MG/1
40 TABLET, DELAYED RELEASE ORAL
Refills: 0 | Status: DISCONTINUED | OUTPATIENT
Start: 2021-01-01 | End: 2021-01-01

## 2021-01-01 RX ORDER — INSULIN GLARGINE 100 [IU]/ML
60 INJECTION, SOLUTION SUBCUTANEOUS
Qty: 0 | Refills: 0 | DISCHARGE

## 2021-01-01 RX ORDER — CADEXOMER IODINE 0.9 %
1 PADS, MEDICATED (EA) TOPICAL DAILY
Refills: 0 | Status: DISCONTINUED | OUTPATIENT
Start: 2021-01-01 | End: 2021-01-01

## 2021-01-01 RX ORDER — SEVELAMER CARBONATE 2400 MG/1
2 POWDER, FOR SUSPENSION ORAL
Qty: 180 | Refills: 0
Start: 2021-01-01 | End: 2021-01-01

## 2021-01-01 RX ORDER — INSULIN GLARGINE 100 [IU]/ML
9 INJECTION, SOLUTION SUBCUTANEOUS AT BEDTIME
Refills: 0 | Status: DISCONTINUED | OUTPATIENT
Start: 2021-01-01 | End: 2021-01-01

## 2021-01-01 RX ORDER — SODIUM CHLORIDE 9 MG/ML
1000 INJECTION, SOLUTION INTRAVENOUS
Refills: 0 | Status: DISCONTINUED | OUTPATIENT
Start: 2021-01-01 | End: 2021-01-01

## 2021-01-01 RX ORDER — LIDOCAINE 4 G/100G
1 CREAM TOPICAL DAILY
Refills: 0 | Status: DISCONTINUED | OUTPATIENT
Start: 2021-01-01 | End: 2021-01-01

## 2021-01-01 RX ORDER — INSULIN GLARGINE 100 [IU]/ML
5 INJECTION, SOLUTION SUBCUTANEOUS AT BEDTIME
Refills: 0 | Status: DISCONTINUED | OUTPATIENT
Start: 2021-01-01 | End: 2021-01-01

## 2021-01-01 RX ORDER — CHLORPROMAZINE HCL 10 MG
10 TABLET ORAL ONCE
Refills: 0 | Status: COMPLETED | OUTPATIENT
Start: 2021-01-01 | End: 2021-01-01

## 2021-01-01 RX ORDER — DRONABINOL 2.5 MG
2.5 CAPSULE ORAL
Refills: 0 | Status: DISCONTINUED | OUTPATIENT
Start: 2021-01-01 | End: 2021-01-01

## 2021-01-01 RX ORDER — POTASSIUM CHLORIDE 20 MEQ
20 PACKET (EA) ORAL ONCE
Refills: 0 | Status: COMPLETED | OUTPATIENT
Start: 2021-01-01 | End: 2021-01-01

## 2021-01-01 RX ORDER — SODIUM CHLORIDE 9 MG/ML
1000 INJECTION INTRAMUSCULAR; INTRAVENOUS; SUBCUTANEOUS
Refills: 0 | Status: DISCONTINUED | OUTPATIENT
Start: 2021-01-01 | End: 2021-01-01

## 2021-01-01 RX ORDER — DRONABINOL 2.5 MG
5 CAPSULE ORAL
Refills: 0 | Status: DISCONTINUED | OUTPATIENT
Start: 2021-01-01 | End: 2021-01-01

## 2021-01-01 RX ORDER — POTASSIUM CHLORIDE 20 MEQ
40 PACKET (EA) ORAL EVERY 4 HOURS
Refills: 0 | Status: COMPLETED | OUTPATIENT
Start: 2021-01-01 | End: 2021-01-01

## 2021-01-01 RX ORDER — DOCUSATE SODIUM 100 MG/1
100 CAPSULE ORAL 3 TIMES DAILY
Qty: 90 | Refills: 3 | Status: ACTIVE | COMMUNITY

## 2021-01-01 RX ORDER — CHROMIUM 200 MCG
800 TABLET ORAL DAILY
Refills: 0 | Status: ACTIVE | COMMUNITY

## 2021-01-01 RX ORDER — CADEXOMER IODINE 0.9 %
1 PADS, MEDICATED (EA) TOPICAL
Qty: 1 | Refills: 0
Start: 2021-01-01 | End: 2021-01-01

## 2021-01-01 RX ORDER — HEPARIN SODIUM 5000 [USP'U]/ML
5000 INJECTION INTRAVENOUS; SUBCUTANEOUS EVERY 12 HOURS
Refills: 0 | Status: DISCONTINUED | OUTPATIENT
Start: 2021-01-01 | End: 2021-01-01

## 2021-01-01 RX ORDER — MIDODRINE HYDROCHLORIDE 2.5 MG/1
20 TABLET ORAL THREE TIMES A DAY
Refills: 0 | Status: DISCONTINUED | OUTPATIENT
Start: 2021-01-01 | End: 2021-01-01

## 2021-01-01 RX ORDER — ASPIRIN/CALCIUM CARB/MAGNESIUM 324 MG
81 TABLET ORAL DAILY
Refills: 0 | Status: DISCONTINUED | OUTPATIENT
Start: 2021-01-01 | End: 2021-01-01

## 2021-01-01 RX ORDER — MIDODRINE HYDROCHLORIDE 10 MG/1
10 TABLET ORAL 3 TIMES DAILY
Qty: 270 | Refills: 0 | Status: ACTIVE | COMMUNITY
Start: 2021-01-01

## 2021-01-01 RX ORDER — DIPHENHYDRAMINE HCL 50 MG
12.5 CAPSULE ORAL EVERY 6 HOURS
Refills: 0 | Status: DISCONTINUED | OUTPATIENT
Start: 2021-01-01 | End: 2021-01-01

## 2021-01-01 RX ORDER — VANCOMYCIN HCL 1 G
750 VIAL (EA) INTRAVENOUS ONCE
Refills: 0 | Status: COMPLETED | OUTPATIENT
Start: 2021-01-01 | End: 2021-01-01

## 2021-01-01 RX ORDER — NOREPINEPHRINE BITARTRATE/D5W 8 MG/250ML
0.05 PLASTIC BAG, INJECTION (ML) INTRAVENOUS
Qty: 8 | Refills: 0 | Status: DISCONTINUED | OUTPATIENT
Start: 2021-01-01 | End: 2021-01-01

## 2021-01-01 RX ORDER — CEFTRIAXONE 500 MG/1
1000 INJECTION, POWDER, FOR SOLUTION INTRAMUSCULAR; INTRAVENOUS EVERY 24 HOURS
Refills: 0 | Status: DISCONTINUED | OUTPATIENT
Start: 2021-01-01 | End: 2021-01-01

## 2021-01-01 RX ORDER — BACLOFEN 100 %
5 POWDER (GRAM) MISCELLANEOUS THREE TIMES A DAY
Refills: 0 | Status: DISCONTINUED | OUTPATIENT
Start: 2021-01-01 | End: 2021-01-01

## 2021-01-01 RX ORDER — MIDODRINE HYDROCHLORIDE 2.5 MG/1
2 TABLET ORAL
Qty: 180 | Refills: 0
Start: 2021-01-01 | End: 2021-01-01

## 2021-01-01 RX ORDER — MULTIVITAMIN
CAPSULE ORAL DAILY
Refills: 0 | Status: DISCONTINUED | COMMUNITY
End: 2021-01-01

## 2021-01-01 RX ORDER — INSULIN LISPRO 100/ML
VIAL (ML) SUBCUTANEOUS AT BEDTIME
Refills: 0 | Status: DISCONTINUED | OUTPATIENT
Start: 2021-01-01 | End: 2021-01-01

## 2021-01-01 RX ORDER — POTASSIUM CHLORIDE 20 MEQ
20 PACKET (EA) ORAL ONCE
Refills: 0 | Status: DISCONTINUED | OUTPATIENT
Start: 2021-01-01 | End: 2021-01-01

## 2021-01-01 RX ORDER — CHLORHEXIDINE GLUCONATE 213 G/1000ML
1 SOLUTION TOPICAL DAILY
Refills: 0 | Status: DISCONTINUED | OUTPATIENT
Start: 2021-01-01 | End: 2021-01-01

## 2021-01-01 RX ORDER — PIPERACILLIN AND TAZOBACTAM 4; .5 G/20ML; G/20ML
3.38 INJECTION, POWDER, LYOPHILIZED, FOR SOLUTION INTRAVENOUS ONCE
Refills: 0 | Status: COMPLETED | OUTPATIENT
Start: 2021-01-01 | End: 2021-01-01

## 2021-01-01 RX ORDER — CHLORPROMAZINE HCL 10 MG
25 TABLET ORAL ONCE
Refills: 0 | Status: COMPLETED | OUTPATIENT
Start: 2021-01-01 | End: 2021-01-01

## 2021-01-01 RX ORDER — INSULIN GLARGINE 100 [IU]/ML
70 INJECTION, SOLUTION SUBCUTANEOUS
Qty: 0 | Refills: 0 | DISCHARGE

## 2021-01-01 RX ORDER — GENTAMICIN SULFATE 0.1 %
1 OINTMENT (GRAM) TOPICAL ONCE
Refills: 0 | Status: COMPLETED | OUTPATIENT
Start: 2021-01-01 | End: 2021-01-01

## 2021-01-01 RX ORDER — MULTI VITAMIN/MINERAL SUPPLEMENT WITH ASCORBIC ACID, NIACIN, PYRIDOXINE, PANTOTHENIC ACID, FOLIC ACID, RIBOFLAVIN, THIAMIN, BIOTIN, COBALAMIN AND ZINC. 60; 20; 12.5; 10; 10; 1.7; 1.5; 1; .3; .006 MG/1; MG/1; MG/1; MG/1; MG/1; MG/1; MG/1; MG/1; MG/1; MG/1
TABLET, COATED ORAL DAILY
Refills: 0 | Status: ACTIVE | COMMUNITY

## 2021-01-01 RX ORDER — DEXTROSE 50 % IN WATER 50 %
25 SYRINGE (ML) INTRAVENOUS ONCE
Refills: 0 | Status: DISCONTINUED | OUTPATIENT
Start: 2021-01-01 | End: 2021-01-01

## 2021-01-01 RX ORDER — MIDODRINE HYDROCHLORIDE 2.5 MG/1
10 TABLET ORAL EVERY 8 HOURS
Refills: 0 | Status: DISCONTINUED | OUTPATIENT
Start: 2021-01-01 | End: 2021-01-01

## 2021-01-01 RX ORDER — CHLORHEXIDINE GLUCONATE 213 G/1000ML
15 SOLUTION TOPICAL
Refills: 0 | Status: DISCONTINUED | OUTPATIENT
Start: 2021-01-01 | End: 2021-01-01

## 2021-01-01 RX ORDER — SEVELAMER CARBONATE 2400 MG/1
1600 POWDER, FOR SUSPENSION ORAL THREE TIMES A DAY
Refills: 0 | Status: DISCONTINUED | OUTPATIENT
Start: 2021-01-01 | End: 2021-01-01

## 2021-01-01 RX ORDER — PANTOPRAZOLE SODIUM 20 MG/1
40 TABLET, DELAYED RELEASE ORAL DAILY
Refills: 0 | Status: DISCONTINUED | OUTPATIENT
Start: 2021-01-01 | End: 2021-01-01

## 2021-01-01 RX ORDER — MIDODRINE HYDROCHLORIDE 2.5 MG/1
30 TABLET ORAL THREE TIMES A DAY
Refills: 0 | Status: DISCONTINUED | OUTPATIENT
Start: 2021-01-01 | End: 2021-01-01

## 2021-01-01 RX ORDER — MIDODRINE HYDROCHLORIDE 5 MG/1
5 TABLET ORAL 3 TIMES DAILY
Refills: 0 | Status: DISCONTINUED | COMMUNITY
Start: 2020-01-01 | End: 2021-01-01

## 2021-01-01 RX ORDER — DEXTROSE 50 % IN WATER 50 %
15 SYRINGE (ML) INTRAVENOUS ONCE
Refills: 0 | Status: COMPLETED | OUTPATIENT
Start: 2021-01-01 | End: 2021-01-01

## 2021-01-01 RX ORDER — TICAGRELOR 60 MG/1
60 TABLET ORAL TWICE DAILY
Refills: 0 | Status: DISCONTINUED | COMMUNITY
Start: 2021-01-01 | End: 2021-01-01

## 2021-01-01 RX ORDER — CEFEPIME 1 G/1
1000 INJECTION, POWDER, FOR SOLUTION INTRAMUSCULAR; INTRAVENOUS EVERY 12 HOURS
Refills: 0 | Status: DISCONTINUED | OUTPATIENT
Start: 2021-01-01 | End: 2021-01-01

## 2021-01-01 RX ORDER — THIAMINE MONONITRATE (VIT B1) 100 MG
500 TABLET ORAL DAILY
Refills: 0 | Status: DISCONTINUED | OUTPATIENT
Start: 2021-01-01 | End: 2021-01-01

## 2021-01-01 RX ORDER — SODIUM CHLORIDE 9 MG/ML
250 INJECTION INTRAMUSCULAR; INTRAVENOUS; SUBCUTANEOUS ONCE
Refills: 0 | Status: COMPLETED | OUTPATIENT
Start: 2021-01-01 | End: 2021-01-01

## 2021-01-01 RX ORDER — CEFEPIME 1 G/1
1000 INJECTION, POWDER, FOR SOLUTION INTRAMUSCULAR; INTRAVENOUS EVERY 24 HOURS
Refills: 0 | Status: DISCONTINUED | OUTPATIENT
Start: 2021-01-01 | End: 2021-01-01

## 2021-01-01 RX ORDER — CHLORHEXIDINE GLUCONATE 213 G/1000ML
1 SOLUTION TOPICAL
Refills: 0 | Status: DISCONTINUED | OUTPATIENT
Start: 2021-01-01 | End: 2021-01-01

## 2021-01-01 RX ORDER — SEVELAMER CARBONATE 2400 MG/1
1 POWDER, FOR SUSPENSION ORAL
Qty: 0 | Refills: 0 | DISCHARGE

## 2021-01-01 RX ORDER — FOLIC ACID 0.8 MG
2 TABLET ORAL
Qty: 0 | Refills: 0 | DISCHARGE

## 2021-01-01 RX ORDER — FLUDROCORTISONE ACETATE 0.1 MG/1
0.1 TABLET ORAL DAILY
Refills: 0 | Status: DISCONTINUED | OUTPATIENT
Start: 2021-01-01 | End: 2021-01-01

## 2021-01-01 RX ORDER — POTASSIUM CHLORIDE 20 MEQ
40 PACKET (EA) ORAL ONCE
Refills: 0 | Status: COMPLETED | OUTPATIENT
Start: 2021-01-01 | End: 2021-01-01

## 2021-01-01 RX ORDER — ACETAMINOPHEN 500 MG
650 TABLET ORAL EVERY 6 HOURS
Refills: 0 | Status: DISCONTINUED | OUTPATIENT
Start: 2021-01-01 | End: 2021-01-01

## 2021-01-01 RX ORDER — ATORVASTATIN CALCIUM 80 MG/1
80 TABLET, FILM COATED ORAL
Qty: 30 | Refills: 0 | Status: ACTIVE | COMMUNITY

## 2021-01-01 RX ORDER — INSULIN GLARGINE 100 [IU]/ML
7 INJECTION, SOLUTION SUBCUTANEOUS AT BEDTIME
Refills: 0 | Status: DISCONTINUED | OUTPATIENT
Start: 2021-01-01 | End: 2021-01-01

## 2021-01-01 RX ORDER — FOLIC ACID 0.8 MG
1 TABLET ORAL
Qty: 30 | Refills: 0
Start: 2021-01-01 | End: 2021-01-01

## 2021-01-01 RX ORDER — POTASSIUM CHLORIDE 20 MEQ
10 PACKET (EA) ORAL ONCE
Refills: 0 | Status: COMPLETED | OUTPATIENT
Start: 2021-01-01 | End: 2021-01-01

## 2021-01-01 RX ORDER — INSULIN LISPRO 100/ML
2 VIAL (ML) SUBCUTANEOUS
Refills: 0 | Status: DISCONTINUED | OUTPATIENT
Start: 2021-01-01 | End: 2021-01-01

## 2021-01-01 RX ORDER — ACETAMINOPHEN 500 MG
650 TABLET ORAL ONCE
Refills: 0 | Status: COMPLETED | OUTPATIENT
Start: 2021-01-01 | End: 2021-01-01

## 2021-01-01 RX ORDER — INSULIN LISPRO 100/ML
VIAL (ML) SUBCUTANEOUS EVERY 6 HOURS
Refills: 0 | Status: DISCONTINUED | OUTPATIENT
Start: 2021-01-01 | End: 2021-01-01

## 2021-01-01 RX ORDER — INSULIN GLARGINE 100 [IU]/ML
30 INJECTION, SOLUTION SUBCUTANEOUS AT BEDTIME
Refills: 0 | Status: DISCONTINUED | OUTPATIENT
Start: 2021-01-01 | End: 2021-01-01

## 2021-01-01 RX ORDER — ACETAMINOPHEN 500 MG
1000 TABLET ORAL ONCE
Refills: 0 | Status: COMPLETED | OUTPATIENT
Start: 2021-01-01 | End: 2021-01-01

## 2021-01-01 RX ORDER — SODIUM CHLORIDE 0.65 %
2 AEROSOL, SPRAY (ML) NASAL THREE TIMES A DAY
Refills: 0 | Status: DISCONTINUED | OUTPATIENT
Start: 2021-01-01 | End: 2021-01-01

## 2021-01-01 RX ORDER — VALPROIC ACID (AS SODIUM SALT) 250 MG/5ML
125 SOLUTION, ORAL ORAL EVERY 8 HOURS
Refills: 0 | Status: DISCONTINUED | OUTPATIENT
Start: 2021-01-01 | End: 2021-01-01

## 2021-01-01 RX ORDER — INSULIN LISPRO 100/ML
VIAL (ML) SUBCUTANEOUS
Refills: 0 | Status: DISCONTINUED | OUTPATIENT
Start: 2021-01-01 | End: 2021-01-01

## 2021-01-01 RX ORDER — MIDODRINE HYDROCHLORIDE 2.5 MG/1
3 TABLET ORAL
Qty: 270 | Refills: 0
Start: 2021-01-01 | End: 2021-01-01

## 2021-01-01 RX ORDER — METOPROLOL SUCCINATE 25 MG/1
25 TABLET, EXTENDED RELEASE ORAL DAILY
Refills: 0 | Status: ACTIVE | COMMUNITY

## 2021-01-01 RX ORDER — FOLIC ACID 0.8 MG
1 TABLET ORAL DAILY
Refills: 0 | Status: DISCONTINUED | OUTPATIENT
Start: 2021-01-01 | End: 2021-01-01

## 2021-01-01 RX ORDER — MIDODRINE HYDROCHLORIDE 2.5 MG/1
1 TABLET ORAL
Qty: 90 | Refills: 0

## 2021-01-01 RX ORDER — INSULIN GLARGINE 100 [IU]/ML
20 INJECTION, SOLUTION SUBCUTANEOUS ONCE
Refills: 0 | Status: COMPLETED | OUTPATIENT
Start: 2021-01-01 | End: 2021-01-01

## 2021-01-01 RX ORDER — CALAMINE AND ZINC OXIDE AND PHENOL 160; 10 MG/ML; MG/ML
1 LOTION TOPICAL EVERY 6 HOURS
Refills: 0 | Status: DISCONTINUED | OUTPATIENT
Start: 2021-01-01 | End: 2021-01-01

## 2021-01-01 RX ORDER — LANOLIN ALCOHOL/MO/W.PET/CERES
5 CREAM (GRAM) TOPICAL AT BEDTIME
Refills: 0 | Status: DISCONTINUED | OUTPATIENT
Start: 2021-01-01 | End: 2021-01-01

## 2021-01-01 RX ORDER — CEFEPIME 1 G/1
500 INJECTION, POWDER, FOR SOLUTION INTRAMUSCULAR; INTRAVENOUS EVERY 12 HOURS
Refills: 0 | Status: DISCONTINUED | OUTPATIENT
Start: 2021-01-01 | End: 2021-01-01

## 2021-01-01 RX ORDER — INSULIN LISPRO 100/ML
8 VIAL (ML) SUBCUTANEOUS
Refills: 0 | Status: DISCONTINUED | OUTPATIENT
Start: 2021-01-01 | End: 2021-01-01

## 2021-01-01 RX ORDER — DEXTROSE 50 % IN WATER 50 %
15 SYRINGE (ML) INTRAVENOUS ONCE
Refills: 0 | Status: DISCONTINUED | OUTPATIENT
Start: 2021-01-01 | End: 2021-01-01

## 2021-01-01 RX ORDER — GLUCAGON INJECTION, SOLUTION 0.5 MG/.1ML
1 INJECTION, SOLUTION SUBCUTANEOUS ONCE
Refills: 0 | Status: DISCONTINUED | OUTPATIENT
Start: 2021-01-01 | End: 2021-01-01

## 2021-01-01 RX ORDER — THIAMINE MONONITRATE (VIT B1) 100 MG
500 TABLET ORAL EVERY 8 HOURS
Refills: 0 | Status: COMPLETED | OUTPATIENT
Start: 2021-01-01 | End: 2021-01-01

## 2021-01-01 RX ORDER — SODIUM CHLORIDE 9 MG/ML
50 INJECTION INTRAMUSCULAR; INTRAVENOUS; SUBCUTANEOUS ONCE
Refills: 0 | Status: COMPLETED | OUTPATIENT
Start: 2021-01-01 | End: 2021-01-01

## 2021-01-01 RX ORDER — TICAGRELOR 90 MG/1
60 TABLET ORAL EVERY 12 HOURS
Refills: 0 | Status: DISCONTINUED | OUTPATIENT
Start: 2021-01-01 | End: 2021-01-01

## 2021-01-01 RX ORDER — SEVELAMER CARBONATE 2400 MG/1
800 POWDER, FOR SUSPENSION ORAL THREE TIMES A DAY
Refills: 0 | Status: DISCONTINUED | OUTPATIENT
Start: 2021-01-01 | End: 2021-01-01

## 2021-01-01 RX ORDER — ONDANSETRON 8 MG/1
4 TABLET, FILM COATED ORAL EVERY 6 HOURS
Refills: 0 | Status: DISCONTINUED | OUTPATIENT
Start: 2021-01-01 | End: 2021-01-01

## 2021-01-01 RX ORDER — DEXTROSE 50 % IN WATER 50 %
12.5 SYRINGE (ML) INTRAVENOUS ONCE
Refills: 0 | Status: DISCONTINUED | OUTPATIENT
Start: 2021-01-01 | End: 2021-01-01

## 2021-01-01 RX ORDER — POTASSIUM CHLORIDE 20 MEQ
10 PACKET (EA) ORAL
Refills: 0 | Status: COMPLETED | OUTPATIENT
Start: 2021-01-01 | End: 2021-01-01

## 2021-01-01 RX ORDER — BACITRACIN ZINC 500 UNIT/G
1 OINTMENT IN PACKET (EA) TOPICAL
Refills: 0 | Status: DISCONTINUED | OUTPATIENT
Start: 2021-01-01 | End: 2021-01-01

## 2021-01-01 RX ORDER — MIDODRINE HYDROCHLORIDE 2.5 MG/1
30 TABLET ORAL ONCE
Refills: 0 | Status: COMPLETED | OUTPATIENT
Start: 2021-01-01 | End: 2021-01-01

## 2021-01-01 RX ORDER — DIVALPROEX SODIUM 500 MG/1
250 TABLET, DELAYED RELEASE ORAL
Refills: 0 | Status: DISCONTINUED | OUTPATIENT
Start: 2021-01-01 | End: 2021-01-01

## 2021-01-01 RX ORDER — PIPERACILLIN AND TAZOBACTAM 4; .5 G/20ML; G/20ML
3.38 INJECTION, POWDER, LYOPHILIZED, FOR SOLUTION INTRAVENOUS EVERY 6 HOURS
Refills: 0 | Status: DISCONTINUED | OUTPATIENT
Start: 2021-01-01 | End: 2021-01-01

## 2021-01-01 RX ORDER — SEVELAMER CARBONATE 800 MG/1
800 TABLET, FILM COATED ORAL
Qty: 30 | Refills: 0 | Status: ACTIVE | COMMUNITY

## 2021-01-01 RX ORDER — FERROUS SULFATE 325(65) MG
325 TABLET ORAL THREE TIMES A DAY
Refills: 0 | Status: DISCONTINUED | OUTPATIENT
Start: 2021-01-01 | End: 2021-01-01

## 2021-01-01 RX ORDER — ONDANSETRON 8 MG/1
4 TABLET, FILM COATED ORAL ONCE
Refills: 0 | Status: COMPLETED | OUTPATIENT
Start: 2021-01-01 | End: 2021-01-01

## 2021-01-01 RX ORDER — PIPERACILLIN AND TAZOBACTAM 4; .5 G/20ML; G/20ML
4.5 INJECTION, POWDER, LYOPHILIZED, FOR SOLUTION INTRAVENOUS EVERY 8 HOURS
Refills: 0 | Status: DISCONTINUED | OUTPATIENT
Start: 2021-01-01 | End: 2021-01-01

## 2021-01-01 RX ORDER — VANCOMYCIN HCL 1 G
1000 VIAL (EA) INTRAVENOUS ONCE
Refills: 0 | Status: COMPLETED | OUTPATIENT
Start: 2021-01-01 | End: 2021-01-01

## 2021-01-01 RX ORDER — INSULIN GLARGINE 100 [IU]/ML
15 INJECTION, SOLUTION SUBCUTANEOUS ONCE
Refills: 0 | Status: COMPLETED | OUTPATIENT
Start: 2021-01-01 | End: 2021-01-01

## 2021-01-01 RX ORDER — VALPROIC ACID (AS SODIUM SALT) 250 MG/5ML
250 SOLUTION, ORAL ORAL ONCE
Refills: 0 | Status: COMPLETED | OUTPATIENT
Start: 2021-01-01 | End: 2021-01-01

## 2021-01-01 RX ORDER — VALPROIC ACID (AS SODIUM SALT) 250 MG/5ML
250 SOLUTION, ORAL ORAL EVERY 12 HOURS
Refills: 0 | Status: DISCONTINUED | OUTPATIENT
Start: 2021-01-01 | End: 2021-01-01

## 2021-01-01 RX ORDER — CHLORPROMAZINE HCL 10 MG
12.5 TABLET ORAL ONCE
Refills: 0 | Status: COMPLETED | OUTPATIENT
Start: 2021-01-01 | End: 2021-01-01

## 2021-01-01 RX ORDER — ATORVASTATIN CALCIUM 80 MG/1
80 TABLET, FILM COATED ORAL AT BEDTIME
Refills: 0 | Status: DISCONTINUED | OUTPATIENT
Start: 2021-01-01 | End: 2021-01-01

## 2021-01-01 RX ORDER — MIDODRINE HYDROCHLORIDE 2.5 MG/1
30 TABLET ORAL EVERY 8 HOURS
Refills: 0 | Status: DISCONTINUED | OUTPATIENT
Start: 2021-01-01 | End: 2021-01-01

## 2021-01-01 RX ORDER — CHLORPROMAZINE HCL 10 MG
12.5 TABLET ORAL ONCE
Refills: 0 | Status: DISCONTINUED | OUTPATIENT
Start: 2021-01-01 | End: 2021-01-01

## 2021-01-01 RX ORDER — INSULIN LISPRO 100/ML
4 VIAL (ML) SUBCUTANEOUS
Refills: 0 | Status: DISCONTINUED | OUTPATIENT
Start: 2021-01-01 | End: 2021-01-01

## 2021-01-01 RX ORDER — CEFTRIAXONE 500 MG/1
1000 INJECTION, POWDER, FOR SOLUTION INTRAMUSCULAR; INTRAVENOUS ONCE
Refills: 0 | Status: COMPLETED | OUTPATIENT
Start: 2021-01-01 | End: 2021-01-01

## 2021-01-01 RX ORDER — SODIUM CHLORIDE 9 MG/ML
500 INJECTION INTRAMUSCULAR; INTRAVENOUS; SUBCUTANEOUS ONCE
Refills: 0 | Status: COMPLETED | OUTPATIENT
Start: 2021-01-01 | End: 2021-01-01

## 2021-01-01 RX ORDER — INSULIN GLARGINE 100 [IU]/ML
8 INJECTION, SOLUTION SUBCUTANEOUS AT BEDTIME
Refills: 0 | Status: DISCONTINUED | OUTPATIENT
Start: 2021-01-01 | End: 2021-01-01

## 2021-01-01 RX ADMIN — INSULIN GLARGINE 5 UNIT(S): 100 INJECTION, SOLUTION SUBCUTANEOUS at 23:09

## 2021-01-01 RX ADMIN — SEVELAMER CARBONATE 800 MILLIGRAM(S): 2400 POWDER, FOR SUSPENSION ORAL at 21:17

## 2021-01-01 RX ADMIN — Medication 1: at 14:04

## 2021-01-01 RX ADMIN — HEPARIN SODIUM 5000 UNIT(S): 5000 INJECTION INTRAVENOUS; SUBCUTANEOUS at 06:15

## 2021-01-01 RX ADMIN — MIDODRINE HYDROCHLORIDE 30 MILLIGRAM(S): 2.5 TABLET ORAL at 23:09

## 2021-01-01 RX ADMIN — Medication 325 MILLIGRAM(S): at 05:25

## 2021-01-01 RX ADMIN — Medication 2: at 09:21

## 2021-01-01 RX ADMIN — MIDODRINE HYDROCHLORIDE 30 MILLIGRAM(S): 2.5 TABLET ORAL at 05:57

## 2021-01-01 RX ADMIN — Medication 5 MILLIGRAM(S): at 17:22

## 2021-01-01 RX ADMIN — Medication 325 MILLIGRAM(S): at 13:38

## 2021-01-01 RX ADMIN — INSULIN GLARGINE 5 UNIT(S): 100 INJECTION, SOLUTION SUBCUTANEOUS at 22:16

## 2021-01-01 RX ADMIN — Medication 81 MILLIGRAM(S): at 12:17

## 2021-01-01 RX ADMIN — CHLORHEXIDINE GLUCONATE 1 APPLICATION(S): 213 SOLUTION TOPICAL at 06:27

## 2021-01-01 RX ADMIN — HEPARIN SODIUM 5000 UNIT(S): 5000 INJECTION INTRAVENOUS; SUBCUTANEOUS at 06:09

## 2021-01-01 RX ADMIN — PANTOPRAZOLE SODIUM 40 MILLIGRAM(S): 20 TABLET, DELAYED RELEASE ORAL at 11:59

## 2021-01-01 RX ADMIN — CHLORHEXIDINE GLUCONATE 15 MILLILITER(S): 213 SOLUTION TOPICAL at 18:42

## 2021-01-01 RX ADMIN — Medication 81 MILLIGRAM(S): at 13:28

## 2021-01-01 RX ADMIN — MIDODRINE HYDROCHLORIDE 30 MILLIGRAM(S): 2.5 TABLET ORAL at 05:22

## 2021-01-01 RX ADMIN — DIVALPROEX SODIUM 250 MILLIGRAM(S): 500 TABLET, DELAYED RELEASE ORAL at 06:02

## 2021-01-01 RX ADMIN — MIDODRINE HYDROCHLORIDE 30 MILLIGRAM(S): 2.5 TABLET ORAL at 12:21

## 2021-01-01 RX ADMIN — MIDODRINE HYDROCHLORIDE 20 MILLIGRAM(S): 2.5 TABLET ORAL at 19:44

## 2021-01-01 RX ADMIN — SEVELAMER CARBONATE 1600 MILLIGRAM(S): 2400 POWDER, FOR SUSPENSION ORAL at 11:49

## 2021-01-01 RX ADMIN — Medication 81 MILLIGRAM(S): at 13:18

## 2021-01-01 RX ADMIN — MIDODRINE HYDROCHLORIDE 20 MILLIGRAM(S): 2.5 TABLET ORAL at 18:03

## 2021-01-01 RX ADMIN — Medication 105 MILLIGRAM(S): at 14:59

## 2021-01-01 RX ADMIN — SEVELAMER CARBONATE 800 MILLIGRAM(S): 2400 POWDER, FOR SUSPENSION ORAL at 18:03

## 2021-01-01 RX ADMIN — MIDODRINE HYDROCHLORIDE 30 MILLIGRAM(S): 2.5 TABLET ORAL at 06:18

## 2021-01-01 RX ADMIN — HEPARIN SODIUM 5000 UNIT(S): 5000 INJECTION INTRAVENOUS; SUBCUTANEOUS at 18:15

## 2021-01-01 RX ADMIN — CEFTRIAXONE 100 MILLIGRAM(S): 500 INJECTION, POWDER, FOR SOLUTION INTRAMUSCULAR; INTRAVENOUS at 17:59

## 2021-01-01 RX ADMIN — Medication 100 MILLIEQUIVALENT(S): at 21:07

## 2021-01-01 RX ADMIN — Medication 1 APPLICATION(S): at 13:22

## 2021-01-01 RX ADMIN — Medication 81 MILLIGRAM(S): at 13:17

## 2021-01-01 RX ADMIN — Medication 15 GRAM(S): at 13:28

## 2021-01-01 RX ADMIN — CHLORHEXIDINE GLUCONATE 1 APPLICATION(S): 213 SOLUTION TOPICAL at 10:09

## 2021-01-01 RX ADMIN — MIDODRINE HYDROCHLORIDE 30 MILLIGRAM(S): 2.5 TABLET ORAL at 01:26

## 2021-01-01 RX ADMIN — TICAGRELOR 90 MILLIGRAM(S): 90 TABLET ORAL at 18:03

## 2021-01-01 RX ADMIN — Medication 20 MILLIEQUIVALENT(S): at 11:48

## 2021-01-01 RX ADMIN — CHLORHEXIDINE GLUCONATE 1 APPLICATION(S): 213 SOLUTION TOPICAL at 06:14

## 2021-01-01 RX ADMIN — MIDODRINE HYDROCHLORIDE 30 MILLIGRAM(S): 2.5 TABLET ORAL at 14:27

## 2021-01-01 RX ADMIN — HEPARIN SODIUM 5000 UNIT(S): 5000 INJECTION INTRAVENOUS; SUBCUTANEOUS at 17:08

## 2021-01-01 RX ADMIN — CHLORHEXIDINE GLUCONATE 1 APPLICATION(S): 213 SOLUTION TOPICAL at 12:43

## 2021-01-01 RX ADMIN — ATORVASTATIN CALCIUM 80 MILLIGRAM(S): 80 TABLET, FILM COATED ORAL at 21:45

## 2021-01-01 RX ADMIN — MIDODRINE HYDROCHLORIDE 30 MILLIGRAM(S): 2.5 TABLET ORAL at 21:45

## 2021-01-01 RX ADMIN — Medication 81 MILLIGRAM(S): at 14:44

## 2021-01-01 RX ADMIN — Medication 10 MILLIEQUIVALENT(S): at 18:02

## 2021-01-01 RX ADMIN — MIDODRINE HYDROCHLORIDE 20 MILLIGRAM(S): 2.5 TABLET ORAL at 13:12

## 2021-01-01 RX ADMIN — ATORVASTATIN CALCIUM 80 MILLIGRAM(S): 80 TABLET, FILM COATED ORAL at 20:02

## 2021-01-01 RX ADMIN — Medication 5 MILLIGRAM(S): at 06:16

## 2021-01-01 RX ADMIN — LIDOCAINE 1 PATCH: 4 CREAM TOPICAL at 19:23

## 2021-01-01 RX ADMIN — MIDODRINE HYDROCHLORIDE 30 MILLIGRAM(S): 2.5 TABLET ORAL at 21:16

## 2021-01-01 RX ADMIN — Medication 1 APPLICATION(S): at 17:08

## 2021-01-01 RX ADMIN — Medication 325 MILLIGRAM(S): at 13:18

## 2021-01-01 RX ADMIN — ATORVASTATIN CALCIUM 80 MILLIGRAM(S): 80 TABLET, FILM COATED ORAL at 21:26

## 2021-01-01 RX ADMIN — INSULIN GLARGINE 5 UNIT(S): 100 INJECTION, SOLUTION SUBCUTANEOUS at 21:33

## 2021-01-01 RX ADMIN — ATORVASTATIN CALCIUM 80 MILLIGRAM(S): 80 TABLET, FILM COATED ORAL at 21:55

## 2021-01-01 RX ADMIN — ATORVASTATIN CALCIUM 80 MILLIGRAM(S): 80 TABLET, FILM COATED ORAL at 22:29

## 2021-01-01 RX ADMIN — TICAGRELOR 60 MILLIGRAM(S): 90 TABLET ORAL at 05:25

## 2021-01-01 RX ADMIN — Medication 1 APPLICATION(S): at 09:09

## 2021-01-01 RX ADMIN — SEVELAMER CARBONATE 800 MILLIGRAM(S): 2400 POWDER, FOR SUSPENSION ORAL at 05:23

## 2021-01-01 RX ADMIN — Medication 5 MILLIGRAM(S): at 18:00

## 2021-01-01 RX ADMIN — Medication 5 MILLIGRAM(S): at 18:01

## 2021-01-01 RX ADMIN — CHLORHEXIDINE GLUCONATE 15 MILLILITER(S): 213 SOLUTION TOPICAL at 18:11

## 2021-01-01 RX ADMIN — MIDODRINE HYDROCHLORIDE 30 MILLIGRAM(S): 2.5 TABLET ORAL at 05:09

## 2021-01-01 RX ADMIN — TICAGRELOR 90 MILLIGRAM(S): 90 TABLET ORAL at 18:05

## 2021-01-01 RX ADMIN — LIDOCAINE 1 PATCH: 4 CREAM TOPICAL at 00:38

## 2021-01-01 RX ADMIN — TICAGRELOR 60 MILLIGRAM(S): 90 TABLET ORAL at 17:22

## 2021-01-01 RX ADMIN — MIDODRINE HYDROCHLORIDE 20 MILLIGRAM(S): 2.5 TABLET ORAL at 06:21

## 2021-01-01 RX ADMIN — ATORVASTATIN CALCIUM 80 MILLIGRAM(S): 80 TABLET, FILM COATED ORAL at 21:38

## 2021-01-01 RX ADMIN — Medication 1 MILLIGRAM(S): at 12:26

## 2021-01-01 RX ADMIN — LIDOCAINE 1 PATCH: 4 CREAM TOPICAL at 00:55

## 2021-01-01 RX ADMIN — SEVELAMER CARBONATE 800 MILLIGRAM(S): 2400 POWDER, FOR SUSPENSION ORAL at 14:20

## 2021-01-01 RX ADMIN — LIDOCAINE 1 PATCH: 4 CREAM TOPICAL at 13:33

## 2021-01-01 RX ADMIN — Medication 325 MILLIGRAM(S): at 12:31

## 2021-01-01 RX ADMIN — Medication 2 UNIT(S): at 18:06

## 2021-01-01 RX ADMIN — INSULIN GLARGINE 5 UNIT(S): 100 INJECTION, SOLUTION SUBCUTANEOUS at 21:13

## 2021-01-01 RX ADMIN — LIDOCAINE 1 PATCH: 4 CREAM TOPICAL at 00:00

## 2021-01-01 RX ADMIN — Medication 105 MILLIGRAM(S): at 18:19

## 2021-01-01 RX ADMIN — Medication 1: at 08:48

## 2021-01-01 RX ADMIN — TICAGRELOR 60 MILLIGRAM(S): 90 TABLET ORAL at 05:09

## 2021-01-01 RX ADMIN — Medication 325 MILLIGRAM(S): at 23:11

## 2021-01-01 RX ADMIN — MIDODRINE HYDROCHLORIDE 30 MILLIGRAM(S): 2.5 TABLET ORAL at 06:21

## 2021-01-01 RX ADMIN — HEPARIN SODIUM 5000 UNIT(S): 5000 INJECTION INTRAVENOUS; SUBCUTANEOUS at 17:42

## 2021-01-01 RX ADMIN — Medication 1 APPLICATION(S): at 22:08

## 2021-01-01 RX ADMIN — HEPARIN SODIUM 5000 UNIT(S): 5000 INJECTION INTRAVENOUS; SUBCUTANEOUS at 17:25

## 2021-01-01 RX ADMIN — INSULIN GLARGINE 5 UNIT(S): 100 INJECTION, SOLUTION SUBCUTANEOUS at 21:56

## 2021-01-01 RX ADMIN — Medication 1 APPLICATION(S): at 12:00

## 2021-01-01 RX ADMIN — Medication 1 APPLICATION(S): at 12:36

## 2021-01-01 RX ADMIN — SEVELAMER CARBONATE 800 MILLIGRAM(S): 2400 POWDER, FOR SUSPENSION ORAL at 13:34

## 2021-01-01 RX ADMIN — Medication 40 MILLIEQUIVALENT(S): at 18:52

## 2021-01-01 RX ADMIN — HEPARIN SODIUM 5000 UNIT(S): 5000 INJECTION INTRAVENOUS; SUBCUTANEOUS at 06:26

## 2021-01-01 RX ADMIN — Medication 81 MILLIGRAM(S): at 13:35

## 2021-01-01 RX ADMIN — Medication 1 APPLICATION(S): at 15:00

## 2021-01-01 RX ADMIN — Medication 325 MILLIGRAM(S): at 13:20

## 2021-01-01 RX ADMIN — Medication 1 TABLET(S): at 11:58

## 2021-01-01 RX ADMIN — LIDOCAINE 1 PATCH: 4 CREAM TOPICAL at 00:40

## 2021-01-01 RX ADMIN — PANTOPRAZOLE SODIUM 40 MILLIGRAM(S): 20 TABLET, DELAYED RELEASE ORAL at 05:56

## 2021-01-01 RX ADMIN — ATORVASTATIN CALCIUM 80 MILLIGRAM(S): 80 TABLET, FILM COATED ORAL at 21:08

## 2021-01-01 RX ADMIN — CEFTRIAXONE 1000 MILLIGRAM(S): 500 INJECTION, POWDER, FOR SOLUTION INTRAMUSCULAR; INTRAVENOUS at 07:30

## 2021-01-01 RX ADMIN — PANTOPRAZOLE SODIUM 40 MILLIGRAM(S): 20 TABLET, DELAYED RELEASE ORAL at 12:55

## 2021-01-01 RX ADMIN — TICAGRELOR 60 MILLIGRAM(S): 90 TABLET ORAL at 05:36

## 2021-01-01 RX ADMIN — Medication 5 MILLIGRAM(S): at 17:56

## 2021-01-01 RX ADMIN — Medication 325 MILLIGRAM(S): at 14:16

## 2021-01-01 RX ADMIN — SEVELAMER CARBONATE 1600 MILLIGRAM(S): 2400 POWDER, FOR SUSPENSION ORAL at 09:03

## 2021-01-01 RX ADMIN — MIDODRINE HYDROCHLORIDE 20 MILLIGRAM(S): 2.5 TABLET ORAL at 05:00

## 2021-01-01 RX ADMIN — LIDOCAINE 1 PATCH: 4 CREAM TOPICAL at 21:38

## 2021-01-01 RX ADMIN — CHLORHEXIDINE GLUCONATE 15 MILLILITER(S): 213 SOLUTION TOPICAL at 17:22

## 2021-01-01 RX ADMIN — SEVELAMER CARBONATE 800 MILLIGRAM(S): 2400 POWDER, FOR SUSPENSION ORAL at 05:08

## 2021-01-01 RX ADMIN — SEVELAMER CARBONATE 800 MILLIGRAM(S): 2400 POWDER, FOR SUSPENSION ORAL at 20:45

## 2021-01-01 RX ADMIN — PANTOPRAZOLE SODIUM 40 MILLIGRAM(S): 20 TABLET, DELAYED RELEASE ORAL at 13:36

## 2021-01-01 RX ADMIN — HEPARIN SODIUM 5000 UNIT(S): 5000 INJECTION INTRAVENOUS; SUBCUTANEOUS at 18:20

## 2021-01-01 RX ADMIN — CEFEPIME 100 MILLIGRAM(S): 1 INJECTION, POWDER, FOR SOLUTION INTRAMUSCULAR; INTRAVENOUS at 23:47

## 2021-01-01 RX ADMIN — LIDOCAINE 1 PATCH: 4 CREAM TOPICAL at 12:44

## 2021-01-01 RX ADMIN — HEPARIN SODIUM 5000 UNIT(S): 5000 INJECTION INTRAVENOUS; SUBCUTANEOUS at 06:17

## 2021-01-01 RX ADMIN — CHLORHEXIDINE GLUCONATE 1 APPLICATION(S): 213 SOLUTION TOPICAL at 06:29

## 2021-01-01 RX ADMIN — MIDODRINE HYDROCHLORIDE 30 MILLIGRAM(S): 2.5 TABLET ORAL at 21:09

## 2021-01-01 RX ADMIN — Medication 325 MILLIGRAM(S): at 13:31

## 2021-01-01 RX ADMIN — Medication 1 APPLICATION(S): at 12:21

## 2021-01-01 RX ADMIN — HEPARIN SODIUM 5000 UNIT(S): 5000 INJECTION INTRAVENOUS; SUBCUTANEOUS at 17:22

## 2021-01-01 RX ADMIN — Medication 1 APPLICATION(S): at 11:49

## 2021-01-01 RX ADMIN — INSULIN GLARGINE 5 UNIT(S): 100 INJECTION, SOLUTION SUBCUTANEOUS at 21:45

## 2021-01-01 RX ADMIN — HEPARIN SODIUM 5000 UNIT(S): 5000 INJECTION INTRAVENOUS; SUBCUTANEOUS at 06:37

## 2021-01-01 RX ADMIN — INSULIN GLARGINE 5 UNIT(S): 100 INJECTION, SOLUTION SUBCUTANEOUS at 22:02

## 2021-01-01 RX ADMIN — HEPARIN SODIUM 5000 UNIT(S): 5000 INJECTION INTRAVENOUS; SUBCUTANEOUS at 05:57

## 2021-01-01 RX ADMIN — HEPARIN SODIUM 5000 UNIT(S): 5000 INJECTION INTRAVENOUS; SUBCUTANEOUS at 05:53

## 2021-01-01 RX ADMIN — Medication 5 MILLIGRAM(S): at 05:36

## 2021-01-01 RX ADMIN — ATORVASTATIN CALCIUM 80 MILLIGRAM(S): 80 TABLET, FILM COATED ORAL at 20:34

## 2021-01-01 RX ADMIN — CHLORHEXIDINE GLUCONATE 1 APPLICATION(S): 213 SOLUTION TOPICAL at 13:01

## 2021-01-01 RX ADMIN — HEPARIN SODIUM 5000 UNIT(S): 5000 INJECTION INTRAVENOUS; SUBCUTANEOUS at 18:03

## 2021-01-01 RX ADMIN — LIDOCAINE 1 PATCH: 4 CREAM TOPICAL at 01:30

## 2021-01-01 RX ADMIN — Medication 5 MILLIGRAM(S): at 06:12

## 2021-01-01 RX ADMIN — CHLORHEXIDINE GLUCONATE 1 APPLICATION(S): 213 SOLUTION TOPICAL at 11:26

## 2021-01-01 RX ADMIN — Medication 1 APPLICATION(S): at 12:54

## 2021-01-01 RX ADMIN — HEPARIN SODIUM 5000 UNIT(S): 5000 INJECTION INTRAVENOUS; SUBCUTANEOUS at 06:14

## 2021-01-01 RX ADMIN — Medication 1 TABLET(S): at 12:51

## 2021-01-01 RX ADMIN — TICAGRELOR 60 MILLIGRAM(S): 90 TABLET ORAL at 18:20

## 2021-01-01 RX ADMIN — MIDODRINE HYDROCHLORIDE 30 MILLIGRAM(S): 2.5 TABLET ORAL at 22:08

## 2021-01-01 RX ADMIN — TICAGRELOR 90 MILLIGRAM(S): 90 TABLET ORAL at 05:45

## 2021-01-01 RX ADMIN — TICAGRELOR 60 MILLIGRAM(S): 90 TABLET ORAL at 09:30

## 2021-01-01 RX ADMIN — INSULIN GLARGINE 5 UNIT(S): 100 INJECTION, SOLUTION SUBCUTANEOUS at 22:15

## 2021-01-01 RX ADMIN — Medication 81 MILLIGRAM(S): at 12:36

## 2021-01-01 RX ADMIN — Medication 81 MILLIGRAM(S): at 11:59

## 2021-01-01 RX ADMIN — Medication 325 MILLIGRAM(S): at 22:29

## 2021-01-01 RX ADMIN — Medication 81 MILLIGRAM(S): at 14:09

## 2021-01-01 RX ADMIN — Medication 5 MILLIGRAM(S): at 17:06

## 2021-01-01 RX ADMIN — TICAGRELOR 60 MILLIGRAM(S): 90 TABLET ORAL at 05:19

## 2021-01-01 RX ADMIN — TICAGRELOR 60 MILLIGRAM(S): 90 TABLET ORAL at 18:28

## 2021-01-01 RX ADMIN — ATORVASTATIN CALCIUM 80 MILLIGRAM(S): 80 TABLET, FILM COATED ORAL at 22:33

## 2021-01-01 RX ADMIN — Medication 1 APPLICATION(S): at 13:01

## 2021-01-01 RX ADMIN — HEPARIN SODIUM 5000 UNIT(S): 5000 INJECTION INTRAVENOUS; SUBCUTANEOUS at 05:10

## 2021-01-01 RX ADMIN — LIDOCAINE 1 PATCH: 4 CREAM TOPICAL at 11:09

## 2021-01-01 RX ADMIN — Medication 1: at 13:02

## 2021-01-01 RX ADMIN — MIDODRINE HYDROCHLORIDE 30 MILLIGRAM(S): 2.5 TABLET ORAL at 13:10

## 2021-01-01 RX ADMIN — Medication 1 MILLIGRAM(S): at 11:49

## 2021-01-01 RX ADMIN — TICAGRELOR 60 MILLIGRAM(S): 90 TABLET ORAL at 18:15

## 2021-01-01 RX ADMIN — Medication 325 MILLIGRAM(S): at 06:02

## 2021-01-01 RX ADMIN — TICAGRELOR 60 MILLIGRAM(S): 90 TABLET ORAL at 18:07

## 2021-01-01 RX ADMIN — INSULIN GLARGINE 5 UNIT(S): 100 INJECTION, SOLUTION SUBCUTANEOUS at 21:32

## 2021-01-01 RX ADMIN — Medication 325 MILLIGRAM(S): at 18:59

## 2021-01-01 RX ADMIN — TICAGRELOR 60 MILLIGRAM(S): 90 TABLET ORAL at 17:31

## 2021-01-01 RX ADMIN — Medication 105 MILLIGRAM(S): at 19:00

## 2021-01-01 RX ADMIN — MIDODRINE HYDROCHLORIDE 30 MILLIGRAM(S): 2.5 TABLET ORAL at 05:59

## 2021-01-01 RX ADMIN — Medication 5 MILLIGRAM(S): at 06:24

## 2021-01-01 RX ADMIN — Medication 1: at 22:09

## 2021-01-01 RX ADMIN — Medication 105 MILLIGRAM(S): at 12:45

## 2021-01-01 RX ADMIN — Medication 81 MILLIGRAM(S): at 12:18

## 2021-01-01 RX ADMIN — Medication 325 MILLIGRAM(S): at 13:11

## 2021-01-01 RX ADMIN — LIDOCAINE 1 PATCH: 4 CREAM TOPICAL at 01:00

## 2021-01-01 RX ADMIN — INSULIN GLARGINE 5 UNIT(S): 100 INJECTION, SOLUTION SUBCUTANEOUS at 22:03

## 2021-01-01 RX ADMIN — INSULIN GLARGINE 5 UNIT(S): 100 INJECTION, SOLUTION SUBCUTANEOUS at 22:00

## 2021-01-01 RX ADMIN — PANTOPRAZOLE SODIUM 40 MILLIGRAM(S): 20 TABLET, DELAYED RELEASE ORAL at 11:58

## 2021-01-01 RX ADMIN — MIDODRINE HYDROCHLORIDE 30 MILLIGRAM(S): 2.5 TABLET ORAL at 21:40

## 2021-01-01 RX ADMIN — TICAGRELOR 60 MILLIGRAM(S): 90 TABLET ORAL at 06:24

## 2021-01-01 RX ADMIN — LIDOCAINE 1 PATCH: 4 CREAM TOPICAL at 19:26

## 2021-01-01 RX ADMIN — HEPARIN SODIUM 5000 UNIT(S): 5000 INJECTION INTRAVENOUS; SUBCUTANEOUS at 18:42

## 2021-01-01 RX ADMIN — HEPARIN SODIUM 5000 UNIT(S): 5000 INJECTION INTRAVENOUS; SUBCUTANEOUS at 16:55

## 2021-01-01 RX ADMIN — Medication 260 MILLIGRAM(S): at 04:10

## 2021-01-01 RX ADMIN — Medication 325 MILLIGRAM(S): at 14:53

## 2021-01-01 RX ADMIN — ATORVASTATIN CALCIUM 80 MILLIGRAM(S): 80 TABLET, FILM COATED ORAL at 21:02

## 2021-01-01 RX ADMIN — MIDODRINE HYDROCHLORIDE 20 MILLIGRAM(S): 2.5 TABLET ORAL at 12:07

## 2021-01-01 RX ADMIN — CHLORHEXIDINE GLUCONATE 1 APPLICATION(S): 213 SOLUTION TOPICAL at 11:23

## 2021-01-01 RX ADMIN — LIDOCAINE 1 PATCH: 4 CREAM TOPICAL at 21:56

## 2021-01-01 RX ADMIN — Medication 1 MILLIGRAM(S): at 12:43

## 2021-01-01 RX ADMIN — Medication 105 MILLIGRAM(S): at 14:03

## 2021-01-01 RX ADMIN — LIDOCAINE 1 PATCH: 4 CREAM TOPICAL at 13:02

## 2021-01-01 RX ADMIN — PANTOPRAZOLE SODIUM 40 MILLIGRAM(S): 20 TABLET, DELAYED RELEASE ORAL at 11:24

## 2021-01-01 RX ADMIN — Medication 325 MILLIGRAM(S): at 11:30

## 2021-01-01 RX ADMIN — SEVELAMER CARBONATE 1600 MILLIGRAM(S): 2400 POWDER, FOR SUSPENSION ORAL at 13:12

## 2021-01-01 RX ADMIN — Medication 325 MILLIGRAM(S): at 21:16

## 2021-01-01 RX ADMIN — Medication 105 MILLIGRAM(S): at 02:24

## 2021-01-01 RX ADMIN — MIDODRINE HYDROCHLORIDE 30 MILLIGRAM(S): 2.5 TABLET ORAL at 14:23

## 2021-01-01 RX ADMIN — INSULIN GLARGINE 5 UNIT(S): 100 INJECTION, SOLUTION SUBCUTANEOUS at 22:24

## 2021-01-01 RX ADMIN — Medication 1 TABLET(S): at 14:03

## 2021-01-01 RX ADMIN — MIDODRINE HYDROCHLORIDE 30 MILLIGRAM(S): 2.5 TABLET ORAL at 12:16

## 2021-01-01 RX ADMIN — Medication 1: at 14:19

## 2021-01-01 RX ADMIN — Medication 325 MILLIGRAM(S): at 21:12

## 2021-01-01 RX ADMIN — LIDOCAINE 1 PATCH: 4 CREAM TOPICAL at 12:13

## 2021-01-01 RX ADMIN — TICAGRELOR 60 MILLIGRAM(S): 90 TABLET ORAL at 06:17

## 2021-01-01 RX ADMIN — SEVELAMER CARBONATE 1600 MILLIGRAM(S): 2400 POWDER, FOR SUSPENSION ORAL at 22:24

## 2021-01-01 RX ADMIN — HEPARIN SODIUM 5000 UNIT(S): 5000 INJECTION INTRAVENOUS; SUBCUTANEOUS at 17:39

## 2021-01-01 RX ADMIN — CHLORHEXIDINE GLUCONATE 15 MILLILITER(S): 213 SOLUTION TOPICAL at 17:54

## 2021-01-01 RX ADMIN — Medication 1 APPLICATION(S): at 13:12

## 2021-01-01 RX ADMIN — PANTOPRAZOLE SODIUM 40 MILLIGRAM(S): 20 TABLET, DELAYED RELEASE ORAL at 06:22

## 2021-01-01 RX ADMIN — Medication 105 MILLIGRAM(S): at 22:15

## 2021-01-01 RX ADMIN — SODIUM CHLORIDE 50 MILLILITER(S): 9 INJECTION INTRAMUSCULAR; INTRAVENOUS; SUBCUTANEOUS at 09:55

## 2021-01-01 RX ADMIN — MIDODRINE HYDROCHLORIDE 20 MILLIGRAM(S): 2.5 TABLET ORAL at 18:05

## 2021-01-01 RX ADMIN — Medication 81 MILLIGRAM(S): at 13:01

## 2021-01-01 RX ADMIN — TICAGRELOR 60 MILLIGRAM(S): 90 TABLET ORAL at 17:14

## 2021-01-01 RX ADMIN — CHLORHEXIDINE GLUCONATE 1 APPLICATION(S): 213 SOLUTION TOPICAL at 13:32

## 2021-01-01 RX ADMIN — SEVELAMER CARBONATE 1600 MILLIGRAM(S): 2400 POWDER, FOR SUSPENSION ORAL at 13:29

## 2021-01-01 RX ADMIN — TICAGRELOR 60 MILLIGRAM(S): 90 TABLET ORAL at 06:13

## 2021-01-01 RX ADMIN — Medication 5 MILLIGRAM(S): at 21:03

## 2021-01-01 RX ADMIN — MIDODRINE HYDROCHLORIDE 30 MILLIGRAM(S): 2.5 TABLET ORAL at 05:15

## 2021-01-01 RX ADMIN — MIDODRINE HYDROCHLORIDE 30 MILLIGRAM(S): 2.5 TABLET ORAL at 12:04

## 2021-01-01 RX ADMIN — CHLORHEXIDINE GLUCONATE 15 MILLILITER(S): 213 SOLUTION TOPICAL at 17:42

## 2021-01-01 RX ADMIN — MIDODRINE HYDROCHLORIDE 30 MILLIGRAM(S): 2.5 TABLET ORAL at 13:34

## 2021-01-01 RX ADMIN — TICAGRELOR 90 MILLIGRAM(S): 90 TABLET ORAL at 16:55

## 2021-01-01 RX ADMIN — TICAGRELOR 60 MILLIGRAM(S): 90 TABLET ORAL at 17:56

## 2021-01-01 RX ADMIN — LIDOCAINE 1 PATCH: 4 CREAM TOPICAL at 12:42

## 2021-01-01 RX ADMIN — LIDOCAINE 1 PATCH: 4 CREAM TOPICAL at 18:13

## 2021-01-01 RX ADMIN — CHLORHEXIDINE GLUCONATE 15 MILLILITER(S): 213 SOLUTION TOPICAL at 08:38

## 2021-01-01 RX ADMIN — HEPARIN SODIUM 5000 UNIT(S): 5000 INJECTION INTRAVENOUS; SUBCUTANEOUS at 17:38

## 2021-01-01 RX ADMIN — MIDODRINE HYDROCHLORIDE 30 MILLIGRAM(S): 2.5 TABLET ORAL at 15:00

## 2021-01-01 RX ADMIN — Medication 325 MILLIGRAM(S): at 13:52

## 2021-01-01 RX ADMIN — HEPARIN SODIUM 5000 UNIT(S): 5000 INJECTION INTRAVENOUS; SUBCUTANEOUS at 18:30

## 2021-01-01 RX ADMIN — Medication 1 APPLICATION(S): at 12:17

## 2021-01-01 RX ADMIN — Medication 1 TABLET(S): at 14:17

## 2021-01-01 RX ADMIN — PANTOPRAZOLE SODIUM 40 MILLIGRAM(S): 20 TABLET, DELAYED RELEASE ORAL at 12:41

## 2021-01-01 RX ADMIN — Medication 2 UNIT(S): at 09:33

## 2021-01-01 RX ADMIN — MIDODRINE HYDROCHLORIDE 20 MILLIGRAM(S): 2.5 TABLET ORAL at 06:15

## 2021-01-01 RX ADMIN — MIDODRINE HYDROCHLORIDE 30 MILLIGRAM(S): 2.5 TABLET ORAL at 17:39

## 2021-01-01 RX ADMIN — Medication 1 APPLICATION(S): at 12:43

## 2021-01-01 RX ADMIN — Medication 1: at 09:04

## 2021-01-01 RX ADMIN — Medication 1 MILLIGRAM(S): at 11:29

## 2021-01-01 RX ADMIN — Medication 1: at 13:35

## 2021-01-01 RX ADMIN — Medication 325 MILLIGRAM(S): at 05:53

## 2021-01-01 RX ADMIN — HEPARIN SODIUM 5000 UNIT(S): 5000 INJECTION INTRAVENOUS; SUBCUTANEOUS at 06:02

## 2021-01-01 RX ADMIN — MIDODRINE HYDROCHLORIDE 30 MILLIGRAM(S): 2.5 TABLET ORAL at 21:32

## 2021-01-01 RX ADMIN — Medication 2.5 MILLIGRAM(S): at 12:55

## 2021-01-01 RX ADMIN — Medication 1 APPLICATION(S): at 12:13

## 2021-01-01 RX ADMIN — SEVELAMER CARBONATE 1600 MILLIGRAM(S): 2400 POWDER, FOR SUSPENSION ORAL at 08:37

## 2021-01-01 RX ADMIN — SEVELAMER CARBONATE 1600 MILLIGRAM(S): 2400 POWDER, FOR SUSPENSION ORAL at 14:41

## 2021-01-01 RX ADMIN — LIDOCAINE 1 PATCH: 4 CREAM TOPICAL at 11:17

## 2021-01-01 RX ADMIN — Medication 2.5 MILLIGRAM(S): at 06:24

## 2021-01-01 RX ADMIN — Medication 5 MILLIGRAM(S): at 05:53

## 2021-01-01 RX ADMIN — Medication 2.5 MILLIGRAM(S): at 08:57

## 2021-01-01 RX ADMIN — Medication 2: at 18:39

## 2021-01-01 RX ADMIN — Medication 2: at 17:42

## 2021-01-01 RX ADMIN — HEPARIN SODIUM 5000 UNIT(S): 5000 INJECTION INTRAVENOUS; SUBCUTANEOUS at 18:58

## 2021-01-01 RX ADMIN — SEVELAMER CARBONATE 1600 MILLIGRAM(S): 2400 POWDER, FOR SUSPENSION ORAL at 12:21

## 2021-01-01 RX ADMIN — MIDODRINE HYDROCHLORIDE 30 MILLIGRAM(S): 2.5 TABLET ORAL at 22:30

## 2021-01-01 RX ADMIN — TICAGRELOR 60 MILLIGRAM(S): 90 TABLET ORAL at 17:25

## 2021-01-01 RX ADMIN — ATORVASTATIN CALCIUM 80 MILLIGRAM(S): 80 TABLET, FILM COATED ORAL at 21:52

## 2021-01-01 RX ADMIN — Medication 2.5 MILLIGRAM(S): at 06:23

## 2021-01-01 RX ADMIN — CHLORHEXIDINE GLUCONATE 15 MILLILITER(S): 213 SOLUTION TOPICAL at 10:08

## 2021-01-01 RX ADMIN — TICAGRELOR 60 MILLIGRAM(S): 90 TABLET ORAL at 18:42

## 2021-01-01 RX ADMIN — Medication 81 MILLIGRAM(S): at 12:52

## 2021-01-01 RX ADMIN — Medication 1 TABLET(S): at 11:16

## 2021-01-01 RX ADMIN — TICAGRELOR 60 MILLIGRAM(S): 90 TABLET ORAL at 17:07

## 2021-01-01 RX ADMIN — TICAGRELOR 60 MILLIGRAM(S): 90 TABLET ORAL at 05:57

## 2021-01-01 RX ADMIN — Medication 5 MILLIGRAM(S): at 18:45

## 2021-01-01 RX ADMIN — ATORVASTATIN CALCIUM 80 MILLIGRAM(S): 80 TABLET, FILM COATED ORAL at 22:23

## 2021-01-01 RX ADMIN — Medication 1: at 18:29

## 2021-01-01 RX ADMIN — Medication 325 MILLIGRAM(S): at 13:12

## 2021-01-01 RX ADMIN — MIDODRINE HYDROCHLORIDE 30 MILLIGRAM(S): 2.5 TABLET ORAL at 21:15

## 2021-01-01 RX ADMIN — Medication 325 MILLIGRAM(S): at 12:54

## 2021-01-01 RX ADMIN — Medication 81 MILLIGRAM(S): at 12:34

## 2021-01-01 RX ADMIN — LIDOCAINE 1 PATCH: 4 CREAM TOPICAL at 20:55

## 2021-01-01 RX ADMIN — MIDODRINE HYDROCHLORIDE 30 MILLIGRAM(S): 2.5 TABLET ORAL at 06:12

## 2021-01-01 RX ADMIN — Medication 2 UNIT(S): at 19:49

## 2021-01-01 RX ADMIN — Medication 325 MILLIGRAM(S): at 20:34

## 2021-01-01 RX ADMIN — LIDOCAINE 1 PATCH: 4 CREAM TOPICAL at 23:00

## 2021-01-01 RX ADMIN — LIDOCAINE 1 PATCH: 4 CREAM TOPICAL at 19:00

## 2021-01-01 RX ADMIN — HEPARIN SODIUM 5000 UNIT(S): 5000 INJECTION INTRAVENOUS; SUBCUTANEOUS at 18:12

## 2021-01-01 RX ADMIN — HEPARIN SODIUM 5000 UNIT(S): 5000 INJECTION INTRAVENOUS; SUBCUTANEOUS at 18:07

## 2021-01-01 RX ADMIN — CHLORHEXIDINE GLUCONATE 15 MILLILITER(S): 213 SOLUTION TOPICAL at 05:23

## 2021-01-01 RX ADMIN — Medication 105 MILLIGRAM(S): at 22:27

## 2021-01-01 RX ADMIN — SEVELAMER CARBONATE 800 MILLIGRAM(S): 2400 POWDER, FOR SUSPENSION ORAL at 13:55

## 2021-01-01 RX ADMIN — Medication 325 MILLIGRAM(S): at 12:07

## 2021-01-01 RX ADMIN — Medication 325 MILLIGRAM(S): at 13:37

## 2021-01-01 RX ADMIN — TICAGRELOR 60 MILLIGRAM(S): 90 TABLET ORAL at 05:52

## 2021-01-01 RX ADMIN — Medication 1: at 18:02

## 2021-01-01 RX ADMIN — SEVELAMER CARBONATE 800 MILLIGRAM(S): 2400 POWDER, FOR SUSPENSION ORAL at 11:51

## 2021-01-01 RX ADMIN — Medication 1 APPLICATION(S): at 13:10

## 2021-01-01 RX ADMIN — SODIUM CHLORIDE 50 MILLILITER(S): 9 INJECTION, SOLUTION INTRAVENOUS at 12:24

## 2021-01-01 RX ADMIN — CHLORHEXIDINE GLUCONATE 1 APPLICATION(S): 213 SOLUTION TOPICAL at 06:13

## 2021-01-01 RX ADMIN — PANTOPRAZOLE SODIUM 40 MILLIGRAM(S): 20 TABLET, DELAYED RELEASE ORAL at 11:27

## 2021-01-01 RX ADMIN — Medication 1: at 09:24

## 2021-01-01 RX ADMIN — ONDANSETRON 4 MILLIGRAM(S): 8 TABLET, FILM COATED ORAL at 10:02

## 2021-01-01 RX ADMIN — PANTOPRAZOLE SODIUM 40 MILLIGRAM(S): 20 TABLET, DELAYED RELEASE ORAL at 12:20

## 2021-01-01 RX ADMIN — MIDODRINE HYDROCHLORIDE 30 MILLIGRAM(S): 2.5 TABLET ORAL at 14:16

## 2021-01-01 RX ADMIN — Medication 1 MILLIGRAM(S): at 13:12

## 2021-01-01 RX ADMIN — MIDODRINE HYDROCHLORIDE 20 MILLIGRAM(S): 2.5 TABLET ORAL at 16:37

## 2021-01-01 RX ADMIN — SEVELAMER CARBONATE 800 MILLIGRAM(S): 2400 POWDER, FOR SUSPENSION ORAL at 21:08

## 2021-01-01 RX ADMIN — PANTOPRAZOLE SODIUM 40 MILLIGRAM(S): 20 TABLET, DELAYED RELEASE ORAL at 14:04

## 2021-01-01 RX ADMIN — Medication 81 MILLIGRAM(S): at 12:21

## 2021-01-01 RX ADMIN — MIDODRINE HYDROCHLORIDE 30 MILLIGRAM(S): 2.5 TABLET ORAL at 06:33

## 2021-01-01 RX ADMIN — Medication 325 MILLIGRAM(S): at 05:59

## 2021-01-01 RX ADMIN — LIDOCAINE 1 PATCH: 4 CREAM TOPICAL at 19:35

## 2021-01-01 RX ADMIN — TICAGRELOR 90 MILLIGRAM(S): 90 TABLET ORAL at 06:17

## 2021-01-01 RX ADMIN — SEVELAMER CARBONATE 800 MILLIGRAM(S): 2400 POWDER, FOR SUSPENSION ORAL at 14:03

## 2021-01-01 RX ADMIN — Medication 81 MILLIGRAM(S): at 12:19

## 2021-01-01 RX ADMIN — SODIUM CHLORIDE 250 MILLILITER(S): 9 INJECTION INTRAMUSCULAR; INTRAVENOUS; SUBCUTANEOUS at 11:09

## 2021-01-01 RX ADMIN — SEVELAMER CARBONATE 800 MILLIGRAM(S): 2400 POWDER, FOR SUSPENSION ORAL at 08:37

## 2021-01-01 RX ADMIN — CEFTRIAXONE 100 MILLIGRAM(S): 500 INJECTION, POWDER, FOR SOLUTION INTRAMUSCULAR; INTRAVENOUS at 17:12

## 2021-01-01 RX ADMIN — TICAGRELOR 60 MILLIGRAM(S): 90 TABLET ORAL at 06:12

## 2021-01-01 RX ADMIN — Medication 100 MILLIEQUIVALENT(S): at 20:09

## 2021-01-01 RX ADMIN — HEPARIN SODIUM 5000 UNIT(S): 5000 INJECTION INTRAVENOUS; SUBCUTANEOUS at 18:22

## 2021-01-01 RX ADMIN — ATORVASTATIN CALCIUM 80 MILLIGRAM(S): 80 TABLET, FILM COATED ORAL at 21:47

## 2021-01-01 RX ADMIN — LIDOCAINE 1 PATCH: 4 CREAM TOPICAL at 03:00

## 2021-01-01 RX ADMIN — MIDODRINE HYDROCHLORIDE 30 MILLIGRAM(S): 2.5 TABLET ORAL at 20:12

## 2021-01-01 RX ADMIN — Medication 4 UNIT(S): at 09:37

## 2021-01-01 RX ADMIN — FLUDROCORTISONE ACETATE 0.1 MILLIGRAM(S): 0.1 TABLET ORAL at 06:40

## 2021-01-01 RX ADMIN — Medication 25 MILLIGRAM(S): at 10:04

## 2021-01-01 RX ADMIN — Medication 5 MILLIGRAM(S): at 18:54

## 2021-01-01 RX ADMIN — Medication 1 MILLIGRAM(S): at 11:56

## 2021-01-01 RX ADMIN — MIDODRINE HYDROCHLORIDE 30 MILLIGRAM(S): 2.5 TABLET ORAL at 13:20

## 2021-01-01 RX ADMIN — Medication 1 APPLICATION(S): at 12:34

## 2021-01-01 RX ADMIN — LIDOCAINE 1 PATCH: 4 CREAM TOPICAL at 18:09

## 2021-01-01 RX ADMIN — HEPARIN SODIUM 5000 UNIT(S): 5000 INJECTION INTRAVENOUS; SUBCUTANEOUS at 18:02

## 2021-01-01 RX ADMIN — Medication 325 MILLIGRAM(S): at 14:03

## 2021-01-01 RX ADMIN — PANTOPRAZOLE SODIUM 40 MILLIGRAM(S): 20 TABLET, DELAYED RELEASE ORAL at 11:16

## 2021-01-01 RX ADMIN — Medication 1 APPLICATION(S): at 12:07

## 2021-01-01 RX ADMIN — Medication 2: at 18:23

## 2021-01-01 RX ADMIN — Medication 5 MILLIGRAM(S): at 06:01

## 2021-01-01 RX ADMIN — TICAGRELOR 60 MILLIGRAM(S): 90 TABLET ORAL at 06:02

## 2021-01-01 RX ADMIN — PANTOPRAZOLE SODIUM 40 MILLIGRAM(S): 20 TABLET, DELAYED RELEASE ORAL at 06:17

## 2021-01-01 RX ADMIN — Medication 250 MILLIGRAM(S): at 15:54

## 2021-01-01 RX ADMIN — Medication 2.5 MILLIGRAM(S): at 05:25

## 2021-01-01 RX ADMIN — INSULIN GLARGINE 5 UNIT(S): 100 INJECTION, SOLUTION SUBCUTANEOUS at 21:46

## 2021-01-01 RX ADMIN — Medication 2: at 18:20

## 2021-01-01 RX ADMIN — Medication 325 MILLIGRAM(S): at 13:59

## 2021-01-01 RX ADMIN — Medication 2: at 17:47

## 2021-01-01 RX ADMIN — CHLORHEXIDINE GLUCONATE 1 APPLICATION(S): 213 SOLUTION TOPICAL at 06:18

## 2021-01-01 RX ADMIN — Medication 1 TABLET(S): at 12:54

## 2021-01-01 RX ADMIN — MIDODRINE HYDROCHLORIDE 20 MILLIGRAM(S): 2.5 TABLET ORAL at 11:14

## 2021-01-01 RX ADMIN — Medication 1 MILLIGRAM(S): at 11:26

## 2021-01-01 RX ADMIN — MIDODRINE HYDROCHLORIDE 30 MILLIGRAM(S): 2.5 TABLET ORAL at 13:11

## 2021-01-01 RX ADMIN — TICAGRELOR 60 MILLIGRAM(S): 90 TABLET ORAL at 18:01

## 2021-01-01 RX ADMIN — Medication 325 MILLIGRAM(S): at 05:57

## 2021-01-01 RX ADMIN — MIDODRINE HYDROCHLORIDE 30 MILLIGRAM(S): 2.5 TABLET ORAL at 13:39

## 2021-01-01 RX ADMIN — HEPARIN SODIUM 5000 UNIT(S): 5000 INJECTION INTRAVENOUS; SUBCUTANEOUS at 06:18

## 2021-01-01 RX ADMIN — Medication 325 MILLIGRAM(S): at 14:41

## 2021-01-01 RX ADMIN — Medication 1 APPLICATION(S): at 11:27

## 2021-01-01 RX ADMIN — TICAGRELOR 60 MILLIGRAM(S): 90 TABLET ORAL at 18:22

## 2021-01-01 RX ADMIN — Medication 1 MILLIGRAM(S): at 12:17

## 2021-01-01 RX ADMIN — LIDOCAINE 1 PATCH: 4 CREAM TOPICAL at 18:14

## 2021-01-01 RX ADMIN — Medication 1: at 12:37

## 2021-01-01 RX ADMIN — Medication 81 MILLIGRAM(S): at 14:18

## 2021-01-01 RX ADMIN — CHLORHEXIDINE GLUCONATE 1 APPLICATION(S): 213 SOLUTION TOPICAL at 06:21

## 2021-01-01 RX ADMIN — Medication 81 MILLIGRAM(S): at 12:54

## 2021-01-01 RX ADMIN — MIDODRINE HYDROCHLORIDE 30 MILLIGRAM(S): 2.5 TABLET ORAL at 02:24

## 2021-01-01 RX ADMIN — MIDODRINE HYDROCHLORIDE 30 MILLIGRAM(S): 2.5 TABLET ORAL at 05:25

## 2021-01-01 RX ADMIN — Medication 325 MILLIGRAM(S): at 13:29

## 2021-01-01 RX ADMIN — PANTOPRAZOLE SODIUM 40 MILLIGRAM(S): 20 TABLET, DELAYED RELEASE ORAL at 13:04

## 2021-01-01 RX ADMIN — Medication 2 UNIT(S): at 17:38

## 2021-01-01 RX ADMIN — Medication 325 MILLIGRAM(S): at 13:36

## 2021-01-01 RX ADMIN — Medication 325 MILLIGRAM(S): at 06:07

## 2021-01-01 RX ADMIN — Medication 25 MILLIGRAM(S): at 21:23

## 2021-01-01 RX ADMIN — LIDOCAINE 1 PATCH: 4 CREAM TOPICAL at 11:16

## 2021-01-01 RX ADMIN — TICAGRELOR 60 MILLIGRAM(S): 90 TABLET ORAL at 21:53

## 2021-01-01 RX ADMIN — HEPARIN SODIUM 5000 UNIT(S): 5000 INJECTION INTRAVENOUS; SUBCUTANEOUS at 06:21

## 2021-01-01 RX ADMIN — Medication 1: at 13:24

## 2021-01-01 RX ADMIN — SEVELAMER CARBONATE 1600 MILLIGRAM(S): 2400 POWDER, FOR SUSPENSION ORAL at 13:57

## 2021-01-01 RX ADMIN — Medication 650 MILLIGRAM(S): at 12:04

## 2021-01-01 RX ADMIN — LIDOCAINE 1 PATCH: 4 CREAM TOPICAL at 13:04

## 2021-01-01 RX ADMIN — MIDODRINE HYDROCHLORIDE 30 MILLIGRAM(S): 2.5 TABLET ORAL at 06:11

## 2021-01-01 RX ADMIN — TICAGRELOR 60 MILLIGRAM(S): 90 TABLET ORAL at 06:38

## 2021-01-01 RX ADMIN — Medication 2: at 09:08

## 2021-01-01 RX ADMIN — Medication 5.95 MICROGRAM(S)/KG/MIN: at 07:57

## 2021-01-01 RX ADMIN — MIDODRINE HYDROCHLORIDE 30 MILLIGRAM(S): 2.5 TABLET ORAL at 06:17

## 2021-01-01 RX ADMIN — CEFTRIAXONE 100 MILLIGRAM(S): 500 INJECTION, POWDER, FOR SOLUTION INTRAMUSCULAR; INTRAVENOUS at 06:56

## 2021-01-01 RX ADMIN — CHLORHEXIDINE GLUCONATE 1 APPLICATION(S): 213 SOLUTION TOPICAL at 17:31

## 2021-01-01 RX ADMIN — TICAGRELOR 60 MILLIGRAM(S): 90 TABLET ORAL at 06:00

## 2021-01-01 RX ADMIN — MIDODRINE HYDROCHLORIDE 30 MILLIGRAM(S): 2.5 TABLET ORAL at 21:02

## 2021-01-01 RX ADMIN — MIDODRINE HYDROCHLORIDE 30 MILLIGRAM(S): 2.5 TABLET ORAL at 22:31

## 2021-01-01 RX ADMIN — MIDODRINE HYDROCHLORIDE 30 MILLIGRAM(S): 2.5 TABLET ORAL at 13:00

## 2021-01-01 RX ADMIN — Medication 325 MILLIGRAM(S): at 06:13

## 2021-01-01 RX ADMIN — Medication 1: at 19:21

## 2021-01-01 RX ADMIN — Medication 100 MILLIEQUIVALENT(S): at 15:39

## 2021-01-01 RX ADMIN — LIDOCAINE 1 PATCH: 4 CREAM TOPICAL at 19:32

## 2021-01-01 RX ADMIN — Medication 325 MILLIGRAM(S): at 12:21

## 2021-01-01 RX ADMIN — MIDODRINE HYDROCHLORIDE 30 MILLIGRAM(S): 2.5 TABLET ORAL at 14:03

## 2021-01-01 RX ADMIN — HEPARIN SODIUM 5000 UNIT(S): 5000 INJECTION INTRAVENOUS; SUBCUTANEOUS at 05:20

## 2021-01-01 RX ADMIN — Medication 650 MILLIGRAM(S): at 23:24

## 2021-01-01 RX ADMIN — Medication 1: at 17:46

## 2021-01-01 RX ADMIN — Medication 650 MILLIGRAM(S): at 07:17

## 2021-01-01 RX ADMIN — Medication 1: at 10:37

## 2021-01-01 RX ADMIN — LIDOCAINE 1 PATCH: 4 CREAM TOPICAL at 14:00

## 2021-01-01 RX ADMIN — HEPARIN SODIUM 5000 UNIT(S): 5000 INJECTION INTRAVENOUS; SUBCUTANEOUS at 06:25

## 2021-01-01 RX ADMIN — Medication 325 MILLIGRAM(S): at 05:23

## 2021-01-01 RX ADMIN — LIDOCAINE 1 PATCH: 4 CREAM TOPICAL at 12:38

## 2021-01-01 RX ADMIN — SEVELAMER CARBONATE 800 MILLIGRAM(S): 2400 POWDER, FOR SUSPENSION ORAL at 06:14

## 2021-01-01 RX ADMIN — HEPARIN SODIUM 5000 UNIT(S): 5000 INJECTION INTRAVENOUS; SUBCUTANEOUS at 18:21

## 2021-01-01 RX ADMIN — SODIUM CHLORIDE 100 MILLILITER(S): 9 INJECTION INTRAMUSCULAR; INTRAVENOUS; SUBCUTANEOUS at 15:17

## 2021-01-01 RX ADMIN — Medication 25 MILLIGRAM(S): at 10:18

## 2021-01-01 RX ADMIN — Medication 1: at 18:47

## 2021-01-01 RX ADMIN — Medication 0.5 MILLIGRAM(S): at 13:36

## 2021-01-01 RX ADMIN — MIDODRINE HYDROCHLORIDE 20 MILLIGRAM(S): 2.5 TABLET ORAL at 06:17

## 2021-01-01 RX ADMIN — Medication 105 MILLIGRAM(S): at 17:53

## 2021-01-01 RX ADMIN — CHLORHEXIDINE GLUCONATE 15 MILLILITER(S): 213 SOLUTION TOPICAL at 18:37

## 2021-01-01 RX ADMIN — Medication 325 MILLIGRAM(S): at 21:58

## 2021-01-01 RX ADMIN — Medication 325 MILLIGRAM(S): at 06:34

## 2021-01-01 RX ADMIN — INSULIN GLARGINE 5 UNIT(S): 100 INJECTION, SOLUTION SUBCUTANEOUS at 22:08

## 2021-01-01 RX ADMIN — LIDOCAINE 1 PATCH: 4 CREAM TOPICAL at 19:30

## 2021-01-01 RX ADMIN — Medication 100 MILLIEQUIVALENT(S): at 13:09

## 2021-01-01 RX ADMIN — MIDODRINE HYDROCHLORIDE 30 MILLIGRAM(S): 2.5 TABLET ORAL at 18:03

## 2021-01-01 RX ADMIN — Medication 4 UNIT(S): at 18:40

## 2021-01-01 RX ADMIN — Medication 1 TABLET(S): at 12:17

## 2021-01-01 RX ADMIN — PANTOPRAZOLE SODIUM 40 MILLIGRAM(S): 20 TABLET, DELAYED RELEASE ORAL at 13:27

## 2021-01-01 RX ADMIN — HEPARIN SODIUM 5000 UNIT(S): 5000 INJECTION INTRAVENOUS; SUBCUTANEOUS at 17:55

## 2021-01-01 RX ADMIN — ATORVASTATIN CALCIUM 80 MILLIGRAM(S): 80 TABLET, FILM COATED ORAL at 21:18

## 2021-01-01 RX ADMIN — CHLORHEXIDINE GLUCONATE 1 APPLICATION(S): 213 SOLUTION TOPICAL at 05:58

## 2021-01-01 RX ADMIN — Medication 2.5 MILLIGRAM(S): at 18:01

## 2021-01-01 RX ADMIN — SEVELAMER CARBONATE 1600 MILLIGRAM(S): 2400 POWDER, FOR SUSPENSION ORAL at 15:38

## 2021-01-01 RX ADMIN — Medication 81 MILLIGRAM(S): at 11:14

## 2021-01-01 RX ADMIN — TICAGRELOR 60 MILLIGRAM(S): 90 TABLET ORAL at 17:29

## 2021-01-01 RX ADMIN — LIDOCAINE 1 PATCH: 4 CREAM TOPICAL at 11:56

## 2021-01-01 RX ADMIN — PANTOPRAZOLE SODIUM 40 MILLIGRAM(S): 20 TABLET, DELAYED RELEASE ORAL at 13:01

## 2021-01-01 RX ADMIN — Medication 105 MILLIGRAM(S): at 13:59

## 2021-01-01 RX ADMIN — Medication 2: at 09:10

## 2021-01-01 RX ADMIN — TICAGRELOR 60 MILLIGRAM(S): 90 TABLET ORAL at 19:01

## 2021-01-01 RX ADMIN — Medication 325 MILLIGRAM(S): at 20:13

## 2021-01-01 RX ADMIN — Medication 4 UNIT(S): at 13:29

## 2021-01-01 RX ADMIN — SEVELAMER CARBONATE 1600 MILLIGRAM(S): 2400 POWDER, FOR SUSPENSION ORAL at 20:13

## 2021-01-01 RX ADMIN — MIDODRINE HYDROCHLORIDE 30 MILLIGRAM(S): 2.5 TABLET ORAL at 06:06

## 2021-01-01 RX ADMIN — Medication 1: at 14:45

## 2021-01-01 RX ADMIN — Medication 1 APPLICATION(S): at 12:53

## 2021-01-01 RX ADMIN — Medication 1 APPLICATION(S): at 14:20

## 2021-01-01 RX ADMIN — Medication 1: at 08:42

## 2021-01-01 RX ADMIN — Medication 1: at 18:28

## 2021-01-01 RX ADMIN — SEVELAMER CARBONATE 1600 MILLIGRAM(S): 2400 POWDER, FOR SUSPENSION ORAL at 06:21

## 2021-01-01 RX ADMIN — Medication 325 MILLIGRAM(S): at 06:18

## 2021-01-01 RX ADMIN — MIDODRINE HYDROCHLORIDE 20 MILLIGRAM(S): 2.5 TABLET ORAL at 06:22

## 2021-01-01 RX ADMIN — DIVALPROEX SODIUM 250 MILLIGRAM(S): 500 TABLET, DELAYED RELEASE ORAL at 22:10

## 2021-01-01 RX ADMIN — Medication 1 TABLET(S): at 11:59

## 2021-01-01 RX ADMIN — MIDODRINE HYDROCHLORIDE 30 MILLIGRAM(S): 2.5 TABLET ORAL at 13:13

## 2021-01-01 RX ADMIN — Medication 15 GRAM(S): at 12:53

## 2021-01-01 RX ADMIN — Medication 1: at 09:08

## 2021-01-01 RX ADMIN — LIDOCAINE 1 PATCH: 4 CREAM TOPICAL at 02:00

## 2021-01-01 RX ADMIN — CEFTRIAXONE 100 MILLIGRAM(S): 500 INJECTION, POWDER, FOR SOLUTION INTRAMUSCULAR; INTRAVENOUS at 17:18

## 2021-01-01 RX ADMIN — Medication 325 MILLIGRAM(S): at 21:47

## 2021-01-01 RX ADMIN — Medication 2: at 13:27

## 2021-01-01 RX ADMIN — Medication 5 MILLIGRAM(S): at 18:22

## 2021-01-01 RX ADMIN — CHLORHEXIDINE GLUCONATE 1 APPLICATION(S): 213 SOLUTION TOPICAL at 14:41

## 2021-01-01 RX ADMIN — HEPARIN SODIUM 5000 UNIT(S): 5000 INJECTION INTRAVENOUS; SUBCUTANEOUS at 06:36

## 2021-01-01 RX ADMIN — CHLORHEXIDINE GLUCONATE 15 MILLILITER(S): 213 SOLUTION TOPICAL at 06:03

## 2021-01-01 RX ADMIN — LIDOCAINE 1 PATCH: 4 CREAM TOPICAL at 08:58

## 2021-01-01 RX ADMIN — Medication 105 MILLIGRAM(S): at 13:14

## 2021-01-01 RX ADMIN — ATORVASTATIN CALCIUM 80 MILLIGRAM(S): 80 TABLET, FILM COATED ORAL at 20:14

## 2021-01-01 RX ADMIN — MIDODRINE HYDROCHLORIDE 30 MILLIGRAM(S): 2.5 TABLET ORAL at 05:49

## 2021-01-01 RX ADMIN — Medication 5 MILLIGRAM(S): at 21:55

## 2021-01-01 RX ADMIN — MIDODRINE HYDROCHLORIDE 30 MILLIGRAM(S): 2.5 TABLET ORAL at 06:22

## 2021-01-01 RX ADMIN — PANTOPRAZOLE SODIUM 40 MILLIGRAM(S): 20 TABLET, DELAYED RELEASE ORAL at 13:23

## 2021-01-01 RX ADMIN — Medication 1 TABLET(S): at 13:52

## 2021-01-01 RX ADMIN — LIDOCAINE 1 PATCH: 4 CREAM TOPICAL at 08:00

## 2021-01-01 RX ADMIN — LIDOCAINE 1 PATCH: 4 CREAM TOPICAL at 11:30

## 2021-01-01 RX ADMIN — SEVELAMER CARBONATE 1600 MILLIGRAM(S): 2400 POWDER, FOR SUSPENSION ORAL at 13:19

## 2021-01-01 RX ADMIN — SEVELAMER CARBONATE 800 MILLIGRAM(S): 2400 POWDER, FOR SUSPENSION ORAL at 13:38

## 2021-01-01 RX ADMIN — MIDODRINE HYDROCHLORIDE 30 MILLIGRAM(S): 2.5 TABLET ORAL at 13:19

## 2021-01-01 RX ADMIN — Medication 2 UNIT(S): at 12:49

## 2021-01-01 RX ADMIN — Medication 105 MILLIGRAM(S): at 12:12

## 2021-01-01 RX ADMIN — SEVELAMER CARBONATE 800 MILLIGRAM(S): 2400 POWDER, FOR SUSPENSION ORAL at 09:12

## 2021-01-01 RX ADMIN — CHLORHEXIDINE GLUCONATE 1 APPLICATION(S): 213 SOLUTION TOPICAL at 12:12

## 2021-01-01 RX ADMIN — Medication 1 APPLICATION(S): at 13:15

## 2021-01-01 RX ADMIN — TICAGRELOR 60 MILLIGRAM(S): 90 TABLET ORAL at 17:09

## 2021-01-01 RX ADMIN — CEFTRIAXONE 100 MILLIGRAM(S): 500 INJECTION, POWDER, FOR SOLUTION INTRAMUSCULAR; INTRAVENOUS at 17:52

## 2021-01-01 RX ADMIN — Medication 325 MILLIGRAM(S): at 06:38

## 2021-01-01 RX ADMIN — Medication 10 MILLIGRAM(S): at 10:50

## 2021-01-01 RX ADMIN — HEPARIN SODIUM 5000 UNIT(S): 5000 INJECTION INTRAVENOUS; SUBCUTANEOUS at 17:24

## 2021-01-01 RX ADMIN — CHLORHEXIDINE GLUCONATE 1 APPLICATION(S): 213 SOLUTION TOPICAL at 17:57

## 2021-01-01 RX ADMIN — SEVELAMER CARBONATE 1600 MILLIGRAM(S): 2400 POWDER, FOR SUSPENSION ORAL at 13:01

## 2021-01-01 RX ADMIN — PANTOPRAZOLE SODIUM 40 MILLIGRAM(S): 20 TABLET, DELAYED RELEASE ORAL at 17:43

## 2021-01-01 RX ADMIN — MIDODRINE HYDROCHLORIDE 30 MILLIGRAM(S): 2.5 TABLET ORAL at 18:59

## 2021-01-01 RX ADMIN — MIDODRINE HYDROCHLORIDE 30 MILLIGRAM(S): 2.5 TABLET ORAL at 06:24

## 2021-01-01 RX ADMIN — Medication 2: at 09:37

## 2021-01-01 RX ADMIN — Medication 1 MILLIGRAM(S): at 13:37

## 2021-01-01 RX ADMIN — SEVELAMER CARBONATE 800 MILLIGRAM(S): 2400 POWDER, FOR SUSPENSION ORAL at 13:37

## 2021-01-01 RX ADMIN — HEPARIN SODIUM 5000 UNIT(S): 5000 INJECTION INTRAVENOUS; SUBCUTANEOUS at 17:59

## 2021-01-01 RX ADMIN — LIDOCAINE 1 PATCH: 4 CREAM TOPICAL at 14:05

## 2021-01-01 RX ADMIN — Medication 2: at 09:50

## 2021-01-01 RX ADMIN — HEPARIN SODIUM 5000 UNIT(S): 5000 INJECTION INTRAVENOUS; SUBCUTANEOUS at 17:14

## 2021-01-01 RX ADMIN — Medication 1 MILLIGRAM(S): at 12:18

## 2021-01-01 RX ADMIN — MIDODRINE HYDROCHLORIDE 30 MILLIGRAM(S): 2.5 TABLET ORAL at 05:53

## 2021-01-01 RX ADMIN — Medication 325 MILLIGRAM(S): at 14:20

## 2021-01-01 RX ADMIN — CHLORHEXIDINE GLUCONATE 1 APPLICATION(S): 213 SOLUTION TOPICAL at 17:20

## 2021-01-01 RX ADMIN — Medication 2: at 13:29

## 2021-01-01 RX ADMIN — Medication 105 MILLIGRAM(S): at 17:42

## 2021-01-01 RX ADMIN — LIDOCAINE 1 PATCH: 4 CREAM TOPICAL at 01:21

## 2021-01-01 RX ADMIN — Medication 81 MILLIGRAM(S): at 12:07

## 2021-01-01 RX ADMIN — MIDODRINE HYDROCHLORIDE 30 MILLIGRAM(S): 2.5 TABLET ORAL at 21:31

## 2021-01-01 RX ADMIN — Medication 2: at 17:25

## 2021-01-01 RX ADMIN — Medication 325 MILLIGRAM(S): at 21:38

## 2021-01-01 RX ADMIN — Medication 0: at 08:36

## 2021-01-01 RX ADMIN — INSULIN GLARGINE 5 UNIT(S): 100 INJECTION, SOLUTION SUBCUTANEOUS at 21:10

## 2021-01-01 RX ADMIN — PANTOPRAZOLE SODIUM 40 MILLIGRAM(S): 20 TABLET, DELAYED RELEASE ORAL at 06:14

## 2021-01-01 RX ADMIN — ATORVASTATIN CALCIUM 80 MILLIGRAM(S): 80 TABLET, FILM COATED ORAL at 21:16

## 2021-01-01 RX ADMIN — PANTOPRAZOLE SODIUM 40 MILLIGRAM(S): 20 TABLET, DELAYED RELEASE ORAL at 05:45

## 2021-01-01 RX ADMIN — TICAGRELOR 60 MILLIGRAM(S): 90 TABLET ORAL at 05:49

## 2021-01-01 RX ADMIN — TICAGRELOR 60 MILLIGRAM(S): 90 TABLET ORAL at 05:10

## 2021-01-01 RX ADMIN — TICAGRELOR 60 MILLIGRAM(S): 90 TABLET ORAL at 07:55

## 2021-01-01 RX ADMIN — Medication 325 MILLIGRAM(S): at 11:14

## 2021-01-01 RX ADMIN — Medication 1: at 17:22

## 2021-01-01 RX ADMIN — MIDODRINE HYDROCHLORIDE 30 MILLIGRAM(S): 2.5 TABLET ORAL at 06:38

## 2021-01-01 RX ADMIN — SEVELAMER CARBONATE 800 MILLIGRAM(S): 2400 POWDER, FOR SUSPENSION ORAL at 22:06

## 2021-01-01 RX ADMIN — CHLORHEXIDINE GLUCONATE 15 MILLILITER(S): 213 SOLUTION TOPICAL at 18:07

## 2021-01-01 RX ADMIN — SEVELAMER CARBONATE 1600 MILLIGRAM(S): 2400 POWDER, FOR SUSPENSION ORAL at 15:35

## 2021-01-01 RX ADMIN — SEVELAMER CARBONATE 800 MILLIGRAM(S): 2400 POWDER, FOR SUSPENSION ORAL at 21:18

## 2021-01-01 RX ADMIN — Medication 40 MILLIEQUIVALENT(S): at 13:53

## 2021-01-01 RX ADMIN — Medication 2 UNIT(S): at 09:10

## 2021-01-01 RX ADMIN — LIDOCAINE 1 PATCH: 4 CREAM TOPICAL at 12:18

## 2021-01-01 RX ADMIN — MIDODRINE HYDROCHLORIDE 30 MILLIGRAM(S): 2.5 TABLET ORAL at 20:02

## 2021-01-01 RX ADMIN — PANTOPRAZOLE SODIUM 40 MILLIGRAM(S): 20 TABLET, DELAYED RELEASE ORAL at 05:23

## 2021-01-01 RX ADMIN — Medication 81 MILLIGRAM(S): at 13:11

## 2021-01-01 RX ADMIN — SEVELAMER CARBONATE 1600 MILLIGRAM(S): 2400 POWDER, FOR SUSPENSION ORAL at 08:58

## 2021-01-01 RX ADMIN — ATORVASTATIN CALCIUM 80 MILLIGRAM(S): 80 TABLET, FILM COATED ORAL at 21:14

## 2021-01-01 RX ADMIN — Medication 1: at 18:07

## 2021-01-01 RX ADMIN — MIDODRINE HYDROCHLORIDE 30 MILLIGRAM(S): 2.5 TABLET ORAL at 06:09

## 2021-01-01 RX ADMIN — Medication 325 MILLIGRAM(S): at 21:52

## 2021-01-01 RX ADMIN — TICAGRELOR 90 MILLIGRAM(S): 90 TABLET ORAL at 16:32

## 2021-01-01 RX ADMIN — Medication 105 MILLIGRAM(S): at 11:55

## 2021-01-01 RX ADMIN — Medication 2.5 MILLIGRAM(S): at 19:26

## 2021-01-01 RX ADMIN — Medication 1 APPLICATION(S): at 11:07

## 2021-01-01 RX ADMIN — Medication 5 MILLIGRAM(S): at 17:48

## 2021-01-01 RX ADMIN — Medication 1 MILLIGRAM(S): at 12:42

## 2021-01-01 RX ADMIN — HEPARIN SODIUM 5000 UNIT(S): 5000 INJECTION INTRAVENOUS; SUBCUTANEOUS at 06:07

## 2021-01-01 RX ADMIN — Medication 81 MILLIGRAM(S): at 11:08

## 2021-01-01 RX ADMIN — Medication 1 APPLICATION(S): at 13:46

## 2021-01-01 RX ADMIN — LIDOCAINE 1 PATCH: 4 CREAM TOPICAL at 12:19

## 2021-01-01 RX ADMIN — HEPARIN SODIUM 5000 UNIT(S): 5000 INJECTION INTRAVENOUS; SUBCUTANEOUS at 19:45

## 2021-01-01 RX ADMIN — INSULIN GLARGINE 20 UNIT(S): 100 INJECTION, SOLUTION SUBCUTANEOUS at 22:58

## 2021-01-01 RX ADMIN — Medication 325 MILLIGRAM(S): at 09:04

## 2021-01-01 RX ADMIN — DIVALPROEX SODIUM 250 MILLIGRAM(S): 500 TABLET, DELAYED RELEASE ORAL at 05:59

## 2021-01-01 RX ADMIN — MIDODRINE HYDROCHLORIDE 30 MILLIGRAM(S): 2.5 TABLET ORAL at 08:06

## 2021-01-01 RX ADMIN — LIDOCAINE 1 PATCH: 4 CREAM TOPICAL at 09:00

## 2021-01-01 RX ADMIN — Medication 1 TABLET(S): at 11:55

## 2021-01-01 RX ADMIN — TICAGRELOR 60 MILLIGRAM(S): 90 TABLET ORAL at 17:20

## 2021-01-01 RX ADMIN — DIVALPROEX SODIUM 250 MILLIGRAM(S): 500 TABLET, DELAYED RELEASE ORAL at 17:55

## 2021-01-01 RX ADMIN — HEPARIN SODIUM 5000 UNIT(S): 5000 INJECTION INTRAVENOUS; SUBCUTANEOUS at 06:22

## 2021-01-01 RX ADMIN — TICAGRELOR 90 MILLIGRAM(S): 90 TABLET ORAL at 06:21

## 2021-01-01 RX ADMIN — TICAGRELOR 60 MILLIGRAM(S): 90 TABLET ORAL at 06:06

## 2021-01-01 RX ADMIN — Medication 325 MILLIGRAM(S): at 06:27

## 2021-01-01 RX ADMIN — Medication 2: at 13:55

## 2021-01-01 RX ADMIN — Medication 81 MILLIGRAM(S): at 11:27

## 2021-01-01 RX ADMIN — Medication 325 MILLIGRAM(S): at 06:33

## 2021-01-01 RX ADMIN — Medication 1 APPLICATION(S): at 17:21

## 2021-01-01 RX ADMIN — Medication 1 MILLIGRAM(S): at 14:45

## 2021-01-01 RX ADMIN — SEVELAMER CARBONATE 1600 MILLIGRAM(S): 2400 POWDER, FOR SUSPENSION ORAL at 13:24

## 2021-01-01 RX ADMIN — Medication 0.25 MILLIGRAM(S): at 14:05

## 2021-01-01 RX ADMIN — HEPARIN SODIUM 5000 UNIT(S): 5000 INJECTION INTRAVENOUS; SUBCUTANEOUS at 18:04

## 2021-01-01 RX ADMIN — Medication 1: at 17:26

## 2021-01-01 RX ADMIN — HEPARIN SODIUM 5000 UNIT(S): 5000 INJECTION INTRAVENOUS; SUBCUTANEOUS at 18:48

## 2021-01-01 RX ADMIN — Medication 81 MILLIGRAM(S): at 12:26

## 2021-01-01 RX ADMIN — ONDANSETRON 4 MILLIGRAM(S): 8 TABLET, FILM COATED ORAL at 04:22

## 2021-01-01 RX ADMIN — SEVELAMER CARBONATE 1600 MILLIGRAM(S): 2400 POWDER, FOR SUSPENSION ORAL at 12:05

## 2021-01-01 RX ADMIN — MIDODRINE HYDROCHLORIDE 10 MILLIGRAM(S): 2.5 TABLET ORAL at 05:56

## 2021-01-01 RX ADMIN — ATORVASTATIN CALCIUM 80 MILLIGRAM(S): 80 TABLET, FILM COATED ORAL at 22:15

## 2021-01-01 RX ADMIN — TICAGRELOR 60 MILLIGRAM(S): 90 TABLET ORAL at 10:04

## 2021-01-01 RX ADMIN — MIDODRINE HYDROCHLORIDE 30 MILLIGRAM(S): 2.5 TABLET ORAL at 21:11

## 2021-01-01 RX ADMIN — PANTOPRAZOLE SODIUM 40 MILLIGRAM(S): 20 TABLET, DELAYED RELEASE ORAL at 12:13

## 2021-01-01 RX ADMIN — PANTOPRAZOLE SODIUM 40 MILLIGRAM(S): 20 TABLET, DELAYED RELEASE ORAL at 14:07

## 2021-01-01 RX ADMIN — CHLORHEXIDINE GLUCONATE 1 APPLICATION(S): 213 SOLUTION TOPICAL at 12:23

## 2021-01-01 RX ADMIN — HEPARIN SODIUM 5000 UNIT(S): 5000 INJECTION INTRAVENOUS; SUBCUTANEOUS at 06:34

## 2021-01-01 RX ADMIN — TICAGRELOR 60 MILLIGRAM(S): 90 TABLET ORAL at 05:15

## 2021-01-01 RX ADMIN — Medication 2.5 MILLIGRAM(S): at 06:38

## 2021-01-01 RX ADMIN — INSULIN GLARGINE 30 UNIT(S): 100 INJECTION, SOLUTION SUBCUTANEOUS at 21:17

## 2021-01-01 RX ADMIN — SEVELAMER CARBONATE 1600 MILLIGRAM(S): 2400 POWDER, FOR SUSPENSION ORAL at 08:22

## 2021-01-01 RX ADMIN — TICAGRELOR 60 MILLIGRAM(S): 90 TABLET ORAL at 17:47

## 2021-01-01 RX ADMIN — MIDODRINE HYDROCHLORIDE 30 MILLIGRAM(S): 2.5 TABLET ORAL at 15:34

## 2021-01-01 RX ADMIN — Medication 1: at 17:49

## 2021-01-01 RX ADMIN — MIDODRINE HYDROCHLORIDE 30 MILLIGRAM(S): 2.5 TABLET ORAL at 12:53

## 2021-01-01 RX ADMIN — SEVELAMER CARBONATE 1600 MILLIGRAM(S): 2400 POWDER, FOR SUSPENSION ORAL at 14:15

## 2021-01-01 RX ADMIN — SEVELAMER CARBONATE 1600 MILLIGRAM(S): 2400 POWDER, FOR SUSPENSION ORAL at 14:04

## 2021-01-01 RX ADMIN — Medication 1 APPLICATION(S): at 05:10

## 2021-01-01 RX ADMIN — Medication 105 MILLIGRAM(S): at 12:19

## 2021-01-01 RX ADMIN — MIDODRINE HYDROCHLORIDE 20 MILLIGRAM(S): 2.5 TABLET ORAL at 13:19

## 2021-01-01 RX ADMIN — Medication 2 UNIT(S): at 14:04

## 2021-01-01 RX ADMIN — MIDODRINE HYDROCHLORIDE 30 MILLIGRAM(S): 2.5 TABLET ORAL at 21:54

## 2021-01-01 RX ADMIN — LIDOCAINE 1 PATCH: 4 CREAM TOPICAL at 20:01

## 2021-01-01 RX ADMIN — CHLORHEXIDINE GLUCONATE 15 MILLILITER(S): 213 SOLUTION TOPICAL at 08:58

## 2021-01-01 RX ADMIN — Medication 2: at 17:13

## 2021-01-01 RX ADMIN — Medication 105 MILLIGRAM(S): at 12:25

## 2021-01-01 RX ADMIN — MIDODRINE HYDROCHLORIDE 30 MILLIGRAM(S): 2.5 TABLET ORAL at 07:52

## 2021-01-01 RX ADMIN — Medication 1 APPLICATION(S): at 22:59

## 2021-01-01 RX ADMIN — Medication 1 MILLIGRAM(S): at 12:53

## 2021-01-01 RX ADMIN — Medication 1 TABLET(S): at 12:40

## 2021-01-01 RX ADMIN — MIDODRINE HYDROCHLORIDE 20 MILLIGRAM(S): 2.5 TABLET ORAL at 06:13

## 2021-01-01 RX ADMIN — Medication 105 MILLIGRAM(S): at 14:48

## 2021-01-01 RX ADMIN — Medication 650 MILLIGRAM(S): at 00:00

## 2021-01-01 RX ADMIN — INSULIN GLARGINE 5 UNIT(S): 100 INJECTION, SOLUTION SUBCUTANEOUS at 22:34

## 2021-01-01 RX ADMIN — MIDODRINE HYDROCHLORIDE 30 MILLIGRAM(S): 2.5 TABLET ORAL at 06:13

## 2021-01-01 RX ADMIN — CHLORHEXIDINE GLUCONATE 15 MILLILITER(S): 213 SOLUTION TOPICAL at 17:06

## 2021-01-01 RX ADMIN — CHLORHEXIDINE GLUCONATE 1 APPLICATION(S): 213 SOLUTION TOPICAL at 06:31

## 2021-01-01 RX ADMIN — SEVELAMER CARBONATE 800 MILLIGRAM(S): 2400 POWDER, FOR SUSPENSION ORAL at 23:14

## 2021-01-01 RX ADMIN — MIDODRINE HYDROCHLORIDE 10 MILLIGRAM(S): 2.5 TABLET ORAL at 13:29

## 2021-01-01 RX ADMIN — INSULIN GLARGINE 5 UNIT(S): 100 INJECTION, SOLUTION SUBCUTANEOUS at 21:24

## 2021-01-01 RX ADMIN — LIDOCAINE 1 PATCH: 4 CREAM TOPICAL at 12:30

## 2021-01-01 RX ADMIN — CHLORHEXIDINE GLUCONATE 15 MILLILITER(S): 213 SOLUTION TOPICAL at 18:56

## 2021-01-01 RX ADMIN — TICAGRELOR 60 MILLIGRAM(S): 90 TABLET ORAL at 05:35

## 2021-01-01 RX ADMIN — Medication 1 TABLET(S): at 12:05

## 2021-01-01 RX ADMIN — MIDODRINE HYDROCHLORIDE 30 MILLIGRAM(S): 2.5 TABLET ORAL at 21:00

## 2021-01-01 RX ADMIN — Medication 2.5 MILLIGRAM(S): at 18:19

## 2021-01-01 RX ADMIN — Medication 1 TABLET(S): at 13:36

## 2021-01-01 RX ADMIN — Medication 105 MILLIGRAM(S): at 11:15

## 2021-01-01 RX ADMIN — CHLORHEXIDINE GLUCONATE 15 MILLILITER(S): 213 SOLUTION TOPICAL at 18:48

## 2021-01-01 RX ADMIN — LIDOCAINE 1 PATCH: 4 CREAM TOPICAL at 00:41

## 2021-01-01 RX ADMIN — Medication 0: at 12:51

## 2021-01-01 RX ADMIN — PANTOPRAZOLE SODIUM 40 MILLIGRAM(S): 20 TABLET, DELAYED RELEASE ORAL at 12:25

## 2021-01-01 RX ADMIN — SEVELAMER CARBONATE 800 MILLIGRAM(S): 2400 POWDER, FOR SUSPENSION ORAL at 13:19

## 2021-01-01 RX ADMIN — SEVELAMER CARBONATE 1600 MILLIGRAM(S): 2400 POWDER, FOR SUSPENSION ORAL at 13:31

## 2021-01-01 RX ADMIN — MIDODRINE HYDROCHLORIDE 30 MILLIGRAM(S): 2.5 TABLET ORAL at 06:36

## 2021-01-01 RX ADMIN — Medication 5 MILLIGRAM(S): at 05:14

## 2021-01-01 RX ADMIN — Medication 1 APPLICATION(S): at 21:17

## 2021-01-01 RX ADMIN — Medication 1: at 18:23

## 2021-01-01 RX ADMIN — Medication 81 MILLIGRAM(S): at 11:57

## 2021-01-01 RX ADMIN — SEVELAMER CARBONATE 800 MILLIGRAM(S): 2400 POWDER, FOR SUSPENSION ORAL at 06:03

## 2021-01-01 RX ADMIN — Medication 1 APPLICATION(S): at 21:02

## 2021-01-01 RX ADMIN — CHLORHEXIDINE GLUCONATE 1 APPLICATION(S): 213 SOLUTION TOPICAL at 08:57

## 2021-01-01 RX ADMIN — HEPARIN SODIUM 5000 UNIT(S): 5000 INJECTION INTRAVENOUS; SUBCUTANEOUS at 06:27

## 2021-01-01 RX ADMIN — CHLORHEXIDINE GLUCONATE 1 APPLICATION(S): 213 SOLUTION TOPICAL at 11:12

## 2021-01-01 RX ADMIN — Medication 1 MILLIGRAM(S): at 14:20

## 2021-01-01 RX ADMIN — Medication 325 MILLIGRAM(S): at 13:24

## 2021-01-01 RX ADMIN — CALAMINE AND ZINC OXIDE AND PHENOL 1 APPLICATION(S): 160; 10 LOTION TOPICAL at 11:07

## 2021-01-01 RX ADMIN — Medication 1 APPLICATION(S): at 22:00

## 2021-01-01 RX ADMIN — MIDODRINE HYDROCHLORIDE 30 MILLIGRAM(S): 2.5 TABLET ORAL at 14:19

## 2021-01-01 RX ADMIN — INSULIN GLARGINE 5 UNIT(S): 100 INJECTION, SOLUTION SUBCUTANEOUS at 22:32

## 2021-01-01 RX ADMIN — MIDODRINE HYDROCHLORIDE 30 MILLIGRAM(S): 2.5 TABLET ORAL at 13:31

## 2021-01-01 RX ADMIN — TICAGRELOR 60 MILLIGRAM(S): 90 TABLET ORAL at 18:48

## 2021-01-01 RX ADMIN — SEVELAMER CARBONATE 800 MILLIGRAM(S): 2400 POWDER, FOR SUSPENSION ORAL at 08:20

## 2021-01-01 RX ADMIN — MIDODRINE HYDROCHLORIDE 30 MILLIGRAM(S): 2.5 TABLET ORAL at 21:52

## 2021-01-01 RX ADMIN — HEPARIN SODIUM 5000 UNIT(S): 5000 INJECTION INTRAVENOUS; SUBCUTANEOUS at 06:05

## 2021-01-01 RX ADMIN — Medication 325 MILLIGRAM(S): at 06:00

## 2021-01-01 RX ADMIN — SEVELAMER CARBONATE 1600 MILLIGRAM(S): 2400 POWDER, FOR SUSPENSION ORAL at 19:00

## 2021-01-01 RX ADMIN — MIDODRINE HYDROCHLORIDE 30 MILLIGRAM(S): 2.5 TABLET ORAL at 21:10

## 2021-01-01 RX ADMIN — SEVELAMER CARBONATE 800 MILLIGRAM(S): 2400 POWDER, FOR SUSPENSION ORAL at 09:09

## 2021-01-01 RX ADMIN — MIDODRINE HYDROCHLORIDE 20 MILLIGRAM(S): 2.5 TABLET ORAL at 11:52

## 2021-01-01 RX ADMIN — Medication 2.5 MILLIGRAM(S): at 06:13

## 2021-01-01 RX ADMIN — Medication 105 MILLIGRAM(S): at 14:06

## 2021-01-01 RX ADMIN — LIDOCAINE 1 PATCH: 4 CREAM TOPICAL at 14:48

## 2021-01-01 RX ADMIN — INSULIN GLARGINE 5 UNIT(S): 100 INJECTION, SOLUTION SUBCUTANEOUS at 22:14

## 2021-01-01 RX ADMIN — HEPARIN SODIUM 5000 UNIT(S): 5000 INJECTION INTRAVENOUS; SUBCUTANEOUS at 05:19

## 2021-01-01 RX ADMIN — PANTOPRAZOLE SODIUM 40 MILLIGRAM(S): 20 TABLET, DELAYED RELEASE ORAL at 14:44

## 2021-01-01 RX ADMIN — SEVELAMER CARBONATE 1600 MILLIGRAM(S): 2400 POWDER, FOR SUSPENSION ORAL at 12:29

## 2021-01-01 RX ADMIN — HEPARIN SODIUM 5000 UNIT(S): 5000 INJECTION INTRAVENOUS; SUBCUTANEOUS at 05:24

## 2021-01-01 RX ADMIN — Medication 1 APPLICATION(S): at 11:13

## 2021-01-01 RX ADMIN — Medication 1 MILLIGRAM(S): at 12:00

## 2021-01-01 RX ADMIN — Medication 5 MILLIGRAM(S): at 06:20

## 2021-01-01 RX ADMIN — INSULIN GLARGINE 7 UNIT(S): 100 INJECTION, SOLUTION SUBCUTANEOUS at 21:26

## 2021-01-01 RX ADMIN — Medication 1 APPLICATION(S): at 12:05

## 2021-01-01 RX ADMIN — SEVELAMER CARBONATE 1600 MILLIGRAM(S): 2400 POWDER, FOR SUSPENSION ORAL at 13:54

## 2021-01-01 RX ADMIN — MIDODRINE HYDROCHLORIDE 30 MILLIGRAM(S): 2.5 TABLET ORAL at 13:24

## 2021-01-01 RX ADMIN — MIDODRINE HYDROCHLORIDE 30 MILLIGRAM(S): 2.5 TABLET ORAL at 13:25

## 2021-01-01 RX ADMIN — INSULIN GLARGINE 5 UNIT(S): 100 INJECTION, SOLUTION SUBCUTANEOUS at 21:39

## 2021-01-01 RX ADMIN — Medication 1 MILLIGRAM(S): at 12:21

## 2021-01-01 RX ADMIN — LIDOCAINE 1 PATCH: 4 CREAM TOPICAL at 06:35

## 2021-01-01 RX ADMIN — Medication 325 MILLIGRAM(S): at 21:45

## 2021-01-01 RX ADMIN — Medication 325 MILLIGRAM(S): at 21:54

## 2021-01-01 RX ADMIN — MIDODRINE HYDROCHLORIDE 30 MILLIGRAM(S): 2.5 TABLET ORAL at 02:25

## 2021-01-01 RX ADMIN — Medication 1 APPLICATION(S): at 17:29

## 2021-01-01 RX ADMIN — HEPARIN SODIUM 5000 UNIT(S): 5000 INJECTION INTRAVENOUS; SUBCUTANEOUS at 05:23

## 2021-01-01 RX ADMIN — PANTOPRAZOLE SODIUM 40 MILLIGRAM(S): 20 TABLET, DELAYED RELEASE ORAL at 05:08

## 2021-01-01 RX ADMIN — Medication 1 MILLIGRAM(S): at 12:04

## 2021-01-01 RX ADMIN — TICAGRELOR 60 MILLIGRAM(S): 90 TABLET ORAL at 17:43

## 2021-01-01 RX ADMIN — MIDODRINE HYDROCHLORIDE 30 MILLIGRAM(S): 2.5 TABLET ORAL at 14:09

## 2021-01-01 RX ADMIN — Medication 325 MILLIGRAM(S): at 08:56

## 2021-01-01 RX ADMIN — CHLORHEXIDINE GLUCONATE 1 APPLICATION(S): 213 SOLUTION TOPICAL at 11:31

## 2021-01-01 RX ADMIN — PANTOPRAZOLE SODIUM 40 MILLIGRAM(S): 20 TABLET, DELAYED RELEASE ORAL at 05:00

## 2021-01-01 RX ADMIN — SODIUM CHLORIDE 60 MILLILITER(S): 9 INJECTION INTRAMUSCULAR; INTRAVENOUS; SUBCUTANEOUS at 17:25

## 2021-01-01 RX ADMIN — HEPARIN SODIUM 5000 UNIT(S): 5000 INJECTION INTRAVENOUS; SUBCUTANEOUS at 17:12

## 2021-01-01 RX ADMIN — Medication 650 MILLIGRAM(S): at 13:00

## 2021-01-01 RX ADMIN — HEPARIN SODIUM 5000 UNIT(S): 5000 INJECTION INTRAVENOUS; SUBCUTANEOUS at 18:05

## 2021-01-01 RX ADMIN — SEVELAMER CARBONATE 1600 MILLIGRAM(S): 2400 POWDER, FOR SUSPENSION ORAL at 09:38

## 2021-01-01 RX ADMIN — PANTOPRAZOLE SODIUM 40 MILLIGRAM(S): 20 TABLET, DELAYED RELEASE ORAL at 06:09

## 2021-01-01 RX ADMIN — Medication 2 UNIT(S): at 13:38

## 2021-01-01 RX ADMIN — CHLORHEXIDINE GLUCONATE 1 APPLICATION(S): 213 SOLUTION TOPICAL at 05:23

## 2021-01-01 RX ADMIN — PANTOPRAZOLE SODIUM 40 MILLIGRAM(S): 20 TABLET, DELAYED RELEASE ORAL at 12:26

## 2021-01-01 RX ADMIN — HEPARIN SODIUM 5000 UNIT(S): 5000 INJECTION INTRAVENOUS; SUBCUTANEOUS at 06:39

## 2021-01-01 RX ADMIN — Medication 1: at 08:30

## 2021-01-01 RX ADMIN — Medication 2: at 17:55

## 2021-01-01 RX ADMIN — Medication 4 UNIT(S): at 18:24

## 2021-01-01 RX ADMIN — INSULIN GLARGINE 9 UNIT(S): 100 INJECTION, SOLUTION SUBCUTANEOUS at 21:37

## 2021-01-01 RX ADMIN — HEPARIN SODIUM 5000 UNIT(S): 5000 INJECTION INTRAVENOUS; SUBCUTANEOUS at 05:49

## 2021-01-01 RX ADMIN — HEPARIN SODIUM 5000 UNIT(S): 5000 INJECTION INTRAVENOUS; SUBCUTANEOUS at 05:00

## 2021-01-01 RX ADMIN — PANTOPRAZOLE SODIUM 40 MILLIGRAM(S): 20 TABLET, DELAYED RELEASE ORAL at 12:42

## 2021-01-01 RX ADMIN — FLUDROCORTISONE ACETATE 0.1 MILLIGRAM(S): 0.1 TABLET ORAL at 05:11

## 2021-01-01 RX ADMIN — MIDODRINE HYDROCHLORIDE 30 MILLIGRAM(S): 2.5 TABLET ORAL at 06:02

## 2021-01-01 RX ADMIN — SEVELAMER CARBONATE 800 MILLIGRAM(S): 2400 POWDER, FOR SUSPENSION ORAL at 21:26

## 2021-01-01 RX ADMIN — Medication 325 MILLIGRAM(S): at 06:17

## 2021-01-01 RX ADMIN — TICAGRELOR 60 MILLIGRAM(S): 90 TABLET ORAL at 05:22

## 2021-01-01 RX ADMIN — Medication 1 APPLICATION(S): at 17:47

## 2021-01-01 RX ADMIN — LIDOCAINE 1 PATCH: 4 CREAM TOPICAL at 13:23

## 2021-01-01 RX ADMIN — INSULIN GLARGINE 5 UNIT(S): 100 INJECTION, SOLUTION SUBCUTANEOUS at 22:11

## 2021-01-01 RX ADMIN — Medication 81 MILLIGRAM(S): at 12:05

## 2021-01-01 RX ADMIN — TICAGRELOR 90 MILLIGRAM(S): 90 TABLET ORAL at 05:00

## 2021-01-01 RX ADMIN — PANTOPRAZOLE SODIUM 40 MILLIGRAM(S): 20 TABLET, DELAYED RELEASE ORAL at 06:21

## 2021-01-01 RX ADMIN — Medication 1 MILLIGRAM(S): at 14:04

## 2021-01-01 RX ADMIN — ATORVASTATIN CALCIUM 80 MILLIGRAM(S): 80 TABLET, FILM COATED ORAL at 23:11

## 2021-01-01 RX ADMIN — Medication 2: at 13:35

## 2021-01-01 RX ADMIN — Medication 325 MILLIGRAM(S): at 20:02

## 2021-01-01 RX ADMIN — LIDOCAINE 1 PATCH: 4 CREAM TOPICAL at 21:58

## 2021-01-01 RX ADMIN — Medication 1 MILLIGRAM(S): at 12:19

## 2021-01-01 RX ADMIN — SEVELAMER CARBONATE 1600 MILLIGRAM(S): 2400 POWDER, FOR SUSPENSION ORAL at 10:08

## 2021-01-01 RX ADMIN — Medication 1 APPLICATION(S): at 13:18

## 2021-01-01 RX ADMIN — INSULIN GLARGINE 5 UNIT(S): 100 INJECTION, SOLUTION SUBCUTANEOUS at 22:45

## 2021-01-01 RX ADMIN — LIDOCAINE 1 PATCH: 4 CREAM TOPICAL at 21:53

## 2021-01-01 RX ADMIN — Medication 1 MILLIGRAM(S): at 11:17

## 2021-01-01 RX ADMIN — Medication 1: at 18:21

## 2021-01-01 RX ADMIN — Medication 5 MILLIGRAM(S): at 17:30

## 2021-01-01 RX ADMIN — Medication 325 MILLIGRAM(S): at 21:09

## 2021-01-01 RX ADMIN — Medication 1: at 17:58

## 2021-01-01 RX ADMIN — Medication 1 APPLICATION(S): at 13:05

## 2021-01-01 RX ADMIN — Medication 100 MILLIEQUIVALENT(S): at 23:11

## 2021-01-01 RX ADMIN — SEVELAMER CARBONATE 1600 MILLIGRAM(S): 2400 POWDER, FOR SUSPENSION ORAL at 18:01

## 2021-01-01 RX ADMIN — TICAGRELOR 90 MILLIGRAM(S): 90 TABLET ORAL at 17:37

## 2021-01-01 RX ADMIN — MIDODRINE HYDROCHLORIDE 20 MILLIGRAM(S): 2.5 TABLET ORAL at 11:26

## 2021-01-01 RX ADMIN — Medication 1 TABLET(S): at 12:42

## 2021-01-01 RX ADMIN — INSULIN GLARGINE 5 UNIT(S): 100 INJECTION, SOLUTION SUBCUTANEOUS at 21:47

## 2021-01-01 RX ADMIN — MIDODRINE HYDROCHLORIDE 30 MILLIGRAM(S): 2.5 TABLET ORAL at 14:40

## 2021-01-01 RX ADMIN — PANTOPRAZOLE SODIUM 40 MILLIGRAM(S): 20 TABLET, DELAYED RELEASE ORAL at 12:18

## 2021-01-01 RX ADMIN — MIDODRINE HYDROCHLORIDE 30 MILLIGRAM(S): 2.5 TABLET ORAL at 22:12

## 2021-01-01 RX ADMIN — HEPARIN SODIUM 5000 UNIT(S): 5000 INJECTION INTRAVENOUS; SUBCUTANEOUS at 17:29

## 2021-01-01 RX ADMIN — Medication 12.5 MILLIGRAM(S): at 18:42

## 2021-01-01 RX ADMIN — Medication 325 MILLIGRAM(S): at 13:34

## 2021-01-01 RX ADMIN — Medication 325 MILLIGRAM(S): at 12:52

## 2021-01-01 RX ADMIN — Medication 81 MILLIGRAM(S): at 13:24

## 2021-01-01 RX ADMIN — Medication 1 MILLIGRAM(S): at 13:18

## 2021-01-01 RX ADMIN — TICAGRELOR 60 MILLIGRAM(S): 90 TABLET ORAL at 17:39

## 2021-01-01 RX ADMIN — Medication 1 MILLIGRAM(S): at 13:25

## 2021-01-01 RX ADMIN — Medication 325 MILLIGRAM(S): at 15:38

## 2021-01-01 RX ADMIN — Medication 650 MILLIGRAM(S): at 23:28

## 2021-01-01 RX ADMIN — Medication 325 MILLIGRAM(S): at 05:08

## 2021-01-01 RX ADMIN — SODIUM CHLORIDE 75 MILLILITER(S): 9 INJECTION INTRAMUSCULAR; INTRAVENOUS; SUBCUTANEOUS at 14:11

## 2021-01-01 RX ADMIN — HEPARIN SODIUM 5000 UNIT(S): 5000 INJECTION INTRAVENOUS; SUBCUTANEOUS at 17:07

## 2021-01-01 RX ADMIN — Medication 81 MILLIGRAM(S): at 12:41

## 2021-01-01 RX ADMIN — HEPARIN SODIUM 5000 UNIT(S): 5000 INJECTION INTRAVENOUS; SUBCUTANEOUS at 18:37

## 2021-01-01 RX ADMIN — Medication 1: at 18:33

## 2021-01-01 RX ADMIN — PANTOPRAZOLE SODIUM 40 MILLIGRAM(S): 20 TABLET, DELAYED RELEASE ORAL at 13:00

## 2021-01-01 RX ADMIN — SEVELAMER CARBONATE 1600 MILLIGRAM(S): 2400 POWDER, FOR SUSPENSION ORAL at 12:52

## 2021-01-01 RX ADMIN — Medication 1: at 14:29

## 2021-01-01 RX ADMIN — CHLORHEXIDINE GLUCONATE 1 APPLICATION(S): 213 SOLUTION TOPICAL at 05:50

## 2021-01-01 RX ADMIN — CHLORHEXIDINE GLUCONATE 1 APPLICATION(S): 213 SOLUTION TOPICAL at 08:42

## 2021-01-01 RX ADMIN — Medication 2: at 19:48

## 2021-01-01 RX ADMIN — MIDODRINE HYDROCHLORIDE 30 MILLIGRAM(S): 2.5 TABLET ORAL at 02:00

## 2021-01-01 RX ADMIN — MIDODRINE HYDROCHLORIDE 30 MILLIGRAM(S): 2.5 TABLET ORAL at 18:52

## 2021-01-01 RX ADMIN — Medication 12.5 MILLIGRAM(S): at 22:36

## 2021-01-01 RX ADMIN — Medication 105 MILLIGRAM(S): at 12:53

## 2021-01-01 RX ADMIN — TICAGRELOR 90 MILLIGRAM(S): 90 TABLET ORAL at 06:14

## 2021-01-01 RX ADMIN — Medication 1 APPLICATION(S): at 12:38

## 2021-01-01 RX ADMIN — CEFEPIME 100 MILLIGRAM(S): 1 INJECTION, POWDER, FOR SOLUTION INTRAMUSCULAR; INTRAVENOUS at 22:59

## 2021-01-01 RX ADMIN — Medication 81 MILLIGRAM(S): at 11:25

## 2021-01-01 RX ADMIN — INSULIN GLARGINE 15 UNIT(S): 100 INJECTION, SOLUTION SUBCUTANEOUS at 22:39

## 2021-01-01 RX ADMIN — TICAGRELOR 60 MILLIGRAM(S): 90 TABLET ORAL at 17:12

## 2021-01-01 RX ADMIN — SODIUM CHLORIDE 25 MILLILITER(S): 9 INJECTION INTRAMUSCULAR; INTRAVENOUS; SUBCUTANEOUS at 09:49

## 2021-01-01 RX ADMIN — HYDROMORPHONE HYDROCHLORIDE 0.2 MILLIGRAM(S): 2 INJECTION INTRAMUSCULAR; INTRAVENOUS; SUBCUTANEOUS at 22:36

## 2021-01-01 RX ADMIN — Medication 1 APPLICATION(S): at 06:13

## 2021-01-01 RX ADMIN — Medication 1: at 13:47

## 2021-01-01 RX ADMIN — Medication 81 MILLIGRAM(S): at 11:49

## 2021-01-01 RX ADMIN — SODIUM CHLORIDE 75 MILLILITER(S): 9 INJECTION INTRAMUSCULAR; INTRAVENOUS; SUBCUTANEOUS at 15:08

## 2021-01-01 RX ADMIN — Medication 1 APPLICATION(S): at 15:36

## 2021-01-01 RX ADMIN — Medication 81 MILLIGRAM(S): at 11:30

## 2021-01-01 RX ADMIN — INSULIN GLARGINE 5 UNIT(S): 100 INJECTION, SOLUTION SUBCUTANEOUS at 21:55

## 2021-01-01 RX ADMIN — Medication 5 MILLIGRAM(S): at 18:46

## 2021-01-01 RX ADMIN — PANTOPRAZOLE SODIUM 40 MILLIGRAM(S): 20 TABLET, DELAYED RELEASE ORAL at 13:29

## 2021-01-01 RX ADMIN — Medication 1 MILLIGRAM(S): at 12:34

## 2021-01-01 RX ADMIN — Medication 650 MILLIGRAM(S): at 00:15

## 2021-01-01 RX ADMIN — PANTOPRAZOLE SODIUM 40 MILLIGRAM(S): 20 TABLET, DELAYED RELEASE ORAL at 06:15

## 2021-01-01 RX ADMIN — MIDODRINE HYDROCHLORIDE 30 MILLIGRAM(S): 2.5 TABLET ORAL at 09:30

## 2021-01-01 RX ADMIN — Medication 3: at 17:42

## 2021-01-01 RX ADMIN — MIDODRINE HYDROCHLORIDE 20 MILLIGRAM(S): 2.5 TABLET ORAL at 17:37

## 2021-01-01 RX ADMIN — Medication 2.5 MILLIGRAM(S): at 18:02

## 2021-01-01 RX ADMIN — Medication 105 MILLIGRAM(S): at 13:33

## 2021-01-01 RX ADMIN — SEVELAMER CARBONATE 800 MILLIGRAM(S): 2400 POWDER, FOR SUSPENSION ORAL at 05:56

## 2021-01-01 RX ADMIN — Medication 325 MILLIGRAM(S): at 06:21

## 2021-01-01 RX ADMIN — Medication 1 MILLIGRAM(S): at 13:52

## 2021-01-01 RX ADMIN — TICAGRELOR 60 MILLIGRAM(S): 90 TABLET ORAL at 18:13

## 2021-01-01 RX ADMIN — Medication 1 APPLICATION(S): at 11:53

## 2021-01-01 RX ADMIN — TICAGRELOR 90 MILLIGRAM(S): 90 TABLET ORAL at 06:22

## 2021-01-01 RX ADMIN — MIDODRINE HYDROCHLORIDE 30 MILLIGRAM(S): 2.5 TABLET ORAL at 12:26

## 2021-01-01 RX ADMIN — ATORVASTATIN CALCIUM 80 MILLIGRAM(S): 80 TABLET, FILM COATED ORAL at 21:37

## 2021-01-01 RX ADMIN — Medication 1 APPLICATION(S): at 22:33

## 2021-01-01 RX ADMIN — FLUDROCORTISONE ACETATE 0.1 MILLIGRAM(S): 0.1 TABLET ORAL at 12:59

## 2021-01-01 RX ADMIN — Medication 1 TABLET(S): at 11:27

## 2021-01-01 RX ADMIN — Medication 250 MILLIGRAM(S): at 05:38

## 2021-01-01 RX ADMIN — CHLORHEXIDINE GLUCONATE 1 APPLICATION(S): 213 SOLUTION TOPICAL at 05:55

## 2021-01-01 RX ADMIN — LIDOCAINE 1 PATCH: 4 CREAM TOPICAL at 01:20

## 2021-01-01 RX ADMIN — Medication 105 MILLIGRAM(S): at 12:34

## 2021-01-01 RX ADMIN — MIDODRINE HYDROCHLORIDE 30 MILLIGRAM(S): 2.5 TABLET ORAL at 21:03

## 2021-01-01 RX ADMIN — Medication 1 APPLICATION(S): at 21:37

## 2021-01-01 RX ADMIN — CHLORHEXIDINE GLUCONATE 1 APPLICATION(S): 213 SOLUTION TOPICAL at 11:57

## 2021-01-01 RX ADMIN — Medication 325 MILLIGRAM(S): at 11:51

## 2021-01-01 RX ADMIN — Medication 2.5 MILLIGRAM(S): at 05:57

## 2021-01-01 RX ADMIN — LIDOCAINE 1 PATCH: 4 CREAM TOPICAL at 08:59

## 2021-01-01 RX ADMIN — HEPARIN SODIUM 5000 UNIT(S): 5000 INJECTION INTRAVENOUS; SUBCUTANEOUS at 17:44

## 2021-01-01 RX ADMIN — SEVELAMER CARBONATE 800 MILLIGRAM(S): 2400 POWDER, FOR SUSPENSION ORAL at 19:44

## 2021-01-01 RX ADMIN — CHLORHEXIDINE GLUCONATE 1 APPLICATION(S): 213 SOLUTION TOPICAL at 09:07

## 2021-01-01 RX ADMIN — ATORVASTATIN CALCIUM 80 MILLIGRAM(S): 80 TABLET, FILM COATED ORAL at 21:59

## 2021-01-01 RX ADMIN — SEVELAMER CARBONATE 1600 MILLIGRAM(S): 2400 POWDER, FOR SUSPENSION ORAL at 14:52

## 2021-01-01 RX ADMIN — MIDODRINE HYDROCHLORIDE 20 MILLIGRAM(S): 2.5 TABLET ORAL at 16:55

## 2021-01-01 RX ADMIN — Medication 2 UNIT(S): at 13:30

## 2021-01-01 RX ADMIN — Medication 1 MILLIGRAM(S): at 13:35

## 2021-01-01 RX ADMIN — Medication 1 APPLICATION(S): at 12:24

## 2021-01-01 RX ADMIN — HEPARIN SODIUM 5000 UNIT(S): 5000 INJECTION INTRAVENOUS; SUBCUTANEOUS at 05:45

## 2021-01-01 RX ADMIN — LIDOCAINE 1 PATCH: 4 CREAM TOPICAL at 17:56

## 2021-01-01 RX ADMIN — TICAGRELOR 60 MILLIGRAM(S): 90 TABLET ORAL at 06:21

## 2021-01-01 RX ADMIN — PANTOPRAZOLE SODIUM 40 MILLIGRAM(S): 20 TABLET, DELAYED RELEASE ORAL at 06:02

## 2021-01-01 RX ADMIN — MIDODRINE HYDROCHLORIDE 30 MILLIGRAM(S): 2.5 TABLET ORAL at 12:30

## 2021-01-01 RX ADMIN — CHLORHEXIDINE GLUCONATE 1 APPLICATION(S): 213 SOLUTION TOPICAL at 13:25

## 2021-01-01 RX ADMIN — SODIUM CHLORIDE 60 MILLILITER(S): 9 INJECTION INTRAMUSCULAR; INTRAVENOUS; SUBCUTANEOUS at 08:29

## 2021-01-01 RX ADMIN — Medication 2: at 12:49

## 2021-01-01 RX ADMIN — Medication 2: at 18:40

## 2021-01-01 RX ADMIN — ATORVASTATIN CALCIUM 80 MILLIGRAM(S): 80 TABLET, FILM COATED ORAL at 22:06

## 2021-01-01 RX ADMIN — LIDOCAINE 1 PATCH: 4 CREAM TOPICAL at 13:03

## 2021-01-01 RX ADMIN — Medication 3: at 18:03

## 2021-01-01 RX ADMIN — ATORVASTATIN CALCIUM 80 MILLIGRAM(S): 80 TABLET, FILM COATED ORAL at 21:51

## 2021-01-01 RX ADMIN — PANTOPRAZOLE SODIUM 40 MILLIGRAM(S): 20 TABLET, DELAYED RELEASE ORAL at 11:48

## 2021-01-01 RX ADMIN — ATORVASTATIN CALCIUM 80 MILLIGRAM(S): 80 TABLET, FILM COATED ORAL at 21:40

## 2021-01-01 RX ADMIN — HEPARIN SODIUM 5000 UNIT(S): 5000 INJECTION INTRAVENOUS; SUBCUTANEOUS at 05:08

## 2021-01-01 RX ADMIN — Medication 81 MILLIGRAM(S): at 13:03

## 2021-01-01 RX ADMIN — CHLORHEXIDINE GLUCONATE 15 MILLILITER(S): 213 SOLUTION TOPICAL at 18:00

## 2021-01-01 RX ADMIN — Medication 5 MILLIGRAM(S): at 06:21

## 2021-01-01 RX ADMIN — MIDODRINE HYDROCHLORIDE 30 MILLIGRAM(S): 2.5 TABLET ORAL at 12:05

## 2021-01-01 RX ADMIN — SEVELAMER CARBONATE 800 MILLIGRAM(S): 2400 POWDER, FOR SUSPENSION ORAL at 10:29

## 2021-01-01 RX ADMIN — SODIUM CHLORIDE 500 MILLILITER(S): 9 INJECTION INTRAMUSCULAR; INTRAVENOUS; SUBCUTANEOUS at 15:33

## 2021-01-01 RX ADMIN — CEFEPIME 100 MILLIGRAM(S): 1 INJECTION, POWDER, FOR SOLUTION INTRAMUSCULAR; INTRAVENOUS at 00:02

## 2021-01-01 RX ADMIN — PANTOPRAZOLE SODIUM 40 MILLIGRAM(S): 20 TABLET, DELAYED RELEASE ORAL at 13:59

## 2021-01-01 RX ADMIN — Medication 1 APPLICATION(S): at 17:31

## 2021-01-01 RX ADMIN — MIDODRINE HYDROCHLORIDE 30 MILLIGRAM(S): 2.5 TABLET ORAL at 05:45

## 2021-01-01 RX ADMIN — CHLORHEXIDINE GLUCONATE 1 APPLICATION(S): 213 SOLUTION TOPICAL at 06:58

## 2021-01-01 RX ADMIN — Medication 1 APPLICATION(S): at 11:25

## 2021-01-01 RX ADMIN — Medication 1 MILLIGRAM(S): at 12:37

## 2021-01-01 RX ADMIN — Medication 1 APPLICATION(S): at 06:59

## 2021-01-01 RX ADMIN — SEVELAMER CARBONATE 1600 MILLIGRAM(S): 2400 POWDER, FOR SUSPENSION ORAL at 21:59

## 2021-01-01 RX ADMIN — Medication 1 MILLIGRAM(S): at 11:30

## 2021-01-01 RX ADMIN — CHLORHEXIDINE GLUCONATE 15 MILLILITER(S): 213 SOLUTION TOPICAL at 17:59

## 2021-01-01 RX ADMIN — Medication 1 TABLET(S): at 13:02

## 2021-01-01 RX ADMIN — CHLORHEXIDINE GLUCONATE 1 APPLICATION(S): 213 SOLUTION TOPICAL at 12:03

## 2021-01-01 RX ADMIN — Medication 20 MILLIEQUIVALENT(S): at 13:39

## 2021-01-01 RX ADMIN — PANTOPRAZOLE SODIUM 40 MILLIGRAM(S): 20 TABLET, DELAYED RELEASE ORAL at 06:05

## 2021-01-01 RX ADMIN — MIDODRINE HYDROCHLORIDE 20 MILLIGRAM(S): 2.5 TABLET ORAL at 06:05

## 2021-01-01 RX ADMIN — LIDOCAINE 1 PATCH: 4 CREAM TOPICAL at 18:28

## 2021-01-01 RX ADMIN — Medication 81 MILLIGRAM(S): at 13:10

## 2021-01-01 RX ADMIN — TICAGRELOR 60 MILLIGRAM(S): 90 TABLET ORAL at 17:54

## 2021-01-01 RX ADMIN — Medication 105 MILLIGRAM(S): at 12:00

## 2021-01-01 RX ADMIN — Medication 5 MILLIGRAM(S): at 17:29

## 2021-01-01 RX ADMIN — TICAGRELOR 60 MILLIGRAM(S): 90 TABLET ORAL at 18:00

## 2021-01-01 RX ADMIN — SEVELAMER CARBONATE 1600 MILLIGRAM(S): 2400 POWDER, FOR SUSPENSION ORAL at 18:00

## 2021-01-01 RX ADMIN — LIDOCAINE 1 PATCH: 4 CREAM TOPICAL at 11:29

## 2021-01-01 RX ADMIN — DIVALPROEX SODIUM 250 MILLIGRAM(S): 500 TABLET, DELAYED RELEASE ORAL at 05:56

## 2021-01-01 RX ADMIN — MIDODRINE HYDROCHLORIDE 30 MILLIGRAM(S): 2.5 TABLET ORAL at 13:03

## 2021-01-01 RX ADMIN — Medication 1 TABLET(S): at 12:43

## 2021-01-01 RX ADMIN — Medication 5 MILLIGRAM(S): at 17:17

## 2021-01-01 RX ADMIN — Medication 81 MILLIGRAM(S): at 11:16

## 2021-01-01 RX ADMIN — HEPARIN SODIUM 5000 UNIT(S): 5000 INJECTION INTRAVENOUS; SUBCUTANEOUS at 18:00

## 2021-01-01 RX ADMIN — Medication 2: at 13:23

## 2021-01-01 RX ADMIN — Medication 5 MILLIGRAM(S): at 06:33

## 2021-01-01 RX ADMIN — Medication 105 MILLIGRAM(S): at 12:18

## 2021-01-01 RX ADMIN — ONDANSETRON 4 MILLIGRAM(S): 8 TABLET, FILM COATED ORAL at 11:06

## 2021-01-01 RX ADMIN — Medication 2.5 MILLIGRAM(S): at 18:23

## 2021-01-01 RX ADMIN — Medication 1 TABLET(S): at 13:59

## 2021-01-01 RX ADMIN — MIDODRINE HYDROCHLORIDE 30 MILLIGRAM(S): 2.5 TABLET ORAL at 21:48

## 2021-01-01 RX ADMIN — Medication 1 APPLICATION(S): at 17:54

## 2021-01-01 RX ADMIN — Medication 1 APPLICATION(S): at 13:35

## 2021-01-01 RX ADMIN — Medication 5 MILLIGRAM(S): at 17:28

## 2021-01-01 RX ADMIN — Medication 1 APPLICATION(S): at 18:40

## 2021-01-01 RX ADMIN — SEVELAMER CARBONATE 800 MILLIGRAM(S): 2400 POWDER, FOR SUSPENSION ORAL at 13:29

## 2021-01-01 RX ADMIN — DIVALPROEX SODIUM 250 MILLIGRAM(S): 500 TABLET, DELAYED RELEASE ORAL at 18:56

## 2021-01-01 RX ADMIN — Medication 0.5 MILLIGRAM(S): at 10:50

## 2021-01-01 RX ADMIN — SEVELAMER CARBONATE 800 MILLIGRAM(S): 2400 POWDER, FOR SUSPENSION ORAL at 21:16

## 2021-01-01 RX ADMIN — Medication 325 MILLIGRAM(S): at 14:14

## 2021-01-01 RX ADMIN — SEVELAMER CARBONATE 800 MILLIGRAM(S): 2400 POWDER, FOR SUSPENSION ORAL at 13:30

## 2021-01-01 RX ADMIN — Medication 1 MILLIGRAM(S): at 13:59

## 2021-01-01 RX ADMIN — Medication 325 MILLIGRAM(S): at 21:18

## 2021-01-01 RX ADMIN — CHLORHEXIDINE GLUCONATE 1 APPLICATION(S): 213 SOLUTION TOPICAL at 08:38

## 2021-01-01 RX ADMIN — CHLORHEXIDINE GLUCONATE 1 APPLICATION(S): 213 SOLUTION TOPICAL at 06:17

## 2021-01-01 RX ADMIN — Medication 1 MILLIGRAM(S): at 11:59

## 2021-01-01 RX ADMIN — SEVELAMER CARBONATE 1600 MILLIGRAM(S): 2400 POWDER, FOR SUSPENSION ORAL at 21:37

## 2021-01-01 RX ADMIN — CHLORHEXIDINE GLUCONATE 15 MILLILITER(S): 213 SOLUTION TOPICAL at 05:57

## 2021-01-01 RX ADMIN — HEPARIN SODIUM 5000 UNIT(S): 5000 INJECTION INTRAVENOUS; SUBCUTANEOUS at 05:35

## 2021-01-01 RX ADMIN — HEPARIN SODIUM 5000 UNIT(S): 5000 INJECTION INTRAVENOUS; SUBCUTANEOUS at 17:30

## 2021-01-01 RX ADMIN — Medication 650 MILLIGRAM(S): at 04:40

## 2021-01-01 RX ADMIN — Medication 2.5 MILLIGRAM(S): at 06:02

## 2021-01-01 RX ADMIN — CHLORHEXIDINE GLUCONATE 15 MILLILITER(S): 213 SOLUTION TOPICAL at 17:55

## 2021-01-01 RX ADMIN — Medication 5 MILLIGRAM(S): at 21:02

## 2021-01-01 RX ADMIN — Medication 25 MILLIGRAM(S): at 11:50

## 2021-01-01 RX ADMIN — LIDOCAINE 1 PATCH: 4 CREAM TOPICAL at 01:12

## 2021-01-01 RX ADMIN — Medication 325 MILLIGRAM(S): at 22:12

## 2021-01-01 RX ADMIN — SEVELAMER CARBONATE 1600 MILLIGRAM(S): 2400 POWDER, FOR SUSPENSION ORAL at 13:34

## 2021-01-01 RX ADMIN — Medication 325 MILLIGRAM(S): at 11:25

## 2021-01-01 RX ADMIN — Medication 1 TABLET(S): at 12:26

## 2021-01-01 RX ADMIN — MIDODRINE HYDROCHLORIDE 30 MILLIGRAM(S): 2.5 TABLET ORAL at 15:38

## 2021-01-01 RX ADMIN — SEVELAMER CARBONATE 1600 MILLIGRAM(S): 2400 POWDER, FOR SUSPENSION ORAL at 13:25

## 2021-01-01 RX ADMIN — SEVELAMER CARBONATE 800 MILLIGRAM(S): 2400 POWDER, FOR SUSPENSION ORAL at 09:10

## 2021-01-01 RX ADMIN — Medication 105 MILLIGRAM(S): at 12:59

## 2021-01-01 RX ADMIN — Medication 100 MILLIEQUIVALENT(S): at 09:54

## 2021-01-01 RX ADMIN — HEPARIN SODIUM 5000 UNIT(S): 5000 INJECTION INTRAVENOUS; SUBCUTANEOUS at 06:00

## 2021-01-01 RX ADMIN — INSULIN GLARGINE 5 UNIT(S): 100 INJECTION, SOLUTION SUBCUTANEOUS at 21:23

## 2021-01-01 RX ADMIN — PIPERACILLIN AND TAZOBACTAM 200 GRAM(S): 4; .5 INJECTION, POWDER, LYOPHILIZED, FOR SOLUTION INTRAVENOUS at 04:22

## 2021-01-01 RX ADMIN — MIDODRINE HYDROCHLORIDE 30 MILLIGRAM(S): 2.5 TABLET ORAL at 05:35

## 2021-01-01 RX ADMIN — Medication 1 APPLICATION(S): at 13:24

## 2021-01-01 RX ADMIN — HEPARIN SODIUM 5000 UNIT(S): 5000 INJECTION INTRAVENOUS; SUBCUTANEOUS at 17:47

## 2021-01-01 RX ADMIN — Medication 1 APPLICATION(S): at 12:18

## 2021-01-01 RX ADMIN — INSULIN GLARGINE 5 UNIT(S): 100 INJECTION, SOLUTION SUBCUTANEOUS at 22:06

## 2021-01-01 RX ADMIN — Medication 81 MILLIGRAM(S): at 11:56

## 2021-01-01 RX ADMIN — Medication 2.5 MILLIGRAM(S): at 18:12

## 2021-01-01 RX ADMIN — Medication 1 APPLICATION(S): at 14:04

## 2021-01-01 RX ADMIN — TICAGRELOR 90 MILLIGRAM(S): 90 TABLET ORAL at 19:45

## 2021-01-01 RX ADMIN — INSULIN GLARGINE 30 UNIT(S): 100 INJECTION, SOLUTION SUBCUTANEOUS at 22:49

## 2021-01-01 RX ADMIN — MIDODRINE HYDROCHLORIDE 30 MILLIGRAM(S): 2.5 TABLET ORAL at 22:15

## 2021-01-01 RX ADMIN — Medication 1 MILLIGRAM(S): at 12:07

## 2021-01-01 RX ADMIN — HYDROMORPHONE HYDROCHLORIDE 0.2 MILLIGRAM(S): 2 INJECTION INTRAMUSCULAR; INTRAVENOUS; SUBCUTANEOUS at 22:51

## 2021-01-01 RX ADMIN — CHLORHEXIDINE GLUCONATE 15 MILLILITER(S): 213 SOLUTION TOPICAL at 18:21

## 2021-01-01 RX ADMIN — PANTOPRAZOLE SODIUM 40 MILLIGRAM(S): 20 TABLET, DELAYED RELEASE ORAL at 11:56

## 2021-01-01 RX ADMIN — Medication 325 MILLIGRAM(S): at 13:10

## 2021-01-01 RX ADMIN — Medication 1 MILLIGRAM(S): at 11:51

## 2021-01-01 RX ADMIN — Medication 5 MILLIGRAM(S): at 06:18

## 2021-01-01 RX ADMIN — MIDODRINE HYDROCHLORIDE 30 MILLIGRAM(S): 2.5 TABLET ORAL at 05:48

## 2021-01-01 RX ADMIN — LIDOCAINE 1 PATCH: 4 CREAM TOPICAL at 11:28

## 2021-01-01 RX ADMIN — SODIUM CHLORIDE 50 MILLILITER(S): 9 INJECTION INTRAMUSCULAR; INTRAVENOUS; SUBCUTANEOUS at 11:33

## 2021-01-01 RX ADMIN — Medication 325 MILLIGRAM(S): at 13:25

## 2021-01-01 RX ADMIN — CHLORHEXIDINE GLUCONATE 1 APPLICATION(S): 213 SOLUTION TOPICAL at 05:14

## 2021-01-01 RX ADMIN — LIDOCAINE 1 PATCH: 4 CREAM TOPICAL at 13:52

## 2021-01-01 RX ADMIN — CHLORHEXIDINE GLUCONATE 1 APPLICATION(S): 213 SOLUTION TOPICAL at 12:00

## 2021-01-01 RX ADMIN — Medication 1 TABLET(S): at 13:23

## 2021-01-01 RX ADMIN — Medication 1 MILLIGRAM(S): at 11:14

## 2021-01-01 RX ADMIN — Medication 25 MILLIGRAM(S): at 22:05

## 2021-01-01 RX ADMIN — Medication 105 MILLIGRAM(S): at 12:51

## 2021-01-01 RX ADMIN — Medication 2.5 MILLIGRAM(S): at 19:19

## 2021-01-01 RX ADMIN — Medication 81 MILLIGRAM(S): at 11:51

## 2021-01-01 RX ADMIN — LIDOCAINE 1 PATCH: 4 CREAM TOPICAL at 11:57

## 2021-01-01 RX ADMIN — Medication 105 MILLIGRAM(S): at 13:30

## 2021-01-01 RX ADMIN — Medication 325 MILLIGRAM(S): at 15:00

## 2021-01-01 RX ADMIN — HEPARIN SODIUM 5000 UNIT(S): 5000 INJECTION INTRAVENOUS; SUBCUTANEOUS at 05:15

## 2021-01-01 RX ADMIN — Medication 105 MILLIGRAM(S): at 14:14

## 2021-01-01 RX ADMIN — Medication 25 MILLIGRAM(S): at 22:32

## 2021-01-01 RX ADMIN — LIDOCAINE 1 PATCH: 4 CREAM TOPICAL at 13:20

## 2021-01-01 RX ADMIN — Medication 1 APPLICATION(S): at 14:44

## 2021-01-01 RX ADMIN — Medication 1 MILLIGRAM(S): at 13:02

## 2021-01-01 RX ADMIN — INSULIN GLARGINE 9 UNIT(S): 100 INJECTION, SOLUTION SUBCUTANEOUS at 21:47

## 2021-01-01 RX ADMIN — INSULIN GLARGINE 5 UNIT(S): 100 INJECTION, SOLUTION SUBCUTANEOUS at 21:52

## 2021-01-01 RX ADMIN — MIDODRINE HYDROCHLORIDE 30 MILLIGRAM(S): 2.5 TABLET ORAL at 20:34

## 2021-01-01 RX ADMIN — Medication 5 MILLIGRAM(S): at 21:10

## 2021-01-01 RX ADMIN — SEVELAMER CARBONATE 1600 MILLIGRAM(S): 2400 POWDER, FOR SUSPENSION ORAL at 15:00

## 2021-01-01 RX ADMIN — MIDODRINE HYDROCHLORIDE 20 MILLIGRAM(S): 2.5 TABLET ORAL at 11:30

## 2021-01-01 RX ADMIN — Medication 325 MILLIGRAM(S): at 21:03

## 2021-01-01 RX ADMIN — Medication 400 MILLIGRAM(S): at 05:06

## 2021-01-01 RX ADMIN — INSULIN GLARGINE 7 UNIT(S): 100 INJECTION, SOLUTION SUBCUTANEOUS at 22:23

## 2021-01-01 RX ADMIN — Medication 105 MILLIGRAM(S): at 18:51

## 2021-01-01 RX ADMIN — Medication 650 MILLIGRAM(S): at 06:17

## 2021-01-01 RX ADMIN — TICAGRELOR 60 MILLIGRAM(S): 90 TABLET ORAL at 18:37

## 2021-01-01 RX ADMIN — TICAGRELOR 60 MILLIGRAM(S): 90 TABLET ORAL at 05:48

## 2021-01-01 RX ADMIN — Medication 325 MILLIGRAM(S): at 22:06

## 2021-01-01 RX ADMIN — TICAGRELOR 60 MILLIGRAM(S): 90 TABLET ORAL at 05:56

## 2021-01-01 RX ADMIN — Medication 81 MILLIGRAM(S): at 14:03

## 2021-01-01 RX ADMIN — CHLORHEXIDINE GLUCONATE 1 APPLICATION(S): 213 SOLUTION TOPICAL at 09:25

## 2021-01-01 RX ADMIN — Medication 1 APPLICATION(S): at 06:24

## 2021-01-01 RX ADMIN — Medication 2: at 12:24

## 2021-01-01 RX ADMIN — SODIUM CHLORIDE 50 MILLILITER(S): 9 INJECTION INTRAMUSCULAR; INTRAVENOUS; SUBCUTANEOUS at 11:12

## 2021-01-01 RX ADMIN — CEFTRIAXONE 100 MILLIGRAM(S): 500 INJECTION, POWDER, FOR SOLUTION INTRAMUSCULAR; INTRAVENOUS at 17:28

## 2021-01-01 RX ADMIN — CHLORHEXIDINE GLUCONATE 1 APPLICATION(S): 213 SOLUTION TOPICAL at 13:05

## 2021-01-01 RX ADMIN — MIDODRINE HYDROCHLORIDE 30 MILLIGRAM(S): 2.5 TABLET ORAL at 13:37

## 2021-01-01 RX ADMIN — CHLORHEXIDINE GLUCONATE 1 APPLICATION(S): 213 SOLUTION TOPICAL at 06:32

## 2021-01-01 RX ADMIN — LIDOCAINE 1 PATCH: 4 CREAM TOPICAL at 12:52

## 2021-01-01 RX ADMIN — TICAGRELOR 60 MILLIGRAM(S): 90 TABLET ORAL at 06:33

## 2021-01-01 RX ADMIN — PANTOPRAZOLE SODIUM 40 MILLIGRAM(S): 20 TABLET, DELAYED RELEASE ORAL at 12:52

## 2021-01-01 RX ADMIN — MIDODRINE HYDROCHLORIDE 30 MILLIGRAM(S): 2.5 TABLET ORAL at 21:22

## 2021-01-01 RX ADMIN — Medication 1 APPLICATION(S): at 18:06

## 2021-01-01 RX ADMIN — HEPARIN SODIUM 5000 UNIT(S): 5000 INJECTION INTRAVENOUS; SUBCUTANEOUS at 16:32

## 2021-01-01 RX ADMIN — HEPARIN SODIUM 5000 UNIT(S): 5000 INJECTION INTRAVENOUS; SUBCUTANEOUS at 17:54

## 2021-01-01 RX ADMIN — Medication 1 APPLICATION(S): at 11:57

## 2021-01-01 RX ADMIN — Medication 20 MILLIEQUIVALENT(S): at 10:20

## 2021-01-01 RX ADMIN — TICAGRELOR 90 MILLIGRAM(S): 90 TABLET ORAL at 06:05

## 2021-01-01 RX ADMIN — Medication 1 TABLET(S): at 14:44

## 2021-01-01 RX ADMIN — Medication 1 APPLICATION(S): at 18:00

## 2021-01-01 RX ADMIN — TICAGRELOR 60 MILLIGRAM(S): 90 TABLET ORAL at 18:04

## 2021-01-01 RX ADMIN — Medication 105 MILLIGRAM(S): at 06:53

## 2021-01-01 RX ADMIN — INSULIN GLARGINE 5 UNIT(S): 100 INJECTION, SOLUTION SUBCUTANEOUS at 21:02

## 2021-01-01 RX ADMIN — TICAGRELOR 90 MILLIGRAM(S): 90 TABLET ORAL at 06:15

## 2021-01-01 RX ADMIN — Medication 4 UNIT(S): at 09:21

## 2021-01-01 RX ADMIN — CEFEPIME 100 MILLIGRAM(S): 1 INJECTION, POWDER, FOR SOLUTION INTRAMUSCULAR; INTRAVENOUS at 00:06

## 2021-01-01 NOTE — PROGRESS NOTE ADULT - SUBJECTIVE AND OBJECTIVE BOX
Chief Complaint:     History:    MEDICATIONS  (STANDING):  aspirin enteric coated 81 milliGRAM(s) Oral daily  atorvastatin 80 milliGRAM(s) Oral at bedtime  cadexomer iodine 0.9% Gel 1 Application(s) Topical daily  dextrose 40% Gel 15 Gram(s) Oral once  dextrose 5%. 1000 milliLiter(s) (50 mL/Hr) IV Continuous <Continuous>  dextrose 5%. 1000 milliLiter(s) (100 mL/Hr) IV Continuous <Continuous>  dextrose 50% Injectable 25 Gram(s) IV Push once  dextrose 50% Injectable 12.5 Gram(s) IV Push once  dextrose 50% Injectable 25 Gram(s) IV Push once  ferrous    sulfate 325 milliGRAM(s) Oral daily  folic acid 1 milliGRAM(s) Oral daily  glucagon  Injectable 1 milliGRAM(s) IntraMuscular once  heparin   Injectable 5000 Unit(s) SubCutaneous every 12 hours  insulin glargine Injectable (LANTUS) 8 Unit(s) SubCutaneous at bedtime  insulin lispro (ADMELOG) corrective regimen sliding scale   SubCutaneous three times a day before meals  insulin lispro (ADMELOG) corrective regimen sliding scale   SubCutaneous at bedtime  midodrine. 20 milliGRAM(s) Oral three times a day  pantoprazole    Tablet 40 milliGRAM(s) Oral before breakfast  sevelamer carbonate 800 milliGRAM(s) Oral three times a day with meals  sodium chloride 0.9%. 1000 milliLiter(s) (100 mL/Hr) IV Continuous <Continuous>  ticagrelor 90 milliGRAM(s) Oral every 12 hours    MEDICATIONS  (PRN):      Allergies    doxazosin (Other)    Intolerances      Review of Systems:  Constitutional: No fever  Eyes: No blurry vision  Neuro: No tremors  HEENT: No pain  Cardiovascular: No chest pain, palpitations  Respiratory: No SOB, no cough  GI: No nausea, vomiting, abdominal pain  : No dysuria  Skin: no rash  Psych: no depression  Endocrine: no polyuria, polydipsia  Hem/lymph: no swelling  Osteoporosis: no fractures    ALL OTHER SYSTEMS REVIEWED AND NEGATIVE    UNABLE TO OBTAIN    PHYSICAL EXAM:  VITALS: T(C): 36.6 (01-01-21 @ 12:00)  T(F): 97.8 (01-01-21 @ 12:00), Max: 98.8 (12-31-20 @ 22:36)  HR: 91 (01-01-21 @ 12:00) (84 - 97)  BP: 111/77 (01-01-21 @ 12:00) (111/77 - 149/96)  RR:  (18 - 18)  SpO2:  (94% - 100%)  Wt(kg): --  GENERAL: NAD, well-groomed, well-developed  EYES: No proptosis, no lid lag, anicteric  HEENT:  Atraumatic, Normocephalic, moist mucous membranes  THYROID: Normal size, no palpable nodules  RESPIRATORY: Clear to auscultation bilaterally; No rales, rhonchi, wheezing, or rubs  CARDIOVASCULAR: Regular rate and rhythm; No murmurs; no peripheral edema  GI: Soft, nontender, non distended, normal bowel sounds  SKIN: Dry, intact, No rashes or lesions  MUSCULOSKELETAL: Full range of motion, normal strength  NEURO: sensation intact, extraocular movements intact, no tremor, normal reflexes  PSYCH: Alert and oriented x 3, normal affect, normal mood  CUSHING'S SIGNS: no striae    POCT Blood Glucose.: 126 mg/dL (01-01-21 @ 08:33)  POCT Blood Glucose.: 146 mg/dL (12-31-20 @ 21:39)  POCT Blood Glucose.: 183 mg/dL (12-31-20 @ 17:50)  POCT Blood Glucose.: 168 mg/dL (12-31-20 @ 11:56)  POCT Blood Glucose.: 93 mg/dL (12-31-20 @ 08:14)  POCT Blood Glucose.: 124 mg/dL (12-30-20 @ 22:40)  POCT Blood Glucose.: 85 mg/dL (12-30-20 @ 21:04)  POCT Blood Glucose.: 166 mg/dL (12-30-20 @ 17:18)  POCT Blood Glucose.: 98 mg/dL (12-30-20 @ 12:08)  POCT Blood Glucose.: 75 mg/dL (12-30-20 @ 07:58)  POCT Blood Glucose.: 65 mg/dL (12-30-20 @ 07:57)  POCT Blood Glucose.: 187 mg/dL (12-30-20 @ 00:08)  POCT Blood Glucose.: 134 mg/dL (12-29-20 @ 22:43)  POCT Blood Glucose.: 112 mg/dL (12-29-20 @ 22:26)  POCT Blood Glucose.: 65 mg/dL (12-29-20 @ 21:47)  POCT Blood Glucose.: 56 mg/dL (12-29-20 @ 21:25)  POCT Blood Glucose.: 49 mg/dL (12-29-20 @ 21:23)  POCT Blood Glucose.: 191 mg/dL (12-29-20 @ 13:53)      01-01    133<L>  |  91<L>  |  47<H>  ----------------------------<  72  4.1   |  23  |  13.88<H>    EGFR if : 4<L>  EGFR if non : 3<L>    Ca    8.4      01-01  Mg     2.1     01-01  Phos  6.6     01-01    TPro  6.0  /  Alb  2.6<L>  /  TBili  0.4  /  DBili  x   /  AST  18  /  ALT  12  /  AlkPhos  83  01-01          Thyroid Function Tests:                           Chief Complaint: T2DM    History: Patient is very drowsy. He has fair appetite as per nurse.     MEDICATIONS  (STANDING):  aspirin enteric coated 81 milliGRAM(s) Oral daily  atorvastatin 80 milliGRAM(s) Oral at bedtime  cadexomer iodine 0.9% Gel 1 Application(s) Topical daily  dextrose 40% Gel 15 Gram(s) Oral once  dextrose 5%. 1000 milliLiter(s) (50 mL/Hr) IV Continuous <Continuous>  dextrose 5%. 1000 milliLiter(s) (100 mL/Hr) IV Continuous <Continuous>  dextrose 50% Injectable 25 Gram(s) IV Push once  dextrose 50% Injectable 12.5 Gram(s) IV Push once  dextrose 50% Injectable 25 Gram(s) IV Push once  ferrous    sulfate 325 milliGRAM(s) Oral daily  folic acid 1 milliGRAM(s) Oral daily  glucagon  Injectable 1 milliGRAM(s) IntraMuscular once  heparin   Injectable 5000 Unit(s) SubCutaneous every 12 hours  insulin glargine Injectable (LANTUS) 8 Unit(s) SubCutaneous at bedtime  insulin lispro (ADMELOG) corrective regimen sliding scale   SubCutaneous three times a day before meals  insulin lispro (ADMELOG) corrective regimen sliding scale   SubCutaneous at bedtime  midodrine. 20 milliGRAM(s) Oral three times a day  pantoprazole    Tablet 40 milliGRAM(s) Oral before breakfast  sevelamer carbonate 800 milliGRAM(s) Oral three times a day with meals  sodium chloride 0.9%. 1000 milliLiter(s) (100 mL/Hr) IV Continuous <Continuous>  ticagrelor 90 milliGRAM(s) Oral every 12 hours    MEDICATIONS  (PRN):      Allergies    doxazosin (Other)    Intolerances      Review of Systems:  Unable to obtain        PHYSICAL EXAM:  VITALS: T(C): 36.6 (01-01-21 @ 12:00)  T(F): 97.8 (01-01-21 @ 12:00), Max: 98.8 (12-31-20 @ 22:36)  HR: 91 (01-01-21 @ 12:00) (84 - 97)  BP: 111/77 (01-01-21 @ 12:00) (111/77 - 149/96)  RR:  (18 - 18)  SpO2:  (94% - 100%)  Wt(kg): --  GENERAL: NAD  EYES: No proptosis  HEENT:  Atraumatic, Normocephalic, moist mucous membranes  RESPIRATORY: Clear to auscultation bilaterally; No rales, rhonchi, wheezing, or rubs  CARDIOVASCULAR: Regular rate and rhythm  GI: Soft, nontender, non distended, normal bowel sounds        POCT Blood Glucose.: 126 mg/dL (01-01-21 @ 08:33)  POCT Blood Glucose.: 146 mg/dL (12-31-20 @ 21:39)  POCT Blood Glucose.: 183 mg/dL (12-31-20 @ 17:50)  POCT Blood Glucose.: 168 mg/dL (12-31-20 @ 11:56)  POCT Blood Glucose.: 93 mg/dL (12-31-20 @ 08:14)  POCT Blood Glucose.: 124 mg/dL (12-30-20 @ 22:40)  POCT Blood Glucose.: 85 mg/dL (12-30-20 @ 21:04)  POCT Blood Glucose.: 166 mg/dL (12-30-20 @ 17:18)  POCT Blood Glucose.: 98 mg/dL (12-30-20 @ 12:08)  POCT Blood Glucose.: 75 mg/dL (12-30-20 @ 07:58)  POCT Blood Glucose.: 65 mg/dL (12-30-20 @ 07:57)  POCT Blood Glucose.: 187 mg/dL (12-30-20 @ 00:08)  POCT Blood Glucose.: 134 mg/dL (12-29-20 @ 22:43)  POCT Blood Glucose.: 112 mg/dL (12-29-20 @ 22:26)  POCT Blood Glucose.: 65 mg/dL (12-29-20 @ 21:47)  POCT Blood Glucose.: 56 mg/dL (12-29-20 @ 21:25)  POCT Blood Glucose.: 49 mg/dL (12-29-20 @ 21:23)  POCT Blood Glucose.: 191 mg/dL (12-29-20 @ 13:53)      01-01    133<L>  |  91<L>  |  47<H>  ----------------------------<  72  4.1   |  23  |  13.88<H>    EGFR if : 4<L>  EGFR if non : 3<L>    Ca    8.4      01-01  Mg     2.1     01-01  Phos  6.6     01-01    TPro  6.0  /  Alb  2.6<L>  /  TBili  0.4  /  DBili  x   /  AST  18  /  ALT  12  /  AlkPhos  83  01-01

## 2021-01-01 NOTE — PROGRESS NOTE ADULT - ASSESSMENT
55 y.o. male with esrd on PD for 5 years, T2DM, cad prior cabg. He underwent cardiac cath and angioplasty about 2 weeks ago due to cp and abnormal ETT with cad. Admitted for fever and dizziness. 56 yo man w/ DM,  ESRD on PD, CAD s/p CABG, underwent cardiac cath s/p PCI with broken balloon wire during cath, s/p redo open heart for stent balloon retrieval and recent admission for dizziness transient fever, infectious work up negative, and now presenting for LE weakness.  Endocrine consulted fro DM management and hyperglycemia.    1.  Type 2 diabetes mellitus with hyperglycemia, with long-term current use of insulin.  Glucose currently at goal   -T2DM uncontrolled. Hba1c 7.8 12/2020. Pt. repors occ. hypoglycemia at home. Low glucoses values here as well.  -Recommend decrease lantus to 8 units qhs   -recommend low dose SS TIDAC/qhs. No standing pre-meal insulin given decreased appetite.  -CC diet   -Monitor FS's TIDAC/qhs    discharge  Basal+victoza   f/u Dr. Villegas.    2.  Essential hypertension.  Recommendation: BP variable, w/ hypotension on admission, but improving.     3.  Other hyperlipidemia.  Recommendation: on lipitor at home, restart if no contraindications  f/u w/ cardiology.   55 y.o. male with esrd on PD for 5 years, T2DM, cad prior cabg. He underwent cardiac cath and angioplasty about 2 weeks ago due to cp and abnormal ETT with cad. Admitted for fever and dizziness. 56 yo man w/ DM,  ESRD on PD, CAD s/p CABG, underwent cardiac cath s/p PCI with broken balloon wire during cath, s/p redo open heart for stent balloon retrieval and recent admission for dizziness transient fever, infectious work up negative, and now presenting for LE weakness.  Endocrine consulted fro DM management and hyperglycemia.    1.  Type 2 diabetes mellitus with hyperglycemia, with long-term current use of insulin.  Glucose currently at goal   -T2DM uncontrolled. Hba1c 7.8 12/2020. Pt. repors occ. hypoglycemia at home.   -Recommend decrease lantus to 5 units qhs   -recommend low dose SS TIDAC/qhs. No standing pre-meal insulin given decreased appetite.  -CC diet   -Monitor FS's TIDAC/qhs    discharge  Basal+victoza   f/u Dr. Villegas.    2.  Essential hypertension.  Recommendation: BP variable, w/ hypotension on admission.    3.  Other hyperlipidemia.  Recommendation: c/w lipitor  f/u w/ cardiology.

## 2021-01-01 NOTE — PROGRESS NOTE ADULT - ASSESSMENT
55 m with    Hypotension  - s/p gentle IVF  - Midodrine  - telemetry  - Cardiology follow     r/o Sepsis  - BC results pending   - off empiric antibiotics  - ID follow    Hx of small sternal collection decreased     Foot wound  - local care  - Podiatry evaluation noted    Diabetes Mellitus   - BS control  - ADA diet   - Endocrine follow     CAD  - continue Rx    Depression  - continue Rx    ESRD  - PD  - Nephrology follow    DVT prophylaxis     PT    Pipe Beck MD pager [phone]

## 2021-01-01 NOTE — PROGRESS NOTE ADULT - SUBJECTIVE AND OBJECTIVE BOX
CARDIOLOGY FOLLOW UP NOTE - DR. SMITH    Subjective:    denies chest pain, dyspnea, palpitations, dizziness  ROS: otherwise negative   overnight events:      PHYSICAL EXAM:  T(C): 36.6 (21 @ 12:00), Max: 37.1 (20 @ 22:36)  HR: 93 (21 @ 14:07) (84 - 97)  BP: 88/90 (21 @ 14:07) (88/90 - 149/96)  RR: 18 (21 @ 12:00) (18 - 18)  SpO2: 100% (21 @ 12:00) (97% - 100%)  Wt(kg): --  I&O's Summary    31 Dec 2020 07:  -  2021 07:00  --------------------------------------------------------  IN: 3500 mL / OUT: 2050 mL / NET: 1450 mL    2021 07:01  -  2021 14:47  --------------------------------------------------------  IN: 2480 mL / OUT: 2300 mL / NET: 180 mL      Daily     Daily Weight in k.5 (2021 10:17)    Appearance: Normal	  Cardiovascular: Normal S1 S2,RRR, No JVD, No murmurs  Respiratory: Lungs clear to auscultation	  Gastrointestinal:  Soft, Non-tender, + BS	  Extremities: Normal range of motion, No clubbing, cyanosis or edema      Home Medications:  aspirin 81 mg oral delayed release tablet: 1 tab(s) orally once a day (29 Dec 2020 18:37)  atorvastatin 80 mg oral tablet: 1 tab(s) orally once a day (at bedtime) (29 Dec 2020 18:37)  epoetin martine: injectable once a month (29 Dec 2020 18:37)  ferrous sulfate 325 mg (65 mg elemental iron) oral tablet: 1 tab(s) orally 3 times a day (29 Dec 2020 18:37)  folic acid 0.4 mg oral tablet: 2 tab(s) orally once a day (29 Dec 2020 18:37)  gabapentin 100 mg oral capsule: 1 cap(s) orally once a day (29 Dec 2020 18:37)  nortriptyline 25 mg oral capsule: 1 cap(s) orally once a day (at bedtime) (29 Dec 2020 18:37)  pantoprazole 40 mg oral delayed release tablet: 1 tab(s) orally once a day (before a meal) (29 Dec 2020 18:37)  sevelamer carbonate 800 mg oral tablet: 1 tab(s) orally 3 times a day (with meals) (29 Dec 2020 18:37)  Toujeo SoloStar 300 units/mL subcutaneous solution: 70 unit(s) subcutaneous once a day (at bedtime) (29 Dec 2020 18:37)      MEDICATIONS  (STANDING):  aspirin enteric coated 81 milliGRAM(s) Oral daily  atorvastatin 80 milliGRAM(s) Oral at bedtime  cadexomer iodine 0.9% Gel 1 Application(s) Topical daily  dextrose 40% Gel 15 Gram(s) Oral once  dextrose 5%. 1000 milliLiter(s) (50 mL/Hr) IV Continuous <Continuous>  dextrose 5%. 1000 milliLiter(s) (100 mL/Hr) IV Continuous <Continuous>  dextrose 50% Injectable 25 Gram(s) IV Push once  dextrose 50% Injectable 12.5 Gram(s) IV Push once  dextrose 50% Injectable 25 Gram(s) IV Push once  ferrous    sulfate 325 milliGRAM(s) Oral daily  folic acid 1 milliGRAM(s) Oral daily  glucagon  Injectable 1 milliGRAM(s) IntraMuscular once  heparin   Injectable 5000 Unit(s) SubCutaneous every 12 hours  insulin glargine Injectable (LANTUS) 8 Unit(s) SubCutaneous at bedtime  insulin lispro (ADMELOG) corrective regimen sliding scale   SubCutaneous three times a day before meals  insulin lispro (ADMELOG) corrective regimen sliding scale   SubCutaneous at bedtime  midodrine. 20 milliGRAM(s) Oral three times a day  pantoprazole    Tablet 40 milliGRAM(s) Oral before breakfast  sevelamer carbonate 800 milliGRAM(s) Oral three times a day with meals  sodium chloride 0.9%. 1000 milliLiter(s) (100 mL/Hr) IV Continuous <Continuous>  ticagrelor 90 milliGRAM(s) Oral every 12 hours      TELEMETRY: 	    ECG:  	  RADIOLOGY:   DIAGNOSTIC TESTING:  [ ] Echocardiogram:  [ ] Catheterization:  [ ] Stress Test:    OTHER: 	    LABS:	 	    CARDIAC MARKERS:  Troponin T, High Sensitivity Result: 796 ng/L ( @ 06:20)  Troponin T, High Sensitivity Result: 796 ng/L ( @ 22:35)  Troponin T, High Sensitivity Result: 812 ng/L ( @ 16:41)  Troponin T, High Sensitivity Result: 852 ng/L ( @ 14:34)                                10.2   12.61 )-----------( 301      ( 2021 06:07 )             31.8         133<L>  |  91<L>  |  47<H>  ----------------------------<  72  4.1   |  23  |  13.88<H>    Ca    8.4      2021 06:07  Phos  6.6       Mg     2.1         TPro  6.0  /  Alb  2.6<L>  /  TBili  0.4  /  DBili  x   /  AST  18  /  ALT  12  /  AlkPhos  83      proBNP:     Lipid Profile:   HgA1c:     Creatinine, Serum: 13.88 mg/dL (21 @ 06:07)  Creatinine, Serum: 13.74 mg/dL (20 @ 06:11)  Creatinine, Serum: 14.31 mg/dL (20 @ 06:20)

## 2021-01-01 NOTE — PROGRESS NOTE ADULT - ASSESSMENT
CATH 11/30/2020:  COMPLICATIONS: The stent was deployed without difficulty. On removal of the  balloon, the shaft broke and the tip of the balloon remained in the vessel. Several attempts were made to snare the balloon unsuccessfully. Dr. Verdugo was notifed who will take the patient to the operating room.  DIAGNOSTIC RECOMMENDATIONS: The patient should continue with the present medications. The patients vessel was stented without difficulty On removal of the balloon, the shaft broke with the baloon stuck in the left main. All attempts to snare the balloon out failed and the patient was taken to the OR by Dr. Arango to remove the balloon  introp eleonora 11/30/20: mild to mod MR, < from: Intra-Operative Transesophageal Echo (11.30.20 @ 17:02) >  Severe global left ventricular systolic dysfunction. Inferior and inferolateral akinesis.  severe hypokinesis of other segments.  Severe global hypokinesia.  Normal left ventricular internal dimensions and wall thicknesses. Moderate diastolic dysfunction (Stage II).  echo 12/17/20: EF 32%, mod MR, Severe global left ventricular systolic dysfunction, moderate pulmonary pressures  echo 12/20/20: EF (Visual Estimate): 25-30 % mild MR, Akinesis of the inferolateral, anterolateral, apical laterlal, inferior, and inferoseptal walls. Severe left ventricular enlargement. No left ventricular thrombus is seen.      a/p  55y male with esrd on PD for 5 years, dm poorly controlled, systolic CHF, CAD, s/p CABG, s/p PCI to OM c/b by failure to remove stent balloon requiring open heart surgery for removal, admitted with  hypotension,  fever and weakness    1. Ischemic Cardiomyopathy. Chronic systolic HF  -cv stable  -Repeat echo with unchanged lv sys dysfx, ef 25- 30%  -BB on hold due to orthostatic hypotension , no acei/arb for now   -continue midodrine for bp support  -EP f/u, not candidate for icd at present     2. CAD, s/p CABG, s/p PCI, s/p redo open heart for stent balloon retrieval   -CV stable  -c/w asa, Brilinta, statin     3. Weakness, R/O sepsis  -abx per id- work up per ID, bc 12/30 neg  -CT chest 12/30  noted  Small volume pneumoperitoneum. Sternal fluid collection has decreased in size    4. ESRD  -on PD  -renal f/u    5. Syncope, orthostatic hypotension   - 12/30 brief LOC while being adjusted from laying to sitting position   - likely vasovagal secondary to orthostatic hypotension, no events on tele  - c/w midodrine to 20 mg TID   - infectious work up   - repeat orthostatic BP    dvt ppx

## 2021-01-01 NOTE — PROGRESS NOTE ADULT - SUBJECTIVE AND OBJECTIVE BOX
NEPHROLOGY     Patient seen and examined. Pt complains of hiccups earlier, noted with low BP, given NS 500cc bolus, no complaints of lightheadedness, or dizziness, no sob or pain, in no acute distress.     MEDICATIONS  (STANDING):  aspirin enteric coated 81 milliGRAM(s) Oral daily  atorvastatin 80 milliGRAM(s) Oral at bedtime  cadexomer iodine 0.9% Gel 1 Application(s) Topical daily  dextrose 40% Gel 15 Gram(s) Oral once  dextrose 5%. 1000 milliLiter(s) (50 mL/Hr) IV Continuous <Continuous>  dextrose 5%. 1000 milliLiter(s) (100 mL/Hr) IV Continuous <Continuous>  dextrose 50% Injectable 25 Gram(s) IV Push once  dextrose 50% Injectable 12.5 Gram(s) IV Push once  dextrose 50% Injectable 25 Gram(s) IV Push once  ferrous    sulfate 325 milliGRAM(s) Oral daily  folic acid 1 milliGRAM(s) Oral daily  glucagon  Injectable 1 milliGRAM(s) IntraMuscular once  heparin   Injectable 5000 Unit(s) SubCutaneous every 12 hours  insulin glargine Injectable (LANTUS) 8 Unit(s) SubCutaneous at bedtime  insulin lispro (ADMELOG) corrective regimen sliding scale   SubCutaneous three times a day before meals  insulin lispro (ADMELOG) corrective regimen sliding scale   SubCutaneous at bedtime  midodrine. 20 milliGRAM(s) Oral three times a day  pantoprazole    Tablet 40 milliGRAM(s) Oral before breakfast  sevelamer carbonate 800 milliGRAM(s) Oral three times a day with meals  sodium chloride 0.9%. 1000 milliLiter(s) (100 mL/Hr) IV Continuous <Continuous>  ticagrelor 90 milliGRAM(s) Oral every 12 hours    VITALS:  T(C): , Max: 37.1 (12-31-20 @ 22:36)  T(F): , Max: 98.8 (12-31-20 @ 22:36)  HR: 92 (01-01-21 @ 15:22)  BP: 104/73 (01-01-21 @ 15:22)  RR: 18 (01-01-21 @ 15:22)  SpO2: 100% (01-01-21 @ 15:22)    I and O's:    12-31 @ 07:01  -  01-01 @ 07:00  --------------------------------------------------------  IN: 3500 mL / OUT: 2050 mL / NET: 1450 mL    01-01 @ 07:01  -  01-01 @ 15:54  --------------------------------------------------------  IN: 2480 mL / OUT: 2300 mL / NET: 180 mL    PHYSICAL EXAM:  Constitutional: NAD  Neck:  No JVD  Respiratory: CTAB/L  Cardiovascular: S1 and S2  Gastrointestinal: BS+, soft, NT/ND + PD catheter   Extremities: No peripheral edema  Neurological: A/O x 3, no focal deficits  Psychiatric: Normal mood, normal affect  : No Johnson  Skin: No rashes  Access: Not applicable    LABS:                        10.2   12.61 )-----------( 301      ( 01 Jan 2021 06:07 )             31.8     01-01    133<L>  |  91<L>  |  47<H>  ----------------------------<  72  4.1   |  23  |  13.88<H>    Ca    8.4      01 Jan 2021 06:07  Phos  6.6     01-01  Mg     2.1     01-01    TPro  6.0  /  Alb  2.6<L>  /  TBili  0.4  /  DBili  x   /  AST  18  /  ALT  12  /  AlkPhos  83  01-01

## 2021-01-01 NOTE — PROGRESS NOTE ADULT - SUBJECTIVE AND OBJECTIVE BOX
Patient is a 55y old  Male who presents with a chief complaint of hypotension (01 Jan 2021 08:56)      SUBJECTIVE / OVERNIGHT EVENTS: Comfortable without new complaints. Still with orthostatic hypotension.  Review of Systems  chest pain no  palpitations no  sob no  nausea no  headache no    MEDICATIONS  (STANDING):  aspirin enteric coated 81 milliGRAM(s) Oral daily  atorvastatin 80 milliGRAM(s) Oral at bedtime  cadexomer iodine 0.9% Gel 1 Application(s) Topical daily  dextrose 40% Gel 15 Gram(s) Oral once  dextrose 5%. 1000 milliLiter(s) (50 mL/Hr) IV Continuous <Continuous>  dextrose 5%. 1000 milliLiter(s) (100 mL/Hr) IV Continuous <Continuous>  dextrose 50% Injectable 25 Gram(s) IV Push once  dextrose 50% Injectable 12.5 Gram(s) IV Push once  dextrose 50% Injectable 25 Gram(s) IV Push once  ferrous    sulfate 325 milliGRAM(s) Oral daily  folic acid 1 milliGRAM(s) Oral daily  glucagon  Injectable 1 milliGRAM(s) IntraMuscular once  heparin   Injectable 5000 Unit(s) SubCutaneous every 12 hours  insulin glargine Injectable (LANTUS) 8 Unit(s) SubCutaneous at bedtime  insulin lispro (ADMELOG) corrective regimen sliding scale   SubCutaneous three times a day before meals  insulin lispro (ADMELOG) corrective regimen sliding scale   SubCutaneous at bedtime  midodrine. 20 milliGRAM(s) Oral three times a day  pantoprazole    Tablet 40 milliGRAM(s) Oral before breakfast  sevelamer carbonate 800 milliGRAM(s) Oral three times a day with meals  sodium chloride 0.9%. 1000 milliLiter(s) (100 mL/Hr) IV Continuous <Continuous>  ticagrelor 90 milliGRAM(s) Oral every 12 hours    MEDICATIONS  (PRN):      Vital Signs Last 24 Hrs  T(C): 36.6 (01 Jan 2021 12:00), Max: 37.1 (31 Dec 2020 22:36)  T(F): 97.8 (01 Jan 2021 12:00), Max: 98.8 (31 Dec 2020 22:36)  HR: 93 (01 Jan 2021 14:07) (84 - 97)  BP: 88/90 (01 Jan 2021 14:07) (88/90 - 149/96)  BP(mean): --  RR: 18 (01 Jan 2021 12:00) (18 - 18)  SpO2: 100% (01 Jan 2021 12:00) (97% - 100%)    PHYSICAL EXAM:  GENERAL: NAD   HEAD:  Atraumatic, Normocephalic  EYES: EOMI, PERRLA, conjunctiva and sclera clear  NECK: Supple, No JVD  CHEST/LUNG: Clear to auscultation bilaterally; No wheeze Sternal wound healing.  HEART: Regular rate and rhythm; No murmurs, rubs, or gallops  ABDOMEN: Soft, Nontender, Nondistended; Bowel sounds present  EXTREMITIES:  2+ Peripheral Pulses, No clubbing, cyanosis, or edema  PSYCH: AAOx3  NEUROLOGY: non-focal  SKIN: No rashes or lesions    LABS:                        10.2   12.61 )-----------( 301      ( 01 Jan 2021 06:07 )             31.8     01-01    133<L>  |  91<L>  |  47<H>  ----------------------------<  72  4.1   |  23  |  13.88<H>    Ca    8.4      01 Jan 2021 06:07  Phos  6.6     01-01  Mg     2.1     01-01    TPro  6.0  /  Alb  2.6<L>  /  TBili  0.4  /  DBili  x   /  AST  18  /  ALT  12  /  AlkPhos  83  01-01              Culture - Blood (collected 30 Dec 2020 01:05)  Source: .Blood Blood  Preliminary Report (31 Dec 2020 02:02):    No growth to date.    Culture - Blood (collected 29 Dec 2020 21:59)  Source: .Blood Blood-Peripheral  Preliminary Report (30 Dec 2020 22:02):    No growth to date.    Culture - Blood (collected 29 Dec 2020 21:59)  Source: .Blood Blood-Peripheral  Preliminary Report (30 Dec 2020 22:02):    No growth to date.        RADIOLOGY & ADDITIONAL TESTS:    Imaging Personally Reviewed:    Consultant(s) Notes Reviewed:      Care Discussed with Consultants/Other Providers:

## 2021-01-01 NOTE — PROGRESS NOTE ADULT - SUBJECTIVE AND OBJECTIVE BOX
CC: f/u for hypotension    Patient reports: he remains weak, no specific complaints    REVIEW OF SYSTEMS:  All other review of systems negative (Comprehensive ROS)    Antimicrobials Day #  :s/p 3 days of cefepime    Other Medications Reviewed  MEDICATIONS  (STANDING):  aspirin enteric coated 81 milliGRAM(s) Oral daily  atorvastatin 80 milliGRAM(s) Oral at bedtime  cadexomer iodine 0.9% Gel 1 Application(s) Topical daily  dextrose 40% Gel 15 Gram(s) Oral once  dextrose 5%. 1000 milliLiter(s) (50 mL/Hr) IV Continuous <Continuous>  dextrose 5%. 1000 milliLiter(s) (100 mL/Hr) IV Continuous <Continuous>  dextrose 50% Injectable 25 Gram(s) IV Push once  dextrose 50% Injectable 12.5 Gram(s) IV Push once  dextrose 50% Injectable 25 Gram(s) IV Push once  ferrous    sulfate 325 milliGRAM(s) Oral daily  folic acid 1 milliGRAM(s) Oral daily  glucagon  Injectable 1 milliGRAM(s) IntraMuscular once  heparin   Injectable 5000 Unit(s) SubCutaneous every 12 hours  insulin glargine Injectable (LANTUS) 8 Unit(s) SubCutaneous at bedtime  insulin lispro (ADMELOG) corrective regimen sliding scale   SubCutaneous three times a day before meals  insulin lispro (ADMELOG) corrective regimen sliding scale   SubCutaneous at bedtime  midodrine. 20 milliGRAM(s) Oral three times a day  pantoprazole    Tablet 40 milliGRAM(s) Oral before breakfast  sevelamer carbonate 800 milliGRAM(s) Oral three times a day with meals  sodium chloride 0.9%. 1000 milliLiter(s) (100 mL/Hr) IV Continuous <Continuous>  ticagrelor 90 milliGRAM(s) Oral every 12 hours    T(F): 98.6 (01-01-21 @ 05:54), Max: 98.8 (12-31-20 @ 22:36)  HR: 86 (01-01-21 @ 05:54)  BP: 128/85 (01-01-21 @ 05:54)  RR: 18 (01-01-21 @ 05:54)  SpO2: 100% (01-01-21 @ 05:54)  Wt(kg): --    PHYSICAL EXAM:  General: alert, no acute distress  Eyes:  anicteric, no conjunctival injection, no discharge  Oropharynx: no lesions or injection 	  Neck: supple, without adenopathy  Lungs: clear to auscultation  Heart: regular rate and rhythm; no murmur, rubs or gallops  Abdomen: soft, nondistended, nontender, PD in progress  Skin: no lesions, chest wounds C/D/I  Extremities: no clubbing, cyanosis, or edema  Neurologic: alert, oriented, moves all extremities    LAB RESULTS:                        10.2   12.61 )-----------( 301      ( 01 Jan 2021 06:07 )             31.8     01-01    133<L>  |  91<L>  |  47<H>  ----------------------------<  72  4.1   |  23  |  13.88<H>    Ca    8.4      01 Jan 2021 06:07  Phos  6.6     01-01  Mg     2.1     01-01    TPro  6.0  /  Alb  2.6<L>  /  TBili  0.4  /  DBili  x   /  AST  18  /  ALT  12  /  AlkPhos  83  01-01    LIVER FUNCTIONS - ( 01 Jan 2021 06:07 )  Alb: 2.6 g/dL / Pro: 6.0 g/dL / ALK PHOS: 83 U/L / ALT: 12 U/L / AST: 18 U/L / GGT: x             MICROBIOLOGY:  RECENT CULTURES:  12-30 @ 01:05 .Blood Blood     No growth to date.      12-29 @ 21:59 .Blood Blood-Peripheral     No growth to date.          RADIOLOGY REVIEWED:    rad

## 2021-01-01 NOTE — PROGRESS NOTE ADULT - ASSESSMENT
56 yo male with history of ESRD, PD,CAD,DM,CABG, recent strenotomy for wire removal after a cardiac cath, admitted with fatigue, hypotension, and failure to thrive.  No fever or chills, similar admission 2 weeks ago ended without a specific answer.  His cortisol level was normal, negative blood and PD cultures, and prior chest CT with no PE just a small retrosternal fluid collection.  Certainly no overt evidence of a surgical site infection.No clear support for infection  s/p limited course of cefepime  All blood cultures have been negative  Chest CT negative  Suggest:  1.monitor off antibiotics  2.PD per renal  3.Await final results of blood cultures  4.supportive care

## 2021-01-01 NOTE — PROGRESS NOTE ADULT - ASSESSMENT
ASSESSMENT/RECOMMENDATIONS:   55M PMH ESRD on PD, DM, CAD s/p CABG x4, underwent cardiac cath and stent and wire broke in heart s/p sternotomy during recent admission 11/30-12/11, recently discharged 1 wk ago for dizziness and transient fever, was treated for culture negative sepsis, BIBEMS for generalized weakness and hypotension x 1 wk.   No stitmata to suggest peritonitis     1 Renal- Resumption of PD     2 CVS-  Midodrine 20mg po tid;  Check orthostasis as ordered    3 ID-Pan cx and on IV abx     Chela Messina NP- C  [facility]  [phone]

## 2021-01-02 NOTE — PROGRESS NOTE ADULT - SUBJECTIVE AND OBJECTIVE BOX
CARDIOLOGY FOLLOW UP NOTE - DR. SMITH    Subjective:  remains dizzy on ambulation  + orthostatic vitals    denies chest pain, dyspnea, palpitations  ROS: otherwise negative   overnight events:      PHYSICAL EXAM:  T(C): 37.3 (21 @ 05:45), Max: 37.3 (21 @ 05:45)  HR: 80 (21 @ 22:48) (80 - 93)  BP: 112/76 (21 @ 23:10) (87/75 - 121/83)  RR: 18 (21 @ 05:45) (18 - 18)  SpO2: 98% (21 @ 05:45) (96% - 100%)  Wt(kg): --  I&O's Summary    2021 07:01  -  2021 07:00  --------------------------------------------------------  IN: 6980 mL / OUT: 7175 mL / NET: -195 mL      Daily     Daily Weight in k.1 (2021 05:45)    Appearance: Normal	  Cardiovascular: Normal S1 S2,RRR, No JVD, No murmurs  Respiratory: Lungs clear to auscultation	  Gastrointestinal:  Soft, Non-tender, + BS	  Extremities: Normal range of motion, No clubbing, cyanosis or edema      Home Medications:  aspirin 81 mg oral delayed release tablet: 1 tab(s) orally once a day (29 Dec 2020 18:37)  atorvastatin 80 mg oral tablet: 1 tab(s) orally once a day (at bedtime) (29 Dec 2020 18:37)  epoetin martine: injectable once a month (29 Dec 2020 18:37)  ferrous sulfate 325 mg (65 mg elemental iron) oral tablet: 1 tab(s) orally 3 times a day (29 Dec 2020 18:37)  folic acid 0.4 mg oral tablet: 2 tab(s) orally once a day (29 Dec 2020 18:37)  gabapentin 100 mg oral capsule: 1 cap(s) orally once a day (29 Dec 2020 18:37)  nortriptyline 25 mg oral capsule: 1 cap(s) orally once a day (at bedtime) (29 Dec 2020 18:37)  pantoprazole 40 mg oral delayed release tablet: 1 tab(s) orally once a day (before a meal) (29 Dec 2020 18:37)  sevelamer carbonate 800 mg oral tablet: 1 tab(s) orally 3 times a day (with meals) (29 Dec 2020 18:37)  Toujeo SoloStar 300 units/mL subcutaneous solution: 70 unit(s) subcutaneous once a day (at bedtime) (29 Dec 2020 18:37)      MEDICATIONS  (STANDING):  aspirin enteric coated 81 milliGRAM(s) Oral daily  atorvastatin 80 milliGRAM(s) Oral at bedtime  cadexomer iodine 0.9% Gel 1 Application(s) Topical daily  dextrose 40% Gel 15 Gram(s) Oral once  dextrose 5%. 1000 milliLiter(s) (50 mL/Hr) IV Continuous <Continuous>  dextrose 5%. 1000 milliLiter(s) (100 mL/Hr) IV Continuous <Continuous>  dextrose 50% Injectable 25 Gram(s) IV Push once  dextrose 50% Injectable 12.5 Gram(s) IV Push once  dextrose 50% Injectable 25 Gram(s) IV Push once  ferrous    sulfate 325 milliGRAM(s) Oral daily  folic acid 1 milliGRAM(s) Oral daily  glucagon  Injectable 1 milliGRAM(s) IntraMuscular once  heparin   Injectable 5000 Unit(s) SubCutaneous every 12 hours  insulin glargine Injectable (LANTUS) 5 Unit(s) SubCutaneous at bedtime  insulin lispro (ADMELOG) corrective regimen sliding scale   SubCutaneous three times a day before meals  insulin lispro (ADMELOG) corrective regimen sliding scale   SubCutaneous at bedtime  midodrine. 20 milliGRAM(s) Oral three times a day  pantoprazole    Tablet 40 milliGRAM(s) Oral before breakfast  sevelamer carbonate 800 milliGRAM(s) Oral three times a day with meals  sodium chloride 0.9%. 1000 milliLiter(s) (100 mL/Hr) IV Continuous <Continuous>  ticagrelor 90 milliGRAM(s) Oral every 12 hours      TELEMETRY: 	    ECG:  	  RADIOLOGY:   DIAGNOSTIC TESTING:  [ ] Echocardiogram:  [ ] Catheterization:  [ ] Stress Test:    OTHER: 	    LABS:	 	    CARDIAC MARKERS:  Troponin T, High Sensitivity Result: 796 ng/L (12-30 @ 06:20)  Troponin T, High Sensitivity Result: 796 ng/L (12-29 @ 22:35)  Troponin T, High Sensitivity Result: 812 ng/L ( @ 16:41)  Troponin T, High Sensitivity Result: 852 ng/L ( @ 14:34)                                9.1    10.01 )-----------( 290      ( 2021 06:06 )             29.0     01    130<L>  |  91<L>  |  46<H>  ----------------------------<  84  3.8   |  22  |  13.23<H>    Ca    8.2<L>      2021 06:06  Phos  6.6       Mg     2.1         TPro  6.0  /  Alb  2.6<L>  /  TBili  0.4  /  DBili  x   /  AST  18  /  ALT  12  /  AlkPhos  83      proBNP:     Lipid Profile:   HgA1c:     Creatinine, Serum: 13.23 mg/dL (21 @ 06:06)  Creatinine, Serum: 13.88 mg/dL (21 @ 06:07)  Creatinine, Serum: 13.74 mg/dL (20 @ 06:11)

## 2021-01-02 NOTE — PROGRESS NOTE ADULT - ASSESSMENT
55 y.o. male with esrd on PD for 5 years, T2DM, cad prior cabg. He underwent cardiac cath and angioplasty about 2 weeks ago due to cp and abnormal ETT with cad. Admitted for fever and dizziness. 56 yo man w/ DM,  ESRD on PD, CAD s/p CABG, underwent cardiac cath s/p PCI with broken balloon wire during cath, s/p redo open heart for stent balloon retrieval and recent admission for dizziness transient fever, infectious work up negative, and now presenting for LE weakness.  Endocrine consulted fro DM management and hyperglycemia.    1.  Type 2 diabetes mellitus with hyperglycemia, with long-term current use of insulin.  Glucose currently at goal   -T2DM uncontrolled. Hba1c 7.8 12/2020. Pt. repors occ. hypoglycemia at home.   -Recommend decrease lantus to 5 units qhs   -recommend low dose SS TIDAC/qhs. No standing pre-meal insulin given decreased appetite.  -CC diet   -Monitor FS's TIDAC/qhs    discharge  Basal+victoza   f/u Dr. Villegas.    2.  Essential hypertension.  Recommendation: BP variable, w/ hypotension on admission.    3.  Other hyperlipidemia.  Recommendation: c/w lipitor  f/u w/ cardiology.   55 y.o. male with esrd on PD for 5 years, T2DM, cad prior cabg. He underwent cardiac cath and angioplasty about 2 weeks ago due to cp and abnormal ETT with cad. Admitted for fever and dizziness. 56 yo man w/ DM,  ESRD on PD, CAD s/p CABG, underwent cardiac cath s/p PCI with broken balloon wire during cath, s/p redo open heart for stent balloon retrieval and recent admission for dizziness transient fever, infectious work up negative, and now presenting for LE weakness.  Endocrine consulted fro DM management and hyperglycemia.    1.  Type 2 diabetes mellitus with hyperglycemia, with long-term current use of insulin.  Glucose currently at goal   -T2DM uncontrolled. Hba1c 7.8 12/2020. Pt. repors occ. hypoglycemia at home.   -Recommend c/w Lantus 5 Units HS  -recommend low dose SS TIDAC/qhs. Start premeal insulin once patient is eating consistently.   -CC diet   -Monitor FS's TIDAC/qhs    discharge  Basal+victoza   f/u Dr. Villegas.    2.  Essential hypertension.  Recommendation: BP variable, w/ hypotension on admission.    3.  Other hyperlipidemia.  Recommendation: c/w lipitor  f/u w/ cardiology.

## 2021-01-02 NOTE — PROGRESS NOTE ADULT - ASSESSMENT
CATH 11/30/2020:  COMPLICATIONS: The stent was deployed without difficulty. On removal of the  balloon, the shaft broke and the tip of the balloon remained in the vessel. Several attempts were made to snare the balloon unsuccessfully. Dr. Verdugo was notifed who will take the patient to the operating room.  DIAGNOSTIC RECOMMENDATIONS: The patient should continue with the present medications. The patients vessel was stented without difficulty On removal of the balloon, the shaft broke with the baloon stuck in the left main. All attempts to snare the balloon out failed and the patient was taken to the OR by Dr. Arango to remove the balloon  introp eleonora 11/30/20: mild to mod MR, < from: Intra-Operative Transesophageal Echo (11.30.20 @ 17:02) >  Severe global left ventricular systolic dysfunction. Inferior and inferolateral akinesis.  severe hypokinesis of other segments.  Severe global hypokinesia.  Normal left ventricular internal dimensions and wall thicknesses. Moderate diastolic dysfunction (Stage II).  echo 12/17/20: EF 32%, mod MR, Severe global left ventricular systolic dysfunction, moderate pulmonary pressures  echo 12/20/20: EF (Visual Estimate): 25-30 % mild MR, Akinesis of the inferolateral, anterolateral, apical laterlal, inferior, and inferoseptal walls. Severe left ventricular enlargement. No left ventricular thrombus is seen.      a/p  55y male with esrd on PD for 5 years, dm poorly controlled, systolic CHF, CAD, s/p CABG, s/p PCI to OM c/b by failure to remove stent balloon requiring open heart surgery for removal, admitted with  hypotension,  fever and weakness    1. Ischemic Cardiomyopathy. Chronic systolic HF  -cv stable  -Repeat echo with unchanged lv sys dysfx, ef 25- 30%  -BB on hold due to orthostatic hypotension , no acei/arb for now   -continue midodrine for bp support  -EP f/u, not candidate for icd at present     2. CAD, s/p CABG, s/p PCI, s/p redo open heart for stent balloon retrieval   -CV stable  -c/w asa, Brilinta, statin     3. Weakness, R/O sepsis  -abx per id- work up per ID, bc 12/30 neg  -CT chest 12/30  noted  Small volume pneumoperitoneum. Sternal fluid collection has decreased in size    4. ESRD  -on PD  -renal f/u    5. Syncope, orthostatic hypotension   -12/30 brief LOC while being adjusted from laying to sitting position   -likely vasovagal secondary to orthostatic hypotension, no events on tele  -still remains orthostatic with sx  -cont midodrine, inc to 30mg TID   -infectious work up thus far unrevealing  -minimize volume removal with pd per renal   -trend orthostatic vitals   -continue to give volume ressucc if remains dizzy on ambulation    dvt ppx

## 2021-01-02 NOTE — PROGRESS NOTE ADULT - ASSESSMENT
56 yo male with history of ESRD, PD,CAD,DM,CABG, recent strenotomy for wire removal after a cardiac cath, admitted with fatigue, hypotension, and failure to thrive.  No fever or chills, similar admission 2 weeks ago ended without a specific answer.  His cortisol level was normal, negative blood and PD cultures, and prior chest CT with no PE just a small retrosternal fluid collection.  Certainly no overt evidence of a surgical site infection.No clear support for infection  s/p limited course of cefepime  All blood cultures have been negative  Chest CT negative  He still is experiencing hypotension and dizziness  Suggest:  1.monitor off antibiotics  2.PD per renal  3.Medical management per IM and cardiology  4.supportive care, at this point I do not see evidence of an infectious process

## 2021-01-02 NOTE — PROGRESS NOTE ADULT - SUBJECTIVE AND OBJECTIVE BOX
Patient is a 55y old  Male who presents with a chief complaint of hypotension (02 Jan 2021 06:49)      SUBJECTIVE / OVERNIGHT EVENTS: No new complaints.   Review of Systems  chest pain no  palpitations no  sob no  nausea no  headache no    MEDICATIONS  (STANDING):  aspirin enteric coated 81 milliGRAM(s) Oral daily  atorvastatin 80 milliGRAM(s) Oral at bedtime  cadexomer iodine 0.9% Gel 1 Application(s) Topical daily  dextrose 40% Gel 15 Gram(s) Oral once  dextrose 5%. 1000 milliLiter(s) (50 mL/Hr) IV Continuous <Continuous>  dextrose 5%. 1000 milliLiter(s) (100 mL/Hr) IV Continuous <Continuous>  dextrose 50% Injectable 25 Gram(s) IV Push once  dextrose 50% Injectable 12.5 Gram(s) IV Push once  dextrose 50% Injectable 25 Gram(s) IV Push once  ferrous    sulfate 325 milliGRAM(s) Oral daily  folic acid 1 milliGRAM(s) Oral daily  glucagon  Injectable 1 milliGRAM(s) IntraMuscular once  heparin   Injectable 5000 Unit(s) SubCutaneous every 12 hours  insulin glargine Injectable (LANTUS) 5 Unit(s) SubCutaneous at bedtime  insulin lispro (ADMELOG) corrective regimen sliding scale   SubCutaneous three times a day before meals  insulin lispro (ADMELOG) corrective regimen sliding scale   SubCutaneous at bedtime  midodrine. 20 milliGRAM(s) Oral three times a day  pantoprazole    Tablet 40 milliGRAM(s) Oral before breakfast  sevelamer carbonate 800 milliGRAM(s) Oral three times a day with meals  sodium chloride 0.9%. 1000 milliLiter(s) (100 mL/Hr) IV Continuous <Continuous>  ticagrelor 90 milliGRAM(s) Oral every 12 hours    MEDICATIONS  (PRN):      Vital Signs Last 24 Hrs  T(C): 37.2 (02 Jan 2021 15:15), Max: 37.3 (02 Jan 2021 05:45)  T(F): 98.9 (02 Jan 2021 15:15), Max: 99.2 (02 Jan 2021 05:45)  HR: 98 (02 Jan 2021 15:15) (80 - 98)  BP: 136/85 (02 Jan 2021 15:15) (98/67 - 147/94)  BP(mean): --  RR: 18 (02 Jan 2021 15:15) (16 - 18)  SpO2: 100% (02 Jan 2021 15:15) (96% - 100%)    PHYSICAL EXAM:  GENERAL: NAD  HEAD:  Atraumatic, Normocephalic  EYES: EOMI, PERRLA, conjunctiva and sclera clear  NECK: Supple, No JVD  CHEST/LUNG: Clear to auscultation bilaterally; No wheeze Sternal wound healing.  HEART: Regular rate and rhythm; No murmurs, rubs, or gallops  ABDOMEN: Soft, Nontender, Nondistended; Bowel sounds present PD catyheter  EXTREMITIES:  2+ Peripheral Pulses, No clubbing, cyanosis, or edema  NEUROLOGY: non-focal  SKIN: No rashes or lesions    LABS:                        9.1    10.01 )-----------( 290      ( 02 Jan 2021 06:06 )             29.0     01-02    130<L>  |  91<L>  |  46<H>  ----------------------------<  84  3.8   |  22  |  13.23<H>    Ca    8.2<L>      02 Jan 2021 06:06  Phos  6.6     01-01  Mg     2.1     01-01    TPro  6.0  /  Alb  2.6<L>  /  TBili  0.4  /  DBili  x   /  AST  18  /  ALT  12  /  AlkPhos  83  01-01                RADIOLOGY & ADDITIONAL TESTS:    Imaging Personally Reviewed:    Consultant(s) Notes Reviewed:      Care Discussed with Consultants/Other Providers:

## 2021-01-02 NOTE — PROGRESS NOTE ADULT - SUBJECTIVE AND OBJECTIVE BOX
CC: f/u for hypotension    Patient reports: he continues to get dizzy when attempting to ambulate    REVIEW OF SYSTEMS:  All other review of systems negative (Comprehensive ROS)    Antimicrobials Day #  :off    Other Medications Reviewed  MEDICATIONS  (STANDING):  aspirin enteric coated 81 milliGRAM(s) Oral daily  atorvastatin 80 milliGRAM(s) Oral at bedtime  cadexomer iodine 0.9% Gel 1 Application(s) Topical daily  dextrose 40% Gel 15 Gram(s) Oral once  dextrose 5%. 1000 milliLiter(s) (50 mL/Hr) IV Continuous <Continuous>  dextrose 5%. 1000 milliLiter(s) (100 mL/Hr) IV Continuous <Continuous>  dextrose 50% Injectable 25 Gram(s) IV Push once  dextrose 50% Injectable 12.5 Gram(s) IV Push once  dextrose 50% Injectable 25 Gram(s) IV Push once  ferrous    sulfate 325 milliGRAM(s) Oral daily  folic acid 1 milliGRAM(s) Oral daily  glucagon  Injectable 1 milliGRAM(s) IntraMuscular once  heparin   Injectable 5000 Unit(s) SubCutaneous every 12 hours  insulin glargine Injectable (LANTUS) 5 Unit(s) SubCutaneous at bedtime  insulin lispro (ADMELOG) corrective regimen sliding scale   SubCutaneous three times a day before meals  insulin lispro (ADMELOG) corrective regimen sliding scale   SubCutaneous at bedtime  midodrine. 20 milliGRAM(s) Oral three times a day  pantoprazole    Tablet 40 milliGRAM(s) Oral before breakfast  sevelamer carbonate 800 milliGRAM(s) Oral three times a day with meals  sodium chloride 0.9%. 1000 milliLiter(s) (100 mL/Hr) IV Continuous <Continuous>  ticagrelor 90 milliGRAM(s) Oral every 12 hours    T(F): 99.2 (01-02-21 @ 05:45), Max: 99.2 (01-02-21 @ 05:45)  HR: 80 (01-01-21 @ 22:48)  BP: 112/76 (01-01-21 @ 23:10)  RR: 18 (01-02-21 @ 05:45)  SpO2: 98% (01-02-21 @ 05:45)  Wt(kg): --    PHYSICAL EXAM:  General: alert, no acute distress  Eyes:  anicteric, no conjunctival injection, no discharge  Oropharynx: no lesions or injection 	  Neck: supple, without adenopathy  Lungs: clear to auscultation  Heart: regular rate and rhythm; no murmur, rubs or gallops  Abdomen: soft, nondistended, nontender, PD catheter in place  Skin: chest and RT axillary incisions C/D/I  Extremities: no clubbing, cyanosis, or edema  Neurologic: alert, oriented, moves all extremities    LAB RESULTS:                        9.1    10.01 )-----------( 290      ( 02 Jan 2021 06:06 )             29.0     01-02    130<L>  |  91<L>  |  46<H>  ----------------------------<  84  3.8   |  22  |  13.23<H>    Ca    8.2<L>      02 Jan 2021 06:06  Phos  6.6     01-01  Mg     2.1     01-01    TPro  6.0  /  Alb  2.6<L>  /  TBili  0.4  /  DBili  x   /  AST  18  /  ALT  12  /  AlkPhos  83  01-01    LIVER FUNCTIONS - ( 01 Jan 2021 06:07 )  Alb: 2.6 g/dL / Pro: 6.0 g/dL / ALK PHOS: 83 U/L / ALT: 12 U/L / AST: 18 U/L / GGT: x             MICROBIOLOGY:  RECENT CULTURES:  12-30 @ 01:05 .Blood Blood     No growth to date.      12-29 @ 21:59 .Blood Blood-Peripheral     No growth to date.          RADIOLOGY REVIEWED:  < from: CT Chest No Cont (12.30.20 @ 00:58) >  IMPRESSION:    Small volume pneumoperitoneum.    Sternal fluid collection has decreased in size, now measuring 2 x 0.8 cm at the posterior table of the sternum.    Small amount of ascites.    Minimal left basilar atelectasis.    The above findings were discussed with CARLYLE Sharpe at 9:59 AM on 12/30/20 with read back confirmation.    < end of copied text >

## 2021-01-02 NOTE — PROGRESS NOTE ADULT - ASSESSMENT
Pt is 56yo who presents w/ LF hallux stable eschar and superficial wound  -Left hallux dorsal eschar and superficial wound to subQ measure 0.25x 0.25cm, no periwound erythema, no drainage no malodor. Superficial wound secondary to trauma.   -Foot does not appear to be source of infection  -LF X-rays unremarkable   -No podiatric intervention at this time  - Cont local wound care daily

## 2021-01-02 NOTE — PROGRESS NOTE ADULT - SUBJECTIVE AND OBJECTIVE BOX
Chief Complaint:     History:    MEDICATIONS  (STANDING):  aspirin enteric coated 81 milliGRAM(s) Oral daily  atorvastatin 80 milliGRAM(s) Oral at bedtime  cadexomer iodine 0.9% Gel 1 Application(s) Topical daily  dextrose 40% Gel 15 Gram(s) Oral once  dextrose 5%. 1000 milliLiter(s) (50 mL/Hr) IV Continuous <Continuous>  dextrose 5%. 1000 milliLiter(s) (100 mL/Hr) IV Continuous <Continuous>  dextrose 50% Injectable 25 Gram(s) IV Push once  dextrose 50% Injectable 12.5 Gram(s) IV Push once  dextrose 50% Injectable 25 Gram(s) IV Push once  ferrous    sulfate 325 milliGRAM(s) Oral daily  folic acid 1 milliGRAM(s) Oral daily  glucagon  Injectable 1 milliGRAM(s) IntraMuscular once  heparin   Injectable 5000 Unit(s) SubCutaneous every 12 hours  insulin glargine Injectable (LANTUS) 5 Unit(s) SubCutaneous at bedtime  insulin lispro (ADMELOG) corrective regimen sliding scale   SubCutaneous three times a day before meals  insulin lispro (ADMELOG) corrective regimen sliding scale   SubCutaneous at bedtime  midodrine. 20 milliGRAM(s) Oral three times a day  pantoprazole    Tablet 40 milliGRAM(s) Oral before breakfast  sevelamer carbonate 800 milliGRAM(s) Oral three times a day with meals  sodium chloride 0.9%. 1000 milliLiter(s) (100 mL/Hr) IV Continuous <Continuous>  ticagrelor 90 milliGRAM(s) Oral every 12 hours    MEDICATIONS  (PRN):      Allergies    doxazosin (Other)    Intolerances      Review of Systems:  Constitutional: No fever  Eyes: No blurry vision  Neuro: No tremors  HEENT: No pain  Cardiovascular: No chest pain, palpitations  Respiratory: No SOB, no cough  GI: No nausea, vomiting, abdominal pain  : No dysuria  Skin: no rash  Psych: no depression  Endocrine: no polyuria, polydipsia  Hem/lymph: no swelling  Osteoporosis: no fractures    ALL OTHER SYSTEMS REVIEWED AND NEGATIVE    UNABLE TO OBTAIN    PHYSICAL EXAM:  VITALS: T(C): 36.8 (01-02-21 @ 10:45)  T(F): 98.2 (01-02-21 @ 10:45), Max: 99.2 (01-02-21 @ 05:45)  HR: 93 (01-02-21 @ 10:45) (80 - 96)  BP: 147/94 (01-02-21 @ 10:45) (87/75 - 147/94)  RR:  (18 - 18)  SpO2:  (96% - 100%)  Wt(kg): --  GENERAL: NAD, well-groomed, well-developed  EYES: No proptosis, no lid lag, anicteric  HEENT:  Atraumatic, Normocephalic, moist mucous membranes  THYROID: Normal size, no palpable nodules  RESPIRATORY: Clear to auscultation bilaterally; No rales, rhonchi, wheezing, or rubs  CARDIOVASCULAR: Regular rate and rhythm; No murmurs; no peripheral edema  GI: Soft, nontender, non distended, normal bowel sounds  SKIN: Dry, intact, No rashes or lesions  MUSCULOSKELETAL: Full range of motion, normal strength  NEURO: sensation intact, extraocular movements intact, no tremor, normal reflexes  PSYCH: Alert and oriented x 3, normal affect, normal mood  CUSHING'S SIGNS: no striae    POCT Blood Glucose.: 112 mg/dL (01-02-21 @ 08:17)  POCT Blood Glucose.: 154 mg/dL (01-01-21 @ 22:32)  POCT Blood Glucose.: 114 mg/dL (01-01-21 @ 19:11)  POCT Blood Glucose.: 131 mg/dL (01-01-21 @ 13:49)  POCT Blood Glucose.: 126 mg/dL (01-01-21 @ 08:33)  POCT Blood Glucose.: 146 mg/dL (12-31-20 @ 21:39)  POCT Blood Glucose.: 183 mg/dL (12-31-20 @ 17:50)  POCT Blood Glucose.: 168 mg/dL (12-31-20 @ 11:56)  POCT Blood Glucose.: 93 mg/dL (12-31-20 @ 08:14)  POCT Blood Glucose.: 124 mg/dL (12-30-20 @ 22:40)  POCT Blood Glucose.: 85 mg/dL (12-30-20 @ 21:04)  POCT Blood Glucose.: 166 mg/dL (12-30-20 @ 17:18)  POCT Blood Glucose.: 98 mg/dL (12-30-20 @ 12:08)      01-02    130<L>  |  91<L>  |  46<H>  ----------------------------<  84  3.8   |  22  |  13.23<H>    EGFR if : 4<L>  EGFR if non : 4<L>    Ca    8.2<L>      01-02  Mg     2.1     01-01  Phos  6.6     01-01    TPro  6.0  /  Alb  2.6<L>  /  TBili  0.4  /  DBili  x   /  AST  18  /  ALT  12  /  AlkPhos  83  01-01          Thyroid Function Tests:                           Chief Complaint: T2DM    History: Patient is more alert today and is eating better. Currently eating home cooked chicken and soup.    MEDICATIONS  (STANDING):  aspirin enteric coated 81 milliGRAM(s) Oral daily  atorvastatin 80 milliGRAM(s) Oral at bedtime  cadexomer iodine 0.9% Gel 1 Application(s) Topical daily  dextrose 40% Gel 15 Gram(s) Oral once  dextrose 5%. 1000 milliLiter(s) (50 mL/Hr) IV Continuous <Continuous>  dextrose 5%. 1000 milliLiter(s) (100 mL/Hr) IV Continuous <Continuous>  dextrose 50% Injectable 25 Gram(s) IV Push once  dextrose 50% Injectable 12.5 Gram(s) IV Push once  dextrose 50% Injectable 25 Gram(s) IV Push once  ferrous    sulfate 325 milliGRAM(s) Oral daily  folic acid 1 milliGRAM(s) Oral daily  glucagon  Injectable 1 milliGRAM(s) IntraMuscular once  heparin   Injectable 5000 Unit(s) SubCutaneous every 12 hours  insulin glargine Injectable (LANTUS) 5 Unit(s) SubCutaneous at bedtime  insulin lispro (ADMELOG) corrective regimen sliding scale   SubCutaneous three times a day before meals  insulin lispro (ADMELOG) corrective regimen sliding scale   SubCutaneous at bedtime  midodrine. 20 milliGRAM(s) Oral three times a day  pantoprazole    Tablet 40 milliGRAM(s) Oral before breakfast  sevelamer carbonate 800 milliGRAM(s) Oral three times a day with meals  sodium chloride 0.9%. 1000 milliLiter(s) (100 mL/Hr) IV Continuous <Continuous>  ticagrelor 90 milliGRAM(s) Oral every 12 hours    MEDICATIONS  (PRN):      Allergies    doxazosin (Other)    Intolerances      Review of Systems:  Constitutional: No fever  Eyes: No blurry vision  Neuro: No tremors  HEENT: No pain  Cardiovascular: No chest pain, palpitations  Respiratory: No SOB, no cough  GI: No nausea, vomiting, abdominal pain  : No dysuria  Skin: no rash  Psych: no depression  Endocrine: no polyuria, polydipsia      ALL OTHER SYSTEMS REVIEWED AND NEGATIVE        PHYSICAL EXAM:  VITALS: T(C): 36.8 (01-02-21 @ 10:45)  T(F): 98.2 (01-02-21 @ 10:45), Max: 99.2 (01-02-21 @ 05:45)  HR: 93 (01-02-21 @ 10:45) (80 - 96)  BP: 147/94 (01-02-21 @ 10:45) (87/75 - 147/94)  RR:  (18 - 18)  SpO2:  (96% - 100%)  Wt(kg): --  GENERAL: NAD, well-groomed, well-developed  EYES: No proptosis, no lid lag, anicteric  HEENT:  Atraumatic, Normocephalic, moist mucous membranes  RESPIRATORY: Clear to auscultation bilaterally; No rales, rhonchi, wheezing, or rubs  CARDIOVASCULAR: Regular rate and rhythm  GI: Soft, nontender, non distended, normal bowel sounds  PSYCH: Alert and oriented x 3, normal affect, normal mood      POCT Blood Glucose.: 112 mg/dL (01-02-21 @ 08:17)  POCT Blood Glucose.: 154 mg/dL (01-01-21 @ 22:32)  POCT Blood Glucose.: 114 mg/dL (01-01-21 @ 19:11)  POCT Blood Glucose.: 131 mg/dL (01-01-21 @ 13:49)  POCT Blood Glucose.: 126 mg/dL (01-01-21 @ 08:33)  POCT Blood Glucose.: 146 mg/dL (12-31-20 @ 21:39)  POCT Blood Glucose.: 183 mg/dL (12-31-20 @ 17:50)  POCT Blood Glucose.: 168 mg/dL (12-31-20 @ 11:56)  POCT Blood Glucose.: 93 mg/dL (12-31-20 @ 08:14)  POCT Blood Glucose.: 124 mg/dL (12-30-20 @ 22:40)  POCT Blood Glucose.: 85 mg/dL (12-30-20 @ 21:04)  POCT Blood Glucose.: 166 mg/dL (12-30-20 @ 17:18)  POCT Blood Glucose.: 98 mg/dL (12-30-20 @ 12:08)      01-02    130<L>  |  91<L>  |  46<H>  ----------------------------<  84  3.8   |  22  |  13.23<H>    EGFR if : 4<L>  EGFR if non : 4<L>    Ca    8.2<L>      01-02  Mg     2.1     01-01  Phos  6.6     01-01    TPro  6.0  /  Alb  2.6<L>  /  TBili  0.4  /  DBili  x   /  AST  18  /  ALT  12  /  AlkPhos  83  01-01

## 2021-01-02 NOTE — PROGRESS NOTE ADULT - ASSESSMENT
Seen and examined s/p PD this AM with net loss of 1050ml.  +orthostasis  Denies complaints    aspirin enteric coated 81 milliGRAM(s) Oral daily  atorvastatin 80 milliGRAM(s) Oral at bedtime  cadexomer iodine 0.9% Gel 1 Application(s) Topical daily  dextrose 40% Gel 15 Gram(s) Oral once  dextrose 5%. 1000 milliLiter(s) IV Continuous <Continuous>  dextrose 5%. 1000 milliLiter(s) IV Continuous <Continuous>  dextrose 50% Injectable 25 Gram(s) IV Push once  dextrose 50% Injectable 12.5 Gram(s) IV Push once  dextrose 50% Injectable 25 Gram(s) IV Push once  ferrous    sulfate 325 milliGRAM(s) Oral daily  folic acid 1 milliGRAM(s) Oral daily  glucagon  Injectable 1 milliGRAM(s) IntraMuscular once  heparin   Injectable 5000 Unit(s) SubCutaneous every 12 hours  insulin glargine Injectable (LANTUS) 5 Unit(s) SubCutaneous at bedtime  insulin lispro (ADMELOG) corrective regimen sliding scale   SubCutaneous three times a day before meals  insulin lispro (ADMELOG) corrective regimen sliding scale   SubCutaneous at bedtime  midodrine. 20 milliGRAM(s) Oral three times a day  pantoprazole    Tablet 40 milliGRAM(s) Oral before breakfast  sevelamer carbonate 800 milliGRAM(s) Oral three times a day with meals  sodium chloride 0.9%. 1000 milliLiter(s) IV Continuous <Continuous>  ticagrelor 90 milliGRAM(s) Oral every 12 hours      VITAL:  T(C): , Max: 37.3 (01-02-21 @ 05:45)  T(F): , Max: 99.2 (01-02-21 @ 05:45)  HR: 93 (01-02-21 @ 10:45)  BP: 147/94 (01-02-21 @ 10:45)  BP(mean): --  RR: 18 (01-02-21 @ 10:45)  SpO2: 100% (01-02-21 @ 10:45)  Wt(kg): --    01-01-21 @ 07:01  -  01-02-21 @ 07:00  --------------------------------------------------------  IN: 6980 mL / OUT: 7175 mL / NET: -195 mL        PHYSICAL EXAM:  Constitutional: NAD  Neck:  No JVD  Respiratory: CTAB/L  Cardiovascular: S1 and S2  Gastrointestinal: BS+, soft, NT/ND + PD catheter   Extremities: No peripheral edema  Neurological: A/O x 3, no focal deficits  Psychiatric: Normal mood, normal affect  : No Johnson  Skin: No rashes  Access: Not applicable    LABS:                          9.1    10.01 )-----------( 290      ( 02 Jan 2021 06:06 )             29.0     Na(130)/K(3.8)/Cl(91)/HCO3(22)/BUN(46)/Cr(13.23)Glu(84)/Ca(8.2)/Mg(--)/PO4(--)    01-02 @ 06:06  Na(133)/K(4.1)/Cl(91)/HCO3(23)/BUN(47)/Cr(13.88)Glu(72)/Ca(8.4)/Mg(2.1)/PO4(6.6)    01-01 @ 06:07  Na(131)/K(3.7)/Cl(90)/HCO3(24)/BUN(44)/Cr(13.74)Glu(101)/Ca(8.8)/Mg(--)/PO4(--)    12-31 @ 06:11    Imaging    EXAM:  XR ABDOMEN PORTABLE ROUTINE 1V                          PROCEDURE DATE:  01/01/2021        INTERPRETATION:  Abdomen one view    HISTORY: Peritoneal dialysis catheter    Frontal view of the abdomen shows a normal gas pattern. Peritonealdialysis projects over the lower abdomen.    There is no evidence of organomegaly. Multiple small renal calculi are seen bilaterally. No definite free air is identified.    IMPRESSION:  Dialysis catheter. No evidence of bowel obstruction.      ASSESSMENT/PLAN  55M PMH ESRD on PD, DM, CAD s/p CABG x4, underwent cardiac cath and stent and wire broke in heart s/p sternotomy during recent admission 11/30-12/11, recently discharged 1 wk ago for dizziness and transient fever, was treated for culture negative sepsis, BIBEMS for generalized weakness and hypotension x 1 wk.   No stitmata to suggest peritonitis     1 Renal- Resumption of PD     2 CVS-  Midodrine 20mg po tid;  Check orthostasis as ordered    3 Hyperphosphatemia- continue Renvela as ordered and draw phos with AM labs       Modesto Wayne NP-BC  [facility]  [phone]

## 2021-01-02 NOTE — PROGRESS NOTE ADULT - ASSESSMENT
55 m with    Hypotension  - s/p gentle IVF  - Midodrine  - telemetry  - Cardiology follow     r/o Sepsis  - BC results noted  - off empiric antibiotics  - ID follow    Hx of small sternal collection decreased     Foot wound  - local care  - Podiatry follow    Diabetes Mellitus   - BS control  - ADA diet   - Endocrine follow     CAD  - continue Rx    CHF cr systolic  - cardiology follow    Depression  - continue Rx    ESRD  - PD  - Nephrology follow    DVT prophylaxis     PT    DVT prophylaxis    Pipe Beck MD pager [phone]

## 2021-01-02 NOTE — PROGRESS NOTE ADULT - SUBJECTIVE AND OBJECTIVE BOX
Podiatry pager #: [phone]    Patient is a 55y old  Male who presents with a chief complaint of Lower extremity weakness (29 Dec 2020 20:47)      HPI:  55M PMH ESRD on PD, DM, CAD s/p CABG x4, underwent cardiac cath and stent and wire broke in heart s/p sternotomy during recent admission 11/30-12/11, recently discharged 1 wk ago for dizziness and transient fever, was treated for culture negative sepsis, BIBEMS for generalized weakness and hypotension x 1 wk. Since recent cardiac surgery, pt has been c/o generalized weakness. Wife/son states that pt has not been getting out of bed at all at home. Was previously ambulatory. Was seen by PT today, and was found to be hypotensive to SBP 80s, prompting ED evaluation. Of note, wife states she's been checking pt's BPs at home and have been intermittently low to SBP 80-90s. Pt also had a presyncopal episode while on the toilet yesterday. No head trauma or LOC. Wife states looks like pt's eyes rolled back. Pt denies CP, SOB. No f/c, URI sx, abd pain, NVD, urinary sx. Has had poor appetite. (29 Dec 2020 20:39)      PAST MEDICAL & SURGICAL HISTORY:  HTN (hypertension)    ESRD (end stage renal disease) on dialysis    CAD, multiple vessel    Diabetes    S/P CABG (coronary artery bypass graft)        MEDICATIONS  (STANDING):  cefepime   IVPB      dextrose 40% Gel 15 Gram(s) Oral once  dextrose 5%. 1000 milliLiter(s) (50 mL/Hr) IV Continuous <Continuous>  dextrose 5%. 1000 milliLiter(s) (100 mL/Hr) IV Continuous <Continuous>  dextrose 50% Injectable 25 Gram(s) IV Push once  dextrose 50% Injectable 12.5 Gram(s) IV Push once  dextrose 50% Injectable 25 Gram(s) IV Push once  glucagon  Injectable 1 milliGRAM(s) IntraMuscular once  heparin   Injectable 5000 Unit(s) SubCutaneous every 12 hours  insulin glargine Injectable (LANTUS) 12 Unit(s) SubCutaneous at bedtime  insulin lispro (ADMELOG) corrective regimen sliding scale   SubCutaneous three times a day before meals  insulin lispro (ADMELOG) corrective regimen sliding scale   SubCutaneous at bedtime  insulin lispro Injectable (ADMELOG) 6 Unit(s) SubCutaneous before breakfast  insulin lispro Injectable (ADMELOG) 6 Unit(s) SubCutaneous before lunch  insulin lispro Injectable (ADMELOG) 6 Unit(s) SubCutaneous before dinner    MEDICATIONS  (PRN):      Allergies    doxazosin (Other)    Intolerances        VITALS:    Vital Signs Last 24 Hrs  T(C): 36.4 (29 Dec 2020 20:34), Max: 37 (29 Dec 2020 16:30)  T(F): 97.5 (29 Dec 2020 20:34), Max: 98.6 (29 Dec 2020 16:30)  HR: 81 (29 Dec 2020 22:12) (75 - 81)  BP: 100/63 (29 Dec 2020 22:12) (79/57 - 122/81)  BP(mean): --  RR: 20 (29 Dec 2020 22:12) (16 - 20)  SpO2: 97% (29 Dec 2020 22:12) (95% - 100%)    LABS:                          11.1   10.63 )-----------( 338      ( 29 Dec 2020 14:34 )             36.1       12-29    134<L>  |  90<L>  |  40<H>  ----------------------------<  168<H>  3.5   |  27  |  13.17<H>    Ca    9.5      29 Dec 2020 14:34  Phos  5.9     12-29  Mg     2.4     12-29    TPro  7.0  /  Alb  3.3  /  TBili  0.4  /  DBili  x   /  AST  19  /  ALT  10  /  AlkPhos  88  12-29      CAPILLARY BLOOD GLUCOSE      POCT Blood Glucose.: 112 mg/dL (29 Dec 2020 22:26)  POCT Blood Glucose.: 65 mg/dL (29 Dec 2020 21:47)  POCT Blood Glucose.: 56 mg/dL (29 Dec 2020 21:25)  POCT Blood Glucose.: 49 mg/dL (29 Dec 2020 21:23)  POCT Blood Glucose.: 191 mg/dL (29 Dec 2020 13:53)      PT/INR - ( 29 Dec 2020 14:34 )   PT: 14.0 sec;   INR: 1.18 ratio         PTT - ( 29 Dec 2020 14:34 )  PTT:36.3 sec    LOWER EXTREMITY PHYSICAL EXAM:    Vascular: DP 1/4, PT NP, B/L, CFT <3 seconds B/L, Temperature gradient WNL, B/L.   Neuro: Epicritic sensation reduced to the level of digits, B/L.  Musculoskeletal/Ortho: unremarkable    Left hallux dorsal eschar and superficial wound to subQ measure 0.25x 0.25cm, no periwound erythema, no drainage no malodor. Superficial wound secondary to trauma.    RADIOLOGY & ADDITIONAL STUDIES:

## 2021-01-03 NOTE — PROGRESS NOTE ADULT - ASSESSMENT
55 m with    Hypotension  - s/p gentle IVF   - Midodrine  - telemetry  - Cardiology follow     r/o Sepsis  - BC results noted  - off empiric antibiotics  - ID follow    Hx of small sternal collection decreased     Foot wound  - local care  - Podiatry follow    Diabetes Mellitus   - BS control  - ADA diet   - Endocrine follow     CAD  - continue Rx    CHF cr systolic  - cardiology follow    Depression  - continue Rx    ESRD  - PD  - Nephrology follow    DVT prophylaxis     PT    DVT prophylaxis    DCP in progress.    Pipe Beck MD pager [phone]

## 2021-01-03 NOTE — PROGRESS NOTE ADULT - SUBJECTIVE AND OBJECTIVE BOX
CARDIOLOGY FOLLOW UP NOTE - DR. SMITH    Subjective:    denies chest pain, dyspnea, palpitations, dizziness  ROS: otherwise negative   overnight events:      PHYSICAL EXAM:  T(C): 36.8 (21 @ 10:10), Max: 37.2 (21 @ 15:15)  HR: 93 (21 @ 10:10) (84 - 99)  BP: 143/99 (21 @ 10:10) (109/75 - 146/95)  RR: 18 (21 @ 10:10) (16 - 19)  SpO2: 99% (21 @ 10:10) (99% - 100%)  Wt(kg): --  I&O's Summary    2021 07:01  -  2021 07:00  --------------------------------------------------------  IN: 440 mL / OUT: 0 mL / NET: 440 mL      Daily     Daily Weight in k.3 (2021 10:10)    Appearance: Normal	  Cardiovascular: Normal S1 S2,RRR, No JVD, No murmurs  Respiratory: Lungs clear to auscultation	  Gastrointestinal:  Soft, Non-tender, + BS	  Extremities: Normal range of motion, No clubbing, cyanosis or edema      Home Medications:  aspirin 81 mg oral delayed release tablet: 1 tab(s) orally once a day (29 Dec 2020 18:37)  atorvastatin 80 mg oral tablet: 1 tab(s) orally once a day (at bedtime) (29 Dec 2020 18:37)  epoetin martine: injectable once a month (29 Dec 2020 18:37)  ferrous sulfate 325 mg (65 mg elemental iron) oral tablet: 1 tab(s) orally 3 times a day (29 Dec 2020 18:37)  folic acid 0.4 mg oral tablet: 2 tab(s) orally once a day (29 Dec 2020 18:37)  gabapentin 100 mg oral capsule: 1 cap(s) orally once a day (29 Dec 2020 18:37)  nortriptyline 25 mg oral capsule: 1 cap(s) orally once a day (at bedtime) (29 Dec 2020 18:37)  pantoprazole 40 mg oral delayed release tablet: 1 tab(s) orally once a day (before a meal) (29 Dec 2020 18:37)  sevelamer carbonate 800 mg oral tablet: 1 tab(s) orally 3 times a day (with meals) (29 Dec 2020 18:37)  Toujeo SoloStar 300 units/mL subcutaneous solution: 70 unit(s) subcutaneous once a day (at bedtime) (29 Dec 2020 18:37)      MEDICATIONS  (STANDING):  aspirin enteric coated 81 milliGRAM(s) Oral daily  atorvastatin 80 milliGRAM(s) Oral at bedtime  cadexomer iodine 0.9% Gel 1 Application(s) Topical daily  dextrose 40% Gel 15 Gram(s) Oral once  dextrose 5%. 1000 milliLiter(s) (50 mL/Hr) IV Continuous <Continuous>  dextrose 5%. 1000 milliLiter(s) (100 mL/Hr) IV Continuous <Continuous>  dextrose 50% Injectable 25 Gram(s) IV Push once  dextrose 50% Injectable 12.5 Gram(s) IV Push once  dextrose 50% Injectable 25 Gram(s) IV Push once  ferrous    sulfate 325 milliGRAM(s) Oral daily  folic acid 1 milliGRAM(s) Oral daily  glucagon  Injectable 1 milliGRAM(s) IntraMuscular once  heparin   Injectable 5000 Unit(s) SubCutaneous every 12 hours  insulin glargine Injectable (LANTUS) 5 Unit(s) SubCutaneous at bedtime  insulin lispro (ADMELOG) corrective regimen sliding scale   SubCutaneous three times a day before meals  insulin lispro (ADMELOG) corrective regimen sliding scale   SubCutaneous at bedtime  midodrine. 20 milliGRAM(s) Oral three times a day  pantoprazole    Tablet 40 milliGRAM(s) Oral before breakfast  sevelamer carbonate 800 milliGRAM(s) Oral three times a day with meals  sodium chloride 0.9%. 1000 milliLiter(s) (100 mL/Hr) IV Continuous <Continuous>  ticagrelor 90 milliGRAM(s) Oral every 12 hours      TELEMETRY: 	    ECG:  	  RADIOLOGY:   DIAGNOSTIC TESTING:  [ ] Echocardiogram:  [ ] Catheterization:  [ ] Stress Test:    OTHER: 	    LABS:	 	    CARDIAC MARKERS:  Troponin T, High Sensitivity Result: 796 ng/L ( @ 06:20)  Troponin T, High Sensitivity Result: 796 ng/L ( @ 22:35)  Troponin T, High Sensitivity Result: 812 ng/L ( @ 16:41)  Troponin T, High Sensitivity Result: 852 ng/L ( @ 14:34)                                10.2   8.46  )-----------( 280      ( 2021 06:06 )             33.3         131<L>  |  90<L>  |  45<H>  ----------------------------<  170<H>  4.5   |  24  |  13.57<H>    Ca    8.8      2021 06:48  Phos  6.4           proBNP:     Lipid Profile:   HgA1c:     Creatinine, Serum: 13.57 mg/dL (21 @ 06:48)  Creatinine, Serum: 13.23 mg/dL (21 @ 06:06)  Creatinine, Serum: 13.88 mg/dL (21 @ 06:07)

## 2021-01-03 NOTE — PROVIDER CONTACT NOTE (OTHER) - DATE AND TIME:
03-Jan-2021 05:55
29-Dec-2020 21:47
30-Dec-2020 00:10
29-Dec-2020 23:30
31-Dec-2020 20:56
29-Dec-2020 21:35

## 2021-01-03 NOTE — PROVIDER CONTACT NOTE (OTHER) - ASSESSMENT
Pt AOx4, denies any lightheadedness or dizziness. Pt sleeping in between care. 02 85-88% on RA, BP 85/62. Pt placed on 2L NC and now SpO2 100%
Pt AOx4, denies any pain SOB or distress. pt states he is "sleepy." VSS
Pt Aox4 currently, complaining of some "sleepiness." Pt denies any dizziness or lightheadedness. Pt states he hasn't had anything to eat yet in ED
Pt Aox4, sleeping in between care. Pt BP low 100s/60s. Other VSS
pt is A/O x 4. + orthostatic. VS a charted
Pt is A&OX4- VSS- pt has no c/o abd pain or discomfort. Pt tolerated PD sessions yesterday well w/ no complaints. Pt PD order:   Instill Dex 1.5% 2L @0600  10am, 2pm, 6pm exchange   10pm drain- leave pt dry overnight.
Pt is comfortable in bed

## 2021-01-03 NOTE — PROGRESS NOTE ADULT - ASSESSMENT
CATH 11/30/2020:  COMPLICATIONS: The stent was deployed without difficulty. On removal of the  balloon, the shaft broke and the tip of the balloon remained in the vessel. Several attempts were made to snare the balloon unsuccessfully. Dr. Verdugo was notifed who will take the patient to the operating room.  DIAGNOSTIC RECOMMENDATIONS: The patient should continue with the present medications. The patients vessel was stented without difficulty On removal of the balloon, the shaft broke with the baloon stuck in the left main. All attempts to snare the balloon out failed and the patient was taken to the OR by Dr. Arango to remove the balloon  introp eleonora 11/30/20: mild to mod MR, < from: Intra-Operative Transesophageal Echo (11.30.20 @ 17:02) >  Severe global left ventricular systolic dysfunction. Inferior and inferolateral akinesis.  severe hypokinesis of other segments.  Severe global hypokinesia.  Normal left ventricular internal dimensions and wall thicknesses. Moderate diastolic dysfunction (Stage II).  echo 12/17/20: EF 32%, mod MR, Severe global left ventricular systolic dysfunction, moderate pulmonary pressures  echo 12/20/20: EF (Visual Estimate): 25-30 % mild MR, Akinesis of the inferolateral, anterolateral, apical laterlal, inferior, and inferoseptal walls. Severe left ventricular enlargement. No left ventricular thrombus is seen.      a/p  55y male with esrd on PD for 5 years, dm poorly controlled, systolic CHF, CAD, s/p CABG, s/p PCI to OM c/b by failure to remove stent balloon requiring open heart surgery for removal, admitted with  hypotension,  fever and weakness    1. Ischemic Cardiomyopathy. Chronic systolic HF  -cv stable  -Repeat echo with unchanged lv sys dysfx, ef 25- 30%  -BB on hold due to orthostatic hypotension , no acei/arb for now   -continue midodrine for bp support  -EP f/u, not candidate for icd at present     2. CAD, s/p CABG, s/p PCI, s/p redo open heart for stent balloon retrieval   -CV stable  -c/w asa, Brilinta, statin     3. Weakness, R/O sepsis  -abx per id- work up per ID, bc 12/30 neg  -CT chest 12/30  noted  Small volume pneumoperitoneum. Sternal fluid collection has decreased in size    4. ESRD  -on PD  -renal f/u    5. Syncope, orthostatic hypotension   -12/30 brief LOC while being adjusted from laying to sitting position   -likely vasovagal secondary to orthostatic hypotension, no events on tele  -still remains orthostatic with sx  -cont midodrine, inc to 30mg TID if needed for further orthostatic sx  -infectious work up thus far unrevealing  -minimize volume removal with pd per renal   -trend orthostatic vitals   -continue to give volume ressucc if remains dizzy on ambulation    dvt ppx

## 2021-01-03 NOTE — PROVIDER CONTACT NOTE (OTHER) - REASON
Pt hypoglycemic previously, due for 12 units of Lantus
Pt hypotensive and hypoxic
Pt FS 65 s/p glutose gel
clarification of peritoneal Dialysis order
Pt due for PD- no order available
Pt hypoglycemic

## 2021-01-03 NOTE — PROGRESS NOTE ADULT - ASSESSMENT
54 yo male with history of ESRD, PD,CAD,DM,CABG, recent strenotomy for wire removal after a cardiac cath, admitted with fatigue, hypotension, and failure to thrive.  No fever or chills, similar admission 2 weeks ago ended without a specific answer.  His cortisol level was normal, negative blood and PD cultures, and prior chest CT with no PE just a small retrosternal fluid collection.  Certainly no overt evidence of a surgical site infection.No clear support for infection  s/p limited course of cefepime  All blood cultures have been negative  Chest CT negative  He  is now experiencing hiccups and weakness  Mild leukocytosis has resolved  Suggest:  1.monitor off antibiotics  2.PD per renal  3.Medical management per IM and cardiology  4.supportive care, at this point I do not see evidence of an infectious process, ? explanation for hiccups

## 2021-01-03 NOTE — PROGRESS NOTE ADULT - SUBJECTIVE AND OBJECTIVE BOX
CC: f/u for hypotension    Patient reports: he c/o hiccups x 2 days.He still c/o weakness    REVIEW OF SYSTEMS:  All other review of systems negative (Comprehensive ROS)    Antimicrobials Day #  :off    Other Medications Reviewed    T(F): 98.1 (01-03-21 @ 06:05), Max: 99 (01-03-21 @ 01:00)  HR: 94 (01-03-21 @ 06:05)  BP: 109/75 (01-03-21 @ 06:05)  RR: 18 (01-03-21 @ 06:05)  SpO2: 100% (01-03-21 @ 06:05)  Wt(kg): --    PHYSICAL EXAM:  General: alert, no acute distress  Eyes:  anicteric, no conjunctival injection, no discharge  Oropharynx: no lesions or injection 	  Neck: supple, without adenopathy  Lungs: clear to auscultation  Heart: regular rate and rhythm; no murmur, rubs or gallops  Abdomen: soft, nondistended, nontender, PD catheter  Skin: no lesions, chest incision C/D/I  Extremities: no clubbing, cyanosis, or edema  Neurologic: alert, oriented, moves all extremities    LAB RESULTS:                        10.2   8.46  )-----------( 280      ( 03 Jan 2021 06:06 )             33.3     01-02    130<L>  |  91<L>  |  46<H>  ----------------------------<  84  3.8   |  22  |  13.23<H>    Ca    8.2<L>      02 Jan 2021 06:06          MICROBIOLOGY:  RECENT CULTURES:  12-30 @ 01:05 .Blood Blood     No growth to date.      12-29 @ 21:59 .Blood Blood-Peripheral     No growth to date.          RADIOLOGY REVIEWED:    < from: Xray Abdomen 1 View PORTABLE -Routine (Xray Abdomen 1 View PORTABLE -Routine .) (01.01.21 @ 19:03) >  IMPRESSION:  Dialysis catheter. No evidence of bowel obstruction.    Thank you for this referral.    < end of copied text >   (4) walks frequently

## 2021-01-03 NOTE — PROGRESS NOTE ADULT - SUBJECTIVE AND OBJECTIVE BOX
Patient is a 55y old  Male who presents with a chief complaint of hypotension (03 Jan 2021 06:43)      SUBJECTIVE / OVERNIGHT EVENTS: feels better  Review of Systems  chest pain no  palpitations no  sob no  nausea no  headache no    MEDICATIONS  (STANDING):  aspirin enteric coated 81 milliGRAM(s) Oral daily  atorvastatin 80 milliGRAM(s) Oral at bedtime  cadexomer iodine 0.9% Gel 1 Application(s) Topical daily  dextrose 40% Gel 15 Gram(s) Oral once  dextrose 5%. 1000 milliLiter(s) (50 mL/Hr) IV Continuous <Continuous>  dextrose 5%. 1000 milliLiter(s) (100 mL/Hr) IV Continuous <Continuous>  dextrose 50% Injectable 25 Gram(s) IV Push once  dextrose 50% Injectable 12.5 Gram(s) IV Push once  dextrose 50% Injectable 25 Gram(s) IV Push once  ferrous    sulfate 325 milliGRAM(s) Oral daily  folic acid 1 milliGRAM(s) Oral daily  glucagon  Injectable 1 milliGRAM(s) IntraMuscular once  heparin   Injectable 5000 Unit(s) SubCutaneous every 12 hours  insulin glargine Injectable (LANTUS) 5 Unit(s) SubCutaneous at bedtime  insulin lispro (ADMELOG) corrective regimen sliding scale   SubCutaneous three times a day before meals  insulin lispro (ADMELOG) corrective regimen sliding scale   SubCutaneous at bedtime  midodrine. 20 milliGRAM(s) Oral three times a day  pantoprazole    Tablet 40 milliGRAM(s) Oral before breakfast  sevelamer carbonate 800 milliGRAM(s) Oral three times a day with meals  sodium chloride 0.9%. 1000 milliLiter(s) (100 mL/Hr) IV Continuous <Continuous>  ticagrelor 90 milliGRAM(s) Oral every 12 hours    MEDICATIONS  (PRN):      Vital Signs Last 24 Hrs  T(C): 36.7 (03 Jan 2021 14:30), Max: 37.2 (03 Jan 2021 01:00)  T(F): 98.1 (03 Jan 2021 14:30), Max: 99 (03 Jan 2021 01:00)  HR: 99 (03 Jan 2021 14:30) (93 - 106)  BP: 145/97 (03 Jan 2021 14:30) (109/75 - 146/95)  BP(mean): --  RR: 18 (03 Jan 2021 14:30) (18 - 19)  SpO2: 100% (03 Jan 2021 14:30) (98% - 100%)    PHYSICAL EXAM:  GENERAL: NAD  HEAD:  Atraumatic, Normocephalic  EYES: EOMI, PERRLA, conjunctiva and sclera clear  NECK: Supple, No JVD  CHEST/LUNG: Clear to auscultation bilaterally; No wheeze  HEART: Regular rate and rhythm; No murmurs, rubs, or gallops  ABDOMEN: Soft, Nontender, Nondistended; Bowel sounds present PD catheter  EXTREMITIES:  2+ Peripheral Pulses, No clubbing, cyanosis, or edema  PSYCH: AAOx3  NEUROLOGY: non-focal  SKIN: No rashes or lesions    LABS:                        10.2   8.46  )-----------( 280      ( 03 Jan 2021 06:06 )             33.3     01-03    131<L>  |  90<L>  |  45<H>  ----------------------------<  170<H>  4.5   |  24  |  13.57<H>    Ca    8.8      03 Jan 2021 06:48  Phos  6.4     01-03                  RADIOLOGY & ADDITIONAL TESTS:    Imaging Personally Reviewed:    Consultant(s) Notes Reviewed:      Care Discussed with Consultants/Other Providers:

## 2021-01-03 NOTE — PROVIDER CONTACT NOTE (OTHER) - BACKGROUND
Pt admitted with LE weakness/hypotension. Pt with hx of DM on PD.
Pt admitted with hypotension/weakness. Hx of CABG
Hyptension, DM, CAD, HTN
Pt admitted with weakness/hypotension
pt admitted for hypotension. hx of DM, CAD, esrd on pd
Pt BIBEMS for generalized weakness and hypotension x 1 week. Found to be hypotensive to SBPs in 80s. Pt has pmh of ESRD on PD, DM2, CAD s/p CABGx4 and unsuccessful cardiac cath during previous adm
Pt admitted for hypotension/weakness. Hx of CABG

## 2021-01-03 NOTE — PROVIDER CONTACT NOTE (OTHER) - ACTION/TREATMENT ORDERED:
As per asim, Samia AGUILLON- OK to begin PD according to previous orders as listed above. Will endorse to day NP to enter order as discussed. As per Samia Dangelo MD- OK to begin PD according to previous orders as listed above. Will endorse to day NP to enter order as discussed.

## 2021-01-03 NOTE — PROGRESS NOTE ADULT - ASSESSMENT
Seen and examined s/p 1 PD exchange earlier today, next exchanges due   Denies complaints  PD orders in    aspirin enteric coated 81 milliGRAM(s) Oral daily  atorvastatin 80 milliGRAM(s) Oral at bedtime  cadexomer iodine 0.9% Gel 1 Application(s) Topical daily  dextrose 40% Gel 15 Gram(s) Oral once  dextrose 5%. 1000 milliLiter(s) IV Continuous <Continuous>  dextrose 5%. 1000 milliLiter(s) IV Continuous <Continuous>  dextrose 50% Injectable 25 Gram(s) IV Push once  dextrose 50% Injectable 12.5 Gram(s) IV Push once  dextrose 50% Injectable 25 Gram(s) IV Push once  ferrous    sulfate 325 milliGRAM(s) Oral daily  folic acid 1 milliGRAM(s) Oral daily  glucagon  Injectable 1 milliGRAM(s) IntraMuscular once  heparin   Injectable 5000 Unit(s) SubCutaneous every 12 hours  insulin glargine Injectable (LANTUS) 5 Unit(s) SubCutaneous at bedtime  insulin lispro (ADMELOG) corrective regimen sliding scale   SubCutaneous three times a day before meals  insulin lispro (ADMELOG) corrective regimen sliding scale   SubCutaneous at bedtime  midodrine. 20 milliGRAM(s) Oral three times a day  pantoprazole    Tablet 40 milliGRAM(s) Oral before breakfast  sevelamer carbonate 800 milliGRAM(s) Oral three times a day with meals  sodium chloride 0.9%. 1000 milliLiter(s) IV Continuous <Continuous>  ticagrelor 90 milliGRAM(s) Oral every 12 hours      VITAL:  T(C): , Max: 37.2 (01-02-21 @ 15:15)  T(F): , Max: 99 (01-03-21 @ 01:00)  HR: 106 (01-03-21 @ 13:50)  BP: 145/98 (01-03-21 @ 13:50)  BP(mean): --  RR: 18 (01-03-21 @ 13:50)  SpO2: 98% (01-03-21 @ 13:50)  Wt(kg): --    01-02-21 @ 07:01  -  01-03-21 @ 07:00  --------------------------------------------------------  IN: 440 mL / OUT: 0 mL / NET: 440 mL        PHYSICAL EXAM:  Constitutional: NAD  Neck:  No JVD  Respiratory: CTAB/L  Cardiovascular: S1 and S2  Gastrointestinal: BS+, soft, NT/ND + PD catheter   Extremities: No peripheral edema  Neurological: A/O x 3, no focal deficits  Psychiatric: Normal mood, normal affect  : No Johnson  Skin: No rashes  Access: PD catheter  LABS:                          10.2   8.46  )-----------( 280      ( 03 Jan 2021 06:06 )             33.3     Na(131)/K(4.5)/Cl(90)/HCO3(24)/BUN(45)/Cr(13.57)Glu(170)/Ca(8.8)/Mg(--)/PO4(6.4)    01-03 @ 06:48  Na(130)/K(3.8)/Cl(91)/HCO3(22)/BUN(46)/Cr(13.23)Glu(84)/Ca(8.2)/Mg(--)/PO4(--)    01-02 @ 06:06  Na(133)/K(4.1)/Cl(91)/HCO3(23)/BUN(47)/Cr(13.88)Glu(72)/Ca(8.4)/Mg(2.1)/PO4(6.6)    01-01 @ 06:07    Imaging    EXAM:  XR ABDOMEN PORTABLE ROUTINE 1V                            PROCEDURE DATE:  01/01/2021      INTERPRETATION:  Abdomen one view    HISTORY: Peritoneal dialysis catheter    Frontal view of the abdomen shows a normal gas pattern. Peritonealdialysis projects over the lower abdomen.    There is no evidence of organomegaly. Multiple small renal calculi are seen bilaterally. No definite free air is identified.    IMPRESSION:  Dialysis catheter. No evidence of bowel obstruction.    Thank you for this referral.          ASSESSMENT/PLAN  55M PMH ESRD on PD, DM, CAD s/p CABG x4, underwent cardiac cath and stent and wire broke in heart s/p sternotomy during recent admission 11/30-12/11, recently discharged 1 wk ago for dizziness and transient fever, was treated for culture negative sepsis, BIBEMS for generalized weakness and hypotension x 1 wk.   No stitmata to suggest peritonitis     1 Renal-  On  PD     2 CVS-  Midodrine 20mg po tid;  Check orthostasis as ordered    3 Hyperphosphatemia- phos trending down but still elevated; continue Renvela as ordered and draw phos with AM labs , advised to limit dairy products      [place] Wayne, NP-BC  [employer]  [phone]

## 2021-01-04 NOTE — DIETITIAN INITIAL EVALUATION ADULT. - PERTINENT MEDS FT
MEDICATIONS  (STANDING):  aspirin enteric coated 81 milliGRAM(s) Oral daily  atorvastatin 80 milliGRAM(s) Oral at bedtime  cadexomer iodine 0.9% Gel 1 Application(s) Topical daily  dextrose 40% Gel 15 Gram(s) Oral once  dextrose 5%. 1000 milliLiter(s) (50 mL/Hr) IV Continuous <Continuous>  dextrose 5%. 1000 milliLiter(s) (100 mL/Hr) IV Continuous <Continuous>  dextrose 50% Injectable 25 Gram(s) IV Push once  dextrose 50% Injectable 12.5 Gram(s) IV Push once  dextrose 50% Injectable 25 Gram(s) IV Push once  ferrous    sulfate 325 milliGRAM(s) Oral daily  folic acid 1 milliGRAM(s) Oral daily  gentamicin 0.1% Ointment 1 Application(s) Topical <User Schedule>  glucagon  Injectable 1 milliGRAM(s) IntraMuscular once  heparin   Injectable 5000 Unit(s) SubCutaneous every 12 hours  insulin glargine Injectable (LANTUS) 5 Unit(s) SubCutaneous at bedtime  insulin lispro (ADMELOG) corrective regimen sliding scale   SubCutaneous three times a day before meals  insulin lispro (ADMELOG) corrective regimen sliding scale   SubCutaneous at bedtime  midodrine. 20 milliGRAM(s) Oral three times a day  pantoprazole    Tablet 40 milliGRAM(s) Oral before breakfast  sevelamer carbonate 800 milliGRAM(s) Oral three times a day with meals  sodium chloride 0.9%. 1000 milliLiter(s) (100 mL/Hr) IV Continuous <Continuous>  sodium chloride 0.9%. 1000 milliLiter(s) (50 mL/Hr) IV Continuous <Continuous>  ticagrelor 90 milliGRAM(s) Oral every 12 hours

## 2021-01-04 NOTE — PROGRESS NOTE ADULT - SUBJECTIVE AND OBJECTIVE BOX
Patient is a 55y old  Male who presents with a chief complaint of hypotension (04 Jan 2021 15:32)      SUBJECTIVE / OVERNIGHT EVENTS: c/o hiccups  Review of Systems  chest pain no  palpitations no  sob no  nausea no  headache no    MEDICATIONS  (STANDING):  aspirin enteric coated 81 milliGRAM(s) Oral daily  atorvastatin 80 milliGRAM(s) Oral at bedtime  cadexomer iodine 0.9% Gel 1 Application(s) Topical daily  chlorproMAZINE    Tablet 12.5 milliGRAM(s) Oral once  dextrose 40% Gel 15 Gram(s) Oral once  dextrose 5%. 1000 milliLiter(s) (50 mL/Hr) IV Continuous <Continuous>  dextrose 5%. 1000 milliLiter(s) (100 mL/Hr) IV Continuous <Continuous>  dextrose 50% Injectable 25 Gram(s) IV Push once  dextrose 50% Injectable 12.5 Gram(s) IV Push once  dextrose 50% Injectable 25 Gram(s) IV Push once  ferrous    sulfate 325 milliGRAM(s) Oral daily  folic acid 1 milliGRAM(s) Oral daily  gentamicin 0.1% Ointment 1 Application(s) Topical <User Schedule>  glucagon  Injectable 1 milliGRAM(s) IntraMuscular once  heparin   Injectable 5000 Unit(s) SubCutaneous every 12 hours  insulin glargine Injectable (LANTUS) 7 Unit(s) SubCutaneous at bedtime  insulin lispro (ADMELOG) corrective regimen sliding scale   SubCutaneous three times a day before meals  insulin lispro (ADMELOG) corrective regimen sliding scale   SubCutaneous at bedtime  insulin lispro Injectable (ADMELOG) 2 Unit(s) SubCutaneous three times a day with meals  midodrine. 20 milliGRAM(s) Oral three times a day  pantoprazole    Tablet 40 milliGRAM(s) Oral before breakfast  sevelamer carbonate 800 milliGRAM(s) Oral three times a day with meals  sodium chloride 0.9%. 1000 milliLiter(s) (100 mL/Hr) IV Continuous <Continuous>  sodium chloride 0.9%. 1000 milliLiter(s) (50 mL/Hr) IV Continuous <Continuous>  ticagrelor 90 milliGRAM(s) Oral every 12 hours    MEDICATIONS  (PRN):      Vital Signs Last 24 Hrs  T(C): 36.8 (04 Jan 2021 18:00), Max: 37.1 (04 Jan 2021 01:21)  T(F): 98.2 (04 Jan 2021 18:00), Max: 98.8 (04 Jan 2021 01:21)  HR: 95 (04 Jan 2021 18:00) (83 - 100)  BP: 156/84 (04 Jan 2021 18:00) (118/81 - 156/84)  BP(mean): --  RR: 18 (04 Jan 2021 18:00) (18 - 18)  SpO2: 100% (04 Jan 2021 18:00) (98% - 100%)    PHYSICAL EXAM:  GENERAL: NAD  HEAD:  Atraumatic, Normocephalic  EYES: EOMI, PERRLA, conjunctiva and sclera clear  NECK: Supple, No JVD  CHEST/LUNG: Clear to auscultation bilaterally; No wheeze  HEART: Regular rate and rhythm; No murmurs, rubs, or gallops  ABDOMEN: Soft, Nontender, Nondistended; Bowel sounds present PD in place  EXTREMITIES:  2+ Peripheral Pulses, No clubbing, cyanosis, or edema  NEUROLOGY: non-focal  SKIN: No rashes or lesions    LABS:                        10.2   10.44 )-----------( 279      ( 04 Jan 2021 07:02 )             32.0     01-04    132<L>  |  91<L>  |  42<H>  ----------------------------<  173<H>  3.8   |  25  |  12.98<H>    Ca    8.8      04 Jan 2021 07:00  Phos  6.2     01-04  Mg     2.0     01-04                  RADIOLOGY & ADDITIONAL TESTS:    Imaging Personally Reviewed:    Consultant(s) Notes Reviewed:      Care Discussed with Consultants/Other Providers:

## 2021-01-04 NOTE — DIETITIAN INITIAL EVALUATION ADULT. - PERTINENT LABORATORY DATA
01-04 Na 132; BUN 42; Cr 12.98 Glucose 173  HbA1c 7.8% (12/1)  CAPILLARY BLOOD GLUCOSE  POCT Blood Glucose.: 238 mg/dL (04 Jan 2021 13:35)  POCT Blood Glucose.: 190 mg/dL (04 Jan 2021 09:07)  POCT Blood Glucose.: 200 mg/dL (04 Jan 2021 07:47)  POCT Blood Glucose.: 208 mg/dL (03 Jan 2021 21:51)  POCT Blood Glucose.: 259 mg/dL (03 Jan 2021 18:00)

## 2021-01-04 NOTE — PROGRESS NOTE ADULT - PROBLEM SELECTOR PLAN 1
-test BG AC/HS/2AM  -Change  Lantus dose to 7 units QHS to imorove overall control  -Add Admelog 2 w/meals.   -C/w low dose Admelog correctional premeal and qhs for now  Discharge:   -Recommend follow up with outpt Endo on Discharge. Dr Villegas in [place]  Touo insulin if pt c/w overnight PD with 2.5 dialysate. Dose TBD  -C/w Victoza 1.2mg daily   -Plan discussed with pt/team. Please f/u ilancoughs complaint  Contact info: [phone] (24/7). pager [phone].

## 2021-01-04 NOTE — DIETITIAN INITIAL EVALUATION ADULT. - CHIEF COMPLAINT
The patient is a "55M PMH ESRD on PD, DM, CAD s/p CABG x4, underwent cardiac cath and stent and wire broke in heart s/p sternotomy during recent admission 11/30-12/11, recently discharged 1 wk ago for dizziness and transient fever, was treated for culture negative sepsis, BIBEMS for generalized weakness and hypotension x 1 wk."

## 2021-01-04 NOTE — PROGRESS NOTE ADULT - SUBJECTIVE AND OBJECTIVE BOX
CARDIOLOGY FOLLOW UP - Dr. Best    CC: denies cp, sob, and palpitations       PHYSICAL EXAM:  T(C): 36.4 (01-04-21 @ 10:00), Max: 37.1 (01-04-21 @ 01:21)  HR: 83 (01-04-21 @ 10:00) (83 - 106)  BP: 143/85 (01-04-21 @ 10:00) (125/82 - 147/90)  RR: 18 (01-04-21 @ 10:00) (18 - 18)  SpO2: 100% (01-04-21 @ 10:00) (98% - 100%)  Wt(kg): --  I&O's Summary    03 Jan 2021 07:01  -  04 Jan 2021 07:00  --------------------------------------------------------  IN: 650 mL / OUT: 0 mL / NET: 650 mL        Appearance: Normal	  Cardiovascular: Normal S1 S2,RRR, No JVD, No murmurs  Respiratory: Lungs clear to auscultation	  Gastrointestinal:  Soft, Non-tender, + BS	  Extremities: Normal range of motion, No clubbing, cyanosis, + bl le edema      Home Medications:  aspirin 81 mg oral delayed release tablet: 1 tab(s) orally once a day (29 Dec 2020 18:37)  atorvastatin 80 mg oral tablet: 1 tab(s) orally once a day (at bedtime) (29 Dec 2020 18:37)  epoetin martine: injectable once a month (29 Dec 2020 18:37)  ferrous sulfate 325 mg (65 mg elemental iron) oral tablet: 1 tab(s) orally 3 times a day (29 Dec 2020 18:37)  folic acid 0.4 mg oral tablet: 2 tab(s) orally once a day (29 Dec 2020 18:37)  gabapentin 100 mg oral capsule: 1 cap(s) orally once a day (29 Dec 2020 18:37)  nortriptyline 25 mg oral capsule: 1 cap(s) orally once a day (at bedtime) (29 Dec 2020 18:37)  pantoprazole 40 mg oral delayed release tablet: 1 tab(s) orally once a day (before a meal) (29 Dec 2020 18:37)  sevelamer carbonate 800 mg oral tablet: 1 tab(s) orally 3 times a day (with meals) (29 Dec 2020 18:37)  Toujeo SoloStar 300 units/mL subcutaneous solution: 70 unit(s) subcutaneous once a day (at bedtime) (29 Dec 2020 18:37)      MEDICATIONS  (STANDING):  aspirin enteric coated 81 milliGRAM(s) Oral daily  atorvastatin 80 milliGRAM(s) Oral at bedtime  cadexomer iodine 0.9% Gel 1 Application(s) Topical daily  dextrose 40% Gel 15 Gram(s) Oral once  dextrose 5%. 1000 milliLiter(s) (50 mL/Hr) IV Continuous <Continuous>  dextrose 5%. 1000 milliLiter(s) (100 mL/Hr) IV Continuous <Continuous>  dextrose 50% Injectable 25 Gram(s) IV Push once  dextrose 50% Injectable 12.5 Gram(s) IV Push once  dextrose 50% Injectable 25 Gram(s) IV Push once  ferrous    sulfate 325 milliGRAM(s) Oral daily  folic acid 1 milliGRAM(s) Oral daily  gentamicin 0.1% Ointment 1 Application(s) Topical <User Schedule>  glucagon  Injectable 1 milliGRAM(s) IntraMuscular once  heparin   Injectable 5000 Unit(s) SubCutaneous every 12 hours  insulin glargine Injectable (LANTUS) 5 Unit(s) SubCutaneous at bedtime  insulin lispro (ADMELOG) corrective regimen sliding scale   SubCutaneous three times a day before meals  insulin lispro (ADMELOG) corrective regimen sliding scale   SubCutaneous at bedtime  midodrine. 20 milliGRAM(s) Oral three times a day  pantoprazole    Tablet 40 milliGRAM(s) Oral before breakfast  sevelamer carbonate 800 milliGRAM(s) Oral three times a day with meals  sodium chloride 0.9%. 1000 milliLiter(s) (50 mL/Hr) IV Continuous <Continuous>  sodium chloride 0.9%. 1000 milliLiter(s) (100 mL/Hr) IV Continuous <Continuous>  ticagrelor 90 milliGRAM(s) Oral every 12 hours      TELEMETRY:  	    ECG:  	  RADIOLOGY:   DIAGNOSTIC TESTING:  [ ] Echocardiogram:  [ ]  Catheterization:  [ ] Stress Test:    OTHER: 	    LABS:	 	    Creatine Kinase, Serum: 48 U/L [30 - 200] (12-30 @ 06:20)  CKMB Units: 3.4 ng/mL [0.0 - 6.7] (12-30 @ 06:20)  Troponin T, High Sensitivity Result: 796 ng/L [0 - 51] (12-30 @ 06:20)  Creatine Kinase, Serum: 50 U/L [30 - 200] (12-29 @ 22:35)  CKMB Units: 3.3 ng/mL [0.0 - 6.7] (12-29 @ 22:35)  Troponin T, High Sensitivity Result: 796 ng/L [0 - 51] (12-29 @ 22:35)  Troponin T, High Sensitivity Result: 812 ng/L [0 - 51] (12-29 @ 16:41)  Troponin T, High Sensitivity Result: 852 ng/L [0 - 51] (12-29 @ 14:34)                          10.2   10.44 )-----------( 279      ( 04 Jan 2021 07:02 )             32.0     01-04    132<L>  |  91<L>  |  42<H>  ----------------------------<  173<H>  3.8   |  25  |  12.98<H>    Ca    8.8      04 Jan 2021 07:00  Phos  6.2     01-04  Mg     2.0     01-04

## 2021-01-04 NOTE — PROGRESS NOTE ADULT - SUBJECTIVE AND OBJECTIVE BOX
[facility] [phone]        Patient seen and examined         MEDICATIONS  (STANDING):  aspirin enteric coated 81 milliGRAM(s) Oral daily  atorvastatin 80 milliGRAM(s) Oral at bedtime  cadexomer iodine 0.9% Gel 1 Application(s) Topical daily  dextrose 40% Gel 15 Gram(s) Oral once  dextrose 5%. 1000 milliLiter(s) (50 mL/Hr) IV Continuous <Continuous>  dextrose 5%. 1000 milliLiter(s) (100 mL/Hr) IV Continuous <Continuous>  dextrose 50% Injectable 25 Gram(s) IV Push once  dextrose 50% Injectable 12.5 Gram(s) IV Push once  dextrose 50% Injectable 25 Gram(s) IV Push once  ferrous    sulfate 325 milliGRAM(s) Oral daily  folic acid 1 milliGRAM(s) Oral daily  gentamicin 0.1% Ointment 1 Application(s) Topical <User Schedule>  glucagon  Injectable 1 milliGRAM(s) IntraMuscular once  heparin   Injectable 5000 Unit(s) SubCutaneous every 12 hours  insulin glargine Injectable (LANTUS) 5 Unit(s) SubCutaneous at bedtime  insulin lispro (ADMELOG) corrective regimen sliding scale   SubCutaneous three times a day before meals  insulin lispro (ADMELOG) corrective regimen sliding scale   SubCutaneous at bedtime  midodrine. 20 milliGRAM(s) Oral three times a day  pantoprazole    Tablet 40 milliGRAM(s) Oral before breakfast  sevelamer carbonate 800 milliGRAM(s) Oral three times a day with meals  sodium chloride 0.9%. 1000 milliLiter(s) (100 mL/Hr) IV Continuous <Continuous>  ticagrelor 90 milliGRAM(s) Oral every 12 hours      VITAL:  T(C): , Max: 37.1 (01-04-21 @ 01:21)  T(F): , Max: 98.8 (01-04-21 @ 01:21)  HR: 99 (01-04-21 @ 06:00)  BP: 135/89 (01-04-21 @ 06:00)  BP(mean): --  RR: 18 (01-04-21 @ 06:00)  SpO2: 100% (01-04-21 @ 06:00)  Wt(kg): --    I and O's:    01-03 @ 07:01  -  01-04 @ 07:00  --------------------------------------------------------  IN: 650 mL / OUT: 0 mL / NET: 650 mL          PHYSICAL EXAM:    Constitutional: NAD  Neck:  No JVD  Respiratory: CTAB/L  Cardiovascular: S1 and S2  Gastrointestinal: BS+, soft, NT/ND  Extremities: No peripheral edema  Neurological: A/O x 3, no focal deficits  Psychiatric: Normal mood, normal affect  : No Johnson  Skin: No rashes  Access: Not applicable    LABS:                        10.2   10.44 )-----------( 279      ( 04 Jan 2021 07:02 )             32.0     01-04    132<L>  |  91<L>  |  42<H>  ----------------------------<  173<H>  3.8   |  25  |  12.98<H>    Ca    8.8      04 Jan 2021 07:00  Phos  6.2     01-04  Mg     2.0     01-04            Urine Studies:          RADIOLOGY & ADDITIONAL STUDIES:

## 2021-01-04 NOTE — PROGRESS NOTE ADULT - ASSESSMENT
55 m with    Hiccups  - CT abdomen  - GI evaluation  - small dose Thorazine    Hypotension  - s/p gentle IVF   - Midodrine  - telemetry  - Cardiology follow     r/o Sepsis  - BC results noted  - off empiric antibiotics  - ID follow    Hx of small sternal collection decreased     Foot wound  - local care  - Podiatry follow    Diabetes Mellitus   - BS control  - ADA diet   - Endocrine follow     CAD  - continue Rx    CHF cr systolic  - cardiology follow    Depression  - continue Rx    ESRD  - PD  - Nephrology follow    DVT prophylaxis     PT    DVT prophylaxis    DCP in progress.    Pipe Beck MD pager [phone]

## 2021-01-04 NOTE — PROGRESS NOTE ADULT - ASSESSMENT
55M PMH ESRD on PD, DM, CAD s/p CABG x4, underwent cardiac cath and stent and wire broke in heart s/p sternotomy during recent admission 11/30-12/11, recently discharged 1 wk ago for dizziness and transient fever, was treated for culture negative sepsis, BIBEMS for generalized weakness and hypotension x 1 wk.   No stitmata to suggest peritonitis     1 Renal-  On  PD at present;  The PD fluid is not leading to dehydration  Another run of IVF today and assess orthostasis is am     2 CVS-  Midodrine 20mg po tid;  Was still orthostatic yesterday     3 Hyperphosphatemia- phos trending down but still elevated; continue Renvela as ordered and draw phos with AM labs , advised to limit dairy products      Sayed [employer]  [phone]

## 2021-01-04 NOTE — PROGRESS NOTE ADULT - SUBJECTIVE AND OBJECTIVE BOX
DIABETES FOLLOW UP NOTE: Saw pt earlier today  INTERVAL HX:56 yo M w/h/o uncontrolled T2DM on Toujeo 60 units at hs prior PD and Victoza daily PTA. Pt reports his BG overnight were in low 200s while on Toujeo 60 units. DM c/b CAD, s/p CABG x 4,  ESRD on PD, DM retinopathy, diabetic neuropathy with PVD. Also h/o HTN, HLD and recent admission with SOB , dizziness and mild chest pain with exertion> s/p HC with stent placement on OM with wire fractured when trying to remove balloon requiring OR for removal of the angioplasty balloon and fractured wire via transverse aortotomy. Recent admission with fever/dizziness > infectious work up negative, and now presenting with hypotension and LE weakness.  Endocrine consulted fro DM management and hyperglycemia. Getting PD exchanges q6h during this admission. Endocrine consult requested for management of DM2. Pt reports having severe and persistently  hiccoughs so not able to eat/sleep well. BG levels high 100s to 200s while on present insulin regimen. No hypoglycemia.      Review of Systems:  General: As above  Cardiovascular: No chest pain, palpitations  Respiratory: No SOB, no cough  GI: No nausea, vomiting, abdominal pain  Endocrine: no polyuria, polydipsia or S&Sx of hypoglycemia    Allergies    doxazosin (Other)    Intolerances      MEDICATIONS:  atorvastatin 80 milliGRAM(s) Oral at bedtime  insulin glargine Injectable (LANTUS) 5 Unit(s) SubCutaneous at bedtime  insulin lispro (ADMELOG) corrective regimen sliding scale   SubCutaneous three times a day before meals  insulin lispro (ADMELOG) corrective regimen sliding scale   SubCutaneous at bedtime    PHYSICAL EXAM:  VITALS: T(C): 36.4 (01-04-21 @ 10:00)  T(F): 97.5 (01-04-21 @ 10:00), Max: 98.8 (01-04-21 @ 01:21)  HR: 83 (01-04-21 @ 10:00) (83 - 100)  BP: 143/85 (01-04-21 @ 10:00) (125/82 - 147/90)  RR:  (18 - 18)  SpO2:  (98% - 100%)  Wt(kg): --  GENERAL: Male laying in bed in NAD  Abdomen: Soft, nontender, non distended. PD cath D&I  Extremities: Warm, NO edema in LEs, + chronic vascular changes in LEs noted darker discoloration and skin dryness.    NEURO: A&O X3    LABS:  POCT Blood Glucose.: 238 mg/dL (01-04-21 @ 13:35)  POCT Blood Glucose.: 190 mg/dL (01-04-21 @ 09:07)  POCT Blood Glucose.: 200 mg/dL (01-04-21 @ 07:47)  POCT Blood Glucose.: 208 mg/dL (01-03-21 @ 21:51)  POCT Blood Glucose.: 259 mg/dL (01-03-21 @ 18:00)  POCT Blood Glucose.: 213 mg/dL (01-03-21 @ 13:17)  POCT Blood Glucose.: 202 mg/dL (01-03-21 @ 08:40)  POCT Blood Glucose.: 203 mg/dL (01-02-21 @ 21:22)  POCT Blood Glucose.: 172 mg/dL (01-02-21 @ 20:34)  POCT Blood Glucose.: 179 mg/dL (01-02-21 @ 17:49)  POCT Blood Glucose.: 152 mg/dL (01-02-21 @ 13:32)  POCT Blood Glucose.: 112 mg/dL (01-02-21 @ 08:17)  POCT Blood Glucose.: 154 mg/dL (01-01-21 @ 22:32)  POCT Blood Glucose.: 114 mg/dL (01-01-21 @ 19:11)                            10.2   10.44 )-----------( 279      ( 04 Jan 2021 07:02 )             32.0       01-04    132<L>  |  91<L>  |  42<H>  ----------------------------<  173<H>  3.8   |  25  |  12.98<H>      EGFR if non : 4<L>    Ca    8.8      01-04  Mg     2.0     01-04  Phos  6.2     01-04    A1C with Estimated Average Glucose Result: 7.8 % (12-01-20 @ 03:13)      Estimated Average Glucose: 177 mg/dL (12-01-20 @ 03:13)

## 2021-01-04 NOTE — PROGRESS NOTE ADULT - ASSESSMENT
56 yo M w/h/o uncontrolled T2DM on Toujeo 60 units at hs prior PD and Victoza daily PTA. Pt reports his BG overnight were in low 200s while on Toujeo 60 units. DM c/b CAD, s/p CABG x 4,  ESRD on PD, DM retinopathy, diabetic neuropathy with PVD. Also h/o HTN, HLD and recent admission with SOB , dizziness and mild chest pain with exertion> s/p HC with stent placement on OM with wire fractured when trying to remove balloon requiring OR for removal of the angioplasty balloon and fractured wire via transverse aortotomy. Recent admission with fever/dizziness > infectious work up negative, and now presenting with hypotension and LE weakness.  Endocrine consulted fro DM management and hyperglycemia. Getting PD exchanges q6h during this admission. Endocrine consult requested for management of DM2. Pt reports having severe and persistently  hiccoughs so not able to eat/sleep well. BG levels high 100s to 200s while on present insulin regimen. No hypoglycemia. Will continue to increase insulin doses to BG 100s to 180s while in hospital.    Spent > 25 minutes assessing pt/labs/meds and discussing plan of care with primary team. Moderate complexity encounter on pt with uncontrolled T2DM and multiple DM complications and comorbidities. Adjusting insulin

## 2021-01-04 NOTE — DIETITIAN INITIAL EVALUATION ADULT. - ADD RECOMMEND
Consider Nephro-kristi if medically feasible. RD to monitor acceptance for diet education as feasible, remains available PRN. Provide PO encouragement with emphasis on protein foods. Continue to monitor PO intake, labs, weights, BM, skin, clinical course.

## 2021-01-04 NOTE — DIETITIAN INITIAL EVALUATION ADULT. - OTHER INFO
Pt reports  lbs, denies any recent weight changes. Pt with fluctuating weights in house likely 2/2 fluid shifts associated with PD between 159-170 lbs. Most recent weight today of 160.4 lbs. Per previous RD note, pt with weight of 167.9 lbs 12/20.    Pt with HbA1c 7.8% indicating poor glycemic control. Pt on Victoza and Toujeo at home for DM management. Pt reports checking FS 2 x daily.    Pt denies any N/V, last BM 1/3. Pt reports good appetite but is not consuming in house meals, pt receiving food from outside facility, states he will not eat institutional foods. Noted fast-food restaurant bags at bedside, RD unable to see exact food items being consumed. Discussed importance of adhering to recommended diet, discussed phosphorus levels and need for restriction, reviewed concentrated phosphorus food items. Pt with limited interest in RD visit, declining education.

## 2021-01-04 NOTE — PROGRESS NOTE ADULT - ASSESSMENT
CATH 11/30/2020:  COMPLICATIONS: The stent was deployed without difficulty. On removal of the  balloon, the shaft broke and the tip of the balloon remained in the vessel. Several attempts were made to snare the balloon unsuccessfully. Dr. Verdugo was notifed who will take the patient to the operating room.  DIAGNOSTIC RECOMMENDATIONS: The patient should continue with the present medications. The patients vessel was stented without difficulty On removal of the balloon, the shaft broke with the baloon stuck in the left main. All attempts to snare the balloon out failed and the patient was taken to the OR by Dr. Arango to remove the balloon  introp eleonora 11/30/20: mild to mod MR, < from: Intra-Operative Transesophageal Echo (11.30.20 @ 17:02) >  Severe global left ventricular systolic dysfunction. Inferior and inferolateral akinesis.  severe hypokinesis of other segments.  Severe global hypokinesia.  Normal left ventricular internal dimensions and wall thicknesses. Moderate diastolic dysfunction (Stage II).  echo 12/17/20: EF 32%, mod MR, Severe global left ventricular systolic dysfunction, moderate pulmonary pressures  echo 12/20/20: EF (Visual Estimate): 25-30 % mild MR, Akinesis of the inferolateral, anterolateral, apical laterlal, inferior, and inferoseptal walls. Severe left ventricular enlargement. No left ventricular thrombus is seen.      a/p  55y male with esrd on PD for 5 years, dm poorly controlled, systolic CHF, CAD, s/p CABG, s/p PCI to OM c/b by failure to remove stent balloon requiring open heart surgery for removal, admitted with  hypotension,  fever and weakness    1. Ischemic Cardiomyopathy. Chronic systolic HF  -cv stable  -Repeat echo with unchanged lv sys dysfx, ef 25- 30%  -BB on hold due to orthostatic hypotension , no acei/arb for now   -continue midodrine for bp support  -EP f/u, not candidate for icd at present     2. CAD, s/p CABG, s/p PCI, s/p redo open heart for stent balloon retrieval   -CV stable  -c/w asa, Brilinta, statin     3. Weakness, R/O sepsis  -abx per id- work up per ID, bc 12/30 neg  -CT chest 12/30  noted  Small volume pneumoperitoneum. Sternal fluid collection has decreased in size  -pod f/u noted     4. ESRD  -on PD  -renal f/u    5. Syncope, orthostatic hypotension   -12/30 brief LOC while being adjusted from laying to sitting position   -likely vasovagal secondary to orthostatic hypotension, no events on tele  -still remains orthostatic with sx  -cont midodrine, inc to 30mg TID if needed for further orthostatic sx  -infectious work up thus far unrevealing  -minimize volume removal with pd per renal   -trend orthostatic vitals   -continue to give volume ressucc if remains dizzy on ambulation    dvt ppx

## 2021-01-04 NOTE — DIETITIAN INITIAL EVALUATION ADULT. - ETIOLOGY
increased physiological demand for nutrients limited interest in adhering to recommended dietary restrictions

## 2021-01-04 NOTE — PROGRESS NOTE ADULT - ASSESSMENT
Pt is 54yo who presents w/ LF hallux stable eschar and superficial wound  -Left hallux dorsal eschar and superficial wound to subQ measure 0.25x 0.25cm, no periwound erythema, no drainage no malodor. Superficial wound secondary to trauma.   -Foot does not appear to be source of infection  -LF X-rays unremarkable   -No podiatric intervention at this time  - Cont local wound care daily

## 2021-01-04 NOTE — DIETITIAN INITIAL EVALUATION ADULT. - ORAL INTAKE PTA/DIET HISTORY
PTA pt reports normal PO intake, states he is a small eater, consumes 2-3 meals per day. Reports following no dietary restrictions, states "I eat whatever I want." Denies use of micronutrient supplements PTA. NKFA. No chewing/swallowing difficulty reported.

## 2021-01-04 NOTE — PROGRESS NOTE ADULT - SUBJECTIVE AND OBJECTIVE BOX
Podiatry pager #: [phone]    Patient is a 55y old  Male who presents with a chief complaint of Lower extremity weakness (29 Dec 2020 20:47)      HPI:  55M PMH ESRD on PD, DM, CAD s/p CABG x4, underwent cardiac cath and stent and wire broke in heart s/p sternotomy during recent admission 11/30-12/11, recently discharged 1 wk ago for dizziness and transient fever, was treated for culture negative sepsis, BIBEMS for generalized weakness and hypotension x 1 wk. Since recent cardiac surgery, pt has been c/o generalized weakness. Wife/son states that pt has not been getting out of bed at all at home. Was previously ambulatory. Was seen by PT today, and was found to be hypotensive to SBP 80s, prompting ED evaluation. Of note, wife states she's been checking pt's BPs at home and have been intermittently low to SBP 80-90s. Pt also had a presyncopal episode while on the toilet yesterday. No head trauma or LOC. Wife states looks like pt's eyes rolled back. Pt denies CP, SOB. No f/c, URI sx, abd pain, NVD, urinary sx. Has had poor appetite. (29 Dec 2020 20:39)      PAST MEDICAL & SURGICAL HISTORY:  HTN (hypertension)    ESRD (end stage renal disease) on dialysis    CAD, multiple vessel    Diabetes    S/P CABG (coronary artery bypass graft)        MEDICATIONS  (STANDING):  cefepime   IVPB      dextrose 40% Gel 15 Gram(s) Oral once  dextrose 5%. 1000 milliLiter(s) (50 mL/Hr) IV Continuous <Continuous>  dextrose 5%. 1000 milliLiter(s) (100 mL/Hr) IV Continuous <Continuous>  dextrose 50% Injectable 25 Gram(s) IV Push once  dextrose 50% Injectable 12.5 Gram(s) IV Push once  dextrose 50% Injectable 25 Gram(s) IV Push once  glucagon  Injectable 1 milliGRAM(s) IntraMuscular once  heparin   Injectable 5000 Unit(s) SubCutaneous every 12 hours  insulin glargine Injectable (LANTUS) 12 Unit(s) SubCutaneous at bedtime  insulin lispro (ADMELOG) corrective regimen sliding scale   SubCutaneous three times a day before meals  insulin lispro (ADMELOG) corrective regimen sliding scale   SubCutaneous at bedtime  insulin lispro Injectable (ADMELOG) 6 Unit(s) SubCutaneous before breakfast  insulin lispro Injectable (ADMELOG) 6 Unit(s) SubCutaneous before lunch  insulin lispro Injectable (ADMELOG) 6 Unit(s) SubCutaneous before dinner    MEDICATIONS  (PRN):      Allergies    doxazosin (Other)    Intolerances        VITALS:    Vital Signs Last 24 Hrs  T(C): 36.4 (29 Dec 2020 20:34), Max: 37 (29 Dec 2020 16:30)  T(F): 97.5 (29 Dec 2020 20:34), Max: 98.6 (29 Dec 2020 16:30)  HR: 81 (29 Dec 2020 22:12) (75 - 81)  BP: 100/63 (29 Dec 2020 22:12) (79/57 - 122/81)  BP(mean): --  RR: 20 (29 Dec 2020 22:12) (16 - 20)  SpO2: 97% (29 Dec 2020 22:12) (95% - 100%)    LABS:                          11.1   10.63 )-----------( 338      ( 29 Dec 2020 14:34 )             36.1       12-29    134<L>  |  90<L>  |  40<H>  ----------------------------<  168<H>  3.5   |  27  |  13.17<H>    Ca    9.5      29 Dec 2020 14:34  Phos  5.9     12-29  Mg     2.4     12-29    TPro  7.0  /  Alb  3.3  /  TBili  0.4  /  DBili  x   /  AST  19  /  ALT  10  /  AlkPhos  88  12-29      CAPILLARY BLOOD GLUCOSE      POCT Blood Glucose.: 112 mg/dL (29 Dec 2020 22:26)  POCT Blood Glucose.: 65 mg/dL (29 Dec 2020 21:47)  POCT Blood Glucose.: 56 mg/dL (29 Dec 2020 21:25)  POCT Blood Glucose.: 49 mg/dL (29 Dec 2020 21:23)  POCT Blood Glucose.: 191 mg/dL (29 Dec 2020 13:53)      PT/INR - ( 29 Dec 2020 14:34 )   PT: 14.0 sec;   INR: 1.18 ratio         PTT - ( 29 Dec 2020 14:34 )  PTT:36.3 sec    LOWER EXTREMITY PHYSICAL EXAM:    Vascular: DP 1/4, PT NP, B/L, CFT <3 seconds B/L, Temperature gradient WNL, B/L.   Neuro: Epicritic sensation reduced to the level of digits, B/L.  Musculoskeletal/Ortho: unremarkable    Left hallux dorsal eschar and superficial wound to subQ measure 0.25x 0.25cm, no periwound erythema, no drainage no malodor. Superficial wound secondary to trauma.    RADIOLOGY & ADDITIONAL STUDIES:

## 2021-01-05 NOTE — PROGRESS NOTE ADULT - SUBJECTIVE AND OBJECTIVE BOX
NEPHROLOGY-[facility] [phone]        Patient seen and examined in bed.  He states that he felt well        MEDICATIONS  (STANDING):  aspirin enteric coated 81 milliGRAM(s) Oral daily  atorvastatin 80 milliGRAM(s) Oral at bedtime  cadexomer iodine 0.9% Gel 1 Application(s) Topical daily  dextrose 40% Gel 15 Gram(s) Oral once  dextrose 5%. 1000 milliLiter(s) (50 mL/Hr) IV Continuous <Continuous>  dextrose 5%. 1000 milliLiter(s) (100 mL/Hr) IV Continuous <Continuous>  dextrose 50% Injectable 25 Gram(s) IV Push once  dextrose 50% Injectable 12.5 Gram(s) IV Push once  dextrose 50% Injectable 25 Gram(s) IV Push once  ferrous    sulfate 325 milliGRAM(s) Oral daily  folic acid 1 milliGRAM(s) Oral daily  gentamicin 0.1% Ointment 1 Application(s) Topical <User Schedule>  glucagon  Injectable 1 milliGRAM(s) IntraMuscular once  heparin   Injectable 5000 Unit(s) SubCutaneous every 12 hours  insulin glargine Injectable (LANTUS) 7 Unit(s) SubCutaneous at bedtime  insulin lispro (ADMELOG) corrective regimen sliding scale   SubCutaneous three times a day before meals  insulin lispro (ADMELOG) corrective regimen sliding scale   SubCutaneous at bedtime  insulin lispro Injectable (ADMELOG) 2 Unit(s) SubCutaneous three times a day with meals  midodrine. 20 milliGRAM(s) Oral three times a day  pantoprazole    Tablet 40 milliGRAM(s) Oral before breakfast  sevelamer carbonate 800 milliGRAM(s) Oral three times a day with meals  sodium chloride 0.9%. 1000 milliLiter(s) (100 mL/Hr) IV Continuous <Continuous>  sodium chloride 0.9%. 1000 milliLiter(s) (50 mL/Hr) IV Continuous <Continuous>  ticagrelor 90 milliGRAM(s) Oral every 12 hours      VITAL:  T(C): , Max: 36.8 (01-04-21 @ 18:00)  T(F): , Max: 98.3 (01-04-21 @ 22:00)  HR: 111 (01-05-21 @ 10:12)  BP: 101/76 (01-05-21 @ 10:12)  BP(mean): --  RR: 18 (01-05-21 @ 08:33)  SpO2: 100% (01-05-21 @ 10:12)  Wt(kg): --    I and O's:    01-04 @ 07:01  -  01-05 @ 07:00  --------------------------------------------------------  IN: 2810 mL / OUT: 2750 mL / NET: 60 mL          PHYSICAL EXAM:    Constitutional: NAD  Neck:  No JVD  Respiratory: CTAB/L  Cardiovascular: S1 and S2  Gastrointestinal: BS+, soft, NT/ND + PD catheter   Extremities: No peripheral edema  Neurological: A/O x 3, no focal deficits  Psychiatric: Normal mood, normal affect  : No Johnson  Skin: No rashes  Access: Not applicable    LABS:                        10.2   10.44 )-----------( 279      ( 04 Jan 2021 07:02 )             32.0     01-05    132<L>  |  92<L>  |  37<H>  ----------------------------<  146<H>  3.6   |  25  |  12.44<H>    Ca    8.4      05 Jan 2021 06:38  Phos  6.7     01-05  Mg     2.0     01-04            Urine Studies:          RADIOLOGY & ADDITIONAL STUDIES:

## 2021-01-05 NOTE — PROGRESS NOTE ADULT - PROBLEM SELECTOR PLAN 1
-test BG AC/HS/2AM  -C/w Lantus dose 7 units QHS to imorove overall control  -C/w Admelog 2 w/meals.   -Adjusted low dose Admelog correctional premeal dose.  -C/w low dose Admelog correction scale  at hs for now  Discharge:   -Recommend follow up with outpt Endo on Discharge. Dr Villegas in [place]  Toujeo insulin if pt c/w overnight PD with 2.5 dialysate. Dose TBD  -C/w Victoza 1.2mg daily   -Plan discussed with pt/team. Please f/u hiccoughs complaint  Contact info: [phone] (24/7). pager [phone].

## 2021-01-05 NOTE — PROGRESS NOTE ADULT - ASSESSMENT
55 m with    Hiccups resolved  - CT abdomen noted  - GI evaluation    Hypotension  - s/p gentle IVF   - Midodrine  - telemetry  - Cardiology follow     r/o Sepsis  - BC results noted  - off empiric antibiotics  - ID follow    Hx of small sternal collection decreased     Foot wound  - local care  - Podiatry follow    Diabetes Mellitus   - BS control  - ADA diet   - Endocrine follow     CAD  - continue Rx    CHF cr systolic  - cardiology follow    Depression  - continue Rx    ESRD  - PD  - Nephrology follow    DVT prophylaxis     PT    DVT prophylaxis    DCP in progress.    Message lefty for son.     Pipe Beck MD pager [phone]

## 2021-01-05 NOTE — PROGRESS NOTE ADULT - ASSESSMENT
54 yo M w/h/o uncontrolled T2DM on Toujeo 60 units at hs prior PD and Victoza daily PTA. Pt reports his BG overnight were in low 200s while on Toujeo 60 units. DM c/b CAD, s/p CABG x 4,  ESRD on PD, DM retinopathy, diabetic neuropathy with PVD. Also h/o HTN, HLD and recent admission with SOB , dizziness and mild chest pain with exertion> s/p HC with stent placement on OM with wire fractured when trying to remove balloon requiring OR for removal of the angioplasty balloon and fractured wire via transverse aortotomy. Recent admission with fever/dizziness > infectious work up negative, and now presenting with hypotension and LE weakness.  Endocrine consulted fro DM management and hyperglycemia. Getting PD exchanges q6h during this admission. Getting PD exchanges q6h during this admission. Pt reports hiccoughs improving but still bothersome. Reports poor PO intake but BG levels remain between high 100s to 200s while on present insulin regimen. No hypoglycemia. Missed premeal dose yesterday with rebound hyperglycemia.     Will adjust Admelog correction dosing since hyperglycemia is likely due to PD dialysate and not food.  BG  goal 100s to 180s while in hospital.    Spent > 25 minutes assessing pt/labs/meds and discussing plan of care with primary team. Moderate complexity encounter on pt with uncontrolled T2DM and multiple DM complications and comorbidities. Adjusting insulin

## 2021-01-05 NOTE — PROGRESS NOTE ADULT - SUBJECTIVE AND OBJECTIVE BOX
CARDIOLOGY FOLLOW UP - Dr. Best    CC: reports improvement with dizziness, but still dizzy upon standing. denies cp, sob, and palpitations       PHYSICAL EXAM:  T(C): 36.5 (01-05-21 @ 10:20), Max: 36.8 (01-04-21 @ 18:00)  HR: 109 (01-05-21 @ 10:20) (92 - 111)  BP: 124/88 (01-05-21 @ 10:20) (101/76 - 156/84)  RR: 18 (01-05-21 @ 10:20) (18 - 18)  SpO2: 100% (01-05-21 @ 10:20) (98% - 100%)  Wt(kg): --  I&O's Summary    04 Jan 2021 07:01  -  05 Jan 2021 07:00  --------------------------------------------------------  IN: 2810 mL / OUT: 2750 mL / NET: 60 mL        Appearance: Normal	  Cardiovascular: Normal S1 S2,RRR, No JVD, No murmurs  Respiratory: Lungs clear to auscultation	  Gastrointestinal:  Soft, Non-tender, + BS	  Extremities: Normal range of motion, No clubbing, cyanosis or edema      Home Medications:  aspirin 81 mg oral delayed release tablet: 1 tab(s) orally once a day (29 Dec 2020 18:37)  atorvastatin 80 mg oral tablet: 1 tab(s) orally once a day (at bedtime) (29 Dec 2020 18:37)  epoetin martine: injectable once a month (29 Dec 2020 18:37)  ferrous sulfate 325 mg (65 mg elemental iron) oral tablet: 1 tab(s) orally 3 times a day (29 Dec 2020 18:37)  folic acid 0.4 mg oral tablet: 2 tab(s) orally once a day (29 Dec 2020 18:37)  gabapentin 100 mg oral capsule: 1 cap(s) orally once a day (29 Dec 2020 18:37)  nortriptyline 25 mg oral capsule: 1 cap(s) orally once a day (at bedtime) (29 Dec 2020 18:37)  pantoprazole 40 mg oral delayed release tablet: 1 tab(s) orally once a day (before a meal) (29 Dec 2020 18:37)  sevelamer carbonate 800 mg oral tablet: 1 tab(s) orally 3 times a day (with meals) (29 Dec 2020 18:37)  Toujeo SoloStar 300 units/mL subcutaneous solution: 70 unit(s) subcutaneous once a day (at bedtime) (29 Dec 2020 18:37)      MEDICATIONS  (STANDING):  aspirin enteric coated 81 milliGRAM(s) Oral daily  atorvastatin 80 milliGRAM(s) Oral at bedtime  cadexomer iodine 0.9% Gel 1 Application(s) Topical daily  dextrose 40% Gel 15 Gram(s) Oral once  dextrose 5%. 1000 milliLiter(s) (50 mL/Hr) IV Continuous <Continuous>  dextrose 5%. 1000 milliLiter(s) (100 mL/Hr) IV Continuous <Continuous>  dextrose 50% Injectable 25 Gram(s) IV Push once  dextrose 50% Injectable 12.5 Gram(s) IV Push once  dextrose 50% Injectable 25 Gram(s) IV Push once  ferrous    sulfate 325 milliGRAM(s) Oral daily  folic acid 1 milliGRAM(s) Oral daily  gentamicin 0.1% Ointment 1 Application(s) Topical <User Schedule>  glucagon  Injectable 1 milliGRAM(s) IntraMuscular once  heparin   Injectable 5000 Unit(s) SubCutaneous every 12 hours  insulin glargine Injectable (LANTUS) 7 Unit(s) SubCutaneous at bedtime  insulin lispro (ADMELOG) corrective regimen sliding scale   SubCutaneous three times a day before meals  insulin lispro (ADMELOG) corrective regimen sliding scale   SubCutaneous at bedtime  insulin lispro Injectable (ADMELOG) 2 Unit(s) SubCutaneous three times a day with meals  midodrine. 30 milliGRAM(s) Oral three times a day  pantoprazole    Tablet 40 milliGRAM(s) Oral before breakfast  sevelamer carbonate 1600 milliGRAM(s) Oral three times a day  sodium chloride 0.9%. 1000 milliLiter(s) (50 mL/Hr) IV Continuous <Continuous>  sodium chloride 0.9%. 1000 milliLiter(s) (100 mL/Hr) IV Continuous <Continuous>  ticagrelor 90 milliGRAM(s) Oral every 12 hours      TELEMETRY: SR 90s, PVCs 	    ECG:  	  RADIOLOGY:   CT Abdomen and Pelvis No Cont (01.04.21 @ 22:44)   FINDINGS:  Evaluation of the parenchymal organs and vascular structures is limited without intravenous contrast.    LOWER CHEST: Cardiomegaly. Aortic, aortic valve, mitral annulus, and coronary artery calcifications. RCA stent. Status post median sternotomy and CABG. Scattered areas of linear atelectasis.    LIVER: Within normal limits.  BILE DUCTS: Normal caliber.  GALLBLADDER: Sludge and cholelithiasis.  SPLEEN: Within normal limits.  PANCREAS: Within normal limits.  ADRENALS: Within normal limits.  KIDNEYS/URETERS: Within normal limits.    BLADDER: Within normal limits.  REPRODUCTIVE ORGANS: Prostate within normal limits.    BOWEL: No bowel obstruction. Appendix is normal.  PERITONEUM: Small volume ascites. Trace pneumoperitoneum, relatively decreased compared to prior CT chest.  VESSELS: Extensive atherosclerotic calcifications.  RETROPERITONEUM/LYMPH NODES: No lymphadenopathy.  ABDOMINAL WALL: Peritoneal dialysis catheter enters the peritoneum in the right lower quadrant and terminates in the left lower quadrant. A small umbilical hernia containing fat and a foci of air.  BONES: Degenerative changes.    IMPRESSION:  Trace pneumoperitoneum, relatively decreased compared to prior CT chest.  Small volume ascites.      DIAGNOSTIC TESTING:  [ ] Echocardiogram:  [ ]  Catheterization:  [ ] Stress Test:    OTHER: 	    LABS:	 	    Creatine Kinase, Serum: 48 U/L [30 - 200] (12-30 @ 06:20)  CKMB Units: 3.4 ng/mL [0.0 - 6.7] (12-30 @ 06:20)  Troponin T, High Sensitivity Result: 796 ng/L [0 - 51] (12-30 @ 06:20)  Creatine Kinase, Serum: 50 U/L [30 - 200] (12-29 @ 22:35)  CKMB Units: 3.3 ng/mL [0.0 - 6.7] (12-29 @ 22:35)  Troponin T, High Sensitivity Result: 796 ng/L [0 - 51] (12-29 @ 22:35)  Troponin T, High Sensitivity Result: 812 ng/L [0 - 51] (12-29 @ 16:41)  Troponin T, High Sensitivity Result: 852 ng/L [0 - 51] (12-29 @ 14:34)                          10.2   10.44 )-----------( 279      ( 04 Jan 2021 07:02 )             32.0     01-05    132<L>  |  92<L>  |  37<H>  ----------------------------<  146<H>  3.6   |  25  |  12.44<H>    Ca    8.4      05 Jan 2021 06:38  Phos  6.7     01-05  Mg     2.0     01-04

## 2021-01-05 NOTE — PROGRESS NOTE ADULT - SUBJECTIVE AND OBJECTIVE BOX
Patient is a 55y old  Male who presents with a chief complaint of syncope (05 Jan 2021 13:42)      SUBJECTIVE / OVERNIGHT EVENTS: Comfortable. Still with dizziness when standing. Hiccups resolved.  Review of Systems  chest pain no  palpitations no  sob no  nausea no  headache no    MEDICATIONS  (STANDING):  aspirin enteric coated 81 milliGRAM(s) Oral daily  atorvastatin 80 milliGRAM(s) Oral at bedtime  cadexomer iodine 0.9% Gel 1 Application(s) Topical daily  dextrose 40% Gel 15 Gram(s) Oral once  dextrose 5%. 1000 milliLiter(s) (50 mL/Hr) IV Continuous <Continuous>  dextrose 5%. 1000 milliLiter(s) (100 mL/Hr) IV Continuous <Continuous>  dextrose 50% Injectable 25 Gram(s) IV Push once  dextrose 50% Injectable 12.5 Gram(s) IV Push once  dextrose 50% Injectable 25 Gram(s) IV Push once  ferrous    sulfate 325 milliGRAM(s) Oral daily  folic acid 1 milliGRAM(s) Oral daily  gentamicin 0.1% Ointment 1 Application(s) Topical <User Schedule>  glucagon  Injectable 1 milliGRAM(s) IntraMuscular once  heparin   Injectable 5000 Unit(s) SubCutaneous every 12 hours  insulin glargine Injectable (LANTUS) 7 Unit(s) SubCutaneous at bedtime  insulin lispro (ADMELOG) corrective regimen sliding scale   SubCutaneous three times a day before meals  insulin lispro (ADMELOG) corrective regimen sliding scale   SubCutaneous at bedtime  insulin lispro Injectable (ADMELOG) 2 Unit(s) SubCutaneous three times a day with meals  midodrine. 30 milliGRAM(s) Oral three times a day  pantoprazole    Tablet 40 milliGRAM(s) Oral before breakfast  sevelamer carbonate 1600 milliGRAM(s) Oral three times a day  sodium chloride 0.9%. 1000 milliLiter(s) (100 mL/Hr) IV Continuous <Continuous>  sodium chloride 0.9%. 1000 milliLiter(s) (50 mL/Hr) IV Continuous <Continuous>  ticagrelor 90 milliGRAM(s) Oral every 12 hours    MEDICATIONS  (PRN):      Vital Signs Last 24 Hrs  T(C): 37.1 (05 Jan 2021 14:20), Max: 37.1 (05 Jan 2021 14:20)  T(F): 98.7 (05 Jan 2021 14:20), Max: 98.7 (05 Jan 2021 14:20)  HR: 100 (05 Jan 2021 14:20) (92 - 111)  BP: 137/92 (05 Jan 2021 14:20) (101/76 - 156/84)  BP(mean): --  RR: 18 (05 Jan 2021 14:20) (18 - 18)  SpO2: 99% (05 Jan 2021 14:20) (98% - 100%)    PHYSICAL EXAM:  GENERAL: NAD  HEAD:  Atraumatic, Normocephalic  EYES: EOMI, PERRLA, conjunctiva and sclera clear  NECK: Supple, No JVD  CHEST/LUNG: Clear to auscultation bilaterally; No wheeze Sternal scar healing.   HEART: Regular rate and rhythm; No murmurs, rubs, or gallops  ABDOMEN: Soft, Nontender, Nondistended; Bowel sounds present PD catheter  EXTREMITIES:  no edema  PSYCH: AAOx3  NEUROLOGY: non-focal  SKIN: No rashes or lesions    LABS:                        10.2   10.44 )-----------( 279      ( 04 Jan 2021 07:02 )             32.0     01-05    132<L>  |  92<L>  |  37<H>  ----------------------------<  146<H>  3.6   |  25  |  12.44<H>    Ca    8.4      05 Jan 2021 06:38  Phos  6.7     01-05  Mg     2.0     01-04                  RADIOLOGY & ADDITIONAL TESTS:    Imaging Personally Reviewed:    Consultant(s) Notes Reviewed:      Care Discussed with Consultants/Other Providers:

## 2021-01-05 NOTE — PROGRESS NOTE ADULT - SUBJECTIVE AND OBJECTIVE BOX
DIABETES FOLLOW UP NOTE: Saw pt earlier today  INTERVAL HX: 54 yo M w/h/o uncontrolled T2DM on Toujeo 60 units at hs prior PD and Victoza daily PTA. Pt reports his BG overnight were in low 200s while on Toujeo 60 units. DM c/b CAD, s/p CABG x 4,  ESRD on PD, DM retinopathy, diabetic neuropathy with PVD. Also h/o HTN, HLD and recent admission with SOB , dizziness and mild chest pain with exertion> s/p HC with stent placement on OM with wire fractured when trying to remove balloon requiring OR for removal of the angioplasty balloon and fractured wire via transverse aortotomy. Recent admission with fever/dizziness > infectious work up negative, and now presenting with hypotension and LE weakness.  Endocrine consulted fro DM management and hyperglycemia. Getting PD exchanges q6h during this admission. Pt reports hiccoughs improving but still bothersome. Reports poor PO intake but BG levels remain between high 100s to 200s while on present insulin regimen. No hypoglycemia. Noted pt didn't get meal time insulin yesterday ac dinner because he didn't eat at dinner time but reports eating later with rebound hyperglycemia at hs. Feels tired.       Review of Systems:  General: As above  Cardiovascular: No chest pain, palpitations  Respiratory: No SOB, no cough  GI: No nausea, vomiting, abdominal pain  Endocrine: no polyuria, polydipsia or S&Sx of hypoglycemia    Allergies    doxazosin (Other)    Intolerances      MEDICATIONS:  atorvastatin 80 milliGRAM(s) Oral at bedtime  insulin glargine Injectable (LANTUS) 7 Unit(s) SubCutaneous at bedtime  insulin lispro (ADMELOG) corrective regimen sliding scale   SubCutaneous three times a day before meals  insulin lispro (ADMELOG) corrective regimen sliding scale   SubCutaneous at bedtime  insulin lispro Injectable (ADMELOG) 2 Unit(s) SubCutaneous three times a day with meals        PHYSICAL EXAM:  VITALS: T(C): 37.1 (01-05-21 @ 14:20)  T(F): 98.7 (01-05-21 @ 14:20), Max: 98.7 (01-05-21 @ 14:20)  HR: 100 (01-05-21 @ 14:20) (92 - 111)  BP: 137/92 (01-05-21 @ 14:20) (101/76 - 156/84)  RR:  (18 - 18)  SpO2:  (98% - 100%)  Wt(kg): --  GENERAL: Male laying in bed in NAD  Abdomen: Soft, nontender, non distended. PD cath D&I  Extremities: Warm, NO edema in LEs, + chronic vascular changes in LEs noted darker discoloration and skin dryness.    NEURO: A&O X3    LABS:  POCT Blood Glucose.: 185 mg/dL (01-05-21 @ 13:43)  POCT Blood Glucose.: 205 mg/dL (01-05-21 @ 09:00)  POCT Blood Glucose.: 209 mg/dL (01-04-21 @ 21:24)  POCT Blood Glucose.: 238 mg/dL (01-04-21 @ 13:35)  POCT Blood Glucose.: 190 mg/dL (01-04-21 @ 09:07)  POCT Blood Glucose.: 200 mg/dL (01-04-21 @ 07:47)  POCT Blood Glucose.: 208 mg/dL (01-03-21 @ 21:51)  POCT Blood Glucose.: 259 mg/dL (01-03-21 @ 18:00)  POCT Blood Glucose.: 213 mg/dL (01-03-21 @ 13:17)  POCT Blood Glucose.: 202 mg/dL (01-03-21 @ 08:40)  POCT Blood Glucose.: 203 mg/dL (01-02-21 @ 21:22)  POCT Blood Glucose.: 172 mg/dL (01-02-21 @ 20:34)  POCT Blood Glucose.: 179 mg/dL (01-02-21 @ 17:49)                            10.2   10.44 )-----------( 279      ( 04 Jan 2021 07:02 )             32.0       01-05    132<L>  |  92<L>  |  37<H>  ----------------------------<  146<H>  3.6   |  25  |  12.44<H>      EGFR if non : 4<L>    Ca    8.4      01-05  Mg     2.0     01-04  Phos  6.7     01-05      A1C with Estimated Average Glucose Result: 7.8 % (12-01-20 @ 03:13)      Estimated Average Glucose: 177 mg/dL (12-01-20 @ 03:13)

## 2021-01-05 NOTE — PROGRESS NOTE ADULT - ASSESSMENT
55M PMH ESRD on PD, DM, CAD s/p CABG x4, underwent cardiac cath and stent and wire broke in heart s/p sternotomy during recent admission 11/30-12/11, recently discharged 1 wk ago for dizziness and transient fever, was treated for culture negative sepsis, BIBEMS for generalized weakness and hypotension x 1 wk.   No stitmata to suggest peritonitis     1 Renal-  On  PD at present;  The PD fluid is not leading to dehydration  He is still orthostatic but not symptomatic     2 CVS-  Midodrine 20mg po tid;  Was still orthostatic     3 Hyperphosphatemia- Increase Renvela 1600 mg po tid       [facility]   [email or web address]  [phone]

## 2021-01-05 NOTE — PROGRESS NOTE ADULT - ASSESSMENT
CATH 11/30/2020:  COMPLICATIONS: The stent was deployed without difficulty. On removal of the  balloon, the shaft broke and the tip of the balloon remained in the vessel. Several attempts were made to snare the balloon unsuccessfully. Dr. Verdugo was notifed who will take the patient to the operating room.  DIAGNOSTIC RECOMMENDATIONS: The patient should continue with the present medications. The patients vessel was stented without difficulty On removal of the balloon, the shaft broke with the baloon stuck in the left main. All attempts to snare the balloon out failed and the patient was taken to the OR by Dr. Arango to remove the balloon  introp eleonora 11/30/20: mild to mod MR, < from: Intra-Operative Transesophageal Echo (11.30.20 @ 17:02) >  Severe global left ventricular systolic dysfunction. Inferior and inferolateral akinesis.  severe hypokinesis of other segments.  Severe global hypokinesia.  Normal left ventricular internal dimensions and wall thicknesses. Moderate diastolic dysfunction (Stage II).  echo 12/17/20: EF 32%, mod MR, Severe global left ventricular systolic dysfunction, moderate pulmonary pressures  echo 12/20/20: EF (Visual Estimate): 25-30 % mild MR, Akinesis of the inferolateral, anterolateral, apical laterlal, inferior, and inferoseptal walls. Severe left ventricular enlargement. No left ventricular thrombus is seen.      a/p  55y male with esrd on PD for 5 years, dm poorly controlled, systolic CHF, CAD, s/p CABG, s/p PCI to OM c/b by failure to remove stent balloon requiring open heart surgery for removal, admitted with  hypotension,  fever and weakness    1. Ischemic Cardiomyopathy. Chronic systolic HF  -cv stable  -Repeat echo with unchanged lv sys dysfx, ef 25- 30%  -BB on hold due to orthostatic hypotension , no acei/arb for now   -continue midodrine for bp support  -EP f/u, not candidate for icd at present     2. CAD, s/p CABG, s/p PCI, s/p redo open heart for stent balloon retrieval   -CV stable  -c/w asa, Brilinta, statin     3. Weakness, R/O sepsis  -abx per id- work up per ID, bc 12/30 neg  -CT chest 12/30  noted  Small volume pneumoperitoneum. Sternal fluid collection has decreased in size  -pod f/u noted   -CT abd noted     4. ESRD  -on PD  -renal f/u    5. Syncope, orthostatic hypotension   -12/30 brief LOC while being adjusted from laying to sitting position   -likely vasovagal secondary to orthostatic hypotension, no events on tele  -still remains orthostatic with sx  -cont midodrine, increased to 30 TID today  -infectious work up thus far unrevealing  -minimize volume removal with pd per renal   -trend orthostatic vitals   -continue to give volume ressucc if remains dizzy on ambulation    dvt ppx

## 2021-01-06 NOTE — PROGRESS NOTE ADULT - SUBJECTIVE AND OBJECTIVE BOX
Patient is a 55y old  Male who presents with a chief complaint of Hypotension (06 Jan 2021 14:46)      SUBJECTIVE / OVERNIGHT EVENTS: No new complaints. Still orthostatic.   Review of Systems  chest pain no  palpitations no  sob no  nausea no  headache no    MEDICATIONS  (STANDING):  aspirin enteric coated 81 milliGRAM(s) Oral daily  atorvastatin 80 milliGRAM(s) Oral at bedtime  cadexomer iodine 0.9% Gel 1 Application(s) Topical daily  dextrose 40% Gel 15 Gram(s) Oral once  dextrose 5%. 1000 milliLiter(s) (50 mL/Hr) IV Continuous <Continuous>  dextrose 5%. 1000 milliLiter(s) (100 mL/Hr) IV Continuous <Continuous>  dextrose 50% Injectable 25 Gram(s) IV Push once  dextrose 50% Injectable 12.5 Gram(s) IV Push once  dextrose 50% Injectable 25 Gram(s) IV Push once  ferrous    sulfate 325 milliGRAM(s) Oral daily  folic acid 1 milliGRAM(s) Oral daily  gentamicin 0.1% Ointment 1 Application(s) Topical <User Schedule>  glucagon  Injectable 1 milliGRAM(s) IntraMuscular once  heparin   Injectable 5000 Unit(s) SubCutaneous every 12 hours  insulin glargine Injectable (LANTUS) 9 Unit(s) SubCutaneous at bedtime  insulin lispro (ADMELOG) corrective regimen sliding scale   SubCutaneous three times a day before meals  insulin lispro (ADMELOG) corrective regimen sliding scale   SubCutaneous at bedtime  insulin lispro Injectable (ADMELOG) 4 Unit(s) SubCutaneous three times a day with meals  LORazepam   Injectable 0.25 milliGRAM(s) IV Push once  midodrine. 20 milliGRAM(s) Oral three times a day  pantoprazole    Tablet 40 milliGRAM(s) Oral before breakfast  sevelamer carbonate 1600 milliGRAM(s) Oral three times a day  sodium chloride 0.9%. 1000 milliLiter(s) (50 mL/Hr) IV Continuous <Continuous>  sodium chloride 0.9%. 1000 milliLiter(s) (75 mL/Hr) IV Continuous <Continuous>  sodium chloride 0.9%. 1000 milliLiter(s) (100 mL/Hr) IV Continuous <Continuous>  ticagrelor 90 milliGRAM(s) Oral every 12 hours    MEDICATIONS  (PRN):      Vital Signs Last 24 Hrs  T(C): 36.9 (06 Jan 2021 17:59), Max: 36.9 (06 Jan 2021 17:59)  T(F): 98.4 (06 Jan 2021 17:59), Max: 98.4 (06 Jan 2021 17:59)  HR: 94 (06 Jan 2021 17:59) (86 - 103)  BP: 150/82 (06 Jan 2021 17:59) (92/60 - 150/82)  BP(mean): --  RR: 18 (06 Jan 2021 17:59) (18 - 18)  SpO2: 100% (06 Jan 2021 17:59) (98% - 100%)    PHYSICAL EXAM:  GENERAL: NAD  HEAD:  Atraumatic, Normocephalic  EYES: EOMI, PERRLA, conjunctiva and sclera clear  NECK: Supple, No JVD  CHEST/LUNG: Clear to auscultation bilaterally; No wheeze  HEART: Regular rate and rhythm; No murmurs, rubs, or gallops  ABDOMEN: Soft, Nontender, Nondistended; Bowel sounds present  EXTREMITIES:  2+ Peripheral Pulses, No clubbing, cyanosis, or edema  PSYCH: AAOx3  NEUROLOGY: non-focal  SKIN: No rashes or lesions    LABS:                        9.8    8.99  )-----------( 254      ( 06 Jan 2021 06:24 )             31.0     01-06    134<L>  |  91<L>  |  38<H>  ----------------------------<  151<H>  4.0   |  25  |  12.00<H>    Ca    8.5      06 Jan 2021 06:24  Phos  6.3     01-06                  RADIOLOGY & ADDITIONAL TESTS:    Imaging Personally Reviewed:    Consultant(s) Notes Reviewed:      Care Discussed with Consultants/Other Providers:

## 2021-01-06 NOTE — PROGRESS NOTE ADULT - ASSESSMENT
55M PMH ESRD on PD, DM, CAD s/p CABG x4, underwent cardiac cath and stent and wire broke in heart s/p sternotomy during recent admission 11/30-12/11, recently discharged 1 wk ago for dizziness and transient fever, was treated for culture negative sepsis, BIBEMS for generalized weakness and hypotension x 1 wk.   No stitmata to suggest peritonitis     1 Renal-  On  PD at present;  The PD fluid is not leading to dehydration--he is having an average of 700-900 cc net negative   He is still orthostatic but not symptomatic     2 CVS-  Midodrine 30mg po tid;  Was still orthostatic... IVF for another 24 hours and then if symptomatic then he may need RHC.   DW cards attd     3 Hyperphosphatemia- Increase Renvela 1600 mg po tid       Sayed [facility]   Radionomy  [phone]

## 2021-01-06 NOTE — PROGRESS NOTE ADULT - ASSESSMENT
55 m with    Hiccups resolved    Hypotension  - s/p gentle IVF   - Midodrine  - telemetry  - Cardiology follow   - may need RHC    r/o Sepsis  - BC results noted  - off empiric antibiotics  - ID follow    Hx of small sternal collection decreased     Foot wound  - local care  - Podiatry follow    Diabetes Mellitus   - BS control  - ADA diet   - Endocrine follow     CAD  - continue Rx    CHF cr systolic  - cardiology follow    Depression  - continue Rx    ESRD  - PD  - Nephrology follow    DVT prophylaxis     PT    DVT prophylaxis    DCP in progress.    d/w son over phone. QA at length     Pipe Beck MD pager [phone]

## 2021-01-06 NOTE — PROGRESS NOTE ADULT - ASSESSMENT
54 yo M w/h/o uncontrolled T2DM on Toujeo 60 units at hs prior PD and Victoza daily PTA. Pt reports his BG overnight were in low 200s while on Toujeo 60 units. DM c/b CAD, s/p CABG x 4,  ESRD on PD, DM retinopathy, diabetic neuropathy with PVD. Also h/o HTN, HLD and recent admission with SOB , dizziness and mild chest pain with exertion> s/p HC with stent placement on OM with wire fractured when trying to remove balloon requiring OR for removal of the angioplasty balloon and fractured wire via transverse aortotomy. Recent admission with fever/dizziness > infectious work up negative, and now presenting with hypotension and LE weakness.  Endocrine consulted fro DM management and hyperglycemia. Getting PD exchanges q6h during this admission. Getting PD exchanges q6h during this admission. Pt reports hiccoughs improving. Unpredictable PO intake, eating food from home at different times of the day so pt missing premeal insulin doses and also having post prandial BG testing.  BG levels remain between high 100s to 200s while on present insulin regimen. Glycemic control also difficult to achieve due to PD dialysate. No hypoglycemia. Will adjust insulin dose to BG  goal 100s to 180s while in hospital. Pt to let staff know when he is eating so he can get premeal insulin doses as ordered    Spent > 25 minutes assessing pt/labs/meds and discussing plan of care with primary team. Moderate complexity encounter on pt with uncontrolled T2DM and multiple DM complications and comorbidities. Adjusting insulin

## 2021-01-06 NOTE — PROGRESS NOTE ADULT - PROBLEM SELECTOR PLAN 1
-test BG AC/HS/2AM  -Change  Lantus dose to 8 units QHS   -Change Admelog to 3 w/meals. PLEASE ADMINISTER WHENEVER PT EATS!  - C/w adjusted low dose Admelog correctional premeal dose (2-6)  -C/w low dose Admelog correction scale  at hs for now  Discharge:   -Recommend follow up with outpt Endo on Discharge. Dr Villegas in [place]  Touo insulin if pt c/w overnight PD with 2.5 dialysate. Dose TBD  -C/w Victoza 1.2mg daily   -Plan discussed with pt/team. Please f/u hiccoughs complaint  Contact info: [phone] (24/7). pager [phone]. -test BG AC/HS/2AM  -Change  Lantus dose to 9 units QHS   -Change Admelog to 4 w/meals. PLEASE ADMINISTER WHENEVER PT EATS!  - C/w adjusted low dose Admelog correctional premeal dose (2-6)  -C/w low dose Admelog correction scale  at hs for now  Discharge:   -Recommend follow up with outpt Endo on Discharge. Dr Villegas in [place]  Touo insulin if pt c/w overnight PD with 2.5 dialysate. Dose TBD  -C/w Victoza 1.2mg daily   -Plan discussed with pt/team. Please f/u hiccoughs complaint  Contact info: [phone] (24/7). pager [phone].

## 2021-01-06 NOTE — PROGRESS NOTE ADULT - SUBJECTIVE AND OBJECTIVE BOX
Podiatry pager #: [phone]/ [phone]    Patient is a 55y old  Male who presents with a chief complaint of Lower extremity weakness (29 Dec 2020 20:47)      HPI:  55M PMH ESRD on PD, DM, CAD s/p CABG x4, underwent cardiac cath and stent and wire broke in heart s/p sternotomy during recent admission 11/30-12/11, recently discharged 1 wk ago for dizziness and transient fever, was treated for culture negative sepsis, BIBEMS for generalized weakness and hypotension x 1 wk. Since recent cardiac surgery, pt has been c/o generalized weakness. Wife/son states that pt has not been getting out of bed at all at home. Was previously ambulatory. Was seen by PT today, and was found to be hypotensive to SBP 80s, prompting ED evaluation. Of note, wife states she's been checking pt's BPs at home and have been intermittently low to SBP 80-90s. Pt also had a presyncopal episode while on the toilet yesterday. No head trauma or LOC. Wife states looks like pt's eyes rolled back. Pt denies CP, SOB. No f/c, URI sx, abd pain, NVD, urinary sx. Has had poor appetite. (29 Dec 2020 20:39)      PAST MEDICAL & SURGICAL HISTORY:  HTN (hypertension)    ESRD (end stage renal disease) on dialysis    CAD, multiple vessel    Diabetes    S/P CABG (coronary artery bypass graft)        MEDICATIONS  (STANDING):  cefepime   IVPB      dextrose 40% Gel 15 Gram(s) Oral once  dextrose 5%. 1000 milliLiter(s) (50 mL/Hr) IV Continuous <Continuous>  dextrose 5%. 1000 milliLiter(s) (100 mL/Hr) IV Continuous <Continuous>  dextrose 50% Injectable 25 Gram(s) IV Push once  dextrose 50% Injectable 12.5 Gram(s) IV Push once  dextrose 50% Injectable 25 Gram(s) IV Push once  glucagon  Injectable 1 milliGRAM(s) IntraMuscular once  heparin   Injectable 5000 Unit(s) SubCutaneous every 12 hours  insulin glargine Injectable (LANTUS) 12 Unit(s) SubCutaneous at bedtime  insulin lispro (ADMELOG) corrective regimen sliding scale   SubCutaneous three times a day before meals  insulin lispro (ADMELOG) corrective regimen sliding scale   SubCutaneous at bedtime  insulin lispro Injectable (ADMELOG) 6 Unit(s) SubCutaneous before breakfast  insulin lispro Injectable (ADMELOG) 6 Unit(s) SubCutaneous before lunch  insulin lispro Injectable (ADMELOG) 6 Unit(s) SubCutaneous before dinner    MEDICATIONS  (PRN):      Allergies    doxazosin (Other)    Intolerances        VITALS:    Vital Signs Last 24 Hrs  T(C): 36.4 (29 Dec 2020 20:34), Max: 37 (29 Dec 2020 16:30)  T(F): 97.5 (29 Dec 2020 20:34), Max: 98.6 (29 Dec 2020 16:30)  HR: 81 (29 Dec 2020 22:12) (75 - 81)  BP: 100/63 (29 Dec 2020 22:12) (79/57 - 122/81)  BP(mean): --  RR: 20 (29 Dec 2020 22:12) (16 - 20)  SpO2: 97% (29 Dec 2020 22:12) (95% - 100%)    LABS:                          11.1   10.63 )-----------( 338      ( 29 Dec 2020 14:34 )             36.1       12-29    134<L>  |  90<L>  |  40<H>  ----------------------------<  168<H>  3.5   |  27  |  13.17<H>    Ca    9.5      29 Dec 2020 14:34  Phos  5.9     12-29  Mg     2.4     12-29    TPro  7.0  /  Alb  3.3  /  TBili  0.4  /  DBili  x   /  AST  19  /  ALT  10  /  AlkPhos  88  12-29      CAPILLARY BLOOD GLUCOSE      POCT Blood Glucose.: 112 mg/dL (29 Dec 2020 22:26)  POCT Blood Glucose.: 65 mg/dL (29 Dec 2020 21:47)  POCT Blood Glucose.: 56 mg/dL (29 Dec 2020 21:25)  POCT Blood Glucose.: 49 mg/dL (29 Dec 2020 21:23)  POCT Blood Glucose.: 191 mg/dL (29 Dec 2020 13:53)      PT/INR - ( 29 Dec 2020 14:34 )   PT: 14.0 sec;   INR: 1.18 ratio         PTT - ( 29 Dec 2020 14:34 )  PTT:36.3 sec    LOWER EXTREMITY PHYSICAL EXAM:    Vascular: DP 1/4, PT NP, B/L, CFT <3 seconds B/L, Temperature gradient WNL, B/L.   Neuro: Epicritic sensation reduced to the level of digits, B/L.  Musculoskeletal/Ortho: unremarkable    Left hallux dorsal eschar and superficial wound to subQ measure 0.25x 0.25cm, no periwound erythema, no drainage no malodor. Superficial wound secondary to trauma.    RADIOLOGY & ADDITIONAL STUDIES:

## 2021-01-06 NOTE — PROGRESS NOTE ADULT - SUBJECTIVE AND OBJECTIVE BOX
CARDIOLOGY FOLLOW UP - Dr. Best    CC: reports dizziness upon standing but improved since admission. denies cp, sob, and palpitations       PHYSICAL EXAM:  T(C): 36.7 (01-06-21 @ 10:00), Max: 37.1 (01-05-21 @ 14:20)  HR: 86 (01-06-21 @ 10:00) (86 - 105)  BP: 115/77 (01-06-21 @ 10:00) (92/60 - 137/92)  RR: 18 (01-06-21 @ 05:00) (18 - 18)  SpO2: 100% (01-06-21 @ 10:00) (98% - 100%)  Wt(kg): --  I&O's Summary    05 Jan 2021 07:01  -  06 Jan 2021 07:00  --------------------------------------------------------  IN: 720 mL / OUT: 215 mL / NET: 505 mL    06 Jan 2021 07:01  -  06 Jan 2021 11:10  --------------------------------------------------------  IN: 2000 mL / OUT: 1800 mL / NET: 200 mL        Appearance: Normal	  Cardiovascular: Normal S1 S2,RRR, No JVD, No murmurs  Respiratory: Lungs clear to auscultation	  Gastrointestinal:  Soft, Non-tender, + BS	  Extremities: Normal range of motion, No clubbing, cyanosis or edema      Home Medications:  aspirin 81 mg oral delayed release tablet: 1 tab(s) orally once a day (29 Dec 2020 18:37)  atorvastatin 80 mg oral tablet: 1 tab(s) orally once a day (at bedtime) (29 Dec 2020 18:37)  epoetin martine: injectable once a month (29 Dec 2020 18:37)  ferrous sulfate 325 mg (65 mg elemental iron) oral tablet: 1 tab(s) orally 3 times a day (29 Dec 2020 18:37)  folic acid 0.4 mg oral tablet: 2 tab(s) orally once a day (29 Dec 2020 18:37)  gabapentin 100 mg oral capsule: 1 cap(s) orally once a day (29 Dec 2020 18:37)  nortriptyline 25 mg oral capsule: 1 cap(s) orally once a day (at bedtime) (29 Dec 2020 18:37)  pantoprazole 40 mg oral delayed release tablet: 1 tab(s) orally once a day (before a meal) (29 Dec 2020 18:37)  sevelamer carbonate 800 mg oral tablet: 1 tab(s) orally 3 times a day (with meals) (29 Dec 2020 18:37)  Toujeo SoloStar 300 units/mL subcutaneous solution: 70 unit(s) subcutaneous once a day (at bedtime) (29 Dec 2020 18:37)      MEDICATIONS  (STANDING):  aspirin enteric coated 81 milliGRAM(s) Oral daily  atorvastatin 80 milliGRAM(s) Oral at bedtime  cadexomer iodine 0.9% Gel 1 Application(s) Topical daily  dextrose 40% Gel 15 Gram(s) Oral once  dextrose 5%. 1000 milliLiter(s) (50 mL/Hr) IV Continuous <Continuous>  dextrose 5%. 1000 milliLiter(s) (100 mL/Hr) IV Continuous <Continuous>  dextrose 50% Injectable 25 Gram(s) IV Push once  dextrose 50% Injectable 12.5 Gram(s) IV Push once  dextrose 50% Injectable 25 Gram(s) IV Push once  ferrous    sulfate 325 milliGRAM(s) Oral daily  folic acid 1 milliGRAM(s) Oral daily  gentamicin 0.1% Ointment 1 Application(s) Topical <User Schedule>  glucagon  Injectable 1 milliGRAM(s) IntraMuscular once  heparin   Injectable 5000 Unit(s) SubCutaneous every 12 hours  insulin glargine Injectable (LANTUS) 7 Unit(s) SubCutaneous at bedtime  insulin lispro (ADMELOG) corrective regimen sliding scale   SubCutaneous at bedtime  insulin lispro (ADMELOG) corrective regimen sliding scale   SubCutaneous three times a day before meals  insulin lispro Injectable (ADMELOG) 2 Unit(s) SubCutaneous three times a day with meals  LORazepam   Injectable 0.25 milliGRAM(s) IV Push once  midodrine. 30 milliGRAM(s) Oral three times a day  pantoprazole    Tablet 40 milliGRAM(s) Oral before breakfast  sevelamer carbonate 1600 milliGRAM(s) Oral three times a day  sodium chloride 0.9%. 1000 milliLiter(s) (100 mL/Hr) IV Continuous <Continuous>  sodium chloride 0.9%. 1000 milliLiter(s) (50 mL/Hr) IV Continuous <Continuous>  ticagrelor 90 milliGRAM(s) Oral every 12 hours      TELEMETRY: SR/SR 90-100s	    ECG:  	  RADIOLOGY:   DIAGNOSTIC TESTING:  [ ] Echocardiogram:  [ ]  Catheterization:  [ ] Stress Test:    OTHER: 	    LABS:	 	                            9.8    8.99  )-----------( 254      ( 06 Jan 2021 06:24 )             31.0     01-06    134<L>  |  91<L>  |  38<H>  ----------------------------<  151<H>  4.0   |  25  |  12.00<H>    Ca    8.5      06 Jan 2021 06:24  Phos  6.3     01-06

## 2021-01-06 NOTE — PROGRESS NOTE ADULT - SUBJECTIVE AND OBJECTIVE BOX
NEPHROLOGY[facility] [phone]        Patient seen and examined in bed.  Still orthostatic         MEDICATIONS  (STANDING):  aspirin enteric coated 81 milliGRAM(s) Oral daily  atorvastatin 80 milliGRAM(s) Oral at bedtime  cadexomer iodine 0.9% Gel 1 Application(s) Topical daily  dextrose 40% Gel 15 Gram(s) Oral once  dextrose 5%. 1000 milliLiter(s) (50 mL/Hr) IV Continuous <Continuous>  dextrose 5%. 1000 milliLiter(s) (100 mL/Hr) IV Continuous <Continuous>  dextrose 50% Injectable 25 Gram(s) IV Push once  dextrose 50% Injectable 12.5 Gram(s) IV Push once  dextrose 50% Injectable 25 Gram(s) IV Push once  ferrous    sulfate 325 milliGRAM(s) Oral daily  folic acid 1 milliGRAM(s) Oral daily  gentamicin 0.1% Ointment 1 Application(s) Topical <User Schedule>  glucagon  Injectable 1 milliGRAM(s) IntraMuscular once  heparin   Injectable 5000 Unit(s) SubCutaneous every 12 hours  insulin glargine Injectable (LANTUS) 7 Unit(s) SubCutaneous at bedtime  insulin lispro (ADMELOG) corrective regimen sliding scale   SubCutaneous three times a day before meals  insulin lispro (ADMELOG) corrective regimen sliding scale   SubCutaneous at bedtime  insulin lispro Injectable (ADMELOG) 2 Unit(s) SubCutaneous three times a day with meals  LORazepam   Injectable 0.25 milliGRAM(s) IV Push once  midodrine. 30 milliGRAM(s) Oral three times a day  pantoprazole    Tablet 40 milliGRAM(s) Oral before breakfast  sevelamer carbonate 1600 milliGRAM(s) Oral three times a day  sodium chloride 0.9%. 1000 milliLiter(s) (100 mL/Hr) IV Continuous <Continuous>  sodium chloride 0.9%. 1000 milliLiter(s) (50 mL/Hr) IV Continuous <Continuous>  ticagrelor 90 milliGRAM(s) Oral every 12 hours      VITAL:  T(C): , Max: 37.1 (01-05-21 @ 14:20)  T(F): , Max: 98.8 (01-05-21 @ 18:09)  HR: 86 (01-06-21 @ 10:00)  BP: 115/77 (01-06-21 @ 10:00)  BP(mean): --  RR: 18 (01-06-21 @ 05:00)  SpO2: 100% (01-06-21 @ 10:00)  Wt(kg): --    I and O's:    01-05 @ 07:01  -  01-06 @ 07:00  --------------------------------------------------------  IN: 720 mL / OUT: 215 mL / NET: 505 mL    01-06 @ 07:01  -  01-06 @ 12:05  --------------------------------------------------------  IN: 2000 mL / OUT: 1800 mL / NET: 200 mL          PHYSICAL EXAM:    Constitutional: NAD  Neck:  No JVD  Respiratory: CTAB/L  Cardiovascular: S1 and S2  Gastrointestinal: BS+, soft, NT/ND + PD catheter   Extremities: No peripheral edema  Neurological: A/O x 3, no focal deficits  Psychiatric: Normal mood, normal affect  : No Johnson  Skin: No rashes  Access: Not applicable    LABS:                        9.8    8.99  )-----------( 254      ( 06 Jan 2021 06:24 )             31.0     01-06    134<L>  |  91<L>  |  38<H>  ----------------------------<  151<H>  4.0   |  25  |  12.00<H>    Ca    8.5      06 Jan 2021 06:24  Phos  6.3     01-06            Urine Studies:          RADIOLOGY & ADDITIONAL STUDIES:

## 2021-01-06 NOTE — PROGRESS NOTE ADULT - ASSESSMENT
CATH 11/30/2020:  COMPLICATIONS: The stent was deployed without difficulty. On removal of the  balloon, the shaft broke and the tip of the balloon remained in the vessel. Several attempts were made to snare the balloon unsuccessfully. Dr. Verdugo was notifed who will take the patient to the operating room.  DIAGNOSTIC RECOMMENDATIONS: The patient should continue with the present medications. The patients vessel was stented without difficulty On removal of the balloon, the shaft broke with the baloon stuck in the left main. All attempts to snare the balloon out failed and the patient was taken to the OR by Dr. Arango to remove the balloon  introp eleonora 11/30/20: mild to mod MR, < from: Intra-Operative Transesophageal Echo (11.30.20 @ 17:02) >  Severe global left ventricular systolic dysfunction. Inferior and inferolateral akinesis.  severe hypokinesis of other segments.  Severe global hypokinesia.  Normal left ventricular internal dimensions and wall thicknesses. Moderate diastolic dysfunction (Stage II).  echo 12/17/20: EF 32%, mod MR, Severe global left ventricular systolic dysfunction, moderate pulmonary pressures  echo 12/20/20: EF (Visual Estimate): 25-30 % mild MR, Akinesis of the inferolateral, anterolateral, apical laterlal, inferior, and inferoseptal walls. Severe left ventricular enlargement. No left ventricular thrombus is seen.      a/p  55y male with esrd on PD for 5 years, dm poorly controlled, systolic CHF, CAD, s/p CABG, s/p PCI to OM c/b by failure to remove stent balloon requiring open heart surgery for removal, admitted with  hypotension,  fever and weakness    1. Ischemic Cardiomyopathy. Chronic systolic HF  -cv stable  -Repeat echo with unchanged lv sys dysfx, ef 25- 30%  -BB on hold due to orthostatic hypotension , no acei/arb for now   -continue midodrine for bp support  -EP f/u, not candidate for icd at present     2. CAD, s/p CABG, s/p PCI, s/p redo open heart for stent balloon retrieval   -CV stable  -c/w asa, Brilinta, statin     3. Weakness, R/O sepsis  -abx per id- work up per ID, bc 12/30 neg  -CT chest 12/30  noted  Small volume pneumoperitoneum. Sternal fluid collection has decreased in size  -pod f/u noted   -CT abd noted     4. ESRD  -on PD  -renal f/u    5. Syncope, orthostatic hypotension   -12/30 brief LOC while being adjusted from laying to sitting position   -likely vasovagal secondary to orthostatic hypotension, no events on tele  -still remains orthostatic with sx, however with some improvement   -cont midodrine as ordered   -infectious work up thus far unrevealing  -minimize volume removal with pd per renal   -trend orthostatic vitals   -continue to give volume ressucc if remains dizzy on ambulation    dvt ppx   CATH 11/30/2020:  COMPLICATIONS: The stent was deployed without difficulty. On removal of the  balloon, the shaft broke and the tip of the balloon remained in the vessel. Several attempts were made to snare the balloon unsuccessfully. Dr. Verdugo was notifed who will take the patient to the operating room.  DIAGNOSTIC RECOMMENDATIONS: The patient should continue with the present medications. The patients vessel was stented without difficulty On removal of the balloon, the shaft broke with the baloon stuck in the left main. All attempts to snare the balloon out failed and the patient was taken to the OR by Dr. Arango to remove the balloon  introp eleonora 11/30/20: mild to mod MR, < from: Intra-Operative Transesophageal Echo (11.30.20 @ 17:02) >  Severe global left ventricular systolic dysfunction. Inferior and inferolateral akinesis.  severe hypokinesis of other segments.  Severe global hypokinesia.  Normal left ventricular internal dimensions and wall thicknesses. Moderate diastolic dysfunction (Stage II).  echo 12/17/20: EF 32%, mod MR, Severe global left ventricular systolic dysfunction, moderate pulmonary pressures  echo 12/20/20: EF (Visual Estimate): 25-30 % mild MR, Akinesis of the inferolateral, anterolateral, apical laterlal, inferior, and inferoseptal walls. Severe left ventricular enlargement. No left ventricular thrombus is seen.      a/p  55y male with esrd on PD for 5 years, dm poorly controlled, systolic CHF, CAD, s/p CABG, s/p PCI to OM c/b by failure to remove stent balloon requiring open heart surgery for removal, admitted with  hypotension,  fever and weakness    1. Ischemic Cardiomyopathy. Chronic systolic HF  -cv stable  -Repeat echo with unchanged lv sys dysfx, ef 25- 30%  -BB on hold due to orthostatic hypotension , no acei/arb for now   -continue midodrine for bp support  -EP f/u, not candidate for icd at present     2. CAD, s/p CABG, s/p PCI, s/p redo open heart for stent balloon retrieval   -CV stable  -c/w asa, Brilinta, statin     3. Weakness, R/O sepsis  -abx per id- work up per ID, bc 12/30 neg  -CT chest 12/30  noted  Small volume pneumoperitoneum. Sternal fluid collection has decreased in size  -pod f/u noted   -CT abd noted     4. ESRD  -on PD  -renal f/u    5. Syncope, orthostatic hypotension   -12/30 brief LOC while being adjusted from laying to sitting position   -likely vasovagal secondary to orthostatic hypotension, no events on tele  -still remains orthostatic with sx, however with some improvement   -cont midodrine as ordered, IVF  -infectious work up thus far unrevealing  -minimize volume removal with pd per renal   -trend orthostatic vitals   -continue to give volume ressucc if remains dizzy on ambulation      dvt ppx

## 2021-01-06 NOTE — PROGRESS NOTE ADULT - SUBJECTIVE AND OBJECTIVE BOX
DIABETES FOLLOW UP NOTE: Saw pt earlier today  INTERVAL HX: 54 yo M w/h/o uncontrolled T2DM on Toujeo 60 units at hs prior PD and Victoza daily PTA. Pt reports his BG overnight were in low 200s while on Toujeo 60 units. DM c/b CAD, s/p CABG x 4,  ESRD on PD, DM retinopathy, diabetic neuropathy with PVD. Also h/o HTN, HLD and recent admission with SOB , dizziness and mild chest pain with exertion> s/p HC with stent placement on OM with wire fractured when trying to remove balloon requiring OR for removal of the angioplasty balloon and fractured wire via transverse aortotomy. Recent admission with fever/dizziness > infectious work up negative, and now presenting with hypotension and LE weakness.  Endocrine consulted fro DM management and hyperglycemia. Getting PD exchanges q6h during this admission. Getting PD exchanges q6h during this admission. Pt reports hiccoughs improving. Poor PO intake because he dislikes hospital food> noted breakfast tray untouched. Pt states his wife is bringing breakfast. BG >200s ac lunch likely due to pt having late breakfast and missing premeal insulin this am. BG levels remain between high 100s to 200s while on present insulin regimen. No hypoglycemia.       Review of Systems:  General: As above  Cardiovascular: No chest pain, palpitations  Respiratory: No SOB, no cough  GI: No nausea, vomiting, abdominal pain  Endocrine: no polyuria, polydipsia or S&Sx of hypoglycemia    Allergies    doxazosin (Other)    Intolerances      MEDICATIONS:  atorvastatin 80 milliGRAM(s) Oral at bedtime  gentamicin 0.1% Ointment 1 Application(s) Topical <User Schedule>  insulin glargine Injectable (LANTUS) 7 Unit(s) SubCutaneous at bedtime  insulin lispro (ADMELOG) corrective regimen sliding scale   SubCutaneous at bedtime  insulin lispro (ADMELOG) corrective regimen sliding scale   SubCutaneous three times a day before meals  insulin lispro Injectable (ADMELOG) 2 Unit(s) SubCutaneous three times a day with meals      PHYSICAL EXAM:  VITALS: T(C): 36.6 (01-06-21 @ 13:54)  T(F): 97.8 (01-06-21 @ 13:54), Max: 98.8 (01-05-21 @ 18:09)  HR: 86 (01-06-21 @ 10:00) (86 - 105)  BP: 115/77 (01-06-21 @ 10:00) (92/60 - 136/91)  RR:  (18 - 18)  SpO2:  (98% - 100%)  Wt(kg): --  GENERAL: Male laying in bed in NAD  Abdomen: Soft, nontender, non distended, central adiposity  Extremities: Warm, no edema in all 4 exts. L 1st toe ulcer with some ss drainage.   NEURO: A&O X3    LABS:  POCT Blood Glucose.: 249 mg/dL (01-06-21 @ 12:38)  POCT Blood Glucose.: 206 mg/dL (01-06-21 @ 09:00)  POCT Blood Glucose.: 195 mg/dL (01-05-21 @ 21:32)  POCT Blood Glucose.: 197 mg/dL (01-05-21 @ 19:44)  POCT Blood Glucose.: 185 mg/dL (01-05-21 @ 13:43)  POCT Blood Glucose.: 205 mg/dL (01-05-21 @ 09:00)  POCT Blood Glucose.: 209 mg/dL (01-04-21 @ 21:24)  POCT Blood Glucose.: 238 mg/dL (01-04-21 @ 13:35)  POCT Blood Glucose.: 190 mg/dL (01-04-21 @ 09:07)  POCT Blood Glucose.: 200 mg/dL (01-04-21 @ 07:47)  POCT Blood Glucose.: 208 mg/dL (01-03-21 @ 21:51)  POCT Blood Glucose.: 259 mg/dL (01-03-21 @ 18:00)                            9.8    8.99  )-----------( 254      ( 06 Jan 2021 06:24 )             31.0       01-06    134<L>  |  91<L>  |  38<H>  ----------------------------<  151<H>  4.0   |  25  |  12.00<H>      EGFR if non : 4<L>    Ca    8.5      01-06  Mg     2.0     01-04  Phos  6.3     01-06    A1C with Estimated Average Glucose Result: 7.8 % (12-01-20 @ 03:13)      Estimated Average Glucose: 177 mg/dL (12-01-20 @ 03:13)

## 2021-01-07 NOTE — PROGRESS NOTE ADULT - PROBLEM SELECTOR PLAN 1
-test BG AC/HS/2AM  -Increase Lantus dose to 9 units QHS   -C/w Admelog to 4 w/meals for now. PLEASE ADMINISTER WHENEVER PT EATS!  - C/w adjusted low dose Admelog correctional premeal dose (2-6)  -C/w low dose Admelog correction scale  at hs for now  Discharge:   -Recommend follow up with outpt Endo on Discharge. Dr Villegas in [place]  Touo insulin if pt c/w overnight PD with 2.5 dialysate. Dose TBD  -C/w Victoza 1.2mg daily

## 2021-01-07 NOTE — PROGRESS NOTE ADULT - PROBLEM SELECTOR PLAN 3
c/w Atorvastatin 80mg QHS    pager: [phone]   office:  [phone] (M-F 9a-5pm)               [phone] (nights/weekends)    -I spent a total time of 25 mins with the patient at bedside/on unit of which more than 50% of time was spent on counseling/coordination of care.

## 2021-01-07 NOTE — PROGRESS NOTE ADULT - ASSESSMENT
55 m with    Hiccups  - low dose Thorazine.     Hypotension  - s/p gentle IVF   - Midodrine  - telemetry  - Cardiology follow   - may need RHC    r/o Sepsis  - BC results noted  - off empiric antibiotics  - ID follow    Hx of small sternal collection decreased     Foot wound  - local care  - Podiatry follow    Diabetes Mellitus   - BS control  - ADA diet   - Endocrine follow     CAD  - continue Rx    CHF cr systolic  - cardiology follow    Depression  - continue Rx    ESRD  - PD  - Nephrology follow    DVT prophylaxis     PT    DVT prophylaxis    DCP in progress.    Pipe Beck MD pager [phone]

## 2021-01-07 NOTE — PROGRESS NOTE ADULT - SUBJECTIVE AND OBJECTIVE BOX
CARDIOLOGY FOLLOW UP - Dr. Best    CC: reports improvement with dizziness upon standing. denies cp, sob, and palpitations       PHYSICAL EXAM:  T(C): 36.3 (01-07-21 @ 12:00), Max: 36.9 (01-06-21 @ 17:59)  HR: 100 (01-07-21 @ 12:00) (89 - 102)  BP: 148/89 (01-07-21 @ 12:00) (113/80 - 150/82)  RR: 18 (01-07-21 @ 12:00) (18 - 18)  SpO2: 100% (01-07-21 @ 12:00) (100% - 100%)  Wt(kg): --  I&O's Summary    06 Jan 2021 07:01  -  07 Jan 2021 07:00  --------------------------------------------------------  IN: 8355 mL / OUT: 6600 mL / NET: 1755 mL    07 Jan 2021 07:01  -  07 Jan 2021 12:07  --------------------------------------------------------  IN: 2375 mL / OUT: 2200 mL / NET: 175 mL        Appearance: Normal	  Cardiovascular: Normal S1 S2,RRR, No JVD, No murmurs  Respiratory: Lungs clear to auscultation	  Gastrointestinal:  Soft, Non-tender, + BS	  Extremities: Normal range of motion, No clubbing, cyanosis or edema      Home Medications:  aspirin 81 mg oral delayed release tablet: 1 tab(s) orally once a day (29 Dec 2020 18:37)  atorvastatin 80 mg oral tablet: 1 tab(s) orally once a day (at bedtime) (29 Dec 2020 18:37)  epoetin martine: injectable once a month (29 Dec 2020 18:37)  ferrous sulfate 325 mg (65 mg elemental iron) oral tablet: 1 tab(s) orally 3 times a day (29 Dec 2020 18:37)  folic acid 0.4 mg oral tablet: 2 tab(s) orally once a day (29 Dec 2020 18:37)  gabapentin 100 mg oral capsule: 1 cap(s) orally once a day (29 Dec 2020 18:37)  nortriptyline 25 mg oral capsule: 1 cap(s) orally once a day (at bedtime) (29 Dec 2020 18:37)  pantoprazole 40 mg oral delayed release tablet: 1 tab(s) orally once a day (before a meal) (29 Dec 2020 18:37)  sevelamer carbonate 800 mg oral tablet: 1 tab(s) orally 3 times a day (with meals) (29 Dec 2020 18:37)  Toujeo SoloStar 300 units/mL subcutaneous solution: 70 unit(s) subcutaneous once a day (at bedtime) (29 Dec 2020 18:37)      MEDICATIONS  (STANDING):  aspirin enteric coated 81 milliGRAM(s) Oral daily  atorvastatin 80 milliGRAM(s) Oral at bedtime  cadexomer iodine 0.9% Gel 1 Application(s) Topical daily  dextrose 40% Gel 15 Gram(s) Oral once  dextrose 5%. 1000 milliLiter(s) (50 mL/Hr) IV Continuous <Continuous>  dextrose 5%. 1000 milliLiter(s) (100 mL/Hr) IV Continuous <Continuous>  dextrose 50% Injectable 25 Gram(s) IV Push once  dextrose 50% Injectable 12.5 Gram(s) IV Push once  dextrose 50% Injectable 25 Gram(s) IV Push once  ferrous    sulfate 325 milliGRAM(s) Oral daily  folic acid 1 milliGRAM(s) Oral daily  gentamicin 0.1% Ointment 1 Application(s) Topical <User Schedule>  glucagon  Injectable 1 milliGRAM(s) IntraMuscular once  heparin   Injectable 5000 Unit(s) SubCutaneous every 12 hours  insulin glargine Injectable (LANTUS) 9 Unit(s) SubCutaneous at bedtime  insulin lispro (ADMELOG) corrective regimen sliding scale   SubCutaneous three times a day before meals  insulin lispro (ADMELOG) corrective regimen sliding scale   SubCutaneous at bedtime  insulin lispro Injectable (ADMELOG) 4 Unit(s) SubCutaneous three times a day with meals  LORazepam   Injectable 0.25 milliGRAM(s) IV Push once  midodrine. 20 milliGRAM(s) Oral three times a day  pantoprazole    Tablet 40 milliGRAM(s) Oral before breakfast  sevelamer carbonate 1600 milliGRAM(s) Oral three times a day  sodium chloride 0.9%. 1000 milliLiter(s) (50 mL/Hr) IV Continuous <Continuous>  sodium chloride 0.9%. 1000 milliLiter(s) (75 mL/Hr) IV Continuous <Continuous>  sodium chloride 0.9%. 1000 milliLiter(s) (100 mL/Hr) IV Continuous <Continuous>  ticagrelor 90 milliGRAM(s) Oral every 12 hours      TELEMETRY: SR/ST 	    ECG:  	  RADIOLOGY:   DIAGNOSTIC TESTING:  [ ] Echocardiogram:  [ ]  Catheterization:  [ ] Stress Test:    OTHER: 	    LABS:	 	                            9.8    8.99  )-----------( 254      ( 06 Jan 2021 06:24 )             31.0     01-06    134<L>  |  91<L>  |  38<H>  ----------------------------<  151<H>  4.0   |  25  |  12.00<H>    Ca    8.5      06 Jan 2021 06:24  Phos  6.3     01-06

## 2021-01-07 NOTE — PROGRESS NOTE ADULT - SUBJECTIVE AND OBJECTIVE BOX
CC: f/u for  hypotension  Patient reports  he is cold  REVIEW OF SYSTEMS:  All other review of systems negative (Comprehensive ROS)    Antimicrobials Day #  :    Other Medications Reviewed    T(F): 97.4 (01-07-21 @ 12:00), Max: 98.2 (01-07-21 @ 10:00)  HR: 100 (01-07-21 @ 12:00)  BP: 129/54 (01-07-21 @ 16:31)  RR: 18 (01-07-21 @ 12:00)  SpO2: 100% (01-07-21 @ 12:00)  Wt(kg): --    PHYSICAL EXAM:  General: flat affect, no acute distress  Eyes:  anicteric, no conjunctival injection, no discharge  Oropharynx: no lesions or injection 	  Neck: supple, without adenopathy  Lungs: clear to auscultation  Heart: regular rate and rhythm; no murmur, rubs or gallops  Abdomen: soft, nondistended, nontender, without mass or organomegaly, pd catheter not tender  Skin: no lesions  Extremities: no clubbing, cyanosis, or edema. dry lac on left great toe  Neurologic:  moves all extremities    LAB RESULTS:                        9.8    8.99  )-----------( 254      ( 06 Jan 2021 06:24 )             31.0     01-06    134<L>  |  91<L>  |  38<H>  ----------------------------<  151<H>  4.0   |  25  |  12.00<H>    Ca    8.5      06 Jan 2021 06:24  Phos  6.3     01-06          MICROBIOLOGY:  RECENT CULTURES:      RADIOLOGY REVIEWED:    < from: CT Abdomen and Pelvis No Cont (01.04.21 @ 22:44) >  EXAM:  CT ABDOMEN AND PELVIS                            PROCEDURE DATE:  01/04/2021            INTERPRETATION:  CLINICAL INFORMATION: Persistent hiccups. Pneumoperitoneum on prior CT chest 12/30/2020, follow-up.    COMPARISON: CT chest 12/30/2020 and abdominal radiograph 1/1/2021.    PROCEDURE:  CT of the Abdomen and Pelvis was performed without intravenous contrast.  Intravenous contrast: None.  Oral contrast: None.  Sagittal and coronal reformats were performed.    FINDINGS:  Evaluation of the parenchymal organs and vascular structures is limited without intravenous contrast.    LOWER CHEST: Cardiomegaly. Aortic, aortic valve, mitral annulus, and coronary artery calcifications. RCA stent. Status post median sternotomy and CABG. Scattered areas of linear atelectasis.    LIVER: Within normal limits.  BILE DUCTS: Normal caliber.  GALLBLADDER: Sludge and cholelithiasis.  SPLEEN: Within normal limits.  PANCREAS: Within normal limits.  ADRENALS: Within normal limits.  KIDNEYS/URETERS: Within normal limits.    BLADDER: Within normal limits.  REPRODUCTIVE ORGANS: Prostate within normal limits.    BOWEL: No bowel obstruction. Appendix is normal.  PERITONEUM: Small volume ascites. Trace pneumoperitoneum, relatively decreased compared to prior CT chest.  VESSELS: Extensive atherosclerotic calcifications.  RETROPERITONEUM/LYMPH NODES: No lymphadenopathy.  ABDOMINAL WALL: Peritoneal dialysis catheter enters the peritoneum in the right lower quadrant and terminates in the left lower quadrant. A small umbilical hernia containing fat and a foci of air.  BONES: Degenerative changes.    IMPRESSION:  Trace pneumoperitoneum, relatively decreased compared to prior CT chest.    Small volume ascites.      < from: CT Chest No Cont (12.30.20 @ 00:58) >  EXAM:  CT CHEST                            PROCEDURE DATE:  12/30/2020            INTERPRETATION:  CLINICAL INFORMATION: Hypertension. Evaluate sternal collection.    COMPARISON: CT chest dated 12/14/2020.    PROCEDURE:  CT of the Chest was performed without intravenous contrast.  Sagittal and coronal reformats were performed.    FINDINGS:    LUNGS AND AIRWAYS: Small secretions within the trachea.  Minimal left basilar atelectasis.    PLEURA: No pleural effusion.    MEDIASTINUM AND RYAN: 1.1 cmright upper paratracheal lymph node is again noted.    VESSELS: Atherosclerotic changes of the aorta and coronary arteries.    HEART: The heart is enlarged. No pericardial effusion. Postsurgical changes of prior CABG. RCA stent.    CHEST WALL AND LOWER NECK: Postsurgical changes of median sternotomy and surgical staples overlie the mediastinum. Prior noted 3 x 1.5 cm fluid collection at the posterior table of the sternum now measures 2 cm x 0.8 cm and no longer contains a focus of air.    VISUALIZED UPPER ABDOMEN: Small amount of ascites is noted. Atherosclerotic aorta. Small volume pneumoperitoneum.    BONES: Degenerative changes.    IMPRESSION:    Small volume pneumoperitoneum.    Sternal fluid collection has decreased in size, now measuring 2 x 0.8 cm at the posterior table of the sternum.    Small amount of ascites.    Minimal left basilar atelectasis.    The above findings were discussed with CARLYLE Sharpe at 9:59 AM on 12/30/20 with read back confirmation.    < end of copied text >    < end of copied text >            Assessment:  Patient with esrd on pd, had recent cath and a wire got stuck so had a sternotomy, came in a few weeks ago with fever and hypotension, no specific infection found, returns again with hypotension, again no infection found. cultures neg, imaging neg, exam neg  Plan:  monitor off abx  call if further input is needed

## 2021-01-07 NOTE — CHART NOTE - NSCHARTNOTEFT_GEN_A_CORE
54 yo with history of DM2 c/b CAD, s/p CABG x 4,  ESRD on PD, DM retinopathy, diabetic neuropathy with PVD. With recent admission with SOB , dizziness and mild chest pain with exertion> s/p HC with stent placement on OM with wire fractured when trying to remove balloon requiring OR for removal of the angioplasty balloon and fractured wire via transverse aortotomy admitted for hypotension.   Patient known to Endo Service from previous admission.    On previous admission patient was on Lantus 12units qhs and Admelog 6units premeal with low correctional scale.   Of Note 8pm VBG, resulted glucose of 65. Recommend stat FS and hypoglycemia protocol of BG <70.  IF qhs BG remains under 100, would hold Lantus for tonight as uncertain of patient nutritional status  If qhs BG at goal, would give reduced dose of Lantus of 8units qhs   Recommend low correctional scale TIDac and qhs, until nutritional status determined  FS goal 100-180    Official consult to follow tmw    Tre Aguilar MD  Endocrine Fellow   please francisca  with further questions
Elysia Cast  Dept. Internal Medicine    TO BE COMPLETED WITHIN 6 HOURS OF INITIAL ASSESSMENT:    For use in patients that have 2 sepsis criteria and new organ dysfunction   •	New or increased oxygen requirement  •	Creatinine >2mg/dL    (13.7  )  •	Bilirubin>2mg/dL  •	Platelet <100,00/mm3  •	INR >1.5, PTT>60  •	Lactate 2.7  to  1.7    On  admission  Pt  was  persistent hypotensive  BP.  MICU  was  consulted  and  patient  was  a/p  2  liter  NS    Bolus  given  in  the ED.      Vital Signs Last 24 Hrs  T(C): 36.6 (29 Dec 2020 23:25), Max: 37 (29 Dec 2020 16:30)  T(F): 97.8 (29 Dec 2020 23:25), Max: 98.6 (29 Dec 2020 16:30)  HR: 75 (30 Dec 2020 00:12) (75 - 81)  BP: 102/73 (30 Dec 2020 00:12) (79/57 - 122/81)  BP(mean): --  RR: 18 (30 Dec 2020 00:12) (16 - 20)  SpO2: 100% (30 Dec 2020 00:12) (85% - 100%)  		  LUNGS:  [x  ]Clear bilaterally [  ] Wheeze [  ] Rhonchi [  ] Rales [  ] Crackles; Other:  HEART: [  ]RRR [  ] No murmur[x  ]  Normal S1S2[  ] Tachycardia;  Other:  CAPILLARY REFiLL:  	Fingers: [ x ] less than 2 seconds [  ] more than 2 seconds                                           Toes: [ x ]  less than 2 seconds [  ] more than 2 seconds   PERIPHERAL PULSES:  Radial: [x  ] Palpable  [  ]  non-palpable                                         Dorsalis Pedis: [ x ] Palpable  [  ] non-palpable                                         Posterior Tibial: [ x ] Palpable  [  ] non-palpable                                          Other:  SKIN:   [  ]  Diaphoretic  [  ]  mottling  [  ]  Cold extremities  [  ]  Warm [x  ]  Dry                      Other:    BEDSIDE ULTRASOUND FINDINGS (IF APPLICABLE):    Labs:  29 Dec 2020 14:34    134    |  90     |  40     ----------------------------<  168    3.5     |  27     |  13.17    Ca    9.5        29 Dec 2020 14:34  Phos  5.9       29 Dec 2020 14:34  Mg     2.4       29 Dec 2020 14:34    TPro  7.0    /  Alb  3.3    /  TBili  0.4    /  DBili  x      /  AST  19     /  ALT  10     /  AlkPhos  88     29 Dec 2020 14:34                          11.1   10.63 )-----------( 338      ( 29 Dec 2020 14:34 )             36.1     PT/INR - ( 29 Dec 2020 14:34 )   PT: 14.0 sec;   INR: 1.18 ratio         PTT - ( 29 Dec 2020 14:34 )  PTT:36.3 sec  Lactate:    Plan (orders must be placed in EMR):     Patient  is  currently  hemodynamically  improved.      [ X ]  No change in current plan    [X ]    If  Pt  becomes   hypotensive  Repeat Fluid Bolus:    Care Discussed with Consultants/Other Providers [ X] YES  [ ] NO
Episodic Note    Notified by the RN that when patient was due for the pre-meal/dinner insulin scale and 2 units of Admelog, he was not eating dinner, and the daytime RN didn't give those medications. However, now the nighttime RN found that the patient had eaten dinner late. Now his bedtime point of care blood glucose value was:    POCT Blood Glucose.: 209 mg/dL (01.04.21 @ 21:24)     > Called and spoke to the covering Endocrinology fellow about the above case.   > The covering Endocrinology fellow aware the patient missed the pre-meal/dinner insulin sliding scale and the extra 2 units of Admelog (and per Endocrinology fellow, no need to give the extra 2 units of Admelog either at this time).   > Since the patient's bedtime point of care blood glucose fingerstick, did not meet the criteria to get bedtime insulin sliding scale, patient only received 7 units of Lantus. Covering Endocrinology fellow aware.   > Continue to monitor the patient and point of care blood glucose finger sticks as scheduled.  > Continue to monitor the vitals closely.   > F/u with the primary care team and Endocrinology in the AM.     RN aware of the above management plan. Covering Endocrinology fellow aware of the above with her instructions above.     Greer Matthew PA-C  Department of Medicine   #[id number]
Notified by Radiologist that pt. with small volume pneumoperitoneum  Pt. with ESRD on Peritoneal dialysis.   Dr. Beck was made aware of the results.   Pt. with benign abdominal exam.   Will continue to monitor closely.   Jaimee Dell fnp-bc
Wound Care Team Note:    Request for wound care consult for toe wound received. Patient seen and being followed by Podiatry. Will defer to Podiatry for management.    Dianne Cisneros, CARLYLE-C, CWOCN [id number]
55y male with ESRD  on PD for 5 years, dm poorly controlled, systolic CHF, CAD, s/p CABG, s/p PCI to OM c/b by failure to remove stent balloon requiring open heart surgery for removal, admitted with  hypotension,  fever and weakness. Patient is with severe  orthostatic hypotension and increasing weakness. Due to patient's deconditioning and   generalized weakness secondary to above mentioned diagnosis patient will require a transport wheel chair. patient ia unable to propel in a standard wheel chair. This is necessary to achieve daily tasks and therapies which cannot be   achieved with the use of a cane or or rolling walker. Patient and family are in agreement with transport chair use at home and assistance will be provided if needed.  Jennifer Cage NP  [phone]
Asked to evaluate for drowsiness; pt s/p dose of Thorazine for intractable hiccups at 11 AM. pt arousable to name ; drifts back to sleep, but then waking up and asking about his current BP etc; moving all extremities; neuro exam non focal; BP improving after  cc bolus; pt to be drained soon;  Advised nurse to continue close observation to assess for improvement in alertness. currently with no hiccups; Attending notified.
Notified by RN of hypotension ; Pt need to be drained now with PD; s/p dose of 20 mg midodrine at 12 Noon.   Discussed with Nephrology Dr Ady OSPINA 500 cc Bolus  cont midodrine 20 mg tid  Attending notified
Patient with hiccup,seen by Dr Beck, ordered 12.5 mg thorazine  as recommended by Dr Beck.  Jennifer Cage NP  [phone]
Pt. with a brief LOC when sitting up on side of bed.   No events on telemetry monitoring.   Vitals signs revealed that SBP dropped to 80's   Pt. placed in Trendelenburg and repeat blood pressure 97/57  Pt. is mentating well , states that he felt weak and dizzy prior to the event.   Cardiology NP at bedside.   Midodrine increased to 20 tid.   Pt. likely with a  vagal episode.   Will continue to monitor closely.   Dr. Beck made aware and in agreement with plan.    [facility]-BC

## 2021-01-07 NOTE — CHART NOTE - NSCHARTNOTESELECT_GEN_ALL_CORE
BP 88/90/Event Note
Event Note
drowsiness/Event Note
Endocrine/Event Note
Event Note
Event Note
Follow  Sepsis/Event Note
Wound Care Team Note:/Event Note

## 2021-01-07 NOTE — PROGRESS NOTE ADULT - SUBJECTIVE AND OBJECTIVE BOX
Diabetes Follow up note:    Chief complaint: T2DM    Interval Hx: BG values in 200s. Pt reports has hiccups so therefore unable to eat much today. Has empty bottle of coke at bedside but reports was not drinking it. On PD w/exchanges q6h. On fluids for ortho hypotension.     Review of Systems:  General: c/o hiccups  GI: Tolerating POs. Denies N/V/D/Abd pain  CV: Denies CP/SOB  ENDO: No S&Sx of hypoglycemia    MEDS:  atorvastatin 80 milliGRAM(s) Oral at bedtime  insulin glargine Injectable (LANTUS) 9 Unit(s) SubCutaneous at bedtime  insulin lispro (ADMELOG) corrective regimen sliding scale   SubCutaneous three times a day before meals  insulin lispro (ADMELOG) corrective regimen sliding scale   SubCutaneous at bedtime  insulin lispro Injectable (ADMELOG) 4 Unit(s) SubCutaneous three times a day with meals      Allergies    doxazosin (Other)        PE:  General: Male lying in bed. NAD>   Vital Signs Last 24 Hrs  T(C): 36.8 (07 Jan 2021 10:00), Max: 36.9 (06 Jan 2021 17:59)  T(F): 98.2 (07 Jan 2021 10:00), Max: 98.4 (06 Jan 2021 17:59)  HR: 102 (07 Jan 2021 10:00) (89 - 102)  BP: 147/95 (07 Jan 2021 10:00) (113/80 - 150/82)  BP(mean): --  RR: 18 (07 Jan 2021 10:00) (18 - 18)  SpO2: 100% (07 Jan 2021 10:00) (100% - 100%)  Abd: Soft, NT,ND,   Extremities: Warm  Neuro: A&O X3    LABS:  POCT Blood Glucose.: 242 mg/dL (01-07-21 @ 09:35)  POCT Blood Glucose.: 227 mg/dL (01-06-21 @ 21:45)  POCT Blood Glucose.: 240 mg/dL (01-06-21 @ 17:52)  POCT Blood Glucose.: 249 mg/dL (01-06-21 @ 12:38)  POCT Blood Glucose.: 206 mg/dL (01-06-21 @ 09:00)  POCT Blood Glucose.: 195 mg/dL (01-05-21 @ 21:32)  POCT Blood Glucose.: 197 mg/dL (01-05-21 @ 19:44)  POCT Blood Glucose.: 185 mg/dL (01-05-21 @ 13:43)  POCT Blood Glucose.: 205 mg/dL (01-05-21 @ 09:00)  POCT Blood Glucose.: 209 mg/dL (01-04-21 @ 21:24)  POCT Blood Glucose.: 238 mg/dL (01-04-21 @ 13:35)                            9.8    8.99  )-----------( 254      ( 06 Jan 2021 06:24 )             31.0       01-06    134<L>  |  91<L>  |  38<H>  ----------------------------<  151<H>  4.0   |  25  |  12.00<H>    Ca    8.5      06 Jan 2021 06:24  Phos  6.3     01-06      A1C with Estimated Average Glucose Result: 7.8 % (12-01-20 @ 03:13)          Contact number: darren [phone] or [phone]

## 2021-01-07 NOTE — PROGRESS NOTE ADULT - SUBJECTIVE AND OBJECTIVE BOX
Patient is a 55y old  Male who presents with a chief complaint of syncope (07 Jan 2021 12:07)      SUBJECTIVE / OVERNIGHT EVENTS: c/o hiccups.  Review of Systems  chest pain no  palpitations no  sob no  nausea no  headache no    MEDICATIONS  (STANDING):  aspirin enteric coated 81 milliGRAM(s) Oral daily  atorvastatin 80 milliGRAM(s) Oral at bedtime  cadexomer iodine 0.9% Gel 1 Application(s) Topical daily  dextrose 40% Gel 15 Gram(s) Oral once  dextrose 5%. 1000 milliLiter(s) (50 mL/Hr) IV Continuous <Continuous>  dextrose 5%. 1000 milliLiter(s) (100 mL/Hr) IV Continuous <Continuous>  dextrose 50% Injectable 25 Gram(s) IV Push once  dextrose 50% Injectable 12.5 Gram(s) IV Push once  dextrose 50% Injectable 25 Gram(s) IV Push once  ferrous    sulfate 325 milliGRAM(s) Oral daily  folic acid 1 milliGRAM(s) Oral daily  gentamicin 0.1% Ointment 1 Application(s) Topical <User Schedule>  glucagon  Injectable 1 milliGRAM(s) IntraMuscular once  heparin   Injectable 5000 Unit(s) SubCutaneous every 12 hours  insulin glargine Injectable (LANTUS) 9 Unit(s) SubCutaneous at bedtime  insulin lispro (ADMELOG) corrective regimen sliding scale   SubCutaneous three times a day before meals  insulin lispro (ADMELOG) corrective regimen sliding scale   SubCutaneous at bedtime  insulin lispro Injectable (ADMELOG) 4 Unit(s) SubCutaneous three times a day with meals  LORazepam   Injectable 0.25 milliGRAM(s) IV Push once  midodrine. 20 milliGRAM(s) Oral three times a day  pantoprazole    Tablet 40 milliGRAM(s) Oral before breakfast  sevelamer carbonate 1600 milliGRAM(s) Oral three times a day  sodium chloride 0.9%. 1000 milliLiter(s) (100 mL/Hr) IV Continuous <Continuous>  sodium chloride 0.9%. 1000 milliLiter(s) (50 mL/Hr) IV Continuous <Continuous>  sodium chloride 0.9%. 1000 milliLiter(s) (75 mL/Hr) IV Continuous <Continuous>  ticagrelor 90 milliGRAM(s) Oral every 12 hours    MEDICATIONS  (PRN):      Vital Signs Last 24 Hrs  T(C): 36.3 (07 Jan 2021 12:00), Max: 36.9 (06 Jan 2021 17:59)  T(F): 97.4 (07 Jan 2021 12:00), Max: 98.4 (06 Jan 2021 17:59)  HR: 100 (07 Jan 2021 12:00) (89 - 102)  BP: 148/89 (07 Jan 2021 12:00) (113/80 - 150/82)  BP(mean): --  RR: 18 (07 Jan 2021 12:00) (18 - 18)  SpO2: 100% (07 Jan 2021 12:00) (100% - 100%)    PHYSICAL EXAM:  GENERAL: NAD  HEAD:  Atraumatic, Normocephalic  EYES: EOMI, PERRLA, conjunctiva and sclera clear  NECK: Supple, No JVD  CHEST/LUNG: Clear to auscultation bilaterally; No wheeze Sternal incision healing  HEART: Regular rate and rhythm; No murmurs, rubs, or gallops  ABDOMEN: Soft, Nontender, Nondistended; Bowel sounds present  EXTREMITIES:  2+ Peripheral Pulses, No clubbing, cyanosis, or edema  PSYCH: AAOx3  NEUROLOGY: non-focal  SKIN: No rashes or lesions    LABS:                        9.8    8.99  )-----------( 254      ( 06 Jan 2021 06:24 )             31.0     01-06    134<L>  |  91<L>  |  38<H>  ----------------------------<  151<H>  4.0   |  25  |  12.00<H>    Ca    8.5      06 Jan 2021 06:24  Phos  6.3     01-06                  RADIOLOGY & ADDITIONAL TESTS:    Imaging Personally Reviewed:    Consultant(s) Notes Reviewed:      Care Discussed with Consultants/Other Providers:

## 2021-01-07 NOTE — PROGRESS NOTE ADULT - ASSESSMENT
54 yo M w/h/o uncontrolled T2DM on Toujeo 60 units at hs prior PD and Victoza daily PTA. Pt reports his BG overnight were in low 200s while on Toujeo 60 units. DM c/b CAD, s/p CABG x 4,  ESRD on PD, DM retinopathy, diabetic neuropathy with PVD. Also h/o HTN, HLD and recent admission with SOB , dizziness and mild chest pain with exertion> s/p HC with stent placement on OM with wire fractured when trying to remove balloon requiring OR for removal of the angioplasty balloon and fractured wire via transverse aortotomy. Recent admission with fever/dizziness > infectious work up negative, and now presenting with hypotension and LE weakness.  Endocrine consulted fro DM management and hyperglycemia. Getting PD exchanges q6h during this admission. Erratic and unpredictable PO intake making glycemic control difficult in this patient. BG goal (100-180mg/dl).

## 2021-01-07 NOTE — PROGRESS NOTE ADULT - PROBLEM SELECTOR PROBLEM 3
Hyperchylomicronemia
Hyperchylomicronemia
Hyperlipidemia, unspecified hyperlipidemia type
Hyperchylomicronemia

## 2021-01-07 NOTE — PROGRESS NOTE ADULT - ASSESSMENT
CATH 11/30/2020:  COMPLICATIONS: The stent was deployed without difficulty. On removal of the  balloon, the shaft broke and the tip of the balloon remained in the vessel. Several attempts were made to snare the balloon unsuccessfully. Dr. Verdugo was notifed who will take the patient to the operating room.  DIAGNOSTIC RECOMMENDATIONS: The patient should continue with the present medications. The patients vessel was stented without difficulty On removal of the balloon, the shaft broke with the baloon stuck in the left main. All attempts to snare the balloon out failed and the patient was taken to the OR by Dr. Arango to remove the balloon  introp eleonora 11/30/20: mild to mod MR, < from: Intra-Operative Transesophageal Echo (11.30.20 @ 17:02) >  Severe global left ventricular systolic dysfunction. Inferior and inferolateral akinesis.  severe hypokinesis of other segments.  Severe global hypokinesia.  Normal left ventricular internal dimensions and wall thicknesses. Moderate diastolic dysfunction (Stage II).  echo 12/17/20: EF 32%, mod MR, Severe global left ventricular systolic dysfunction, moderate pulmonary pressures  echo 12/20/20: EF (Visual Estimate): 25-30 % mild MR, Akinesis of the inferolateral, anterolateral, apical laterlal, inferior, and inferoseptal walls. Severe left ventricular enlargement. No left ventricular thrombus is seen.      a/p  55y male with esrd on PD for 5 years, dm poorly controlled, systolic CHF, CAD, s/p CABG, s/p PCI to OM c/b by failure to remove stent balloon requiring open heart surgery for removal, admitted with  hypotension,  fever and weakness    1. Ischemic Cardiomyopathy. Chronic systolic HF  -cv stable  -Repeat echo with unchanged lv sys dysfx, ef 25- 30%  -BB on hold due to orthostatic hypotension , no acei/arb for now   -continue midodrine for bp support  -EP f/u, not candidate for icd at present     2. CAD, s/p CABG, s/p PCI, s/p redo open heart for stent balloon retrieval   -CV stable  -c/w asa, Brilinta, statin     3. Weakness, R/O sepsis  -abx per id- work up per ID, bc 12/30 neg  -CT chest 12/30  noted  Small volume pneumoperitoneum. Sternal fluid collection has decreased in size  -pod f/u noted   -CT abd noted     4. ESRD  -on PD  -renal f/u    5. Syncope, orthostatic hypotension   -12/30 brief LOC while being adjusted from laying to sitting position   -likely vasovagal secondary to orthostatic hypotension, no events on tele  -still remains orthostatic with sx, however with some improvement   -cont midodrine as ordered, IVF  -infectious work up thus far unrevealing  -minimize volume removal with pd per renal   -trend orthostatic vitals   -continue to give volume ressucc if remains dizzy on ambulation      dvt ppx

## 2021-01-07 NOTE — PROGRESS NOTE ADULT - SUBJECTIVE AND OBJECTIVE BOX
[facility] [phone]        Patient seen and examined in bed.  He was feeling better and less orthostasis         MEDICATIONS  (STANDING):  aspirin enteric coated 81 milliGRAM(s) Oral daily  atorvastatin 80 milliGRAM(s) Oral at bedtime  cadexomer iodine 0.9% Gel 1 Application(s) Topical daily  dextrose 40% Gel 15 Gram(s) Oral once  dextrose 5%. 1000 milliLiter(s) (50 mL/Hr) IV Continuous <Continuous>  dextrose 5%. 1000 milliLiter(s) (100 mL/Hr) IV Continuous <Continuous>  dextrose 50% Injectable 25 Gram(s) IV Push once  dextrose 50% Injectable 12.5 Gram(s) IV Push once  dextrose 50% Injectable 25 Gram(s) IV Push once  ferrous    sulfate 325 milliGRAM(s) Oral daily  folic acid 1 milliGRAM(s) Oral daily  gentamicin 0.1% Ointment 1 Application(s) Topical <User Schedule>  glucagon  Injectable 1 milliGRAM(s) IntraMuscular once  heparin   Injectable 5000 Unit(s) SubCutaneous every 12 hours  insulin glargine Injectable (LANTUS) 9 Unit(s) SubCutaneous at bedtime  insulin lispro (ADMELOG) corrective regimen sliding scale   SubCutaneous three times a day before meals  insulin lispro (ADMELOG) corrective regimen sliding scale   SubCutaneous at bedtime  insulin lispro Injectable (ADMELOG) 4 Unit(s) SubCutaneous three times a day with meals  LORazepam   Injectable 0.25 milliGRAM(s) IV Push once  midodrine. 20 milliGRAM(s) Oral three times a day  pantoprazole    Tablet 40 milliGRAM(s) Oral before breakfast  sevelamer carbonate 1600 milliGRAM(s) Oral three times a day  sodium chloride 0.9%. 1000 milliLiter(s) (100 mL/Hr) IV Continuous <Continuous>  sodium chloride 0.9%. 1000 milliLiter(s) (50 mL/Hr) IV Continuous <Continuous>  sodium chloride 0.9%. 1000 milliLiter(s) (75 mL/Hr) IV Continuous <Continuous>  ticagrelor 90 milliGRAM(s) Oral every 12 hours      VITAL:  T(C): , Max: 36.9 (01-06-21 @ 17:59)  T(F): , Max: 98.4 (01-06-21 @ 17:59)  HR: 100 (01-07-21 @ 12:00)  BP: 148/89 (01-07-21 @ 12:00)  BP(mean): --  RR: 18 (01-07-21 @ 12:00)  SpO2: 100% (01-07-21 @ 12:00)  Wt(kg): --    I and O's:    01-06 @ 07:01  -  01-07 @ 07:00  --------------------------------------------------------  IN: 8355 mL / OUT: 6600 mL / NET: 1755 mL    01-07 @ 07:01  -  01-07 @ 13:52  --------------------------------------------------------  IN: 2375 mL / OUT: 2200 mL / NET: 175 mL          PHYSICAL EXAM:    Constitutional: NAD  Neck:  No JVD  Respiratory: CTAB/L  Cardiovascular: S1 and S2  Gastrointestinal: BS+, soft, NT/ND + PD catheter   Extremities: No peripheral edema  Neurological: A/O x 3, no focal deficits  Psychiatric: Normal mood, normal affect  : No Johnson  Skin: No rashes  Access: Not applicable    LABS:                        9.8    8.99  )-----------( 254      ( 06 Jan 2021 06:24 )             31.0     01-06    134<L>  |  91<L>  |  38<H>  ----------------------------<  151<H>  4.0   |  25  |  12.00<H>    Ca    8.5      06 Jan 2021 06:24  Phos  6.3     01-06            Urine Studies:          RADIOLOGY & ADDITIONAL STUDIES:

## 2021-01-07 NOTE — PROGRESS NOTE ADULT - ASSESSMENT
55M PMH ESRD on PD, DM, CAD s/p CABG x4, underwent cardiac cath and stent and wire broke in heart s/p sternotomy during recent admission 11/30-12/11, recently discharged 1 wk ago for dizziness and transient fever, was treated for culture negative sepsis, BIBEMS for generalized weakness and hypotension x 1 wk.   No stitmata to suggest peritonitis     1 Renal-  On  PD at present;  The PD fluid is not leading to dehydration--he is having an average of 700-900 cc net negative   He is still orthostatic but not symptomatic     2 CVS-  Midodrine 30mg po tid;  Was still orthostatic but less so then before... IVF for another 8 hours and dc the IVF.  IF the pt is stable in am then dc planning.  Check orthostasis in am     3 Hyperphosphatemia-  Renvela 1600 mg po tid       Sayed [facility]   [place]  [phone]

## 2021-01-08 NOTE — PROGRESS NOTE ADULT - ASSESSMENT
CATH 11/30/2020:  COMPLICATIONS: The stent was deployed without difficulty. On removal of the  balloon, the shaft broke and the tip of the balloon remained in the vessel. Several attempts were made to snare the balloon unsuccessfully. Dr. Verdugo was notifed who will take the patient to the operating room.  DIAGNOSTIC RECOMMENDATIONS: The patient should continue with the present medications. The patients vessel was stented without difficulty On removal of the balloon, the shaft broke with the baloon stuck in the left main. All attempts to snare the balloon out failed and the patient was taken to the OR by Dr. Arango to remove the balloon  introp eleonora 11/30/20: mild to mod MR, < from: Intra-Operative Transesophageal Echo (11.30.20 @ 17:02) >  Severe global left ventricular systolic dysfunction. Inferior and inferolateral akinesis.  severe hypokinesis of other segments.  Severe global hypokinesia.  Normal left ventricular internal dimensions and wall thicknesses. Moderate diastolic dysfunction (Stage II).  echo 12/17/20: EF 32%, mod MR, Severe global left ventricular systolic dysfunction, moderate pulmonary pressures  echo 12/20/20: EF (Visual Estimate): 25-30 % mild MR, Akinesis of the inferolateral, anterolateral, apical laterlal, inferior, and inferoseptal walls. Severe left ventricular enlargement. No left ventricular thrombus is seen.      a/p  55y male with esrd on PD for 5 years, dm poorly controlled, systolic CHF, CAD, s/p CABG, s/p PCI to OM c/b by failure to remove stent balloon requiring open heart surgery for removal, admitted with  hypotension,  fever and weakness    1. Ischemic Cardiomyopathy. Chronic systolic HF  -cv stable  -Repeat echo with unchanged lv sys dysfx, ef 25- 30%  -BB on hold due to orthostatic hypotension , no acei/arb for now   -continue midodrine for bp support  -EP f/u, not candidate for icd at present     2. CAD, s/p CABG, s/p PCI, s/p redo open heart for stent balloon retrieval   -CV stable  -c/w asa, Brilinta, statin     3. Weakness, R/O sepsis  -abx per id- work up per ID, bc 12/30 neg  -CT chest 12/30  noted  Small volume pneumoperitoneum. Sternal fluid collection has decreased in size  -pod f/u noted   -CT abd noted     4. ESRD  -on PD  -renal f/u    5. Syncope, orthostatic hypotension   -12/30 brief LOC while being adjusted from laying to sitting position   -likely vasovagal secondary to orthostatic hypotension, no events on tele  -orthostatics neg  - s/p ivf   -cont midodrine as ordered  -infectious work up thus far unrevealing  -minimize volume removal with pd per renal   -trend orthostatic vitals   -continue to give volume ressucc if remains dizzy on ambulation      dvt ppx   CATH 11/30/2020:  COMPLICATIONS: The stent was deployed without difficulty. On removal of the  balloon, the shaft broke and the tip of the balloon remained in the vessel. Several attempts were made to snare the balloon unsuccessfully. Dr. Verdugo was notifed who will take the patient to the operating room.  DIAGNOSTIC RECOMMENDATIONS: The patient should continue with the present medications. The patients vessel was stented without difficulty On removal of the balloon, the shaft broke with the baloon stuck in the left main. All attempts to snare the balloon out failed and the patient was taken to the OR by Dr. Arango to remove the balloon  introp eleonora 11/30/20: mild to mod MR, < from: Intra-Operative Transesophageal Echo (11.30.20 @ 17:02) >  Severe global left ventricular systolic dysfunction. Inferior and inferolateral akinesis.  severe hypokinesis of other segments.  Severe global hypokinesia.  Normal left ventricular internal dimensions and wall thicknesses. Moderate diastolic dysfunction (Stage II).  echo 12/17/20: EF 32%, mod MR, Severe global left ventricular systolic dysfunction, moderate pulmonary pressures  echo 12/20/20: EF (Visual Estimate): 25-30 % mild MR, Akinesis of the inferolateral, anterolateral, apical laterlal, inferior, and inferoseptal walls. Severe left ventricular enlargement. No left ventricular thrombus is seen.      a/p  55y male with esrd on PD for 5 years, dm poorly controlled, systolic CHF, CAD, s/p CABG, s/p PCI to OM c/b by failure to remove stent balloon requiring open heart surgery for removal, admitted with  hypotension,  fever and weakness    1. Ischemic Cardiomyopathy. Chronic systolic HF  -cv stable  -Repeat echo with unchanged lv sys dysfx, ef 25- 30%  -BB on hold due to orthostatic hypotension , no acei/arb for now   -continue midodrine for bp support  -EP f/u, not candidate for icd at present     2. CAD, s/p CABG, s/p PCI, s/p redo open heart for stent balloon retrieval   -CV stable  -c/w asa, Brilinta, statin     3. Weakness, R/O sepsis  -abx per id- work up per ID, bc 12/30 neg  -CT chest 12/30  noted  Small volume pneumoperitoneum. Sternal fluid collection has decreased in size  -pod f/u noted   -CT abd noted     4. ESRD  -on PD  -renal f/u    5. Syncope, orthostatic hypotension   -12/30 brief LOC while being adjusted from laying to sitting position   -likely vasovagal secondary to orthostatic hypotension, no events on tele  -orthostatics neg  - s/p ivf   -cont midodrine as ordered  -infectious work up thus far unrevealing  -minimize volume removal with pd per renal   -trend orthostatic vitals   -continue to give volume ressucc if remains dizzy on ambulation      dvt ppx    dcp     pt to f/u w Dr. Best in office -apt to be made

## 2021-01-08 NOTE — PROGRESS NOTE ADULT - ASSESSMENT
54 yo M w/h/o uncontrolled T2DM on Toujeo 60 units at hs prior PD and Victoza daily PTA. Pt reports his BG overnight were in low 200s while on Toujeo 60 units. DM c/b CAD, s/p CABG x 4,  ESRD on PD, DM retinopathy, diabetic neuropathy with PVD. Also h/o HTN, HLD and recent admission with SOB , dizziness and mild chest pain with exertion> s/p HC with stent placement on OM with wire fractured when trying to remove balloon requiring OR for removal of the angioplasty balloon and fractured wire via transverse aortotomy. Recent admission with fever/dizziness > infectious work up negative, and now presenting with hypotension and LE weakness.  Endocrine consulted fro DM management and hyperglycemia. Getting PD exchanges q6h during this admission. Erratic and unpredictable PO intake making glycemic control difficult to achieve. Pt dislikes and can't eat hospital food so eats food from home at different times of the day with rebound hyperglycemia. Per team and pt going home this pm. Will adjust insulin doses if pt stays and gave discharge recs to team for home since pt does PD overnight and needs high insulin doses. BG goal (100-180mg/dl).     Spent > 25 minutes assessing pt/labs/meds and discussing discharge plan of care with primary team. Moderate complexity encounter on pt with uncontrolled T2DM and multiple DM complications and comorbidities. Adjusting insulin

## 2021-01-08 NOTE — PROGRESS NOTE ADULT - NUTRITIONAL ASSESSMENT
Please see RD assessment and/or follow up.  Managed by primary team as well

## 2021-01-08 NOTE — PROGRESS NOTE ADULT - ASSESSMENT
55M PMH ESRD on PD, DM, CAD s/p CABG x4, underwent cardiac cath and stent and wire broke in heart s/p sternotomy during recent admission 11/30-12/11, recently discharged 1 wk ago for dizziness and transient fever, was treated for culture negative sepsis, BIBEMS for generalized weakness and hypotension x 1 wk.   No stitmata to suggest peritonitis     1 Renal-  On  PD at present;  The PD fluid is not leading to dehydration--he is having an average of 700-900 cc net negative   He is still orthostatic but barely and markedly improved     2 CVS-  Midodrine 30mg po tid;  Was still orthostatic but less so then before...      3 Hyperphosphatemia-  Renvela 1600 mg po tid       Sayed Albireo  [phone]

## 2021-01-08 NOTE — PROGRESS NOTE ADULT - ASSESSMENT
55 m with    Hiccups improving   - low dose Thorazine.     Hypotension  - s/p gentle IVF   - Midodrine  - telemetry  - Cardiology follow   - cleared for DC    r/o Sepsis  - BC results noted  - off empiric antibiotics  - ID follow    Hx of small sternal collection decreased     Foot wound  - local care  - Podiatry follow    Diabetes Mellitus   - BS control  - ADA diet   - Endocrine follow     CAD  - continue Rx    CHF cr systolic  - cardiology follow    Depression  - continue Rx    ESRD  - PD  - Nephrology follow    DVT prophylaxis     PT    DVT prophylaxis    DC home. Follow closely with PMD/ Cardiology/ Nephrology in 3-4 days. QA    Pipe Beck MD pager [phone]

## 2021-01-08 NOTE — PROGRESS NOTE ADULT - SUBJECTIVE AND OBJECTIVE BOX
Patient is a 55y old  Male who presents with a chief complaint of Hypotension (08 Jan 2021 12:45)      SUBJECTIVE / OVERNIGHT EVENTS: No new complaints.   Review of Systems  chest pain no  palpitations no  sob no  nausea no  headache no    MEDICATIONS  (STANDING):  aspirin enteric coated 81 milliGRAM(s) Oral daily  atorvastatin 80 milliGRAM(s) Oral at bedtime  cadexomer iodine 0.9% Gel 1 Application(s) Topical daily  dextrose 40% Gel 15 Gram(s) Oral once  dextrose 5%. 1000 milliLiter(s) (50 mL/Hr) IV Continuous <Continuous>  dextrose 5%. 1000 milliLiter(s) (100 mL/Hr) IV Continuous <Continuous>  dextrose 50% Injectable 25 Gram(s) IV Push once  dextrose 50% Injectable 12.5 Gram(s) IV Push once  dextrose 50% Injectable 25 Gram(s) IV Push once  ferrous    sulfate 325 milliGRAM(s) Oral daily  folic acid 1 milliGRAM(s) Oral daily  gentamicin 0.1% Ointment 1 Application(s) Topical <User Schedule>  glucagon  Injectable 1 milliGRAM(s) IntraMuscular once  heparin   Injectable 5000 Unit(s) SubCutaneous every 12 hours  insulin glargine Injectable (LANTUS) 9 Unit(s) SubCutaneous at bedtime  insulin lispro (ADMELOG) corrective regimen sliding scale   SubCutaneous three times a day before meals  insulin lispro (ADMELOG) corrective regimen sliding scale   SubCutaneous at bedtime  insulin lispro Injectable (ADMELOG) 4 Unit(s) SubCutaneous three times a day with meals  LORazepam   Injectable 0.25 milliGRAM(s) IV Push once  midodrine. 20 milliGRAM(s) Oral three times a day  pantoprazole    Tablet 40 milliGRAM(s) Oral before breakfast  sevelamer carbonate 1600 milliGRAM(s) Oral three times a day  sodium chloride 0.9%. 1000 milliLiter(s) (50 mL/Hr) IV Continuous <Continuous>  sodium chloride 0.9%. 1000 milliLiter(s) (75 mL/Hr) IV Continuous <Continuous>  sodium chloride 0.9%. 1000 milliLiter(s) (100 mL/Hr) IV Continuous <Continuous>  ticagrelor 90 milliGRAM(s) Oral every 12 hours    MEDICATIONS  (PRN):      Vital Signs Last 24 Hrs  T(C): 36.8 (08 Jan 2021 13:00), Max: 37.1 (07 Jan 2021 22:00)  T(F): 98.2 (08 Jan 2021 13:00), Max: 98.7 (07 Jan 2021 22:00)  HR: 108 (08 Jan 2021 13:00) (93 - 108)  BP: 155/92 (08 Jan 2021 13:00) (120/81 - 155/92)  BP(mean): --  RR: 18 (08 Jan 2021 13:00) (18 - 18)  SpO2: 100% (08 Jan 2021 13:00) (98% - 100%)    PHYSICAL EXAM:  GENERAL: NAD, well-developed  HEAD:  Atraumatic, Normocephalic  EYES: EOMI, PERRLA, conjunctiva and sclera clear  NECK: Supple, No JVD  CHEST/LUNG: Clear to auscultation bilaterally; No wheeze  HEART: Regular rate and rhythm; No murmurs, rubs, or gallops  ABDOMEN: Soft, Nontender, Nondistended; Bowel sounds present PD catheter in place.  EXTREMITIES:  2+ Peripheral Pulses, No clubbing, cyanosis, or edema  PSYCH: AAOx3  NEUROLOGY: non-focal  SKIN: No rashes or lesions    LABS:                        9.2    9.25  )-----------( 252      ( 08 Jan 2021 06:05 )             29.8     01-08    135  |  95<L>  |  31<H>  ----------------------------<  147<H>  3.7   |  23  |  11.33<H>    Ca    8.1<L>      08 Jan 2021 06:05    TPro  6.0  /  Alb  2.6<L>  /  TBili  0.5  /  DBili  x   /  AST  21  /  ALT  8<L>  /  AlkPhos  78  01-08                RADIOLOGY & ADDITIONAL TESTS:    Imaging Personally Reviewed:    Consultant(s) Notes Reviewed:      Care Discussed with Consultants/Other Providers:

## 2021-01-08 NOTE — PROGRESS NOTE ADULT - SUBJECTIVE AND OBJECTIVE BOX
CARDIOLOGY FOLLOW UP - Dr. Best    CC: no longer dizzy upon standing. denies cp, sob, and palpitations       PHYSICAL EXAM:  T(C): 36.7 (01-08-21 @ 10:00), Max: 37.1 (01-07-21 @ 22:00)  HR: 101 (01-08-21 @ 10:00) (93 - 106)  BP: 149/92 (01-08-21 @ 10:00) (120/81 - 155/95)  RR: 18 (01-08-21 @ 10:00) (18 - 18)  SpO2: 100% (01-08-21 @ 10:00) (98% - 100%)  Wt(kg): --  I&O's Summary    07 Jan 2021 07:01  -  08 Jan 2021 07:00  --------------------------------------------------------  IN: 7705 mL / OUT: 6900 mL / NET: 805 mL        Appearance: Normal	  Cardiovascular: Normal S1 S2,RRR, No JVD, No murmurs  Respiratory: Lungs clear to auscultation	  Gastrointestinal:  Soft, Non-tender, + BS	  Extremities: Normal range of motion, No clubbing, cyanosis or edema      Home Medications:  aspirin 81 mg oral delayed release tablet: 1 tab(s) orally once a day (29 Dec 2020 18:37)  atorvastatin 80 mg oral tablet: 1 tab(s) orally once a day (at bedtime) (29 Dec 2020 18:37)  epoetin martine: injectable once a month (29 Dec 2020 18:37)  ferrous sulfate 325 mg (65 mg elemental iron) oral tablet: 1 tab(s) orally 3 times a day (29 Dec 2020 18:37)  folic acid 0.4 mg oral tablet: 2 tab(s) orally once a day (29 Dec 2020 18:37)  gabapentin 100 mg oral capsule: 1 cap(s) orally once a day (29 Dec 2020 18:37)  nortriptyline 25 mg oral capsule: 1 cap(s) orally once a day (at bedtime) (29 Dec 2020 18:37)  pantoprazole 40 mg oral delayed release tablet: 1 tab(s) orally once a day (before a meal) (29 Dec 2020 18:37)  sevelamer carbonate 800 mg oral tablet: 1 tab(s) orally 3 times a day (with meals) (29 Dec 2020 18:37)  Toujeo SoloStar 300 units/mL subcutaneous solution: 70 unit(s) subcutaneous once a day (at bedtime) (29 Dec 2020 18:37)      MEDICATIONS  (STANDING):  aspirin enteric coated 81 milliGRAM(s) Oral daily  atorvastatin 80 milliGRAM(s) Oral at bedtime  cadexomer iodine 0.9% Gel 1 Application(s) Topical daily  dextrose 40% Gel 15 Gram(s) Oral once  dextrose 5%. 1000 milliLiter(s) (50 mL/Hr) IV Continuous <Continuous>  dextrose 5%. 1000 milliLiter(s) (100 mL/Hr) IV Continuous <Continuous>  dextrose 50% Injectable 25 Gram(s) IV Push once  dextrose 50% Injectable 12.5 Gram(s) IV Push once  dextrose 50% Injectable 25 Gram(s) IV Push once  ferrous    sulfate 325 milliGRAM(s) Oral daily  folic acid 1 milliGRAM(s) Oral daily  gentamicin 0.1% Ointment 1 Application(s) Topical <User Schedule>  glucagon  Injectable 1 milliGRAM(s) IntraMuscular once  heparin   Injectable 5000 Unit(s) SubCutaneous every 12 hours  insulin glargine Injectable (LANTUS) 9 Unit(s) SubCutaneous at bedtime  insulin lispro (ADMELOG) corrective regimen sliding scale   SubCutaneous three times a day before meals  insulin lispro (ADMELOG) corrective regimen sliding scale   SubCutaneous at bedtime  insulin lispro Injectable (ADMELOG) 4 Unit(s) SubCutaneous three times a day with meals  LORazepam   Injectable 0.25 milliGRAM(s) IV Push once  midodrine. 20 milliGRAM(s) Oral three times a day  pantoprazole    Tablet 40 milliGRAM(s) Oral before breakfast  sevelamer carbonate 1600 milliGRAM(s) Oral three times a day  sodium chloride 0.9%. 1000 milliLiter(s) (100 mL/Hr) IV Continuous <Continuous>  sodium chloride 0.9%. 1000 milliLiter(s) (75 mL/Hr) IV Continuous <Continuous>  sodium chloride 0.9%. 1000 milliLiter(s) (50 mL/Hr) IV Continuous <Continuous>  ticagrelor 90 milliGRAM(s) Oral every 12 hours      TELEMETRY: SR/ST 	    ECG:  	  RADIOLOGY:   DIAGNOSTIC TESTING:  [ ] Echocardiogram:  [ ]  Catheterization:  [ ] Stress Test:    OTHER: 	    LABS:	 	                            9.2    9.25  )-----------( 252      ( 08 Jan 2021 06:05 )             29.8     01-08    135  |  95<L>  |  31<H>  ----------------------------<  147<H>  3.7   |  23  |  11.33<H>    Ca    8.1<L>      08 Jan 2021 06:05    TPro  6.0  /  Alb  2.6<L>  /  TBili  0.5  /  DBili  x   /  AST  21  /  ALT  8<L>  /  AlkPhos  78  01-08

## 2021-01-08 NOTE — PROGRESS NOTE ADULT - ATTENDING COMMENTS
Addendum-  Still orthostatic   NS 100cc/hr for 10 hours     orthostasis in am as well
Agree with above NP note.  cv stable  cont midodrine as ordered  eps f/u
Agree with above NP note.  cv stable  remains symptomatic during orthostatic vitals  resting supine bp in 140;s with midodrine   cont ivf's  reassess serial orthostatic vitals
Agree with above NP note.  cv stable and improved  orthostatic vitals improved post hydration  cont to monitor   cont mido  cont to hold lv dysfxn meds
Pt seen and examined, agree with the above assessment and plan by CARLYLE Cosby.  cv stable  orthostatics remain positive  decrease midodrine to 20mg  continued IVF  repeat orthostatics tomorrow  if we do not see an improvement will boogie pursue a RHC tomorrow to assess volume status
Agree with above NP note.  cv stable  adjust midodrine  cont to monitor orthostatics
Agree with above NP note.  cv stable and improved  orthostatic vitals improved post hydration  cont to monitor   cont mido  cont to hold lv dysfxn meds
Syncopal episodes likely hypotension from orthostasis and poor cardiac output. I have concerns of placing any devices given recent fevers and WBC mildly elevated. Continue observation and revaluate for primary prevention ICD 90 days post revascularization. Consider Life Vest if increased asymptomatic ventricular ectopy.
Scarlett Doty (pager [phone])  On evenings and weekends, please call [phone] or page endocrine fellow on call.   Please note that this patient may be followed by different provider tomorrow. If no answer, contact endocrine fellow on call.
Scarlett Doty (pager [phone])  On evenings and weekends, please call [phone] or page endocrine fellow on call.   Please note that this patient may be followed by different provider tomorrow. If no answer, contact endocrine fellow on call.

## 2021-01-08 NOTE — DISCHARGE NOTE NURSING/CASE MANAGEMENT/SOCIAL WORK - PATIENT PORTAL LINK FT
You can access the FollowMyHealth Patient Portal offered by [facility] by registering at the following website: [email or web address]. By joining [facility]’s FollowMyHealth portal, you will also be able to view your health information using other applications (apps) compatible with our system.

## 2021-01-08 NOTE — PROGRESS NOTE ADULT - PROBLEM SELECTOR PLAN 1
-test BG AC/HS/2AM  -C/w Lantus dose  9 units QHS  since glucose on BMP this am was 147.  -Increase Admelog to 5 units w/meals. PLEASE ADMINISTER WHENEVER PT EATS!  - C/w adjusted low dose Admelog correctional premeal dose (2-6)  -C/w low dose Admelog correction scale  at hs for now  Discharge:   -Recommend follow up with outpt Endo on Discharge. Dr Villegas in [place]  Touo 60 units at start of overnight PD with 2.5 dialysate. This is pt home dose  -C/w Victoza 1.2mg daily  -Plan discussed with pt/team.  Contact info: [phone] (24/7). pager [phone]

## 2021-01-08 NOTE — DISCHARGE NOTE NURSING/CASE MANAGEMENT/SOCIAL WORK - NSDPACMPNY_GEN_ALL_CORE
Vascular Access Note    Vascular Access Screening:   Allergies to Lidocaine: no  Allergies to Latex: no  Presence of Pacemaker/Defibrillator: No  Mastectomy with Lymph Node Dissection: No  AV Fistula / AV Graft: No  Dialysis Catheter: No  Central Line: No Spouse

## 2021-01-08 NOTE — PROGRESS NOTE ADULT - REASON FOR ADMISSION
hypotension
syncope
syncope
hypotension
syncope
syncope
hypotension

## 2021-01-08 NOTE — PROGRESS NOTE ADULT - SUBJECTIVE AND OBJECTIVE BOX
DIABETES FOLLOW UP NOTE: Saw pt earlier today  INTERVAL HX: 54 yo M w/h/o uncontrolled T2DM on Toujeo 60 units at hs prior PD and Victoza daily PTA. Pt reports his BG overnight were in low 200s while on Toujeo 60 units. DM c/b CAD, s/p CABG x 4,  ESRD on PD, DM retinopathy, diabetic neuropathy with PVD. Also h/o HTN, HLD and recent admission with SOB , dizziness and mild chest pain with exertion> s/p HC with stent placement on OM with wire fractured when trying to remove balloon requiring OR for removal of the angioplasty balloon and fractured wire via transverse aortotomy. Recent admission with fever/dizziness > infectious work up negative, and now presenting with hypotension and LE weakness.  Endocrine consulted fro DM management and hyperglycemia. Getting PD exchanges q6h during this admission. Erratic and unpredictable PO intake making glycemic control difficult to achieve. Per RN and pt He vomited when he saw breakfast this am. Pt states he dislikes and can't eat hospital food. Eats food from home at different times of the day with rebound hyperglycemia. Per team and pt going home this pm. Feels better.       Review of Systems:  General: As above  Cardiovascular: No chest pain, palpitations  Respiratory: No SOB, no cough  GI: No nausea, vomiting, abdominal pain  Endocrine: no polyuria, polydipsia or S&Sx of hypoglycemia    Allergies    doxazosin (Other)    Intolerances      MEDICATIONS:  atorvastatin 80 milliGRAM(s) Oral at bedtime  insulin glargine Injectable (LANTUS) 9 Unit(s) SubCutaneous at bedtime  insulin lispro (ADMELOG) corrective regimen sliding scale   SubCutaneous three times a day before meals  insulin lispro (ADMELOG) corrective regimen sliding scale   SubCutaneous at bedtime  insulin lispro Injectable (ADMELOG) 4 Unit(s) SubCutaneous three times a day with meals      PHYSICAL EXAM:  VITALS: T(C): 36.7 (01-08-21 @ 10:00)  T(F): 98 (01-08-21 @ 10:00), Max: 98.7 (01-07-21 @ 22:00)  HR: 101 (01-08-21 @ 10:00) (93 - 106)  BP: 149/92 (01-08-21 @ 10:00) (120/81 - 155/95)  RR:  (18 - 18)  SpO2:  (98% - 100%)  Wt(kg): --  GENERAL: Male sitting at edge of bed in NAD  Abdomen: Soft, nontender, non distended. central adiposity. PD cath in place D&I.   Extremities: Warm, no edema in all 4 exts. L food dressing D&I  NEURO: A&O X3    LABS:  POCT Blood Glucose.: 277 mg/dL (01-08-21 @ 11:47)  POCT Blood Glucose.: 202 mg/dL (01-08-21 @ 08:48)  POCT Blood Glucose.: 206 mg/dL (01-07-21 @ 21:35)  POCT Blood Glucose.: 160 mg/dL (01-07-21 @ 17:55)  POCT Blood Glucose.: 172 mg/dL (01-07-21 @ 13:28)  POCT Blood Glucose.: 242 mg/dL (01-07-21 @ 09:35)  POCT Blood Glucose.: 227 mg/dL (01-06-21 @ 21:45)  POCT Blood Glucose.: 240 mg/dL (01-06-21 @ 17:52)  POCT Blood Glucose.: 249 mg/dL (01-06-21 @ 12:38)  POCT Blood Glucose.: 206 mg/dL (01-06-21 @ 09:00)  POCT Blood Glucose.: 195 mg/dL (01-05-21 @ 21:32)  POCT Blood Glucose.: 197 mg/dL (01-05-21 @ 19:44)  POCT Blood Glucose.: 185 mg/dL (01-05-21 @ 13:43)                            9.2    9.25  )-----------( 252      ( 08 Jan 2021 06:05 )             29.8       01-08    135  |  95<L>  |  31<H>  ----------------------------<  147<H>  3.7   |  23  |  11.33<H>      EGFR if non : 4<L>    Ca    8.1<L>      01-08  Phos  6.3     01-06    TPro  6.0  /  Alb  2.6<L>  /  TBili  0.5  /  DBili  x   /  AST  21  /  ALT  8<L>  /  AlkPhos  78  01-08      A1C with Estimated Average Glucose Result: 7.8 % (12-01-20 @ 03:13)      Estimated Average Glucose: 177 mg/dL (12-01-20 @ 03:13)

## 2021-01-08 NOTE — PROGRESS NOTE ADULT - PROBLEM SELECTOR PROBLEM 1
Type 2 diabetes mellitus with other skin complication

## 2021-01-08 NOTE — PROGRESS NOTE ADULT - PROVIDER SPECIALTY LIST ADULT
Cardiology
Endocrinology
Endocrinology
Infectious Disease
Internal Medicine
Nephrology
Nephrology
Cardiology
Cardiology
Endocrinology
Infectious Disease
Infectious Disease
Internal Medicine
Nephrology
Podiatry
Cardiology
Electrophysiology
Infectious Disease
Infectious Disease
Internal Medicine
Podiatry
Cardiology
Cardiology
Endocrinology

## 2021-01-08 NOTE — PROGRESS NOTE ADULT - PROBLEM SELECTOR PLAN 2
BP variable, w/ hypotension on admission  Primary team/renal managing HTN    3.  Other hyperlipidemia.  Recommendation: on lipitor at home, restart if no contraindications  f/u w/ cardiology.
-requiring midodrine currently for orthostatic hypotension
BP variable, w/ hypotension on admission  Primary team/renal managing HTN    3.  Other hyperlipidemia.  Recommendation: on lipitor at home, restart if no contraindications  f/u w/ cardiology.
BP variable, w/ hypotension on admission  Primary team/renal managing HTN    3.  Other hyperlipidemia.  Recommendation: on lipitor at home, restart if no contraindications  f/u w/ cardiology.
-Follow and manage by primary team and renal

## 2021-01-08 NOTE — PROGRESS NOTE ADULT - SUBJECTIVE AND OBJECTIVE BOX
NEPHROLOGY-[facility] [phone]        Patient seen and examined in bed.  Off IVF since last night and the orthostasis was better and he feels better        MEDICATIONS  (STANDING):  aspirin enteric coated 81 milliGRAM(s) Oral daily  atorvastatin 80 milliGRAM(s) Oral at bedtime  cadexomer iodine 0.9% Gel 1 Application(s) Topical daily  dextrose 40% Gel 15 Gram(s) Oral once  dextrose 5%. 1000 milliLiter(s) (50 mL/Hr) IV Continuous <Continuous>  dextrose 5%. 1000 milliLiter(s) (100 mL/Hr) IV Continuous <Continuous>  dextrose 50% Injectable 25 Gram(s) IV Push once  dextrose 50% Injectable 12.5 Gram(s) IV Push once  dextrose 50% Injectable 25 Gram(s) IV Push once  ferrous    sulfate 325 milliGRAM(s) Oral daily  folic acid 1 milliGRAM(s) Oral daily  gentamicin 0.1% Ointment 1 Application(s) Topical <User Schedule>  glucagon  Injectable 1 milliGRAM(s) IntraMuscular once  heparin   Injectable 5000 Unit(s) SubCutaneous every 12 hours  insulin glargine Injectable (LANTUS) 9 Unit(s) SubCutaneous at bedtime  insulin lispro (ADMELOG) corrective regimen sliding scale   SubCutaneous three times a day before meals  insulin lispro (ADMELOG) corrective regimen sliding scale   SubCutaneous at bedtime  insulin lispro Injectable (ADMELOG) 4 Unit(s) SubCutaneous three times a day with meals  LORazepam   Injectable 0.25 milliGRAM(s) IV Push once  midodrine. 20 milliGRAM(s) Oral three times a day  pantoprazole    Tablet 40 milliGRAM(s) Oral before breakfast  sevelamer carbonate 1600 milliGRAM(s) Oral three times a day  sodium chloride 0.9%. 1000 milliLiter(s) (100 mL/Hr) IV Continuous <Continuous>  sodium chloride 0.9%. 1000 milliLiter(s) (75 mL/Hr) IV Continuous <Continuous>  sodium chloride 0.9%. 1000 milliLiter(s) (50 mL/Hr) IV Continuous <Continuous>  ticagrelor 90 milliGRAM(s) Oral every 12 hours      VITAL:  T(C): , Max: 37.1 (01-07-21 @ 22:00)  T(F): , Max: 98.7 (01-07-21 @ 22:00)  HR: 101 (01-08-21 @ 10:00)  BP: 149/92 (01-08-21 @ 10:00)  BP(mean): --  RR: 18 (01-08-21 @ 10:00)  SpO2: 100% (01-08-21 @ 10:00)  Wt(kg): --    I and O's:    01-07 @ 07:01  -  01-08 @ 07:00  --------------------------------------------------------  IN: 7705 mL / OUT: 6900 mL / NET: 805 mL          PHYSICAL EXAM:    Constitutional: NAD  Neck:  No JVD  Respiratory: CTAB/L  Cardiovascular: S1 and S2  Gastrointestinal: BS+, soft, NT/ND  Extremities: No peripheral edema  Neurological: A/O x 3, no focal deficits  Psychiatric: Normal mood, normal affect  : No Johnson  Skin: No rashes  Access: Not applicable    LABS:                        9.2    9.25  )-----------( 252      ( 08 Jan 2021 06:05 )             29.8     01-08    135  |  95<L>  |  31<H>  ----------------------------<  147<H>  3.7   |  23  |  11.33<H>    Ca    8.1<L>      08 Jan 2021 06:05    TPro  6.0  /  Alb  2.6<L>  /  TBili  0.5  /  DBili  x   /  AST  21  /  ALT  8<L>  /  AlkPhos  78  01-08          Urine Studies:          RADIOLOGY & ADDITIONAL STUDIES:

## 2021-02-05 PROBLEM — Z09 POSTOP CHECK: Status: ACTIVE | Noted: 2021-01-01

## 2021-03-01 PROBLEM — I10 BENIGN HYPERTENSION: Status: ACTIVE | Noted: 2020-01-01

## 2021-03-01 PROBLEM — I25.5 ISCHEMIC CARDIOMYOPATHY: Status: ACTIVE | Noted: 2021-01-01

## 2021-03-01 PROBLEM — N18.6 ESRD ON PERITONEAL DIALYSIS: Status: ACTIVE | Noted: 2020-01-01

## 2021-03-01 PROBLEM — I25.10 3-VESSEL CAD: Status: ACTIVE | Noted: 2020-01-01

## 2021-03-01 PROBLEM — I50.1 HEART FAILURE, LEFT, WITH LVEF <=30%: Status: ACTIVE | Noted: 2021-01-01

## 2021-03-01 NOTE — HISTORY OF PRESENT ILLNESS
[FreeTextEntry1] : 55M w/prior CABG, recent LHC w/PCI to OM1 requiring cardiac surgery due to balloon shaft fracture (11/30/20), ESRD on PD, DM II, with recent admission for hypotension, with positive orthostatic blood pressures and associated dizziness and syncope.  Given his fevers and mildly elevated WBC at the time of his hospitalization, our team expressed concern regarding implantation of any devices (and not 90 dyas post intervention/revasc).  He returns to our office today for evaluation for a primary prevention ICD.   Since discharge, he reports generally feeling well,  and denies any chest pain, palpitations, or dyspnea.  His dizziness and syncopal episodes have since resolved, although his daily blood pressure log still shows occasional low trending SBP readings.  He does have issues with persistent wound healing issues on the ventral side of his right 2nd, 3rd and 4 digits His LVEF in December of 2020 was 25-30%

## 2021-03-01 NOTE — PHYSICAL EXAM
[Normal Conjunctiva] : the conjunctiva exhibited no abnormalities [Auscultation Breath Sounds / Voice Sounds] : lungs were clear to auscultation bilaterally [Lungs Percussion] : the lungs were normal to percussion [Heart Sounds] : normal S1 and S2 [Murmurs] : no murmurs present [Arterial Pulses Normal] : the arterial pulses were normal [Edema] : no peripheral edema present [Bowel Sounds] : normal bowel sounds [Abdomen Soft] : soft [Abnormal Walk] : normal gait [Nail Clubbing] : no clubbing of the fingernails [Cyanosis, Localized] : no localized cyanosis [No Venous Stasis] : no venous stasis [Oriented To Time, Place, And Person] : oriented to person, place, and time [Impaired Insight] : insight and judgment were intact [Affect] : the affect was normal [Mood] : the mood was normal [FreeTextEntry1] : PD catheter

## 2021-03-01 NOTE — REVIEW OF SYSTEMS
[Feeling Fatigued] : feeling fatigued [Dyspnea on exertion] : dyspnea during exertion [Skin Lesions] : skin lesion(s): [Fever] : no fever [Headache] : no headache [Chills] : no chills [Blurry Vision] : no blurred vision [Shortness Of Breath] : no shortness of breath [Chest  Pressure] : no chest pressure [Chest Pain] : no chest pain [Lower Ext Edema] : no extremity edema [Palpitations] : no palpitations [Cough] : no cough [Abdominal Pain] : no abdominal pain [Nausea] : no nausea [Urinary Frequency] : no change in urinary frequency [Joint Pain] : no joint pain [Muscle Cramps] : no muscle cramps [Skin: A Rash] : no rash: [Dizziness] : no dizziness [Tremor] : no tremor was seen [Confusion] : no confusion was observed [Easy Bleeding] : no tendency for easy bleeding [Easy Bruising] : no tendency for easy bruising

## 2021-03-01 NOTE — DISCUSSION/SUMMARY
[FreeTextEntry1] : I had a long discussion with the patient and his wife about primary prevention ICD for which he qualifies. They would like to consider this. I did explain that he would be at higher risk for infection. It would be preferable to consider a Sub cutaneous ICD; however, with the recent [employer] advisory/recall on their subcutaneous lead, it would be better to use a single chamber  transvenous lead. A repeat echo may be helpful as well

## 2021-03-02 NOTE — PROGRESS NOTE ADULT - ASSESSMENT
56yo M w/h/o uncontrolled T2DM on Toujeo and PRN Victoza(only takes it if FBG >200s). DM c/b CAD, s/p CABG x 4,  ESRD on PD, DM retinopathy, diabetic neuropathy with PVD. Also h/o HTN, HLD. Here with SOB , dizziness and mild chest pain with exertion. S/p HC with stent placement on OM with wire fractured when trying to remove balloon. Pt was subsequently taken to the OR for removal of the angioplasty balloon and fractured wire via transverse aortotomy. Endocrine consult requested for management of DM2. BG values remain variable while on present insulin doses without a pattern likely due to PD exchanges done at different times during the day. Per primary team pt going home today and restarting overnight PD as PTA.  BG goal (100-180mg/dl). Spoke to pt and team about discharge plan of care. Insulin therapy to change as PD is switching from daytime to night time. Pt able to give teach back about plan of care.    Spent 25 minutes assessing pt/labs/meds and discussing plan of care with primary team. Moderate complexity encounter on pt with uncontrolled T2DM and multiple DM complications and comorbidities. Adjusting insulin for discharge home   [de-identified] : no cervical or supraclavicular adenopathy, trachea midline, thyroid without enlargement or palpable mass, incision healing well. , scar min discussed [Normal] : no neck adenopathy [de-identified] : Skin:  normal appearance.  no rash, nodules, vesicles, or erythema,\par Musculoskeletal:  full range of motion and no deformities appreciated\par Neurological:  grossly intact\par Psychiatric:  oriented to person, place and time with appropriate affect

## 2021-07-06 NOTE — DISCHARGE NOTE PROVIDER - NSDCMRMEDTOKEN_GEN_ALL_CORE_FT
aspirin 81 mg oral delayed release tablet: 1 tab(s) orally once a day  atorvastatin 80 mg oral tablet: 1 tab(s) orally once a day (at bedtime)  cadexomer iodine 0.9% topical gel: 1 application topically once a day  epoetin martine: injectable once a month  ferrous sulfate 325 mg (65 mg elemental iron) oral tablet: 1 tab(s) orally 3 times a day  folic acid 1 mg oral tablet: 1 tab(s) orally once a day  gabapentin 100 mg oral capsule: 1 cap(s) orally once a day  gentamicin 0.1% topical cream: 1 application topically (to left foot) 2 times a day  metoprolol tartrate 25 mg oral tablet: 0.5 tab(s) orally every 12 hours   midodrine 10 mg oral tablet: 3 tab(s) orally 3 times a day   nortriptyline 25 mg oral capsule: 1 cap(s) orally once a day (at bedtime)  pantoprazole 40 mg oral delayed release tablet: 1 tab(s) orally once a day (before a meal)  sevelamer carbonate 800 mg oral tablet: 2 tab(s) orally 3 times a day  ticagrelor 60 mg oral tablet: 1 tab(s) orally every 12 hours   Toujeo SoloStar 300 units/mL subcutaneous solution: 60 unit(s) subcutaneous once a day (at bedtime)  Victoza 18 mg/3 mL subcutaneous solution: 1.2 milligram(s) subcutaneously once a day, As Needed per blood sugar level

## 2021-07-06 NOTE — PATIENT PROFILE ADULT - NSTOBACCONEVERSMOKERY/N_GEN_A
Additional Information    Caller has medication question, adult has minor symptoms, caller declines triage, and triager answers question    Protocols used: MEDICATION QUESTION CALL-ADULT-    Message from Dr. Baird about switching to Zithromax due to increased WBC read to patient.  Kalli Johnson RN  [facility]     No

## 2021-07-06 NOTE — H&P ADULT - NSHPREVIEWOFSYSTEMS_GEN_ALL_CORE
REVIEW OF SYSTEMS:  CONSTITUTIONAL: No fever, weight loss, or fatigue  EYES: No eye pain, visual disturbances, or discharge  RESPIRATORY:+ cough, wheezing, chills or hemoptysis; + shortness of breath  CARDIOVASCULAR: No chest pain, palpitations, dizziness, or leg swelling  GASTROINTESTINAL: No abdominal or epigastric pain. + nausea, + vomiting. No diarrhea or constipation. No melena or hematochezia.  GENITOURINARY: No dysuria, frequency, hematuria, or incontinence  NEUROLOGICAL: No headaches, memory loss, loss of strength, numbness, or tremors  SKIN: No itching, burning, rashes, or lesions   MUSCULOSKELETAL: No joint pain or swelling; No muscle, back, or extremity pain

## 2021-07-06 NOTE — DISCHARGE NOTE PROVIDER - CARE PROVIDER_API CALL
GABRIELA [place] [facility]  Internal Medicine  [address] [place] [address], [place] [address]  Phone: [phone]  Fax: [phone]  Follow Up Time:

## 2021-07-06 NOTE — ED PROVIDER NOTE - PHYSICAL EXAMINATION
Vital signs reviewed  GENERAL: lethargic   HEAD: NCAT  EYES: Anicteric  ENT: MMM  NECK: Supple, non tender  RESPIRATORY: tachypnic   CARDIOVASCULAR: Regular rate and rhythm  ABDOMEN: Soft. Nondistended. peritoneal site intact   MUSCULOSKELETAL/EXTREMITIES: Brisk cap refill. 2+ radial pulses. No leg edema.  SKIN:  Warm and dry  NEURO: AAOx1

## 2021-07-06 NOTE — CONSULT NOTE ADULT - ASSESSMENT
55M PMH ESRD on PD, DM on insulin, CHF EF 25-30% CAD s/p CABG x4 pw with hypotension with fever  Pt is unaware of clarity of PD fluid   Hypotension on/off pressors and febrile state likely related to sepsis.  Source is pending     1 Renal - Will start by first placing fluid in the abd and then draining it and sending for cx.  All CCPD orders will be 1.5% as to not cause too much UF and ultimately hypotension   2 ID-Blood cx sent   3 [facility]-On and off pressors at present      ICU in detail     [facility]   [phone]

## 2021-07-06 NOTE — ED ADULT NURSE REASSESSMENT NOTE - NS ED NURSE REASSESS COMMENT FT1
MD Mgaallon aware of BP, reports goal MAP >60, and reports no further interventions needed at this time

## 2021-07-06 NOTE — DISCHARGE NOTE PROVIDER - NSDCFUADDINST_GEN_ALL_CORE_FT
Podiatry Discharge Instructions:  Follow up: Please follow up with Dr. Mcrae within 1 week of discharge from the hospital, please call [phone] for appointment and discuss that you recently were seen in the hospital.  Wound Care: Please apply iodosorb to R 2nd toe eschar, medial 1st MTPJ eschar and lateral Left hallux eschar every other day.   Weight bearing: Please weight bear as tolerated.  Antibiotics: Please continue as instructed.

## 2021-07-06 NOTE — CONSULT NOTE ADULT - TIME BILLING
Pt seen and examined, agree with the above assessment and plan by CARLYLE Cosby.  56M well known to practice with PMH ESRD on PD, DM on insulin, CHF EF 25-30% CAD s/p CABG x4, underwent cardiac cath and stent and wire broke in heart s/p sternotomy p/w septic shock  Cont broad spectrum IV abx  Followup cultures  Wean off vasopressor support per MICU  Cont mido  BB/Acei on hold  cont vol removal w PD

## 2021-07-06 NOTE — CONSULT NOTE ADULT - SUBJECTIVE AND OBJECTIVE BOX
Podiatry pager #: [phone]/ [phone]    Patient is a 56y old  Male who presents with a chief complaint of Hypotension (06 Jul 2021 12:35)      HPI:  55M PMH ESRD on PD, DM on insulin, CHF EF 25-30% CAD s/p CABG x4, underwent cardiac cath and stent and wire broke in heart sternotomy. Patient states that for the last 2 days he has been having increased cough and sweating. He endorsed chills but no measured fevers at home. He denies any sick contacts or recent travel. Patient states that he fell few days ago with no head trauma. Of note patient has right toe gangrenous lesion that has been present for several months, He denies any trauma or pain at the site.       In the ED, Patient was found to be hypotensive to 70s. Patient was given midodrine 10mg x1 and bedside POCUS was completed showing poor cardiac function with diffuse hypokinesis. Patient was unable to maintain adequate SBPs despite midodrine so was started on levophed for pressor support. Admitted to MICU hypotension requiring for pressor support likely  2/2 to sepsis.     (06 Jul 2021 07:59)      PAST MEDICAL & SURGICAL HISTORY:  Diabetes    CAD, multiple vessel    ESRD (end stage renal disease) on dialysis    HTN (hypertension)    S/P CABG (coronary artery bypass graft)        MEDICATIONS  (STANDING):  aspirin enteric coated 81 milliGRAM(s) Oral daily  atorvastatin 80 milliGRAM(s) Oral at bedtime  chlorhexidine 0.12% Liquid 15 milliLiter(s) Oral Mucosa two times a day  chlorhexidine 4% Liquid 1 Application(s) Topical <User Schedule>  dextrose 40% Gel 15 Gram(s) Oral once  dextrose 5%. 1000 milliLiter(s) (50 mL/Hr) IV Continuous <Continuous>  dextrose 5%. 1000 milliLiter(s) (100 mL/Hr) IV Continuous <Continuous>  dextrose 50% Injectable 25 Gram(s) IV Push once  dextrose 50% Injectable 12.5 Gram(s) IV Push once  dextrose 50% Injectable 25 Gram(s) IV Push once  ferrous    sulfate 325 milliGRAM(s) Oral three times a day  folic acid 1 milliGRAM(s) Oral daily  glucagon  Injectable 1 milliGRAM(s) IntraMuscular once  heparin   Injectable 5000 Unit(s) SubCutaneous every 12 hours  insulin glargine Injectable (LANTUS) 30 Unit(s) SubCutaneous at bedtime  insulin lispro (ADMELOG) corrective regimen sliding scale   SubCutaneous three times a day before meals  insulin lispro (ADMELOG) corrective regimen sliding scale   SubCutaneous at bedtime  lactated ringers. 1000 milliLiter(s) (50 mL/Hr) IV Continuous <Continuous>  midodrine 10 milliGRAM(s) Oral every 8 hours  norepinephrine Infusion 0.05 MICROgram(s)/kG/Min (5.95 mL/Hr) IV Continuous <Continuous>  pantoprazole  Injectable 40 milliGRAM(s) IV Push daily  sevelamer carbonate 800 milliGRAM(s) Oral three times a day  ticagrelor 60 milliGRAM(s) Oral every 12 hours    MEDICATIONS  (PRN):      Allergies    doxazosin (Other)    Intolerances        VITALS:    Vital Signs Last 24 Hrs  T(C): 37.1 (06 Jul 2021 09:15), Max: 38.4 (06 Jul 2021 03:39)  T(F): 98.8 (06 Jul 2021 09:15), Max: 101.2 (06 Jul 2021 03:39)  HR: 90 (06 Jul 2021 13:00) (88 - 112)  BP: 91/66 (06 Jul 2021 13:00) (73/54 - 113/79)  BP(mean): 75 (06 Jul 2021 13:00) (62 - 93)  RR: 20 (06 Jul 2021 13:00) (13 - 24)  SpO2: 100% (06 Jul 2021 13:00) (90% - 100%)    LABS:                          12.0   10.43 )-----------( 258      ( 06 Jul 2021 04:13 )             40.5       07-06    137  |  95<L>  |  59<H>  ----------------------------<  132<H>  3.7   |  20<L>  |  10.78<H>    Ca    8.8      06 Jul 2021 04:13    TPro  6.5  /  Alb  2.4<L>  /  TBili  0.5  /  DBili  x   /  AST  22  /  ALT  31  /  AlkPhos  142<H>  07-06      CAPILLARY BLOOD GLUCOSE      POCT Blood Glucose.: 117 mg/dL (06 Jul 2021 03:47)      PT/INR - ( 06 Jul 2021 04:35 )   PT: 15.0 sec;   INR: 1.26 ratio         PTT - ( 06 Jul 2021 04:35 )  PTT:37.2 sec    LOWER EXTREMITY PHYSICAL EXAM:    Vascular: DP 1/4, PT NP, B/L, CFT <3 seconds B/L, Temperature gradient WNL, B/L.   Neuro: Epicritic sensation reduced to the level of digits, B/L.  Musculoskeletal/Ortho: unremarkable    R 2nd toe distal gangrene, dry stable, no local signs of infection, R medial MPJ fibrotic wound to subQ w/ no local signs of infection, L foot lateral hallux wound to subQ, dry and stable.     RADIOLOGY & ADDITIONAL STUDIES:

## 2021-07-06 NOTE — PROVIDER CONTACT NOTE (OTHER) - ACTION/TREATMENT ORDERED:
drain for culture now. Perform first exchange today at 1600. drain for culture and perform first exchange.

## 2021-07-06 NOTE — CHART NOTE - NSCHARTNOTEFT_GEN_A_CORE
MICU Transfer Note    Transfer from: MICU  Transfer to:  (  ) Medicine    (  ) Telemetry    (  ) RCU    (  ) Palliative    (  ) Stroke Unit    (  ) _______________    Accepting physician:    HPI: 55M PMH ESRD on PD, DM on insulin, CHF EF 25-30% CAD s/p CABG x4, underwent cardiac cath and stent and wire broke in heart sternotomy. Patient states that for the last 2 days he has been having increased cough and sweating. He endorsed chills but no measured fevers at home. He denies any sick contacts or recent travel. Patient states that he fell few days ago with no head trauma. Of note patient has right toe gangrenous lesion that has been present for several months, He denies any trauma or pain at the site. In the ED, Patient was found to be hypotensive to 70s. Patient was given midodrine 10mg x1 and bedside POCUS was completed showing poor cardiac function with diffuse hypokinesis. Patient was unable to maintain adequate SBPs despite midodrine so was started on levophed for pressor support. Admitted to MICU hypotension requiring for pressor support likely  2/2 to sepsis.        MICU COURSE: Admitted to MICU 7/5 overnight in setting of hypotension requiring pressors. By 7/6 morning he was off of pressors and no longer requires MICU level management.           ASSESSMENT & PLAN:         For Follow-Up:   [] f/up peritoneal fluid cultures  [] f/up vanc level on 7/8 am for redosing        Vital Signs Last 24 Hrs  T(C): 36.7 (06 Jul 2021 15:00), Max: 38.4 (06 Jul 2021 03:39)  T(F): 98.1 (06 Jul 2021 15:00), Max: 101.2 (06 Jul 2021 03:39)  HR: 89 (06 Jul 2021 19:00) (87 - 112)  BP: 87/63 (06 Jul 2021 19:00) (73/54 - 113/79)  BP(mean): 71 (06 Jul 2021 19:00) (62 - 93)  RR: 19 (06 Jul 2021 19:00) (12 - 24)  SpO2: 100% (06 Jul 2021 19:00) (90% - 100%)  I&O's Summary    06 Jul 2021 07:01  -  06 Jul 2021 19:27  --------------------------------------------------------  IN: 2400 mL / OUT: 2000 mL / NET: 400 mL          MEDICATIONS  (STANDING):  aspirin enteric coated 81 milliGRAM(s) Oral daily  atorvastatin 80 milliGRAM(s) Oral at bedtime  cadexomer iodine 0.9% Gel 1 Application(s) Topical daily  chlorhexidine 0.12% Liquid 15 milliLiter(s) Oral Mucosa two times a day  chlorhexidine 4% Liquid 1 Application(s) Topical <User Schedule>  dextrose 40% Gel 15 Gram(s) Oral once  dextrose 5%. 1000 milliLiter(s) (50 mL/Hr) IV Continuous <Continuous>  dextrose 5%. 1000 milliLiter(s) (100 mL/Hr) IV Continuous <Continuous>  dextrose 50% Injectable 25 Gram(s) IV Push once  dextrose 50% Injectable 12.5 Gram(s) IV Push once  dextrose 50% Injectable 25 Gram(s) IV Push once  ferrous    sulfate 325 milliGRAM(s) Oral three times a day  folic acid 1 milliGRAM(s) Oral daily  glucagon  Injectable 1 milliGRAM(s) IntraMuscular once  heparin   Injectable 5000 Unit(s) SubCutaneous every 12 hours  insulin glargine Injectable (LANTUS) 30 Unit(s) SubCutaneous at bedtime  insulin lispro (ADMELOG) corrective regimen sliding scale   SubCutaneous three times a day before meals  insulin lispro (ADMELOG) corrective regimen sliding scale   SubCutaneous at bedtime  insulin lispro Injectable (ADMELOG) 2 Unit(s) SubCutaneous three times a day before meals  lactated ringers. 1000 milliLiter(s) (50 mL/Hr) IV Continuous <Continuous>  midodrine 30 milliGRAM(s) Oral every 8 hours  pantoprazole    Tablet 40 milliGRAM(s) Oral before breakfast  sevelamer carbonate 800 milliGRAM(s) Oral three times a day  ticagrelor 60 milliGRAM(s) Oral every 12 hours    MEDICATIONS  (PRN):        LABS                                            12.0                  Neurophils% (auto):   81.3   (07-06 @ 04:13):    10.43)-----------(258          Lymphocytes% (auto):  6.2                                           40.5                   Eosinphils% (auto):   0.4      Manual%: Neutrophils x    ; Lymphocytes x    ; Eosinophils x    ; Bands%: x    ; Blasts x                                    137    |  95     |  59                  Calcium: 8.8   / iCa: x      (07-06 @ 04:13)    ----------------------------<  132       Magnesium: x                                3.7     |  20     |  10.78            Phosphorous: x        TPro  6.5    /  Alb  2.4    /  TBili  0.5    /  DBili  x      /  AST  22     /  ALT  31     /  AlkPhos  142    06 Jul 2021 04:13    ( 07-06 @ 04:35 )   PT: 15.0 sec;   INR: 1.26 ratio  aPTT: 37.2 sec      Rosalinda Gaines MD  PGY-1 Medicine MICU Transfer Note    Transfer from: MICU  Transfer to:  (x) Medicine    (  ) Telemetry    (  ) RCU    (  ) Palliative    (  ) Stroke Unit    (  ) _______________    Accepting physician: Dr. Beck    HPI: 55M PMH ESRD on PD, DM on insulin, CHF EF 25-30% CAD s/p CABG x4, underwent cardiac cath and stent. For the last 2 days he has been having increased cough, sweating and chills without measured fevers at home. He denies any sick contacts or recent travel. Patient states that he fell few days ago with no head trauma. Of note patient has right toe gangrenous lesion that has been present for several months, He denies any trauma or pain at the site. In the ED, Patient was found to be hypotensive to 70s. Patient was given midodrine 10mg x1 and bedside POCUS was completed showing poor cardiac function with diffuse hypokinesis. Patient was unable to maintain adequate SBPs despite midodrine so was started on levophed for pressor support. He was febrile to 101 and received vancomycin, ceftriaxone, and azithromycin. Admitted to MICU hypotension requiring for pressor support likely  2/2 to sepsis.    MICU COURSE: Admitted to MICU 7/5 overnight in setting of hypotension requiring pressors. By 7/6 morning he was off of pressors. His midodrine was changed to 30 TID to match his home dose. He received PD and fluid from the PD site was sent for culture. Podiatry saw him for his toe and said it was likely not the source of his sepsis but will follow up with wound care. He no longer requires MICU level management.       ASSESSMENT & PLAN:     #Neuro  -AAO x3, able to converse; sitting up in bed comfortably     #Resp  - Sating well on RA    #Cardiovascular   - Hypotension likely in the setting of septic shock,  Hypotensive to 70s s/p 10mg of midodrine;   - midodrine dose increased to 30 mg TID to match home dose  - Previous echo with reduced EF  [ ] Formal TTE    CAD s/p CABG  - c/w DAPT    #ID  - Febrile to 101.2, s/p vanc/ceftriaoxone/zosyn in the ED, WBC wnl   - 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, which has been present for several months  - will start on cefepime 1000mg qDay and vanc by [id number] (7/8 morning) per nephrology   - Right foot xray to shows no evidence of OM  - Podiatry doesn't believe the toe is the source of infection, they will follow with wound care  - f/up peritoneal fluid culture    #Renal  - On Peritoneal Dialysis (has been on PD for 2-3 years), Cr 10.78 (unsure of baseline)   - nephrology initiated PD  - patient on midodrine 30 q8h at home, continue here  - Holding home metoprolol 25 q12hr in setting of hypotension    #Heme  H/H stable  -Platelets wnl  - No active issues     #Endocrine  -Hx of diabetes on   - Tujeo 60 units at bedtime and Victoza at home, started on 30 units at bedtime, adjust as needed based on patient PO intake  - added 2 units insulin pre-meal    ETHICS- Full Code         For Follow-Up:   [] f/up peritoneal fluid cultures  [] f/up vanc level on 7/8 am for redosing  [] podiatry following for wound care  [] f/up with nephrology for PD        Vital Signs Last 24 Hrs  T(C): 36.7 (06 Jul 2021 15:00), Max: 38.4 (06 Jul 2021 03:39)  T(F): 98.1 (06 Jul 2021 15:00), Max: 101.2 (06 Jul 2021 03:39)  HR: 89 (06 Jul 2021 19:00) (87 - 112)  BP: 87/63 (06 Jul 2021 19:00) (73/54 - 113/79)  BP(mean): 71 (06 Jul 2021 19:00) (62 - 93)  RR: 19 (06 Jul 2021 19:00) (12 - 24)  SpO2: 100% (06 Jul 2021 19:00) (90% - 100%)  I&O's Summary    06 Jul 2021 07:01  -  06 Jul 2021 19:27  --------------------------------------------------------  IN: 2400 mL / OUT: 2000 mL / NET: 400 mL          MEDICATIONS  (STANDING):  aspirin enteric coated 81 milliGRAM(s) Oral daily  atorvastatin 80 milliGRAM(s) Oral at bedtime  cadexomer iodine 0.9% Gel 1 Application(s) Topical daily  chlorhexidine 0.12% Liquid 15 milliLiter(s) Oral Mucosa two times a day  chlorhexidine 4% Liquid 1 Application(s) Topical <User Schedule>  dextrose 40% Gel 15 Gram(s) Oral once  dextrose 5%. 1000 milliLiter(s) (50 mL/Hr) IV Continuous <Continuous>  dextrose 5%. 1000 milliLiter(s) (100 mL/Hr) IV Continuous <Continuous>  dextrose 50% Injectable 25 Gram(s) IV Push once  dextrose 50% Injectable 12.5 Gram(s) IV Push once  dextrose 50% Injectable 25 Gram(s) IV Push once  ferrous    sulfate 325 milliGRAM(s) Oral three times a day  folic acid 1 milliGRAM(s) Oral daily  glucagon  Injectable 1 milliGRAM(s) IntraMuscular once  heparin   Injectable 5000 Unit(s) SubCutaneous every 12 hours  insulin glargine Injectable (LANTUS) 30 Unit(s) SubCutaneous at bedtime  insulin lispro (ADMELOG) corrective regimen sliding scale   SubCutaneous three times a day before meals  insulin lispro (ADMELOG) corrective regimen sliding scale   SubCutaneous at bedtime  insulin lispro Injectable (ADMELOG) 2 Unit(s) SubCutaneous three times a day before meals  lactated ringers. 1000 milliLiter(s) (50 mL/Hr) IV Continuous <Continuous>  midodrine 30 milliGRAM(s) Oral every 8 hours  pantoprazole    Tablet 40 milliGRAM(s) Oral before breakfast  sevelamer carbonate 800 milliGRAM(s) Oral three times a day  ticagrelor 60 milliGRAM(s) Oral every 12 hours    MEDICATIONS  (PRN):        LABS                                            12.0                  Neurophils% (auto):   81.3   (07-06 @ 04:13):    10.43)-----------(258          Lymphocytes% (auto):  6.2                                           40.5                   Eosinphils% (auto):   0.4      Manual%: Neutrophils x    ; Lymphocytes x    ; Eosinophils x    ; Bands%: x    ; Blasts x                                    137    |  95     |  59                  Calcium: 8.8   / iCa: x      (07-06 @ 04:13)    ----------------------------<  132       Magnesium: x                                3.7     |  20     |  10.78            Phosphorous: x        TPro  6.5    /  Alb  2.4    /  TBili  0.5    /  DBili  x      /  AST  22     /  ALT  31     /  AlkPhos  142    06 Jul 2021 04:13    ( 07-06 @ 04:35 )   PT: 15.0 sec;   INR: 1.26 ratio  aPTT: 37.2 sec      Rosalinda Gaines MD  PGY-1 Medicine MICU Transfer Note    Transfer from: MICU  Transfer to:  (x) Medicine    (  ) Telemetry    (  ) RCU    (  ) Palliative    (  ) Stroke Unit    (  ) _______________    Accepting physician: Dr. Beck    HPI: 55M PMH ESRD on PD, DM on insulin, CHF EF 25-30% CAD s/p CABG x4, underwent cardiac cath and stent. For the last 2 days he has been having increased cough, sweating and chills without measured fevers at home. He denies any sick contacts or recent travel. Patient states that he fell few days ago with no head trauma. Of note patient has right toe gangrenous lesion that has been present for several months, He denies any trauma or pain at the site. In the ED, Patient was found to be hypotensive to 70s. Patient was given midodrine 10mg x1 and bedside POCUS was completed showing poor cardiac function with diffuse hypokinesis. Patient was unable to maintain adequate SBPs despite midodrine so was started on levophed for pressor support. He was febrile to 101.2 and received vancomycin, ceftriaxone, and azithromycin. Admitted to MICU hypotension requiring for pressor support likely  2/2 to sepsis.    MICU COURSE: Admitted to MICU 7/5 overnight in setting of hypotension requiring pressors. By 7/6 morning he was off of pressors. His midodrine was changed to 30 TID to match his home dose. He received PD and fluid from the PD site was sent for culture. Podiatry saw him for his toe and said it was likely not the source of his sepsis but will follow up with wound care. He no longer requires MICU level management.       ASSESSMENT & PLAN:     #Neuro  -AAO x3, able to converse; sitting up in bed comfortably     #Resp  - Sating well on RA    #Cardiovascular   - Hypotension likely in the setting of septic shock,  Hypotensive to 70s s/p 10mg of midodrine;   - midodrine dose increased to 30 mg TID to match home dose  - Previous echo with reduced EF  - Order formal TTE    CAD s/p CABG  - c/w DAPT    #ID  - Febrile to 101.2, s/p vanc/ceftriaoxone/zosyn in the ED, WBC wnl   - 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, which has been present for several months  - will start on cefepime 1000mg qDay and vanc by [id number] (7/8 morning) per nephrology   - Right foot xray to shows no evidence of OM  - Podiatry doesn't believe the toe is the source of infection, they will follow with wound care  - f/up peritoneal fluid culture    #Renal  - On Peritoneal Dialysis (has been on PD for 2-3 years), Cr 10.78 (unsure of baseline)   - nephrology initiated PD  - patient on midodrine 30 q8h at home, continue here  - Holding home metoprolol 25 q12hr in setting of hypotension    #Heme  H/H stable  -Platelets wnl  - No active issues     #Endocrine  -Hx of diabetes on   - Tujeo 60 units at bedtime and Victoza at home, started on 30 units at bedtime, adjust as needed based on patient PO intake  - added 2 units insulin pre-meal    ETHICS- Full Code         For Follow-Up:   [] f/up peritoneal fluid cultures  [] f/up vanc level on 7/8 am for redosing  [] podiatry following for wound care  [] f/up with nephrology for PD        Vital Signs Last 24 Hrs  T(C): 36.7 (06 Jul 2021 15:00), Max: 38.4 (06 Jul 2021 03:39)  T(F): 98.1 (06 Jul 2021 15:00), Max: 101.2 (06 Jul 2021 03:39)  HR: 89 (06 Jul 2021 19:00) (87 - 112)  BP: 87/63 (06 Jul 2021 19:00) (73/54 - 113/79)  BP(mean): 71 (06 Jul 2021 19:00) (62 - 93)  RR: 19 (06 Jul 2021 19:00) (12 - 24)  SpO2: 100% (06 Jul 2021 19:00) (90% - 100%)  I&O's Summary    06 Jul 2021 07:01  -  06 Jul 2021 19:27  --------------------------------------------------------  IN: 2400 mL / OUT: 2000 mL / NET: 400 mL          MEDICATIONS  (STANDING):  aspirin enteric coated 81 milliGRAM(s) Oral daily  atorvastatin 80 milliGRAM(s) Oral at bedtime  cadexomer iodine 0.9% Gel 1 Application(s) Topical daily  chlorhexidine 0.12% Liquid 15 milliLiter(s) Oral Mucosa two times a day  chlorhexidine 4% Liquid 1 Application(s) Topical <User Schedule>  dextrose 40% Gel 15 Gram(s) Oral once  dextrose 5%. 1000 milliLiter(s) (50 mL/Hr) IV Continuous <Continuous>  dextrose 5%. 1000 milliLiter(s) (100 mL/Hr) IV Continuous <Continuous>  dextrose 50% Injectable 25 Gram(s) IV Push once  dextrose 50% Injectable 12.5 Gram(s) IV Push once  dextrose 50% Injectable 25 Gram(s) IV Push once  ferrous    sulfate 325 milliGRAM(s) Oral three times a day  folic acid 1 milliGRAM(s) Oral daily  glucagon  Injectable 1 milliGRAM(s) IntraMuscular once  heparin   Injectable 5000 Unit(s) SubCutaneous every 12 hours  insulin glargine Injectable (LANTUS) 30 Unit(s) SubCutaneous at bedtime  insulin lispro (ADMELOG) corrective regimen sliding scale   SubCutaneous three times a day before meals  insulin lispro (ADMELOG) corrective regimen sliding scale   SubCutaneous at bedtime  insulin lispro Injectable (ADMELOG) 2 Unit(s) SubCutaneous three times a day before meals  lactated ringers. 1000 milliLiter(s) (50 mL/Hr) IV Continuous <Continuous>  midodrine 30 milliGRAM(s) Oral every 8 hours  pantoprazole    Tablet 40 milliGRAM(s) Oral before breakfast  sevelamer carbonate 800 milliGRAM(s) Oral three times a day  ticagrelor 60 milliGRAM(s) Oral every 12 hours    MEDICATIONS  (PRN):        LABS                                            12.0                  Neurophils% (auto):   81.3   (07-06 @ 04:13):    10.43)-----------(258          Lymphocytes% (auto):  6.2                                           40.5                   Eosinphils% (auto):   0.4      Manual%: Neutrophils x    ; Lymphocytes x    ; Eosinophils x    ; Bands%: x    ; Blasts x                                    137    |  95     |  59                  Calcium: 8.8   / iCa: x      (07-06 @ 04:13)    ----------------------------<  132       Magnesium: x                                3.7     |  20     |  10.78            Phosphorous: x        TPro  6.5    /  Alb  2.4    /  TBili  0.5    /  DBili  x      /  AST  22     /  ALT  31     /  AlkPhos  142    06 Jul 2021 04:13    ( 07-06 @ 04:35 )   PT: 15.0 sec;   INR: 1.26 ratio  aPTT: 37.2 sec      Rosalinda Gaines MD  PGY-1 Medicine MICU Transfer Note    Transfer from: MICU  Transfer to:  (x) Medicine    (  ) Telemetry    (  ) RCU    (  ) Palliative    (  ) Stroke Unit    (  ) _______________    Accepting physician: Dr. Beck    HPI: 55M PMH ESRD on PD, DM on insulin, CHF EF 25-30% CAD s/p CABG x4, underwent cardiac cath and stent. For the last 2 days he has been having increased cough, sweating and chills without measured fevers at home. He denies any sick contacts or recent travel. Patient states that he fell few days ago with no head trauma. Of note patient has right toe gangrenous lesion that has been present for several months, He denies any trauma or pain at the site. In the ED, Patient was found to be hypotensive to 70s. Patient was given midodrine 10mg x1 and bedside POCUS was completed showing poor cardiac function with diffuse hypokinesis. Patient was unable to maintain adequate SBPs despite midodrine so was started on levophed for pressor support. He was febrile to 101.2 and received vancomycin, ceftriaxone, and azithromycin. Admitted to MICU hypotension requiring for pressor support likely  2/2 to sepsis.    MICU COURSE: Admitted to MICU 7/5 overnight in setting of hypotension requiring pressors. By 7/6 morning he was off of pressors. His midodrine was changed to 30 TID to match his home dose. He received PD and fluid from the PD site was sent for culture. Podiatry saw him for his toe and said it was likely not the source of his sepsis but will follow up with wound care. He no longer requires MICU level management.       ASSESSMENT & PLAN:     #Neuro  -AAO x3, able to converse; sitting up in bed comfortably     #Resp  - Sating well on RA    #Cardiovascular   - Hypotension likely in the setting of septic shock,  Hypotensive to 70s s/p 10mg of midodrine;   - midodrine dose increased to 30 mg TID to match home dose  - Previous echo with reduced EF  - Order formal TTE    CAD s/p CABG  - c/w DAPT    #ID  - Febrile to 101.2, s/p vanc/ceftriaoxone/zosyn in the ED, WBC wnl   - 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, which has been present for several months  - will start on cefepime 1000mg qDay and vanc by [id number] (7/8 morning) per nephrology   - Right foot xray to shows no evidence of OM  - Podiatry doesn't believe the toe is the source of infection, they will follow with wound care  - f/up peritoneal fluid culture    #Renal  - On Peritoneal Dialysis (has been on PD for 2-3 years), Cr 10.78 (unsure of baseline)   - nephrology initiated PD  - patient on midodrine 30 q8h at home, continue here  - Holding home metoprolol 25 q12hr in setting of hypotension    #Heme  H/H stable  -Platelets wnl  - No active issues     #Endocrine  -Hx of diabetes on   - Tujeo 60 units at bedtime and Victoza at home, started on 30 units at bedtime, adjust as needed based on patient PO intake  - added 2 units ademolog insulin pre-meal    ETHICS- Full Code         For Follow-Up:   [] f/up peritoneal fluid cultures  [] f/up vanc level on 7/8 am for redosing  [] podiatry following for wound care  [] f/up with nephrology for PD        Vital Signs Last 24 Hrs  T(C): 36.7 (06 Jul 2021 15:00), Max: 38.4 (06 Jul 2021 03:39)  T(F): 98.1 (06 Jul 2021 15:00), Max: 101.2 (06 Jul 2021 03:39)  HR: 89 (06 Jul 2021 19:00) (87 - 112)  BP: 87/63 (06 Jul 2021 19:00) (73/54 - 113/79)  BP(mean): 71 (06 Jul 2021 19:00) (62 - 93)  RR: 19 (06 Jul 2021 19:00) (12 - 24)  SpO2: 100% (06 Jul 2021 19:00) (90% - 100%)  I&O's Summary    06 Jul 2021 07:01  -  06 Jul 2021 19:27  --------------------------------------------------------  IN: 2400 mL / OUT: 2000 mL / NET: 400 mL          MEDICATIONS  (STANDING):  aspirin enteric coated 81 milliGRAM(s) Oral daily  atorvastatin 80 milliGRAM(s) Oral at bedtime  cadexomer iodine 0.9% Gel 1 Application(s) Topical daily  chlorhexidine 0.12% Liquid 15 milliLiter(s) Oral Mucosa two times a day  chlorhexidine 4% Liquid 1 Application(s) Topical <User Schedule>  dextrose 40% Gel 15 Gram(s) Oral once  dextrose 5%. 1000 milliLiter(s) (50 mL/Hr) IV Continuous <Continuous>  dextrose 5%. 1000 milliLiter(s) (100 mL/Hr) IV Continuous <Continuous>  dextrose 50% Injectable 25 Gram(s) IV Push once  dextrose 50% Injectable 12.5 Gram(s) IV Push once  dextrose 50% Injectable 25 Gram(s) IV Push once  ferrous    sulfate 325 milliGRAM(s) Oral three times a day  folic acid 1 milliGRAM(s) Oral daily  glucagon  Injectable 1 milliGRAM(s) IntraMuscular once  heparin   Injectable 5000 Unit(s) SubCutaneous every 12 hours  insulin glargine Injectable (LANTUS) 30 Unit(s) SubCutaneous at bedtime  insulin lispro (ADMELOG) corrective regimen sliding scale   SubCutaneous three times a day before meals  insulin lispro (ADMELOG) corrective regimen sliding scale   SubCutaneous at bedtime  insulin lispro Injectable (ADMELOG) 2 Unit(s) SubCutaneous three times a day before meals  lactated ringers. 1000 milliLiter(s) (50 mL/Hr) IV Continuous <Continuous>  midodrine 30 milliGRAM(s) Oral every 8 hours  pantoprazole    Tablet 40 milliGRAM(s) Oral before breakfast  sevelamer carbonate 800 milliGRAM(s) Oral three times a day  ticagrelor 60 milliGRAM(s) Oral every 12 hours    MEDICATIONS  (PRN):        LABS                                            12.0                  Neurophils% (auto):   81.3   (07-06 @ 04:13):    10.43)-----------(258          Lymphocytes% (auto):  6.2                                           40.5                   Eosinphils% (auto):   0.4      Manual%: Neutrophils x    ; Lymphocytes x    ; Eosinophils x    ; Bands%: x    ; Blasts x                                    137    |  95     |  59                  Calcium: 8.8   / iCa: x      (07-06 @ 04:13)    ----------------------------<  132       Magnesium: x                                3.7     |  20     |  10.78            Phosphorous: x        TPro  6.5    /  Alb  2.4    /  TBili  0.5    /  DBili  x      /  AST  22     /  ALT  31     /  AlkPhos  142    06 Jul 2021 04:13    ( 07-06 @ 04:35 )   PT: 15.0 sec;   INR: 1.26 ratio  aPTT: 37.2 sec      Rosalinda Gaines MD  PGY-1 Medicine

## 2021-07-06 NOTE — H&P ADULT - NSHPPHYSICALEXAM_GEN_ALL_CORE
ICU Vital Signs Last 24 Hrs  T(C): 36.4 (06 Jul 2021 07:30), Max: 38.4 (06 Jul 2021 03:39)  T(F): 97.6 (06 Jul 2021 07:30), Max: 101.2 (06 Jul 2021 03:39)  HR: 89 (06 Jul 2021 07:30) (89 - 112)  BP: 79/67 (06 Jul 2021 07:30) (73/54 - 99/70)  BP(mean): 74 (06 Jul 2021 06:59) (62 - 74)  ABP: --  ABP(mean): --  RR: 18 (06 Jul 2021 07:30) (17 - 24)  SpO2: 100% (06 Jul 2021 07:30) (90% - 100%)    PHYSICAL EXAM:  General: Alert and cooperative. Tired appearing.  Head: Normocephalic, no mass or lesions.  Eyes: Intact visual fields. PERRL, clear conjunctiva, EOMI, no ptosis.   Throat: Oral cavity and pharynx normal. No inflammation, swelling, exudate, or lesions. Teeth and gingiva in good general condition.  Neck: No lymphadenopathy, no masses, no thyromegaly. Carotid pulses 2+. No JVD.   Respiratory: Bilateral lungs clear to auscultation, no crackles, wheezes, or rhonchi.   Cardiovascular: S1/S2 auscultated. Regular rhythm. No murmurs, rubs or gallop. No peripheral edema, cyanosis, or pallor. Extremities warm and well perfused. Capillary refill <2 seconds. No carotid bruits.  Abdomen: Soft, non-tender, nondistended, no guarding or rebound tenderness. Active bowel sounds in all 4 quadrants. No hepatosplenomegaly.  Extremities: right 2nd toe with black eschar, non tender touch,lesion noted on right shin  Skin: Intact, no rash. Normal color, texture and turgor with no lesions or eruptions.  Neurological: AOAx3. Strength and sensation symmetric and intact throughout. ICU Vital Signs Last 24 Hrs  T(C): 36.4 (06 Jul 2021 07:30), Max: 38.4 (06 Jul 2021 03:39)  T(F): 97.6 (06 Jul 2021 07:30), Max: 101.2 (06 Jul 2021 03:39)  HR: 89 (06 Jul 2021 07:30) (89 - 112)  BP: 79/67 (06 Jul 2021 07:30) (73/54 - 99/70)  BP(mean): 74 (06 Jul 2021 06:59) (62 - 74)  ABP: --  ABP(mean): --  RR: 18 (06 Jul 2021 07:30) (17 - 24)  SpO2: 100% (06 Jul 2021 07:30) (90% - 100%)    PHYSICAL EXAM:  General: Alert and cooperative. Tired appearing.  Head: Normocephalic, no mass or lesions.  Eyes: Intact visual fields. PERRL, clear conjunctiva, EOMI, no ptosis.   Throat: Oral cavity and pharynx normal. No inflammation, swelling, exudate, or lesions. Teeth and gingiva in good general condition.  Respiratory: Bilateral lungs clear to auscultation, no crackles, wheezes, or rhonchi.   Cardiovascular: S1/S2 auscultated. Regular rhythm. No murmurs, rubs or gallop.   Abdomen: Soft, non-tender, nondistended, no guarding or rebound tenderness. Active bowel sounds in all 4 quadrants. No hepatosplenomegaly. + PD catherter in place, no tenderness at site  Extremities: right 2nd toe with black eschar, non tender touch, lesion noted on right shin  Skin: Intact, no rash. Normal color, texture and turgor with no lesions or eruptions.  Neurological: AOAx3. Strength and sensation symmetric and intact throughout.

## 2021-07-06 NOTE — CHART NOTE - NSCHARTNOTEFT_GEN_A_CORE
: MD Derek and MD Sid    INDICATION: Shock    PROCEDURE:  [x] LIMITED ECHO  [x] LIMITED CHEST  [ ] LIMITED RETROPERITONEAL  [x] LIMITED ABDOMINAL  [ ] LIMITED DVT  [ ] NEEDLE GUIDANCE VASCULAR  [ ] NEEDLE GUIDANCE THORACENTESIS  [ ] NEEDLE GUIDANCE PARACENTESIS  [ ] NEEDLE GUIDANCE PERICARDIOCENTESIS  [ ] OTHER    FINDINGS:  Thoracic: Bilateral A-line predominant pattern. No pleural effusions.  Cardiac: Grossly reduced LV systolic function. RV<LV. No pericardial effusion. IVC 1.2cm.  Abdominal: Ascites    INTERPRETATION:  Normal goal directed thoracic ultrasound. Grossly reduced LV systolic function. IVC 1.2cm.    Images stored in [employer] : MD Derek and MD Sid    INDICATION: Shock    PROCEDURE:  [x] LIMITED ECHO  [x] LIMITED CHEST  [ ] LIMITED RETROPERITONEAL  [x] LIMITED ABDOMINAL  [ ] LIMITED DVT  [ ] NEEDLE GUIDANCE VASCULAR  [ ] NEEDLE GUIDANCE THORACENTESIS  [ ] NEEDLE GUIDANCE PARACENTESIS  [ ] NEEDLE GUIDANCE PERICARDIOCENTESIS  [ ] OTHER    FINDINGS:  Thoracic: Bilateral A-line predominant pattern. No pleural effusions.  Cardiac: severely reduced LV systolic function. RV<LV. No pericardial effusion. IVC 1.2cm.  Abdominal: Ascites    INTERPRETATION:  Normal goal directed thoracic ultrasound. Severely  reduced LV systolic function. IVC 1.2cm.    Images stored in [employer]

## 2021-07-06 NOTE — ED ADULT NURSE REASSESSMENT NOTE - NS ED NURSE REASSESS COMMENT FT1
0710 Pt in ER purple [id number]. Pt drowsy. Oriented x 3. 2+pedal edema with discoloration lower legs right 2nd toe blackish, and right distal thumb blackish. skin tear right lower leg with bandage at site. Pt states recent fall at home witnessed by wife. IVL intact x 2 without sx of infilt. Color sallow. skin W&D. Denies pain, HA, dizziness, nausea, SOB. +crackles. [facility] [facility] BNP on labs drawn earlier. Peritoneal dialysis cath noted at abd. Fall risk precautions maintained

## 2021-07-06 NOTE — CONSULT NOTE ADULT - ASSESSMENT
CATH 11/30/2020:  COMPLICATIONS: The stent was deployed without difficulty. On removal of the  balloon, the shaft broke and the tip of the balloon remained in the vessel. Several attempts were made to snare the balloon unsuccessfully. Dr. Verdugo was notifed who will take the patient to the operating room.  DIAGNOSTIC RECOMMENDATIONS: The patient should continue with the present medications. The patients vessel was stented without difficulty On removal of the balloon, the shaft broke with the baloon stuck in the left main. All attempts to snare the balloon out failed and the patient was taken to the OR by Dr. Arango to remove the balloon  introp eleonora 11/30/20: mild to mod MR, < from: Intra-Operative Transesophageal Echo (11.30.20 @ 17:02) >  Severe global left ventricular systolic dysfunction. Inferior and inferolateral akinesis.  severe hypokinesis of other segments.  Severe global hypokinesia.  Normal left ventricular internal dimensions and wall thicknesses. Moderate diastolic dysfunction (Stage II).  echo 12/17/20: EF 32%, mod MR, Severe global left ventricular systolic dysfunction, moderate pulmonary pressures  echo 12/20/20: EF (Visual Estimate):  25-30 % mild MR, Akinesis of the inferolateral, anterolateral, apical laterlal, inferior, and inferoseptal walls. Severe left ventricular enlargement. No left ventricular thrombus is seen.      a/p  55M well known to practice with PMH ESRD on PD, DM on insulin, CHF EF 25-30% CAD s/p CABG x4, underwent cardiac cath and stent and wire broke in heart sternotomy.    #Septic Shock  +fevers noted  -f/u bcx  -iv abx per MICU  -on pressors   -Bedside POCUS notable for scattered B lines, biventricular diffuse hypokinesis  -pt also with right 2nd toe gangrene - pod eval for debridement   -f/u culture of PD cathter  -w/u per MICU     #Ischemic Cardiomyopathy. Chronic systolic HF  -vol status stable   -CXR w clear lungs   -Repeat echo with unchanged lv sys dysfx, ef 25- 30%  -no bb, acei/arb given hypotension and need for pressors   -continue midodrine for bp support  -Most recent echo noted with 25-30 % mild MR, Akinesis of the inferolateral, anterolateral, apical laterlal, inferior, and inferoseptal walls. Severe left ventricular enlargement. No left ventricular thrombus is seen.  -last admission pt was not candidate for icd   -cont vol removal w PD     # CAD, s/p CABG, s/p PCI, s/p redo open heart for stent balloon retrieval   -hst elevated, likely demand ischemia in setting of septic shock, ESRD   -check EKG  - not in chart/sunrise   -c/w asa, Brilinta, statin     # ESRD  -on PD  -renal f/u    dvt ppx  care per ICU      CATH 11/30/2020:  COMPLICATIONS: The stent was deployed without difficulty. On removal of the  balloon, the shaft broke and the tip of the balloon remained in the vessel. Several attempts were made to snare the balloon unsuccessfully. Dr. Verdugo was notifed who will take the patient to the operating room.  DIAGNOSTIC RECOMMENDATIONS: The patient should continue with the present medications. The patients vessel was stented without difficulty On removal of the balloon, the shaft broke with the baloon stuck in the left main. All attempts to snare the balloon out failed and the patient was taken to the OR by Dr. Arango to remove the balloon  introp eleonora 11/30/20: mild to mod MR, < from: Intra-Operative Transesophageal Echo (11.30.20 @ 17:02) >  Severe global left ventricular systolic dysfunction. Inferior and inferolateral akinesis.  severe hypokinesis of other segments.  Severe global hypokinesia.  Normal left ventricular internal dimensions and wall thicknesses. Moderate diastolic dysfunction (Stage II).  echo 12/17/20: EF 32%, mod MR, Severe global left ventricular systolic dysfunction, moderate pulmonary pressures  echo 12/20/20: EF (Visual Estimate):  25-30 % mild MR, Akinesis of the inferolateral, anterolateral, apical laterlal, inferior, and inferoseptal walls. Severe left ventricular enlargement. No left ventricular thrombus is seen.      a/p  89M well known to practice with PMH ESRD on PD, DM on insulin, CHF EF 25-30% CAD s/p CABG x4, underwent cardiac cath and stent and wire broke in heart s/p sternotomy p/w septic shock    #Septic Shock  +fevers noted  -f/u bcx  -iv abx per MICU  -on pressors   -Bedside POCUS notable for scattered B lines, biventricular diffuse hypokinesis  -pt also with right 2nd toe gangrene - pod eval for debridement   -f/u culture of PD cathter  -w/u per MICU     #Ischemic Cardiomyopathy. Chronic systolic HF  -vol status stable   -CXR w clear lungs   -Repeat echo with unchanged lv sys dysfx, ef 25- 30%  -no bb, acei/arb given hypotension and need for pressors   -continue midodrine for bp support  -Most recent echo noted with 25-30 % mild MR, Akinesis of the inferolateral, anterolateral, apical laterlal, inferior, and inferoseptal walls. Severe left ventricular enlargement. No left ventricular thrombus is seen.  -last admission pt was not candidate for icd   -cont vol removal w PD     # CAD, s/p CABG, s/p PCI, s/p redo open heart for stent balloon retrieval   -hst elevated, likely demand ischemia in setting of septic shock, ESRD   -check EKG  - not in chart/sunrise   -c/w asa, Brilinta, statin     # ESRD  -on PD  -renal f/u    dvt ppx  care per ICU

## 2021-07-06 NOTE — CONSULT NOTE ADULT - SUBJECTIVE AND OBJECTIVE BOX
CARDIOLOGY CONSULT - Dr. Best         HPI:  55M well known to practice with PMH ESRD on PD, DM on insulin, CHF EF 25-30% CAD s/p CABG x4, underwent cardiac cath and stent and wire broke in heart sternotomy. Patient states that for the last 2 days he has been having increased cough and sweating. He endorsed chills but no measured fevers at home. He denies any sick contacts or recent travel. Patient states that he fell few days ago with no head trauma. Of note patient has right toe gangrenous lesion that has been present for several months, He denies any trauma or pain at the site.     In the ED, Patient was found to be hypotensive to 70s. Patient was given midodrine 10mg x1 and bedside POCUS was completed showing poor cardiac function with diffuse hypokinesis. Patient was unable to maintain adequate SBPs despite midodrine so was started on levophed for pressor support. Admitted to MICU hypotension requiring for pressor support likely  2/2 to sepsis.    Most recent echo noted with 25-30 % mild MR, Akinesis of the inferolateral, anterolateral, apical laterlal, inferior, and inferoseptal walls. Severe left ventricular enlargement. No left ventricular thrombus is seen. he currently denies cp, sob, and palpitations . ROS otherwise neg       PAST MEDICAL & SURGICAL HISTORY:  Diabetes    CAD, multiple vessel    ESRD (end stage renal disease) on dialysis    HTN (hypertension)    S/P CABG (coronary artery bypass graft)            PREVIOUS DIAGNOSTIC TESTING:    [x ] Echocardiogram:  < from: TTE with Doppler (w/Cont) (12.30.20 @ 09:05) >  Dimensions:    Normal Values:  LA:     3.2    2.0 - 4.0 cm  Ao:     2.7    2.0 - 3.8 cm  SEPTUM: 1.0    0.6 - 1.2 cm  PWT:    0.9    0.6 - 1.1 cm  LVIDd:  5.5    3.0 - 5.6 cm  LVIDs:  4.8    1.8 - 4.0 cm  Derived variables:  LVMI: 103 g/m2  RWT: 0.32  EF (Visual Estimate): 25-30 %  ------------------------------------------------------------------------  Observations:  Mitral Valve: Mitral annular, chordal, and leaflet  calcification. Mild mitral regurgitation.  Aortic Valve/Aorta: Calcified aortic valve with normal  opening. Mild aortic regurgitation.  Normal aortic root.  Left Atrium: Mildly dilated left atrium.  Left Ventricle: Endocardial visualization enhanced with  intravenous injection of Ultrasonic Enhancing Agent  (Definity).  Severe left ventricular enlargement. Estimated ejection  fraction 25-30%.  Akinesis of the inferolateral, anterolateral, apical  laterlal, inferior, and inferoseptal walls.  Impaired LV-relaxation with normal filling pressure.  No left ventricular thrombus is seen.  Right Heart: Normal right atrium. Normal right ventricular  size and function.  Normal tricuspid valve. Minimal tricuspid regurgitation.  Normal pulmonic valve.  Pericardium/Pleura: Normal pericardium with no pericardial  effusion.  Hemodynamic: Estimated right atrial pressure is normal.  No evidence of pulmoanry hypertension.  No PFO seen with color Doppler.  ------------------------------------------------------------------------  Conclusions:  Endocardial visualization enhanced with intravenous  injection of Ultrasonic Enhancing Agent (Definity).  Estimated ejection fraction 25-30%, regional abnormalities  as described.  *** Compared with echocardiogram of 12/17/2020, pulmonary  artery pressure appears to be lower.    < end of copied text >    [x ]  Catheterization:  < from: Cardiac Cath Lab - Adult (11.30.20 @ 09:43) >  LM:   --  Distal left main: There was a 70 % stenosis.  LAD:   --  Mid LAD: There was a 100 % stenosis.  CX:   --  OM1: There was a 90 % stenosis.  RCA:   --  Ostial RCA: There was a 100 % stenosis.  GRAFTS:   --  Graft to the mid LAD: The graft was a LIMA. Graft angiography  showed minor luminal irregularities.  COMPLICATIONS: The stent was deployed without difficulty. On removal of the  balloon, the shaft broke and the tip of the balloon remained in the  vessel. Several attempts were made to snare the balloon unsuccessfully.  Dr. Verdugo was notifed who will take the patient to the operating room.    < end of copied text >    [ ] Stress Test:  	    MEDICATIONS:  Home Medications:  aspirin 81 mg oral delayed release tablet: 1 tab(s) orally once a day (29 Dec 2020 18:37)  atorvastatin 80 mg oral tablet: 1 tab(s) orally once a day (at bedtime) (29 Dec 2020 18:37)  epoetin martine: injectable once a month (29 Dec 2020 18:37)  ferrous sulfate 325 mg (65 mg elemental iron) oral tablet: 1 tab(s) orally 3 times a day (29 Dec 2020 18:37)  gabapentin 100 mg oral capsule: 1 cap(s) orally once a day (29 Dec 2020 18:37)  nortriptyline 25 mg oral capsule: 1 cap(s) orally once a day (at bedtime) (29 Dec 2020 18:37)  pantoprazole 40 mg oral delayed release tablet: 1 tab(s) orally once a day (before a meal) (29 Dec 2020 18:37)  Toujeo SoloStar 300 units/mL subcutaneous solution: 60 unit(s) subcutaneous once a day (at bedtime) (08 Jan 2021 12:41)      MEDICATIONS  (STANDING):  aspirin enteric coated 81 milliGRAM(s) Oral daily  atorvastatin 80 milliGRAM(s) Oral at bedtime  chlorhexidine 0.12% Liquid 15 milliLiter(s) Oral Mucosa two times a day  chlorhexidine 4% Liquid 1 Application(s) Topical <User Schedule>  dextrose 40% Gel 15 Gram(s) Oral once  dextrose 5%. 1000 milliLiter(s) (50 mL/Hr) IV Continuous <Continuous>  dextrose 5%. 1000 milliLiter(s) (100 mL/Hr) IV Continuous <Continuous>  dextrose 50% Injectable 25 Gram(s) IV Push once  dextrose 50% Injectable 12.5 Gram(s) IV Push once  dextrose 50% Injectable 25 Gram(s) IV Push once  ferrous    sulfate 325 milliGRAM(s) Oral three times a day  folic acid 1 milliGRAM(s) Oral daily  glucagon  Injectable 1 milliGRAM(s) IntraMuscular once  heparin   Injectable 5000 Unit(s) SubCutaneous every 12 hours  insulin glargine Injectable (LANTUS) 30 Unit(s) SubCutaneous at bedtime  insulin lispro (ADMELOG) corrective regimen sliding scale   SubCutaneous three times a day before meals  insulin lispro (ADMELOG) corrective regimen sliding scale   SubCutaneous at bedtime  lactated ringers. 1000 milliLiter(s) (50 mL/Hr) IV Continuous <Continuous>  midodrine 10 milliGRAM(s) Oral every 8 hours  norepinephrine Infusion 0.05 MICROgram(s)/kG/Min (5.95 mL/Hr) IV Continuous <Continuous>  pantoprazole  Injectable 40 milliGRAM(s) IV Push daily  sevelamer carbonate 800 milliGRAM(s) Oral three times a day  ticagrelor 60 milliGRAM(s) Oral every 12 hours      FAMILY HISTORY:  FHx: lung cancer (Sibling)  bother    FH: diabetes mellitus (Sibling)  father        SOCIAL HISTORY:    [x ] Non-smoker  [ ] Smoker  [ ] Alcohol    Allergies    doxazosin (Other)    Intolerances    	    REVIEW OF SYSTEMS:  CONSTITUTIONAL: No fever, weight loss, or fatigue  EYES: No eye pain, visual disturbances, or discharge  ENMT:  No difficulty hearing, tinnitus, vertigo; No sinus or throat pain  NECK: No pain or stiffness  RESPIRATORY: No cough, wheezing, chills or hemoptysis; No Shortness of Breath  CARDIOVASCULAR: No chest pain, palpitations, passing out, dizziness, or leg swelling  GASTROINTESTINAL: No abdominal or epigastric pain. No nausea, vomiting, or hematemesis; No diarrhea or constipation. No melena or hematochezia.  GENITOURINARY: No dysuria, frequency, hematuria, or incontinence  NEUROLOGICAL: No headaches, memory loss, loss of strength, numbness, or tremors  SKIN: No itching, burning, rashes, or lesions   	    [x ] All others negative	see hpi   [ ] Unable to obtain    PHYSICAL EXAM:  T(C): 37.1 (07-06-21 @ 09:15), Max: 38.4 (07-06-21 @ 03:39)  HR: 90 (07-06-21 @ 12:00) (88 - 112)  BP: 93/68 (07-06-21 @ 12:00) (73/54 - 113/79)  RR: 16 (07-06-21 @ 12:00) (13 - 24)  SpO2: 100% (07-06-21 @ 12:00) (90% - 100%)  Wt(kg): --  I&O's Summary    06 Jul 2021 07:01  -  06 Jul 2021 12:35  --------------------------------------------------------  IN: 150 mL / OUT: 0 mL / NET: 150 mL        Appearance: Normal	  Psychiatry: A & O x 3, Mood & affect appropriate  HEENT:   Normal oral mucosa, PERRL, EOMI	  Lymphatic: No lymphadenopathy  Cardiovascular: Normal S1 S2,RRR, No JVD, No murmurs  Respiratory: Lungs clear to auscultation	  Gastrointestinal:  Soft, Non-tender, + BS	  Skin: No rashes, No ecchymoses, No cyanosis	  Neurologic: Non-focal  Extremities: Normal range of motion, No clubbing, cyanosis or edema  Vascular: Peripheral pulses palpable 2+ bilaterally    TELEMETRY: 	NSR     ECG:  	not in chart/sunirse   RADIOLOGY:  < from: Xray Chest 1 View- PORTABLE-Urgent (Xray Chest 1 View- PORTABLE-Urgent .) (07.06.21 @ 05:26) >  Status post sternotomy and CABG. The heart size is normal.    The lungs are clear. No pleural effusion or pneumothorax.    The visualized osseous structures demonstrate no acute pathology.    IMPRESSION:  Clear lungs.    < end of copied text >    OTHER: 	  	  LABS:	 	    CARDIAC MARKERS:  Troponin T, High Sensitivity Result: 468 ng/L (07-06 @ 07:45)  Troponin T, High Sensitivity Result: 488 ng/L (07-06 @ 04:13)                                  12.0   10.43 )-----------( 258      ( 06 Jul 2021 04:13 )             40.5     07-06    137  |  95<L>  |  59<H>  ----------------------------<  132<H>  3.7   |  20<L>  |  10.78<H>    Ca    8.8      06 Jul 2021 04:13    TPro  6.5  /  Alb  2.4<L>  /  TBili  0.5  /  DBili  x   /  AST  22  /  ALT  31  /  AlkPhos  142<H>  07-06    PT/INR - ( 06 Jul 2021 04:35 )   PT: 15.0 sec;   INR: 1.26 ratio         PTT - ( 06 Jul 2021 04:35 )  PTT:37.2 sec  proBNP: Serum Pro-Brain Natriuretic Peptide: [id number] pg/mL (07-06 @ 04:13)    Lipid Profile:   HgA1c:   TSH:        CARDIOLOGY CONSULT - Dr. Best         HPI:  55M well known to practice with PMH ESRD on PD, DM on insulin, CHF EF 25-30% CAD s/p CABG x4, underwent cardiac cath and stent and wire broke in heart sternotomy. Patient states that for the last 2 days he has been having increased cough and sweating. He endorsed chills but no measured fevers at home. He denies any sick contacts or recent travel. Patient states that he fell few days ago with no head trauma. Of note patient has right toe gangrenous lesion that has been present for several months, He denies any trauma or pain at the site.     In the ED, Patient was found to be hypotensive to 70s. Patient was given midodrine 10mg x1 and bedside POCUS was completed showing poor cardiac function with diffuse hypokinesis. Patient was unable to maintain adequate SBPs despite midodrine so was started on levophed for pressor support. Admitted to MICU hypotension requiring for pressor support likely  2/2 to sepsis.    Most recent echo noted with 25-30 % mild MR, Akinesis of the inferolateral, anterolateral, apical laterlal, inferior, and inferoseptal walls. Severe left ventricular enlargement. No left ventricular thrombus is seen. he currently denies cp, sob, and palpitations . ROS otherwise neg       PAST MEDICAL & SURGICAL HISTORY:  Diabetes    CAD, multiple vessel    ESRD (end stage renal disease) on dialysis    HTN (hypertension)    S/P CABG (coronary artery bypass graft)            PREVIOUS DIAGNOSTIC TESTING:    [x ] Echocardiogram:  < from: TTE with Doppler (w/Cont) (12.30.20 @ 09:05) >  Dimensions:    Normal Values:  LA:     3.2    2.0 - 4.0 cm  Ao:     2.7    2.0 - 3.8 cm  SEPTUM: 1.0    0.6 - 1.2 cm  PWT:    0.9    0.6 - 1.1 cm  LVIDd:  5.5    3.0 - 5.6 cm  LVIDs:  4.8    1.8 - 4.0 cm  Derived variables:  LVMI: 103 g/m2  RWT: 0.32  EF (Visual Estimate): 25-30 %  ------------------------------------------------------------------------  Observations:  Mitral Valve: Mitral annular, chordal, and leaflet  calcification. Mild mitral regurgitation.  Aortic Valve/Aorta: Calcified aortic valve with normal  opening. Mild aortic regurgitation.  Normal aortic root.  Left Atrium: Mildly dilated left atrium.  Left Ventricle: Endocardial visualization enhanced with  intravenous injection of Ultrasonic Enhancing Agent  (Definity).  Severe left ventricular enlargement. Estimated ejection  fraction 25-30%.  Akinesis of the inferolateral, anterolateral, apical  laterlal, inferior, and inferoseptal walls.  Impaired LV-relaxation with normal filling pressure.  No left ventricular thrombus is seen.  Right Heart: Normal right atrium. Normal right ventricular  size and function.  Normal tricuspid valve. Minimal tricuspid regurgitation.  Normal pulmonic valve.  Pericardium/Pleura: Normal pericardium with no pericardial  effusion.  Hemodynamic: Estimated right atrial pressure is normal.  No evidence of pulmoanry hypertension.  No PFO seen with color Doppler.  ------------------------------------------------------------------------  Conclusions:  Endocardial visualization enhanced with intravenous  injection of Ultrasonic Enhancing Agent (Definity).  Estimated ejection fraction 25-30%, regional abnormalities  as described.  *** Compared with echocardiogram of 12/17/2020, pulmonary  artery pressure appears to be lower.    < end of copied text >    [x ]  Catheterization:  < from: Cardiac Cath Lab - Adult (11.30.20 @ 09:43) >  LM:   --  Distal left main: There was a 70 % stenosis.  LAD:   --  Mid LAD: There was a 100 % stenosis.  CX:   --  OM1: There was a 90 % stenosis.  RCA:   --  Ostial RCA: There was a 100 % stenosis.  GRAFTS:   --  Graft to the mid LAD: The graft was a LIMA. Graft angiography  showed minor luminal irregularities.  COMPLICATIONS: The stent was deployed without difficulty. On removal of the  balloon, the shaft broke and the tip of the balloon remained in the  vessel. Several attempts were made to snare the balloon unsuccessfully.  Dr. Verdugo was notifed who will take the patient to the operating room.    < end of copied text >    [ ] Stress Test:  	    MEDICATIONS:  Home Medications:  aspirin 81 mg oral delayed release tablet: 1 tab(s) orally once a day (29 Dec 2020 18:37)  atorvastatin 80 mg oral tablet: 1 tab(s) orally once a day (at bedtime) (29 Dec 2020 18:37)  epoetin martine: injectable once a month (29 Dec 2020 18:37)  ferrous sulfate 325 mg (65 mg elemental iron) oral tablet: 1 tab(s) orally 3 times a day (29 Dec 2020 18:37)  gabapentin 100 mg oral capsule: 1 cap(s) orally once a day (29 Dec 2020 18:37)  nortriptyline 25 mg oral capsule: 1 cap(s) orally once a day (at bedtime) (29 Dec 2020 18:37)  pantoprazole 40 mg oral delayed release tablet: 1 tab(s) orally once a day (before a meal) (29 Dec 2020 18:37)  Toujeo SoloStar 300 units/mL subcutaneous solution: 60 unit(s) subcutaneous once a day (at bedtime) (08 Jan 2021 12:41)      MEDICATIONS  (STANDING):  aspirin enteric coated 81 milliGRAM(s) Oral daily  atorvastatin 80 milliGRAM(s) Oral at bedtime  chlorhexidine 0.12% Liquid 15 milliLiter(s) Oral Mucosa two times a day  chlorhexidine 4% Liquid 1 Application(s) Topical <User Schedule>  dextrose 40% Gel 15 Gram(s) Oral once  dextrose 5%. 1000 milliLiter(s) (50 mL/Hr) IV Continuous <Continuous>  dextrose 5%. 1000 milliLiter(s) (100 mL/Hr) IV Continuous <Continuous>  dextrose 50% Injectable 25 Gram(s) IV Push once  dextrose 50% Injectable 12.5 Gram(s) IV Push once  dextrose 50% Injectable 25 Gram(s) IV Push once  ferrous    sulfate 325 milliGRAM(s) Oral three times a day  folic acid 1 milliGRAM(s) Oral daily  glucagon  Injectable 1 milliGRAM(s) IntraMuscular once  heparin   Injectable 5000 Unit(s) SubCutaneous every 12 hours  insulin glargine Injectable (LANTUS) 30 Unit(s) SubCutaneous at bedtime  insulin lispro (ADMELOG) corrective regimen sliding scale   SubCutaneous three times a day before meals  insulin lispro (ADMELOG) corrective regimen sliding scale   SubCutaneous at bedtime  lactated ringers. 1000 milliLiter(s) (50 mL/Hr) IV Continuous <Continuous>  midodrine 10 milliGRAM(s) Oral every 8 hours  norepinephrine Infusion 0.05 MICROgram(s)/kG/Min (5.95 mL/Hr) IV Continuous <Continuous>  pantoprazole  Injectable 40 milliGRAM(s) IV Push daily  sevelamer carbonate 800 milliGRAM(s) Oral three times a day  ticagrelor 60 milliGRAM(s) Oral every 12 hours      FAMILY HISTORY:  FHx: lung cancer (Sibling)  bother    FH: diabetes mellitus (Sibling)  father        SOCIAL HISTORY:    [x ] Non-smoker  [ ] Smoker  [ ] Alcohol    Allergies    doxazosin (Other)    Intolerances    	    REVIEW OF SYSTEMS:  CONSTITUTIONAL: No fever, weight loss, or fatigue  EYES: No eye pain, visual disturbances, or discharge  ENMT:  No difficulty hearing, tinnitus, vertigo; No sinus or throat pain  NECK: No pain or stiffness  RESPIRATORY: No cough, wheezing, chills or hemoptysis; No Shortness of Breath  CARDIOVASCULAR: No chest pain, palpitations, passing out, dizziness, or leg swelling  GASTROINTESTINAL: No abdominal or epigastric pain. No nausea, vomiting, or hematemesis; No diarrhea or constipation. No melena or hematochezia.  GENITOURINARY: No dysuria, frequency, hematuria, or incontinence  NEUROLOGICAL: No headaches, memory loss, loss of strength, numbness, or tremors  SKIN: No itching, burning, rashes, or lesions   	    [x ] All others negative	see hpi   [ ] Unable to obtain    PHYSICAL EXAM:  T(C): 37.1 (07-06-21 @ 09:15), Max: 38.4 (07-06-21 @ 03:39)  HR: 90 (07-06-21 @ 12:00) (88 - 112)  BP: 93/68 (07-06-21 @ 12:00) (73/54 - 113/79)  RR: 16 (07-06-21 @ 12:00) (13 - 24)  SpO2: 100% (07-06-21 @ 12:00) (90% - 100%)  Wt(kg): --  I&O's Summary    06 Jul 2021 07:01  -  06 Jul 2021 12:35  --------------------------------------------------------  IN: 150 mL / OUT: 0 mL / NET: 150 mL        Appearance: Normal	  Psychiatry: A & O x 3, Mood & affect appropriate  HEENT:   Normal oral mucosa, PERRL, EOMI	  Lymphatic: No lymphadenopathy  Cardiovascular: Normal S1 S2,RRR, No JVD, No murmurs  Respiratory: Lungs clear to auscultation	  Gastrointestinal:  Soft, Non-tender, + BS	  Skin: No rashes, No ecchymoses, No cyanosis	  Neurologic: Non-focal  Extremities: Normal range of motion, No clubbing, cyanosis or edema  Vascular: Peripheral pulses palpable 2+ bilaterally    TELEMETRY: 	NSR     ECG:  	not in chart/sunirse   RADIOLOGY:  < from: Xray Chest 1 View- PORTABLE-Urgent (Xray Chest 1 View- PORTABLE-Urgent .) (07.06.21 @ 05:26) >  Status post sternotomy and CABG. The heart size is normal.    The lungs are clear. No pleural effusion or pneumothorax.    The visualized osseous structures demonstrate no acute pathology.    IMPRESSION:  Clear lungs.    < end of copied text >    OTHER: 	  	  LABS:	 	    CARDIAC MARKERS:  Troponin T, High Sensitivity Result: 468 ng/L (07-06 @ 07:45)  Troponin T, High Sensitivity Result: 488 ng/L (07-06 @ 04:13)                                  12.0   10.43 )-----------( 258      ( 06 Jul 2021 04:13 )             40.5     07-06    137  |  95<L>  |  59<H>  ----------------------------<  132<H>  3.7   |  20<L>  |  10.78<H>    Ca    8.8      06 Jul 2021 04:13    TPro  6.5  /  Alb  2.4<L>  /  TBili  0.5  /  DBili  x   /  AST  22  /  ALT  31  /  AlkPhos  142<H>  07-06    PT/INR - ( 06 Jul 2021 04:35 )   PT: 15.0 sec;   INR: 1.26 ratio         PTT - ( 06 Jul 2021 04:35 )  PTT:37.2 sec  proBNP: Serum Pro-Brain Natriuretic Peptide: [id number] pg/mL (07-06 @ 04:13)    Lipid Profile:   HgA1c:   TSH:       MEDICATIONS  (STANDING):  aspirin enteric coated 81 milliGRAM(s) Oral daily  atorvastatin 80 milliGRAM(s) Oral at bedtime  chlorhexidine 0.12% Liquid 15 milliLiter(s) Oral Mucosa two times a day  chlorhexidine 4% Liquid 1 Application(s) Topical <User Schedule>  dextrose 40% Gel 15 Gram(s) Oral once  dextrose 5%. 1000 milliLiter(s) (50 mL/Hr) IV Continuous <Continuous>  dextrose 5%. 1000 milliLiter(s) (100 mL/Hr) IV Continuous <Continuous>  dextrose 50% Injectable 25 Gram(s) IV Push once  dextrose 50% Injectable 12.5 Gram(s) IV Push once  dextrose 50% Injectable 25 Gram(s) IV Push once  ferrous    sulfate 325 milliGRAM(s) Oral three times a day  folic acid 1 milliGRAM(s) Oral daily  glucagon  Injectable 1 milliGRAM(s) IntraMuscular once  heparin   Injectable 5000 Unit(s) SubCutaneous every 12 hours  insulin glargine Injectable (LANTUS) 30 Unit(s) SubCutaneous at bedtime  insulin lispro (ADMELOG) corrective regimen sliding scale   SubCutaneous three times a day before meals  insulin lispro (ADMELOG) corrective regimen sliding scale   SubCutaneous at bedtime  lactated ringers. 1000 milliLiter(s) (50 mL/Hr) IV Continuous <Continuous>  midodrine 10 milliGRAM(s) Oral every 8 hours  norepinephrine Infusion 0.05 MICROgram(s)/kG/Min (5.95 mL/Hr) IV Continuous <Continuous>  pantoprazole  Injectable 40 milliGRAM(s) IV Push daily  sevelamer carbonate 800 milliGRAM(s) Oral three times a day  ticagrelor 60 milliGRAM(s) Oral every 12 hours

## 2021-07-06 NOTE — PROGRESS NOTE ADULT - ASSESSMENT
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, which has been present for several months  - will start on cefepime 500mg [id number] and vanc by level  - f/u vanc trough   - f/u right foot xray to r/o OM  - Podiatry consult for debridement of 2nd right toe  - obtain culture of peritoneal dialysis cathter to evaluate for source of infection     #Renal  - On Peritoneal Dialysis (has been on PD for 2-3 years), Cr 10.78 (unsure of baseline)   - consult nephrology for inititian of PD  - patient on midodrine 30 q8h at home and metoprol 25 q12h, will hold for now in setting of hypotension     #Heme  H/H stable  -Platelets wnl  - No active issues     #Endocrine  -Hx of diabetes on   - Tujeo 60 units at bedtime and Victoza at home, started on 30 units at bedtime, adjust as needed based on patient PO intake  ETHICS- Full Code  #Neuro  -AAO x3, able to converse; sitting up in bed comfortably     #Resp  - Sating well on RA    #Cardiovascular   - Hypotension likely in the setting of septic shock,  Hypotensive to 70s s/p 10mg of midodrine;   -Continue levophed for pressor support, MAP goal > 65  - Bedside POCUS notable for scattered B lines, biventricular diffuse hypokinesis  - Previous echo with reduced EF  [ ] Formal TTE    CAD s/p CABG  - c/w DAPT    #ID  - Febrile to 101.2, s/p vanc/ceftriaoxone/zosyn in the ED, WBC wnl   - 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, which has been present for several months  - will start on cefepime 500mg [id number] and vanc by level  - f/u vanc trough   - f/u right foot xray to r/o OM  - Podiatry consult for debridement of 2nd right toe  - obtain culture of peritoneal dialysis cathter to evaluate for source of infection     #Renal  - On Peritoneal Dialysis (has been on PD for 2-3 years), Cr 10.78 (unsure of baseline)   - consult nephrology for inititian of PD  - patient on midodrine 30 q8h at home and metoprol 25 q12h, will hold for now in setting of hypotension     #Heme  H/H stable  -Platelets wnl  - No active issues     #Endocrine  -Hx of diabetes on   - Tujeo 60 units at bedtime and Victoza at home, started on 30 units at bedtime, adjust as needed based on patient PO intake  ETHICS- Full Code  #Neuro  -AAO x3, able to converse; sitting up in bed comfortably     #Resp  - Sating well on RA    #Cardiovascular   - Hypotension likely in the setting of septic shock,  Hypotensive to 70s s/p 10mg of midodrine;   -Continue levophed for pressor support, MAP goal > 65  - Bedside POCUS notable for scattered B lines, biventricular diffuse hypokinesis  - Previous echo with reduced EF  [ ] Formal TTE    CAD s/p CABG  - c/w DAPT    #ID  - Febrile to 101.2, s/p vanc/ceftriaoxone/zosyn in the ED, WBC wnl   - 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, which has been present for several months  - will start on cefepime 1000mg qDay and vanc by [id number] per nephrology  - f/u right foot xray to r/o OM  - Podiatry consult for evaluation of 2nd right toe  - obtain culture of peritoneal dialysis cathter to evaluate for source of infection     #Renal  - On Peritoneal Dialysis (has been on PD for 2-3 years), Cr 10.78 (unsure of baseline)   - consult nephrology for initiation of PD  - patient on midodrine 30 q8h at home and metoprol 25 q12h, will hold for now in setting of hypotension     #Heme  H/H stable  -Platelets wnl  - No active issues     #Endocrine  -Hx of diabetes on   - Tujeo 60 units at bedtime and Victoza at home, started on 30 units at bedtime, adjust as needed based on patient PO intake  ETHICS- Full Code  #Neuro  -AAO x3, able to converse; sitting up in bed comfortably     #Resp  - Sating well on RA    #Cardiovascular   - Hypotension likely in the setting of septic shock,  Hypotensive to 70s s/p 10mg of midodrine;   -Continue levophed for pressor support, MAP goal > 65  - Bedside POCUS notable for scattered B lines, biventricular diffuse hypokinesis  - Previous echo with reduced EF  [ ] Formal TTE    CAD s/p CABG  - c/w DAPT    #ID  - Febrile to 101.2, s/p vanc/ceftriaoxone/zosyn in the ED, WBC wnl   - 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, which has been present for several months  - will start on cefepime 1000mg qDay and vanc by [id number] per nephrology  - f/u right foot xray to r/o OM  - Podiatry consult for evaluation of 2nd right toe  - obtain culture of peritoneal dialysis cathter to evaluate for source of infection     #Renal  - On Peritoneal Dialysis (has been on PD for 2-3 years), Cr 10.78 (unsure of baseline)   - consult nephrology for initiation of PD  - patient on midodrine 30 q8h at home, continue here  - Holding home metoprolol 25 q12hr in setting of hypotension    #Heme  H/H stable  -Platelets wnl  - No active issues     #Endocrine  -Hx of diabetes on   - Tujeo 60 units at bedtime and Victoza at home, started on 30 units at bedtime, adjust as needed based on patient PO intake  ETHICS- Full Code

## 2021-07-06 NOTE — ED ADULT NURSE NOTE - OBJECTIVE STATEMENT
56y male BIBEMS, per wife pt is baseline AAOx3 but unable to walk at home, PMH peritoneal HD daily, HTN, DM, wife reports pt has been lethargic at home, poor PO intake, low BP, upon arrival pt w/low BP, lethargic but following some commands, pt w/fever rectally, tachycardic, wife reports pt was unable to tolerate full HD today, LE swollen w/dressed wounds, 18g placed by EMS left ac, right 20 placed by RN, placed on CM, labs drawn, bed in lowest position, comfort and safety provided.

## 2021-07-06 NOTE — ED PROVIDER NOTE - NS ED ATTENDING STATEMENT MOD
Pt arrives via EMS from home, onset of right sided weakness and slurred speech while eating around 1050. Neurology at bedside. Code s called.      RACE score 2 I have personally provided the amount of critical care time documented below concurrently with the resident/fellow.  This time excludes time spent on separate procedures and time spent teaching. I have reviewed the resident’s / fellow’s documentation and I agree with the history, exam, and assessment and plan of care.

## 2021-07-06 NOTE — DISCHARGE NOTE PROVIDER - HOSPITAL COURSE
55 year old male, with PMHx of ESRD on PD, DM on insulin, CHF EF 25-30% CAD s/p CABG x4, underwent cardiac cath and stent and wire broke in heart sternotomy, presents with two days of cough and sweating.  He endorsed chills but no measured fevers at home.  Patient states that he fell few days ago with no head trauma. Of note patient has right toe gangrenous lesion that has been present for several months.  In the ED, patient was found to be hypotensive to 70s.  Patient was given midodrine 10mg x1 and bedside POCUS was completed showing poor cardiac function with diffuse hypokinesis.  Patient was unable to maintain adequate SBPs despite midodrine so was started on levophed for pressor support.  Admitted to MICU hypotension requiring for pressor support.   55 year old male, with PMHx of ESRD on PD, DM on insulin, CHF EF 25-30% CAD s/p CABG x4, underwent cardiac cath and stent and wire broke in heart sternotomy, presents with two days of cough and sweating.  He endorsed chills but no measured fevers at home.  Patient states that he fell few days ago with no head trauma. Of note patient has right toe gangrenous lesion that has been present for several months.  In the ED, patient was found to be hypotensive to 70s.  Patient was given midodrine 10mg x1 and bedside POCUS was completed showing poor cardiac function with diffuse hypokinesis.  Patient was unable to maintain adequate SBPs despite midodrine so was started on levophed for pressor support.  Admitted to MICU hypotension requiring for pressor support - seemingly septic shock, of unknown etiology.  Antibiotics started.  ID consulted.  Blood and PD fluid cultures negative - antibiotics discontinued.  Confusion noted - CT head negative.  Podiatry and Vascular consulted for 2nd toe gangrene.  PONCHO/PVRs revealing bilateral non-compressible vessels - outpatient vascular follow up recommended.  Speech and swallow consulted for difficulty swallowing - recommended ENT - lesion noted.  Patient currently tolerating oral diet.  Patient noted to have an EF of 16% - Cards and EP consulted - patient not currently a candidate for AICD secondary to poor nutritional status.   55 year old male, with PMHx of ESRD on PD, DM on insulin, CHF EF 25-30% CAD s/p CABG x4, underwent cardiac cath and stent and wire broke in heart sternotomy, presents with two days of cough and sweating.  He endorsed chills but no measured fevers at home.  Patient states that he fell few days ago with no head trauma. Of note patient has right toe gangrenous lesion that has been present for several months.  In the ED, patient was found to be hypotensive to 70s.  Patient was given midodrine 10mg x1 and bedside POCUS was completed showing poor cardiac function with diffuse hypokinesis.  Patient was unable to maintain adequate SBPs despite midodrine so was started on levophed for pressor support.  Admitted to MICU hypotension requiring for pressor support - seemingly septic shock, of unknown etiology.  Antibiotics started.  ID consulted.  Blood and PD fluid cultures negative - antibiotics discontinued.  Confusion noted - CT head negative.  Podiatry and Vascular consulted for 2nd toe gangrene.  PONCHO/PVRs revealing bilateral non-compressible vessels - outpatient vascular follow up recommended.  Speech and swallow consulted for difficulty swallowing - recommended ENT - lesion noted.  Patient currently tolerating oral diet.  Transthoracic echocardiogram shows EF 16%, moderate-severe MR, moderate AS, severe global LV systolic dysfunction, severe diastolic dysfunction, moderate pulmonary hypertension.  Cardiology consulted.  KLAUDIA showed EF 24.5%,  tethered mitral valve leaflets with normal opening, severe MR, mild to moderate AR, eccentric left ventricular hypertrophy; Dilated TV annulus; there are two jets seen with venacontracta 0.4cm and 0.5q cm, Severe tricuspid regurgitation, no evidence of valvular vegetation is seen, severe global left ventricular systolic dysfunction.  EP consulted - patient not currently a candidate for AICD secondary to poor nutritional status.   55 year old male, with PMHx of ESRD on PD, DM on insulin, CHF EF 25-30% CAD s/p CABG x4, underwent cardiac cath and stent and wire broke in heart sternotomy, presents with two days of cough and sweating.  He endorsed chills but no measured fevers at home.  Patient states that he fell few days ago with no head trauma. Of note patient has right toe gangrenous lesion that has been present for several months.  In the ED, patient was found to be hypotensive to 70s.  Patient was given midodrine 10mg x1 and bedside POCUS was completed showing poor cardiac function with diffuse hypokinesis.  Patient was unable to maintain adequate SBPs despite midodrine so was started on levophed for pressor support.  Admitted to MICU hypotension requiring for pressor support - seemingly septic shock, of unknown etiology.  Antibiotics started.  ID consulted.  Blood and PD fluid cultures negative - antibiotics discontinued.  Confusion noted - CT head negative.  Podiatry and Vascular consulted for 2nd toe gangrene.  PONCHO/PVRs revealing bilateral non-compressible vessels - outpatient vascular follow up recommended.  Speech and swallow consulted for difficulty swallowing - recommended ENT - lesion noted - outpatient follow up recommended.  Patient currently tolerating oral diet.  Transthoracic echocardiogram shows EF 16%, moderate-severe MR, moderate AS, severe global LV systolic dysfunction, severe diastolic dysfunction, moderate pulmonary hypertension.  Cardiology consulted.  KLAUDIA showed EF 24.5%,  tethered mitral valve leaflets with normal opening, severe MR, mild to moderate AR, eccentric left ventricular hypertrophy; Dilated TV annulus; there are two jets seen with venacontracta 0.4cm and 0.5q cm, Severe tricuspid regurgitation, no evidence of valvular vegetation is seen, severe global left ventricular systolic dysfunction.  EP consulted - patient not currently a candidate for AICD secondary to poor nutritional status and delayed wound healing.  Hypotension noted - IV fluids and midodrine given.

## 2021-07-06 NOTE — PROGRESS NOTE ADULT - SUBJECTIVE AND OBJECTIVE BOX
INTERVAL HPI/OVERNIGHT EVENTS:     SUBJECTIVE: Patient seen and examined at bedside this morning.     OBJECTIVE:      07-06 @ 07:01  -  07-06 @ 12:15  --------------------------------------------------------  IN: 100 mL / OUT: 0 mL / NET: 100 mL      CAPILLARY BLOOD GLUCOSE      POCT Blood Glucose.: 117 mg/dL (06 Jul 2021 03:47)      ICU Vital Signs Last 24 Hrs  T(C): 37.1 (06 Jul 2021 09:15), Max: 38.4 (06 Jul 2021 03:39)  T(F): 98.8 (06 Jul 2021 09:15), Max: 101.2 (06 Jul 2021 03:39)  HR: 91 (06 Jul 2021 11:00) (88 - 112)  BP: 90/65 (06 Jul 2021 11:00) (73/54 - 113/79)  BP(mean): 74 (06 Jul 2021 11:00) (62 - 93)  ABP: --  ABP(mean): --  RR: 17 (06 Jul 2021 11:00) (13 - 24)  SpO2: 100% (06 Jul 2021 11:00) (90% - 100%)    PHYSICAL EXAM:      MEDICATIONS:  MEDICATIONS  (STANDING):  aspirin enteric coated 81 milliGRAM(s) Oral daily  atorvastatin 80 milliGRAM(s) Oral at bedtime  chlorhexidine 0.12% Liquid 15 milliLiter(s) Oral Mucosa two times a day  chlorhexidine 4% Liquid 1 Application(s) Topical <User Schedule>  dextrose 40% Gel 15 Gram(s) Oral once  dextrose 5%. 1000 milliLiter(s) (50 mL/Hr) IV Continuous <Continuous>  dextrose 5%. 1000 milliLiter(s) (100 mL/Hr) IV Continuous <Continuous>  dextrose 50% Injectable 25 Gram(s) IV Push once  dextrose 50% Injectable 12.5 Gram(s) IV Push once  dextrose 50% Injectable 25 Gram(s) IV Push once  ferrous    sulfate 325 milliGRAM(s) Oral three times a day  folic acid 1 milliGRAM(s) Oral daily  glucagon  Injectable 1 milliGRAM(s) IntraMuscular once  heparin   Injectable 5000 Unit(s) SubCutaneous every 12 hours  insulin glargine Injectable (LANTUS) 30 Unit(s) SubCutaneous at bedtime  insulin lispro (ADMELOG) corrective regimen sliding scale   SubCutaneous three times a day before meals  insulin lispro (ADMELOG) corrective regimen sliding scale   SubCutaneous at bedtime  lactated ringers. 1000 milliLiter(s) (50 mL/Hr) IV Continuous <Continuous>  midodrine 10 milliGRAM(s) Oral every 8 hours  norepinephrine Infusion 0.05 MICROgram(s)/kG/Min (5.95 mL/Hr) IV Continuous <Continuous>  pantoprazole  Injectable 40 milliGRAM(s) IV Push daily  sevelamer carbonate 800 milliGRAM(s) Oral three times a day  ticagrelor 60 milliGRAM(s) Oral every 12 hours    MEDICATIONS  (PRN):      ALLERGIES:  Allergies    doxazosin (Other)    Intolerances        LABS:                        12.0   10.43 )-----------( 258      ( 06 Jul 2021 04:13 )             40.5     Hemoglobin: 12.0 g/dL (07-06 @ 04:13)    CBC Full  -  ( 06 Jul 2021 04:13 )  WBC Count : 10.43 K/uL  RBC Count : 4.13 M/uL  Hemoglobin : 12.0 g/dL  Hematocrit : 40.5 %  Platelet Count - Automated : 258 K/uL  Mean Cell Volume : 98.1 fl  Mean Cell Hemoglobin : 29.1 pg  Mean Cell Hemoglobin Concentration : 29.6 gm/dL  Auto Neutrophil # : 8.48 K/uL  Auto Lymphocyte # : 0.65 K/uL  Auto Monocyte # : 1.15 K/uL  Auto Eosinophil # : 0.04 K/uL  Auto Basophil # : 0.05 K/uL  Auto Neutrophil % : 81.3 %  Auto Lymphocyte % : 6.2 %  Auto Monocyte % : 11.0 %  Auto Eosinophil % : 0.4 %  Auto Basophil % : 0.5 %    07-06    137  |  95<L>  |  59<H>  ----------------------------<  132<H>  3.7   |  20<L>  |  10.78<H>    Ca    8.8      06 Jul 2021 04:13    TPro  6.5  /  Alb  2.4<L>  /  TBili  0.5  /  DBili  x   /  AST  22  /  ALT  31  /  AlkPhos  142<H>  07-06    Creatinine Trend: 10.78<--  LIVER FUNCTIONS - ( 06 Jul 2021 04:13 )  Alb: 2.4 g/dL / Pro: 6.5 g/dL / ALK PHOS: 142 U/L / ALT: 31 U/L / AST: 22 U/L / GGT: x           PT/INR - ( 06 Jul 2021 04:35 )   PT: 15.0 sec;   INR: 1.26 ratio         PTT - ( 06 Jul 2021 04:35 )  PTT:37.2 sec    hs Troponin:                468 <<== 07-06-21 @ 07:45                488 <<== 07-06-21 @ 04:13        04:13 - VBG - pH: 7.40  | pCO2: 42    | pO2: 38    | Lactate: 4.6          CSF:                      EKG:   MICROBIOLOGY:    IMAGING:      Labs, imaging, EKG personally reviewed    RADIOLOGY & ADDITIONAL TESTS: Reviewed. INTERVAL HPI/OVERNIGHT EVENTS:     SUBJECTIVE: Admitted this AM with concern for septic shock. Awake, alert, and denying pain. States does PD every AM. Has not had prior PD cath infections.    OBJECTIVE:    07-06 @ 07:01  -  07-06 @ 12:15  --------------------------------------------------------  IN: 100 mL / OUT: 0 mL / NET: 100 mL      CAPILLARY BLOOD GLUCOSE      POCT Blood Glucose.: 117 mg/dL (06 Jul 2021 03:47)      ICU Vital Signs Last 24 Hrs  T(C): 37.1 (06 Jul 2021 09:15), Max: 38.4 (06 Jul 2021 03:39)  T(F): 98.8 (06 Jul 2021 09:15), Max: 101.2 (06 Jul 2021 03:39)  HR: 91 (06 Jul 2021 11:00) (88 - 112)  BP: 90/65 (06 Jul 2021 11:00) (73/54 - 113/79)  BP(mean): 74 (06 Jul 2021 11:00) (62 - 93)  ABP: --  ABP(mean): --  RR: 17 (06 Jul 2021 11:00) (13 - 24)  SpO2: 100% (06 Jul 2021 11:00) (90% - 100%)    PHYSICAL EXAM:      MEDICATIONS:  MEDICATIONS  (STANDING):  aspirin enteric coated 81 milliGRAM(s) Oral daily  atorvastatin 80 milliGRAM(s) Oral at bedtime  chlorhexidine 0.12% Liquid 15 milliLiter(s) Oral Mucosa two times a day  chlorhexidine 4% Liquid 1 Application(s) Topical <User Schedule>  dextrose 40% Gel 15 Gram(s) Oral once  dextrose 5%. 1000 milliLiter(s) (50 mL/Hr) IV Continuous <Continuous>  dextrose 5%. 1000 milliLiter(s) (100 mL/Hr) IV Continuous <Continuous>  dextrose 50% Injectable 25 Gram(s) IV Push once  dextrose 50% Injectable 12.5 Gram(s) IV Push once  dextrose 50% Injectable 25 Gram(s) IV Push once  ferrous    sulfate 325 milliGRAM(s) Oral three times a day  folic acid 1 milliGRAM(s) Oral daily  glucagon  Injectable 1 milliGRAM(s) IntraMuscular once  heparin   Injectable 5000 Unit(s) SubCutaneous every 12 hours  insulin glargine Injectable (LANTUS) 30 Unit(s) SubCutaneous at bedtime  insulin lispro (ADMELOG) corrective regimen sliding scale   SubCutaneous three times a day before meals  insulin lispro (ADMELOG) corrective regimen sliding scale   SubCutaneous at bedtime  lactated ringers. 1000 milliLiter(s) (50 mL/Hr) IV Continuous <Continuous>  midodrine 10 milliGRAM(s) Oral every 8 hours  norepinephrine Infusion 0.05 MICROgram(s)/kG/Min (5.95 mL/Hr) IV Continuous <Continuous>  pantoprazole  Injectable 40 milliGRAM(s) IV Push daily  sevelamer carbonate 800 milliGRAM(s) Oral three times a day  ticagrelor 60 milliGRAM(s) Oral every 12 hours    MEDICATIONS  (PRN):      ALLERGIES:  Allergies    doxazosin (Other)    Intolerances        LABS:                        12.0   10.43 )-----------( 258      ( 06 Jul 2021 04:13 )             40.5     Hemoglobin: 12.0 g/dL (07-06 @ 04:13)    CBC Full  -  ( 06 Jul 2021 04:13 )  WBC Count : 10.43 K/uL  RBC Count : 4.13 M/uL  Hemoglobin : 12.0 g/dL  Hematocrit : 40.5 %  Platelet Count - Automated : 258 K/uL  Mean Cell Volume : 98.1 fl  Mean Cell Hemoglobin : 29.1 pg  Mean Cell Hemoglobin Concentration : 29.6 gm/dL  Auto Neutrophil # : 8.48 K/uL  Auto Lymphocyte # : 0.65 K/uL  Auto Monocyte # : 1.15 K/uL  Auto Eosinophil # : 0.04 K/uL  Auto Basophil # : 0.05 K/uL  Auto Neutrophil % : 81.3 %  Auto Lymphocyte % : 6.2 %  Auto Monocyte % : 11.0 %  Auto Eosinophil % : 0.4 %  Auto Basophil % : 0.5 %    07-06    137  |  95<L>  |  59<H>  ----------------------------<  132<H>  3.7   |  20<L>  |  10.78<H>    Ca    8.8      06 Jul 2021 04:13    TPro  6.5  /  Alb  2.4<L>  /  TBili  0.5  /  DBili  x   /  AST  22  /  ALT  31  /  AlkPhos  142<H>  07-06    Creatinine Trend: 10.78<--  LIVER FUNCTIONS - ( 06 Jul 2021 04:13 )  Alb: 2.4 g/dL / Pro: 6.5 g/dL / ALK PHOS: 142 U/L / ALT: 31 U/L / AST: 22 U/L / GGT: x           PT/INR - ( 06 Jul 2021 04:35 )   PT: 15.0 sec;   INR: 1.26 ratio         PTT - ( 06 Jul 2021 04:35 )  PTT:37.2 sec    hs Troponin:                468 <<== 07-06-21 @ 07:45                488 <<== 07-06-21 @ 04:13        04:13 - VBG - pH: 7.40  | pCO2: 42    | pO2: 38    | Lactate: 4.6   INTERVAL HPI/OVERNIGHT EVENTS:     SUBJECTIVE: Admitted this AM with concern for septic shock. Awake, alert, and denying pain. States does PD every AM. Has not had prior PD cath infections.    OBJECTIVE:    07-06 @ 07:01  -  07-06 @ 12:15  --------------------------------------------------------  IN: 100 mL / OUT: 0 mL / NET: 100 mL      CAPILLARY BLOOD GLUCOSE      POCT Blood Glucose.: 117 mg/dL (06 Jul 2021 03:47)      ICU Vital Signs Last 24 Hrs  T(C): 37.1 (06 Jul 2021 09:15), Max: 38.4 (06 Jul 2021 03:39)  T(F): 98.8 (06 Jul 2021 09:15), Max: 101.2 (06 Jul 2021 03:39)  HR: 91 (06 Jul 2021 11:00) (88 - 112)  BP: 90/65 (06 Jul 2021 11:00) (73/54 - 113/79)  BP(mean): 74 (06 Jul 2021 11:00) (62 - 93)  ABP: --  ABP(mean): --  RR: 17 (06 Jul 2021 11:00) (13 - 24)  SpO2: 100% (06 Jul 2021 11:00) (90% - 100%)    PHYSICAL EXAM:  General: Alert and cooperative. Tired appearing.  Head: Normocephalic, no mass or lesions.  Eyes: Intact visual fields. PERRL, clear conjunctiva, EOMI, no ptosis.   Throat: Oral cavity and pharynx normal. No inflammation, swelling, exudate, or lesions. Teeth and gingiva in good general condition.  Respiratory: Bilateral lungs clear to auscultation, no crackles, wheezes, or rhonchi.   Cardiovascular: S1/S2 auscultated. Regular rhythm. No murmurs, rubs or gallop.   Abdomen: Soft, non-tender, nondistended, no guarding or rebound tenderness. No hepatosplenomegaly. + PD catheter in place  Extremities: right 2nd toe with black eschar, non tender touch, lesion noted on right shin now wrapped  Neurological: No focal deficits. Strength and sensation symmetric and intact throughout.    MEDICATIONS:  MEDICATIONS  (STANDING):  aspirin enteric coated 81 milliGRAM(s) Oral daily  atorvastatin 80 milliGRAM(s) Oral at bedtime  chlorhexidine 0.12% Liquid 15 milliLiter(s) Oral Mucosa two times a day  chlorhexidine 4% Liquid 1 Application(s) Topical <User Schedule>  dextrose 40% Gel 15 Gram(s) Oral once  dextrose 5%. 1000 milliLiter(s) (50 mL/Hr) IV Continuous <Continuous>  dextrose 5%. 1000 milliLiter(s) (100 mL/Hr) IV Continuous <Continuous>  dextrose 50% Injectable 25 Gram(s) IV Push once  dextrose 50% Injectable 12.5 Gram(s) IV Push once  dextrose 50% Injectable 25 Gram(s) IV Push once  ferrous    sulfate 325 milliGRAM(s) Oral three times a day  folic acid 1 milliGRAM(s) Oral daily  glucagon  Injectable 1 milliGRAM(s) IntraMuscular once  heparin   Injectable 5000 Unit(s) SubCutaneous every 12 hours  insulin glargine Injectable (LANTUS) 30 Unit(s) SubCutaneous at bedtime  insulin lispro (ADMELOG) corrective regimen sliding scale   SubCutaneous three times a day before meals  insulin lispro (ADMELOG) corrective regimen sliding scale   SubCutaneous at bedtime  lactated ringers. 1000 milliLiter(s) (50 mL/Hr) IV Continuous <Continuous>  midodrine 10 milliGRAM(s) Oral every 8 hours  norepinephrine Infusion 0.05 MICROgram(s)/kG/Min (5.95 mL/Hr) IV Continuous <Continuous>  pantoprazole  Injectable 40 milliGRAM(s) IV Push daily  sevelamer carbonate 800 milliGRAM(s) Oral three times a day  ticagrelor 60 milliGRAM(s) Oral every 12 hours    MEDICATIONS  (PRN):      ALLERGIES:  Allergies    doxazosin (Other)    Intolerances        LABS:                        12.0   10.43 )-----------( 258      ( 06 Jul 2021 04:13 )             40.5     Hemoglobin: 12.0 g/dL (07-06 @ 04:13)    CBC Full  -  ( 06 Jul 2021 04:13 )  WBC Count : 10.43 K/uL  RBC Count : 4.13 M/uL  Hemoglobin : 12.0 g/dL  Hematocrit : 40.5 %  Platelet Count - Automated : 258 K/uL  Mean Cell Volume : 98.1 fl  Mean Cell Hemoglobin : 29.1 pg  Mean Cell Hemoglobin Concentration : 29.6 gm/dL  Auto Neutrophil # : 8.48 K/uL  Auto Lymphocyte # : 0.65 K/uL  Auto Monocyte # : 1.15 K/uL  Auto Eosinophil # : 0.04 K/uL  Auto Basophil # : 0.05 K/uL  Auto Neutrophil % : 81.3 %  Auto Lymphocyte % : 6.2 %  Auto Monocyte % : 11.0 %  Auto Eosinophil % : 0.4 %  Auto Basophil % : 0.5 %    07-06    137  |  95<L>  |  59<H>  ----------------------------<  132<H>  3.7   |  20<L>  |  10.78<H>    Ca    8.8      06 Jul 2021 04:13    TPro  6.5  /  Alb  2.4<L>  /  TBili  0.5  /  DBili  x   /  AST  22  /  ALT  31  /  AlkPhos  142<H>  07-06    Creatinine Trend: 10.78<--  LIVER FUNCTIONS - ( 06 Jul 2021 04:13 )  Alb: 2.4 g/dL / Pro: 6.5 g/dL / ALK PHOS: 142 U/L / ALT: 31 U/L / AST: 22 U/L / GGT: x           PT/INR - ( 06 Jul 2021 04:35 )   PT: 15.0 sec;   INR: 1.26 ratio         PTT - ( 06 Jul 2021 04:35 )  PTT:37.2 sec    hs Troponin:                468 <<== 07-06-21 @ 07:45                488 <<== 07-06-21 @ 04:13        04:13 - VBG - pH: 7.40  | pCO2: 42    | pO2: 38    | Lactate: 4.6   INTERVAL HPI/OVERNIGHT EVENTS:     SUBJECTIVE: Admitted this AM with concern for septic shock. Awake, alert, and denying pain. States does PD every AM. Has not had prior PD cath infections.    OBJECTIVE:    07-06 @ 07:01  -  07-06 @ 12:15  --------------------------------------------------------  IN: 100 mL / OUT: 0 mL / NET: 100 mL      CAPILLARY BLOOD GLUCOSE      POCT Blood Glucose.: 117 mg/dL (06 Jul 2021 03:47)      ICU Vital Signs Last 24 Hrs  T(C): 37.1 (06 Jul 2021 09:15), Max: 38.4 (06 Jul 2021 03:39)  T(F): 98.8 (06 Jul 2021 09:15), Max: 101.2 (06 Jul 2021 03:39)  HR: 91 (06 Jul 2021 11:00) (88 - 112)  BP: 90/65 (06 Jul 2021 11:00) (73/54 - 113/79)  BP(mean): 74 (06 Jul 2021 11:00) (62 - 93)  ABP: --  ABP(mean): --  RR: 17 (06 Jul 2021 11:00) (13 - 24)  SpO2: 100% (06 Jul 2021 11:00) (90% - 100%)    PHYSICAL EXAM:  General: Alert and cooperative. Tired appearing.  Head: Normocephalic, no mass or lesions.  Eyes: Intact visual fields. PERRL, clear conjunctiva, EOMI, no ptosis.   Throat: Oral cavity and pharynx normal. No inflammation, swelling, exudate, or lesions. Teeth and gingiva in good general condition.  Respiratory: Bilateral lungs clear to auscultation, no crackles, wheezes, or rhonchi.   Cardiovascular: S1/S2 auscultated. Regular rhythm. +Murmur  Abdomen: Soft, non-tender, nondistended, no guarding or rebound tenderness. No hepatosplenomegaly. + PD catheter in place  Extremities: right 2nd toe with black eschar, non tender touch, lesion noted on right shin now wrapped  Neurological: No focal deficits. Strength and sensation symmetric and intact throughout.    MEDICATIONS:  MEDICATIONS  (STANDING):  aspirin enteric coated 81 milliGRAM(s) Oral daily  atorvastatin 80 milliGRAM(s) Oral at bedtime  chlorhexidine 0.12% Liquid 15 milliLiter(s) Oral Mucosa two times a day  chlorhexidine 4% Liquid 1 Application(s) Topical <User Schedule>  dextrose 40% Gel 15 Gram(s) Oral once  dextrose 5%. 1000 milliLiter(s) (50 mL/Hr) IV Continuous <Continuous>  dextrose 5%. 1000 milliLiter(s) (100 mL/Hr) IV Continuous <Continuous>  dextrose 50% Injectable 25 Gram(s) IV Push once  dextrose 50% Injectable 12.5 Gram(s) IV Push once  dextrose 50% Injectable 25 Gram(s) IV Push once  ferrous    sulfate 325 milliGRAM(s) Oral three times a day  folic acid 1 milliGRAM(s) Oral daily  glucagon  Injectable 1 milliGRAM(s) IntraMuscular once  heparin   Injectable 5000 Unit(s) SubCutaneous every 12 hours  insulin glargine Injectable (LANTUS) 30 Unit(s) SubCutaneous at bedtime  insulin lispro (ADMELOG) corrective regimen sliding scale   SubCutaneous three times a day before meals  insulin lispro (ADMELOG) corrective regimen sliding scale   SubCutaneous at bedtime  lactated ringers. 1000 milliLiter(s) (50 mL/Hr) IV Continuous <Continuous>  midodrine 10 milliGRAM(s) Oral every 8 hours  norepinephrine Infusion 0.05 MICROgram(s)/kG/Min (5.95 mL/Hr) IV Continuous <Continuous>  pantoprazole  Injectable 40 milliGRAM(s) IV Push daily  sevelamer carbonate 800 milliGRAM(s) Oral three times a day  ticagrelor 60 milliGRAM(s) Oral every 12 hours    MEDICATIONS  (PRN):      ALLERGIES:  Allergies    doxazosin (Other)    Intolerances        LABS:                        12.0   10.43 )-----------( 258      ( 06 Jul 2021 04:13 )             40.5     Hemoglobin: 12.0 g/dL (07-06 @ 04:13)    CBC Full  -  ( 06 Jul 2021 04:13 )  WBC Count : 10.43 K/uL  RBC Count : 4.13 M/uL  Hemoglobin : 12.0 g/dL  Hematocrit : 40.5 %  Platelet Count - Automated : 258 K/uL  Mean Cell Volume : 98.1 fl  Mean Cell Hemoglobin : 29.1 pg  Mean Cell Hemoglobin Concentration : 29.6 gm/dL  Auto Neutrophil # : 8.48 K/uL  Auto Lymphocyte # : 0.65 K/uL  Auto Monocyte # : 1.15 K/uL  Auto Eosinophil # : 0.04 K/uL  Auto Basophil # : 0.05 K/uL  Auto Neutrophil % : 81.3 %  Auto Lymphocyte % : 6.2 %  Auto Monocyte % : 11.0 %  Auto Eosinophil % : 0.4 %  Auto Basophil % : 0.5 %    07-06    137  |  95<L>  |  59<H>  ----------------------------<  132<H>  3.7   |  20<L>  |  10.78<H>    Ca    8.8      06 Jul 2021 04:13    TPro  6.5  /  Alb  2.4<L>  /  TBili  0.5  /  DBili  x   /  AST  22  /  ALT  31  /  AlkPhos  142<H>  07-06    Creatinine Trend: 10.78<--  LIVER FUNCTIONS - ( 06 Jul 2021 04:13 )  Alb: 2.4 g/dL / Pro: 6.5 g/dL / ALK PHOS: 142 U/L / ALT: 31 U/L / AST: 22 U/L / GGT: x           PT/INR - ( 06 Jul 2021 04:35 )   PT: 15.0 sec;   INR: 1.26 ratio         PTT - ( 06 Jul 2021 04:35 )  PTT:37.2 sec    hs Troponin:                468 <<== 07-06-21 @ 07:45                488 <<== 07-06-21 @ 04:13        04:13 - VBG - pH: 7.40  | pCO2: 42    | pO2: 38    | Lactate: 4.6

## 2021-07-06 NOTE — CONSULT NOTE ADULT - ASSESSMENT
Pt is 57yo who presents w/ R 2nd toe distal gangrene  -pt was seen and examined   -Afebrile, no leukocytosis  -R 2nd toe distal gangrene, dry stable, no local signs of infection, R medial MPJ fibrotic wound to subQ w/ no local signs of infection, L foot lateral hallux wound to subQ, dry and stable.   -Foot is not source of infection  -RFXR: wet read- unremarkable  -Pod plan for local wound care  -discussed w/ attending

## 2021-07-06 NOTE — ED PROVIDER NOTE - ATTENDING CONTRIBUTION TO CARE
Pt presents with hypotension, tachycardia, vomiting, decreased mental status. concern for shock septic vs cardiogenic. pt received sepsis w/u, broad spectrum abx to cover yet unclear source of sepsis. pt with some fluid overload peripherally despite likely intravasc depleted thus fluids will be carefully administered despite septic shock state since given his ESRD status harm vs benefit of large fluid not clear. neprhology contacted for PD fluid collection as potential source - will come obtain in am, aware of broad spectrum abx administration.

## 2021-07-06 NOTE — H&P ADULT - ATTENDING COMMENTS
Hypotension and fever suggesting septic shock. Etiology unclear . Pt with c/o fever and chills . No pain. In ED pt found to have SBP in 70's and started on pressors.  Possible sources  of infection include gangrenous toes,  Peritoneal dialysis catheter and peritonitis. Clinically with soft non tender abdomen. Pt with PMH significant for CABG and ESRD on PD.    Plan ;  1. Admit to MICU.  2, Pressors for map 65  3. POCUS shows Severely reduced EF. IVC 1.2cm Predominantly a lines on chest. Will give 250mls LR over 5 hours. Try to taper pressors as tolerated.  4. ABX with Vancomycin and cefepime. Check blood cx and Peritoneal fluid cx  5.Podiatry consult for toes.  6.DVT prophylaxis with sq heparin  7. Start PD as per nephrology  8/ GOC: Full code.

## 2021-07-06 NOTE — PROVIDER CONTACT NOTE (CHANGE IN STATUS NOTIFICATION) - BACKGROUND
Patient admitted for hypotension, cough and weakness. BC and PD fluid cultures sent. Patient off levo on arrival to SICU.

## 2021-07-06 NOTE — H&P ADULT - NSHPSOCIALHISTORY_GEN_ALL_CORE
Social History:    Marital Status:  (  X )    (   ) Single    (   )    (  )   Occupation:   Lives with: (  ) alone  (  ) children   ( X ) spouse   (  ) parents  (  ) other    Substance Use (street drugs): ( X ) never used  (  ) other:  Tobacco Usage:  ( X  ) never smoked   (   ) former smoker   (   ) current smoker  (     ) pack year  (        )   Sexual History:

## 2021-07-06 NOTE — H&P ADULT - HISTORY OF PRESENT ILLNESS
55M PMH ESRD on PD, DM on insulin, CHF EF 25-30% CAD s/p CABG x4, underwent cardiac cath and stent and wire broke in heart sternotomy. Patient states that for the last 2 dyas he has been having increased cough and sweating. He endorsed chills but no measured fevers at home. He denies any sick contacts or recent travel. Patient states that he fell few days ago with no head trauma.       In the ED, Patient was found to be hypotensive to 70s. Patient was given midodrine 10mg x1 and bedside POCUS was completed showing poor cardiac functionwith diffuse hypokinesis     55M PMH ESRD on PD, DM on insulin, CHF EF 25-30% CAD s/p CABG x4, underwent cardiac cath and stent and wire broke in heart sternotomy. Patient states that for the last 2 days he has been having increased cough and sweating. He endorsed chills but no measured fevers at home. He denies any sick contacts or recent travel. Patient states that he fell few days ago with no head trauma. Of note patient has right toe gangrenous lesion that has been present for several months, He denies any trauma or pain at the site.       In the ED, Patient was found to be hypotensive to 70s. Patient was given midodrine 10mg x1 and bedside POCUS was completed showing poor cardiac function with diffuse hypokinesis. Patient was unable to maintain adequate SBPs despite midodrine so was started on levophed for pressor support. Admitted to MICU hypotension requiring for pressor support likely  2/2 to sepsis.

## 2021-07-06 NOTE — DISCHARGE NOTE PROVIDER - NSDCFUADDAPPT_GEN_ALL_CORE_FT
You will need to follow up with your primary medical doctor within 3-4 days of discharge - please call to make an appointment.

## 2021-07-06 NOTE — H&P ADULT - NSHPLABSRESULTS_GEN_ALL_CORE
12.0   10.43 )-----------( 258      ( 06 Jul 2021 04:13 )             40.5       07-06    137  |  95<L>  |  59<H>  ----------------------------<  132<H>  3.7   |  20<L>  |  10.78<H>    Ca    8.8      06 Jul 2021 04:13    TPro  6.5  /  Alb  2.4<L>  /  TBili  0.5  /  DBili  x   /  AST  22  /  ALT  31  /  AlkPhos  142<H>  07-06                  PT/INR - ( 06 Jul 2021 04:35 )   PT: 15.0 sec;   INR: 1.26 ratio         PTT - ( 06 Jul 2021 04:35 )  PTT:37.2 sec    Lactate Trend            CAPILLARY BLOOD GLUCOSE      POCT Blood Glucose.: 117 mg/dL (06 Jul 2021 03:47)        < from: Xray Chest 1 View- PORTABLE-Urgent (Xray Chest 1 View- PORTABLE-Urgent .) (07.06.21 @ 05:26) >      INTERPRETATION:  CLINICAL INFORMATION: Fever.    EXAM: AP chest radiograph.    COMPARISON: Chest radiograph dated 12/29/2020.    FINDINGS:  Status post sternotomy and CABG. The heart size is normal.    The lungs are clear. No pleural effusion or pneumothorax.    The visualized osseous structures demonstrate no acute pathology.    IMPRESSION:  Clear lungs.    < end of copied text >

## 2021-07-06 NOTE — CONSULT NOTE ADULT - SUBJECTIVE AND OBJECTIVE BOX
NEPHROLOGY - NSN    Patient seen and examined.    HPI:  55M PMH ESRD on PD, DM on insulin, CHF EF 25-30% CAD s/p CABG x4, underwent cardiac cath and stent and wire broke in heart sternotomy. Patient states that for the last 2 dyas he has been having increased cough and sweating. He endorsed chills but no measured fevers at home. He denies any sick contacts or recent travel. Patient states that he fell few days ago with no head trauma.       In the ED, Patient was found to be hypotensive to 70s. Patient was given midodrine 10mg x1 and bedside POCUS was completed showing poor cardiac functionwith diffuse hypokinesis     (06 Jul 2021 07:59)      PAST MEDICAL & SURGICAL HISTORY:  Diabetes    CAD, multiple vessel    ESRD (end stage renal disease) on dialysis    HTN (hypertension)    S/P CABG (coronary artery bypass graft)        MEDICATIONS  (STANDING):  aspirin enteric coated 81 milliGRAM(s) Oral daily  atorvastatin 80 milliGRAM(s) Oral at bedtime  chlorhexidine 0.12% Liquid 15 milliLiter(s) Oral Mucosa two times a day  chlorhexidine 4% Liquid 1 Application(s) Topical <User Schedule>  dextrose 40% Gel 15 Gram(s) Oral once  dextrose 5%. 1000 milliLiter(s) (50 mL/Hr) IV Continuous <Continuous>  dextrose 5%. 1000 milliLiter(s) (100 mL/Hr) IV Continuous <Continuous>  dextrose 50% Injectable 25 Gram(s) IV Push once  dextrose 50% Injectable 12.5 Gram(s) IV Push once  dextrose 50% Injectable 25 Gram(s) IV Push once  ferrous    sulfate 325 milliGRAM(s) Oral three times a day  folic acid 1 milliGRAM(s) Oral daily  glucagon  Injectable 1 milliGRAM(s) IntraMuscular once  heparin   Injectable 5000 Unit(s) SubCutaneous every 12 hours  insulin glargine Injectable (LANTUS) 30 Unit(s) SubCutaneous at bedtime  insulin lispro (ADMELOG) corrective regimen sliding scale   SubCutaneous three times a day before meals  insulin lispro (ADMELOG) corrective regimen sliding scale   SubCutaneous at bedtime  midodrine 10 milliGRAM(s) Oral every 8 hours  norepinephrine Infusion 0.05 MICROgram(s)/kG/Min (5.95 mL/Hr) IV Continuous <Continuous>  pantoprazole  Injectable 40 milliGRAM(s) IV Push daily  piperacillin/tazobactam IVPB... 4.5 Gram(s) IV Intermittent every 8 hours  sevelamer carbonate 800 milliGRAM(s) Oral three times a day  ticagrelor 60 milliGRAM(s) Oral every 12 hours      Allergies    doxazosin (Other)    Intolerances        SOCIAL HISTORY:  Denies alcohol abuse, drug abuse or tobacco usage.     FAMILY HISTORY:  FHx: lung cancer (Sibling)  bother    FH: diabetes mellitus (Sibling)  father        VITALS:  T(C): 36.4 (07-06-21 @ 07:30), Max: 38.4 (07-06-21 @ 03:39)  HR: 96 (07-06-21 @ 08:55) (88 - 112)  BP: 104/86 (07-06-21 @ 08:55) (73/54 - 104/86)  RR: 17 (07-06-21 @ 08:55) (17 - 24)  SpO2: 100% (07-06-21 @ 08:55) (90% - 100%)    REVIEW OF SYSTEMS:  Denies any nausea, vomiting, diarrhea, fever or chills. Denies chest pain, SOB, focal weakness, hematuria or dysuria. Good oral intake and denies fatigue or weakness. All other pertinent systems are reviewed and are negative.    PHYSICAL EXAM:  Constitutional: NAD  HEENT: EOMI  Neck:  No JVD, supple   Respiratory: CTA B/L  Cardiovascular: S1 and S2, RRR  Gastrointestinal: + BS, soft, NT, ND  Extremities: No peripheral edema, + peripheral pulses  Neurological: A/O x 3, CN2-12 intact  Psychiatric: Normal mood, normal affect  : No Johnson  Skin: No rashes, C/D/I  Access: Not applicable    I and O's:    Height (cm): 170.2 (07-06 @ 03:25)  Weight (kg): 63.5 (07-06 @ 04:46)  BMI (kg/m2): 21.9 (07-06 @ 04:46)  BSA (m2): 1.74 (07-06 @ 04:46)    LABS:                        12.0   10.43 )-----------( 258      ( 06 Jul 2021 04:13 )             40.5     07-06    137  |  95<L>  |  59<H>  ----------------------------<  132<H>  3.7   |  20<L>  |  10.78<H>    Ca    8.8      06 Jul 2021 04:13    TPro  6.5  /  Alb  2.4<L>  /  TBili  0.5  /  DBili  x   /  AST  22  /  ALT  31  /  AlkPhos  142<H>  07-06      URINE:      RADIOLOGY & ADDITIONAL STUDIES:     NEPHROLOGY - NSN    Patient seen and examined.    HPI:  55M PMH ESRD on PD, DM on insulin, CHF EF 25-30% CAD s/p CABG x4, underwent cardiac cath and stent and wire broke in heart sternotomy. Patient states that for the last 2 dyas he has been having increased cough and sweating. He endorsed chills but no measured fevers at home. He denies any sick contacts or recent travel. Patient states that he fell few days ago with no head trauma.       In the ED, Patient was found to be hypotensive to 70s. Patient was given midodrine 10mg x1 and bedside POCUS was completed showing poor cardiac functionwith diffuse hypokinesis    We are consulted for PD maintenance. Patient is currently on [facility] doing  2.5 % dianeal exchanges at home. He filled fluid in his abdomen last night and has not drained since then.     The PD fluid is clear, not cloudy and catheter works well, no cp, sob, cough, wound drainage, abd pain, back pain, ha, sore throat. No sick contacts.  He gets PD at [facility]      (06 Jul 2021 07:59)      PAST MEDICAL & SURGICAL HISTORY:  Diabetes    CAD, multiple vessel    ESRD (end stage renal disease) on dialysis    HTN (hypertension)    S/P CABG (coronary artery bypass graft)        MEDICATIONS  (STANDING):  aspirin enteric coated 81 milliGRAM(s) Oral daily  atorvastatin 80 milliGRAM(s) Oral at bedtime  chlorhexidine 0.12% Liquid 15 milliLiter(s) Oral Mucosa two times a day  chlorhexidine 4% Liquid 1 Application(s) Topical <User Schedule>  dextrose 40% Gel 15 Gram(s) Oral once  dextrose 5%. 1000 milliLiter(s) (50 mL/Hr) IV Continuous <Continuous>  dextrose 5%. 1000 milliLiter(s) (100 mL/Hr) IV Continuous <Continuous>  dextrose 50% Injectable 25 Gram(s) IV Push once  dextrose 50% Injectable 12.5 Gram(s) IV Push once  dextrose 50% Injectable 25 Gram(s) IV Push once  ferrous    sulfate 325 milliGRAM(s) Oral three times a day  folic acid 1 milliGRAM(s) Oral daily  glucagon  Injectable 1 milliGRAM(s) IntraMuscular once  heparin   Injectable 5000 Unit(s) SubCutaneous every 12 hours  insulin glargine Injectable (LANTUS) 30 Unit(s) SubCutaneous at bedtime  insulin lispro (ADMELOG) corrective regimen sliding scale   SubCutaneous three times a day before meals  insulin lispro (ADMELOG) corrective regimen sliding scale   SubCutaneous at bedtime  midodrine 10 milliGRAM(s) Oral every 8 hours  norepinephrine Infusion 0.05 MICROgram(s)/kG/Min (5.95 mL/Hr) IV Continuous <Continuous>  pantoprazole  Injectable 40 milliGRAM(s) IV Push daily  piperacillin/tazobactam IVPB... 4.5 Gram(s) IV Intermittent every 8 hours  sevelamer carbonate 800 milliGRAM(s) Oral three times a day  ticagrelor 60 milliGRAM(s) Oral every 12 hours      Allergies    doxazosin (Other)    Intolerances        SOCIAL HISTORY:  Denies alcohol abuse, drug abuse or tobacco usage.     FAMILY HISTORY:  FHx: lung cancer (Sibling)  bother    FH: diabetes mellitus (Sibling)  father        VITALS:  T(C): 36.4 (07-06-21 @ 07:30), Max: 38.4 (07-06-21 @ 03:39)  HR: 96 (07-06-21 @ 08:55) (88 - 112)  BP: 104/86 (07-06-21 @ 08:55) (73/54 - 104/86)  RR: 17 (07-06-21 @ 08:55) (17 - 24)  SpO2: 100% (07-06-21 @ 08:55) (90% - 100%)    REVIEW OF SYSTEMS:  Denies any nausea, vomiting, diarrhea, fever or chills. Denies chest pain, SOB, focal weakness, hematuria or dysuria. Good oral intake and denies fatigue or weakness. All other pertinent systems are reviewed and are negative.    PHYSICAL EXAM:  Constitutional: NAD  HEENT: EOMI  Neck:  No JVD, supple   Respiratory: CTA B/L  Cardiovascular: S1 and S2, RRR  Gastrointestinal: + BS, soft, NT, ND  Extremities: No peripheral edema, + peripheral pulses  Neurological: A/O x 3, CN2-12 intact  Psychiatric: Normal mood, normal affect  : No Johnson  Skin: No rashes, C/D/I  Access: Not applicable    I and O's:    Height (cm): 170.2 (07-06 @ 03:25)  Weight (kg): 63.5 (07-06 @ 04:46)  BMI (kg/m2): 21.9 (07-06 @ 04:46)  BSA (m2): 1.74 (07-06 @ 04:46)    LABS:                        12.0   10.43 )-----------( 258      ( 06 Jul 2021 04:13 )             40.5     07-06    137  |  95<L>  |  59<H>  ----------------------------<  132<H>  3.7   |  20<L>  |  10.78<H>    Ca    8.8      06 Jul 2021 04:13    TPro  6.5  /  Alb  2.4<L>  /  TBili  0.5  /  DBili  x   /  AST  22  /  ALT  31  /  AlkPhos  142<H>  07-06      URINE:      RADIOLOGY & ADDITIONAL STUDIES:     NEPHROLOGY - NSN    Patient seen and examined.    HPI:  55M PMH ESRD on PD, DM on insulin, CHF EF 25-30% CAD s/p CABG x4, underwent cardiac cath and stent and wire broke in heart sternotomy. Patient states that for the last 2 dyas he has been having increased cough and sweating. He endorsed chills but no measured fevers at home. He denies any sick contacts or recent travel. Patient states that he fell few days ago with no head trauma.       In the ED, Patient was found to be hypotensive to 70s. Patient was given midodrine 10mg x1 and bedside POCUS was completed showing poor cardiac functionwith diffuse hypokinesis    We are consulted for PD maintenance. Patient is currently on CCPD doing  2.5 % dianeal exchanges at home. He is empty at present and did one exchange last night.  For some reason the PD fluid clarity was not noted by the pt;  THe PD  catheter works well, no cp, sob, cough, wound drainage, abd pain, back pain, ha, sore throat. No sick contacts.  He gets PD at [facility]           PAST MEDICAL & SURGICAL HISTORY:  Diabetes    CAD, multiple vessel    ESRD (end stage renal disease) on dialysis    HTN (hypertension)    S/P CABG (coronary artery bypass graft)        MEDICATIONS  (STANDING):  aspirin enteric coated 81 milliGRAM(s) Oral daily  atorvastatin 80 milliGRAM(s) Oral at bedtime  chlorhexidine 0.12% Liquid 15 milliLiter(s) Oral Mucosa two times a day  chlorhexidine 4% Liquid 1 Application(s) Topical <User Schedule>  dextrose 40% Gel 15 Gram(s) Oral once  dextrose 5%. 1000 milliLiter(s) (50 mL/Hr) IV Continuous <Continuous>  dextrose 5%. 1000 milliLiter(s) (100 mL/Hr) IV Continuous <Continuous>  dextrose 50% Injectable 25 Gram(s) IV Push once  dextrose 50% Injectable 12.5 Gram(s) IV Push once  dextrose 50% Injectable 25 Gram(s) IV Push once  ferrous    sulfate 325 milliGRAM(s) Oral three times a day  folic acid 1 milliGRAM(s) Oral daily  glucagon  Injectable 1 milliGRAM(s) IntraMuscular once  heparin   Injectable 5000 Unit(s) SubCutaneous every 12 hours  insulin glargine Injectable (LANTUS) 30 Unit(s) SubCutaneous at bedtime  insulin lispro (ADMELOG) corrective regimen sliding scale   SubCutaneous three times a day before meals  insulin lispro (ADMELOG) corrective regimen sliding scale   SubCutaneous at bedtime  midodrine 10 milliGRAM(s) Oral every 8 hours  norepinephrine Infusion 0.05 MICROgram(s)/kG/Min (5.95 mL/Hr) IV Continuous <Continuous>  pantoprazole  Injectable 40 milliGRAM(s) IV Push daily  piperacillin/tazobactam IVPB... 4.5 Gram(s) IV Intermittent every 8 hours  sevelamer carbonate 800 milliGRAM(s) Oral three times a day  ticagrelor 60 milliGRAM(s) Oral every 12 hours      Allergies    doxazosin (Other)    Intolerances        SOCIAL HISTORY:  Denies alcohol abuse, drug abuse or tobacco usage.     FAMILY HISTORY:  FHx: lung cancer (Sibling)  bother    FH: diabetes mellitus (Sibling)  father        VITALS:  T(C): 36.4 (07-06-21 @ 07:30), Max: 38.4 (07-06-21 @ 03:39)  HR: 96 (07-06-21 @ 08:55) (88 - 112)  BP: 104/86 (07-06-21 @ 08:55) (73/54 - 104/86)  RR: 17 (07-06-21 @ 08:55) (17 - 24)  SpO2: 100% (07-06-21 @ 08:55) (90% - 100%)    REVIEW OF SYSTEMS:  Denies any nausea.   Denies chest pain, SOB, focal weakness, hematuria or dysuria. Good oral intake and denies fatigue or weakness. All other pertinent systems are reviewed and are negative.    PHYSICAL EXAM:  Constitutional: NAD  HEENT: EOMI  Neck:  No JVD, supple   Respiratory: CTA B/L  Cardiovascular: S1 and S2, RRR  Gastrointestinal: + BS, soft, NT, ND  Extremities: No peripheral edema, + peripheral pulses  Neurological: A/O x 3, CN2-12 intact  Psychiatric: Normal mood, normal affect  : No Johnson  Skin: No rashes, C/D/I  Access: Not applicable    I and O's:    Height (cm): 170.2 (07-06 @ 03:25)  Weight (kg): 63.5 (07-06 @ 04:46)  BMI (kg/m2): 21.9 (07-06 @ 04:46)  BSA (m2): 1.74 (07-06 @ 04:46)    LABS:                        12.0   10.43 )-----------( 258      ( 06 Jul 2021 04:13 )             40.5     07-06    137  |  95<L>  |  59<H>  ----------------------------<  132<H>  3.7   |  20<L>  |  10.78<H>    Ca    8.8      06 Jul 2021 04:13    TPro  6.5  /  Alb  2.4<L>  /  TBili  0.5  /  DBili  x   /  AST  22  /  ALT  31  /  AlkPhos  142<H>  07-06      URINE:      RADIOLOGY & ADDITIONAL STUDIES:    < from: Xray Chest 1 View- PORTABLE-Urgent (Xray Chest 1 View- PORTABLE-Urgent .) (07.06.21 @ 05:26) >    ******PRELIMINARY REPORT******    ******PRELIMINARY REPORT******          EXAM:  XR CHEST PORTABLE URGENT 1V                            PROCEDURE DATE:  07/06/2021      ******PRELIMINARY REPORT******    ******PRELIMINARY REPORT******              INTERPRETATION:  CLINICAL INFORMATION: Fever.    EXAM: AP chest radiograph.    COMPARISON: Chest radiograph dated 12/29/2020.    FINDINGS:  Status post sternotomy and CABG. The heart size is normal.    The lungs are clear. No pleural effusion or pneumothorax.    The visualized osseous structures demonstrate no acute pathology.    IMPRESSION:  Clear lungs.            ******PRELIMINARY REPORT******    ******PRELIMINARY REPORT******          ELSA MAK MD; Resident Radiology    < end of copied text >

## 2021-07-06 NOTE — ED ADULT NURSE NOTE - NS ED NURSE TRANSPORT WITH
Cardiac Monitor/Defib/ACLS/Rescue Kit/O2/BVM/oxygen/IV pump/pulse ox/ACLS Rescue Kit levophed via IV pump/Cardiac Monitor/Defib/ACLS/Rescue Kit/O2/BVM/oxygen/IV pump/pulse ox/ACLS Rescue Kit

## 2021-07-06 NOTE — H&P ADULT - ASSESSMENT
#Neuro  -AAO x3, able to converse; sitting up in bed comfortably     #Resp  - Sating well on RA    #Cardiovascular   - Hypotension likely in the setting of septic shock,  Hypotensive to 70s s/p 10mg of midodrine;   - will start on levophed for pressor support, started on levo 0.5mcg  - Bedside POCUS notable for scattered B lines, biventricular diffuse hypokinesis   -Repeat TTE    #ID  - Febrile to 101,  - s/p vanc/ceftriaoxone/vancomycin in the ED  - Muliplesoricespatien with right toe gangrene  -f/u right foot xray to r/o OM  - Podiatry consult for debridement of 2nd right toe      #Renal  - On Peritoneal Dialysis, Cr 10.78  - will need to consult nephrology to set up PD inpatient vs HD    #Heme  H/H stable  -Platelets wnl    #Endocrine  -[facility]       DV[facility]                 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, which has been present for several months  - will start on cefepime 500mg [id number] and vanc by level  - f/u vanc trough   - f/u right foot xray to r/o OM  - Podiatry consult for debridement of 2nd right toe  - obtain culture of peritoneal dialysis cathter to evaluate for source of infection     #Renal  - On Peritoneal Dialysis (has been on PD for 2-3 years), Cr 10.78 (unsure of baseline)   - consult nephrology for inititian of PD  - patient on midodrine 30 q8h at home and metoprol 25 q12h, will hold for now in setting of hypotension     #Heme  H/H stable  -Platelets wnl  - No active issues     #Endocrine  -Hx of diabetes on   - Tujeo 60 units at bedtime and Victoza at home, started on 30 units at bedtime, adjust as needed based on patient PO intake  ETHICS- Full Code       DVTPP heparin subq,

## 2021-07-06 NOTE — ED PROVIDER NOTE - OBJECTIVE STATEMENT
56M PMH ESRD on PD, DM, CAD s/p CABG x4, underwent cardiac cath and stent and wire broke in heart s/p sternotomy p/w fever, vomiting x 2 days. presents with wife who states patient has been more lethargic recenly. began having vomiting episodes today. has PD dialysis 7x a week 4 sessions a day. completed 1/4 sessions today  no sob, cough, ill contacts, diarrhea, abd pain, rash reported.

## 2021-07-06 NOTE — ED PROVIDER NOTE - CLINICAL SUMMARY MEDICAL DECISION MAKING FREE TEXT BOX
56M PMH ESRD on PD, DM, CAD s/p CABG x4, underwent cardiac cath and stent and wire broke in heart s/p sternotomy p/w fever, vomiting x 2 days.   febrile, hypotensive, tachycardic. patient meets criteria for septic shock 2/2 pneumonia, peritonitis. will do labs and imaging and likely admit.

## 2021-07-06 NOTE — DISCHARGE NOTE PROVIDER - NSDCCPCAREPLAN_GEN_ALL_CORE_FT
PRINCIPAL DISCHARGE DIAGNOSIS  Diagnosis: Sepsis  Assessment and Plan of Treatment: Take all antibiotics as ordered.  Call you Health care provider upon arrival home to make a one week follow up appointment.  If you develop fever, chills, malaise, or change in mental status call your Health Care Provider or go to the Emergency Department.  Nutrition is important, eat small frequent meals to help ensure you get adequate calories.  Do not stay in bed all day!  Increase your activity daily as tolerated.      SECONDARY DISCHARGE DIAGNOSES  Diagnosis: ESRD (end stage renal disease) on dialysis  Assessment and Plan of Treatment: Avoid taking (NSAIDs) - (ex: Ibuprofen, Advil, Celebrex, Naprosyn)  Avoid taking any nephrotoxic agents (can harm kidneys) - Intravenous contrast for diagnostic testing, combination cold medications.  Have all medications adjusted for your renal function by your Health Care Provider.  Blood pressure control is important.  Take all medication as prescribed.    Diagnosis: End stage heart failure  Assessment and Plan of Treatment: Do not eat or drink foods containing more than 2000mg of salt (sodium) in your diet every day.  Call your Health Care Provider if you have any swelling or increased swelling in your feet, ankles, and/or stomach.  Take all of your medication as directed.  If you become dizzy call your Health Care Provider.    Diagnosis: Gangrene of toe of right foot  Assessment and Plan of Treatment: apply iodosorn to distal right second toe, and medial 1st MTPJ every other day.  apply iodosorb to left lateral hallux every other day     PRINCIPAL DISCHARGE DIAGNOSIS  Diagnosis: Sepsis  Assessment and Plan of Treatment: Call you Health care provider upon arrival home to make a follow up appointment.  If you develop fever, chills, malaise, or change in mental status call your Health Care Provider or go to the Emergency Department.  Nutrition is important, eat small frequent meals to help ensure you get adequate calories.  Do not stay in bed all day!  Increase your activity daily as tolerated.      SECONDARY DISCHARGE DIAGNOSES  Diagnosis: ESRD (end stage renal disease) on dialysis  Assessment and Plan of Treatment: Avoid taking (NSAIDs) - (ex: Ibuprofen, Advil, Celebrex, Naprosyn)  Avoid taking any nephrotoxic agents (can harm kidneys) - Intravenous contrast for diagnostic testing, combination cold medications.  Have all medications adjusted for your renal function by your Health Care Provider.  Blood pressure control is important.  Take all medication as prescribed.    Diagnosis: End stage heart failure  Assessment and Plan of Treatment: Do not eat or drink foods containing more than 2000mg of salt (sodium) in your diet every day.  Call your Health Care Provider if you have any swelling or increased swelling in your feet, ankles, and/or stomach.  Take all of your medication as directed.  If you become dizzy call your Health Care Provider.    Diagnosis: Gangrene of toe of right foot  Assessment and Plan of Treatment: apply iodosorn to distal right second toe, and medial 1st MTPJ every other day.  apply iodosorb to left lateral hallux every other day    Diagnosis: Lesion of larynx  Assessment and Plan of Treatment: Follow up with ENT upon discharge

## 2021-07-06 NOTE — ED ADULT NURSE REASSESSMENT NOTE - NS ED NURSE REASSESS COMMENT FT1
Pt resting in stretcher with wife at bedside, a&ox1-2, lethargic but responsive to verbal/painful stimuli, slow to respond, skin warm and pallor in color, able to follow minimal commands, breathing spontaneous and nonlabored, on 2L NC, peritoneal dialysis port noted, bilateral LLE edema noted. As per wife, pt baseline mental status is a&ox3. Pt sinus tach on cardiac monitor. As per MD Grajeda, goal MAP >60. pending dispo Pt resting in stretcher with wife at bedside, a&ox1-2, lethargic but responsive to verbal/painful stimuli, slow to respond, skin warm and pallor in color, able to follow minimal commands, breathing spontaneous and nonlabored, on 2L NC, peritoneal dialysis port noted, bilateral LLE edema with dressed wounds noted. As per wife, pt baseline mental status is a&ox3. Pt sinus tach on cardiac monitor. As per MD Grajeda, goal MAP >60. pending dispo Pt resting in stretcher with wife at bedside, a&ox1-2, lethargic but responsive to verbal/painful stimuli, slow to respond, skin warm and pallor in color, able to follow minimal commands, breathing spontaneous and nonlabored, on 2L NC, peritoneal dialysis port noted, bilateral LLE edema with dressed wounds noted. As per wife, pt baseline mental status is a&ox3. Pt sinus tach on cardiac monitor. As per MD Grajeda, goal MAP >60 and no further interventions needed at this time. pending dispo Pt resting in stretcher with wife at bedside, a&ox1-2, lethargic but responsive to verbal/painful stimuli, slow to respond, skin warm and pallor in color, able to follow minimal commands, breathing spontaneous and nonlabored, on 2L NC, peritoneal dialysis port noted, bilateral LLE edema with dressed wounds noted. As per wife, pt baseline mental status is a&ox3. Pt sinus tach on cardiac monitor. As per MD Grajeda and MD Magallon, goal MAP >60 and no further interventions needed at this time. pending dispo

## 2021-07-06 NOTE — DISCHARGE NOTE PROVIDER - DETAILS OF MALNUTRITION DIAGNOSIS/DIAGNOSES
This patient has been assessed with a concern for Malnutrition and was treated during this hospitalization for the following Nutrition diagnosis/diagnoses:     -  07/11/2021: Moderate protein-calorie malnutrition

## 2021-07-06 NOTE — ED PROCEDURE NOTE - ATTENDING CONTRIBUTION TO CARE
I was present for the procedure performed by the resident and directly supervised the critical aspects of it
I was present for the procedure performed by the resident and directly supervised the critical aspects of it

## 2021-07-06 NOTE — ED PROVIDER NOTE - PROGRESS NOTE DETAILS
Negrito Grajeda PGY-2 spoke with micu, will come see patient. advised to give midodrine PO. Negrito Grajeda PGY-2 spoke with nephrology, state to wait until morning for peritoneal dialysis to come culture site. will start abx empirically for peritonitis. Attending MD Wong.  Pt signed out to me in stable condition pending MICU consult.  Immediately following signout pt admitted to MICU.  Pt is a 55 yo male with EF in low 20's, came to ED for fever, sepsis, hypotension with MAP's ~65's, concern for combo sepsis + cardiogenic shock but also diffusely overloaded, diffuse B-lines, crackles, peripherally cool.  Pt is a peritoneal dialysis pt, nephro consulted prior to signout.  Midodrine given prior to MICU consult.  No fluids given 2/2 fluid overload + ESRD.  No acute resp distress on exam.  No clear source for sepsis on arrival.  Abdomen distended c/w baseline, non-tender.  No cough reported. nephrology consulted and will see pt for peritoneal cx and inpt dialysis nephrology consulted and will see pt for peritoneal cx and inpt dialysis  pt has private nephro dr. Suyapa Hubbard [phone] (cell)

## 2021-07-07 NOTE — PROGRESS NOTE ADULT - ASSESSMENT
55M PMH ESRD on PD, DM on insulin, CHF EF 25-30% CAD s/p CABG x4 pw with hypotension with fever  Pt is unaware of clarity of PD fluid   Hypotension off pressors and   Source is pending     1 Renal -   [facility]PD orders will be 1.5% as to not cause too much UF and ultimately hypotension   2 ID-Blood cx sent and PD cx are still pending.  IV abx   3 CVS-Midodrine increased to 30mg po q 8 hours          [facility]   [phone]

## 2021-07-07 NOTE — CONSULT NOTE ADULT - ASSESSMENT
56 m with    Sepsis  - antibiotic  - peritoneal culture pending   - toe gangrene  - Podiatry follow  - ID evaluation    COPD  - 2L NC overnight because he becomes apneic, sats well  - Sats well on RA while awake    CAD s/p CABG   - Hypotension likely in the setting of septic shock   - midodrine dose increased to 30 mg TID to match home dose  - Previous echo with reduced EF  - F/up TTE  - c/w DAPT    CHF cr systolic  - cardiology follow    Peritoneal Dialysis   - nephrology follow PD, recommend 4x a day  - patient on midodrine 30 q8h at home, continue here  - Holding home metoprolol 25 q12hr in setting of hypotension    Diabetes   - HA1C 6.1 on 7/6 suggesting prediabetes  - Tujeo 60 units at bedtime and Victoza at home, started on 30 units at bedtime, adjust as needed based on patient PO intake  - added 2 units insulin pre-meal    Further action as per clinical course     Pipe Beck MD pager [phone]

## 2021-07-07 NOTE — PROGRESS NOTE ADULT - ASSESSMENT
#Neuro  -AAO x3, able to converse; sitting up in bed comfortably     #Resp  - Sating well on 2L NC during sleep  - Sats well on RA while awake    #Cardiovascular   - Hypotension likely in the setting of septic shock,  Hypotensive to 70s s/p 10mg of midodrine;   - Midodrine increased to 30 TID to match home dose  - levophed d/c'ed at 7/7 3 am, MAP goal > 65  - Bedside POCUS notable for scattered B lines, biventricular diffuse hypokinesis  - Previous echo with reduced EF at 25-30%  - F/up Formal TTE results    CAD s/p CABG  - c/w DAPT    #ID  - Febrile to 101.2, s/p vanc/ceftriaoxone/zosyn in the ED, WBC wnl   - 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, which has been present for several months  - will start on cefepime 1000mg qDay and vanc by [id number] per nephrology  - f/u right foot xray to r/o OM  - Podiatry consult for evaluation of 2nd right toe  - f/up peritoneal fluid cultures      #Renal  - On Peritoneal Dialysis (has been on PD for 2-3 years), Cr 10.78 (unsure of baseline)   - nephrology suggest PD 4x a day  - patient on midodrine 30 q8h at home, continue here  - Holding home metoprolol 25 q12hr in setting of hypotension    #Heme  H/H stable  -Platelets wnl  - No active issues     #Endocrine  - Hx of diabetes  - A1C - 6.1 7/6  - Tujeo 60 units at bedtime and Victoza at home, started on 30 units at bedtime, adjust as needed based on patient PO intake  - 7/6 added 2 units before meals of ademolog   ETHICS- Full Code

## 2021-07-07 NOTE — CHART NOTE - NSCHARTNOTEFT_GEN_A_CORE
MICU Transfer Note    Transfer from: MICU  Transfer to:  (x) Medicine    (  ) Telemetry    (  ) RCU    (  ) Palliative    (  ) Stroke Unit    (  ) _______________    Accepting physician: Dr. Beck    HPI: 55M PMH ESRD on PD, DM on insulin, CHF EF 25-30% CAD s/p CABG x4, underwent cardiac cath and stent. For the last 2 days he has been having increased cough, sweating and chills without measured fevers at home. He denies any sick contacts or recent travel. Patient states that he fell few days ago with no head trauma. Of note patient has right toe gangrenous lesion that has been present for several months, He denies any trauma or pain at the site. In the ED, Patient was found to be hypotensive to 70s. Patient was given midodrine 10mg x1 and bedside POCUS was completed showing poor cardiac function with diffuse hypokinesis. Patient was unable to maintain adequate SBPs despite midodrine so was started on levophed for pressor support. He was febrile to 101.2 and received vancomycin, ceftriaxone, and azithromycin. Admitted to MICU hypotension requiring for pressor support likely  2/2 to sepsis.    MICU COURSE: Admitted to MICU 7/5 overnight in setting of hypotension requiring pressors. By 7/6 morning he was off of pressors. His midodrine was changed to 30 TID to match his home dose. He received PD and fluid from the PD site was sent for culture. Podiatry saw him for his toe and said it was likely not the source of his sepsis but will follow up with wound care. In the evening of 7/6 he became hypotensive with the sys bp in the 70's and MAPs in the 50's. He was started on .05 levo, which was titrated down to .015, and stopped at 3 am. 7/7 afternoon he is doing well off pressors, mapping in the 70s with a blood pressure of 100s/70s. He no longer requires MICU level management.       ASSESSMENT & PLAN:     #Neuro  -AAO x3, able to converse; sitting up in bed comfortably     #Resp  - 2L NC overnight because he becomes apneic, sats well  - Sats well on RA while awake    #Cardiovascular   - Hypotension likely in the setting of septic shock   - midodrine dose increased to 30 mg TID to match home dose  - Previous echo with reduced EF  - F/up TTE    CAD s/p CABG  - c/w DAPT    #ID  - Febrile to 101.2, s/p vanc/ceftriaoxone/zosyn in the ED, WBC wnl   - 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, which has been present for several months  - will start on cefepime 1000mg qDay and vanc by [id number] (7/8 morning) per nephrology   - Right foot xray to shows no evidence of OM  - Podiatry doesn't believe the toe is the source of infection, they will follow with wound care  - f/up peritoneal fluid culture    #Renal  - On Peritoneal Dialysis (has been on PD for 2-3 years), Cr 10.78 (unsure of baseline)   - nephrology initiated PD, recommend 4x a day  - patient on midodrine 30 q8h at home, continue here  - Holding home metoprolol 25 q12hr in setting of hypotension    #Heme  H/H stable  -Platelets wnl  - No active issues     #Endocrine  - Hx of diabetes   - HA1C 6.1 on 7/6  - Tujeo 60 units at bedtime and Victoza at home, started on 30 units at bedtime, adjust as needed based on patient PO intake  - added 2 units ademolog insulin pre-meal          Vital Signs Last 24 Hrs  T(C): 36 (07 Jul 2021 07:00), Max: 36.7 (06 Jul 2021 15:00)  T(F): 96.8 (07 Jul 2021 07:00), Max: 98.1 (06 Jul 2021 15:00)  HR: 86 (07 Jul 2021 10:00) (80 - 93)  BP: 95/67 (07 Jul 2021 10:00) (72/50 - 105/74)  BP(mean): 76 (07 Jul 2021 10:00) (57 - 87)  RR: 14 (07 Jul 2021 10:00) (10 - 23)  SpO2: 100% (07 Jul 2021 10:00) (96% - 100%)  I&O's Summary    06 Jul 2021 07:01  -  07 Jul 2021 07:00  --------------------------------------------------------  IN: 6458.4 mL / OUT: 6000 mL / NET: 458.4 mL          MEDICATIONS  (STANDING):  aspirin enteric coated 81 milliGRAM(s) Oral daily  atorvastatin 80 milliGRAM(s) Oral at bedtime  cadexomer iodine 0.9% Gel 1 Application(s) Topical daily  cefepime   IVPB 1000 milliGRAM(s) IV Intermittent every 24 hours  chlorhexidine 0.12% Liquid 15 milliLiter(s) Oral Mucosa two times a day  chlorhexidine 4% Liquid 1 Application(s) Topical <User Schedule>  dextrose 40% Gel 15 Gram(s) Oral once  dextrose 5%. 1000 milliLiter(s) (50 mL/Hr) IV Continuous <Continuous>  dextrose 5%. 1000 milliLiter(s) (100 mL/Hr) IV Continuous <Continuous>  dextrose 50% Injectable 25 Gram(s) IV Push once  dextrose 50% Injectable 12.5 Gram(s) IV Push once  dextrose 50% Injectable 25 Gram(s) IV Push once  ferrous    sulfate 325 milliGRAM(s) Oral three times a day  folic acid 1 milliGRAM(s) Oral daily  glucagon  Injectable 1 milliGRAM(s) IntraMuscular once  heparin   Injectable 5000 Unit(s) SubCutaneous every 12 hours  insulin glargine Injectable (LANTUS) 30 Unit(s) SubCutaneous at bedtime  insulin lispro (ADMELOG) corrective regimen sliding scale   SubCutaneous three times a day before meals  insulin lispro (ADMELOG) corrective regimen sliding scale   SubCutaneous at bedtime  insulin lispro Injectable (ADMELOG) 2 Unit(s) SubCutaneous three times a day before meals  lactated ringers. 1000 milliLiter(s) (50 mL/Hr) IV Continuous <Continuous>  midodrine 30 milliGRAM(s) Oral every 8 hours  norepinephrine Infusion 0.05 MICROgram(s)/kG/Min (5.95 mL/Hr) IV Continuous <Continuous>  pantoprazole    Tablet 40 milliGRAM(s) Oral before breakfast  sevelamer carbonate 800 milliGRAM(s) Oral three times a day  ticagrelor 60 milliGRAM(s) Oral every 12 hours    MEDICATIONS  (PRN):        LABS                                            11.4                  Neurophils% (auto):   70.3   (07-07 @ 00:36):    8.46 )-----------(252          Lymphocytes% (auto):  11.1                                          38.4                   Eosinphils% (auto):   5.0      Manual%: Neutrophils x    ; Lymphocytes x    ; Eosinophils x    ; Bands%: x    ; Blasts x                                    131    |  93     |  63                  Calcium: 8.7   / iCa: x      (07-07 @ 00:35)    ----------------------------<  256       Magnesium: 2.6                              3.9     |  20     |  10.50            Phosphorous: 6.6      TPro  6.3    /  Alb  2.1    /  TBili  0.4    /  DBili  x      /  AST  14     /  ALT  22     /  AlkPhos  122    07 Jul 2021 00:35        Rosalinda Gaines MD  PGY-1 Medicine MICU Transfer Note    Transfer from: MICU  Transfer to:  (x) Medicine    (  ) Telemetry    (  ) RCU    (  ) Palliative    (  ) Stroke Unit    (  ) _______________    Accepting physician: Dr. Beck    HPI: 55M PMH ESRD on PD, DM on insulin, CHF EF 25-30% CAD s/p CABG x4, underwent cardiac cath and stent. For the last 2 days he has been having increased cough, sweating and chills without measured fevers at home. He denies any sick contacts or recent travel. Patient states that he fell few days ago with no head trauma. Of note patient has right toe gangrenous lesion that has been present for several months, He denies any trauma or pain at the site. In the ED, Patient was found to be hypotensive to 70s. Patient was given midodrine 10mg x1 and bedside POCUS was completed showing poor cardiac function with diffuse hypokinesis. Patient was unable to maintain adequate SBPs despite midodrine so was started on levophed for pressor support. He was febrile to 101.2 and received vancomycin, ceftriaxone, and azithromycin. Admitted to MICU hypotension requiring for pressor support likely  2/2 to sepsis.    MICU COURSE: Admitted to MICU 7/5 overnight in setting of hypotension requiring pressors. By 7/6 morning he was off of pressors. His midodrine was changed to 30 TID to match his home dose. He received PD and fluid from the PD site was sent for culture. Podiatry saw him for his toe and said it was likely not the source of his sepsis but will follow up with wound care. In the evening of 7/6 he became hypotensive with the sys bp in the 70's and MAPs in the 50's. He was started on .05 levo, which was titrated down to .015, and stopped at 3 am. 7/7 afternoon he is doing well off pressors, mapping in the 70s with a blood pressure of 100s/70s. He no longer requires MICU level management.       ASSESSMENT & PLAN:     #Neuro  -AAO x3, able to converse; sitting up in bed comfortably     #Resp  - 2L NC overnight because he becomes apneic, sats well  - Sats well on RA while awake    #Cardiovascular   - Hypotension likely in the setting of septic shock   - midodrine dose increased to 30 mg TID to match home dose  - Previous echo with reduced EF  - F/up TTE    CAD s/p CABG  - c/w DAPT    #ID  - Febrile to 101.2, s/p vanc/ceftriaoxone/zosyn in the ED, WBC wnl   - 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, which has been present for several months  - will start on cefepime 1000mg qDay and vanc by [id number] (7/8 morning) per nephrology   - Right foot xray to shows no evidence of OM  - Podiatry doesn't believe the toe is the source of infection, they will follow with wound care  - f/up peritoneal fluid culture    #Renal  - On Peritoneal Dialysis (has been on PD for 2-3 years), Cr 10.78 (unsure of baseline)   - nephrology initiated PD, recommend 4x a day  - patient on midodrine 30 q8h at home, continue here  - Holding home metoprolol 25 q12hr in setting of hypotension    #Heme  H/H stable  -Platelets wnl  - No active issues     #Endocrine  - Hx of diabetes   - HA1C 6.1 on 7/6 suggesting prediabetes  - Tujeo 60 units at bedtime and Victoza at home, started on 30 units at bedtime, adjust as needed based on patient PO intake  - added 2 units ademolog insulin pre-meal      For Follow-Up:  [ ] Follow up cultures - peritoneal and BC (7/6)  [ ] Continue cefepime 1 g qDay (renally dosed) and follow up vancomycin trough (7/8) and dose by level  [ ] Monitor BP - resume home metoprolol when able to tolerate  [ ] At discharge will need Podiatry follow up as instructed in discharge summary      Vital Signs Last 24 Hrs  T(C): 36 (07 Jul 2021 07:00), Max: 36.7 (06 Jul 2021 15:00)  T(F): 96.8 (07 Jul 2021 07:00), Max: 98.1 (06 Jul 2021 15:00)  HR: 86 (07 Jul 2021 10:00) (80 - 93)  BP: 95/67 (07 Jul 2021 10:00) (72/50 - 105/74)  BP(mean): 76 (07 Jul 2021 10:00) (57 - 87)  RR: 14 (07 Jul 2021 10:00) (10 - 23)  SpO2: 100% (07 Jul 2021 10:00) (96% - 100%)  I&O's Summary    06 Jul 2021 07:01  -  07 Jul 2021 07:00  --------------------------------------------------------  IN: 6458.4 mL / OUT: 6000 mL / NET: 458.4 mL          MEDICATIONS  (STANDING):  aspirin enteric coated 81 milliGRAM(s) Oral daily  atorvastatin 80 milliGRAM(s) Oral at bedtime  cadexomer iodine 0.9% Gel 1 Application(s) Topical daily  cefepime   IVPB 1000 milliGRAM(s) IV Intermittent every 24 hours  chlorhexidine 0.12% Liquid 15 milliLiter(s) Oral Mucosa two times a day  chlorhexidine 4% Liquid 1 Application(s) Topical <User Schedule>  dextrose 40% Gel 15 Gram(s) Oral once  dextrose 5%. 1000 milliLiter(s) (50 mL/Hr) IV Continuous <Continuous>  dextrose 5%. 1000 milliLiter(s) (100 mL/Hr) IV Continuous <Continuous>  dextrose 50% Injectable 25 Gram(s) IV Push once  dextrose 50% Injectable 12.5 Gram(s) IV Push once  dextrose 50% Injectable 25 Gram(s) IV Push once  ferrous    sulfate 325 milliGRAM(s) Oral three times a day  folic acid 1 milliGRAM(s) Oral daily  glucagon  Injectable 1 milliGRAM(s) IntraMuscular once  heparin   Injectable 5000 Unit(s) SubCutaneous every 12 hours  insulin glargine Injectable (LANTUS) 30 Unit(s) SubCutaneous at bedtime  insulin lispro (ADMELOG) corrective regimen sliding scale   SubCutaneous three times a day before meals  insulin lispro (ADMELOG) corrective regimen sliding scale   SubCutaneous at bedtime  insulin lispro Injectable (ADMELOG) 2 Unit(s) SubCutaneous three times a day before meals  lactated ringers. 1000 milliLiter(s) (50 mL/Hr) IV Continuous <Continuous>  midodrine 30 milliGRAM(s) Oral every 8 hours  norepinephrine Infusion 0.05 MICROgram(s)/kG/Min (5.95 mL/Hr) IV Continuous <Continuous>  pantoprazole    Tablet 40 milliGRAM(s) Oral before breakfast  sevelamer carbonate 800 milliGRAM(s) Oral three times a day  ticagrelor 60 milliGRAM(s) Oral every 12 hours    MEDICATIONS  (PRN):        LABS                                            11.4                  Neurophils% (auto):   70.3   (07-07 @ 00:36):    8.46 )-----------(252          Lymphocytes% (auto):  11.1                                          38.4                   Eosinphils% (auto):   5.0      Manual%: Neutrophils x    ; Lymphocytes x    ; Eosinophils x    ; Bands%: x    ; Blasts x                                    131    |  93     |  63                  Calcium: 8.7   / iCa: x      (07-07 @ 00:35)    ----------------------------<  256       Magnesium: 2.6                              3.9     |  20     |  10.50            Phosphorous: 6.6      TPro  6.3    /  Alb  2.1    /  TBili  0.4    /  DBili  x      /  AST  14     /  ALT  22     /  AlkPhos  122    07 Jul 2021 00:35        Rosalinda Gaines MD  PGY-1 Medicine

## 2021-07-07 NOTE — PROGRESS NOTE ADULT - SUBJECTIVE AND OBJECTIVE BOX
BELLA MARTINS 56y Male  MRN#: [id number]   Hospital Day: 1d    SUBJECTIVE  Patient is a 56y old Male who presents with a chief complaint of Hypotension (07 Jul 2021 08:50)  Currently admitted to medicine with the primary diagnosis of Sepsis      INTERVAL HPI AND OVERNIGHT EVENTS:  Patient was examined and seen at bedside. This morning he is resting comfortably in bed. Overnight he was hypotensive systolic in the 70's and mapping in the 50s, he was put on .05 levo which was brought down to .015 and shut off at 3 am. He is maintaining a map of 75-80 now and blood pressure is stable.     REVIEW OF SYMPTOMS:  CONSTITUTIONAL: No weakness, fevers or chills; No headaches  EYES: No visual changes, eye pain, or discharge  ENT: No vertigo; No ear pain or change in hearing; No sore throat or difficulty swallowing  NECK: No pain or stiffness  RESPIRATORY: No cough, wheezing, or hemoptysis; No shortness of breath  CARDIOVASCULAR: No chest pain or palpitations  GASTROINTESTINAL: No abdominal or epigastric pain; No nausea, vomiting, or hematemesis; No diarrhea or constipation; No melena or hematochezia  GENITOURINARY: No dysuria, frequency or hematuria  MUSCULOSKELETAL: No joint pain, no muscle pain, no weakness  NEUROLOGICAL: No numbness or weakness  SKIN: No itching or rashes      OBJECTIVE  PAST MEDICAL & SURGICAL HISTORY  Diabetes    CAD, multiple vessel    ESRD (end stage renal disease) on dialysis    HTN (hypertension)    S/P CABG (coronary artery bypass graft)      ALLERGIES:  doxazosin (Other)    MEDICATIONS:  STANDING MEDICATIONS  aspirin enteric coated 81 milliGRAM(s) Oral daily  atorvastatin 80 milliGRAM(s) Oral at bedtime  cadexomer iodine 0.9% Gel 1 Application(s) Topical daily  cefepime   IVPB 1000 milliGRAM(s) IV Intermittent every 24 hours  chlorhexidine 0.12% Liquid 15 milliLiter(s) Oral Mucosa two times a day  chlorhexidine 4% Liquid 1 Application(s) Topical <User Schedule>  dextrose 40% Gel 15 Gram(s) Oral once  dextrose 5%. 1000 milliLiter(s) IV Continuous <Continuous>  dextrose 5%. 1000 milliLiter(s) IV Continuous <Continuous>  dextrose 50% Injectable 25 Gram(s) IV Push once  dextrose 50% Injectable 12.5 Gram(s) IV Push once  dextrose 50% Injectable 25 Gram(s) IV Push once  ferrous    sulfate 325 milliGRAM(s) Oral three times a day  folic acid 1 milliGRAM(s) Oral daily  glucagon  Injectable 1 milliGRAM(s) IntraMuscular once  heparin   Injectable 5000 Unit(s) SubCutaneous every 12 hours  insulin glargine Injectable (LANTUS) 30 Unit(s) SubCutaneous at bedtime  insulin lispro (ADMELOG) corrective regimen sliding scale   SubCutaneous three times a day before meals  insulin lispro (ADMELOG) corrective regimen sliding scale   SubCutaneous at bedtime  insulin lispro Injectable (ADMELOG) 2 Unit(s) SubCutaneous three times a day before meals  lactated ringers. 1000 milliLiter(s) IV Continuous <Continuous>  midodrine 30 milliGRAM(s) Oral every 8 hours  norepinephrine Infusion 0.05 MICROgram(s)/kG/Min IV Continuous <Continuous>  pantoprazole    Tablet 40 milliGRAM(s) Oral before breakfast  sevelamer carbonate 800 milliGRAM(s) Oral three times a day  ticagrelor 60 milliGRAM(s) Oral every 12 hours    PRN MEDICATIONS      VITAL SIGNS: Last 24 Hours  T(C): 36 (07 Jul 2021 07:00), Max: 36.7 (06 Jul 2021 15:00)  T(F): 96.8 (07 Jul 2021 07:00), Max: 98.1 (06 Jul 2021 15:00)  HR: 86 (07 Jul 2021 10:00) (80 - 93)  BP: 95/67 (07 Jul 2021 10:00) (72/50 - 105/74)  BP(mean): 76 (07 Jul 2021 10:00) (57 - 87)  RR: 14 (07 Jul 2021 10:00) (10 - 23)  SpO2: 100% (07 Jul 2021 10:00) (96% - 100%)    LABS:                        11.4   8.46  )-----------( 252      ( 07 Jul 2021 00:36 )             38.4     07-07    131<L>  |  93<L>  |  63<H>  ----------------------------<  256<H>  3.9   |  20<L>  |  10.50<H>    Ca    8.7      07 Jul 2021 00:35  Phos  6.6     07-07  Mg     2.6     07-07    TPro  6.3  /  Alb  2.1<L>  /  TBili  0.4  /  DBili  x   /  AST  14  /  ALT  22  /  AlkPhos  122<H>  07-07    PT/INR - ( 06 Jul 2021 04:35 )   PT: 15.0 sec;   INR: 1.26 ratio         PTT - ( 06 Jul 2021 04:35 )  PTT:37.2 sec    ABG - ( 07 Jul 2021 00:29 )  pH, Arterial: 7.39  pH, Blood: x     /  pCO2: 42    /  pO2: 104   / HCO3: 25    / Base Excess: .5    /  SaO2: 98                    Culture - Blood (collected 06 Jul 2021 08:22)  Source: .Blood Blood-Peripheral  Preliminary Report (07 Jul 2021 09:02):    No growth to date.    Culture - Blood (collected 06 Jul 2021 08:22)  Source: .Blood Blood-Peripheral  Preliminary Report (07 Jul 2021 09:02):    No growth to date.        PHYSICAL EXAM:  CONSTITUTIONAL: No acute distress, well-developed, well-groomed, AAOx3  HEAD: Atraumatic, normocephalic  EYES: EOM intact, PERRLA, conjunctiva and sclera clear  ENT: Supple, no masses, no thyromegaly, no bruits, no JVD; moist mucous membranes  PULMONARY: Clear to auscultation bilaterally; no wheezes, rales, or rhonchi  CARDIOVASCULAR: Regular rate and rhythm; no murmurs, rubs, or gallops  GASTROINTESTINAL: PD site clean and dry.   MUSCULOSKELETAL: 2+ peripheral pulses; no clubbing, no cyanosis, no edema  NEUROLOGY: non-focal  SKIN: dry gangraneous lesion on 2nd r toe and tip of r index finger.

## 2021-07-07 NOTE — PROGRESS NOTE ADULT - SUBJECTIVE AND OBJECTIVE BOX
CARDIOLOGY FOLLOW UP - Dr. Best  Date of Service: 7/7/21  CC: feeling better, denies cp, sob, and palpitations     Review of Systems:  Constitutional: No fever, weight loss, or fatigue  Respiratory: No cough, wheezing, or hemoptysis, no shortness of breath  Cardiovascular: No chest pain, palpitations, passing out, dizziness, or leg swelling  Gastrointestinal: No abd or epigastric pain.  No nausea, vomiting, or hematemesis; no diarrhea or constipation, no melena or hematochezia  Vascular: no edema       PHYSICAL EXAM:  T(C): 36.4 (07-07-21 @ 11:00), Max: 36.7 (07-06-21 @ 15:00)  HR: 89 (07-07-21 @ 11:00) (80 - 93)  BP: 96/69 (07-07-21 @ 11:00) (72/50 - 105/74)  RR: 20 (07-07-21 @ 11:00) (10 - 23)  SpO2: 100% (07-07-21 @ 11:00) (96% - 100%)  Wt(kg): --  I&O's Summary    06 Jul 2021 07:01  -  07 Jul 2021 07:00  --------------------------------------------------------  IN: 6458.4 mL / OUT: 6000 mL / NET: 458.4 mL        Appearance: Normal	  Cardiovascular: Normal S1 S2,RRR, No JVD, No murmurs  Respiratory: Lungs clear to auscultation	  Gastrointestinal:  Soft, Non-tender, + BS	  Extremities: Normal range of motion, No clubbing, cyanosis or edema      Home Medications:  aspirin 81 mg oral delayed release tablet: 1 tab(s) orally once a day (29 Dec 2020 18:37)  atorvastatin 80 mg oral tablet: 1 tab(s) orally once a day (at bedtime) (29 Dec 2020 18:37)  epoetin martine: injectable once a month (29 Dec 2020 18:37)  ferrous sulfate 325 mg (65 mg elemental iron) oral tablet: 1 tab(s) orally 3 times a day (29 Dec 2020 18:37)  gabapentin 100 mg oral capsule: 1 cap(s) orally once a day (29 Dec 2020 18:37)  nortriptyline 25 mg oral capsule: 1 cap(s) orally once a day (at bedtime) (29 Dec 2020 18:37)  pantoprazole 40 mg oral delayed release tablet: 1 tab(s) orally once a day (before a meal) (29 Dec 2020 18:37)  Toujeo SoloStar 300 units/mL subcutaneous solution: 60 unit(s) subcutaneous once a day (at bedtime) (08 Jan 2021 12:41)      MEDICATIONS  (STANDING):  aspirin enteric coated 81 milliGRAM(s) Oral daily  atorvastatin 80 milliGRAM(s) Oral at bedtime  cadexomer iodine 0.9% Gel 1 Application(s) Topical daily  cefepime   IVPB 1000 milliGRAM(s) IV Intermittent every 24 hours  chlorhexidine 0.12% Liquid 15 milliLiter(s) Oral Mucosa two times a day  chlorhexidine 4% Liquid 1 Application(s) Topical <User Schedule>  dextrose 40% Gel 15 Gram(s) Oral once  dextrose 5%. 1000 milliLiter(s) (50 mL/Hr) IV Continuous <Continuous>  dextrose 5%. 1000 milliLiter(s) (100 mL/Hr) IV Continuous <Continuous>  dextrose 50% Injectable 25 Gram(s) IV Push once  dextrose 50% Injectable 12.5 Gram(s) IV Push once  dextrose 50% Injectable 25 Gram(s) IV Push once  ferrous    sulfate 325 milliGRAM(s) Oral three times a day  folic acid 1 milliGRAM(s) Oral daily  glucagon  Injectable 1 milliGRAM(s) IntraMuscular once  heparin   Injectable 5000 Unit(s) SubCutaneous every 12 hours  insulin glargine Injectable (LANTUS) 30 Unit(s) SubCutaneous at bedtime  insulin lispro (ADMELOG) corrective regimen sliding scale   SubCutaneous three times a day before meals  insulin lispro (ADMELOG) corrective regimen sliding scale   SubCutaneous at bedtime  insulin lispro Injectable (ADMELOG) 2 Unit(s) SubCutaneous three times a day before meals  lactated ringers. 1000 milliLiter(s) (50 mL/Hr) IV Continuous <Continuous>  midodrine 30 milliGRAM(s) Oral every 8 hours  norepinephrine Infusion 0.05 MICROgram(s)/kG/Min (5.95 mL/Hr) IV Continuous <Continuous>  pantoprazole    Tablet 40 milliGRAM(s) Oral before breakfast  sevelamer carbonate 800 milliGRAM(s) Oral three times a day  ticagrelor 60 milliGRAM(s) Oral every 12 hours      TELEMETRY: 	NSR     ECG:  	  RADIOLOGY:   DIAGNOSTIC TESTING:  [ x] Echocardiogram:   < from: TTE with Doppler (w/Cont) (07.07.21 @ 10:34) >  Dimensions:    Normal Values:  LA:     5.0    2.0 - 4.0 cm  Ao:     3.0    2.0 - 3.8 cm  SEPTUM: 0.6    0.6 - 1.2 cm  PWT:    0.7    0.6 - 1.1 cm  LVIDd:  6.2    3.0 - 5.6 cm  LVIDs:  5.9    1.8 - 4.0 cm  Derived variables:  LVMI: 88 g/m2  RWT: 0.22  Fractional short: 5 %  EF (Sarmiento Rule): 16 %Doppler Peak Velocity (m/sec):  AoV=1.4  ------------------------------------------------------------------------  Observations:  Mitral Valve: Tethered mitral valve leaflets with normal  opening.  Mitral annular calcification. Moderate-severe  mitral regurgitation.  Aortic Valve/Aorta: Calcified trileaflet aortic valve with  decreased opening. Peak transaortic valve gradient equals 8  mm Hg, mean transaortic valve gradient equals 4 mm Hg,  estimated aortic valve area equals 1.4 sqcm (by continuity  equation), aortic valve velocity time integral equals 26  cm, consistent with moderate aortic stenosis. Peak left  ventricular outflow tract gradient equals 1 mm Hg, LVOT  velocity time integral equals 10 cm.  Aortic Root: 3 cm.  LVOT diameter: 2.2 cm.  Left Atrium: Severely dilated left atrium.  LA volume index  = 53 cc/m2.  Left Ventricle: Severe global left ventricular systolic  dysfunction.  Endocardial visualization enhanced with  intravenous injection of Ultrasonic Enhancing Agent  (Definity).  No left ventricular thrombus. Moderate left  ventricular enlargement. Severe  diastolic dysfunction  (Stage III).   IncreasedE/e'  is consistent with elevated  left ventricular filling pressure.  Right Heart: Moderate right atrial enlargement. Right  ventricular enlargement with decreased right ventricular  systolic function. Normal tricuspid valve. Mild-moderate  tricuspid regurgitation. Normal pulmonic valve. Minimal  pulmonic regurgitation.  Pericardium/Pleura: Normal pericardium with no pericardial  effusion.  Hemodynamic: Estimated right atrial pressure is 8 mm Hg.  Estimated right ventricular systolic pressure equals 57 mm  Hg, assuming right atrial pressure equals 8 mm Hg,  consistent with moderate pulmonary hypertension.  ------------------------------------------------------------------------  Conclusions:  1. Moderate-severe mitral regurgitation.  2. Severely dilated left atrium.  LA volume index = 53  cc/m2.  3. Moderate left ventricular enlargement.  4. Severe global left ventricular systolic dysfunction.  Endocardial visualization enhanced with intravenous  injection of Ultrasonic Enhancing Agent (Definity).  No  left ventricular thrombus.  5. Severe  diastolic dysfunction (Stage III).   Increased  E/e'  is consistent with elevated left ventricular filling  pressure.  6. Moderate right atrial enlargement.  7. Right ventricular enlargement with decreased right  ventricular systolic function.  8. Normal tricuspid valve. Mild-moderate tricuspid  regurgitation.  9. Estimated pulmonary artery systolic pressure equals 57  mm Hg, assuming right atrial pressure equals 8 mm Hg,  consistent with moderate pulmonary pressures.  *** Compared with echocardiogram of 12/30/2020, further  decline in LV systolic function.    < end of copied text >    [ ]  Catheterization:  [ ] Stress Test:    OTHER: 	    LABS:	 	    Troponin T, High Sensitivity Result: 468 ng/L [0 - 51] (07-06 @ 07:45)  Troponin T, High Sensitivity Result: 488 ng/L [0 - 51] (07-06 @ 04:13)                          11.4   8.46  )-----------( 252      ( 07 Jul 2021 00:36 )             38.4     07-07    131<L>  |  93<L>  |  63<H>  ----------------------------<  256<H>  3.9   |  20<L>  |  10.50<H>    Ca    8.7      07 Jul 2021 00:35  Phos  6.6     07-07  Mg     2.6     07-07    TPro  6.3  /  Alb  2.1<L>  /  TBili  0.4  /  DBili  x   /  AST  14  /  ALT  22  /  AlkPhos  122<H>  07-07    PT/INR - ( 06 Jul 2021 04:35 )   PT: 15.0 sec;   INR: 1.26 ratio         PTT - ( 06 Jul 2021 04:35 )  PTT:37.2 sec

## 2021-07-07 NOTE — PROGRESS NOTE ADULT - ASSESSMENT
CATH 11/30/2020:  COMPLICATIONS: The stent was deployed without difficulty. On removal of the  balloon, the shaft broke and the tip of the balloon remained in the vessel. Several attempts were made to snare the balloon unsuccessfully. Dr. Verdugo was notifed who will take the patient to the operating room.  DIAGNOSTIC RECOMMENDATIONS: The patient should continue with the present medications. The patients vessel was stented without difficulty On removal of the balloon, the shaft broke with the baloon stuck in the left main. All attempts to snare the balloon out failed and the patient was taken to the OR by Dr. Arango to remove the balloon  introp eleonora 11/30/20: mild to mod MR, < from: Intra-Operative Transesophageal Echo (11.30.20 @ 17:02) >  Severe global left ventricular systolic dysfunction. Inferior and inferolateral akinesis.  severe hypokinesis of other segments.  Severe global hypokinesia.  Normal left ventricular internal dimensions and wall thicknesses. Moderate diastolic dysfunction (Stage II).  echo 12/17/20: EF 32%, mod MR, Severe global left ventricular systolic dysfunction, moderate pulmonary pressures  echo 12/20/20: EF (Visual Estimate):  25-30 % mild MR, Akinesis of the inferolateral, anterolateral, apical laterlal, inferior, and inferoseptal walls. Severe left ventricular enlargement. No left ventricular thrombus is seen.  Echo 7/7/21: EF 16%, mod - sev MR, mod AS, severe global lv sys dysfx, severe diastolic dysfx, mod pulm htn       a/p  89M well known to practice with PMH ESRD on PD, DM on insulin, CHF EF 25-30% CAD s/p CABG x4, underwent cardiac cath and stent and wire broke in heart s/p sternotomy p/w septic shock    #Septic Shock  +fevers noted  -Bcx NGTD   -iv abx per MICU  -s/p pressors   -Bedside POCUS notable for scattered B lines, biventricular diffuse hypokinesis  -pt also with right 2nd toe gangrene - pod eval noted   -f/u culture of PD cathter  -w/u per MICU     #Ischemic Cardiomyopathy. Chronic systolic HF  -vol status stable   -CXR w clear lungs   -Repeat echo noted with EF 16%, mod - sev MR, mod AS, severe global lv sys dysfx, severe diastolic dysfx, mod pulm htn   -last admission pt was not candidate for icd   -no bb, acei/arb given hypotension and need for pressors   -continue midodrine for bp support  -cont vol removal w PD     # CAD, s/p CABG, s/p PCI, s/p redo open heart for stent balloon retrieval   -hst elevated, likely demand ischemia in setting of septic shock, ESRD   -c/w asa, Brilinta, statin     # ESRD  -on PD  -renal f/u    dvt ppx  care per ICU

## 2021-07-07 NOTE — CONSULT NOTE ADULT - SUBJECTIVE AND OBJECTIVE BOX
HPI:  55M PMH ESRD on PD, DM on insulin, CHF EF 25-30% CAD s/p CABG x4, underwent cardiac cath and stent and wire broke in heart sternotomy. Patient states that for the last 2 days he has been having increased cough and sweating. He endorsed chills but no measured fevers at home. He denies any sick contacts or recent travel. Patient states that he fell few days ago with no head trauma. Of note patient has right toe gangrenous lesion that has been present for several months, He denies any trauma or pain at the site.       In the ED, Patient was found to be hypotensive to 70s. Patient was given midodrine 10mg x1 and bedside POCUS was completed showing poor cardiac function with diffuse hypokinesis. Patient was unable to maintain adequate SBPs despite midodrine so was started on levophed for pressor support. Admitted to MICU hypotension requiring for pressor support likely  2/2 to sepsis.     (06 Jul 2021 07:59)      PAST MEDICAL & SURGICAL HISTORY:  Diabetes    CAD, multiple vessel    ESRD (end stage renal disease) on dialysis    HTN (hypertension)    S/P CABG (coronary artery bypass graft)        Review of Systems:   CONSTITUTIONAL: No fever, weight loss, or fatigue  EYES: No eye pain, visual disturbances, or discharge  ENMT:  No difficulty hearing, tinnitus, vertigo; No sinus or throat pain  NECK: No pain or stiffness  BREASTS: No pain, masses, or nipple discharge  RESPIRATORY: No cough, wheezing, chills or hemoptysis; No shortness of breath  CARDIOVASCULAR: No chest pain, palpitations, dizziness, or leg swelling  GASTROINTESTINAL: No abdominal or epigastric pain. No nausea, vomiting, or hematemesis; No diarrhea or constipation. No melena or hematochezia.  GENITOURINARY: No dysuria, frequency, hematuria, or incontinence  NEUROLOGICAL: No headaches, memory loss, loss of strength, numbness, or tremors  SKIN: No itching, burning, rashes, or lesions   LYMPH NODES: No enlarged glands  ENDOCRINE: No heat or cold intolerance; No hair loss  MUSCULOSKELETAL: No joint pain or swelling; No muscle, back, or extremity pain  PSYCHIATRIC: No depression, anxiety, mood swings, or difficulty sleeping  HEME/LYMPH: No easy bruising, or bleeding gums  ALLERY AND IMMUNOLOGIC: No hives or eczema    Allergies    doxazosin (Other)    Intolerances        Social History:     FAMILY HISTORY:  FHx: lung cancer (Sibling)  bother    FH: diabetes mellitus (Sibling)  father        MEDICATIONS  (STANDING):  aspirin enteric coated 81 milliGRAM(s) Oral daily  atorvastatin 80 milliGRAM(s) Oral at bedtime  cadexomer iodine 0.9% Gel 1 Application(s) Topical daily  cefepime   IVPB 1000 milliGRAM(s) IV Intermittent every 24 hours  chlorhexidine 0.12% Liquid 15 milliLiter(s) Oral Mucosa two times a day  chlorhexidine 4% Liquid 1 Application(s) Topical <User Schedule>  dextrose 40% Gel 15 Gram(s) Oral once  dextrose 5%. 1000 milliLiter(s) (50 mL/Hr) IV Continuous <Continuous>  dextrose 5%. 1000 milliLiter(s) (100 mL/Hr) IV Continuous <Continuous>  dextrose 50% Injectable 25 Gram(s) IV Push once  dextrose 50% Injectable 12.5 Gram(s) IV Push once  dextrose 50% Injectable 25 Gram(s) IV Push once  ferrous    sulfate 325 milliGRAM(s) Oral three times a day  folic acid 1 milliGRAM(s) Oral daily  glucagon  Injectable 1 milliGRAM(s) IntraMuscular once  heparin   Injectable 5000 Unit(s) SubCutaneous every 12 hours  insulin glargine Injectable (LANTUS) 30 Unit(s) SubCutaneous at bedtime  insulin lispro (ADMELOG) corrective regimen sliding scale   SubCutaneous three times a day before meals  insulin lispro (ADMELOG) corrective regimen sliding scale   SubCutaneous at bedtime  insulin lispro Injectable (ADMELOG) 2 Unit(s) SubCutaneous three times a day before meals  lactated ringers. 1000 milliLiter(s) (50 mL/Hr) IV Continuous <Continuous>  midodrine 30 milliGRAM(s) Oral every 8 hours  pantoprazole    Tablet 40 milliGRAM(s) Oral before breakfast  sevelamer carbonate 800 milliGRAM(s) Oral three times a day  ticagrelor 60 milliGRAM(s) Oral every 12 hours    MEDICATIONS  (PRN):        CAPILLARY BLOOD GLUCOSE      POCT Blood Glucose.: 175 mg/dL (07 Jul 2021 22:20)  POCT Blood Glucose.: 127 mg/dL (07 Jul 2021 17:45)  POCT Blood Glucose.: 116 mg/dL (07 Jul 2021 12:25)  POCT Blood Glucose.: 131 mg/dL (07 Jul 2021 09:09)    I&O's Summary    06 Jul 2021 07:01  -  07 Jul 2021 07:00  --------------------------------------------------------  IN: 6458.4 mL / OUT: 6000 mL / NET: 458.4 mL    07 Jul 2021 07:01  -  07 Jul 2021 22:36  --------------------------------------------------------  IN: 5600 mL / OUT: 4500 mL / NET: 1100 mL        PHYSICAL EXAM:  GENERAL: NAD, well-developed  HEAD:  Atraumatic, Normocephalic  EYES: EOMI, PERRLA, conjunctiva and sclera clear  NECK: Supple, No JVD  CHEST/LUNG: Clear to auscultation bilaterally; No wheeze  HEART: Regular rate and rhythm; No murmurs, rubs, or gallops  ABDOMEN: Soft, Nontender, Nondistended; Bowel sounds present PD catheter in place  EXTREMITIES:  R 2nd toe gangrene  PSYCH: AAOx3  NEUROLOGY: non-focal  SKIN: No rashes or lesions    LABS:                        11.4   8.46  )-----------( 252      ( 07 Jul 2021 00:36 )             38.4     07-07    131<L>  |  93<L>  |  63<H>  ----------------------------<  256<H>  3.9   |  20<L>  |  10.50<H>    Ca    8.7      07 Jul 2021 00:35  Phos  6.6     07-07  Mg     2.6     07-07    TPro  6.3  /  Alb  2.1<L>  /  TBili  0.4  /  DBili  x   /  AST  14  /  ALT  22  /  AlkPhos  122<H>  07-07    PT/INR - ( 06 Jul 2021 04:35 )   PT: 15.0 sec;   INR: 1.26 ratio         PTT - ( 06 Jul 2021 04:35 )  PTT:37.2 sec          RADIOLOGY & ADDITIONAL TESTS:    Imaging Personally Reviewed:    Consultant(s) Notes Reviewed:      Care Discussed with Consultants/Other Providers:

## 2021-07-07 NOTE — PROGRESS NOTE ADULT - SUBJECTIVE AND OBJECTIVE BOX
NEPHROLOGY-[facility] [phone]        Patient seen and examined in bed.  He was not on pressors         MEDICATIONS  (STANDING):  aspirin enteric coated 81 milliGRAM(s) Oral daily  atorvastatin 80 milliGRAM(s) Oral at bedtime  cadexomer iodine 0.9% Gel 1 Application(s) Topical daily  cefepime   IVPB 1000 milliGRAM(s) IV Intermittent every 24 hours  chlorhexidine 0.12% Liquid 15 milliLiter(s) Oral Mucosa two times a day  chlorhexidine 4% Liquid 1 Application(s) Topical <User Schedule>  dextrose 40% Gel 15 Gram(s) Oral once  dextrose 5%. 1000 milliLiter(s) (50 mL/Hr) IV Continuous <Continuous>  dextrose 5%. 1000 milliLiter(s) (100 mL/Hr) IV Continuous <Continuous>  dextrose 50% Injectable 25 Gram(s) IV Push once  dextrose 50% Injectable 12.5 Gram(s) IV Push once  dextrose 50% Injectable 25 Gram(s) IV Push once  ferrous    sulfate 325 milliGRAM(s) Oral three times a day  folic acid 1 milliGRAM(s) Oral daily  glucagon  Injectable 1 milliGRAM(s) IntraMuscular once  heparin   Injectable 5000 Unit(s) SubCutaneous every 12 hours  insulin glargine Injectable (LANTUS) 30 Unit(s) SubCutaneous at bedtime  insulin lispro (ADMELOG) corrective regimen sliding scale   SubCutaneous three times a day before meals  insulin lispro (ADMELOG) corrective regimen sliding scale   SubCutaneous at bedtime  insulin lispro Injectable (ADMELOG) 2 Unit(s) SubCutaneous three times a day before meals  lactated ringers. 1000 milliLiter(s) (50 mL/Hr) IV Continuous <Continuous>  midodrine 30 milliGRAM(s) Oral every 8 hours  norepinephrine Infusion 0.05 MICROgram(s)/kG/Min (5.95 mL/Hr) IV Continuous <Continuous>  pantoprazole    Tablet 40 milliGRAM(s) Oral before breakfast  sevelamer carbonate 800 milliGRAM(s) Oral three times a day  ticagrelor 60 milliGRAM(s) Oral every 12 hours      VITAL:  T(C): , Max: 37.2 (07-06-21 @ 11:00)  T(F): , Max: 99 (07-06-21 @ 11:00)  HR: 81 (07-07-21 @ 07:00)  BP: 85/58 (07-07-21 @ 07:00)  BP(mean): 67 (07-07-21 @ 07:00)  RR: 18 (07-07-21 @ 07:00)  SpO2: 100% (07-07-21 @ 07:00)  Wt(kg): --    I and O's:    07-06 @ 07:01  -  07-07 @ 07:00  --------------------------------------------------------  IN: 6458.4 mL / OUT: 6000 mL / NET: 458.4 mL          PHYSICAL EXAM:    Constitutional: NAD  Neck:  No JVD  Respiratory: CTAB/L  Cardiovascular: S1 and S2  Gastrointestinal: BS+, soft, + PD   Extremities: No peripheral edema  Neurological: A/O x 3, no focal deficits  Psychiatric: Normal mood, normal affect  : No Johnson  Skin: No rashes  Access: Not applicable    LABS:                        11.4   8.46  )-----------( 252      ( 07 Jul 2021 00:36 )             38.4     07-07    131<L>  |  93<L>  |  63<H>  ----------------------------<  256<H>  3.9   |  20<L>  |  10.50<H>    Ca    8.7      07 Jul 2021 00:35  Phos  6.6     07-07  Mg     2.6     07-07    TPro  6.3  /  Alb  2.1<L>  /  TBili  0.4  /  DBili  x   /  AST  14  /  ALT  22  /  AlkPhos  122<H>  07-07          Urine Studies:          RADIOLOGY & ADDITIONAL STUDIES:

## 2021-07-07 NOTE — CHART NOTE - NSCHARTNOTEFT_GEN_A_CORE
: Arabella Otero MD, Stewart Caba MD and MD Sid    INDICATION: Shock     PROCEDURE:  [x] LIMITED ECHO  [x] LIMITED CHEST  [ ] LIMITED RETROPERITONEAL  [ ] LIMITED ABDOMINAL  [ ] LIMITED DVT  [ ] NEEDLE GUIDANCE VASCULAR  [ ] NEEDLE GUIDANCE THORACENTESIS  [ ] NEEDLE GUIDANCE PARACENTESIS  [ ] NEEDLE GUIDANCE PERICARDIOCENTESIS  [ ] OTHER    FINDINGS:  Thoracic: A-line predominant pattern anteriorly.   Cardiac: Severely reduced LV systolic function.     INTERPRETATION:  Normal lungs. Severely reduced LVSF consistent with history of MI.     Images stored in [employer]. : Arabella Otero MD, Stewart Caba MD and MD Sid    INDICATION: Shock     PROCEDURE:  [x] LIMITED ECHO  [x] LIMITED CHEST  [ ] LIMITED RETROPERITONEAL  [ ] LIMITED ABDOMINAL  [ ] LIMITED DVT  [ ] NEEDLE GUIDANCE VASCULAR  [ ] NEEDLE GUIDANCE THORACENTESIS  [ ] NEEDLE GUIDANCE PARACENTESIS  [ ] NEEDLE GUIDANCE PERICARDIOCENTESIS  [ ] OTHER    FINDINGS:  Thoracic: A-line predominant pattern anteriorly. Minimal fluid beneath diaphragm on right side.   Cardiac: Severely reduced LV systolic function.     INTERPRETATION:  Normal lungs. Mild ascites. Severely reduced LVSF consistent with history of MI.     Images stored in [employer]. : Arabella Otero MD, Stewart Caba MD and MD Sid    INDICATION: Shock     PROCEDURE:  [x] LIMITED ECHO  [x] LIMITED CHEST  [ ] LIMITED RETROPERITONEAL  [ ] LIMITED ABDOMINAL  [ ] LIMITED DVT  [ ] NEEDLE GUIDANCE VASCULAR  [ ] NEEDLE GUIDANCE THORACENTESIS  [ ] NEEDLE GUIDANCE PARACENTESIS  [ ] NEEDLE GUIDANCE PERICARDIOCENTESIS  [ ] OTHER    FINDINGS:  Thoracic: A-line predominant pattern anteriorly. Small amount of ascites.  Cardiac: Severely reduced LV systolic function.     INTERPRETATION:  Normal lungs. Mild ascites. Severely reduced LVSF consistent with history of MI.     Images stored in [employer].

## 2021-07-08 NOTE — PROGRESS NOTE ADULT - ASSESSMENT
56 m with    Sepsis  - antibiotic  - peritoneal culture pending   - toe gangrene  - Podiatry follow  - ID evaluation called    COPD  - 2L NC overnight because he becomes apneic, sats well  - Sats well on RA while awake    CAD s/p CABG   - Hypotension likely in the setting of septic shock   - midodrine dose increased to 30 mg TID to match home dose  - Previous echo with reduced EF  - F/up TTE  - c/w DAPT  - Cardiology follow    CHF cr systolic  - cardiology follow    Peritoneal Dialysis   - nephrology follow PD, recommend 4x a day  - patient on midodrine 30 q8h at home, continue here  - Holding home metoprolol 25 q12hr in setting of hypotension    Diabetes   - HA1C 6.1 on 7/6 suggesting prediabetes  - Tujeo 60 units at bedtime and Victoza at home, started on 30 units at bedtime, adjust as needed based on patient PO intake  - added 2 units insulin pre-meal    Pipe Beck MD pager [phone]  56 m with    Sepsis  - antibiotic  - peritoneal culture pending   - toe gangrene  - Podiatry follow  - ID evaluation called    COPD  - 2L NC overnight because he becomes apneic, sats well  - Sats well on RA while awake    CAD s/p CABG   - Hypotension likely in the setting of septic shock   - midodrine dose increased to 30 mg TID to match home dose  - Previous echo with reduced EF  - F/up TTE  - c/w DAPT  - Cardiology follow    CHF cr systolic  - cardiology follow    Peritoneal Dialysis   - nephrology follow PD, recommend 4x a day  - patient on midodrine 30 q8h at home, continue here  - Holding home metoprolol 25 q12hr in setting of hypotension    Diabetes   - HA1C 6.1 on 7/6 suggesting prediabetes  - Tujeo 60 units at bedtime and Victoza at home, started on 30 units at bedtime, adjust as needed based on patient PO intake  - added 2 units insulin pre-meal    Message left for son    Pipe Beck MD pager [phone]

## 2021-07-08 NOTE — PROGRESS NOTE ADULT - SUBJECTIVE AND OBJECTIVE BOX
NEPHROLOGY-[facility] [phone]        Patient seen and examined in bed.  He was in good spirits         MEDICATIONS  (STANDING):  aspirin enteric coated 81 milliGRAM(s) Oral daily  atorvastatin 80 milliGRAM(s) Oral at bedtime  cadexomer iodine 0.9% Gel 1 Application(s) Topical daily  cefepime   IVPB 1000 milliGRAM(s) IV Intermittent every 24 hours  chlorhexidine 0.12% Liquid 15 milliLiter(s) Oral Mucosa two times a day  chlorhexidine 4% Liquid 1 Application(s) Topical <User Schedule>  dextrose 40% Gel 15 Gram(s) Oral once  dextrose 5%. 1000 milliLiter(s) (50 mL/Hr) IV Continuous <Continuous>  dextrose 5%. 1000 milliLiter(s) (100 mL/Hr) IV Continuous <Continuous>  dextrose 50% Injectable 25 Gram(s) IV Push once  dextrose 50% Injectable 12.5 Gram(s) IV Push once  dextrose 50% Injectable 25 Gram(s) IV Push once  ferrous    sulfate 325 milliGRAM(s) Oral three times a day  folic acid 1 milliGRAM(s) Oral daily  glucagon  Injectable 1 milliGRAM(s) IntraMuscular once  heparin   Injectable 5000 Unit(s) SubCutaneous every 12 hours  insulin glargine Injectable (LANTUS) 30 Unit(s) SubCutaneous at bedtime  insulin lispro (ADMELOG) corrective regimen sliding scale   SubCutaneous three times a day before meals  insulin lispro (ADMELOG) corrective regimen sliding scale   SubCutaneous at bedtime  insulin lispro Injectable (ADMELOG) 2 Unit(s) SubCutaneous three times a day before meals  lactated ringers. 1000 milliLiter(s) (50 mL/Hr) IV Continuous <Continuous>  midodrine 30 milliGRAM(s) Oral every 8 hours  pantoprazole    Tablet 40 milliGRAM(s) Oral before breakfast  sevelamer carbonate 800 milliGRAM(s) Oral three times a day  ticagrelor 60 milliGRAM(s) Oral every 12 hours      VITAL:  T(C): , Max: 37.1 (07-07-21 @ 23:55)  T(F): , Max: 98.7 (07-07-21 @ 23:55)  HR: 104 (07-08-21 @ 08:19)  BP: 110/73 (07-08-21 @ 08:19)  BP(mean): 83 (07-07-21 @ 21:00)  RR: 20 (07-08-21 @ 08:19)  SpO2: 100% (07-08-21 @ 08:19)  Wt(kg): --    I and O's:    07-07 @ 07:01  -  07-08 @ 07:00  --------------------------------------------------------  IN: 70247 mL / OUT: 9000 mL / NET: 1600 mL          PHYSICAL EXAM:    Constitutional: NAD  Neck:  No JVD  Respiratory: CTAB/L  Cardiovascular: S1 and S2  Gastrointestinal: BS+, soft, NT/ND + PD   Extremities: No peripheral edema  Neurological: A/O x 3, no focal deficits  Psychiatric: Normal mood, normal affect  : No Johnson  Skin: No rashes  Access: Not applicable    LABS:                        11.4   9.90  )-----------( 277      ( 08 Jul 2021 06:34 )             39.1     07-08    133<L>  |  92<L>  |  58<H>  ----------------------------<  140<H>  3.4<L>   |  22  |  9.86<H>    Ca    8.4      08 Jul 2021 06:34  Phos  5.0     07-08  Mg     2.4     07-08    TPro  6.3  /  Alb  2.1<L>  /  TBili  0.4  /  DBili  x   /  AST  17  /  ALT  21  /  AlkPhos  118  07-08          Urine Studies:          RADIOLOGY & ADDITIONAL STUDIES:

## 2021-07-08 NOTE — PHYSICAL THERAPY INITIAL EVALUATION ADULT - PERTINENT HX OF CURRENT PROBLEM, REHAB EVAL
57 yo F PMH ESRD on PD, underwent cardiac cath and stent and wire broke in heart s/p sternotomy p/w fever, vomiting x 2 days. presents with wife who states patient has been more lethargic recenly. began having vomiting episodes today. has PD dialysis 7x a week 4 sessions a day. Pt reports inc cough and sweating x2 days. He endorsed chills but no measured fevers at home. Found to be hypotensive to 70's in ED.

## 2021-07-08 NOTE — PROGRESS NOTE ADULT - ASSESSMENT
55M PMH ESRD on PD, DM on insulin, CHF EF 25-30% CAD s/p CABG x4 pw with hypotension with fever  Hypotension off pressors and   Source is pending   Hypokalemia    1 Renal -   [facility]PD orders will be 1.5% as to not cause too much UF and ultimately hypotension   Kdur 20meq po x 1   2 ID-Blood cx thus far are negative and still awaiting PD fluid cx.  IV abx   3 CVS-Midodrine increased to 30mg po q 8 hours   4 Podiatry- Toe does not seem to be the issue        Sayed [facility]   [phone]

## 2021-07-08 NOTE — PHYSICAL THERAPY INITIAL EVALUATION ADULT - PRECAUTIONS/LIMITATIONS, REHAB EVAL
CONT: Admitted to MICU 7/5 overnight in setting of hypotension requiring pressors. By 7/6 morning he was off of pressors. Of note patient has right toe gangrenous lesion that has been present for several months- no signs of infection per podiatry. XRay R foot 7/6: Neg CONT: Admitted to MICU 7/5 overnight in setting of hypotension requiring pressors. By 7/6 morning he was off of pressors. Of note patient has right toe gangrenous lesion that has been present for several months- no signs of infection per podiatry. XRay R foot 7/6: Neg/fall precautions

## 2021-07-08 NOTE — PROGRESS NOTE ADULT - SUBJECTIVE AND OBJECTIVE BOX
Patient is a 56y old  Male who presents with a chief complaint of Hypotension (08 Jul 2021 11:56)    Asked to assume care  SUBJECTIVE / OVERNIGHT EVENTS: Comfortable without new complaints.   Review of Systems  chest pain no  palpitations no  sob no  nausea no  headache no    MEDICATIONS  (STANDING):  aspirin enteric coated 81 milliGRAM(s) Oral daily  atorvastatin 80 milliGRAM(s) Oral at bedtime  cadexomer iodine 0.9% Gel 1 Application(s) Topical daily  cefepime   IVPB 1000 milliGRAM(s) IV Intermittent every 24 hours  chlorhexidine 0.12% Liquid 15 milliLiter(s) Oral Mucosa two times a day  chlorhexidine 4% Liquid 1 Application(s) Topical <User Schedule>  dextrose 40% Gel 15 Gram(s) Oral once  dextrose 5%. 1000 milliLiter(s) (50 mL/Hr) IV Continuous <Continuous>  dextrose 5%. 1000 milliLiter(s) (100 mL/Hr) IV Continuous <Continuous>  dextrose 50% Injectable 25 Gram(s) IV Push once  dextrose 50% Injectable 12.5 Gram(s) IV Push once  dextrose 50% Injectable 25 Gram(s) IV Push once  ferrous    sulfate 325 milliGRAM(s) Oral three times a day  folic acid 1 milliGRAM(s) Oral daily  glucagon  Injectable 1 milliGRAM(s) IntraMuscular once  heparin   Injectable 5000 Unit(s) SubCutaneous every 12 hours  insulin glargine Injectable (LANTUS) 30 Unit(s) SubCutaneous at bedtime  insulin lispro (ADMELOG) corrective regimen sliding scale   SubCutaneous three times a day before meals  insulin lispro (ADMELOG) corrective regimen sliding scale   SubCutaneous at bedtime  insulin lispro Injectable (ADMELOG) 2 Unit(s) SubCutaneous three times a day before meals  lactated ringers. 1000 milliLiter(s) (50 mL/Hr) IV Continuous <Continuous>  midodrine 30 milliGRAM(s) Oral every 8 hours  pantoprazole    Tablet 40 milliGRAM(s) Oral before breakfast  sevelamer carbonate 800 milliGRAM(s) Oral three times a day  ticagrelor 60 milliGRAM(s) Oral every 12 hours    MEDICATIONS  (PRN):      Vital Signs Last 24 Hrs  T(C): 36.7 (08 Jul 2021 13:45), Max: 37.1 (07 Jul 2021 23:55)  T(F): 98.1 (08 Jul 2021 13:45), Max: 98.7 (07 Jul 2021 23:55)  HR: 97 (08 Jul 2021 15:26) (95 - 107)  BP: 112/81 (08 Jul 2021 15:26) (101/74 - 131/77)  BP(mean): 83 (07 Jul 2021 21:00) (83 - 83)  RR: 20 (08 Jul 2021 13:45) (15 - 20)  SpO2: 97% (08 Jul 2021 15:26) (94% - 100%)    PHYSICAL EXAM:  GENERAL: NAD, well-developed  HEAD:  Atraumatic, Normocephalic  EYES: EOMI, PERRLA, conjunctiva and sclera clear  NECK: Supple, No JVD  CHEST/LUNG: Clear to auscultation bilaterally; No wheeze  HEART: Regular rate and rhythm; No murmurs, rubs, or gallops  ABDOMEN: Soft, Nontender, Nondistended; Bowel sounds present PD actheter  EXTREMITIES:  2+ Peripheral Pulses, No clubbing, cyanosis, or edema R 2nd toe with gangrenous area  PSYCH: AAOx3  NEUROLOGY: non-focal  SKIN: No rashes or lesions    LABS:                        11.4   9.90  )-----------( 277      ( 08 Jul 2021 06:34 )             39.1     07-08    133<L>  |  92<L>  |  58<H>  ----------------------------<  140<H>  3.4<L>   |  22  |  9.86<H>    Ca    8.4      08 Jul 2021 06:34  Phos  5.0     07-08  Mg     2.4     07-08    TPro  6.3  /  Alb  2.1<L>  /  TBili  0.4  /  DBili  x   /  AST  17  /  ALT  21  /  AlkPhos  118  07-08              Culture - Blood (collected 06 Jul 2021 08:22)  Source: .Blood Blood-Peripheral  Preliminary Report (07 Jul 2021 09:02):    No growth to date.    Culture - Blood (collected 06 Jul 2021 08:22)  Source: .Blood Blood-Peripheral  Preliminary Report (07 Jul 2021 09:02):    No growth to date.        RADIOLOGY & ADDITIONAL TESTS:    Imaging Personally Reviewed:    Consultant(s) Notes Reviewed:      Care Discussed with Consultants/Other Providers:

## 2021-07-08 NOTE — PROGRESS NOTE ADULT - ASSESSMENT
CATH 11/30/2020:  COMPLICATIONS: The stent was deployed without difficulty. On removal of the  balloon, the shaft broke and the tip of the balloon remained in the vessel. Several attempts were made to snare the balloon unsuccessfully. Dr. Verdugo was notifed who will take the patient to the operating room.  DIAGNOSTIC RECOMMENDATIONS: The patient should continue with the present medications. The patients vessel was stented without difficulty On removal of the balloon, the shaft broke with the baloon stuck in the left main. All attempts to snare the balloon out failed and the patient was taken to the OR by Dr. Arango to remove the balloon  introp eleonora 11/30/20: mild to mod MR, < from: Intra-Operative Transesophageal Echo (11.30.20 @ 17:02) >  Severe global left ventricular systolic dysfunction. Inferior and inferolateral akinesis.  severe hypokinesis of other segments.  Severe global hypokinesia.  Normal left ventricular internal dimensions and wall thicknesses. Moderate diastolic dysfunction (Stage II).  echo 12/17/20: EF 32%, mod MR, Severe global left ventricular systolic dysfunction, moderate pulmonary pressures  echo 12/20/20: EF (Visual Estimate):  25-30 % mild MR, Akinesis of the inferolateral, anterolateral, apical laterlal, inferior, and inferoseptal walls. Severe left ventricular enlargement. No left ventricular thrombus is seen.  Echo 7/7/21: EF 16%, mod - sev MR, mod AS, severe global lv sys dysfx, severe diastolic dysfx, mod pulm htn       a/p  89M well known to practice with PMH ESRD on PD, DM on insulin, CHF EF 25-30% CAD s/p CABG x4, underwent cardiac cath and stent and wire broke in heart s/p sternotomy p/w septic shock    #Septic Shock, resolved   +fevers noted  -Bcx NGTD   -iv abx  -s/p pressors   -Bedside POCUS notable for scattered B lines, biventricular diffuse hypokinesis  -pt also with right 2nd toe gangrene - pod eval noted   -f/u culture of PD cathter  -med f/u    #Ischemic Cardiomyopathy. Chronic systolic HF  -vol status stable   -CXR w clear lungs   -Repeat echo noted with EF 16%, mod - sev MR, mod AS, severe global lv sys dysfx, severe diastolic dysfx, mod pulm htn   -last admission pt was not candidate for icd   -no bb, acei/arb given hx of orthostatic bp   -continue midodrine for bp support  -cont vol removal w PD     # CAD, s/p CABG, s/p PCI, s/p redo open heart for stent balloon retrieval   -hst elevated, likely demand ischemia in setting of septic shock, ESRD   -c/w asa, Brilinta, statin     # ESRD  -on PD  -renal f/u    dvt ppx  plan discussed with ACP      CATH 11/30/2020:  COMPLICATIONS: The stent was deployed without difficulty. On removal of the  balloon, the shaft broke and the tip of the balloon remained in the vessel. Several attempts were made to snare the balloon unsuccessfully. Dr. Verdugo was notifed who will take the patient to the operating room.  DIAGNOSTIC RECOMMENDATIONS: The patient should continue with the present medications. The patients vessel was stented without difficulty On removal of the balloon, the shaft broke with the baloon stuck in the left main. All attempts to snare the balloon out failed and the patient was taken to the OR by Dr. Arango to remove the balloon  introp eleonora 11/30/20: mild to mod MR, < from: Intra-Operative Transesophageal Echo (11.30.20 @ 17:02) >  Severe global left ventricular systolic dysfunction. Inferior and inferolateral akinesis.  severe hypokinesis of other segments.  Severe global hypokinesia.  Normal left ventricular internal dimensions and wall thicknesses. Moderate diastolic dysfunction (Stage II).  echo 12/17/20: EF 32%, mod MR, Severe global left ventricular systolic dysfunction, moderate pulmonary pressures  echo 12/20/20: EF (Visual Estimate):  25-30 % mild MR, Akinesis of the inferolateral, anterolateral, apical laterlal, inferior, and inferoseptal walls. Severe left ventricular enlargement. No left ventricular thrombus is seen.  Echo 7/7/21: EF 16%, mod - sev MR, mod AS, severe global lv sys dysfx, severe diastolic dysfx, mod pulm htn       a/p  89M well known to practice with PMH ESRD on PD, DM on insulin, CHF EF 25-30% CAD s/p CABG x4, underwent cardiac cath and stent and wire broke in heart s/p sternotomy p/w septic shock    #Septic Shock, resolved   +fevers noted  -Bcx NGTD   -iv abx  -s/p pressors   -Bedside POCUS notable for scattered B lines, biventricular diffuse hypokinesis  -pt also with right 2nd toe gangrene - pod eval noted   -f/u culture of PD cathter  -med f/u    #Ischemic Cardiomyopathy. Chronic systolic HF  -vol status stable   -CXR w clear lungs   -Repeat echo noted with EF 16%, mod - sev MR, mod AS, severe global lv sys dysfx, severe diastolic dysfx, mod pulm htn   -eps eval for crt-D, pt reluctant in past    -no bb, acei/arb given hx of orthostatic bp   -continue midodrine for bp support  -cont vol removal w PD     # CAD, s/p CABG, s/p PCI, s/p redo open heart for stent balloon retrieval   -hst elevated, likely demand ischemia in setting of septic shock, ESRD   -c/w asa, Brilinta, statin     # ESRD  -on PD  -renal f/u    dvt ppx  plan discussed with ACP

## 2021-07-08 NOTE — PHYSICAL THERAPY INITIAL EVALUATION ADULT - BALANCE TRAINING, PT EVAL
GOAL: Pt will increase static/ dynamic standing balance to Fair+ with rolling walker to improve safety with functional activities in 4 weeks.

## 2021-07-08 NOTE — PROGRESS NOTE ADULT - SUBJECTIVE AND OBJECTIVE BOX
CARDIOLOGY FOLLOW UP - Dr. Best  Date of Service: 7/8/21  CC: denies cp, sob, and palpitations , reports general weakness     Review of Systems:  Constitutional: No fever, weight loss, or fatigue  Respiratory: No cough, wheezing, or hemoptysis, no shortness of breath  Cardiovascular: No chest pain, palpitations, passing out, dizziness, or leg swelling  Gastrointestinal: No abd or epigastric pain.  No nausea, vomiting, or hematemesis; no diarrhea or constipation, no melena or hematochezia  Vascular: no edema       PHYSICAL EXAM:  T(C): 36.3 (07-08-21 @ 08:19), Max: 37.1 (07-07-21 @ 23:55)  HR: 104 (07-08-21 @ 08:19) (91 - 104)  BP: 110/73 (07-08-21 @ 08:19) (86/67 - 127/65)  RR: 20 (07-08-21 @ 08:19) (13 - 20)  SpO2: 100% (07-08-21 @ 08:19) (96% - 100%)  Wt(kg): --  I&O's Summary    07 Jul 2021 07:01  -  08 Jul 2021 07:00  --------------------------------------------------------  IN: 17976 mL / OUT: 9000 mL / NET: 1600 mL        Appearance: Normal	  Cardiovascular: Normal S1 S2,RRR, No JVD, No murmurs  Respiratory: Lungs clear to auscultation	  Gastrointestinal:  Soft, Non-tender, + BS	  Extremities: Normal range of motion, No clubbing, cyanosis or edema      Home Medications:  aspirin 81 mg oral delayed release tablet: 1 tab(s) orally once a day (29 Dec 2020 18:37)  atorvastatin 80 mg oral tablet: 1 tab(s) orally once a day (at bedtime) (29 Dec 2020 18:37)  epoetin martine: injectable once a month (29 Dec 2020 18:37)  ferrous sulfate 325 mg (65 mg elemental iron) oral tablet: 1 tab(s) orally 3 times a day (29 Dec 2020 18:37)  gabapentin 100 mg oral capsule: 1 cap(s) orally once a day (29 Dec 2020 18:37)  nortriptyline 25 mg oral capsule: 1 cap(s) orally once a day (at bedtime) (29 Dec 2020 18:37)  pantoprazole 40 mg oral delayed release tablet: 1 tab(s) orally once a day (before a meal) (29 Dec 2020 18:37)  Toujeo SoloStar 300 units/mL subcutaneous solution: 60 unit(s) subcutaneous once a day (at bedtime) (08 Jan 2021 12:41)      MEDICATIONS  (STANDING):  aspirin enteric coated 81 milliGRAM(s) Oral daily  atorvastatin 80 milliGRAM(s) Oral at bedtime  cadexomer iodine 0.9% Gel 1 Application(s) Topical daily  cefepime   IVPB 1000 milliGRAM(s) IV Intermittent every 24 hours  chlorhexidine 0.12% Liquid 15 milliLiter(s) Oral Mucosa two times a day  chlorhexidine 4% Liquid 1 Application(s) Topical <User Schedule>  dextrose 40% Gel 15 Gram(s) Oral once  dextrose 5%. 1000 milliLiter(s) (50 mL/Hr) IV Continuous <Continuous>  dextrose 5%. 1000 milliLiter(s) (100 mL/Hr) IV Continuous <Continuous>  dextrose 50% Injectable 25 Gram(s) IV Push once  dextrose 50% Injectable 12.5 Gram(s) IV Push once  dextrose 50% Injectable 25 Gram(s) IV Push once  ferrous    sulfate 325 milliGRAM(s) Oral three times a day  folic acid 1 milliGRAM(s) Oral daily  glucagon  Injectable 1 milliGRAM(s) IntraMuscular once  heparin   Injectable 5000 Unit(s) SubCutaneous every 12 hours  insulin glargine Injectable (LANTUS) 30 Unit(s) SubCutaneous at bedtime  insulin lispro (ADMELOG) corrective regimen sliding scale   SubCutaneous three times a day before meals  insulin lispro (ADMELOG) corrective regimen sliding scale   SubCutaneous at bedtime  insulin lispro Injectable (ADMELOG) 2 Unit(s) SubCutaneous three times a day before meals  lactated ringers. 1000 milliLiter(s) (50 mL/Hr) IV Continuous <Continuous>  midodrine 30 milliGRAM(s) Oral every 8 hours  pantoprazole    Tablet 40 milliGRAM(s) Oral before breakfast  potassium chloride    Tablet ER 20 milliEquivalent(s) Oral once  sevelamer carbonate 800 milliGRAM(s) Oral three times a day  ticagrelor 60 milliGRAM(s) Oral every 12 hours      TELEMETRY: 	    ECG:  	  RADIOLOGY:   DIAGNOSTIC TESTING:  [ ] Echocardiogram:  [ ]  Catheterization:  [ ] Stress Test:    OTHER: 	    LABS:	 	    Troponin T, High Sensitivity Result: 468 ng/L [0 - 51] (07-06 @ 07:45)  Troponin T, High Sensitivity Result: 488 ng/L [0 - 51] (07-06 @ 04:13)                          11.4   9.90  )-----------( 277      ( 08 Jul 2021 06:34 )             39.1     07-08    133<L>  |  92<L>  |  58<H>  ----------------------------<  140<H>  3.4<L>   |  22  |  9.86<H>    Ca    8.4      08 Jul 2021 06:34  Phos  5.0     07-08  Mg     2.4     07-08    TPro  6.3  /  Alb  2.1<L>  /  TBili  0.4  /  DBili  x   /  AST  17  /  ALT  21  /  AlkPhos  118  07-08

## 2021-07-08 NOTE — PHYSICAL THERAPY INITIAL EVALUATION ADULT - ADDITIONAL COMMENTS
Per pt: lives in private home with spouse, 4 steps to enter +HR, no steps inside. Pt states that his wife assists him with functional mobility. He has a rolling walker, walker, shower chair and cane. Patient receives an aide 5 days a week for 5 hours a day.

## 2021-07-09 NOTE — CONSULT NOTE ADULT - ASSESSMENT
A/P:55M PMH ESRD on PD, DM on insulin, CHF EF 25-30% CAD s/p CABG x4 admitted with hypotension with fever    Wound Consult requested to assist w/ management of    Incontinence Dermatitis  Incontinence of stool and urine  PAD w/ h/o hypotension on pressors- necrotic toes and fingers  Prophylaxis measures    Capri BID and prn w/ [facility] as per protocol for incontinence  Consider Vascular/ plastics for necrotic finger tips Rt hand- betadine while awaiting  Apprecitate DPM for feet gangrene  Consider PONCHO/PVR, XRay  BLE elevation  Abx per Medicine/ ID  Moisturize intact skin w/ SWEEN cream BID  Nutrition Consult for optimization         consider MVI & Vit C to promote wound healing        encourage high quality protein  Hyperglycemia - consider HgA1c        FS w/ ISS q6h, consider Endo Consult  Anemia- transfusions, Fe studies  Continue turning and positioning w/ offloading assistive devices as per protocol  ContinueWaffle Cushion to chair when oob to chair  Continue w/ low air loss fluidized bed surface   Care as per medicine, will follow w/ you  Upon discharge f/u as outpatient at [facility] [phone]  Seen w/ attng and D/w team  Thank you for this consult  Annabella Burrell PA-C [id number]

## 2021-07-09 NOTE — OCCUPATIONAL THERAPY INITIAL EVALUATION ADULT - PRECAUTIONS/LIMITATIONS, REHAB EVAL
In ED, Pt found to be hypotensive to 70s. Pt was given midodrine 10mg x1 and bedside POCUS was completed showing poor cardiac function with diffuse hypokinesis. Pt was unable to maintain adequate SBPs despite midodrine so was started on levophed for pressor support. Admitted to MICU hypotension requiring for pressor support likely 2/2 to sepsis./fall precautions

## 2021-07-09 NOTE — CONSULT NOTE ADULT - SUBJECTIVE AND OBJECTIVE BOX
Wound SURGERY CONSULT NOTE    HPI:  55M PMH ESRD on PD, DM on insulin, CHF EF 25-30% CAD s/p CABG x4, underwent cardiac cath and stent and wire broke in heart sternotomy. Patient states that for the last 2 days he has been having increased cough and sweating. He endorsed chills but no measured fevers at home. He denies any sick contacts or recent travel. Patient states that he fell few days ago with no head trauma. Of note patient has right toe gangrenous lesion that has been present for several months, He denies any trauma or pain at the site. In the ED, Patient was found to be hypotensive to 70s. Patient was given midodrine 10mg x1 and bedside POCUS was completed showing poor cardiac function with diffuse hypokinesis. Patient was unable to maintain adequate SBPs despite midodrine so was started on levophed for pressor support. Admitted to MICU hypotension requiring for pressor support likely  2/2 to sepsis.   no N/V/D, palp/ sob/dyspnea/ cp, nor F/C/S  Wound consult requested to assist w/ management of skin changes on backside noted on admission. Pt also noted with necrotic digits. DPM evaluated pt appreciated. Pt w/o c/o pain, drainage, odor, color change,  worsening swelling . Increasingly sedentary.  Incontinent of urine & stool.  off loading and pericare initiated while awaiting consult.  Appetite ok, ? weight loss.    Current Diet: Diet, Renal Restrictions:   For patients receiving Renal Replacement - No Protein Restr, No Conc K, No Conc Phos, Low Sodium (07-06-21 @ 09:57)      PAST MEDICAL & SURGICAL HISTORY:  Diabetes    CAD, multiple vessel    ESRD (end stage renal disease) on dialysis    HTN (hypertension)    S/P CABG (coronary artery bypass graft)        REVIEW OF SYSTEMS: General/ Skin/ MSK see HPI  All other systems negative    MEDICATIONS  (STANDING):  aspirin enteric coated 81 milliGRAM(s) Oral daily  atorvastatin 80 milliGRAM(s) Oral at bedtime  cadexomer iodine 0.9% Gel 1 Application(s) Topical daily  cefepime   IVPB 1000 milliGRAM(s) IV Intermittent every 24 hours  chlorhexidine 0.12% Liquid 15 milliLiter(s) Oral Mucosa two times a day  chlorhexidine 2% Cloths 1 Application(s) Topical <User Schedule>  dextrose 40% Gel 15 Gram(s) Oral once  dextrose 5%. 1000 milliLiter(s) (50 mL/Hr) IV Continuous <Continuous>  dextrose 5%. 1000 milliLiter(s) (100 mL/Hr) IV Continuous <Continuous>  dextrose 50% Injectable 25 Gram(s) IV Push once  dextrose 50% Injectable 25 Gram(s) IV Push once  dextrose 50% Injectable 12.5 Gram(s) IV Push once  ferrous    sulfate 325 milliGRAM(s) Oral three times a day  folic acid 1 milliGRAM(s) Oral daily  glucagon  Injectable 1 milliGRAM(s) IntraMuscular once  heparin   Injectable 5000 Unit(s) SubCutaneous every 12 hours  insulin glargine Injectable (LANTUS) 30 Unit(s) SubCutaneous at bedtime  insulin lispro (ADMELOG) corrective regimen sliding scale   SubCutaneous three times a day before meals  insulin lispro (ADMELOG) corrective regimen sliding scale   SubCutaneous at bedtime  insulin lispro Injectable (ADMELOG) 2 Unit(s) SubCutaneous three times a day before meals  lactated ringers. 1000 milliLiter(s) (50 mL/Hr) IV Continuous <Continuous>  midodrine 30 milliGRAM(s) Oral every 8 hours  pantoprazole    Tablet 40 milliGRAM(s) Oral before breakfast  sevelamer carbonate 800 milliGRAM(s) Oral three times a day  ticagrelor 60 milliGRAM(s) Oral every 12 hours    Allergies    doxazosin (Other)    SOCIAL HISTORY:  / /single/ ; (+)HHA/ lives in SNF; Former smoker, Denies smoking, ETOH, drugs    FAMILY HISTORY:  no h/o PAD or pressure injury  FHx: lung cancer (Sibling)  bother    FH: diabetes mellitus (Sibling)  father        PHYSICAL EXAM:  Vital Signs Last 24 Hrs  T(C): 36.8 (09 Jul 2021 12:00), Max: 37.1 (09 Jul 2021 08:48)  T(F): 98.2 (09 Jul 2021 12:00), Max: 98.7 (09 Jul 2021 08:48)  HR: 97 (09 Jul 2021 12:00) (96 - 101)  BP: 106/75 (09 Jul 2021 12:00) (105/78 - 114/80)  BP(mean): --  RR: 18 (09 Jul 2021 08:48) (18 - 20)  SpO2: 100% (09 Jul 2021 08:48) (95% - 100%)    NAD, Alert, Confused, frail  [facility] P500 bed   HEENT:  NC/AT, PERRL, EOMI, mucosa moist, throat clear, trachea midline, neck supple  Cardiovascular: RRR   Respiratory: CTA  Gastrointestinal: soft NT/ND (+)BS   Neurology:  weakened strength & sensation grossly intact  Psych: sleepy, but arousable  Musculoskeletal: FROM, necrotic 1st and 2nd fingers Rt hand- dry  Vascular: BLE equally warm,  no equal        no acute ischemia noted  Skin:  frail,  ecchymosis w/o hematoma         sacrum and bilateral ischium w/ dark hyperpigmented skin c/w chronic incontinence         no open lesion, blistering, or drainage   No odor, erythema, increased warmth, tenderness, induration, fluctuance    LABS/ CULTURES/ RADIOLOGY:                        11.4   9.54  )-----------( 268      ( 09 Jul 2021 06:34 )             39.1       132  |  92  |  52  ----------------------------<  138      [07-09-21 @ 06:35]  3.4   |  22  |  9.65        Ca     8.5     [07-09-21 @ 06:35]      Mg     2.4     [07-08-21 @ 06:34]      Phos  4.7     [07-09-21 @ 06:35]    TPro  6.3  /  Alb  2.1  /  TBili  0.4  /  DBili  x   /  AST  17  /  ALT  21  /  AlkPhos  118  [07-08-21 @ 06:34]              Culture - Blood (collected 07-06-21 @ 08:22)  Source: .Blood Blood-Peripheral  Preliminary Report (07-07-21 @ 09:02):    No growth to date.    Culture - Blood (collected 07-06-21 @ 08:22)  Source: .Blood Blood-Peripheral  Preliminary Report (07-07-21 @ 09:02):    No growth to date.

## 2021-07-09 NOTE — PROGRESS NOTE ADULT - SUBJECTIVE AND OBJECTIVE BOX
Patient is a 56y old  Male who presents with a chief complaint of Hypotension (09 Jul 2021 16:49)      SUBJECTIVE / OVERNIGHT EVENTS: c/o hiccups. Wife at bedside.  Review of Systems  chest pain no  palpitations no  sob no  nausea no  headache no    MEDICATIONS  (STANDING):  aspirin enteric coated 81 milliGRAM(s) Oral daily  atorvastatin 80 milliGRAM(s) Oral at bedtime  cadexomer iodine 0.9% Gel 1 Application(s) Topical daily  cefepime   IVPB 1000 milliGRAM(s) IV Intermittent every 24 hours  chlorhexidine 0.12% Liquid 15 milliLiter(s) Oral Mucosa two times a day  chlorhexidine 2% Cloths 1 Application(s) Topical <User Schedule>  dextrose 40% Gel 15 Gram(s) Oral once  dextrose 5%. 1000 milliLiter(s) (50 mL/Hr) IV Continuous <Continuous>  dextrose 5%. 1000 milliLiter(s) (100 mL/Hr) IV Continuous <Continuous>  dextrose 50% Injectable 25 Gram(s) IV Push once  dextrose 50% Injectable 12.5 Gram(s) IV Push once  dextrose 50% Injectable 25 Gram(s) IV Push once  ferrous    sulfate 325 milliGRAM(s) Oral three times a day  folic acid 1 milliGRAM(s) Oral daily  glucagon  Injectable 1 milliGRAM(s) IntraMuscular once  heparin   Injectable 5000 Unit(s) SubCutaneous every 12 hours  insulin glargine Injectable (LANTUS) 30 Unit(s) SubCutaneous at bedtime  insulin lispro (ADMELOG) corrective regimen sliding scale   SubCutaneous three times a day before meals  insulin lispro (ADMELOG) corrective regimen sliding scale   SubCutaneous at bedtime  insulin lispro Injectable (ADMELOG) 2 Unit(s) SubCutaneous three times a day before meals  lactated ringers. 1000 milliLiter(s) (50 mL/Hr) IV Continuous <Continuous>  midodrine 30 milliGRAM(s) Oral every 8 hours  pantoprazole    Tablet 40 milliGRAM(s) Oral before breakfast  sevelamer carbonate 800 milliGRAM(s) Oral three times a day  ticagrelor 60 milliGRAM(s) Oral every 12 hours    MEDICATIONS  (PRN):      Vital Signs Last 24 Hrs  T(C): 37 (09 Jul 2021 17:22), Max: 37.1 (09 Jul 2021 08:48)  T(F): 98.6 (09 Jul 2021 17:22), Max: 98.7 (09 Jul 2021 08:48)  HR: 97 (09 Jul 2021 17:22) (97 - 101)  BP: 108/75 (09 Jul 2021 17:22) (105/78 - 110/65)  BP(mean): --  RR: 18 (09 Jul 2021 17:22) (18 - 18)  SpO2: 97% (09 Jul 2021 17:22) (95% - 100%)    PHYSICAL EXAM:  GENERAL: NAD  HEAD:  Atraumatic, Normocephalic  EYES: EOMI, PERRLA, conjunctiva and sclera clear  NECK: Supple, No JVD  CHEST/LUNG: Clear to auscultation bilaterally; No wheeze  HEART: Regular rate and rhythm; No murmurs, rubs, or gallops  ABDOMEN: Soft, Nontender, Nondistended; Bowel sounds present PD catheter  EXTREMITIES:  2+ Peripheral Pulses, No clubbing, cyanosis, or edema  PSYCH: AAOx3  NEUROLOGY: non-focal  SKIN: No rashes or lesions    LABS:                        11.4   9.54  )-----------( 268      ( 09 Jul 2021 06:34 )             39.1     07-09    132<L>  |  92<L>  |  52<H>  ----------------------------<  138<H>  3.4<L>   |  22  |  9.65<H>    Ca    8.5      09 Jul 2021 06:35  Phos  4.7     07-09  Mg     2.4     07-08    TPro  6.3  /  Alb  2.1<L>  /  TBili  0.4  /  DBili  x   /  AST  17  /  ALT  21  /  AlkPhos  118  07-08                RADIOLOGY & ADDITIONAL TESTS:    Imaging Personally Reviewed:    Consultant(s) Notes Reviewed:      Care Discussed with Consultants/Other Providers:

## 2021-07-09 NOTE — OCCUPATIONAL THERAPY INITIAL EVALUATION ADULT - DIAGNOSIS, OT EVAL
Pt p/w decreased strength, balance, ROM endurance and cognition impairing functional mobility and participation in ADLs

## 2021-07-09 NOTE — PROGRESS NOTE ADULT - SUBJECTIVE AND OBJECTIVE BOX
NEPHROLOGY-[facility] [phone]        Patient seen and examined in bed.  He was the same         MEDICATIONS  (STANDING):  aspirin enteric coated 81 milliGRAM(s) Oral daily  atorvastatin 80 milliGRAM(s) Oral at bedtime  cadexomer iodine 0.9% Gel 1 Application(s) Topical daily  cefepime   IVPB 1000 milliGRAM(s) IV Intermittent every 24 hours  chlorhexidine 0.12% Liquid 15 milliLiter(s) Oral Mucosa two times a day  chlorhexidine 4% Liquid 1 Application(s) Topical <User Schedule>  dextrose 40% Gel 15 Gram(s) Oral once  dextrose 5%. 1000 milliLiter(s) (50 mL/Hr) IV Continuous <Continuous>  dextrose 5%. 1000 milliLiter(s) (100 mL/Hr) IV Continuous <Continuous>  dextrose 50% Injectable 25 Gram(s) IV Push once  dextrose 50% Injectable 12.5 Gram(s) IV Push once  dextrose 50% Injectable 25 Gram(s) IV Push once  ferrous    sulfate 325 milliGRAM(s) Oral three times a day  folic acid 1 milliGRAM(s) Oral daily  glucagon  Injectable 1 milliGRAM(s) IntraMuscular once  heparin   Injectable 5000 Unit(s) SubCutaneous every 12 hours  insulin glargine Injectable (LANTUS) 30 Unit(s) SubCutaneous at bedtime  insulin lispro (ADMELOG) corrective regimen sliding scale   SubCutaneous three times a day before meals  insulin lispro (ADMELOG) corrective regimen sliding scale   SubCutaneous at bedtime  insulin lispro Injectable (ADMELOG) 2 Unit(s) SubCutaneous three times a day before meals  lactated ringers. 1000 milliLiter(s) (50 mL/Hr) IV Continuous <Continuous>  midodrine 30 milliGRAM(s) Oral every 8 hours  pantoprazole    Tablet 40 milliGRAM(s) Oral before breakfast  potassium chloride    Tablet ER 20 milliEquivalent(s) Oral once  sevelamer carbonate 800 milliGRAM(s) Oral three times a day  ticagrelor 60 milliGRAM(s) Oral every 12 hours      VITAL:  T(C): , Max: 37.1 (07-09-21 @ 08:48)  T(F): , Max: 98.7 (07-09-21 @ 08:48)  HR: 101 (07-09-21 @ 08:48)  BP: 105/78 (07-09-21 @ 08:48)  BP(mean): --  RR: 18 (07-09-21 @ 08:48)  SpO2: 100% (07-09-21 @ 08:48)  Wt(kg): --    I and O's:    07-08 @ 07:01  -  07-09 @ 07:00  --------------------------------------------------------  IN: 5360 mL / OUT: 5400 mL / NET: -40 mL          PHYSICAL EXAM:    Constitutional: NAD  Neck:  No JVD  Respiratory: CTAB/L  Cardiovascular: S1 and S2  Gastrointestinal: BS+, soft, + PD  Extremities: No peripheral edema  Neurological: A/O x 3, no focal deficits  Psychiatric: Normal mood, normal affect  : No Johnson  Skin: No rashes  Access: Not applicable    LABS:                        11.4   9.54  )-----------( 268      ( 09 Jul 2021 06:34 )             39.1     07-09    132<L>  |  92<L>  |  52<H>  ----------------------------<  138<H>  3.4<L>   |  22  |  9.65<H>    Ca    8.5      09 Jul 2021 06:35  Phos  4.7     07-09  Mg     2.4     07-08    TPro  6.3  /  Alb  2.1<L>  /  TBili  0.4  /  DBili  x   /  AST  17  /  ALT  21  /  AlkPhos  118  07-08          Urine Studies:          RADIOLOGY & ADDITIONAL STUDIES:

## 2021-07-09 NOTE — CONSULT NOTE ADULT - SUBJECTIVE AND OBJECTIVE BOX
Patient is a 56y old  Male who presented with a chief complaint of Hypotension (09 Jul 2021 12:42)      HPI:  55M PMH ESRD on PD, DM on insulin, CHF EF 25-30% CAD s/p CABG x4, underwent cardiac cath and stent and wire broke in heart sternotomy. Patient states that for the last 2 days he has been having increased cough and sweating. He endorsed chills but no measured fevers at home. He denies any sick contacts or recent travel. Patient states that he fell few days ago with no head trauma. Of note patient has right toe gangrenous lesion that has been present for several months, He denies any trauma or pain at the site.     In the ED, Patient was found to be hypotensive to 70s. Patient was given midodrine 10mg x1 and bedside POCUS was completed showing poor cardiac function with diffuse hypokinesis. Patient was unable to maintain adequate SBPs despite midodrine so was started on levophed for pressor support. Admitted to MICU hypotension requiring for pressor support likely  2/2 to sepsis.     (06 Jul 2021 07:59)    s/p short stay in MICU for pressor support  Cultures negative to date  ESRD on PD  GI asked to evaluate for nausea and severe hiccups (per pt has had intermittent issues with hiccuping prior to admission)  qTc prolonged (600)  Denies abdominal pain  no vomiting  spitting up saliva, some retching  takes PPI daily (was on this prior to admission)    Much improved after 1 dose of PO Ativan this AM (received just prior to exam/consultation)    no abdominal or back pain, no abdominal/catheter site drainage  +fever in ER        PAST MEDICAL & SURGICAL HISTORY:  Diabetes    CAD, multiple vessel    ESRD (end stage renal disease) on dialysis    HTN (hypertension)    S/P CABG (coronary artery bypass graft)        Allergies  doxazosin (Other)      MEDICATIONS  (STANDING):  aspirin enteric coated 81 milliGRAM(s) Oral daily  atorvastatin 80 milliGRAM(s) Oral at bedtime  cadexomer iodine 0.9% Gel 1 Application(s) Topical daily  cefepime   IVPB 1000 milliGRAM(s) IV Intermittent every 24 hours  chlorhexidine 0.12% Liquid 15 milliLiter(s) Oral Mucosa two times a day  chlorhexidine 2% Cloths 1 Application(s) Topical <User Schedule>  dextrose 40% Gel 15 Gram(s) Oral once  dextrose 5%. 1000 milliLiter(s) (50 mL/Hr) IV Continuous <Continuous>  dextrose 5%. 1000 milliLiter(s) (100 mL/Hr) IV Continuous <Continuous>  dextrose 50% Injectable 25 Gram(s) IV Push once  dextrose 50% Injectable 25 Gram(s) IV Push once  dextrose 50% Injectable 12.5 Gram(s) IV Push once  ferrous    sulfate 325 milliGRAM(s) Oral three times a day  folic acid 1 milliGRAM(s) Oral daily  glucagon  Injectable 1 milliGRAM(s) IntraMuscular once  heparin   Injectable 5000 Unit(s) SubCutaneous every 12 hours  insulin glargine Injectable (LANTUS) 30 Unit(s) SubCutaneous at bedtime  insulin lispro (ADMELOG) corrective regimen sliding scale   SubCutaneous three times a day before meals  insulin lispro (ADMELOG) corrective regimen sliding scale   SubCutaneous at bedtime  insulin lispro Injectable (ADMELOG) 2 Unit(s) SubCutaneous three times a day before meals  lactated ringers. 1000 milliLiter(s) (50 mL/Hr) IV Continuous <Continuous>  midodrine 30 milliGRAM(s) Oral every 8 hours  pantoprazole    Tablet 40 milliGRAM(s) Oral before breakfast  sevelamer carbonate 800 milliGRAM(s) Oral three times a day  ticagrelor 60 milliGRAM(s) Oral every 12 hours    MEDICATIONS  (PRN):      Social History:  no alcohol, tobacco or drug use      Family History   lung cancer - sibling  IBD (  ) Yes   ( X ) No  GI Malignancy (  )  Yes    ( X ) No      Advanced Directives: (  X   ) None    (      ) DNR    (     ) DNI    (     ) Health Care Proxy:     Review of Systems:    General:  No wt loss , chills, night sweats  CV:  No pain, palpitatioins,  +CAD  resolved hypotension  Resp:  No dyspnea, cough, tachypnea, wheezing  GI:  see HPI  :  ESRD on PD  Muscle:  No pain, weakness  Neuro:  No weakness, tingling, memory problems  Psych:  No fatigue, insomnia, mood problems, depression  Endocrine:  No polyuria, polydypsia, cold/heat intolerance  Heme:  No petechiae, ecchymosis, easy bruisability  Skin:  No rash, tattoos, scars, edema      Vital Signs Last 24 Hrs  T(C): 36.8 (09 Jul 2021 12:00), Max: 37.1 (09 Jul 2021 08:48)  T(F): 98.2 (09 Jul 2021 12:00), Max: 98.7 (09 Jul 2021 08:48)  HR: 97 (09 Jul 2021 12:00) (96 - 107)  BP: 106/75 (09 Jul 2021 12:00) (105/78 - 131/77)  BP(mean): --  RR: 18 (09 Jul 2021 08:48) (18 - 20)  SpO2: 100% (09 Jul 2021 08:48) (95% - 100%)    PHYSICAL EXAM:    Constitutional: NAD, well-developed chronically ill appearing male.   no hiccupping or retching during interview/exam  Neck: No LAD, supple no JVD  Respiratory: grossly clear   +WH sternal scar  +WH scar right chest wall  Cardiovascular: S1 and S2, RRR, +murmur  Gastrointestinal: BS+, soft, NT/ND, no rebound guarding or rigidity  +dressing over PD site - clean and dry, non tender near catheter site. no scrotal edema/swelling  Extremities: No peripheral edema,  Right toe gangrene (dry)   Left foot wound dry  Vascular: 2+ peripheral pulses  Neurological: A/O x 3, no focal deficits  Psychiatric: Normal mood, normal affect  Skin: anicteric        LABS:                        11.4   9.54  )-----------( 268      ( 09 Jul 2021 06:34 )             39.1     07-09    132<L>  |  92<L>  |  52<H>  ----------------------------<  138<H>  3.4<L>   |  22  |  9.65<H>    Ca    8.5      09 Jul 2021 06:35  Phos  4.7     07-09  Mg     2.4     07-08    TPro  6.3  /  Alb  2.1<L>  /  TBili  0.4  /  DBili  x   /  AST  17  /  ALT  21  /  AlkPhos  118  07-08      Culture - Dialysis Fluid (07.06.21 @ 17:33)   Specimen Source: .Peritoneal Dialysis Fluid Dialysis (P.D.) Fluid   Culture Results:   No growth to date   Culture - Blood (07.06.21 @ 08:22)   Specimen Source: .Blood Blood-Peripheral   Culture Results:   No growth to date.   Culture - Blood (07.06.21 @ 08:22)   Specimen Source: .Blood Blood-Peripheral   Culture Results:   No growth to date.   Serum Pro-Brain Natriuretic Peptide (07.06.21 @ 04:13)   Serum Pro-Brain Natriuretic Peptide: [id number] pg/mL     COVID-19 PCR . (07.06.21 @ 04:13)   COVID-19 PCR: NotDetec    RADIOLOGY & ADDITIONAL TESTS:  EXAM:  XR FOOT COMP MIN 3 VIEWS RT                        PROCEDURE DATE:  07/06/2021        INTERPRETATION:  CLINICAL INDICATION: right foot pain; infection    EXAM:  Frontal, oblique, and lateral right foot from 7/6/2021 at 0859. No similar prior studies available for comparison.    IMPRESSION:  No tracking soft tissue gas collections, radiopaque foreign bodies, or gross radiographic evidence for osteomyelitis.    No fractures or dislocations.    Tarsometatarsal alignment maintained without evidence for a Lisfranc injury.    Preserved visualized joint spaces and no joint margin erosions.    No lytic or blastic lesions.    Vascular calcifications.    EXAM:  XR CHEST PORTABLE URGENT 1V                        PROCEDURE DATE:  07/06/2021      INTERPRETATION:  CLINICAL INFORMATION: Fever.    EXAM: AP chest radiograph.    COMPARISON: Chest radiograph dated 12/29/2020.    FINDINGS:  Heart size and the mediastinum cannot be accurately evaluated on this projection. Median sternotomy sutures and surgical clips again seen. Calcified tortuous thoracic aorta.  Low lung volumes.  New ill-defined right upper lung nodular opacities are seen. Linear atelectasis versus scar in the left mid and lower lung. The remainder of the lungs are clear.  No pleural effusion or pneumothorax.  Surgical clips project over the right scapula.  No acute bony abnormality.  Pneumoperitoneum which may be attributable to ongoing peritoneal dialysis is again seen.      IMPRESSION:  Low lung volumes.    New ill-defined right upper lung nodular opacities, of indeterminate nature. Infection or neoplasm are possible. Suggest short-term follow-up chest x-ray to evaluate for resolution. If findings persist then consider a CT scan of the chest for further evaluation.    Left mid and lower lung linear atelectasis.    Pneumoperitoneum which may be attributable to ongoing peritoneal dialysis.

## 2021-07-09 NOTE — OCCUPATIONAL THERAPY INITIAL EVALUATION ADULT - LIVES WITH, PROFILE
As per care coordination assessment, pt lives with wife and child in  with 4 steps to enter and 1 level set up inside. Pt required assistance with dressing and bathing and owns RW, shower chair and cane.

## 2021-07-09 NOTE — PROGRESS NOTE ADULT - SUBJECTIVE AND OBJECTIVE BOX
CARDIOLOGY FOLLOW UP - Dr. Best  Date of Service: 7/9/21  CC: reports nausea and vomiting today, no cp/sob     Review of Systems:  Constitutional: No fever, weight loss, or fatigue  Respiratory: No cough, wheezing, or hemoptysis, no shortness of breath  Cardiovascular: No chest pain, palpitations, passing out, dizziness, or leg swelling  Gastrointestinal: No abd or epigastric pain.  + nausea, vomiting, ; no diarrhea or constipation, no melena or hematochezia  Vascular: no edema       PHYSICAL EXAM:  T(C): 36.8 (07-09-21 @ 12:00), Max: 37.1 (07-09-21 @ 08:48)  HR: 97 (07-09-21 @ 12:00) (96 - 107)  BP: 106/75 (07-09-21 @ 12:00) (105/78 - 131/77)  RR: 18 (07-09-21 @ 08:48) (18 - 20)  SpO2: 100% (07-09-21 @ 08:48) (95% - 100%)  Wt(kg): --  I&O's Summary    08 Jul 2021 07:01  -  09 Jul 2021 07:00  --------------------------------------------------------  IN: 5360 mL / OUT: 5400 mL / NET: -40 mL    09 Jul 2021 07:01  -  09 Jul 2021 12:42  --------------------------------------------------------  IN: 2500 mL / OUT: 2500 mL / NET: 0 mL        Appearance: Normal	  Cardiovascular: Normal S1 S2,RRR, No JVD, No murmurs  Respiratory: Lungs clear to auscultation	  Gastrointestinal:  Soft, Non-tender, + BS	  Extremities: Normal range of motion, No clubbing, cyanosis or edema      Home Medications:  aspirin 81 mg oral delayed release tablet: 1 tab(s) orally once a day (29 Dec 2020 18:37)  atorvastatin 80 mg oral tablet: 1 tab(s) orally once a day (at bedtime) (29 Dec 2020 18:37)  epoetin martine: injectable once a month (29 Dec 2020 18:37)  ferrous sulfate 325 mg (65 mg elemental iron) oral tablet: 1 tab(s) orally 3 times a day (29 Dec 2020 18:37)  gabapentin 100 mg oral capsule: 1 cap(s) orally once a day (29 Dec 2020 18:37)  nortriptyline 25 mg oral capsule: 1 cap(s) orally once a day (at bedtime) (29 Dec 2020 18:37)  pantoprazole 40 mg oral delayed release tablet: 1 tab(s) orally once a day (before a meal) (29 Dec 2020 18:37)  Toujeo SoloStar 300 units/mL subcutaneous solution: 60 unit(s) subcutaneous once a day (at bedtime) (08 Jan 2021 12:41)      MEDICATIONS  (STANDING):  aspirin enteric coated 81 milliGRAM(s) Oral daily  atorvastatin 80 milliGRAM(s) Oral at bedtime  cadexomer iodine 0.9% Gel 1 Application(s) Topical daily  cefepime   IVPB 1000 milliGRAM(s) IV Intermittent every 24 hours  chlorhexidine 0.12% Liquid 15 milliLiter(s) Oral Mucosa two times a day  chlorhexidine 2% Cloths 1 Application(s) Topical <User Schedule>  dextrose 40% Gel 15 Gram(s) Oral once  dextrose 5%. 1000 milliLiter(s) (50 mL/Hr) IV Continuous <Continuous>  dextrose 5%. 1000 milliLiter(s) (100 mL/Hr) IV Continuous <Continuous>  dextrose 50% Injectable 25 Gram(s) IV Push once  dextrose 50% Injectable 25 Gram(s) IV Push once  dextrose 50% Injectable 12.5 Gram(s) IV Push once  ferrous    sulfate 325 milliGRAM(s) Oral three times a day  folic acid 1 milliGRAM(s) Oral daily  glucagon  Injectable 1 milliGRAM(s) IntraMuscular once  heparin   Injectable 5000 Unit(s) SubCutaneous every 12 hours  insulin glargine Injectable (LANTUS) 30 Unit(s) SubCutaneous at bedtime  insulin lispro (ADMELOG) corrective regimen sliding scale   SubCutaneous three times a day before meals  insulin lispro (ADMELOG) corrective regimen sliding scale   SubCutaneous at bedtime  insulin lispro Injectable (ADMELOG) 2 Unit(s) SubCutaneous three times a day before meals  lactated ringers. 1000 milliLiter(s) (50 mL/Hr) IV Continuous <Continuous>  midodrine 30 milliGRAM(s) Oral every 8 hours  pantoprazole    Tablet 40 milliGRAM(s) Oral before breakfast  sevelamer carbonate 800 milliGRAM(s) Oral three times a day  ticagrelor 60 milliGRAM(s) Oral every 12 hours      TELEMETRY: 	    ECG:  	  RADIOLOGY:   DIAGNOSTIC TESTING:  [ ] Echocardiogram:  [ ]  Catheterization:  [ ] Stress Test:    OTHER: 	    LABS:	 	    Troponin T, High Sensitivity Result: 468 ng/L [0 - 51] (07-06 @ 07:45)  Troponin T, High Sensitivity Result: 488 ng/L [0 - 51] (07-06 @ 04:13)                          11.4   9.54  )-----------( 268      ( 09 Jul 2021 06:34 )             39.1     07-09    132<L>  |  92<L>  |  52<H>  ----------------------------<  138<H>  3.4<L>   |  22  |  9.65<H>    Ca    8.5      09 Jul 2021 06:35  Phos  4.7     07-09  Mg     2.4     07-08    TPro  6.3  /  Alb  2.1<L>  /  TBili  0.4  /  DBili  x   /  AST  17  /  ALT  21  /  AlkPhos  118  07-08

## 2021-07-09 NOTE — PROGRESS NOTE ADULT - ASSESSMENT
55M PMH ESRD on PD, DM on insulin, CHF EF 25-30% CAD s/p CABG x4 pw with hypotension with fever  Hypotension off pressors and   Source is pending   Hypokalemia    1 Renal -   CCPD orders will be 1.5% as to not cause too much UF and ultimately hypotension   Kdur 40meq po x 1   2 ID-Blood cx thus far are negative and still awaiting PD fluid cx(unclear why not resulted).  IV abx .  Can we get ID eval   3 [facility]-Midodrine increased to 30mg po q 8 hours   4 Podiatry- Toe does not seem to be the issue        Sayed [facility]   [phone]

## 2021-07-09 NOTE — OCCUPATIONAL THERAPY INITIAL EVALUATION ADULT - PERTINENT HX OF CURRENT PROBLEM, REHAB EVAL
56yo M PMH ESRD on PD, DM on insulin, CHF EF 25-30% CAD s/p CABG x4, underwent cardiac cath and stent and wire broke in heart sternotomy. Pt states that for last 2 days he has been having increased cough and sweating. Pt endorsed chills but no measured fevers at home. Denies any sick contacts or recent travel. Pt states that he fell few days ago with no head trauma. Of note pt has right toe gangrenous lesion that has been present for several months, He denies any trauma or pain at the site (cont

## 2021-07-09 NOTE — OCCUPATIONAL THERAPY INITIAL EVALUATION ADULT - VISUAL ACUITY
Pt able to orient to stimuli on R or L side. Difficulty with tracking 2/2 participation and command follow

## 2021-07-09 NOTE — PROGRESS NOTE ADULT - ASSESSMENT
56 m with    Sepsis  - antibiotic  - peritoneal culture pending   - toe gangrene  - Podiatry follow  - ID evaluation   - CT chest    COPD  - 2L NC overnight because he becomes apneic, sats well  - Sats well on RA while awake    CAD s/p CABG   - Hypotension likely in the setting of septic shock   - midodrine dose increased to 30 mg TID to match home dose  - Previous echo with reduced EF  - F/up TTE  - c/w DAPT  - Cardiology follow    CHF cr systolic  - cardiology follow    Peritoneal Dialysis   - nephrology follow PD, recommend 4x a day  - patient on midodrine 30 q8h at home, continue here  - Holding home metoprolol 25 q12hr in setting of hypotension    Diabetes   - HA1C 6.1 on 7/6 suggesting prediabetes  - Tujeo 60 units at bedtime and Victoza at home, started on 30 units at bedtime, adjust as needed based on patient PO intake  - added 2 units insulin pre-meal    Hiccups  - GI evaluation  - Trial of Ativan    Incontinence dermatitis  - wound care    Toe gangrene  - Podiatry follow  - Vascular evaluation    d/w wife at length    Pipe Beck MD pager [phone]

## 2021-07-09 NOTE — PROVIDER CONTACT NOTE (OTHER) - BACKGROUND
Pt admit dx for septic shock, s/p MICU admission. PMH ESRD on PD, DM on insulin, CHF EF 25-30% CAD s/p CABG x4. On Midodrine 30mg q8 for hypotension

## 2021-07-09 NOTE — OCCUPATIONAL THERAPY INITIAL EVALUATION ADULT - ANTICIPATED DISCHARGE DISPOSITION, OT EVAL
Subacute Rehab. If pt were to be d/c home, Home OT recommended for home safety evaluation, DME training, fall prevention, cognition, ADLs, balance, strength, ROM and endurance. Pt would require hospital bed, patient lift device, and transport w/c.  Supervision/Assist for ALL ADLs and functional mobility.

## 2021-07-09 NOTE — PROVIDER CONTACT NOTE (OTHER) - ASSESSMENT
Pt is A&Ox4- more lethargic than previous night. Pt reports feeling nauseous and unable to tolerate anything PO; hiccups noted. As per day RN, pt did not eat any meals. Pt denies any pain. No neurological deficits noted at this time.

## 2021-07-09 NOTE — CONSULT NOTE ADULT - SUBJECTIVE AND OBJECTIVE BOX
HPI:   Patient is a 56y male with esrd on dialysis via PD, had cardiac stent placed late last year, wire broke in the heart and required a sternotomy to get it out, had 2 stays after that most recent in Jan for hypotension, fever, weak but no specific infection found. He returned 3 days ago with fever, hypotension, very weak, report of cough. He was in micu on pressors for a day, cultures are no growth including the pd fluid, now on floor. He has been on cefepime since admission. He is not answering directed questions.     REVIEW OF SYSTEMS:  All other review of systems negative (Comprehensive ROS) cannot get well    PAST MEDICAL & SURGICAL HISTORY:  Diabetes    CAD, multiple vessel    ESRD (end stage renal disease) on dialysis    HTN (hypertension)    S/P CABG (coronary artery bypass graft)        Allergies    doxazosin (Other)    Intolerances        Antimicrobials Day #  :4`  cefepime   IVPB 1000 milliGRAM(s) IV Intermittent every 24 hours    Other Medications:  aspirin enteric coated 81 milliGRAM(s) Oral daily  atorvastatin 80 milliGRAM(s) Oral at bedtime  cadexomer iodine 0.9% Gel 1 Application(s) Topical daily  chlorhexidine 0.12% Liquid 15 milliLiter(s) Oral Mucosa two times a day  chlorhexidine 2% Cloths 1 Application(s) Topical <User Schedule>  dextrose 40% Gel 15 Gram(s) Oral once  dextrose 5%. 1000 milliLiter(s) IV Continuous <Continuous>  dextrose 5%. 1000 milliLiter(s) IV Continuous <Continuous>  dextrose 50% Injectable 25 Gram(s) IV Push once  dextrose 50% Injectable 25 Gram(s) IV Push once  dextrose 50% Injectable 12.5 Gram(s) IV Push once  ferrous    sulfate 325 milliGRAM(s) Oral three times a day  folic acid 1 milliGRAM(s) Oral daily  glucagon  Injectable 1 milliGRAM(s) IntraMuscular once  heparin   Injectable 5000 Unit(s) SubCutaneous every 12 hours  insulin glargine Injectable (LANTUS) 30 Unit(s) SubCutaneous at bedtime  insulin lispro (ADMELOG) corrective regimen sliding scale   SubCutaneous three times a day before meals  insulin lispro (ADMELOG) corrective regimen sliding scale   SubCutaneous at bedtime  insulin lispro Injectable (ADMELOG) 2 Unit(s) SubCutaneous three times a day before meals  lactated ringers. 1000 milliLiter(s) IV Continuous <Continuous>  midodrine 30 milliGRAM(s) Oral every 8 hours  pantoprazole    Tablet 40 milliGRAM(s) Oral before breakfast  sevelamer carbonate 800 milliGRAM(s) Oral three times a day  ticagrelor 60 milliGRAM(s) Oral every 12 hours      FAMILY HISTORY:  FHx: lung cancer (Sibling)  bother    FH: diabetes mellitus (Sibling)  father        SOCIAL HISTORY:  Smoking: [ ]Yes [ x]No  ETOH: [ ]Yes [ x]No  Drug Use: [ ]Yes [ x]No   x[ ] Single[ ]    T(F): 98.7 (07-09-21 @ 08:48), Max: 98.7 (07-09-21 @ 08:48)  HR: 101 (07-09-21 @ 08:48)  BP: 105/78 (07-09-21 @ 08:48)  RR: 18 (07-09-21 @ 08:48)  SpO2: 100% (07-09-21 @ 08:48)  Wt(kg): --    PHYSICAL EXAM:  General: awake, weak looking , no acute distress  Eyes:  anicteric, no conjunctival injection, no discharge  Oropharynx: no lesions or injection 	  Neck: supple, without adenopathy  Lungs: clear to auscultation  Heart: regular rate and rhythm; no murmur, rubs or gallops  Abdomen: soft, nondistended, nontender, without mass or organomegaly, no tenderness or redness near catheter  Skin: scar lesions  Extremities: no clubbing, cyanosis, or edema. right foot 3rd toe black tip  Neurologic: follows commands, moves all extremities  scrotum no swelling or tenderness  LAB RESULTS:                        11.4   9.54  )-----------( 268      ( 09 Jul 2021 06:34 )             39.1     07-09    132<L>  |  92<L>  |  52<H>  ----------------------------<  138<H>  3.4<L>   |  22  |  9.65<H>    Ca    8.5      09 Jul 2021 06:35  Phos  4.7     07-09  Mg     2.4     07-08    TPro  6.3  /  Alb  2.1<L>  /  TBili  0.4  /  DBili  x   /  AST  17  /  ALT  21  /  AlkPhos  118  07-08    LIVER FUNCTIONS - ( 08 Jul 2021 06:34 )  Alb: 2.1 g/dL / Pro: 6.3 g/dL / ALK PHOS: 118 U/L / ALT: 21 U/L / AST: 17 U/L / GGT: x           Cortisol AM, Serum: 12.5 ug/dL (07.07.21 @ 10:22)       MICROBIOLOGY:  RECENT CULTURES:  07-06 @ 17:33 .Peritoneal Dialysis Fluid Dialysis (P.D.) Fluid     No growth to date      07-06 @ 08:22 .Blood Blood-Peripheral     No growth to date.            RADIOLOGY REVIEWED:    < from: Xray Foot AP + Lateral + Oblique, Right (07.06.21 @ 08:59) >  EXAM:  XR FOOT COMP MIN 3 VIEWS RT                            PROCEDURE DATE:  07/06/2021            INTERPRETATION:  CLINICAL INDICATION: right foot pain; infection    EXAM:  Frontal, oblique, and lateral right foot from 7/6/2021 at 0859. No similar prior studies available for comparison.    IMPRESSION:  No tracking soft tissue gas collections, radiopaque foreign bodies, or gross radiographic evidence for osteomyelitis.    No fractures or dislocations.    Tarsometatarsal alignment maintained without evidence for a Lisfranc injury.    Preserved visualized joint spaces and no joint margin erosions.    No lytic or blastic lesions.    Vascular calcifications.    --- End of Report ---    < end of copied text >        Impression:      < from: Xray Chest 1 View- PORTABLE-Urgent (Xray Chest 1 View- PORTABLE-Urgent .) (07.06.21 @ 05:26) >    EXAM:  XR CHEST PORTABLE URGENT 1V                            PROCEDURE DATE:  07/06/2021            INTERPRETATION:  CLINICAL INFORMATION: Fever.    EXAM: AP chest radiograph.    COMPARISON: Chest radiograph dated 12/29/2020.    FINDINGS:  Heart size and the mediastinum cannot be accurately evaluated on this projection. Median sternotomy sutures and surgical clips again seen. Calcified tortuous thoracic aorta.  Low lung volumes.  New ill-defined right upper lung nodular opacities are seen. Linear atelectasis versus scar in the left mid and lower lung. The remainder of the lungs are clear.  No pleural effusion or pneumothorax.  Surgical clips project over the right scapula.  No acute bony abnormality.  Pneumoperitoneum which may be attributable to ongoing peritoneal dialysis is again seen.      IMPRESSION:  Low lung volumes.    New ill-defined right upper lung nodular opacities, of indeterminate nature. Infection or neoplasm are possible. Suggest short-term follow-up chest x-ray to evaluate for resolution. If findings persist then consider a CT scan of the chest for further evaluation.    Left mid and lower lung linear atelectasis.    Pneumoperitoneum which may be attributable to ongoing peritoneal dialysis.    --- End of Report ---    < end of copied text >    Impression  Patient with esrd on pd, had stay about 7 months ago s/p cardiac stent, wire broke, had to undergo sternotomy, 2 stays soon after that for sepsis unclear cause, now returns a few days ago with fever, hypotension, weak, report of cough. He required pressors in micu now off, cultures of blood and pd fluid are no growth, exam not revealing. Source of presumed sepsis is not readily apparent. CXR shows no infiltrate but could have pneumonia not seen given the cough. Has dry gangrene of right 3rd toe but that is not a source of sepsis  Recommendations:  will continue cefepime for now  f/u final cultures  ct chest  supportive care  consider psych eval he seems very depressed

## 2021-07-09 NOTE — PROGRESS NOTE ADULT - ASSESSMENT
CATH 11/30/2020:  COMPLICATIONS: The stent was deployed without difficulty. On removal of the  balloon, the shaft broke and the tip of the balloon remained in the vessel. Several attempts were made to snare the balloon unsuccessfully. Dr. Verdugo was notifed who will take the patient to the operating room.  DIAGNOSTIC RECOMMENDATIONS: The patient should continue with the present medications. The patients vessel was stented without difficulty On removal of the balloon, the shaft broke with the baloon stuck in the left main. All attempts to snare the balloon out failed and the patient was taken to the OR by Dr. Arango to remove the balloon  introp eleonora 11/30/20: mild to mod MR, < from: Intra-Operative Transesophageal Echo (11.30.20 @ 17:02) >  Severe global left ventricular systolic dysfunction. Inferior and inferolateral akinesis.  severe hypokinesis of other segments.  Severe global hypokinesia.  Normal left ventricular internal dimensions and wall thicknesses. Moderate diastolic dysfunction (Stage II).  echo 12/17/20: EF 32%, mod MR, Severe global left ventricular systolic dysfunction, moderate pulmonary pressures  echo 12/20/20: EF (Visual Estimate):  25-30 % mild MR, Akinesis of the inferolateral, anterolateral, apical laterlal, inferior, and inferoseptal walls. Severe left ventricular enlargement. No left ventricular thrombus is seen.  Echo 7/7/21: EF 16%, mod - sev MR, mod AS, severe global lv sys dysfx, severe diastolic dysfx, mod pulm htn       a/p  89M well known to practice with PMH ESRD on PD, DM on insulin, CHF EF 25-30% CAD s/p CABG x4, underwent cardiac cath and stent and wire broke in heart s/p sternotomy p/w septic shock    #Septic Shock, resolved   +fevers noted  -Bcx NGTD   -iv abx  -s/p pressors   -Bedside POCUS notable for scattered B lines, biventricular diffuse hypokinesis  -pt also with right 2nd toe gangrene - pod eval noted   -f/u culture of PD cathter  -ID eval noted   -pending ct chest   -med f/u    #Ischemic Cardiomyopathy. Chronic systolic HF  -vol status stable   -CXR w clear lungs   -Repeat echo noted with EF 16%, mod - sev MR, mod AS, severe global lv sys dysfx, severe diastolic dysfx, mod pulm htn   -eps eval for crt-D, pt reluctant in past    -no bb, acei/arb given hx of orthostatic bp   -continue midodrine for bp support  -cont vol removal w PD     # CAD, s/p CABG, s/p PCI, s/p redo open heart for stent balloon retrieval   -hst elevated, likely demand ischemia in setting of septic shock, ESRD   -c/w asa, Brilinta, statin     # ESRD  -on PD  -renal f/u    #Nausea, vomitting  -GI eval     dvt ppx  plan discussed with ACP

## 2021-07-09 NOTE — OCCUPATIONAL THERAPY INITIAL EVALUATION ADULT - BALANCE TRAINING, PT EVAL
GOAL: Pt will demonstrate improved static/dynamic balance to FAIR- in order to increase safety and independence in ADLs within 4 weeks

## 2021-07-09 NOTE — CONSULT NOTE ADULT - ASSESSMENT
55M PMH ESRD on PD, DM on insulin, CHF EF 25-30% CAD s/p CABG x4, underwent cardiac cath and stent and wire broke in heart sternotomy admitted with cough/sweating found to be hypotensive and febrile in ER- admitted for presumed septic shock; cultures negative thus far    s/p short stay in MICU for pressor support  Cultures negative to date  ESRD on PD  COVID negative      #nausea and severe hiccups (per pt has had intermittent issues with hiccuping prior to admission)  qTc prolonged (600). No acute GI pathology on CT A/P 1/2021  -Given excellent response to trial of Ativan x 1, can use Ativan PRN for hiccups  -PD per Renal  -PO diet as tolerated  -Continue PPI  -await CT Chest    #suspected sepsis  -Abx and work-up per ID    Discussed with pt, all questions answered  Discussed with Medicine NP    Thank you for the courtesy of this consult.    Please call over weekend prn with acute GI concerns   GI service : [phone]    Sanjiv Oglesby PA-C    [facility]  [phone]  After hours and weekend coverage [phone]

## 2021-07-09 NOTE — OCCUPATIONAL THERAPY INITIAL EVALUATION ADULT - NS ASR WT BEARING DETAIL RLE
Detail Level: Generalized Detail Level: Zone in darco shoe/weight-bearing as tolerated Detail Level: Detailed

## 2021-07-09 NOTE — PROVIDER CONTACT NOTE (OTHER) - ACTION/TREATMENT ORDERED:
As per Esther PA- OK to hold PO evening medications as pt is unable to tolerate. 19-Feb-2018 21-Feb-2018

## 2021-07-10 NOTE — PROVIDER CONTACT NOTE (CHANGE IN STATUS NOTIFICATION) - ACTION/TREATMENT ORDERED:
NP is aware, Nephrology is aware, will re-eval BP around 1200 cycle and notify MD if BP remains low
Restart levo, unlist patient from floor. Patient now more arousable and AO x4  stating that he is "just tired". Levo restarted at ordered rate, patient now due for PD.

## 2021-07-10 NOTE — PROVIDER CONTACT NOTE (OTHER) - ASSESSMENT
Pt is A&Ox4- more lethargic overnight. Pt was unable to tolerate PO Midodrine @ 2200 last night. PD was performed @0000 as ordered. VS as charted this AM. Pt unable to tolerate small sips of water and refusing PO Midodrine at this time.

## 2021-07-10 NOTE — PROVIDER CONTACT NOTE (CHANGE IN STATUS NOTIFICATION) - ASSESSMENT
At start of shift patient alert AOx4, following commands. Pt resting after assessment on 1L NC for desatuation while sleeping to 86%. BP 72/50 MAP 57 HR 81 and on reassessment 74/52 MAP 59 with HR 81. . Pt now difficult to arouse after repeated stimulation.
BP is soft, 97/72, unable to take midodrine. Last PD was done at 2400 7/9, unable to do manual this AM due to low BP

## 2021-07-10 NOTE — CONSULT NOTE ADULT - ASSESSMENT
56y year old Male who presents with septic shock likely due to PNA. Vascular surgery consulted for R second toe gangrene  - Recommend PONCHO/PVR with toe pressures  - FU podiatry recs for local wound care  - ASA/Statin  - Medical optimization  - Vascular to follow   - Plan discussed with Vascular Surgery Fellow, Dr. Chen    Vascular Surgery   [id number]

## 2021-07-10 NOTE — CONSULT NOTE ADULT - SUBJECTIVE AND OBJECTIVE BOX
Patient is a 56yMale admitted to [facility] medicine service for septic shock. Ortho consulted for incidental finding of age indeterminate t12 VCF. No known recent traumas/falls. Patient has been obtunded/lethargic since admission per RN and is unable to follow commands. Unable to obtained detailed history from pt 2/2 mental status.     PAST MEDICAL & SURGICAL HISTORY:  Diabetes  CAD, multiple vessel  ESRD (end stage renal disease) on dialysis  HTN (hypertension)  S/P CABG (coronary artery bypass graft)    Vital Signs Last 24 Hrs  T(C): 36.8 (10 Jul 2021 12:00), Max: 37 (09 Jul 2021 17:22)  T(F): 98.2 (10 Jul 2021 12:00), Max: 98.6 (09 Jul 2021 17:22)  HR: 111 (10 Jul 2021 12:11) (97 - 111)  BP: 95/67 (10 Jul 2021 12:11) (95/67 - 113/83)  BP(mean): --  RR: 20 (10 Jul 2021 12:00) (18 - 20)  SpO2: 98% (10 Jul 2021 12:00) (95% - 100%)  I&O's Detail    09 Jul 2021 07:01  -  10 Jul 2021 07:00  --------------------------------------------------------  IN:    Other (mL): 5000 mL  Total IN: 5000 mL    OUT:    Other (mL): 5200 mL  Total OUT: 5200 mL    Total NET: -200 mL    LABS:                        12.2   9.95  )-----------( 258      ( 10 Jul 2021 07:40 )             41.7     07-10    131<L>  |  91<L>  |  47<H>  ----------------------------<  98  3.6   |  22  |  9.24<H>    Ca    9.1      10 Jul 2021 07:38  Phos  4.8     07-10      PHYSICAL EXAM:  General: Awake, NAD, unable to follow commands, does not answer questions.  Spine: No TTP over spinous processes or paraspinal muscles at C/T/L spine. No palpable step off.            Motor exam:   Unable to obtain motor exam 2/2 mental status    Sensory:  (0=absent, 1=impaired, 2=normal, NT=not testable)  Unable to obtain sensory exam 2/2 mental status    BL Upper extremity:   Negative Clemente  +Rad Pulse  Compartments soft and compressible           BL Lower Extremity:  Negative Clonus   Negative Babinski  +DP  Compartments soft and compressible    Secondary  +superficial BL lower extremity abrasions/excoriations. No erythema/ecchymosis. No TTP over bony prominences, SILT, palpable pulses, full/painless A/PROM, compartments soft. No other injuries or complaints. Negative logroll/heelstrike BL.     Imaging:  CT C demonstrates age indeterminate T12 VCF

## 2021-07-10 NOTE — PROGRESS NOTE ADULT - SUBJECTIVE AND OBJECTIVE BOX
Patient is a 56y old  Male who presents with a chief complaint of Hypotension (10 Jul 2021 15:23)      SUBJECTIVE / OVERNIGHT EVENTS: Comfortable without new complaints.   Review of Systems  chest pain no  palpitations no  sob no  nausea no  headache no    MEDICATIONS  (STANDING):  aspirin enteric coated 81 milliGRAM(s) Oral daily  atorvastatin 80 milliGRAM(s) Oral at bedtime  cadexomer iodine 0.9% Gel 1 Application(s) Topical daily  cefepime   IVPB 1000 milliGRAM(s) IV Intermittent every 24 hours  chlorhexidine 0.12% Liquid 15 milliLiter(s) Oral Mucosa two times a day  chlorhexidine 2% Cloths 1 Application(s) Topical <User Schedule>  dextrose 40% Gel 15 Gram(s) Oral once  dextrose 5%. 1000 milliLiter(s) (50 mL/Hr) IV Continuous <Continuous>  dextrose 5%. 1000 milliLiter(s) (100 mL/Hr) IV Continuous <Continuous>  dextrose 50% Injectable 25 Gram(s) IV Push once  dextrose 50% Injectable 12.5 Gram(s) IV Push once  dextrose 50% Injectable 25 Gram(s) IV Push once  ferrous    sulfate 325 milliGRAM(s) Oral three times a day  folic acid 1 milliGRAM(s) Oral daily  glucagon  Injectable 1 milliGRAM(s) IntraMuscular once  heparin   Injectable 5000 Unit(s) SubCutaneous every 12 hours  insulin glargine Injectable (LANTUS) 5 Unit(s) SubCutaneous at bedtime  insulin lispro (ADMELOG) corrective regimen sliding scale   SubCutaneous three times a day before meals  insulin lispro (ADMELOG) corrective regimen sliding scale   SubCutaneous at bedtime  lactated ringers. 1000 milliLiter(s) (50 mL/Hr) IV Continuous <Continuous>  midodrine 30 milliGRAM(s) Oral every 8 hours  pantoprazole    Tablet 40 milliGRAM(s) Oral before breakfast  sevelamer carbonate 800 milliGRAM(s) Oral three times a day  ticagrelor 60 milliGRAM(s) Oral every 12 hours    MEDICATIONS  (PRN):      Vital Signs Last 24 Hrs  T(C): 36.8 (10 Jul 2021 12:00), Max: 37 (09 Jul 2021 17:22)  T(F): 98.2 (10 Jul 2021 12:00), Max: 98.6 (09 Jul 2021 17:22)  HR: 102 (10 Jul 2021 13:44) (97 - 111)  BP: 106/70 (10 Jul 2021 13:44) (95/67 - 113/83)  BP(mean): --  RR: 20 (10 Jul 2021 13:44) (18 - 20)  SpO2: 98% (10 Jul 2021 13:44) (95% - 100%)    PHYSICAL EXAM:  GENERAL: NAD  HEAD:  Atraumatic, Normocephalic  EYES: EOMI, PERRLA, conjunctiva and sclera clear  NECK: Supple, No JVD  CHEST/LUNG: Clear to auscultation bilaterally; No wheeze  HEART: Regular rate and rhythm; No murmurs, rubs, or gallops  ABDOMEN: Soft, Nontender, Nondistended; Bowel sounds present PD catheter  EXTREMITIES:  few areas of gangrene fingers and r 2nd toe   PSYCH: confused  NEUROLOGY: non-focal  SKIN: No rashes or lesions    LABS:                        12.2   9.95  )-----------( 258      ( 10 Jul 2021 07:40 )             41.7     07-10    131<L>  |  91<L>  |  47<H>  ----------------------------<  98  3.6   |  22  |  9.24<H>    Ca    9.1      10 Jul 2021 07:38  Phos  4.8     07-10                  RADIOLOGY & ADDITIONAL TESTS:    Imaging Personally Reviewed:  < from: CT Head No Cont (07.09.21 @ 21:43) >  IMPRESSION:    No acute intracranial hemorrhage or mass effect.    < end of copied text >  < from: CT Chest No Cont (07.09.21 @ 21:43) >  IMPRESSION:  Few subtle peripheral opacities in the right upper lobe are new from the prior study and indeterminate. Short-term follow-up CT recommended for complete evaluation.    Pneumoperitoneum may be related to peritoneal dialysis catheter. If concern for additional abnormality, CT abdomen/pelvis is recommended.    T12 vertebral body fracture is age-indeterminate but new from the prior study. There appears to be some mass effect upon the canal. MRI spine may be helpful for complete evaluation.    < end of copied text >    Consultant(s) Notes Reviewed:      Care Discussed with Consultants/Other Providers:

## 2021-07-10 NOTE — PROGRESS NOTE ADULT - SUBJECTIVE AND OBJECTIVE BOX
CC: no events    TELEMETRY:     PHYSICAL EXAM:    T(C): 36.8 (07-10-21 @ 08:16), Max: 37.1 (07-09-21 @ 08:48)  HR: 103 (07-10-21 @ 08:16) (97 - 105)  BP: 97/72 (07-10-21 @ 08:16) (97/72 - 113/83)  RR: 20 (07-10-21 @ 08:16) (18 - 20)  SpO2: 95% (07-10-21 @ 08:16) (95% - 100%)  Wt(kg): --  I&O's Summary    09 Jul 2021 07:01  -  10 Jul 2021 07:00  --------------------------------------------------------  IN: 5000 mL / OUT: 5200 mL / NET: -200 mL        Appearance: Normal	  Cardiovascular: Normal S1 S2,RRR, No JVD, No murmurs  Respiratory: Lungs clear to auscultation	  Gastrointestinal:  Soft, Non-tender, + BS	  Extremities: Normal range of motion, No clubbing, cyanosis or edema  Vascular: Peripheral pulses palpable 2+ bilaterally     LABS:	 	                          12.2   9.95  )-----------( 258      ( 10 Jul 2021 07:40 )             41.7     07-09    132<L>  |  92<L>  |  52<H>  ----------------------------<  138<H>  3.4<L>   |  22  |  9.65<H>    Ca    8.5      09 Jul 2021 06:35  Phos  4.7     07-09            CARDIAC MARKERS:

## 2021-07-10 NOTE — CHART NOTE - NSCHARTNOTEFT_GEN_A_CORE
Called by RN to report blood sugar of 67 repeat 66. Pt asymptomatic bu wit decreased PO intake. Pt got 15 units of Lantus last night due to poor PO intake (50% of ordered dose of Lantus 30 units).  Hypoglycemia protocol followed   Decreased dose to 5units at bedtime and discontinued premeal Admelog  Will continue to monitor BS and adjust insulins    [id number]

## 2021-07-10 NOTE — PROGRESS NOTE ADULT - ASSESSMENT
Seen and examined at bedside  Refusing all meds except midodrine  BPs are soft ( current BP is 95/67  s/p midodrine @ 0930 this AM)  Lethargic  Denies n/v/d,cp, sob  Afebrile    aspirin enteric coated 81 milliGRAM(s) Oral daily  atorvastatin 80 milliGRAM(s) Oral at bedtime  cadexomer iodine 0.9% Gel 1 Application(s) Topical daily  cefepime   IVPB 1000 milliGRAM(s) IV Intermittent every 24 hours  chlorhexidine 0.12% Liquid 15 milliLiter(s) Oral Mucosa two times a day  chlorhexidine 2% Cloths 1 Application(s) Topical <User Schedule>  dextrose 40% Gel 15 Gram(s) Oral once  dextrose 5%. 1000 milliLiter(s) IV Continuous <Continuous>  dextrose 5%. 1000 milliLiter(s) IV Continuous <Continuous>  dextrose 50% Injectable 25 Gram(s) IV Push once  dextrose 50% Injectable 12.5 Gram(s) IV Push once  dextrose 50% Injectable 25 Gram(s) IV Push once  ferrous    sulfate 325 milliGRAM(s) Oral three times a day  folic acid 1 milliGRAM(s) Oral daily  glucagon  Injectable 1 milliGRAM(s) IntraMuscular once  heparin   Injectable 5000 Unit(s) SubCutaneous every 12 hours  insulin glargine Injectable (LANTUS) 30 Unit(s) SubCutaneous at bedtime  insulin lispro (ADMELOG) corrective regimen sliding scale   SubCutaneous three times a day before meals  insulin lispro (ADMELOG) corrective regimen sliding scale   SubCutaneous at bedtime  insulin lispro Injectable (ADMELOG) 2 Unit(s) SubCutaneous three times a day before meals  lactated ringers. 1000 milliLiter(s) IV Continuous <Continuous>  midodrine 30 milliGRAM(s) Oral every 8 hours  pantoprazole    Tablet 40 milliGRAM(s) Oral before breakfast  sevelamer carbonate 800 milliGRAM(s) Oral three times a day  ticagrelor 60 milliGRAM(s) Oral every 12 hours      VITAL:  T(C): , Max: 37 (07-09-21 @ 17:22)  T(F): , Max: 98.6 (07-09-21 @ 17:22)  HR: 111 (07-10-21 @ 12:11)  BP: 95/67 (07-10-21 @ 12:11)  BP(mean): --  RR: 20 (07-10-21 @ 12:00)  SpO2: 98% (07-10-21 @ 12:00)  Wt(kg): --    07-09-21 @ 07:01  -  07-10-21 @ 07:00  --------------------------------------------------------  IN: 5000 mL / OUT: 5200 mL / NET: -200 mL        PHYSICAL EXAM:  Constitutional: NAD; lethaargic  Neck:  No JVD  Respiratory: distant BS  b/l  Cardiovascular: S1 and S2  Gastrointestinal: BS+, soft, + PD  Extremities: No peripheral edema  Neurological: A/O x 3, no focal deficits  Psychiatric: Normal mood, normal affect  : No Johnson  Skin: No rashes  Access: PD catheter  LABS:                          12.2   9.95  )-----------( 258      ( 10 Jul 2021 07:40 )             41.7     Na(131)/K(3.6)/Cl(91)/HCO3(22)/BUN(47)/Cr(9.24)Glu(98)/Ca(9.1)/Mg(--)/PO4(4.8)    07-10 @ 07:38  Na(132)/K(3.4)/Cl(92)/HCO3(22)/BUN(52)/Cr(9.65)Glu(138)/Ca(8.5)/Mg(--)/PO4(4.7)    07-09 @ 06:35  Na(133)/K(3.4)/Cl(92)/HCO3(22)/BUN(58)/Cr(9.86)Glu(140)/Ca(8.4)/Mg(2.4)/PO4(5.0)    07-08 @ 06:34    Imaging    EXAM:  CT BRAIN                            PROCEDURE DATE:  07/09/2021      INTERPRETATION:  CLINICAL INFORMATION: Altered mental status    TECHNIQUE: Noncontrast axial CT images were acquired through the head. Two-dimensional sagittal and coronal reformats were generated.    COMPARISON STUDY: CT head from 12/29/2020    FINDINGS:    No CT evidence of acute intracranial hemorrhage, extra-axial collection, edema, mass effect, midline shift, central herniation, or hydrocephalus. Grey-white matter junction is well-preserved.    Mild patchy periventricular white matter heterogeneity, nonspecific, although likely on the basis of chronic small vessel ischemic disease.    Prominence of the ventricles and sulci, unchanged, compatible with generalized brain parenchymal volume loss.    Atherosclerotic calcifications at the skull base.    Trace bilateral maxillary sinus mucosal thickening. Right frontal sinus polyp versus retention cyst. Bilateral mastoid air cells and middle ear cavities areclear. Bilateral ocular lens replacements.    Calvarium is intact.    IMPRESSION:    No acute intracranial hemorrhage or mass effect.    EXAM:  CT CHEST                            PROCEDURE DATE:  07/09/2021    INTERPRETATION:  CLINICAL INFORMATION: Cough. Evaluate for pneumonia.    COMPARISON: Chest x-ray 7/6/2021 and CT chest 12/30/2020    CONTRAST/COMPLICATIONS:  IV Contrast: NONE  Oral Contrast: NONE  Complications: None reported at time of study completion    PROCEDURE:  CT of the Chest was performed.  Sagittal and coronal reformats were performed.    FINDINGS:    LUNGS AND AIRWAYS: Central airways are clear. Few subtle peripheral opacities in the right upper lobe are new from the prior study. Bilateral dependent subsegmental atelectasis.  PLEURA: Trace bilateral pleural effusions.  MEDIASTINUM AND RYAN: 1.1 cm right paratracheal node is again noted.  VESSELS: Atherosclerotic coronary arteries and aorta.  HEART: Cardiomegaly. No pericardial effusion. Patient is status post CABG. No pericardial effusion. Aortic valve calcification.  CHEST WALL AND LOWER NECK: Patient status post median sternotomy.  VISUALIZEDUPPER ABDOMEN: Pneumoperitoneum. Mild ascites. Atherosclerotic changes involving the aortic and celiac vessels. Cholelithiasis  BONES: Degenerative changes. Nonunion sternal fragments. T12 vertebral body fracture is age-indeterminate but new from theprior study. There appears to be some mass effect upon the canal    IMPRESSION:  Few subtle peripheral opacities in the right upper lobe are new from the prior study and indeterminate. Short-term follow-up CT recommended for complete evaluation.    Pneumoperitoneum may be related to peritoneal dialysis catheter. If concern for additional abnormality, CT abdomen/pelvis is recommended.    T12 vertebral body fracture is age-indeterminate but new from the prior study. There appears to be some mass effect upon the canal. MRI spine may be helpful for complete evaluation.      ASSESSMENT/PLAN  Assessment:  55M PMH ESRD on PD, DM on insulin, CHF EF 25-30% CAD s/p CABG x4 pw with hypotension with fever  Hypotension off pressors and   Source is pending   Hypokalemia- resolving/ resolved s/p repletion yesterday    1 Renal -   [facility]PD orders will be 1.5% as to not cause too much UF and ultimately hypotension ( will hold PD  for now)    2 ID-Blood cx thus far are negative and still awaiting PD fluid cx(unclear why not resulted).  IV abx .  ID eval   from 7/9 noted  3 [facility]-Midodrine increased to 30mg po q 8 hours   4 Podiatry- Toe does not seem to be the issue  5  Psych eval might be needed since he seems very depressed      D/ w  Dr. Dangelo.  Informed primary RN    Modesto Wayne, NP-BC  [employer]  [phone]   Seen and examined at bedside  Refusing all meds except midodrine  BPs are soft ( current BP is 95/67  s/p midodrine @ 0930 this AM)  Lethargic  Denies n/v/d,cp, sob  Afebrile    aspirin enteric coated 81 milliGRAM(s) Oral daily  atorvastatin 80 milliGRAM(s) Oral at bedtime  cadexomer iodine 0.9% Gel 1 Application(s) Topical daily  cefepime   IVPB 1000 milliGRAM(s) IV Intermittent every 24 hours  chlorhexidine 0.12% Liquid 15 milliLiter(s) Oral Mucosa two times a day  chlorhexidine 2% Cloths 1 Application(s) Topical <User Schedule>  dextrose 40% Gel 15 Gram(s) Oral once  dextrose 5%. 1000 milliLiter(s) IV Continuous <Continuous>  dextrose 5%. 1000 milliLiter(s) IV Continuous <Continuous>  dextrose 50% Injectable 25 Gram(s) IV Push once  dextrose 50% Injectable 12.5 Gram(s) IV Push once  dextrose 50% Injectable 25 Gram(s) IV Push once  ferrous    sulfate 325 milliGRAM(s) Oral three times a day  folic acid 1 milliGRAM(s) Oral daily  glucagon  Injectable 1 milliGRAM(s) IntraMuscular once  heparin   Injectable 5000 Unit(s) SubCutaneous every 12 hours  insulin glargine Injectable (LANTUS) 30 Unit(s) SubCutaneous at bedtime  insulin lispro (ADMELOG) corrective regimen sliding scale   SubCutaneous three times a day before meals  insulin lispro (ADMELOG) corrective regimen sliding scale   SubCutaneous at bedtime  insulin lispro Injectable (ADMELOG) 2 Unit(s) SubCutaneous three times a day before meals  lactated ringers. 1000 milliLiter(s) IV Continuous <Continuous>  midodrine 30 milliGRAM(s) Oral every 8 hours  pantoprazole    Tablet 40 milliGRAM(s) Oral before breakfast  sevelamer carbonate 800 milliGRAM(s) Oral three times a day  ticagrelor 60 milliGRAM(s) Oral every 12 hours      VITAL:  T(C): , Max: 37 (07-09-21 @ 17:22)  T(F): , Max: 98.6 (07-09-21 @ 17:22)  HR: 111 (07-10-21 @ 12:11)  BP: 95/67 (07-10-21 @ 12:11)  BP(mean): --  RR: 20 (07-10-21 @ 12:00)  SpO2: 98% (07-10-21 @ 12:00)  Wt(kg): --    07-09-21 @ 07:01  -  07-10-21 @ 07:00  --------------------------------------------------------  IN: 5000 mL / OUT: 5200 mL / NET: -200 mL        PHYSICAL EXAM:  Constitutional: NAD; lethaargic  Neck:  No JVD  Respiratory: distant BS  b/l  Cardiovascular: S1 and S2  Gastrointestinal: BS+, soft, + PD  Extremities: No peripheral edema  Neurological: A/O x 3, no focal deficits  Psychiatric: Normal mood, normal affect  : No Johnson  Skin: No rashes  Access: PD catheter  LABS:                          12.2   9.95  )-----------( 258      ( 10 Jul 2021 07:40 )             41.7     Na(131)/K(3.6)/Cl(91)/HCO3(22)/BUN(47)/Cr(9.24)Glu(98)/Ca(9.1)/Mg(--)/PO4(4.8)    07-10 @ 07:38  Na(132)/K(3.4)/Cl(92)/HCO3(22)/BUN(52)/Cr(9.65)Glu(138)/Ca(8.5)/Mg(--)/PO4(4.7)    07-09 @ 06:35  Na(133)/K(3.4)/Cl(92)/HCO3(22)/BUN(58)/Cr(9.86)Glu(140)/Ca(8.4)/Mg(2.4)/PO4(5.0)    07-08 @ 06:34    Imaging    EXAM:  CT BRAIN                            PROCEDURE DATE:  07/09/2021      INTERPRETATION:  CLINICAL INFORMATION: Altered mental status    TECHNIQUE: Noncontrast axial CT images were acquired through the head. Two-dimensional sagittal and coronal reformats were generated.    COMPARISON STUDY: CT head from 12/29/2020    FINDINGS:    No CT evidence of acute intracranial hemorrhage, extra-axial collection, edema, mass effect, midline shift, central herniation, or hydrocephalus. Grey-white matter junction is well-preserved.    Mild patchy periventricular white matter heterogeneity, nonspecific, although likely on the basis of chronic small vessel ischemic disease.    Prominence of the ventricles and sulci, unchanged, compatible with generalized brain parenchymal volume loss.    Atherosclerotic calcifications at the skull base.    Trace bilateral maxillary sinus mucosal thickening. Right frontal sinus polyp versus retention cyst. Bilateral mastoid air cells and middle ear cavities areclear. Bilateral ocular lens replacements.    Calvarium is intact.    IMPRESSION:    No acute intracranial hemorrhage or mass effect.    EXAM:  CT CHEST                            PROCEDURE DATE:  07/09/2021    INTERPRETATION:  CLINICAL INFORMATION: Cough. Evaluate for pneumonia.    COMPARISON: Chest x-ray 7/6/2021 and CT chest 12/30/2020    CONTRAST/COMPLICATIONS:  IV Contrast: NONE  Oral Contrast: NONE  Complications: None reported at time of study completion    PROCEDURE:  CT of the Chest was performed.  Sagittal and coronal reformats were performed.    FINDINGS:    LUNGS AND AIRWAYS: Central airways are clear. Few subtle peripheral opacities in the right upper lobe are new from the prior study. Bilateral dependent subsegmental atelectasis.  PLEURA: Trace bilateral pleural effusions.  MEDIASTINUM AND RYAN: 1.1 cm right paratracheal node is again noted.  VESSELS: Atherosclerotic coronary arteries and aorta.  HEART: Cardiomegaly. No pericardial effusion. Patient is status post CABG. No pericardial effusion. Aortic valve calcification.  CHEST WALL AND LOWER NECK: Patient status post median sternotomy.  VISUALIZEDUPPER ABDOMEN: Pneumoperitoneum. Mild ascites. Atherosclerotic changes involving the aortic and celiac vessels. Cholelithiasis  BONES: Degenerative changes. Nonunion sternal fragments. T12 vertebral body fracture is age-indeterminate but new from theprior study. There appears to be some mass effect upon the canal    IMPRESSION:  Few subtle peripheral opacities in the right upper lobe are new from the prior study and indeterminate. Short-term follow-up CT recommended for complete evaluation.    Pneumoperitoneum may be related to peritoneal dialysis catheter. If concern for additional abnormality, CT abdomen/pelvis is recommended.    T12 vertebral body fracture is age-indeterminate but new from the prior study. There appears to be some mass effect upon the canal. MRI spine may be helpful for complete evaluation.      ASSESSMENT/PLAN  Assessment:  55M PMH ESRD on PD, DM on insulin, CHF EF 25-30% CAD s/p CABG x4 pw with hypotension with fever  Hypotension off pressors and   Source is pending   Hypokalemia- resolving/ resolved s/p repletion yesterday    1 Renal -   [facility] orders will be 1.5% as to not cause too much UF and ultimately hypotension ( will hold PD  for now)                    please draw up blood  and PD cultures            2 ID-Blood cx thus far are negative and still awaiting PD fluid cx(unclear why not resulted).  IV abx .  ID eval   from 7/9 noted  3 [facility]S-Midodrine increased to 30mg po q 8 hours   4 Podiatry- Toe does not seem to be the issue  5  Psych eval might be needed since he seems very depressed      D/ w  Dr. Dangelo.  Informed primary RN    Modesto Wayne, NP-BC  [employer]  [phone]

## 2021-07-10 NOTE — PROVIDER CONTACT NOTE (OTHER) - BACKGROUND
Pt originally admitted for sepsis. Pt has ESRD on PD, DM, CHF, hx of CABGx4 and aortotomy. Pt has very poor PO intake despite efforts to encourage Pt to eat and drink.

## 2021-07-10 NOTE — PROVIDER CONTACT NOTE (OTHER) - SITUATION
Pt is diabetic and blood sugar for lunch was checked to be 66 and then rechecked as 66. Pt has very poor PO intake. Nutrition consult is ordered by provider.

## 2021-07-10 NOTE — PROGRESS NOTE ADULT - ASSESSMENT
CATH 11/30/2020:  COMPLICATIONS: The stent was deployed without difficulty. On removal of the  balloon, the shaft broke and the tip of the balloon remained in the vessel. Several attempts were made to snare the balloon unsuccessfully. Dr. Verdugo was notifed who will take the patient to the operating room.  DIAGNOSTIC RECOMMENDATIONS: The patient should continue with the present medications. The patients vessel was stented without difficulty On removal of the balloon, the shaft broke with the baloon stuck in the left main. All attempts to snare the balloon out failed and the patient was taken to the OR by Dr. Arango to remove the balloon  introp eleonora 11/30/20: mild to mod MR, < from: Intra-Operative Transesophageal Echo (11.30.20 @ 17:02) >  Severe global left ventricular systolic dysfunction. Inferior and inferolateral akinesis.  severe hypokinesis of other segments.  Severe global hypokinesia.  Normal left ventricular internal dimensions and wall thicknesses. Moderate diastolic dysfunction (Stage II).  echo 12/17/20: EF 32%, mod MR, Severe global left ventricular systolic dysfunction, moderate pulmonary pressures  echo 12/20/20: EF (Visual Estimate):  25-30 % mild MR, Akinesis of the inferolateral, anterolateral, apical laterlal, inferior, and inferoseptal walls. Severe left ventricular enlargement. No left ventricular thrombus is seen.  Echo 7/7/21: EF 16%, mod - sev MR, mod AS, severe global lv sys dysfx, severe diastolic dysfx, mod pulm htn       a/p  89M well known to practice with PMH ESRD on PD, DM on insulin, CHF EF 25-30% CAD s/p CABG x4, underwent cardiac cath and stent and wire broke in heart s/p sternotomy p/w septic shock    #Septic Shock, resolved   +fevers noted  -Bcx NGTD   -iv abx  -s/p pressors   -Bedside POCUS notable for scattered B lines, biventricular diffuse hypokinesis  -pt also with right 2nd toe gangrene - pod eval noted   -f/u culture of PD cathter  -ID eval noted   -pending ct chest   -med f/u    #Ischemic Cardiomyopathy. Chronic systolic HF  -vol status stable   -CXR w clear lungs   -Repeat echo noted with EF 16%, mod - sev MR, mod AS, severe global lv sys dysfx, severe diastolic dysfx, mod pulm htn   -eps eval for crt-D, pt reluctant in past    -no bb, acei/arb given hx of orthostatic bp   -continue midodrine for bp support  -cont vol removal w PD     # CAD, s/p CABG, s/p PCI, s/p redo open heart for stent balloon retrieval   -hst elevated, likely demand ischemia in setting of septic shock, ESRD   -c/w asa, Brilinta, statin     # ESRD  -on PD  -renal f/u    #Nausea, vomitting  -GI eval     dvt ppx

## 2021-07-10 NOTE — PROGRESS NOTE ADULT - ASSESSMENT
56 m with    Sepsis  - antibiotic   - peritoneal culture pending   - toe gangrene  - Podiatry follow  - ID follow   - CT chest noted    COPD  - 2L NC overnight because he becomes apneic, sats well  - Sats well on RA while awake    CAD s/p CABG   - Hypotension likely in the setting of septic shock   - midodrine dose increased to 30 mg TID to match home dose  - Previous echo with reduced EF  - F/up TTE  - c/w DAPT  - Cardiology follow    CHF cr systolic  - cardiology follow    Peritoneal Dialysis   - nephrology follow PD, recommend 4x a day  - patient on midodrine 30 q8h at home, continue here  - Holding home metoprolol 25 q12hr in setting of hypotension    Diabetes   - HA1C 6.1 on 7/6 suggesting prediabetes  - Tujeo 60 units at bedtime and Victoza at home, started on 30 units at bedtime, adjust as needed based on patient PO intake  - added 2 units insulin pre-meal    Hiccups  - GI evaluation  - Trial of Ativan    Incontinence dermatitis  - wound care    Toe gangrene  - Podiatry follow  - Vascular evaluation noted  - PONCHO/PVR    Vertebral fracture T12  - Ortho eval noted  - No intervention    Pipe Beck MD pager [phone]

## 2021-07-10 NOTE — PROVIDER CONTACT NOTE (CHANGE IN STATUS NOTIFICATION) - SITUATION
Pt admitted with sepsis transferred from MICU after requiring pressors
Patient less responsive and consistently MAPing <65 after repeating 2100 BP.

## 2021-07-10 NOTE — PROVIDER CONTACT NOTE (OTHER) - ASSESSMENT
Pt usually A/Ox3-4. Pt will not speak to writer making it difficult to assess orientation. VSS. Pt shows no s/s of hypogylcemia and is alert.

## 2021-07-10 NOTE — PROVIDER CONTACT NOTE (OTHER) - BACKGROUND
Pt admit dx for septic shock, s/p MICU admission requiring pressors. PMH ESRD on PD, DM on insulin, CHF EF 25-30% CAD s/p CABG x4. On Midodrine 30mg q8 for hypotension.

## 2021-07-10 NOTE — PROVIDER CONTACT NOTE (OTHER) - ACTION/TREATMENT ORDERED:
As per Esther PA- [facility] for recommendation on whether to perform PD based on pt situation.

## 2021-07-10 NOTE — CONSULT NOTE ADULT - ASSESSMENT
A/P :   Patient is a 56yMale w/ age indeterminate T12 VCF    -Admitted to medicine  -Medical management per primary team  -Clinical presentation and physical exam are not consistent w/ acute cord compression/cauda equina. Does not demonstrate red flag symptoms such as bowel/bladder incontinence, saddle anesthesia, fevers/chills, or weight loss.  -No acute orthopaedic surgical intervention planned at this time.  -DVT ppx per primary team  -PT/OT  -Ambulate as tolerated  -No heavy lifting/twisting  -Multimodal analgesia - recommend low dose opioids, acetaminophen, muscle relaxant as tolerated  -Recommend follow up w/ [facility] outpatient in 1-2 weeks. Call office to schedule appointment.  -Will discuss w/ attending and advise if plan changes.    Mac Valdez M.D.   Orthopaedic Surgery  [id number] A/P :   Patient is a 56yMale w/ age indeterminate T12 VCF    -Admitted to medicine  -Medical management per primary team  -Clinical presentation and physical exam are not consistent w/ acute cord compression/cauda equina. Does not demonstrate red flag symptoms such as bowel/bladder incontinence, saddle anesthesia, fevers/chills, or weight loss.  -Will need MRI to further characterize fx when pt medically stable  -No acute orthopaedic surgical intervention planned at this time.  -DVT ppx per primary team  -PT/OT  -Ambulate as tolerated  -No heavy lifting/twisting  -Multimodal analgesia - recommend low dose opioids, acetaminophen, muscle relaxant as tolerated  -Recommend follow up w/ [facility] outpatient in 1-2 weeks. Call office to schedule appointment.  -Will discuss w/ attending and advise if plan changes.    Mac Valdez M.D.   Orthopaedic Surgery  [id number]

## 2021-07-10 NOTE — CONSULT NOTE ADULT - SUBJECTIVE AND OBJECTIVE BOX
CC: Patient is a 56y old  Male who presents with a chief complaint of Hypotension (10 Jul 2021 13:07)    HPI:  55M PMH ESRD on PD, DM on insulin, CHF EF 25-30% CAD s/p CABG x4, underwent cardiac cath and stent and wire broke in heart sternotomy. Patient states that for the last 2 days he has been having increased cough and sweating. He endorsed chills but no measured fevers at home. He denies any sick contacts or recent travel. Patient states that he fell few days ago with no head trauma. Of note patient has right toe gangrenous lesion that has been present for several months, He denies any trauma or pain at the site.   In the ED, Patient was found to be hypotensive to 70s. Patient was given midodrine 10mg x1 and bedside POCUS was completed showing poor cardiac function with diffuse hypokinesis. Patient was unable to maintain adequate SBPs despite midodrine so was started on levophed for pressor support. Admitted to MICU hypotension requiring for pressor support likely  2/2 to sepsis.   (06 Jul 2021 07:59)    Patient seen and examined at bedside. Patient has since been downgraded to floor. Unable to obtain good history given patient's mental status. Unresponsive to questioning.       PMH  Diabetes    CAD, multiple vessel    ESRD (end stage renal disease) on dialysis    HTN (hypertension)      PSH  No significant past surgical history    S/P CABG (coronary artery bypass graft)      MEDS    Allergies    doxazosin (Other)    Intolerances        Social        Physical Exam  T(C): 36.8 (07-10-21 @ 12:00), Max: 37 (07-09-21 @ 17:22)  HR: 102 (07-10-21 @ 13:44) (97 - 111)  BP: 106/70 (07-10-21 @ 13:44) (95/67 - 113/83)  RR: 20 (07-10-21 @ 12:00) (18 - 20)  SpO2: 98% (07-10-21 @ 12:00) (95% - 100%)  Wt(kg): --  Tmax: T(C): , Max: 37 (07-09-21 @ 17:22)  Wt(kg): --    Gen: NAD  HEENT: normocephalic, atraumatic, no scleral icterus  Pulm: nonlabored respirations  Abd: Soft, ND, NTP, no rebound, no guarding, no palpable organomegaly/masses  Vascular: pedal pulses NP bilaterally, otherwise warm   LEFT foot: +Dp, PT signals. palpable pop and femoral pulses  Right foot: + DP, PT signals, palpable pop and femoral pulses. R second toe with demarcated dry gangrene      07-09-21  -  07-10-21  --------------------------------------------------------  IN:    Other (mL): 5000 mL  Total IN: 5000 mL    OUT:    Other (mL): 5200 mL  Total OUT: 5200 mL    Total NET: -200 mL      07-10-21  -  07-10-21  --------------------------------------------------------  IN:  Total IN: 0 mL    OUT:    Other (mL): 2900 mL  Total OUT: 2900 mL    Total NET: -2900 mL          Labs:                        12.2   9.95  )-----------( 258      ( 10 Jul 2021 07:40 )             41.7     07-10    131<L>  |  91<L>  |  47<H>  ----------------------------<  98  3.6   |  22  |  9.24<H>    Ca    9.1      10 Jul 2021 07:38  Phos  4.8     07-10                Imaging

## 2021-07-10 NOTE — PROVIDER CONTACT NOTE (OTHER) - ACTION/TREATMENT ORDERED:
NP stated to follow hypoglycemia protocol. Glucose gel was admin twice. Blood sugar was 75 after first dose and then 101 after second dose and juice. Pt stable, safety maintained. Will monitor

## 2021-07-11 NOTE — DIETITIAN INITIAL EVALUATION ADULT. - PERTINENT MEDS FT
MEDICATIONS  (STANDING):  aspirin enteric coated 81 milliGRAM(s) Oral daily  atorvastatin 80 milliGRAM(s) Oral at bedtime  cadexomer iodine 0.9% Gel 1 Application(s) Topical daily  cefepime   IVPB 1000 milliGRAM(s) IV Intermittent every 24 hours  chlorhexidine 0.12% Liquid 15 milliLiter(s) Oral Mucosa two times a day  chlorhexidine 2% Cloths 1 Application(s) Topical <User Schedule>  dextrose 40% Gel 15 Gram(s) Oral once  dextrose 5%. 1000 milliLiter(s) (100 mL/Hr) IV Continuous <Continuous>  dextrose 5%. 1000 milliLiter(s) (50 mL/Hr) IV Continuous <Continuous>  dextrose 50% Injectable 25 Gram(s) IV Push once  dextrose 50% Injectable 12.5 Gram(s) IV Push once  dextrose 50% Injectable 25 Gram(s) IV Push once  ferrous    sulfate 325 milliGRAM(s) Oral three times a day  folic acid 1 milliGRAM(s) Oral daily  glucagon  Injectable 1 milliGRAM(s) IntraMuscular once  heparin   Injectable 5000 Unit(s) SubCutaneous every 12 hours  insulin glargine Injectable (LANTUS) 5 Unit(s) SubCutaneous at bedtime  insulin lispro (ADMELOG) corrective regimen sliding scale   SubCutaneous three times a day before meals  insulin lispro (ADMELOG) corrective regimen sliding scale   SubCutaneous at bedtime  lactated ringers. 1000 milliLiter(s) (50 mL/Hr) IV Continuous <Continuous>  midodrine 30 milliGRAM(s) Oral every 8 hours  pantoprazole    Tablet 40 milliGRAM(s) Oral before breakfast  sevelamer carbonate 800 milliGRAM(s) Oral three times a day  ticagrelor 60 milliGRAM(s) Oral every 12 hours

## 2021-07-11 NOTE — DIETITIAN INITIAL EVALUATION ADULT. - FEEDING ASSISTANCE
Recommend nursing to continue to encourage adequate intake and provide feeding assistance at meals as needed

## 2021-07-11 NOTE — PROGRESS NOTE ADULT - SUBJECTIVE AND OBJECTIVE BOX
VASCULAR SURGERY PROGRESS NOTE  Hospital Day #5d      SUBJECTIVE  Pt seen and examined at bedside during morning rounds. No complaints.       PMHx  ·	Diabetes  ·	CAD, multiple vessel  ·	ESRD (end stage renal disease) on dialysis  ·	HTN (hypertension)        OBJECTIVE:    PHYSICAL EXAM   General Appearance: Appears well, NAD  Resp: Patent airway, non-labored breathing  CV: RRR  Abdomen: Soft, Nontender, Nondistended  Vascular: nonpalpable DP/PT pulses B/L  RLE: 2nd toe w/ distal dry gangrene with duskiness surrounding area    Vital Signs Last 24 Hrs  T(C): 36.7 (11 Jul 2021 10:00), Max: 36.9 (11 Jul 2021 01:00)  T(F): 98 (11 Jul 2021 10:00), Max: 98.5 (11 Jul 2021 01:00)  HR: 98 (11 Jul 2021 10:00) (98 - 111)  BP: 106/76 (11 Jul 2021 10:00) (95/67 - 108/74)  BP(mean): --  RR: 18 (11 Jul 2021 10:00) (18 - 20)  SpO2: 98% (11 Jul 2021 10:00) (96% - 100%)    I's & O's    07-10-21 @ 07:01  -  07-11-21 @ 07:00  --------------------------------------------------------  IN: 120 mL / OUT: 2900 mL / NET: -2780 mL        MEDICATIONS:  ·	DVT PROPHYLAXIS: heparin   Injectable 5000 Unit(s)  ·	GI PROPHYLAXIS: pantoprazole    Tablet 40 milliGRAM(s)  ·	ANTIBIOTICS: cefepime   IVPB 1000 milliGRAM(s)      LAB/STUDIES:                        12.3   10.47 )-----------( 249      ( 11 Jul 2021 07:19 )             40.7     07-11    128<L>  |  88<L>  |  52<H>  ----------------------------<  91  3.8   |  22  |  10.42<H>    Ca    9.1      11 Jul 2021 07:15  Phos  5.8     07-11  Mg     2.4     07-11      IMAGING  Echo 7/7/21: EF 16%, mod - sev MR, mod AS, severe global lv sys dysfx, severe diastolic dysfx, mod pulm htn

## 2021-07-11 NOTE — DIETITIAN INITIAL EVALUATION ADULT. - ORAL NUTRITION SUPPLEMENTS
pt's spouse reports pt does not like nepro oral nutrition supplements or any of the protein supplements at this time

## 2021-07-11 NOTE — PROGRESS NOTE ADULT - SUBJECTIVE AND OBJECTIVE BOX
CC: f/u for  sepsis  Patient reports  nothing not talking  REVIEW OF SYSTEMS:  All other review of systems negative (Comprehensive ROS)  cannot get  Antimicrobials Day #  :    Other Medications Reviewed    T(F): 98.4 (07-11-21 @ 18:54), Max: 98.5 (07-11-21 @ 01:00)  HR: 103 (07-11-21 @ 18:54)  BP: 103/72 (07-11-21 @ 18:54)  RR: 18 (07-11-21 @ 18:54)  SpO2: 93% (07-11-21 @ 18:54)  Wt(kg): --    PHYSICAL EXAM:  General: hiccups  Eyes:  anicteric, no conjunctival injection, no discharge  Oropharynx: no lesions or injection 	  Neck: supple, without adenopathy  Lungs: clear to auscultation  Heart: regular rate and rhythm; no murmur, rubs or gallops  Abdomen: soft, nondistended, nontender, without mass or organomegaly  Skin: no lesions  Extremities: no clubbing, cyanosis, or edema  Neurologic: not following commands but awake    LAB RESULTS:                        12.3   10.47 )-----------( 249      ( 11 Jul 2021 07:19 )             40.7     07-11    128<L>  |  88<L>  |  52<H>  ----------------------------<  91  3.8   |  22  |  10.42<H>    Ca    9.1      11 Jul 2021 07:15  Phos  5.8     07-11  Mg     2.4     07-11          MICROBIOLOGY:  RECENT CULTURES:  07-10 @ 19:16 .Peritoneal Dialysis Fluid Dialysis (P.D.) Fluid     No growth      07-10 @ 18:54 .Blood Blood-Peripheral     No growth to date.          RADIOLOGY REVIEWED:    < from: CT Chest No Cont (07.09.21 @ 21:43) >    EXAM:  CT CHEST                            PROCEDURE DATE:  07/09/2021            INTERPRETATION:  CLINICAL INFORMATION: Cough. Evaluate for pneumonia.    COMPARISON: Chest x-ray 7/6/2021 and CT chest 12/30/2020    CONTRAST/COMPLICATIONS:  IV Contrast: NONE  Oral Contrast: NONE  Complications: None reported at time of study completion    PROCEDURE:  CT of the Chest was performed.  Sagittal and coronal reformats were performed.    FINDINGS:    LUNGS AND AIRWAYS: Central airways are clear. Few subtle peripheral opacities in the right upper lobe are new from the prior study. Bilateral dependent subsegmental atelectasis.  PLEURA: Trace bilateral pleural effusions.  MEDIASTINUM AND RYAN: 1.1 cm right paratracheal node is again noted.  VESSELS: Atherosclerotic coronary arteries and aorta.  HEART: Cardiomegaly. No pericardial effusion. Patient is status post CABG. No pericardial effusion. Aortic valve calcification.  CHEST WALL AND LOWER NECK: Patient status post median sternotomy.  VISUALIZEDUPPER ABDOMEN: Pneumoperitoneum. Mild ascites. Atherosclerotic changes involving the aortic and celiac vessels. Cholelithiasis  BONES: Degenerative changes. Nonunion sternal fragments. T12 vertebral body fracture is age-indeterminate but new from theprior study. There appears to be some mass effect upon the canal    IMPRESSION:  Few subtle peripheral opacities in the right upper lobe are new from the prior study and indeterminate. Short-term follow-up CT recommended for complete evaluation.    Pneumoperitoneum may be related to peritoneal dialysis catheter. If concern for additional abnormality, CT abdomen/pelvis is recommended.    T12 vertebral body fracture is age-indeterminate but new from the prior study. There appears to be some mass effect upon the canal. MRI spine may be helpful for complete evaluation.    Dr. Méndez discussed these findings with CARLYLE Cordero on 7/10/2021 9:34 AM .    --- End of Report ---        < end of copied text >            Assessment:  Patient with esrd on pd, had stay about 7 months ago s/p cardiac stent, wire broke, had to undergo sternotomy, 2 stays soon after that for sepsis unclear cause, now returns a few days ago with fever, hypotension, weak, report of cough. He required pressors in micu now off, cultures of blood and pd fluid are no growth, exam not revealing. Source of presumed sepsis is not readily apparent. CXR shows no infiltrate but could have pneumonia not seen given the cough. Has dry gangrene of right 3rd toe but that is not a source of sepsis. has subtle infiltrates on ct so maybe pneumonia. VEry encephalopathic maybe from cefepime, had enough   Recommendations:  will stop cefepime , had 6 d should be ok for pneumonia and may be causing encephalopath  f/u final cultures  await mri for t spine fx eval  supportive care  consider psych eval he seems very depressed

## 2021-07-11 NOTE — PROGRESS NOTE ADULT - ASSESSMENT
56 m with    Sepsis  - antibiotic   - peritoneal culture pending   - toe gangrene  - Podiatry follow  - ID follow     COPD  - 2L NC overnight because he becomes apneic, sats well  - Sats well on RA while awake    CAD s/p CABG   - Hypotension likely in the setting of septic shock   - midodrine dose increased to 30 mg TID to match home dose  - Previous echo with reduced EF  - F/up TTE  - c/w DAPT  - Cardiology follow    CHF cr systolic  - cardiology follow    Peritoneal Dialysis   - nephrology follow PD, recommend 4x a day  - patient on midodrine 30 q8h at home, continue here  - Holding home metoprolol 25 q12hr in setting of hypotension    Diabetes   - HA1C 6.1 on 7/6 suggesting prediabetes  - Tujeo 60 units at bedtime and Victoza at home, started on 30 units at bedtime, adjust as needed based on patient PO intake  - added 2 units insulin pre-meal    Hiccups  - GI evaluation  - hold Ativan 2 to sedation    Incontinence dermatitis  - wound care    Toe gangrene  - Podiatry follow  - Vascular evaluation noted  - PONCHO/PVR    Finger gangrenous area  - Plastic evaluation    Vertebral fracture T12  - Ortho eval noted   - MRI spine pending   - No intervention    Pipe Beck MD pager [phone]

## 2021-07-11 NOTE — CHART NOTE - NSCHARTNOTEFT_GEN_A_CORE
Reported by RN that patient not eating anything. He hoards food and medicines in the mouth.  Patient is more awake than yesterday but continues to be nonverbal and does not follow commands  Discussed with Dr. Beck   Call psyche consult in AM  Speech and swallow pending    [phone]

## 2021-07-11 NOTE — DIETITIAN INITIAL EVALUATION ADULT. - OTHER CALCULATIONS
estimated energy & protein needs based on most recent bed scale weight of 150.3 pounds; defer fluid needs to team

## 2021-07-11 NOTE — PROVIDER CONTACT NOTE (OTHER) - ASSESSMENT
pt unable to tolerate PO medications, pt pocketing PO meds. suction required to remove medication from mouth

## 2021-07-11 NOTE — PROVIDER CONTACT NOTE (OTHER) - SITUATION
pt unable to tolerate PO medications, pt pocketing PO meds. suction required to remove medication from mouth.

## 2021-07-11 NOTE — PROGRESS NOTE ADULT - ASSESSMENT
CATH 11/30/2020:  COMPLICATIONS: The stent was deployed without difficulty. On removal of the  balloon, the shaft broke and the tip of the balloon remained in the vessel. Several attempts were made to snare the balloon unsuccessfully. Dr. Verdugo was notifed who will take the patient to the operating room.  DIAGNOSTIC RECOMMENDATIONS: The patient should continue with the present medications. The patients vessel was stented without difficulty On removal of the balloon, the shaft broke with the baloon stuck in the left main. All attempts to snare the balloon out failed and the patient was taken to the OR by Dr. Arango to remove the balloon  introp eleonora 11/30/20: mild to mod MR, < from: Intra-Operative Transesophageal Echo (11.30.20 @ 17:02) >  Severe global left ventricular systolic dysfunction. Inferior and inferolateral akinesis.  severe hypokinesis of other segments.  Severe global hypokinesia.  Normal left ventricular internal dimensions and wall thicknesses. Moderate diastolic dysfunction (Stage II).  echo 12/17/20: EF 32%, mod MR, Severe global left ventricular systolic dysfunction, moderate pulmonary pressures  echo 12/20/20: EF (Visual Estimate):  25-30 % mild MR, Akinesis of the inferolateral, anterolateral, apical laterlal, inferior, and inferoseptal walls. Severe left ventricular enlargement. No left ventricular thrombus is seen.  Echo 7/7/21: EF 16%, mod - sev MR, mod AS, severe global lv sys dysfx, severe diastolic dysfx, mod pulm htn       a/p  56 M well known to practice with PMH ESRD on PD, DM on insulin, CHF EF 25-30% CAD s/p CABG x4, underwent cardiac cath and stent and wire broke in heart s/p sternotomy p/w septic shock    #Septic Shock, resolved   +fevers noted  -Bcx NGTD   -iv abx  -s/p pressors   -Bedside POCUS notable for scattered B lines, biventricular diffuse hypokinesis  -pt also with right 2nd toe gangrene - pod eval noted   -f/u culture of PD cathter  -ID eval noted   -med f/u    #Ischemic Cardiomyopathy. Chronic systolic HF  -vol status stable   -CXR w clear lungs   -Repeat echo noted with EF 16%, mod - sev MR, mod AS, severe global lv sys dysfx, severe diastolic dysfx, mod pulm htn   -eps eval for crt-D, pt reluctant in past    -no bb, acei/arb given hx of orthostatic bp   -continue midodrine for bp support  -cont vol removal w PD     # CAD, s/p CABG, s/p PCI, s/p redo open heart for stent balloon retrieval   -hst elevated, likely demand ischemia in setting of septic shock, ESRD   -c/w asa, Brilinta, statin     # ESRD  -on PD  -renal f/u    #Nausea, vomiting  -GI followup    dvt ppx

## 2021-07-11 NOTE — DIETITIAN INITIAL EVALUATION ADULT. - ORAL INTAKE PTA/DIET HISTORY
Per pt's spouse at bedside, pt with poor appetite for a while PTA (does not give specific time frame). Pt was consuming regular texture diet; per RD note from 1/4/21, pt reports not following therapeutic diet at home. No known food allergies per chart. Pt's spouse reports pt takes Vit D supplement weekly. Noted per H&P, pt also ordered for ferrous sulfate and folic acid at home.

## 2021-07-11 NOTE — PROGRESS NOTE ADULT - ASSESSMENT
Seen and examined at bedside  Alert , but not answering questions: NAD  BP improved relative to yesterday  Resume PD     aspirin enteric coated 81 milliGRAM(s) Oral daily  atorvastatin 80 milliGRAM(s) Oral at bedtime  cadexomer iodine 0.9% Gel 1 Application(s) Topical daily  cefepime   IVPB 1000 milliGRAM(s) IV Intermittent every 24 hours  chlorhexidine 0.12% Liquid 15 milliLiter(s) Oral Mucosa two times a day  chlorhexidine 2% Cloths 1 Application(s) Topical <User Schedule>  dextrose 40% Gel 15 Gram(s) Oral once  dextrose 5%. 1000 milliLiter(s) IV Continuous <Continuous>  dextrose 5%. 1000 milliLiter(s) IV Continuous <Continuous>  dextrose 50% Injectable 25 Gram(s) IV Push once  dextrose 50% Injectable 12.5 Gram(s) IV Push once  dextrose 50% Injectable 25 Gram(s) IV Push once  ferrous    sulfate 325 milliGRAM(s) Oral three times a day  folic acid 1 milliGRAM(s) Oral daily  glucagon  Injectable 1 milliGRAM(s) IntraMuscular once  heparin   Injectable 5000 Unit(s) SubCutaneous every 12 hours  insulin glargine Injectable (LANTUS) 5 Unit(s) SubCutaneous at bedtime  insulin lispro (ADMELOG) corrective regimen sliding scale   SubCutaneous three times a day before meals  insulin lispro (ADMELOG) corrective regimen sliding scale   SubCutaneous at bedtime  lactated ringers. 1000 milliLiter(s) IV Continuous <Continuous>  midodrine 30 milliGRAM(s) Oral every 8 hours  pantoprazole    Tablet 40 milliGRAM(s) Oral before breakfast  sevelamer carbonate 800 milliGRAM(s) Oral three times a day  ticagrelor 60 milliGRAM(s) Oral every 12 hours      VITAL:  T(C): , Max: 36.9 (07-11-21 @ 01:00)  T(F): , Max: 98.5 (07-11-21 @ 01:00)  HR: 102 (07-11-21 @ 09:00)  BP: 108/74 (07-11-21 @ 09:00)  BP(mean): --  RR: 18 (07-11-21 @ 09:00)  SpO2: 98% (07-11-21 @ 09:00)  Wt(kg): --    07-10-21 @ 07:01  -  07-11-21 @ 07:00  --------------------------------------------------------  IN: 120 mL / OUT: 2900 mL / NET: -2780 mL        PHYSICAL EXAM:  Constitutional: NAD; lethargic  Neck:  No JVD  Respiratory: distant BS  b/l  Cardiovascular: S1 and S2  Gastrointestinal: BS+, soft, + PD  Extremities: No peripheral edema  Neurological: A/O x 3, no focal deficits  Psychiatric: Normal mood, normal affect  : No Johnson  Skin: No rashes  Access: PD catheter    LABS:                          12.3   10.47 )-----------( 249      ( 11 Jul 2021 07:19 )             40.7     Na(128)/K(3.8)/Cl(88)/HCO3(22)/BUN(52)/Cr(10.42)Glu(91)/Ca(9.1)/Mg(2.4)/PO4(5.8)    07-11 @ 07:15  Na(131)/K(3.6)/Cl(91)/HCO3(22)/BUN(47)/Cr(9.24)Glu(98)/Ca(9.1)/Mg(--)/PO4(4.8)    07-10 @ 07:38  Na(132)/K(3.4)/Cl(92)/HCO3(22)/BUN(52)/Cr(9.65)Glu(138)/Ca(8.5)/Mg(--)/PO4(4.7)    07-09 @ 06:35    Imaging    EXAM:  CT BRAIN                            PROCEDURE DATE:  07/09/2021      INTERPRETATION:  CLINICAL INFORMATION: Altered mental status    TECHNIQUE: Noncontrast axial CT images were acquired through the head. Two-dimensional sagittal and coronal reformats were generated.    COMPARISON STUDY: CT head from 12/29/2020    FINDINGS:    No CT evidence of acute intracranial hemorrhage, extra-axial collection, edema, mass effect, midline shift, central herniation, or hydrocephalus. Grey-white matter junction is well-preserved.    Mild patchy periventricular white matter heterogeneity, nonspecific, although likely on the basis of chronic small vessel ischemic disease.    Prominence of the ventricles and sulci, unchanged, compatible with generalized brain parenchymal volume loss.    Atherosclerotic calcifications at the skull base.    Trace bilateral maxillary sinus mucosal thickening. Right frontal sinus polyp versus retention cyst. Bilateral mastoid air cells and middle ear cavities areclear. Bilateral ocular lens replacements.    Calvarium is intact.    IMPRESSION:    No acute intracranial hemorrhage or mass effect.        ASSESSMENT/PLAN  Assessment:  55M PMH ESRD on PD, DM on insulin, CHF EF 25-30% CAD s/p CABG x4 pw with hypotension with fever  Hypotension off pressors and   Source is pending   Hypokalemia- resolving/ resolved s/p repletion yesterday    1 Renal -   [facility] orders will be 1.5% as to not cause too much UF and ultimately hypotension . BP improved with midodrine as well as holding PD yesterday.    Can resume PD today  2 Hyponatremia- should improve with PD          3 ID-Blood cx  and  PD fluid cx repeated  yesterday.   IV abx .  ID eval   from 7/9 noted  4 CVS-Midodrine increased to 30mg po q 8 hours   5 Podiatry- Toe does not seem to be the issue  6  Psych eval might be needed since he seems very depressed      Modesto Wayne NP-BC  [employer]  [phone]   Seen and examined at bedside  Alert , but not answering questions: NAD  BP improved relative to yesterday  Resume PD     aspirin enteric coated 81 milliGRAM(s) Oral daily  atorvastatin 80 milliGRAM(s) Oral at bedtime  cadexomer iodine 0.9% Gel 1 Application(s) Topical daily  cefepime   IVPB 1000 milliGRAM(s) IV Intermittent every 24 hours  chlorhexidine 0.12% Liquid 15 milliLiter(s) Oral Mucosa two times a day  chlorhexidine 2% Cloths 1 Application(s) Topical <User Schedule>  dextrose 40% Gel 15 Gram(s) Oral once  dextrose 5%. 1000 milliLiter(s) IV Continuous <Continuous>  dextrose 5%. 1000 milliLiter(s) IV Continuous <Continuous>  dextrose 50% Injectable 25 Gram(s) IV Push once  dextrose 50% Injectable 12.5 Gram(s) IV Push once  dextrose 50% Injectable 25 Gram(s) IV Push once  ferrous    sulfate 325 milliGRAM(s) Oral three times a day  folic acid 1 milliGRAM(s) Oral daily  glucagon  Injectable 1 milliGRAM(s) IntraMuscular once  heparin   Injectable 5000 Unit(s) SubCutaneous every 12 hours  insulin glargine Injectable (LANTUS) 5 Unit(s) SubCutaneous at bedtime  insulin lispro (ADMELOG) corrective regimen sliding scale   SubCutaneous three times a day before meals  insulin lispro (ADMELOG) corrective regimen sliding scale   SubCutaneous at bedtime  lactated ringers. 1000 milliLiter(s) IV Continuous <Continuous>  midodrine 30 milliGRAM(s) Oral every 8 hours  pantoprazole    Tablet 40 milliGRAM(s) Oral before breakfast  sevelamer carbonate 800 milliGRAM(s) Oral three times a day  ticagrelor 60 milliGRAM(s) Oral every 12 hours      VITAL:  T(C): , Max: 36.9 (07-11-21 @ 01:00)  T(F): , Max: 98.5 (07-11-21 @ 01:00)  HR: 102 (07-11-21 @ 09:00)  BP: 108/74 (07-11-21 @ 09:00)  BP(mean): --  RR: 18 (07-11-21 @ 09:00)  SpO2: 98% (07-11-21 @ 09:00)  Wt(kg): --    07-10-21 @ 07:01  -  07-11-21 @ 07:00  --------------------------------------------------------  IN: 120 mL / OUT: 2900 mL / NET: -2780 mL        PHYSICAL EXAM:  Constitutional: NAD; lethargic  Neck:  No JVD  Respiratory: distant BS  b/l  Cardiovascular: S1 and S2  Gastrointestinal: BS+, soft, + PD  Extremities: No peripheral edema  Neurological: A/O x 3, no focal deficits  Psychiatric: Normal mood, normal affect  : No Johnson  Skin: No rashes  Access: PD catheter    LABS:                          12.3   10.47 )-----------( 249      ( 11 Jul 2021 07:19 )             40.7     Na(128)/K(3.8)/Cl(88)/HCO3(22)/BUN(52)/Cr(10.42)Glu(91)/Ca(9.1)/Mg(2.4)/PO4(5.8)    07-11 @ 07:15  Na(131)/K(3.6)/Cl(91)/HCO3(22)/BUN(47)/Cr(9.24)Glu(98)/Ca(9.1)/Mg(--)/PO4(4.8)    07-10 @ 07:38  Na(132)/K(3.4)/Cl(92)/HCO3(22)/BUN(52)/Cr(9.65)Glu(138)/Ca(8.5)/Mg(--)/PO4(4.7)    07-09 @ 06:35    Imaging    EXAM:  CT BRAIN                            PROCEDURE DATE:  07/09/2021      INTERPRETATION:  CLINICAL INFORMATION: Altered mental status    TECHNIQUE: Noncontrast axial CT images were acquired through the head. Two-dimensional sagittal and coronal reformats were generated.    COMPARISON STUDY: CT head from 12/29/2020    FINDINGS:    No CT evidence of acute intracranial hemorrhage, extra-axial collection, edema, mass effect, midline shift, central herniation, or hydrocephalus. Grey-white matter junction is well-preserved.    Mild patchy periventricular white matter heterogeneity, nonspecific, although likely on the basis of chronic small vessel ischemic disease.    Prominence of the ventricles and sulci, unchanged, compatible with generalized brain parenchymal volume loss.    Atherosclerotic calcifications at the skull base.    Trace bilateral maxillary sinus mucosal thickening. Right frontal sinus polyp versus retention cyst. Bilateral mastoid air cells and middle ear cavities areclear. Bilateral ocular lens replacements.    Calvarium is intact.    IMPRESSION:    No acute intracranial hemorrhage or mass effect.        ASSESSMENT/PLAN  Assessment:  55M PMH ESRD on PD, DM on insulin, CHF EF 25-30% CAD s/p CABG x4 pw with hypotension with fever  Hypotension off pressors and   Source is pending   Hypokalemia- resolving/ resolved s/p repletion yesterday    1 Renal -   [facility] orders will be 1.5% as to not cause too much UF and ultimately hypotension . BP improved with midodrine as well as holding PD yesterday.    Can resume PD today  2 Hyponatremia- should improve with PD          3 ID-Blood cx  and  PD fluid cx repeated  yesterday.   IV abx .  ID eval   from 7/9 noted  4 CVS-Midodrine increased to 30mg po q 8 hours   5 Podiatry- Toe does not seem to be the issue  6  Psych eval might be needed since he seems very depressed            d/w Dr. Dangelo and informed staff RN      Modesto Wayne, NP-BC  [employer] [employer]  [phone]

## 2021-07-11 NOTE — PROGRESS NOTE ADULT - SUBJECTIVE AND OBJECTIVE BOX
Patient is a 56y old  Male who presents with a chief complaint of Hypotension (11 Jul 2021 14:03)      SUBJECTIVE / OVERNIGHT EVENTS: No new complaints. + hiccups.  Review of Systems  chest pain no  palpitations no  sob no  nausea no  headache no    MEDICATIONS  (STANDING):  aspirin enteric coated 81 milliGRAM(s) Oral daily  atorvastatin 80 milliGRAM(s) Oral at bedtime  cadexomer iodine 0.9% Gel 1 Application(s) Topical daily  cefepime   IVPB 1000 milliGRAM(s) IV Intermittent every 24 hours  chlorhexidine 0.12% Liquid 15 milliLiter(s) Oral Mucosa two times a day  chlorhexidine 2% Cloths 1 Application(s) Topical <User Schedule>  dextrose 40% Gel 15 Gram(s) Oral once  dextrose 5%. 1000 milliLiter(s) (50 mL/Hr) IV Continuous <Continuous>  dextrose 5%. 1000 milliLiter(s) (100 mL/Hr) IV Continuous <Continuous>  dextrose 50% Injectable 25 Gram(s) IV Push once  dextrose 50% Injectable 12.5 Gram(s) IV Push once  dextrose 50% Injectable 25 Gram(s) IV Push once  ferrous    sulfate 325 milliGRAM(s) Oral three times a day  folic acid 1 milliGRAM(s) Oral daily  glucagon  Injectable 1 milliGRAM(s) IntraMuscular once  heparin   Injectable 5000 Unit(s) SubCutaneous every 12 hours  insulin glargine Injectable (LANTUS) 5 Unit(s) SubCutaneous at bedtime  insulin lispro (ADMELOG) corrective regimen sliding scale   SubCutaneous three times a day before meals  insulin lispro (ADMELOG) corrective regimen sliding scale   SubCutaneous at bedtime  lactated ringers. 1000 milliLiter(s) (50 mL/Hr) IV Continuous <Continuous>  midodrine 30 milliGRAM(s) Oral every 8 hours  pantoprazole    Tablet 40 milliGRAM(s) Oral before breakfast  sevelamer carbonate 800 milliGRAM(s) Oral three times a day  ticagrelor 60 milliGRAM(s) Oral every 12 hours    MEDICATIONS  (PRN):      Vital Signs Last 24 Hrs  T(C): 36.7 (11 Jul 2021 13:00), Max: 36.9 (11 Jul 2021 01:00)  T(F): 98.1 (11 Jul 2021 13:00), Max: 98.5 (11 Jul 2021 01:00)  HR: 98 (11 Jul 2021 13:00) (98 - 105)  BP: 113/78 (11 Jul 2021 13:00) (100/69 - 113/78)  BP(mean): --  RR: 18 (11 Jul 2021 13:00) (18 - 20)  SpO2: 95% (11 Jul 2021 13:00) (95% - 100%)    PHYSICAL EXAM:  GENERAL: NAD, well-developed  HEAD:  Atraumatic, Normocephalic  EYES: EOMI, PERRLA, conjunctiva and sclera clear  NECK: Supple, No JVD  CHEST/LUNG: Clear to auscultation bilaterally; No wheeze  HEART: Regular rate and rhythm; No murmurs, rubs, or gallops  ABDOMEN: Soft, Nontender, Nondistended; Bowel sounds present PD catheter.  EXTREMITIES:  2+ Peripheral Pulses, No clubbing, cyanosis, or edema R 2nd toe gangrenous area. R hand finger gangrenous area tip finger.  PSYCH: AAOx3  NEUROLOGY: non-focal  SKIN: No rashes or lesions    LABS:                        12.3   10.47 )-----------( 249      ( 11 Jul 2021 07:19 )             40.7     07-11    128<L>  |  88<L>  |  52<H>  ----------------------------<  91  3.8   |  22  |  10.42<H>    Ca    9.1      11 Jul 2021 07:15  Phos  5.8     07-11  Mg     2.4     07-11                  RADIOLOGY & ADDITIONAL TESTS:    Imaging Personally Reviewed:    Consultant(s) Notes Reviewed:      Care Discussed with Consultants/Other Providers:

## 2021-07-11 NOTE — PROGRESS NOTE ADULT - ASSESSMENT
56M w/PMHx of ESRD on PD, DM on insulin, CHF EF 25-30% CAD s/p CABG x4, w/ R toe gangrenous lesion present for several months, admitted to [facility]. Pt hypotensive, requiring pressors so transferred to MICU w/concern for septic shock. Vascular surgery consulted for evaluation of RLE 2nd toe.       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 Gustavo Mary, PGY-1  Vascular Surgery  Pager [phone]

## 2021-07-11 NOTE — DIETITIAN INITIAL EVALUATION ADULT. - REASON INDICATOR FOR ASSESSMENT
Nutrition Consult for Education  Source: medical record, pt's spouse Ann Marie at bedside, noted pt lethargic, oriented x4 per flow sheets, however not responding to RD interview questions

## 2021-07-11 NOTE — DIETITIAN INITIAL EVALUATION ADULT. - PERTINENT LABORATORY DATA
7/11: Na: 128 L, BUN: 52 H, Cr: 10.42 H, eGFR: 5 L, Phos: 5.8 H  --> Noted pt with hyponatremia; per nephrology, pt for Peritoneal Dialysis today- should improve; pt also with hyperphosphatemia- ordered for renvela

## 2021-07-11 NOTE — DIETITIAN INITIAL EVALUATION ADULT. - CHIEF COMPLAINT
"55M PMH ESRD on PD, DM on insulin, CHF EF 25-30% CAD s/p CABG x4 w/ R toe gangrenous lesion present for several months pw with hypotension with fever." Per Nephrology note, psych evaluation needed as pt seems depressed. "54 y/o Male PMH ESRD on PD, DM on insulin, CHF EF 25-30% CAD s/p CABG x4 w/ R toe gangrenous lesion present for several months pw with hypotension with fever." Per Nephrology note, psych evaluation needed as pt seems depressed.

## 2021-07-11 NOTE — DIETITIAN INITIAL EVALUATION ADULT. - OTHER INFO
Upon RD visit, pt was laying in bed with spouse at bedside. Spouse had brought in soup from home that she was feeding to patient; pt not eating much of soup. No nausea, vomiting, constipation or diarrhea at this time. Pt's spouse reports pt with a few episodes of vomiting right before admit but no vomiting noted since. Pt's spouse also reports pt stopped talking since he has been on medical unit, reports he was speaking in the ICU. Per discussion with nurse as well, pt seems more lethargic today. Unclear if pt able to swallow medications. Noted speech therapist evaluation has been ordered for today. Last bowel movement 7/8 per flow sheets.    Pt's spouse reports pt weight is ~140 pounds consistent with dosing weight (7/6/21). Per RD note from 1/4/21, pt with a UBW of 170 pounds. In-house weights during last admit ranged from 159-170 pounds. Per RD note from 12/20, Weight of 167.9 pounds. During this admit, weights ~157-161 pounds. Most recent weight today of 150.3 pounds (7/11/21). Unclear accuracy of 140 pounds dosing weight? Weight fluctuations likely secondary to fluid shifts from Peritoneal Dialysis. Will continue to monitor trends as able.    Nutrition education not appropriate at this time. Pt previously declined nutrition education at last RD visit from 1/21.

## 2021-07-11 NOTE — DIETITIAN INITIAL EVALUATION ADULT. - REASON
nasal pain with no epistaxis
Nutrition focused physical exam conducted with verbal consent of pt's spouse at bedside

## 2021-07-11 NOTE — DIETITIAN INITIAL EVALUATION ADULT. - ADD RECOMMEND
Monitor diet, PO intake, GI status, weight, labs, skin integrity, Follow up with recommendations from speech therapist regarding appropriate diet texture for pt, Malnutrition notification placed in chart

## 2021-07-12 NOTE — PROGRESS NOTE ADULT - SUBJECTIVE AND OBJECTIVE BOX
Patient is a 56y old  Male who presented with a chief complaint of Hypotension (11 Jul 2021 20:50)      INTERVAL HPI/OVERNIGHT EVENTS:  intermittent hiccups yesterday - none further  large BM - no melena or rectal bleeding  poor PO intake - pocketing food and meds yesterday; planned SLP evaluation    appears depressed, not communicating, poor eye contact    MEDICATIONS  (STANDING):  aspirin enteric coated 81 milliGRAM(s) Oral daily  atorvastatin 80 milliGRAM(s) Oral at bedtime  cadexomer iodine 0.9% Gel 1 Application(s) Topical daily  chlorhexidine 0.12% Liquid 15 milliLiter(s) Oral Mucosa two times a day  chlorhexidine 2% Cloths 1 Application(s) Topical <User Schedule>  dextrose 40% Gel 15 Gram(s) Oral once  dextrose 5%. 1000 milliLiter(s) (50 mL/Hr) IV Continuous <Continuous>  dextrose 5%. 1000 milliLiter(s) (100 mL/Hr) IV Continuous <Continuous>  dextrose 50% Injectable 25 Gram(s) IV Push once  dextrose 50% Injectable 12.5 Gram(s) IV Push once  dextrose 50% Injectable 25 Gram(s) IV Push once  ferrous    sulfate 325 milliGRAM(s) Oral three times a day  folic acid 1 milliGRAM(s) Oral daily  glucagon  Injectable 1 milliGRAM(s) IntraMuscular once  heparin   Injectable 5000 Unit(s) SubCutaneous every 12 hours  insulin glargine Injectable (LANTUS) 5 Unit(s) SubCutaneous at bedtime  insulin lispro (ADMELOG) corrective regimen sliding scale   SubCutaneous three times a day before meals  insulin lispro (ADMELOG) corrective regimen sliding scale   SubCutaneous at bedtime  lactated ringers. 1000 milliLiter(s) (50 mL/Hr) IV Continuous <Continuous>  midodrine 30 milliGRAM(s) Oral every 8 hours  pantoprazole    Tablet 40 milliGRAM(s) Oral before breakfast  sevelamer carbonate 800 milliGRAM(s) Oral three times a day  ticagrelor 60 milliGRAM(s) Oral every 12 hours    MEDICATIONS  (PRN):      Allergies  doxazosin (Other)      Review of Systems:  General:  No wt loss , chills, night sweats  CV:  No pain, palpitatioins,  +CAD  resolved hypotension  Resp:  No dyspnea, cough, tachypnea, wheezing  GI:  see HPI  :  ESRD on PD  Muscle:  No pain, weakness  Neuro:  No weakness, tingling, memory problems  Psych:  No fatigue, insomnia, mood problems, depression  Endocrine:  No polyuria, polydypsia, cold/heat intolerance  Heme:  No petechiae, ecchymosis, easy bruisability  Skin:  No rash, edema +gangrene +wounds    Vital Signs Last 24 Hrs  T(C): 36.3 (12 Jul 2021 09:00), Max: 36.9 (11 Jul 2021 18:54)  T(F): 97.4 (12 Jul 2021 09:00), Max: 98.4 (11 Jul 2021 18:54)  HR: 99 (12 Jul 2021 09:00) (98 - 103)  BP: 104/71 (12 Jul 2021 09:00) (99/68 - 113/78)  BP(mean): --  RR: 18 (12 Jul 2021 09:00) (18 - 18)  SpO2: 95% (12 Jul 2021 09:00) (93% - 100%)    PHYSICAL EXAM:  Constitutional: NAD, well-developed chronically ill appearing male.   no hiccupping or retching during interview/exam. poor eye contact  Neck: No LAD, supple no JVD  Respiratory: grossly clear   +WH sternal scar  +WH scar right chest wall  Cardiovascular: S1 and S2, RRR, +murmur  Gastrointestinal: BS+, soft, NT/ND, no rebound guarding or rigidity  +dressing over PD site - clean and dry, non tender near catheter site. no scrotal edema/swelling  Extremities: No peripheral edema,  Right toe gangrene (dry);   Left foot wound dry;  thumb +dry gangrene  Vascular: 2+ peripheral pulses  Neurological: no focal asymmetry  Psychiatric: depressed affect, poor eye contact refusing verbalization  Skin: anicteric        LABS:                        12.1   8.79  )-----------( 242      ( 12 Jul 2021 07:10 )             41.1     07-12    130<L>  |  90<L>  |  51<H>  ----------------------------<  140<H>  3.7   |  22  |  10.60<H>    Ca    8.8      12 Jul 2021 07:10  Phos  6.2     07-12  Mg     2.4     07-12          RADIOLOGY & ADDITIONAL TESTS:    EXAM:  CT CHEST                       PROCEDURE DATE:  07/09/2021        INTERPRETATION:  CLINICAL INFORMATION: Cough. Evaluate for pneumonia.    COMPARISON: Chest x-ray 7/6/2021 and CT chest 12/30/2020    CONTRAST/COMPLICATIONS:  IV Contrast: NONE  Oral Contrast: NONE  Complications: None reported at time of study completion    PROCEDURE:  CT of the Chest was performed.  Sagittal and coronal reformats were performed.    FINDINGS:    LUNGS AND AIRWAYS: Central airways are clear. Few subtle peripheral opacities in the right upper lobe are new from the prior study. Bilateral dependent subsegmental atelectasis.  PLEURA: Trace bilateral pleural effusions.  MEDIASTINUM AND RYAN: 1.1 cm right paratracheal node is again noted.  VESSELS: Atherosclerotic coronary arteries and aorta.  HEART: Cardiomegaly. No pericardial effusion. Patient is status post CABG. No pericardial effusion. Aortic valve calcification.  CHEST WALL AND LOWER NECK: Patient status post median sternotomy.  VISUALIZEDUPPER ABDOMEN: Pneumoperitoneum. Mild ascites. Atherosclerotic changes involving the aortic and celiac vessels. Cholelithiasis  BONES: Degenerative changes. Nonunion sternal fragments. T12 vertebral body fracture is age-indeterminate but new from theprior study. There appears to be some mass effect upon the canal    IMPRESSION:  Few subtle peripheral opacities in the right upper lobe are new from the prior study and indeterminate. Short-term follow-up CT recommended for complete evaluation.    Pneumoperitoneum may be related to peritoneal dialysis catheter. If concern for additional abnormality, CT abdomen/pelvis is recommended.    T12 vertebral body fracture is age-indeterminate but new from the prior study. There appears to be some mass effect upon the canal. MRI spine may be helpful for complete evaluation.    Dr. Méndez discussed these findings with CARLYLE Cordero on 7/10/2021 9:34 AM .    --- End of Report ---    EXAM:  CT ABDOMEN AND PELVIS                        PROCEDURE DATE:  07/11/2021        INTERPRETATION:  CLINICAL INFORMATION: Sepsis. Evaluate for infectious etiology. End-stage renal disease, insulin-dependent diabetes mellitus, congestiveheart failure with reduced ejection fraction.    COMPARISON: CT abdomen pelvis 1/4/2021.    CONTRAST/COMPLICATIONS:  IV Contrast: None  Oral Contrast: None  Complications: None    PROCEDURE:  CT of the Abdomen and Pelvis was performed.  Sagittal and coronal reformats were performed.    FINDINGS:  LOWER CHEST: Trace bilateral pleural effusions. Minimal bibasilar compressive atelectasis. Cardiomegaly. Coronary artery calcifications.    LIVER: Within normal limits.  BILE DUCTS: Normal caliber.  GALLBLADDER: Cholelithiasis.  SPLEEN: Within normal limits.  PANCREAS: Within normal limits.  ADRENALS: Within normal limits.  KIDNEYS/URETERS: Within normal limits.    BLADDER: Minimally distended.  REPRODUCTIVE ORGANS: Prostate within normal limits.    BOWEL: No bowel obstruction. Thickening versus under distention of the ascending colon for which correlation with colonoscopy is recommended if not recently performed. Appendix is normal.  PERITONEUM: Moderate volume ascites. Peritoneal dialysis catheter terminates in the pelvis. A few tiny droplets of free air likely related to the catheter.  VESSELS: Extensive atherosclerotic calcifications.  RETROPERITONEUM/LYMPH NODES: No lymphadenopathy.  ABDOMINAL WALL: Anasarca. Small umbilical hernia containing fluid and foci of air.  BONES: T12 compression fracture new from prior imaging in January 2021. Correlate with lumbar spine MR same day.    IMPRESSION:    No evidence of an infectious source.    Thickening versus under distention of the ascending colon for which correlation with colonoscopy is recommended if not recently performed.    T12 compression fracture new from prior imaging in January 2021. Correlate with lumbar spine MR same day.    --- End of Report ---    EXAM:  CT BRAIN                        PROCEDURE DATE:  07/09/2021      INTERPRETATION:  CLINICAL INFORMATION: Altered mental status      IMPRESSION:    No acute intracranial hemorrhage or mass effect.    --- End of Report ---

## 2021-07-12 NOTE — PROVIDER CONTACT NOTE (OTHER) - ACTION/TREATMENT ORDERED:
As per MD, wants to come up and see the patient before putting a PD order in however the primary RN can perform the next exchange now in the meantime.

## 2021-07-12 NOTE — BH CONSULTATION LIAISON ASSESSMENT NOTE - NSSUICPROTFACT_PSY_ALL_CORE
Responsibility to children, family, or others Supportive social network of family or friends/Unable to assess

## 2021-07-12 NOTE — PROVIDER CONTACT NOTE (OTHER) - ASSESSMENT
Need new PD order for today. Last exchange was 6 am today, pt is filled with 2.5L of fluid. Pt was due for next exchange at noon today.

## 2021-07-12 NOTE — BH CONSULTATION LIAISON ASSESSMENT NOTE - NSBHCHARTREVIEWVS_PSY_A_CORE FT
Vital Signs Last 24 Hrs  T(C): 36.7 (12 Jul 2021 12:10), Max: 36.9 (11 Jul 2021 18:54)  T(F): 98 (12 Jul 2021 12:10), Max: 98.4 (11 Jul 2021 18:54)  HR: 100 (12 Jul 2021 12:10) (99 - 103)  BP: 118/83 (12 Jul 2021 12:10) (99/68 - 118/83)  BP(mean): --  RR: 18 (12 Jul 2021 12:10) (18 - 18)  SpO2: 99% (12 Jul 2021 12:10) (93% - 100%)

## 2021-07-12 NOTE — PROGRESS NOTE ADULT - SUBJECTIVE AND OBJECTIVE BOX
CARDIOLOGY FOLLOW UP - Dr. Best  DATE OF SERVICE: 7/12/21     CC no cp or sob  no acute events       REVIEW OF SYSTEMS:  CONSTITUTIONAL: No fever, weight loss, or fatigue  RESPIRATORY: No cough, wheezing, chills or hemoptysis; No Shortness of Breath  CARDIOVASCULAR: No chest pain, palpitations, passing out, dizziness, or leg swelling  GASTROINTESTINAL: No abdominal or epigastric pain. No nausea, vomiting, or hematemesis; No diarrhea or constipation. No melena or hematochezia.  VASCULAR: No edema     PHYSICAL EXAM:  T(C): 36.7 (07-12-21 @ 12:10), Max: 36.9 (07-11-21 @ 18:54)  HR: 100 (07-12-21 @ 12:10) (99 - 103)  BP: 118/83 (07-12-21 @ 12:10) (99/68 - 118/83)  RR: 18 (07-12-21 @ 12:10) (18 - 18)  SpO2: 99% (07-12-21 @ 12:10) (93% - 100%)  Wt(kg): --  I&O's Summary    11 Jul 2021 07:01  -  12 Jul 2021 07:00  --------------------------------------------------------  IN: 4060 mL / OUT: 4800 mL / NET: -740 mL        Appearance: Normal	  Cardiovascular: Normal S1 S2,RRR, No JVD, No murmurs  Respiratory: Lungs clear to auscultation	  Gastrointestinal:  Soft, Non-tender, + BS	  Extremities: Normal range of motion, No clubbing, cyanosis or edema      Home Medications:  aspirin 81 mg oral delayed release tablet: 1 tab(s) orally once a day (29 Dec 2020 18:37)  atorvastatin 80 mg oral tablet: 1 tab(s) orally once a day (at bedtime) (29 Dec 2020 18:37)  epoetin martine: injectable once a month (29 Dec 2020 18:37)  ferrous sulfate 325 mg (65 mg elemental iron) oral tablet: 1 tab(s) orally 3 times a day (29 Dec 2020 18:37)  gabapentin 100 mg oral capsule: 1 cap(s) orally once a day (29 Dec 2020 18:37)  nortriptyline 25 mg oral capsule: 1 cap(s) orally once a day (at bedtime) (29 Dec 2020 18:37)  pantoprazole 40 mg oral delayed release tablet: 1 tab(s) orally once a day (before a meal) (29 Dec 2020 18:37)  Toujeo SoloStar 300 units/mL subcutaneous solution: 60 unit(s) subcutaneous once a day (at bedtime) (08 Jan 2021 12:41)      MEDICATIONS  (STANDING):  aspirin enteric coated 81 milliGRAM(s) Oral daily  atorvastatin 80 milliGRAM(s) Oral at bedtime  cadexomer iodine 0.9% Gel 1 Application(s) Topical daily  chlorhexidine 0.12% Liquid 15 milliLiter(s) Oral Mucosa two times a day  chlorhexidine 2% Cloths 1 Application(s) Topical <User Schedule>  dextrose 40% Gel 15 Gram(s) Oral once  dextrose 5%. 1000 milliLiter(s) (50 mL/Hr) IV Continuous <Continuous>  dextrose 5%. 1000 milliLiter(s) (100 mL/Hr) IV Continuous <Continuous>  dextrose 50% Injectable 25 Gram(s) IV Push once  dextrose 50% Injectable 12.5 Gram(s) IV Push once  dextrose 50% Injectable 25 Gram(s) IV Push once  ferrous    sulfate 325 milliGRAM(s) Oral three times a day  folic acid 1 milliGRAM(s) Oral daily  glucagon  Injectable 1 milliGRAM(s) IntraMuscular once  heparin   Injectable 5000 Unit(s) SubCutaneous every 12 hours  insulin glargine Injectable (LANTUS) 5 Unit(s) SubCutaneous at bedtime  insulin lispro (ADMELOG) corrective regimen sliding scale   SubCutaneous three times a day before meals  insulin lispro (ADMELOG) corrective regimen sliding scale   SubCutaneous at bedtime  lactated ringers. 1000 milliLiter(s) (50 mL/Hr) IV Continuous <Continuous>  midodrine 30 milliGRAM(s) Oral every 8 hours  pantoprazole    Tablet 40 milliGRAM(s) Oral before breakfast  sevelamer carbonate 800 milliGRAM(s) Oral three times a day  ticagrelor 60 milliGRAM(s) Oral every 12 hours      TELEMETRY: 	    ECG:  	  RADIOLOGY:   DIAGNOSTIC TESTING:  [ ] Echocardiogram:  [ ]  Catheterization:  [ ] Stress Test:    OTHER: 	    LABS:	 	    Troponin T, High Sensitivity Result: 468 ng/L [0 - 51] (07-06 @ 07:45)  Troponin T, High Sensitivity Result: 488 ng/L [0 - 51] (07-06 @ 04:13)                          12.1   8.79  )-----------( 242      ( 12 Jul 2021 07:10 )             41.1     07-12    130<L>  |  90<L>  |  51<H>  ----------------------------<  140<H>  3.7   |  22  |  10.60<H>    Ca    8.8      12 Jul 2021 07:10  Phos  6.2     07-12  Mg     2.4     07-12

## 2021-07-12 NOTE — BH CONSULTATION LIAISON ASSESSMENT NOTE - HPI (INCLUDE ILLNESS QUALITY, SEVERITY, DURATION, TIMING, CONTEXT, MODIFYING FACTORS, ASSOCIATED SIGNS AND SYMPTOMS)
Patient is a 56 year old male, , domiciled with wife, unemployed, no history of psychiatric diagnoses, psych admission, or psychiatric meds, with a history of ESRD on peritoneal dialysis, DM on insulin, CHF EF 25-30% CAD s/p CABG x4, underwent cardiac cath and stent and wire broke in heart sternotomy, who presented with cough and hypotension, was admitted for septic shock to the ICU. Patient was put on pressors and downgraded from the ICU to the medical floor on HOD 2.     Psychiatry was consulted for depression, decreased verbal communication, and bizarre behavior of hoarding food in mouth. Per chart review, patient noted to develop altered mental status following administration of 0.5mg Ativan on 7/9. Per nurse, patient selectively spoke with staff and medical team sparsely since admission. Per wife, patient acutely stopped speaking and eating on 7/4. Wife states that she believes the patient wants to speak, but is unable to. She states that this has never occurred before. Upon interview, patient unable to speak, follow commands, but does track with eyes. Wife denies history of depression, anxiety, SI/HI, or hallucinations for patient. Of note, patient's head CT was negative. Patient is a 56 year old male, , domiciled with wife, unemployed, no history of psychiatric diagnoses, psych admission, or psychiatric meds, with a history of ESRD on peritoneal dialysis, DM on insulin, CHF EF 25-30% CAD s/p CABG x4, underwent cardiac cath and stent and wire broke in heart sternotomy, who presented with cough and hypotension, was admitted for septic shock to the ICU. Patient was put on pressors and antibiotics, and downgraded from the ICU to the medical floor on HOD 2.     Psychiatry was consulted for depression, decreased verbal communication, and bizarre behavior of hoarding food in mouth. Per chart review, patient noted to develop altered mental status following administration of 0.5mg Ativan on 7/9. Per nurse, patient selectively spoke with staff and medical team sparsely since admission. Per wife, patient acutely stopped speaking and eating on 7/4. She is not aware of any triggering/preceding events leading up to him becoming non-verbal. Wife states that she believes the patient wants to speak, but is unable to. She states that this has never occurred before. Upon interview, patient unable to speak, follow commands, but does track with eyes. Wife denies history of depression, anxiety, SI/HI, or hallucinations for patient. Of note, patient's head CT was negative.

## 2021-07-12 NOTE — PROGRESS NOTE ADULT - ASSESSMENT
55M PMH ESRD on PD, DM on insulin, CHF EF 25-30% CAD s/p CABG x4, underwent cardiac cath and stent and wire broke in heart sternotomy admitted with cough/sweating found to be hypotensive and febrile in ER- admitted for presumed septic shock; cultures negative thus far    s/p short stay in MICU for pressor support  Cultures negative to date  ESRD on PD  COVID negative      #nausea and severe hiccups (per pt has had intermittent issues with hiccuping prior to admission)  qTc prolonged (600). No acute GI pathology on CT A/P. Nausea appears to have improved  ?related to uremia  -will d/w Renal if able to give baclofen PRN from renal standpoint for symptomatic hiccups Rx  -PD per Renal  -PO diet as tolerated; encourage preferences  -Continue PPI      #suspected sepsis  -Abx and work-up per ID    Recommend PSych input as pt appears very depressed  Await SLP input - but suspect symptoms may be psych/depression related  Discussed with Medicine MEGA Oglesby PA-C    [facility]  [phone]  After hours and weekend coverage [phone]

## 2021-07-12 NOTE — BH CONSULTATION LIAISON ASSESSMENT NOTE - CURRENT MEDICATION
MEDICATIONS  (STANDING):  aspirin enteric coated 81 milliGRAM(s) Oral daily  atorvastatin 80 milliGRAM(s) Oral at bedtime  cadexomer iodine 0.9% Gel 1 Application(s) Topical daily  chlorhexidine 0.12% Liquid 15 milliLiter(s) Oral Mucosa two times a day  chlorhexidine 2% Cloths 1 Application(s) Topical <User Schedule>  dextrose 40% Gel 15 Gram(s) Oral once  dextrose 5%. 1000 milliLiter(s) (50 mL/Hr) IV Continuous <Continuous>  dextrose 5%. 1000 milliLiter(s) (100 mL/Hr) IV Continuous <Continuous>  dextrose 50% Injectable 25 Gram(s) IV Push once  dextrose 50% Injectable 12.5 Gram(s) IV Push once  dextrose 50% Injectable 25 Gram(s) IV Push once  ferrous    sulfate 325 milliGRAM(s) Oral three times a day  folic acid 1 milliGRAM(s) Oral daily  glucagon  Injectable 1 milliGRAM(s) IntraMuscular once  heparin   Injectable 5000 Unit(s) SubCutaneous every 12 hours  insulin glargine Injectable (LANTUS) 5 Unit(s) SubCutaneous at bedtime  insulin lispro (ADMELOG) corrective regimen sliding scale   SubCutaneous three times a day before meals  insulin lispro (ADMELOG) corrective regimen sliding scale   SubCutaneous at bedtime  lactated ringers. 1000 milliLiter(s) (50 mL/Hr) IV Continuous <Continuous>  midodrine 30 milliGRAM(s) Oral every 8 hours  pantoprazole    Tablet 40 milliGRAM(s) Oral before breakfast  sevelamer carbonate 800 milliGRAM(s) Oral three times a day  ticagrelor 60 milliGRAM(s) Oral every 12 hours    MEDICATIONS  (PRN):

## 2021-07-12 NOTE — PROGRESS NOTE ADULT - SUBJECTIVE AND OBJECTIVE BOX
CC: f/u for  hypotension  Patient reports nothing not talking    REVIEW OF SYSTEMS:  All other review of systems negative (Comprehensive ROS)cannotget    Antimicrobials Day #  :    Other Medications Reviewed    T(F): 98.4 (07-12-21 @ 20:00), Max: 98.4 (07-12-21 @ 20:00)  HR: 100 (07-12-21 @ 20:00)  BP: 103/71 (07-12-21 @ 20:00)  RR: 18 (07-12-21 @ 20:00)  SpO2: 97% (07-12-21 @ 20:00)  Wt(kg): --    PHYSICAL EXAM:  General: alert, no acute distress  Eyes:  anicteric, no conjunctival injection, no discharge  Oropharynx: no lesions or injection 	  Neck: supple, without adenopathy  Lungs: clear to auscultation  Heart: regular rate and rhythm; no murmur, rubs or gallops  Abdomen: soft, nondistended, nontender, without mass or organomegaly  Skin: no lesions  Extremities: no clubbing, cyanosis, or edema  Neurologic: alert, not interactive moves all extremities    LAB RESULTS:                        12.1   8.79  )-----------( 242      ( 12 Jul 2021 07:10 )             41.1     07-12    130<L>  |  90<L>  |  51<H>  ----------------------------<  140<H>  3.7   |  22  |  10.60<H>    Ca    8.8      12 Jul 2021 07:10  Phos  6.2     07-12  Mg     2.4     07-12          MICROBIOLOGY:  RECENT CULTURES:  07-10 @ 19:16 .Peritoneal Dialysis Fluid Dialysis (P.D.) Fluid     No growth      07-10 @ 18:54 .Blood Blood-Peripheral     No growth to date.          RADIOLOGY REVIEWED:    < from: MR Thoracic Spine No Cont (07.11.21 @ 23:55) >  EXAM:  MR SPINE LUMBAR                          EXAM:  MR SPINE THORACIC                            PROCEDURE DATE:  07/11/2021            INTERPRETATION:  CLINICAL INDICATION: Fracture.    TECHNIQUE: Multi-planar multi-sequential MR imaging of thethoracic an lumbar spine was performed without the administration of intravenous contrast, according to standard protocol.    COMPARISON: CT abdomen and pelvis earlier the same day 7/11/2021.    FINDINGS:    ALIGNMENT:  The alignment is normal.    VERTEBRAE: There is a moderate compression fracture of T12 vertebral body with moderate associated marrow edema. There is slight bony retropulsion. No spinal canal stenosis at this level.    No aggressive osseous lesions. Multilevel degenerative changes.    DISCS: The disc spaces are maintained.    CORD: The conus medullaris terminates at T12-L1. Cord and conus medullaris are normal in appearance.    EVALUATION OF INDIVIDUAL LEVELS:   T3-T4: Left central disc protrusion. No spinal canal or neuroforaminal stenosis.    L4-L5: Disc bulge asymmetric to the right. There is narrowing of the right lateral recess with possible impingement of the descending right L5 nerve root.    Remaining levels demonstrate no disc protrusion, spinal canal, or neuroforaminal stenosis.    PARAVERTEBRAL SOFT TISSUES: Paraspinal musculature is unremarkable.    Small bilateral pleural effusions are suggested.    IMPRESSION:    -Acute to subacute moderate compression fracture of T12 with slight bony retropulsion but no associated spinal canal stenosis.    -L4-L5 disc bulge with potential impingement of the descending right L5 nerve root.    --- End of Report ---      < end of copied text >            Assessment:  Patient with esrd on pd, had stay about 7 months ago s/p cardiac stent, wire broke, had to undergo sternotomy, 2 stays soon after that for sepsis unclear cause, now returns a few days ago with fever, hypotension, weak, report of cough. He required pressors in micu now off, cultures of blood and pd fluid are no growth, exam not revealing. Source of presumed sepsis is not readily apparent. CXR shows no infiltrate but could have pneumonia not seen given the cough. Has dry gangrene of right 3rd toe but that is not a source of sepsis. He was found to have subtle infiltrates on ct so maybe pneumonia. Very encephalopathic maybe from cefepime, had enough so stopped . Seems a little better today. MRI spine not consistent with infection  Recommendations:  supportive care   psych eval appreciated  does he need more efficient dialysis?

## 2021-07-12 NOTE — PROGRESS NOTE ADULT - SUBJECTIVE AND OBJECTIVE BOX
Patient is a 56y old  Male who presents with a chief complaint of Hypotension (12 Jul 2021 16:54)      SUBJECTIVE / OVERNIGHT EVENTS: Comfortable without new complaints. Wife and daughter at bedside   Review of Systems  chest pain no  palpitations no  sob no  nausea no  headache no    MEDICATIONS  (STANDING):  aspirin enteric coated 81 milliGRAM(s) Oral daily  atorvastatin 80 milliGRAM(s) Oral at bedtime  cadexomer iodine 0.9% Gel 1 Application(s) Topical daily  chlorhexidine 0.12% Liquid 15 milliLiter(s) Oral Mucosa two times a day  chlorhexidine 2% Cloths 1 Application(s) Topical <User Schedule>  dextrose 40% Gel 15 Gram(s) Oral once  dextrose 5%. 1000 milliLiter(s) (50 mL/Hr) IV Continuous <Continuous>  dextrose 5%. 1000 milliLiter(s) (100 mL/Hr) IV Continuous <Continuous>  dextrose 50% Injectable 25 Gram(s) IV Push once  dextrose 50% Injectable 12.5 Gram(s) IV Push once  dextrose 50% Injectable 25 Gram(s) IV Push once  ferrous    sulfate 325 milliGRAM(s) Oral three times a day  folic acid 1 milliGRAM(s) Oral daily  glucagon  Injectable 1 milliGRAM(s) IntraMuscular once  heparin   Injectable 5000 Unit(s) SubCutaneous every 12 hours  insulin glargine Injectable (LANTUS) 5 Unit(s) SubCutaneous at bedtime  insulin lispro (ADMELOG) corrective regimen sliding scale   SubCutaneous three times a day before meals  insulin lispro (ADMELOG) corrective regimen sliding scale   SubCutaneous at bedtime  lactated ringers. 1000 milliLiter(s) (50 mL/Hr) IV Continuous <Continuous>  midodrine 30 milliGRAM(s) Oral every 8 hours  pantoprazole    Tablet 40 milliGRAM(s) Oral before breakfast  sevelamer carbonate 800 milliGRAM(s) Oral three times a day  ticagrelor 60 milliGRAM(s) Oral every 12 hours    MEDICATIONS  (PRN):      Vital Signs Last 24 Hrs  T(C): 36.9 (12 Jul 2021 20:00), Max: 36.9 (12 Jul 2021 20:00)  T(F): 98.4 (12 Jul 2021 20:00), Max: 98.4 (12 Jul 2021 20:00)  HR: 100 (12 Jul 2021 20:00) (96 - 103)  BP: 103/71 (12 Jul 2021 20:00) (99/68 - 118/83)  BP(mean): --  RR: 18 (12 Jul 2021 20:00) (18 - 18)  SpO2: 97% (12 Jul 2021 20:00) (94% - 100%)    PHYSICAL EXAM:  GENERAL: NAD  HEAD:  Atraumatic, Normocephalic  EYES: EOMI, PERRLA, conjunctiva and sclera clear  NECK: Supple, No JVD  CHEST/LUNG: Clear to auscultation bilaterally; No wheeze  HEART: Regular rate and rhythm; No murmurs, rubs, or gallops  ABDOMEN: Soft, Nontender, Nondistended; Bowel sounds present PD catheter  EXTREMITIES:  2+ Peripheral Pulses, No clubbing, cyanosis, or edema  PSYCH: depressed  NEUROLOGY: non-focal  SKIN: gangrenous toe and few dark spots on R fingers.     LABS:                        12.1   8.79  )-----------( 242      ( 12 Jul 2021 07:10 )             41.1     07-12    130<L>  |  90<L>  |  51<H>  ----------------------------<  140<H>  3.7   |  22  |  10.60<H>    Ca    8.8      12 Jul 2021 07:10  Phos  6.2     07-12  Mg     2.4     07-12                Culture - Dialysis Fluid (collected 10 Jul 2021 19:16)  Source: .Peritoneal Dialysis Fluid Dialysis (P.D.) Fluid  Preliminary Report (11 Jul 2021 16:43):    No growth    Culture - Blood (collected 10 Jul 2021 18:54)  Source: .Blood Blood-Peripheral  Preliminary Report (11 Jul 2021 19:01):    No growth to date.    Culture - Blood (collected 10 Jul 2021 18:54)  Source: .Blood Blood-Peripheral  Preliminary Report (11 Jul 2021 19:01):    No growth to date.        RADIOLOGY & ADDITIONAL TESTS:    Imaging Personally Reviewed:    Consultant(s) Notes Reviewed:      Care Discussed with Consultants/Other Providers:

## 2021-07-12 NOTE — PROGRESS NOTE ADULT - ASSESSMENT
56 m with    Sepsis  - antibiotic   - peritoneal culture pending   - toe gangrene  - Podiatry follow  - ID follow     COPD  - 2L NC overnight because he becomes apneic, sats well  - Sats well on RA while awake    CAD s/p CABG   - Hypotension likely in the setting of septic shock   - midodrine dose increased to 30 mg TID to match home dose  - Previous echo with reduced EF  - F/up TTE  - c/w DAPT  - Cardiology follow    CHF cr systolic  - cardiology follow  - EP eval re AICD    Peritoneal Dialysis   - nephrology follow PD, recommend 4x a day  - patient on midodrine 30 q8h at home, continue here  - Holding home metoprolol 25 q12hr in setting of hypotension    Diabetes   - HA1C 6.1 on 7/6 suggesting prediabetes  - Tujeo 60 units at bedtime and Victoza at home, started on 30 units at bedtime, adjust as needed based on patient PO intake  - added 2 units insulin pre-meal    Hiccups  - GI evaluation  - hold Ativan 2 to sedation    Incontinence dermatitis  - wound care    Toe gangrene  - Podiatry follow  - Vascular evaluation noted  - PONCHO/PVR noted  - Angiogram if agrees    Finger gangrenous area  - Plastic evaluation    Vertebral fracture T12  - Ortho eval noted   - MRI spine noted  - No intervention    d/w patient , wife and daughter    Pipe Beck MD pager [phone]

## 2021-07-12 NOTE — PROGRESS NOTE ADULT - SUBJECTIVE AND OBJECTIVE BOX
Ongoing PD exchanges      VITAL:  T(C): , Max: 36.9 (07-11-21 @ 18:54)  T(F): , Max: 98.4 (07-11-21 @ 18:54)  HR: 96 (07-12-21 @ 16:38)  BP: 102/72 (07-12-21 @ 16:38)  BP(mean): --  RR: 18 (07-12-21 @ 16:38)  SpO2: 100% (07-12-21 @ 16:38)  Wt(kg): --      PHYSICAL EXAM:  General: Alert   HEENT: MMM  Lungs:CTA-b/l  Heart: RRR S1S2  Abdomen: Soft PD cathter in situ   Ext: no pedal edema b/l  : no keys      LABS:                        12.1   8.79  )-----------( 242      ( 12 Jul 2021 07:10 )             41.1     Na(130)/K(3.7)/Cl(90)/HCO3(22)/BUN(51)/Cr(10.60)Glu(140)/Ca(8.8)/Mg(2.4)/PO4(6.2)    07-12 @ 07:10  Na(128)/K(3.8)/Cl(88)/HCO3(22)/BUN(52)/Cr(10.42)Glu(91)/Ca(9.1)/Mg(2.4)/PO4(5.8)    07-11 @ 07:15  Na(131)/K(3.6)/Cl(91)/HCO3(22)/BUN(47)/Cr(9.24)Glu(98)/Ca(9.1)/Mg(--)/PO4(4.8)    07-10 @ 07:38        5M PMH ESRD on PD, DM on insulin, CHF EF 25-30% CAD s/p CABG x4 pw with hypotension with fever  Hypotension off pressors and   Source is pending   Hypokalemia- resolving/ resolved s/p repletion yesterday    1 Renal -   [facility]PD orders will be 1.5% as to not cause too much UF and ultimately hypotension .   2 Hyponatremia- should improve with PD          3 ID-Blood cx  and  PD fluid cx repeated  yesterday.   IV abx .  I  4 CVS-Midodrine increased to 30mg po q 8 hours   5 Podiatry- Toe does not seem to be the issue      Samia Dangelo MD  [facility]  Office: [phone]         Ongoing PD exchanges  Lethargic      VITAL:  T(C): , Max: 36.9 (07-11-21 @ 18:54)  T(F): , Max: 98.4 (07-11-21 @ 18:54)  HR: 96 (07-12-21 @ 16:38)  BP: 102/72 (07-12-21 @ 16:38)  BP(mean): --  RR: 18 (07-12-21 @ 16:38)  SpO2: 100% (07-12-21 @ 16:38)  Wt(kg): --      PHYSICAL EXAM:  General: Alert   HEENT: MMM  Lungs:CTA-b/l  Heart: RRR S1S2  Abdomen: Soft PD cathter in situ   Ext: no pedal edema b/l  : no keys      LABS:                        12.1   8.79  )-----------( 242      ( 12 Jul 2021 07:10 )             41.1     Na(130)/K(3.7)/Cl(90)/HCO3(22)/BUN(51)/Cr(10.60)Glu(140)/Ca(8.8)/Mg(2.4)/PO4(6.2)    07-12 @ 07:10  Na(128)/K(3.8)/Cl(88)/HCO3(22)/BUN(52)/Cr(10.42)Glu(91)/Ca(9.1)/Mg(2.4)/PO4(5.8)    07-11 @ 07:15  Na(131)/K(3.6)/Cl(91)/HCO3(22)/BUN(47)/Cr(9.24)Glu(98)/Ca(9.1)/Mg(--)/PO4(4.8)    07-10 @ 07:38        5M PMH ESRD on PD, DM on insulin, CHF EF 25-30% CAD s/p CABG x4 pw with hypotension with fever  Hypotension off pressors ESRD on PD       1 Renal -   CCPD orders will be 1.5% as to not cause too much UF and ultimately hypotension .   2 Hyponatremia- should improve with PD          3 ID-Blood cx  and  PD fluid cx -    IV abx .    4 CVS-Midodrine increased to 30mg po q 8 hours   5 Podiatry- Toe does not seem to be the issue      Samia Dangelo MD  [facility]  Office: [phone]

## 2021-07-12 NOTE — BH CONSULTATION LIAISON ASSESSMENT NOTE - SUMMARY
Patient is a 56 year old male, , domiciled with wife, unemployed, no history of psychiatric diagnoses, psych admission, or psychiatric meds, with a history of ESRD on peritoneal dialysis, DM on insulin, CHF EF 25-30% CAD s/p CABG x4, underwent cardiac cath and stent and wire broke in heart sternotomy, who presented with cough and hypotension, was admitted for septic shock to the ICU. Patient was put on pressors and antibiotics, and downgraded from the ICU to the medical floor on HOD 2.     Psychiatry was consulted for depression, decreased verbal communication, and bizarre behavior of hoarding food in mouth. Per chart review, patient noted to develop altered mental status following administration of 0.5mg Ativan on 7/9. Per nurse, patient selectively spoke with staff and medical team sparsely since admission. Per wife, patient acutely stopped speaking and eating on 7/4. Wife states that she believes the patient wants to speak, but is unable to. She states that this has never occurred before. Upon interview, patient unable to speak, follow commands, but does track with eyes. Wife denies history of depression, anxiety, SI/HI, or hallucinations for patient. Of note, patient's head CT was negative.    Patient likely has developed catatonia due to severe medical condition. Patient is a 56 year old male, , domiciled with wife, unemployed, no history of psychiatric diagnoses, psych admission, or psychiatric meds, with a history of ESRD on peritoneal dialysis, DM on insulin, CHF EF 25-30% CAD s/p CABG x4, underwent cardiac cath and stent and wire broke in heart sternotomy, who presented with cough and hypotension, was admitted for septic shock to the ICU. Patient was put on pressors and antibiotics, and downgraded from the ICU to the medical floor on HOD 2.     Psychiatry was consulted for depression, decreased verbal communication, and odd behavior of hoarding food in mouth. Per chart review, patient noted to develop altered mental status following administration of 0.5mg Ativan on 7/9. Per nurse, patient selectively spoke with staff and medical team sparsely since admission. Per wife, patient acutely stopped speaking and eating on 7/4. Wife states that she believes the patient wants to speak, but is unable to. She states that this has never occurred before. Upon interview, patient unable to speak, follow commands, but does track with eyes. Wife denies history of depression, anxiety, SI/HI, or hallucinations for patient. Of note, patient's head CT was negative.    DDx includes delirium vs. catatonia.

## 2021-07-12 NOTE — BH CONSULTATION LIAISON ASSESSMENT NOTE - CASE SUMMARY
56M year old male, , domiciled with wife, unemployed, with no formal PPHx, SA, or psych admissions, with PMH significant for ESRD on peritoneal dialysis, DM on insulin, CHF EF 25-30% CAD s/p CABG x4, underwent cardiac cath and stent and wire broke in heart sternotomy, who presented with cough and hypotension, was admitted for septic shock to the ICU, received pressors and antibiotics, now transferred to medical floor, psychiatry consulted due to AMS/decreased verbal output.  On interview pt is staring into space, intermittently looks at writer and looks away, does not follow commands, speak, or blink to threat.  Allows passive movement of his limbs, no rigidity/waxy flexibility/posturing.  Per wife pt was at baseline mental status a week ago, was speaking at that time.  Presentation concerning for catatonia vs. hypoactive delirium.  Recommend Ativan challenge 1mg IVP x1 dose if pt able to tolerate from pulm standpoint.  Consider neuro eval, EEG, MRI.  CL psych will f/u.

## 2021-07-12 NOTE — CHART NOTE - NSCHARTNOTEFT_GEN_A_CORE
-MRI findings reviewed. No further acute ortho spine workup at this time. Further management per primary medical team  -DVT ppx per primary team  -PT/OT  -Ambulate as tolerated  -No heavy lifting/twisting  -Multimodal analgesia - recommend low dose opioids, acetaminophen, muscle relaxant as tolerated  -Recommend follow up w/ [facility] outpatient in 1-2 weeks. Call office to schedule appointment.  -Will discuss w/ attending and advise if plan changes.

## 2021-07-12 NOTE — PROGRESS NOTE ADULT - ASSESSMENT
CATH 11/30/2020:  COMPLICATIONS: The stent was deployed without difficulty. On removal of the  balloon, the shaft broke and the tip of the balloon remained in the vessel. Several attempts were made to snare the balloon unsuccessfully. Dr. Verdugo was notifed who will take the patient to the operating room.  DIAGNOSTIC RECOMMENDATIONS: The patient should continue with the present medications. The patients vessel was stented without difficulty On removal of the balloon, the shaft broke with the baloon stuck in the left main. All attempts to snare the balloon out failed and the patient was taken to the OR by Dr. Arango to remove the balloon  introp eleonora 11/30/20: mild to mod MR, < from: Intra-Operative Transesophageal Echo (11.30.20 @ 17:02) >  Severe global left ventricular systolic dysfunction. Inferior and inferolateral akinesis.  severe hypokinesis of other segments.  Severe global hypokinesia.  Normal left ventricular internal dimensions and wall thicknesses. Moderate diastolic dysfunction (Stage II).  echo 12/17/20: EF 32%, mod MR, Severe global left ventricular systolic dysfunction, moderate pulmonary pressures  echo 12/20/20: EF (Visual Estimate):  25-30 % mild MR, Akinesis of the inferolateral, anterolateral, apical laterlal, inferior, and inferoseptal walls. Severe left ventricular enlargement. No left ventricular thrombus is seen.  Echo 7/7/21: EF 16%, mod - sev MR, mod AS, severe global lv sys dysfx, severe diastolic dysfx, mod pulm htn       a/p  56 M well known to practice with PMH ESRD on PD, DM on insulin, CHF EF 25-30% CAD s/p CABG x4, underwent cardiac cath and stent and wire broke in heart s/p sternotomy p/w septic shock    #Septic Shock, resolved   -Bcx NGTD 7/10   -s/p iv abx  -s/p pressors   -Bedside POCUS notable for scattered B lines, biventricular diffuse hypokinesis  -pt also with right 2nd toe gangrene - pod eval noted -- vascular following - PONCHO/PVR noted   -f/u culture of PD cathter-- ID following    -med f/u    #Ischemic Cardiomyopathy. Chronic systolic HF  -vol status stable   -Repeat echo noted with EF 16%, mod - sev MR, mod AS, severe global lv sys dysfx, severe diastolic dysfx, mod pulm htn   -eps eval Pending for crt-D,  pt reluctant in past    -no bb, acei/arb given hx of orthostatic bp   -continue midodrine for bp support  -cont vol removal w PD     # CAD, s/p CABG, s/p PCI, s/p redo open heart for stent balloon retrieval   -hst elevated, likely demand ischemia in setting of septic shock, ESRD   -c/w asa, Brilinta, statin     # ESRD  -on PD  -renal f/u    #Nausea, vomiting  -GI followup    dvt ppx

## 2021-07-12 NOTE — BH CONSULTATION LIAISON ASSESSMENT NOTE - RISK ASSESSMENT
Risk factors: unable to care for self 2/2 psychiatric illness    Protective factors: no current SIIP/HIIP, no h/o SA/SIB, no h/o psych admissions, no active substance abuse, dependent children, domiciled, intact marriage, social supports, positive therapeutic relationship, engaged in treatment, compliant with treatment    Overall, pt is at low risk of harm and does not meet criteria for psychiatric admission. Risk factors: AMS, acute/chronic medical conditions  Protective factors: no h/o SA/SIB, no h/o psych admissions, no active substance abuse, dependent children, domiciled, intact marriage, social supports, no e/o sucidality or homicidality

## 2021-07-12 NOTE — BH CONSULTATION LIAISON ASSESSMENT NOTE - NSBHCHARTREVIEWINVESTIGATE_PSY_A_CORE FT
< from: 12 Lead ECG (07.06.21 @ 05:46) >    Ventricular Rate 91 BPM    Atrial Rate 91 BPM    P-R Interval 296 ms    QRS Duration 160 ms    Q-T Interval 518 ms    QTC Calculation(Bazett) 637 ms    P Axis 66 degrees    R Axis -86 degrees    T Axis 77 degrees    Diagnosis Line SINUS RHYTHM [id number] DEGREE A-V BLOCK WITH OCCASIONAL PREMATURE VENTRICULAR COMPLEXES  RIGHT BUNDLE BRANCH BLOCK  LEFT ANTERIOR FASCICULAR BLOCK  *** BIFASCICULAR BLOCK ***  ST & T WAVE ABNORMALITY, CONSIDER LATERAL ISCHEMIA  ABNORMAL ECG    < end of copied text >    < from: VA Physiol Extremity Lower 3+ Level, BI (07.12.21 @ 10:54) >    IMPRESSION: The quality of this examination is adversely impacted by the noncompressible nature of the diabetic arteries.    It is sufficient to show severe bilateral lower extremity arterial vascular disease affecting the trifurcation arteries and the small arteries of both feet.    An arterial duplex examination is recommended to better delineate the location of the arterialdisease.    < end of copied text >    < from: MR Thoracic Spine No Cont (07.11.21 @ 23:55) >    IMPRESSION:    -Acute to subacute moderate compression fracture of T12 with slight bony retropulsion but no associated spinal canal stenosis.    -L4-L5 disc bulge with potential impingement of the descending right L5 nerve root.    < end of copied text >    < from: Xray Thoracic Spine 1 View (07.11.21 @ 17:57) >    IMPRESSION:    Thoracic spine: The upper thoracic spine is not included on the lateral view. There is severe anterior wedging of the T12 vertebral body as seen on the recent CT scan. Patient is status post CABG with sternotomy wires. Surgicalclips project over the right chest wall.    Lumbar spine: Again noted is a severe anterior wedging compression deformity at T12. There is a minimal kyphosis centered at this level. The heights of the remaining vertebral bodies are preserved. There isminimal disc height loss at L4-5. Aortoiliac calcification is noted.    < end of copied text >    < from: CT Abdomen and Pelvis No Cont (07.11.21 @ 16:15) >    IMPRESSION:    No evidence of an infectious source.    Thickening versus under distention of the ascending colon for which correlation with colonoscopy is recommended if not recently performed.    T12 compression fracture new from prior imaging in January 2021. Correlate with lumbar spine MR same day.    < end of copied text >    < from: CT Head No Cont (07.09.21 @ 21:43) >    IMPRESSION:    No acute intracranial hemorrhage or mass effect.    < end of copied text >    < from: CT Chest No Cont (07.09.21 @ 21:43) >    IMPRESSION:  Few subtle peripheral opacities in the right upper lobe are new from the prior study and indeterminate. Short-term follow-up CT recommended for complete evaluation.    Pneumoperitoneum may be related to peritoneal dialysis catheter. If concern for additional abnormality, CT abdomen/pelvis is recommended.    T12 vertebral body fracture is age-indeterminate but new from the prior study. There appears to be some mass effect upon the canal. MRI spine may be helpful for complete evaluation.    < end of copied text >    < from: Xray Foot AP + Lateral + Oblique, Right (07.06.21 @ 08:59) >    IMPRESSION:  No tracking soft tissue gas collections, radiopaque foreign bodies, or gross radiographic evidence for osteomyelitis.    No fractures or dislocations.    Tarsometatarsal alignment maintained without evidence for a Lisfranc injury.    Preserved visualized joint spaces and no joint margin erosions.    No lytic or blastic lesions.    Vascular calcifications.      < end of copied text >    < from: Xray Chest 1 View- PORTABLE-Urgent (Xray Chest 1 View- PORTABLE-Urgent .) (07.06.21 @ 05:26) >    IMPRESSION:  Low lung volumes.    New ill-defined right upper lung nodular opacities, of indeterminate nature. Infection or neoplasm are possible. Suggest short-term follow-up chest x-ray to evaluate for resolution. If findings persist then consider a CT scan of the chest for further evaluation.    Left mid and lower lung linear atelectasis.    Pneumoperitoneum which may be attributable to ongoing peritoneal dialysis.    < end of copied text >

## 2021-07-12 NOTE — BH CONSULTATION LIAISON ASSESSMENT NOTE - NSBHCONSULTRECOMMENDOTHER_PSY_A_CORE FT
Ativan 2mg IV Recommend Ativan challenge to r/o catatonia (if pt able to tolerate from pulm standpoint): Ativan 1mg IV push x1 dose    Consider neuro eval, EEG, MRI    CL psych will f/u

## 2021-07-13 NOTE — PROGRESS NOTE ADULT - SUBJECTIVE AND OBJECTIVE BOX
Patient is a 56y old  Male who presents with a chief complaint of Hypotension (13 Jul 2021 13:26)      SUBJECTIVE / OVERNIGHT EVENTS: No new complaints.   Review of Systems  chest pain no  palpitations no  sob no  nausea no  headache no    MEDICATIONS  (STANDING):  aspirin enteric coated 81 milliGRAM(s) Oral daily  atorvastatin 80 milliGRAM(s) Oral at bedtime  cadexomer iodine 0.9% Gel 1 Application(s) Topical daily  chlorhexidine 0.12% Liquid 15 milliLiter(s) Oral Mucosa two times a day  chlorhexidine 2% Cloths 1 Application(s) Topical <User Schedule>  dextrose 40% Gel 15 Gram(s) Oral once  dextrose 5%. 1000 milliLiter(s) (50 mL/Hr) IV Continuous <Continuous>  dextrose 5%. 1000 milliLiter(s) (100 mL/Hr) IV Continuous <Continuous>  dextrose 50% Injectable 12.5 Gram(s) IV Push once  dextrose 50% Injectable 25 Gram(s) IV Push once  dextrose 50% Injectable 25 Gram(s) IV Push once  ferrous    sulfate 325 milliGRAM(s) Oral three times a day  folic acid 1 milliGRAM(s) Oral daily  glucagon  Injectable 1 milliGRAM(s) IntraMuscular once  heparin   Injectable 5000 Unit(s) SubCutaneous every 12 hours  insulin glargine Injectable (LANTUS) 5 Unit(s) SubCutaneous at bedtime  insulin lispro (ADMELOG) corrective regimen sliding scale   SubCutaneous three times a day before meals  insulin lispro (ADMELOG) corrective regimen sliding scale   SubCutaneous at bedtime  lactated ringers. 1000 milliLiter(s) (50 mL/Hr) IV Continuous <Continuous>  midodrine 30 milliGRAM(s) Oral every 8 hours  pantoprazole    Tablet 40 milliGRAM(s) Oral before breakfast  sevelamer carbonate 800 milliGRAM(s) Oral three times a day  ticagrelor 60 milliGRAM(s) Oral every 12 hours    MEDICATIONS  (PRN):      Vital Signs Last 24 Hrs  T(C): 36.6 (13 Jul 2021 14:00), Max: 36.9 (12 Jul 2021 20:00)  T(F): 97.8 (13 Jul 2021 14:00), Max: 98.4 (12 Jul 2021 20:00)  HR: 99 (13 Jul 2021 14:00) (96 - 101)  BP: 105/76 (13 Jul 2021 14:00) (96/68 - 105/76)  BP(mean): --  RR: 18 (13 Jul 2021 14:00) (18 - 18)  SpO2: 99% (13 Jul 2021 14:00) (95% - 99%)    PHYSICAL EXAM:  GENERAL: NAD, well-developed  HEAD:  Atraumatic, Normocephalic  EYES: EOMI, PERRLA, conjunctiva and sclera clear  NECK: Supple, No JVD  CHEST/LUNG: Clear to auscultation bilaterally; No wheeze  HEART: Regular rate and rhythm; No murmurs, rubs, or gallops  ABDOMEN: Soft, Nontender, Nondistended; Bowel sounds present PD catheter in place  EXTREMITIES:  2+ Peripheral Pulses, No clubbing, cyanosis, or edema  PSYCH: confused  NEUROLOGY: non-focal  SKIN: No rashes or lesions    LABS:                        12.0   9.89  )-----------( 265      ( 13 Jul 2021 06:47 )             39.8     07-13    130<L>  |  89<L>  |  50<H>  ----------------------------<  157<H>  3.4<L>   |  22  |  10.24<H>    Ca    9.2      13 Jul 2021 06:47  Phos  6.1     07-13  Mg     2.4     07-13                  RADIOLOGY & ADDITIONAL TESTS:    Imaging Personally Reviewed:    Consultant(s) Notes Reviewed:      Care Discussed with Consultants/Other Providers:

## 2021-07-13 NOTE — PROGRESS NOTE ADULT - ASSESSMENT
Seen and examined at bedside. States he feels much better.  Due for PD exchanges today    aspirin enteric coated 81 milliGRAM(s) Oral daily  atorvastatin 80 milliGRAM(s) Oral at bedtime  cadexomer iodine 0.9% Gel 1 Application(s) Topical daily  chlorhexidine 0.12% Liquid 15 milliLiter(s) Oral Mucosa two times a day  chlorhexidine 2% Cloths 1 Application(s) Topical <User Schedule>  dextrose 40% Gel 15 Gram(s) Oral once  dextrose 5%. 1000 milliLiter(s) IV Continuous <Continuous>  dextrose 5%. 1000 milliLiter(s) IV Continuous <Continuous>  dextrose 50% Injectable 25 Gram(s) IV Push once  dextrose 50% Injectable 12.5 Gram(s) IV Push once  dextrose 50% Injectable 25 Gram(s) IV Push once  ferrous    sulfate 325 milliGRAM(s) Oral three times a day  folic acid 1 milliGRAM(s) Oral daily  glucagon  Injectable 1 milliGRAM(s) IntraMuscular once  heparin   Injectable 5000 Unit(s) SubCutaneous every 12 hours  insulin glargine Injectable (LANTUS) 5 Unit(s) SubCutaneous at bedtime  insulin lispro (ADMELOG) corrective regimen sliding scale   SubCutaneous three times a day before meals  insulin lispro (ADMELOG) corrective regimen sliding scale   SubCutaneous at bedtime  lactated ringers. 1000 milliLiter(s) IV Continuous <Continuous>  midodrine 30 milliGRAM(s) Oral every 8 hours  pantoprazole    Tablet 40 milliGRAM(s) Oral before breakfast  potassium chloride  10 mEq/100 mL IVPB 10 milliEquivalent(s) IV Intermittent every 1 hour  sevelamer carbonate 800 milliGRAM(s) Oral three times a day  ticagrelor 60 milliGRAM(s) Oral every 12 hours      VITAL:  T(C): , Max: 36.9 (07-12-21 @ 20:00)  T(F): , Max: 98.4 (07-12-21 @ 20:00)  HR: 97 (07-13-21 @ 08:00)  BP: 101/71 (07-13-21 @ 08:00)  BP(mean): --  RR: 18 (07-13-21 @ 08:00)  SpO2: 98% (07-13-21 @ 08:00)  Wt(kg): --    07-12-21 @ 07:01  -  07-13-21 @ 07:00  --------------------------------------------------------  IN: 2000 mL / OUT: 2400 mL / NET: -400 mL    07-13-21 @ 07:01  -  07-13-21 @ 09:54  --------------------------------------------------------  IN: 2000 mL / OUT: 2300 mL / NET: -300 mL        PHYSICAL EXAM:  General: Alert ; lethargic  HEENT: MMM  Lungs:CTA-b/l  Heart: RRR S1S2  Abdomen: Soft PD cathter in situ   Ext: no pedal edema b/l  : no keys  Access: PD catheter    LABS:                          12.0   9.89  )-----------( 265      ( 13 Jul 2021 06:47 )             39.8     Na(130)/K(3.4)/Cl(89)/HCO3(22)/BUN(50)/Cr(10.24)Glu(157)/Ca(9.2)/Mg(2.4)/PO4(6.1)    07-13 @ 06:47  Na(130)/K(3.7)/Cl(90)/HCO3(22)/BUN(51)/Cr(10.60)Glu(140)/Ca(8.8)/Mg(2.4)/PO4(6.2)    07-12 @ 07:10  Na(128)/K(3.8)/Cl(88)/HCO3(22)/BUN(52)/Cr(10.42)Glu(91)/Ca(9.1)/Mg(2.4)/PO4(5.8)    07-11 @ 07:15      Imaging    EXAM:  PHYSIOL EXTREM LOW 3+ LEV BI                        PROCEDURE DATE:  07/12/2021      IMPRESSION: The quality of this examination is adversely impacted by the noncompressible nature of the diabetic arteries.    It is sufficient to show severe bilateral lower extremity arterial vascular disease affecting the trifurcation arteries and the small arteries of both feet.    An arterial duplex examination is recommended to better delineate the location of the arterialdisease.        ASSESSMENT/PLAN      5M PMH ESRD on PD, DM on insulin, CHF EF 25-30% CAD s/p CABG x4 pw with hypotension with fever  Hypotension off pressors ESRD on PD       1 Renal -   [facility] orders will be 1.5% as to not cause too much UF and ultimately hypotension .   2 Hyponatremia- stable  3 ID-Blood cx  and  PD fluid cx -    IV abx .    4 Hyperphosphatemia-   increase dose to  1600mg TID  for now  5 [facility]-Midodrine increased to 30mg po q 8 hours   6 Podiatry- Toe does not seem to be the issue      Modesto Wayne NP-BC  [employer]  [phone]   Seen and examined at bedside. States he feels much better.  Due for PD exchanges today  On room air  Afebrile    aspirin enteric coated 81 milliGRAM(s) Oral daily  atorvastatin 80 milliGRAM(s) Oral at bedtime  cadexomer iodine 0.9% Gel 1 Application(s) Topical daily  chlorhexidine 0.12% Liquid 15 milliLiter(s) Oral Mucosa two times a day  chlorhexidine 2% Cloths 1 Application(s) Topical <User Schedule>  dextrose 40% Gel 15 Gram(s) Oral once  dextrose 5%. 1000 milliLiter(s) IV Continuous <Continuous>  dextrose 5%. 1000 milliLiter(s) IV Continuous <Continuous>  dextrose 50% Injectable 25 Gram(s) IV Push once  dextrose 50% Injectable 12.5 Gram(s) IV Push once  dextrose 50% Injectable 25 Gram(s) IV Push once  ferrous    sulfate 325 milliGRAM(s) Oral three times a day  folic acid 1 milliGRAM(s) Oral daily  glucagon  Injectable 1 milliGRAM(s) IntraMuscular once  heparin   Injectable 5000 Unit(s) SubCutaneous every 12 hours  insulin glargine Injectable (LANTUS) 5 Unit(s) SubCutaneous at bedtime  insulin lispro (ADMELOG) corrective regimen sliding scale   SubCutaneous three times a day before meals  insulin lispro (ADMELOG) corrective regimen sliding scale   SubCutaneous at bedtime  lactated ringers. 1000 milliLiter(s) IV Continuous <Continuous>  midodrine 30 milliGRAM(s) Oral every 8 hours  pantoprazole    Tablet 40 milliGRAM(s) Oral before breakfast  potassium chloride  10 mEq/100 mL IVPB 10 milliEquivalent(s) IV Intermittent every 1 hour  sevelamer carbonate 800 milliGRAM(s) Oral three times a day  ticagrelor 60 milliGRAM(s) Oral every 12 hours      VITAL:  T(C): , Max: 36.9 (07-12-21 @ 20:00)  T(F): , Max: 98.4 (07-12-21 @ 20:00)  HR: 97 (07-13-21 @ 08:00)  BP: 101/71 (07-13-21 @ 08:00)  BP(mean): --  RR: 18 (07-13-21 @ 08:00)  SpO2: 98% (07-13-21 @ 08:00)  Wt(kg): --    07-12-21 @ 07:01  -  07-13-21 @ 07:00  --------------------------------------------------------  IN: 2000 mL / OUT: 2400 mL / NET: -400 mL    07-13-21 @ 07:01  -  07-13-21 @ 09:54  --------------------------------------------------------  IN: 2000 mL / OUT: 2300 mL / NET: -300 mL        PHYSICAL EXAM:  General: Alert ; lethargic  HEENT: MMM  Lungs:CTA-b/l  Heart: RRR S1S2  Abdomen: Soft PD cathter in situ   Ext: no pedal edema b/l  : no keys  Access: PD catheter    LABS:                          12.0   9.89  )-----------( 265      ( 13 Jul 2021 06:47 )             39.8     Na(130)/K(3.4)/Cl(89)/HCO3(22)/BUN(50)/Cr(10.24)Glu(157)/Ca(9.2)/Mg(2.4)/PO4(6.1)    07-13 @ 06:47  Na(130)/K(3.7)/Cl(90)/HCO3(22)/BUN(51)/Cr(10.60)Glu(140)/Ca(8.8)/Mg(2.4)/PO4(6.2)    07-12 @ 07:10  Na(128)/K(3.8)/Cl(88)/HCO3(22)/BUN(52)/Cr(10.42)Glu(91)/Ca(9.1)/Mg(2.4)/PO4(5.8)    07-11 @ 07:15      Imaging    EXAM:  PHYSIOL EXTREM LOW 3+ LEV BI                        PROCEDURE DATE:  07/12/2021      IMPRESSION: The quality of this examination is adversely impacted by the noncompressible nature of the diabetic arteries.    It is sufficient to show severe bilateral lower extremity arterial vascular disease affecting the trifurcation arteries and the small arteries of both feet.    An arterial duplex examination is recommended to better delineate the location of the arterialdisease.        ASSESSMENT/PLAN      5M PMH ESRD on PD, DM on insulin, CHF EF 25-30% CAD s/p CABG x4 pw with hypotension with fever  Hypotension off pressors ESRD on PD       1 Renal -   [facility] orders will be 1.5% as to not cause too much UF and ultimately hypotension .   2 Hyponatremia- stable  3 ID-Blood cx  and  PD fluid cx -    IV abx .    4 Hyperphosphatemia-   increase dose to  1600mg TID  for now  5 [facility]-Midodrine increased to 30mg po q 8 hours   6 Podiatry- Toe does not seem to be the issue      Modesto Wayne NP-BC  [employer]  [phone]

## 2021-07-13 NOTE — SWALLOW BEDSIDE ASSESSMENT ADULT - SWALLOW EVAL: DIAGNOSIS
Swallow evaluation limited, as pt only accepting trials of thin liquids and x1 tsp of thin puree. Pt declines further trials. With accepted textures, no overt signs of laryngeal penetration/aspiration observed; however, unable to make diet recommendation based on this evaluation. Suspect poor PO intake is related to psych/behavioral component vs true oropharyngeal dysphagia.

## 2021-07-13 NOTE — PROGRESS NOTE ADULT - SUBJECTIVE AND OBJECTIVE BOX
CC: f/u for    Patient reports    REVIEW OF SYSTEMS: no fevers, no chills, no nausea, no vomiting, no abd pain, no resp symptoms  All other review of systems negative (Comprehensive ROS)    Antimicrobials Day #      Other Medications Reviewed    T(F): 97.9 (07-13-21 @ 08:00), Max: 98.4 (07-12-21 @ 20:00)  HR: 97 (07-13-21 @ 08:00)  BP: 101/71 (07-13-21 @ 08:00)  RR: 18 (07-13-21 @ 08:00)  SpO2: 98% (07-13-21 @ 08:00)    PHYSICAL EXAM:  General: alert, no acute distress  Eyes:  anicteric, no conjunctival injection, no discharge  Oropharynx: no lesions or injection 	  Neck: supple, without adenopathy  Lungs: clear to auscultation  Heart: regular rate and rhythm; no murmur, rubs or gallops  Abdomen: soft, nondistended, nontender, without mass or organomegaly  Skin: no lesions  Extremities: no clubbing, cyanosis, or edema  Neurologic: alert, oriented, moves all extremities    LAB RESULTS:                        12.0   9.89  )-----------( 265      ( 13 Jul 2021 06:47 )             39.8     07-13    130<L>  |  89<L>  |  50<H>  ----------------------------<  157<H>  3.4<L>   |  22  |  10.24<H>    Ca    9.2      13 Jul 2021 06:47  Phos  6.1     07-13  Mg     2.4     07-13            MICROBIOLOGY REVIEWED:  Culture - Dialysis Fluid (07.10.21 @ 19:16)   Specimen Source: .Peritoneal Dialysis Fluid Dialysis (P.D.) Fluid   Culture Results:   No growth Culture - Blood (07.10.21 @ 18:54)   Specimen Source: .Blood Blood-Peripheral   Culture Results:   No growth to date.   RADIOLOGY REVIEWED:  < from: MR Thoracic Spine No Cont (07.11.21 @ 23:55) >  IMPRESSION:    -Acute to subacute moderate compression fracture of T12 with slight bony retropulsion but no associated spinal canal stenosis.    -L4-L5 disc bulge with potential impingement of the descending right L5 nerve root.    --- End of Report ---    < end of copied text >     CC: f/u for hypotension    Patient is poorly interactive    REVIEW OF SYSTEMS: limited  All other review of systems negative (Comprehensive ROS)    Antimicrobials Day #      Other Medications Reviewed    T(F): 97.9 (07-13-21 @ 08:00), Max: 98.4 (07-12-21 @ 20:00)  HR: 97 (07-13-21 @ 08:00)  BP: 101/71 (07-13-21 @ 08:00)  RR: 18 (07-13-21 @ 08:00)  SpO2: 98% (07-13-21 @ 08:00)    PHYSICAL EXAM:  General: alert, no acute distress  Eyes:  anicteric, no conjunctival injection, no discharge  Oropharynx: no lesions or injection 	  Neck: supple, without adenopathy  Lungs: clear to auscultation  Heart: regular rate and rhythm; no murmur, rubs or gallops  Abdomen: soft, nondistended, nontender, without mass or organomegaly  Skin: no lesions  Extremities: no clubbing, cyanosis, or edema  Neurologic: alert, poorly interactive    LAB RESULTS:                        12.0   9.89  )-----------( 265      ( 13 Jul 2021 06:47 )             39.8     07-13    130<L>  |  89<L>  |  50<H>  ----------------------------<  157<H>  3.4<L>   |  22  |  10.24<H>    Ca    9.2      13 Jul 2021 06:47  Phos  6.1     07-13  Mg     2.4     07-13            MICROBIOLOGY REVIEWED:  Culture - Dialysis Fluid (07.10.21 @ 19:16)   Specimen Source: .Peritoneal Dialysis Fluid Dialysis (P.D.) Fluid   Culture Results:   No growth Culture - Blood (07.10.21 @ 18:54)   Specimen Source: .Blood Blood-Peripheral   Culture Results:   No growth to date.   RADIOLOGY REVIEWED:  < from: MR Thoracic Spine No Cont (07.11.21 @ 23:55) >  IMPRESSION:    -Acute to subacute moderate compression fracture of T12 with slight bony retropulsion but no associated spinal canal stenosis.    -L4-L5 disc bulge with potential impingement of the descending right L5 nerve root.    --- End of Report ---    < end of copied text >

## 2021-07-13 NOTE — CHART NOTE - NSCHARTNOTEFT_GEN_A_CORE
Per psych recommendation,  neuro eval for concern for delirium in setting of infection and AMS/decreased verbal output. Spoke with attending who agreed. Consult placed with consult beeper [phone] at 5:15 said this was inappropriate consult because the patient has an infection. Requested attending call [facility]. Attending made aware who said private practice attending will be called for consult since house group feels it is inappropriate.

## 2021-07-13 NOTE — PROGRESS NOTE ADULT - SUBJECTIVE AND OBJECTIVE BOX
Patient is a 56y old  Male who presented with a chief complaint of Hypotension (13 Jul 2021 13:03)      INTERVAL HPI/OVERNIGHT EVENTS:  no vomiting  +hiccups  more verbal today, responding to simple questions  +Bms - brown, not diarrhea per RN report  no abdominal pain    MEDICATIONS  (STANDING):  aspirin enteric coated 81 milliGRAM(s) Oral daily  atorvastatin 80 milliGRAM(s) Oral at bedtime  cadexomer iodine 0.9% Gel 1 Application(s) Topical daily  chlorhexidine 0.12% Liquid 15 milliLiter(s) Oral Mucosa two times a day  chlorhexidine 2% Cloths 1 Application(s) Topical <User Schedule>  dextrose 40% Gel 15 Gram(s) Oral once  dextrose 5%. 1000 milliLiter(s) (50 mL/Hr) IV Continuous <Continuous>  dextrose 5%. 1000 milliLiter(s) (100 mL/Hr) IV Continuous <Continuous>  dextrose 50% Injectable 25 Gram(s) IV Push once  dextrose 50% Injectable 12.5 Gram(s) IV Push once  dextrose 50% Injectable 25 Gram(s) IV Push once  ferrous    sulfate 325 milliGRAM(s) Oral three times a day  folic acid 1 milliGRAM(s) Oral daily  glucagon  Injectable 1 milliGRAM(s) IntraMuscular once  heparin   Injectable 5000 Unit(s) SubCutaneous every 12 hours  insulin glargine Injectable (LANTUS) 5 Unit(s) SubCutaneous at bedtime  insulin lispro (ADMELOG) corrective regimen sliding scale   SubCutaneous three times a day before meals  insulin lispro (ADMELOG) corrective regimen sliding scale   SubCutaneous at bedtime  lactated ringers. 1000 milliLiter(s) (50 mL/Hr) IV Continuous <Continuous>  midodrine 30 milliGRAM(s) Oral every 8 hours  pantoprazole    Tablet 40 milliGRAM(s) Oral before breakfast  potassium chloride  10 mEq/100 mL IVPB 10 milliEquivalent(s) IV Intermittent every 1 hour  sevelamer carbonate 800 milliGRAM(s) Oral three times a day  ticagrelor 60 milliGRAM(s) Oral every 12 hours    MEDICATIONS  (PRN):      Allergies  doxazosin (Other)      Review of Systems:  General:  No wt loss , chills, night sweats  CV:  No pain, palpitatioins,  +CAD  resolved hypotension  Resp:  No dyspnea, cough, tachypnea, wheezing  GI:  see HPI  :  ESRD on PD  Muscle:  No pain, weakness  Neuro:  No weakness, tingling, memory problems  Psych:  No fatigue, insomnia, mood problems, depression  Endocrine:  No polyuria, polydypsia, cold/heat intolerance  Heme:  No petechiae, ecchymosis, easy bruisability  Skin:  No rash, edema +gangrene +wounds      Vital Signs Last 24 Hrs  T(C): 36.4 (13 Jul 2021 09:00), Max: 36.9 (12 Jul 2021 20:00)  T(F): 97.6 (13 Jul 2021 09:00), Max: 98.4 (12 Jul 2021 20:00)  HR: 98 (13 Jul 2021 09:00) (96 - 101)  BP: 102/63 (13 Jul 2021 09:00) (96/68 - 113/80)  BP(mean): --  RR: 18 (13 Jul 2021 09:00) (18 - 18)  SpO2: 98% (13 Jul 2021 09:00) (95% - 100%)    PHYSICAL EXAM:  Constitutional: NAD, well-developed chronically ill appearing male.   no hiccupping or retching during interview/exam. poor eye contact  Neck: No LAD, supple no JVD  Respiratory: grossly clear   +WH sternal scar  +WH scar right chest wall  Cardiovascular: S1 and S2, RRR, +murmur  Gastrointestinal: BS+, soft, NT/ND, no rebound guarding or rigidity  +dressing over PD site - clean and dry, non tender near catheter site. no scrotal edema/swelling  Extremities: No peripheral edema,  Right toe gangrene (dry);   Left foot wound dry;  thumb +dry gangrene  Vascular: 2+ peripheral pulses  Neurological: no focal asymmetry  Psychiatric: depressed affect, poor eye contact refusing verbalization  Skin: anicteric    LABS:                        12.0   9.89  )-----------( 265      ( 13 Jul 2021 06:47 )             39.8     07-13    130<L>  |  89<L>  |  50<H>  ----------------------------<  157<H>  3.4<L>   |  22  |  10.24<H>    Ca    9.2      13 Jul 2021 06:47  Phos  6.1     07-13  Mg     2.4     07-13              RADIOLOGY & ADDITIONAL TESTS:

## 2021-07-13 NOTE — SWALLOW BEDSIDE ASSESSMENT ADULT - SWALLOW EVAL: THERAPY FREQUENCY
Problem: Fall Risk (Adult)  Goal: Absence of Fall  Outcome: Ongoing (interventions implemented as appropriate)   04/07/19 1514   Fall Risk (Adult)   Absence of Fall making progress toward outcome       Problem: Diabetes, Type 2 (Adult)  Goal: Signs and Symptoms of Listed Potential Problems Will be Absent, Minimized or Managed (Diabetes, Type 2)  Outcome: Ongoing (interventions implemented as appropriate)   04/07/19 1514   Goal/Outcome Evaluation   Problems Assessed (Type 2 Diabetes) hyperglycemia   Problems Present (Type 2 Diabetes) hyperglycemia       Problem: Patient Care Overview  Goal: Plan of Care Review  Outcome: Ongoing (interventions implemented as appropriate)   04/07/19 1514   Patient Care Overview   IRF Plan of Care Review progress ongoing, continue   Progress, Functional Goals demonstrating adequate progress   Coping/Psychosocial   Plan of Care Reviewed With patient   OTHER   Outcome Summary Patient's pain is controlled by Norco. mobility and transfers improved. BP remains elevated but within parameters set out for increased perfusion through slightly elevated BP.          as schedule permits

## 2021-07-13 NOTE — PROGRESS NOTE ADULT - ASSESSMENT
56 m with    Sepsis  - antibiotic   - peritoneal culture pending   - toe gangrene  - Podiatry follow  - ID follow     COPD  - 2L NC overnight because he becomes apneic, sats well  - Sats well on RA while awake    CAD s/p CABG   - Hypotension likely in the setting of septic shock   - midodrine dose increased to 30 mg TID to match home dose  - Previous echo with reduced EF  - F/up TTE  - c/w DAPT  - Cardiology follow    CHF cr systolic  - cardiology follow  - EP eval re AICD    Peritoneal Dialysis   - nephrology follow PD, recommend 4x a day  - patient on midodrine 30 q8h at home, continue here  - Holding home metoprolol 25 q12hr in setting of hypotension    Diabetes   - HA1C 6.1 on 7/6 suggesting prediabetes  - Tujeo 60 units at bedtime and Victoza at home, started on 30 units at bedtime, adjust as needed based on patient PO intake  - added 2 units insulin pre-meal    Hiccups  - GI follow  - hold Ativan 2 to sedation    Incontinence dermatitis  - wound care    Toe gangrene  - Podiatry follow  - Vascular evaluation noted  - PONCHO/PVR noted  - Angiogram if agrees    Finger gangrenous area  - Plastic evaluation    Vertebral fracture T12  - Ortho eval noted   - MRI spine noted  - No intervention    Confusion  - possible delirium  - Psych evaluation  - Neurology evaluation    Pipe Beck MD pager [phone]

## 2021-07-13 NOTE — PROGRESS NOTE ADULT - SUBJECTIVE AND OBJECTIVE BOX
CARDIOLOGY FOLLOW UP - Dr. Best  DATE OF SERVICE: 07-13-21     CC resting comfortably with no acute events or complaints.     REVIEW OF SYSTEMS:   CONSTITUTIONAL: No fever, weight loss, or fatigue   RESPIRATORY:  No cough, wheezing, chills or hemoptysis; No SOB  CARDIOVASCULAR: No chest pain, palpitations, passing out, dizziness, or leg swelling   GASTROINTESTINAL: No abdominal or epigastric pain. No nausea, vomiting, or hematemesis, no diarreha, or constipation, No melena or hematochezia   VASCULAR: no edema.       PHYSICAL EXAM:  T(C): 36.4 (07-13-21 @ 09:00), Max: 36.9 (07-12-21 @ 20:00)  HR: 98 (07-13-21 @ 09:00) (96 - 101)  BP: 102/63 (07-13-21 @ 09:00) (96/68 - 113/80)  RR: 18 (07-13-21 @ 09:00) (18 - 18)  SpO2: 98% (07-13-21 @ 09:00) (95% - 100%)  Wt(kg): --  I&O's Summary    12 Jul 2021 07:01  -  13 Jul 2021 07:00  --------------------------------------------------------  IN: 2000 mL / OUT: 2400 mL / NET: -400 mL    13 Jul 2021 07:01  -  13 Jul 2021 13:03  --------------------------------------------------------  IN: 2000 mL / OUT: 2300 mL / NET: -300 mL        Appearance: Normal	  Cardiovascular: Normal S1 S2,RRR, No JVD, No murmurs  Respiratory: Lungs clear to auscultation	  Gastrointestinal:  Soft, Non-tender, + BS	  Extremities: Normal range of motion, No clubbing, cyanosis or edema      HOME MEDICATIONS:  aspirin 81 mg oral delayed release tablet: 1 tab(s) orally once a day (29 Dec 2020 18:37)  atorvastatin 80 mg oral tablet: 1 tab(s) orally once a day (at bedtime) (29 Dec 2020 18:37)  epoetin martine: injectable once a month (29 Dec 2020 18:37)  ferrous sulfate 325 mg (65 mg elemental iron) oral tablet: 1 tab(s) orally 3 times a day (29 Dec 2020 18:37)  gabapentin 100 mg oral capsule: 1 cap(s) orally once a day (29 Dec 2020 18:37)  nortriptyline 25 mg oral capsule: 1 cap(s) orally once a day (at bedtime) (29 Dec 2020 18:37)  pantoprazole 40 mg oral delayed release tablet: 1 tab(s) orally once a day (before a meal) (29 Dec 2020 18:37)  Toujeo SoloStar 300 units/mL subcutaneous solution: 60 unit(s) subcutaneous once a day (at bedtime) (08 Jan 2021 12:41)      MEDICATIONS  (STANDING):  aspirin enteric coated 81 milliGRAM(s) Oral daily  atorvastatin 80 milliGRAM(s) Oral at bedtime  cadexomer iodine 0.9% Gel 1 Application(s) Topical daily  chlorhexidine 0.12% Liquid 15 milliLiter(s) Oral Mucosa two times a day  chlorhexidine 2% Cloths 1 Application(s) Topical <User Schedule>  dextrose 40% Gel 15 Gram(s) Oral once  dextrose 5%. 1000 milliLiter(s) (50 mL/Hr) IV Continuous <Continuous>  dextrose 5%. 1000 milliLiter(s) (100 mL/Hr) IV Continuous <Continuous>  dextrose 50% Injectable 12.5 Gram(s) IV Push once  dextrose 50% Injectable 25 Gram(s) IV Push once  dextrose 50% Injectable 25 Gram(s) IV Push once  ferrous    sulfate 325 milliGRAM(s) Oral three times a day  folic acid 1 milliGRAM(s) Oral daily  glucagon  Injectable 1 milliGRAM(s) IntraMuscular once  heparin   Injectable 5000 Unit(s) SubCutaneous every 12 hours  insulin glargine Injectable (LANTUS) 5 Unit(s) SubCutaneous at bedtime  insulin lispro (ADMELOG) corrective regimen sliding scale   SubCutaneous three times a day before meals  insulin lispro (ADMELOG) corrective regimen sliding scale   SubCutaneous at bedtime  lactated ringers. 1000 milliLiter(s) (50 mL/Hr) IV Continuous <Continuous>  midodrine 30 milliGRAM(s) Oral every 8 hours  pantoprazole    Tablet 40 milliGRAM(s) Oral before breakfast  potassium chloride  10 mEq/100 mL IVPB 10 milliEquivalent(s) IV Intermittent every 1 hour  sevelamer carbonate 800 milliGRAM(s) Oral three times a day  ticagrelor 60 milliGRAM(s) Oral every 12 hours      TELEMETRY: 	    ECG:  	  RADIOLOGY:   DIAGNOSTIC TESTING:  [ ] Echocardiogram:  [ ]  Catheterization:  [ ] Stress Test:    OTHER: 	    LABS:	 	                                12.0   9.89  )-----------( 265      ( 13 Jul 2021 06:47 )             39.8     07-13    130<L>  |  89<L>  |  50<H>  ----------------------------<  157<H>  3.4<L>   |  22  |  10.24<H>    Ca    9.2      13 Jul 2021 06:47  Phos  6.1     07-13  Mg     2.4     07-13

## 2021-07-13 NOTE — PROGRESS NOTE ADULT - ASSESSMENT
55 yo M DM, , CHF, CAD with esrd on pd, had stay about 7 months ago s/p cardiac stent, wire broke, had to undergo sternotomy, 2 stays soon after that for sepsis unclear cause, now returns a few days ago with fever, hypotension, weak, report of cough. He required pressors in micu now off, cultures of blood and pd fluid are no growth, exam not revealing. Source of presumed sepsis is not readily apparent. CXR shows no infiltrate but could have pneumonia not seen given the cough. Has dry gangrene of right 3rd toe but that is not a source of sepsis.  He was found to have subtle infiltrates on ct so maybe pneumonia.   Encephalopathy from ?cefepime, now stopped   MRI spine not consistent with infection  Recommendations:    supportive care  Monitor for any fever relapse or signs of infection  f/u final culture results  No indications for abx at present

## 2021-07-13 NOTE — SWALLOW BEDSIDE ASSESSMENT ADULT - ORAL PREPARATORY PHASE
Within functional limits Oral holding, responds to verbal cues to swallow and subsequently states "I don't like applesauce".

## 2021-07-13 NOTE — PROGRESS NOTE ADULT - ASSESSMENT
55M PMH ESRD on PD, DM on insulin, CHF EF 25-30% CAD s/p CABG x4, underwent cardiac cath and stent and wire broke in heart sternotomy admitted with cough/sweating found to be hypotensive and febrile in ER- admitted for presumed septic shock; cultures negative thus far    s/p short stay in MICU for pressor support  Cultures negative to date  ESRD on PD  COVID negative    #nausea and severe hiccups (per pt has had intermittent issues with hiccuping prior to admission)  qTc prolonged (600). No acute GI pathology on CT A/P. Nausea appears to have improved  ?related to uremia  -will d/w Renal if able to give baclofen PRN from renal standpoint for symptomatic hiccups Rx (call placed to office, awaiting call back) Unable to give Baclofen 5mg TID 2/2 Creat clearance per d/w Pharmacy but unclear if can use q 8 or q 12 hour PRN  dosing therefore will await clarification with Renal  -PD per Renal  -PO diet as tolerated; encourage preferences  -Continue PPI    #suspected sepsis  -Abx and work-up per ID    Recommend Psych input as pt appears very depressed  SLP input appreciated-  suspect symptoms may be psych/behavior related  Discussed with Medicine NP    Sanjiv Oglesby PA-C    [facility]  [phone]  After hours and weekend coverage [phone]

## 2021-07-14 NOTE — BH CONSULTATION LIAISON PROGRESS NOTE - NSBHFUPINTERVALHXFT_PSY_A_CORE
Patient is a 56 year old male, , domiciled with wife, unemployed, no history of psychiatric diagnoses, psych admission, or psychiatric meds, with a history of ESRD on peritoneal dialysis, DM on insulin, CHF EF 25-30% CAD s/p CABG x4, underwent cardiac cath and stent and wire broke in heart sternotomy, who presented with cough and hypotension, was admitted for septic shock to the ICU. Patient was put on pressors and antibiotics, and downgraded from the ICU to the medical floor on HOD 2. Psychiatry was consulted for depression, decreased verbal communication, and bizarre behavior of hoarding food in mouth.    Differential diagnoses included catatonia vs delirium. Upon assessment today, patient more verbal, but still somewhat selective with responses. Patient was able to explain that he has gotten confused in the hospital, thinking that his wife was with him in the morning and that he was at home. Patient AAOx1 today. He states that he had been hoarding food in his mouth because he doesn't like the taste, and states that has been talking less because medical team was helping him go to the bathroom, which he felt was invading his privacy. Patient denies SI/HI, depression. Patient's wife at bedside stated that patient does not seem confused currently, but states patient had been more active and talkative before coming to the hospital. She states that since he had cardiac surgery in December 2020, he had progressively worsened in his speech and movement.  Today patient more verbal, but still somewhat selective with responses. Patient was able to explain that he has gotten confused in the hospital, thinking that his wife was with him in the morning and that he was at home. Patient AAOx1 today. He states that he had been hoarding food in his mouth because he doesn't like the taste, and states that has been talking less because medical team was helping him go to the bathroom, which he felt was invading his privacy. Patient denies SI/HI, depression. Patient's wife at bedside stated that patient does not seem confused currently, but states patient had been more active and talkative before coming to the hospital. She states that since he had cardiac surgery in December 2020, he had progressively worsened in his speech and movement.

## 2021-07-14 NOTE — PROGRESS NOTE ADULT - ASSESSMENT
55M PMH ESRD on PD, DM on insulin, CHF EF 25-30% CAD s/p CABG x4, underwent cardiac cath and stent and wire broke in heart sternotomy admitted with cough/sweating found to be hypotensive and febrile in ER- admitted for presumed septic shock; cultures negative thus far    s/p short stay in MICU for pressor support  Cultures negative to date  ESRD on PD  COVID negative  CT A/P - thickening vs underdistension of AC (th clinically benign abdominal exam), no evidence of infectious source, T12 compression fracture  CT Chest- subtle peripheral opacities RUL, pneumoperitoneum related to PD catheter, T12 vertebral fracture    #nausea and hiccups (per pt has had intermittent issues with hiccuping prior to admission)  qTc prolonged (600). No acute GI pathology on CT A/P. Nausea appears to have improved  ?related to uremia    -per d/w Renal we cannot give Baclofen (even at PRN dosing)  -given previous good response to Ativan, would consider PRN Ativan; this was discussed with Psych at bedside  -PD per Renal  -PO diet as tolerated; encourage preferences  -Continue PPI    #Anorexia  -encourage preferences (discussed with RN, pt and spouse at bedside)  -Marinol 2.5 mg BID for appetite stimulant  -Psych follow up    #suspected sepsis, all cultures negative to date  -Abx and work-up per ID      SLP input appreciated (7/13)- suspect symptoms may be psych/behavior related  Discussed with Medicine NP/attending and Neurology attending    Sanjiv Oglesby PA-C    [facility]  [phone]  After hours and weekend coverage [phone]

## 2021-07-14 NOTE — CHART NOTE - NSCHARTNOTEFT_GEN_A_CORE
Nutrition Follow Up Note  Patient seen for: malnutrition follow up     Chart reviewed, events noted.    Source: [ ] Patient       [x] EMR        [X] RN        [ ] Family at bedside       [ ] Other:    -If unable to interview patient: [ ] Trach/Vent/BiPAP  [X] Disoriented/confused/inappropriate to interview    Diet Order:   Diet, Renal Restrictions:   For patients receiving Renal Replacement - No Protein Restr, No Conc K, No Conc Phos, Low Sodium (21)    - Is current order appropriate/adequate? [X] Yes  [ ]  No:     - PO intake :   [ ] >75%  Adequate    [X] 50-75%  Fair        [ ] <50%  Poor  - Nutrition-Related Concerns: Per RN, pt continues to be very lethargic and sleeping most of the day. Pt with poor to fair appetite; consuming ~50% of most meals. Per RD observation, pt seemingly consumed 100% of entree for lunch today. Pt continues to require assistance with feeding. Pt tolerating diet consistency at this time per RN. Pt reported to also receive meals from home at times.     - Micronutrient Supplementation: dextrose 5%. 1000 milliLiter(s) IV Continuous <Continuous>  dextrose 5%. 1000 milliLiter(s) IV Continuous <Continuous>  ferrous    sulfate 325 milliGRAM(s) Oral three times a day  folic acid 1 milliGRAM(s) Oral daily  lactated ringers. 1000 milliLiter(s) IV Continuous <Continuous>  Nephro-kristi 1 Tablet(s) Oral daily  thiamine IVPB 500 milliGRAM(s) IV Intermittent every 8 hours    GI:  Last BM .   Bowel Regimen? [ ] Yes   [X] No    Weights:   Daily Weight in k (), Weight in k (), Weight in k.3 (), Weight in k.9 (), Weight in k (), Weight in k.8 (), Weight in k ()  Pt observed with continued weight fluctuations likely due to fluid shifts from HD.     MEDICATIONS  (STANDING):  aspirin enteric coated  atorvastatin  cadexomer iodine 0.9% Gel  chlorhexidine 0.12% Liquid  chlorhexidine 2% Cloths  dextrose 40% Gel  dextrose 5%.  dextrose 5%.  dextrose 50% Injectable  dextrose 50% Injectable  dextrose 50% Injectable  dronabinol  ferrous    sulfate  folic acid  glucagon  Injectable  heparin   Injectable  insulin glargine Injectable (LANTUS)  insulin lispro (ADMELOG) corrective regimen sliding scale  insulin lispro (ADMELOG) corrective regimen sliding scale  lactated ringers.  midodrine  Nephro-kristi  pantoprazole    Tablet  sevelamer carbonate  thiamine IVPB  ticagrelor    Pertinent Labs:  @ 06:13: Na 130<L>, BUN 48<H>, Cr 10.01<H>, <H>, K+ 3.5, Phos --, Mg --, Alk Phos 112, ALT/SGPT 13, AST/SGOT 18, HbA1c --    A1C with Estimated Average Glucose Result: 6.1 % (21 @ 03:53)    Finger Sticks:  POCT Blood Glucose.: 145 mg/dL ( @ 14:13)  POCT Blood Glucose.: 122 mg/dL ( @ 09:48)  POCT Blood Glucose.: 206 mg/dL ( @ 23:05)  POCT Blood Glucose.: 183 mg/dL ( @ 18:22)      Skin per nursing documentation: DTI (bilat buttocks), stage 2 PU (bilat knees), unstageable (right medial foot)  Edema: none noted    Estimated Needs:   [X] no change since previous assessment  [ ] recalculated:     Previous Nutrition Diagnosis: moderate malnutrition  Nutrition Diagnosis is: [X] ongoing  [ ] resolved [ ] not applicable     New Nutrition Diagnosis: [X] Not applicable    Nutrition Care Plan:  [X] In Progress  [ ] Achieved  [ ] Not applicable    Nutrition Interventions / Recommendations:  1) Continue current diet order as tolerated.   2) Encourage PO intake, obtain food preferences, provide feeding assistance as needed.   3) Consider Multivitamin and vitamin C to promote wound healing.     Monitoring and Evaluation:   Continue to monitor Nutritional intake, Tolerance to diet prescription, weights, labs, skin integrity    Arlette Cervantes RD  Pager [phone]  RD remains available upon request and will follow up per protocol

## 2021-07-14 NOTE — PROGRESS NOTE ADULT - ASSESSMENT
55 yo M DM, , CHF, CAD with ESRD on PD, had stay about 7 months ago s/p cardiac stent, wire broke, had to undergo sternotomy, 2 stays soon after that for sepsis unclear cause, now returns a few days ago with fever, hypotension, weak, report of cough. He required pressors in micu now off, cultures of blood and pd fluid are no growth, exam not revealing.   Source of presumed sepsis is not readily apparent.   CXR shows no infiltrate   Has dry gangrene of right 3rd toe but that is not a source of sepsis.  He was found to have subtle infiltrates on ct chest, treated with Cefepime, received abx 7/6-7/11, now dcd due to concern of contributing to encephalopathy.   MRI spine not consistent with infection    Recommendations:    supportive care  Monitor for any fever relapse or signs of infection  f/u final culture results  No indications for abx at present  psych and neuro eval in progress

## 2021-07-14 NOTE — PROGRESS NOTE ADULT - ASSESSMENT
56 m with    Sepsis  - antibiotic   - peritoneal culture pending   - toe gangrene  - Podiatry follow  - ID follow     COPD  - 2L NC overnight because he becomes apneic, sats well  - Sats well on RA while awake    CAD s/p CABG   - Hypotension likely in the setting of septic shock   - midodrine dose increased to 30 mg TID to match home dose  - Previous echo with reduced EF  - F/up TTE  - c/w DAPT  - Cardiology follow    CHF cr systolic  - cardiology follow  - EP eval re AICD    Peritoneal Dialysis   - nephrology follow PD, recommend 4x a day  - patient on midodrine 30 q8h at home, continue here  - Holding home metoprolol 25 q12hr in setting of hypotension    Diabetes   - HA1C 6.1 on 7/6 suggesting prediabetes  - Tujeo 60 units at bedtime and Victoza at home, started on 30 units at bedtime, adjust as needed based on patient PO intake  - added 2 units insulin pre-meal    Hiccups  - GI follow  - hold Ativan 2 to sedation    Incontinence dermatitis  - wound care    Toe gangrene  - Podiatry follow  - Vascular evaluation noted  - PONCHO/PVR noted  - Angiogram if agrees    Finger gangrenous area  - Plastic evaluation    Vertebral fracture T12  - Ortho eval noted   - MRI spine noted  - No intervention    Confusion  - possible delirium  - Psych evaluation  - Neurology evaluation    Pipe Beck MD pager [phone]

## 2021-07-14 NOTE — PROGRESS NOTE ADULT - SUBJECTIVE AND OBJECTIVE BOX
On going PD exchanges       VITAL:  T(C): , Max: 37 (07-14-21 @ 02:30)  T(F): , Max: 98.6 (07-14-21 @ 02:30)  HR: 97 (07-14-21 @ 05:00)  BP: 99/71 (07-14-21 @ 05:00)  BP(mean): --  RR: 18 (07-14-21 @ 05:00)  SpO2: 98% (07-14-21 @ 05:00)  Wt(kg): --      PHYSICAL EXAM:  General: lethargic   HEENT: MMM  Lungs:CTA-b/l  Heart: RRR S1S2  Abdomen: Soft, PD catheter in situ   Ext: no pedal edema b/l  : no keys      LABS:                        12.2   10.74 )-----------( 260      ( 14 Jul 2021 06:13 )             41.2     Na(130)/K(3.5)/Cl(91)/HCO3(23)/BUN(48)/Cr(10.01)Glu(134)/Ca(9.0)/Mg(--)/PO4(--)    07-14 @ 06:13  Na(130)/K(3.4)/Cl(89)/HCO3(22)/BUN(50)/Cr(10.24)Glu(157)/Ca(9.2)/Mg(2.4)/PO4(6.1)    07-13 @ 06:47  Na(130)/K(3.7)/Cl(90)/HCO3(22)/BUN(51)/Cr(10.60)Glu(140)/Ca(8.8)/Mg(2.4)/PO4(6.2)    07-12 @ 07:10      56 M PMH ESRD on PD, DM on insulin, CHF EF 25-30% CAD s/p CABG x4 pw with hypotension with fever  Hypotension off pressors ESRD on PD       1 Renal -   [facility] orders will be 1.5% as to not cause too much UF and ultimately hypotension .   2 Hyponatremia- stable  3 ID-Blood cx  and  PD fluid cx -    IV abx .    4 Hyperphosphatemia-   increase dose to  1600mg TID  for now  5 CVS-Midodrine increased to 30mg po q 8 hours   6 psych/neuro eval   7 Would get ammonia level   8 Encephalopathy from cefepime ?  ( now stopped)       Samia Dangelo MD  [facility]  Office: [phone]

## 2021-07-14 NOTE — PROGRESS NOTE ADULT - ASSESSMENT
CATH 11/30/2020:  COMPLICATIONS: The stent was deployed without difficulty. On removal of the  balloon, the shaft broke and the tip of the balloon remained in the vessel. Several attempts were made to snare the balloon unsuccessfully. Dr. Verdugo was notifed who will take the patient to the operating room.  DIAGNOSTIC RECOMMENDATIONS: The patient should continue with the present medications. The patients vessel was stented without difficulty On removal of the balloon, the shaft broke with the baloon stuck in the left main. All attempts to snare the balloon out failed and the patient was taken to the OR by Dr. Arango to remove the balloon  introp eleonora 11/30/20: mild to mod MR, < from: Intra-Operative Transesophageal Echo (11.30.20 @ 17:02) >  Severe global left ventricular systolic dysfunction. Inferior and inferolateral akinesis.  severe hypokinesis of other segments.  Severe global hypokinesia.  Normal left ventricular internal dimensions and wall thicknesses. Moderate diastolic dysfunction (Stage II).  echo 12/17/20: EF 32%, mod MR, Severe global left ventricular systolic dysfunction, moderate pulmonary pressures  echo 12/20/20: EF (Visual Estimate):  25-30 % mild MR, Akinesis of the inferolateral, anterolateral, apical laterlal, inferior, and inferoseptal walls. Severe left ventricular enlargement. No left ventricular thrombus is seen.  Echo 7/7/21: EF 16%, mod - sev MR, mod AS, severe global lv sys dysfx, severe diastolic dysfx, mod pulm htn       a/p  56 M well known to practice with PMH ESRD on PD, DM on insulin, CHF EF 25-30% CAD s/p CABG x4, underwent cardiac cath and stent and wire broke in heart s/p sternotomy p/w septic shock.    #Septic Shock, resolved   -Bcx NGTD 7/10   -s/p iv abx  -s/p pressors   -Bedside POCUS notable for scattered B lines, biventricular diffuse hypokinesis  -pt also with right 2nd toe gangrene - pod eval noted -- vascular following - PONCHO/PVR noted   -culture of PD cathter NGTD-- ID following    -med f/u    #Ischemic Cardiomyopathy. Chronic systolic HF  -vol status stable   -Repeat echo noted with EF 16%, mod - sev MR, mod AS, severe global lv sys dysfx, severe diastolic dysfx, mod pulm htn   -eps eval Pending for crt-D,  pt reluctant in past    -no bb, acei/arb given hx of orthostatic bp   -continue midodrine for bp support  -cont vol removal w PD     # CAD, s/p CABG, s/p PCI, s/p redo open heart for stent balloon retrieval   -hst elevated, likely demand ischemia in setting of septic shock, ESRD   -c/w asa, Brilinta, statin     # ESRD  -on PD  -renal f/u    #Nausea, vomiting  -GI followup    dvt ppx

## 2021-07-14 NOTE — PROGRESS NOTE ADULT - SUBJECTIVE AND OBJECTIVE BOX
CC: f/u for hypotension    Patient is poorly interactive    REVIEW OF SYSTEMS: limited  All other review of systems negative (Comprehensive ROS)    Antimicrobials Day #        Other Medications Reviewed    Vital Signs Last 24 Hrs  T(C): 36.3 (14 Jul 2021 05:00), Max: 37 (14 Jul 2021 02:30)  T(F): 97.3 (14 Jul 2021 05:00), Max: 98.6 (14 Jul 2021 02:30)  HR: 97 (14 Jul 2021 05:00) (95 - 99)  BP: 99/71 (14 Jul 2021 05:00) (99/71 - 105/76)  BP(mean): --  RR: 18 (14 Jul 2021 05:00) (18 - 18)  SpO2: 98% (14 Jul 2021 05:00) (95% - 99%)    PHYSICAL EXAM:  General: alert, no acute distress  Eyes:  anicteric, no conjunctival injection, no discharge  Oropharynx: no lesions or injection 	  Neck: supple, without adenopathy  Lungs: clear to auscultation  Heart: regular rate and rhythm; no murmur, rubs or gallops  Abdomen: soft, nondistended, nontender, without mass or organomegaly  Skin: no lesions  Extremities: no clubbing, cyanosis, or edema  Neurologic: alert, poorly interactive    LAB RESULTS:                                   12.2   10.74 )-----------( 260      ( 14 Jul 2021 06:13 )             41.2   07-14    130<L>  |  91<L>  |  48<H>  ----------------------------<  134<H>  3.5   |  23  |  10.01<H>    Ca    9.0      14 Jul 2021 06:13  Phos  6.1     07-13  Mg     2.4     07-13    TPro  6.3  /  Alb  2.4<L>  /  TBili  0.6  /  DBili  x   /  AST  18  /  ALT  13  /  AlkPhos  112  07-14              MICROBIOLOGY REVIEWED:      Culture - Dialysis Fluid (07.10.21 @ 19:16)   Specimen Source: .Peritoneal Dialysis Fluid Dialysis (P.D.) Fluid   Culture Results:   No growth Culture - Blood (07.10.21 @ 18:54)   Specimen Source: .Blood Blood-Peripheral   Culture Results:   No growth to date.   RADIOLOGY REVIEWED:      < from: MR Thoracic Spine No Cont (07.11.21 @ 23:55) >  IMPRESSION:    -Acute to subacute moderate compression fracture of T12 with slight bony retropulsion but no associated spinal canal stenosis.    -L4-L5 disc bulge with potential impingement of the descending right L5 nerve root.    --- End of Report ---    < end of copied text >

## 2021-07-14 NOTE — BH CONSULTATION LIAISON PROGRESS NOTE - NSBHASSESSMENTFT_PSY_ALL_CORE
Patient is a 56 year old male, , domiciled with wife, unemployed, no history of psychiatric diagnoses, psych admission, or psychiatric meds, with a history of ESRD on peritoneal dialysis, DM on insulin, CHF EF 25-30% CAD s/p CABG x4, underwent cardiac cath and stent and wire broke in heart sternotomy, who presented with cough and hypotension, was admitted for septic shock to the ICU. Patient was put on pressors and antibiotics, and downgraded from the ICU to the medical floor on HOD 2. Psychiatry was consulted for depression, decreased verbal communication, and bizarre behavior of hoarding food in mouth.    Differential diagnoses included catatonia vs delirium. Upon assessment today, patient more verbal, but still somewhat selective with responses. Patient was able to explain that he has gotten confused in the hospital, thinking that his wife was with him in the morning and that he was at home. Patient AAOx1 today. He states that he had been hoarding food in his mouth because he doesn't like the taste, and states that has been talking less because medical team was helping him go to the bathroom, which he felt was invading his privacy.     Patient likely has hypoactive delirium. Please avoid antipsychotics due to QTC>600. Avoid benzos which can potentiate delirium.  Patient is a 56 year old male, , domiciled with wife, unemployed, no history of psychiatric diagnoses, psych admission, or psychiatric meds, with a history of ESRD on peritoneal dialysis, DM on insulin, CHF EF 25-30% CAD s/p CABG x4, underwent cardiac cath and stent and wire broke in heart sternotomy, who presented with cough and hypotension, was admitted for septic shock to the ICU. Patient was put on pressors and antibiotics, and downgraded from the ICU to the medical floor on HOD 2. Psychiatry was consulted for depression, decreased verbal communication, and bizarre behavior of hoarding food in mouth.    Differential diagnoses included catatonia vs delirium. Upon assessment today, patient more verbal, but still somewhat selective with responses. Patient was able to explain that he has gotten confused in the hospital, thinking that his wife was with him in the morning and that he was at home. Patient AAOx1 today. He states that he had been hoarding food in his mouth because he doesn't like the taste, and states that has been talking less because medical team was helping him go to the bathroom, which he felt was invading his privacy.  Patient likely has hypoactive delirium. Will avoid antipsychotics due to QTC>600.

## 2021-07-14 NOTE — PROGRESS NOTE ADULT - SUBJECTIVE AND OBJECTIVE BOX
Patient is a 56y old  Male who presents with a chief complaint of Hypotension (14 Jul 2021 13:32)      SUBJECTIVE / OVERNIGHT EVENTS: tired. Nonverbal today .  Review of Systems  unobtainable      MEDICATIONS  (STANDING):  aspirin enteric coated 81 milliGRAM(s) Oral daily  atorvastatin 80 milliGRAM(s) Oral at bedtime  cadexomer iodine 0.9% Gel 1 Application(s) Topical daily  chlorhexidine 0.12% Liquid 15 milliLiter(s) Oral Mucosa two times a day  chlorhexidine 2% Cloths 1 Application(s) Topical <User Schedule>  dextrose 40% Gel 15 Gram(s) Oral once  dextrose 5%. 1000 milliLiter(s) (50 mL/Hr) IV Continuous <Continuous>  dextrose 5%. 1000 milliLiter(s) (100 mL/Hr) IV Continuous <Continuous>  dextrose 50% Injectable 25 Gram(s) IV Push once  dextrose 50% Injectable 12.5 Gram(s) IV Push once  dextrose 50% Injectable 25 Gram(s) IV Push once  diVALproex  milliGRAM(s) Oral two times a day  dronabinol 2.5 milliGRAM(s) Oral two times a day  ferrous    sulfate 325 milliGRAM(s) Oral three times a day  folic acid 1 milliGRAM(s) Oral daily  glucagon  Injectable 1 milliGRAM(s) IntraMuscular once  heparin   Injectable 5000 Unit(s) SubCutaneous every 12 hours  insulin glargine Injectable (LANTUS) 5 Unit(s) SubCutaneous at bedtime  insulin lispro (ADMELOG) corrective regimen sliding scale   SubCutaneous three times a day before meals  insulin lispro (ADMELOG) corrective regimen sliding scale   SubCutaneous at bedtime  lactated ringers. 1000 milliLiter(s) (50 mL/Hr) IV Continuous <Continuous>  midodrine 30 milliGRAM(s) Oral every 8 hours  Nephro-kristi 1 Tablet(s) Oral daily  pantoprazole    Tablet 40 milliGRAM(s) Oral before breakfast  sevelamer carbonate 800 milliGRAM(s) Oral three times a day  thiamine IVPB 500 milliGRAM(s) IV Intermittent every 8 hours  ticagrelor 60 milliGRAM(s) Oral every 12 hours    MEDICATIONS  (PRN):  valproate sodium IVPB 125 milliGRAM(s) IV Intermittent every 8 hours PRN agitation      Vital Signs Last 24 Hrs  T(C): 36.3 (14 Jul 2021 14:16), Max: 37 (14 Jul 2021 02:30)  T(F): 97.3 (14 Jul 2021 14:16), Max: 98.6 (14 Jul 2021 02:30)  HR: 98 (14 Jul 2021 16:45) (79 - 98)  BP: 100/70 (14 Jul 2021 16:45) (94/67 - 108/81)  BP(mean): --  RR: 18 (14 Jul 2021 16:45) (18 - 18)  SpO2: 97% (14 Jul 2021 16:45) (95% - 99%)    PHYSICAL EXAM:  GENERAL: NAD  HEAD:  Atraumatic, Normocephalic  EYES: EOMI, PERRLA, conjunctiva and sclera clear  NECK: Supple, No JVD  CHEST/LUNG: Clear to auscultation bilaterally; No wheeze  HEART: Regular rate and rhythm; No murmurs, rubs, or gallops  ABDOMEN: Soft, Nontender, Nondistended; Bowel sounds present  EXTREMITIES:  2+ Peripheral Pulses, No clubbing, cyanosis, or edema  NEUROLOGY: non-focal, lethargic  SKIN: No rashes or lesions    LABS:                        12.2   10.74 )-----------( 260      ( 14 Jul 2021 06:13 )             41.2     07-14    130<L>  |  91<L>  |  48<H>  ----------------------------<  134<H>  3.5   |  23  |  10.01<H>    Ca    9.0      14 Jul 2021 06:13  Phos  6.1     07-13  Mg     2.4     07-13    TPro  6.3  /  Alb  2.4<L>  /  TBili  0.6  /  DBili  x   /  AST  18  /  ALT  13  /  AlkPhos  112  07-14                RADIOLOGY & ADDITIONAL TESTS:    Imaging Personally Reviewed:    Consultant(s) Notes Reviewed:      Care Discussed with Consultants/Other Providers:

## 2021-07-14 NOTE — BH CONSULTATION LIAISON PROGRESS NOTE - CASE SUMMARY
56M year old male, , domiciled with wife, unemployed, with no formal PPHx, SA, or psych admissions, with PMH significant for ESRD on peritoneal dialysis, DM on insulin, CHF EF 25-30% CAD s/p CABG x4, underwent cardiac cath and stent and wire broke in heart sternotomy, who presented with cough and hypotension, was admitted for septic shock to the ICU, received pressors and antibiotics, now transferred to medical floor, psychiatry consulted due to AMS/decreased verbal output.  On interview pt is staring into space, intermittently looks at writer and looks away, does not follow commands, speak, or blink to threat.  Allows passive movement of his limbs, no rigidity/waxy flexibility/posturing.  Per wife pt was at baseline mental status a week ago, was speaking at that time.  Presentation concerning for catatonia vs. hypoactive delirium.  7/14: Pt with improving mental status, more verbal today, oriented to person but could not give month/year/season or name the president.  States he has been feeling intermittently disoriented.  Denies depression/SI/HI.  States he heard his wife's voice this morning, it might have been the radio, he wasn't sure if he was home or not.  Dx: Delirium 2/2 GMC.  Recs: Start VPA in light of QTc prolongation for delirium as above, melatonin PRN insomnia, CL psych will f/u.  Agree with resident's assessment and plan as above.

## 2021-07-14 NOTE — PROGRESS NOTE ADULT - SUBJECTIVE AND OBJECTIVE BOX
Patient is a 56y old  Male who presented with a chief complaint of Hypotension (14 Jul 2021 10:30)      INTERVAL HPI/OVERNIGHT EVENTS:  no further hiccups  no vomiting  poor PO intake  multiple small BMs yesterday, 1 BM this AM  no abdominal pain  no fever or chills  more responsive today - verbalizing.  Spouse also at bedside    seen also at bedside with Neurology and Psych  Has been pocketing foods and refusing to swallow  denies odynophagia or dysphagia  Pt states the smell of foods is off-putting and he doesn't want to eat - but said will try ice cream     MEDICATIONS  (STANDING):  aspirin enteric coated 81 milliGRAM(s) Oral daily  atorvastatin 80 milliGRAM(s) Oral at bedtime  cadexomer iodine 0.9% Gel 1 Application(s) Topical daily  chlorhexidine 0.12% Liquid 15 milliLiter(s) Oral Mucosa two times a day  chlorhexidine 2% Cloths 1 Application(s) Topical <User Schedule>  dextrose 40% Gel 15 Gram(s) Oral once  dextrose 5%. 1000 milliLiter(s) (50 mL/Hr) IV Continuous <Continuous>  dextrose 5%. 1000 milliLiter(s) (100 mL/Hr) IV Continuous <Continuous>  dextrose 50% Injectable 25 Gram(s) IV Push once  dextrose 50% Injectable 12.5 Gram(s) IV Push once  dextrose 50% Injectable 25 Gram(s) IV Push once  ferrous    sulfate 325 milliGRAM(s) Oral three times a day  folic acid 1 milliGRAM(s) Oral daily  glucagon  Injectable 1 milliGRAM(s) IntraMuscular once  heparin   Injectable 5000 Unit(s) SubCutaneous every 12 hours  insulin glargine Injectable (LANTUS) 5 Unit(s) SubCutaneous at bedtime  insulin lispro (ADMELOG) corrective regimen sliding scale   SubCutaneous three times a day before meals  insulin lispro (ADMELOG) corrective regimen sliding scale   SubCutaneous at bedtime  lactated ringers. 1000 milliLiter(s) (50 mL/Hr) IV Continuous <Continuous>  LORazepam   Injectable 1 milliGRAM(s) IV Push once  midodrine 30 milliGRAM(s) Oral every 8 hours  Nephro-kristi 1 Tablet(s) Oral daily  pantoprazole    Tablet 40 milliGRAM(s) Oral before breakfast  sevelamer carbonate 800 milliGRAM(s) Oral three times a day  thiamine IVPB 500 milliGRAM(s) IV Intermittent every 8 hours  ticagrelor 60 milliGRAM(s) Oral every 12 hours    MEDICATIONS  (PRN):      Allergies  doxazosin (Other)      Review of Systems:  General:  No wt loss , chills, night sweats  CV:  No pain, palpitatioins,  +CAD  resolved hypotension  Resp:  No dyspnea, cough, tachypnea, wheezing  GI:  see HPI  :  ESRD on PD  Muscle:  No pain, weakness  Neuro:  No weakness, tingling, memory problems  Psych:  No fatigue, insomnia, mood problems, depression  Endocrine:  No polyuria, polydypsia, cold/heat intolerance  Heme:  No petechiae, ecchymosis, easy bruisability  Skin:  No rash, edema +gangrene +wounds        Vital Signs Last 24 Hrs  T(C): 36.4 (14 Jul 2021 10:00), Max: 37 (14 Jul 2021 02:30)  T(F): 97.5 (14 Jul 2021 10:00), Max: 98.6 (14 Jul 2021 02:30)  HR: 98 (14 Jul 2021 10:00) (79 - 99)  BP: 101/73 (14 Jul 2021 10:00) (94/67 - 108/81)  BP(mean): --  RR: 18 (14 Jul 2021 10:00) (18 - 18)  SpO2: 98% (14 Jul 2021 10:00) (95% - 99%)    PHYSICAL EXAM:  Constitutional: NAD, well-developed chronically ill appearing male.   no hiccupping or retching during interview/exam. sitting up in bed verbalizing/responsive today. spouse at bedside   taking only few spoonfuls of congee but readily eating ice cream with feeding assistance  Neck: No LAD, supple no JVD  Respiratory: grossly clear   +WH sternal scar  +WH scar right chest wall  Cardiovascular: S1 and S2, RRR, +murmur  Gastrointestinal: BS+, soft, NT/ND, no rebound guarding or rigidity  +dressing over PD site - clean and dry, non tender near catheter site. no scrotal edema/swelling  Extremities: No peripheral edema,  Right toe gangrene (dry);   Left foot wound dry;  thumb +dry gangrene  Right thumb (under fingernail and fingertip) ?gangrene  +lesions at tips of fingers, no purulence or bleeding  +b/l Z flow boots  Vascular: 2+ peripheral pulses  Neurological: no focal asymmetry  Psychiatric: responsive and verbalizing  Skin: anicteric    LABS:                        12.2   10.74 )-----------( 260      ( 14 Jul 2021 06:13 )             41.2     07-14    130<L>  |  91<L>  |  48<H>  ----------------------------<  134<H>  3.5   |  23  |  10.01<H>    Ca    9.0      14 Jul 2021 06:13  Phos  6.1     07-13  Mg     2.4     07-13    TPro  6.3  /  Alb  2.4<L>  /  TBili  0.6  /  DBili  x   /  AST  18  /  ALT  13  /  AlkPhos  112  07-14          RADIOLOGY & ADDITIONAL TESTS:

## 2021-07-14 NOTE — CONSULT NOTE ADULT - SUBJECTIVE AND OBJECTIVE BOX
[facility]        Patient is a 56y old  Male who presents with a chief complaint of Hypotension (2021 18:38)    Excerpt from H&P,"     55M PMH ESRD on PD, DM on insulin, CHF EF 25-30% CAD s/p CABG x4,  Patient states that for the last 2 days PTA he has been having increased cough and sweating. He endorsed chills but no measured fevers at home. He denies any sick contacts or recent travel. Patient states that he fell few days ago with no head trauma. Of note patient has right toe gangrenous lesion that has been present for several months, He denies any trauma or pain at the site.       In the ED, Patient was found to be hypotensive to 70s. Patient was given midodrine 10mg x1 and bedside POCUS was completed showing poor cardiac function with diffuse hypokinesis. Patient was unable to maintain adequate SBPs  so required micu admission and pressor support.   Called to ruth roberson       (2021 07:59)           *****PAST MEDICAL / Surgical  HISTORY:  PAST MEDICAL & SURGICAL HISTORY:  Diabetes    CAD, multiple vessel    ESRD (end stage renal disease) on dialysis    HTN (hypertension)    S/P CABG (coronary artery bypass graft)             *****FAMILY HISTORY:  FAMILY HISTORY:  FHx: lung cancer (Sibling)  bother    FH: diabetes mellitus (Sibling)  father             *****SOCIAL HISTORY:  Alcohol: None  Smoking: None         *****ALLERGIES:   Allergies    doxazosin (Other)    Intolerances             *****MEDICATIONS: current medication reviewed and documented.   MEDICATIONS  (STANDING):  aspirin enteric coated 81 milliGRAM(s) Oral daily  atorvastatin 80 milliGRAM(s) Oral at bedtime  cadexomer iodine 0.9% Gel 1 Application(s) Topical daily  chlorhexidine 0.12% Liquid 15 milliLiter(s) Oral Mucosa two times a day  chlorhexidine 2% Cloths 1 Application(s) Topical <User Schedule>  dextrose 40% Gel 15 Gram(s) Oral once  dextrose 5%. 1000 milliLiter(s) (50 mL/Hr) IV Continuous <Continuous>  dextrose 5%. 1000 milliLiter(s) (100 mL/Hr) IV Continuous <Continuous>  dextrose 50% Injectable 25 Gram(s) IV Push once  dextrose 50% Injectable 12.5 Gram(s) IV Push once  dextrose 50% Injectable 25 Gram(s) IV Push once  diVALproex  milliGRAM(s) Oral two times a day  dronabinol 2.5 milliGRAM(s) Oral two times a day  ferrous    sulfate 325 milliGRAM(s) Oral three times a day  folic acid 1 milliGRAM(s) Oral daily  glucagon  Injectable 1 milliGRAM(s) IntraMuscular once  heparin   Injectable 5000 Unit(s) SubCutaneous every 12 hours  insulin glargine Injectable (LANTUS) 5 Unit(s) SubCutaneous at bedtime  insulin lispro (ADMELOG) corrective regimen sliding scale   SubCutaneous three times a day before meals  insulin lispro (ADMELOG) corrective regimen sliding scale   SubCutaneous at bedtime  lactated ringers. 1000 milliLiter(s) (50 mL/Hr) IV Continuous <Continuous>  midodrine 30 milliGRAM(s) Oral every 8 hours  Nephro-kristi 1 Tablet(s) Oral daily  pantoprazole    Tablet 40 milliGRAM(s) Oral before breakfast  sevelamer carbonate 800 milliGRAM(s) Oral three times a day  thiamine IVPB 500 milliGRAM(s) IV Intermittent every 8 hours  ticagrelor 60 milliGRAM(s) Oral every 12 hours    MEDICATIONS  (PRN):  valproate sodium IVPB 125 milliGRAM(s) IV Intermittent every 8 hours PRN agitation           *****REVIEW OF SYSTEM:  GEN: no fever, no chills, no pain  RESP: no SOB, no cough, no sputum  CVS: no chest pain, no palpitations, no edema  GI: no abdominal pain, no nausea, no vomiting, no constipation, no diarrhea  : no dysurea, no frequency, no hematurea  Neuro: no headache, no dizziness  PSYCH: no anxiety, no depression  Derm : no itching, no rash         *****VITAL SIGNS:  T(C): 36.3 (21 @ 21:12), Max: 37 (21 @ 02:30)  HR: 84 (21 @ 21:12) (79 - 98)  BP: 95/64 (21 @ 21:12) (94/67 - 108/81)  RR: 18 (21 @ 21:12) (18 - 18)  SpO2: 97% (21 @ 21:12) (97% - 99%)  Wt(kg): --     @ 07:  -   @ 07:00  --------------------------------------------------------  IN: 8140 mL / OUT: 8900 mL / NET: -760 mL     @ 07:01  -  07-15 @ 00:06  --------------------------------------------------------  IN: 4060 mL / OUT: 4100 mL / NET: -40 mL             *****PHYSICAL EXAM:   Alert can follow simple commands   Attention poor  comprehension limited follows some commands intermittently   . Able to name, repeat, without any difficulty.   Able to follow 1 step commands.  delayed responses  able to tell me his daughters name, his wifes name  He was able to say 1 word answers   knew his bday  knows the current president of the [employer] [facility] not visualized,  blinks to threat b/l   No facial asymmetry   Tongue is midline      Moving all 4 ext symmetrically    sensation is grossly symmetric  Gait : not assessed.  B/L down going toes               *****LAB AND IMAGIN.2   10.74 )-----------( 260      ( 2021 06:13 )             41.2               07-14    130<L>  |  91<L>  |  48<H>  ----------------------------<  134<H>  3.5   |  23  |  10.01<H>    Ca    9.0      2021 06:13  Phos  6.1     07-13  Mg     2.4     07-13    TPro  6.3  /  Alb  2.4<L>  /  TBili  0.6  /  DBili  x   /  AST  18  /  ALT  13  /  AlkPhos  112  07-14                                [All pertinent recent Imaging reports reviewed]         *****A S S E S S M E N T   A N D   P L A N :   Excerpt from H&P,"     55M PMH ESRD on PD, DM on insulin, CHF EF 25-30% CAD s/p CABG x4,  Patient states that for the last 2 days PTA he has been having increased cough and sweating. He endorsed chills but no measured fevers at home. He denies any sick contacts or recent travel. Patient states that he fell few days ago with no head trauma. Of note patient has right toe gangrenous lesion that has been present for several months, He denies any trauma or pain at the site.       In the ED, Patient was found to be hypotensive to 70s. Patient was given midodrine 10mg x1 and bedside POCUS was completed showing poor cardiac function with diffuse hypokinesis. Patient was unable to maintain adequate SBPs  so required micu admission and pressor support.   Called to eval ams       Problem/Recommendations 1:  ams likely multifactorial metabolic encephalopathy started immediately after cefepime, also other factors include poor po intake, sleep wake cycle disruption hyponatremia   correct metabolic derangements   nephrovite added   thiamine 500 iv q h   mri mra when able     limi ts sedatives   Problem/Recommendations 2:  ecchymosis of the finger tips   r/o emboli   derm eval    consider eleonora given no clear source of infection         Problem/Recommendations 3: poor nutritional intake   encourage po intake   marinol as per gi     ___________________________  Will follow with you.  Thank you,  Grisel Lainez MD

## 2021-07-14 NOTE — BH CONSULTATION LIAISON PROGRESS NOTE - NSBHCONSULTRECOMMENDOTHER_PSY_A_CORE FT
Depakote 250mg PO BID, melatonin 3mg PO qhs Depakote 250mg PO BID, melatonin 3mg PO qhs    PRN: Depacon 125mg PO/IV q8h PRN agitation

## 2021-07-14 NOTE — PROGRESS NOTE ADULT - SUBJECTIVE AND OBJECTIVE BOX
CARDIOLOGY FOLLOW UP - Dr. Best  DATE OF SERVICE: 07-14-21    CC resting comfortably without complaints    REVIEW OF SYSTEMS:   CONSTITUTIONAL: No fever, weight loss, or fatigue   RESPIRATORY:  No cough, wheezing, chills or hemoptysis; No SOB  CARDIOVASCULAR: No chest pain, palpitations, passing out, dizziness, or leg swelling   GASTROINTESTINAL: No abdominal or epigastric pain. No nausea, vomiting, or hematemesis, no diarreha, or constipation, No melena or hematochezia   VASCULAR: no edema.       PHYSICAL EXAM:  T(C): 36.4 (07-14-21 @ 10:00), Max: 37 (07-14-21 @ 02:30)  HR: 98 (07-14-21 @ 10:00) (79 - 99)  BP: 101/73 (07-14-21 @ 10:00) (94/67 - 108/81)  RR: 18 (07-14-21 @ 10:00) (18 - 18)  SpO2: 98% (07-14-21 @ 10:00) (95% - 99%)  Wt(kg): --  I&O's Summary    13 Jul 2021 07:01  -  14 Jul 2021 07:00  --------------------------------------------------------  IN: 8140 mL / OUT: 8900 mL / NET: -760 mL    14 Jul 2021 07:01  -  14 Jul 2021 10:30  --------------------------------------------------------  IN: 2000 mL / OUT: 900 mL / NET: 1100 mL        Appearance: Nad 	  Cardiovascular: Normal S1 S2,RRR, No JVD, No murmurs  Respiratory: Lungs clear to auscultation	  Gastrointestinal:  Soft, Non-tender, + BS	  Extremities: Normal range of motion, No clubbing, cyanosis or edema      HOME MEDICATIONS:  aspirin 81 mg oral delayed release tablet: 1 tab(s) orally once a day (29 Dec 2020 18:37)  atorvastatin 80 mg oral tablet: 1 tab(s) orally once a day (at bedtime) (29 Dec 2020 18:37)  epoetin martine: injectable once a month (29 Dec 2020 18:37)  ferrous sulfate 325 mg (65 mg elemental iron) oral tablet: 1 tab(s) orally 3 times a day (29 Dec 2020 18:37)  gabapentin 100 mg oral capsule: 1 cap(s) orally once a day (29 Dec 2020 18:37)  nortriptyline 25 mg oral capsule: 1 cap(s) orally once a day (at bedtime) (29 Dec 2020 18:37)  pantoprazole 40 mg oral delayed release tablet: 1 tab(s) orally once a day (before a meal) (29 Dec 2020 18:37)  Toujeo SoloStar 300 units/mL subcutaneous solution: 60 unit(s) subcutaneous once a day (at bedtime) (08 Jan 2021 12:41)      MEDICATIONS  (STANDING):  aspirin enteric coated 81 milliGRAM(s) Oral daily  atorvastatin 80 milliGRAM(s) Oral at bedtime  cadexomer iodine 0.9% Gel 1 Application(s) Topical daily  chlorhexidine 0.12% Liquid 15 milliLiter(s) Oral Mucosa two times a day  chlorhexidine 2% Cloths 1 Application(s) Topical <User Schedule>  dextrose 40% Gel 15 Gram(s) Oral once  dextrose 5%. 1000 milliLiter(s) (50 mL/Hr) IV Continuous <Continuous>  dextrose 5%. 1000 milliLiter(s) (100 mL/Hr) IV Continuous <Continuous>  dextrose 50% Injectable 25 Gram(s) IV Push once  dextrose 50% Injectable 12.5 Gram(s) IV Push once  dextrose 50% Injectable 25 Gram(s) IV Push once  ferrous    sulfate 325 milliGRAM(s) Oral three times a day  folic acid 1 milliGRAM(s) Oral daily  glucagon  Injectable 1 milliGRAM(s) IntraMuscular once  heparin   Injectable 5000 Unit(s) SubCutaneous every 12 hours  insulin glargine Injectable (LANTUS) 5 Unit(s) SubCutaneous at bedtime  insulin lispro (ADMELOG) corrective regimen sliding scale   SubCutaneous three times a day before meals  insulin lispro (ADMELOG) corrective regimen sliding scale   SubCutaneous at bedtime  lactated ringers. 1000 milliLiter(s) (50 mL/Hr) IV Continuous <Continuous>  midodrine 30 milliGRAM(s) Oral every 8 hours  pantoprazole    Tablet 40 milliGRAM(s) Oral before breakfast  sevelamer carbonate 800 milliGRAM(s) Oral three times a day  ticagrelor 60 milliGRAM(s) Oral every 12 hours      TELEMETRY: 	    ECG:  	  RADIOLOGY:   DIAGNOSTIC TESTING:  [ ] Echocardiogram:  [ ]  Catheterization:  [ ] Stress Test:    OTHER: 	    LABS:	 	                                12.2   10.74 )-----------( 260      ( 14 Jul 2021 06:13 )             41.2     07-14    130<L>  |  91<L>  |  48<H>  ----------------------------<  134<H>  3.5   |  23  |  10.01<H>    Ca    9.0      14 Jul 2021 06:13  Phos  6.1     07-13  Mg     2.4     07-13    TPro  6.3  /  Alb  2.4<L>  /  TBili  0.6  /  DBili  x   /  AST  18  /  ALT  13  /  AlkPhos  112  07-14

## 2021-07-15 NOTE — PROGRESS NOTE ADULT - ASSESSMENT
55M PMH ESRD on PD, DM on insulin, CHF EF 25-30% CAD s/p CABG x4, underwent cardiac cath and stent and wire broke in heart sternotomy admitted with cough/sweating found to be hypotensive and febrile in ER- admitted for presumed septic shock; cultures negative thus far    s/p short stay in MICU for pressor support  Cultures negative to date  ESRD on PD  COVID negative  CT A/P - thickening vs underdistension of AC (th clinically benign abdominal exam), no evidence of infectious source, T12 compression fracture  CT Chest- subtle peripheral opacities RUL, pneumoperitoneum related to PD catheter, T12 vertebral fracture    #nausea and hiccups (per pt has had intermittent issues with hiccuping prior to admission)  qTc prolonged (600); per d/w Renal we cannot give Baclofen (even at PRN dosing). No acute GI pathology on CT A/P. Nausea appears to have improved  ?related to uremia    -Ativan 0.5mg PRN for hiccups; previous good response to Ativan for hiccups  -PD per Renal  -Continue PPI    #Anorexia  -encourage preferences (discussed with RN, pt and spouse at bedside)  -Marinol 2.5 mg BID for appetite stimulant  -Psych follow up    #suspected sepsis, all cultures negative to date  -Abx and work-up per ID  -await KLAUDIA    SLP input appreciated (7/13)- suspect symptoms may be psych/behavior related  Discussed with Medicine PA and attending     Sanjiv Oglesby PA-C    [facility]  [phone]  After hours and weekend coverage [phone]

## 2021-07-15 NOTE — PROGRESS NOTE ADULT - ASSESSMENT
55 yo M DM, , CHF, CAD with ESRD on PD, had stay about 7 months ago s/p cardiac stent, wire broke, had to undergo sternotomy, 2 stays soon after that for sepsis unclear cause, now returns a few days ago with fever, hypotension, weak, report of cough. He required pressors in micu now off, cultures of blood and pd fluid are no growth, exam not revealing.   Source of presumed sepsis is not readily apparent.   CXR shows no infiltrate   Has dry gangrene of right 3rd toe but that is not a source of sepsis.  He was found to have subtle infiltrates on ct chest, treated with Cefepime, received abx 7/6-7/11, now dcd due to concern of contributing to encephalopathy.   MRI spine not consistent with infection    Recommendations:    supportive care  Monitor for any fever relapse or signs of infection  f/u final culture results  No indications for abx at present  psych and neuro eval in progress, agree with KLAUIDA to r/o endovascular infection

## 2021-07-15 NOTE — BH CONSULTATION LIAISON PROGRESS NOTE - CASE SUMMARY
56M year old male, , domiciled with wife, unemployed, with no formal PPHx, SA, or psych admissions, with PMH significant for ESRD on peritoneal dialysis, DM on insulin, CHF EF 25-30% CAD s/p CABG x4, underwent cardiac cath and stent and wire broke in heart sternotomy, who presented with cough and hypotension, was admitted for septic shock to the ICU, received pressors and antibiotics, now transferred to medical floor, psychiatry consulted due to AMS/decreased verbal output.  On interview pt is staring into space, intermittently looks at writer and looks away, does not follow commands, speak, or blink to threat.  Allows passive movement of his limbs, no rigidity/waxy flexibility/posturing.  Per wife pt was at baseline mental status a week ago, was speaking at that time.  Presentation concerning for catatonia vs. hypoactive delirium.  7/14: Pt with improving mental status, more verbal today, oriented to person but could not give month/year/season or name the president.  States he has been feeling intermittently disoriented.  Denies depression/SI/HI.  States he heard his wife's voice this morning, it might have been the radio, he wasn't sure if he was home or not.  Dx: Delirium 2/2 GMC.  Recs: Start VPA in light of QTc prolongation for delirium as above, melatonin PRN insomnia, CL psych will f/u.  Agree with resident's assessment and plan as above. 56M year old male, , domiciled with wife, unemployed, with no formal PPHx, SA, or psych admissions, with PMH significant for ESRD on peritoneal dialysis, DM on insulin, CHF EF 25-30% CAD s/p CABG x4, underwent cardiac cath and stent and wire broke in heart sternotomy, who presented with cough and hypotension, was admitted for septic shock to the ICU, received pressors and antibiotics, now transferred to medical floor, psychiatry consulted due to AMS/decreased verbal output.  On interview pt is staring into space, intermittently looks at writer and looks away, does not follow commands, speak, or blink to threat.  Allows passive movement of his limbs, no rigidity/waxy flexibility/posturing.  Per wife pt was at baseline mental status a week ago, was speaking at that time.  Presentation concerning for catatonia vs. hypoactive delirium.  7/15: Pt more verbal today, oriented to self, thinks we are at "[facility]," struggles to state the month but knows the year, can name the president.  Denies AVH today, denies SI/HI, denies depressed mood/anxiety.  Dx: Delirium 2/2 C.  C/w VPA standing and PRN, melatonin PRN insomnia, CL psych will f/u.  Agree with resident's assessment and plan as above.

## 2021-07-15 NOTE — PROGRESS NOTE ADULT - SUBJECTIVE AND OBJECTIVE BOX
Patient is a 56y old  Male who presents with a chief complaint of Hypotension (15 Jul 2021 12:17)      SUBJECTIVE / OVERNIGHT EVENTS: Comfortable. More awake.  Review of Systems  chest pain no  palpitations no  sob no  nausea no  headache no    MEDICATIONS  (STANDING):  aspirin enteric coated 81 milliGRAM(s) Oral daily  atorvastatin 80 milliGRAM(s) Oral at bedtime  cadexomer iodine 0.9% Gel 1 Application(s) Topical daily  chlorhexidine 0.12% Liquid 15 milliLiter(s) Oral Mucosa two times a day  chlorhexidine 2% Cloths 1 Application(s) Topical <User Schedule>  dextrose 40% Gel 15 Gram(s) Oral once  dextrose 5%. 1000 milliLiter(s) (50 mL/Hr) IV Continuous <Continuous>  dextrose 5%. 1000 milliLiter(s) (100 mL/Hr) IV Continuous <Continuous>  dextrose 50% Injectable 25 Gram(s) IV Push once  dextrose 50% Injectable 12.5 Gram(s) IV Push once  dextrose 50% Injectable 25 Gram(s) IV Push once  diVALproex  milliGRAM(s) Oral two times a day  dronabinol 2.5 milliGRAM(s) Oral two times a day  ferrous    sulfate 325 milliGRAM(s) Oral three times a day  folic acid 1 milliGRAM(s) Oral daily  glucagon  Injectable 1 milliGRAM(s) IntraMuscular once  heparin   Injectable 5000 Unit(s) SubCutaneous every 12 hours  insulin glargine Injectable (LANTUS) 5 Unit(s) SubCutaneous at bedtime  insulin lispro (ADMELOG) corrective regimen sliding scale   SubCutaneous every 6 hours  lactated ringers. 1000 milliLiter(s) (50 mL/Hr) IV Continuous <Continuous>  midodrine 30 milliGRAM(s) Oral every 8 hours  Nephro-kristi 1 Tablet(s) Oral daily  pantoprazole    Tablet 40 milliGRAM(s) Oral before breakfast  sevelamer carbonate 800 milliGRAM(s) Oral three times a day  thiamine IVPB 500 milliGRAM(s) IV Intermittent every 8 hours  ticagrelor 60 milliGRAM(s) Oral every 12 hours    MEDICATIONS  (PRN):  valproate sodium IVPB 125 milliGRAM(s) IV Intermittent every 8 hours PRN agitation      Vital Signs Last 24 Hrs  T(C): 36.8 (15 Jul 2021 18:53), Max: 37 (15 Jul 2021 02:00)  T(F): 98.2 (15 Jul 2021 18:53), Max: 98.6 (15 Jul 2021 02:00)  HR: 95 (15 Jul 2021 18:53) (84 - 95)  BP: 90/63 (15 Jul 2021 18:53) (90/60 - 108/76)  BP(mean): --  RR: 20 (15 Jul 2021 18:53) (18 - 20)  SpO2: 100% (15 Jul 2021 18:53) (94% - 100%)    PHYSICAL EXAM:  GENERAL: NAD, well-developed  HEAD:  Atraumatic, Normocephalic  EYES: EOMI, PERRLA, conjunctiva and sclera clear  NECK: Supple, No JVD  CHEST/LUNG: Clear to auscultation bilaterally; No wheeze  HEART: Regular rate and rhythm; No murmurs, rubs, or gallops  ABDOMEN: Soft, Nontender, Nondistended; Bowel sounds present PD catheter.  EXTREMITIES:  toe gangrene. R finger gangrene area.  PSYCH: confused  NEUROLOGY: non-focal  SKIN: No rashes or lesions    LABS:                        12.3   9.36  )-----------( 256      ( 15 Jul 2021 06:40 )             40.5     07-15    132<L>  |  91<L>  |  47<H>  ----------------------------<  159<H>  3.6   |  23  |  9.54<H>    Ca    8.4      15 Jul 2021 06:43    TPro  6.3  /  Alb  2.4<L>  /  TBili  0.6  /  DBili  x   /  AST  18  /  ALT  13  /  AlkPhos  112  07-14              Culture - Blood (collected 14 Jul 2021 18:34)  Source: .Blood Blood-Peripheral  Preliminary Report (15 Jul 2021 19:02):    No growth to date.    Culture - Blood (collected 14 Jul 2021 18:34)  Source: .Blood Blood-Peripheral  Preliminary Report (15 Jul 2021 19:02):    No growth to date.        RADIOLOGY & ADDITIONAL TESTS:    Imaging Personally Reviewed:    Consultant(s) Notes Reviewed:      Care Discussed with Consultants/Other Providers:

## 2021-07-15 NOTE — BH CONSULTATION LIAISON PROGRESS NOTE - CURRENT MEDICATION
MEDICATIONS  (STANDING):  aspirin enteric coated 81 milliGRAM(s) Oral daily  atorvastatin 80 milliGRAM(s) Oral at bedtime  cadexomer iodine 0.9% Gel 1 Application(s) Topical daily  chlorhexidine 0.12% Liquid 15 milliLiter(s) Oral Mucosa two times a day  chlorhexidine 2% Cloths 1 Application(s) Topical <User Schedule>  dextrose 40% Gel 15 Gram(s) Oral once  dextrose 5%. 1000 milliLiter(s) (50 mL/Hr) IV Continuous <Continuous>  dextrose 5%. 1000 milliLiter(s) (100 mL/Hr) IV Continuous <Continuous>  dextrose 50% Injectable 25 Gram(s) IV Push once  dextrose 50% Injectable 12.5 Gram(s) IV Push once  dextrose 50% Injectable 25 Gram(s) IV Push once  diVALproex  milliGRAM(s) Oral two times a day  dronabinol 2.5 milliGRAM(s) Oral two times a day  ferrous    sulfate 325 milliGRAM(s) Oral three times a day  folic acid 1 milliGRAM(s) Oral daily  glucagon  Injectable 1 milliGRAM(s) IntraMuscular once  heparin   Injectable 5000 Unit(s) SubCutaneous every 12 hours  insulin glargine Injectable (LANTUS) 5 Unit(s) SubCutaneous at bedtime  insulin lispro (ADMELOG) corrective regimen sliding scale   SubCutaneous every 6 hours  lactated ringers. 1000 milliLiter(s) (50 mL/Hr) IV Continuous <Continuous>  midodrine 30 milliGRAM(s) Oral every 8 hours  Nephro-kristi 1 Tablet(s) Oral daily  pantoprazole    Tablet 40 milliGRAM(s) Oral before breakfast  sevelamer carbonate 800 milliGRAM(s) Oral three times a day  thiamine IVPB 500 milliGRAM(s) IV Intermittent every 8 hours  ticagrelor 60 milliGRAM(s) Oral every 12 hours    MEDICATIONS  (PRN):  valproate sodium IVPB 125 milliGRAM(s) IV Intermittent every 8 hours PRN agitation  
MEDICATIONS  (STANDING):  aspirin enteric coated 81 milliGRAM(s) Oral daily  atorvastatin 80 milliGRAM(s) Oral at bedtime  cadexomer iodine 0.9% Gel 1 Application(s) Topical daily  chlorhexidine 0.12% Liquid 15 milliLiter(s) Oral Mucosa two times a day  chlorhexidine 2% Cloths 1 Application(s) Topical <User Schedule>  dextrose 40% Gel 15 Gram(s) Oral once  dextrose 5%. 1000 milliLiter(s) (50 mL/Hr) IV Continuous <Continuous>  dextrose 5%. 1000 milliLiter(s) (100 mL/Hr) IV Continuous <Continuous>  dextrose 50% Injectable 25 Gram(s) IV Push once  dextrose 50% Injectable 12.5 Gram(s) IV Push once  dextrose 50% Injectable 25 Gram(s) IV Push once  dronabinol 2.5 milliGRAM(s) Oral two times a day  ferrous    sulfate 325 milliGRAM(s) Oral three times a day  folic acid 1 milliGRAM(s) Oral daily  glucagon  Injectable 1 milliGRAM(s) IntraMuscular once  heparin   Injectable 5000 Unit(s) SubCutaneous every 12 hours  insulin glargine Injectable (LANTUS) 5 Unit(s) SubCutaneous at bedtime  insulin lispro (ADMELOG) corrective regimen sliding scale   SubCutaneous three times a day before meals  insulin lispro (ADMELOG) corrective regimen sliding scale   SubCutaneous at bedtime  lactated ringers. 1000 milliLiter(s) (50 mL/Hr) IV Continuous <Continuous>  midodrine 30 milliGRAM(s) Oral every 8 hours  Nephro-kristi 1 Tablet(s) Oral daily  pantoprazole    Tablet 40 milliGRAM(s) Oral before breakfast  sevelamer carbonate 800 milliGRAM(s) Oral three times a day  thiamine IVPB 500 milliGRAM(s) IV Intermittent every 8 hours  ticagrelor 60 milliGRAM(s) Oral every 12 hours    MEDICATIONS  (PRN):

## 2021-07-15 NOTE — PROGRESS NOTE ADULT - SUBJECTIVE AND OBJECTIVE BOX
Remains         VITAL:  T(C): , Max: 37 (07-15-21 @ 02:00)  T(F): , Max: 98.6 (07-15-21 @ 02:00)  HR: 88 (07-15-21 @ 08:00)  BP: 96/63 (07-15-21 @ 08:00)  BP(mean): --  RR: 18 (07-15-21 @ 08:00)  SpO2: 94% (07-15-21 @ 08:00)  Wt(kg): --      PHYSICAL EXAM:  General: Lethargic   HEENT: MMM  Lungs:CTA-b/l  Heart: RRR S1S2  Abdomen: Soft, Pd catheter in situ   Ext: no pedal edema b/l  : no keys      LABS:                        12.3   9.36  )-----------( 256      ( 15 Jul 2021 06:40 )             40.5     Na(132)/K(3.6)/Cl(91)/HCO3(23)/BUN(47)/Cr(9.54)Glu(159)/Ca(8.4)/Mg(--)/PO4(--)    07-15 @ 06:43  Na(130)/K(3.5)/Cl(91)/HCO3(23)/BUN(48)/Cr(10.01)Glu(134)/Ca(9.0)/Mg(--)/PO4(--)    07-14 @ 06:13  Na(130)/K(3.4)/Cl(89)/HCO3(22)/BUN(50)/Cr(10.24)Glu(157)/Ca(9.2)/Mg(2.4)/PO4(6.1)    07-13 @ 06:47        6 M PMH ESRD on PD, DM on insulin, CHF EF 25-30% CAD s/p CABG x4 pw with hypotension with fever    Hypotension off pressors   ESRD on PD   Encephalopathy likely related to cefepime ? no obvious source of sepsis apparent as of now    RECOMMENDATIONS    CCPD orders will be 1.5% as to not cause too much UF and ultimately hypotension .   Hyponatremia- stable   ID-Blood cx  and  PD fluid cx -   off IV antibiotics   Hyperphosphatemia-   Renvela 1600mg TID  for now  [facility]-Midodrine  to 30mg po q 8 hours   Encephalopathy from cefepime ?  ( now stopped), ammonia levels ok   Check phos with am labs tomorrow  Any role of spinal tap here?  Ongoing neuro/psych eval                 Saima Dangelo MD  [facility]  Office: [phone]         Remains minimally responsive   Ongoing PD exchanges   UF limited due to hypotension         VITAL:  T(C): , Max: 37 (07-15-21 @ 02:00)  T(F): , Max: 98.6 (07-15-21 @ 02:00)  HR: 88 (07-15-21 @ 08:00)  BP: 96/63 (07-15-21 @ 08:00)  BP(mean): --  RR: 18 (07-15-21 @ 08:00)  SpO2: 94% (07-15-21 @ 08:00)  Wt(kg): --      PHYSICAL EXAM:  General: Lethargic   HEENT: MMM  Lungs:CTA-b/l  Heart: RRR S1S2  Abdomen: Soft, Pd catheter in situ   Ext: no pedal edema b/l  : no keys      LABS:                        12.3   9.36  )-----------( 256      ( 15 Jul 2021 06:40 )             40.5     Na(132)/K(3.6)/Cl(91)/HCO3(23)/BUN(47)/Cr(9.54)Glu(159)/Ca(8.4)/Mg(--)/PO4(--)    07-15 @ 06:43  Na(130)/K(3.5)/Cl(91)/HCO3(23)/BUN(48)/Cr(10.01)Glu(134)/Ca(9.0)/Mg(--)/PO4(--)    07-14 @ 06:13  Na(130)/K(3.4)/Cl(89)/HCO3(22)/BUN(50)/Cr(10.24)Glu(157)/Ca(9.2)/Mg(2.4)/PO4(6.1)    07-13 @ 06:47        6 M PMH ESRD on PD, DM on insulin, CHF EF 25-30% CAD s/p CABG x4 pw with hypotension with fever    Hypotension off pressors   ESRD on PD   Encephalopathy likely related to cefepime ? no obvious source of sepsis apparent as of now    RECOMMENDATIONS    CCPD orders will be 1.5% as to not cause too much UF and ultimately hypotension .   Hyponatremia- stable   ID-Blood cx  and  PD fluid cx -   off IV antibiotics   Hyperphosphatemia-   Renvela 1600mg TID  for now  CVS-Midodrine  t30mg po q 8 hours   Encephalopathy from cefepime ?  ( now stopped), ammonia levels ok   Check phos with am labs tomorrow  Any role of spinal tap here?  Ongoing neuro/psych eval   PD ins and outs not sure if accurately charted, discussed with RN to provide an update                 Samia Dangelo MD  [facility]  Office: [phone]

## 2021-07-15 NOTE — PROGRESS NOTE ADULT - SUBJECTIVE AND OBJECTIVE BOX
Patient is a 56y old  Male who presented with a chief complaint of Hypotension (15 Jul 2021 09:10)      INTERVAL HPI/OVERNIGHT EVENTS:  episode of hiccups this morning followed by small volume "spit up" per pt  took little PO yesterday  no melena or rectal bleeding    MEDICATIONS  (STANDING):  aspirin enteric coated 81 milliGRAM(s) Oral daily  atorvastatin 80 milliGRAM(s) Oral at bedtime  cadexomer iodine 0.9% Gel 1 Application(s) Topical daily  chlorhexidine 0.12% Liquid 15 milliLiter(s) Oral Mucosa two times a day  chlorhexidine 2% Cloths 1 Application(s) Topical <User Schedule>  dextrose 40% Gel 15 Gram(s) Oral once  dextrose 5%. 1000 milliLiter(s) (50 mL/Hr) IV Continuous <Continuous>  dextrose 5%. 1000 milliLiter(s) (100 mL/Hr) IV Continuous <Continuous>  dextrose 50% Injectable 25 Gram(s) IV Push once  dextrose 50% Injectable 12.5 Gram(s) IV Push once  dextrose 50% Injectable 25 Gram(s) IV Push once  diVALproex  milliGRAM(s) Oral two times a day  dronabinol 2.5 milliGRAM(s) Oral two times a day  ferrous    sulfate 325 milliGRAM(s) Oral three times a day  folic acid 1 milliGRAM(s) Oral daily  glucagon  Injectable 1 milliGRAM(s) IntraMuscular once  heparin   Injectable 5000 Unit(s) SubCutaneous every 12 hours  insulin glargine Injectable (LANTUS) 5 Unit(s) SubCutaneous at bedtime  insulin lispro (ADMELOG) corrective regimen sliding scale   SubCutaneous every 6 hours  lactated ringers. 1000 milliLiter(s) (50 mL/Hr) IV Continuous <Continuous>  midodrine 30 milliGRAM(s) Oral every 8 hours  Nephro-kristi 1 Tablet(s) Oral daily  pantoprazole    Tablet 40 milliGRAM(s) Oral before breakfast  sevelamer carbonate 800 milliGRAM(s) Oral three times a day  thiamine IVPB 500 milliGRAM(s) IV Intermittent every 8 hours  ticagrelor 60 milliGRAM(s) Oral every 12 hours    MEDICATIONS  (PRN):  valproate sodium IVPB 125 milliGRAM(s) IV Intermittent every 8 hours PRN agitation      Allergies  doxazosin (Other)        Review of Systems:  General:  No wt loss , chills, night sweats  CV:  No pain, palpitatioins,  +CAD  resolved hypotension  Resp:  No dyspnea, cough, tachypnea, wheezing  GI:  see HPI  :  ESRD on PD  Muscle:  No pain, weakness  Neuro:  No weakness, tingling, memory problems  Psych:  No fatigue, insomnia, mood problems, depression  Endocrine:  No polyuria, polydypsia, cold/heat intolerance  Heme:  No petechiae, ecchymosis, easy bruisability  Skin:  No rash, edema +gangrene +wounds        Vital Signs Last 24 Hrs  T(C): 36.4 (15 Jul 2021 09:00), Max: 37 (15 Jul 2021 02:00)  T(F): 97.5 (15 Jul 2021 09:00), Max: 98.6 (15 Jul 2021 02:00)  HR: 92 (15 Jul 2021 09:00) (84 - 98)  BP: 108/76 (15 Jul 2021 09:00) (90/60 - 108/76)  BP(mean): --  RR: 18 (15 Jul 2021 09:00) (18 - 18)  SpO2: 94% (15 Jul 2021 09:00) (94% - 100%)    PHYSICAL EXAM:  Constitutional: NAD, well-developed chronically ill appearing male.   no hiccupping or retching during interview/exam. sitting up in bed verbalizing/responsive today. spouse at bedside   taking only few spoonfuls of congee but readily eating ice cream with feeding assistance  Neck: No LAD, supple no JVD  Respiratory: grossly clear   +WH sternal scar  +WH scar right chest wall  Cardiovascular: S1 and S2, RRR, +murmur  Gastrointestinal: BS+, soft, NT/ND, no rebound guarding or rigidity  +dressing over PD site - clean and dry, non tender near catheter site. no scrotal edema/swelling  Extremities: No peripheral edema,  Right toe gangrene (dry);   Left foot wound dry;  thumb +dry gangrene  Right thumb (under fingernail and fingertip) ?gangrene  +lesions at tips of fingers, no purulence or bleeding  +b/l Z flow boots  Vascular: 2+ peripheral pulses  Neurological: no focal asymmetry  Psychiatric: responsive and verbalizing  Skin: anicteric    LABS:                        12.3   9.36  )-----------( 256      ( 15 Jul 2021 06:40 )             40.5     07-15    132<L>  |  91<L>  |  47<H>  ----------------------------<  159<H>  3.6   |  23  |  9.54<H>    Ca    8.4      15 Jul 2021 06:43    TPro  6.3  /  Alb  2.4<L>  /  TBili  0.6  /  DBili  x   /  AST  18  /  ALT  13  /  AlkPhos  112  07-14        LIVER FUNCTIONS - ( 14 Jul 2021 06:13 )  Alb: 2.4 g/dL / Pro: 6.3 g/dL / ALK PHOS: 112 U/L / ALT: 13 U/L / AST: 18 U/L / GGT: x             RADIOLOGY & ADDITIONAL TESTS:

## 2021-07-15 NOTE — BH CONSULTATION LIAISON PROGRESS NOTE - NSBHASSESSMENTFT_PSY_ALL_CORE
Patient is a 56 year old male, , domiciled with wife, unemployed, no history of psychiatric diagnoses, psych admission, or psychiatric meds, with a history of ESRD on peritoneal dialysis, DM on insulin, CHF EF 25-30% CAD s/p CABG x4, underwent cardiac cath and stent and wire broke in heart sternotomy, who presented with cough and hypotension, was admitted for septic shock to the ICU. Patient was put on pressors and antibiotics, and downgraded from the ICU to the medical floor on HOD 2. Psychiatry was consulted for depression, decreased verbal communication, and bizarre behavior of hoarding food in mouth.    Differential diagnoses included catatonia vs delirium. Upon assessment today, patient more verbal, responding to more questions than previously. Patient now AAOx3. Patient's speech, alertness, and concentration likely improving on Depakote, supporting diagnosis hypoactive delirium. Please avoid sedatives/benzos.     Patient is a 56 year old male, , domiciled with wife, unemployed, no history of psychiatric diagnoses, psych admission, or psychiatric meds, with a history of ESRD on peritoneal dialysis, DM on insulin, CHF EF 25-30% CAD s/p CABG x4, underwent cardiac cath and stent and wire broke in heart sternotomy, who presented with cough and hypotension, was admitted for septic shock to the ICU. Patient was put on pressors and antibiotics, and downgraded from the ICU to the medical floor on HOD 2. Psychiatry was consulted for depression, decreased verbal communication, and bizarre behavior of hoarding food in mouth.    Differential diagnoses included catatonia vs delirium. Upon assessment today, patient more verbal, responding to more questions than previously. Patient now AAOx3. Patient's speech, alertness, and concentration appear to be improving on Depakote, supporting diagnosis hypoactive delirium. Please avoid sedatives/benzos.

## 2021-07-15 NOTE — PROGRESS NOTE ADULT - SUBJECTIVE AND OBJECTIVE BOX
[facility]      Seen earlier today, and examined.  - Today, patient is without complaints.           *****MEDICATIONS: Current medication reviewed and documented.    MEDICATIONS  (STANDING):  aspirin enteric coated 81 milliGRAM(s) Oral daily  atorvastatin 80 milliGRAM(s) Oral at bedtime  cadexomer iodine 0.9% Gel 1 Application(s) Topical daily  chlorhexidine 0.12% Liquid 15 milliLiter(s) Oral Mucosa two times a day  chlorhexidine 2% Cloths 1 Application(s) Topical <User Schedule>  dextrose 40% Gel 15 Gram(s) Oral once  dextrose 5%. 1000 milliLiter(s) (50 mL/Hr) IV Continuous <Continuous>  dextrose 5%. 1000 milliLiter(s) (100 mL/Hr) IV Continuous <Continuous>  dextrose 50% Injectable 25 Gram(s) IV Push once  dextrose 50% Injectable 12.5 Gram(s) IV Push once  dextrose 50% Injectable 25 Gram(s) IV Push once  diVALproex  milliGRAM(s) Oral two times a day  dronabinol 2.5 milliGRAM(s) Oral two times a day  ferrous    sulfate 325 milliGRAM(s) Oral three times a day  folic acid 1 milliGRAM(s) Oral daily  glucagon  Injectable 1 milliGRAM(s) IntraMuscular once  heparin   Injectable 5000 Unit(s) SubCutaneous every 12 hours  insulin glargine Injectable (LANTUS) 5 Unit(s) SubCutaneous at bedtime  insulin lispro (ADMELOG) corrective regimen sliding scale   SubCutaneous three times a day before meals  insulin lispro (ADMELOG) corrective regimen sliding scale   SubCutaneous at bedtime  lactated ringers. 1000 milliLiter(s) (50 mL/Hr) IV Continuous <Continuous>  midodrine 30 milliGRAM(s) Oral every 8 hours  Nephro-kristi 1 Tablet(s) Oral daily  pantoprazole    Tablet 40 milliGRAM(s) Oral before breakfast  sevelamer carbonate 800 milliGRAM(s) Oral three times a day  thiamine IVPB 500 milliGRAM(s) IV Intermittent every 8 hours  ticagrelor 60 milliGRAM(s) Oral every 12 hours    MEDICATIONS  (PRN):  valproate sodium IVPB 125 milliGRAM(s) IV Intermittent every 8 hours PRN agitation          ***** VITAL SIGNS:  T(F): 97.5 (07-16-21 @ 06:00), Max: 98.2 (07-15-21 @ 18:53)  HR: 95 (21 @ 06:00) (87 - 95)  BP: 109/76 (21 @ 06:00) (90/63 - 109/76)  RR: 18 (21 @ 06:00) (18 - 20)  SpO2: 98% (21 @ 06:00) (94% - 100%)  Wt(kg): --  ,   I&O's Summary    15 Jul 2021 07:01  -  2021 07:00  --------------------------------------------------------  IN: 2120 mL / OUT: 4600 mL / NET: -2480 mL             *****PHYSICAL EXAM:   alert oriented x 2 attention better comprehension  fair.  Able to name, repeat.     EOmi fundi not visualized   no nystagmus VFF to confrontation  Tongue is midline     Moving all 4 ext spontaneously no drift appreciated    Gait not assessed.            *****LAB AND IMAGIN.3   9.36  )-----------( 256      ( 15 Jul 2021 06:40 )             40.5               07-15    132<L>  |  91<L>  |  47<H>  ----------------------------<  159<H>  3.6   |  23  |  9.54<H>    Ca    8.4      15 Jul 2021 06:43                           [All pertinent recent Imaging/Reports reviewed]           *****A S S E S S M E N T   A N D   P L A N :     Excerpt from H&P,"     55M PMH ESRD on PD, DM on insulin, CHF EF 25-30% CAD s/p CABG x4,  Patient states that for the last 2 days PTA he has been having increased cough and sweating. He endorsed chills but no measured fevers at home. He denies any sick contacts or recent travel. Patient states that he fell few days ago with no head trauma. Of note patient has right toe gangrenous lesion that has been present for several months, He denies any trauma or pain at the site.       In the ED, Patient was found to be hypotensive to 70s. Patient was given midodrine 10mg x1 and bedside POCUS was completed showing poor cardiac function with diffuse hypokinesis. Patient was unable to maintain adequate SBPs  so required micu admission and pressor support.   Called to eval ams       Problem/Recommendations 1:  ams likely multifactorial metabolic encephalopathy started immediately after cefepime, also other factors include poor po intake, sleep wake cycle disruption hyponatremia   correct metabolic derangements   nephrovite added   thiamine 500 iv q h   mri mra when able   limit sedatives   7/15 mental status with improvement       Problem/Recommendations 2:  ecchymosis of the finger tips   r/o emboli   derm eval     eleonora no evidence for endocarditis       Problem/Recommendations 3: poor nutritional intake   encourage po intake   marinol as per gi       Thank you for allowing me to participate in the care of this patient. Will continue to follow patient periodically. Please do not hesitate to call me if you have any  questions or if there has been a change in patients neurological status     ________________  Grisel Lainez MD  [facility] ([facility]  [phone]      33 minutes spent on total encounter; more than 50 % of the visit was  spent counseling about plan of care, compliance to diet/exercise and medication regimen and or  coordinating care by the attending physician.      It is advised that stroke patients follow up with CARLYLE Pena @ [phone] in 1- 2 weeks.   Others please follow up with Dr. Michael Nissenbaum [phone]

## 2021-07-15 NOTE — PROGRESS NOTE ADULT - SUBJECTIVE AND OBJECTIVE BOX
CC: f/u for hypotension    Patient is poorly interactive    REVIEW OF SYSTEMS: limited  All other review of systems negative (Comprehensive ROS)    Antimicrobials Day #        Other Medications Reviewed    Vital Signs Last 24 Hrs  T(C): 36.4 (15 Jul 2021 09:00), Max: 37 (15 Jul 2021 02:00)  T(F): 97.5 (15 Jul 2021 09:00), Max: 98.6 (15 Jul 2021 02:00)  HR: 92 (15 Jul 2021 09:00) (84 - 98)  BP: 108/76 (15 Jul 2021 09:00) (90/60 - 108/76)  BP(mean): --  RR: 18 (15 Jul 2021 09:00) (18 - 18)  SpO2: 94% (15 Jul 2021 09:00) (94% - 100%)    PHYSICAL EXAM:  General: alert, no acute distress, chronically ill appearing  Eyes:  anicteric, no conjunctival injection, no discharge  Oropharynx: no lesions or injection 	  Neck: supple, without adenopathy  Lungs: clear to auscultation  Heart: regular rate and rhythm; no murmur, rubs or gallops  Abdomen: soft, nondistended, nontender, without mass or organomegaly  Skin: no lesions  Extremities: no clubbing, cyanosis, or edema  Neurologic: alert, poorly interactive    LAB RESULTS:                                              12.3   9.36  )-----------( 256      ( 15 Jul 2021 06:40 )             40.5   07-15    132<L>  |  91<L>  |  47<H>  ----------------------------<  159<H>  3.6   |  23  |  9.54<H>    Ca    8.4      15 Jul 2021 06:43    TPro  6.3  /  Alb  2.4<L>  /  TBili  0.6  /  DBili  x   /  AST  18  /  ALT  13  /  AlkPhos  112  07-14              MICROBIOLOGY REVIEWED:      Culture - Dialysis Fluid (07.10.21 @ 19:16)   Specimen Source: .Peritoneal Dialysis Fluid Dialysis (P.D.) Fluid   Culture Results:   No growth Culture - Blood (07.10.21 @ 18:54)   Specimen Source: .Blood Blood-Peripheral   Culture Results:   No growth to date.   RADIOLOGY REVIEWED:      < from: MR Thoracic Spine No Cont (07.11.21 @ 23:55) >  IMPRESSION:    -Acute to subacute moderate compression fracture of T12 with slight bony retropulsion but no associated spinal canal stenosis.    -L4-L5 disc bulge with potential impingement of the descending right L5 nerve root.    --- End of Report ---    < end of copied text >

## 2021-07-15 NOTE — BH CONSULTATION LIAISON PROGRESS NOTE - NSBHCONSULTRECOMMENDOTHER_PSY_A_CORE FT
Continue with Depakote 250mg PO BID, melatonin 3mg PO qhs    Continue with PRN: Depacon 125mg PO/IV q8h PRN agitation Continue with Depakote 250mg PO BID, monitor PLT/LFT    c/w melatonin 3mg PO qhs    Continue with PRN: Depacon 125mg PO/IV q8h PRN agitation

## 2021-07-15 NOTE — BH CONSULTATION LIAISON PROGRESS NOTE - NSBHCHARTREVIEWVS_PSY_A_CORE FT
Vital Signs Last 24 Hrs  T(C): 36.3 (14 Jul 2021 14:16), Max: 37 (14 Jul 2021 02:30)  T(F): 97.3 (14 Jul 2021 14:16), Max: 98.6 (14 Jul 2021 02:30)  HR: 98 (14 Jul 2021 14:16) (79 - 98)  BP: 96/67 (14 Jul 2021 14:16) (94/67 - 108/81)  BP(mean): --  RR: 18 (14 Jul 2021 14:16) (18 - 18)  SpO2: 99% (14 Jul 2021 14:16) (95% - 99%)
Vital Signs Last 24 Hrs  T(C): 36.7 (15 Jul 2021 13:00), Max: 37 (15 Jul 2021 02:00)  T(F): 98 (15 Jul 2021 13:00), Max: 98.6 (15 Jul 2021 02:00)  HR: 89 (15 Jul 2021 13:00) (84 - 98)  BP: 93/63 (15 Jul 2021 13:00) (90/60 - 108/76)  BP(mean): --  RR: 18 (15 Jul 2021 13:00) (18 - 18)  SpO2: 95% (15 Jul 2021 13:00) (94% - 100%)

## 2021-07-15 NOTE — BH CONSULTATION LIAISON PROGRESS NOTE - NSBHCHARTREVIEWLAB_PSY_A_CORE FT
12.2   10.74 )-----------( 260      ( 14 Jul 2021 06:13 )             41.2     07-14    130<L>  |  91<L>  |  48<H>  ----------------------------<  134<H>  3.5   |  23  |  10.01<H>    Ca    9.0      14 Jul 2021 06:13  Phos  6.1     07-13  Mg     2.4     07-13    TPro  6.3  /  Alb  2.4<L>  /  TBili  0.6  /  DBili  x   /  AST  18  /  ALT  13  /  AlkPhos  112  07-14  
                      12.3   9.36  )-----------( 256      ( 15 Jul 2021 06:40 )             40.5     07-15    132<L>  |  91<L>  |  47<H>  ----------------------------<  159<H>  3.6   |  23  |  9.54<H>    Ca    8.4      15 Jul 2021 06:43    TPro  6.3  /  Alb  2.4<L>  /  TBili  0.6  /  DBili  x   /  AST  18  /  ALT  13  /  AlkPhos  112  07-14                            12.2   10.74 )-----------( 260      ( 14 Jul 2021 06:13 )             41.2     07-14    130<L>  |  91<L>  |  48<H>  ----------------------------<  134<H>  3.5   |  23  |  10.01<H>    Ca    9.0      14 Jul 2021 06:13  Phos  6.1     07-13  Mg     2.4     07-13      TPro  6.3  /  Alb  2.4<L>  /  TBili  0.6  /  DBili  x   /  AST  18  /  ALT  13  /  AlkPhos  112  07-14

## 2021-07-15 NOTE — BH CONSULTATION LIAISON PROGRESS NOTE - NSBHCHARTREVIEWINVESTIGATE_PSY_A_CORE FT
< from: 12 Lead ECG (07.06.21 @ 05:46) >    Ventricular Rate 91 BPM    Atrial Rate 91 BPM    P-R Interval 296 ms    QRS Duration 160 ms    Q-T Interval 518 ms    QTC Calculation(Bazett) 637 ms    P Axis 66 degrees    R Axis -86 degrees    T Axis 77 degrees    Diagnosis Line SINUS RHYTHM [id number] DEGREE A-V BLOCK WITH OCCASIONAL PREMATURE VENTRICULAR COMPLEXES  RIGHT BUNDLE BRANCH BLOCK  LEFT ANTERIOR FASCICULAR BLOCK  *** BIFASCICULAR BLOCK ***  ST & T WAVE ABNORMALITY, CONSIDER LATERAL ISCHEMIA  ABNORMAL ECG    < end of copied text >    < from: [facility] Physiol Extremity Lower 3+ Level, BI (07.12.21 @ 10:54) >    IMPRESSION: The quality of this examination is adversely impacted by the noncompressible nature of the diabetic arteries.    It is sufficient to show severe bilateral lower extremity arterial vascular disease affecting the trifurcation arteries and the small arteries of both feet.    An arterial duplex examination is recommended to better delineate the location of the arterialdisease.    < end of copied text >    < from: MR Thoracic Spine No Cont (07.11.21 @ 23:55) >    IMPRESSION:    -Acute to subacute moderate compression fracture of T12 with slight bony retropulsion but no associated spinal canal stenosis.    -L4-L5 disc bulge with potential impingement of the descending right L5 nerve root.    < end of copied text >    < from: Xray Thoracic Spine 1 View (07.11.21 @ 17:57) >    IMPRESSION:    Thoracic spine: The upper thoracic spine is not included on the lateral view. There is severe anterior wedging of the T12 vertebral body as seen on the recent CT scan. Patient is status post CABG with sternotomy wires. Surgicalclips project over the right chest wall.    Lumbar spine: Again noted is a severe anterior wedging compression deformity at T12. There is a minimal kyphosis centered at this level. The heights of the remaining vertebral bodies are preserved. There isminimal disc height loss at L4-5. Aortoiliac calcification is noted.    < end of copied text >    < from: CT Abdomen and Pelvis No Cont (07.11.21 @ 16:15) >    IMPRESSION:    No evidence of an infectious source.    Thickening versus under distention of the ascending colon for which correlation with colonoscopy is recommended if not recently performed.    T12 compression fracture new from prior imaging in January 2021. Correlate with lumbar spine MR same day.    < end of copied text >    < from: CT Head No Cont (07.09.21 @ 21:43) >    IMPRESSION:    No acute intracranial hemorrhage or mass effect.    < end of copied text >    < from: CT Chest No Cont (07.09.21 @ 21:43) >    IMPRESSION:  Few subtle peripheral opacities in the right upper lobe are new from the prior study and indeterminate. Short-term follow-up CT recommended for complete evaluation.    Pneumoperitoneum may be related to peritoneal dialysis catheter. If concern for additional abnormality, CT abdomen/pelvis is recommended.    T12 vertebral body fracture is age-indeterminate but new from the prior study. There appears to be some mass effect upon the canal. MRI spine may be helpful for complete evaluation.    < end of copied text >    < from: Xray Foot AP + Lateral + Oblique, Right (07.06.21 @ 08:59) >    IMPRESSION:  No tracking soft tissue gas collections, radiopaque foreign bodies, or gross radiographic evidence for osteomyelitis.    No fractures or dislocations.    Tarsometatarsal alignment maintained without evidence for a Lisfranc injury.    Preserved visualized joint spaces and no joint margin erosions.    No lytic or blastic lesions.    Vascular calcifications.      < end of copied text >    < from: Xray Chest 1 View- PORTABLE-Urgent (Xray Chest 1 View- PORTABLE-Urgent .) (07.06.21 @ 05:26) >    IMPRESSION:  Low lung volumes.    New ill-defined right upper lung nodular opacities, of indeterminate nature. Infection or neoplasm are possible. Suggest short-term follow-up chest x-ray to evaluate for resolution. If findings persist then consider a CT scan of the chest for further evaluation.    Left mid and lower lung linear atelectasis.    Pneumoperitoneum which may be attributable to ongoing peritoneal dialysis.    < end of copied text >  
< from: 12 Lead ECG (07.06.21 @ 05:46) >    Ventricular Rate 91 BPM    Atrial Rate 91 BPM    P-R Interval 296 ms    QRS Duration 160 ms    Q-T Interval 518 ms    QTC Calculation(Bazett) 637 ms    P Axis 66 degrees    R Axis -86 degrees    T Axis 77 degrees    Diagnosis Line SINUS RHYTHM [id number] DEGREE A-V BLOCK WITH OCCASIONAL PREMATURE VENTRICULAR COMPLEXES  RIGHT BUNDLE BRANCH BLOCK  LEFT ANTERIOR FASCICULAR BLOCK  *** BIFASCICULAR BLOCK ***  ST & T WAVE ABNORMALITY, CONSIDER LATERAL ISCHEMIA  ABNORMAL ECG    < end of copied text >    < from: [facility] Physiol Extremity Lower 3+ Level, BI (07.12.21 @ 10:54) >    IMPRESSION: The quality of this examination is adversely impacted by the noncompressible nature of the diabetic arteries.    It is sufficient to show severe bilateral lower extremity arterial vascular disease affecting the trifurcation arteries and the small arteries of both feet.    An arterial duplex examination is recommended to better delineate the location of the arterialdisease.    < end of copied text >    < from: MR Thoracic Spine No Cont (07.11.21 @ 23:55) >    IMPRESSION:    -Acute to subacute moderate compression fracture of T12 with slight bony retropulsion but no associated spinal canal stenosis.    -L4-L5 disc bulge with potential impingement of the descending right L5 nerve root.    < end of copied text >    < from: Xray Thoracic Spine 1 View (07.11.21 @ 17:57) >    IMPRESSION:    Thoracic spine: The upper thoracic spine is not included on the lateral view. There is severe anterior wedging of the T12 vertebral body as seen on the recent CT scan. Patient is status post CABG with sternotomy wires. Surgicalclips project over the right chest wall.    Lumbar spine: Again noted is a severe anterior wedging compression deformity at T12. There is a minimal kyphosis centered at this level. The heights of the remaining vertebral bodies are preserved. There isminimal disc height loss at L4-5. Aortoiliac calcification is noted.    < end of copied text >    < from: CT Abdomen and Pelvis No Cont (07.11.21 @ 16:15) >    IMPRESSION:    No evidence of an infectious source.    Thickening versus under distention of the ascending colon for which correlation with colonoscopy is recommended if not recently performed.    T12 compression fracture new from prior imaging in January 2021. Correlate with lumbar spine MR same day.    < end of copied text >    < from: CT Head No Cont (07.09.21 @ 21:43) >    IMPRESSION:    No acute intracranial hemorrhage or mass effect.    < end of copied text >    < from: CT Chest No Cont (07.09.21 @ 21:43) >    IMPRESSION:  Few subtle peripheral opacities in the right upper lobe are new from the prior study and indeterminate. Short-term follow-up CT recommended for complete evaluation.    Pneumoperitoneum may be related to peritoneal dialysis catheter. If concern for additional abnormality, CT abdomen/pelvis is recommended.    T12 vertebral body fracture is age-indeterminate but new from the prior study. There appears to be some mass effect upon the canal. MRI spine may be helpful for complete evaluation.    < end of copied text >    < from: Xray Foot AP + Lateral + Oblique, Right (07.06.21 @ 08:59) >    IMPRESSION:  No tracking soft tissue gas collections, radiopaque foreign bodies, or gross radiographic evidence for osteomyelitis.    No fractures or dislocations.    Tarsometatarsal alignment maintained without evidence for a Lisfranc injury.    Preserved visualized joint spaces and no joint margin erosions.    No lytic or blastic lesions.    Vascular calcifications.      < end of copied text >    < from: Xray Chest 1 View- PORTABLE-Urgent (Xray Chest 1 View- PORTABLE-Urgent .) (07.06.21 @ 05:26) >    IMPRESSION:  Low lung volumes.    New ill-defined right upper lung nodular opacities, of indeterminate nature. Infection or neoplasm are possible. Suggest short-term follow-up chest x-ray to evaluate for resolution. If findings persist then consider a CT scan of the chest for further evaluation.    Left mid and lower lung linear atelectasis.    Pneumoperitoneum which may be attributable to ongoing peritoneal dialysis.    < end of copied text >

## 2021-07-15 NOTE — BH CONSULTATION LIAISON PROGRESS NOTE - NSBHFUPINTERVALHXFT_PSY_A_CORE
Patient progressively improving in speech. Was more verbal today, answering all questions. He states that he had been trying to talk earlier during his hospital course, but was unable to. He is unsure on the reason why he has been unable to speak. Patient AAOx3 today. He denies history of and current depressed mood, SI/HI, and manic symptoms. Denies current hallucinations/delusions, but states that he had been confused on where he was 1 week ago. Patient continues to have blunted affect.

## 2021-07-15 NOTE — PROGRESS NOTE ADULT - ASSESSMENT
56 m with    Sepsis  - off antibiotic   - peritoneal culture pending   - toe gangrene  - Podiatry follow  - ID follow   - KLAUDIA pending to r/o endocarditis    COPD  - 2L NC overnight because he becomes apneic, sats well  - Sats well on RA while awake    CAD s/p CABG   - Hypotension likely in the setting of septic shock   - midodrine dose increased to 30 mg TID to match home dose  - Previous echo with reduced EF  - c/w DAPT  - Cardiology follow    CHF cr systolic  - cardiology follow  - EP eval re AICD    Peritoneal Dialysis   - nephrology follow PD, recommend 4x a day  - patient on midodrine 30 q8h at home, continue here  - Holding home metoprolol 25 q12hr in setting of hypotension    Diabetes   - HA1C 6.1 on 7/6 suggesting prediabetes  - Tujeo 60 units at bedtime and Victoza at home, started on 30 units at bedtime, adjust as needed based on patient PO intake  - added 2 units insulin pre-meal    Hiccups  - GI follow  - hold Ativan 2 to sedation    Incontinence dermatitis  - wound care    Toe gangrene  - Podiatry follow  - Vascular evaluation noted  - PONCHO/PVR noted  - Angiogram if agrees    Finger gangrenous area  - Plastic evaluation    Vertebral fracture T12  - Ortho eval noted   - MRI spine noted  - No intervention    Confusion  - possible delirium  - Psych follow  - Neurology follow    Pipe Beck MD pager [phone]

## 2021-07-16 NOTE — PROGRESS NOTE ADULT - ASSESSMENT
CATH 11/30/2020:  COMPLICATIONS: The stent was deployed without difficulty. On removal of the  balloon, the shaft broke and the tip of the balloon remained in the vessel. Several attempts were made to snare the balloon unsuccessfully. Dr. Verdugo was notifed who will take the patient to the operating room.  DIAGNOSTIC RECOMMENDATIONS: The patient should continue with the present medications. The patients vessel was stented without difficulty On removal of the balloon, the shaft broke with the baloon stuck in the left main. All attempts to snare the balloon out failed and the patient was taken to the OR by Dr. Arango to remove the balloon  introp eleonora 11/30/20: mild to mod MR, < from: Intra-Operative Transesophageal Echo (11.30.20 @ 17:02) >  Severe global left ventricular systolic dysfunction. Inferior and inferolateral akinesis.  severe hypokinesis of other segments.  Severe global hypokinesia.  Normal left ventricular internal dimensions and wall thicknesses. Moderate diastolic dysfunction (Stage II).  echo 12/17/20: EF 32%, mod MR, Severe global left ventricular systolic dysfunction, moderate pulmonary pressures  echo 12/20/20: EF (Visual Estimate):  25-30 % mild MR, Akinesis of the inferolateral, anterolateral, apical laterlal, inferior, and inferoseptal walls. Severe left ventricular enlargement. No left ventricular thrombus is seen.  Echo 7/7/21: EF 16%, mod - sev MR, mod AS, severe global lv sys dysfx, severe diastolic dysfx, mod pulm htn       a/p  56 M well known to practice with PMH ESRD on PD, DM on insulin, CHF EF 25-30% CAD s/p CABG x4, underwent cardiac cath and stent and wire broke in heart s/p sternotomy p/w septic shock.    #Septic Shock, resolved   -Bcx NGTD 7/10   -s/p iv abx  -s/p pressors   -Bedside POCUS notable for scattered B lines, biventricular diffuse hypokinesis  -pt also with right 2nd toe gangrene - pod eval noted -- vascular following - PONCHO/PVR noted   -culture of PD cathter NGTD-- ID following    -med f/u    #Ischemic Cardiomyopathy. Chronic systolic HF  -vol status stable   -Repeat echo noted with EF 16%, mod - sev MR, mod AS, severe global lv sys dysfx, severe diastolic dysfx, mod pulm htn   -eps eval for icd when mental status improves, pt has declined icd in the past?  -no bb, acei/arb given hx of orthostatic bp   -continue midodrine for bp support  -cont vol removal w PD     # CAD, s/p CABG, s/p PCI, s/p redo open heart for stent balloon retrieval   -hst elevated, likely demand ischemia in setting of septic shock, ESRD   -c/w asa, Brilinta, statin     # ESRD  -on PD  -renal f/u    #Nausea, vomiting  -GI followup    dvt ppx CATH 11/30/2020:  COMPLICATIONS: The stent was deployed without difficulty. On removal of the  balloon, the shaft broke and the tip of the balloon remained in the vessel. Several attempts were made to snare the balloon unsuccessfully. Dr. Verdugo was notifed who will take the patient to the operating room.  DIAGNOSTIC RECOMMENDATIONS: The patient should continue with the present medications. The patients vessel was stented without difficulty On removal of the balloon, the shaft broke with the baloon stuck in the left main. All attempts to snare the balloon out failed and the patient was taken to the OR by Dr. Arango to remove the balloon  introp eleonora 11/30/20: mild to mod MR, < from: Intra-Operative Transesophageal Echo (11.30.20 @ 17:02) >  Severe global left ventricular systolic dysfunction. Inferior and inferolateral akinesis.  severe hypokinesis of other segments.  Severe global hypokinesia.  Normal left ventricular internal dimensions and wall thicknesses. Moderate diastolic dysfunction (Stage II).  echo 12/17/20: EF 32%, mod MR, Severe global left ventricular systolic dysfunction, moderate pulmonary pressures  echo 12/20/20: EF (Visual Estimate):  25-30 % mild MR, Akinesis of the inferolateral, anterolateral, apical laterlal, inferior, and inferoseptal walls. Severe left ventricular enlargement. No left ventricular thrombus is seen.  Echo 7/7/21: EF 16%, mod - sev MR, mod AS, severe global lv sys dysfx, severe diastolic dysfx, mod pulm htn       a/p  56 M well known to practice with PMH ESRD on PD, DM on insulin, CHF EF 25-30% CAD s/p CABG x4, underwent cardiac cath and stent and wire broke in heart s/p sternotomy p/w septic shock.    #Septic Shock, resolved   -Bcx NGTD 7/10   -s/p iv abx  -s/p pressors   -Bedside POCUS notable for scattered B lines, biventricular diffuse hypokinesis  -pt also with right 2nd toe gangrene - pod eval noted -- vascular following - PONCHO/PVR noted   -culture of PD cathter NGTD-- ID following    -ELEONORA: Overall thickened valves seen however, no evidence of valvular vegetation is seen.  -med f/u    #Ischemic Cardiomyopathy. Chronic systolic HF  -vol status stable   -Repeat echo noted with EF 16%, mod - sev MR, mod AS, severe global lv sys dysfx, severe diastolic dysfx, mod pulm htn   -eps eval for icd when mental status improves, pt has declined icd in the past?  -no bb, acei/arb given hx of orthostatic bp   -continue midodrine for bp support  -cont vol removal w PD     # CAD, s/p CABG, s/p PCI, s/p redo open heart for stent balloon retrieval   -hst elevated, likely demand ischemia in setting of septic shock, ESRD   -c/w asa, Brilinta, statin     # ESRD  -on PD  -renal f/u    #Nausea, vomiting  -GI followup    dvt ppx

## 2021-07-16 NOTE — PROGRESS NOTE ADULT - SUBJECTIVE AND OBJECTIVE BOX
Patient is a 56y old  Male who presented with a chief complaint of Hypotension (16 Jul 2021 11:11)      INTERVAL HPI/OVERNIGHT EVENTS:  hiccups resolved after Ativan x1 yesterday but subsequently became very lethargic per RN   s/p KLAUDIA yesterday  EEG in progress this AM  no vomiting  no fever or chills  BM x 2 yesterday, no melena or rectal bleeding      MEDICATIONS  (STANDING):  aspirin enteric coated 81 milliGRAM(s) Oral daily  atorvastatin 80 milliGRAM(s) Oral at bedtime  cadexomer iodine 0.9% Gel 1 Application(s) Topical daily  chlorhexidine 0.12% Liquid 15 milliLiter(s) Oral Mucosa two times a day  chlorhexidine 2% Cloths 1 Application(s) Topical <User Schedule>  dextrose 40% Gel 15 Gram(s) Oral once  dextrose 5%. 1000 milliLiter(s) (100 mL/Hr) IV Continuous <Continuous>  dextrose 5%. 1000 milliLiter(s) (50 mL/Hr) IV Continuous <Continuous>  dextrose 50% Injectable 25 Gram(s) IV Push once  dextrose 50% Injectable 12.5 Gram(s) IV Push once  dextrose 50% Injectable 25 Gram(s) IV Push once  diVALproex  milliGRAM(s) Oral two times a day  dronabinol 2.5 milliGRAM(s) Oral two times a day  ferrous    sulfate 325 milliGRAM(s) Oral three times a day  folic acid 1 milliGRAM(s) Oral daily  glucagon  Injectable 1 milliGRAM(s) IntraMuscular once  heparin   Injectable 5000 Unit(s) SubCutaneous every 12 hours  insulin glargine Injectable (LANTUS) 5 Unit(s) SubCutaneous at bedtime  insulin lispro (ADMELOG) corrective regimen sliding scale   SubCutaneous three times a day before meals  insulin lispro (ADMELOG) corrective regimen sliding scale   SubCutaneous at bedtime  lactated ringers. 1000 milliLiter(s) (50 mL/Hr) IV Continuous <Continuous>  midodrine 30 milliGRAM(s) Oral every 8 hours  Nephro-kristi 1 Tablet(s) Oral daily  pantoprazole    Tablet 40 milliGRAM(s) Oral before breakfast  sevelamer carbonate 800 milliGRAM(s) Oral three times a day  thiamine IVPB 500 milliGRAM(s) IV Intermittent every 8 hours  ticagrelor 60 milliGRAM(s) Oral every 12 hours    MEDICATIONS  (PRN):  valproate sodium IVPB 125 milliGRAM(s) IV Intermittent every 8 hours PRN agitation      Allergies  doxazosin (Other)      Review of Systems:  unable to obtain from pt    Vital Signs Last 24 Hrs  T(C): 36.7 (16 Jul 2021 09:00), Max: 36.8 (15 Jul 2021 18:53)  T(F): 98.1 (16 Jul 2021 09:00), Max: 98.2 (15 Jul 2021 18:53)  HR: 92 (16 Jul 2021 09:00) (87 - 95)  BP: 110/77 (16 Jul 2021 09:00) (90/63 - 110/77)  BP(mean): --  RR: 18 (16 Jul 2021 09:00) (18 - 20)  SpO2: 100% (16 Jul 2021 09:00) (95% - 100%)    PHYSICAL EXAM:  Constitutional: NAD, chronically ill appearing male.   drowsy  non toxic appearing  Neck: No LAD, supple no JVD  Respiratory: grossly clear   +WH sternal scar  +WH scar right chest wall  Cardiovascular: S1 and S2, RRR, +murmur  Gastrointestinal: BS+, soft, NT/ND, no rebound guarding or rigidity  +dressing over PD site - clean and dry, non tender near catheter site. no scrotal edema/swelling  Extremities: No peripheral edema,  Right toe gangrene (dry);   Left foot wound dry;  thumb +dry gangrene  Right thumb (under fingernail and fingertip) subungual hematoma with ?gangrene  +lesions at tips of fingers, no purulence or bleeding  +b/l Z flow boots  Vascular: 2+ peripheral pulses  Neurological: no focal asymmetry  Psychiatric: drowsy  Skin: anicteric    LABS:                        12.2   9.54  )-----------( 245      ( 16 Jul 2021 07:27 )             41.2     07-16    131<L>  |  91<L>  |  45<H>  ----------------------------<  124<H>  3.2<L>   |  20<L>  |  9.67<H>    Ca    8.5      16 Jul 2021 07:25  Phos  5.7     07-16    TPro  6.2  /  Alb  2.2<L>  /  TBili  0.7  /  DBili  x   /  AST  25  /  ALT  15  /  AlkPhos  111  07-16        RADIOLOGY & ADDITIONAL TESTS:    Patient name: BELLA MARTINS  YOB: 1965   Age: 56 (M)   MR#: [id number]  Study Date: 7/15/2021  Location: [address] [place], [place] [address]OR217Iwsbiwvjlvo: Dyllan Diez M.D.  Study quality: Technically good  Referring Physician: Pipe Beck MD  BloodPressure: 103/72 mmHg  Height: 170 cm  Weight: 66 kg  BSA: 1.8 m2  ------------------------------------------------------------------------  PROCEDURE: Transesophageal and transthoracic  echocardiograms with 2-D, M-Mode and complete spectral and  color flow Doppler were performed.  Informed consent was  first obtained for KLAUDIA. The patient was sedated - see  anesthesia record.  The procedure was monitored with  automatic blood pressure monitoring, ECG tracings and pulse  oximetry.  The transesophageal probe was placed in the  esophagus posterior to the heart without complications.  Real-time and reconstructed 3-dimensional imaging was  performed with Workstation. Color Doppler analysis was  carried out using both 2D and 3D mapping. Patient was  injected with 10 cc's of aerosolized saline. Patient was  injected with 10 cc's of aerosolized saline.  INDICATION: Endocarditis, valve unspecified (I38)  ------------------------------------------------------------------------  Dimensions:    Normal Values:  LA:            2.0 - 4.0 cm  Ao:     3.0    2.0 - 3.8 cm  SEPTUM: 1.1    0.6 - 1.2 cm  PWT:           0.6 - 1.1 cm  LVIDd:         3.0 - 5.6 cm  LVIDs:         1.8 - 4.0 cm  EF (3D Quantification): 24.5 %  ------------------------------------------------------------------------  Observations:  Mitral Valve: Tethered mitral valve leaflets with normal  opening. Mitral annular calcification. Severe mitral  regurgitation. There are 2 major jets  seen one at at the  medial sapect of P2 and one at the lateral aspect of P2. MR  3D ERO 0.27sqcm and 0.23sqcm.  By PISA ERO 0.27sq cm  (Radius 0.7 , alising velocity 34cm/sec, Vmax 424cm/s)  also 2nd jet ERO 0.2sqcm (radius 0.62) Regurgitant volume  55-60cc.  Aortic Valve/Aorta: Calcified trileaflet aortic valve with  normal opening. Estimated aortic valve area equals 2.6  sqcm. Mild-moderate aortic regurgitation.  Aortic Root: 3 cm.  Left Atrium: No left atrial or left atrial appendage  thrombus. Decreased left atrial appendage velocities noted.  Left Ventricle: Severe global left ventricular systolic  dysfunction. Eccentric left ventricular hypertrophy  (dilated left ventricle with normal relative wall  thickness). Moderate left ventricular enlargement. EDV  198cc, ESV 141cc  IncreasedE/e'  is consistent with  elevated left ventricular filling pressure.  Right Heart: right atrial enlargement. Right ventricular  enlargement with decreased right ventricular systolic  function. Dilated  TV annulus There are two jets seen  with  vena contracta 0.4cm and 0.5q cm. Severe tricuspid  regurgitation. Normal pulmonic valve. Minimal pulmonic  regurgitation.  Pericardium/Pleura: Normal pericardium with no pericardial  effusion.  Hemodynamic: Estimated right atrial pressure is 8 mm Hg.  Estimated right ventricular systolic pressure equals 54 mm  Hg, assuming right atrial pressure equals 8 mm Hg,  consistent with moderate pulmonary hypertension. Contrast  injection demonstrates no evidence of a patent foramen  ovale.  ------------------------------------------------------------------------  Conclusions:  1. Tethered mitral valve leaflets with normal opening.  Mitral annular calcification. Severe mitral regurgitation.  There are 2 major jets  seen one at at the medial sapect of  P2 and one at the lateral aspect of P2. MR 3D ERO 0.27sqcm  and 0.23sqcm.  By PISA ERO 0.27sq cm (Radius 0.7 , alising  velocity 34cm/sec, Vmax 424cm/s)  also 2nd jet ERO 0.2sqcm  (radius 0.62) Regurgitant volume 55-60cc.  2. Calcified trileaflet aortic valve with normal opening.  Mild-moderate aortic regurgitation.  3. Eccentric left ventricular hypertrophy (dilated left  ventricle with normal relative wall thickness). Moderate  left ventricular enlargement. EDV 198cc, ESV 141cc  4. Severe global leftventricular systolic dysfunction.  5. Right ventricular enlargement with decreased right  ventricular systolic function.  6. Dilated  TV annulus There are two jets seen  with vena  contracta 0.4cm and 0.5q cm. Severe tricuspid  regurgitation.  7. Overall thickened valves seen however, no evidence of  valvular vegetation is seen.  ------------------------------------------------------------------------  Confirmed on  7/15/2021 - 19:30:23 by REILLY Vargas  ------------------------------------------------------------------------

## 2021-07-16 NOTE — PROGRESS NOTE ADULT - ASSESSMENT
56 m with    Sepsis  - off antibiotic   - toe gangrene  - Podiatry follow  - ID follow   - KLAUDIA noted    COPD  - 2L NC overnight because he becomes apneic, sats well  - Sats well on RA while awake    CAD s/p CABG   - Hypotension likely in the setting of septic shock   - midodrine dose increased to 30 mg TID to match home dose  - Previous echo with reduced EF  - c/w DAPT  - Cardiology follow    CHF cr systolic  - cardiology follow  - EP eval re AICD    Peritoneal Dialysis   - nephrology follow PD, recommend 4x a day  - patient on midodrine 30 q8h at home, continue here  - Holding home metoprolol 25 q12hr in setting of hypotension    Diabetes   - HA1C 6.1 on 7/6 suggesting prediabetes  - Tujeo 60 units at bedtime and Victoza at home, started on 30 units at bedtime, adjust as needed based on patient PO intake  - added 2 units insulin pre-meal    Hiccups  - GI follow  - hold Ativan 2 to sedation    Incontinence dermatitis  - wound care    Toe gangrene  - Podiatry follow  - Vascular evaluation noted  - PONCHO/PVR noted  - Angiogram if agrees    Finger gangrenous area  - Plastic evaluation    Vertebral fracture T12  - Ortho eval noted   - MRI spine noted  - No intervention    Confusion  - possible delirium  - Psych follow  - Neurology follow  - EEG noted    Pipe Beck MD pager [phone]

## 2021-07-16 NOTE — PROGRESS NOTE ADULT - SUBJECTIVE AND OBJECTIVE BOX
CARDIOLOGY FOLLOW UP - Dr. Best  DATE OF SERVICE: 07-16-21      no acute events  EEG in progress     REVIEW OF SYSTEMS:   CONSTITUTIONAL: No fever, weight loss, or fatigue   RESPIRATORY:  No cough, wheezing, chills or hemoptysis; No SOB  CARDIOVASCULAR: No chest pain, palpitations, passing out, dizziness, or leg swelling   GASTROINTESTINAL: No abdominal or epigastric pain. No nausea, vomiting, or hematemesis, no diarreha, or constipation, No melena or hematochezia   VASCULAR: no edema.       PHYSICAL EXAM:  T(C): 36.7 (07-16-21 @ 09:00), Max: 36.8 (07-15-21 @ 18:53)  HR: 92 (07-16-21 @ 09:00) (87 - 95)  BP: 110/77 (07-16-21 @ 09:00) (90/63 - 110/77)  RR: 18 (07-16-21 @ 09:00) (18 - 20)  SpO2: 100% (07-16-21 @ 09:00) (95% - 100%)  Wt(kg): --  I&O's Summary    15 Jul 2021 07:01  -  16 Jul 2021 07:00  --------------------------------------------------------  IN: 2120 mL / OUT: 4600 mL / NET: -2480 mL        Appearance: Nad 	  Cardiovascular: Normal S1 S2,RRR, No JVD, No murmurs  Respiratory: Lungs clear to auscultation	  Gastrointestinal:  Soft, Non-tender, + BS	  Extremities: Normal range of motion, No clubbing, cyanosis or edema      HOME MEDICATIONS:  aspirin 81 mg oral delayed release tablet: 1 tab(s) orally once a day (29 Dec 2020 18:37)  atorvastatin 80 mg oral tablet: 1 tab(s) orally once a day (at bedtime) (29 Dec 2020 18:37)  epoetin martine: injectable once a month (29 Dec 2020 18:37)  ferrous sulfate 325 mg (65 mg elemental iron) oral tablet: 1 tab(s) orally 3 times a day (29 Dec 2020 18:37)  gabapentin 100 mg oral capsule: 1 cap(s) orally once a day (29 Dec 2020 18:37)  nortriptyline 25 mg oral capsule: 1 cap(s) orally once a day (at bedtime) (29 Dec 2020 18:37)  pantoprazole 40 mg oral delayed release tablet: 1 tab(s) orally once a day (before a meal) (29 Dec 2020 18:37)  Toujeo SoloStar 300 units/mL subcutaneous solution: 60 unit(s) subcutaneous once a day (at bedtime) (08 Jan 2021 12:41)      MEDICATIONS  (STANDING):  aspirin enteric coated 81 milliGRAM(s) Oral daily  atorvastatin 80 milliGRAM(s) Oral at bedtime  cadexomer iodine 0.9% Gel 1 Application(s) Topical daily  chlorhexidine 0.12% Liquid 15 milliLiter(s) Oral Mucosa two times a day  chlorhexidine 2% Cloths 1 Application(s) Topical <User Schedule>  dextrose 40% Gel 15 Gram(s) Oral once  dextrose 5%. 1000 milliLiter(s) (100 mL/Hr) IV Continuous <Continuous>  dextrose 5%. 1000 milliLiter(s) (50 mL/Hr) IV Continuous <Continuous>  dextrose 50% Injectable 25 Gram(s) IV Push once  dextrose 50% Injectable 12.5 Gram(s) IV Push once  dextrose 50% Injectable 25 Gram(s) IV Push once  diVALproex  milliGRAM(s) Oral two times a day  dronabinol 2.5 milliGRAM(s) Oral two times a day  ferrous    sulfate 325 milliGRAM(s) Oral three times a day  folic acid 1 milliGRAM(s) Oral daily  glucagon  Injectable 1 milliGRAM(s) IntraMuscular once  heparin   Injectable 5000 Unit(s) SubCutaneous every 12 hours  insulin glargine Injectable (LANTUS) 5 Unit(s) SubCutaneous at bedtime  insulin lispro (ADMELOG) corrective regimen sliding scale   SubCutaneous three times a day before meals  insulin lispro (ADMELOG) corrective regimen sliding scale   SubCutaneous at bedtime  lactated ringers. 1000 milliLiter(s) (50 mL/Hr) IV Continuous <Continuous>  midodrine 30 milliGRAM(s) Oral every 8 hours  Nephro-kristi 1 Tablet(s) Oral daily  pantoprazole    Tablet 40 milliGRAM(s) Oral before breakfast  sevelamer carbonate 800 milliGRAM(s) Oral three times a day  thiamine IVPB 500 milliGRAM(s) IV Intermittent every 8 hours  ticagrelor 60 milliGRAM(s) Oral every 12 hours      TELEMETRY: 	    ECG:  	  RADIOLOGY:   DIAGNOSTIC TESTING:  [ ] Echocardiogram:  [ ]  Catheterization:  [ ] Stress Test:    OTHER: 	    LABS:	 	                                12.2   9.54  )-----------( 245      ( 16 Jul 2021 07:27 )             41.2     07-16    131<L>  |  91<L>  |  45<H>  ----------------------------<  124<H>  3.2<L>   |  20<L>  |  9.67<H>    Ca    8.5      16 Jul 2021 07:25  Phos  5.7     07-16    TPro  6.2  /  Alb  2.2<L>  /  TBili  0.7  /  DBili  x   /  AST  25  /  ALT  15  /  AlkPhos  111  07-16

## 2021-07-16 NOTE — PROGRESS NOTE ADULT - SUBJECTIVE AND OBJECTIVE BOX
Communicating but not oriented       VITAL:  T(C): , Max: 36.8 (07-15-21 @ 18:53)  T(F): , Max: 98.2 (07-15-21 @ 18:53)  HR: 91 (07-16-21 @ 13:00)  BP: 110/75 (07-16-21 @ 13:00)  BP(mean): --  RR: 18 (07-16-21 @ 13:00)  SpO2: 98% (07-16-21 @ 13:00)  Wt(kg): --      PHYSICAL EXAM:  General: Alert  HEENT: MMM  Lungs:CTA-b/l  Heart: RRR S1S2  Abdomen: Soft, PD catheter   Ext: no pedal edema b/l  : no keys      LABS:                        12.2   9.54  )-----------( 245      ( 16 Jul 2021 07:27 )             41.2     Na(131)/K(3.2)/Cl(91)/HCO3(20)/BUN(45)/Cr(9.67)Glu(124)/Ca(8.5)/Mg(--)/PO4(5.7)    07-16 @ 07:25  Na(132)/K(3.6)/Cl(91)/HCO3(23)/BUN(47)/Cr(9.54)Glu(159)/Ca(8.4)/Mg(--)/PO4(--)    07-15 @ 06:43  Na(130)/K(3.5)/Cl(91)/HCO3(23)/BUN(48)/Cr(10.01)Glu(134)/Ca(9.0)/Mg(--)/PO4(--)    07-14 @ 06:13      6 M PMH ESRD on PD, DM on insulin, CHF EF 25-30% CAD s/p CABG x4 pw with hypotension with fever. Encephalopathy likely related to cefepime ? no obvious source of sepsis apparent as of now, KLAUDIA: Overall thickened valves seen however, no evidence of valvular vegetation is seen.     Hypotension off pressors  ESRD on PD   Encephalopathy likely related to cefepime ? no obvious source of sepsis apparent as of now, KLAUDIA: Overall thickened valves seen however, no evidence of valvular vegetation is seen. unlikely infective endocarditis?     RECOMMENDATIONS    c/w CCPD orders with dianeal 1.5 to limit UF and ultimately hypotension .   Hyponatremia- stable   ID-Blood cx  and  PD fluid cx -   off IV antibiotics   Hyperphosphatemia-   Renvela 1600mg TID  for now  [facility]S-Midodrine  30mg po q 8 hours   Encephalopathy from cefepime ?  ( now stopped), ammonia levels ok   Any role of spinal tap here?  Ongoing neuro/psych eval   PD ins and outs not sure if accurately charted, discussed with RN to provide an update       Samia Dangelo MD  [facility]  Office: [phone]           Communicating but not oriented       VITAL:  T(C): , Max: 36.8 (07-15-21 @ 18:53)  T(F): , Max: 98.2 (07-15-21 @ 18:53)  HR: 91 (07-16-21 @ 13:00)  BP: 110/75 (07-16-21 @ 13:00)  BP(mean): --  RR: 18 (07-16-21 @ 13:00)  SpO2: 98% (07-16-21 @ 13:00)  Wt(kg): --      PHYSICAL EXAM:  General: Alert  HEENT: MMM  Lungs:CTA-b/l  Heart: RRR S1S2  Abdomen: Soft, PD catheter   Ext: no pedal edema b/l  : no keys      LABS:                        12.2   9.54  )-----------( 245      ( 16 Jul 2021 07:27 )             41.2     Na(131)/K(3.2)/Cl(91)/HCO3(20)/BUN(45)/Cr(9.67)Glu(124)/Ca(8.5)/Mg(--)/PO4(5.7)    07-16 @ 07:25  Na(132)/K(3.6)/Cl(91)/HCO3(23)/BUN(47)/Cr(9.54)Glu(159)/Ca(8.4)/Mg(--)/PO4(--)    07-15 @ 06:43  Na(130)/K(3.5)/Cl(91)/HCO3(23)/BUN(48)/Cr(10.01)Glu(134)/Ca(9.0)/Mg(--)/PO4(--)    07-14 @ 06:13      56 M PMH ESRD on PD, DM on insulin, CHF EF 25-30% CAD s/p CABG x4 pw with hypotension with fever. Encephalopathy likely related to cefepime ? no obvious source of sepsis apparent as of now, KLAUDIA: Overall thickened valves seen however, no evidence of valvular vegetation is seen.     Hypotension off pressors  ESRD on PD   Encephalopathy likely related to cefepime ? no obvious source of sepsis apparent as of now, KLAUDIA: Overall thickened valves seen however, no evidence of valvular vegetation is seen. unlikely infective endocarditis?   .   RECOMMENDATIONS    c/w CCPD orders with dianeal 1.5 to limit UF and ultimately hypotension .   Hyponatremia- stable   ID-Blood cx  and  PD fluid cx -   off IV antibiotics   Hyperphosphatemia-   Renvela 1600mg TID  for now  CVS-Midodrine  30mg po q 8 hours   Encephalopathy from cefepime ?  ( now stopped), ammonia levels ok   Any role of spinal tap here?  Ongoing neuro/psych eval   PD ins and outs not sure if accurately charted, discussed with RN to provide an update       Samia Dangelo MD  [facility]  Office: [phone]

## 2021-07-16 NOTE — PROGRESS NOTE ADULT - ASSESSMENT
55M PMH ESRD on PD, DM on insulin, CHF EF 25-30% CAD s/p CABG x4, underwent cardiac cath and stent and wire broke in heart sternotomy admitted with cough/sweating found to be hypotensive and febrile in ER- admitted for presumed septic shock; cultures negative thus far    s/p short stay in MICU for pressor support  Cultures negative to date  ESRD on PD  COVID negative  CT A/P - thickening vs underdistension of AC (th clinically benign abdominal exam), no evidence of infectious source, T12 compression fracture  CT Chest- subtle peripheral opacities RUL, pneumoperitoneum related to PD catheter, T12 vertebral fracture  Ischemic CM, chronic systolic HF - management per Cardiology    #nausea and hiccups (per pt has had intermittent issues with hiccuping prior to admission)  qTc prolonged (600); per d/w Renal we cannot give Baclofen (even at PRN dosing). No acute GI pathology on CT A/P. Nausea appears to have improved  ?related to uremia    -hiccups resolved after 1 time dosing of Ativan but then noted to become lethargic. Hold off on further doses  -PD per Renal  -Continue PPI    #Anorexia  -encourage preferences and supplements  -Marinol 2.5 mg BID for appetite stimulant  -Psych following    #?sepsis, all cultures negative to date. KLAUDIA without vegetations  -ID following, recommend monitor off ABx    Neuro work-up in progress  SLP input appreciated (7/13)- suspect symptoms may be psych/behavior related  Discussed with Medicine PA and attending     Please call over weekend prn with acute GI concerns   GI service : [phone]    Sanjiv Oglesby PA-C    [facility]  [phone]  After hours and weekend coverage [phone]

## 2021-07-16 NOTE — PROGRESS NOTE ADULT - SUBJECTIVE AND OBJECTIVE BOX
CC: f/u for hypotension    Patient is poorly interactive, currently getting EEG    REVIEW OF SYSTEMS: limited  All other review of systems negative (Comprehensive ROS)    Antimicrobials Day #        Other Medications Reviewed    Vital Signs Last 24 Hrs  T(C): 36.4 (15 Jul 2021 09:00), Max: 37 (15 Jul 2021 02:00)  T(F): 97.5 (15 Jul 2021 09:00), Max: 98.6 (15 Jul 2021 02:00)  HR: 92 (15 Jul 2021 09:00) (84 - 98)  BP: 108/76 (15 Jul 2021 09:00) (90/60 - 108/76)  BP(mean): --  RR: 18 (15 Jul 2021 09:00) (18 - 18)  SpO2: 94% (15 Jul 2021 09:00) (94% - 100%)    PHYSICAL EXAM:  General: alert, no acute distress, chronically ill appearing  Eyes:  anicteric, no conjunctival injection, no discharge  Oropharynx: no lesions or injection 	  Neck: supple, without adenopathy  Lungs: clear to auscultation  Heart: regular rate and rhythm; no murmur, rubs or gallops  Abdomen: soft, nondistended, nontender, without mass or organomegaly  Skin: no lesions  Extremities: no clubbing, cyanosis, or edema  Neurologic: alert, poorly interactive    LAB RESULTS:                                              12.2   9.54  )-----------( 245      ( 16 Jul 2021 07:27 )             41.2   07-16    131<L>  |  91<L>  |  45<H>  ----------------------------<  124<H>  3.2<L>   |  20<L>  |  9.67<H>    Ca    8.5      16 Jul 2021 07:25  Phos  5.7     07-16    TPro  6.2  /  Alb  2.2<L>  /  TBili  0.7  /  DBili  x   /  AST  25  /  ALT  15  /  AlkPhos  111  07-16                MICROBIOLOGY REVIEWED:      Culture - Dialysis Fluid (07.10.21 @ 19:16)   Specimen Source: .Peritoneal Dialysis Fluid Dialysis (P.D.) Fluid   Culture Results:   No growth Culture - Blood (07.10.21 @ 18:54)   Specimen Source: .Blood Blood-Peripheral   Culture Results:   No growth to date.   RADIOLOGY REVIEWED:      < from: MR Thoracic Spine No Cont (07.11.21 @ 23:55) >  IMPRESSION:    -Acute to subacute moderate compression fracture of T12 with slight bony retropulsion but no associated spinal canal stenosis.    -L4-L5 disc bulge with potential impingement of the descending right L5 nerve root.    --- End of Report ---    < end of copied text >

## 2021-07-16 NOTE — EEG REPORT - NS EEG TEXT BOX
REPORT OF ROUTINE EEG WITH VIDEO AND REPORT OF DIGITAL COMPRESSED SPECTRAL ARRAY ANALYSIS   [facility]: [address], [address] [place] [address], Phone: [phone] [facility]: [address] [place] [id number], Phone: [phone] Office: [address] [place] [address], [facility] [address], [place] [address], Phone: [phone]  Patient Name: Lavell Miles   Age: 56 year : 1965 MRN #: MR# [id number], Location: - Referring Physician: -  EEG #: [id number] Study Date: 2021   Start Time: 10:00:22 AM    Study Duration: 22.9 		  Technical Information:					 On Instrument: - Placement and Labeling of Electrodes: The EEG was performed utilizing 20 channels referential EEG connections (coronal over temporal over parasagittal montage) using all standard 10-20 electrode placements with EKG.  Recording was at a sampling rate of 256 samples per second per channel.  Time synchronized digital video recording was done simultaneously with EEG recording.  A low light infrared camera was used for low light recording.  Paresh and seizure detection algorithms were utilized.  History:  Routine EEG performed at bedside COR: drowsy,  NO HV performed due to patient's history of CAD, No photic performed patient refused 57 y/o M h/o  ESRD, DM, CAD s/p CABG x4  p/w AMS  Medication DepaCON Depakote (Valproic acid/Divalproex)  Study Interpretation:  FINDINGS:  The background was continuous, spontaneously variable and reactive.  During wakefulness, the posteriorly dominant rhythm consisted of symmetric, well modulated 7.5 Hz activity, with an amplitude to 30 uV, that attenuated to eye opening.    Background Slowing: Generalized slowing: diffuse theta/delta slowing. Focal slowing: none was present.  Sleep Background: Drowsiness was characterized by fragmentation, attenuation, and slowing of the background activity.   Stage II sleep transients were not recorded.  Epileptiform Activity:  Intermittent 0.5-1 Hz Generalized periodic discharges with triphasic morphology  Events: No clinical events were recorded. No seizures were recorded.  Activation Procedures:  -Hyperventilation was not performed.   -Photic stimulation was not performed.  Artifacts: Intermittent myogenic and movement artifacts were noted.  ECG: The heart rate on single channel ECG was predominantly between 85-95 BPM.  EEG Classification / Summary: Abnormal Routine EEG Study in the awake/drowsy state -Intermittent periodic discharges, generalized, with triphasic morphology -Mild to moderate background slowing, generalized  Clinical Impression: The findings of generalized periodic discharges with triphasic morphology are typically seen in metabolic encephalopathy, but can, in certain clinical stiuations, increase the risk for seizures. Clinical correlation advised. There is evidence for mild to moderate multifocal/diffuse nonspecific cerebral dysfunction. There were no seizures recorded.    Parvez Velazquez MD Epilepsy Fellow  Reading Room: [phone] On Call Service After Hours: [phone]

## 2021-07-16 NOTE — PROGRESS NOTE ADULT - ASSESSMENT
55 yo M DM, , CHF, CAD with ESRD on PD, had stay about 7 months ago s/p cardiac stent, wire broke, had to undergo sternotomy, 2 stays soon after that for sepsis unclear cause, now returns a few days ago with fever, hypotension, weak, report of cough. He required pressors in micu now off, cultures of blood and pd fluid are no growth, exam not revealing.   Source of presumed sepsis is not readily apparent.   CXR shows no infiltrate   Has dry gangrene of right 3rd toe but that is not a source of sepsis.  He was found to have subtle infiltrates on ct chest, treated with Cefepime, received abx 7/6-7/11, now dcd due to concern of contributing to encephalopathy.   MRI spine not consistent with infection  KLAUDIA: Overall thickened valves seen however, no evidence of valvular vegetation is seen.        Recommendations:    Monitor for any fever relapse or signs of infection  No indications for abx at present  psych and neuro eval in progress, EEG in progress

## 2021-07-16 NOTE — PROGRESS NOTE ADULT - SUBJECTIVE AND OBJECTIVE BOX
Patient is a 56y old  Male who presents with a chief complaint of Hypotension (16 Jul 2021 16:57)      SUBJECTIVE / OVERNIGHT EVENTS: Comfortable without new complaints. More alert  Review of Systems  chest pain no  palpitations no  sob no  nausea no  headache no    MEDICATIONS  (STANDING):  aspirin enteric coated 81 milliGRAM(s) Oral daily  atorvastatin 80 milliGRAM(s) Oral at bedtime  cadexomer iodine 0.9% Gel 1 Application(s) Topical daily  chlorhexidine 0.12% Liquid 15 milliLiter(s) Oral Mucosa two times a day  chlorhexidine 2% Cloths 1 Application(s) Topical <User Schedule>  dextrose 40% Gel 15 Gram(s) Oral once  dextrose 5%. 1000 milliLiter(s) (50 mL/Hr) IV Continuous <Continuous>  dextrose 5%. 1000 milliLiter(s) (100 mL/Hr) IV Continuous <Continuous>  dextrose 50% Injectable 25 Gram(s) IV Push once  dextrose 50% Injectable 25 Gram(s) IV Push once  dextrose 50% Injectable 12.5 Gram(s) IV Push once  diVALproex  milliGRAM(s) Oral two times a day  dronabinol 2.5 milliGRAM(s) Oral two times a day  ferrous    sulfate 325 milliGRAM(s) Oral three times a day  folic acid 1 milliGRAM(s) Oral daily  glucagon  Injectable 1 milliGRAM(s) IntraMuscular once  heparin   Injectable 5000 Unit(s) SubCutaneous every 12 hours  insulin glargine Injectable (LANTUS) 5 Unit(s) SubCutaneous at bedtime  insulin lispro (ADMELOG) corrective regimen sliding scale   SubCutaneous three times a day before meals  insulin lispro (ADMELOG) corrective regimen sliding scale   SubCutaneous at bedtime  lactated ringers. 1000 milliLiter(s) (50 mL/Hr) IV Continuous <Continuous>  midodrine 30 milliGRAM(s) Oral every 8 hours  Nephro-kristi 1 Tablet(s) Oral daily  pantoprazole    Tablet 40 milliGRAM(s) Oral before breakfast  sevelamer carbonate 800 milliGRAM(s) Oral three times a day  thiamine IVPB 500 milliGRAM(s) IV Intermittent every 8 hours  ticagrelor 60 milliGRAM(s) Oral every 12 hours    MEDICATIONS  (PRN):  valproate sodium IVPB 125 milliGRAM(s) IV Intermittent every 8 hours PRN agitation      Vital Signs Last 24 Hrs  T(C): 36.6 (16 Jul 2021 21:00), Max: 36.7 (16 Jul 2021 01:00)  T(F): 97.8 (16 Jul 2021 21:00), Max: 98.1 (16 Jul 2021 09:00)  HR: 95 (16 Jul 2021 21:00) (91 - 95)  BP: 103/74 (16 Jul 2021 21:00) (102/75 - 110/77)  BP(mean): --  RR: 18 (16 Jul 2021 21:00) (18 - 18)  SpO2: 99% (16 Jul 2021 21:00) (95% - 100%)    PHYSICAL EXAM:  GENERAL: NAD, well-developed  HEAD:  Atraumatic, Normocephalic  EYES: EOMI, PERRLA, conjunctiva and sclera clear  NECK: Supple, No JVD  CHEST/LUNG: Clear to auscultation bilaterally; No wheeze  HEART: Regular rate and rhythm; No murmurs, rubs, or gallops  ABDOMEN: Soft, Nontender, Nondistended; Bowel sounds present PD catheter  EXTREMITIES:  R thumb with gangrene R2nd toe with area of gangrene   NEUROLOGY: non-focal  SKIN: No rashes or lesions    LABS:                        12.2   9.54  )-----------( 245      ( 16 Jul 2021 07:27 )             41.2     07-16    130<L>  |  91<L>  |  44<H>  ----------------------------<  192<H>  3.4<L>   |  21<L>  |  9.38<H>    Ca    8.2<L>      16 Jul 2021 19:34  Phos  5.7     07-16    TPro  6.2  /  Alb  2.2<L>  /  TBili  0.7  /  DBili  x   /  AST  25  /  ALT  15  /  AlkPhos  111  07-16              Culture - Blood (collected 14 Jul 2021 18:34)  Source: .Blood Blood-Peripheral  Preliminary Report (15 Jul 2021 19:02):    No growth to date.    Culture - Blood (collected 14 Jul 2021 18:34)  Source: .Blood Blood-Peripheral  Preliminary Report (15 Jul 2021 19:02):    No growth to date.      Clinical Impression:  The findings of generalized periodic discharges with triphasic morphology are typically seen in metabolic encephalopathy, but can, in certain clinical stiuations, increase the risk for seizures. Clinical correlation advised.  There is evidence for mild to moderate multifocal/diffuse nonspecific cerebral dysfunction.  There were no seizures recorded.  < from: MR Head w/wo IV Cont (07.16.21 @ 17:44) >      IMPRESSION:    MRI BRAIN: minimal periventricular white matter ischemia. Old lacunar infarctions are seen within the BILATERAL basal ganglia.    MRA brain:   Unremarkable MR angiography of the intracranial circulation.    --- End of Report ---    < end of copied text >  < from: Transesophageal Echocardiogram (07.15.21 @ 18:43) >  Conclusions:  1. Tethered mitral valve leaflets with normal opening.  Mitral annular calcification. Severe mitral regurgitation.  There are 2 major jets  seen one at at the medial sapect of  P2 and one at the lateral aspect of P2. MR 3D ERO 0.27sqcm  and 0.23sqcm.  By PISA ERO 0.27sq cm (Radius 0.7 , alising  velocity 34cm/sec, Vmax 424cm/s)  also 2nd jet ERO 0.2sqcm  (radius 0.62) Regurgitant volume 55-60cc.  2. Calcified trileaflet aortic valve with normal opening.  Mild-moderate aortic regurgitation.  3. Eccentric left ventricular hypertrophy (dilated left  ventricle with normal relative wall thickness). Moderate  left ventricular enlargement. EDV 198cc, ESV 141cc  4. Severe global leftventricular systolic dysfunction.  5. Right ventricular enlargement with decreased right  ventricular systolic function.  6. Dilated  TV annulus There are two jets seen  with vena  contracta 0.4cm and 0.5q cm. Severe tricuspid  regurgitation.  7. Overall thickened valves seen however, no evidence of  valvular vegetation is seen.    < end of copied text >      RADIOLOGY & ADDITIONAL TESTS:    Imaging Personally Reviewed:    Consultant(s) Notes Reviewed:      Care Discussed with Consultants/Other Providers:

## 2021-07-17 NOTE — PROGRESS NOTE ADULT - ASSESSMENT
56 m with    Sepsis  - off antibiotic   - toe gangrene  - Podiatry follow  - ID follow   - KLAUDIA noted    COPD  - 2L NC overnight because he becomes apneic, sats well  - Sats well on RA while awake    CAD s/p CABG   - Hypotension likely in the setting of septic shock   - midodrine dose increased to 30 mg TID to match home dose  - Previous echo with reduced EF  - c/w DAPT  - Cardiology follow    CHF cr systolic  - cardiology follow  - EP eval re AICD    Peritoneal Dialysis   - nephrology follow PD, recommend 4x a day  - patient on midodrine 30 q8h at home, continue here  - Holding home metoprolol 25 q12hr in setting of hypotension    Diabetes   - HA1C 6.1 on 7/6 suggesting prediabetes  - Tujeo 60 units at bedtime and Victoza at home, started on 30 units at bedtime, adjust as needed based on patient PO intake  - added 2 units insulin pre-meal    Hiccups  - GI follow  - hold Ativan 2 to sedation    Incontinence dermatitis  - wound care    Toe gangrene  - Podiatry follow  - Vascular evaluation noted  - PONCHO/PVR noted  - Angiogram if agrees    Finger gangrenous area  - Plastic evaluation    Vertebral fracture T12  - Ortho eval noted   - MRI spine noted  - No intervention    Confusion  - possible delirium  - Psych follow  - Neurology follow  - EEG noted    Pipe Beck MD pager [phone]

## 2021-07-17 NOTE — PROGRESS NOTE ADULT - ASSESSMENT
CATH 11/30/2020:  COMPLICATIONS: The stent was deployed without difficulty. On removal of the  balloon, the shaft broke and the tip of the balloon remained in the vessel. Several attempts were made to snare the balloon unsuccessfully. Dr. Verdugo was notifed who will take the patient to the operating room.  DIAGNOSTIC RECOMMENDATIONS: The patient should continue with the present medications. The patients vessel was stented without difficulty On removal of the balloon, the shaft broke with the baloon stuck in the left main. All attempts to snare the balloon out failed and the patient was taken to the OR by Dr. Arango to remove the balloon  introp eleonora 11/30/20: mild to mod MR, < from: Intra-Operative Transesophageal Echo (11.30.20 @ 17:02) >  Severe global left ventricular systolic dysfunction. Inferior and inferolateral akinesis.  severe hypokinesis of other segments.  Severe global hypokinesia.  Normal left ventricular internal dimensions and wall thicknesses. Moderate diastolic dysfunction (Stage II).  echo 12/17/20: EF 32%, mod MR, Severe global left ventricular systolic dysfunction, moderate pulmonary pressures  echo 12/20/20: EF (Visual Estimate):  25-30 % mild MR, Akinesis of the inferolateral, anterolateral, apical laterlal, inferior, and inferoseptal walls. Severe left ventricular enlargement. No left ventricular thrombus is seen.  Echo 7/7/21: EF 16%, mod - sev MR, mod AS, severe global lv sys dysfx, severe diastolic dysfx, mod pulm htn       a/p  56 M well known to practice with PMH ESRD on PD, DM on insulin, CHF EF 25-30% CAD s/p CABG x4, underwent cardiac cath and stent and wire broke in heart s/p sternotomy p/w septic shock.    #Septic Shock, resolved   -Bcx NGTD 7/10   -s/p iv abx, pressors   -Bedside POCUS notable for biventricular diffuse hypokinesis  -right 2nd toe gangrene, pod f/u, vascular f/u, PONCHO/PVR   -ID f/u   -ELEONORA: no evidence of valvular vegetation is seen.  -med f/u    #Ischemic Cardiomyopathy. Chronic systolic HF  -vol status stable   -Repeat echo noted with EF 16%, mod - sev MR, mod AS, severe global lv sys dysfx, severe diastolic dysfx, mod pulm htn   -eps eval for icd when mental status improves, pt has declined icd in the past  -not current candidate for icd   -no bb, acei/arb given hx of orthostatic bp   -continue midodrine for bp support  -cont vol removal w PD     # CAD, s/p CABG, s/p PCI, s/p redo open heart for stent balloon retrieval   -hst elevated, likely demand ischemia in setting of septic shock, ESRD   -c/w asa, Brilinta, statin     # ESRD  -on PD  -renal f/u    #AMS  -neruro, psych f/u    dvt ppx    35 minutes spent on total encounter; more than 50% of the visit was spent counseling and/or coordinating care by the attending physician.

## 2021-07-17 NOTE — PROGRESS NOTE ADULT - ASSESSMENT
57 yo M DM, , CHF, CAD with ESRD on PD, had stay about 7 months ago s/p cardiac stent, wire broke, had to undergo sternotomy, 2 stays soon after that for sepsis unclear cause, now returns a few days ago with fever, hypotension, weak, report of cough. He required pressors in micu now off, cultures of blood and pd fluid are no growth, exam not revealing.   Source of presumed sepsis is not readily apparent.   CXR shows no infiltrate   Has dry gangrene of right 3rd toe but that is not a source of sepsis.  He was found to have subtle infiltrates on ct chest, treated with Cefepime, received abx 7/6-7/11, now dcd due to concern of contributing to encephalopathy.   MRI spine not consistent with infection  KLAUDIA: Overall thickened valves seen however, no evidence of valvular vegetation is seen.  MRI Brain: old lacunar basal ganglia infarcts      Recommendations:    Monitor for any fever relapse or signs of infection  No indications for abx at present  psych and neuro eval in progress

## 2021-07-17 NOTE — CHART NOTE - NSCHARTNOTEFT_GEN_A_CORE
Pt c/o R knee pain. per wife pt has had steroid inj to R knee previously. R knee not swollen, No fluctuance, no increased temp  PLAN  > Xray R Knee  > Uric acid level in AM  > Lidocaine patch  > Tylenol PRN for pain  > Discussed with attending

## 2021-07-17 NOTE — PROVIDER CONTACT NOTE (OTHER) - ACTION/TREATMENT ORDERED:
will order depakote in IV and retry administering medications once pt stops c/o of nausea and vomiting.

## 2021-07-17 NOTE — PROGRESS NOTE ADULT - SUBJECTIVE AND OBJECTIVE BOX
CARDIOLOGY FOLLOW UP NOTE - DR. SMITH    Patient Name: BELLA MARTINS  Date of Service: 21 @ 10:49    Patient seen and examined    Subjective:    cv: denies chest pain, dyspnea, palpitations, dizziness  pulmonary: denies cough  GI: denies abdominal pain, nausea, vomiting  vascular/legs: no edema   skin: no rash  ROS: otherwise negative   overnight events:      PHYSICAL EXAM:  T(C): 36.9 (21 @ 05:00), Max: 36.9 (21 @ 05:00)  HR: 88 (21 @ 08:00) (88 - 97)  BP: 98/67 (21 @ 08:00) (98/67 - 110/75)  RR: 18 (21 @ 05:00) (18 - 18)  SpO2: 97% (21 @ 05:00) (95% - 99%)  Wt(kg): --  I&O's Summary    2021 07:  -  2021 07:00  --------------------------------------------------------  IN: 6240 mL / OUT: 6800 mL / NET: -560 mL    2021 07:01  -  2021 10:49  --------------------------------------------------------  IN: 2000 mL / OUT: 2300 mL / NET: -300 mL      Daily     Daily Weight in k.3 (2021 08:00)    Appearance: Normal	  Cardiovascular: Normal S1 S2,RRR, No JVD, No murmurs  Respiratory: Lungs clear to auscultation	  Gastrointestinal:  Soft, Non-tender, + BS	  Extremities: Normal range of motion, No clubbing, cyanosis or edema      Home Medications:  aspirin 81 mg oral delayed release tablet: 1 tab(s) orally once a day (29 Dec 2020 18:37)  atorvastatin 80 mg oral tablet: 1 tab(s) orally once a day (at bedtime) (29 Dec 2020 18:37)  epoetin martine: injectable once a month (29 Dec 2020 18:37)  ferrous sulfate 325 mg (65 mg elemental iron) oral tablet: 1 tab(s) orally 3 times a day (29 Dec 2020 18:37)  gabapentin 100 mg oral capsule: 1 cap(s) orally once a day (29 Dec 2020 18:37)  nortriptyline 25 mg oral capsule: 1 cap(s) orally once a day (at bedtime) (29 Dec 2020 18:37)  pantoprazole 40 mg oral delayed release tablet: 1 tab(s) orally once a day (before a meal) (29 Dec 2020 18:37)  Toujeo SoloStar 300 units/mL subcutaneous solution: 60 unit(s) subcutaneous once a day (at bedtime) (2021 12:41)      MEDICATIONS  (STANDING):  aspirin enteric coated 81 milliGRAM(s) Oral daily  atorvastatin 80 milliGRAM(s) Oral at bedtime  cadexomer iodine 0.9% Gel 1 Application(s) Topical daily  chlorhexidine 0.12% Liquid 15 milliLiter(s) Oral Mucosa two times a day  chlorhexidine 2% Cloths 1 Application(s) Topical <User Schedule>  dextrose 40% Gel 15 Gram(s) Oral once  dextrose 5%. 1000 milliLiter(s) (50 mL/Hr) IV Continuous <Continuous>  dextrose 5%. 1000 milliLiter(s) (100 mL/Hr) IV Continuous <Continuous>  dextrose 50% Injectable 25 Gram(s) IV Push once  dextrose 50% Injectable 12.5 Gram(s) IV Push once  dextrose 50% Injectable 25 Gram(s) IV Push once  diVALproex  milliGRAM(s) Oral two times a day  dronabinol 2.5 milliGRAM(s) Oral two times a day  ferrous    sulfate 325 milliGRAM(s) Oral three times a day  folic acid 1 milliGRAM(s) Oral daily  glucagon  Injectable 1 milliGRAM(s) IntraMuscular once  heparin   Injectable 5000 Unit(s) SubCutaneous every 12 hours  insulin glargine Injectable (LANTUS) 5 Unit(s) SubCutaneous at bedtime  insulin lispro (ADMELOG) corrective regimen sliding scale   SubCutaneous three times a day before meals  insulin lispro (ADMELOG) corrective regimen sliding scale   SubCutaneous at bedtime  lactated ringers. 1000 milliLiter(s) (50 mL/Hr) IV Continuous <Continuous>  midodrine 30 milliGRAM(s) Oral every 8 hours  Nephro-kristi 1 Tablet(s) Oral daily  pantoprazole    Tablet 40 milliGRAM(s) Oral before breakfast  sevelamer carbonate 1600 milliGRAM(s) Oral three times a day  thiamine IVPB 500 milliGRAM(s) IV Intermittent every 8 hours  ticagrelor 60 milliGRAM(s) Oral every 12 hours      TELEMETRY: 	    ECG:  	  RADIOLOGY:   DIAGNOSTIC TESTING:  [ ] Echocardiogram:  [ ] Catheterization:  [ ] Stress Test:    OTHER: 	    LABS:	 	    CARDIAC MARKERS:                                      12.2   9.54  )-----------( 245      ( 2021 07:27 )             41.2         130<L>  |  89<L>  |  42<H>  ----------------------------<  167<H>  3.6   |  22  |  9.28<H>    Ca    8.3<L>      2021 06:32  Phos  5.8       Mg     2.1         TPro  6.2  /  Alb  2.2<L>  /  TBili  0.7  /  DBili  x   /  AST  25  /  ALT  15  /  AlkPhos  111      proBNP:     Lipid Profile:   HgA1c:     Creatinine, Serum: 9.28 mg/dL (21 @ 06:32)  Creatinine, Serum: 9.38 mg/dL (21 @ 19:34)  Creatinine, Serum: 9.67 mg/dL (21 @ 07:25)  Creatinine, Serum: 9.54 mg/dL (07-15-21 @ 06:43)

## 2021-07-17 NOTE — PROGRESS NOTE ADULT - SUBJECTIVE AND OBJECTIVE BOX
CC: f/u for hypotension    Patient is poorly interactive, answers simple questions, but poorly follows commands  no fevers    REVIEW OF SYSTEMS: limited  All other review of systems negative (Comprehensive ROS)    Antimicrobials Day #        Other Medications Reviewed    Vital Signs Last 24 Hrs  T(C): 36.9 (17 Jul 2021 05:00), Max: 36.9 (17 Jul 2021 05:00)  T(F): 98.4 (17 Jul 2021 05:00), Max: 98.4 (17 Jul 2021 05:00)  HR: 90 (17 Jul 2021 05:00) (90 - 97)  BP: 103/71 (17 Jul 2021 05:00) (103/69 - 110/77)  BP(mean): --  RR: 18 (17 Jul 2021 05:00) (18 - 18)  SpO2: 97% (17 Jul 2021 05:00) (95% - 100%)    PHYSICAL EXAM:  General: alert, no acute distress, chronically ill appearing  Eyes:  anicteric, no conjunctival injection, no discharge  Oropharynx: no lesions or injection 	  Neck: supple, without adenopathy  Lungs: clear to auscultation  Heart: regular rate and rhythm; no murmur, rubs or gallops  Abdomen: soft, nondistended, nontender, without mass or organomegaly  Skin: no lesions  Extremities: no clubbing, cyanosis, or edema  Neurologic: alert, poorly interactive    LAB RESULTS:                                   12.2   9.54  )-----------( 245      ( 16 Jul 2021 07:27 )             41.2   07-17    130<L>  |  89<L>  |  42<H>  ----------------------------<  167<H>  3.6   |  22  |  9.28<H>    Ca    8.3<L>      17 Jul 2021 06:32  Phos  5.8     07-17  Mg     2.1     07-17    TPro  6.2  /  Alb  2.2<L>  /  TBili  0.7  /  DBili  x   /  AST  25  /  ALT  15  /  AlkPhos  111  07-16                  MICROBIOLOGY REVIEWED:    Culture - Blood (07.14.21 @ 18:34)   Specimen Source: .Blood Blood-Peripheral   Culture Results:   No growth to date.   Culture - Dialysis Fluid (07.10.21 @ 19:16)   Specimen Source: .Peritoneal Dialysis Fluid Dialysis (P.D.) Fluid   Culture Results:   No growth Culture - Blood (07.10.21 @ 18:54)   Specimen Source: .Blood Blood-Peripheral   Culture Results:   No growth to date.   RADIOLOGY REVIEWED:    < from: MR Head w/wo IV Cont (07.16.21 @ 17:44) >    IMPRESSION:    MRI BRAIN: minimal periventricular white matter ischemia. Old lacunar infarctions are seen within the BILATERAL basal ganglia.    MRA brain:   Unremarkable MR angiography of the intracranial circulation.    --- End of Report ---    < end of copied text >    < from: MR Thoracic Spine No Cont (07.11.21 @ 23:55) >  IMPRESSION:    -Acute to subacute moderate compression fracture of T12 with slight bony retropulsion but no associated spinal canal stenosis.    -L4-L5 disc bulge with potential impingement of the descending right L5 nerve root.    --- End of Report ---    < end of copied text >

## 2021-07-17 NOTE — PROGRESS NOTE ADULT - SUBJECTIVE AND OBJECTIVE BOX
Patient seen and examined in bed.  No new events overnight.    REVIEW OF SYSTEMS:  As per HPI, otherwise 8 full 10 ROS were unremarkable    MEDICATIONS  (STANDING):  aspirin enteric coated 81 milliGRAM(s) Oral daily  atorvastatin 80 milliGRAM(s) Oral at bedtime  cadexomer iodine 0.9% Gel 1 Application(s) Topical daily  chlorhexidine 0.12% Liquid 15 milliLiter(s) Oral Mucosa two times a day  chlorhexidine 2% Cloths 1 Application(s) Topical <User Schedule>  dextrose 40% Gel 15 Gram(s) Oral once  dextrose 5%. 1000 milliLiter(s) (50 mL/Hr) IV Continuous <Continuous>  dextrose 5%. 1000 milliLiter(s) (100 mL/Hr) IV Continuous <Continuous>  dextrose 50% Injectable 25 Gram(s) IV Push once  dextrose 50% Injectable 12.5 Gram(s) IV Push once  dextrose 50% Injectable 25 Gram(s) IV Push once  diVALproex  milliGRAM(s) Oral two times a day  dronabinol 2.5 milliGRAM(s) Oral two times a day  ferrous    sulfate 325 milliGRAM(s) Oral three times a day  folic acid 1 milliGRAM(s) Oral daily  glucagon  Injectable 1 milliGRAM(s) IntraMuscular once  heparin   Injectable 5000 Unit(s) SubCutaneous every 12 hours  insulin glargine Injectable (LANTUS) 5 Unit(s) SubCutaneous at bedtime  insulin lispro (ADMELOG) corrective regimen sliding scale   SubCutaneous three times a day before meals  insulin lispro (ADMELOG) corrective regimen sliding scale   SubCutaneous at bedtime  lactated ringers. 1000 milliLiter(s) (50 mL/Hr) IV Continuous <Continuous>  midodrine 30 milliGRAM(s) Oral every 8 hours  Nephro-kristi 1 Tablet(s) Oral daily  pantoprazole    Tablet 40 milliGRAM(s) Oral before breakfast  sevelamer carbonate 800 milliGRAM(s) Oral three times a day  thiamine IVPB 500 milliGRAM(s) IV Intermittent every 8 hours  ticagrelor 60 milliGRAM(s) Oral every 12 hours      VITAL:  T(C): , Max: 36.9 (07-17-21 @ 05:00)  T(F): , Max: 98.4 (07-17-21 @ 05:00)  HR: 88 (07-17-21 @ 08:00)  BP: 98/67 (07-17-21 @ 08:00)  BP(mean): --  RR: 18 (07-17-21 @ 05:00)  SpO2: 97% (07-17-21 @ 05:00)  Wt(kg): --    I and O's:    07-16 @ 07:01  -  07-17 @ 07:00  --------------------------------------------------------  IN: 6240 mL / OUT: 6800 mL / NET: -560 mL    07-17 @ 07:01  -  07-17 @ 10:31  --------------------------------------------------------  IN: 2000 mL / OUT: 2300 mL / NET: -300 mL          PHYSICAL EXAM:    Constitutional: NAD  HEENT: PERRLA, EOMI,  MMM  Neck: No LAD, No JVD  Respiratory: CTAB  Cardiovascular: S1 and S2  Gastrointestinal: BS+, soft, NT/ND; +PD catheter   Extremities: No peripheral edema  Neurological: Communicative; more alert  : No Johnson  Skin: No rashes  Access: Not applicable    LABS:                        12.2   9.54  )-----------( 245      ( 16 Jul 2021 07:27 )             41.2     07-17    130<L>  |  89<L>  |  42<H>  ----------------------------<  167<H>  3.6   |  22  |  9.28<H>    Ca    8.3<L>      17 Jul 2021 06:32  Phos  5.8     07-17  Mg     2.1     07-17    TPro  6.2  /  Alb  2.2<L>  /  TBili  0.7  /  DBili  x   /  AST  25  /  ALT  15  /  AlkPhos  111  07-16        56 M PMH ESRD on PD, DM on insulin, CHF EF 25-30% CAD s/p CABG x4 pw with hypotension with fever. Encephalopathy likely related to cefepime ? no obvious source of sepsis apparent as of now, KLAUDIA: Overall thickened valves seen however, no evidence of valvular vegetation is seen.     Hypotension:  on Midodrine for hemodynamic support:  BPs soft at times; acceptable for now   ESRD on PD   Encephalopathy likely related to cefepime ? no obvious source of sepsis apparent as of now, KLAUDIA: Overall thickened valves seen however, no evidence of valvular vegetation is seen. unlikely infective endocarditis?   Hyponatremia- stable  ID-Blood cx and PD fluid cx -   off IV antibiotics   Hyperphosphatemia:  remains >goal     RECOMMENDATIONS   - c/w CCPD orders with dianeal 1.5 to limit UF and ultimately hypotension .   - increase Renvela to 1,600 mg PO TID  - continue Midodrine  30mg po q 8 hours   - Ongoing neuro/psych eval   - strict I & Os  - Any role of spinal tap here?

## 2021-07-17 NOTE — PROGRESS NOTE ADULT - SUBJECTIVE AND OBJECTIVE BOX
Patient is a 56y old  Male who presents with a chief complaint of Hypotension (17 Jul 2021 10:48)      SUBJECTIVE / OVERNIGHT EVENTS: Comfortable without new complaints.   Review of Systems  chest pain no  palpitations no  sob no  nausea no  headache no    MEDICATIONS  (STANDING):  aspirin enteric coated 81 milliGRAM(s) Oral daily  atorvastatin 80 milliGRAM(s) Oral at bedtime  cadexomer iodine 0.9% Gel 1 Application(s) Topical daily  chlorhexidine 0.12% Liquid 15 milliLiter(s) Oral Mucosa two times a day  chlorhexidine 2% Cloths 1 Application(s) Topical <User Schedule>  dextrose 40% Gel 15 Gram(s) Oral once  dextrose 5%. 1000 milliLiter(s) (50 mL/Hr) IV Continuous <Continuous>  dextrose 5%. 1000 milliLiter(s) (100 mL/Hr) IV Continuous <Continuous>  dextrose 50% Injectable 25 Gram(s) IV Push once  dextrose 50% Injectable 12.5 Gram(s) IV Push once  dextrose 50% Injectable 25 Gram(s) IV Push once  diVALproex  milliGRAM(s) Oral two times a day  dronabinol 2.5 milliGRAM(s) Oral two times a day  ferrous    sulfate 325 milliGRAM(s) Oral three times a day  folic acid 1 milliGRAM(s) Oral daily  glucagon  Injectable 1 milliGRAM(s) IntraMuscular once  heparin   Injectable 5000 Unit(s) SubCutaneous every 12 hours  insulin glargine Injectable (LANTUS) 5 Unit(s) SubCutaneous at bedtime  insulin lispro (ADMELOG) corrective regimen sliding scale   SubCutaneous three times a day before meals  insulin lispro (ADMELOG) corrective regimen sliding scale   SubCutaneous at bedtime  lactated ringers. 1000 milliLiter(s) (50 mL/Hr) IV Continuous <Continuous>  midodrine 30 milliGRAM(s) Oral every 8 hours  Nephro-kristi 1 Tablet(s) Oral daily  pantoprazole    Tablet 40 milliGRAM(s) Oral before breakfast  sevelamer carbonate 1600 milliGRAM(s) Oral three times a day  ticagrelor 60 milliGRAM(s) Oral every 12 hours    MEDICATIONS  (PRN):  valproate sodium IVPB 125 milliGRAM(s) IV Intermittent every 8 hours PRN agitation      Vital Signs Last 24 Hrs  T(C): 36.9 (17 Jul 2021 14:00), Max: 36.9 (17 Jul 2021 05:00)  T(F): 98.4 (17 Jul 2021 14:00), Max: 98.4 (17 Jul 2021 05:00)  HR: 91 (17 Jul 2021 14:00) (68 - 98)  BP: 102/69 (17 Jul 2021 14:00) (97/67 - 108/75)  BP(mean): --  RR: 18 (17 Jul 2021 13:06) (18 - 18)  SpO2: 95% (17 Jul 2021 13:06) (95% - 99%)    PHYSICAL EXAM:  GENERAL: NAD, well-developed  HEAD:  Atraumatic, Normocephalic  EYES: EOMI, PERRLA, conjunctiva and sclera clear  NECK: Supple, No JVD  CHEST/LUNG: Clear to auscultation bilaterally; No wheeze  HEART: Regular rate and rhythm; No murmurs, rubs, or gallops  ABDOMEN: Soft, Nontender, Nondistended; Bowel sounds present PD cath  EXTREMITIES:  2+ Peripheral Pulses, No clubbing, cyanosis, or edema Toe with gangrenous change. R thumb wit gangrenous change.  PSYCH: confused  NEUROLOGY: non-focal  SKIN: No rashes or lesions    LABS:                        12.2   9.54  )-----------( 245      ( 16 Jul 2021 07:27 )             41.2     07-17    130<L>  |  89<L>  |  42<H>  ----------------------------<  167<H>  3.6   |  22  |  9.28<H>    Ca    8.3<L>      17 Jul 2021 06:32  Phos  5.8     07-17  Mg     2.1     07-17    TPro  6.2  /  Alb  2.2<L>  /  TBili  0.7  /  DBili  x   /  AST  25  /  ALT  15  /  AlkPhos  111  07-16              Culture - Blood (collected 14 Jul 2021 18:34)  Source: .Blood Blood-Peripheral  Preliminary Report (15 Jul 2021 19:02):    No growth to date.    Culture - Blood (collected 14 Jul 2021 18:34)  Source: .Blood Blood-Peripheral  Preliminary Report (15 Jul 2021 19:02):    No growth to date.        RADIOLOGY & ADDITIONAL TESTS:    Imaging Personally Reviewed:    Consultant(s) Notes Reviewed:      Care Discussed with Consultants/Other Providers:

## 2021-07-17 NOTE — PROVIDER CONTACT NOTE (OTHER) - BACKGROUND
admitted for sepsis PMH: ESRD on PD

## 2021-07-18 NOTE — PROGRESS NOTE ADULT - SUBJECTIVE AND OBJECTIVE BOX
Patient is a 56y old  Male who presents with a chief complaint of Hypotension (18 Jul 2021 11:30)      SUBJECTIVE / OVERNIGHT EVENTS: Comfortable without new complaints.   Review of Systems  chest pain no  palpitations no  sob no  nausea yes  headache no    MEDICATIONS  (STANDING):  aspirin enteric coated 81 milliGRAM(s) Oral daily  atorvastatin 80 milliGRAM(s) Oral at bedtime  cadexomer iodine 0.9% Gel 1 Application(s) Topical daily  chlorhexidine 0.12% Liquid 15 milliLiter(s) Oral Mucosa two times a day  chlorhexidine 2% Cloths 1 Application(s) Topical <User Schedule>  dextrose 40% Gel 15 Gram(s) Oral once  dextrose 5%. 1000 milliLiter(s) (100 mL/Hr) IV Continuous <Continuous>  dextrose 5%. 1000 milliLiter(s) (50 mL/Hr) IV Continuous <Continuous>  dextrose 50% Injectable 25 Gram(s) IV Push once  dextrose 50% Injectable 12.5 Gram(s) IV Push once  dextrose 50% Injectable 25 Gram(s) IV Push once  dronabinol 2.5 milliGRAM(s) Oral two times a day  ferrous    sulfate 325 milliGRAM(s) Oral three times a day  folic acid 1 milliGRAM(s) Oral daily  glucagon  Injectable 1 milliGRAM(s) IntraMuscular once  heparin   Injectable 5000 Unit(s) SubCutaneous every 12 hours  insulin glargine Injectable (LANTUS) 5 Unit(s) SubCutaneous at bedtime  insulin lispro (ADMELOG) corrective regimen sliding scale   SubCutaneous at bedtime  insulin lispro (ADMELOG) corrective regimen sliding scale   SubCutaneous three times a day before meals  lactated ringers. 1000 milliLiter(s) (50 mL/Hr) IV Continuous <Continuous>  lidocaine   Patch 1 Patch Transdermal daily  midodrine 30 milliGRAM(s) Oral every 8 hours  Nephro-kristi 1 Tablet(s) Oral daily  pantoprazole  Injectable 40 milliGRAM(s) IV Push daily  sevelamer carbonate 1600 milliGRAM(s) Oral three times a day  ticagrelor 60 milliGRAM(s) Oral every 12 hours  valproate sodium IVPB 250 milliGRAM(s) IV Intermittent every 12 hours    MEDICATIONS  (PRN):  acetaminophen   Tablet .. 650 milliGRAM(s) Oral every 6 hours PRN Moderate Pain (4 - 6)  valproate sodium IVPB 125 milliGRAM(s) IV Intermittent every 8 hours PRN agitation      Vital Signs Last 24 Hrs  T(C): 37.1 (18 Jul 2021 14:00), Max: 37.1 (18 Jul 2021 14:00)  T(F): 98.7 (18 Jul 2021 14:00), Max: 98.7 (18 Jul 2021 14:00)  HR: 96 (18 Jul 2021 14:00) (77 - 96)  BP: 97/65 (18 Jul 2021 14:00) (90/58 - 111/76)  BP(mean): 88 (17 Jul 2021 21:00) (88 - 88)  RR: 18 (18 Jul 2021 14:00) (18 - 18)  SpO2: 100% (18 Jul 2021 14:00) (97% - 100%)    PHYSICAL EXAM:  GENERAL: NAD  HEAD:  Atraumatic, Normocephalic  EYES: EOMI, PERRLA, conjunctiva and sclera clear  NECK: Supple, No JVD  CHEST/LUNG: Clear to auscultation bilaterally; No wheeze  HEART: Regular rate and rhythm; No murmurs, rubs, or gallops  ABDOMEN: Soft, Nontender, Nondistended; Bowel sounds present PD catheter  EXTREMITIES:  2+ Peripheral Pulses, No clubbing, cyanosis, or edema Toes and finger with areas of gangrenous change.  NEUROLOGY: non-focal  SKIN: No rashes or lesions    LABS:    07-18    131<L>  |  91<L>  |  41<H>  ----------------------------<  129<H>  3.9   |  22  |  9.37<H>    Ca    8.4      18 Jul 2021 06:46  Phos  5.8     07-17  Mg     2.1     07-17                  RADIOLOGY & ADDITIONAL TESTS:    Imaging Personally Reviewed:    Consultant(s) Notes Reviewed:      Care Discussed with Consultants/Other Providers:

## 2021-07-18 NOTE — CHART NOTE - NSCHARTNOTEFT_GEN_A_CORE
Nutrition Follow Up Note  Patient seen for: nutrition consult for poor PO intake     Chart reviewed, events noted. This is a 56 M well known to practice with PMH ESRD on PD, DM on insulin, CHF EF 25-30% CAD s/p CABG x4, underwent cardiac cath and stent and wire broke in heart s/p sternotomy p/w septic shock (resolved). Psych and neurourology following.      Source: [x] Patient       [x] EMR        [x] RN        [x] Family at bedside--wife at bedside        [] Other:    Diet Order:   Diet, Renal Restrictions:   For patients receiving Renal Replacement - No Protein Restr, No Conc K, No Conc Phos, Low Sodium (21)    - Is current order appropriate/adequate? [x] Yes  []  No:     - PO intake :   [] >75%  Adequate    [] 50-75%  Fair       [x] <50%  Poor    - Nutrition-related concerns:  -pt noted with poor PO intake and appetite during admission   -Per RN pt only had sip of water today, noted with emesis of phlegm like substance not food. Untouched lunch tray noted at bedside. Wife reports pt ate a little more yesterday.  -Pt previously seen by SLP on  "Swallow evaluation limited, as pt only accepting trials of thin liquids and x1 tsp of thin puree. Pt declines further trials. With accepted textures, no overt signs of laryngeal penetration/aspiration observed; however, unable to make diet recommendation based on this evaluation. Suspect poor PO intake is related to psych/behavioral component vs true oropharyngeal dysphagia."  -PO medications switched to IV due nausea and emesis.   -Encourage intake of protein-rich foods as able, will recommend addition of nutrition supplements to help encourage intake (pt previously declining)      GI:  Last BM __.   Bowel Regimen? [] Yes   [x] No      Weights:   Daily Weight in k.7 (-), Weight in k.8 (-), Weight in k.3 (-), Weight in k.8 (-), Weight in k.1 (-15), Weight in k.5 (-15), Weight in k (-)  Dosing weight 63.5Kg (7/15)  Wt fluctuations noted throughout admission likely due to fluids shifts. Suspect dry weight loss in setting of poor intake     Nutritionally Pertinent MEDICATIONS  (STANDING):  atorvastatin  dextrose 40% Gel  dextrose 5%.  dextrose 5%.  dextrose 50% Injectable  dextrose 50% Injectable  dextrose 50% Injectable  ferrous    sulfate  folic acid  glucagon  Injectable  insulin glargine Injectable (LANTUS)  insulin lispro (ADMELOG) corrective regimen sliding scale  insulin lispro (ADMELOG) corrective regimen sliding scale  lactated ringers.  midodrine  Nephro-kristi  pantoprazole  Injectable    Pertinent Labs:  @ 06:46: Na 131<L>, BUN 41<H>, Cr 9.37<H>, <H>, K+ 3.9, Phos --, Mg --, Alk Phos --, ALT/SGPT --, AST/SGOT --, HbA1c --    A1C with Estimated Average Glucose Result: 6.1 % (21 @ 03:53)    Finger Sticks:  POCT Blood Glucose.: 131 mg/dL ( @ 13:15)  POCT Blood Glucose.: 115 mg/dL ( @ 08:44)  POCT Blood Glucose.: 163 mg/dL ( @ 21:04)  POCT Blood Glucose.: 158 mg/dL ( @ 17:46)  POCT Blood Glucose.: 164 mg/dL ( @ 14:18)      Skin per nursing documentation: suspected DTI to right buttock, suspected DTI to left buttock, right medial foot, stage 2 pressure injury right knee, stage 2 left knee  Edema: none     Estimated Needs:   [x] no change since previous assessment  [] recalculated:     Previous Nutrition Diagnosis: moderate malnutrition  Nutrition Diagnosis is: [X] ongoing       New Nutrition Diagnosis: [x] Not applicable    Nutrition Care Plan:  [x] In Progress  [] Achieved  [] Not applicable    Nutrition Interventions:     Education Provided:       [x] Yes:  [] No: encourage PO intake of protein-rich foods as able        Recommendations:         [x] Continue current diet order. If concern for dysphagia consider SLP re-evaluation.      [x] Add oral nutrition supplement: Nepro BID to supplement PO intake as able      [x] Continue current micronutrient supplementation: Nephro-Kristi      [x] Continue appetite stimulant if no contraindications          Monitoring and Evaluation:   Continue to monitor nutritional intake, tolerance to diet prescription, weights, labs, skin integrity      RD remains available upon request and will follow up per protocol  Margaret Sprague RD, CDN, Pager # [phone] Nutrition Follow Up Note  Patient seen for: nutrition consult for poor PO intake     Chart reviewed, events noted. This is a 56 M well known to practice with PMH ESRD on PD, DM on insulin, CHF EF 25-30% CAD s/p CABG x4, underwent cardiac cath and stent and wire broke in heart s/p sternotomy p/w septic shock (resolved). Psych and neurourology following.      Source: [x] Patient       [x] EMR        [x] RN        [x] Family at bedside--wife at bedside        [] Other:    Diet Order:   Diet, Renal Restrictions:   For patients receiving Renal Replacement - No Protein Restr, No Conc K, No Conc Phos, Low Sodium (21)    - Is current order appropriate/adequate? [x] Yes  []  No:     - PO intake :   [] >75%  Adequate    [] 50-75%  Fair       [x] <50%  Poor    - Nutrition-related concerns:  -pt noted with poor PO intake and appetite during admission   -Per RN pt only had sip of water today, noted with emesis of phlegm like substance not food. Untouched lunch tray noted at bedside. Wife reports pt ate a little more yesterday.  -Pt previously seen by SLP on  "Swallow evaluation limited, as pt only accepting trials of thin liquids and x1 tsp of thin puree. Pt declines further trials. With accepted textures, no overt signs of laryngeal penetration/aspiration observed; however, unable to make diet recommendation based on this evaluation. Suspect poor PO intake is related to psych/behavioral component vs true oropharyngeal dysphagia."  -PO medications switched to IV due nausea and emesis.   -Encourage intake of protein-rich foods as able, will recommend addition of nutrition supplements to help encourage intake (pt previously declining)      GI:  Last BM __.   Bowel Regimen? [] Yes   [x] No      Weights:   Daily Weight in k.7 (-), Weight in k.8 (-), Weight in k.3 (-), Weight in k.8 (-), Weight in k.1 (-15), Weight in k.5 (-15), Weight in k (-)  Dosing weight 63.5Kg (7/15)  Wt fluctuations noted throughout admission likely due to fluids shifts. Suspect dry weight loss in setting of poor intake     Nutritionally Pertinent MEDICATIONS  (STANDING):  atorvastatin  dextrose 40% Gel  dextrose 5%.  dextrose 5%.  dextrose 50% Injectable  dextrose 50% Injectable  dextrose 50% Injectable  ferrous    sulfate  folic acid  glucagon  Injectable  insulin glargine Injectable (LANTUS)  insulin lispro (ADMELOG) corrective regimen sliding scale  insulin lispro (ADMELOG) corrective regimen sliding scale  lactated ringers.  midodrine  Nephro-kristi  pantoprazole  Injectable    Pertinent Labs:  @ 06:46: Na 131<L>, BUN 41<H>, Cr 9.37<H>, <H>, K+ 3.9, Phos --, Mg --, Alk Phos --, ALT/SGPT --, AST/SGOT --, HbA1c --    A1C with Estimated Average Glucose Result: 6.1 % (21 @ 03:53)    Finger Sticks:  POCT Blood Glucose.: 131 mg/dL ( @ 13:15)  POCT Blood Glucose.: 115 mg/dL ( @ 08:44)  POCT Blood Glucose.: 163 mg/dL ( @ 21:04)  POCT Blood Glucose.: 158 mg/dL ( @ 17:46)  POCT Blood Glucose.: 164 mg/dL ( @ 14:18)      Skin per nursing documentation: suspected DTI to right buttock, suspected DTI to left buttock, right medial foot, stage 2 pressure injury right knee, stage 2 left knee  Edema: none     Estimated Needs:   [x] no change since previous assessment  [] recalculated:     Previous Nutrition Diagnosis: moderate malnutrition  Nutrition Diagnosis is: [X] ongoing       New Nutrition Diagnosis: [x] Not applicable    Nutrition Care Plan:  [x] In Progress  [] Achieved  [] Not applicable    Nutrition Interventions:     Education Provided:       [x] Yes:  [] No: encourage PO intake of protein-rich foods as able        Recommendations:         [x] Continue current diet order. If concern for dysphagia would consider SLP re-evaluation.      [x] Add oral nutrition supplement: Nepro BID to supplement PO intake as able      [x] Continue current micronutrient supplementation: Nephro-Kristi      [x] Continue appetite stimulant if no contraindications   * Discussed with CARLYLE Luna, pending order placed.         Monitoring and Evaluation:   Continue to monitor nutritional intake, tolerance to diet prescription, weights, labs, skin integrity      RD remains available upon request and will follow up per protocol  Margaret Sprague RD, CDN, Pager # [phone]

## 2021-07-18 NOTE — PROVIDER CONTACT NOTE (OTHER) - ASSESSMENT
Pt A&Ox2/3. Pt refusing PO meds. Pt agreed to take only midodrine only, after being explained his BP would continue to drop without it. Afterwards pt felt nausea, PA Maira aware.

## 2021-07-18 NOTE — PROGRESS NOTE ADULT - SUBJECTIVE AND OBJECTIVE BOX
CARDIOLOGY FOLLOW UP NOTE - DR. SMITH    Patient Name: BELLA MARTINS  Date of Service: 21 @ 09:39    Patient seen and examined  no changes    Subjective:    cv: denies chest pain, dyspnea, palpitations, dizziness  pulmonary: denies cough  GI: denies abdominal pain, nausea, vomiting  vascular/legs: no edema   skin: no rash  ROS: otherwise negative   overnight events:      PHYSICAL EXAM:  T(C): 36.8 (21 @ 08:00), Max: 37 (21 @ 20:00)  HR: 90 (21 @ 08:00) (68 - 95)  BP: 108/72 (21 @ 08:00) (90/58 - 111/76)  RR: 18 (21 @ 06:50) (18 - 18)  SpO2: 99% (21 @ 06:50) (95% - 99%)  Wt(kg): --  I&O's Summary    2021 07:01  -  2021 07:00  --------------------------------------------------------  IN: 4220 mL / OUT: 4500 mL / NET: -280 mL    2021 07:01  -  2021 09:39  --------------------------------------------------------  IN: 2000 mL / OUT: 2400 mL / NET: -400 mL      Daily     Daily Weight in k.7 (2021 08:00)    Appearance: Normal confused	  Cardiovascular: Normal S1 S2,RRR, No JVD, No murmurs  Respiratory: Lungs clear to auscultation	  Gastrointestinal:  Soft, Non-tender, + BS	  Extremities: Normal range of motion, No clubbing, cyanosis or edema      Home Medications:  aspirin 81 mg oral delayed release tablet: 1 tab(s) orally once a day (29 Dec 2020 18:37)  atorvastatin 80 mg oral tablet: 1 tab(s) orally once a day (at bedtime) (29 Dec 2020 18:37)  epoetin martine: injectable once a month (29 Dec 2020 18:37)  ferrous sulfate 325 mg (65 mg elemental iron) oral tablet: 1 tab(s) orally 3 times a day (29 Dec 2020 18:37)  gabapentin 100 mg oral capsule: 1 cap(s) orally once a day (29 Dec 2020 18:37)  nortriptyline 25 mg oral capsule: 1 cap(s) orally once a day (at bedtime) (29 Dec 2020 18:37)  pantoprazole 40 mg oral delayed release tablet: 1 tab(s) orally once a day (before a meal) (29 Dec 2020 18:37)  Toujeo SoloStar 300 units/mL subcutaneous solution: 60 unit(s) subcutaneous once a day (at bedtime) (2021 12:41)      MEDICATIONS  (STANDING):  aspirin enteric coated 81 milliGRAM(s) Oral daily  atorvastatin 80 milliGRAM(s) Oral at bedtime  cadexomer iodine 0.9% Gel 1 Application(s) Topical daily  chlorhexidine 0.12% Liquid 15 milliLiter(s) Oral Mucosa two times a day  chlorhexidine 2% Cloths 1 Application(s) Topical <User Schedule>  dextrose 40% Gel 15 Gram(s) Oral once  dextrose 5%. 1000 milliLiter(s) (100 mL/Hr) IV Continuous <Continuous>  dextrose 5%. 1000 milliLiter(s) (50 mL/Hr) IV Continuous <Continuous>  dextrose 50% Injectable 25 Gram(s) IV Push once  dextrose 50% Injectable 12.5 Gram(s) IV Push once  dextrose 50% Injectable 25 Gram(s) IV Push once  dronabinol 2.5 milliGRAM(s) Oral two times a day  ferrous    sulfate 325 milliGRAM(s) Oral three times a day  folic acid 1 milliGRAM(s) Oral daily  glucagon  Injectable 1 milliGRAM(s) IntraMuscular once  heparin   Injectable 5000 Unit(s) SubCutaneous every 12 hours  insulin glargine Injectable (LANTUS) 5 Unit(s) SubCutaneous at bedtime  insulin lispro (ADMELOG) corrective regimen sliding scale   SubCutaneous three times a day before meals  insulin lispro (ADMELOG) corrective regimen sliding scale   SubCutaneous at bedtime  lactated ringers. 1000 milliLiter(s) (50 mL/Hr) IV Continuous <Continuous>  lidocaine   Patch 1 Patch Transdermal daily  midodrine 30 milliGRAM(s) Oral every 8 hours  Nephro-kristi 1 Tablet(s) Oral daily  pantoprazole  Injectable 40 milliGRAM(s) IV Push daily  sevelamer carbonate 1600 milliGRAM(s) Oral three times a day  ticagrelor 60 milliGRAM(s) Oral every 12 hours  valproate sodium IVPB 250 milliGRAM(s) IV Intermittent every 12 hours      TELEMETRY: 	    ECG:  	  RADIOLOGY:   DIAGNOSTIC TESTING:  [ ] Echocardiogram:  [ ] Catheterization:  [ ] Stress Test:    OTHER: 	    LABS:	 	    CARDIAC MARKERS:                      131<L>  |  91<L>  |  41<H>  ----------------------------<  129<H>  3.9   |  22  |  9.37<H>    Ca    8.4      2021 06:46  Phos  5.8       Mg     2.1           proBNP:     Lipid Profile:   HgA1c:     Creatinine, Serum: 9.37 mg/dL (21 @ 06:46)  Creatinine, Serum: 9.28 mg/dL (21 @ 06:32)  Creatinine, Serum: 9.38 mg/dL (21 @ 19:34)  Creatinine, Serum: 9.67 mg/dL (21 @ 07:25)

## 2021-07-18 NOTE — PROGRESS NOTE ADULT - ASSESSMENT
CATH 11/30/2020:  COMPLICATIONS: The stent was deployed without difficulty. On removal of the  balloon, the shaft broke and the tip of the balloon remained in the vessel. Several attempts were made to snare the balloon unsuccessfully. Dr. Verdugo was notifed who will take the patient to the operating room.  DIAGNOSTIC RECOMMENDATIONS: The patient should continue with the present medications. The patients vessel was stented without difficulty On removal of the balloon, the shaft broke with the baloon stuck in the left main. All attempts to snare the balloon out failed and the patient was taken to the OR by Dr. Arango to remove the balloon  introp eleonora 11/30/20: mild to mod MR, < from: Intra-Operative Transesophageal Echo (11.30.20 @ 17:02) >  Severe global left ventricular systolic dysfunction. Inferior and inferolateral akinesis.  severe hypokinesis of other segments.  Severe global hypokinesia.  Normal left ventricular internal dimensions and wall thicknesses. Moderate diastolic dysfunction (Stage II).  echo 12/17/20: EF 32%, mod MR, Severe global left ventricular systolic dysfunction, moderate pulmonary pressures  echo 12/20/20: EF (Visual Estimate):  25-30 % mild MR, Akinesis of the inferolateral, anterolateral, apical laterlal, inferior, and inferoseptal walls. Severe left ventricular enlargement. No left ventricular thrombus is seen.  Echo 7/7/21: EF 16%, mod - sev MR, mod AS, severe global lv sys dysfx, severe diastolic dysfx, mod pulm htn       a/p  56 M well known to practice with PMH ESRD on PD, DM on insulin, CHF EF 25-30% CAD s/p CABG x4, underwent cardiac cath and stent and wire broke in heart s/p sternotomy p/w septic shock.    #Septic Shock, resolved   -Bcx NGTD 7/10   -s/p iv abx, pressors   -Bedside POCUS notable for biventricular diffuse hypokinesis  -right 2nd toe gangrene, pod f/u, vascular f/u, PONCHO/PVR   -ID f/u   -ELEONORA: no evidence of valvular vegetation is seen.  -med f/u    #Ischemic Cardiomyopathy. Chronic systolic HF  -vol status stable   -Repeat echo noted with EF 16%, mod - sev MR, mod AS, severe global lv sys dysfx, severe diastolic dysfx, mod pulm htn   -eps eval for icd if mental status improves, pt has declined icd in the past  -not current candidate for icd   -no bb, acei/arb given hx of orthostatic bp   -continue midodrine for bp support  -cont vol removal w PD     # CAD, s/p CABG, s/p PCI, s/p redo open heart for stent balloon retrieval   -hst elevated, likely demand ischemia in setting of septic shock, ESRD   -c/w asa, Brilinta, statin     # ESRD  -on PD  -renal f/u    #AMS  -neruro, psych f/u    dvt ppx    35 minutes spent on total encounter; more than 50% of the visit was spent counseling and/or coordinating care by the attending physician.   - Current at current speed 5400; plan on repeat TTE sometime this week to speed optimize if recurrent PI events  - Remains on heparin gtt ( PTT goal ~50). Plan to start Coumadin once cleared by CT surgeon   - Continue ASA 81 mg PO daily   - Continue to trend LDH levels daily  - CT management by CTS

## 2021-07-18 NOTE — PROGRESS NOTE ADULT - SUBJECTIVE AND OBJECTIVE BOX
CC: f/u for hypotension    Patient is poorly interactive, answers simple questions, but poorly follows commands  no fevers    REVIEW OF SYSTEMS: limited  All other review of systems negative (Comprehensive ROS)    Antimicrobials Day #        Other Medications Reviewed    Vital Signs Last 24 Hrs  T(C): 36.9 (18 Jul 2021 05:17), Max: 37 (17 Jul 2021 20:00)  T(F): 98.4 (18 Jul 2021 05:17), Max: 98.6 (17 Jul 2021 20:00)  HR: 86 (18 Jul 2021 06:50) (68 - 98)  BP: 95/65 (18 Jul 2021 06:50) (90/58 - 111/76)  BP(mean): 88 (17 Jul 2021 21:00) (88 - 88)  RR: 18 (18 Jul 2021 06:50) (18 - 18)  SpO2: 99% (18 Jul 2021 06:50) (95% - 99%)    PHYSICAL EXAM:  General: alert, no acute distress, chronically ill appearing  Eyes:  anicteric, no conjunctival injection, no discharge  Oropharynx: no lesions or injection 	  Neck: supple, without adenopathy  Lungs: clear to auscultation  Heart: regular rate and rhythm; no murmur, rubs or gallops  Abdomen: soft, nondistended, nontender, without mass or organomegaly  Skin: no lesions  Extremities: no clubbing, cyanosis, or edema  Neurologic: alert, poorly interactive    LAB RESULTS:             07-18    131<L>  |  91<L>  |  41<H>  ----------------------------<  129<H>  3.9   |  22  |  9.37<H>    Ca    8.4      18 Jul 2021 06:46  Phos  5.8     07-17  Mg     2.1     07-17                      MICROBIOLOGY REVIEWED:    Culture - Blood (07.14.21 @ 18:34)   Specimen Source: .Blood Blood-Peripheral   Culture Results:   No growth to date.   Culture - Dialysis Fluid (07.10.21 @ 19:16)   Specimen Source: .Peritoneal Dialysis Fluid Dialysis (P.D.) Fluid   Culture Results:   No growth Culture - Blood (07.10.21 @ 18:54)   Specimen Source: .Blood Blood-Peripheral   Culture Results:   No growth to date.   RADIOLOGY REVIEWED:    < from: MR Head w/wo IV Cont (07.16.21 @ 17:44) >    IMPRESSION:    MRI BRAIN: minimal periventricular white matter ischemia. Old lacunar infarctions are seen within the BILATERAL basal ganglia.    MRA brain:   Unremarkable MR angiography of the intracranial circulation.    --- End of Report ---    < end of copied text >    < from: MR Thoracic Spine No Cont (07.11.21 @ 23:55) >  IMPRESSION:    -Acute to subacute moderate compression fracture of T12 with slight bony retropulsion but no associated spinal canal stenosis.    -L4-L5 disc bulge with potential impingement of the descending right L5 nerve root.    --- End of Report ---    < end of copied text >     CC: f/u for hypotension    Patient is poorly interactive, answers simple questions, but poorly follows commands  no fevers    REVIEW OF SYSTEMS: limited  All other review of systems negative (Comprehensive ROS)    Antimicrobials Day #        Other Medications Reviewed    Vital Signs Last 24 Hrs  T(C): 36.9 (18 Jul 2021 05:17), Max: 37 (17 Jul 2021 20:00)  T(F): 98.4 (18 Jul 2021 05:17), Max: 98.6 (17 Jul 2021 20:00)  HR: 86 (18 Jul 2021 06:50) (68 - 98)  BP: 95/65 (18 Jul 2021 06:50) (90/58 - 111/76)  BP(mean): 88 (17 Jul 2021 21:00) (88 - 88)  RR: 18 (18 Jul 2021 06:50) (18 - 18)  SpO2: 99% (18 Jul 2021 06:50) (95% - 99%)    PHYSICAL EXAM:  General: alert, no acute distress, chronically ill appearing  Eyes:  anicteric, no conjunctival injection, no discharge  Oropharynx: no lesions or injection 	  Neck: supple, without adenopathy  Lungs: clear to auscultation  Heart: regular rate and rhythm; no murmur, rubs or gallops  Abdomen: soft, nondistended, nontender, without mass or organomegaly  Skin: no lesions  Extremities: no clubbing, cyanosis, or edema  Neurologic: alert, poorly interactive, follows commands    LAB RESULTS:             07-18    131<L>  |  91<L>  |  41<H>  ----------------------------<  129<H>  3.9   |  22  |  9.37<H>    Ca    8.4      18 Jul 2021 06:46  Phos  5.8     07-17  Mg     2.1     07-17                      MICROBIOLOGY REVIEWED:    Culture - Blood (07.14.21 @ 18:34)   Specimen Source: .Blood Blood-Peripheral   Culture Results:   No growth to date.   Culture - Dialysis Fluid (07.10.21 @ 19:16)   Specimen Source: .Peritoneal Dialysis Fluid Dialysis (P.D.) Fluid   Culture Results:   No growth Culture - Blood (07.10.21 @ 18:54)   Specimen Source: .Blood Blood-Peripheral   Culture Results:   No growth to date.   RADIOLOGY REVIEWED:    < from: MR Head w/wo IV Cont (07.16.21 @ 17:44) >    IMPRESSION:    MRI BRAIN: minimal periventricular white matter ischemia. Old lacunar infarctions are seen within the BILATERAL basal ganglia.    MRA brain:   Unremarkable MR angiography of the intracranial circulation.    --- End of Report ---    < end of copied text >    < from: MR Thoracic Spine No Cont (07.11.21 @ 23:55) >  IMPRESSION:    -Acute to subacute moderate compression fracture of T12 with slight bony retropulsion but no associated spinal canal stenosis.    -L4-L5 disc bulge with potential impingement of the descending right L5 nerve root.    --- End of Report ---    < end of copied text >

## 2021-07-18 NOTE — PROGRESS NOTE ADULT - ASSESSMENT
56 m with    Sepsis  - off antibiotic   - toe gangrene  - Podiatry follow  - ID follow   - KLAUDIA noted    COPD  - 2L NC overnight because he becomes apneic, sats well  - Sats well on RA while awake    CAD s/p CABG   - Hypotension likely in the setting of septic shock   - midodrine dose increased to 30 mg TID to match home dose  - Previous echo with reduced EF  - c/w DAPT  - Cardiology follow    CHF cr systolic  - cardiology follow  - EP eval re AICD    Peritoneal Dialysis   - nephrology follow PD, recommend 4x a day  - patient on midodrine 30 q8h at home, continue here  - Holding home metoprolol 25 q12hr in setting of hypotension    Diabetes   - HA1C 6.1 on 7/6 suggesting prediabetes  - Tujeo 60 units at bedtime and Victoza at home, started on 30 units at bedtime, adjust as needed based on patient PO intake  - added 2 units insulin pre-meal    Hiccups  - GI follow  - hold Ativan 2 to sedation    Incontinence dermatitis  - wound care    Toe gangrene  - Podiatry follow  - Vascular evaluation noted  - PONCHO/PVR noted  - Angiogram if agrees    Finger gangrenous area  - Plastic evaluation    Vertebral fracture T12  - Ortho eval noted   - MRI spine noted  - No intervention    Confusion  - possible delirium  - Psych follow  - Neurology follow  - EEG noted    Pipe Beck MD pager [phone]

## 2021-07-18 NOTE — PROVIDER CONTACT NOTE (OTHER) - BACKGROUND
Pt admitted for sepsis, PMH of ESRD on PD, DM etc Pt admitted for sepsis, PMH of ESRD on PD, DM etc.

## 2021-07-18 NOTE — PROGRESS NOTE ADULT - ASSESSMENT
55 yo M DM, , CHF, CAD with ESRD on PD, had stay about 7 months ago s/p cardiac stent, wire broke, had to undergo sternotomy, 2 stays soon after that for sepsis unclear cause, now returns a few days ago with fever, hypotension, weak, report of cough. He required pressors in micu now off, cultures of blood and pd fluid are no growth, exam not revealing.   Source of presumed sepsis is not readily apparent.   CXR shows no infiltrate   Has dry gangrene of right 3rd toe but that is not a source of sepsis.  He was found to have subtle infiltrates on ct chest, treated with Cefepime, received abx 7/6-7/11, now dcd due to concern of contributing to encephalopathy.   MRI spine not consistent with infection  KLAUDIA: Overall thickened valves seen however, no evidence of valvular vegetation is seen.  MRI Brain: old lacunar basal ganglia infarcts      Recommendations:    Monitor for any fever relapse or signs of infection  No indications for abx at present`  psych and neuro eval in progress

## 2021-07-18 NOTE — PROVIDER CONTACT NOTE (OTHER) - RECOMMENDATIONS
Change PO meds to IV medication. Change PO meds to IV medication. Get a speech and swallow eval again, for medication and food; pt is known to have poor appetite.

## 2021-07-18 NOTE — CHART NOTE - NSCHARTNOTEFT_GEN_A_CORE
Notified of patient refusing nighttime medications d/t nausea. Patient also refusing AM medications though nausea has improved. Patient A&Ox2/3. Patient encouraged by RN but still refused. Will offer again at 8AM prior to dialysis. Will continue to monitor overnight and notify day team.    Maira Corona PA-C  Medicine  [phone]

## 2021-07-18 NOTE — PROGRESS NOTE ADULT - SUBJECTIVE AND OBJECTIVE BOX
Patient seen and examined in bed.  No new events overnight.    REVIEW OF SYSTEMS:  As per HPI, otherwise 8 full 10 ROS were unremarkable    MEDICATIONS  (STANDING):  aspirin enteric coated 81 milliGRAM(s) Oral daily  atorvastatin 80 milliGRAM(s) Oral at bedtime  cadexomer iodine 0.9% Gel 1 Application(s) Topical daily  chlorhexidine 0.12% Liquid 15 milliLiter(s) Oral Mucosa two times a day  chlorhexidine 2% Cloths 1 Application(s) Topical <User Schedule>  dextrose 40% Gel 15 Gram(s) Oral once  dextrose 5%. 1000 milliLiter(s) (50 mL/Hr) IV Continuous <Continuous>  dextrose 5%. 1000 milliLiter(s) (100 mL/Hr) IV Continuous <Continuous>  dextrose 50% Injectable 25 Gram(s) IV Push once  dextrose 50% Injectable 12.5 Gram(s) IV Push once  dextrose 50% Injectable 25 Gram(s) IV Push once  dronabinol 2.5 milliGRAM(s) Oral two times a day  ferrous    sulfate 325 milliGRAM(s) Oral three times a day  folic acid 1 milliGRAM(s) Oral daily  glucagon  Injectable 1 milliGRAM(s) IntraMuscular once  heparin   Injectable 5000 Unit(s) SubCutaneous every 12 hours  insulin glargine Injectable (LANTUS) 5 Unit(s) SubCutaneous at bedtime  insulin lispro (ADMELOG) corrective regimen sliding scale   SubCutaneous three times a day before meals  insulin lispro (ADMELOG) corrective regimen sliding scale   SubCutaneous at bedtime  lactated ringers. 1000 milliLiter(s) (50 mL/Hr) IV Continuous <Continuous>  lidocaine   Patch 1 Patch Transdermal daily  midodrine 30 milliGRAM(s) Oral every 8 hours  Nephro-kristi 1 Tablet(s) Oral daily  pantoprazole  Injectable 40 milliGRAM(s) IV Push daily  sevelamer carbonate 1600 milliGRAM(s) Oral three times a day  ticagrelor 60 milliGRAM(s) Oral every 12 hours  valproate sodium IVPB 250 milliGRAM(s) IV Intermittent every 12 hours      VITAL:  T(C): , Max: 37 (07-17-21 @ 20:00)  T(F): , Max: 98.6 (07-17-21 @ 20:00)  HR: 90 (07-18-21 @ 08:00)  BP: 108/72 (07-18-21 @ 08:00)  BP(mean): 88 (07-17-21 @ 21:00)  RR: 18 (07-18-21 @ 06:50)  SpO2: 99% (07-18-21 @ 06:50)  Wt(kg): --    I and O's:    07-17 @ 07:01  -  07-18 @ 07:00  --------------------------------------------------------  IN: 4220 mL / OUT: 4500 mL / NET: -280 mL    07-18 @ 07:01  -  07-18 @ 11:30  --------------------------------------------------------  IN: 2050 mL / OUT: 2400 mL / NET: -350 mL          PHYSICAL EXAM:    Constitutional: NAD  HEENT: PERRLA, EOMI,  MMM  Neck: No LAD, No JVD  Respiratory: diminished   Cardiovascular: S1 and S2  Gastrointestinal: BS+, soft, NT/ND  Extremities: No peripheral edema  Neurological: communicative though limited   : No Johnson  Skin: No rashes  Access: +PD catheter     LABS:    07-18    131<L>  |  91<L>  |  41<H>  ----------------------------<  129<H>  3.9   |  22  |  9.37<H>    Ca    8.4      18 Jul 2021 06:46  Phos  5.8     07-17  Mg     2.1     07-17      Assessment:  56 M PMH ESRD on PD, DM on insulin, CHF EF 25-30% CAD s/p CABG x4 pw with hypotension with fever. Encephalopathy likely related to cefepime ? no obvious source of sepsis apparent as of now, KLAUDIA: Overall thickened valves seen however, no evidence of valvular vegetation is seen.     Hypotension:  on Midodrine for hemodynamic support:  BPs soft at times; acceptable for now   ESRD on PD. Patient TW 65 kg.  Weight this AM 66.7 kg  Encephalopathy likely related to cefepime ? no obvious source of sepsis apparent as of now, KLAUDIA: Overall thickened valves seen however, no evidence of valvular vegetation is seen. unlikely infective endocarditis?   Hyponatremia- stable  ID-Blood cx and PD fluid cx -   off IV antibiotics   Hyperphosphatemia:  remains >goal as of late; binders increased yesterday    RECOMMENDATIONS   - c/w CCPD orders with dianeal 1.5 to limit UF and ultimately hypotension    - continue Renvela to 1,600 mg PO TID  - continue Midodrine  30mg po q 8 hours   - Ongoing neuro/psych eval   - strict I & Os

## 2021-07-19 NOTE — PROVIDER CONTACT NOTE (OTHER) - ASSESSMENT
Pt chooses to ignore direct questioning. Pt vocal and coherant English speaking during care,  t&p and hygene

## 2021-07-19 NOTE — PROGRESS NOTE ADULT - SUBJECTIVE AND OBJECTIVE BOX
Patient is a 56y old  Male who presents with a chief complaint of Hypotension (19 Jul 2021 13:39)      SUBJECTIVE / OVERNIGHT EVENTS: No new complaints.   Review of Systems  unobtainable     MEDICATIONS  (STANDING):  aspirin enteric coated 81 milliGRAM(s) Oral daily  atorvastatin 80 milliGRAM(s) Oral at bedtime  cadexomer iodine 0.9% Gel 1 Application(s) Topical daily  chlorhexidine 0.12% Liquid 15 milliLiter(s) Oral Mucosa two times a day  chlorhexidine 2% Cloths 1 Application(s) Topical <User Schedule>  dextrose 40% Gel 15 Gram(s) Oral once  dextrose 5%. 1000 milliLiter(s) (50 mL/Hr) IV Continuous <Continuous>  dextrose 5%. 1000 milliLiter(s) (100 mL/Hr) IV Continuous <Continuous>  dextrose 50% Injectable 25 Gram(s) IV Push once  dextrose 50% Injectable 12.5 Gram(s) IV Push once  dextrose 50% Injectable 25 Gram(s) IV Push once  dronabinol 2.5 milliGRAM(s) Oral two times a day  ferrous    sulfate 325 milliGRAM(s) Oral three times a day  folic acid 1 milliGRAM(s) Oral daily  glucagon  Injectable 1 milliGRAM(s) IntraMuscular once  heparin   Injectable 5000 Unit(s) SubCutaneous every 12 hours  insulin glargine Injectable (LANTUS) 5 Unit(s) SubCutaneous at bedtime  insulin lispro (ADMELOG) corrective regimen sliding scale   SubCutaneous three times a day before meals  insulin lispro (ADMELOG) corrective regimen sliding scale   SubCutaneous at bedtime  lactated ringers. 1000 milliLiter(s) (50 mL/Hr) IV Continuous <Continuous>  lidocaine   Patch 1 Patch Transdermal daily  midodrine 30 milliGRAM(s) Oral every 8 hours  Nephro-kristi 1 Tablet(s) Oral daily  pantoprazole  Injectable 40 milliGRAM(s) IV Push daily  sevelamer carbonate 1600 milliGRAM(s) Oral three times a day  ticagrelor 60 milliGRAM(s) Oral every 12 hours  valproate sodium IVPB 250 milliGRAM(s) IV Intermittent every 12 hours    MEDICATIONS  (PRN):  acetaminophen   Tablet .. 650 milliGRAM(s) Oral every 6 hours PRN Moderate Pain (4 - 6)  valproate sodium IVPB 125 milliGRAM(s) IV Intermittent every 8 hours PRN agitation      Vital Signs Last 24 Hrs  T(C): 37 (19 Jul 2021 17:00), Max: 37.2 (19 Jul 2021 01:10)  T(F): 98.6 (19 Jul 2021 17:00), Max: 99 (19 Jul 2021 01:10)  HR: 95 (19 Jul 2021 17:00) (69 - 95)  BP: 102/73 (19 Jul 2021 17:00) (100/77 - 127/72)  BP(mean): --  RR: 18 (19 Jul 2021 17:00) (18 - 18)  SpO2: 96% (19 Jul 2021 17:00) (96% - 99%)    PHYSICAL EXAM:  GENERAL: NAD  HEAD:  Atraumatic, Normocephalic  EYES: EOMI, PERRLA, conjunctiva and sclera clear  NECK: Supple, No JVD  CHEST/LUNG: Clear to auscultation bilaterally; No wheeze  HEART: Regular rate and rhythm; No murmurs, rubs, or gallops  ABDOMEN: Soft, Nontender, Nondistended; Bowel sounds present  EXTREMITIES:  r 2nd toe and R thumb with gangrenous changes   PSYCH: confused  NEUROLOGY: non-focal  SKIN: No rashes or lesions    LABS:                        11.8   8.80  )-----------( 206      ( 19 Jul 2021 07:16 )             38.3     07-19    131<L>  |  90<L>  |  41<H>  ----------------------------<  139<H>  3.6   |  19<L>  |  9.37<H>    Ca    8.4      19 Jul 2021 07:00    TPro  6.0  /  Alb  2.0<L>  /  TBili  0.8  /  DBili  x   /  AST  29  /  ALT  13  /  AlkPhos  105  07-19                RADIOLOGY & ADDITIONAL TESTS:    Imaging Personally Reviewed:    Consultant(s) Notes Reviewed:      Care Discussed with Consultants/Other Providers:

## 2021-07-19 NOTE — PROGRESS NOTE ADULT - SUBJECTIVE AND OBJECTIVE BOX
[facility]      Seen earlier today, and examined.  - Today, patient is without complaints.           *****MEDICATIONS: Current medication reviewed and documented.    MEDICATIONS  (STANDING):  aspirin enteric coated 81 milliGRAM(s) Oral daily  atorvastatin 80 milliGRAM(s) Oral at bedtime  cadexomer iodine 0.9% Gel 1 Application(s) Topical daily  chlorhexidine 0.12% Liquid 15 milliLiter(s) Oral Mucosa two times a day  chlorhexidine 2% Cloths 1 Application(s) Topical <User Schedule>  dextrose 40% Gel 15 Gram(s) Oral once  dextrose 5%. 1000 milliLiter(s) (50 mL/Hr) IV Continuous <Continuous>  dextrose 5%. 1000 milliLiter(s) (100 mL/Hr) IV Continuous <Continuous>  dextrose 50% Injectable 25 Gram(s) IV Push once  dextrose 50% Injectable 12.5 Gram(s) IV Push once  dextrose 50% Injectable 25 Gram(s) IV Push once  dronabinol 2.5 milliGRAM(s) Oral two times a day  ferrous    sulfate 325 milliGRAM(s) Oral three times a day  folic acid 1 milliGRAM(s) Oral daily  glucagon  Injectable 1 milliGRAM(s) IntraMuscular once  heparin   Injectable 5000 Unit(s) SubCutaneous every 12 hours  insulin glargine Injectable (LANTUS) 5 Unit(s) SubCutaneous at bedtime  insulin lispro (ADMELOG) corrective regimen sliding scale   SubCutaneous three times a day before meals  insulin lispro (ADMELOG) corrective regimen sliding scale   SubCutaneous at bedtime  lactated ringers. 1000 milliLiter(s) (50 mL/Hr) IV Continuous <Continuous>  lidocaine   Patch 1 Patch Transdermal daily  midodrine 30 milliGRAM(s) Oral every 8 hours  Nephro-kristi 1 Tablet(s) Oral daily  pantoprazole  Injectable 40 milliGRAM(s) IV Push daily  sevelamer carbonate 1600 milliGRAM(s) Oral three times a day  ticagrelor 60 milliGRAM(s) Oral every 12 hours  valproate sodium IVPB 250 milliGRAM(s) IV Intermittent every 12 hours    MEDICATIONS  (PRN):  acetaminophen   Tablet .. 650 milliGRAM(s) Oral every 6 hours PRN Moderate Pain (4 - 6)  valproate sodium IVPB 125 milliGRAM(s) IV Intermittent every 8 hours PRN agitation          ***** VITAL SIGNS:  T(F): 98 (07-19-21 @ 21:00), Max: 99 (21 @ 01:10)  HR: 76 (21 @ 21:20) (76 - 95)  BP: 100/70 (21 @ 21:20) (99/65 - 104/69)  RR: 18 (21 @ 21:20) (18 - 18)  SpO2: 96% (21 @ 21:00) (96% - 99%)  Wt(kg): --  ,   I&O's Summary    2021 07:01  -  2021 07:00  --------------------------------------------------------  IN: 6130 mL / OUT: 6703 mL / NET: -573 mL    2021 07:01  -  2021 22:36  --------------------------------------------------------  IN: 4290 mL / OUT: 4500 mL / NET: -210 mL             *****PHYSICAL EXAM: lethargic    delayed responses   pt gagging unable to complete testing            *****LAB AND IMAGIN.8   8.80  )-----------( 206      ( 2021 07:16 )             38.3               07-19    131<L>  |  90<L>  |  41<H>  ----------------------------<  139<H>  3.6   |  19<L>  |  9.37<H>    Ca    8.4      2021 07:00    TPro  6.0  /  Alb  2.0<L>  /  TBili  0.8  /  DBili  x   /  AST  29  /  ALT  13  /  AlkPhos  105  -                         [All pertinent recent Imaging/Reports reviewed]           *****A S S E S S M E N T   A N D   P L A N :    55M PMH ESRD on PD, DM on insulin, CHF EF 25-30% CAD s/p CABG x4,  Patient states that for the last 2 days PTA he has been having increased cough and sweating. He endorsed chills but no measured fevers at home. He denies any sick contacts or recent travel. Patient states that he fell few days ago with no head trauma. Of note patient has right toe gangrenous lesion that has been present for several months, He denies any trauma or pain at the site.       In the ED, Patient was found to be hypotensive to 70s. Patient was given midodrine 10mg x1 and bedside POCUS was completed showing poor cardiac function with diffuse hypokinesis. Patient was unable to maintain adequate SBPs  so required micu admission and pressor support.   Called to eval ams       Problem/Recommendations 1:  ams likely multifactorial metabolic encephalopathy started immediately after cefepime, also other factors include poor po intake, sleep wake cycle disruption hyponatremia   correct metabolic derangements   nephrovite added   thiamine 500 iv q h   mri mra when able   limit sedatives   7/15 mental status with improvement    seemed lethargic  gagging   would lower depakote, check lfts ammonia     Problem/Recommendations 2:  ecchymosis of the finger tips   r/o emboli   derm eval     eleonora no evidence for endocarditis       Problem/Recommendations 3: poor nutritional intake   encourage po intake   marinol as per gi       Thank you for allowing me to participate in the care of this patient. Will continue to follow patient periodically. Please do not hesitate to call me if you have any  questions or if there has been a change in patients neurological status     ________________  Grisel Lainez MD  [facility] ([facility]  [phone]      33 minutes spent on total encounter; more than 50 % of the visit was  spent counseling about plan of care, compliance to diet/exercise and medication regimen and or  coordinating care by the attending physician.      It is advised that stroke patients follow up with CARLYLE Pena @ [phone] in 1- 2 weeks.   Others please follow up with Dr. Michael Nissenbaum [phone]

## 2021-07-19 NOTE — PROVIDER CONTACT NOTE (OTHER) - ACTION/TREATMENT ORDERED:
Guillermo as not done if pt refuses and will reassess in the morning. pt has call bell within reach and will continue to monitor

## 2021-07-19 NOTE — PROVIDER CONTACT NOTE (OTHER) - BACKGROUND
Pt admited with fevers lethargy and hypotension. Pt has been noted to have flat affect and refusal of oral pills in past

## 2021-07-19 NOTE — PROVIDER CONTACT NOTE (OTHER) - ASSESSMENT
Pt is in bed lethargic. on initial assessment pt responded to name and  and refuses to answer other questions on assessments and gives a blank stare

## 2021-07-19 NOTE — PROGRESS NOTE ADULT - ASSESSMENT
56 m with    Sepsis  - off antibiotic   - toe gangrene  - Podiatry follow  - ID follow   - KLAUDIA noted    COPD  - 2L NC overnight because he becomes apneic, sats well  - Sats well on RA while awake    CAD s/p CABG   - Hypotension likely in the setting of septic shock   - midodrine dose increased to 30 mg TID to match home dose  - Previous echo with reduced EF  - c/w DAPT  - Cardiology follow    CHF cr systolic  - cardiology follow  - EP eval re AICD    Peritoneal Dialysis   - nephrology follow PD, recommend 4x a day  - patient on midodrine 30 q8h at home, continue here  - Holding home metoprolol 25 q12hr in setting of hypotension    Diabetes   - HA1C 6.1 on 7/6 suggesting prediabetes  - Tujeo 60 units at bedtime and Victoza at home, started on 30 units at bedtime, adjust as needed based on patient PO intake  - added 2 units insulin pre-meal    Hiccups  - GI follow  - hold Ativan 2 to sedation    Incontinence dermatitis  - wound care    Toe gangrene  - Podiatry follow  - Vascular evaluation noted  - PONCHO/PVR noted  - Angiogram if agrees    Finger gangrenous area  - Plastic evaluation    Vertebral fracture T12  - Ortho eval noted   - MRI spine noted  - No intervention    Confusion  - possible delirium  - Psych follow  - Neurology follow  - EEG noted    DCP in progress    Pipe Beck MD pager [phone]

## 2021-07-19 NOTE — PROGRESS NOTE ADULT - SUBJECTIVE AND OBJECTIVE BOX
CARDIOLOGY FOLLOW UP - Dr. Best  Date of Service: 7/19/21  CC: no acute events , resting comfortably     Review of Systems:  Constitutional: No fever, weight loss, or fatigue  Respiratory: No cough, wheezing, or hemoptysis, no shortness of breath  Cardiovascular: No chest pain, palpitations, passing out, dizziness, or leg swelling  Gastrointestinal: No abd or epigastric pain.  No nausea, vomiting, or hematemesis; no diarrhea or constipation, no melena or hematochezia  Vascular: no edema       PHYSICAL EXAM:  T(C): 37.1 (07-19-21 @ 09:00), Max: 37.4 (07-18-21 @ 20:34)  HR: 91 (07-19-21 @ 09:00) (69 - 96)  BP: 101/76 (07-19-21 @ 09:00) (97/65 - 127/72)  RR: 18 (07-19-21 @ 09:00) (18 - 18)  SpO2: 96% (07-19-21 @ 09:00) (96% - 100%)  Wt(kg): --  I&O's Summary    18 Jul 2021 07:01  -  19 Jul 2021 07:00  --------------------------------------------------------  IN: 6130 mL / OUT: 6703 mL / NET: -573 mL        Appearance: A&Ox2-3	  Cardiovascular: Normal S1 S2,RRR, No JVD, No murmurs  Respiratory: Lungs clear to auscultation	  Gastrointestinal:  Soft, Non-tender, + BS	  Extremities: Normal range of motion, No clubbing, cyanosis or edema      Home Medications:  aspirin 81 mg oral delayed release tablet: 1 tab(s) orally once a day (29 Dec 2020 18:37)  atorvastatin 80 mg oral tablet: 1 tab(s) orally once a day (at bedtime) (29 Dec 2020 18:37)  epoetin martine: injectable once a month (29 Dec 2020 18:37)  ferrous sulfate 325 mg (65 mg elemental iron) oral tablet: 1 tab(s) orally 3 times a day (29 Dec 2020 18:37)  gabapentin 100 mg oral capsule: 1 cap(s) orally once a day (29 Dec 2020 18:37)  nortriptyline 25 mg oral capsule: 1 cap(s) orally once a day (at bedtime) (29 Dec 2020 18:37)  pantoprazole 40 mg oral delayed release tablet: 1 tab(s) orally once a day (before a meal) (29 Dec 2020 18:37)  Toujeo SoloStar 300 units/mL subcutaneous solution: 60 unit(s) subcutaneous once a day (at bedtime) (08 Jan 2021 12:41)      MEDICATIONS  (STANDING):  aspirin enteric coated 81 milliGRAM(s) Oral daily  atorvastatin 80 milliGRAM(s) Oral at bedtime  cadexomer iodine 0.9% Gel 1 Application(s) Topical daily  chlorhexidine 0.12% Liquid 15 milliLiter(s) Oral Mucosa two times a day  chlorhexidine 2% Cloths 1 Application(s) Topical <User Schedule>  dextrose 40% Gel 15 Gram(s) Oral once  dextrose 5%. 1000 milliLiter(s) (50 mL/Hr) IV Continuous <Continuous>  dextrose 5%. 1000 milliLiter(s) (100 mL/Hr) IV Continuous <Continuous>  dextrose 50% Injectable 25 Gram(s) IV Push once  dextrose 50% Injectable 12.5 Gram(s) IV Push once  dextrose 50% Injectable 25 Gram(s) IV Push once  dronabinol 2.5 milliGRAM(s) Oral two times a day  ferrous    sulfate 325 milliGRAM(s) Oral three times a day  folic acid 1 milliGRAM(s) Oral daily  glucagon  Injectable 1 milliGRAM(s) IntraMuscular once  heparin   Injectable 5000 Unit(s) SubCutaneous every 12 hours  insulin glargine Injectable (LANTUS) 5 Unit(s) SubCutaneous at bedtime  insulin lispro (ADMELOG) corrective regimen sliding scale   SubCutaneous three times a day before meals  insulin lispro (ADMELOG) corrective regimen sliding scale   SubCutaneous at bedtime  lactated ringers. 1000 milliLiter(s) (50 mL/Hr) IV Continuous <Continuous>  lidocaine   Patch 1 Patch Transdermal daily  midodrine 30 milliGRAM(s) Oral every 8 hours  Nephro-kristi 1 Tablet(s) Oral daily  pantoprazole  Injectable 40 milliGRAM(s) IV Push daily  sevelamer carbonate 1600 milliGRAM(s) Oral three times a day  ticagrelor 60 milliGRAM(s) Oral every 12 hours  valproate sodium IVPB 250 milliGRAM(s) IV Intermittent every 12 hours      TELEMETRY: 	    ECG:  	  RADIOLOGY:   DIAGNOSTIC TESTING:  [ ] Echocardiogram:  [ ]  Catheterization:  [ ] Stress Test:    OTHER: 	    LABS:	 	                            11.8   8.80  )-----------( 206      ( 19 Jul 2021 07:16 )             38.3     07-19    131<L>  |  90<L>  |  41<H>  ----------------------------<  139<H>  3.6   |  19<L>  |  9.37<H>    Ca    8.4      19 Jul 2021 07:00    TPro  6.0  /  Alb  2.0<L>  /  TBili  0.8  /  DBili  x   /  AST  29  /  ALT  13  /  AlkPhos  105  07-19

## 2021-07-19 NOTE — PROGRESS NOTE ADULT - SUBJECTIVE AND OBJECTIVE BOX
INTERVAL HPI/OVERNIGHT EVENTS:  Patient awake in bed this morning, just  nodding in response to my inquiries   Asked if he can speak, and he responded "yes"     MEDICATIONS  (STANDING):  aspirin enteric coated 81 milliGRAM(s) Oral daily  atorvastatin 80 milliGRAM(s) Oral at bedtime  cadexomer iodine 0.9% Gel 1 Application(s) Topical daily  chlorhexidine 0.12% Liquid 15 milliLiter(s) Oral Mucosa two times a day  chlorhexidine 2% Cloths 1 Application(s) Topical <User Schedule>  dextrose 40% Gel 15 Gram(s) Oral once  dextrose 5%. 1000 milliLiter(s) (50 mL/Hr) IV Continuous <Continuous>  dextrose 5%. 1000 milliLiter(s) (100 mL/Hr) IV Continuous <Continuous>  dextrose 50% Injectable 25 Gram(s) IV Push once  dextrose 50% Injectable 12.5 Gram(s) IV Push once  dextrose 50% Injectable 25 Gram(s) IV Push once  dronabinol 2.5 milliGRAM(s) Oral two times a day  ferrous    sulfate 325 milliGRAM(s) Oral three times a day  folic acid 1 milliGRAM(s) Oral daily  glucagon  Injectable 1 milliGRAM(s) IntraMuscular once  heparin   Injectable 5000 Unit(s) SubCutaneous every 12 hours  insulin glargine Injectable (LANTUS) 5 Unit(s) SubCutaneous at bedtime  insulin lispro (ADMELOG) corrective regimen sliding scale   SubCutaneous three times a day before meals  insulin lispro (ADMELOG) corrective regimen sliding scale   SubCutaneous at bedtime  lactated ringers. 1000 milliLiter(s) (50 mL/Hr) IV Continuous <Continuous>  lidocaine   Patch 1 Patch Transdermal daily  midodrine 30 milliGRAM(s) Oral every 8 hours  Nephro-kristi 1 Tablet(s) Oral daily  pantoprazole  Injectable 40 milliGRAM(s) IV Push daily  sevelamer carbonate 1600 milliGRAM(s) Oral three times a day  ticagrelor 60 milliGRAM(s) Oral every 12 hours  valproate sodium IVPB 250 milliGRAM(s) IV Intermittent every 12 hours    MEDICATIONS  (PRN):  acetaminophen   Tablet .. 650 milliGRAM(s) Oral every 6 hours PRN Moderate Pain (4 - 6)  valproate sodium IVPB 125 milliGRAM(s) IV Intermittent every 8 hours PRN agitation      Allergies    doxazosin (Other)    Intolerances        Review of Systems:    General: No reports of  fevers or chills. No hiccups noted during assessment   ENT:  No sore throat, pain, runny nose, dysphagia  CV:  No chest pain  Resp:  No  report of SOB       Vital Signs Last 24 Hrs  T(C): 37.1 (19 Jul 2021 09:00), Max: 37.4 (18 Jul 2021 20:34)  T(F): 98.7 (19 Jul 2021 09:00), Max: 99.3 (18 Jul 2021 20:34)  HR: 91 (19 Jul 2021 09:00) (69 - 96)  BP: 101/76 (19 Jul 2021 09:00) (97/65 - 127/72)  BP(mean): --  RR: 18 (19 Jul 2021 09:00) (18 - 18)  SpO2: 96% (19 Jul 2021 09:00) (96% - 100%)    PHYSICAL EXAM:    Constitutional:  non toxic and in NAD  Respiratory: anterior chest wall clear to auscultation  Cardiovascular: S1 and S2, RRR  Gastrointestinal: has +BS   BM yesterday per documentation   Extremities: No peripheral edema      LABS:                        11.8   8.80  )-----------( 206      ( 19 Jul 2021 07:16 )             38.3     07-19    131<L>  |  90<L>  |  41<H>  ----------------------------<  139<H>  3.6   |  19<L>  |  9.37<H>    Ca    8.4      19 Jul 2021 07:00    TPro  6.0  /  Alb  2.0<L>  /  TBili  0.8  /  DBili  x   /  AST  29  /  ALT  13  /  AlkPhos  105  07-19        LIVER FUNCTIONS - ( 19 Jul 2021 07:00 )  Alb: 2.0 g/dL / Pro: 6.0 g/dL / ALK PHOS: 105 U/L / ALT: 13 U/L / AST: 29 U/L / GGT: x             RADIOLOGY & ADDITIONAL TESTS:

## 2021-07-19 NOTE — PROGRESS NOTE ADULT - ASSESSMENT
CATH 11/30/2020:  COMPLICATIONS: The stent was deployed without difficulty. On removal of the  balloon, the shaft broke and the tip of the balloon remained in the vessel. Several attempts were made to snare the balloon unsuccessfully. Dr. Verdugo was notifed who will take the patient to the operating room.  DIAGNOSTIC RECOMMENDATIONS: The patient should continue with the present medications. The patients vessel was stented without difficulty On removal of the balloon, the shaft broke with the baloon stuck in the left main. All attempts to snare the balloon out failed and the patient was taken to the OR by Dr. Arango to remove the balloon  introp eleonora 11/30/20: mild to mod MR, < from: Intra-Operative Transesophageal Echo (11.30.20 @ 17:02) >  Severe global left ventricular systolic dysfunction. Inferior and inferolateral akinesis.  severe hypokinesis of other segments.  Severe global hypokinesia.  Normal left ventricular internal dimensions and wall thicknesses. Moderate diastolic dysfunction (Stage II).  echo 12/17/20: EF 32%, mod MR, Severe global left ventricular systolic dysfunction, moderate pulmonary pressures  echo 12/20/20: EF (Visual Estimate):  25-30 % mild MR, Akinesis of the inferolateral, anterolateral, apical laterlal, inferior, and inferoseptal walls. Severe left ventricular enlargement. No left ventricular thrombus is seen.  Echo 7/7/21: EF 16%, mod - sev MR, mod AS, severe global lv sys dysfx, severe diastolic dysfx, mod pulm htn       a/p  56 M well known to practice with PMH ESRD on PD, DM on insulin, CHF EF 25-30% CAD s/p CABG x4, underwent cardiac cath and stent and wire broke in heart s/p sternotomy p/w septic shock.    #Septic Shock, resolved   -Bcx NGTD 7/10   -s/p iv abx, pressors   -Bedside POCUS notable for biventricular diffuse hypokinesis  -right 2nd toe gangrene, pod f/u, vascular f/u, PONCHO/PVR   -ID f/u   -ELEONORA: no evidence of valvular vegetation is seen.  -med f/u    #Ischemic Cardiomyopathy. Chronic systolic HF  -vol status stable - monitor vol status closely while on IVF   -Repeat echo noted with EF 16%, mod - sev MR, mod AS, severe global lv sys dysfx, severe diastolic dysfx, mod pulm htn   -eps eval for icd if mental status improves, pt has declined icd in the past  -not current candidate for icd   -no bb, acei/arb given hx of orthostatic bp   -continue midodrine for bp support  -cont vol removal w PD     # CAD, s/p CABG, s/p PCI, s/p redo open heart for stent balloon retrieval   -hst elevated, likely demand ischemia in setting of septic shock, ESRD   -c/w asa, Brilinta, statin     # ESRD  -on PD  -renal f/u    #AMS  -improving   -neuro psych f/u    dvt ppx

## 2021-07-19 NOTE — PROGRESS NOTE ADULT - ASSESSMENT
56 male with ESRD , CHF, CAD who had report of hiccups. No observed hiccups during bedside assessment and patient denied having hiccups yesterday or today .     Recommendations ;  Encourage po diet, with renal restrictions per nutrition    BRINA [facility]  [address] [place] [address]  [address] [phone]

## 2021-07-19 NOTE — PROGRESS NOTE ADULT - SUBJECTIVE AND OBJECTIVE BOX
NEPHROLOGY-[facility] [phone]        Patient seen and examined in bed.  He was the same        MEDICATIONS  (STANDING):  aspirin enteric coated 81 milliGRAM(s) Oral daily  atorvastatin 80 milliGRAM(s) Oral at bedtime  cadexomer iodine 0.9% Gel 1 Application(s) Topical daily  chlorhexidine 0.12% Liquid 15 milliLiter(s) Oral Mucosa two times a day  chlorhexidine 2% Cloths 1 Application(s) Topical <User Schedule>  dextrose 40% Gel 15 Gram(s) Oral once  dextrose 5%. 1000 milliLiter(s) (50 mL/Hr) IV Continuous <Continuous>  dextrose 5%. 1000 milliLiter(s) (100 mL/Hr) IV Continuous <Continuous>  dextrose 50% Injectable 25 Gram(s) IV Push once  dextrose 50% Injectable 12.5 Gram(s) IV Push once  dextrose 50% Injectable 25 Gram(s) IV Push once  dronabinol 2.5 milliGRAM(s) Oral two times a day  ferrous    sulfate 325 milliGRAM(s) Oral three times a day  folic acid 1 milliGRAM(s) Oral daily  glucagon  Injectable 1 milliGRAM(s) IntraMuscular once  heparin   Injectable 5000 Unit(s) SubCutaneous every 12 hours  insulin glargine Injectable (LANTUS) 5 Unit(s) SubCutaneous at bedtime  insulin lispro (ADMELOG) corrective regimen sliding scale   SubCutaneous three times a day before meals  insulin lispro (ADMELOG) corrective regimen sliding scale   SubCutaneous at bedtime  lactated ringers. 1000 milliLiter(s) (50 mL/Hr) IV Continuous <Continuous>  lidocaine   Patch 1 Patch Transdermal daily  midodrine 30 milliGRAM(s) Oral every 8 hours  Nephro-kristi 1 Tablet(s) Oral daily  pantoprazole  Injectable 40 milliGRAM(s) IV Push daily  sevelamer carbonate 1600 milliGRAM(s) Oral three times a day  ticagrelor 60 milliGRAM(s) Oral every 12 hours  valproate sodium IVPB 250 milliGRAM(s) IV Intermittent every 12 hours      VITAL:  T(C): , Max: 37.4 (07-18-21 @ 20:34)  T(F): , Max: 99.3 (07-18-21 @ 20:34)  HR: 95 (07-19-21 @ 05:05)  BP: 103/70 (07-19-21 @ 05:05)  BP(mean): --  RR: 18 (07-19-21 @ 05:05)  SpO2: 99% (07-19-21 @ 05:05)  Wt(kg): --    I and O's:    07-18 @ 07:01  -  07-19 @ 07:00  --------------------------------------------------------  IN: 6130 mL / OUT: 6703 mL / NET: -573 mL          PHYSICAL EXAM:    Constitutional: NAD  Neck:  No JVD  Respiratory: CTAB/L  Cardiovascular: S1 and S2  Gastrointestinal: BS+, soft, NT/ND  Extremities: No peripheral edema  Neurological: A/O x 3, no focal deficits  Psychiatric: Normal mood, normal affect  : No Johnson  Skin: No rashes  Access: Not applicable    LABS:                        11.8   8.80  )-----------( 206      ( 19 Jul 2021 07:16 )             38.3     07-19    131<L>  |  90<L>  |  41<H>  ----------------------------<  139<H>  3.6   |  19<L>  |  9.37<H>    Ca    8.4      19 Jul 2021 07:00    TPro  6.0  /  Alb  2.0<L>  /  TBili  0.8  /  DBili  x   /  AST  29  /  ALT  13  /  AlkPhos  105  07-19          Urine Studies:          RADIOLOGY & ADDITIONAL STUDIES:

## 2021-07-19 NOTE — PROGRESS NOTE ADULT - ASSESSMENT
56 M PMH ESRD on PD, DM on insulin, CHF EF 25-30% CAD s/p CABG x4 pw with hypotension with fever. Encephalopathy likely related to cefepime ? no obvious source of sepsis apparent as of now, KLAUDIA: Overall thickened valves seen however, no evidence of valvular vegetation is seen.     Hypotension:  on Midodrine for hemodynamic support:  BPs soft at times; acceptable for now   ESRD on PD. Patient TW 65 kg.    Hyponatremia- stable  ID-Blood cx and PD fluid cx -   off IV antibiotics   Hyperphosphatemia:  remains >goal as of late; binders increased yesterday    RECOMMENDATIONS   - c/w CCPD orders with dianeal 1.5 to limit UF and ultimately hypotension    - continue Renvela to 1,600 mg PO TID  - continue Midodrine  30mg po q 8 hours   - Ongoing neuro/psych eval   - strict I & Os  -Cards eval and ID eval noted     Sayed [facility]   [phone]

## 2021-07-19 NOTE — PROVIDER CONTACT NOTE (OTHER) - SITUATION
Pt refusing to take oral pills. Placed in mouth with pudding and pt refusing to swallow and medication suctioned out

## 2021-07-20 NOTE — PROGRESS NOTE ADULT - SUBJECTIVE AND OBJECTIVE BOX
[facility] [phone]        Patient seen and examined in bed.  No new complaints.         MEDICATIONS  (STANDING):  aspirin enteric coated 81 milliGRAM(s) Oral daily  atorvastatin 80 milliGRAM(s) Oral at bedtime  cadexomer iodine 0.9% Gel 1 Application(s) Topical daily  chlorhexidine 0.12% Liquid 15 milliLiter(s) Oral Mucosa two times a day  chlorhexidine 2% Cloths 1 Application(s) Topical <User Schedule>  dextrose 40% Gel 15 Gram(s) Oral once  dextrose 5%. 1000 milliLiter(s) (50 mL/Hr) IV Continuous <Continuous>  dextrose 5%. 1000 milliLiter(s) (100 mL/Hr) IV Continuous <Continuous>  dextrose 50% Injectable 25 Gram(s) IV Push once  dextrose 50% Injectable 12.5 Gram(s) IV Push once  dextrose 50% Injectable 25 Gram(s) IV Push once  dronabinol 2.5 milliGRAM(s) Oral two times a day  ferrous    sulfate 325 milliGRAM(s) Oral three times a day  folic acid 1 milliGRAM(s) Oral daily  glucagon  Injectable 1 milliGRAM(s) IntraMuscular once  heparin   Injectable 5000 Unit(s) SubCutaneous every 12 hours  insulin glargine Injectable (LANTUS) 5 Unit(s) SubCutaneous at bedtime  insulin lispro (ADMELOG) corrective regimen sliding scale   SubCutaneous three times a day before meals  insulin lispro (ADMELOG) corrective regimen sliding scale   SubCutaneous at bedtime  lactated ringers. 1000 milliLiter(s) (50 mL/Hr) IV Continuous <Continuous>  lidocaine   Patch 1 Patch Transdermal daily  midodrine 30 milliGRAM(s) Oral every 8 hours  Nephro-kristi 1 Tablet(s) Oral daily  pantoprazole  Injectable 40 milliGRAM(s) IV Push daily  sevelamer carbonate 1600 milliGRAM(s) Oral three times a day  ticagrelor 60 milliGRAM(s) Oral every 12 hours  valproate sodium IVPB 250 milliGRAM(s) IV Intermittent every 12 hours      VITAL:  T(C): , Max: 37.4 (07-18-21 @ 20:34)  T(F): , Max: 99.3 (07-18-21 @ 20:34)  HR: 95 (07-19-21 @ 05:05)  BP: 103/70 (07-19-21 @ 05:05)  BP(mean): --  RR: 18 (07-19-21 @ 05:05)  SpO2: 99% (07-19-21 @ 05:05)  Wt(kg): --    I and O's:    07-18 @ 07:01  -  07-19 @ 07:00  --------------------------------------------------------  IN: 6130 mL / OUT: 6703 mL / NET: -573 mL          PHYSICAL EXAM:    Constitutional: NAD  Neck:  No JVD  Respiratory: CTAB/L  Cardiovascular: S1 and S2  Gastrointestinal: BS+, soft, NT/ND  Extremities: No peripheral edema  Neurological: A/O x 3, no focal deficits  Psychiatric: Normal mood, normal affect  : No Johnson  Skin: No rashes  Access: Not applicable    LABS:                        11.8   8.80  )-----------( 206      ( 19 Jul 2021 07:16 )             38.3     07-19    131<L>  |  90<L>  |  41<H>  ----------------------------<  139<H>  3.6   |  19<L>  |  9.37<H>    Ca    8.4      19 Jul 2021 07:00    TPro  6.0  /  Alb  2.0<L>  /  TBili  0.8  /  DBili  x   /  AST  29  /  ALT  13  /  AlkPhos  105  07-19          Urine Studies:          RADIOLOGY & ADDITIONAL STUDIES:             [facility] [phone]        Patient seen and examined in bed.  No new complaints.         MEDICATIONS  (STANDING):  aspirin enteric coated 81 milliGRAM(s) Oral daily  atorvastatin 80 milliGRAM(s) Oral at bedtime  cadexomer iodine 0.9% Gel 1 Application(s) Topical daily  chlorhexidine 0.12% Liquid 15 milliLiter(s) Oral Mucosa two times a day  chlorhexidine 2% Cloths 1 Application(s) Topical <User Schedule>  dextrose 40% Gel 15 Gram(s) Oral once  dextrose 5%. 1000 milliLiter(s) (50 mL/Hr) IV Continuous <Continuous>  dextrose 5%. 1000 milliLiter(s) (100 mL/Hr) IV Continuous <Continuous>  dextrose 50% Injectable 25 Gram(s) IV Push once  dextrose 50% Injectable 12.5 Gram(s) IV Push once  dextrose 50% Injectable 25 Gram(s) IV Push once  dronabinol 2.5 milliGRAM(s) Oral two times a day  ferrous    sulfate 325 milliGRAM(s) Oral three times a day  folic acid 1 milliGRAM(s) Oral daily  glucagon  Injectable 1 milliGRAM(s) IntraMuscular once  heparin   Injectable 5000 Unit(s) SubCutaneous every 12 hours  insulin glargine Injectable (LANTUS) 5 Unit(s) SubCutaneous at bedtime  insulin lispro (ADMELOG) corrective regimen sliding scale   SubCutaneous three times a day before meals  insulin lispro (ADMELOG) corrective regimen sliding scale   SubCutaneous at bedtime  lactated ringers. 1000 milliLiter(s) (50 mL/Hr) IV Continuous <Continuous>  lidocaine   Patch 1 Patch Transdermal daily  midodrine 30 milliGRAM(s) Oral every 8 hours  Nephro-kristi 1 Tablet(s) Oral daily  pantoprazole  Injectable 40 milliGRAM(s) IV Push daily  sevelamer carbonate 1600 milliGRAM(s) Oral three times a day  ticagrelor 60 milliGRAM(s) Oral every 12 hours  valproate sodium IVPB 250 milliGRAM(s) IV Intermittent every 12 hours      VITAL:  T(C): , Max: 37.4 (07-18-21 @ 20:34)  T(F): , Max: 99.3 (07-18-21 @ 20:34)  HR: 95 (07-19-21 @ 05:05)  BP: 103/70 (07-19-21 @ 05:05)  BP(mean): --  RR: 18 (07-19-21 @ 05:05)  SpO2: 99% (07-19-21 @ 05:05)  Wt(kg): --    I and O's:    07-18 @ 07:01  -  07-19 @ 07:00  --------------------------------------------------------  IN: 6130 mL / OUT: 6703 mL / NET: -573 mL          PHYSICAL EXAM:    Constitutional: NAD  Neck:  No JVD  Respiratory: CTAB/L  Cardiovascular: S1 and S2  Gastrointestinal: BS+, soft, NT/ND, + right abdominal PD catheter   Extremities: No peripheral edema  Neurological: A/O x 3, no focal deficits  Psychiatric: Normal mood, normal affect  : No Johnson  Skin: No rashes      LABS:                        11.8   8.80  )-----------( 206      ( 19 Jul 2021 07:16 )             38.3     07-19    131<L>  |  90<L>  |  41<H>  ----------------------------<  139<H>  3.6   |  19<L>  |  9.37<H>    Ca    8.4      19 Jul 2021 07:00    TPro  6.0  /  Alb  2.0<L>  /  TBili  0.8  /  DBili  x   /  AST  29  /  ALT  13  /  AlkPhos  105  07-19          Urine Studies:          RADIOLOGY & ADDITIONAL STUDIES:             [facility] [phone]        Patient seen and examined in bed.  No new complaints.         MEDICATIONS  (STANDING):  aspirin enteric coated 81 milliGRAM(s) Oral daily  atorvastatin 80 milliGRAM(s) Oral at bedtime  cadexomer iodine 0.9% Gel 1 Application(s) Topical daily  chlorhexidine 0.12% Liquid 15 milliLiter(s) Oral Mucosa two times a day  chlorhexidine 2% Cloths 1 Application(s) Topical <User Schedule>  dextrose 40% Gel 15 Gram(s) Oral once  dextrose 5%. 1000 milliLiter(s) (50 mL/Hr) IV Continuous <Continuous>  dextrose 5%. 1000 milliLiter(s) (100 mL/Hr) IV Continuous <Continuous>  dextrose 50% Injectable 25 Gram(s) IV Push once  dextrose 50% Injectable 12.5 Gram(s) IV Push once  dextrose 50% Injectable 25 Gram(s) IV Push once  dronabinol 2.5 milliGRAM(s) Oral two times a day  ferrous    sulfate 325 milliGRAM(s) Oral three times a day  folic acid 1 milliGRAM(s) Oral daily  glucagon  Injectable 1 milliGRAM(s) IntraMuscular once  heparin   Injectable 5000 Unit(s) SubCutaneous every 12 hours  insulin glargine Injectable (LANTUS) 5 Unit(s) SubCutaneous at bedtime  insulin lispro (ADMELOG) corrective regimen sliding scale   SubCutaneous three times a day before meals  insulin lispro (ADMELOG) corrective regimen sliding scale   SubCutaneous at bedtime  lactated ringers. 1000 milliLiter(s) (50 mL/Hr) IV Continuous <Continuous>  lidocaine   Patch 1 Patch Transdermal daily  midodrine 30 milliGRAM(s) Oral every 8 hours  Nephro-kristi 1 Tablet(s) Oral daily  pantoprazole  Injectable 40 milliGRAM(s) IV Push daily  sevelamer carbonate 1600 milliGRAM(s) Oral three times a day  ticagrelor 60 milliGRAM(s) Oral every 12 hours  valproate sodium IVPB 250 milliGRAM(s) IV Intermittent every 12 hours      VITAL:  T(C): , Max: 37.4 (07-18-21 @ 20:34)  T(F): , Max: 99.3 (07-18-21 @ 20:34)  HR: 95 (07-19-21 @ 05:05)  BP: 103/70 (07-19-21 @ 05:05)  BP(mean): --  RR: 18 (07-19-21 @ 05:05)  SpO2: 99% (07-19-21 @ 05:05)  Wt(kg): --    I and O's:    07-18 @ 07:01  -  07-19 @ 07:00  --------------------------------------------------------  IN: 6130 mL / OUT: 6703 mL / NET: -573 mL          PHYSICAL EXAM:    Constitutional: NAD  Neck:  No JVD  Respiratory: CTAB/L  Cardiovascular: S1 and S2  Gastrointestinal: BS+, soft, NT/ND, + right abdominal PD catheter   Extremities: No peripheral edema  Neurological: limited   Psychiatric: Normal mood, normal affect  : No Johnson  Skin: No rashes      LABS:                        11.8   8.80  )-----------( 206      ( 19 Jul 2021 07:16 )             38.3     07-19    131<L>  |  90<L>  |  41<H>  ----------------------------<  139<H>  3.6   |  19<L>  |  9.37<H>    Ca    8.4      19 Jul 2021 07:00    TPro  6.0  /  Alb  2.0<L>  /  TBili  0.8  /  DBili  x   /  AST  29  /  ALT  13  /  AlkPhos  105  07-19          Urine Studies:          RADIOLOGY & ADDITIONAL STUDIES:

## 2021-07-20 NOTE — PROGRESS NOTE ADULT - SUBJECTIVE AND OBJECTIVE BOX
Patient is a 56y old  Male who presents with a chief complaint of Hypotension (20 Jul 2021 16:55)      SUBJECTIVE / OVERNIGHT EVENTS: No new complaints.   Review of Systems  chest pain no  palpitations no  sob no  nausea no  headache no    MEDICATIONS  (STANDING):  aspirin enteric coated 81 milliGRAM(s) Oral daily  atorvastatin 80 milliGRAM(s) Oral at bedtime  cadexomer iodine 0.9% Gel 1 Application(s) Topical daily  chlorhexidine 0.12% Liquid 15 milliLiter(s) Oral Mucosa two times a day  chlorhexidine 2% Cloths 1 Application(s) Topical <User Schedule>  dextrose 40% Gel 15 Gram(s) Oral once  dextrose 5%. 1000 milliLiter(s) (50 mL/Hr) IV Continuous <Continuous>  dextrose 5%. 1000 milliLiter(s) (100 mL/Hr) IV Continuous <Continuous>  dextrose 50% Injectable 25 Gram(s) IV Push once  dextrose 50% Injectable 12.5 Gram(s) IV Push once  dextrose 50% Injectable 25 Gram(s) IV Push once  dronabinol 2.5 milliGRAM(s) Oral two times a day  ferrous    sulfate 325 milliGRAM(s) Oral three times a day  folic acid 1 milliGRAM(s) Oral daily  glucagon  Injectable 1 milliGRAM(s) IntraMuscular once  heparin   Injectable 5000 Unit(s) SubCutaneous every 12 hours  insulin glargine Injectable (LANTUS) 5 Unit(s) SubCutaneous at bedtime  insulin lispro (ADMELOG) corrective regimen sliding scale   SubCutaneous three times a day before meals  insulin lispro (ADMELOG) corrective regimen sliding scale   SubCutaneous at bedtime  lactated ringers. 1000 milliLiter(s) (50 mL/Hr) IV Continuous <Continuous>  lidocaine   Patch 1 Patch Transdermal daily  midodrine 30 milliGRAM(s) Oral every 8 hours  Nephro-kristi 1 Tablet(s) Oral daily  pantoprazole  Injectable 40 milliGRAM(s) IV Push daily  sevelamer carbonate 1600 milliGRAM(s) Oral three times a day  ticagrelor 60 milliGRAM(s) Oral every 12 hours    MEDICATIONS  (PRN):  acetaminophen   Tablet .. 650 milliGRAM(s) Oral every 6 hours PRN Moderate Pain (4 - 6)  valproate sodium IVPB 125 milliGRAM(s) IV Intermittent every 8 hours PRN agitation      Vital Signs Last 24 Hrs  T(C): 36.8 (20 Jul 2021 17:07), Max: 36.9 (20 Jul 2021 13:00)  T(F): 98.3 (20 Jul 2021 17:07), Max: 98.4 (20 Jul 2021 13:00)  HR: 98 (20 Jul 2021 17:07) (76 - 99)  BP: 104/76 (20 Jul 2021 17:07) (98/69 - 104/76)  BP(mean): --  RR: 18 (20 Jul 2021 17:07) (18 - 20)  SpO2: 96% (20 Jul 2021 17:07) (93% - 100%)    PHYSICAL EXAM:  GENERAL: NAD  HEAD:  Atraumatic, Normocephalic  EYES: EOMI, PERRLA, conjunctiva and sclera clear  NECK: Supple, No JVD  CHEST/LUNG: Clear to auscultation bilaterally; No wheeze  HEART: Regular rate and rhythm; No murmurs, rubs, or gallops  ABDOMEN: Soft, Nontender, Nondistended; Bowel sounds present PD catheter  EXTREMITIES:  few areas of gangrene 2 R toe and R thumb   PSYCH: confused  NEUROLOGY: non-focal  SKIN: No rashes     LABS:                        12.1   9.10  )-----------( 220      ( 20 Jul 2021 06:41 )             40.4     07-20    135  |  93<L>  |  42<H>  ----------------------------<  154<H>  3.4<L>   |  22  |  9.01<H>    Ca    8.6      20 Jul 2021 06:40    TPro  6.0  /  Alb  2.0<L>  /  TBili  0.8  /  DBili  x   /  AST  29  /  ALT  13  /  AlkPhos  105  07-19                RADIOLOGY & ADDITIONAL TESTS:    Imaging Personally Reviewed:    Consultant(s) Notes Reviewed:      Care Discussed with Consultants/Other Providers:

## 2021-07-20 NOTE — PROGRESS NOTE ADULT - SUBJECTIVE AND OBJECTIVE BOX
CC: f/u for hypotension    Patient is lying in bed , he has no complaints, reviewed with nurse taking care of pt and she reports no significant overnight events     REVIEW OF SYSTEMS: limited  All other review of systems negative (Comprehensive ROS)    Antimicrobials Day #        Other Medications Reviewed    Vital Signs Last 24 Hrs  T(C): 36.3 (20 Jul 2021 10:00), Max: 37 (19 Jul 2021 17:00)  T(F): 97.3 (20 Jul 2021 10:00), Max: 98.6 (19 Jul 2021 17:00)  HR: 96 (20 Jul 2021 10:00) (76 - 98)  BP: 102/71 (20 Jul 2021 10:00) (98/69 - 102/73)  BP(mean): --  RR: 20 (20 Jul 2021 10:00) (18 - 20)  SpO2: 96% (20 Jul 2021 10:00) (93% - 99%)    PHYSICAL EXAM:  General: alert, no acute distress, chronically ill appearing  Eyes:  anicteric, no conjunctival injection, no discharge  Oropharynx: no lesions or injection 	  Lungs: clear to auscultation  Heart: regular rate and rhythm; no murmur, rubs or gallops  Abdomen: soft, nondistended, nontender, without mass or organomegaly  Skin: no lesions  Extremities: no clubbing, cyanosis, or edema  Neurologic: alert, poorly interactive, follows commands    LAB RESULTS:                                   12.1   9.10  )-----------( 220      ( 20 Jul 2021 06:41 )             40.4                         MICROBIOLOGY REVIEWED:    Culture - Blood (07.14.21 @ 18:34)   Specimen Source: .Blood Blood-Peripheral   Culture Results:   No growth to date.   Culture - Dialysis Fluid (07.10.21 @ 19:16)   Specimen Source: .Peritoneal Dialysis Fluid Dialysis (P.D.) Fluid   Culture Results:   No growth Culture - Blood (07.10.21 @ 18:54)   Specimen Source: .Blood Blood-Peripheral   Culture Results:   No growth to date.   RADIOLOGY REVIEWED:    < from: MR Head w/wo IV Cont (07.16.21 @ 17:44) >    IMPRESSION:    MRI BRAIN: minimal periventricular white matter ischemia. Old lacunar infarctions are seen within the BILATERAL basal ganglia.    MRA brain:   Unremarkable MR angiography of the intracranial circulation.    --- End of Report ---    < end of copied text >    < from: MR Thoracic Spine No Cont (07.11.21 @ 23:55) >  IMPRESSION:    -Acute to subacute moderate compression fracture of T12 with slight bony retropulsion but no associated spinal canal stenosis.    -L4-L5 disc bulge with potential impingement of the descending right L5 nerve root.    --- End of Report ---    < end of copied text >

## 2021-07-20 NOTE — PROGRESS NOTE ADULT - SUBJECTIVE AND OBJECTIVE BOX
CARDIOLOGY FOLLOW UP - Dr. Best  DATE OF SERVICE: 07-20-21    CC more awake and alert today  resting comfortably      REVIEW OF SYSTEMS:   CONSTITUTIONAL: No fever, weight loss, or fatigue   RESPIRATORY:  No cough, wheezing, chills or hemoptysis; No SOB  CARDIOVASCULAR: No chest pain, palpitations, passing out, dizziness, or leg swelling   GASTROINTESTINAL: No abdominal or epigastric pain. No nausea, vomiting, or hematemesis, no diarreha, or constipation, No melena or hematochezia   VASCULAR: no edema.       PHYSICAL EXAM:  T(C): 36.9 (07-20-21 @ 13:00), Max: 37 (07-19-21 @ 17:00)  HR: 98 (07-20-21 @ 13:00) (76 - 98)  BP: 100/68 (07-20-21 @ 13:00) (98/69 - 102/73)  RR: 18 (07-20-21 @ 13:00) (18 - 20)  SpO2: 100% (07-20-21 @ 13:00) (93% - 100%)  Wt(kg): --  I&O's Summary    19 Jul 2021 07:01  -  20 Jul 2021 07:00  --------------------------------------------------------  IN: 4290 mL / OUT: 4500 mL / NET: -210 mL        Appearance: Nad , a&ox 2-3   Cardiovascular: Normal S1 S2,RRR, No JVD, No murmurs  Respiratory: Lungs clear to auscultation	  Gastrointestinal:  Soft, Non-tender, + BS	  Extremities: Normal range of motion, No clubbing, cyanosis or edema      HOME MEDICATIONS:  aspirin 81 mg oral delayed release tablet: 1 tab(s) orally once a day (29 Dec 2020 18:37)  atorvastatin 80 mg oral tablet: 1 tab(s) orally once a day (at bedtime) (29 Dec 2020 18:37)  epoetin martine: injectable once a month (29 Dec 2020 18:37)  ferrous sulfate 325 mg (65 mg elemental iron) oral tablet: 1 tab(s) orally 3 times a day (29 Dec 2020 18:37)  gabapentin 100 mg oral capsule: 1 cap(s) orally once a day (29 Dec 2020 18:37)  nortriptyline 25 mg oral capsule: 1 cap(s) orally once a day (at bedtime) (29 Dec 2020 18:37)  pantoprazole 40 mg oral delayed release tablet: 1 tab(s) orally once a day (before a meal) (29 Dec 2020 18:37)  Toujeo SoloStar 300 units/mL subcutaneous solution: 60 unit(s) subcutaneous once a day (at bedtime) (08 Jan 2021 12:41)      MEDICATIONS  (STANDING):  aspirin enteric coated 81 milliGRAM(s) Oral daily  atorvastatin 80 milliGRAM(s) Oral at bedtime  cadexomer iodine 0.9% Gel 1 Application(s) Topical daily  chlorhexidine 0.12% Liquid 15 milliLiter(s) Oral Mucosa two times a day  chlorhexidine 2% Cloths 1 Application(s) Topical <User Schedule>  dextrose 40% Gel 15 Gram(s) Oral once  dextrose 5%. 1000 milliLiter(s) (50 mL/Hr) IV Continuous <Continuous>  dextrose 5%. 1000 milliLiter(s) (100 mL/Hr) IV Continuous <Continuous>  dextrose 50% Injectable 25 Gram(s) IV Push once  dextrose 50% Injectable 12.5 Gram(s) IV Push once  dextrose 50% Injectable 25 Gram(s) IV Push once  dronabinol 2.5 milliGRAM(s) Oral two times a day  ferrous    sulfate 325 milliGRAM(s) Oral three times a day  folic acid 1 milliGRAM(s) Oral daily  glucagon  Injectable 1 milliGRAM(s) IntraMuscular once  heparin   Injectable 5000 Unit(s) SubCutaneous every 12 hours  insulin glargine Injectable (LANTUS) 5 Unit(s) SubCutaneous at bedtime  insulin lispro (ADMELOG) corrective regimen sliding scale   SubCutaneous three times a day before meals  insulin lispro (ADMELOG) corrective regimen sliding scale   SubCutaneous at bedtime  lactated ringers. 1000 milliLiter(s) (50 mL/Hr) IV Continuous <Continuous>  lidocaine   Patch 1 Patch Transdermal daily  midodrine 30 milliGRAM(s) Oral every 8 hours  Nephro-kristi 1 Tablet(s) Oral daily  pantoprazole  Injectable 40 milliGRAM(s) IV Push daily  sevelamer carbonate 1600 milliGRAM(s) Oral three times a day  ticagrelor 60 milliGRAM(s) Oral every 12 hours      TELEMETRY: 	    ECG:  	  RADIOLOGY:   DIAGNOSTIC TESTING:  [ ] Echocardiogram:  [ ]  Catheterization:  [ ] Stress Test:    OTHER: 	    LABS:	 	                                12.1   9.10  )-----------( 220      ( 20 Jul 2021 06:41 )             40.4     07-20    135  |  93<L>  |  42<H>  ----------------------------<  154<H>  3.4<L>   |  22  |  9.01<H>    Ca    8.6      20 Jul 2021 06:40    TPro  6.0  /  Alb  2.0<L>  /  TBili  0.8  /  DBili  x   /  AST  29  /  ALT  13  /  AlkPhos  105  07-19

## 2021-07-20 NOTE — PROGRESS NOTE ADULT - SUBJECTIVE AND OBJECTIVE BOX
INTERVAL HPI/OVERNIGHT EVENTS:  Patient had PD in progress earlier today  Wife is now bedside .He reports currently feeling OK, no recurrent hiccups, confirmed with RN     MEDICATIONS  (STANDING):  aspirin enteric coated 81 milliGRAM(s) Oral daily  atorvastatin 80 milliGRAM(s) Oral at bedtime  cadexomer iodine 0.9% Gel 1 Application(s) Topical daily  chlorhexidine 0.12% Liquid 15 milliLiter(s) Oral Mucosa two times a day  chlorhexidine 2% Cloths 1 Application(s) Topical <User Schedule>  dextrose 40% Gel 15 Gram(s) Oral once  dextrose 5%. 1000 milliLiter(s) (50 mL/Hr) IV Continuous <Continuous>  dextrose 5%. 1000 milliLiter(s) (100 mL/Hr) IV Continuous <Continuous>  dextrose 50% Injectable 25 Gram(s) IV Push once  dextrose 50% Injectable 12.5 Gram(s) IV Push once  dextrose 50% Injectable 25 Gram(s) IV Push once  dronabinol 2.5 milliGRAM(s) Oral two times a day  ferrous    sulfate 325 milliGRAM(s) Oral three times a day  folic acid 1 milliGRAM(s) Oral daily  glucagon  Injectable 1 milliGRAM(s) IntraMuscular once  heparin   Injectable 5000 Unit(s) SubCutaneous every 12 hours  insulin glargine Injectable (LANTUS) 5 Unit(s) SubCutaneous at bedtime  insulin lispro (ADMELOG) corrective regimen sliding scale   SubCutaneous three times a day before meals  insulin lispro (ADMELOG) corrective regimen sliding scale   SubCutaneous at bedtime  lactated ringers. 1000 milliLiter(s) (50 mL/Hr) IV Continuous <Continuous>  lidocaine   Patch 1 Patch Transdermal daily  midodrine 30 milliGRAM(s) Oral every 8 hours  Nephro-kristi 1 Tablet(s) Oral daily  pantoprazole  Injectable 40 milliGRAM(s) IV Push daily  sevelamer carbonate 1600 milliGRAM(s) Oral three times a day  ticagrelor 60 milliGRAM(s) Oral every 12 hours    MEDICATIONS  (PRN):  acetaminophen   Tablet .. 650 milliGRAM(s) Oral every 6 hours PRN Moderate Pain (4 - 6)  valproate sodium IVPB 125 milliGRAM(s) IV Intermittent every 8 hours PRN agitation      Allergies    doxazosin (Other)    Intolerances        Review of Systems:    General:  No fevers or chills    ENT: no dysphagia though reports appetite not good   CV:  No chest pain reported   Resp: No cough or wheezing  GI: had BM       Vital Signs Last 24 Hrs  T(C): 36.9 (20 Jul 2021 13:00), Max: 37 (19 Jul 2021 17:00)  T(F): 98.4 (20 Jul 2021 13:00), Max: 98.6 (19 Jul 2021 17:00)  HR: 98 (20 Jul 2021 13:00) (76 - 98)  BP: 100/68 (20 Jul 2021 13:00) (98/69 - 102/73)  BP(mean): --  RR: 18 (20 Jul 2021 13:00) (18 - 20)  SpO2: 100% (20 Jul 2021 13:00) (93% - 100%)    PHYSICAL EXAM:    Constitutional: awake alert and in NAD, non toxic in appearance   Respiratory: anterior chest wall clear to auscultation  Cardiovascular: S1 and S2, RRR,   Extremities: No peripheral edema  Neurological: alert, responsive   Psychiatric: Normal mood, normal affect  Skin: No visible rashes      LABS:                        12.1   9.10  )-----------( 220      ( 20 Jul 2021 06:41 )             40.4     07-20    135  |  93<L>  |  42<H>  ----------------------------<  154<H>  3.4<L>   |  22  |  9.01<H>    Ca    8.6      20 Jul 2021 06:40    TPro  6.0  /  Alb  2.0<L>  /  TBili  0.8  /  DBili  x   /  AST  29  /  ALT  13  /  AlkPhos  105  07-19        LIVER FUNCTIONS - ( 19 Jul 2021 07:00 )  Alb: 2.0 g/dL / Pro: 6.0 g/dL / ALK PHOS: 105 U/L / ALT: 13 U/L / AST: 29 U/L / GGT: x             RADIOLOGY & ADDITIONAL TESTS:

## 2021-07-20 NOTE — PROGRESS NOTE ADULT - ASSESSMENT
57 yo M DM, , CHF, CAD with ESRD on PD, had stay about 7 months ago s/p cardiac stent, wire broke, had to undergo sternotomy, 2 stays soon after that for sepsis unclear cause, now returns a few days ago with fever, hypotension, weak, report of cough. He required pressors in micu now off, cultures of blood and pd fluid are no growth, exam not revealing.   Source of presumed sepsis is not readily apparent.   CXR shows no infiltrate   Has dry gangrene of right 3rd toe but that is not a source of sepsis.  He was found to have subtle infiltrates on ct chest, treated with Cefepime, received abx 7/6-7/11, now dcd due to concern of contributing to encephalopathy.   MRI spine not consistent with infection  KLAUDIA: Overall thickened valves seen however, no evidence of valvular vegetation is seen.  MRI Brain: old lacunar basal ganglia infarcts  vitals reviewed he remains afebrile  wbc wnl   7/10 blood cx no growth  7/14 blood cx no growth to date  Peritoneal fluid no growth   he complete a course of cefepime   no signs of infection at present    plan   continue supportive care as per medicine, neurology, nephrology and cardiology   Re consult us as needed

## 2021-07-20 NOTE — PROGRESS NOTE ADULT - SUBJECTIVE AND OBJECTIVE BOX
[facility]      Seen earlier today, and examined.  - Today, patient is without complaints.           *****MEDICATIONS: Current medication reviewed and documented.    MEDICATIONS  (STANDING):  aspirin enteric coated 81 milliGRAM(s) Oral daily  atorvastatin 80 milliGRAM(s) Oral at bedtime  cadexomer iodine 0.9% Gel 1 Application(s) Topical daily  chlorhexidine 0.12% Liquid 15 milliLiter(s) Oral Mucosa two times a day  chlorhexidine 2% Cloths 1 Application(s) Topical <User Schedule>  dextrose 40% Gel 15 Gram(s) Oral once  dextrose 5%. 1000 milliLiter(s) (50 mL/Hr) IV Continuous <Continuous>  dextrose 5%. 1000 milliLiter(s) (100 mL/Hr) IV Continuous <Continuous>  dextrose 50% Injectable 25 Gram(s) IV Push once  dextrose 50% Injectable 12.5 Gram(s) IV Push once  dextrose 50% Injectable 25 Gram(s) IV Push once  dronabinol 2.5 milliGRAM(s) Oral two times a day  ferrous    sulfate 325 milliGRAM(s) Oral three times a day  folic acid 1 milliGRAM(s) Oral daily  glucagon  Injectable 1 milliGRAM(s) IntraMuscular once  heparin   Injectable 5000 Unit(s) SubCutaneous every 12 hours  insulin glargine Injectable (LANTUS) 5 Unit(s) SubCutaneous at bedtime  insulin lispro (ADMELOG) corrective regimen sliding scale   SubCutaneous three times a day before meals  insulin lispro (ADMELOG) corrective regimen sliding scale   SubCutaneous at bedtime  lactated ringers. 1000 milliLiter(s) (50 mL/Hr) IV Continuous <Continuous>  lidocaine   Patch 1 Patch Transdermal daily  midodrine 30 milliGRAM(s) Oral every 8 hours  Nephro-kristi 1 Tablet(s) Oral daily  pantoprazole  Injectable 40 milliGRAM(s) IV Push daily  sevelamer carbonate 1600 milliGRAM(s) Oral three times a day  ticagrelor 60 milliGRAM(s) Oral every 12 hours    MEDICATIONS  (PRN):  acetaminophen   Tablet .. 650 milliGRAM(s) Oral every 6 hours PRN Moderate Pain (4 - 6)  valproate sodium IVPB 125 milliGRAM(s) IV Intermittent every 8 hours PRN agitation          ***** VITAL SIGNS:  T(F): 98.3 (21 @ 17:07), Max: 98.4 (21 @ 13:00)  HR: 98 (21 @ 17:07) (76 - 99)  BP: 104/76 (21 @ 17:07) (98/69 - 104/76)  RR: 18 (21 @ 17:07) (18 - 20)  SpO2: 96% (21 @ 17:07) (93% - 100%)  Wt(kg): --  ,   I&O's Summary    2021 07:01  -  2021 07:00  --------------------------------------------------------  IN: 4290 mL / OUT: 4500 mL / NET: -210 mL    2021 07:01  -  2021 20:33  --------------------------------------------------------  IN: 340 mL / OUT: 0 mL / NET: 340 mL             *****PHYSICAL EXAM: lethargic   responds after much coercion      alert oriented x 2 ( does not know name of hospital think he is at [facility]) does not know the date   delyaed responses    attention poor  comprehension  fair can follow 1 step commands   does answer some questions intermittently   EOmi fundi not visualized   no nystagmus VFF to confrontation  Tongue is midline  Palate elevates symmetrically   Moving both upper ext minimally  LE with limited mvt   does wiggles toes b/l     Gait not assessed.            *****LAB AND IMAGIN.1   9.10  )-----------( 220      ( 2021 06:41 )             40.4               07-    135  |  93<L>  |  42<H>  ----------------------------<  154<H>  3.4<L>   |  22  |  9.01<H>    Ca    8.6      2021 06:40    TPro  6.0  /  Alb  2.0<L>  /  TBili  0.8  /  DBili  x   /  AST  29  /  ALT  13  /  AlkPhos  105  07-19                         [All pertinent recent Imaging/Reports reviewed]    eeg     The findings of generalized periodic discharges with triphasic morphology are typically seen in metabolic encephalopathy, but can, in certain clinical stiuations, increase the risk for seizures. Clinical correlation advised.  There is evidence for mild to moderate multifocal/diffuse nonspecific cerebral dysfunction.  There were no seizures recorded.          *****A S S E S S M E N T   A N D   P L A N :    55M PMH ESRD on PD, DM on insulin, CHF EF 25-30% CAD s/p CABG x4,  Patient states that for the last 2 days PTA he has been having increased cough and sweating. He endorsed chills but no measured fevers at home. He denies any sick contacts or recent travel. Patient states that he fell few days ago with no head trauma. Of note patient has right toe gangrenous lesion that has been present for several months, He denies any trauma or pain at the site.       In the ED, Patient was found to be hypotensive to 70s. Patient was given midodrine 10mg x1 and bedside POCUS was completed showing poor cardiac function with diffuse hypokinesis. Patient was unable to maintain adequate SBPs  so required micu admission and pressor support.   Called to eval ams       Problem/Recommendations 1:  ams likely multifactorial metabolic encephalopathy started immediately after cefepime, also other factors include poor po intake, sleep wake cycle disruption hyponatremia   correct metabolic derangements   nephrovite added   thiamine 500 iv q h   mri mra < from: MR Head w/wo IV Cont (21 @ 17:44) >  The anterior circulation demonstrates intact petrous, cavernous and clinoid segments of the internal carotid arteries.  The anterior cerebral arteries are intact and symmetric. An anterior communicating artery is demonstrated.  The middle cerebral arteries demonstrate intact initial M1 and M2 segments. There are symmetric intact peripheral Sylvian branches.    The posterior circulation demonstrates intact vertebral, basilar and posterior cerebral arteries.    No abnormal vessel termination, vascular malformation or intracranial aneurysm is recognized.        IMPRESSION:    MRI BRAIN: minimal periventricular white matter ischemia. Old lacunar infarctions are seen within the BILATERAL basal ganglia.    MRA brain:   Unremarkable MR angiography of the intracranial circulation.      < end of copied text >     limit sedatives   7/15 mental status with improvement    seemed lethargic  gagging     mental status improved     Problem/Recommendations 2:  ecchymosis of the finger tips   r/o emboli   derm eval     eleonora no evidence for endocarditis         Problem/Recommendations 3: poor nutritional intake severe protein caloric malnutrition   encourage po intake   marinol as per gi     nutrition consult ? nephrovite for supplementation   thiamine daily       oob to chair to mobilize     Thank you for allowing me to participate in the care of this patient. Will continue to follow patient periodically. Please do not hesitate to call me if you have any  questions or if there has been a change in patients neurological status     ________________  Grisel Lainez MD  [facility] (PN)[facility]  [phone]      33 minutes spent on total encounter; more than 50 % of the visit was  spent counseling about plan of care, compliance to diet/exercise and medication regimen and or  coordinating care by the attending physician.      It is advised that stroke patients follow up with CARLYLE Pena @ [phone] in 1- 2 weeks.   Others please follow up with Dr. Michael Nissenbaum [phone]

## 2021-07-20 NOTE — PROGRESS NOTE ADULT - ASSESSMENT
56 m with    Sepsis  - off antibiotic   - toe gangrene  - Podiatry follow  - ID follow   - KLAUDIA noted    COPD  - 2L NC overnight because he becomes apneic, sats well  - Sats well on RA while awake    CAD s/p CABG   - Hypotension likely in the setting of septic shock   - midodrine dose increased to 30 mg TID to match home dose  - Previous echo with reduced EF  - c/w DAPT  - Cardiology follow    CHF cr systolic  - cardiology follow  - EP eval re AICD    Peritoneal Dialysis   - nephrology follow PD, recommend 4x a day  - patient on midodrine 30 q8h at home, continue here  - Holding home metoprolol 25 q12hr in setting of hypotension    Diabetes   - HA1C 6.1 on 7/6 suggesting prediabetes  - Tujeo 60 units at bedtime and Victoza at home, started on 30 units at bedtime, adjust as needed based on patient PO intake  - added 2 units insulin pre-meal    Hiccups  - GI follow  - hold Ativan 2 to sedation    Incontinence dermatitis  - wound care    Toe gangrene  - Podiatry follow  - Vascular evaluation noted  - PONCHO/PVR noted  - Angiogram if agrees    Finger gangrenous area  - Plastic evaluation    Vertebral fracture T12  - Ortho eval noted   - MRI spine noted  - No intervention    Confusion  - possible delirium  - Psych follow  - Neurology follow  - EEG noted    Palliative eval re [facility]    d/w son Joaquim over phone at length QA    DCP in progress    Pipe Beck MD pager [phone]

## 2021-07-20 NOTE — PROGRESS NOTE ADULT - ASSESSMENT
56 M PMH ESRD on PD, DM on insulin, CHF EF 25-30% CAD s/p CABG x4 pw with hypotension with fever. Encephalopathy likely related to cefepime ? no obvious source of sepsis apparent as of now, KLAUDIA: Overall thickened valves seen however, no evidence of valvular vegetation is seen.     Hypotension:  on Midodrine for hemodynamic support:  BPs soft at times; acceptable for now   ESRD on PD. Patient TW 65 kg.    Hyponatremia- improving today   ID-Blood cx and PD fluid cx -   off IV antibiotics   Hyperphosphatemia:  remains >goal as of late; binders increased     RECOMMENDATIONS   - c/w CCPD orders with dianeal 1.5 to limit UF and ultimately hypotension    - continue Renvela to 1,600 mg PO TID  - continue Midodrine  30mg po q 8 hours   - Ongoing neuro/psych eval   - strict I & Os  -Cards eval and ID eval noted     [facility]   [id number]        56 M PMH ESRD on PD, DM on insulin, CHF EF 25-30% CAD s/p CABG x4 pw with hypotension with fever. Encephalopathy likely related to cefepime ? no obvious source of sepsis apparent as of now, KLAUDIA: Overall thickened valves seen however, no evidence of valvular vegetation is seen.     Hypotension:  on Midodrine for hemodynamic support:  BPs soft at times; acceptable for now   ESRD on PD. Patient TW 65 kg.    Hyponatremia- improving today   ID-Blood cx and PD fluid cx -   off IV antibiotics   Hyperphosphatemia:  remains >goal as of late; binders increased   Hypokalemia    RECOMMENDATIONS   - c/w CCPD orders with dianeal 1.5 to limit UF and ultimately hypotension    - will supplement low K+ with 40meq x1  - continue Renvela to 1,600 mg PO TID  - continue Midodrine  30mg po q 8 hours   - Ongoing neuro/psych eval   - strict I & Os  -Cards eval and ID eval noted     [facility]   [phone]        56 M PMH ESRD on PD, DM on insulin, CHF EF 25-30% CAD s/p CABG x4 pw with hypotension with fever. Encephalopathy likely related to cefepime ? no obvious source of sepsis apparent as of now, KLAUDIA: Overall thickened valves seen however, no evidence of valvular vegetation is seen.     Hypotension:  on Midodrine for hemodynamic support:  BPs soft at times; acceptable for now   ESRD on PD. Patient TW 65 kg.    Hyponatremia- improving today   ID-Blood cx and PD fluid cx -   off IV antibiotics   Hyperphosphatemia:  remains >goal as of late; binders increased   Hypokalemia  Failure to thrive     RECOMMENDATIONS   - c/w CCPD orders with dianeal 1.5 to limit UF and ultimately hypotension    - will supplement low K+ with 40meq x1  - continue Renvela to 1,600 mg PO TID;  Check Phos in am   - continue Midodrine  30mg po q 8 hours   - Ongoing neuro/psych eval.  refusing meds   -Cards eval and ID eval noted ;  Off abx     [facility]   [phone]

## 2021-07-20 NOTE — PROGRESS NOTE ADULT - ASSESSMENT
56 male with ESRD with peritoneal dialysis ,CHF, CAD who had report of hiccups. There has been no recurrent hiccups reported by patient or staff . Documented BM today .     Recommendations ;  Encourage po diet, with renal restrictions per nutrition  Will sign off, please re call for any GI issues or concerns     BRINA [facility]  [address] [place] [address]  [address] [phone]

## 2021-07-21 NOTE — CONSULT NOTE ADULT - SUBJECTIVE AND OBJECTIVE BOX
HPI:  55M PMH ESRD on PD, DM on insulin, CHF EF 25-30% CAD s/p CABG x4, underwent cardiac cath and stent and wire broke in heart sternotomy. Patient states that for the last 2 days he has been having increased cough and sweating. He endorsed chills but no measured fevers at home. He denies any sick contacts or recent travel. Patient states that he fell few days ago with no head trauma. Of note patient has right toe gangrenous lesion that has been present for several months, He denies any trauma or pain at the site.       In the ED, Patient was found to be hypotensive to 70s. Patient was given midodrine 10mg x1 and bedside POCUS was completed showing poor cardiac function with diffuse hypokinesis. Patient was unable to maintain adequate SBPs despite midodrine so was started on levophed for pressor support. Admitted to MICU hypotension requiring for pressor support likely  2/2 to sepsis.     (06 Jul 2021 07:59)    PERTINENT PM/SXH:   Diabetes    CAD, multiple vessel    ESRD (end stage renal disease) on dialysis    HTN (hypertension)      No significant past surgical history    S/P CABG (coronary artery bypass graft)      FAMILY HISTORY:  FHx: lung cancer (Sibling)  bother    FH: diabetes mellitus (Sibling)  father      ITEMS NOT CHECKED ARE NOT PRESENT    SOCIAL HISTORY:   Significant other/partner[x Wife So  ]  Children[ ]  Taoism/Spirituality:  Substance hx:  [ ]   Tobacco hx:  [ ]   Alcohol hx: [ ]   Home Opioid hx:  [ ] I-Stop Reference No:  Living Situation: [x ]Home  [ ]Long term care  [ ]Rehab [ ]Other    ADVANCE DIRECTIVES:    DNR    MOLST  [ ]    Living Will  [ ]     DECISION MAKER(s):  [ ] Health Care Proxy(s)  [x ] Surrogate(s)  [ ] Guardian           Name(s): Phone Number(s): MICHELLE [phone]    BASELINE (I)ADL(s) (prior to admission):  Fannin: [ ]Total  [x ] Moderate [ ]Dependent    Allergies    doxazosin (Other)    Intolerances    MEDICATIONS  (STANDING):  aspirin enteric coated 81 milliGRAM(s) Oral daily  atorvastatin 80 milliGRAM(s) Oral at bedtime  cadexomer iodine 0.9% Gel 1 Application(s) Topical daily  chlorhexidine 0.12% Liquid 15 milliLiter(s) Oral Mucosa two times a day  chlorhexidine 2% Cloths 1 Application(s) Topical <User Schedule>  dextrose 40% Gel 15 Gram(s) Oral once  dextrose 5%. 1000 milliLiter(s) (50 mL/Hr) IV Continuous <Continuous>  dextrose 5%. 1000 milliLiter(s) (100 mL/Hr) IV Continuous <Continuous>  dextrose 50% Injectable 25 Gram(s) IV Push once  dextrose 50% Injectable 12.5 Gram(s) IV Push once  dextrose 50% Injectable 25 Gram(s) IV Push once  dronabinol 2.5 milliGRAM(s) Oral two times a day  ferrous    sulfate 325 milliGRAM(s) Oral three times a day  folic acid 1 milliGRAM(s) Oral daily  glucagon  Injectable 1 milliGRAM(s) IntraMuscular once  heparin   Injectable 5000 Unit(s) SubCutaneous every 12 hours  insulin glargine Injectable (LANTUS) 5 Unit(s) SubCutaneous at bedtime  insulin lispro (ADMELOG) corrective regimen sliding scale   SubCutaneous three times a day before meals  insulin lispro (ADMELOG) corrective regimen sliding scale   SubCutaneous at bedtime  lactated ringers. 1000 milliLiter(s) (50 mL/Hr) IV Continuous <Continuous>  lidocaine   Patch 1 Patch Transdermal daily  midodrine 30 milliGRAM(s) Oral every 8 hours  Nephro-kristi 1 Tablet(s) Oral daily  pantoprazole  Injectable 40 milliGRAM(s) IV Push daily  potassium chloride    Tablet ER 40 milliEquivalent(s) Oral every 4 hours  sevelamer carbonate 1600 milliGRAM(s) Oral three times a day  thiamine IVPB 500 milliGRAM(s) IV Intermittent daily  ticagrelor 60 milliGRAM(s) Oral every 12 hours    MEDICATIONS  (PRN):  acetaminophen   Tablet .. 650 milliGRAM(s) Oral every 6 hours PRN Moderate Pain (4 - 6)  valproate sodium IVPB 125 milliGRAM(s) IV Intermittent every 8 hours PRN agitation    PRESENT SYMPTOMS: [x]Unable to obtain due to poor mentation   Source if other than patient:  [ ]Family   [ ]Team     Pain: [ ]yes [ x]no  QOL impact -   Location -                    Aggravating factors -  Quality -  Radiation -  Timing-  Severity (0-10 scale):  Minimal acceptable level (0-10 scale):     PAIN AD Score: 0    [email or web address] (press ctrl +  left click to view)    Dyspnea:                           [ ]Mild [ ]Moderate [ ]Severe  Anxiety:                             [ ]Mild [ ]Moderate [ ]Severe  Fatigue:                             [ ]Mild [ ]Moderate [ ]Severe  Nausea:                             [ ]Mild [ ]Moderate [ ]Severe  Loss of appetite:              [ ]Mild [ ]Moderate [ ]Severe  Constipation:                    [ ]Mild [ ]Moderate [ ]Severe    Other Symptoms:  [ ]All other review of systems negative     Palliative Performance Status Version 2:       20  %    [email or web address]/files/[email or web address]_[email or web address]  PHYSICAL EXAM:  Vital Signs Last 24 Hrs  T(C): 36.8 (21 Jul 2021 09:33), Max: 36.9 (20 Jul 2021 13:00)  T(F): 98.2 (21 Jul 2021 09:33), Max: 98.5 (21 Jul 2021 04:00)  HR: 90 (21 Jul 2021 09:33) (90 - 100)  BP: 109/76 (21 Jul 2021 09:33) (100/65 - 109/76)  BP(mean): --  RR: 18 (21 Jul 2021 09:33) (18 - 18)  SpO2: 96% (21 Jul 2021 09:33) (96% - 100%) I&O's Summary    20 Jul 2021 07:01  -  21 Jul 2021 07:00  --------------------------------------------------------  IN: 3300 mL / OUT: 2700 mL / NET: 600 mL    21 Jul 2021 07:01  -  21 Jul 2021 11:46  --------------------------------------------------------  IN: 2500 mL / OUT: 2700 mL / NET: -200 mL      GENERAL: when name is called pt opened his eyes and then closed them.  Unable to answer questions  [x ]Alert to person [ ]Oriented x   [ x]Lethargic  [ ]Cachexia  [ ]Unarousable  [ ]Verbal  [x ]Non-Verbal during interaction    Behavioral:   [ ] Anxiety  [ ] Delirium [ ] Agitation [ ] Other    HEENT:  [ ]Normal   [x ]Dry mouth   [ ]ET Tube/Trach  [ ]Oral lesions    PULMONARY:   [ ]Clear [ ]Tachypnea  [ ]Audible excessive secretions   [ ]Rhonchi        [ ]Right [ ]Left [ ]Bilateral  [ ]Crackles        [ ]Right [ ]Left [ ]Bilateral  [ ]Wheezing     [ ]Right [ ]Left [ ]Bilateral  [ x]Diminished breath sounds [ ]right [ ]left [ x]bilateral    CARDIOVASCULAR:    [x ]Regular [ ]Irregular [ ]Tachy  [ ]Dhaval [ ]Murmur [ ]Other    GASTROINTESTINAL:  [x]Soft  [ x]Distended   [x ]+BS  [x ]Non tender [ ]Tender  [ ]PEG [ ]OGT/ NGT  Last BM:     GENITOURINARY:  [ ]Normal [ ] Incontinent   [ ]Oliguria/Anuria   [ x]Johnson    [ ]Esternal cath    MUSCULOSKELETAL:   [ ]Normal   [ x]Weakness  [x ]Bed/Wheelchair bound [ ]Edema    NEUROLOGIC:   [ ]No focal deficits  [ ]Cognitive impairment  [ ]Dysphagia [ ]Dysarthria [ ]Paresis [ ]Other     SKIN:   [ ]Normal    [ ]Rash  [ ]Pressure ulcer(s)       Present on admission [ ]y [ ]n    CRITICAL CARE:  [ ]Shock Present  [x ]Septic [ ]Cardiogenic [ ]Neurologic [ ]Hypovolemic  [ ]  Vasopressors [ ]  Inotropes   [ ]Respiratory failure present [ ]Mechanical ventilation [ ]Non-invasive ventilatory support [ ]High flow  [ ]Acute  [ ]Chronic [ ]Hypoxic  [ ]Hypercarbic [ ]Other  [ ]Other organ failure     LABS:                        12.1   8.91  )-----------( 206      ( 21 Jul 2021 07:03 )             40.0   07-21    133<L>  |  92<L>  |  38<H>  ----------------------------<  148<H>  3.1<L>   |  22  |  8.48<H>    Ca    8.4      21 Jul 2021 07:01  Phos  5.0     07-21  Mg     1.8     07-21          RADIOLOGY & ADDITIONAL STUDIES:    < from: MR Head w/wo IV Cont (07.16.21 @ 17:44) >  EXAM:  MR ANGIO BRAIN                          EXAM:  MR BRAIN WAW IC                            PROCEDURE DATE:  07/16/2021            INTERPRETATION:  MR brain with and without gadolinium    CLINICAL INFORMATION:   Altered mental status, sepsis    TECHNIQUE:   Sagittal and axial T1-weighted images, axial FLAIR images, axial T2-weighted images, axial gradient echo images and axial diffusion weighted images of the brain were obtained. Following 6 cc of Gadavist administration, 1.5 cc discarded,isotropic volumetric and fast spin echo T1-weighted images were obtained; this data was reformatted using image post processing software in multiple imaging planes.    FINDINGS:   No prior similar studies are available for review.    The brain demonstrates minimal periventricular white matter ischemia. Old lacunar infarctions are seen within the BILATERAL basal ganglia. Following gadolinium administration no abnormal enhancement occurs.  No acute cerebral cortical infarct is found.   No intracranial hemorrhage is recognized.  No mass effect is found in the brain.    The ventricles, sulci and basal cisterns show global atrophy.    The vertebral and internal carotid arteries demonstrate expected flow voids indicating their patency.    The orbits are unremarkable.  The paranasal sinuses are clear.  The nasal cavity appears intact.  The nasopharynx is symmetric.  The central skull base and petrous temporal bones are intact.  The calvarium appears unremarkable.        MR angiography of the intracranial circulation.  (Non gadolinium technique.)      CLINICAL INFORMATION:  stroke; vascular stenosis.    TECHNIQUE:  MR angiography of intracranial circulation was performed using three-dimensional time of flight technique. Post processing angiographic reconstruction of images was performed. This data set was reconstructed as maximum intensity pixel images and displayed in multiple rotations.    FINDINGS:   No comparison similar studies are available.    The anterior circulation demonstrates intact petrous, cavernous and clinoid segments of the internal carotid arteries.  The anterior cerebral arteries are intact and symmetric. An anterior communicating artery is demonstrated.  The middle cerebral arteries demonstrate intact initial M1 and M2 segments. There are symmetric intact peripheral Sylvian branches.    The posterior circulation demonstrates intact vertebral, basilar and posterior cerebral arteries.    No abnormal vessel termination, vascular malformation or intracranial aneurysm is recognized.        IMPRESSION:    MRI BRAIN: minimal periventricular white matter ischemia. Old lacunar infarctions are seen within the BILATERAL basal ganglia.    MRA brain:   Unremarkable MR angiography of the intracranial circulation.    --- End of Report ---                JOSE BOLAND MD; Attending Radiologist  This document has been electronically signed. Jul 16 2021  6:30PM    < end of copied text >      < from: CT Abdomen and Pelvis No Cont (07.11.21 @ 16:15) >  EXAM:  CT ABDOMEN AND PELVIS                            PROCEDURE DATE:  07/11/2021            INTERPRETATION:  CLINICAL INFORMATION: Sepsis. Evaluate for infectious etiology. End-stage renal disease, insulin-dependent diabetes mellitus, congestiveheart failure with reduced ejection fraction.    COMPARISON: CT abdomen pelvis 1/4/2021.    CONTRAST/COMPLICATIONS:  IV Contrast: None  Oral Contrast: None  Complications: None    PROCEDURE:  CT of the Abdomen and Pelvis was performed.  Sagittal and coronal reformats were performed.    FINDINGS:  LOWER CHEST: Trace bilateral pleural effusions. Minimal bibasilar compressive atelectasis. Cardiomegaly. Coronary artery calcifications.    LIVER: Within normal limits.  BILE DUCTS: Normal caliber.  GALLBLADDER: Cholelithiasis.  SPLEEN: Within normal limits.  PANCREAS: Within normal limits.  ADRENALS: Within normal limits.  KIDNEYS/URETERS: Within normal limits.    BLADDER: Minimally distended.  REPRODUCTIVE ORGANS: Prostate within normal limits.    BOWEL: No bowel obstruction. Thickening versus under distention of the ascending colon for which correlation with colonoscopy is recommended if not recently performed. Appendix is normal.  PERITONEUM: Moderate volume ascites. Peritoneal dialysis catheter terminates in the pelvis. A few tiny droplets of free air likely related to the catheter.  VESSELS: Extensive atherosclerotic calcifications.  RETROPERITONEUM/LYMPH NODES: No lymphadenopathy.  ABDOMINAL WALL: Anasarca. Small umbilical hernia containing fluid and foci of air.  BONES: T12 compression fracture new from prior imaging in January 2021. Correlate with lumbar spine MR same day.    IMPRESSION:    No evidence of an infectious source.    Thickening versus under distention of the ascending colon for which correlation with colonoscopy is recommended if not recently performed.    T12 compression fracture new from prior imaging in January 2021. Correlate with lumbar spine MR same day.    --- End of Report ---              EDWARD GALEANA MD; Resident Radiology  This document has been electronically signed.  JORDAN CHASE MD; Attending Radiologist  This document has been electronically signed. Jul 12 2021 12:00PM    < end of copied text >          PROTEIN CALORIE MALNUTRITION PRESENT: [ ]mild [ ]moderate [ ]severe [ ]underweight [ ]morbid obesity  https://www.andeal.org/vault/2440/web/files/ONC/Table_Clinical%20Characteristics%20to%20Document%20Malnutrition-White%20JV%20et%20al%202012.pdf    Height (cm): 170.2 (07-15-21 @ 15:14), 170.2 (12-29-20 @ 13:29), 170.2 (12-14-20 @ 09:52)  Weight (kg): 63.5 (07-15-21 @ 15:14), 81.6 (12-29-20 @ 13:29), 76.2 (12-14-20 @ 09:52)  BMI (kg/m2): 21.9 (07-15-21 @ 15:14), 28.2 (12-29-20 @ 13:29), 26.3 (12-14-20 @ 09:52)    [ ]PPSV2 < or = to 30% [ ]significant weight loss  [ ]poor nutritional intake  [ ]anasarca      [ ]Artificial Nutrition      REFERRALS:   [ ]Chaplaincy  [ ]Hospice  [ ]Child Life  [ ]Social Work  [ ]Case management [ ]Holistic Therapy     Goals of Care Document:

## 2021-07-21 NOTE — PROGRESS NOTE ADULT - ASSESSMENT
56 M PMH ESRD on PD, DM on insulin, CHF EF 25-30% CAD s/p CABG x4 pw with hypotension with fever. Encephalopathy likely related to cefepime ? no obvious source of sepsis apparent as of now, KLAUDIA: Overall thickened valves seen however, no evidence of valvular vegetation is seen.     Hypotension:  on Midodrine for hemodynamic support:  BPs soft at times; acceptable for now   ESRD on PD. Patient TW 65 kg.    Hyponatremia  ID-Blood cx and PD fluid cx -   off IV antibiotics   Hyperphosphatemia:  remains >goal as of late; binders increased   Hypokalemia  Failure to thrive     RECOMMENDATIONS   - c/w CCPD orders with dianeal 1.5 to limit UF and ultimately hypotension    - will supplement low K+ with 40meq x2  - continue Renvela to 1,600 mg PO TID;   - continue Midodrine  30mg po q 8 hours   - Ongoing neuro/psych eval.  continues to refuse meds   - a/w speech and swallow evaluation   -Cards eval and ID eval noted ;  Off abx     [facility]   [phone]

## 2021-07-21 NOTE — PROGRESS NOTE ADULT - SUBJECTIVE AND OBJECTIVE BOX
[facility]      Seen earlier today, and examined.  - Today, patient is without complaints.           *****MEDICATIONS: Current medication reviewed and documented.    MEDICATIONS  (STANDING):  aspirin enteric coated 81 milliGRAM(s) Oral daily  atorvastatin 80 milliGRAM(s) Oral at bedtime  cadexomer iodine 0.9% Gel 1 Application(s) Topical daily  chlorhexidine 0.12% Liquid 15 milliLiter(s) Oral Mucosa two times a day  chlorhexidine 2% Cloths 1 Application(s) Topical <User Schedule>  dextrose 40% Gel 15 Gram(s) Oral once  dextrose 5%. 1000 milliLiter(s) (50 mL/Hr) IV Continuous <Continuous>  dextrose 5%. 1000 milliLiter(s) (100 mL/Hr) IV Continuous <Continuous>  dextrose 50% Injectable 25 Gram(s) IV Push once  dextrose 50% Injectable 12.5 Gram(s) IV Push once  dextrose 50% Injectable 25 Gram(s) IV Push once  dronabinol 2.5 milliGRAM(s) Oral two times a day  ferrous    sulfate 325 milliGRAM(s) Oral three times a day  folic acid 1 milliGRAM(s) Oral daily  glucagon  Injectable 1 milliGRAM(s) IntraMuscular once  heparin   Injectable 5000 Unit(s) SubCutaneous every 12 hours  insulin glargine Injectable (LANTUS) 5 Unit(s) SubCutaneous at bedtime  insulin lispro (ADMELOG) corrective regimen sliding scale   SubCutaneous three times a day before meals  insulin lispro (ADMELOG) corrective regimen sliding scale   SubCutaneous at bedtime  lactated ringers. 1000 milliLiter(s) (50 mL/Hr) IV Continuous <Continuous>  lidocaine   Patch 1 Patch Transdermal daily  midodrine 30 milliGRAM(s) Oral every 8 hours  Nephro-kristi 1 Tablet(s) Oral daily  pantoprazole  Injectable 40 milliGRAM(s) IV Push daily  sevelamer carbonate 1600 milliGRAM(s) Oral three times a day  thiamine IVPB 500 milliGRAM(s) IV Intermittent daily  ticagrelor 60 milliGRAM(s) Oral every 12 hours    MEDICATIONS  (PRN):  acetaminophen   Tablet .. 650 milliGRAM(s) Oral every 6 hours PRN Moderate Pain (4 - 6)  valproate sodium IVPB 125 milliGRAM(s) IV Intermittent every 8 hours PRN agitation          ***** VITAL SIGNS:  T(F): 97.9 (07-21-21 @ 22:45), Max: 99 (21 @ 21:07)  HR: 108 (21 @ 22:45) (90 - 109)  BP: 94/69 (21 @ 22:45) (94/69 - 109/76)  RR: 18 (21 @ 22:45) (18 - 18)  SpO2: 98% (21 @ 22:45) (96% - 98%)  Wt(kg): --  ,   I&O's Summary    2021 07:01  -  2021 07:00  --------------------------------------------------------  IN: 3300 mL / OUT: 2700 mL / NET: 600 mL    2021 07:01  -  2021 23:36  --------------------------------------------------------  IN: 7580 mL / OUT: 7800 mL / NET: -220 mL             *****PHYSICAL EXAM: responds after much coercion      alert oriented x 2 ( does not know name of hospital think he is at [facility]) does not know the date   delyaed responses    attention poor  comprehension  fair can follow 1 step commands   does answer some questions intermittently   EOmi fundi not visualized   no nystagmus VFF to confrontation  Tongue is midline  Palate elevates symmetrically   Moving both upper ext minimally  LE with limited mvt   does wiggles toes b/l     Gait not assessed.          *****LAB AND IMAGIN.1   8.91  )-----------( 206      ( 2021 07:03 )             40.0               07    133<L>  |  92<L>  |  38<H>  ----------------------------<  148<H>  3.1<L>   |  22  |  8.48<H>    Ca    8.4      2021 07:01  Phos  5.0     07-21  Mg     1.8     -                           [All pertinent recent Imaging/Reports reviewed]           *****A S S E S S M E N T   A N D   P L A N :      55M PMH ESRD on PD, DM on insulin, CHF EF 25-30% CAD s/p CABG x4,  Patient states that for the last 2 days PTA he has been having increased cough and sweating. He endorsed chills but no measured fevers at home. He denies any sick contacts or recent travel. Patient states that he fell few days ago with no head trauma. Of note patient has right toe gangrenous lesion that has been present for several months, He denies any trauma or pain at the site.       In the ED, Patient was found to be hypotensive to 70s. Patient was given midodrine 10mg x1 and bedside POCUS was completed showing poor cardiac function with diffuse hypokinesis. Patient was unable to maintain adequate SBPs  so required micu admission and pressor support.   Called to eval ams       Problem/Recommendations 1:  ams likely multifactorial metabolic encephalopathy started immediately after cefepime, also other factors include poor po intake, sleep wake cycle disruption hyponatremia   correct metabolic derangements   nephrovite added   thiamine 500 iv q h       limit sedatives   7/15 mental status with improvement    seemed lethargic  gagging     mental status improved    mental status remains stable, still with processing delay     Problem/Recommendations 2:  ecchymosis of the finger tips   r/o emboli   derm eval     eleonora no evidence for endocarditis   poor po intake          Problem/Recommendations 3: poor nutritional intake severe protein caloric malnutrition   encourage po intake   marinol as per gi     nutrition consult ? nephrovite for supplementation   thiamine daily       oob to chair to mobilize     Thank you for allowing me to participate in the care of this patient. Will continue to follow patient periodically. Please do not hesitate to call me if you have any  questions or if there has been a change in patients neurological status     ________________  Grisel Lainez MD  [facility]  [phone]      33 minutes spent on total encounter; more than 50 % of the visit was  spent counseling about plan of care, compliance to diet/exercise and medication regimen and or  coordinating care by the attending physician.      It is advised that stroke patients follow up with CARLYLE Pena @ [phone] in 1- 2 weeks.   Others please follow up with Dr. Michael Nissenbaum [phone]

## 2021-07-21 NOTE — CONSULT NOTE ADULT - ASSESSMENT
55 yo Male h/o CAD, CHF, COPD, on PD 2/2 ESRD p/w hypotension 2/2 sepsis likely 2/2 toe gangrene.  Palliative care called for GOC.

## 2021-07-21 NOTE — PROGRESS NOTE ADULT - ASSESSMENT
CATH 11/30/2020:  COMPLICATIONS: The stent was deployed without difficulty. On removal of the  balloon, the shaft broke and the tip of the balloon remained in the vessel. Several attempts were made to snare the balloon unsuccessfully. Dr. Verdugo was notifed who will take the patient to the operating room.  DIAGNOSTIC RECOMMENDATIONS: The patient should continue with the present medications. The patients vessel was stented without difficulty On removal of the balloon, the shaft broke with the baloon stuck in the left main. All attempts to snare the balloon out failed and the patient was taken to the OR by Dr. Arango to remove the balloon  introp eleonora 11/30/20: mild to mod MR, < from: Intra-Operative Transesophageal Echo (11.30.20 @ 17:02) >  Severe global left ventricular systolic dysfunction. Inferior and inferolateral akinesis.  severe hypokinesis of other segments.  Severe global hypokinesia.  Normal left ventricular internal dimensions and wall thicknesses. Moderate diastolic dysfunction (Stage II).  echo 12/17/20: EF 32%, mod MR, Severe global left ventricular systolic dysfunction, moderate pulmonary pressures  echo 12/20/20: EF (Visual Estimate):  25-30 % mild MR, Akinesis of the inferolateral, anterolateral, apical laterlal, inferior, and inferoseptal walls. Severe left ventricular enlargement. No left ventricular thrombus is seen.  Echo 7/7/21: EF 16%, mod - sev MR, mod AS, severe global lv sys dysfx, severe diastolic dysfx, mod pulm htn       a/p  56 M well known to practice with PMH ESRD on PD, DM on insulin, CHF EF 25-30% CAD s/p CABG x4, underwent cardiac cath and stent and wire broke in heart s/p sternotomy p/w septic shock.    #Septic Shock, resolved   -Bcx NGTD 7/10   -s/p iv abx, pressors   -Bedside POCUS notable for biventricular diffuse hypokinesis  -right 2nd toe gangrene, pod f/u, vascular f/u, PONCHO/PVR   -ID f/u   -ELEONORA: no evidence of valvular vegetation is seen.  -med f/u    #Ischemic Cardiomyopathy. Chronic systolic HF  -vol status stable - monitor vol status closely while on IVF   -Repeat echo noted with EF 16%, mod - sev MR, mod AS, severe global lv sys dysfx, severe diastolic dysfx, mod pulm htn   -eps eval for icd if mental status improves, pt has declined icd in the past  -not current candidate for icd   -no bb, acei/arb given hx of orthostatic bp   -continue midodrine for bp support  -cont vol removal w PD     # CAD, s/p CABG, s/p PCI, s/p redo open heart for stent balloon retrieval   -hst elevated, likely demand ischemia in setting of septic shock, ESRD   -c/w asa, Brilinta, statin     # ESRD  -on PD  -renal f/u    #AMS  -improving   -neuro psych f/u    #GOC  -palliative f/u     dvt ppx

## 2021-07-21 NOTE — PROGRESS NOTE ADULT - ASSESSMENT
56 m with    Sepsis  - off antibiotic   - toe gangrene  - Podiatry follow  - ID follow   - KLAUDIA noted    COPD  - 2L NC overnight because he becomes apneic, sats well  - Sats well on RA while awake    CAD s/p CABG   - Hypotension likely in the setting of septic shock   - midodrine dose increased to 30 mg TID to match home dose  - Previous echo with reduced EF  - c/w DAPT  - Cardiology follow    CHF cr systolic  - cardiology follow  - EP eval re AICD    Peritoneal Dialysis   - nephrology follow PD, recommend 4x a day  - patient on midodrine 30 q8h at home, continue here  - Holding home metoprolol 25 q12hr in setting of hypotension    Diabetes   - HA1C 6.1 on 7/6 suggesting prediabetes  - Tujeo 60 units at bedtime and Victoza at home, started on 30 units at bedtime, adjust as needed based on patient PO intake  - added 2 units insulin pre-meal    Hiccups  - GI follow  - hold Ativan 2 to sedation    Incontinence dermatitis  - wound care    Toe gangrene  - Podiatry follow  - Vascular evaluation noted  - PONCHO/PVR   - Angiogram if agrees    Finger gangrenous area  - Plastic evaluation    Vertebral fracture T12  - Ortho eval noted   - MRI spine noted  - No intervention    Confusion  - possible delirium  - Psych follow  - Neurology follow  - EEG noted    Palliative eval re GOC noted    d/w wife at bedside.  QA    DCP in progress    Pipe Beck MD pager [phone]

## 2021-07-21 NOTE — PROVIDER CONTACT NOTE (OTHER) - SITUATION
Pt refusing to take AM meds. However Pt did agree to take midodrine & heparin. After taking midodrine, Pt had a hard time getting water down after swallowing the medication.

## 2021-07-21 NOTE — PROGRESS NOTE ADULT - SUBJECTIVE AND OBJECTIVE BOX
Patient is a 56y old  Male who presents with a chief complaint of Hypotension (21 Jul 2021 14:28)      SUBJECTIVE / OVERNIGHT EVENTS: No new complaints.   Review of Systems  chest pain no  palpitations no  sob no  nausea no  headache no    MEDICATIONS  (STANDING):  aspirin enteric coated 81 milliGRAM(s) Oral daily  atorvastatin 80 milliGRAM(s) Oral at bedtime  cadexomer iodine 0.9% Gel 1 Application(s) Topical daily  chlorhexidine 0.12% Liquid 15 milliLiter(s) Oral Mucosa two times a day  chlorhexidine 2% Cloths 1 Application(s) Topical <User Schedule>  dextrose 40% Gel 15 Gram(s) Oral once  dextrose 5%. 1000 milliLiter(s) (50 mL/Hr) IV Continuous <Continuous>  dextrose 5%. 1000 milliLiter(s) (100 mL/Hr) IV Continuous <Continuous>  dextrose 50% Injectable 25 Gram(s) IV Push once  dextrose 50% Injectable 12.5 Gram(s) IV Push once  dextrose 50% Injectable 25 Gram(s) IV Push once  dronabinol 2.5 milliGRAM(s) Oral two times a day  ferrous    sulfate 325 milliGRAM(s) Oral three times a day  folic acid 1 milliGRAM(s) Oral daily  glucagon  Injectable 1 milliGRAM(s) IntraMuscular once  heparin   Injectable 5000 Unit(s) SubCutaneous every 12 hours  insulin glargine Injectable (LANTUS) 5 Unit(s) SubCutaneous at bedtime  insulin lispro (ADMELOG) corrective regimen sliding scale   SubCutaneous three times a day before meals  insulin lispro (ADMELOG) corrective regimen sliding scale   SubCutaneous at bedtime  lactated ringers. 1000 milliLiter(s) (50 mL/Hr) IV Continuous <Continuous>  lidocaine   Patch 1 Patch Transdermal daily  midodrine 30 milliGRAM(s) Oral every 8 hours  Nephro-kristi 1 Tablet(s) Oral daily  pantoprazole  Injectable 40 milliGRAM(s) IV Push daily  potassium chloride  10 mEq/100 mL IVPB 10 milliEquivalent(s) IV Intermittent every 1 hour  sevelamer carbonate 1600 milliGRAM(s) Oral three times a day  thiamine IVPB 500 milliGRAM(s) IV Intermittent daily  ticagrelor 60 milliGRAM(s) Oral every 12 hours    MEDICATIONS  (PRN):  acetaminophen   Tablet .. 650 milliGRAM(s) Oral every 6 hours PRN Moderate Pain (4 - 6)  valproate sodium IVPB 125 milliGRAM(s) IV Intermittent every 8 hours PRN agitation      Vital Signs Last 24 Hrs  T(C): 36.7 (21 Jul 2021 17:00), Max: 36.9 (20 Jul 2021 21:27)  T(F): 98 (21 Jul 2021 17:00), Max: 98.5 (21 Jul 2021 04:00)  HR: 102 (21 Jul 2021 17:00) (90 - 102)  BP: 96/68 (21 Jul 2021 17:00) (96/68 - 109/76)  BP(mean): --  RR: 18 (21 Jul 2021 17:00) (18 - 18)  SpO2: 97% (21 Jul 2021 17:00) (96% - 100%)    PHYSICAL EXAM:  GENERAL: NAD  HEAD:  Atraumatic, Normocephalic  EYES: EOMI, PERRLA, conjunctiva and sclera clear  NECK: Supple, No JVD  CHEST/LUNG: Clear to auscultation bilaterally; No wheeze  HEART: Regular rate and rhythm; No murmurs, rubs, or gallops  ABDOMEN: Soft, Nontender, Nondistended; Bowel sounds present PD catheter  EXTREMITIES:  2+ Peripheral Pulses, No clubbing, cyanosis, or edema  R 2nd toe gangrene  R thumb gangrene  PSYCH: confused  NEUROLOGY: non-focal      LABS:                        12.1   8.91  )-----------( 206      ( 21 Jul 2021 07:03 )             40.0     07-21    133<L>  |  92<L>  |  38<H>  ----------------------------<  148<H>  3.1<L>   |  22  |  8.48<H>    Ca    8.4      21 Jul 2021 07:01  Phos  5.0     07-21  Mg     1.8     07-21                  RADIOLOGY & ADDITIONAL TESTS:    Imaging Personally Reviewed:    Consultant(s) Notes Reviewed:      Care Discussed with Consultants/Other Providers:

## 2021-07-21 NOTE — PROVIDER CONTACT NOTE (OTHER) - ASSESSMENT
Unable to assess Pt orientation as Pt refuses to answer many questions throughout the night. Pt is alert. Pt initially refusing medication b/c he states "I want you to get in trouble with the doctors.", Pt educated on importance of taking his medication & still refusing. After agreeing, Pt could not get down water used to swallow medication. No additional oral medications were given @ this time

## 2021-07-21 NOTE — PROVIDER CONTACT NOTE (OTHER) - ACTION/TREATMENT ORDERED:
ACP Radha made aware. Medications moved to 12 am. ACP will order psych consult & speech and swallow. Will continue to monitor.

## 2021-07-21 NOTE — PROGRESS NOTE ADULT - SUBJECTIVE AND OBJECTIVE BOX
CARDIOLOGY FOLLOW UP - Dr. Best  Date of Service: 7/21/21  CC: resting comfortably, no cp/sob     Review of Systems:  Constitutional: No fever, weight loss, or fatigue  Respiratory: No cough, wheezing, or hemoptysis, no shortness of breath  Cardiovascular: No chest pain, palpitations, passing out, dizziness, or leg swelling  Gastrointestinal: No abd or epigastric pain.  No nausea, vomiting, or hematemesis; no diarrhea or constipation, no melena or hematochezia  Vascular: no edema       PHYSICAL EXAM:  T(C): 36.7 (07-21-21 @ 13:30), Max: 36.9 (07-20-21 @ 21:27)  HR: 99 (07-21-21 @ 13:30) (90 - 100)  BP: 108/75 (07-21-21 @ 13:30) (100/65 - 109/76)  RR: 18 (07-21-21 @ 13:30) (18 - 18)  SpO2: 97% (07-21-21 @ 13:30) (96% - 100%)  Wt(kg): --  I&O's Summary    20 Jul 2021 07:01  -  21 Jul 2021 07:00  --------------------------------------------------------  IN: 3300 mL / OUT: 2700 mL / NET: 600 mL    21 Jul 2021 07:01  -  21 Jul 2021 14:29  --------------------------------------------------------  IN: 2500 mL / OUT: 2700 mL / NET: -200 mL        Appearance: Normal	  Cardiovascular: Normal S1 S2,RRR, No JVD, No murmurs  Respiratory: Lungs clear to auscultation	  Gastrointestinal:  Soft, Non-tender, + BS	  Extremities: Normal range of motion, No clubbing, cyanosis or edema      Home Medications:  aspirin 81 mg oral delayed release tablet: 1 tab(s) orally once a day (29 Dec 2020 18:37)  atorvastatin 80 mg oral tablet: 1 tab(s) orally once a day (at bedtime) (29 Dec 2020 18:37)  epoetin martine: injectable once a month (29 Dec 2020 18:37)  ferrous sulfate 325 mg (65 mg elemental iron) oral tablet: 1 tab(s) orally 3 times a day (29 Dec 2020 18:37)  gabapentin 100 mg oral capsule: 1 cap(s) orally once a day (29 Dec 2020 18:37)  nortriptyline 25 mg oral capsule: 1 cap(s) orally once a day (at bedtime) (29 Dec 2020 18:37)  pantoprazole 40 mg oral delayed release tablet: 1 tab(s) orally once a day (before a meal) (29 Dec 2020 18:37)  Toujeo SoloStar 300 units/mL subcutaneous solution: 60 unit(s) subcutaneous once a day (at bedtime) (08 Jan 2021 12:41)      MEDICATIONS  (STANDING):  aspirin enteric coated 81 milliGRAM(s) Oral daily  atorvastatin 80 milliGRAM(s) Oral at bedtime  cadexomer iodine 0.9% Gel 1 Application(s) Topical daily  chlorhexidine 0.12% Liquid 15 milliLiter(s) Oral Mucosa two times a day  chlorhexidine 2% Cloths 1 Application(s) Topical <User Schedule>  dextrose 40% Gel 15 Gram(s) Oral once  dextrose 5%. 1000 milliLiter(s) (50 mL/Hr) IV Continuous <Continuous>  dextrose 5%. 1000 milliLiter(s) (100 mL/Hr) IV Continuous <Continuous>  dextrose 50% Injectable 25 Gram(s) IV Push once  dextrose 50% Injectable 12.5 Gram(s) IV Push once  dextrose 50% Injectable 25 Gram(s) IV Push once  dronabinol 2.5 milliGRAM(s) Oral two times a day  ferrous    sulfate 325 milliGRAM(s) Oral three times a day  folic acid 1 milliGRAM(s) Oral daily  glucagon  Injectable 1 milliGRAM(s) IntraMuscular once  heparin   Injectable 5000 Unit(s) SubCutaneous every 12 hours  insulin glargine Injectable (LANTUS) 5 Unit(s) SubCutaneous at bedtime  insulin lispro (ADMELOG) corrective regimen sliding scale   SubCutaneous three times a day before meals  insulin lispro (ADMELOG) corrective regimen sliding scale   SubCutaneous at bedtime  lactated ringers. 1000 milliLiter(s) (50 mL/Hr) IV Continuous <Continuous>  lidocaine   Patch 1 Patch Transdermal daily  midodrine 30 milliGRAM(s) Oral every 8 hours  Nephro-kristi 1 Tablet(s) Oral daily  pantoprazole  Injectable 40 milliGRAM(s) IV Push daily  potassium chloride    Tablet ER 40 milliEquivalent(s) Oral every 4 hours  sevelamer carbonate 1600 milliGRAM(s) Oral three times a day  thiamine IVPB 500 milliGRAM(s) IV Intermittent daily  ticagrelor 60 milliGRAM(s) Oral every 12 hours      TELEMETRY: 	    ECG:  	  RADIOLOGY:   DIAGNOSTIC TESTING:  [ ] Echocardiogram:  [ ]  Catheterization:  [ ] Stress Test:    OTHER: 	    LABS:	 	                            12.1   8.91  )-----------( 206      ( 21 Jul 2021 07:03 )             40.0     07-21    133<L>  |  92<L>  |  38<H>  ----------------------------<  148<H>  3.1<L>   |  22  |  8.48<H>    Ca    8.4      21 Jul 2021 07:01  Phos  5.0     07-21  Mg     1.8     07-21

## 2021-07-21 NOTE — PROGRESS NOTE ADULT - SUBJECTIVE AND OBJECTIVE BOX
[facility] [phone]        Patient seen and examined in bed.  No new complaints.         MEDICATIONS  (STANDING):  aspirin enteric coated 81 milliGRAM(s) Oral daily  atorvastatin 80 milliGRAM(s) Oral at bedtime  cadexomer iodine 0.9% Gel 1 Application(s) Topical daily  chlorhexidine 0.12% Liquid 15 milliLiter(s) Oral Mucosa two times a day  chlorhexidine 2% Cloths 1 Application(s) Topical <User Schedule>  dextrose 40% Gel 15 Gram(s) Oral once  dextrose 5%. 1000 milliLiter(s) (50 mL/Hr) IV Continuous <Continuous>  dextrose 5%. 1000 milliLiter(s) (100 mL/Hr) IV Continuous <Continuous>  dextrose 50% Injectable 25 Gram(s) IV Push once  dextrose 50% Injectable 12.5 Gram(s) IV Push once  dextrose 50% Injectable 25 Gram(s) IV Push once  dronabinol 2.5 milliGRAM(s) Oral two times a day  ferrous    sulfate 325 milliGRAM(s) Oral three times a day  folic acid 1 milliGRAM(s) Oral daily  glucagon  Injectable 1 milliGRAM(s) IntraMuscular once  heparin   Injectable 5000 Unit(s) SubCutaneous every 12 hours  insulin glargine Injectable (LANTUS) 5 Unit(s) SubCutaneous at bedtime  insulin lispro (ADMELOG) corrective regimen sliding scale   SubCutaneous three times a day before meals  insulin lispro (ADMELOG) corrective regimen sliding scale   SubCutaneous at bedtime  lactated ringers. 1000 milliLiter(s) (50 mL/Hr) IV Continuous <Continuous>  lidocaine   Patch 1 Patch Transdermal daily  midodrine 30 milliGRAM(s) Oral every 8 hours  Nephro-kristi 1 Tablet(s) Oral daily  pantoprazole  Injectable 40 milliGRAM(s) IV Push daily  sevelamer carbonate 1600 milliGRAM(s) Oral three times a day  ticagrelor 60 milliGRAM(s) Oral every 12 hours  valproate sodium IVPB 250 milliGRAM(s) IV Intermittent every 12 hours      VITAL:  T(C): , Max: 37.4 (07-18-21 @ 20:34)  T(F): , Max: 99.3 (07-18-21 @ 20:34)  HR: 95 (07-19-21 @ 05:05)  BP: 103/70 (07-19-21 @ 05:05)  BP(mean): --  RR: 18 (07-19-21 @ 05:05)  SpO2: 99% (07-19-21 @ 05:05)  Wt(kg): --    I and O's:    07-18 @ 07:01  -  07-19 @ 07:00  --------------------------------------------------------  IN: 6130 mL / OUT: 6703 mL / NET: -573 mL          PHYSICAL EXAM:    Constitutional: NAD  Neck:  No JVD  Respiratory: CTAB/L  Cardiovascular: S1 and S2  Gastrointestinal: BS+, soft, NT/ND, + right abdominal PD catheter   Extremities: No peripheral edema  Neurological: limited   Psychiatric: Normal mood, normal affect  : No Johnson  Skin: No rashes      LABS:                        11.8   8.80  )-----------( 206      ( 19 Jul 2021 07:16 )             38.3     07-19    131<L>  |  90<L>  |  41<H>  ----------------------------<  139<H>  3.6   |  19<L>  |  9.37<H>    Ca    8.4      19 Jul 2021 07:00    TPro  6.0  /  Alb  2.0<L>  /  TBili  0.8  /  DBili  x   /  AST  29  /  ALT  13  /  AlkPhos  105  07-19          Urine Studies:          RADIOLOGY & ADDITIONAL STUDIES:             [facility] [phone]        Patient seen and examined in bed.  No new complaints. Not eating well?        MEDICATIONS  (STANDING):  aspirin enteric coated 81 milliGRAM(s) Oral daily  atorvastatin 80 milliGRAM(s) Oral at bedtime  cadexomer iodine 0.9% Gel 1 Application(s) Topical daily  chlorhexidine 0.12% Liquid 15 milliLiter(s) Oral Mucosa two times a day  chlorhexidine 2% Cloths 1 Application(s) Topical <User Schedule>  dextrose 40% Gel 15 Gram(s) Oral once  dextrose 5%. 1000 milliLiter(s) (50 mL/Hr) IV Continuous <Continuous>  dextrose 5%. 1000 milliLiter(s) (100 mL/Hr) IV Continuous <Continuous>  dextrose 50% Injectable 25 Gram(s) IV Push once  dextrose 50% Injectable 12.5 Gram(s) IV Push once  dextrose 50% Injectable 25 Gram(s) IV Push once  dronabinol 2.5 milliGRAM(s) Oral two times a day  ferrous    sulfate 325 milliGRAM(s) Oral three times a day  folic acid 1 milliGRAM(s) Oral daily  glucagon  Injectable 1 milliGRAM(s) IntraMuscular once  heparin   Injectable 5000 Unit(s) SubCutaneous every 12 hours  insulin glargine Injectable (LANTUS) 5 Unit(s) SubCutaneous at bedtime  insulin lispro (ADMELOG) corrective regimen sliding scale   SubCutaneous three times a day before meals  insulin lispro (ADMELOG) corrective regimen sliding scale   SubCutaneous at bedtime  lactated ringers. 1000 milliLiter(s) (50 mL/Hr) IV Continuous <Continuous>  lidocaine   Patch 1 Patch Transdermal daily  midodrine 30 milliGRAM(s) Oral every 8 hours  Nephro-kristi 1 Tablet(s) Oral daily  pantoprazole  Injectable 40 milliGRAM(s) IV Push daily  sevelamer carbonate 1600 milliGRAM(s) Oral three times a day  ticagrelor 60 milliGRAM(s) Oral every 12 hours  valproate sodium IVPB 250 milliGRAM(s) IV Intermittent every 12 hours      VITAL:  T(C): , Max: 37.4 (07-18-21 @ 20:34)  T(F): , Max: 99.3 (07-18-21 @ 20:34)  HR: 95 (07-19-21 @ 05:05)  BP: 103/70 (07-19-21 @ 05:05)  BP(mean): --  RR: 18 (07-19-21 @ 05:05)  SpO2: 99% (07-19-21 @ 05:05)  Wt(kg): --    I and O's:    07-18 @ 07:01  -  07-19 @ 07:00  --------------------------------------------------------  IN: 6130 mL / OUT: 6703 mL / NET: -573 mL          PHYSICAL EXAM:    Constitutional: NAD  Neck:  No JVD  Respiratory: CTAB/L  Cardiovascular: S1 and S2  Gastrointestinal: BS+, soft, NT/ND, + right abdominal PD catheter   Extremities: No peripheral edema  Neurological: limited   Psychiatric: Normal mood, normal affect  : No Johnson  Skin: No rashes      LABS:                        11.8   8.80  )-----------( 206      ( 19 Jul 2021 07:16 )             38.3     07-19    131<L>  |  90<L>  |  41<H>  ----------------------------<  139<H>  3.6   |  19<L>  |  9.37<H>    Ca    8.4      19 Jul 2021 07:00    TPro  6.0  /  Alb  2.0<L>  /  TBili  0.8  /  DBili  x   /  AST  29  /  ALT  13  /  AlkPhos  105  07-19          Urine Studies:          RADIOLOGY & ADDITIONAL STUDIES:

## 2021-07-22 NOTE — PROGRESS NOTE ADULT - PROBLEM SELECTOR PLAN 5
Spoke with pts wife for greater then 20 mins about ACP using  service id # [id number].  Pts wife Ann Marie with limited medical literacy.  Explained hospital course and medical conditions in detail.  Questions answered emotional support provided.  Discussed the pts refusal of AICD.  Ann Marie confirms that decision.  We discussed code status in detail including but not limited to CPR, intubation, and shock.  Ann Marie  would like to try to attempt to discuss this with her  tonight during her visit.  Palliative care will meet with Ann Marie and pt at pts bedside tomorrow at  for .

## 2021-07-22 NOTE — PROGRESS NOTE ADULT - ASSESSMENT
CATH 11/30/2020:  COMPLICATIONS: The stent was deployed without difficulty. On removal of the  balloon, the shaft broke and the tip of the balloon remained in the vessel. Several attempts were made to snare the balloon unsuccessfully. Dr. Verdugo was notifed who will take the patient to the operating room.  DIAGNOSTIC RECOMMENDATIONS: The patient should continue with the present medications. The patients vessel was stented without difficulty On removal of the balloon, the shaft broke with the baloon stuck in the left main. All attempts to snare the balloon out failed and the patient was taken to the OR by Dr. Arango to remove the balloon  introp eleonora 11/30/20: mild to mod MR, < from: Intra-Operative Transesophageal Echo (11.30.20 @ 17:02) >  Severe global left ventricular systolic dysfunction. Inferior and inferolateral akinesis.  severe hypokinesis of other segments.  Severe global hypokinesia.  Normal left ventricular internal dimensions and wall thicknesses. Moderate diastolic dysfunction (Stage II).  echo 12/17/20: EF 32%, mod MR, Severe global left ventricular systolic dysfunction, moderate pulmonary pressures  echo 12/20/20: EF (Visual Estimate):  25-30 % mild MR, Akinesis of the inferolateral, anterolateral, apical laterlal, inferior, and inferoseptal walls. Severe left ventricular enlargement. No left ventricular thrombus is seen.  Echo 7/7/21: EF 16%, mod - sev MR, mod AS, severe global lv sys dysfx, severe diastolic dysfx, mod pulm htn       a/p  56 M well known to practice with PMH ESRD on PD, DM on insulin, CHF EF 25-30% CAD s/p CABG x4, underwent cardiac cath and stent and wire broke in heart s/p sternotomy p/w septic shock.    #Septic Shock, resolved   -Bcx NGTD 7/10   -s/p iv abx, pressors   -Bedside POCUS notable for biventricular diffuse hypokinesis  -right 2nd toe gangrene, pod f/u, vascular f/u, PONCHO/PVR   -ID f/u   -ELEONORA: no evidence of valvular vegetation is seen.  -med f/u    #Ischemic Cardiomyopathy. Chronic systolic HF  -vol status stable - monitor vol status closely while on IVF   -Repeat echo noted with EF 16%, mod - sev MR, mod AS, severe global lv sys dysfx, severe diastolic dysfx, mod pulm htn   -pt declining ICD for   -no bb, acei/arb given hx of orthostatic bp   -continue midodrine for bp support  -cont vol removal w PD     # CAD, s/p CABG, s/p PCI, s/p redo open heart for stent balloon retrieval   -hst elevated, likely demand ischemia in setting of septic shock, ESRD   -c/w asa, Brilinta, statin     # ESRD  -on PD  -renal f/u    #AMS  -improving   -neuro psych f/u    #GOC  -palliative f/u     dvt ppx   CATH 11/30/2020:  COMPLICATIONS: The stent was deployed without difficulty. On removal of the  balloon, the shaft broke and the tip of the balloon remained in the vessel. Several attempts were made to snare the balloon unsuccessfully. Dr. Verdugo was notifed who will take the patient to the operating room.  DIAGNOSTIC RECOMMENDATIONS: The patient should continue with the present medications. The patients vessel was stented without difficulty On removal of the balloon, the shaft broke with the baloon stuck in the left main. All attempts to snare the balloon out failed and the patient was taken to the OR by Dr. Arango to remove the balloon  introp eleonora 11/30/20: mild to mod MR, < from: Intra-Operative Transesophageal Echo (11.30.20 @ 17:02) >  Severe global left ventricular systolic dysfunction. Inferior and inferolateral akinesis.  severe hypokinesis of other segments.  Severe global hypokinesia.  Normal left ventricular internal dimensions and wall thicknesses. Moderate diastolic dysfunction (Stage II).  echo 12/17/20: EF 32%, mod MR, Severe global left ventricular systolic dysfunction, moderate pulmonary pressures  echo 12/20/20: EF (Visual Estimate):  25-30 % mild MR, Akinesis of the inferolateral, anterolateral, apical laterlal, inferior, and inferoseptal walls. Severe left ventricular enlargement. No left ventricular thrombus is seen.  Echo 7/7/21: EF 16%, mod - sev MR, mod AS, severe global lv sys dysfx, severe diastolic dysfx, mod pulm htn       a/p  56 M well known to practice with PMH ESRD on PD, DM on insulin, CHF EF 25-30% CAD s/p CABG x4, underwent cardiac cath and stent and wire broke in heart s/p sternotomy p/w septic shock.    #Septic Shock, resolved   -Bcx NGTD 7/10   -s/p iv abx, pressors   -Bedside POCUS notable for biventricular diffuse hypokinesis  -right 2nd toe gangrene, pod f/u, vascular f/u, PONCHO/PVR   -ID f/u   -ELEONORA: no evidence of valvular vegetation is seen.  -med f/u    #Ischemic Cardiomyopathy. Chronic systolic HF  -vol status stable - monitor vol status closely while on IVF   -Repeat echo noted with EF 16%, mod - sev MR, mod AS, severe global lv sys dysfx, severe diastolic dysfx, mod pulm htn   -pt declining ICD   -no bb, acei/arb given hx of orthostatic bp   -continue midodrine for bp support  -cont vol removal w PD     # CAD, s/p CABG, s/p PCI, s/p redo open heart for stent balloon retrieval   -hst elevated, likely demand ischemia in setting of septic shock, ESRD   -c/w asa, Brilinta, statin     # ESRD  -on PD  -renal f/u    #AMS  -improving   -neuro psych f/u    #GOC  -palliative f/u     dvt ppx

## 2021-07-22 NOTE — PROGRESS NOTE ADULT - SUBJECTIVE AND OBJECTIVE BOX
NEPHROLOGY-[facility] [phone]        Patient seen and examined in bed.  He was in good spirits         MEDICATIONS  (STANDING):  aspirin enteric coated 81 milliGRAM(s) Oral daily  atorvastatin 80 milliGRAM(s) Oral at bedtime  cadexomer iodine 0.9% Gel 1 Application(s) Topical daily  chlorhexidine 0.12% Liquid 15 milliLiter(s) Oral Mucosa two times a day  chlorhexidine 2% Cloths 1 Application(s) Topical <User Schedule>  dextrose 40% Gel 15 Gram(s) Oral once  dextrose 5%. 1000 milliLiter(s) (50 mL/Hr) IV Continuous <Continuous>  dextrose 5%. 1000 milliLiter(s) (100 mL/Hr) IV Continuous <Continuous>  dextrose 50% Injectable 25 Gram(s) IV Push once  dextrose 50% Injectable 12.5 Gram(s) IV Push once  dextrose 50% Injectable 25 Gram(s) IV Push once  dronabinol 2.5 milliGRAM(s) Oral two times a day  ferrous    sulfate 325 milliGRAM(s) Oral three times a day  folic acid 1 milliGRAM(s) Oral daily  glucagon  Injectable 1 milliGRAM(s) IntraMuscular once  heparin   Injectable 5000 Unit(s) SubCutaneous every 12 hours  insulin glargine Injectable (LANTUS) 5 Unit(s) SubCutaneous at bedtime  insulin lispro (ADMELOG) corrective regimen sliding scale   SubCutaneous three times a day before meals  insulin lispro (ADMELOG) corrective regimen sliding scale   SubCutaneous at bedtime  lactated ringers. 1000 milliLiter(s) (50 mL/Hr) IV Continuous <Continuous>  lidocaine   Patch 1 Patch Transdermal daily  midodrine 30 milliGRAM(s) Oral every 8 hours  Nephro-kristi 1 Tablet(s) Oral daily  pantoprazole  Injectable 40 milliGRAM(s) IV Push daily  sevelamer carbonate 1600 milliGRAM(s) Oral three times a day  thiamine IVPB 500 milliGRAM(s) IV Intermittent daily  ticagrelor 60 milliGRAM(s) Oral every 12 hours      VITAL:  T(C): , Max: 37.2 (07-21-21 @ 21:07)  T(F): , Max: 99 (07-21-21 @ 21:07)  HR: 104 (07-22-21 @ 04:21)  BP: 94/66 (07-22-21 @ 04:21)  BP(mean): --  RR: 18 (07-22-21 @ 04:21)  SpO2: 97% (07-22-21 @ 04:21)  Wt(kg): --    I and O's:    07-21 @ 07:01  -  07-22 @ 07:00  --------------------------------------------------------  IN: 48139 mL / OUT: 62105 mL / NET: -420 mL          PHYSICAL EXAM:    Constitutional: NAD  Neck:  No JVD  Respiratory: CTAB/L  Cardiovascular: S1 and S2  Gastrointestinal: BS+, soft, NT/ND + PD   Extremities: No peripheral edema  Neurological: A/O x 3, no focal deficits  Psychiatric: Normal mood, normal affect  : No Johnson  Skin: No rashes  Access: Not applicable    LABS:                        12.5   8.55  )-----------( 210      ( 22 Jul 2021 06:16 )             42.5     07-22    129<L>  |  92<L>  |  36<H>  ----------------------------<  194<H>  3.8   |  21<L>  |  8.22<H>    Ca    8.5      22 Jul 2021 06:16  Phos  5.0     07-21  Mg     1.8     07-21            Urine Studies:          RADIOLOGY & ADDITIONAL STUDIES:

## 2021-07-22 NOTE — PROGRESS NOTE ADULT - SUBJECTIVE AND OBJECTIVE BOX
SUBJECTIVE AND OBJECTIVE: pt more awake this morning able to answer yes no questions  INTERVAL HPI/OVERNIGHT EVENTS: no acute events overnight    DNR on chart:   Allergies    doxazosin (Other)    Intolerances    MEDICATIONS  (STANDING):  aspirin enteric coated 81 milliGRAM(s) Oral daily  atorvastatin 80 milliGRAM(s) Oral at bedtime  cadexomer iodine 0.9% Gel 1 Application(s) Topical daily  chlorhexidine 0.12% Liquid 15 milliLiter(s) Oral Mucosa two times a day  chlorhexidine 2% Cloths 1 Application(s) Topical <User Schedule>  dextrose 40% Gel 15 Gram(s) Oral once  dextrose 5%. 1000 milliLiter(s) (50 mL/Hr) IV Continuous <Continuous>  dextrose 5%. 1000 milliLiter(s) (100 mL/Hr) IV Continuous <Continuous>  dextrose 50% Injectable 25 Gram(s) IV Push once  dextrose 50% Injectable 12.5 Gram(s) IV Push once  dextrose 50% Injectable 25 Gram(s) IV Push once  dronabinol 2.5 milliGRAM(s) Oral two times a day  ferrous    sulfate 325 milliGRAM(s) Oral three times a day  folic acid 1 milliGRAM(s) Oral daily  glucagon  Injectable 1 milliGRAM(s) IntraMuscular once  heparin   Injectable 5000 Unit(s) SubCutaneous every 12 hours  insulin glargine Injectable (LANTUS) 5 Unit(s) SubCutaneous at bedtime  insulin lispro (ADMELOG) corrective regimen sliding scale   SubCutaneous three times a day before meals  insulin lispro (ADMELOG) corrective regimen sliding scale   SubCutaneous at bedtime  lactated ringers. 1000 milliLiter(s) (50 mL/Hr) IV Continuous <Continuous>  lidocaine   Patch 1 Patch Transdermal daily  midodrine 30 milliGRAM(s) Oral every 8 hours  Nephro-kristi 1 Tablet(s) Oral daily  pantoprazole  Injectable 40 milliGRAM(s) IV Push daily  sevelamer carbonate 1600 milliGRAM(s) Oral three times a day  thiamine IVPB 500 milliGRAM(s) IV Intermittent daily  ticagrelor 60 milliGRAM(s) Oral every 12 hours    MEDICATIONS  (PRN):  acetaminophen   Tablet .. 650 milliGRAM(s) Oral every 6 hours PRN Moderate Pain (4 - 6)  valproate sodium IVPB 125 milliGRAM(s) IV Intermittent every 8 hours PRN agitation      ITEMS UNCHECKED ARE NOT PRESENT    PRESENT SYMPTOMS: [ ]Unable to obtain due to poor mentation   Source if other than patient:  [ ]Family   [ ]Team     Pain:  [ ]yes [x ]no  QOL impact -   Location -                    Aggravating factors -  Quality -  Radiation -  Timing-  Severity (0-10 scale):  Minimal acceptable level (0-10 scale):     Dyspnea:                           [ ]Mild [ ]Moderate [ ]Severe  Anxiety:                             [ ]Mild [ ]Moderate [ ]Severe  Fatigue:                             [ ]Mild [ ]Moderate [x ]Severe  Nausea:                             [ ]Mild [ ]Moderate [ ]Severe  Loss of appetite:              [ ]Mild [ ]Moderate [x ]Severe  Constipation:                    [ ]Mild [ ]Moderate [ ]Severe    PAIN AD Score:	  [email or web address] (Ctrl + left click to view)    Other Symptoms: pt able to answer yes no questions  [x ]All other review of systems negative     Palliative Performance Status Version 2:    30     %      [email or web address]  PHYSICAL EXAM:  Vital Signs Last 24 Hrs  T(C): 36.5 (22 Jul 2021 10:30), Max: 37.2 (21 Jul 2021 21:07)  T(F): 97.7 (22 Jul 2021 10:30), Max: 99 (21 Jul 2021 21:07)  HR: 92 (22 Jul 2021 10:30) (61 - 109)  BP: 101/71 (22 Jul 2021 10:30) (94/66 - 110/63)  BP(mean): --  RR: 18 (22 Jul 2021 10:30) (18 - 18)  SpO2: 100% (22 Jul 2021 10:30) (96% - 100%) I&O's Summary    21 Jul 2021 07:01  -  22 Jul 2021 07:00  --------------------------------------------------------  IN: 56148 mL / OUT: 26176 mL / NET: -420 mL       GENERAL: answering yes no questions   [ ]Alert  [ ]Oriented x   [x ]Lethargic  [ ]Cachexia  [ ]Unarousable  [ ] minimal Verbal  [ ]Non-Verbal    Behavioral:   [ ]Anxiety  [ ]Delirium [ ]Agitation [ ]Other    HEENT:  [ ]Normal   [x ]Dry mouth   [ ]ET Tube/Trach  [ ]Oral lesions    PULMONARY:   [ ]Clear [ ]Tachypnea  [ ]Audible excessive secretions   [ ]Rhonchi        [ ]Right [ ]Left [ ]Bilateral  [ ]Crackles        [ ]Right [ ]Left [ ]Bilateral  [ ]Wheezing     [ ]Right [ ]Left [ ]Bilateral  x[ ]Diminished BS [ ] Right [ ]Left [ x]Bilateral    CARDIOVASCULAR:    [x ]Regular [ ]Irregular [ ]Tachy  [ ]Dhaval [ ]Murmur [ ]Other    GASTROINTESTINAL:  [x ]Soft  [ x]Distended   [x ]+BS  [ ]Non tender [ ]Tender  [ ]PEG [ ]OGT/ NGT   Last BM:      GENITOURINARY:  [ ]Normal [ ]Incontinent   [ ]Oliguria/Anuria   [ ]Johnson   [ ] External cath    MUSCULOSKELETAL:   [ ]Normal   [ ]Weakness  [x ]Bed/Wheelchair bound [ ]Edema    NEUROLOGIC: encephalopathy   [ ]No focal deficits  [ ] Cognitive impairment  [ ] Dysphagia [ ]Dysarthria [ ] Paresis [ ]Other     SKIN:   [ ]Normal  [ ]Rash   [ ]Pressure ulcer(s) [ ]y [ ]n present on admission    CRITICAL CARE:  [ ]Shock Present  [x ]Septic [ ]Cardiogenic [ ]Neurologic [ ]Hypovolemic  [ ]Vasopressors [ ]Inotropes  [ ]Respiratory failure present [ ]Mechanical Ventilation [ ]Non-invasive ventilatory support [ ]High-Flow  [ ]Acute  [ ]Chronic [ ]Hypoxic  [ ]Hypercarbic [ ]Other  [ ]Other organ failure     LABS:                        12.5   8.55  )-----------( 210      ( 22 Jul 2021 06:16 )             42.5   07-22    129<L>  |  92<L>  |  36<H>  ----------------------------<  194<H>  3.8   |  21<L>  |  8.22<H>    Ca    8.5      22 Jul 2021 06:16  Phos  5.0     07-21  Mg     1.8     07-21          RADIOLOGY & ADDITIONAL STUDIES:    < from: MR Head w/wo IV Cont (07.16.21 @ 17:44) >  EXAM:  MR ANGIO BRAIN                          EXAM:  MR BRAIN WAW IC                            PROCEDURE DATE:  07/16/2021            INTERPRETATION:  MR brain with and without gadolinium    CLINICAL INFORMATION:   Altered mental status, sepsis    TECHNIQUE:   Sagittal and axial T1-weighted images, axial FLAIR images, axial T2-weighted images, axial gradient echo images and axial diffusion weighted images of the brain were obtained. Following 6 cc of Gadavist administration, 1.5 cc discarded,isotropic volumetric and fast spin echo T1-weighted images were obtained; this data was reformatted using image post processing software in multiple imaging planes.    FINDINGS:   No prior similar studies are available for review.    The brain demonstrates minimal periventricular white matter ischemia. Old lacunar infarctions are seen within the BILATERAL basal ganglia. Following gadolinium administration no abnormal enhancement occurs.  No acute cerebral cortical infarct is found.   No intracranial hemorrhage is recognized.  No mass effect is found in the brain.    The ventricles, sulci and basal cisterns show global atrophy.    The vertebral and internal carotid arteries demonstrate expected flow voids indicating their patency.    The orbits are unremarkable.  The paranasal sinuses are clear.  The nasal cavity appears intact.  The nasopharynx is symmetric.  The central skull base and petrous temporal bones are intact.  The calvarium appears unremarkable.        MR angiography of the intracranial circulation.  (Non gadolinium technique.)      CLINICAL INFORMATION:  stroke; vascular stenosis.    TECHNIQUE:  MR angiography of intracranial circulation was performed using three-dimensional time of flight technique. Post processing angiographic reconstruction of images was performed. This data set was reconstructed as maximum intensity pixel images and displayed in multiple rotations.    FINDINGS:   No comparison similar studies are available.    The anterior circulation demonstrates intact petrous, cavernous and clinoid segments of the internal carotid arteries.  The anterior cerebral arteries are intact and symmetric. An anterior communicating artery is demonstrated.  The middle cerebral arteries demonstrate intact initial M1 and M2 segments. There are symmetric intact peripheral Sylvian branches.    The posterior circulation demonstrates intact vertebral, basilar and posterior cerebral arteries.    No abnormal vessel termination, vascular malformation or intracranial aneurysm is recognized.        IMPRESSION:    MRI BRAIN: minimal periventricular white matter ischemia. Old lacunar infarctions are seen within the BILATERAL basal ganglia.    MRA brain:   Unremarkable MR angiography of the intracranial circulation.    --- End of Report ---                JOSE BOLAND MD; Attending Radiologist  This document has been electronically signed. Jul 16 2021  6:30PM    < end of copied text >      Protein Calorie Malnutrition Present: [ ]mild [ ]moderate [ ]severe [ ]underweight [ ]morbid obesity  https://www.andeal.org/vault/2440/web/files/ONC/Table_Clinical%20Characteristics%20to%20Document%20Malnutrition-White%20JV%20et%20al%202012.pdf    Height (cm): 170.2 (07-15-21 @ 15:14), 170.2 (12-29-20 @ 13:29), 170.2 (12-14-20 @ 09:52)  Weight (kg): 63.5 (07-15-21 @ 15:14), 81.6 (12-29-20 @ 13:29), 76.2 (12-14-20 @ 09:52)  BMI (kg/m2): 21.9 (07-15-21 @ 15:14), 28.2 (12-29-20 @ 13:29), 26.3 (12-14-20 @ 09:52)    [ ]PPSV2 < or = 30%  [ ]significant weight loss [ ]poor nutritional intake [ ]anasarca    [ ]Artificial Nutrition    REFERRALS:   [ ]Chaplaincy  [ ]Hospice  [ ]Child Life  [ ]Social Work  [ ]Case management [ ]Holistic Therapy     Goals of Care Document:     no

## 2021-07-22 NOTE — PROGRESS NOTE ADULT - ASSESSMENT
56 m with    Sepsis  - off antibiotic   - toe gangrene  - Podiatry follow  - ID follow   - KLAUDIA noted     COPD  - 2L NC overnight because he becomes apneic, sats well  - Sats well on RA while awake    CAD s/p CABG   - Hypotension likely in the setting of septic shock   - midodrine dose increased to 30 mg TID to match home dose  - Previous echo with reduced EF  - c/w DAPT  - Cardiology follow    CHF cr systolic  - cardiology follow  - EP eval re AICD    Peritoneal Dialysis   - nephrology follow PD, recommend 4x a day  - patient on midodrine 30 q8h at home, continue here  - Holding home metoprolol 25 q12hr in setting of hypotension    Diabetes   - HA1C 6.1 on 7/6 suggesting prediabetes  - Tujeo 60 units at bedtime and Victoza at home, started on 30 units at bedtime, adjust as needed based on patient PO intake  - added 2 units insulin pre-meal    Hiccups  - GI follow  - hold Ativan 2 to sedation    Incontinence dermatitis  - wound care    Toe gangrene  - Podiatry follow  - Vascular evaluation noted  - PONCHO/PVR   - Angiogram if agrees    Finger gangrenous area  - Plastic evaluation    Vertebral fracture T12  - Ortho eval noted   - MRI spine noted  - No intervention    Confusion  - possible delirium  - Psych follow  - Neurology follow  - EEG noted    Palliative follow re [facility]     DCP in progress    Pipe Beck MD pager [phone]

## 2021-07-22 NOTE — PROGRESS NOTE ADULT - SUBJECTIVE AND OBJECTIVE BOX
CARDIOLOGY FOLLOW UP - Dr. Best  Date of Service: 7/22/21  CC: no cp /sob     Review of Systems:  Constitutional: No fever, weight loss, or fatigue  Respiratory: No cough, wheezing, or hemoptysis, no shortness of breath  Cardiovascular: No chest pain, palpitations, passing out, dizziness, or leg swelling  Gastrointestinal: No abd or epigastric pain.  No nausea, vomiting, or hematemesis; no diarrhea or constipation, no melena or hematochezia  Vascular: no edema       PHYSICAL EXAM:  T(C): 36.5 (07-22-21 @ 10:30), Max: 37.2 (07-21-21 @ 21:07)  HR: 92 (07-22-21 @ 10:30) (61 - 109)  BP: 101/71 (07-22-21 @ 10:30) (94/66 - 110/63)  RR: 18 (07-22-21 @ 10:30) (18 - 18)  SpO2: 100% (07-22-21 @ 10:30) (96% - 100%)  Wt(kg): --  I&O's Summary    21 Jul 2021 07:01  -  22 Jul 2021 07:00  --------------------------------------------------------  IN: 98637 mL / OUT: 34973 mL / NET: -420 mL        Appearance: Normal	  Cardiovascular: Normal S1 S2,RRR, No JVD, No murmurs  Respiratory: Lungs clear to auscultation	  Gastrointestinal:  Soft, Non-tender, + BS	  Extremities: Normal range of motion, No clubbing, cyanosis or edema      Home Medications:  aspirin 81 mg oral delayed release tablet: 1 tab(s) orally once a day (29 Dec 2020 18:37)  atorvastatin 80 mg oral tablet: 1 tab(s) orally once a day (at bedtime) (29 Dec 2020 18:37)  epoetin martine: injectable once a month (29 Dec 2020 18:37)  ferrous sulfate 325 mg (65 mg elemental iron) oral tablet: 1 tab(s) orally 3 times a day (29 Dec 2020 18:37)  gabapentin 100 mg oral capsule: 1 cap(s) orally once a day (29 Dec 2020 18:37)  nortriptyline 25 mg oral capsule: 1 cap(s) orally once a day (at bedtime) (29 Dec 2020 18:37)  pantoprazole 40 mg oral delayed release tablet: 1 tab(s) orally once a day (before a meal) (29 Dec 2020 18:37)  Toujeo SoloStar 300 units/mL subcutaneous solution: 60 unit(s) subcutaneous once a day (at bedtime) (08 Jan 2021 12:41)      MEDICATIONS  (STANDING):  aspirin enteric coated 81 milliGRAM(s) Oral daily  atorvastatin 80 milliGRAM(s) Oral at bedtime  cadexomer iodine 0.9% Gel 1 Application(s) Topical daily  chlorhexidine 0.12% Liquid 15 milliLiter(s) Oral Mucosa two times a day  chlorhexidine 2% Cloths 1 Application(s) Topical <User Schedule>  dextrose 40% Gel 15 Gram(s) Oral once  dextrose 5%. 1000 milliLiter(s) (50 mL/Hr) IV Continuous <Continuous>  dextrose 5%. 1000 milliLiter(s) (100 mL/Hr) IV Continuous <Continuous>  dextrose 50% Injectable 25 Gram(s) IV Push once  dextrose 50% Injectable 12.5 Gram(s) IV Push once  dextrose 50% Injectable 25 Gram(s) IV Push once  dronabinol 2.5 milliGRAM(s) Oral two times a day  ferrous    sulfate 325 milliGRAM(s) Oral three times a day  folic acid 1 milliGRAM(s) Oral daily  glucagon  Injectable 1 milliGRAM(s) IntraMuscular once  heparin   Injectable 5000 Unit(s) SubCutaneous every 12 hours  insulin glargine Injectable (LANTUS) 5 Unit(s) SubCutaneous at bedtime  insulin lispro (ADMELOG) corrective regimen sliding scale   SubCutaneous three times a day before meals  insulin lispro (ADMELOG) corrective regimen sliding scale   SubCutaneous at bedtime  lactated ringers. 1000 milliLiter(s) (50 mL/Hr) IV Continuous <Continuous>  lidocaine   Patch 1 Patch Transdermal daily  midodrine 30 milliGRAM(s) Oral every 8 hours  Nephro-kristi 1 Tablet(s) Oral daily  pantoprazole  Injectable 40 milliGRAM(s) IV Push daily  sevelamer carbonate 1600 milliGRAM(s) Oral three times a day  thiamine IVPB 500 milliGRAM(s) IV Intermittent daily  ticagrelor 60 milliGRAM(s) Oral every 12 hours      TELEMETRY: 	    ECG:  	  RADIOLOGY:   DIAGNOSTIC TESTING:  [ ] Echocardiogram:  [ ]  Catheterization:  [ ] Stress Test:    OTHER: 	    LABS:	 	                            12.5   8.55  )-----------( 210      ( 22 Jul 2021 06:16 )             42.5     07-22    129<L>  |  92<L>  |  36<H>  ----------------------------<  194<H>  3.8   |  21<L>  |  8.22<H>    Ca    8.5      22 Jul 2021 06:16  Phos  5.0     07-21  Mg     1.8     07-21

## 2021-07-22 NOTE — PROGRESS NOTE ADULT - SUBJECTIVE AND OBJECTIVE BOX
Patient is a 56y old  Male who presents with a chief complaint of Hypotension (22 Jul 2021 12:31)      SUBJECTIVE / OVERNIGHT EVENTS: No new complaints. More awake today.  Review of Systems  chest pain no  palpitations no  sob no  nausea no  headache no    MEDICATIONS  (STANDING):  aspirin enteric coated 81 milliGRAM(s) Oral daily  atorvastatin 80 milliGRAM(s) Oral at bedtime  cadexomer iodine 0.9% Gel 1 Application(s) Topical daily  chlorhexidine 0.12% Liquid 15 milliLiter(s) Oral Mucosa two times a day  chlorhexidine 2% Cloths 1 Application(s) Topical <User Schedule>  dextrose 40% Gel 15 Gram(s) Oral once  dextrose 5%. 1000 milliLiter(s) (50 mL/Hr) IV Continuous <Continuous>  dextrose 5%. 1000 milliLiter(s) (100 mL/Hr) IV Continuous <Continuous>  dextrose 50% Injectable 25 Gram(s) IV Push once  dextrose 50% Injectable 12.5 Gram(s) IV Push once  dextrose 50% Injectable 25 Gram(s) IV Push once  dronabinol 2.5 milliGRAM(s) Oral two times a day  ferrous    sulfate 325 milliGRAM(s) Oral three times a day  folic acid 1 milliGRAM(s) Oral daily  glucagon  Injectable 1 milliGRAM(s) IntraMuscular once  heparin   Injectable 5000 Unit(s) SubCutaneous every 12 hours  insulin glargine Injectable (LANTUS) 5 Unit(s) SubCutaneous at bedtime  insulin lispro (ADMELOG) corrective regimen sliding scale   SubCutaneous three times a day before meals  insulin lispro (ADMELOG) corrective regimen sliding scale   SubCutaneous at bedtime  lactated ringers. 1000 milliLiter(s) (50 mL/Hr) IV Continuous <Continuous>  lidocaine   Patch 1 Patch Transdermal daily  midodrine 30 milliGRAM(s) Oral every 8 hours  Nephro-kristi 1 Tablet(s) Oral daily  pantoprazole  Injectable 40 milliGRAM(s) IV Push daily  sevelamer carbonate 1600 milliGRAM(s) Oral three times a day  thiamine IVPB 500 milliGRAM(s) IV Intermittent daily  ticagrelor 60 milliGRAM(s) Oral every 12 hours    MEDICATIONS  (PRN):  acetaminophen   Tablet .. 650 milliGRAM(s) Oral every 6 hours PRN Moderate Pain (4 - 6)  valproate sodium IVPB 125 milliGRAM(s) IV Intermittent every 8 hours PRN agitation      Vital Signs Last 24 Hrs  T(C): 36.6 (22 Jul 2021 17:00), Max: 37.2 (21 Jul 2021 21:07)  T(F): 97.9 (22 Jul 2021 17:00), Max: 99 (21 Jul 2021 21:07)  HR: 91 (22 Jul 2021 17:00) (61 - 109)  BP: 108/70 (22 Jul 2021 17:00) (94/66 - 110/63)  BP(mean): --  RR: 20 (22 Jul 2021 17:00) (18 - 20)  SpO2: 94% (22 Jul 2021 17:00) (94% - 100%)    PHYSICAL EXAM:  GENERAL: NAD, frail  HEAD:  Atraumatic, Normocephalic  EYES: EOMI, PERRLA, conjunctiva and sclera clear  NECK: Supple, No JVD  CHEST/LUNG: Clear to auscultation bilaterally; No wheeze  HEART: Regular rate and rhythm; No murmurs, rubs, or gallops  ABDOMEN: Soft, Nontender, Nondistended; Bowel sounds present PD catheter  EXTREMITIES:  2nd r toe gangrenous R thumb gangrenous  PSYCH: confused  NEUROLOGY: non-focal  SKIN: No rashes or lesions    LABS:                        12.5   8.55  )-----------( 210      ( 22 Jul 2021 06:16 )             42.5     07-22    129<L>  |  92<L>  |  36<H>  ----------------------------<  194<H>  3.8   |  21<L>  |  8.22<H>    Ca    8.5      22 Jul 2021 06:16  Phos  5.0     07-21  Mg     1.8     07-21                  RADIOLOGY & ADDITIONAL TESTS:    Imaging Personally Reviewed:    Consultant(s) Notes Reviewed:      Care Discussed with Consultants/Other Providers:

## 2021-07-22 NOTE — PROGRESS NOTE ADULT - ASSESSMENT
56 M PMH ESRD on PD, DM on insulin, CHF EF 25-30% CAD s/p CABG x4 pw with hypotension with fever. Encephalopathy likely related to cefepime ? no obvious source of sepsis apparent as of now, KLAUDIA: Overall thickened valves seen however, no evidence of valvular vegetation is seen.     Hypotension:  on Midodrine for hemodynamic support:  BPs soft at times; acceptable for now   ESRD on PD. Patient TW 65 kg.    Hyponatremia  ID-Blood cx and PD fluid cx -   off IV antibiotics   Hyperphosphatemia:  remains >goal as of late; binders increased   Failure to thrive     RECOMMENDATIONS   - c/w CCPD orders with dianeal 1.5 to limit UF and ultimately hypotension    - Palliative care noted   - continue Renvela to 1,600 mg PO TID;   - continue Midodrine  30mg po q 8 hours   - Ongoing neuro/psych eval.  continues to refuse meds   - Possible angiogram of KEISHA         [facility]   [phone]

## 2021-07-23 NOTE — PROGRESS NOTE ADULT - SUBJECTIVE AND OBJECTIVE BOX
SUBJECTIVE AND OBJECTIVE:  pt awake in bed, confused to time. wife at bedsdie.  INTERVAL HPI/OVERNIGHT EVENTS:  swallow study completed    DNR on chart:   Allergies    doxazosin (Other)    Intolerances    MEDICATIONS  (STANDING):  aspirin enteric coated 81 milliGRAM(s) Oral daily  atorvastatin 80 milliGRAM(s) Oral at bedtime  cadexomer iodine 0.9% Gel 1 Application(s) Topical daily  chlorhexidine 0.12% Liquid 15 milliLiter(s) Oral Mucosa two times a day  chlorhexidine 2% Cloths 1 Application(s) Topical <User Schedule>  dextrose 40% Gel 15 Gram(s) Oral once  dextrose 5%. 1000 milliLiter(s) (50 mL/Hr) IV Continuous <Continuous>  dextrose 5%. 1000 milliLiter(s) (100 mL/Hr) IV Continuous <Continuous>  dextrose 50% Injectable 25 Gram(s) IV Push once  dextrose 50% Injectable 12.5 Gram(s) IV Push once  dextrose 50% Injectable 25 Gram(s) IV Push once  dronabinol 2.5 milliGRAM(s) Oral two times a day  ferrous    sulfate 325 milliGRAM(s) Oral three times a day  folic acid 1 milliGRAM(s) Oral daily  glucagon  Injectable 1 milliGRAM(s) IntraMuscular once  heparin   Injectable 5000 Unit(s) SubCutaneous every 12 hours  insulin glargine Injectable (LANTUS) 5 Unit(s) SubCutaneous at bedtime  insulin lispro (ADMELOG) corrective regimen sliding scale   SubCutaneous three times a day before meals  insulin lispro (ADMELOG) corrective regimen sliding scale   SubCutaneous at bedtime  lactated ringers. 1000 milliLiter(s) (50 mL/Hr) IV Continuous <Continuous>  lidocaine   Patch 1 Patch Transdermal daily  midodrine 30 milliGRAM(s) Oral every 8 hours  Nephro-kristi 1 Tablet(s) Oral daily  pantoprazole  Injectable 40 milliGRAM(s) IV Push daily  sevelamer carbonate 1600 milliGRAM(s) Oral three times a day  thiamine IVPB 500 milliGRAM(s) IV Intermittent daily  ticagrelor 60 milliGRAM(s) Oral every 12 hours    MEDICATIONS  (PRN):  acetaminophen   Tablet .. 650 milliGRAM(s) Oral every 6 hours PRN Moderate Pain (4 - 6)  LORazepam   Injectable 0.25 milliGRAM(s) IV Push once PRN Nausea and/or Vomiting  valproate sodium IVPB 125 milliGRAM(s) IV Intermittent every 8 hours PRN agitation      ITEMS UNCHECKED ARE NOT PRESENT    PRESENT SYMPTOMS: [ ]Unable to obtain due to poor mentation   Source if other than patient:  [ ]Family   [ ]Team     Pain:  [ ]yes [x ]no  QOL impact -   Location -                    Aggravating factors -  Quality -  Radiation -  Timing-  Severity (0-10 scale):  Minimal acceptable level (0-10 scale):     Dyspnea:                           [ ]Mild [ ]Moderate [ ]Severe  Anxiety:                             [ ]Mild [ ]Moderate [ ]Severe  Fatigue:                             [x ]Mild [ ]Moderate [ ]Severe  Nausea:                             [ ]Mild [ ]Moderate [ ]Severe  Loss of appetite:              [x ]Mild [ ]Moderate [ ]Severe  Constipation:                    [ ]Mild [ ]Moderate [ ]Severe    PAIN AD Score:	1  [email or web address] (Ctrl + left click to view)    Other Symptoms:  [ ]All other review of systems negative     Palliative Performance Status Version 2:     40    %      [email or web address]/files/[email or web address]_[email or web address]  PHYSICAL EXAM:  Vital Signs Last 24 Hrs  T(C): 36.6 (23 Jul 2021 13:00), Max: 36.6 (22 Jul 2021 17:00)  T(F): 97.8 (23 Jul 2021 13:00), Max: 97.9 (22 Jul 2021 17:00)  HR: 96 (23 Jul 2021 13:00) (74 - 99)  BP: 90/61 (23 Jul 2021 13:00) (90/61 - 108/70)  BP(mean): --  RR: 18 (23 Jul 2021 13:00) (18 - 20)  SpO2: 94% (23 Jul 2021 13:00) (94% - 98%) I&O's Summary    22 Jul 2021 07:01  -  23 Jul 2021 07:00  --------------------------------------------------------  IN: 5520 mL / OUT: 5260 mL / NET: 260 mL       GENERAL: awake.  able to report he is in the hospital but confused with time and why he is here. Pt unable to teach back information provided.  [x ]Alert  [x ]Oriented x 2 [ ]Lethargic  [ ]Cachexia  [ ]Unarousable  [x ]Verbal  [ ]Non-Verbal    Behavioral: flat affect  [ ]Anxiety  [ ]Delirium [ ]Agitation [ ]Other    HEENT:  [ ]Normal   [ ]Dry mouth   [ ]ET Tube/Trach  [ ]Oral lesions    PULMONARY:   [ ]Clear [ ]Tachypnea  [ ]Audible excessive secretions   [ ]Rhonchi        [ ]Right [ ]Left [ ]Bilateral  [ ]Crackles        [ ]Right [ ]Left [ ]Bilateral  [ ]Wheezing     [ ]Right [ ]Left [ ]Bilateral  [x ]Diminished BS [ ] Right [ ]Left [x ]Bilateral    CARDIOVASCULAR:    [x ]Regular [ ]Irregular [ ]Tachy  [ ]Dhaval [ ]Murmur [ ]Other    GASTROINTESTINAL:  [x ]Soft  [ ]Distended   [ ]+BS  [ ]Non tender [ ]Tender  [ ]PEG [ ]OGT/ NGT   Last BM:      GENITOURINARY:  x[ ]Normal [ ]Incontinent   [ ]Oliguria/Anuria   [ ]Johnson   [ ] External cath    MUSCULOSKELETAL:   [ ]Normal   [x ]Weakness  [x ]Bed/Wheelchair bound [ ]Edema    NEUROLOGIC:   [ ]No focal deficits  [x ] Cognitive impairment  [x ] Dysphagia [ ]Dysarthria [ ] Paresis [ ]Other     SKIN: see RN flowsheet gangrene toe  [ ]Normal  [ ]Rash   [ ]Pressure ulcer(s) [ ]y [ ]n present on admission    CRITICAL CARE:  [ ]Shock Present  [ ]Septic [ ]Cardiogenic [ ]Neurologic [ ]Hypovolemic  [ ]Vasopressors [ ]Inotropes  [ ]Respiratory failure present [ ]Mechanical Ventilation [ ]Non-invasive ventilatory support [ ]High-Flow  [ ]Acute  [ ]Chronic [ ]Hypoxic  [ ]Hypercarbic [ ]Other  [ ]Other organ failure     LABS:                        12.5   8.55  )-----------( 210      ( 22 Jul 2021 06:16 )             42.5   07-23    132<L>  |  91<L>  |  32<H>  ----------------------------<  168<H>  3.2<L>   |  23  |  7.74<H>    Ca    8.8      23 Jul 2021 09:21          RADIOLOGY & ADDITIONAL STUDIES:    < from: MR Head w/wo IV Cont (07.16.21 @ 17:44) >  EXAM:  MR ANGIO BRAIN                          EXAM:  MR BRAIN WAW IC                            PROCEDURE DATE:  07/16/2021            INTERPRETATION:  MR brain with and without gadolinium    CLINICAL INFORMATION:   Altered mental status, sepsis    TECHNIQUE:   Sagittal and axial T1-weighted images, axial FLAIR images, axial T2-weighted images, axial gradient echo images and axial diffusion weighted images of the brain were obtained. Following 6 cc of Gadavist administration, 1.5 cc discarded,isotropic volumetric and fast spin echo T1-weighted images were obtained; this data was reformatted using image post processing software in multiple imaging planes.    FINDINGS:   No prior similar studies are available for review.    The brain demonstrates minimal periventricular white matter ischemia. Old lacunar infarctions are seen within the BILATERAL basal ganglia. Following gadolinium administration no abnormal enhancement occurs.  No acute cerebral cortical infarct is found.   No intracranial hemorrhage is recognized.  No mass effect is found in the brain.    The ventricles, sulci and basal cisterns show global atrophy.    The vertebral and internal carotid arteries demonstrate expected flow voids indicating their patency.    The orbits are unremarkable.  The paranasal sinuses are clear.  The nasal cavity appears intact.  The nasopharynx is symmetric.  The central skull base and petrous temporal bones are intact.  The calvarium appears unremarkable.        MR angiography of the intracranial circulation.  (Non gadolinium technique.)      CLINICAL INFORMATION:  stroke; vascular stenosis.    TECHNIQUE:  MR angiography of intracranial circulation was performed using three-dimensional time of flight technique. Post processing angiographic reconstruction of images was performed. This data set was reconstructed as maximum intensity pixel images and displayed in multiple rotations.    FINDINGS:   No comparison similar studies are available.    The anterior circulation demonstrates intact petrous, cavernous and clinoid segments of the internal carotid arteries.  The anterior cerebral arteries are intact and symmetric. An anterior communicating artery is demonstrated.  The middle cerebral arteries demonstrate intact initial M1 and M2 segments. There are symmetric intact peripheral Sylvian branches.    The posterior circulation demonstrates intact vertebral, basilar and posterior cerebral arteries.    No abnormal vessel termination, vascular malformation or intracranial aneurysm is recognized.        IMPRESSION:    MRI BRAIN: minimal periventricular white matter ischemia. Old lacunar infarctions are seen within the BILATERAL basal ganglia.    MRA brain:   Unremarkable MR angiography of the intracranial circulation.    --- End of Report ---                JOSE BOLAND MD; Attending Radiologist  This document has been electronically signed. Jul 16 2021  6:30PM    < end of copied text >      Protein Calorie Malnutrition Present: [ ]mild [ ]moderate [ ]severe [ ]underweight [ ]morbid obesity  https://www.andeal.org/vault/2440/web/files/ONC/Table_Clinical%20Characteristics%20to%20Document%20Malnutrition-White%20JV%20et%20al%202012.pdf    Height (cm): 170.2 (07-15-21 @ 15:14), 170.2 (12-29-20 @ 13:29), 170.2 (12-14-20 @ 09:52)  Weight (kg): 63.5 (07-15-21 @ 15:14), 81.6 (12-29-20 @ 13:29), 76.2 (12-14-20 @ 09:52)  BMI (kg/m2): 21.9 (07-15-21 @ 15:14), 28.2 (12-29-20 @ 13:29), 26.3 (12-14-20 @ 09:52)    [ ]PPSV2 < or = 30%  [ ]significant weight loss [ ]poor nutritional intake [ ]anasarca    [ ]Artificial Nutrition    REFERRALS:   [ ]Chaplaincy  [ ]Hospice  [ ]Child Life  [ ]Social Work  [ ]Case management [ ]Holistic Therapy     Goals of Care Document:

## 2021-07-23 NOTE — PROGRESS NOTE ADULT - SUBJECTIVE AND OBJECTIVE BOX
[facility]      Seen earlier today, and examined.  - Today, patient is without complaints.           *****MEDICATIONS: Current medication reviewed and documented.    MEDICATIONS  (STANDING):  aspirin enteric coated 81 milliGRAM(s) Oral daily  atorvastatin 80 milliGRAM(s) Oral at bedtime  cadexomer iodine 0.9% Gel 1 Application(s) Topical daily  chlorhexidine 0.12% Liquid 15 milliLiter(s) Oral Mucosa two times a day  chlorhexidine 2% Cloths 1 Application(s) Topical <User Schedule>  dextrose 40% Gel 15 Gram(s) Oral once  dextrose 5%. 1000 milliLiter(s) (50 mL/Hr) IV Continuous <Continuous>  dextrose 5%. 1000 milliLiter(s) (100 mL/Hr) IV Continuous <Continuous>  dextrose 50% Injectable 25 Gram(s) IV Push once  dextrose 50% Injectable 12.5 Gram(s) IV Push once  dextrose 50% Injectable 25 Gram(s) IV Push once  dronabinol 2.5 milliGRAM(s) Oral two times a day  ferrous    sulfate 325 milliGRAM(s) Oral three times a day  folic acid 1 milliGRAM(s) Oral daily  glucagon  Injectable 1 milliGRAM(s) IntraMuscular once  heparin   Injectable 5000 Unit(s) SubCutaneous every 12 hours  insulin glargine Injectable (LANTUS) 5 Unit(s) SubCutaneous at bedtime  insulin lispro (ADMELOG) corrective regimen sliding scale   SubCutaneous three times a day before meals  insulin lispro (ADMELOG) corrective regimen sliding scale   SubCutaneous at bedtime  lactated ringers. 1000 milliLiter(s) (50 mL/Hr) IV Continuous <Continuous>  lidocaine   Patch 1 Patch Transdermal daily  midodrine 30 milliGRAM(s) Oral every 8 hours  Nephro-kristi 1 Tablet(s) Oral daily  pantoprazole  Injectable 40 milliGRAM(s) IV Push daily  sevelamer carbonate 1600 milliGRAM(s) Oral three times a day  sodium chloride 0.9%. 1000 milliLiter(s) (60 mL/Hr) IV Continuous <Continuous>  thiamine IVPB 500 milliGRAM(s) IV Intermittent daily  ticagrelor 60 milliGRAM(s) Oral every 12 hours    MEDICATIONS  (PRN):  acetaminophen   Tablet .. 650 milliGRAM(s) Oral every 6 hours PRN Moderate Pain (4 - 6)  LORazepam   Injectable 0.25 milliGRAM(s) IV Push once PRN Nausea and/or Vomiting  valproate sodium IVPB 125 milliGRAM(s) IV Intermittent every 8 hours PRN agitation          ***** VITAL SIGNS:  T(F): 98 (21 @ 16:13), Max: 98 (21 @ 16:13)  HR: 92 (21 @ 16:13) (74 - 99)  BP: 100/73 (21 @ 16:13) (90/61 - 101/69)  RR: 18 (21 @ 16:13) (18 - 20)  SpO2: 94% (21 @ 16:13) (94% - 98%)  Wt(kg): --  ,   I&O's Summary    2021 07:  -  2021 07:00  --------------------------------------------------------  IN: 5520 mL / OUT: 5260 mL / NET: 260 mL    2021 07:  -  2021 19:30  --------------------------------------------------------  IN: 360 mL / OUT: 0 mL / NET: 360 mL             *****PHYSICAL EXAM: esponds after much coercion      alert oriented x 2 ( does not know name of hospital think he is at [facility]) does not know the date   delyaed responses    attention poor  comprehension  fair can follow 1 step commands   does answer some questions intermittently   EOmi fundi not visualized   no nystagmus VFF to confrontation  Tongue is midline  Palate elevates symmetrically   Moving both upper ext minimally  LE with limited mvt   does wiggles toes b/l     Gait not assessed.          *****LAB AND IMAGIN.5   8.55  )-----------( 210      ( 2021 06:16 )             42.5               07-23    132<L>  |  91<L>  |  32<H>  ----------------------------<  168<H>  3.2<L>   |  23  |  7.74<H>    Ca    8.8      2021 09:21                           [All pertinent recent Imaging/Reports reviewed]           *****A S S E S S M E N T   A N D   P L A N :  55M PMH ESRD on PD, DM on insulin, CHF EF 25-30% CAD s/p CABG x4,  Patient states that for the last 2 days PTA he has been having increased cough and sweating. He endorsed chills but no measured fevers at home. He denies any sick contacts or recent travel. Patient states that he fell few days ago with no head trauma. Of note patient has right toe gangrenous lesion that has been present for several months, He denies any trauma or pain at the site.       In the ED, Patient was found to be hypotensive to 70s. Patient was given midodrine 10mg x1 and bedside POCUS was completed showing poor cardiac function with diffuse hypokinesis. Patient was unable to maintain adequate SBPs  so required micu admission and pressor support.   Called to eval ams       Problem/Recommendations 1:  ams likely multifactorial metabolic encephalopathy started immediately after cefepime, also other factors include poor po intake, sleep wake cycle disruption hyponatremia   correct metabolic derangements   nephrovite added   thiamine 500 iv q h       limit sedatives   7/15 mental status with improvement    seemed lethargic  gagging     mental status improved    mental status remains stable, still with processing delay     Problem/Recommendations 2:  ecchymosis of the finger tips   r/o emboli   derm eval     eleonora no evidence for endocarditis   poor po intake         more alert and responsive       Problem/Recommendations 3: poor nutritional intake severe protein caloric malnutrition   encourage po intake   marinol as per gi     nutrition consult ? nephrovite for supplementation   thiamine daily       oob to chair to mobilize         Thank you for allowing me to participate in the care of this patient. Will continue to follow patient periodically. Please do not hesitate to call me if you have any  questions or if there has been a change in patients neurological status     ________________  Grisel Lainez MD  [facility]  [phone]      33 minutes spent on total encounter; more than 50 % of the visit was  spent counseling about plan of care, compliance to diet/exercise and medication regimen and or  coordinating care by the attending physician.      It is advised that stroke patients follow up with CARLYLE Pena @ [phone] in 1- 2 weeks.   Others please follow up with Dr. Michael Nissenbaum [phone]

## 2021-07-23 NOTE — CONSULT NOTE ADULT - PROBLEM SELECTOR RECOMMENDATION 9
Plan to rescope in 2 weeks  GI consult  Pt should F/U with Dr Simmons at [facility] after discharge [phone]
likely 2/2 sepsis =/- delirium

## 2021-07-23 NOTE — PROGRESS NOTE ADULT - SUBJECTIVE AND OBJECTIVE BOX
CARDIOLOGY FOLLOW UP - Dr. Best  DATE OF SERVICE: 07-23-21      no cp/sob   events noted - low bp     REVIEW OF SYSTEMS:   CONSTITUTIONAL: No fever, weight loss, or fatigue   RESPIRATORY:  No cough, wheezing, chills or hemoptysis; No SOB  CARDIOVASCULAR: No chest pain, palpitations, passing out, dizziness, or leg swelling   GASTROINTESTINAL: No abdominal or epigastric pain. No nausea, vomiting, or hematemesis, no diarreha, or constipation, No melena or hematochezia   VASCULAR: no edema.       PHYSICAL EXAM:  T(C): 36.4 (07-23-21 @ 08:00), Max: 36.6 (07-22-21 @ 17:00)  HR: 91 (07-23-21 @ 08:00) (74 - 99)  BP: 100/67 (07-23-21 @ 08:00) (91/63 - 108/70)  RR: 18 (07-23-21 @ 08:00) (18 - 20)  SpO2: 94% (07-23-21 @ 08:00) (94% - 99%)  Wt(kg): --  I&O's Summary    22 Jul 2021 07:01  -  23 Jul 2021 07:00  --------------------------------------------------------  IN: 5520 mL / OUT: 5260 mL / NET: 260 mL        Appearance: Normal	  Cardiovascular: Normal S1 S2,RRR, No JVD, No murmurs  Respiratory: Lungs clear to auscultation	  Gastrointestinal:  Soft, Non-tender, + BS	  Extremities: Normal range of motion, No clubbing, cyanosis or edema      HOME MEDICATIONS:  aspirin 81 mg oral delayed release tablet: 1 tab(s) orally once a day (29 Dec 2020 18:37)  atorvastatin 80 mg oral tablet: 1 tab(s) orally once a day (at bedtime) (29 Dec 2020 18:37)  epoetin martine: injectable once a month (29 Dec 2020 18:37)  ferrous sulfate 325 mg (65 mg elemental iron) oral tablet: 1 tab(s) orally 3 times a day (29 Dec 2020 18:37)  gabapentin 100 mg oral capsule: 1 cap(s) orally once a day (29 Dec 2020 18:37)  nortriptyline 25 mg oral capsule: 1 cap(s) orally once a day (at bedtime) (29 Dec 2020 18:37)  pantoprazole 40 mg oral delayed release tablet: 1 tab(s) orally once a day (before a meal) (29 Dec 2020 18:37)  Toujeo SoloStar 300 units/mL subcutaneous solution: 60 unit(s) subcutaneous once a day (at bedtime) (08 Jan 2021 12:41)      MEDICATIONS  (STANDING):  aspirin enteric coated 81 milliGRAM(s) Oral daily  atorvastatin 80 milliGRAM(s) Oral at bedtime  cadexomer iodine 0.9% Gel 1 Application(s) Topical daily  chlorhexidine 0.12% Liquid 15 milliLiter(s) Oral Mucosa two times a day  chlorhexidine 2% Cloths 1 Application(s) Topical <User Schedule>  dextrose 40% Gel 15 Gram(s) Oral once  dextrose 5%. 1000 milliLiter(s) (50 mL/Hr) IV Continuous <Continuous>  dextrose 5%. 1000 milliLiter(s) (100 mL/Hr) IV Continuous <Continuous>  dextrose 50% Injectable 25 Gram(s) IV Push once  dextrose 50% Injectable 12.5 Gram(s) IV Push once  dextrose 50% Injectable 25 Gram(s) IV Push once  dronabinol 2.5 milliGRAM(s) Oral two times a day  ferrous    sulfate 325 milliGRAM(s) Oral three times a day  folic acid 1 milliGRAM(s) Oral daily  glucagon  Injectable 1 milliGRAM(s) IntraMuscular once  heparin   Injectable 5000 Unit(s) SubCutaneous every 12 hours  insulin glargine Injectable (LANTUS) 5 Unit(s) SubCutaneous at bedtime  insulin lispro (ADMELOG) corrective regimen sliding scale   SubCutaneous three times a day before meals  insulin lispro (ADMELOG) corrective regimen sliding scale   SubCutaneous at bedtime  lactated ringers. 1000 milliLiter(s) (50 mL/Hr) IV Continuous <Continuous>  lidocaine   Patch 1 Patch Transdermal daily  midodrine 30 milliGRAM(s) Oral every 8 hours  Nephro-kristi 1 Tablet(s) Oral daily  pantoprazole  Injectable 40 milliGRAM(s) IV Push daily  sevelamer carbonate 1600 milliGRAM(s) Oral three times a day  thiamine IVPB 500 milliGRAM(s) IV Intermittent daily  ticagrelor 60 milliGRAM(s) Oral every 12 hours      TELEMETRY: 	    ECG:  	  RADIOLOGY:   DIAGNOSTIC TESTING:  [ ] Echocardiogram:  [ ]  Catheterization:  [ ] Stress Test:    OTHER: 	    LABS:	 	                                12.5   8.55  )-----------( 210      ( 22 Jul 2021 06:16 )             42.5     07-23    132<L>  |  91<L>  |  32<H>  ----------------------------<  168<H>  3.2<L>   |  23  |  7.74<H>    Ca    8.8      23 Jul 2021 09:21

## 2021-07-23 NOTE — PROVIDER CONTACT NOTE (OTHER) - ASSESSMENT
pt minimally verbal and refuses to talk. Administered 30mg midodrine @ bedtime immediately followed by emesis x 1. Did not notice pills in vomitus.

## 2021-07-23 NOTE — PROVIDER CONTACT NOTE (OTHER) - SITUATION
pt refuses to take PO meds and does not allow it to be crushed for administration.  pt was willing to take Midodrine and Brilinta during shift and refused all other PO meds

## 2021-07-23 NOTE — PROVIDER CONTACT NOTE (CRITICAL VALUE NOTIFICATION) - BACKGROUND
Admitted for Sepsis PMH: ESRD on PD
Pt admitted for septic shock; s/p ICU admission w/ pressors. Pmh of ESRD on PD, HTN, DM2

## 2021-07-23 NOTE — PROVIDER CONTACT NOTE (OTHER) - RECOMMENDATIONS
pt usually maintains SBP > 100 per trend after bedtime midodrine. supplement w / midodrine to maintain BP?

## 2021-07-23 NOTE — PROVIDER CONTACT NOTE (OTHER) - ASSESSMENT
pt minimally verbal, refuses to talk at times. Verbalized pill stuck in throat . VS as charted. Denies respiratory / cardiac distress. No S/S of hypoxia noted

## 2021-07-23 NOTE — PROGRESS NOTE ADULT - ASSESSMENT
CATH 11/30/2020:  COMPLICATIONS: The stent was deployed without difficulty. On removal of the  balloon, the shaft broke and the tip of the balloon remained in the vessel. Several attempts were made to snare the balloon unsuccessfully. Dr. Verdugo was notifed who will take the patient to the operating room.  DIAGNOSTIC RECOMMENDATIONS: The patient should continue with the present medications. The patients vessel was stented without difficulty On removal of the balloon, the shaft broke with the baloon stuck in the left main. All attempts to snare the balloon out failed and the patient was taken to the OR by Dr. Arango to remove the balloon  introp eleonora 11/30/20: mild to mod MR, < from: Intra-Operative Transesophageal Echo (11.30.20 @ 17:02) >  Severe global left ventricular systolic dysfunction. Inferior and inferolateral akinesis.  severe hypokinesis of other segments.  Severe global hypokinesia.  Normal left ventricular internal dimensions and wall thicknesses. Moderate diastolic dysfunction (Stage II).  echo 12/17/20: EF 32%, mod MR, Severe global left ventricular systolic dysfunction, moderate pulmonary pressures  echo 12/20/20: EF (Visual Estimate):  25-30 % mild MR, Akinesis of the inferolateral, anterolateral, apical laterlal, inferior, and inferoseptal walls. Severe left ventricular enlargement. No left ventricular thrombus is seen.  Echo 7/7/21: EF 16%, mod - sev MR, mod AS, severe global lv sys dysfx, severe diastolic dysfx, mod pulm htn       a/p  56 M well known to practice with PMH ESRD on PD, DM on insulin, CHF EF 25-30% CAD s/p CABG x4, underwent cardiac cath and stent and wire broke in heart s/p sternotomy p/w septic shock.    #Septic Shock, resolved   -Bcx NGTD 7/10   -s/p iv abx, pressors   -Bedside POCUS notable for biventricular diffuse hypokinesis  -right 2nd toe gangrene, pod f/u, vascular f/u, PONCHO/PVR   -ID f/u   -ELEONORA: no evidence of valvular vegetation is seen.  -med f/u    #Ischemic Cardiomyopathy. Chronic systolic HF  -vol status stable - monitor vol status closely while on IVF   -Repeat echo noted with EF 16%, mod - sev MR, mod AS, severe global lv sys dysfx, severe diastolic dysfx, mod pulm htn   -pt declining ICD   -no bb, acei/arb given hx of orthostatic bp   -continue midodrine for bp support - increase as needed   -cont vol removal w PD     # CAD, s/p CABG, s/p PCI, s/p redo open heart for stent balloon retrieval   -hst elevated, likely demand ischemia in setting of septic shock, ESRD   -c/w asa, Brilinta, statin     # ESRD  -on PD  -renal f/u    #AMS  -improving   -neuro psych f/u    #GOC  -palliative f/u     dvt ppx

## 2021-07-23 NOTE — PROGRESS NOTE ADULT - ASSESSMENT
56 male with ESRD with peritoneal dialysis ,CHF, CAD who had report of hiccups. There has been no recurrent hiccups reported by patient or staff. Request reconsult for patient c/o difficulty swallowing , episodic vomiting . ENT evaluation noted     Recommendations ;  As discussed with GI attending recommend a MBS and a barium esophagram to evaluate his swallow abilty and to  define his anatomy.       Mary Jo Soto, ANP-[facility]  [address] [place] [address]  [phone]      56 male with ESRD with peritoneal dialysis ,CHF, CAD , Per wife bedside patient has had a poor appetite since cardiac surgery in November 2020. She said If he does not like the food or its smell he gags/throws up. Noted  on bedside table were [employer] candy bars and 2  wrapper. I asked  patient who ate the candy and he said he did  and did not have any trouble swallowing them.  Discussed with patient and wife that trying to eat foods he likes, suggest wife bring in food preferences .  She replied  he doesn't like anything she makes. Inquired what he may like to eat and  he responded fried chicken . Unclear the cause of his aversion to foods since his cardiac surgery 8 months ago        Recommendations ;  As discussed with GI attending recommend a MBS and a barium esophagram which would  evaluate his swallow ability and to define his esophageal anatomy.   Wife to bring in some foods he requests in if within his dietary restrictions     Call GI Service at [phone] over weekend for any GI issues or concerns     BRINA Miller-BC  Covering [facility]  [address] [place] [address]  [address] [phone]

## 2021-07-23 NOTE — PROGRESS NOTE ADULT - ASSESSMENT
56 M PMH ESRD on PD, DM on insulin, CHF EF 25-30% CAD s/p CABG x4 pw with hypotension with fever. Encephalopathy likely related to cefepime ? no obvious source of sepsis apparent as of now, KLAUDIA: Overall thickened valves seen however, no evidence of valvular vegetation is seen.     Hypotension:  on Midodrine for hemodynamic support:  BPs soft at times; acceptable for now   ESRD on PD. Patient TW 65 kg.    Hyponatremia  ID-Blood cx and PD fluid cx -   off IV antibiotics   Hyperphosphatemia:  remains >goal as of late; binders increased   Failure to thrive     RECOMMENDATIONS   - c/w CCPD orders with dianeal 1.5 to limit UF and ultimately hypotension.  Will only do 3 exchanges as he is not eating and vomiting   -Gentle IVF as well   - Palliative care follow up  - continue Renvela to 1,600 mg PO TID;   - continue Midodrine  30mg po q 8 hours but at present throwing up   - Ongoing neuro/psych eval.         DW Wife and Nurse     Sayed [facility]   [phone]

## 2021-07-23 NOTE — PROVIDER CONTACT NOTE (OTHER) - RECOMMENDATIONS
pt cannot tolerate ordered PO meds at the same time. will administer  and Brilinta at this time and reschedule until pt is able to tolerate it

## 2021-07-23 NOTE — PROGRESS NOTE ADULT - SUBJECTIVE AND OBJECTIVE BOX
INTERVAL HPI/OVERNIGHT EVENTS:    MEDICATIONS  (STANDING):  aspirin enteric coated 81 milliGRAM(s) Oral daily  atorvastatin 80 milliGRAM(s) Oral at bedtime  cadexomer iodine 0.9% Gel 1 Application(s) Topical daily  chlorhexidine 0.12% Liquid 15 milliLiter(s) Oral Mucosa two times a day  chlorhexidine 2% Cloths 1 Application(s) Topical <User Schedule>  dextrose 40% Gel 15 Gram(s) Oral once  dextrose 5%. 1000 milliLiter(s) (50 mL/Hr) IV Continuous <Continuous>  dextrose 5%. 1000 milliLiter(s) (100 mL/Hr) IV Continuous <Continuous>  dextrose 50% Injectable 25 Gram(s) IV Push once  dextrose 50% Injectable 12.5 Gram(s) IV Push once  dextrose 50% Injectable 25 Gram(s) IV Push once  dronabinol 2.5 milliGRAM(s) Oral two times a day  ferrous    sulfate 325 milliGRAM(s) Oral three times a day  folic acid 1 milliGRAM(s) Oral daily  glucagon  Injectable 1 milliGRAM(s) IntraMuscular once  heparin   Injectable 5000 Unit(s) SubCutaneous every 12 hours  insulin glargine Injectable (LANTUS) 5 Unit(s) SubCutaneous at bedtime  insulin lispro (ADMELOG) corrective regimen sliding scale   SubCutaneous three times a day before meals  insulin lispro (ADMELOG) corrective regimen sliding scale   SubCutaneous at bedtime  lactated ringers. 1000 milliLiter(s) (50 mL/Hr) IV Continuous <Continuous>  lidocaine   Patch 1 Patch Transdermal daily  midodrine 30 milliGRAM(s) Oral every 8 hours  Nephro-kristi 1 Tablet(s) Oral daily  pantoprazole  Injectable 40 milliGRAM(s) IV Push daily  sevelamer carbonate 1600 milliGRAM(s) Oral three times a day  sodium chloride 0.9%. 1000 milliLiter(s) (60 mL/Hr) IV Continuous <Continuous>  thiamine IVPB 500 milliGRAM(s) IV Intermittent daily  ticagrelor 60 milliGRAM(s) Oral every 12 hours    MEDICATIONS  (PRN):  acetaminophen   Tablet .. 650 milliGRAM(s) Oral every 6 hours PRN Moderate Pain (4 - 6)  LORazepam   Injectable 0.25 milliGRAM(s) IV Push once PRN Nausea and/or Vomiting  valproate sodium IVPB 125 milliGRAM(s) IV Intermittent every 8 hours PRN agitation      Allergies    doxazosin (Other)    Intolerances        Review of Systems:    General:    ENT:    CV:  No pain,   Resp:                Vital Signs Last 24 Hrs  T(C): 36.7 (23 Jul 2021 16:13), Max: 36.7 (23 Jul 2021 16:13)  T(F): 98 (23 Jul 2021 16:13), Max: 98 (23 Jul 2021 16:13)  HR: 92 (23 Jul 2021 16:13) (74 - 99)  BP: 100/73 (23 Jul 2021 16:13) (90/61 - 101/69)  BP(mean): --  RR: 18 (23 Jul 2021 16:13) (18 - 20)  SpO2: 94% (23 Jul 2021 16:13) (94% - 98%)    PHYSICAL EXAM:    Constitutional: NAD, well-developed  Neck: No LAD, supple  Respiratory: clear to auscultation with no wheezing  Cardiovascular: S1 and S2, RRR,   Gastrointestinal:   Extremities: No peripheral edema,   Skin: No rashes      LABS:                        12.5   8.55  )-----------( 210      ( 22 Jul 2021 06:16 )             42.5     07-23    132<L>  |  91<L>  |  32<H>  ----------------------------<  168<H>  3.2<L>   |  23  |  7.74<H>    Ca    8.8      23 Jul 2021 09:21          LIVER FUNCTIONS - ( 19 Jul 2021 07:00 )  Alb: 2.0 g/dL / Pro: 6.0 g/dL / ALK PHOS: 105 U/L / ALT: 13 U/L / AST: 29 U/L / GGT: x             RADIOLOGY & ADDITIONAL TESTS:  Fiberoptic Indirect laryngoscopy:  (Scope #2 used)    Reason for Laryngoscopy:    Patient was unable to cooperate with mirror.  Granulomatous lesion noted at the base of the left arytenoid, pachydermia consistent with LPR, Nasopharynx, oropharynx, and hypopharynx clear, no bleeding. Tongue base, posterior pharyngeal wall, vallecula, epiglottis, and subglottis appear normal. No erythema, edema, pooling of secretions, masses or lesions. Airway patent, no foreign body visualized. No glottic/supraglottic edema. True vocal cords, arytenoids, vestibular folds, ventricles, pyriform sinuses, and aryepiglottic folds appear normal bilaterally. Vocal cords mobile with good contact b/l.       INTERVAL HPI/OVERNIGHT EVENTS:  Patient in bed wife joined bedside     MEDICATIONS  (STANDING):  aspirin enteric coated 81 milliGRAM(s) Oral daily  atorvastatin 80 milliGRAM(s) Oral at bedtime  cadexomer iodine 0.9% Gel 1 Application(s) Topical daily  chlorhexidine 0.12% Liquid 15 milliLiter(s) Oral Mucosa two times a day  chlorhexidine 2% Cloths 1 Application(s) Topical <User Schedule>  dextrose 40% Gel 15 Gram(s) Oral once  dextrose 5%. 1000 milliLiter(s) (50 mL/Hr) IV Continuous <Continuous>  dextrose 5%. 1000 milliLiter(s) (100 mL/Hr) IV Continuous <Continuous>  dextrose 50% Injectable 25 Gram(s) IV Push once  dextrose 50% Injectable 12.5 Gram(s) IV Push once  dextrose 50% Injectable 25 Gram(s) IV Push once  dronabinol 2.5 milliGRAM(s) Oral two times a day  ferrous    sulfate 325 milliGRAM(s) Oral three times a day  folic acid 1 milliGRAM(s) Oral daily  glucagon  Injectable 1 milliGRAM(s) IntraMuscular once  heparin   Injectable 5000 Unit(s) SubCutaneous every 12 hours  insulin glargine Injectable (LANTUS) 5 Unit(s) SubCutaneous at bedtime  insulin lispro (ADMELOG) corrective regimen sliding scale   SubCutaneous three times a day before meals  insulin lispro (ADMELOG) corrective regimen sliding scale   SubCutaneous at bedtime  lactated ringers. 1000 milliLiter(s) (50 mL/Hr) IV Continuous <Continuous>  lidocaine   Patch 1 Patch Transdermal daily  midodrine 30 milliGRAM(s) Oral every 8 hours  Nephro-kristi 1 Tablet(s) Oral daily  pantoprazole  Injectable 40 milliGRAM(s) IV Push daily  sevelamer carbonate 1600 milliGRAM(s) Oral three times a day  sodium chloride 0.9%. 1000 milliLiter(s) (60 mL/Hr) IV Continuous <Continuous>  thiamine IVPB 500 milliGRAM(s) IV Intermittent daily  ticagrelor 60 milliGRAM(s) Oral every 12 hours    MEDICATIONS  (PRN):  acetaminophen   Tablet .. 650 milliGRAM(s) Oral every 6 hours PRN Moderate Pain (4 - 6)  LORazepam   Injectable 0.25 milliGRAM(s) IV Push once PRN Nausea and/or Vomiting  valproate sodium IVPB 125 milliGRAM(s) IV Intermittent every 8 hours PRN agitation      Allergies    doxazosin (Other)    Intolerances        Review of Systems:    General:  No fevers   ENT:  states he has problem swallowing "tablets" and doesnt like apple sauce per staff he does not want meds crushed   CV:  No pain,   Resp: no cough or hiccups during assessment                 Vital Signs Last 24 Hrs  T(C): 36.7 (23 Jul 2021 16:13), Max: 36.7 (23 Jul 2021 16:13)  T(F): 98 (23 Jul 2021 16:13), Max: 98 (23 Jul 2021 16:13)  HR: 92 (23 Jul 2021 16:13) (74 - 99)  BP: 100/73 (23 Jul 2021 16:13) (90/61 - 101/69)  BP(mean): --  RR: 18 (23 Jul 2021 16:13) (18 - 20)  SpO2: 94% (23 Jul 2021 16:13) (94% - 98%)    PHYSICAL EXAM:    Constitutional: NAD, non toxic   Respiratory: anterior chest wall clear to auscultation   Cardiovascular: S1 and S2, RRR,   Gastrointestinal: abdomen non distended + bowel sounds   Extremities: No peripheral edema,   Skin: No rashes      LABS:                        12.5   8.55  )-----------( 210      ( 22 Jul 2021 06:16 )             42.5     07-23    132<L>  |  91<L>  |  32<H>  ----------------------------<  168<H>  3.2<L>   |  23  |  7.74<H>    Ca    8.8      23 Jul 2021 09:21          LIVER FUNCTIONS - ( 19 Jul 2021 07:00 )  Alb: 2.0 g/dL / Pro: 6.0 g/dL / ALK PHOS: 105 U/L / ALT: 13 U/L / AST: 29 U/L / GGT: x             RADIOLOGY & ADDITIONAL TESTS:  Fiberoptic Indirect laryngoscopy:  (Scope #2 used)    Reason for Laryngoscopy:    Patient was unable to cooperate with mirror.  Granulomatous lesion noted at the base of the left arytenoid, pachydermia consistent with LPR, Nasopharynx, oropharynx, and hypopharynx clear, no bleeding. Tongue base, posterior pharyngeal wall, vallecula, epiglottis, and subglottis appear normal. No erythema, edema, pooling of secretions, masses or lesions. Airway patent, no foreign body visualized. No glottic/supraglottic edema. True vocal cords, arytenoids, vestibular folds, ventricles, pyriform sinuses, and aryepiglottic folds appear normal bilaterally. Vocal cords mobile with good contact b/l.

## 2021-07-23 NOTE — PROGRESS NOTE ADULT - SUBJECTIVE AND OBJECTIVE BOX
NEPHROLOGY-[facility] [phone]        Patient seen and examined in bed this am.  He was unable to keep down food or meds         MEDICATIONS  (STANDING):  aspirin enteric coated 81 milliGRAM(s) Oral daily  atorvastatin 80 milliGRAM(s) Oral at bedtime  cadexomer iodine 0.9% Gel 1 Application(s) Topical daily  chlorhexidine 0.12% Liquid 15 milliLiter(s) Oral Mucosa two times a day  chlorhexidine 2% Cloths 1 Application(s) Topical <User Schedule>  dextrose 40% Gel 15 Gram(s) Oral once  dextrose 5%. 1000 milliLiter(s) (50 mL/Hr) IV Continuous <Continuous>  dextrose 5%. 1000 milliLiter(s) (100 mL/Hr) IV Continuous <Continuous>  dextrose 50% Injectable 25 Gram(s) IV Push once  dextrose 50% Injectable 12.5 Gram(s) IV Push once  dextrose 50% Injectable 25 Gram(s) IV Push once  dronabinol 2.5 milliGRAM(s) Oral two times a day  ferrous    sulfate 325 milliGRAM(s) Oral three times a day  folic acid 1 milliGRAM(s) Oral daily  glucagon  Injectable 1 milliGRAM(s) IntraMuscular once  heparin   Injectable 5000 Unit(s) SubCutaneous every 12 hours  insulin glargine Injectable (LANTUS) 5 Unit(s) SubCutaneous at bedtime  insulin lispro (ADMELOG) corrective regimen sliding scale   SubCutaneous three times a day before meals  insulin lispro (ADMELOG) corrective regimen sliding scale   SubCutaneous at bedtime  lactated ringers. 1000 milliLiter(s) (50 mL/Hr) IV Continuous <Continuous>  lidocaine   Patch 1 Patch Transdermal daily  midodrine 30 milliGRAM(s) Oral every 8 hours  Nephro-kristi 1 Tablet(s) Oral daily  pantoprazole  Injectable 40 milliGRAM(s) IV Push daily  sevelamer carbonate 1600 milliGRAM(s) Oral three times a day  thiamine IVPB 500 milliGRAM(s) IV Intermittent daily  ticagrelor 60 milliGRAM(s) Oral every 12 hours      VITAL:  T(C): , Max: 36.7 (07-23-21 @ 16:13)  T(F): , Max: 98 (07-23-21 @ 16:13)  HR: 92 (07-23-21 @ 16:13)  BP: 100/73 (07-23-21 @ 16:13)  BP(mean): --  RR: 18 (07-23-21 @ 16:13)  SpO2: 94% (07-23-21 @ 16:13)  Wt(kg): --    I and O's:    07-22 @ 07:01  -  07-23 @ 07:00  --------------------------------------------------------  IN: 5520 mL / OUT: 5260 mL / NET: 260 mL          PHYSICAL EXAM:    Constitutional: NAD  Neck:  No JVD  Respiratory: CTAB/L  Cardiovascular: S1 and S2  Gastrointestinal: BS+, soft, NT/ND  Extremities: No peripheral edema  Neurological:   no focal deficits  Psychiatric: Normal mood, normal affect  : No Johnson  Skin: No rashes  Access: Not applicable    LABS:                        12.5   8.55  )-----------( 210      ( 22 Jul 2021 06:16 )             42.5     07-23    132<L>  |  91<L>  |  32<H>  ----------------------------<  168<H>  3.2<L>   |  23  |  7.74<H>    Ca    8.8      23 Jul 2021 09:21            Urine Studies:          RADIOLOGY & ADDITIONAL STUDIES:

## 2021-07-23 NOTE — PROVIDER CONTACT NOTE (OTHER) - ASSESSMENT
pt not able to take multiple meds at the same time d/t N/V. emesis episodes immediately followed after med admin

## 2021-07-23 NOTE — PROVIDER CONTACT NOTE (OTHER) - ACTION/TREATMENT ORDERED:
will supplement pt with another 30mg midodrine and will reassess.   pt tolerated well. No emesis noted. pt has call bell within reach and will monitor

## 2021-07-23 NOTE — PROVIDER CONTACT NOTE (OTHER) - ACTION/TREATMENT ORDERED:
Administer Brilinta this morning and re-schedule midodrine to 0800 as the adjunct dose of midodrine was administered to pt within past 8 hours.

## 2021-07-23 NOTE — PROGRESS NOTE ADULT - ASSESSMENT
56 m with    Sepsis  - off antibiotic   - toe gangrene  - Podiatry follow  - ID follow   - KLAUDIA noted     COPD  - 2L NC overnight because he becomes apneic, sats well  - Sats well on RA while awake    CAD s/p CABG   - Hypotension likely in the setting of septic shock   - midodrine dose increased to 30 mg TID to match home dose  - Previous echo with reduced EF  - c/w DAPT  - Cardiology follow    CHF cr systolic  - cardiology follow  - patient and family refuse AICD    Peritoneal Dialysis   - nephrology follow PD, recommend 4x a day  - patient on midodrine 30 q8h at home, continue here  - Holding home metoprolol 25 q12hr in setting of hypotension    Diabetes   - HA1C 6.1 on 7/6 suggesting prediabetes  - Tujeo 60 units at bedtime and Victoza at home, started on 30 units at bedtime, adjust as needed based on patient PO intake  - added 2 units insulin pre-meal    Hiccups  - GI follow  - hold Ativan 2 to sedation    Incontinence dermatitis  - wound care    Toe gangrene  - Podiatry follow  - Vascular follow  - PONCHO/PVR noted  - Angiogram if agrees    Finger gangrenous area  - Plastic evaluation  - local care    Vertebral fracture T12  - Ortho eval noted   - MRI spine noted  - No intervention    Confusion  - possible delirium  - Psych follow  - Neurology follow  - EEG noted    Dysphagia  - SS  - GI follow  - MBS  - ENT follow    functional quadriplegia   - supportive care     Palliative follow re [facility]     DCP in progress    Pipe Beck MD pager [phone]

## 2021-07-23 NOTE — PROGRESS NOTE ADULT - PROBLEM SELECTOR PLAN 1
mental status improved today able to speak simple sentences unable to teach back information provided  including but not limited to infection/sepsis, heart failure, ESRD

## 2021-07-23 NOTE — PROGRESS NOTE ADULT - PROBLEM SELECTOR PLAN 5
Met with pt and wife at bedside.  Greater then 20 mins spent discussing ACP. Wife states pt understands english well and able to have conversation.  "He needs to make the decisions." Attempted to speak with pt in English. Pt unable to communicate/teach  back information provided to him.  Used  service  ID # [id number] Cantonese speaking.  Pt states that is 2011 and that he is in the hospital for his heart and there has been nothing said about an infection.  Spoke with wife outside pts room.  Her son will be flying in from [place] and she would like him to be involved with the conversation on Monday of Tuesday.  Pts wife will call when son arrives to set up time for family meeting. Palliative care will continue to follow.

## 2021-07-23 NOTE — PROVIDER CONTACT NOTE (OTHER) - ACTION/TREATMENT ORDERED:
Will endorse to team in the morning for speech and swallow eval  As of 0655 - Pt denies pill stuck in his throat. VS as charted and will endorse to day RN. pt has call bell within reach and will mtr

## 2021-07-23 NOTE — CONSULT NOTE ADULT - ASSESSMENT
55yo male with significant PMHx C/O dysphagia mostly to solid foods. Bedside indirect laryngoscopy revealed a granulomatous lesions at the base of his left arytenoid unlikely the cause of dysphagia. LPR was also noted.

## 2021-07-23 NOTE — CONSULT NOTE ADULT - SUBJECTIVE AND OBJECTIVE BOX
CC: Dysphagia    HPI: 55M PMH ESRD on PD, DM on insulin, CHF EF 25-30% CAD s/p CABG x4, underwent cardiac cath and stent and wire broke in heart sternotomy. Patient states that for the last 2 days he has been having increased cough and sweating. He endorsed chills but no measured fevers at home. He denies any sick contacts or recent travel. Patient states that he fell few days ago with no head trauma. Of note patient has right toe gangrenous lesion that has been present for several months. Pt was found to have sepsis and treated with ABx. ENT was consulted for dysphagia. Pt admits to difficulty swallowing mostly solid foods. Bedside swallow eval by speech on 7/13 was limited since pt only agreed to swallow thin liquids. Pt admits to vomiting, but denies throat pain, cough, SOB, hoarseness.        PAST MEDICAL & SURGICAL HISTORY:  Diabetes    CAD, multiple vessel    ESRD (end stage renal disease) on dialysis    HTN (hypertension)    S/P CABG (coronary artery bypass graft)      Allergies    doxazosin (Other)    Intolerances      MEDICATIONS  (STANDING):  aspirin enteric coated 81 milliGRAM(s) Oral daily  atorvastatin 80 milliGRAM(s) Oral at bedtime  cadexomer iodine 0.9% Gel 1 Application(s) Topical daily  chlorhexidine 0.12% Liquid 15 milliLiter(s) Oral Mucosa two times a day  chlorhexidine 2% Cloths 1 Application(s) Topical <User Schedule>  dextrose 40% Gel 15 Gram(s) Oral once  dextrose 5%. 1000 milliLiter(s) (50 mL/Hr) IV Continuous <Continuous>  dextrose 5%. 1000 milliLiter(s) (100 mL/Hr) IV Continuous <Continuous>  dextrose 50% Injectable 25 Gram(s) IV Push once  dextrose 50% Injectable 12.5 Gram(s) IV Push once  dextrose 50% Injectable 25 Gram(s) IV Push once  dronabinol 2.5 milliGRAM(s) Oral two times a day  ferrous    sulfate 325 milliGRAM(s) Oral three times a day  folic acid 1 milliGRAM(s) Oral daily  glucagon  Injectable 1 milliGRAM(s) IntraMuscular once  heparin   Injectable 5000 Unit(s) SubCutaneous every 12 hours  insulin glargine Injectable (LANTUS) 5 Unit(s) SubCutaneous at bedtime  insulin lispro (ADMELOG) corrective regimen sliding scale   SubCutaneous three times a day before meals  insulin lispro (ADMELOG) corrective regimen sliding scale   SubCutaneous at bedtime  lactated ringers. 1000 milliLiter(s) (50 mL/Hr) IV Continuous <Continuous>  lidocaine   Patch 1 Patch Transdermal daily  midodrine 30 milliGRAM(s) Oral every 8 hours  Nephro-kristi 1 Tablet(s) Oral daily  pantoprazole  Injectable 40 milliGRAM(s) IV Push daily  sevelamer carbonate 1600 milliGRAM(s) Oral three times a day  thiamine IVPB 500 milliGRAM(s) IV Intermittent daily  ticagrelor 60 milliGRAM(s) Oral every 12 hours    MEDICATIONS  (PRN):  acetaminophen   Tablet .. 650 milliGRAM(s) Oral every 6 hours PRN Moderate Pain (4 - 6)  LORazepam   Injectable 0.25 milliGRAM(s) IV Push once PRN Nausea and/or Vomiting  valproate sodium IVPB 125 milliGRAM(s) IV Intermittent every 8 hours PRN agitation      Social History: former smoker    Family history: no pertinent FHx    ROS:   ENT: all negative except as noted in HPI   CV: denies palpitations  Pulm: denies SOB, cough, hemoptysis  GI: denies change in apetite, indigestion, n/v  : denies pertinent urinary symptoms, urgency  Neuro: denies numbness/tingling, loss of sensation  Psych: denies anxiety  MS: denies muscle weakness, instability  Heme: denies easy bruising or bleeding  Endo: denies heat/cold intolerance, excessive sweating  Vascular: denies LE edema    Vital Signs Last 24 Hrs  T(C): 36.4 (23 Jul 2021 08:00), Max: 36.6 (22 Jul 2021 17:00)  T(F): 97.6 (23 Jul 2021 08:00), Max: 97.9 (22 Jul 2021 17:00)  HR: 91 (23 Jul 2021 08:00) (74 - 99)  BP: 100/67 (23 Jul 2021 08:00) (91/63 - 108/70)  BP(mean): --  RR: 18 (23 Jul 2021 08:00) (18 - 20)  SpO2: 94% (23 Jul 2021 08:00) (94% - 99%)                          12.5   8.55  )-----------( 210      ( 22 Jul 2021 06:16 )             42.5    07-23    132<L>  |  91<L>  |  32<H>  ----------------------------<  168<H>  3.2<L>   |  23  |  7.74<H>    Ca    8.8      23 Jul 2021 09:21         PHYSICAL EXAM:  Gen: NAD  Skin: No rashes, bruises, or lesions  Head: Normocephalic, Atraumatic  Face: no edema, erythema, or fluctuance. Parotid glands soft without mass  Eyes: no scleral injection  Nose: Nares bilaterally patent, no discharge  Mouth: No Stridor / Drooling / Trismus.  Mucosa moist, tongue/uvula midline, oropharynx clear  Neck: Flat, supple, no lymphadenopathy, trachea midline, no masses  Lymphatic: No lymphadenopathy  Resp: breathing easily, no stridor  CV: no peripheral edema/cyanosis  GI: nondistended   Peripheral vascular: no JVD or edema  Neuro: facial nerve intact, no facial droop      Fiberoptic Indirect laryngoscopy:  (Scope #2 used)    Reason for Laryngoscopy:    Patient was unable to cooperate with mirror.  Granulomatous lesion noted at the base of the left arytenoid, pachydermia consistent with LPR, Nasopharynx, oropharynx, and hypopharynx clear, no bleeding. Tongue base, posterior pharyngeal wall, vallecula, epiglottis, and subglottis appear normal. No erythema, edema, pooling of secretions, masses or lesions. Airway patent, no foreign body visualized. No glottic/supraglottic edema. True vocal cords, arytenoids, vestibular folds, ventricles, pyriform sinuses, and aryepiglottic folds appear normal bilaterally. Vocal cords mobile with good contact b/l.

## 2021-07-23 NOTE — PROGRESS NOTE ADULT - SUBJECTIVE AND OBJECTIVE BOX
Patient is a 56y old  Male who presents with a chief complaint of Hypotension (23 Jul 2021 19:30)      SUBJECTIVE / OVERNIGHT EVENTS: c/o difficulty swallowing.  Review of Systems  chest pain no  palpitations no  sob no  nausea no  headache no    MEDICATIONS  (STANDING):  aspirin enteric coated 81 milliGRAM(s) Oral daily  atorvastatin 80 milliGRAM(s) Oral at bedtime  cadexomer iodine 0.9% Gel 1 Application(s) Topical daily  chlorhexidine 0.12% Liquid 15 milliLiter(s) Oral Mucosa two times a day  chlorhexidine 2% Cloths 1 Application(s) Topical <User Schedule>  dextrose 40% Gel 15 Gram(s) Oral once  dextrose 5%. 1000 milliLiter(s) (50 mL/Hr) IV Continuous <Continuous>  dextrose 5%. 1000 milliLiter(s) (100 mL/Hr) IV Continuous <Continuous>  dextrose 50% Injectable 25 Gram(s) IV Push once  dextrose 50% Injectable 12.5 Gram(s) IV Push once  dextrose 50% Injectable 25 Gram(s) IV Push once  dronabinol 2.5 milliGRAM(s) Oral two times a day  ferrous    sulfate 325 milliGRAM(s) Oral three times a day  folic acid 1 milliGRAM(s) Oral daily  glucagon  Injectable 1 milliGRAM(s) IntraMuscular once  heparin   Injectable 5000 Unit(s) SubCutaneous every 12 hours  insulin glargine Injectable (LANTUS) 5 Unit(s) SubCutaneous at bedtime  insulin lispro (ADMELOG) corrective regimen sliding scale   SubCutaneous three times a day before meals  insulin lispro (ADMELOG) corrective regimen sliding scale   SubCutaneous at bedtime  lactated ringers. 1000 milliLiter(s) (50 mL/Hr) IV Continuous <Continuous>  lidocaine   Patch 1 Patch Transdermal daily  midodrine 30 milliGRAM(s) Oral every 8 hours  Nephro-kristi 1 Tablet(s) Oral daily  pantoprazole  Injectable 40 milliGRAM(s) IV Push daily  sevelamer carbonate 1600 milliGRAM(s) Oral three times a day  sodium chloride 0.9%. 1000 milliLiter(s) (60 mL/Hr) IV Continuous <Continuous>  thiamine IVPB 500 milliGRAM(s) IV Intermittent daily  ticagrelor 60 milliGRAM(s) Oral every 12 hours    MEDICATIONS  (PRN):  acetaminophen   Tablet .. 650 milliGRAM(s) Oral every 6 hours PRN Moderate Pain (4 - 6)  LORazepam   Injectable 0.25 milliGRAM(s) IV Push once PRN Nausea and/or Vomiting  valproate sodium IVPB 125 milliGRAM(s) IV Intermittent every 8 hours PRN agitation      Vital Signs Last 24 Hrs  T(C): 36.7 (23 Jul 2021 16:13), Max: 36.7 (23 Jul 2021 16:13)  T(F): 98 (23 Jul 2021 16:13), Max: 98 (23 Jul 2021 16:13)  HR: 92 (23 Jul 2021 16:13) (74 - 96)  BP: 100/73 (23 Jul 2021 16:13) (90/61 - 101/69)  BP(mean): --  RR: 18 (23 Jul 2021 16:13) (18 - 20)  SpO2: 94% (23 Jul 2021 16:13) (94% - 98%)    PHYSICAL EXAM:  GENERAL: sick  HEAD:  Atraumatic, Normocephalic  EYES: EOMI, PERRLA, conjunctiva and sclera clear  NECK: Supple, No JVD  CHEST/LUNG: Clear to auscultation bilaterally; No wheeze  HEART: Regular rate and rhythm; No murmurs, rubs, or gallops  ABDOMEN: Soft, Nontender, Nondistended; Bowel sounds present PD catheter  EXTREMITIES:  toe with gangrenous area and R thumb with gangrenous area  PSYCH: confused  NEUROLOGY: non-focal  SKIN: No rashes or lesions    LABS:                        12.5   8.55  )-----------( 210      ( 22 Jul 2021 06:16 )             42.5     07-23    132<L>  |  91<L>  |  32<H>  ----------------------------<  168<H>  3.2<L>   |  23  |  7.74<H>    Ca    8.8      23 Jul 2021 09:21                  RADIOLOGY & ADDITIONAL TESTS:    Imaging Personally Reviewed:    Consultant(s) Notes Reviewed:      Care Discussed with Consultants/Other Providers:

## 2021-07-24 NOTE — PROGRESS NOTE ADULT - SUBJECTIVE AND OBJECTIVE BOX
Patient is a 56y old  Male who presents with a chief complaint of Hypotension (24 Jul 2021 14:53)      SUBJECTIVE / OVERNIGHT EVENTS: more awake.  No new complaints.   Review of Systems  chest pain no  palpitations no  sob no  nausea no  headache no    MEDICATIONS  (STANDING):  aspirin enteric coated 81 milliGRAM(s) Oral daily  atorvastatin 80 milliGRAM(s) Oral at bedtime  cadexomer iodine 0.9% Gel 1 Application(s) Topical daily  chlorhexidine 0.12% Liquid 15 milliLiter(s) Oral Mucosa two times a day  chlorhexidine 2% Cloths 1 Application(s) Topical <User Schedule>  dextrose 40% Gel 15 Gram(s) Oral once  dextrose 5%. 1000 milliLiter(s) (50 mL/Hr) IV Continuous <Continuous>  dextrose 5%. 1000 milliLiter(s) (100 mL/Hr) IV Continuous <Continuous>  dextrose 50% Injectable 25 Gram(s) IV Push once  dextrose 50% Injectable 12.5 Gram(s) IV Push once  dextrose 50% Injectable 25 Gram(s) IV Push once  dronabinol 2.5 milliGRAM(s) Oral two times a day  ferrous    sulfate 325 milliGRAM(s) Oral three times a day  folic acid 1 milliGRAM(s) Oral daily  glucagon  Injectable 1 milliGRAM(s) IntraMuscular once  heparin   Injectable 5000 Unit(s) SubCutaneous every 12 hours  insulin glargine Injectable (LANTUS) 5 Unit(s) SubCutaneous at bedtime  insulin lispro (ADMELOG) corrective regimen sliding scale   SubCutaneous three times a day before meals  insulin lispro (ADMELOG) corrective regimen sliding scale   SubCutaneous at bedtime  lactated ringers. 1000 milliLiter(s) (50 mL/Hr) IV Continuous <Continuous>  lidocaine   Patch 1 Patch Transdermal daily  midodrine 30 milliGRAM(s) Oral every 8 hours  Nephro-kristi 1 Tablet(s) Oral daily  pantoprazole  Injectable 40 milliGRAM(s) IV Push daily  sevelamer carbonate 1600 milliGRAM(s) Oral three times a day  thiamine IVPB 500 milliGRAM(s) IV Intermittent daily  ticagrelor 60 milliGRAM(s) Oral every 12 hours    MEDICATIONS  (PRN):  acetaminophen   Tablet .. 650 milliGRAM(s) Oral every 6 hours PRN Moderate Pain (4 - 6)  LORazepam   Injectable 0.25 milliGRAM(s) IV Push once PRN Nausea and/or Vomiting  valproate sodium IVPB 125 milliGRAM(s) IV Intermittent every 8 hours PRN agitation      Vital Signs Last 24 Hrs  T(C): 36.8 (24 Jul 2021 17:00), Max: 36.9 (24 Jul 2021 08:04)  T(F): 98.3 (24 Jul 2021 17:00), Max: 98.4 (24 Jul 2021 08:04)  HR: 95 (24 Jul 2021 17:00) (91 - 96)  BP: 98/66 (24 Jul 2021 17:00) (87/64 - 100/71)  BP(mean): --  RR: 18 (24 Jul 2021 17:00) (18 - 18)  SpO2: 96% (24 Jul 2021 17:00) (96% - 98%)    PHYSICAL EXAM:  GENERAL: NAD, well-developed  HEAD:  Atraumatic, Normocephalic  EYES: EOMI, PERRLA, conjunctiva and sclera clear  NECK: Supple, No JVD  CHEST/LUNG: Clear to auscultation bilaterally; No wheeze  HEART: Regular rate and rhythm; No murmurs, rubs, or gallops  ABDOMEN: Soft, Nontender, Nondistended; Bowel sounds present  EXTREMITIES:  2nd r toe gangrenous R thumb gangrenous   PSYCH: AAOx3  NEUROLOGY: non-focal  SKIN: No rashes or lesions    LABS:    07-24    130<L>  |  91<L>  |  33<H>  ----------------------------<  135<H>  3.0<L>   |  23  |  7.88<H>    Ca    8.5      24 Jul 2021 06:31                  RADIOLOGY & ADDITIONAL TESTS:    Imaging Personally Reviewed:    Consultant(s) Notes Reviewed:      Care Discussed with Consultants/Other Providers:

## 2021-07-24 NOTE — PROGRESS NOTE ADULT - ASSESSMENT
56 m with    Sepsis  - off antibiotic   - toe gangrene  - Podiatry follow  - ID follow   - KLAUDIA noted     COPD  - 2L NC overnight because he becomes apneic, sats well  - Sats well on RA while awake    CAD s/p CABG   - Hypotension likely in the setting of septic shock   - midodrine dose increased to 30 mg TID to match home dose  - Previous echo with reduced EF  - c/w DAPT  - Cardiology follow    CHF cr systolic  - cardiology follow  - patient and family refuse AICD    Peritoneal Dialysis   - nephrology follow PD, recommend 4x a day  - patient on midodrine 30 q8h at home, continue here  - Holding home metoprolol 25 q12hr in setting of hypotension    Diabetes   - HA1C 6.1 on 7/6 suggesting prediabetes  - Tujeo 60 units at bedtime and Victoza at home, started on 30 units at bedtime, adjust as needed based on patient PO intake  - added 2 units insulin pre-meal    Hiccups  - GI follow  - hold Ativan 2 to sedation    Incontinence dermatitis  - wound care    Toe gangrene  - Podiatry follow  - Vascular follow  - PONCHO/PVR noted  - Angiogram if agrees    Finger gangrenous area  - Plastic evaluation  - local care    Vertebral fracture T12  - Ortho eval noted   - MRI spine noted  - No intervention    Confusion  - possible delirium  - Psych follow  - Neurology follow  - EEG noted    Dysphagia  - GI follow  - MBS  - ENT follow    functional quadriplegia   - supportive care     Palliative follow re [facility]     DCP in progress    Pipe Beck MD pager [phone]

## 2021-07-24 NOTE — SWALLOW BEDSIDE ASSESSMENT ADULT - ASR SWALLOW REFERRAL
Consider RD consult to assist with provision of options to maximize oral intake.
due to risk of malnutrition/poor PO intake/Registered Dietitian

## 2021-07-24 NOTE — SWALLOW BEDSIDE ASSESSMENT ADULT - SLP PERTINENT HISTORY OF CURRENT PROBLEM
55M PMH ESRD on PD, DM on insulin, CHF EF 25-30% CAD s/p CABG x4, underwent cardiac cath and stent and wire broke in heart sternotomy. Patient states that for the last 2 days he has been having increased cough and sweating. He endorsed chills but no measured fevers at home. He denies any sick contacts or recent travel. Patient states that he fell few days ago with no head trauma. Of note patient has right toe gangrenous lesion that has been present for several months, He denies any trauma or pain at the site. In the ED, Patient was found to be hypotensive to 70s. Patient was given midodrine 10mg x1 and bedside POCUS was completed showing poor cardiac function with diffuse hypokinesis. Patient was unable to maintain adequate SBPs despite midodrine so was started on levophed for pressor support. Admitted to MICU hypotension requiring for pressor support likely  2/2 to sepsis.
55M PMH ESRD on PD, DM on insulin, CHF EF 25-30% CAD s/p CABG x4, underwent cardiac cath and stent and wire broke in heart sternotomy. Pt states that for the last 2 days he has been having increased cough and sweating. Endorsed chills but no fevers at home. Denies any sick contacts or recent travel. Pt states that he fell few days ago with no head trauma. Of note Pt has right toe gangrenous lesion that has been present for several months, denies any trauma or pain at the site. In the ED, Pt found to be hypotensive to 70s. Given midodrine 10mg x1 and bedside POCUS showed poor cardiac function with diffuse hypokinesis. He was unable to maintain adequate SBPs despite midodrine and was started on levophed for pressor support. Admitted to MICU 7/5 hypotension requiring for pressor support likely 2/2 to sepsis.

## 2021-07-24 NOTE — PROGRESS NOTE ADULT - SUBJECTIVE AND OBJECTIVE BOX
[facility]      Seen earlier today, and examined.  - Today, patient is without complaints.           *****MEDICATIONS: Current medication reviewed and documented.    MEDICATIONS  (STANDING):  aspirin enteric coated 81 milliGRAM(s) Oral daily  atorvastatin 80 milliGRAM(s) Oral at bedtime  cadexomer iodine 0.9% Gel 1 Application(s) Topical daily  chlorhexidine 0.12% Liquid 15 milliLiter(s) Oral Mucosa two times a day  chlorhexidine 2% Cloths 1 Application(s) Topical <User Schedule>  dextrose 40% Gel 15 Gram(s) Oral once  dextrose 5%. 1000 milliLiter(s) (50 mL/Hr) IV Continuous <Continuous>  dextrose 5%. 1000 milliLiter(s) (100 mL/Hr) IV Continuous <Continuous>  dextrose 50% Injectable 25 Gram(s) IV Push once  dextrose 50% Injectable 12.5 Gram(s) IV Push once  dextrose 50% Injectable 25 Gram(s) IV Push once  dronabinol 2.5 milliGRAM(s) Oral two times a day  ferrous    sulfate 325 milliGRAM(s) Oral three times a day  folic acid 1 milliGRAM(s) Oral daily  glucagon  Injectable 1 milliGRAM(s) IntraMuscular once  heparin   Injectable 5000 Unit(s) SubCutaneous every 12 hours  insulin glargine Injectable (LANTUS) 5 Unit(s) SubCutaneous at bedtime  insulin lispro (ADMELOG) corrective regimen sliding scale   SubCutaneous three times a day before meals  insulin lispro (ADMELOG) corrective regimen sliding scale   SubCutaneous at bedtime  lactated ringers. 1000 milliLiter(s) (50 mL/Hr) IV Continuous <Continuous>  lidocaine   Patch 1 Patch Transdermal daily  midodrine 30 milliGRAM(s) Oral every 8 hours  Nephro-kristi 1 Tablet(s) Oral daily  pantoprazole  Injectable 40 milliGRAM(s) IV Push daily  sevelamer carbonate 1600 milliGRAM(s) Oral three times a day  thiamine IVPB 500 milliGRAM(s) IV Intermittent daily  ticagrelor 60 milliGRAM(s) Oral every 12 hours    MEDICATIONS  (PRN):  acetaminophen   Tablet .. 650 milliGRAM(s) Oral every 6 hours PRN Moderate Pain (4 - 6)  LORazepam   Injectable 0.25 milliGRAM(s) IV Push once PRN Nausea and/or Vomiting  valproate sodium IVPB 125 milliGRAM(s) IV Intermittent every 8 hours PRN agitation          ***** VITAL SIGNS:  T(F): 97.6 (21 @ 05:00), Max: 98.3 (21 @ 12:00)  HR: 87 (21 @ 05:00) (74 - 99)  BP: 94/64 (21 @ 05:00) (91/65 - 101/71)  RR: 18 (21 @ 05:00) (18 - 18)  SpO2: 100% (21 @ 05:00) (96% - 100%)  Wt(kg): --  ,   I&O's Summary    2021 07:01  -  2021 07:00  --------------------------------------------------------  IN: 340 mL / OUT: 2501 mL / NET: -2161 mL             *****PHYSICAL EXAM:responds after much coercion      alert oriented x 2 ( does not know name of hospital think he is at [facility]) does not know the date   delyaed responses    attention poor  comprehension  fair can follow 1 step commands   does answer some questions intermittently   EOmi fundi not visualized   no nystagmus VFF to confrontation  Tongue is midline  Palate elevates symmetrically   Moving both upper ext minimally  LE with limited mvt   does wiggles toes b/l           *****LAB AND IMAGIN.7   7.26  )-----------( 240      ( 2021 06:20 )             41.9                   130<L>  |  92<L>  |  38<H>  ----------------------------<  82  3.4<L>   |  22  |  8.73<H>    Ca    8.4      2021 06:20  Phos  5.4       Mg     1.9         TPro  6.4  /  Alb  1.9<L>  /  TBili  0.9  /  DBili  x   /  AST  21  /  ALT  11  /  AlkPhos  117                           [All pertinent recent Imaging/Reports reviewed]           *****A S S E S S M E N T   A N D   P L A N :  55M PMH ESRD on PD, DM on insulin, CHF EF 25-30% CAD s/p CABG x4,  Patient states that for the last 2 days PTA he has been having increased cough and sweating. He endorsed chills but no measured fevers at home. He denies any sick contacts or recent travel. Patient states that he fell few days ago with no head trauma. Of note patient has right toe gangrenous lesion that has been present for several months, He denies any trauma or pain at the site.       In the ED, Patient was found to be hypotensive to 70s. Patient was given midodrine 10mg x1 and bedside POCUS was completed showing poor cardiac function with diffuse hypokinesis. Patient was unable to maintain adequate SBPs  so required micu admission and pressor support.   Called to eval ams       Problem/Recommendations 1:  ams likely multifactorial metabolic encephalopathy started immediately after cefepime, also other factors include poor po intake, sleep wake cycle disruption hyponatremia   correct metabolic derangements   nephrovite added   thiamine 500 iv q h       limit sedatives   7/15 mental status with improvement    seemed lethargic  gagging     mental status improved    mental status remains stable, still with processing delay     Problem/Recommendations 2:  ecchymosis of the finger tips   r/o emboli   derm eval     eleonora no evidence for endocarditis   poor po intake         more alert and responsive    alert responsive       Problem/Recommendations 3: poor nutritional intake severe protein caloric malnutrition   encourage po intake   marinol as per gi     nutrition consult ? nephrovite for supplementation   thiamine daily       oob to chair to mobilize         Thank you for allowing me to participate in the care of this patient. Will continue to follow patient periodically. Please do not hesitate to call me if you have any  questions or if there has been a change in patients neurological status     ________________  Grisel Lainez MD  [facility] ([facility]  [phone]      33 minutes spent on total encounter; more than 50 % of the visit was  spent counseling about plan of care, compliance to diet/exercise and medication regimen and or  coordinating care by the attending physician.      It is advised that stroke patients follow up with CARLYLE Pena @ [phone] in 1- 2 weeks.   Others please follow up with Dr. Michael Nissenbaum [phone]

## 2021-07-24 NOTE — SWALLOW BEDSIDE ASSESSMENT ADULT - ASR SWALLOW ASPIRATION MONITOR
change of breathing pattern/cough/gurgly voice/fever/pneumonia/throat clearing/upper respiratory infection
change of breathing pattern/cough/gurgly voice/fever/pneumonia/throat clearing/upper respiratory infection

## 2021-07-24 NOTE — PROGRESS NOTE ADULT - SUBJECTIVE AND OBJECTIVE BOX
CARDIOLOGY FOLLOW UP - Dr. Best  Date of Service: 7/24/21  CC: no cp/sob     Review of Systems:  Constitutional: No fever, weight loss, or fatigue  Respiratory: No cough, wheezing, or hemoptysis, no shortness of breath  Cardiovascular: No chest pain, palpitations, passing out, dizziness, or leg swelling  Gastrointestinal: No abd or epigastric pain.  No nausea, vomiting, or hematemesis; no diarrhea or constipation, no melena or hematochezia  Vascular: no edema       PHYSICAL EXAM:  T(C): 36.4 (07-24-21 @ 05:10), Max: 36.7 (07-23-21 @ 16:13)  HR: 93 (07-24-21 @ 05:10) (91 - 96)  BP: 95/68 (07-24-21 @ 05:10) (90/61 - 100/73)  RR: 18 (07-24-21 @ 05:10) (18 - 18)  SpO2: 98% (07-24-21 @ 05:10) (94% - 98%)  Wt(kg): --  I&O's Summary    23 Jul 2021 07:01  -  24 Jul 2021 07:00  --------------------------------------------------------  IN: 1020 mL / OUT: 0 mL / NET: 1020 mL        Appearance: Normal	  Cardiovascular: Normal S1 S2,RRR, No JVD, No murmurs  Respiratory: Lungs clear to auscultation	  Gastrointestinal:  Soft, Non-tender, + BS	  Extremities: Normal range of motion, No clubbing, cyanosis or edema      Home Medications:  aspirin 81 mg oral delayed release tablet: 1 tab(s) orally once a day (29 Dec 2020 18:37)  atorvastatin 80 mg oral tablet: 1 tab(s) orally once a day (at bedtime) (29 Dec 2020 18:37)  epoetin martine: injectable once a month (29 Dec 2020 18:37)  ferrous sulfate 325 mg (65 mg elemental iron) oral tablet: 1 tab(s) orally 3 times a day (29 Dec 2020 18:37)  gabapentin 100 mg oral capsule: 1 cap(s) orally once a day (29 Dec 2020 18:37)  nortriptyline 25 mg oral capsule: 1 cap(s) orally once a day (at bedtime) (29 Dec 2020 18:37)  pantoprazole 40 mg oral delayed release tablet: 1 tab(s) orally once a day (before a meal) (29 Dec 2020 18:37)  Toujeo SoloStar 300 units/mL subcutaneous solution: 60 unit(s) subcutaneous once a day (at bedtime) (08 Jan 2021 12:41)      MEDICATIONS  (STANDING):  aspirin enteric coated 81 milliGRAM(s) Oral daily  atorvastatin 80 milliGRAM(s) Oral at bedtime  cadexomer iodine 0.9% Gel 1 Application(s) Topical daily  chlorhexidine 0.12% Liquid 15 milliLiter(s) Oral Mucosa two times a day  chlorhexidine 2% Cloths 1 Application(s) Topical <User Schedule>  dextrose 40% Gel 15 Gram(s) Oral once  dextrose 5%. 1000 milliLiter(s) (50 mL/Hr) IV Continuous <Continuous>  dextrose 5%. 1000 milliLiter(s) (100 mL/Hr) IV Continuous <Continuous>  dextrose 50% Injectable 25 Gram(s) IV Push once  dextrose 50% Injectable 12.5 Gram(s) IV Push once  dextrose 50% Injectable 25 Gram(s) IV Push once  dronabinol 2.5 milliGRAM(s) Oral two times a day  ferrous    sulfate 325 milliGRAM(s) Oral three times a day  folic acid 1 milliGRAM(s) Oral daily  glucagon  Injectable 1 milliGRAM(s) IntraMuscular once  heparin   Injectable 5000 Unit(s) SubCutaneous every 12 hours  insulin glargine Injectable (LANTUS) 5 Unit(s) SubCutaneous at bedtime  insulin lispro (ADMELOG) corrective regimen sliding scale   SubCutaneous three times a day before meals  insulin lispro (ADMELOG) corrective regimen sliding scale   SubCutaneous at bedtime  lactated ringers. 1000 milliLiter(s) (50 mL/Hr) IV Continuous <Continuous>  lidocaine   Patch 1 Patch Transdermal daily  midodrine 30 milliGRAM(s) Oral every 8 hours  Nephro-kristi 1 Tablet(s) Oral daily  pantoprazole  Injectable 40 milliGRAM(s) IV Push daily  sevelamer carbonate 1600 milliGRAM(s) Oral three times a day  sodium chloride 0.9%. 1000 milliLiter(s) (60 mL/Hr) IV Continuous <Continuous>  thiamine IVPB 500 milliGRAM(s) IV Intermittent daily  ticagrelor 60 milliGRAM(s) Oral every 12 hours      TELEMETRY: 	    ECG:  	  RADIOLOGY:   DIAGNOSTIC TESTING:  [ ] Echocardiogram:  [ ]  Catheterization:  [ ] Stress Test:    OTHER: 	    LABS:	 	        07-24    130<L>  |  91<L>  |  33<H>  ----------------------------<  135<H>  3.0<L>   |  23  |  7.88<H>    Ca    8.5      24 Jul 2021 06:31               [facility]

## 2021-07-24 NOTE — SWALLOW BEDSIDE ASSESSMENT ADULT - SWALLOW EVAL: PATIENT/FAMILY GOALS STATEMENT
Pt does not state
Pt denied dysphagia. States he does not eat because he does not like the taste of any foods. Endorsed fear of burping and vomitting with PO. Stated pills get stuck because he does not like the taste.

## 2021-07-24 NOTE — SWALLOW BEDSIDE ASSESSMENT ADULT - ADDITIONAL RECOMMENDATIONS
Maintain good oral hygiene.
This service will continue to follow, pending GI workup if within pt/family wishes.
0

## 2021-07-24 NOTE — SWALLOW BEDSIDE ASSESSMENT ADULT - SWALLOW EVAL: PROGNOSIS
Dx Continued: There were multiple swallows across consistencies, which may be indicative of pharyngeal stasis however, no overt, clinical s/s of aspiration/penetration noted. +Eructation post PO. During hospital course, Pt has had symptoms consistent with esophageal dysphagia including emesis and eructation with c/o nausea. Recommend continue with esophagram and consider further GI workup if medically indicated to r/o gastric motility disorder. Given no immediate concern for aspiration, will hold off on mbs at this time pending GI workup. Recommend dietary consult and ongoing goals of care discussion with family given severe risk of malnutrition as Pt not likely meeting nutrition needs by mouth and has disinterest in all PO except [place]. Also, goals of care discussion warranted to determine pt/family wishes for objective assessments and dysphagia workup. Dx Cont.: During trials of thick puree, Pt presented with delayed oral transport vs delayed pharyngeal swallow suspect related to food aversion/behavioral component as pt w/ facial grimacing stating he does not like the taste. Multiple swallows across consistencies, may be indicative of pharyngeal stasis however, no overt, clinical s/s of aspiration/penetration noted. +Eructation post PO. During hospital course, Pt symptoms consistent with esophageal dysphagia (emesis and eructation with c/o nausea.) Rec cont. with esophagram & consider further GI workup as indicated to r/o gastric motility disorder. Given no immediate concern for aspiration, will hold off on mbs pending GI workup. Rec dietary consult and ongoing goc discussion with family given severe risk of malnutrition as Pt not likely meeting nutrition needs by mouth and has disinterest in all PO except [place]. Also, goals of care discussion warranted to determine pt/family wishes for objective assessments and dysphagia workup. Dx Cont.: During trials of thick puree, Pt presented with delayed oral transport vs delayed pharyngeal swallow suspect related to food aversion/behavioral component as pt w/ facial grimacing stating he does not like the taste. Multiple swallows across consistencies, may be indicative of pharyngeal stasis however, no overt, s/s of aspiration/penetration noted. +Eructation/burping post PO. During hospital course, Pt symptoms consistent with esophageal dysphagia (emesis and eructation/burping with c/o nausea.) Rec cont. with esophagram & consider further GI workup as indicated to r/o gastric motility disorder. Given no immediate concern for aspiration, will hold off on mbs pending GI workup. Rec dietary consult and ongoing goc discussion with family given severe risk of malnutrition as Pt not likely meeting nutrition needs by mouth and has disinterest in all PO except [place]. Also, goc discussion warranted to determine pt/family wishes for objective assessments and dysphagia workup.

## 2021-07-24 NOTE — SWALLOW BEDSIDE ASSESSMENT ADULT - ASPIRATION PRECAUTIONS
Monitor for s/s aspiration/laryngeal penetration. If noted:  D/C p.o. intake, provide non-oral nutrition/hydration/meds, and contact this service @ [phone]
for all PO/yes

## 2021-07-24 NOTE — SWALLOW BEDSIDE ASSESSMENT ADULT - SWALLOW EVAL: RECOMMENDED FEEDING/EATING TECHNIQUES
maintain upright posture during/after eating for 30 mins
allow for swallow between intakes/check mouth frequently for oral residue/pocketing/maintain upright posture during/after eating for 30 mins

## 2021-07-24 NOTE — PROGRESS NOTE ADULT - ASSESSMENT
Seen and examined at bedside being fed by wife.  Communicative; states he feels much better  Denies complaints.  PD being held today due to soft BP earlier  Restart tomorrow     acetaminophen   Tablet .. 650 milliGRAM(s) Oral every 6 hours PRN  aspirin enteric coated 81 milliGRAM(s) Oral daily  atorvastatin 80 milliGRAM(s) Oral at bedtime  cadexomer iodine 0.9% Gel 1 Application(s) Topical daily  chlorhexidine 0.12% Liquid 15 milliLiter(s) Oral Mucosa two times a day  chlorhexidine 2% Cloths 1 Application(s) Topical <User Schedule>  dextrose 40% Gel 15 Gram(s) Oral once  dextrose 5%. 1000 milliLiter(s) IV Continuous <Continuous>  dextrose 5%. 1000 milliLiter(s) IV Continuous <Continuous>  dextrose 50% Injectable 25 Gram(s) IV Push once  dextrose 50% Injectable 12.5 Gram(s) IV Push once  dextrose 50% Injectable 25 Gram(s) IV Push once  dronabinol 2.5 milliGRAM(s) Oral two times a day  ferrous    sulfate 325 milliGRAM(s) Oral three times a day  folic acid 1 milliGRAM(s) Oral daily  glucagon  Injectable 1 milliGRAM(s) IntraMuscular once  heparin   Injectable 5000 Unit(s) SubCutaneous every 12 hours  insulin glargine Injectable (LANTUS) 5 Unit(s) SubCutaneous at bedtime  insulin lispro (ADMELOG) corrective regimen sliding scale   SubCutaneous three times a day before meals  insulin lispro (ADMELOG) corrective regimen sliding scale   SubCutaneous at bedtime  lactated ringers. 1000 milliLiter(s) IV Continuous <Continuous>  lidocaine   Patch 1 Patch Transdermal daily  LORazepam   Injectable 0.25 milliGRAM(s) IV Push once PRN  midodrine 30 milliGRAM(s) Oral every 8 hours  Nephro-kristi 1 Tablet(s) Oral daily  pantoprazole  Injectable 40 milliGRAM(s) IV Push daily  sevelamer carbonate 1600 milliGRAM(s) Oral three times a day  thiamine IVPB 500 milliGRAM(s) IV Intermittent daily  ticagrelor 60 milliGRAM(s) Oral every 12 hours  valproate sodium IVPB 125 milliGRAM(s) IV Intermittent every 8 hours PRN      VITAL:  T(C): , Max: 36.9 (07-24-21 @ 08:04)  T(F): , Max: 98.4 (07-24-21 @ 08:04)  HR: 96 (07-24-21 @ 12:00)  BP: 93/65 (07-24-21 @ 12:00)  BP(mean): --  RR: 18 (07-24-21 @ 12:00)  SpO2: 98% (07-24-21 @ 12:00)  Wt(kg): --    07-23-21 @ 07:01  -  07-24-21 @ 07:00  --------------------------------------------------------  IN: 1080 mL / OUT: 0 mL / NET: 1080 mL    07-24-21 @ 07:01  -  07-24-21 @ 14:53  --------------------------------------------------------  IN: 120 mL / OUT: 0 mL / NET: 120 mL        PHYSICAL EXAM:  Constitutional: NAD  Neck:  No JVD  Respiratory: CTAB/L  Cardiovascular: S1 and S2  Gastrointestinal: BS+, soft, NT/ND  Extremities: No peripheral edema  Neurological:   no focal deficits  Psychiatric: Normal mood, normal affect  : No Johnson  Skin: No rashes  Access: PD catheter      LABS:      Na(130)/K(3.0)/Cl(91)/HCO3(23)/BUN(33)/Cr(7.88)Glu(135)/Ca(8.5)/Mg(--)/PO4(--)    07-24 @ 06:31  Na(132)/K(3.2)/Cl(91)/HCO3(23)/BUN(32)/Cr(7.74)Glu(168)/Ca(8.8)/Mg(--)/PO4(--)    07-23 @ 09:21  Na(127)/K(6.7)/Cl(89)/HCO3(21)/BUN(35)/Cr(7.45)Glu(159)/Ca(8.5)/Mg(--)/PO4(--)    07-23 @ 06:54  Na(129)/K(3.8)/Cl(92)/HCO3(21)/BUN(36)/Cr(8.22)Glu(194)/Ca(8.5)/Mg(--)/PO4(--)    07-22 @ 06:16      Imaging    EXAM:  ART DUPLEX LOWER EXT BILATERAL                          PROCEDURE DATE:  07/23/2021      INTERPRETATION:  Clinical information hypertension. Bilateral foot ulcers. Abnormal arterial Doppler study.    COMPARISON: Lower extremity arterial Doppler study.    TECHNIQUE: Bilateral lower extremity arterial duplex study.    FINDINGS: Duplex evaluation of the distributive arteries of both lower extremities was performed. The arteries above the knee are patent. Diffuse calcific plaque is present bilaterally. Triphasic arterial waveforms are present above the knees. Disturbed color flow and elevated blood flow velocities are present in the distal left superficial femoral artery and the right tibioperoneal trunk. Spectral waveforms belowthis level are monophasic and of low velocity.    IMPRESSION: Diffuse calcified atheromatous plaque.    Flow-limiting stenoses in the left distal superficial femoral artery and in the right tibioperoneal trunk.    Multifocal disease in the infrapopliteal circulation bilaterally with segmental occlusions of the left peroneal and anterior tibial arteries.        ASSESSMENT/PLAN  56 M PMH ESRD on PD, DM on insulin, CHF EF 25-30% CAD s/p CABG x4 pw with hypotension with fever. Encephalopathy likely related to cefepime ? no obvious source of sepsis apparent as of now, KLAUDIA: Overall thickened valves seen however, no evidence of valvular vegetation is seen.     Hypotension:  on Midodrine for hemodynamic support:  BPs soft, PD being held for  today  ESRD on PD. Patient TW 65 kg.    Hyponatremia- should improve with PD  ID-Blood cx and PD fluid cx -   off IV antibiotics   Hyperphosphatemia:  remains >goal as of late;  c/ w binders as prescribed   Failure to thrive     RECOMMENDATIONS   - please give potassium chloride 20meq x 1 dose  - c/w CCPD orders with dianeal 1.5 to limit UF and ultimately hypotension.  Will only do 3 exchanges as he is not eating and vomiting   -Gentle IVF as well   - Palliative care follow up  - continue Renvela to 1,600 mg PO TID;   - continue Midodrine  30mg po q 8 hours but at present throwing up   - Ongoing neuro/psych eval.                  updated wife at bedside    Modesto Wayne NP-BC  [email or web address]  [phone]

## 2021-07-24 NOTE — SWALLOW BEDSIDE ASSESSMENT ADULT - SWALLOW EVAL: DIAGNOSIS
New order received for clinical bedside swallow evaluation. Pt already on SLP caseload with previous dysphagia evaluations during this admission. SLP reconsulted due to GI recommendation for MBS (also recommended esophagram, which is ordered). Today, Pt presents with poor PO intake, limiting bedside assessment. Pt accepted 1 tspn thick puree (chocolate pudding), a few bites of a [place], and straw sips of thin liquids. Refused additional trials despite encouragement. Pt denied dysphagia to SLP, stating he does not eat because he doesn’t like any of the food and he throws it up. Pt stated he does not take pills because he does not like the taste. Also, endorsed c/o pharyngeal stasis and emesis with pills intermittently, stating it is because he doesn’t like the taste. During trials of thick puree, Pt presented with delayed oral transport vs delayed pharyngeal swallow suspect related to food aversion as pt with facial grimacing stating he does not like the taste. New order received for bedside swallow evaluation. Pt already on SLP caseload with previous dysphagia evaluations during this admission. SLP reconsulted due to GI recommendation for MBS (also recommended esophagram, which is ordered). Today, Pt presents with poor PO intake, limiting bedside assessment. Pt accepted 1 tspn thick puree (chocolate pudding), a few bites of a Kit-Xiao, and straw sips of thin liquids. Refused additional trials despite encouragement. Pt denied dysphagia to SLP, stating he does not eat because he doesn’t like any of the food and he throws it up. Pt stated he does not take pills because he does not like the taste. Also, endorsed c/o pharyngeal stasis and emesis with pills intermittently, stating it is because he doesn’t like the taste- unclear how reliable of a historian Pt is.

## 2021-07-24 NOTE — SWALLOW BEDSIDE ASSESSMENT ADULT - ASR SWALLOW RECOMMEND DIAG
will continue to follow pt at bedside; will determine appropriateness for mbs post GI workup
Defer instrumental assessment at this time. Will follow-up at the bedside for continued assessment, pending improved acceptance, and as clinically indicated.

## 2021-07-24 NOTE — SWALLOW BEDSIDE ASSESSMENT ADULT - SLP GENERAL OBSERVATIONS
Pt encountered awake and alert, upright in bed on room air. A&Ox2. Denied pain. Vocal quality clear, speech intelligible.
Pt encountered in bed, awake, on room air. Intermittently verbally responsive throughout evaluation; pt seems to selectively respond, at one point stating "I don't like applesauce" however does not further elaborate on food preferences when asked. Delayed response time. Intermittently follows simple directives. +Hiccups. Vocal quality WFL.

## 2021-07-24 NOTE — SWALLOW BEDSIDE ASSESSMENT ADULT - COMMENTS
Hospital Course: 7/6: off of pressors. Midodrine changed to 30 TID to match his home dose. He received PD and fluid from the PD site was sent for culture. Podiatry saw him for his toe- not the source of his sepsis but f/u with wound care. In the evening of 7/6, Pt became hypotensive with the sys bp in the 70's and MAPs in the 50's. Started on .05 levo, which was titrated down to .015, and stopped at 3 am. 7/7: did well off pressors, no longer needs MICU management.  7/9: ID consulted -> Source of presumed sepsis is not readily apparent. CXR shows no infiltrate but could have pneumonia not seen given the cough. Has dry gangrene of right 3rd toe but that is not a source of sepsis. GI consulted -> for nausea and severe hiccups (per pt has had intermittent issues with hiccuping prior to admission). No acute GI pathology on CT A/P 1/2021 -Given excellent response to trial of Ativan x 1, can use Ativan PRN for hiccups.   7/11: Reported by RN that patient not eating anything. He hoards food and medicines in the mouth.  Patient is more awake than yesterday but continues to be nonverbal and does not follow commands. suction required to remove medication from mouth. S&S eval?   7/12: Per GI -> Await SLP input - but suspect symptoms may be psych/depression related. Psych consulted -> DDx includes delirium vs. catatonia.  Upon interview, patient unable to speak, follow commands, but does track with eyes.  7/13: Initial SLP evaluation-> Swallow evaluation limited, as pt only accepting trials of thin liquids and x1 tsp of thin puree. Pt declines further trials. With accepted textures, no overt signs of laryngeal penetration/aspiration observed; however, unable to make diet recommendation based on this evaluation. Suspect poor PO intake is related to psych/behavioral component vs true oropharyngeal dysphagia.     See addendum for additional history/hospital course... +No overt, clinical s/s of aspiration/penetration +Facial grimacing  +No overt, clinical s/s of aspiration/penetration

## 2021-07-25 NOTE — PROGRESS NOTE ADULT - ASSESSMENT
CATH 11/30/2020:  COMPLICATIONS: The stent was deployed without difficulty. On removal of the  balloon, the shaft broke and the tip of the balloon remained in the vessel. Several attempts were made to snare the balloon unsuccessfully. Dr. Verdugo was notifed who will take the patient to the operating room.  DIAGNOSTIC RECOMMENDATIONS: The patient should continue with the present medications. The patients vessel was stented without difficulty On removal of the balloon, the shaft broke with the baloon stuck in the left main. All attempts to snare the balloon out failed and the patient was taken to the OR by Dr. Arango to remove the balloon  introp eelonora 11/30/20: mild to mod MR, < from: Intra-Operative Transesophageal Echo (11.30.20 @ 17:02) >  Severe global left ventricular systolic dysfunction. Inferior and inferolateral akinesis.  severe hypokinesis of other segments.  Severe global hypokinesia.  Normal left ventricular internal dimensions and wall thicknesses. Moderate diastolic dysfunction (Stage II).  echo 12/17/20: EF 32%, mod MR, Severe global left ventricular systolic dysfunction, moderate pulmonary pressures  echo 12/20/20: EF (Visual Estimate):  25-30 % mild MR, Akinesis of the inferolateral, anterolateral, apical laterlal, inferior, and inferoseptal walls. Severe left ventricular enlargement. No left ventricular thrombus is seen.  Echo 7/7/21: EF 16%, mod - sev MR, mod AS, severe global lv sys dysfx, severe diastolic dysfx, mod pulm htn       a/p  56 M well known to practice with PMH ESRD on PD, DM on insulin, CHF EF 25-30% CAD s/p CABG x4, underwent cardiac cath and stent and wire broke in heart s/p sternotomy p/w septic shock.    #Septic Shock, resolved   -Bcx NGTD 7/10   -s/p iv abx, pressors   -Bedside POCUS notable for biventricular diffuse hypokinesis  -right 2nd toe gangrene, pod f/u, vascular f/u, PONCHO/PVR   -ID f/u   -ELEONORA: no evidence of valvular vegetation is seen.  -med f/u    #Ischemic Cardiomyopathy. Chronic systolic HF  -vol status stable - monitor vol status closely while on IVF   -Repeat echo noted with EF 16%, mod - sev MR, mod AS, severe global lv sys dysfx, severe diastolic dysfx, mod pulm htn   -pt declining ICD   -no bb, acei/arb given hx of orthostatic bp   -continue midodrine for bp support - increase as needed   -cont vol removal w PD     # CAD, s/p CABG, s/p PCI, s/p redo open heart for stent balloon retrieval   -hst elevated, likely demand ischemia in setting of septic shock, ESRD   -c/w asa, Brilinta, statin     # ESRD  -on PD  -renal f/u    #AMS  -improving   -neuro psych f/u    #GOC  -palliative f/u     dvt ppx

## 2021-07-25 NOTE — PROGRESS NOTE ADULT - SUBJECTIVE AND OBJECTIVE BOX
CC: no events, alert    TELEMETRY:     PHYSICAL EXAM:    T(C): 36.4 (07-25-21 @ 05:00), Max: 36.8 (07-24-21 @ 12:00)  HR: 87 (07-25-21 @ 05:00) (74 - 99)  BP: 94/64 (07-25-21 @ 05:00) (91/65 - 101/71)  RR: 18 (07-25-21 @ 05:00) (18 - 18)  SpO2: 100% (07-25-21 @ 05:00) (96% - 100%)  Wt(kg): --  I&O's Summary    24 Jul 2021 07:01  -  25 Jul 2021 07:00  --------------------------------------------------------  IN: 340 mL / OUT: 2501 mL / NET: -2161 mL        Appearance: Normal	  Cardiovascular: Normal S1 S2,RRR, No JVD, No murmurs  Respiratory: Lungs clear to auscultation	  Gastrointestinal:  Soft, Non-tender, + BS	  Extremities: Normal range of motion, No clubbing, cyanosis or edema  Vascular: Peripheral pulses palpable 2+ bilaterally     LABS:	 	                          12.7   7.26  )-----------( 240      ( 25 Jul 2021 06:20 )             41.9     07-25    130<L>  |  92<L>  |  38<H>  ----------------------------<  82  3.4<L>   |  22  |  8.73<H>    Ca    8.4      25 Jul 2021 06:20  Phos  5.4     07-25  Mg     1.9     07-25    TPro  6.4  /  Alb  1.9<L>  /  TBili  0.9  /  DBili  x   /  AST  21  /  ALT  11  /  AlkPhos  117  07-25          CARDIAC MARKERS:

## 2021-07-25 NOTE — PROGRESS NOTE ADULT - ASSESSMENT
Seen and examined at bedside  on room air  improved appetite; had nephro shake  Denies complaints  PD being held today due to soft BPs    acetaminophen   Tablet .. 650 milliGRAM(s) Oral every 6 hours PRN  aspirin enteric coated 81 milliGRAM(s) Oral daily  atorvastatin 80 milliGRAM(s) Oral at bedtime  cadexomer iodine 0.9% Gel 1 Application(s) Topical daily  chlorhexidine 0.12% Liquid 15 milliLiter(s) Oral Mucosa two times a day  chlorhexidine 2% Cloths 1 Application(s) Topical <User Schedule>  dextrose 40% Gel 15 Gram(s) Oral once  dextrose 5%. 1000 milliLiter(s) IV Continuous <Continuous>  dextrose 5%. 1000 milliLiter(s) IV Continuous <Continuous>  dextrose 50% Injectable 12.5 Gram(s) IV Push once  dextrose 50% Injectable 25 Gram(s) IV Push once  dextrose 50% Injectable 25 Gram(s) IV Push once  dronabinol 2.5 milliGRAM(s) Oral two times a day  ferrous    sulfate 325 milliGRAM(s) Oral three times a day  folic acid 1 milliGRAM(s) Oral daily  glucagon  Injectable 1 milliGRAM(s) IntraMuscular once  heparin   Injectable 5000 Unit(s) SubCutaneous every 12 hours  insulin glargine Injectable (LANTUS) 5 Unit(s) SubCutaneous at bedtime  insulin lispro (ADMELOG) corrective regimen sliding scale   SubCutaneous three times a day before meals  insulin lispro (ADMELOG) corrective regimen sliding scale   SubCutaneous at bedtime  lidocaine   Patch 1 Patch Transdermal daily  LORazepam   Injectable 0.25 milliGRAM(s) IV Push once PRN  midodrine 30 milliGRAM(s) Oral every 8 hours  Nephro-kristi 1 Tablet(s) Oral daily  pantoprazole  Injectable 40 milliGRAM(s) IV Push daily  sevelamer carbonate 1600 milliGRAM(s) Oral three times a day  thiamine IVPB 500 milliGRAM(s) IV Intermittent daily  ticagrelor 60 milliGRAM(s) Oral every 12 hours  valproate sodium IVPB 125 milliGRAM(s) IV Intermittent every 8 hours PRN      VITAL:  T(C): , Max: 36.8 (07-24-21 @ 17:00)  T(F): , Max: 98.3 (07-24-21 @ 17:00)  HR: 87 (07-25-21 @ 05:00)  BP: 94/64 (07-25-21 @ 05:00)  BP(mean): --  RR: 18 (07-25-21 @ 05:00)  SpO2: 100% (07-25-21 @ 05:00)  Wt(kg): --    07-24-21 @ 07:01  -  07-25-21 @ 07:00  --------------------------------------------------------  IN: 340 mL / OUT: 2501 mL / NET: -2161 mL        PHYSICAL EXAM:  Constitutional: NAD  Neck:  No JVD  Respiratory: CTAB/L  Cardiovascular: S1 and S2  Gastrointestinal: BS+, soft, NT/ND  Extremities: No peripheral edema  Neurological:   no focal deficits  Psychiatric: Normal mood, normal affect  : No Johnson  Skin: No rashes  Access: PD catheter  LABS:                          12.7   7.26  )-----------( 240      ( 25 Jul 2021 06:20 )             41.9     Na(130)/K(3.4)/Cl(92)/HCO3(22)/BUN(38)/Cr(8.73)Glu(82)/Ca(8.4)/Mg(1.9)/PO4(5.4)    07-25 @ 06:20  Na(130)/K(3.0)/Cl(91)/HCO3(23)/BUN(33)/Cr(7.88)Glu(135)/Ca(8.5)/Mg(--)/PO4(--)    07-24 @ 06:31  Na(132)/K(3.2)/Cl(91)/HCO3(23)/BUN(32)/Cr(7.74)Glu(168)/Ca(8.8)/Mg(--)/PO4(--)    07-23 @ 09:21  Na(127)/K(6.7)/Cl(89)/HCO3(21)/BUN(35)/Cr(7.45)Glu(159)/Ca(8.5)/Mg(--)/PO4(--)    07-23 @ 06:54            ASSESSMENT/PLAN  56 M PMH ESRD on PD, DM on insulin, CHF EF 25-30% CAD s/p CABG x4 pw with hypotension with fever. Encephalopathy likely related to cefepime ? no obvious source of sepsis apparent as of now, KLAUDIA: Overall thickened valves seen however, no evidence of valvular vegetation is seen.     Hypotension:  on Midodrine for hemodynamic support:  BPs soft, PD being held for  today  ESRD on PD. Patient TW 65 kg.    Hyponatremia- Na+ stable.  poor po intake  ID-Blood cx and PD fluid cx -   off IV antibiotics   Hyperphosphatemia:  remains >goal as of late;  c/ w binders as prescribed   Failure to thrive     RECOMMENDATIONS   - please give potassium chloride 10mEqx 1 dose  - c/w CCPD orders with dianeal 1.5 to limit UF and ultimately hypotension.  Will only do 3 exchanges as he is not eating and vomiting (hold  today and resume tommorrow)  -Gentle IVF as well  PRN  - Palliative care follow up  - continue Renvela to 1,600 mg PO TID;   - continue Midodrine  30mg po q 8 hours but at present throwing up   - Ongoing neuro/psych eval.                  updated wife at bedside    Modesto Wayne NP-BC  [facility]  [phone]   Seen and examined at bedside  on room air  improved appetite; had nephro shake  Denies complaints  PD being held today due to soft BPs    acetaminophen   Tablet .. 650 milliGRAM(s) Oral every 6 hours PRN  aspirin enteric coated 81 milliGRAM(s) Oral daily  atorvastatin 80 milliGRAM(s) Oral at bedtime  cadexomer iodine 0.9% Gel 1 Application(s) Topical daily  chlorhexidine 0.12% Liquid 15 milliLiter(s) Oral Mucosa two times a day  chlorhexidine 2% Cloths 1 Application(s) Topical <User Schedule>  dextrose 40% Gel 15 Gram(s) Oral once  dextrose 5%. 1000 milliLiter(s) IV Continuous <Continuous>  dextrose 5%. 1000 milliLiter(s) IV Continuous <Continuous>  dextrose 50% Injectable 12.5 Gram(s) IV Push once  dextrose 50% Injectable 25 Gram(s) IV Push once  dextrose 50% Injectable 25 Gram(s) IV Push once  dronabinol 2.5 milliGRAM(s) Oral two times a day  ferrous    sulfate 325 milliGRAM(s) Oral three times a day  folic acid 1 milliGRAM(s) Oral daily  glucagon  Injectable 1 milliGRAM(s) IntraMuscular once  heparin   Injectable 5000 Unit(s) SubCutaneous every 12 hours  insulin glargine Injectable (LANTUS) 5 Unit(s) SubCutaneous at bedtime  insulin lispro (ADMELOG) corrective regimen sliding scale   SubCutaneous three times a day before meals  insulin lispro (ADMELOG) corrective regimen sliding scale   SubCutaneous at bedtime  lidocaine   Patch 1 Patch Transdermal daily  LORazepam   Injectable 0.25 milliGRAM(s) IV Push once PRN  midodrine 30 milliGRAM(s) Oral every 8 hours  Nephro-kristi 1 Tablet(s) Oral daily  pantoprazole  Injectable 40 milliGRAM(s) IV Push daily  sevelamer carbonate 1600 milliGRAM(s) Oral three times a day  thiamine IVPB 500 milliGRAM(s) IV Intermittent daily  ticagrelor 60 milliGRAM(s) Oral every 12 hours  valproate sodium IVPB 125 milliGRAM(s) IV Intermittent every 8 hours PRN      VITAL:  T(C): , Max: 36.8 (07-24-21 @ 17:00)  T(F): , Max: 98.3 (07-24-21 @ 17:00)  HR: 87 (07-25-21 @ 05:00)  BP: 94/64 (07-25-21 @ 05:00)  BP(mean): --  RR: 18 (07-25-21 @ 05:00)  SpO2: 100% (07-25-21 @ 05:00)  Wt(kg): --    07-24-21 @ 07:01  -  07-25-21 @ 07:00  --------------------------------------------------------  IN: 340 mL / OUT: 2501 mL / NET: -2161 mL        PHYSICAL EXAM:  Constitutional: NAD  Neck:  No JVD  Respiratory: CTAB/L  Cardiovascular: S1 and S2  Gastrointestinal: BS+, soft, NT/ND  Extremities: No peripheral edema  Neurological:   no focal deficits  Psychiatric: Normal mood, normal affect  : No Johnson  Skin: No rashes  Access: PD catheter  LABS:                          12.7   7.26  )-----------( 240      ( 25 Jul 2021 06:20 )             41.9     Na(130)/K(3.4)/Cl(92)/HCO3(22)/BUN(38)/Cr(8.73)Glu(82)/Ca(8.4)/Mg(1.9)/PO4(5.4)    07-25 @ 06:20  Na(130)/K(3.0)/Cl(91)/HCO3(23)/BUN(33)/Cr(7.88)Glu(135)/Ca(8.5)/Mg(--)/PO4(--)    07-24 @ 06:31  Na(132)/K(3.2)/Cl(91)/HCO3(23)/BUN(32)/Cr(7.74)Glu(168)/Ca(8.8)/Mg(--)/PO4(--)    07-23 @ 09:21  Na(127)/K(6.7)/Cl(89)/HCO3(21)/BUN(35)/Cr(7.45)Glu(159)/Ca(8.5)/Mg(--)/PO4(--)    07-23 @ 06:54            ASSESSMENT/PLAN  56 M PMH ESRD on PD, DM on insulin, CHF EF 25-30% CAD s/p CABG x4 pw with hypotension with fever. Encephalopathy likely related to cefepime ? no obvious source of sepsis apparent as of now, KLAUDIA: Overall thickened valves seen however, no evidence of valvular vegetation is seen.     Hypotension:  on Midodrine for hemodynamic support:  BPs soft, PD being held for  today  ESRD on PD. Patient TW 65 kg.    Hyponatremia- Na+ stable.  poor po intake  ID-Blood cx and PD fluid cx -   off IV antibiotics   Hyperphosphatemia:  remains >goal as of late;  c/ w binders as prescribed   Failure to thrive     RECOMMENDATIONS   - please give potassium chloride 10mEqx 1 dose  - Hold  CCPD today and likely restart in am   -Gentle IVF as well  PRN  - Palliative care follow up  - continue Renvela to 1,600 mg PO TID;   - continue Midodrine  30mg po q 8 hours but at present throwing up   - Ongoing neuro/psych eval.                  updated wife at bedside    Modesto Wayne NP-BC  [employer]  [phone]

## 2021-07-25 NOTE — PROGRESS NOTE ADULT - ASSESSMENT
56 m with    Sepsis  - off antibiotic   - toe gangrene  - Podiatry follow  - ID follow   - KLAUDIA noted     COPD  - 2L NC overnight because he becomes apneic, sats well  - Sats well on RA while awake    CAD s/p CABG   - Hypotension likely in the setting of septic shock   - midodrine dose increased to 30 mg TID to match home dose  - Previous echo with reduced EF  - c/w DAPT  - Cardiology follow    CHF cr systolic  - cardiology follow  - patient and family refuse AICD    Peritoneal Dialysis   - nephrology follow PD, recommend 4x a day  - patient on midodrine 30 q8h at home, continue here  - Holding home metoprolol 25 q12hr in setting of hypotension    Diabetes   - HA1C 6.1 on 7/6 suggesting prediabetes  - Tujeo 60 units at bedtime and Victoza at home, started on 30 units at bedtime, adjust as needed based on patient PO intake  - added 2 units insulin pre-meal    Hiccups  - GI follow  - hold Ativan 2 to sedation    Incontinence dermatitis  - wound care    Toe gangrene  - Podiatry follow  - Vascular follow  - PONCHO/PVR noted  - Angiogram if agrees    Finger gangrenous area  - Plastic evaluation  - local care    Vertebral fracture T12  - Ortho eval noted   - MRI spine noted  - No intervention    Confusion  - possible delirium  - Psych follow  - Neurology follow  - EEG noted    Dysphagia  - GI follow  - MBS  - ENT follow    functional quadriplegia   - supportive care     Palliative follow re [facility]     DCP in progress    Pipe Beck MD pager [phone]

## 2021-07-25 NOTE — PROVIDER CONTACT NOTE (OTHER) - ACTION/TREATMENT ORDERED:
As per MD Dale Sayed, MD does not want to do peritoneal dialysis today because of pt's BP & pt has been having poor PO intake.

## 2021-07-25 NOTE — PROVIDER CONTACT NOTE (OTHER) - BACKGROUND
55 y/o male p/w cough and diaphoresis found to be hypotensive and admitted to the MICU with pressors on septic shock. Pt has ESRD on peritoneal dialysis.

## 2021-07-25 NOTE — PROGRESS NOTE ADULT - SUBJECTIVE AND OBJECTIVE BOX
Patient is a 56y old  Male who presents with a chief complaint of Hypotension (25 Jul 2021 13:32)      SUBJECTIVE / OVERNIGHT EVENTS: Comfortable   Review of Systems  unobtainable    MEDICATIONS  (STANDING):  aspirin enteric coated 81 milliGRAM(s) Oral daily  atorvastatin 80 milliGRAM(s) Oral at bedtime  cadexomer iodine 0.9% Gel 1 Application(s) Topical daily  chlorhexidine 0.12% Liquid 15 milliLiter(s) Oral Mucosa two times a day  chlorhexidine 2% Cloths 1 Application(s) Topical <User Schedule>  dextrose 40% Gel 15 Gram(s) Oral once  dextrose 5%. 1000 milliLiter(s) (50 mL/Hr) IV Continuous <Continuous>  dextrose 5%. 1000 milliLiter(s) (100 mL/Hr) IV Continuous <Continuous>  dextrose 50% Injectable 12.5 Gram(s) IV Push once  dextrose 50% Injectable 25 Gram(s) IV Push once  dextrose 50% Injectable 25 Gram(s) IV Push once  dronabinol 2.5 milliGRAM(s) Oral two times a day  ferrous    sulfate 325 milliGRAM(s) Oral three times a day  folic acid 1 milliGRAM(s) Oral daily  glucagon  Injectable 1 milliGRAM(s) IntraMuscular once  heparin   Injectable 5000 Unit(s) SubCutaneous every 12 hours  insulin glargine Injectable (LANTUS) 5 Unit(s) SubCutaneous at bedtime  insulin lispro (ADMELOG) corrective regimen sliding scale   SubCutaneous three times a day before meals  insulin lispro (ADMELOG) corrective regimen sliding scale   SubCutaneous at bedtime  lidocaine   Patch 1 Patch Transdermal daily  midodrine 30 milliGRAM(s) Oral every 8 hours  Nephro-kristi 1 Tablet(s) Oral daily  pantoprazole  Injectable 40 milliGRAM(s) IV Push daily  potassium chloride    Tablet ER 10 milliEquivalent(s) Oral once  sevelamer carbonate 1600 milliGRAM(s) Oral three times a day  thiamine IVPB 500 milliGRAM(s) IV Intermittent daily  ticagrelor 60 milliGRAM(s) Oral every 12 hours    MEDICATIONS  (PRN):  acetaminophen   Tablet .. 650 milliGRAM(s) Oral every 6 hours PRN Moderate Pain (4 - 6)  LORazepam   Injectable 0.25 milliGRAM(s) IV Push once PRN Nausea and/or Vomiting  valproate sodium IVPB 125 milliGRAM(s) IV Intermittent every 8 hours PRN agitation      Vital Signs Last 24 Hrs  T(C): 36.4 (25 Jul 2021 14:00), Max: 36.8 (24 Jul 2021 17:00)  T(F): 97.6 (25 Jul 2021 14:00), Max: 98.3 (24 Jul 2021 17:00)  HR: 88 (25 Jul 2021 14:00) (74 - 99)  BP: 112/81 (25 Jul 2021 14:00) (91/65 - 112/81)  BP(mean): --  RR: 18 (25 Jul 2021 14:00) (18 - 18)  SpO2: 100% (25 Jul 2021 14:00) (96% - 100%)    PHYSICAL EXAM:  GENERAL: NAD   HEAD:  Atraumatic, Normocephalic  EYES: EOMI, PERRLA, conjunctiva and sclera clear  NECK: Supple, No JVD  CHEST/LUNG: Clear to auscultation bilaterally; No wheeze  HEART: Regular rate and rhythm; No murmurs, rubs, or gallops  ABDOMEN: Soft, Nontender, Nondistended; Bowel sounds present  EXTREMITIES:  2+ Peripheral Pulses, No clubbing, cyanosis, or edema  PSYCH: less confused  NEUROLOGY: non-focal  SKIN: No rashes or lesions    LABS:                        12.7   7.26  )-----------( 240      ( 25 Jul 2021 06:20 )             41.9     07-25    130<L>  |  92<L>  |  38<H>  ----------------------------<  82  3.4<L>   |  22  |  8.73<H>    Ca    8.4      25 Jul 2021 06:20  Phos  5.4     07-25  Mg     1.9     07-25    TPro  6.4  /  Alb  1.9<L>  /  TBili  0.9  /  DBili  x   /  AST  21  /  ALT  11  /  AlkPhos  117  07-25                RADIOLOGY & ADDITIONAL TESTS:    Imaging Personally Reviewed:    Consultant(s) Notes Reviewed:      Care Discussed with Consultants/Other Providers:

## 2021-07-26 NOTE — PROGRESS NOTE ADULT - SUBJECTIVE AND OBJECTIVE BOX
CARDIOLOGY FOLLOW UP NOTE - DR. SMITH    Patient Name: BELLA MARTINS  Date of Service: 21 @ 12:15    Patient seen and examined    Subjective:    cv: denies chest pain, dyspnea, palpitations, dizziness  pulmonary: denies cough  GI: denies abdominal pain, nausea, vomiting  vascular/legs: no edema   skin: no rash  ROS: otherwise negative   overnight events:      PHYSICAL EXAM:  T(C): 36.3 (21 @ 09:03), Max: 36.7 (21 @ 00:35)  HR: 101 (21 @ 09:03) (88 - 101)  BP: 88/66 (21 @ 09:03) (88/66 - 112/81)  RR: 18 (21 @ 09:03) (18 - 18)  SpO2: 98% (21 @ 09:03) (98% - 100%)  Wt(kg): --  I&O's Summary    2021 07:01  -  2021 07:00  --------------------------------------------------------  IN: 440 mL / OUT: 0 mL / NET: 440 mL      Daily     Daily Weight in k.2 (2021 06:51)    Appearance: Normal	  Cardiovascular: Normal S1 S2,RRR, No JVD, No murmurs  Respiratory: Lungs clear to auscultation	  Gastrointestinal:  Soft, Non-tender, + BS	  Extremities: Normal range of motion, No clubbing, cyanosis or edema      Home Medications:  aspirin 81 mg oral delayed release tablet: 1 tab(s) orally once a day (29 Dec 2020 18:37)  atorvastatin 80 mg oral tablet: 1 tab(s) orally once a day (at bedtime) (29 Dec 2020 18:37)  epoetin martine: injectable once a month (29 Dec 2020 18:37)  ferrous sulfate 325 mg (65 mg elemental iron) oral tablet: 1 tab(s) orally 3 times a day (29 Dec 2020 18:37)  gabapentin 100 mg oral capsule: 1 cap(s) orally once a day (29 Dec 2020 18:37)  nortriptyline 25 mg oral capsule: 1 cap(s) orally once a day (at bedtime) (29 Dec 2020 18:37)  pantoprazole 40 mg oral delayed release tablet: 1 tab(s) orally once a day (before a meal) (29 Dec 2020 18:37)  Toujeo SoloStar 300 units/mL subcutaneous solution: 60 unit(s) subcutaneous once a day (at bedtime) (2021 12:41)      MEDICATIONS  (STANDING):  aspirin enteric coated 81 milliGRAM(s) Oral daily  atorvastatin 80 milliGRAM(s) Oral at bedtime  cadexomer iodine 0.9% Gel 1 Application(s) Topical daily  chlorhexidine 0.12% Liquid 15 milliLiter(s) Oral Mucosa two times a day  chlorhexidine 2% Cloths 1 Application(s) Topical <User Schedule>  dextrose 40% Gel 15 Gram(s) Oral once  dextrose 5%. 1000 milliLiter(s) (50 mL/Hr) IV Continuous <Continuous>  dextrose 5%. 1000 milliLiter(s) (100 mL/Hr) IV Continuous <Continuous>  dextrose 50% Injectable 12.5 Gram(s) IV Push once  dextrose 50% Injectable 25 Gram(s) IV Push once  dextrose 50% Injectable 25 Gram(s) IV Push once  dronabinol 2.5 milliGRAM(s) Oral two times a day  ferrous    sulfate 325 milliGRAM(s) Oral three times a day  folic acid 1 milliGRAM(s) Oral daily  glucagon  Injectable 1 milliGRAM(s) IntraMuscular once  heparin   Injectable 5000 Unit(s) SubCutaneous every 12 hours  insulin glargine Injectable (LANTUS) 5 Unit(s) SubCutaneous at bedtime  insulin lispro (ADMELOG) corrective regimen sliding scale   SubCutaneous three times a day before meals  insulin lispro (ADMELOG) corrective regimen sliding scale   SubCutaneous at bedtime  lidocaine   Patch 1 Patch Transdermal daily  midodrine 30 milliGRAM(s) Oral every 8 hours  Nephro-kristi 1 Tablet(s) Oral daily  pantoprazole  Injectable 40 milliGRAM(s) IV Push daily  sevelamer carbonate 1600 milliGRAM(s) Oral three times a day  sodium chloride 0.9%. 1000 milliLiter(s) (50 mL/Hr) IV Continuous <Continuous>  thiamine IVPB 500 milliGRAM(s) IV Intermittent daily  ticagrelor 60 milliGRAM(s) Oral every 12 hours      TELEMETRY: 	    ECG:  	  RADIOLOGY:   DIAGNOSTIC TESTING:  [ ] Echocardiogram:  [ ] Catheterization:  [ ] Stress Test:    OTHER: 	    LABS:	 	    CARDIAC MARKERS:                                      12.5   7.90  )-----------( 240      ( 2021 06:19 )             41.2         129<L>  |  91<L>  |  44<H>  ----------------------------<  121<H>  3.6   |  21<L>  |  10.07<H>    Ca    8.7      2021 06:19  Phos  5.3       Mg     2.0         TPro  6.4  /  Alb  2.1<L>  /  TBili  0.8  /  DBili  x   /  AST  21  /  ALT  12  /  AlkPhos  114      proBNP:     Lipid Profile:   HgA1c:     Creatinine, Serum: 10.07 mg/dL (21 @ 06:19)  Creatinine, Serum: 8.73 mg/dL (21 @ 06:20)  Creatinine, Serum: 7.88 mg/dL (21 @ 06:31)

## 2021-07-26 NOTE — PROGRESS NOTE ADULT - ASSESSMENT
CATH 11/30/2020:  COMPLICATIONS: The stent was deployed without difficulty. On removal of the  balloon, the shaft broke and the tip of the balloon remained in the vessel. Several attempts were made to snare the balloon unsuccessfully. Dr. Verdugo was notifed who will take the patient to the operating room.  DIAGNOSTIC RECOMMENDATIONS: The patient should continue with the present medications. The patients vessel was stented without difficulty On removal of the balloon, the shaft broke with the baloon stuck in the left main. All attempts to snare the balloon out failed and the patient was taken to the OR by Dr. Arango to remove the balloon  introp eleonora 11/30/20: mild to mod MR, < from: Intra-Operative Transesophageal Echo (11.30.20 @ 17:02) >  Severe global left ventricular systolic dysfunction. Inferior and inferolateral akinesis.  severe hypokinesis of other segments.  Severe global hypokinesia.  Normal left ventricular internal dimensions and wall thicknesses. Moderate diastolic dysfunction (Stage II).  echo 12/17/20: EF 32%, mod MR, Severe global left ventricular systolic dysfunction, moderate pulmonary pressures  echo 12/20/20: EF (Visual Estimate):  25-30 % mild MR, Akinesis of the inferolateral, anterolateral, apical laterlal, inferior, and inferoseptal walls. Severe left ventricular enlargement. No left ventricular thrombus is seen.  Echo 7/7/21: EF 16%, mod - sev MR, mod AS, severe global lv sys dysfx, severe diastolic dysfx, mod pulm htn       a/p  56 M well known to practice with PMH ESRD on PD, DM on insulin, CHF EF 25-30% CAD s/p CABG x4, underwent cardiac cath and stent and wire broke in heart s/p sternotomy p/w septic shock.    #Septic Shock, resolved   -Bcx NGTD 7/10   -s/p iv abx, pressors   -Bedside POCUS notable for biventricular diffuse hypokinesis  -right 2nd toe gangrene, pod f/u, vascular f/u, PONCHO/PVR   -ID f/u   -ELEONORA: no evidence of valvular vegetation is seen.  -med f/u    #Ischemic Cardiomyopathy. Chronic systolic HF  -ivf per renal   -monitor closely for CHF  -Repeat echo noted with EF 16%, mod - sev MR, mod AS, severe global lv sys dysfx, severe diastolic dysfx, mod pulm htn   -pt declining ICD   -no bb, acei/arb given hx of orthostatic bp   -continue midodrine for bp support - increase as needed   -cont vol removal w PD     # CAD, s/p CABG, s/p PCI, s/p redo open heart for stent balloon retrieval   -hst elevated, likely demand ischemia in setting of septic shock, ESRD   -c/w asa, Brilinta, statin     # ESRD  -on PD  -renal f/u    #AMS  -improving   -neuro psych f/u    #GOC  -palliative f/u     dvt ppx    35 minutes spent on total encounter; more than 50% of the visit was spent counseling and/or coordinating care by the attending physician.

## 2021-07-26 NOTE — PROGRESS NOTE ADULT - ATTENDING COMMENTS
Hypotension and fever suggesting septic shock. Etiology unclear . Pt off pressors. Feels better. No fever last night. Appreciate Podiatry input. Await blood cx and PD fluid culture. Pt improving on cefepime and flagyl. Off pressors since 3 am. Continue home midodrine. ESRD on PD. Exchanges as per nephrology.
Agree with above. Hiccups resolving, ?more cardiac related. Monitor for recurrence. Would not pursue endoscopic evaluation at this time given comorbidities and improvement.
Lucien Rooney MD, FACP, FACG, AGAF  [facility]  [phone]     After hours and weekend coverage GI service : [phone]
Lucien Rooney MD, FACP, FACG, AGAF  [facility]  [phone]     After hours and weekend coverage GI service : [phone]
Pt. is a 55M PMH ESRD on PD, DM on insulin, CHF EF 25-30% CAD s/p CABG x4, underwent cardiac cath and stent and wire broke in heart sternotomy admitted with cough/sweating found to be hypotensive and febrile in ER- admitted for presumed septic shock; cultures negative thus far      Issues:  1. nausea and hiccups (per pt has had intermittent issues with hiccuping prior to admission)  qTc prolonged (600); per d/w Renal we cannot give Baclofen (even at PRN dosing). No acute GI pathology on CT A/P. Nausea appears to have improved  ?related to uremia      -use ativan prn as needed.      Yaniv Fuller MD  [facility]
intractable hiccups improved on ativan, abnormal colon on ct scan with no significant lower gi symptoms, For Ashwin today.anorexia    plan continue ppi daily         outpatient GI follow up         management as above.
nausea with hiccups    plan ppi daily           consider baclofen
Agree with above  DW Nurse on the floor  Compliance with meds stressed
Pt. is a 56 male with ESRD with peritoneal dialysis ,CHF, CAD , Per wife bedside patient has had a poor appetite since cardiac surgery in November 2020. She said If he does not like the food or its smell he gags/throws up. Noted  on bedside table were [employer] candy bars and 2  wrapper. I asked  patient who ate the candy and he said he did  and did not have any trouble swallowing them.  Discussed with patient and wife that trying to eat foods he likes, suggest wife bring in food preferences .  She replied  he doesn't like anything she makes. Inquired what he may like to eat and  he responded fried chicken . Unclear the cause of his aversion to foods since his cardiac surgery 8 months ago        Recommendations ;  As discussed with GI attending recommend a MBS and a barium esophagram (with tablet) which would  evaluate his swallow ability and to define his esophageal anatomy.  We cannot evaluate above his UES with GI endoscopy. We will defer to ENT regarding any suspected oropharyngeal, hypopharyngeal or above UES issues.  Wife to bring in some foods he requests in if within his dietary restrictions       Yaniv Fuller MD  [facility]
Agree with above
Please see H&P of today.

## 2021-07-26 NOTE — PROGRESS NOTE ADULT - SUBJECTIVE AND OBJECTIVE BOX
NE[facility]ROLOGY-[facility] [phone]        Patient seen and examined in bed.  He was more alert and is eating more as well         MEDICATIONS  (STANDING):  aspirin enteric coated 81 milliGRAM(s) Oral daily  atorvastatin 80 milliGRAM(s) Oral at bedtime  cadexomer iodine 0.9% Gel 1 Application(s) Topical daily  chlorhexidine 0.12% Liquid 15 milliLiter(s) Oral Mucosa two times a day  chlorhexidine 2% Cloths 1 Application(s) Topical <User Schedule>  dextrose 40% Gel 15 Gram(s) Oral once  dextrose 5%. 1000 milliLiter(s) (50 mL/Hr) IV Continuous <Continuous>  dextrose 5%. 1000 milliLiter(s) (100 mL/Hr) IV Continuous <Continuous>  dextrose 50% Injectable 12.5 Gram(s) IV Push once  dextrose 50% Injectable 25 Gram(s) IV Push once  dextrose 50% Injectable 25 Gram(s) IV Push once  dronabinol 2.5 milliGRAM(s) Oral two times a day  ferrous    sulfate 325 milliGRAM(s) Oral three times a day  folic acid 1 milliGRAM(s) Oral daily  glucagon  Injectable 1 milliGRAM(s) IntraMuscular once  heparin   Injectable 5000 Unit(s) SubCutaneous every 12 hours  insulin glargine Injectable (LANTUS) 5 Unit(s) SubCutaneous at bedtime  insulin lispro (ADMELOG) corrective regimen sliding scale   SubCutaneous three times a day before meals  insulin lispro (ADMELOG) corrective regimen sliding scale   SubCutaneous at bedtime  lidocaine   Patch 1 Patch Transdermal daily  midodrine 30 milliGRAM(s) Oral every 8 hours  Nephro-kristi 1 Tablet(s) Oral daily  pantoprazole  Injectable 40 milliGRAM(s) IV Push daily  sevelamer carbonate 1600 milliGRAM(s) Oral three times a day  thiamine IVPB 500 milliGRAM(s) IV Intermittent daily  ticagrelor 60 milliGRAM(s) Oral every 12 hours      VITAL:  T(C): , Max: 36.7 (07-26-21 @ 00:35)  T(F): , Max: 98.1 (07-26-21 @ 00:35)  HR: 101 (07-26-21 @ 09:03)  BP: 88/66 (07-26-21 @ 09:03)  BP(mean): --  RR: 18 (07-26-21 @ 09:03)  SpO2: 98% (07-26-21 @ 09:03)  Wt(kg): --    I and O's:    07-25 @ 07:01  -  07-26 @ 07:00  --------------------------------------------------------  IN: 440 mL / OUT: 0 mL / NET: 440 mL          PHYSICAL EXAM:    Constitutional: NAD  Neck:  No JVD  Respiratory: CTAB/L  Cardiovascular: S1 and S2  Gastrointestinal: BS+, soft, NT/ND  Extremities: No peripheral edema  Neurological: A/O x 3, no focal deficits  Psychiatric: Normal mood, normal affect  : No Johnson  Skin: No rashes  Access: Not applicable    LABS:                        12.5   7.90  )-----------( 240      ( 26 Jul 2021 06:19 )             41.2     07-26    129<L>  |  91<L>  |  44<H>  ----------------------------<  121<H>  3.6   |  21<L>  |  10.07<H>    Ca    8.7      26 Jul 2021 06:19  Phos  5.3     07-26  Mg     2.0     07-26    TPro  6.4  /  Alb  2.1<L>  /  TBili  0.8  /  DBili  x   /  AST  21  /  ALT  12  /  AlkPhos  114  07-26          Urine Studies:          RADIOLOGY & ADDITIONAL STUDIES:

## 2021-07-26 NOTE — PROGRESS NOTE ADULT - ASSESSMENT
55M PMH ESRD on PD, DM on insulin, CHF EF 25-30% CAD s/p CABG x4, underwent cardiac cath and stent and wire broke in heart sternotomy admitted with cough/sweating found to be hypotensive and febrile in ER- admitted for presumed septic shock; cultures negative thus far    s/p short stay in MICU for pressor support  Cultures negative to date  ESRD on PD  COVID negative  CT A/P - thickening vs underdistension of AC (th clinically benign abdominal exam), no evidence of infectious source, T12 compression fracture  CT Chest- subtle peripheral opacities RUL, pneumoperitoneum related to PD catheter, T12 vertebral fracture    #nausea and hiccups (per pt has had intermittent issues with hiccuping prior to admission)  qTc prolonged (600); per d/w Renal we cannot give Baclofen (even at PRN dosing). No acute GI pathology on CT A/P. Nausea appears to have improved  ?related to uremia     Significant sedation with use of Ativan  Clinically improved/without hiccups - will monitor  -PD per Renal  -Continue PPI    #Anorexia/Poor PO intake  -encourage preferences (discussed with RN)  -Marinol for appetite stimulant; will uptitrate dosing  -Psych following    #?Dysphagia vs Behavioral component limiting PO intake (is able to eat his favorite chocolate bars without any c/o dysphagia/odynophagia)  -await esophagram  -SLP following  -Palliative following; await family meeting  -Renal input noted; if pt/family opting for PEG then would need to convert to HD for 2 weeks prior to PEG    SLP input appreciated  suspect symptoms may be psych/behavior related  Discussed with Medicine NP    Sanjiv Oglesby PA-C    [facility]  [phone]  After hours and weekend coverage [phone]

## 2021-07-26 NOTE — PROGRESS NOTE ADULT - SUBJECTIVE AND OBJECTIVE BOX
Patient is a 56y old  Male who presents with a chief complaint of Hypotension (26 Jul 2021 13:19)      SUBJECTIVE / OVERNIGHT EVENTS: No new complaints. More alert. Wife at bedside.  Review of Systems  chest pain no  palpitations no  sob no  nausea no  headache no    MEDICATIONS  (STANDING):  aspirin enteric coated 81 milliGRAM(s) Oral daily  atorvastatin 80 milliGRAM(s) Oral at bedtime  cadexomer iodine 0.9% Gel 1 Application(s) Topical daily  chlorhexidine 0.12% Liquid 15 milliLiter(s) Oral Mucosa two times a day  chlorhexidine 2% Cloths 1 Application(s) Topical <User Schedule>  dextrose 40% Gel 15 Gram(s) Oral once  dextrose 5%. 1000 milliLiter(s) (50 mL/Hr) IV Continuous <Continuous>  dextrose 5%. 1000 milliLiter(s) (100 mL/Hr) IV Continuous <Continuous>  dextrose 50% Injectable 25 Gram(s) IV Push once  dextrose 50% Injectable 25 Gram(s) IV Push once  dextrose 50% Injectable 12.5 Gram(s) IV Push once  dronabinol 5 milliGRAM(s) Oral two times a day  ferrous    sulfate 325 milliGRAM(s) Oral three times a day  folic acid 1 milliGRAM(s) Oral daily  glucagon  Injectable 1 milliGRAM(s) IntraMuscular once  heparin   Injectable 5000 Unit(s) SubCutaneous every 12 hours  insulin glargine Injectable (LANTUS) 5 Unit(s) SubCutaneous at bedtime  insulin lispro (ADMELOG) corrective regimen sliding scale   SubCutaneous three times a day before meals  insulin lispro (ADMELOG) corrective regimen sliding scale   SubCutaneous at bedtime  lidocaine   Patch 1 Patch Transdermal daily  midodrine 30 milliGRAM(s) Oral every 8 hours  Nephro-kristi 1 Tablet(s) Oral daily  pantoprazole  Injectable 40 milliGRAM(s) IV Push daily  sevelamer carbonate 1600 milliGRAM(s) Oral three times a day  sodium chloride 0.9%. 1000 milliLiter(s) (50 mL/Hr) IV Continuous <Continuous>  thiamine IVPB 500 milliGRAM(s) IV Intermittent daily  ticagrelor 60 milliGRAM(s) Oral every 12 hours    MEDICATIONS  (PRN):  acetaminophen   Tablet .. 650 milliGRAM(s) Oral every 6 hours PRN Moderate Pain (4 - 6)  LORazepam   Injectable 0.25 milliGRAM(s) IV Push once PRN Nausea and/or Vomiting  valproate sodium IVPB 125 milliGRAM(s) IV Intermittent every 8 hours PRN agitation      Vital Signs Last 24 Hrs  T(C): 36.5 (26 Jul 2021 17:00), Max: 36.7 (26 Jul 2021 00:35)  T(F): 97.7 (26 Jul 2021 17:00), Max: 98.1 (26 Jul 2021 00:35)  HR: 95 (26 Jul 2021 17:00) (91 - 101)  BP: 90/60 (26 Jul 2021 17:00) (88/66 - 95/67)  BP(mean): --  RR: 18 (26 Jul 2021 17:00) (18 - 18)  SpO2: 98% (26 Jul 2021 17:00) (97% - 98%)    PHYSICAL EXAM:  GENERAL: NAD  HEAD:  Atraumatic, Normocephalic  EYES: EOMI, PERRLA, conjunctiva and sclera clear  NECK: Supple, No JVD  CHEST/LUNG: Clear to auscultation bilaterally; No wheeze  HEART: Regular rate and rhythm; No murmurs, rubs, or gallops  ABDOMEN: Soft, Nontender, Nondistended; Bowel sounds present PD cath.  EXTREMITIES:  2+ Peripheral Pulses, No clubbing, cyanosis, or edema 2nd R toe gangrenous R thumb gangrenous  PSYCH: AAOx3  NEUROLOGY: non-focal  SKIN: No rashes or lesions    LABS:                        12.5   7.90  )-----------( 240      ( 26 Jul 2021 06:19 )             41.2     07-26    129<L>  |  91<L>  |  44<H>  ----------------------------<  121<H>  3.6   |  21<L>  |  10.07<H>    Ca    8.7      26 Jul 2021 06:19  Phos  5.3     07-26  Mg     2.0     07-26    TPro  6.4  /  Alb  2.1<L>  /  TBili  0.8  /  DBili  x   /  AST  21  /  ALT  12  /  AlkPhos  114  07-26                RADIOLOGY & ADDITIONAL TESTS:    Imaging Personally Reviewed:    Consultant(s) Notes Reviewed:      Care Discussed with Consultants/Other Providers:

## 2021-07-26 NOTE — PROGRESS NOTE ADULT - ASSESSMENT
56 M PMH ESRD on PD, DM on insulin, CHF EF 25-30% CAD s/p CABG x4 pw with hypotension with fever. Encephalopathy likely related to cefepime ? no obvious source of sepsis apparent as of now, KLAUDIA: Overall thickened valves seen however, no evidence of valvular vegetation is seen.     Hypotension:  on Midodrine for hemodynamic support:    ESRD on PD. Patient TW 65 kg.    Hyponatremia- Na+ stable.  poor po protein intake  ID-Blood cx and PD fluid cx -   off IV antibiotics   Hyperphosphatemia:  remains >goal as of late;  c/ w binders as prescribed   Failure to thrive     RECOMMENDATIONS   -  CAPD today and only 2 exchanges  -Gentle IVF today 50cc/hr x 10 hours   - Palliative care follow up  - continue Renvela to 1,600 mg PO TID;   - continue Midodrine  30mg po q 8 hours   -  In order for him to get a peg would need to change to intermittent HD for 2 weeks and then transition back to PD      [facility]   [phone]

## 2021-07-26 NOTE — PROGRESS NOTE ADULT - SUBJECTIVE AND OBJECTIVE BOX
Patient is a 56y old  Male who presented with a chief complaint of Hypotension (26 Jul 2021 12:15)      INTERVAL HPI/OVERNIGHT EVENTS:  no hiccups  no abdominal pain  poor PO intake - reports  "I dont like the food"  reports eating candy and [employer]  no oral pain  +BMs (reported 1/day)    awaiting esophagram today  SLP evaluation noted/appreciated    MEDICATIONS  (STANDING):  aspirin enteric coated 81 milliGRAM(s) Oral daily  atorvastatin 80 milliGRAM(s) Oral at bedtime  cadexomer iodine 0.9% Gel 1 Application(s) Topical daily  chlorhexidine 0.12% Liquid 15 milliLiter(s) Oral Mucosa two times a day  chlorhexidine 2% Cloths 1 Application(s) Topical <User Schedule>  dextrose 40% Gel 15 Gram(s) Oral once  dextrose 5%. 1000 milliLiter(s) (50 mL/Hr) IV Continuous <Continuous>  dextrose 5%. 1000 milliLiter(s) (100 mL/Hr) IV Continuous <Continuous>  dextrose 50% Injectable 12.5 Gram(s) IV Push once  dextrose 50% Injectable 25 Gram(s) IV Push once  dextrose 50% Injectable 25 Gram(s) IV Push once  dronabinol 2.5 milliGRAM(s) Oral two times a day  ferrous    sulfate 325 milliGRAM(s) Oral three times a day  folic acid 1 milliGRAM(s) Oral daily  glucagon  Injectable 1 milliGRAM(s) IntraMuscular once  heparin   Injectable 5000 Unit(s) SubCutaneous every 12 hours  insulin glargine Injectable (LANTUS) 5 Unit(s) SubCutaneous at bedtime  insulin lispro (ADMELOG) corrective regimen sliding scale   SubCutaneous three times a day before meals  insulin lispro (ADMELOG) corrective regimen sliding scale   SubCutaneous at bedtime  lidocaine   Patch 1 Patch Transdermal daily  midodrine 30 milliGRAM(s) Oral every 8 hours  Nephro-kristi 1 Tablet(s) Oral daily  pantoprazole  Injectable 40 milliGRAM(s) IV Push daily  sevelamer carbonate 1600 milliGRAM(s) Oral three times a day  sodium chloride 0.9%. 1000 milliLiter(s) (50 mL/Hr) IV Continuous <Continuous>  thiamine IVPB 500 milliGRAM(s) IV Intermittent daily  ticagrelor 60 milliGRAM(s) Oral every 12 hours    MEDICATIONS  (PRN):  acetaminophen   Tablet .. 650 milliGRAM(s) Oral every 6 hours PRN Moderate Pain (4 - 6)  LORazepam   Injectable 0.25 milliGRAM(s) IV Push once PRN Nausea and/or Vomiting  valproate sodium IVPB 125 milliGRAM(s) IV Intermittent every 8 hours PRN agitation      Allergies  doxazosin (Other)      Review of Systems:  General:  No wt loss , chills, night sweats  CV:  No pain, palpitatioins,  +CAD  resolved hypotension  Resp:  No dyspnea, cough, tachypnea, wheezing  GI:  see HPI  :  ESRD on PD  Muscle:  No pain, weakness  Neuro:  No weakness, tingling, memory problems  Psych:  No fatigue, insomnia, mood problems, depression  Endocrine:  No polyuria, polydypsia, cold/heat intolerance  Heme:  No petechiae, ecchymosis, easy bruisability  Skin:  No rash, edema +gangrene +wounds      Vital Signs Last 24 Hrs  T(C): 36.3 (26 Jul 2021 12:50), Max: 36.7 (26 Jul 2021 00:35)  T(F): 97.3 (26 Jul 2021 12:50), Max: 98.1 (26 Jul 2021 00:35)  HR: 98 (26 Jul 2021 12:50) (88 - 101)  BP: 89/64 (26 Jul 2021 12:50) (88/66 - 112/81)  BP(mean): --  RR: 18 (26 Jul 2021 12:50) (18 - 18)  SpO2: 97% (26 Jul 2021 12:50) (97% - 100%)    PHYSICAL EXAM:  Constitutional: NAD, well-developed chronically ill appearing male.   no hiccupping or retching during interview/exam. sitting up in bed verbalizing/responsive  Neck: No LAD, supple no JVD  Respiratory: grossly clear   +WH sternal scar  +WH scar right chest wall  Cardiovascular: S1 and S2, RRR, +murmur  Gastrointestinal: BS+, soft, NT/ND, no rebound guarding or rigidity  +dressing over PD site - clean and dry, non tender near catheter site. no scrotal edema/swelling  Extremities: No peripheral edema,  Right toe gangrene (dry);   Left foot wound dry;  thumb +dry gangrene  Right thumb (under fingernail and fingertip) ?gangrene  +lesions at tips of fingers, no purulence or bleeding  +b/l Z flow boots  Vascular: 2+ peripheral pulses  Neurological: no focal asymmetry  Psychiatric: responsive and verbalizing  Skin: anicteric      LABS:                        12.5   7.90  )-----------( 240      ( 26 Jul 2021 06:19 )             41.2     07-26    129<L>  |  91<L>  |  44<H>  ----------------------------<  121<H>  3.6   |  21<L>  |  10.07<H>    Ca    8.7      26 Jul 2021 06:19  Phos  5.3     07-26  Mg     2.0     07-26    TPro  6.4  /  Alb  2.1<L>  /  TBili  0.8  /  DBili  x   /  AST  21  /  ALT  12  /  AlkPhos  114  07-26           RADIOLOGY & ADDITIONAL TESTS:

## 2021-07-26 NOTE — PROGRESS NOTE ADULT - SUBJECTIVE AND OBJECTIVE BOX
[facility]      Seen earlier today, and examined.  - Today, patient is without complaints.           *****MEDICATIONS: Current medication reviewed and documented.    MEDICATIONS  (STANDING):  aspirin enteric coated 81 milliGRAM(s) Oral daily  atorvastatin 80 milliGRAM(s) Oral at bedtime  cadexomer iodine 0.9% Gel 1 Application(s) Topical daily  chlorhexidine 0.12% Liquid 15 milliLiter(s) Oral Mucosa two times a day  chlorhexidine 2% Cloths 1 Application(s) Topical <User Schedule>  dextrose 40% Gel 15 Gram(s) Oral once  dextrose 5%. 1000 milliLiter(s) (50 mL/Hr) IV Continuous <Continuous>  dextrose 5%. 1000 milliLiter(s) (100 mL/Hr) IV Continuous <Continuous>  dextrose 50% Injectable 25 Gram(s) IV Push once  dextrose 50% Injectable 25 Gram(s) IV Push once  dextrose 50% Injectable 12.5 Gram(s) IV Push once  dronabinol 5 milliGRAM(s) Oral two times a day  ferrous    sulfate 325 milliGRAM(s) Oral three times a day  folic acid 1 milliGRAM(s) Oral daily  glucagon  Injectable 1 milliGRAM(s) IntraMuscular once  heparin   Injectable 5000 Unit(s) SubCutaneous every 12 hours  insulin glargine Injectable (LANTUS) 5 Unit(s) SubCutaneous at bedtime  insulin lispro (ADMELOG) corrective regimen sliding scale   SubCutaneous three times a day before meals  insulin lispro (ADMELOG) corrective regimen sliding scale   SubCutaneous at bedtime  lidocaine   Patch 1 Patch Transdermal daily  midodrine 30 milliGRAM(s) Oral every 8 hours  Nephro-kristi 1 Tablet(s) Oral daily  pantoprazole  Injectable 40 milliGRAM(s) IV Push daily  sevelamer carbonate 1600 milliGRAM(s) Oral three times a day  sodium chloride 0.9%. 1000 milliLiter(s) (50 mL/Hr) IV Continuous <Continuous>  thiamine IVPB 500 milliGRAM(s) IV Intermittent daily  ticagrelor 60 milliGRAM(s) Oral every 12 hours    MEDICATIONS  (PRN):  acetaminophen   Tablet .. 650 milliGRAM(s) Oral every 6 hours PRN Moderate Pain (4 - 6)  LORazepam   Injectable 0.25 milliGRAM(s) IV Push once PRN Nausea and/or Vomiting  valproate sodium IVPB 125 milliGRAM(s) IV Intermittent every 8 hours PRN agitation          ***** VITAL SIGNS:  T(F): 97.7 (21 @ 17:00), Max: 98.1 (21 @ 00:35)  HR: 95 (21 @ 17:00) (91 - 101)  BP: 90/60 (21 @ 17:00) (88/66 - 108/63)  RR: 18 (21 @ 17:00) (18 - 18)  SpO2: 98% (21 @ 17:00) (97% - 99%)  Wt(kg): --  ,   I&O's Summary    2021 07:01  -  2021 07:00  --------------------------------------------------------  IN: 440 mL / OUT: 0 mL / NET: 440 mL    2021 07:01  -  2021 21:09  --------------------------------------------------------  IN: 2800 mL / OUT: 200 mL / NET: 2600 mL             *****PHYSICAL EXAM: responds after much coercion      alert oriented x 2 ( does not know name of hospital think he is at [facility]) does not know the date   delyaed responses    attention poor  comprehension  fair can follow 1 step commands   does answer some questions intermittently   EOmi fundi not visualized   no nystagmus VFF to confrontation  Tongue is midline  Palate elevates symmetrically   Moving both upper ext minimally  LE with limited mvt   does wiggles toes b/l             *****LAB AND IMAGIN.5   7.90  )-----------( 240      ( 2021 06:19 )             41.2                   129<L>  |  91<L>  |  44<H>  ----------------------------<  121<H>  3.6   |  21<L>  |  10.07<H>    Ca    8.7      2021 06:19  Phos  5.3     -  Mg     2.0     -    TPro  6.4  /  Alb  2.1<L>  /  TBili  0.8  /  DBili  x   /  AST  21  /  ALT  12  /  AlkPhos  114                           [All pertinent recent Imaging/Reports reviewed]           *****A S S E S S M E N T   A N D   P L A N :  55M PMH ESRD on PD, DM on insulin, CHF EF 25-30% CAD s/p CABG x4,  Patient states that for the last 2 days PTA he has been having increased cough and sweating. He endorsed chills but no measured fevers at home. He denies any sick contacts or recent travel. Patient states that he fell few days ago with no head trauma. Of note patient has right toe gangrenous lesion that has been present for several months, He denies any trauma or pain at the site.       In the ED, Patient was found to be hypotensive to 70s. Patient was given midodrine 10mg x1 and bedside POCUS was completed showing poor cardiac function with diffuse hypokinesis. Patient was unable to maintain adequate SBPs  so required micu admission and pressor support.   Called to eval ams       Problem/Recommendations 1:  ams likely multifactorial metabolic encephalopathy started immediately after cefepime, also other factors include poor po intake, sleep wake cycle disruption hyponatremia   correct metabolic derangements   nephrovite added   thiamine 500 iv q h       limit sedatives   7/15 mental status with improvement    seemed lethargic  gagging     mental status improved    mental status remains stable, still with processing delay     Problem/Recommendations 2:  ecchymosis of the finger tips   r/o emboli   derm eval     eleonora no evidence for endocarditis   poor po intake         more alert and responsive    alert responsive    more alert sitting up in bed   as per wife, pt ate a meal that she brought in       Problem/Recommendations 3: poor nutritional intake severe protein caloric malnutrition   encourage po intake   marinol as per gi     nutrition consult ? nephrovite for supplementation   thiamine daily       oob to chair to mobilize        Thank you for allowing me to participate in the care of this patient. Will continue to follow patient periodically. Please do not hesitate to call me if you have any  questions or if there has been a change in patients neurological status     ________________  Grisel Lainez MD  [facility]  [phone]      33 minutes spent on total encounter; more than 50 % of the visit was  spent counseling about plan of care, compliance to diet/exercise and medication regimen and or  coordinating care by the attending physician.      It is advised that stroke patients follow up with CARLYLE Pena @ [phone] in 1- 2 weeks.   Others please follow up with Dr. Michael Nissenbaum [phone]

## 2021-07-26 NOTE — PROGRESS NOTE ADULT - ASSESSMENT
56 m with    Sepsis  - off antibiotic   - toe gangrene  - Podiatry follow  - ID follow   - KLAUDIA noted     COPD  - 2L NC overnight because he becomes apneic, sats well  - Sats well on RA while awake    CAD s/p CABG   - Hypotension likely in the setting of septic shock   - midodrine dose increased to 30 mg TID to match home dose  - Previous echo with reduced EF  - c/w DAPT  - Cardiology follow    CHF cr systolic  - cardiology follow  - patient and family refuse AICD    Peritoneal Dialysis   - nephrology follow PD, recommend 4x a day  - patient on midodrine 30 q8h at home, continue here  - Holding home metoprolol 25 q12hr in setting of hypotension    Diabetes   - HA1C 6.1 on 7/6 suggesting prediabetes  - Tujeo 60 units at bedtime and Victoza at home, started on 30 units at bedtime, adjust as needed based on patient PO intake  - added 2 units insulin pre-meal    Hiccups  - GI follow  - hold Ativan 2 to sedation    Incontinence dermatitis  - wound care    Toe gangrene  - Podiatry follow  - Vascular follow  - PONCHO/PVR noted  - Angiogram if agrees    Finger gangrenous area  - Plastic evaluation  - local care    Vertebral fracture T12  - Ortho eval noted   - MRI spine noted  - No intervention    Confusion  - possible delirium  - Psych follow  - Neurology follow  - EEG noted    Dysphagia  - GI follow  - MBS  - ENT follow    functional quadriplegia   - supportive care     Palliative follow re GOC     DCP in progress    d/w patient and wife. If he cannot walk she cannot take him home.     Pipe Beck MD pager [phone]

## 2021-07-26 NOTE — PROVIDER CONTACT NOTE (OTHER) - BACKGROUND
PMH ESRD on PD, DM. Admitted for septic shock of unclear etiology s/p MICU w/ pressors. BP now 88/66

## 2021-07-27 NOTE — PROGRESS NOTE ADULT - ASSESSMENT
55M PMH ESRD on PD, DM on insulin, CHF EF 25-30% CAD s/p CABG x4, underwent cardiac cath and stent and wire broke in heart sternotomy admitted with cough/sweating found to be hypotensive and febrile in ER- admitted for presumed septic shock; cultures negative thus far    s/p short stay in MICU for pressor support  Cultures negative to date  ESRD on PD  COVID negative  CT A/P - thickening vs underdistension of AC (th clinically benign abdominal exam), no evidence of infectious source, T12 compression fracture  CT Chest- subtle peripheral opacities RUL, pneumoperitoneum related to PD catheter, T12 vertebral fracture    #nausea and hiccups (per pt has had intermittent issues with hiccuping prior to admission)  qTc prolonged (600); per d/w Renal we cannot give Baclofen (even at PRN dosing). No acute GI pathology on CT A/P. Nausea appears to have improved  ?related to uremia     Significant sedation with use of Ativan  Clinically improved/without hiccups - will monitor  -PD per Renal  -Continue PPI    #Anorexia/Poor PO intake  -encourage preferences (discussed with RN)  -Marinol for appetite stimulant; will uptitrate dosing  -Psych following    #?Dysphagia vs Behavioral component limiting PO intake (is able to eat his favorite chocolate bars without any c/o dysphagia/odynophagia).  SLP input appreciated  suspect symptoms may be psych/behavior related.  -f/u esophagram  -SLP following  -Palliative following; await family meeting  -Renal input noted; if pt/family opting for PEG then would need to convert to HD for 2 weeks prior to PEG and continue HD for 6 weeks thereafter to allow a mature tract to form

## 2021-07-27 NOTE — PROGRESS NOTE ADULT - SUBJECTIVE AND OBJECTIVE BOX
CARDIOLOGY FOLLOW UP - Dr. Best  DATE OF SERVICE: 07-27-21     CC oob in chair, resting comfortably     REVIEW OF SYSTEMS:   CONSTITUTIONAL: No fever, weight loss, or fatigue   RESPIRATORY:  No cough, wheezing, chills or hemoptysis; No SOB  CARDIOVASCULAR: No chest pain, palpitations, passing out, dizziness, or leg swelling   GASTROINTESTINAL: No abdominal or epigastric pain. No nausea, vomiting, or hematemesis, no diarreha, or constipation, No melena or hematochezia   VASCULAR: no edema.       PHYSICAL EXAM:  T(C): 36.7 (07-27-21 @ 09:00), Max: 36.7 (07-27-21 @ 09:00)  HR: 93 (07-27-21 @ 09:00) (93 - 100)  BP: 92/63 (07-27-21 @ 09:00) (90/60 - 96/62)  RR: 18 (07-27-21 @ 09:00) (18 - 18)  SpO2: 98% (07-27-21 @ 09:00) (95% - 99%)  Wt(kg): --  I&O's Summary    26 Jul 2021 07:01  -  27 Jul 2021 07:00  --------------------------------------------------------  IN: 8490 mL / OUT: 5400 mL / NET: 3090 mL        Appearance: Normal	  Cardiovascular: Normal S1 S2,RRR, No JVD, No murmurs  Respiratory: Lungs clear to auscultation	  Gastrointestinal:  Soft, Non-tender, + BS	  Extremities: Normal range of motion, No clubbing, cyanosis or edema      HOME MEDICATIONS:  aspirin 81 mg oral delayed release tablet: 1 tab(s) orally once a day (29 Dec 2020 18:37)  atorvastatin 80 mg oral tablet: 1 tab(s) orally once a day (at bedtime) (29 Dec 2020 18:37)  epoetin martine: injectable once a month (29 Dec 2020 18:37)  ferrous sulfate 325 mg (65 mg elemental iron) oral tablet: 1 tab(s) orally 3 times a day (29 Dec 2020 18:37)  gabapentin 100 mg oral capsule: 1 cap(s) orally once a day (29 Dec 2020 18:37)  nortriptyline 25 mg oral capsule: 1 cap(s) orally once a day (at bedtime) (29 Dec 2020 18:37)  pantoprazole 40 mg oral delayed release tablet: 1 tab(s) orally once a day (before a meal) (29 Dec 2020 18:37)  Toujeo SoloStar 300 units/mL subcutaneous solution: 60 unit(s) subcutaneous once a day (at bedtime) (08 Jan 2021 12:41)      MEDICATIONS  (STANDING):  aspirin enteric coated 81 milliGRAM(s) Oral daily  atorvastatin 80 milliGRAM(s) Oral at bedtime  cadexomer iodine 0.9% Gel 1 Application(s) Topical daily  chlorhexidine 0.12% Liquid 15 milliLiter(s) Oral Mucosa two times a day  chlorhexidine 2% Cloths 1 Application(s) Topical <User Schedule>  dextrose 40% Gel 15 Gram(s) Oral once  dextrose 5%. 1000 milliLiter(s) (50 mL/Hr) IV Continuous <Continuous>  dextrose 5%. 1000 milliLiter(s) (100 mL/Hr) IV Continuous <Continuous>  dextrose 50% Injectable 25 Gram(s) IV Push once  dextrose 50% Injectable 25 Gram(s) IV Push once  dextrose 50% Injectable 12.5 Gram(s) IV Push once  dronabinol 5 milliGRAM(s) Oral two times a day  ferrous    sulfate 325 milliGRAM(s) Oral three times a day  folic acid 1 milliGRAM(s) Oral daily  glucagon  Injectable 1 milliGRAM(s) IntraMuscular once  heparin   Injectable 5000 Unit(s) SubCutaneous every 12 hours  insulin glargine Injectable (LANTUS) 5 Unit(s) SubCutaneous at bedtime  insulin lispro (ADMELOG) corrective regimen sliding scale   SubCutaneous three times a day before meals  insulin lispro (ADMELOG) corrective regimen sliding scale   SubCutaneous at bedtime  lidocaine   Patch 1 Patch Transdermal daily  midodrine 30 milliGRAM(s) Oral every 8 hours  Nephro-kristi 1 Tablet(s) Oral daily  pantoprazole  Injectable 40 milliGRAM(s) IV Push daily  sevelamer carbonate 1600 milliGRAM(s) Oral three times a day  sodium chloride 0.9%. 1000 milliLiter(s) (50 mL/Hr) IV Continuous <Continuous>  thiamine IVPB 500 milliGRAM(s) IV Intermittent daily  ticagrelor 60 milliGRAM(s) Oral every 12 hours      TELEMETRY: 	    ECG:  	  RADIOLOGY:   DIAGNOSTIC TESTING:  [ ] Echocardiogram:  [ ]  Catheterization:  [ ] Stress Test:    OTHER: 	    LABS:	 	                                12.8   8.84  )-----------( 248      ( 27 Jul 2021 06:25 )             43.4     07-27    131<L>  |  93<L>  |  43<H>  ----------------------------<  199<H>  3.8   |  22  |  10.09<H>    Ca    8.6      27 Jul 2021 06:25  Phos  5.3     07-26  Mg     2.0     07-26    TPro  6.4  /  Alb  2.1<L>  /  TBili  0.8  /  DBili  x   /  AST  21  /  ALT  12  /  AlkPhos  114  07-26

## 2021-07-27 NOTE — PROGRESS NOTE ADULT - SUBJECTIVE AND OBJECTIVE BOX
Patient is a 56y old  Male who presented with a chief complaint of Hypotension (26 Jul 2021 12:15)      INTERVAL HPI/OVERNIGHT EVENTS:  Intermitent hiccups  no abdominal pain  poor PO intake - reports  "I dont like the food"  reports eating candy and KitKat  no oral pain  +BMs (reported 1/day)  SLP evaluation noted/appreciated    MEDICATIONS  (STANDING):  aspirin enteric coated 81 milliGRAM(s) Oral daily  atorvastatin 80 milliGRAM(s) Oral at bedtime  cadexomer iodine 0.9% Gel 1 Application(s) Topical daily  chlorhexidine 0.12% Liquid 15 milliLiter(s) Oral Mucosa two times a day  chlorhexidine 2% Cloths 1 Application(s) Topical <User Schedule>  dextrose 40% Gel 15 Gram(s) Oral once  dextrose 5%. 1000 milliLiter(s) (50 mL/Hr) IV Continuous <Continuous>  dextrose 5%. 1000 milliLiter(s) (100 mL/Hr) IV Continuous <Continuous>  dextrose 50% Injectable 25 Gram(s) IV Push once  dextrose 50% Injectable 25 Gram(s) IV Push once  dextrose 50% Injectable 12.5 Gram(s) IV Push once  dronabinol 5 milliGRAM(s) Oral two times a day  ferrous    sulfate 325 milliGRAM(s) Oral three times a day  folic acid 1 milliGRAM(s) Oral daily  glucagon  Injectable 1 milliGRAM(s) IntraMuscular once  heparin   Injectable 5000 Unit(s) SubCutaneous every 12 hours  insulin glargine Injectable (LANTUS) 5 Unit(s) SubCutaneous at bedtime  insulin lispro (ADMELOG) corrective regimen sliding scale   SubCutaneous three times a day before meals  insulin lispro (ADMELOG) corrective regimen sliding scale   SubCutaneous at bedtime  lidocaine   Patch 1 Patch Transdermal daily  midodrine 30 milliGRAM(s) Oral every 8 hours  Nephro-kristi 1 Tablet(s) Oral daily  pantoprazole  Injectable 40 milliGRAM(s) IV Push daily  sevelamer carbonate 1600 milliGRAM(s) Oral three times a day  sodium chloride 0.9%. 1000 milliLiter(s) (50 mL/Hr) IV Continuous <Continuous>  thiamine IVPB 500 milliGRAM(s) IV Intermittent daily  ticagrelor 60 milliGRAM(s) Oral every 12 hours    MEDICATIONS  (PRN):  acetaminophen   Tablet .. 650 milliGRAM(s) Oral every 6 hours PRN Moderate Pain (4 - 6)  LORazepam   Injectable 0.25 milliGRAM(s) IV Push once PRN Nausea and/or Vomiting  valproate sodium IVPB 125 milliGRAM(s) IV Intermittent every 8 hours PRN agitation    Allergies  doxazosin (Other)    Review of Systems:  General:  No wt loss , chills, night sweats  CV:  No pain, palpitatioins,  +CAD  resolved hypotension  Resp:  No dyspnea, cough, tachypnea, wheezing  GI:  see HPI  :  ESRD on PD  Muscle:  No pain, weakness  Neuro:  No weakness, tingling, memory problems  Psych:  No fatigue, insomnia, mood problems, depression  Endocrine:  No polyuria, polydypsia, cold/heat intolerance  Heme:  No petechiae, ecchymosis, easy bruisability  Skin:  No rash, edema +gangrene +wounds    T(F): 97.4 (07-27-21 @ 06:00), Max: 97.9 (07-26-21 @ 22:00)  HR: 100 (07-27-21 @ 06:00) (95 - 100)  BP: 96/62 (07-27-21 @ 06:00) (89/64 - 96/62)  RR: 18 (07-27-21 @ 06:00) (18 - 18)  SpO2: 99% (07-27-21 @ 06:00) (95% - 99%)  Wt(kg): --  ,   I&O's Summary    26 Jul 2021 07:01  -  27 Jul 2021 07:00  --------------------------------------------------------  IN: 8490 mL / OUT: 5400 mL / NET: 3090 mL    PHYSICAL EXAM:  Constitutional: NAD, well-developed chronically ill appearing male.   no hiccupping or retching during interview/exam. sitting up in bed verbalizing/responsive  Neck: No LAD, supple no JVD  Respiratory: grossly clear   +WH sternal scar  +WH scar right chest wall  Cardiovascular: S1 and S2, RRR, +murmur  Gastrointestinal: BS+, soft, NT/ND, no rebound guarding or rigidity  +dressing over PD site - clean and dry, non tender near catheter site. no scrotal edema/swelling  Extremities: No peripheral edema,  Right toe gangrene (dry);   Left foot wound dry;  thumb +dry gangrene  Right thumb (under fingernail and fingertip) ?gangrene  +lesions at tips of fingers, no purulence or bleeding  +b/l Z flow boots  Vascular: 2+ peripheral pulses  Neurological: no focal asymmetry  Psychiatric: responsive and verbalizing  Skin: anicteric    LABS:                        12.5   7.90  )-----------( 240      ( 26 Jul 2021 06:19 )             41.2     07-26    129<L>  |  91<L>  |  44<H>  ----------------------------<  121<H>  3.6   |  21<L>  |  10.07<H>    Ca    8.7      26 Jul 2021 06:19  Phos  5.3     07-26  Mg     2.0     07-26    TPro  6.4  /  Alb  2.1<L>  /  TBili  0.8  /  DBili  x   /  AST  21  /  ALT  12  /  AlkPhos  114  07-26         RADIOLOGY & ADDITIONAL TESTS:

## 2021-07-27 NOTE — PROGRESS NOTE ADULT - SUBJECTIVE AND OBJECTIVE BOX
[facility]      Seen earlier today, and examined.  - Today, patient is without complaints.           *****MEDICATIONS: Current medication reviewed and documented.    MEDICATIONS  (STANDING):  aspirin enteric coated 81 milliGRAM(s) Oral daily  atorvastatin 80 milliGRAM(s) Oral at bedtime  cadexomer iodine 0.9% Gel 1 Application(s) Topical daily  chlorhexidine 0.12% Liquid 15 milliLiter(s) Oral Mucosa two times a day  chlorhexidine 2% Cloths 1 Application(s) Topical <User Schedule>  dextrose 40% Gel 15 Gram(s) Oral once  dextrose 5%. 1000 milliLiter(s) (50 mL/Hr) IV Continuous <Continuous>  dextrose 5%. 1000 milliLiter(s) (100 mL/Hr) IV Continuous <Continuous>  dextrose 50% Injectable 25 Gram(s) IV Push once  dextrose 50% Injectable 12.5 Gram(s) IV Push once  dextrose 50% Injectable 25 Gram(s) IV Push once  dronabinol 5 milliGRAM(s) Oral two times a day  ferrous    sulfate 325 milliGRAM(s) Oral three times a day  folic acid 1 milliGRAM(s) Oral daily  glucagon  Injectable 1 milliGRAM(s) IntraMuscular once  heparin   Injectable 5000 Unit(s) SubCutaneous every 12 hours  insulin glargine Injectable (LANTUS) 5 Unit(s) SubCutaneous at bedtime  insulin lispro (ADMELOG) corrective regimen sliding scale   SubCutaneous three times a day before meals  insulin lispro (ADMELOG) corrective regimen sliding scale   SubCutaneous at bedtime  lidocaine   Patch 1 Patch Transdermal daily  midodrine 30 milliGRAM(s) Oral every 8 hours  Nephro-kristi 1 Tablet(s) Oral daily  pantoprazole  Injectable 40 milliGRAM(s) IV Push daily  sevelamer carbonate 1600 milliGRAM(s) Oral three times a day  sodium chloride 0.9%. 1000 milliLiter(s) (50 mL/Hr) IV Continuous <Continuous>  thiamine IVPB 500 milliGRAM(s) IV Intermittent daily  ticagrelor 60 milliGRAM(s) Oral every 12 hours    MEDICATIONS  (PRN):  acetaminophen   Tablet .. 650 milliGRAM(s) Oral every 6 hours PRN Moderate Pain (4 - 6)  valproate sodium IVPB 125 milliGRAM(s) IV Intermittent every 8 hours PRN agitation          ***** VITAL SIGNS:  T(F): 97.9 (07-27-21 @ 21:00), Max: 98.1 (21 @ 09:00)  HR: 105 (21 @ 21:00) (79 - 106)  BP: 112/78 (21 @ 21:00) (92/62 - 112/78)  RR: 18 (21 @ 21:00) (18 - 18)  SpO2: 100% (21 @ 21:00) (94% - 100%)  Wt(kg): --  ,   I&O's Summary    2021 07:01  -  2021 07:00  --------------------------------------------------------  IN: 8490 mL / OUT: 5400 mL / NET: 3090 mL             *****PHYSICAL EXAM:   responds after much coercion      alert oriented x 2 ( does not know name of hospital think he is at [facility]) does not know the date   delyaed responses    attention poor  comprehension  fair can follow 1 step commands   does answer some questions intermittently   EOmi fundi not visualized   no nystagmus VFF to confrontation  Tongue is midline  Palate elevates symmetrically   Moving both upper ext minimally  LE with limited mvt   does wiggles toes b/l          *****LAB AND IMAGIN.8   8.84  )-----------( 248      ( 2021 06:25 )             43.4                   131<L>  |  93<L>  |  43<H>  ----------------------------<  199<H>  3.8   |  22  |  10.09<H>    Ca    8.6      2021 06:25  Phos  5.3       Mg     2.0         TPro  6.4  /  Alb  2.1<L>  /  TBili  0.8  /  DBili  x   /  AST  21  /  ALT  12  /  AlkPhos  114                           [All pertinent recent Imaging/Reports reviewed]           *****A S S E S S M E N T   A N D   P L A N :    55M PMH ESRD on PD, DM on insulin, CHF EF 25-30% CAD s/p CABG x4,  Patient states that for the last 2 days PTA he has been having increased cough and sweating. He endorsed chills but no measured fevers at home. He denies any sick contacts or recent travel. Patient states that he fell few days ago with no head trauma. Of note patient has right toe gangrenous lesion that has been present for several months, He denies any trauma or pain at the site.       In the ED, Patient was found to be hypotensive to 70s. Patient was given midodrine 10mg x1 and bedside POCUS was completed showing poor cardiac function with diffuse hypokinesis. Patient was unable to maintain adequate SBPs  so required micu admission and pressor support.   Called to eval ams       Problem/Recommendations 1:  ams likely multifactorial metabolic encephalopathy started immediately after cefepime, also other factors include poor po intake, sleep wake cycle disruption hyponatremia   correct metabolic derangements   nephrovite added   thiamine 500 iv q h       limit sedatives   7/15 mental status with improvement    seemed lethargic  gagging     mental status improved    mental status remains stable, still with processing delay     Problem/Recommendations 2:  ecchymosis of the finger tips   r/o emboli   derm eval     eleonora no evidence for endocarditis   poor po intake         more alert and responsive    alert responsive    more alert sitting up in bed   as per wife, pt ate a meal that she brought in       Problem/Recommendations 3: poor nutritional intake severe protein caloric malnutrition   encourage po intake   marinol as per gi     nutrition consult ? nephrovite for supplementation   thiamine daily       oob to chair to mobilize        Thank you for allowing me to participate in the care of this patient. Will continue to follow patient periodically. Please do not hesitate to call me if you have any  questions or if there has been a change in patients neurological status     ________________  Grisel Lainez MD  [facility]  [phone]      33 minutes spent on total encounter; more than 50 % of the visit was  spent counseling about plan of care, compliance to diet/exercise and medication regimen and or  coordinating care by the attending physician.      It is advised that stroke patients follow up with NP Shruthi Pena @ [phone] in 1- 2 weeks.   Others please follow up with Dr. Michael Nissenbaum [phone]

## 2021-07-27 NOTE — PROGRESS NOTE ADULT - ASSESSMENT
56 m with    Sepsis resolved  - off antibiotic   - toe gangrene  - Podiatry follow  - ID follow   - KLAUDIA noted     COPD  - 2L NC overnight because he becomes apneic, sats well  - Sats well on RA while awake    CAD s/p CABG   - Hypotension likely in the setting of septic shock   - midodrine dose increased to 30 mg TID to match home dose  - Previous echo with reduced EF  - c/w DAPT  - Cardiology follow    CHF cr systolic  - cardiology follow  - patient and family refuse AICD    Peritoneal Dialysis   - nephrology follow PD, recommend 4x a day  - patient on midodrine 30 q8h at home, continue here  - Holding home metoprolol 25 q12hr in setting of hypotension    Diabetes   - HA1C 6.1 on 7/6 suggesting prediabetes  - Tujeo 60 units at bedtime and Victoza at home, started on 30 units at bedtime, adjust as needed based on patient PO intake  - added 2 units insulin pre-meal    Hiccups  - GI follow  - hold Ativan 2 to sedation    Incontinence dermatitis  - wound care    Toe gangrene  - Podiatry follow  - Vascular follow  - PONCHO/PVR noted  - Angiogram if agrees    Finger gangrenous area  - local care    Vertebral fracture T12  - Ortho eval noted   - MRI spine noted  - No intervention    Confusion improving   - possible delirium  - Psych follow  - Neurology follow  - EEG noted    Dysphagia  - GI follow  - MBS  - ENT follow    functional quadriplegia   - supportive care     Palliative follow re [facility]     DCP in progress    d/w patient     Pipe Beck MD pager [phone]

## 2021-07-27 NOTE — PROGRESS NOTE ADULT - ASSESSMENT
CATH 11/30/2020:  COMPLICATIONS: The stent was deployed without difficulty. On removal of the  balloon, the shaft broke and the tip of the balloon remained in the vessel. Several attempts were made to snare the balloon unsuccessfully. Dr. Verdugo was notifed who will take the patient to the operating room.  DIAGNOSTIC RECOMMENDATIONS: The patient should continue with the present medications. The patients vessel was stented without difficulty On removal of the balloon, the shaft broke with the baloon stuck in the left main. All attempts to snare the balloon out failed and the patient was taken to the OR by Dr. Arango to remove the balloon  introp eleonora 11/30/20: mild to mod MR, < from: Intra-Operative Transesophageal Echo (11.30.20 @ 17:02) >  Severe global left ventricular systolic dysfunction. Inferior and inferolateral akinesis.  severe hypokinesis of other segments.  Severe global hypokinesia.  Normal left ventricular internal dimensions and wall thicknesses. Moderate diastolic dysfunction (Stage II).  echo 12/17/20: EF 32%, mod MR, Severe global left ventricular systolic dysfunction, moderate pulmonary pressures  echo 12/20/20: EF (Visual Estimate):  25-30 % mild MR, Akinesis of the inferolateral, anterolateral, apical laterlal, inferior, and inferoseptal walls. Severe left ventricular enlargement. No left ventricular thrombus is seen.  Echo 7/7/21: EF 16%, mod - sev MR, mod AS, severe global lv sys dysfx, severe diastolic dysfx, mod pulm htn       a/p  56 M well known to practice with PMH ESRD on PD, DM on insulin, CHF EF 25-30% CAD s/p CABG x4, underwent cardiac cath and stent and wire broke in heart s/p sternotomy p/w septic shock.    #Septic Shock, resolved   -Bcx NGTD 7/10   -s/p iv abx, pressors   -Bedside POCUS notable for biventricular diffuse hypokinesis  -right 2nd toe gangrene, pod f/u, vascular f/u, PONCHO/PVR   -ID f/u   -ELEONORA: no evidence of valvular vegetation is seen.  -med f/u    #Ischemic Cardiomyopathy. Chronic systolic HF  -ivf per renal   -monitor closely for CHF  -Repeat echo noted with EF 16%, mod - sev MR, mod AS, severe global lv sys dysfx, severe diastolic dysfx, mod pulm htn   -pt declining ICD   -no bb, acei/arb given hx of orthostatic bp   -continue midodrine for bp support - increase as needed   -cont vol removal w PD     # CAD, s/p CABG, s/p PCI, s/p redo open heart for stent balloon retrieval   -hst elevated, likely demand ischemia in setting of septic shock, ESRD   -c/w asa, Brilinta, statin     # ESRD  -on PD  -renal f/u    #AMS  -improving   -neuro psych f/u    #GOC  -palliative f/u     dvt ppx

## 2021-07-27 NOTE — PROGRESS NOTE ADULT - ASSESSMENT
Seen and examined at bedside.  Much more alert   He has been eating food brought in by  his wife , not so much following his diet  Denies complaints  will c/ w PD for 2 exchanges today    acetaminophen   Tablet .. 650 milliGRAM(s) Oral every 6 hours PRN  aspirin enteric coated 81 milliGRAM(s) Oral daily  atorvastatin 80 milliGRAM(s) Oral at bedtime  cadexomer iodine 0.9% Gel 1 Application(s) Topical daily  chlorhexidine 0.12% Liquid 15 milliLiter(s) Oral Mucosa two times a day  chlorhexidine 2% Cloths 1 Application(s) Topical <User Schedule>  dextrose 40% Gel 15 Gram(s) Oral once  dextrose 5%. 1000 milliLiter(s) IV Continuous <Continuous>  dextrose 5%. 1000 milliLiter(s) IV Continuous <Continuous>  dextrose 50% Injectable 25 Gram(s) IV Push once  dextrose 50% Injectable 25 Gram(s) IV Push once  dextrose 50% Injectable 12.5 Gram(s) IV Push once  dronabinol 5 milliGRAM(s) Oral two times a day  ferrous    sulfate 325 milliGRAM(s) Oral three times a day  folic acid 1 milliGRAM(s) Oral daily  glucagon  Injectable 1 milliGRAM(s) IntraMuscular once  heparin   Injectable 5000 Unit(s) SubCutaneous every 12 hours  insulin glargine Injectable (LANTUS) 5 Unit(s) SubCutaneous at bedtime  insulin lispro (ADMELOG) corrective regimen sliding scale   SubCutaneous three times a day before meals  insulin lispro (ADMELOG) corrective regimen sliding scale   SubCutaneous at bedtime  lidocaine   Patch 1 Patch Transdermal daily  LORazepam   Injectable 0.25 milliGRAM(s) IV Push once PRN  midodrine 30 milliGRAM(s) Oral every 8 hours  Nephro-kristi 1 Tablet(s) Oral daily  pantoprazole  Injectable 40 milliGRAM(s) IV Push daily  sevelamer carbonate 1600 milliGRAM(s) Oral three times a day  sodium chloride 0.9%. 1000 milliLiter(s) IV Continuous <Continuous>  thiamine IVPB 500 milliGRAM(s) IV Intermittent daily  ticagrelor 60 milliGRAM(s) Oral every 12 hours  valproate sodium IVPB 125 milliGRAM(s) IV Intermittent every 8 hours PRN      VITAL:  T(C): , Max: 36.6 (07-26-21 @ 22:00)  T(F): , Max: 97.9 (07-26-21 @ 22:00)  HR: 100 (07-27-21 @ 06:00)  BP: 96/62 (07-27-21 @ 06:00)  BP(mean): --  RR: 18 (07-27-21 @ 06:00)  SpO2: 99% (07-27-21 @ 06:00)  Wt(kg): --    07-26-21 @ 07:01  -  07-27-21 @ 07:00  --------------------------------------------------------  IN: 8490 mL / OUT: 5400 mL / NET: 3090 mL        PHYSICAL EXAM:  Constitutional: NAD  Neck:  No JVD  Respiratory: CTAB/L  Cardiovascular: S1 and S2  Gastrointestinal: BS+, soft, NT/ND  Extremities: No peripheral edema  Neurological: A/O x 3, no focal deficits  Psychiatric: Normal mood, normal affect  : No Johnson  Skin: No rashes  Access: Not applicable      LABS:                          12.8   8.84  )-----------( 248      ( 27 Jul 2021 06:25 )             43.4     Na(131)/K(3.8)/Cl(93)/HCO3(22)/BUN(43)/Cr(10.09)Glu(199)/Ca(8.6)/Mg(--)/PO4(--)    07-27 @ 06:25  Na(129)/K(3.6)/Cl(91)/HCO3(21)/BUN(44)/Cr(10.07)Glu(121)/Ca(8.7)/Mg(2.0)/PO4(5.3)    07-26 @ 06:19  Na(130)/K(3.4)/Cl(92)/HCO3(22)/BUN(38)/Cr(8.73)Glu(82)/Ca(8.4)/Mg(1.9)/PO4(5.4)    07-25 @ 06:20            ASSESSMENT/PLAN  Assessment:  56 M PMH ESRD on PD, DM on insulin, CHF EF 25-30% CAD s/p CABG x4 pw with hypotension with fever. Encephalopathy likely related to cefepime ? no obvious source of sepsis apparent as of now, KLAUDIA: Overall thickened valves seen however, no evidence of valvular vegetation is seen.     Hypotension:  on Midodrine for hemodynamic support:   BP improving  ESRD on PD. Patient TW 65 kg.   Current weight is 61.5kg  Hyponatremia- Na+ improving.  poor po protein intake  ID-Blood cx and PD fluid cx -   off IV antibiotics   Hyperphosphatemia:  remains >goal as of late;  c/ w binders as prescribed   Failure to thrive     RECOMMENDATIONS   - CAPD today and only 2 exchanges  -Gentle IVF today 50cc/hr x 10 hours   - Palliative care follow up  - continue Renvela to 1,600 mg PO TID;   - continue Midodrine  30mg po q 8 hours   -  In order for him to get a peg would need to change to intermittent HD for 2 weeks and then transition back to PD        Modesto Wayne NP-BC  [facility]  [phone]   Seen and examined at bedside.  Much more alert   He has been eating food brought in by  his wife , not so much following his diet  Denies complaints  will c/ w PD  as ordered    acetaminophen   Tablet .. 650 milliGRAM(s) Oral every 6 hours PRN  aspirin enteric coated 81 milliGRAM(s) Oral daily  atorvastatin 80 milliGRAM(s) Oral at bedtime  cadexomer iodine 0.9% Gel 1 Application(s) Topical daily  chlorhexidine 0.12% Liquid 15 milliLiter(s) Oral Mucosa two times a day  chlorhexidine 2% Cloths 1 Application(s) Topical <User Schedule>  dextrose 40% Gel 15 Gram(s) Oral once  dextrose 5%. 1000 milliLiter(s) IV Continuous <Continuous>  dextrose 5%. 1000 milliLiter(s) IV Continuous <Continuous>  dextrose 50% Injectable 25 Gram(s) IV Push once  dextrose 50% Injectable 25 Gram(s) IV Push once  dextrose 50% Injectable 12.5 Gram(s) IV Push once  dronabinol 5 milliGRAM(s) Oral two times a day  ferrous    sulfate 325 milliGRAM(s) Oral three times a day  folic acid 1 milliGRAM(s) Oral daily  glucagon  Injectable 1 milliGRAM(s) IntraMuscular once  heparin   Injectable 5000 Unit(s) SubCutaneous every 12 hours  insulin glargine Injectable (LANTUS) 5 Unit(s) SubCutaneous at bedtime  insulin lispro (ADMELOG) corrective regimen sliding scale   SubCutaneous three times a day before meals  insulin lispro (ADMELOG) corrective regimen sliding scale   SubCutaneous at bedtime  lidocaine   Patch 1 Patch Transdermal daily  LORazepam   Injectable 0.25 milliGRAM(s) IV Push once PRN  midodrine 30 milliGRAM(s) Oral every 8 hours  Nephro-kristi 1 Tablet(s) Oral daily  pantoprazole  Injectable 40 milliGRAM(s) IV Push daily  sevelamer carbonate 1600 milliGRAM(s) Oral three times a day  sodium chloride 0.9%. 1000 milliLiter(s) IV Continuous <Continuous>  thiamine IVPB 500 milliGRAM(s) IV Intermittent daily  ticagrelor 60 milliGRAM(s) Oral every 12 hours  valproate sodium IVPB 125 milliGRAM(s) IV Intermittent every 8 hours PRN      VITAL:  T(C): , Max: 36.6 (07-26-21 @ 22:00)  T(F): , Max: 97.9 (07-26-21 @ 22:00)  HR: 100 (07-27-21 @ 06:00)  BP: 96/62 (07-27-21 @ 06:00)  BP(mean): --  RR: 18 (07-27-21 @ 06:00)  SpO2: 99% (07-27-21 @ 06:00)  Wt(kg): --    07-26-21 @ 07:01  -  07-27-21 @ 07:00  --------------------------------------------------------  IN: 8490 mL / OUT: 5400 mL / NET: 3090 mL        PHYSICAL EXAM:  Constitutional: NAD  Neck:  No JVD  Respiratory: CTAB/L  Cardiovascular: S1 and S2  Gastrointestinal: BS+, soft, NT/ND  Extremities: No peripheral edema  Neurological: A/O x 3, no focal deficits  Psychiatric: Normal mood, normal affect  : No Johnson  Skin: No rashes  Access: Not applicable      LABS:                          12.8   8.84  )-----------( 248      ( 27 Jul 2021 06:25 )             43.4     Na(131)/K(3.8)/Cl(93)/HCO3(22)/BUN(43)/Cr(10.09)Glu(199)/Ca(8.6)/Mg(--)/PO4(--)    07-27 @ 06:25  Na(129)/K(3.6)/Cl(91)/HCO3(21)/BUN(44)/Cr(10.07)Glu(121)/Ca(8.7)/Mg(2.0)/PO4(5.3)    07-26 @ 06:19  Na(130)/K(3.4)/Cl(92)/HCO3(22)/BUN(38)/Cr(8.73)Glu(82)/Ca(8.4)/Mg(1.9)/PO4(5.4)    07-25 @ 06:20            ASSESSMENT/PLAN  Assessment:  56 M PMH ESRD on PD, DM on insulin, CHF EF 25-30% CAD s/p CABG x4 pw with hypotension with fever. Encephalopathy likely related to cefepime ? no obvious source of sepsis apparent as of now, KLAUDIA: Overall thickened valves seen however, no evidence of valvular vegetation is seen.     Hypotension:  on Midodrine for hemodynamic support:   BP improving  ESRD on PD. Patient TW 65 kg.   Current weight is 61.5kg  Hyponatremia- Na+ improving.  poor po protein intake  ID-Blood cx and PD fluid cx -   off IV antibiotics   Hyperphosphatemia:  remains >goal as of late;  c/ w binders as prescribed   Failure to thrive     RECOMMENDATIONS   - CAPD today and only 2 exchanges  -Gentle IVF today 50cc/hr x 10 hours   - Palliative care follow up  - continue Renvela to 1,600 mg PO TID;   - continue Midodrine  30mg po q 8 hours   -  In order for him to get a peg would need to change to intermittent HD for 2 weeks and then transition back to PD        Modesto Wayne NP-BC  [employer]  [phone]   Seen and examined at bedside.  Much more alert   He has been eating food brought in by  his wife , not so much following his diet  Denies complaints  will c/ w PD  as ordered    acetaminophen   Tablet .. 650 milliGRAM(s) Oral every 6 hours PRN  aspirin enteric coated 81 milliGRAM(s) Oral daily  atorvastatin 80 milliGRAM(s) Oral at bedtime  cadexomer iodine 0.9% Gel 1 Application(s) Topical daily  chlorhexidine 0.12% Liquid 15 milliLiter(s) Oral Mucosa two times a day  chlorhexidine 2% Cloths 1 Application(s) Topical <User Schedule>  dextrose 40% Gel 15 Gram(s) Oral once  dextrose 5%. 1000 milliLiter(s) IV Continuous <Continuous>  dextrose 5%. 1000 milliLiter(s) IV Continuous <Continuous>  dextrose 50% Injectable 25 Gram(s) IV Push once  dextrose 50% Injectable 25 Gram(s) IV Push once  dextrose 50% Injectable 12.5 Gram(s) IV Push once  dronabinol 5 milliGRAM(s) Oral two times a day  ferrous    sulfate 325 milliGRAM(s) Oral three times a day  folic acid 1 milliGRAM(s) Oral daily  glucagon  Injectable 1 milliGRAM(s) IntraMuscular once  heparin   Injectable 5000 Unit(s) SubCutaneous every 12 hours  insulin glargine Injectable (LANTUS) 5 Unit(s) SubCutaneous at bedtime  insulin lispro (ADMELOG) corrective regimen sliding scale   SubCutaneous three times a day before meals  insulin lispro (ADMELOG) corrective regimen sliding scale   SubCutaneous at bedtime  lidocaine   Patch 1 Patch Transdermal daily  LORazepam   Injectable 0.25 milliGRAM(s) IV Push once PRN  midodrine 30 milliGRAM(s) Oral every 8 hours  Nephro-kristi 1 Tablet(s) Oral daily  pantoprazole  Injectable 40 milliGRAM(s) IV Push daily  sevelamer carbonate 1600 milliGRAM(s) Oral three times a day  sodium chloride 0.9%. 1000 milliLiter(s) IV Continuous <Continuous>  thiamine IVPB 500 milliGRAM(s) IV Intermittent daily  ticagrelor 60 milliGRAM(s) Oral every 12 hours  valproate sodium IVPB 125 milliGRAM(s) IV Intermittent every 8 hours PRN      VITAL:  T(C): , Max: 36.6 (07-26-21 @ 22:00)  T(F): , Max: 97.9 (07-26-21 @ 22:00)  HR: 100 (07-27-21 @ 06:00)  BP: 96/62 (07-27-21 @ 06:00)  BP(mean): --  RR: 18 (07-27-21 @ 06:00)  SpO2: 99% (07-27-21 @ 06:00)  Wt(kg): --    07-26-21 @ 07:01  -  07-27-21 @ 07:00  --------------------------------------------------------  IN: 8490 mL / OUT: 5400 mL / NET: 3090 mL        PHYSICAL EXAM:  Constitutional: NAD  Neck:  No JVD  Respiratory: CTAB/L  Cardiovascular: S1 and S2  Gastrointestinal: BS+, soft, NT/ND  Extremities: No peripheral edema  Neurological: A/O x 3, no focal deficits  Psychiatric: Normal mood, normal affect  : No Johnson  Skin: No rashes  Access: PD catheter      LABS:                          12.8   8.84  )-----------( 248      ( 27 Jul 2021 06:25 )             43.4     Na(131)/K(3.8)/Cl(93)/HCO3(22)/BUN(43)/Cr(10.09)Glu(199)/Ca(8.6)/Mg(--)/PO4(--)    07-27 @ 06:25  Na(129)/K(3.6)/Cl(91)/HCO3(21)/BUN(44)/Cr(10.07)Glu(121)/Ca(8.7)/Mg(2.0)/PO4(5.3)    07-26 @ 06:19  Na(130)/K(3.4)/Cl(92)/HCO3(22)/BUN(38)/Cr(8.73)Glu(82)/Ca(8.4)/Mg(1.9)/PO4(5.4)    07-25 @ 06:20            ASSESSMENT/PLAN  Assessment:  56 M PMH ESRD on PD, DM on insulin, CHF EF 25-30% CAD s/p CABG x4 pw with hypotension with fever. Encephalopathy likely related to cefepime ? no obvious source of sepsis apparent as of now, KLAUDIA: Overall thickened valves seen however, no evidence of valvular vegetation is seen.     Hypotension:  on Midodrine for hemodynamic support:   BP improving  ESRD on PD. Patient TW 65 kg.   Current weight is 61.5kg  Hyponatremia- Na+ improving.  poor po protein intake  ID-Blood cx and PD fluid cx -   off IV antibiotics   Hyperphosphatemia:  remains >goal as of late;  c/ w binders as prescribed   Failure to thrive     RECOMMENDATIONS   - CAPD today and only 2 exchanges  -Gentle IVF today 50cc/hr x 10 hours   - Palliative care follow up  - continue Renvela to 1,600 mg PO TID;   - continue Midodrine  30mg po q 8 hours   -  In order for him to get a peg would need to change to intermittent HD for 2 weeks and then transition back to PD        Modesto Wayne NP-BC  [email or web address]  [phone]

## 2021-07-27 NOTE — PROGRESS NOTE ADULT - SUBJECTIVE AND OBJECTIVE BOX
Patient is a 56y old  Male who presents with a chief complaint of Hypotension (27 Jul 2021 13:09)      SUBJECTIVE / OVERNIGHT EVENTS: No new complaints. Sitting in chair.  Review of Systems  chest pain no  palpitations no  sob no  nausea no  headache no    MEDICATIONS  (STANDING):  aspirin enteric coated 81 milliGRAM(s) Oral daily  atorvastatin 80 milliGRAM(s) Oral at bedtime  cadexomer iodine 0.9% Gel 1 Application(s) Topical daily  chlorhexidine 0.12% Liquid 15 milliLiter(s) Oral Mucosa two times a day  chlorhexidine 2% Cloths 1 Application(s) Topical <User Schedule>  dextrose 40% Gel 15 Gram(s) Oral once  dextrose 5%. 1000 milliLiter(s) (50 mL/Hr) IV Continuous <Continuous>  dextrose 5%. 1000 milliLiter(s) (100 mL/Hr) IV Continuous <Continuous>  dextrose 50% Injectable 25 Gram(s) IV Push once  dextrose 50% Injectable 25 Gram(s) IV Push once  dextrose 50% Injectable 12.5 Gram(s) IV Push once  dronabinol 5 milliGRAM(s) Oral two times a day  ferrous    sulfate 325 milliGRAM(s) Oral three times a day  folic acid 1 milliGRAM(s) Oral daily  glucagon  Injectable 1 milliGRAM(s) IntraMuscular once  heparin   Injectable 5000 Unit(s) SubCutaneous every 12 hours  insulin glargine Injectable (LANTUS) 5 Unit(s) SubCutaneous at bedtime  insulin lispro (ADMELOG) corrective regimen sliding scale   SubCutaneous three times a day before meals  insulin lispro (ADMELOG) corrective regimen sliding scale   SubCutaneous at bedtime  lidocaine   Patch 1 Patch Transdermal daily  midodrine 30 milliGRAM(s) Oral every 8 hours  Nephro-kristi 1 Tablet(s) Oral daily  pantoprazole  Injectable 40 milliGRAM(s) IV Push daily  sevelamer carbonate 1600 milliGRAM(s) Oral three times a day  sodium chloride 0.9%. 1000 milliLiter(s) (50 mL/Hr) IV Continuous <Continuous>  thiamine IVPB 500 milliGRAM(s) IV Intermittent daily  ticagrelor 60 milliGRAM(s) Oral every 12 hours    MEDICATIONS  (PRN):  acetaminophen   Tablet .. 650 milliGRAM(s) Oral every 6 hours PRN Moderate Pain (4 - 6)  valproate sodium IVPB 125 milliGRAM(s) IV Intermittent every 8 hours PRN agitation      Vital Signs Last 24 Hrs  T(C): 36.4 (27 Jul 2021 15:30), Max: 36.7 (27 Jul 2021 09:00)  T(F): 97.6 (27 Jul 2021 15:30), Max: 98.1 (27 Jul 2021 09:00)  HR: 99 (27 Jul 2021 15:30) (79 - 100)  BP: 104/76 (27 Jul 2021 15:30) (92/63 - 104/76)  BP(mean): --  RR: 18 (27 Jul 2021 15:30) (18 - 18)  SpO2: 100% (27 Jul 2021 15:30) (95% - 100%)    PHYSICAL EXAM:  GENERAL: NAD, well-developed  HEAD:  Atraumatic, Normocephalic  EYES: EOMI, PERRLA, conjunctiva and sclera clear  NECK: Supple, No JVD  CHEST/LUNG: Clear to auscultation bilaterally; No wheeze  HEART: Regular rate and rhythm; No murmurs, rubs, or gallops  ABDOMEN: Soft, Nontender, Nondistended; Bowel sounds present PD in place.  EXTREMITIES:  2+ Peripheral Pulses, No clubbing, cyanosis, or edema  NEUROLOGY: non-focal. More alert today.  SKIN: No rashes or lesions    LABS:                        12.8   8.84  )-----------( 248      ( 27 Jul 2021 06:25 )             43.4     07-27    131<L>  |  93<L>  |  43<H>  ----------------------------<  199<H>  3.8   |  22  |  10.09<H>    Ca    8.6      27 Jul 2021 06:25  Phos  5.3     07-26  Mg     2.0     07-26    TPro  6.4  /  Alb  2.1<L>  /  TBili  0.8  /  DBili  x   /  AST  21  /  ALT  12  /  AlkPhos  114  07-26                RADIOLOGY & ADDITIONAL TESTS:    Imaging Personally Reviewed:    Consultant(s) Notes Reviewed:      Care Discussed with Consultants/Other Providers:

## 2021-07-28 NOTE — PROGRESS NOTE ADULT - ASSESSMENT
55M PMH ESRD on PD, DM on insulin, CHF EF 25-30% CAD s/p CABG x4, underwent cardiac cath and stent and wire broke in heart sternotomy admitted with cough/sweating found to be hypotensive and febrile in ER- admitted for presumed septic shock; cultures negative thus far    s/p short stay in MICU for pressor support  Cultures negative to date  ESRD on PD  COVID negative  CT A/P - thickening vs underdistension of AC (th clinically benign abdominal exam), no evidence of infectious source, T12 compression fracture  CT Chest- subtle peripheral opacities RUL, pneumoperitoneum related to PD catheter, T12 vertebral fracture    #nausea and hiccups- IMPROVED (per pt has had intermittent issues with hiccuping prior to admission)  qTc prolonged (600); per d/w Renal we cannot give Baclofen (even at PRN dosing). No acute GI pathology on CT A/P. Nausea appears to have improved  ?related to uremia     Significant sedation with use of Ativan  Clinically improved/without hiccups - will monitor  -PD per Renal  -Continue PPI    #Anorexia/Poor PO intake  -encourage preferences (discussed with RN, pt and family)  -Marinol for appetite stimulant (dose increased to 5mg BID 7/26)  -Psych following    #?Dysphagia vs Behavioral component limiting PO intake (is able to eat his favorite chocolate bars without any c/o dysphagia/odynophagia).  SLP input appreciated  suspect symptoms may be psych/behavior related.  -esophagram has not yet been done but clinically does not appear to have overt dysphagia and tolerating PO  -SLP following  -Palliative following; await family meeting  -Renal input noted; if pt/family opting for PEG then would need to convert to HD for 2 weeks prior to PEG and continue HD for 6 weeks thereafter to allow a mature tract to form. This potential option discussed with spouse and pt at bedside today and family/pt do not want to pursue a PEG tube   -continue PO Marinol      Supportive care    Sanjiv Oglesby PA-C    [facility]  [phone]  After hours and weekend coverage [phone]

## 2021-07-28 NOTE — PROGRESS NOTE ADULT - SUBJECTIVE AND OBJECTIVE BOX
CARDIOLOGY FOLLOW UP - Dr. Best  DATE OF SERVICE: 07-28-21     CC no cp/sob  no acute events     REVIEW OF SYSTEMS:   CONSTITUTIONAL: No fever, weight loss, or fatigue   RESPIRATORY:  No cough, wheezing, chills or hemoptysis; No SOB  CARDIOVASCULAR: No chest pain, palpitations, passing out, dizziness, or leg swelling   GASTROINTESTINAL: No abdominal or epigastric pain. No nausea, vomiting, or hematemesis, no diarreha, or constipation, No melena or hematochezia   VASCULAR: no edema.       PHYSICAL EXAM:  T(C): 36.6 (07-28-21 @ 12:24), Max: 36.8 (07-28-21 @ 04:47)  HR: 90 (07-28-21 @ 12:24) (79 - 106)  BP: 101/70 (07-28-21 @ 12:24) (91/61 - 112/78)  RR: 18 (07-28-21 @ 12:24) (18 - 18)  SpO2: 98% (07-28-21 @ 12:24) (94% - 100%)  Wt(kg): --  I&O's Summary    27 Jul 2021 07:01  -  28 Jul 2021 07:00  --------------------------------------------------------  IN: 5240 mL / OUT: 5100 mL / NET: 140 mL    28 Jul 2021 07:01  -  28 Jul 2021 12:34  --------------------------------------------------------  IN: 120 mL / OUT: 0 mL / NET: 120 mL        Appearance: Normal	  Cardiovascular: Normal S1 S2,RRR, No JVD, No murmurs  Respiratory: Lungs clear to auscultation	  Gastrointestinal:  Soft, Non-tender, + BS	  Extremities: Normal range of motion, No clubbing, cyanosis or edema      HOME MEDICATIONS:  aspirin 81 mg oral delayed release tablet: 1 tab(s) orally once a day (29 Dec 2020 18:37)  atorvastatin 80 mg oral tablet: 1 tab(s) orally once a day (at bedtime) (29 Dec 2020 18:37)  epoetin martine: injectable once a month (29 Dec 2020 18:37)  ferrous sulfate 325 mg (65 mg elemental iron) oral tablet: 1 tab(s) orally 3 times a day (29 Dec 2020 18:37)  gabapentin 100 mg oral capsule: 1 cap(s) orally once a day (29 Dec 2020 18:37)  nortriptyline 25 mg oral capsule: 1 cap(s) orally once a day (at bedtime) (29 Dec 2020 18:37)  pantoprazole 40 mg oral delayed release tablet: 1 tab(s) orally once a day (before a meal) (29 Dec 2020 18:37)  Toujeo SoloStar 300 units/mL subcutaneous solution: 60 unit(s) subcutaneous once a day (at bedtime) (08 Jan 2021 12:41)      MEDICATIONS  (STANDING):  aspirin enteric coated 81 milliGRAM(s) Oral daily  atorvastatin 80 milliGRAM(s) Oral at bedtime  cadexomer iodine 0.9% Gel 1 Application(s) Topical daily  chlorhexidine 0.12% Liquid 15 milliLiter(s) Oral Mucosa two times a day  chlorhexidine 2% Cloths 1 Application(s) Topical <User Schedule>  dextrose 40% Gel 15 Gram(s) Oral once  dextrose 5%. 1000 milliLiter(s) (100 mL/Hr) IV Continuous <Continuous>  dextrose 5%. 1000 milliLiter(s) (50 mL/Hr) IV Continuous <Continuous>  dextrose 50% Injectable 25 Gram(s) IV Push once  dextrose 50% Injectable 12.5 Gram(s) IV Push once  dextrose 50% Injectable 25 Gram(s) IV Push once  dronabinol 5 milliGRAM(s) Oral two times a day  ferrous    sulfate 325 milliGRAM(s) Oral three times a day  folic acid 1 milliGRAM(s) Oral daily  glucagon  Injectable 1 milliGRAM(s) IntraMuscular once  heparin   Injectable 5000 Unit(s) SubCutaneous every 12 hours  insulin glargine Injectable (LANTUS) 5 Unit(s) SubCutaneous at bedtime  insulin lispro (ADMELOG) corrective regimen sliding scale   SubCutaneous three times a day before meals  insulin lispro (ADMELOG) corrective regimen sliding scale   SubCutaneous at bedtime  lidocaine   Patch 1 Patch Transdermal daily  midodrine 30 milliGRAM(s) Oral every 8 hours  Nephro-kristi 1 Tablet(s) Oral daily  pantoprazole  Injectable 40 milliGRAM(s) IV Push daily  sevelamer carbonate 1600 milliGRAM(s) Oral three times a day  sodium chloride 0.9%. 1000 milliLiter(s) (50 mL/Hr) IV Continuous <Continuous>  thiamine IVPB 500 milliGRAM(s) IV Intermittent daily  ticagrelor 60 milliGRAM(s) Oral every 12 hours      TELEMETRY: 	    ECG:  	  RADIOLOGY:   DIAGNOSTIC TESTING:  [ ] Echocardiogram:  [ ]  Catheterization:  [ ] Stress Test:    OTHER: 	    LABS:	 	                                12.9   10.06 )-----------( 247      ( 28 Jul 2021 07:04 )             43.0     07-28    131<L>  |  91<L>  |  42<H>  ----------------------------<  179<H>  3.8   |  21<L>  |  9.95<H>    Ca    8.9      28 Jul 2021 07:04

## 2021-07-28 NOTE — PROGRESS NOTE ADULT - SUBJECTIVE AND OBJECTIVE BOX
[facility]      Seen earlier today, and examined.  - Today, patient is without complaints.           *****MEDICATIONS: Current medication reviewed and documented.    MEDICATIONS  (STANDING):  aspirin enteric coated 81 milliGRAM(s) Oral daily  atorvastatin 80 milliGRAM(s) Oral at bedtime  cadexomer iodine 0.9% Gel 1 Application(s) Topical daily  chlorhexidine 0.12% Liquid 15 milliLiter(s) Oral Mucosa two times a day  chlorhexidine 2% Cloths 1 Application(s) Topical <User Schedule>  dextrose 40% Gel 15 Gram(s) Oral once  dextrose 5%. 1000 milliLiter(s) (50 mL/Hr) IV Continuous <Continuous>  dextrose 5%. 1000 milliLiter(s) (100 mL/Hr) IV Continuous <Continuous>  dextrose 50% Injectable 25 Gram(s) IV Push once  dextrose 50% Injectable 12.5 Gram(s) IV Push once  dextrose 50% Injectable 25 Gram(s) IV Push once  dronabinol 5 milliGRAM(s) Oral two times a day  ferrous    sulfate 325 milliGRAM(s) Oral three times a day  folic acid 1 milliGRAM(s) Oral daily  gentamicin 0.1% Ointment 1 Application(s) Topical <User Schedule>  glucagon  Injectable 1 milliGRAM(s) IntraMuscular once  heparin   Injectable 5000 Unit(s) SubCutaneous every 12 hours  insulin glargine Injectable (LANTUS) 5 Unit(s) SubCutaneous at bedtime  insulin lispro (ADMELOG) corrective regimen sliding scale   SubCutaneous three times a day before meals  insulin lispro (ADMELOG) corrective regimen sliding scale   SubCutaneous at bedtime  lidocaine   Patch 1 Patch Transdermal daily  melatonin 5 milliGRAM(s) Oral at bedtime  midodrine 30 milliGRAM(s) Oral every 8 hours  Nephro-kristi 1 Tablet(s) Oral daily  pantoprazole  Injectable 40 milliGRAM(s) IV Push daily  sevelamer carbonate 1600 milliGRAM(s) Oral three times a day  sodium chloride 0.9%. 1000 milliLiter(s) (50 mL/Hr) IV Continuous <Continuous>  thiamine IVPB 500 milliGRAM(s) IV Intermittent daily  ticagrelor 60 milliGRAM(s) Oral every 12 hours    MEDICATIONS  (PRN):  acetaminophen   Tablet .. 650 milliGRAM(s) Oral every 6 hours PRN Moderate Pain (4 - 6)  valproate sodium IVPB 125 milliGRAM(s) IV Intermittent every 8 hours PRN agitation          ***** VITAL SIGNS:  T(F): 98.6 (21 @ 20:15), Max: 98.6 (21 @ 20:15)  HR: 101 (21 @ 20:15) (90 - 101)  BP: 109/76 (21 @ 20:15) (91/61 - 109/76)  RR: 18 (21 @ 20:15) (18 - 18)  SpO2: 97% (21 @ 20:15) (97% - 98%)  Wt(kg): --  ,   I&O's Summary    2021 07:  -  2021 07:00  --------------------------------------------------------  IN: 5240 mL / OUT: 5100 mL / NET: 140 mL    2021 07:  -  2021 22:16  --------------------------------------------------------  IN: 520 mL / OUT: 0 mL / NET: 520 mL             *****PHYSICAL EXAM:  responds after much coercion      alert oriented x 2 ( does not know name of hospital think he is at [facility]) does not know the date   delyaed responses    attention poor  comprehension  fair can follow 1 step commands   does answer some questions intermittently   EOmi fundi not visualized   no nystagmus VFF to confrontation  Tongue is midline  Palate elevates symmetrically   Moving both upper ext minimally  LE with limited mvt   does wiggles toes b/l        *****LAB AND IMAGIN.9   10.06 )-----------( 247      ( 2021 07:04 )             43.0                   131<L>  |  91<L>  |  42<H>  ----------------------------<  179<H>  3.8   |  21<L>  |  9.95<H>    Ca    8.9      2021 07:04                           [All pertinent recent Imaging/Reports reviewed]           *****A S S E S S M E N T   A N D   P L A N :    55M PMH ESRD on PD, DM on insulin, CHF EF 25-30% CAD s/p CABG x4,  Patient states that for the last 2 days PTA he has been having increased cough and sweating. He endorsed chills but no measured fevers at home. He denies any sick contacts or recent travel. Patient states that he fell few days ago with no head trauma. Of note patient has right toe gangrenous lesion that has been present for several months, He denies any trauma or pain at the site.       In the ED, Patient was found to be hypotensive to 70s. Patient was given midodrine 10mg x1 and bedside POCUS was completed showing poor cardiac function with diffuse hypokinesis. Patient was unable to maintain adequate SBPs  so required micu admission and pressor support.   Called to eval ams       Problem/Recommendations 1:  ams likely multifactorial metabolic encephalopathy started immediately after cefepime, also other factors include poor po intake, sleep wake cycle disruption hyponatremia   correct metabolic derangements   nephrovite added   thiamine 500 iv q h       limit sedatives   7/15 mental status with improvement    seemed lethargic  gagging     mental status improved    mental status remains stable, still with processing delay     Problem/Recommendations 2:  ecchymosis of the finger tips   r/o emboli   derm eval     eleonora no evidence for endocarditis   poor po intake         more alert and responsive    alert responsive    more alert sitting up in bed   as per wife, pt ate a meal that she brought in      lethargic pt reports poor sleep intake   melatonin 5mg for sleep augmentation     Problem/Recommendations 3: poor nutritional intake severe protein caloric malnutrition   encourage po intake   marinol as per gi     nutrition consult ? nephrovite for supplementation   thiamine daily       oob to chair to mobilize      Thank you for allowing me to participate in the care of this patient. Will continue to follow patient periodically. Please do not hesitate to call me if you have any  questions or if there has been a change in patients neurological status     ________________  Grisel Lainez MD  [facility]  [phone]      33 minutes spent on total encounter; more than 50 % of the visit was  spent counseling about plan of care, compliance to diet/exercise and medication regimen and or  coordinating care by the attending physician.      It is advised that stroke patients follow up with CARLYLE Pena @ [phone] in 1- 2 weeks.   Others please follow up with Dr. Michael Nissenbaum [phone]

## 2021-07-28 NOTE — PROGRESS NOTE ADULT - SUBJECTIVE AND OBJECTIVE BOX
Patient is a 56y old  Male who presents with a chief complaint of Hypotension (28 Jul 2021 15:49)      SUBJECTIVE / OVERNIGHT EVENTS: Comfortable without new complaints. Wife at bedside.  Review of Systems  chest pain no  palpitations no  sob no  nausea no  headache no    MEDICATIONS  (STANDING):  aspirin enteric coated 81 milliGRAM(s) Oral daily  atorvastatin 80 milliGRAM(s) Oral at bedtime  cadexomer iodine 0.9% Gel 1 Application(s) Topical daily  chlorhexidine 0.12% Liquid 15 milliLiter(s) Oral Mucosa two times a day  chlorhexidine 2% Cloths 1 Application(s) Topical <User Schedule>  dextrose 40% Gel 15 Gram(s) Oral once  dextrose 5%. 1000 milliLiter(s) (50 mL/Hr) IV Continuous <Continuous>  dextrose 5%. 1000 milliLiter(s) (100 mL/Hr) IV Continuous <Continuous>  dextrose 50% Injectable 25 Gram(s) IV Push once  dextrose 50% Injectable 12.5 Gram(s) IV Push once  dextrose 50% Injectable 25 Gram(s) IV Push once  dronabinol 5 milliGRAM(s) Oral two times a day  ferrous    sulfate 325 milliGRAM(s) Oral three times a day  folic acid 1 milliGRAM(s) Oral daily  gentamicin 0.1% Ointment 1 Application(s) Topical <User Schedule>  glucagon  Injectable 1 milliGRAM(s) IntraMuscular once  heparin   Injectable 5000 Unit(s) SubCutaneous every 12 hours  insulin glargine Injectable (LANTUS) 5 Unit(s) SubCutaneous at bedtime  insulin lispro (ADMELOG) corrective regimen sliding scale   SubCutaneous three times a day before meals  insulin lispro (ADMELOG) corrective regimen sliding scale   SubCutaneous at bedtime  lidocaine   Patch 1 Patch Transdermal daily  midodrine 30 milliGRAM(s) Oral every 8 hours  Nephro-kristi 1 Tablet(s) Oral daily  pantoprazole  Injectable 40 milliGRAM(s) IV Push daily  sevelamer carbonate 1600 milliGRAM(s) Oral three times a day  sodium chloride 0.9%. 1000 milliLiter(s) (50 mL/Hr) IV Continuous <Continuous>  thiamine IVPB 500 milliGRAM(s) IV Intermittent daily  ticagrelor 60 milliGRAM(s) Oral every 12 hours    MEDICATIONS  (PRN):  acetaminophen   Tablet .. 650 milliGRAM(s) Oral every 6 hours PRN Moderate Pain (4 - 6)  valproate sodium IVPB 125 milliGRAM(s) IV Intermittent every 8 hours PRN agitation      Vital Signs Last 24 Hrs  T(C): 37 (28 Jul 2021 20:15), Max: 37 (28 Jul 2021 20:15)  T(F): 98.6 (28 Jul 2021 20:15), Max: 98.6 (28 Jul 2021 20:15)  HR: 101 (28 Jul 2021 20:15) (90 - 101)  BP: 109/76 (28 Jul 2021 20:15) (91/61 - 109/76)  BP(mean): --  RR: 18 (28 Jul 2021 20:15) (18 - 18)  SpO2: 97% (28 Jul 2021 20:15) (97% - 98%)    PHYSICAL EXAM:  GENERAL: NAD  HEAD:  Atraumatic, Normocephalic  EYES: EOMI, PERRLA, conjunctiva and sclera clear  NECK: Supple, No JVD  CHEST/LUNG: Clear to auscultation bilaterally; No wheeze  HEART: Regular rate and rhythm; No murmurs, rubs, or gallops  ABDOMEN: Soft, Nontender, Nondistended; Bowel sounds present PD catheter  EXTREMITIES:  2+ Peripheral Pulses, No clubbing, cyanosis, or edema  PSYCH: less confused  NEUROLOGY: non-focal  SKIN: No rashes or lesions    LABS:                        12.9   10.06 )-----------( 247      ( 28 Jul 2021 07:04 )             43.0     07-28    131<L>  |  91<L>  |  42<H>  ----------------------------<  179<H>  3.8   |  21<L>  |  9.95<H>    Ca    8.9      28 Jul 2021 07:04                  RADIOLOGY & ADDITIONAL TESTS:    Imaging Personally Reviewed:    Consultant(s) Notes Reviewed:      Care Discussed with Consultants/Other Providers:

## 2021-07-28 NOTE — PROGRESS NOTE ADULT - ASSESSMENT
CATH 11/30/2020:  COMPLICATIONS: The stent was deployed without difficulty. On removal of the  balloon, the shaft broke and the tip of the balloon remained in the vessel. Several attempts were made to snare the balloon unsuccessfully. Dr. Verdugo was notifed who will take the patient to the operating room.  DIAGNOSTIC RECOMMENDATIONS: The patient should continue with the present medications. The patients vessel was stented without difficulty On removal of the balloon, the shaft broke with the baloon stuck in the left main. All attempts to snare the balloon out failed and the patient was taken to the OR by Dr. Arango to remove the balloon  introp eleonora 11/30/20: mild to mod MR, < from: Intra-Operative Transesophageal Echo (11.30.20 @ 17:02) >  Severe global left ventricular systolic dysfunction. Inferior and inferolateral akinesis.  severe hypokinesis of other segments.  Severe global hypokinesia.  Normal left ventricular internal dimensions and wall thicknesses. Moderate diastolic dysfunction (Stage II)  echo 12/17/20: EF 32%, mod MR, Severe global left ventricular systolic dysfunction, moderate pulmonary pressures  echo 12/20/20: EF (Visual Estimate):  25-30 % mild MR, Akinesis of the inferolateral, anterolateral, apical laterlal, inferior, and inferoseptal walls. Severe left ventricular enlargement. No left ventricular thrombus is seen.  Echo 7/7/21: EF 16%, mod - sev MR, mod AS, severe global lv sys dysfx, severe diastolic dysfx, mod pulm htn       a/p  56 M well known to practice with PMH ESRD on PD, DM on insulin, CHF EF 25-30% CAD s/p CABG x4, underwent cardiac cath and stent and wire broke in heart s/p sternotomy p/w septic shock.    #Septic Shock, resolved   -Bcx NGTD 7/10   -s/p iv abx, pressors   -Bedside POCUS notable for biventricular diffuse hypokinesis  -right 2nd toe gangrene, pod f/u, vascular f/u, PONCHO/PVR   -ID f/u   -ELEONORA: no evidence of valvular vegetation is seen.  -med f/u    #Ischemic Cardiomyopathy. Chronic systolic HF  -ivf per renal   -monitor closely for CHF  -Repeat echo noted with EF 16%, mod - sev MR, mod AS, severe global lv sys dysfx, severe diastolic dysfx, mod pulm htn   -pt declining ICD   -no bb, acei/arb given hx of orthostatic bp   -continue midodrine for bp support   -cont vol removal w PD     # CAD, s/p CABG, s/p PCI, s/p redo open heart for stent balloon retrieval   -hst elevated, likely demand ischemia in setting of septic shock, ESRD   -c/w asa, Brilinta, statin     # ESRD  -on PD  -renal f/u    #AMS  -improving   -neuro psych f/u    #GOC  -palliative f/u     dvt ppx

## 2021-07-28 NOTE — PROGRESS NOTE ADULT - ASSESSMENT
56 M PMH ESRD on PD, DM on insulin, CHF EF 25-30% CAD s/p CABG x4 pw with hypotension with fever. Encephalopathy likely related to cefepime ? no obvious source of sepsis apparent as of now, KLAUDIA: Overall thickened valves seen however, no evidence of valvular vegetation is seen.     Hypotension:  on Midodrine for hemodynamic support:   BP improving  ESRD on PD. Patient TW  61.5kg  Hyponatremia- Na+ improving.  poor po protein intake  ID-Blood cx and PD fluid cx -   off IV antibiotics   Hyperphosphatemia:  remains >goal as of late;  c/ w binders as prescribed   Failure to thrive     RECOMMENDATIONS   - CAPD today and only 3 exchanges  - Palliative care follow up  - continue Renvela to 1,600 mg PO TID;   - continue Midodrine  30mg po q 8 hours   -  In order for him to get a peg would need to change to intermittent HD for 2 weeks and then transition back to PD      [facility]   [phone]

## 2021-07-28 NOTE — PROGRESS NOTE ADULT - SUBJECTIVE AND OBJECTIVE BOX
Patient is a 56y old  Male who presented with a chief complaint of Hypotension (28 Jul 2021 12:34)      INTERVAL HPI/OVERNIGHT EVENTS:  no nausea or vomiting  no hiccups    eating food brought in by family - appetite and PO intake slowly improving per spouse (at bedside)  denies dysphagia or odynophagia   eating PO [employer], cream filled wafer cookies and macadamia nuts (brought in by family)  Ate rice noodles with thomas sauce today  no abdominal pain  no diarrhea    MEDICATIONS  (STANDING):  aspirin enteric coated 81 milliGRAM(s) Oral daily  atorvastatin 80 milliGRAM(s) Oral at bedtime  cadexomer iodine 0.9% Gel 1 Application(s) Topical daily  chlorhexidine 0.12% Liquid 15 milliLiter(s) Oral Mucosa two times a day  chlorhexidine 2% Cloths 1 Application(s) Topical <User Schedule>  dextrose 40% Gel 15 Gram(s) Oral once  dextrose 5%. 1000 milliLiter(s) (100 mL/Hr) IV Continuous <Continuous>  dextrose 5%. 1000 milliLiter(s) (50 mL/Hr) IV Continuous <Continuous>  dextrose 50% Injectable 25 Gram(s) IV Push once  dextrose 50% Injectable 12.5 Gram(s) IV Push once  dextrose 50% Injectable 25 Gram(s) IV Push once  dronabinol 5 milliGRAM(s) Oral two times a day  ferrous    sulfate 325 milliGRAM(s) Oral three times a day  folic acid 1 milliGRAM(s) Oral daily  gentamicin 0.1% Ointment 1 Application(s) Topical <User Schedule>  glucagon  Injectable 1 milliGRAM(s) IntraMuscular once  heparin   Injectable 5000 Unit(s) SubCutaneous every 12 hours  insulin glargine Injectable (LANTUS) 5 Unit(s) SubCutaneous at bedtime  insulin lispro (ADMELOG) corrective regimen sliding scale   SubCutaneous three times a day before meals  insulin lispro (ADMELOG) corrective regimen sliding scale   SubCutaneous at bedtime  lidocaine   Patch 1 Patch Transdermal daily  midodrine 30 milliGRAM(s) Oral every 8 hours  Nephro-kristi 1 Tablet(s) Oral daily  pantoprazole  Injectable 40 milliGRAM(s) IV Push daily  sevelamer carbonate 1600 milliGRAM(s) Oral three times a day  sodium chloride 0.9%. 1000 milliLiter(s) (50 mL/Hr) IV Continuous <Continuous>  thiamine IVPB 500 milliGRAM(s) IV Intermittent daily  ticagrelor 60 milliGRAM(s) Oral every 12 hours    MEDICATIONS  (PRN):  acetaminophen   Tablet .. 650 milliGRAM(s) Oral every 6 hours PRN Moderate Pain (4 - 6)  valproate sodium IVPB 125 milliGRAM(s) IV Intermittent every 8 hours PRN agitation      Allergies  doxazosin (Other)      Review of Systems:    General:  No wt loss , chills, night sweats  CV:  No pain, palpitatioins,  +CAD  pt/family refusing AICD +ICM  Resp:  No dyspnea, cough, tachypnea, wheezing  GI:  see HPI  :  ESRD on PD  Muscle:  No pain, weakness  Neuro:  No weakness, tingling, memory problems  Psych:  No fatigue, insomnia, mood problems, depression  Endocrine:  No polyuria, polydypsia, cold/heat intolerance  Heme:  No petechiae, ecchymosis, easy bruisability  Skin:  No rash, edema +gangrene +wounds    Vital Signs Last 24 Hrs  T(C): 36.6 (28 Jul 2021 12:24), Max: 36.8 (28 Jul 2021 04:47)  T(F): 97.8 (28 Jul 2021 12:24), Max: 98.2 (28 Jul 2021 04:47)  HR: 90 (28 Jul 2021 12:24) (90 - 106)  BP: 101/70 (28 Jul 2021 12:24) (91/61 - 112/78)  BP(mean): --  RR: 18 (28 Jul 2021 12:24) (18 - 18)  SpO2: 98% (28 Jul 2021 12:24) (94% - 100%)    PHYSICAL EXAM:  Constitutional: NAD, well-developed chronically ill appearing male. sitting up in bed verbalizing/responsive with feet dangling at side of bed. spouse at bedside  Neck: No LAD, supple no JVD  Respiratory: grossly clear   +WH sternal scar  +WH scar right chest wall  Cardiovascular: S1 and S2, RRR, +murmur  Gastrointestinal: BS+, soft, NT/ND, no rebound guarding or rigidity  +dressing over PD site - clean and dry, non tender near catheter site. no scrotal edema/swelling  Extremities: No peripheral edema,  Right toe gangrene (dry);   Left foot wound dry;  thumb +dry gangrene  Right thumb (under fingernail and fingertip) ?gangrene  +lesions at tips of fingers, no purulence or bleeding   Vascular: 2+ peripheral pulses  Neurological: no focal asymmetry  Psychiatric: responsive and verbalizing  Skin: anicteric        LABS:                        12.9   10.06 )-----------( 247      ( 28 Jul 2021 07:04 )             43.0     07-28    131<L>  |  91<L>  |  42<H>  ----------------------------<  179<H>  3.8   |  21<L>  |  9.95<H>    Ca    8.9      28 Jul 2021 07:04          LIVER FUNCTIONS - ( 26 Jul 2021 06:19 )  Alb: 2.1 g/dL / Pro: 6.4 g/dL / ALK PHOS: 114 U/L / ALT: 12 U/L / AST: 21 U/L / GGT: x             RADIOLOGY & ADDITIONAL TESTS:

## 2021-07-28 NOTE — PROGRESS NOTE ADULT - SUBJECTIVE AND OBJECTIVE BOX
NEPHROLOGY[facility] [phone]        Patient seen and examined in bed.  He was sleepy this am         MEDICATIONS  (STANDING):  aspirin enteric coated 81 milliGRAM(s) Oral daily  atorvastatin 80 milliGRAM(s) Oral at bedtime  cadexomer iodine 0.9% Gel 1 Application(s) Topical daily  chlorhexidine 0.12% Liquid 15 milliLiter(s) Oral Mucosa two times a day  chlorhexidine 2% Cloths 1 Application(s) Topical <User Schedule>  dextrose 40% Gel 15 Gram(s) Oral once  dextrose 5%. 1000 milliLiter(s) (50 mL/Hr) IV Continuous <Continuous>  dextrose 5%. 1000 milliLiter(s) (100 mL/Hr) IV Continuous <Continuous>  dextrose 50% Injectable 25 Gram(s) IV Push once  dextrose 50% Injectable 12.5 Gram(s) IV Push once  dextrose 50% Injectable 25 Gram(s) IV Push once  dronabinol 5 milliGRAM(s) Oral two times a day  ferrous    sulfate 325 milliGRAM(s) Oral three times a day  folic acid 1 milliGRAM(s) Oral daily  glucagon  Injectable 1 milliGRAM(s) IntraMuscular once  heparin   Injectable 5000 Unit(s) SubCutaneous every 12 hours  insulin glargine Injectable (LANTUS) 5 Unit(s) SubCutaneous at bedtime  insulin lispro (ADMELOG) corrective regimen sliding scale   SubCutaneous three times a day before meals  insulin lispro (ADMELOG) corrective regimen sliding scale   SubCutaneous at bedtime  lidocaine   Patch 1 Patch Transdermal daily  midodrine 30 milliGRAM(s) Oral every 8 hours  Nephro-kristi 1 Tablet(s) Oral daily  pantoprazole  Injectable 40 milliGRAM(s) IV Push daily  sevelamer carbonate 1600 milliGRAM(s) Oral three times a day  sodium chloride 0.9%. 1000 milliLiter(s) (50 mL/Hr) IV Continuous <Continuous>  thiamine IVPB 500 milliGRAM(s) IV Intermittent daily  ticagrelor 60 milliGRAM(s) Oral every 12 hours      VITAL:  T(C): , Max: 36.8 (07-28-21 @ 04:47)  T(F): , Max: 98.2 (07-28-21 @ 04:47)  HR: 98 (07-28-21 @ 04:47)  BP: 91/61 (07-28-21 @ 04:47)  BP(mean): --  RR: 18 (07-28-21 @ 04:47)  SpO2: 97% (07-28-21 @ 04:47)  Wt(kg): --    I and O's:    07-27 @ 07:01  -  07-28 @ 07:00  --------------------------------------------------------  IN: 5240 mL / OUT: 5100 mL / NET: 140 mL          PHYSICAL EXAM:    Constitutional: NAD  Neck:  No JVD  Respiratory: CTAB/L  Cardiovascular: S1 and S2  Gastrointestinal: BS+, soft, NT/ND + PD cath  Extremities: No peripheral edema  Neurological: A/O x 3, no focal deficits  Psychiatric: Normal mood, normal affect  : No Johnson  Skin: No rashes  Access: Not applicable    LABS:                        12.9   10.06 )-----------( 247      ( 28 Jul 2021 07:04 )             43.0     07-28    131<L>  |  91<L>  |  42<H>  ----------------------------<  179<H>  3.8   |  21<L>  |  9.95<H>    Ca    8.9      28 Jul 2021 07:04            Urine Studies:          RADIOLOGY & ADDITIONAL STUDIES:

## 2021-07-28 NOTE — PROGRESS NOTE ADULT - ASSESSMENT
56 m with    Sepsis resolved  - off antibiotic   - toe gangrene  - Podiatry follow  - ID follow   - KLAUDIA noted     COPD  - 2L NC overnight because he becomes apneic, sats well  - Sats well on RA while awake    CAD s/p CABG   - Hypotension likely in the setting of septic shock   - midodrine dose increased to 30 mg TID to match home dose  - Previous echo with reduced EF  - c/w DAPT  - Cardiology follow    CHF cr systolic  - cardiology follow  - patient and family refuse AICD    Peritoneal Dialysis   - nephrology follow PD, recommend 4x a day  - patient on midodrine 30 q8h at home, continue here  - Holding home metoprolol 25 q12hr in setting of hypotension    Diabetes   - HA1C 6.1 on 7/6 suggesting prediabetes  - Tujeo 60 units at bedtime and Victoza at home, started on 30 units at bedtime, adjust as needed based on patient PO intake  - added 2 units insulin pre-meal    Hiccups  - GI follow  - hold Ativan 2 to sedation    Incontinence dermatitis  - wound care    Toe gangrene  - Podiatry follow  - Vascular follow  - PONCHO/PVR noted  - Angiogram if agrees    Finger gangrenous area  - local care    Vertebral fracture T12  - Ortho eval noted   - MRI spine noted  - No intervention    Confusion improving   - possible delirium  - Psych follow  - Neurology follow  - EEG noted    Dysphagia  - GI follow  - MBS  - ENT follow    functional quadriplegia   - supportive care     Palliative follow re GOC     DCP in progress    d/w patient and wife at length.     Pipe Beck MD pager [phone]

## 2021-07-29 NOTE — PROGRESS NOTE ADULT - SUBJECTIVE AND OBJECTIVE BOX
NEPHROLOGY-[facility] [phone]        Patient seen and examined in bed.  He seemed more alert.  Still not eating great         MEDICATIONS  (STANDING):  aspirin enteric coated 81 milliGRAM(s) Oral daily  atorvastatin 80 milliGRAM(s) Oral at bedtime  cadexomer iodine 0.9% Gel 1 Application(s) Topical daily  chlorhexidine 0.12% Liquid 15 milliLiter(s) Oral Mucosa two times a day  chlorhexidine 2% Cloths 1 Application(s) Topical <User Schedule>  dextrose 40% Gel 15 Gram(s) Oral once  dextrose 5%. 1000 milliLiter(s) (100 mL/Hr) IV Continuous <Continuous>  dextrose 5%. 1000 milliLiter(s) (50 mL/Hr) IV Continuous <Continuous>  dextrose 50% Injectable 25 Gram(s) IV Push once  dextrose 50% Injectable 12.5 Gram(s) IV Push once  dextrose 50% Injectable 25 Gram(s) IV Push once  dronabinol 5 milliGRAM(s) Oral two times a day  ferrous    sulfate 325 milliGRAM(s) Oral three times a day  folic acid 1 milliGRAM(s) Oral daily  gentamicin 0.1% Ointment 1 Application(s) Topical <User Schedule>  glucagon  Injectable 1 milliGRAM(s) IntraMuscular once  heparin   Injectable 5000 Unit(s) SubCutaneous every 12 hours  insulin glargine Injectable (LANTUS) 5 Unit(s) SubCutaneous at bedtime  insulin lispro (ADMELOG) corrective regimen sliding scale   SubCutaneous at bedtime  insulin lispro (ADMELOG) corrective regimen sliding scale   SubCutaneous three times a day before meals  lidocaine   Patch 1 Patch Transdermal daily  melatonin 5 milliGRAM(s) Oral at bedtime  midodrine 30 milliGRAM(s) Oral every 8 hours  Nephro-kristi 1 Tablet(s) Oral daily  pantoprazole  Injectable 40 milliGRAM(s) IV Push daily  sevelamer carbonate 1600 milliGRAM(s) Oral three times a day  sodium chloride 0.9%. 1000 milliLiter(s) (50 mL/Hr) IV Continuous <Continuous>  thiamine IVPB 500 milliGRAM(s) IV Intermittent daily  ticagrelor 60 milliGRAM(s) Oral every 12 hours      VITAL:  T(C): , Max: 37 (07-28-21 @ 20:15)  T(F): , Max: 98.6 (07-28-21 @ 20:15)  HR: 95 (07-29-21 @ 05:19)  BP: 106/77 (07-29-21 @ 05:19)  BP(mean): --  RR: 18 (07-29-21 @ 05:19)  SpO2: 97% (07-29-21 @ 05:19)  Wt(kg): --    I and O's:    07-28 @ 07:01  -  07-29 @ 07:00  --------------------------------------------------------  IN: 3240 mL / OUT: 5200 mL / NET: -1960 mL          PHYSICAL EXAM:    Constitutional: NAD  Neck:  No JVD  Respiratory: CTAB/L  Cardiovascular: S1 and S2  Gastrointestinal: BS+, soft, NT/ND  Extremities: No peripheral edema  Neurological: A/O x 3, no focal deficits  Psychiatric: Normal mood, normal affect  : No Johnson  Skin: No rashes  Access: Not applicable    LABS:                        13.8   8.96  )-----------( 250      ( 29 Jul 2021 06:10 )             47.3     07-29    135  |  94<L>  |  41<H>  ----------------------------<  107<H>  3.6   |  23  |  10.56<H>    Ca    8.7      29 Jul 2021 06:10  Phos  4.6     07-29  Mg     2.1     07-29    TPro  6.6  /  Alb  2.3<L>  /  TBili  0.8  /  DBili  x   /  AST  21  /  ALT  12  /  AlkPhos  131<H>  07-29          Urine Studies:          RADIOLOGY & ADDITIONAL STUDIES:

## 2021-07-29 NOTE — PROGRESS NOTE ADULT - SUBJECTIVE AND OBJECTIVE BOX
CARDIOLOGY FOLLOW UP - Dr. Best  DATE OF SERVICE: 7/29/21      no acute events ,      REVIEW OF SYSTEMS:  CONSTITUTIONAL: No fever, weight loss, or fatigue  RESPIRATORY: No cough, wheezing, chills or hemoptysis; No Shortness of Breath  CARDIOVASCULAR: No chest pain, palpitations, passing out, dizziness, or leg swelling  GASTROINTESTINAL: No abdominal or epigastric pain. No nausea, vomiting, or hematemesis; No diarrhea or constipation. No melena or hematochezia.  VASCULAR: No edema     PHYSICAL EXAM:  T(C): 36.5 (07-29-21 @ 12:05), Max: 37 (07-28-21 @ 20:15)  HR: 89 (07-29-21 @ 12:05) (86 - 101)  BP: 96/69 (07-29-21 @ 12:05) (91/58 - 109/76)  RR: 18 (07-29-21 @ 12:05) (18 - 18)  SpO2: 98% (07-29-21 @ 12:05) (97% - 98%)  Wt(kg): --  I&O's Summary    28 Jul 2021 07:01  -  29 Jul 2021 07:00  --------------------------------------------------------  IN: 3240 mL / OUT: 5200 mL / NET: -1960 mL        Appearance: Normal	  Cardiovascular: Normal S1 S2,RRR, No JVD, No murmurs  Respiratory: Lungs clear to auscultation	  Gastrointestinal:  Soft, Non-tender, + BS	  Extremities: Normal range of motion, No clubbing, cyanosis or edema      Home Medications:  aspirin 81 mg oral delayed release tablet: 1 tab(s) orally once a day (29 Dec 2020 18:37)  atorvastatin 80 mg oral tablet: 1 tab(s) orally once a day (at bedtime) (29 Dec 2020 18:37)  epoetin martine: injectable once a month (29 Dec 2020 18:37)  ferrous sulfate 325 mg (65 mg elemental iron) oral tablet: 1 tab(s) orally 3 times a day (29 Dec 2020 18:37)  gabapentin 100 mg oral capsule: 1 cap(s) orally once a day (29 Dec 2020 18:37)  nortriptyline 25 mg oral capsule: 1 cap(s) orally once a day (at bedtime) (29 Dec 2020 18:37)  pantoprazole 40 mg oral delayed release tablet: 1 tab(s) orally once a day (before a meal) (29 Dec 2020 18:37)  Toujeo SoloStar 300 units/mL subcutaneous solution: 60 unit(s) subcutaneous once a day (at bedtime) (08 Jan 2021 12:41)      MEDICATIONS  (STANDING):  aspirin enteric coated 81 milliGRAM(s) Oral daily  atorvastatin 80 milliGRAM(s) Oral at bedtime  cadexomer iodine 0.9% Gel 1 Application(s) Topical daily  chlorhexidine 0.12% Liquid 15 milliLiter(s) Oral Mucosa two times a day  chlorhexidine 2% Cloths 1 Application(s) Topical <User Schedule>  dextrose 40% Gel 15 Gram(s) Oral once  dextrose 5%. 1000 milliLiter(s) (50 mL/Hr) IV Continuous <Continuous>  dextrose 5%. 1000 milliLiter(s) (100 mL/Hr) IV Continuous <Continuous>  dextrose 50% Injectable 25 Gram(s) IV Push once  dextrose 50% Injectable 12.5 Gram(s) IV Push once  dextrose 50% Injectable 25 Gram(s) IV Push once  dronabinol 5 milliGRAM(s) Oral two times a day  ferrous    sulfate 325 milliGRAM(s) Oral three times a day  folic acid 1 milliGRAM(s) Oral daily  gentamicin 0.1% Ointment 1 Application(s) Topical <User Schedule>  glucagon  Injectable 1 milliGRAM(s) IntraMuscular once  heparin   Injectable 5000 Unit(s) SubCutaneous every 12 hours  insulin glargine Injectable (LANTUS) 5 Unit(s) SubCutaneous at bedtime  insulin lispro (ADMELOG) corrective regimen sliding scale   SubCutaneous three times a day before meals  insulin lispro (ADMELOG) corrective regimen sliding scale   SubCutaneous at bedtime  lidocaine   4% Patch 1 Patch Transdermal daily  melatonin 5 milliGRAM(s) Oral at bedtime  midodrine 30 milliGRAM(s) Oral every 8 hours  Nephro-kristi 1 Tablet(s) Oral daily  pantoprazole  Injectable 40 milliGRAM(s) IV Push daily  sevelamer carbonate 1600 milliGRAM(s) Oral three times a day  sodium chloride 0.9%. 1000 milliLiter(s) (50 mL/Hr) IV Continuous <Continuous>  thiamine IVPB 500 milliGRAM(s) IV Intermittent daily  ticagrelor 60 milliGRAM(s) Oral every 12 hours      TELEMETRY: 	    ECG:  	  RADIOLOGY:   DIAGNOSTIC TESTING:  [ ] Echocardiogram:  [ ]  Catheterization:  [ ] Stress Test:    OTHER: 	    LABS:	 	                            13.8   8.96  )-----------( 250      ( 29 Jul 2021 06:10 )             47.3     07-29    135  |  94<L>  |  41<H>  ----------------------------<  107<H>  3.6   |  23  |  10.56<H>    Ca    8.7      29 Jul 2021 06:10  Phos  4.6     07-29  Mg     2.1     07-29    TPro  6.6  /  Alb  2.3<L>  /  TBili  0.8  /  DBili  x   /  AST  21  /  ALT  12  /  AlkPhos  131<H>  07-29

## 2021-07-29 NOTE — PROGRESS NOTE ADULT - CONVERSATION DETAILS
d/w the patient's wife and daughter that gave verbalized understanding about the patient medical issues (ESRD on PD, Advanced heart failure, poor oral intake) and Rx options. When discussing about the patient getting an AICD, his wife indicated that after talking to him, the patient was not against it and that she actually wanted to d/w Cardio or EP in order to better understand the risks and benefits of this device. When talking about a PEG, his wife indicated the patient's issues was not dysphagia but poor appetite. However, that while in the hospital, the patient's appetite had improved and that it was not of a concern at this time.   d/w Dr. Beck about calling EP or Cardio to f/u with the patient and with his wife.    16' were spent in ACP.

## 2021-07-29 NOTE — PROGRESS NOTE ADULT - PROBLEM SELECTOR PLAN 6
Will continue to f/u for ACP.         Garrick Madera MD   Geriatrics and Palliative Care (GAP) Consult Service    of Geriatric and Palliative Medicine  [facility]      Please page the following number for clinical matters between the hours of 9 am and 5 pm from Monday through Friday : [phone]    After 5pm and on weekends, please see the contact information below:    In the event of newly developing, evolving, or worsening symptoms, please contact the Palliative Medicine team via pager (if the patient is at [facility] #[phone] or if the patient is at [facility] #[phone]) The Geriatric and Palliative Medicine service has coverage 24 hours a day/ 7 days a week to provide medical recommendations regarding symptom management needs via telephone

## 2021-07-29 NOTE — PROGRESS NOTE ADULT - SUBJECTIVE AND OBJECTIVE BOX
Patient is a 56y old  Male who presents with a chief complaint of Hypotension (29 Jul 2021 10:08)      SUBJECTIVE / OVERNIGHT EVENTS: Comfortable without new complaints.   Review of Systems  chest pain no  palpitations no  sob no  nausea no  headache no    MEDICATIONS  (STANDING):  aspirin enteric coated 81 milliGRAM(s) Oral daily  atorvastatin 80 milliGRAM(s) Oral at bedtime  cadexomer iodine 0.9% Gel 1 Application(s) Topical daily  chlorhexidine 0.12% Liquid 15 milliLiter(s) Oral Mucosa two times a day  chlorhexidine 2% Cloths 1 Application(s) Topical <User Schedule>  dextrose 40% Gel 15 Gram(s) Oral once  dextrose 5%. 1000 milliLiter(s) (50 mL/Hr) IV Continuous <Continuous>  dextrose 5%. 1000 milliLiter(s) (100 mL/Hr) IV Continuous <Continuous>  dextrose 50% Injectable 25 Gram(s) IV Push once  dextrose 50% Injectable 12.5 Gram(s) IV Push once  dextrose 50% Injectable 25 Gram(s) IV Push once  dronabinol 5 milliGRAM(s) Oral two times a day  ferrous    sulfate 325 milliGRAM(s) Oral three times a day  folic acid 1 milliGRAM(s) Oral daily  gentamicin 0.1% Ointment 1 Application(s) Topical <User Schedule>  glucagon  Injectable 1 milliGRAM(s) IntraMuscular once  heparin   Injectable 5000 Unit(s) SubCutaneous every 12 hours  insulin glargine Injectable (LANTUS) 5 Unit(s) SubCutaneous at bedtime  insulin lispro (ADMELOG) corrective regimen sliding scale   SubCutaneous three times a day before meals  insulin lispro (ADMELOG) corrective regimen sliding scale   SubCutaneous at bedtime  lidocaine   4% Patch 1 Patch Transdermal daily  melatonin 5 milliGRAM(s) Oral at bedtime  midodrine 30 milliGRAM(s) Oral every 8 hours  Nephro-kristi 1 Tablet(s) Oral daily  pantoprazole  Injectable 40 milliGRAM(s) IV Push daily  sevelamer carbonate 1600 milliGRAM(s) Oral three times a day  sodium chloride 0.9%. 1000 milliLiter(s) (50 mL/Hr) IV Continuous <Continuous>  thiamine IVPB 500 milliGRAM(s) IV Intermittent daily  ticagrelor 60 milliGRAM(s) Oral every 12 hours    MEDICATIONS  (PRN):  acetaminophen   Tablet .. 650 milliGRAM(s) Oral every 6 hours PRN Moderate Pain (4 - 6)  valproate sodium IVPB 125 milliGRAM(s) IV Intermittent every 8 hours PRN agitation      Vital Signs Last 24 Hrs  T(C): 36.5 (29 Jul 2021 12:05), Max: 37 (28 Jul 2021 20:15)  T(F): 97.7 (29 Jul 2021 12:05), Max: 98.6 (28 Jul 2021 20:15)  HR: 89 (29 Jul 2021 12:05) (86 - 101)  BP: 96/69 (29 Jul 2021 12:05) (91/58 - 109/76)  BP(mean): --  RR: 18 (29 Jul 2021 12:05) (18 - 18)  SpO2: 98% (29 Jul 2021 12:05) (97% - 98%)    PHYSICAL EXAM:  GENERAL: NAD, frail  HEAD:  Atraumatic, Normocephalic  EYES: EOMI, PERRLA, conjunctiva and sclera clear  NECK: Supple, No JVD  CHEST/LUNG: Clear to auscultation bilaterally; No wheeze  HEART: Regular rate and rhythm; No murmurs, rubs, or gallops  ABDOMEN: Soft, Nontender, Nondistended; Bowel sounds present  EXTREMITIES:  2+ Peripheral Pulses, No clubbing, cyanosis, or edema  NEUROLOGY: non-focal  SKIN: No rashes or lesions    LABS:                        13.8   8.96  )-----------( 250      ( 29 Jul 2021 06:10 )             47.3     07-29    135  |  94<L>  |  41<H>  ----------------------------<  107<H>  3.6   |  23  |  10.56<H>    Ca    8.7      29 Jul 2021 06:10  Phos  4.6     07-29  Mg     2.1     07-29    TPro  6.6  /  Alb  2.3<L>  /  TBili  0.8  /  DBili  x   /  AST  21  /  ALT  12  /  AlkPhos  131<H>  07-29                RADIOLOGY & ADDITIONAL TESTS:    Imaging Personally Reviewed:    Consultant(s) Notes Reviewed:      Care Discussed with Consultants/Other Providers:   Patient is a 56y old  Male who presents with a chief complaint of Hypotension (29 Jul 2021 10:08)      SUBJECTIVE / OVERNIGHT EVENTS: Comfortable without new complaints.   Review of Systems  chest pain no  palpitations no  sob no  nausea no  headache no    MEDICATIONS  (STANDING):  aspirin enteric coated 81 milliGRAM(s) Oral daily  atorvastatin 80 milliGRAM(s) Oral at bedtime  cadexomer iodine 0.9% Gel 1 Application(s) Topical daily  chlorhexidine 0.12% Liquid 15 milliLiter(s) Oral Mucosa two times a day  chlorhexidine 2% Cloths 1 Application(s) Topical <User Schedule>  dextrose 40% Gel 15 Gram(s) Oral once  dextrose 5%. 1000 milliLiter(s) (50 mL/Hr) IV Continuous <Continuous>  dextrose 5%. 1000 milliLiter(s) (100 mL/Hr) IV Continuous <Continuous>  dextrose 50% Injectable 25 Gram(s) IV Push once  dextrose 50% Injectable 12.5 Gram(s) IV Push once  dextrose 50% Injectable 25 Gram(s) IV Push once  dronabinol 5 milliGRAM(s) Oral two times a day  ferrous    sulfate 325 milliGRAM(s) Oral three times a day  folic acid 1 milliGRAM(s) Oral daily  gentamicin 0.1% Ointment 1 Application(s) Topical <User Schedule>  glucagon  Injectable 1 milliGRAM(s) IntraMuscular once  heparin   Injectable 5000 Unit(s) SubCutaneous every 12 hours  insulin glargine Injectable (LANTUS) 5 Unit(s) SubCutaneous at bedtime  insulin lispro (ADMELOG) corrective regimen sliding scale   SubCutaneous three times a day before meals  insulin lispro (ADMELOG) corrective regimen sliding scale   SubCutaneous at bedtime  lidocaine   4% Patch 1 Patch Transdermal daily  melatonin 5 milliGRAM(s) Oral at bedtime  midodrine 30 milliGRAM(s) Oral every 8 hours  Nephro-kristi 1 Tablet(s) Oral daily  pantoprazole  Injectable 40 milliGRAM(s) IV Push daily  sevelamer carbonate 1600 milliGRAM(s) Oral three times a day  sodium chloride 0.9%. 1000 milliLiter(s) (50 mL/Hr) IV Continuous <Continuous>  thiamine IVPB 500 milliGRAM(s) IV Intermittent daily  ticagrelor 60 milliGRAM(s) Oral every 12 hours    MEDICATIONS  (PRN):  acetaminophen   Tablet .. 650 milliGRAM(s) Oral every 6 hours PRN Moderate Pain (4 - 6)  valproate sodium IVPB 125 milliGRAM(s) IV Intermittent every 8 hours PRN agitation      Vital Signs Last 24 Hrs  T(C): 36.5 (29 Jul 2021 12:05), Max: 37 (28 Jul 2021 20:15)  T(F): 97.7 (29 Jul 2021 12:05), Max: 98.6 (28 Jul 2021 20:15)  HR: 89 (29 Jul 2021 12:05) (86 - 101)  BP: 96/69 (29 Jul 2021 12:05) (91/58 - 109/76)  BP(mean): --  RR: 18 (29 Jul 2021 12:05) (18 - 18)  SpO2: 98% (29 Jul 2021 12:05) (97% - 98%)    PHYSICAL EXAM:  GENERAL: NAD, frail  HEAD:  Atraumatic, Normocephalic  EYES: EOMI, PERRLA, conjunctiva and sclera clear  NECK: Supple, No JVD  CHEST/LUNG: Clear to auscultation bilaterally; No wheeze  HEART: Regular rate and rhythm; No murmurs, rubs, or gallops  ABDOMEN: Soft, Nontender, Nondistended; Bowel sounds present PD catheter in place  EXTREMITIES:  2+ Peripheral Pulses, No clubbing, cyanosis, or edema  NEUROLOGY: non-focal  SKIN: No rashes or lesions    LABS:                        13.8   8.96  )-----------( 250      ( 29 Jul 2021 06:10 )             47.3     07-29    135  |  94<L>  |  41<H>  ----------------------------<  107<H>  3.6   |  23  |  10.56<H>    Ca    8.7      29 Jul 2021 06:10  Phos  4.6     07-29  Mg     2.1     07-29    TPro  6.6  /  Alb  2.3<L>  /  TBili  0.8  /  DBili  x   /  AST  21  /  ALT  12  /  AlkPhos  131<H>  07-29                RADIOLOGY & ADDITIONAL TESTS:    Imaging Personally Reviewed:    Consultant(s) Notes Reviewed:      Care Discussed with Consultants/Other Providers:

## 2021-07-29 NOTE — PROGRESS NOTE ADULT - PROBLEM SELECTOR PLAN 3
Continue Marinol.   Appetite appears to be improving.   Wife and patient (as per wife) are not looking for a PEG.

## 2021-07-29 NOTE — PROGRESS NOTE ADULT - PROBLEM SELECTOR PLAN 5
Will continue to f/u for ACP.         Garrick Madera MD   Geriatrics and Palliative Care (GAP) Consult Service    of Geriatric and Palliative Medicine  [facility]      Please page the following number for clinical matters between the hours of 9 am and 5 pm from Monday through Friday : [phone]    After 5pm and on weekends, please see the contact information below:    In the event of newly developing, evolving, or worsening symptoms, please contact the Palliative Medicine team via pager (if the patient is at [facility] #[phone] or if the patient is at [facility] #[phone]) The Geriatric and Palliative Medicine service has coverage 24 hours a day/ 7 days a week to provide medical recommendations regarding symptom management needs via telephone See GOC above.   His wife is his surrogate.

## 2021-07-29 NOTE — PROGRESS NOTE ADULT - ASSESSMENT
55M PMH ESRD on PD, DM on insulin, CHF EF 25-30% CAD s/p CABG x4, underwent cardiac cath and stent and wire broke in heart sternotomy admitted with cough/sweating found to be hypotensive and febrile in ER- admitted for presumed septic shock; cultures negative thus far    s/p short stay in MICU for pressor support  Cultures negative to date  ESRD on PD  COVID negative  CT A/P - thickening vs underdistension of AC (th clinically benign abdominal exam), no evidence of infectious source, T12 compression fracture  CT Chest- subtle peripheral opacities RUL, pneumoperitoneum related to PD catheter, T12 vertebral fracture    #nausea and hiccups- IMPROVED (per pt has had intermittent issues with hiccuping prior to admission)  qTc prolonged (600); per d/w Renal we cannot give Baclofen (even at PRN dosing). No acute GI pathology on CT A/P. Nausea appears to have improved  ?related to uremia     Significant sedation with use of Ativan  Clinically improved/without hiccups - will monitor  -PD per Renal  -Continue PPI    #Anorexia/Poor PO intake  -encourage preferences (discussed with RN, pt and family)  -Marinol for appetite stimulant (dose increased to 5mg BID 7/26)  -Psych following    #?Dysphagia vs Behavioral component limiting PO intake (is able to eat his favorite chocolate bars without any c/o dysphagia/odynophagia).  SLP input appreciated  suspect symptoms may be psych/behavior related.  -esophagram has not yet been done but clinically does not appear to have overt dysphagia and tolerating PO. Pt and family express that episodes of "spitting up" were related to pt disliking the smell of the food options being attempted  -SLP following  -Palliative following; await family meeting  -Renal input noted; if pt/family opting for PEG then would need to convert to HD for 2 weeks prior to PEG and continue HD for 6 weeks thereafter to allow a mature tract to form. This potential option discussed with spouse and pt at bedside today and family/pt do not want to pursue a PEG tube   -continue PO Marinol      Supportive care    Continue Marinol for appetite stimulation. Given Pt and spouse have stated that they would not want to pursue a PEG placement, will Sign off care. Please recall prn for GI concerns.  Thank You.    Medicine and Renal attendings updated    Sanjiv Oglesby PA-C    [facility]  [phone]  After hours and weekend coverage [phone]

## 2021-07-29 NOTE — PROGRESS NOTE ADULT - ASSESSMENT
CATH 11/30/2020:  COMPLICATIONS: The stent was deployed without difficulty. On removal of the  balloon, the shaft broke and the tip of the balloon remained in the vessel. Several attempts were made to snare the balloon unsuccessfully. Dr. Verdugo was notifed who will take the patient to the operating room.  DIAGNOSTIC RECOMMENDATIONS: The patient should continue with the present medications. The patients vessel was stented without difficulty On removal of the balloon, the shaft broke with the baloon stuck in the left main. All attempts to snare the balloon out failed and the patient was taken to the OR by Dr. Arango to remove the balloon  introp eleonora 11/30/20: mild to mod MR, < from: Intra-Operative Transesophageal Echo (11.30.20 @ 17:02) >  Severe global left ventricular systolic dysfunction. Inferior and inferolateral akinesis.  severe hypokinesis of other segments.  Severe global hypokinesia.  Normal left ventricular internal dimensions and wall thicknesses. Moderate diastolic dysfunction (Stage II)  echo 12/17/20: EF 32%, mod MR, Severe global left ventricular systolic dysfunction, moderate pulmonary pressures  echo 12/20/20: EF (Visual Estimate):  25-30 % mild MR, Akinesis of the inferolateral, anterolateral, apical laterlal, inferior, and inferoseptal walls. Severe left ventricular enlargement. No left ventricular thrombus is seen.  Echo 7/7/21: EF 16%, mod - sev MR, mod AS, severe global lv sys dysfx, severe diastolic dysfx, mod pulm htn       a/p  56 M well known to practice with PMH ESRD on PD, DM on insulin, CHF EF 25-30% CAD s/p CABG x4, underwent cardiac cath and stent and wire broke in heart s/p sternotomy p/w septic shock.    #Septic Shock, resolved   -Bcx NGTD 7/10   -s/p iv abx, pressors   -Bedside POCUS notable for biventricular diffuse hypokinesis  -right 2nd toe gangrene, pod f/u, vascular f/u  -ID f/u   -ELEONORA: no evidence of valvular vegetation is seen.  -med f/u    #Ischemic Cardiomyopathy. Chronic systolic HF  -Repeat echo noted with EF 16%, mod - sev MR, mod AS, severe global lv sys dysfx, severe diastolic dysfx, mod pulm htn   -pt declining ICD   -no bb, acei/arb given hx of orthostatic bp   -continue midodrine for bp support   -cont vol removal w PD     # CAD, s/p CABG, s/p PCI, s/p redo open heart for stent balloon retrieval   -hst elevated, likely demand ischemia in setting of septic shock, ESRD   -c/w asa, Brilinta, statin     # ESRD  -on PD  -renal f/u    #AMS  -neuro psych f/u    #GOC  -palliative f/u     dvt ppx

## 2021-07-29 NOTE — PROGRESS NOTE ADULT - SUBJECTIVE AND OBJECTIVE BOX
Patient is a 56y old  Male who presented with a chief complaint of Hypotension (29 Jul 2021 14:31)      INTERVAL HPI/OVERNIGHT EVENTS:  no hiccups, nausea or vomiting  no dysphagia or odynophagia - eating food brought in by family and snacks  appetite remains fair  no abdominal pain      MEDICATIONS  (STANDING):  aspirin enteric coated 81 milliGRAM(s) Oral daily  atorvastatin 80 milliGRAM(s) Oral at bedtime  cadexomer iodine 0.9% Gel 1 Application(s) Topical daily  chlorhexidine 0.12% Liquid 15 milliLiter(s) Oral Mucosa two times a day  chlorhexidine 2% Cloths 1 Application(s) Topical <User Schedule>  dextrose 40% Gel 15 Gram(s) Oral once  dextrose 5%. 1000 milliLiter(s) (50 mL/Hr) IV Continuous <Continuous>  dextrose 5%. 1000 milliLiter(s) (100 mL/Hr) IV Continuous <Continuous>  dextrose 50% Injectable 25 Gram(s) IV Push once  dextrose 50% Injectable 12.5 Gram(s) IV Push once  dextrose 50% Injectable 25 Gram(s) IV Push once  dronabinol 5 milliGRAM(s) Oral two times a day  ferrous    sulfate 325 milliGRAM(s) Oral three times a day  folic acid 1 milliGRAM(s) Oral daily  gentamicin 0.1% Ointment 1 Application(s) Topical <User Schedule>  glucagon  Injectable 1 milliGRAM(s) IntraMuscular once  heparin   Injectable 5000 Unit(s) SubCutaneous every 12 hours  insulin glargine Injectable (LANTUS) 5 Unit(s) SubCutaneous at bedtime  insulin lispro (ADMELOG) corrective regimen sliding scale   SubCutaneous three times a day before meals  insulin lispro (ADMELOG) corrective regimen sliding scale   SubCutaneous at bedtime  lidocaine   4% Patch 1 Patch Transdermal daily  melatonin 5 milliGRAM(s) Oral at bedtime  midodrine 30 milliGRAM(s) Oral every 8 hours  Nephro-kristi 1 Tablet(s) Oral daily  pantoprazole  Injectable 40 milliGRAM(s) IV Push daily  sevelamer carbonate 1600 milliGRAM(s) Oral three times a day  sodium chloride 0.9%. 1000 milliLiter(s) (50 mL/Hr) IV Continuous <Continuous>  thiamine IVPB 500 milliGRAM(s) IV Intermittent daily  ticagrelor 60 milliGRAM(s) Oral every 12 hours    MEDICATIONS  (PRN):  acetaminophen   Tablet .. 650 milliGRAM(s) Oral every 6 hours PRN Moderate Pain (4 - 6)  valproate sodium IVPB 125 milliGRAM(s) IV Intermittent every 8 hours PRN agitation      Allergies  doxazosin (Other)      Review of Systems:  General:  No wt loss , chills, night sweats  CV:  No pain, palpitatioins,  +CAD  pt/family refusing AICD +ICM  Resp:  No dyspnea, cough, tachypnea, wheezing  GI:  see HPI  :  ESRD on PD  Muscle:  No pain, weakness  Neuro:  No weakness, tingling, memory problems  Psych:  No fatigue, insomnia, mood problems, depression  Endocrine:  No polyuria, polydypsia, cold/heat intolerance  Heme:  No petechiae, ecchymosis, easy bruisability  Skin:  No rash, edema +gangrene +wounds      Vital Signs Last 24 Hrs  T(C): 36.5 (29 Jul 2021 12:05), Max: 37 (28 Jul 2021 20:15)  T(F): 97.7 (29 Jul 2021 12:05), Max: 98.6 (28 Jul 2021 20:15)  HR: 89 (29 Jul 2021 12:05) (86 - 101)  BP: 96/69 (29 Jul 2021 12:05) (91/58 - 109/76)  BP(mean): --  RR: 18 (29 Jul 2021 12:05) (18 - 18)  SpO2: 98% (29 Jul 2021 12:05) (97% - 98%)    PHYSICAL EXAM:  Constitutional: NAD, well-developed chronically ill appearing male. sitting up in bed. alert and responsive  Neck: No LAD, supple no JVD  Respiratory: grossly clear   +WH sternal scar  +WH scar right chest wall  Cardiovascular: S1 and S2, RRR, +murmur  Gastrointestinal: BS+, soft, NT/ND, no rebound guarding or rigidity  +dressing over PD site - clean and dry, non tender near catheter site. no scrotal edema/swelling  Extremities: No peripheral edema,  Right toe gangrene (dry);   Left foot wound dry;  thumb +dry gangrene  Right thumb (under fingernail and fingertip) ?gangrene  +lesions at tips of fingers, no purulence or bleeding   Vascular: 2+ peripheral pulses  Neurological: no focal asymmetry  Psychiatric: responsive and verbalizing  Skin: anicteric      LABS:                        13.8   8.96  )-----------( 250      ( 29 Jul 2021 06:10 )             47.3     07-29    135  |  94<L>  |  41<H>  ----------------------------<  107<H>  3.6   |  23  |  10.56<H>    Ca    8.7      29 Jul 2021 06:10  Phos  4.6     07-29  Mg     2.1     07-29    TPro  6.6  /  Alb  2.3<L>  /  TBili  0.8  /  DBili  x   /  AST  21  /  ALT  12  /  AlkPhos  131<H>  07-29          RADIOLOGY & ADDITIONAL TESTS:

## 2021-07-29 NOTE — PROGRESS NOTE ADULT - SUBJECTIVE AND OBJECTIVE BOX
[facility]      Seen earlier today, and examined.  - Today, patient is without complaints.           *****MEDICATIONS: Current medication reviewed and documented.    MEDICATIONS  (STANDING):  aspirin enteric coated 81 milliGRAM(s) Oral daily  atorvastatin 80 milliGRAM(s) Oral at bedtime  cadexomer iodine 0.9% Gel 1 Application(s) Topical daily  chlorhexidine 0.12% Liquid 15 milliLiter(s) Oral Mucosa two times a day  chlorhexidine 2% Cloths 1 Application(s) Topical <User Schedule>  dextrose 40% Gel 15 Gram(s) Oral once  dextrose 5%. 1000 milliLiter(s) (50 mL/Hr) IV Continuous <Continuous>  dextrose 5%. 1000 milliLiter(s) (100 mL/Hr) IV Continuous <Continuous>  dextrose 50% Injectable 25 Gram(s) IV Push once  dextrose 50% Injectable 12.5 Gram(s) IV Push once  dextrose 50% Injectable 25 Gram(s) IV Push once  dronabinol 5 milliGRAM(s) Oral two times a day  ferrous    sulfate 325 milliGRAM(s) Oral three times a day  folic acid 1 milliGRAM(s) Oral daily  gentamicin 0.1% Ointment 1 Application(s) Topical <User Schedule>  glucagon  Injectable 1 milliGRAM(s) IntraMuscular once  heparin   Injectable 5000 Unit(s) SubCutaneous every 12 hours  insulin glargine Injectable (LANTUS) 5 Unit(s) SubCutaneous at bedtime  insulin lispro (ADMELOG) corrective regimen sliding scale   SubCutaneous three times a day before meals  insulin lispro (ADMELOG) corrective regimen sliding scale   SubCutaneous at bedtime  lidocaine   4% Patch 1 Patch Transdermal daily  melatonin 5 milliGRAM(s) Oral at bedtime  midodrine 30 milliGRAM(s) Oral every 8 hours  Nephro-kristi 1 Tablet(s) Oral daily  pantoprazole  Injectable 40 milliGRAM(s) IV Push daily  sevelamer carbonate 1600 milliGRAM(s) Oral three times a day  sodium chloride 0.9%. 1000 milliLiter(s) (50 mL/Hr) IV Continuous <Continuous>  thiamine IVPB 500 milliGRAM(s) IV Intermittent daily  ticagrelor 60 milliGRAM(s) Oral every 12 hours    MEDICATIONS  (PRN):  acetaminophen   Tablet .. 650 milliGRAM(s) Oral every 6 hours PRN Moderate Pain (4 - 6)  valproate sodium IVPB 125 milliGRAM(s) IV Intermittent every 8 hours PRN agitation          ***** VITAL SIGNS:  T(F): 98 (21 @ 18:45), Max: 98.5 (21 @ 00:48)  HR: 90 (21 @ 18:45) (86 - 96)  BP: 103/75 (21 @ 18:45) (91/58 - 106/77)  RR: 18 (21 @ 18:45) (18 - 18)  SpO2: 97% (21 @ 18:45) (97% - 98%)  Wt(kg): --  ,   I&O's Summary    2021 07:  -  2021 07:00  --------------------------------------------------------  IN: 3240 mL / OUT: 5200 mL / NET: -1960 mL    2021 07:  -  2021 20:25  --------------------------------------------------------  IN: 5360 mL / OUT: 2700 mL / NET: 2660 mL             *****PHYSICAL EXAM: :  responds after much coercion      alert oriented x 2 ( does not know name of hospital think he is at [facility]) does not know the date   delyaed responses    attention poor  comprehension  fair can follow 1 step commands   does answer some questions intermittently   EOmi fundi not visualized   no nystagmus VFF to confrontation  Tongue is midline  Palate elevates symmetrically   Moving both upper ext minimally  LE with limited mvt   does wiggles toes b/l            *****LAB AND IMAGIN.8   8.96  )-----------( 250      ( 2021 06:10 )             47.3                   135  |  94<L>  |  41<H>  ----------------------------<  107<H>  3.6   |  23  |  10.56<H>    Ca    8.7      2021 06:10  Phos  4.6       Mg     2.1         TPro  6.6  /  Alb  2.3<L>  /  TBili  0.8  /  DBili  x   /  AST  21  /  ALT  12  /  AlkPhos  131<H>                           [All pertinent recent Imaging/Reports reviewed]           *****A S S E S S M E N T   A N D   P L A N :      55M PMH ESRD on PD, DM on insulin, CHF EF 25-30% CAD s/p CABG x4,  Patient states that for the last 2 days PTA he has been having increased cough and sweating. He endorsed chills but no measured fevers at home. He denies any sick contacts or recent travel. Patient states that he fell few days ago with no head trauma. Of note patient has right toe gangrenous lesion that has been present for several months, He denies any trauma or pain at the site.       In the ED, Patient was found to be hypotensive to 70s. Patient was given midodrine 10mg x1 and bedside POCUS was completed showing poor cardiac function with diffuse hypokinesis. Patient was unable to maintain adequate SBPs  so required micu admission and pressor support.   Called to eval ams       Problem/Recommendations 1:  ams likely multifactorial metabolic encephalopathy started immediately after cefepime, also other factors include poor po intake, sleep wake cycle disruption hyponatremia   correct metabolic derangements   nephrovite added   thiamine 500 iv q h       limit sedatives   7/15 mental status with improvement    seemed lethargic  gagging     mental status improved    mental status remains stable, still with processing delay     Problem/Recommendations 2:  ecchymosis of the finger tips   r/o emboli   derm eval     eleonora no evidence for endocarditis   poor po intake         more alert and responsive    alert responsive    more alert sitting up in bed   as per wife, pt ate a meal that she brought in      lethargic pt reports poor sleep intake   melatonin 5mg for sleep augmentation     Problem/Recommendations 3: poor nutritional intake severe protein caloric malnutrition   encourage po intake   marinol as per gi     nutrition consult ? nephrovite for supplementation   thiamine daily       oob to chair to mobilize      Thank you for allowing me to participate in the care of this patient. Will continue to follow patient periodically. Please do not hesitate to call me if you have any  questions or if there has been a change in patients neurological status     ________________  Grisel Lainez MD  [facility] ([facility]  [phone]      33 minutes spent on total encounter; more than 50 % of the visit was  spent counseling about plan of care, compliance to diet/exercise and medication regimen and or  coordinating care by the attending physician.      It is advised that stroke patients follow up with CARLYLE Pena @ [phone] in 1- 2 weeks.   Others please follow up with Dr. Michael Nissenbaum [phone]

## 2021-07-29 NOTE — PROGRESS NOTE ADULT - ASSESSMENT
56 M PMH ESRD on PD, DM on insulin, CHF EF 25-30% CAD s/p CABG x4 pw with hypotension with fever. Encephalopathy likely related to cefepime ? no obvious source of sepsis apparent as of now, KLAUDIA: Overall thickened valves seen however, no evidence of valvular vegetation is seen.     Hypotension:  on Midodrine for hemodynamic support:      ESRD on PD. Patient TW  61.5kg  Hyponatremia- Na+ improving.  poor po protein intake  ID-Blood cx and PD fluid cx -   off IV antibiotics   Hyperphosphatemia:     c/ w binders as prescribed   Failure to thrive     RECOMMENDATIONS   - CAPD today and only 3 exchanges  - Palliative care follow up  - continue Renvela to 1,600 mg PO TID;   - continue Midodrine  30mg po q 8 hours   -  In order for him to get a peg would need to change to intermittent HD for 2 weeks and then transition back to PD.  Issue of waxing and waning mental status will still persist      [facility]   [phone]

## 2021-07-29 NOTE — PROGRESS NOTE ADULT - SUBJECTIVE AND OBJECTIVE BOX
SUBJECTIVE AND OBJECTIVE:  INTERVAL HPI/OVERNIGHT EVENTS:    DNR on chart:   Allergies    doxazosin (Other)    Intolerances    MEDICATIONS  (STANDING):  aspirin enteric coated 81 milliGRAM(s) Oral daily  atorvastatin 80 milliGRAM(s) Oral at bedtime  cadexomer iodine 0.9% Gel 1 Application(s) Topical daily  chlorhexidine 0.12% Liquid 15 milliLiter(s) Oral Mucosa two times a day  chlorhexidine 2% Cloths 1 Application(s) Topical <User Schedule>  dextrose 40% Gel 15 Gram(s) Oral once  dextrose 5%. 1000 milliLiter(s) (50 mL/Hr) IV Continuous <Continuous>  dextrose 5%. 1000 milliLiter(s) (100 mL/Hr) IV Continuous <Continuous>  dextrose 50% Injectable 25 Gram(s) IV Push once  dextrose 50% Injectable 12.5 Gram(s) IV Push once  dextrose 50% Injectable 25 Gram(s) IV Push once  dronabinol 5 milliGRAM(s) Oral two times a day  ferrous    sulfate 325 milliGRAM(s) Oral three times a day  folic acid 1 milliGRAM(s) Oral daily  gentamicin 0.1% Ointment 1 Application(s) Topical <User Schedule>  glucagon  Injectable 1 milliGRAM(s) IntraMuscular once  heparin   Injectable 5000 Unit(s) SubCutaneous every 12 hours  insulin glargine Injectable (LANTUS) 5 Unit(s) SubCutaneous at bedtime  insulin lispro (ADMELOG) corrective regimen sliding scale   SubCutaneous three times a day before meals  insulin lispro (ADMELOG) corrective regimen sliding scale   SubCutaneous at bedtime  lidocaine   4% Patch 1 Patch Transdermal daily  melatonin 5 milliGRAM(s) Oral at bedtime  midodrine 30 milliGRAM(s) Oral every 8 hours  Nephro-kristi 1 Tablet(s) Oral daily  pantoprazole  Injectable 40 milliGRAM(s) IV Push daily  sevelamer carbonate 1600 milliGRAM(s) Oral three times a day  sodium chloride 0.9%. 1000 milliLiter(s) (50 mL/Hr) IV Continuous <Continuous>  thiamine IVPB 500 milliGRAM(s) IV Intermittent daily  ticagrelor 60 milliGRAM(s) Oral every 12 hours    MEDICATIONS  (PRN):  acetaminophen   Tablet .. 650 milliGRAM(s) Oral every 6 hours PRN Moderate Pain (4 - 6)  valproate sodium IVPB 125 milliGRAM(s) IV Intermittent every 8 hours PRN agitation      ITEMS UNCHECKED ARE NOT PRESENT    PRESENT SYMPTOMS: [ ]Unable to obtain due to poor mentation   Source if other than patient:  [ ]Family   [ ]Team     Pain:  [ ]yes [ ]no  QOL impact -   Location -                    Aggravating factors -  Quality -  Radiation -  Timing-  Severity (0-10 scale):  Minimal acceptable level (0-10 scale):     Dyspnea:                           [ ]Mild [ ]Moderate [ ]Severe  Anxiety:                             [ ]Mild [ ]Moderate [ ]Severe  Fatigue:                             [ ]Mild [ ]Moderate [ ]Severe  Nausea:                             [ ]Mild [ ]Moderate [ ]Severe  Loss of appetite:              [ ]Mild [ ]Moderate [ ]Severe  Constipation:                    [ ]Mild [ ]Moderate [ ]Severe    CPOT:    https://www.[facility].org/getattachment/[email or web address]/Critical-Care-Pain-Observation-Tool-(CPOT)    PAIN AD Score:	  http://geriatrictoolkit.[facility]/cog/painad.pdf (Ctrl + left click to view)    Other Symptoms:  [ ]All other review of systems negative     Palliative Performance Status Version 2:         %      http://[facility].org/files/news/palliative_performance_scale_ppsv2.pdf  PHYSICAL EXAM:  Vital Signs Last 24 Hrs  T(C): 36.5 (29 Jul 2021 12:05), Max: 37 (28 Jul 2021 20:15)  T(F): 97.7 (29 Jul 2021 12:05), Max: 98.6 (28 Jul 2021 20:15)  HR: 89 (29 Jul 2021 12:05) (86 - 101)  BP: 96/69 (29 Jul 2021 12:05) (91/58 - 109/76)  BP(mean): --  RR: 18 (29 Jul 2021 12:05) (18 - 18)  SpO2: 98% (29 Jul 2021 12:05) (97% - 98%) I&O's Summary    28 Jul 2021 07:01  -  29 Jul 2021 07:00  --------------------------------------------------------  IN: 3240 mL / OUT: 5200 mL / NET: -1960 mL    29 Jul 2021 07:01  -  29 Jul 2021 19:04  --------------------------------------------------------  IN: 5360 mL / OUT: 2700 mL / NET: 2660 mL       GENERAL:  [ ]Alert  [ ]Oriented x   [ ]Lethargic  [ ]Cachexia  [ ]Unarousable  [ ]Verbal  [ ]Non-Verbal  Behavioral:   [ ]Anxiety  [ ]Delirium [ ]Agitation [ ]Other  HEENT:  [ ]Normal   [ ]Dry mouth   [ ]ET Tube/Trach  [ ]Oral lesions  PULMONARY:   [ ]Clear [ ]Tachypnea  [ ]Audible excessive secretions   [ ]Rhonchi        [ ]Right [ ]Left [ ]Bilateral  [ ]Crackles        [ ]Right [ ]Left [ ]Bilateral  [ ]Wheezing     [ ]Right [ ]Left [ ]Bilateral  [ ]Diminished BS [ ] Right [ ]Left [ ]Bilateral  CARDIOVASCULAR:    [ ]Regular [ ]Irregular [ ]Tachy  [ ]Dhaval [ ]Murmur [ ]Other  GASTROINTESTINAL:  [ ]Soft  [ ]Distended   [ ]+BS  [ ]Non tender [ ]Tender  [ ]PEG [ ]OGT/ NGT   Last BM:    GENITOURINARY:  [ ]Normal [ ]Incontinent   [ ]Oliguria/Anuria   [ ]Johnson  MUSCULOSKELETAL:   [ ]Normal   [ ]Weakness  [ ]Bed/Wheelchair bound [ ]Edema  NEUROLOGIC:   [ ]No focal deficits  [ ] Cognitive impairment  [ ] Dysphagia [ ]Dysarthria [ ] Paresis [ ]Other   SKIN:   [ ]Normal  [ ]Rash   [ ]Pressure ulcer(s) [ ]y [ ]n present on admission    CRITICAL CARE:  [ ]Shock Present  [ ]Septic [ ]Cardiogenic [ ]Neurologic [ ]Hypovolemic  [ ]Vasopressors [ ]Inotropes  [ ]Respiratory failure present [ ]Mechanical Ventilation [ ]Non-invasive ventilatory support [ ]High-Flow   [ ]Acute  [ ]Chronic [ ]Hypoxic  [ ]Hypercarbic [ ]Other  [ ]Other organ failure     LABS:                        13.8   8.96  )-----------( 250      ( 29 Jul 2021 06:10 )             47.3   07-29    135  |  94<L>  |  41<H>  ----------------------------<  107<H>  3.6   |  23  |  10.56<H>    Ca    8.7      29 Jul 2021 06:10  Phos  4.6     07-29  Mg     2.1     07-29    TPro  6.6  /  Alb  2.3<L>  /  TBili  0.8  /  DBili  x   /  AST  21  /  ALT  12  /  AlkPhos  131<H>  07-29        RADIOLOGY & ADDITIONAL STUDIES:    Protein Calorie Malnutrition Present: [ ]mild [ ]moderate [ ]severe [ ]underweight [ ]morbid obesity  https://www.andeal.org/vault/2440/web/files/ONC/Table_Clinical%20Characteristics%20to%20Document%20Malnutrition-White%20JV%20et%20al%202012.pdf    Height (cm): 170.2 (07-15-21 @ 15:14), 170.2 (12-29-20 @ 13:29), 170.2 (12-14-20 @ 09:52)  Weight (kg): 63.5 (07-15-21 @ 15:14), 81.6 (12-29-20 @ 13:29), 76.2 (12-14-20 @ 09:52)  BMI (kg/m2): 21.9 (07-15-21 @ 15:14), 28.2 (12-29-20 @ 13:29), 26.3 (12-14-20 @ 09:52)    [ ]PPSV2 < or = 30%  [ ]significant weight loss [ ]poor nutritional intake [ ]anasarca    [ ]Artificial Nutrition    REFERRALS:   [ ]Chaplaincy  [ ]Hospice  [ ]Child Life  [ ]Social Work  [ ]Case management [ ]Holistic Therapy     Goals of Care Document:     SUBJECTIVE AND OBJECTIVE:  INTERVAL HPI/OVERNIGHT EVENTS:  The patient was seen and examined. He was alert and was eating wafer cookies without any relevant signs of dysphagia. He denied any complaints.   DNR on chart: No   Allergies    doxazosin (Other)    Intolerances    MEDICATIONS  (STANDING):  aspirin enteric coated 81 milliGRAM(s) Oral daily  atorvastatin 80 milliGRAM(s) Oral at bedtime  cadexomer iodine 0.9% Gel 1 Application(s) Topical daily  chlorhexidine 0.12% Liquid 15 milliLiter(s) Oral Mucosa two times a day  chlorhexidine 2% Cloths 1 Application(s) Topical <User Schedule>  dextrose 40% Gel 15 Gram(s) Oral once  dextrose 5%. 1000 milliLiter(s) (50 mL/Hr) IV Continuous <Continuous>  dextrose 5%. 1000 milliLiter(s) (100 mL/Hr) IV Continuous <Continuous>  dextrose 50% Injectable 25 Gram(s) IV Push once  dextrose 50% Injectable 12.5 Gram(s) IV Push once  dextrose 50% Injectable 25 Gram(s) IV Push once  dronabinol 5 milliGRAM(s) Oral two times a day  ferrous    sulfate 325 milliGRAM(s) Oral three times a day  folic acid 1 milliGRAM(s) Oral daily  gentamicin 0.1% Ointment 1 Application(s) Topical <User Schedule>  glucagon  Injectable 1 milliGRAM(s) IntraMuscular once  heparin   Injectable 5000 Unit(s) SubCutaneous every 12 hours  insulin glargine Injectable (LANTUS) 5 Unit(s) SubCutaneous at bedtime  insulin lispro (ADMELOG) corrective regimen sliding scale   SubCutaneous three times a day before meals  insulin lispro (ADMELOG) corrective regimen sliding scale   SubCutaneous at bedtime  lidocaine   4% Patch 1 Patch Transdermal daily  melatonin 5 milliGRAM(s) Oral at bedtime  midodrine 30 milliGRAM(s) Oral every 8 hours  Nephro-kristi 1 Tablet(s) Oral daily  pantoprazole  Injectable 40 milliGRAM(s) IV Push daily  sevelamer carbonate 1600 milliGRAM(s) Oral three times a day  sodium chloride 0.9%. 1000 milliLiter(s) (50 mL/Hr) IV Continuous <Continuous>  thiamine IVPB 500 milliGRAM(s) IV Intermittent daily  ticagrelor 60 milliGRAM(s) Oral every 12 hours    MEDICATIONS  (PRN):  acetaminophen   Tablet .. 650 milliGRAM(s) Oral every 6 hours PRN Moderate Pain (4 - 6)  valproate sodium IVPB 125 milliGRAM(s) IV Intermittent every 8 hours PRN agitation      ITEMS UNCHECKED ARE NOT PRESENT    PRESENT SYMPTOMS: [ ]Unable to obtain due to poor mentation   Source if other than patient:  [ ]Family   [ ]Team     Pain:  [ ]yes [x ]no  QOL impact -   Location -                    Aggravating factors -  Quality -  Radiation -  Timing-  Severity (0-10 scale):  Minimal acceptable level (0-10 scale):     Dyspnea:                           [ ]Mild [ ]Moderate [ ]Severe  Anxiety:                             [ ]Mild [ ]Moderate [ ]Severe  Fatigue:                             [ ]Mild [ ]Moderate [ ]Severe  Nausea:                             [ ]Mild [ ]Moderate [ ]Severe  Loss of appetite:              [ ]Mild [ ]Moderate [ ]Severe  Constipation:                    [ ]Mild [ ]Moderate [ ]Severe    CPOT:    https://www.[facility].org/getattachment/[email or web address]/Critical-Care-Pain-Observation-Tool-(CPOT)    PAIN AD Score:	  http://geriatrictoolkit.[facility]/cog/painad.pdf (Ctrl + left click to view)    Other Symptoms:  [x ]All other review of systems negative     Palliative Performance Status Version 2:   40      %      http://npcrc.org/files/news/palliative_performance_scale_ppsv2.pdf  PHYSICAL EXAM:  Vital Signs Last 24 Hrs  T(C): 36.5 (29 Jul 2021 12:05), Max: 37 (28 Jul 2021 20:15)  T(F): 97.7 (29 Jul 2021 12:05), Max: 98.6 (28 Jul 2021 20:15)  HR: 89 (29 Jul 2021 12:05) (86 - 101)  BP: 96/69 (29 Jul 2021 12:05) (91/58 - 109/76)  BP(mean): --  RR: 18 (29 Jul 2021 12:05) (18 - 18)  SpO2: 98% (29 Jul 2021 12:05) (97% - 98%) I&O's Summary    28 Jul 2021 07:01  -  29 Jul 2021 07:00  --------------------------------------------------------  IN: 3240 mL / OUT: 5200 mL / NET: -1960 mL    29 Jul 2021 07:01  -  29 Jul 2021 19:04  --------------------------------------------------------  IN: 5360 mL / OUT: 2700 mL / NET: 2660 mL       GENERAL:  [ x]Alert  [x ]Oriented x  2 [ ]Lethargic  [ ]Cachexia  [ ]Unarousable  [x ]Verbal  [ ]Non-Verbal  Behavioral:   [ ]Anxiety  [ ]Delirium [ ]Agitation [ ]Other  HEENT:  [ ]Normal   [ ]Dry mouth   [ ]ET Tube/Trach  [ ]Oral lesions  PULMONARY:   [x]Clear [ ]Tachypnea  [ ]Audible excessive secretions   [ ]Rhonchi        [ ]Right [ ]Left [ ]Bilateral  [ ]Crackles        [ ]Right [ ]Left [ ]Bilateral  [ ]Wheezing     [ ]Right [ ]Left [ ]Bilateral  [ ]Diminished BS [ ] Right [ ]Left [ ]Bilateral  CARDIOVASCULAR:    [x ]Regular [ ]Irregular [ ]Tachy  [ ]Dhaval [ ]Murmur [ ]Other  GASTROINTESTINAL:  [x ]Soft  [ ]Distended   [x ]+BS  [ ]Non tender [ ]Tender  [ ]PEG [ ]OGT/ NGT   Last BM:  Tunneled catheter.     GENITOURINARY:  [ ]Normal [ ]Incontinent   [x ]Oliguria/Anuria   [ ]Johnson  MUSCULOSKELETAL:   [ ]Normal   [x ]Weakness  [ ]Bed/Wheelchair bound [ ]Edema  NEUROLOGIC:   [x ]No focal deficits  [ ] Cognitive impairment  [ ] Dysphagia [ ]Dysarthria [ ] Paresis [ ]Other   SKIN:   [x ]Normal  [ ]Rash   [ ]Pressure ulcer(s) [ ]y [ ]n present on admission    CRITICAL CARE:  [ ]Shock Present  [ ]Septic [ ]Cardiogenic [ ]Neurologic [ ]Hypovolemic  [ ]Vasopressors [ ]Inotropes  [ ]Respiratory failure present [ ]Mechanical Ventilation [ ]Non-invasive ventilatory support [ ]High-Flow   [ ]Acute  [ ]Chronic [ ]Hypoxic  [ ]Hypercarbic [ ]Other  [ ]Other organ failure     LABS:                        13.8   8.96  )-----------( 250      ( 29 Jul 2021 06:10 )             47.3   07-29    135  |  94<L>  |  41<H>  ----------------------------<  107<H>  3.6   |  23  |  10.56<H>    Ca    8.7      29 Jul 2021 06:10  Phos  4.6     07-29  Mg     2.1     07-29    TPro  6.6  /  Alb  2.3<L>  /  TBili  0.8  /  DBili  x   /  AST  21  /  ALT  12  /  AlkPhos  131<H>  07-29        RADIOLOGY & ADDITIONAL STUDIES: Reviewed.     Protein Calorie Malnutrition Present: [ ]mild [ ]moderate [ ]severe [ ]underweight [ ]morbid obesity  https://www.andeal.org/vault/9400/web/files/ONC/Table_Clinical%20Characteristics%20to%20Document%20Malnutrition-White%20JV%20et%20al%202012.pdf    Height (cm): 170.2 (07-15-21 @ 15:14), 170.2 (12-29-20 @ 13:29), 170.2 (12-14-20 @ 09:52)  Weight (kg): 63.5 (07-15-21 @ 15:14), 81.6 (12-29-20 @ 13:29), 76.2 (12-14-20 @ 09:52)  BMI (kg/m2): 21.9 (07-15-21 @ 15:14), 28.2 (12-29-20 @ 13:29), 26.3 (12-14-20 @ 09:52)    [ ]PPSV2 < or = 30%  [ ]significant weight loss [ ]poor nutritional intake [ ]anasarca    [ ]Artificial Nutrition    REFERRALS:   [ ]Chaplaincy  [ ]Hospice  [ ]Child Life  [ ]Social Work  [ ]Case management [ ]Holistic Therapy     Goals of Care Document:

## 2021-07-29 NOTE — PROGRESS NOTE ADULT - ASSESSMENT
full note to f/u.   d/w the patient's wife and daughter that gave verbalized understanding about the patient medical issues (ESRD on PD, Advanced heart failure, poor oral intake) and Rx options. When discussing about the patient getting an AICD, his wife indicated that after talking to him, the patient was not against it and that she actually wanted to d/w Cardio or EP in order to better understand the risks and benefits of this device. When talking about a PEG, his wife indicated the patient's issues was not dysphagia but poor appetite. However, that while in the hospital, the patient's appetite had improved and that it was not of a concern at this time.   d/w Dr. Beck about calling EP or Cardio to f/u with the patient and with his wife.         Garrick Madera MD   Geriatrics and Palliative Care (GAP) Consult Service    of Geriatric and Palliative Medicine  [facility]      Please page the following number for clinical matters between the hours of 9 am and 5 pm from Monday through Friday : [phone]    After 5pm and on weekends, please see the contact information below:    In the event of newly developing, evolving, or worsening symptoms, please contact the Palliative Medicine team via pager (if the patient is at [facility] #[phone] or if the patient is at [facility] #[phone]) The Geriatric and Palliative Medicine service has coverage 24 hours a day/ 7 days a week to provide medical recommendations regarding symptom management needs via telephone 55 yo Male h/o CAD, CHF (EF 24.5%. Severe MR. Also with RV dysfunction), COPD, on PD 2/2 ESRD p/w hypotension 2/2 sepsis likely 2/2 toe gangrene.  Palliative care called for GOC and ACP.

## 2021-07-29 NOTE — PROGRESS NOTE ADULT - ASSESSMENT
56 m with    Sepsis resolved  - off antibiotic   - toe gangrene  - Podiatry follow  - ID follow   - KLAUDIA noted     COPD  - 2L NC overnight because he becomes apneic, sats well  - Sats well on RA while awake    CAD s/p CABG   - Hypotension likely in the setting of septic shock   - midodrine dose increased to 30 mg TID to match home dose  - Previous echo with reduced EF  - c/w DAPT  - Cardiology follow    CHF cr systolic  - cardiology follow  - patient and family refuse AICD    Peritoneal Dialysis   - nephrology follow PD, recommend 4x a day  - patient on midodrine 30 q8h at home, continue here  - Holding home metoprolol 25 q12hr in setting of hypotension    Diabetes   - HA1C 6.1 on 7/6 suggesting prediabetes  - Tujeo 60 units at bedtime and Victoza at home, started on 30 units at bedtime, adjust as needed based on patient PO intake  - added 2 units insulin pre-meal    Hiccups  - GI follow  - hold Ativan 2 to sedation    Incontinence dermatitis  - wound care    Toe gangrene  - Podiatry follow  - Vascular follow  - PONCHO/PVR noted  - Angiogram if agrees    Finger gangrenous area  - local care    Vertebral fracture T12  - Ortho eval noted   - MRI spine noted  - No intervention    Confusion improving   - possible delirium  - Psych follow  - Neurology follow  - EEG noted    Dysphagia  - GI follow  - MBS  - ENT follow    functional quadriplegia   - supportive care     Palliative follow re [facility]     DCP in progress    Pipe Beck MD pager [phone]

## 2021-07-30 NOTE — PROGRESS NOTE ADULT - ASSESSMENT
56 m with    Sepsis resolved  - off antibiotic   - toe gangrene  - Podiatry follow  - ID follow   - KLAUDIA noted     COPD  - 2L NC overnight because he becomes apneic, sats well  - Sats well on RA while awake    CAD s/p CABG   - Hypotension likely in the setting of septic shock   - midodrine dose increased to 30 mg TID to match home dose  - Previous echo with reduced EF  - c/w DAPT  - Cardiology follow    CHF cr systolic  - cardiology follow    AICD  - EP eval noted. Not a good candidate for AICD    Peritoneal Dialysis   - nephrology follow PD, recommend 4x a day  - patient on midodrine 30 q8h at home, continue here  - Holding home metoprolol 25 q12hr in setting of hypotension    Diabetes   - HA1C 6.1 on 7/6 suggesting prediabetes  - Tujeo 60 units at bedtime and Victoza at home, started on 30 units at bedtime, adjust as needed based on patient PO intake  - added 2 units insulin pre-meal    Hiccups  - GI follow  - hold Ativan 2 to sedation    Incontinence dermatitis  - wound care    Toe gangrene  - Podiatry follow  - Vascular follow  - PONCHO/PVR noted  - Angiogram if agrees    Finger gangrenous area  - local care    Vertebral fracture T12  - Ortho eval noted   - MRI spine noted  - No intervention    Confusion   - possible delirium  - Psych follow  - Neurology follow  - EEG noted    Dysphagia  - GI follow  - MBS  - ENT follow    functional quadriplegia   - supportive care     Palliative follow re [facility]     DCP in progress    Pipe Beck MD pager [phone]

## 2021-07-30 NOTE — PROGRESS NOTE ADULT - SUBJECTIVE AND OBJECTIVE BOX
CARDIOLOGY FOLLOW UP - Dr. Best  DATE OF SERVICE: 7/30/21     CC no acute events   family and pt now wishes to get eval for ICD placement       REVIEW OF SYSTEMS:  CONSTITUTIONAL: No fever, weight loss, or fatigue  RESPIRATORY: No cough, wheezing, chills or hemoptysis; No Shortness of Breath  CARDIOVASCULAR: No chest pain, palpitations, passing out, dizziness, or leg swelling  GASTROINTESTINAL: No abdominal or epigastric pain. No nausea, vomiting, or hematemesis; No diarrhea or constipation. No melena or hematochezia.  VASCULAR: No edema     PHYSICAL EXAM:  T(C): 36.6 (07-30-21 @ 12:41), Max: 36.8 (07-30-21 @ 00:42)  HR: 94 (07-30-21 @ 14:38) (81 - 94)  BP: 106/74 (07-30-21 @ 14:38) (90/60 - 106/74)  RR: 18 (07-30-21 @ 12:41) (18 - 18)  SpO2: 99% (07-30-21 @ 12:41) (96% - 100%)  Wt(kg): --  I&O's Summary    29 Jul 2021 07:01  -  30 Jul 2021 07:00  --------------------------------------------------------  IN: 8200 mL / OUT: 7800 mL / NET: 400 mL        Appearance: Normal	  Cardiovascular: Normal S1 S2,RRR, No JVD, No murmurs  Respiratory: Lungs clear to auscultation	  Gastrointestinal:  Soft, Non-tender, + BS	  Extremities: Normal range of motion, No clubbing, cyanosis or edema      Home Medications:  aspirin 81 mg oral delayed release tablet: 1 tab(s) orally once a day (29 Dec 2020 18:37)  atorvastatin 80 mg oral tablet: 1 tab(s) orally once a day (at bedtime) (29 Dec 2020 18:37)  epoetin martine: injectable once a month (29 Dec 2020 18:37)  ferrous sulfate 325 mg (65 mg elemental iron) oral tablet: 1 tab(s) orally 3 times a day (29 Dec 2020 18:37)  gabapentin 100 mg oral capsule: 1 cap(s) orally once a day (29 Dec 2020 18:37)  nortriptyline 25 mg oral capsule: 1 cap(s) orally once a day (at bedtime) (29 Dec 2020 18:37)  pantoprazole 40 mg oral delayed release tablet: 1 tab(s) orally once a day (before a meal) (29 Dec 2020 18:37)  Toujeo SoloStar 300 units/mL subcutaneous solution: 60 unit(s) subcutaneous once a day (at bedtime) (08 Jan 2021 12:41)      MEDICATIONS  (STANDING):  aspirin enteric coated 81 milliGRAM(s) Oral daily  atorvastatin 80 milliGRAM(s) Oral at bedtime  cadexomer iodine 0.9% Gel 1 Application(s) Topical daily  chlorhexidine 0.12% Liquid 15 milliLiter(s) Oral Mucosa two times a day  chlorhexidine 2% Cloths 1 Application(s) Topical <User Schedule>  chlorhexidine 2% Cloths 1 Application(s) Topical daily  dextrose 40% Gel 15 Gram(s) Oral once  dextrose 5%. 1000 milliLiter(s) (50 mL/Hr) IV Continuous <Continuous>  dextrose 5%. 1000 milliLiter(s) (100 mL/Hr) IV Continuous <Continuous>  dextrose 50% Injectable 25 Gram(s) IV Push once  dextrose 50% Injectable 12.5 Gram(s) IV Push once  dextrose 50% Injectable 25 Gram(s) IV Push once  dronabinol 5 milliGRAM(s) Oral two times a day  ferrous    sulfate 325 milliGRAM(s) Oral three times a day  folic acid 1 milliGRAM(s) Oral daily  gentamicin 0.1% Ointment 1 Application(s) Topical <User Schedule>  glucagon  Injectable 1 milliGRAM(s) IntraMuscular once  heparin   Injectable 5000 Unit(s) SubCutaneous every 12 hours  insulin glargine Injectable (LANTUS) 5 Unit(s) SubCutaneous at bedtime  insulin lispro (ADMELOG) corrective regimen sliding scale   SubCutaneous three times a day before meals  insulin lispro (ADMELOG) corrective regimen sliding scale   SubCutaneous at bedtime  lidocaine   4% Patch 1 Patch Transdermal daily  melatonin 5 milliGRAM(s) Oral at bedtime  midodrine 30 milliGRAM(s) Oral every 8 hours  Nephro-kristi 1 Tablet(s) Oral daily  pantoprazole  Injectable 40 milliGRAM(s) IV Push daily  sevelamer carbonate 1600 milliGRAM(s) Oral three times a day  sodium chloride 0.9%. 1000 milliLiter(s) (50 mL/Hr) IV Continuous <Continuous>  thiamine IVPB 500 milliGRAM(s) IV Intermittent daily  ticagrelor 60 milliGRAM(s) Oral every 12 hours      TELEMETRY: 	    ECG:  	  RADIOLOGY:   DIAGNOSTIC TESTING:  [ ] Echocardiogram:  [ ]  Catheterization:  [ ] Stress Test:    OTHER: 	    LABS:	 	                            12.8   13.91 )-----------( 229      ( 30 Jul 2021 06:29 )             43.5     07-30    132<L>  |  94<L>  |  42<H>  ----------------------------<  149<H>  4.2   |  22  |  10.03<H>    Ca    8.7      30 Jul 2021 06:26  Phos  4.9     07-30  Mg     2.2     07-30    TPro  6.1  /  Alb  1.9<L>  /  TBili  0.8  /  DBili  x   /  AST  24  /  ALT  12  /  AlkPhos  129<H>  07-30

## 2021-07-30 NOTE — PROGRESS NOTE ADULT - ASSESSMENT
CATH 11/30/2020:  COMPLICATIONS: The stent was deployed without difficulty. On removal of the  balloon, the shaft broke and the tip of the balloon remained in the vessel. Several attempts were made to snare the balloon unsuccessfully. Dr. Verdugo was notifed who will take the patient to the operating room.  DIAGNOSTIC RECOMMENDATIONS: The patient should continue with the present medications. The patients vessel was stented without difficulty On removal of the balloon, the shaft broke with the baloon stuck in the left main. All attempts to snare the balloon out failed and the patient was taken to the OR by Dr. Arango to remove the balloon  introp eleonora 11/30/20: mild to mod MR, < from: Intra-Operative Transesophageal Echo (11.30.20 @ 17:02) >  Severe global left ventricular systolic dysfunction. Inferior and inferolateral akinesis.  severe hypokinesis of other segments.  Severe global hypokinesia.  Normal left ventricular internal dimensions and wall thicknesses. Moderate diastolic dysfunction (Stage II)  echo 12/17/20: EF 32%, mod MR, Severe global left ventricular systolic dysfunction, moderate pulmonary pressures  echo 12/20/20: EF (Visual Estimate):  25-30 % mild MR, Akinesis of the inferolateral, anterolateral, apical laterlal, inferior, and inferoseptal walls. Severe left ventricular enlargement. No left ventricular thrombus is seen.  Echo 7/7/21: EF 16%, mod - sev MR, mod AS, severe global lv sys dysfx, severe diastolic dysfx, mod pulm htn   ELEONORA 7/15/21: EF 24.5%,  Tethered mitral valve leaflets with normal opening. sev MR, mild to mod AR,  eccentric left ventricular hypertrophy; Dilated  TV annulus There are two jets seen  with venacontracta 0.4cm and 0.5q cm. Severe tricuspid regurgitation.  no evidence of valvular vegetation is seen. evere global leftventricular systolic dysfunction.        a/p  56 M well known to practice with PMH ESRD on PD, DM on insulin, CHF EF 25-30% CAD s/p CABG x4, underwent cardiac cath and stent and wire broke in heart s/p sternotomy p/w septic shock.    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  -med f/u    #Ischemic Cardiomyopathy. Chronic systolic HF  -Repeat echo noted with EF 16%, mod - sev MR, mod AS, severe global lv sys dysfx, severe diastolic dysfx, mod pulm htn ; eleonora with ef 24.5%   -pt and family now wants to get eval for ICD -- EP eval pending   -no bb, acei/arb given hx of orthostatic bp   -continue midodrine for bp support   -cont vol removal w PD     # CAD, s/p CABG, s/p PCI, s/p redo open heart for stent balloon retrieval   -hst elevated, likely demand ischemia in setting of septic shock, ESRD   -c/w asa, Brilinta, statin     # ESRD  -on PD  -renal f/u    #AMS  -neuro psych f/u    #GOC  -palliative f/u     dvt ppx

## 2021-07-30 NOTE — CONSULT NOTE ADULT - ATTENDING COMMENTS
Pt. is a 55M PMH ESRD on PD, DM on insulin, CHF EF 25-30% CAD s/p CABG x4, underwent cardiac cath and stent and wire broke in heart sternotomy admitted with cough/sweating found to be hypotensive and febrile in ER- admitted for presumed septic shock; cultures negative thus far. He has nausea and hiccups with some response to ativan. He can continue ativan (minimal dose, .125mg) as needed for hiccups.    Yaniv Fuller MD  [facility]
I know him well. I saw and examined him. Difficult situation. He is much worse than when I saw him in office a few months ago. His cardiac survival is less than one year. Wound healing will be an issue. Palliative care would be the best option. I will discuss with both him and his wife who will be in tomorrow. 30 minutes spent on visit
pt has excellent laryngeal sensation on scope.  There is a white lesion on the laryngeal surface of the left arytenoid, it appears similar to a granuloma, not typical of malignancy but has a smoking history so will follow closely.  No pain, not consistent with HSV or candida.  Suspect granuloma from repeated vomiting.  Will repeat scope in 2 weeks after PPI, if still present may recommend biopsy if there is growth - poor surgical candidate.  may be able to get awake biopsy from laryngology specialist.  Please call if still present in the hospital in 2 weeks or if discharged, f/up with ENT on dc.

## 2021-07-30 NOTE — PROGRESS NOTE ADULT - SUBJECTIVE AND OBJECTIVE BOX
Patient is a 56y old  Male who presents with a chief complaint of Hypotension (30 Jul 2021 14:41)      SUBJECTIVE / OVERNIGHT EVENTS: No new complaints.   Review of Systems  chest pain no  palpitations no  sob no  nausea no  headache no    MEDICATIONS  (STANDING):  aspirin enteric coated 81 milliGRAM(s) Oral daily  atorvastatin 80 milliGRAM(s) Oral at bedtime  cadexomer iodine 0.9% Gel 1 Application(s) Topical daily  chlorhexidine 0.12% Liquid 15 milliLiter(s) Oral Mucosa two times a day  chlorhexidine 2% Cloths 1 Application(s) Topical <User Schedule>  chlorhexidine 2% Cloths 1 Application(s) Topical daily  dextrose 40% Gel 15 Gram(s) Oral once  dextrose 5%. 1000 milliLiter(s) (50 mL/Hr) IV Continuous <Continuous>  dextrose 5%. 1000 milliLiter(s) (100 mL/Hr) IV Continuous <Continuous>  dextrose 50% Injectable 25 Gram(s) IV Push once  dextrose 50% Injectable 12.5 Gram(s) IV Push once  dextrose 50% Injectable 25 Gram(s) IV Push once  dronabinol 5 milliGRAM(s) Oral two times a day  ferrous    sulfate 325 milliGRAM(s) Oral three times a day  folic acid 1 milliGRAM(s) Oral daily  gentamicin 0.1% Ointment 1 Application(s) Topical <User Schedule>  glucagon  Injectable 1 milliGRAM(s) IntraMuscular once  heparin   Injectable 5000 Unit(s) SubCutaneous every 12 hours  insulin glargine Injectable (LANTUS) 5 Unit(s) SubCutaneous at bedtime  insulin lispro (ADMELOG) corrective regimen sliding scale   SubCutaneous three times a day before meals  insulin lispro (ADMELOG) corrective regimen sliding scale   SubCutaneous at bedtime  lidocaine   4% Patch 1 Patch Transdermal daily  melatonin 5 milliGRAM(s) Oral at bedtime  midodrine 30 milliGRAM(s) Oral every 8 hours  Nephro-kristi 1 Tablet(s) Oral daily  pantoprazole  Injectable 40 milliGRAM(s) IV Push daily  sevelamer carbonate 1600 milliGRAM(s) Oral three times a day  sodium chloride 0.9%. 1000 milliLiter(s) (50 mL/Hr) IV Continuous <Continuous>  thiamine IVPB 500 milliGRAM(s) IV Intermittent daily  ticagrelor 60 milliGRAM(s) Oral every 12 hours    MEDICATIONS  (PRN):  acetaminophen   Tablet .. 650 milliGRAM(s) Oral every 6 hours PRN Moderate Pain (4 - 6)  valproate sodium IVPB 125 milliGRAM(s) IV Intermittent every 8 hours PRN agitation      Vital Signs Last 24 Hrs  T(C): 36.7 (30 Jul 2021 16:30), Max: 36.8 (30 Jul 2021 00:42)  T(F): 98 (30 Jul 2021 16:30), Max: 98.2 (30 Jul 2021 00:42)  HR: 90 (30 Jul 2021 16:30) (81 - 94)  BP: 102/71 (30 Jul 2021 16:30) (90/60 - 106/74)  BP(mean): --  RR: 18 (30 Jul 2021 16:30) (18 - 18)  SpO2: 99% (30 Jul 2021 16:30) (96% - 100%)    PHYSICAL EXAM:  GENERAL: NAD, frail  HEAD:  Atraumatic, Normocephalic  EYES: EOMI, PERRLA, conjunctiva and sclera clear  NECK: Supple, No JVD  CHEST/LUNG: Clear to auscultation bilaterally; No wheeze  HEART: Regular rate and rhythm; No murmurs, rubs, or gallops  ABDOMEN: Soft, Nontender, Nondistended; Bowel sounds present PD in place.  EXTREMITIES:  2+ Peripheral Pulses, No clubbing, cyanosis, or edema  PSYCH: confused  NEUROLOGY: non-focal  SKIN: No rashes or lesions    LABS:                        12.8   13.91 )-----------( 229      ( 30 Jul 2021 06:29 )             43.5     07-30    132<L>  |  94<L>  |  42<H>  ----------------------------<  149<H>  4.2   |  22  |  10.03<H>    Ca    8.7      30 Jul 2021 06:26  Phos  4.9     07-30  Mg     2.2     07-30    TPro  6.1  /  Alb  1.9<L>  /  TBili  0.8  /  DBili  x   /  AST  24  /  ALT  12  /  AlkPhos  129<H>  07-30                RADIOLOGY & ADDITIONAL TESTS:    Imaging Personally Reviewed:    Consultant(s) Notes Reviewed:      Care Discussed with Consultants/Other Providers:

## 2021-07-30 NOTE — PROGRESS NOTE ADULT - SUBJECTIVE AND OBJECTIVE BOX
[facility]OLOGY[facility] [phone]        Patient seen and examined in bed.  He was alert today and awaiting for his wife to bring him food         MEDICATIONS  (STANDING):  aspirin enteric coated 81 milliGRAM(s) Oral daily  atorvastatin 80 milliGRAM(s) Oral at bedtime  cadexomer iodine 0.9% Gel 1 Application(s) Topical daily  chlorhexidine 0.12% Liquid 15 milliLiter(s) Oral Mucosa two times a day  chlorhexidine 2% Cloths 1 Application(s) Topical <User Schedule>  chlorhexidine 2% Cloths 1 Application(s) Topical daily  dextrose 40% Gel 15 Gram(s) Oral once  dextrose 5%. 1000 milliLiter(s) (50 mL/Hr) IV Continuous <Continuous>  dextrose 5%. 1000 milliLiter(s) (100 mL/Hr) IV Continuous <Continuous>  dextrose 50% Injectable 25 Gram(s) IV Push once  dextrose 50% Injectable 12.5 Gram(s) IV Push once  dextrose 50% Injectable 25 Gram(s) IV Push once  dronabinol 5 milliGRAM(s) Oral two times a day  ferrous    sulfate 325 milliGRAM(s) Oral three times a day  folic acid 1 milliGRAM(s) Oral daily  gentamicin 0.1% Ointment 1 Application(s) Topical <User Schedule>  glucagon  Injectable 1 milliGRAM(s) IntraMuscular once  heparin   Injectable 5000 Unit(s) SubCutaneous every 12 hours  insulin glargine Injectable (LANTUS) 5 Unit(s) SubCutaneous at bedtime  insulin lispro (ADMELOG) corrective regimen sliding scale   SubCutaneous three times a day before meals  insulin lispro (ADMELOG) corrective regimen sliding scale   SubCutaneous at bedtime  lidocaine   4% Patch 1 Patch Transdermal daily  melatonin 5 milliGRAM(s) Oral at bedtime  midodrine 30 milliGRAM(s) Oral every 8 hours  Nephro-kristi 1 Tablet(s) Oral daily  pantoprazole  Injectable 40 milliGRAM(s) IV Push daily  sevelamer carbonate 1600 milliGRAM(s) Oral three times a day  sodium chloride 0.9%. 1000 milliLiter(s) (50 mL/Hr) IV Continuous <Continuous>  thiamine IVPB 500 milliGRAM(s) IV Intermittent daily  ticagrelor 60 milliGRAM(s) Oral every 12 hours      VITAL:  T(C): , Max: 36.8 (07-30-21 @ 00:42)  T(F): , Max: 98.2 (07-30-21 @ 00:42)  HR: 89 (07-30-21 @ 09:00)  BP: 101/67 (07-30-21 @ 09:00)  BP(mean): --  RR: 18 (07-30-21 @ 09:00)  SpO2: 97% (07-30-21 @ 09:00)  Wt(kg): --    I and O's:    07-29 @ 07:01  -  07-30 @ 07:00  --------------------------------------------------------  IN: 8200 mL / OUT: 7800 mL / NET: 400 mL          PHYSICAL EXAM:    Constitutional: NAD  Neck:  No JVD  Respiratory: CTAB/L  Cardiovascular: S1 and S2  Gastrointestinal: BS+, soft, NT/ND  Extremities: No peripheral edema  Neurological: A/O x 3, no focal deficits  Psychiatric: Normal mood, normal affect  : No Johnson  Skin: No rashes  Access: Not applicable    LABS:                        12.8   13.91 )-----------( 229      ( 30 Jul 2021 06:29 )             43.5     07-30    132<L>  |  94<L>  |  42<H>  ----------------------------<  149<H>  4.2   |  22  |  10.03<H>    Ca    8.7      30 Jul 2021 06:26  Phos  4.9     07-30  Mg     2.2     07-30    TPro  6.1  /  Alb  1.9<L>  /  TBili  0.8  /  DBili  x   /  AST  24  /  ALT  12  /  AlkPhos  129<H>  07-30          Urine Studies:          RADIOLOGY & ADDITIONAL STUDIES:

## 2021-07-30 NOTE — PROGRESS NOTE ADULT - ASSESSMENT
56 M PMH ESRD on PD, DM on insulin, CHF EF 25-30% CAD s/p CABG x4 pw with hypotension with fever. Encephalopathy likely related to cefepime ? no obvious source of sepsis apparent as of now, KLAUDIA: Overall thickened valves seen however, no evidence of valvular vegetation is seen.     Hypotension:  on Midodrine for hemodynamic support:      ESRD on PD. Patient TW  61.5kg  Hyponatremia- Na+ improving.  poor po protein intake  ID-Blood cx and PD fluid cx -   off IV antibiotics   Hyperphosphatemia:     c/ w binders as prescribed   Failure to thrive     RECOMMENDATIONS   - CAPD today and only 3 exchanges  - Palliative care follow up noted.  Functional quadriplegic   - continue Renvela to 1,600 mg PO TID;   - continue Midodrine  30mg po q 8 hours   - No peg per family   -Switch Nepro to Glucerna as he refused Nepro          nurse     [facility]   [phone]

## 2021-07-30 NOTE — CONSULT NOTE ADULT - ASSESSMENT
55 ill appearing M with PMH ESRD on PD, DM on insulin, R toe dry gangrene, CHF EF 25-30% (patient of Dr. Best), CAD s/p CABG x4 4/2019 at [facility], underwent cardiac cath 11/2020 2/2 CP and abnormal nuclear stress resulting in successful deployment of stent with retained balloon s/p removal of the angioplasty balloon and fractured wire via sternotomy and transverse aortotomy. He presents to [facility] ED 7/6/21 c/o  increased cough and chills, found to be hypotensive, admitted to MICU,  CT revealing subtle infiltrates treated with Cefepime. This admission has been complicated by AMS/encephelopathy, EEG (-) for seizure. Patient with poor PO intake (patient refusing PEG, no evidence of dysphagia/odynophagia rather patient is selective about what he eats). Patient was seen and evaluated in 12/2021 in Dr. Dejesus's clinic for primary prevention ICD, it was discussed at that time and the patient was refusing at that time. Palliative following to facilitate GOC, KLAUDIA performed on this admission 7/15/21 revealing severely decreased LV systolic function EF 25%. In 12/2020 EF measured at 25-30%.    1) Advanced HF   2) ESRD on PD   3) Poor PO intake    -D/w Dr. Dejesus, patient at this time not a candidate for primary prevention ICD secondary to poor PO intake/nutrition, delayed wound healing. Not ideal candidate for S-ICD secondary to thin frame      [phone]

## 2021-07-30 NOTE — CONSULT NOTE ADULT - SUBJECTIVE AND OBJECTIVE BOX
CHIEF COMPLAINT: HFrEF    HISTORY OF PRESENT ILLNESS:  55M with PMH ESRD on PD, DM on insulin, R toe dry gangrene, CHF EF 25-30% (patient of Dr. Best), CAD s/p CABG x4 4/2019 at [facility], underwent cardiac cath 11/2020 2/2 CP and abnormal nuclear stress resulting in successful deployment of stent with retained balloon s/p removal of the angioplasty balloon and fractured wire via sternotomy and transverse aortotomy, presents to [facility] ED 7/6/21 c/o  increased cough and chills but no measured fevers at home In the ED, patient was found to be hypotensive SBP 70's, admitted to MICU 2/2 hypotension requiring for pressor support likely  2/2 to sepsis w/o clear etiology as blood/peritoneal cultures negative, CT revealing subtle infiltrates treated with Cefepime. He has since been downgraded to the floor. This admission has been complicated by AMS/encephelopathy, EEG (-) for seizure. Patient with poor PO intake (patient refusing PEG, no evidence of dysphagia/odynophagia rather patient is selective about what he eats). Patient refusing primary prevention ICD in the past; however, after discussion with his family yesterday is more amenable. Palliative following to facilitate GOC. KLAUDIA performed on this admission 7/15/21 revealing severe MR, severe TR, mild mod AR, decreased RV systolic function and severely decreased LV systolic function EF 25%.       Allergies    doxazosin (Other)    Intolerances    	    MEDICATIONS:  aspirin enteric coated 81 milliGRAM(s) Oral daily  heparin   Injectable 5000 Unit(s) SubCutaneous every 12 hours  midodrine 30 milliGRAM(s) Oral every 8 hours  ticagrelor 60 milliGRAM(s) Oral every 12 hours        acetaminophen   Tablet .. 650 milliGRAM(s) Oral every 6 hours PRN  dronabinol 5 milliGRAM(s) Oral two times a day  melatonin 5 milliGRAM(s) Oral at bedtime  valproate sodium IVPB 125 milliGRAM(s) IV Intermittent every 8 hours PRN    pantoprazole  Injectable 40 milliGRAM(s) IV Push daily    atorvastatin 80 milliGRAM(s) Oral at bedtime  dextrose 40% Gel 15 Gram(s) Oral once  dextrose 50% Injectable 25 Gram(s) IV Push once  dextrose 50% Injectable 12.5 Gram(s) IV Push once  dextrose 50% Injectable 25 Gram(s) IV Push once  glucagon  Injectable 1 milliGRAM(s) IntraMuscular once  insulin glargine Injectable (LANTUS) 5 Unit(s) SubCutaneous at bedtime  insulin lispro (ADMELOG) corrective regimen sliding scale   SubCutaneous three times a day before meals  insulin lispro (ADMELOG) corrective regimen sliding scale   SubCutaneous at bedtime    cadexomer iodine 0.9% Gel 1 Application(s) Topical daily  chlorhexidine 0.12% Liquid 15 milliLiter(s) Oral Mucosa two times a day  chlorhexidine 2% Cloths 1 Application(s) Topical <User Schedule>  chlorhexidine 2% Cloths 1 Application(s) Topical daily  dextrose 5%. 1000 milliLiter(s) IV Continuous <Continuous>  dextrose 5%. 1000 milliLiter(s) IV Continuous <Continuous>  ferrous    sulfate 325 milliGRAM(s) Oral three times a day  folic acid 1 milliGRAM(s) Oral daily  gentamicin 0.1% Ointment 1 Application(s) Topical <User Schedule>  lidocaine   4% Patch 1 Patch Transdermal daily  Nephro-kristi 1 Tablet(s) Oral daily  sodium chloride 0.9%. 1000 milliLiter(s) IV Continuous <Continuous>  thiamine IVPB 500 milliGRAM(s) IV Intermittent daily      PAST MEDICAL & SURGICAL HISTORY:  Diabetes    CAD, multiple vessel    ESRD (end stage renal disease) on dialysis    HTN (hypertension)    S/P CABG (coronary artery bypass graft)        FAMILY HISTORY:  FHx: lung cancer (Sibling)  bother    FH: diabetes mellitus (Sibling)  father        SOCIAL HISTORY:    [ ]Non-smoker  [ ] Smoker  [ ] Alcohol      REVIEW OF SYSTEMS:  See HPI. Otherwise, 10 point ROS done and otherwise negative.    PHYSICAL EXAM:  T(C): 36.6 (07-30-21 @ 09:00), Max: 36.8 (07-30-21 @ 00:42)  HR: 89 (07-30-21 @ 09:00) (88 - 94)  BP: 101/67 (07-30-21 @ 09:00) (90/60 - 103/81)  RR: 18 (07-30-21 @ 09:00) (18 - 18)  SpO2: 97% (07-30-21 @ 09:00) (96% - 100%)  Wt(kg): --  I&O's Summary    29 Jul 2021 07:01  -  30 Jul 2021 07:00  --------------------------------------------------------  IN: 8200 mL / OUT: 7800 mL / NET: 400 mL        Appearance: Alert. NAD	  HEENT:   NC/AT	  Cardiovascular: +S1S2 RRR no m/g/r  Respiratory: CTA B/L	  Psychiatry: A & O x 3, Mood & affect appropriate  Gastrointestinal:  Soft, NT.ND., + BS	  Skin: No rashes	  Neurologic: Non-focal  Extremities: No edema BLE  Vascular: Peripheral pulses palpable 2+ bilaterally      LABS:	 	    CBC Full  -  ( 30 Jul 2021 06:29 )  WBC Count : 13.91 K/uL  Hemoglobin : 12.8 g/dL  Hematocrit : 43.5 %  Platelet Count - Automated : 229 K/uL  Mean Cell Volume : 98.2 fl  Mean Cell Hemoglobin : 28.9 pg  Mean Cell Hemoglobin Concentration : 29.4 gm/dL  Auto Neutrophil # : 10.07 K/uL  Auto Lymphocyte # : 1.20 K/uL  Auto Monocyte # : 2.16 K/uL  Auto Eosinophil # : 0.36 K/uL  Auto Basophil # : 0.13 K/uL  Auto Neutrophil % : 72.4 %  Auto Lymphocyte % : 8.6 %  Auto Monocyte % : 15.5 %  Auto Eosinophil % : 2.6 %  Auto Basophil % : 0.9 %    07-30    132<L>  |  94<L>  |  42<H>  ----------------------------<  149<H>  4.2   |  22  |  10.03<H>  07-29    135  |  94<L>  |  41<H>  ----------------------------<  107<H>  3.6   |  23  |  10.56<H>    Ca    8.7      30 Jul 2021 06:26  Ca    8.7      29 Jul 2021 06:10  Phos  4.9     07-30  Phos  4.6     07-29  Mg     2.2     07-30  Mg     2.1     07-29    TPro  6.1  /  Alb  1.9<L>  /  TBili  0.8  /  DBili  x   /  AST  24  /  ALT  12  /  AlkPhos  129<H>  07-30  TPro  6.6  /  Alb  2.3<L>  /  TBili  0.8  /  DBili  x   /  AST  21  /  ALT  12  /  AlkPhos  131<H>  07-29      proBNP:   Lipid Profile:   HgA1c:   TSH:       CARDIAC MARKERS:                TELEMETRY: 	    ECG:  	  RADIOLOGY:  OTHER: 	    PREVIOUS DIAGNOSTIC TESTING:    Echocardiogram:    Catheterization:    Stress Test:  	  	  ASSESSMENT/PLAN: 	     CHIEF COMPLAINT: HFrEF    HISTORY OF PRESENT ILLNESS:  55M with PMH ESRD on PD, DM on insulin, R toe dry gangrene, CHF EF 25-30% (patient of Dr. Best), CAD s/p CABG x4 4/2019 at [facility], underwent cardiac cath 11/2020 2/2 CP and abnormal nuclear stress resulting in successful deployment of stent with retained balloon s/p removal of the angioplasty balloon and fractured wire via sternotomy and transverse aortotomy, presents to [facility] ED 7/6/21 c/o  increased cough and chills but no measured fevers at home In the ED, patient was found to be hypotensive SBP 70's, admitted to MICU 2/2 hypotension requiring for pressor support likely  2/2 to sepsis w/o clear etiology as blood/peritoneal cultures negative, CT revealing subtle infiltrates treated with Cefepime. He has since been downgraded to the floor. This admission has been complicated by AMS/encephelopathy, EEG (-) for seizure. Patient with poor PO intake (patient refusing PEG, no evidence of dysphagia/odynophagia rather patient is selective about what he eats). Patient refusing primary prevention ICD in the past; however, after discussion with his family yesterday is more amenable. Palliative following to facilitate GOC. KLAUDIA performed on this admission 7/15/21 revealing severe MR, severe TR, mild mod AR, decreased RV systolic function and severely decreased LV systolic function EF 25%. In 12/2020 EF measured at 25-30%. Patient was seen and evaluated in 12/2021 in Dr. Dejesus's clinic for primary prevention ICD, it was discussed at that time and the patient expressed interest.       Allergies    doxazosin (Other)    Intolerances    	    MEDICATIONS:  aspirin enteric coated 81 milliGRAM(s) Oral daily  heparin   Injectable 5000 Unit(s) SubCutaneous every 12 hours  midodrine 30 milliGRAM(s) Oral every 8 hours  ticagrelor 60 milliGRAM(s) Oral every 12 hours        acetaminophen   Tablet .. 650 milliGRAM(s) Oral every 6 hours PRN  dronabinol 5 milliGRAM(s) Oral two times a day  melatonin 5 milliGRAM(s) Oral at bedtime  valproate sodium IVPB 125 milliGRAM(s) IV Intermittent every 8 hours PRN    pantoprazole  Injectable 40 milliGRAM(s) IV Push daily    atorvastatin 80 milliGRAM(s) Oral at bedtime  dextrose 40% Gel 15 Gram(s) Oral once  dextrose 50% Injectable 25 Gram(s) IV Push once  dextrose 50% Injectable 12.5 Gram(s) IV Push once  dextrose 50% Injectable 25 Gram(s) IV Push once  glucagon  Injectable 1 milliGRAM(s) IntraMuscular once  insulin glargine Injectable (LANTUS) 5 Unit(s) SubCutaneous at bedtime  insulin lispro (ADMELOG) corrective regimen sliding scale   SubCutaneous three times a day before meals  insulin lispro (ADMELOG) corrective regimen sliding scale   SubCutaneous at bedtime    cadexomer iodine 0.9% Gel 1 Application(s) Topical daily  chlorhexidine 0.12% Liquid 15 milliLiter(s) Oral Mucosa two times a day  chlorhexidine 2% Cloths 1 Application(s) Topical <User Schedule>  chlorhexidine 2% Cloths 1 Application(s) Topical daily  dextrose 5%. 1000 milliLiter(s) IV Continuous <Continuous>  dextrose 5%. 1000 milliLiter(s) IV Continuous <Continuous>  ferrous    sulfate 325 milliGRAM(s) Oral three times a day  folic acid 1 milliGRAM(s) Oral daily  gentamicin 0.1% Ointment 1 Application(s) Topical <User Schedule>  lidocaine   4% Patch 1 Patch Transdermal daily  Nephro-kristi 1 Tablet(s) Oral daily  sodium chloride 0.9%. 1000 milliLiter(s) IV Continuous <Continuous>  thiamine IVPB 500 milliGRAM(s) IV Intermittent daily      PAST MEDICAL & SURGICAL HISTORY:  Diabetes    CAD, multiple vessel    ESRD (end stage renal disease) on dialysis    HTN (hypertension)    S/P CABG (coronary artery bypass graft)        FAMILY HISTORY:  FHx: lung cancer (Sibling)  bother    FH: diabetes mellitus (Sibling)  father        SOCIAL HISTORY:    [ ]Non-smoker  [ ] Smoker  [ ] Alcohol      REVIEW OF SYSTEMS:  See HPI. Otherwise, 10 point ROS done and otherwise negative.    PHYSICAL EXAM:  T(C): 36.6 (07-30-21 @ 09:00), Max: 36.8 (07-30-21 @ 00:42)  HR: 89 (07-30-21 @ 09:00) (88 - 94)  BP: 101/67 (07-30-21 @ 09:00) (90/60 - 103/81)  RR: 18 (07-30-21 @ 09:00) (18 - 18)  SpO2: 97% (07-30-21 @ 09:00) (96% - 100%)  Wt(kg): --  I&O's Summary    29 Jul 2021 07:01  -  30 Jul 2021 07:00  --------------------------------------------------------  IN: 8200 mL / OUT: 7800 mL / NET: 400 mL        Appearance: Alert. NAD	  HEENT:   NC/AT	  Cardiovascular: +S1S2 RRR no m/g/r  Respiratory: CTA B/L	  Psychiatry: A & O x 3, Mood & affect appropriate  Gastrointestinal:  Soft, NT.ND., + BS	  Skin: No rashes	  Neurologic: Non-focal  Extremities: No edema BLE  Vascular: Peripheral pulses palpable 2+ bilaterally      LABS:	 	    CBC Full  -  ( 30 Jul 2021 06:29 )  WBC Count : 13.91 K/uL  Hemoglobin : 12.8 g/dL  Hematocrit : 43.5 %  Platelet Count - Automated : 229 K/uL  Mean Cell Volume : 98.2 fl  Mean Cell Hemoglobin : 28.9 pg  Mean Cell Hemoglobin Concentration : 29.4 gm/dL  Auto Neutrophil # : 10.07 K/uL  Auto Lymphocyte # : 1.20 K/uL  Auto Monocyte # : 2.16 K/uL  Auto Eosinophil # : 0.36 K/uL  Auto Basophil # : 0.13 K/uL  Auto Neutrophil % : 72.4 %  Auto Lymphocyte % : 8.6 %  Auto Monocyte % : 15.5 %  Auto Eosinophil % : 2.6 %  Auto Basophil % : 0.9 %    07-30    132<L>  |  94<L>  |  42<H>  ----------------------------<  149<H>  4.2   |  22  |  10.03<H>  07-29    135  |  94<L>  |  41<H>  ----------------------------<  107<H>  3.6   |  23  |  10.56<H>    Ca    8.7      30 Jul 2021 06:26  Ca    8.7      29 Jul 2021 06:10  Phos  4.9     07-30  Phos  4.6     07-29  Mg     2.2     07-30  Mg     2.1     07-29    TPro  6.1  /  Alb  1.9<L>  /  TBili  0.8  /  DBili  x   /  AST  24  /  ALT  12  /  AlkPhos  129<H>  07-30  TPro  6.6  /  Alb  2.3<L>  /  TBili  0.8  /  DBili  x   /  AST  21  /  ALT  12  /  AlkPhos  131<H>  07-29      proBNP:   Lipid Profile:   HgA1c:   TSH:       CARDIAC MARKERS:                TELEMETRY: 	    ECG:  	  RADIOLOGY:  OTHER: 	    PREVIOUS DIAGNOSTIC TESTING:    Echocardiogram:    Catheterization:    Stress Test:  	  	  ASSESSMENT/PLAN: 	     CHIEF COMPLAINT: HFrEF    HISTORY OF PRESENT ILLNESS:  55 ill appearing M with PMH ESRD on PD, DM on insulin, R toe dry gangrene, CHF EF 25-30% (patient of Dr. Best), CAD s/p CABG x4 4/2019 at [facility], underwent cardiac cath 11/2020 2/2 CP and abnormal nuclear stress resulting in successful deployment of stent with retained balloon s/p removal of the angioplasty balloon and fractured wire via sternotomy and transverse aortotomy. He pesents to [facility] ED 7/6/21 c/o  increased cough and chills but no measured fevers at home. In the ED, patient was found to be hypotensive SBP 70's, admitted to MICU 2/2 hypotension requiring for pressor support likely  2/2 to sepsis w/o clear etiology as blood/peritoneal cultures negative, CT revealing subtle infiltrates treated with Cefepime. He has since been downgraded to the floor. This admission has been complicated by AMS/encephelopathy, EEG (-) for seizure. Patient with poor PO intake (patient refusing PEG, no evidence of dysphagia/odynophagia rather patient is selective about what he eats). Patient's family is bringing him food from home, but is eating small amounts of that at best. Patient was seen and evaluated in 12/2021 in Dr. Dejesus's clinic for primary prevention ICD, it was discussed at that time and the patient was refusing at that time. Palliative following to facilitate GOC; however, after discussion with his family yesterday is more amenable to ICD. KLAUDIA performed on this admission 7/15/21 revealing severe MR, severe TR, mild mod AR, decreased RV systolic function and severely decreased LV systolic function EF 25%. In 12/2020 EF measured at 25-30%. Patient currently endorses weakness, says he is unable to walk as he is so weak. At home, the patient is cared for his wife and HHA. Prior to admission, he was dependent on a walker for mobility.     Allergies    doxazosin (Other)    Intolerances    	    MEDICATIONS:  aspirin enteric coated 81 milliGRAM(s) Oral daily  heparin   Injectable 5000 Unit(s) SubCutaneous every 12 hours  midodrine 30 milliGRAM(s) Oral every 8 hours  ticagrelor 60 milliGRAM(s) Oral every 12 hours        acetaminophen   Tablet .. 650 milliGRAM(s) Oral every 6 hours PRN  dronabinol 5 milliGRAM(s) Oral two times a day  melatonin 5 milliGRAM(s) Oral at bedtime  valproate sodium IVPB 125 milliGRAM(s) IV Intermittent every 8 hours PRN    pantoprazole  Injectable 40 milliGRAM(s) IV Push daily    atorvastatin 80 milliGRAM(s) Oral at bedtime  dextrose 40% Gel 15 Gram(s) Oral once  dextrose 50% Injectable 25 Gram(s) IV Push once  dextrose 50% Injectable 12.5 Gram(s) IV Push once  dextrose 50% Injectable 25 Gram(s) IV Push once  glucagon  Injectable 1 milliGRAM(s) IntraMuscular once  insulin glargine Injectable (LANTUS) 5 Unit(s) SubCutaneous at bedtime  insulin lispro (ADMELOG) corrective regimen sliding scale   SubCutaneous three times a day before meals  insulin lispro (ADMELOG) corrective regimen sliding scale   SubCutaneous at bedtime    cadexomer iodine 0.9% Gel 1 Application(s) Topical daily  chlorhexidine 0.12% Liquid 15 milliLiter(s) Oral Mucosa two times a day  chlorhexidine 2% Cloths 1 Application(s) Topical <User Schedule>  chlorhexidine 2% Cloths 1 Application(s) Topical daily  dextrose 5%. 1000 milliLiter(s) IV Continuous <Continuous>  dextrose 5%. 1000 milliLiter(s) IV Continuous <Continuous>  ferrous    sulfate 325 milliGRAM(s) Oral three times a day  folic acid 1 milliGRAM(s) Oral daily  gentamicin 0.1% Ointment 1 Application(s) Topical <User Schedule>  lidocaine   4% Patch 1 Patch Transdermal daily  Nephro-kristi 1 Tablet(s) Oral daily  sodium chloride 0.9%. 1000 milliLiter(s) IV Continuous <Continuous>  thiamine IVPB 500 milliGRAM(s) IV Intermittent daily      PAST MEDICAL & SURGICAL HISTORY:  Diabetes    CAD, multiple vessel    ESRD (end stage renal disease) on dialysis    HTN (hypertension)    S/P CABG (coronary artery bypass graft)        FAMILY HISTORY:  FHx: lung cancer (Sibling)  bother    FH: diabetes mellitus (Sibling)  father        SOCIAL HISTORY:    [ ]Non-smoker  [ ] Smoker  [ ] Alcohol      REVIEW OF SYSTEMS:  See HPI. Otherwise, 10 point ROS done and otherwise negative.    PHYSICAL EXAM:  T(C): 36.6 (07-30-21 @ 09:00), Max: 36.8 (07-30-21 @ 00:42)  HR: 89 (07-30-21 @ 09:00) (88 - 94)  BP: 101/67 (07-30-21 @ 09:00) (90/60 - 103/81)  RR: 18 (07-30-21 @ 09:00) (18 - 18)  SpO2: 97% (07-30-21 @ 09:00) (96% - 100%)  Wt(kg): --  I&O's Summary    29 Jul 2021 07:01  -  30 Jul 2021 07:00  --------------------------------------------------------  IN: 8200 mL / OUT: 7800 mL / NET: 400 mL        Appearance: Alert. NAD	  Cardiovascular: +S1S2 RRR no m/g/r  Respiratory: CTA B/L	  Gastrointestinal:  Soft, NT, ND	  Skin: midsternal incision well healed, R infraclavicular site well healed 	  Extremities: No edema B/L LE, chronic appearing LE wounds, gangrene R toe  Vascular: Peripheral pulses palpable 2+ bilaterally      LABS:	 	    CBC Full  -  ( 30 Jul 2021 06:29 )  WBC Count : 13.91 K/uL  Hemoglobin : 12.8 g/dL  Hematocrit : 43.5 %  Platelet Count - Automated : 229 K/uL  Mean Cell Volume : 98.2 fl  Mean Cell Hemoglobin : 28.9 pg  Mean Cell Hemoglobin Concentration : 29.4 gm/dL  Auto Neutrophil # : 10.07 K/uL  Auto Lymphocyte # : 1.20 K/uL  Auto Monocyte # : 2.16 K/uL  Auto Eosinophil # : 0.36 K/uL  Auto Basophil # : 0.13 K/uL  Auto Neutrophil % : 72.4 %  Auto Lymphocyte % : 8.6 %  Auto Monocyte % : 15.5 %  Auto Eosinophil % : 2.6 %  Auto Basophil % : 0.9 %    07-30    132<L>  |  94<L>  |  42<H>  ----------------------------<  149<H>  4.2   |  22  |  10.03<H>  07-29    135  |  94<L>  |  41<H>  ----------------------------<  107<H>  3.6   |  23  |  10.56<H>    Ca    8.7      30 Jul 2021 06:26  Ca    8.7      29 Jul 2021 06:10  Phos  4.9     07-30  Phos  4.6     07-29  Mg     2.2     07-30  Mg     2.1     07-29    TPro  6.1  /  Alb  1.9<L>  /  TBili  0.8  /  DBili  x   /  AST  24  /  ALT  12  /  AlkPhos  129<H>  07-30  TPro  6.6  /  Alb  2.3<L>  /  TBili  0.8  /  DBili  x   /  AST  21  /  ALT  12  /  AlkPhos  131<H>  07-29      CARDIAC MARKERS:    ECG:  	< from: 12 Lead ECG (07.22.21 @ 21:28) >  Ventricular Rate 99 BPM    Atrial Rate 99 BPM    P-R Interval 178 ms    QRS Duration 164 ms    Q-T Interval 428 ms    QTC Calculation(Bazett) 549 ms    P Axis 107 degrees    R Axis -74 degrees    T Axis 91 degrees    Diagnosis Line SINUS RHYTHM WITH FUSION COMPLEXES  RIGHT BUNDLE BRANCH BLOCK  LEFT ANTERIOR FASCICULAR BLOCK  *** BIFASCICULAR BLOCK ***  WHEN COMPARED WITH ECG OF 22-JUL-2021 21:28, (UNCONFIRMED)  SIGNIFICANT CHANGES HAVE OCCURRED  Confirmed by WAQAS AGUILLON MAY ([id number]) on 7/24/2021 11:53:23 AM    < end of copied text >    RADIOLOGY: < from: Xray Chest 1 View- PORTABLE-Urgent (Xray Chest 1 View- PORTABLE-Urgent .) (07.06.21 @ 05:26) >  IMPRESSION:  Low lung volumes.    New ill-defined right upper lung nodular opacities, of indeterminate nature. Infection or neoplasm are possible. Suggest short-term follow-up chest x-ray to evaluate for resolution. If findings persist then consider a CT scan of the chest for further evaluation.    Left mid and lower lung linear atelectasis.    Pneumoperitoneum which may be attributable to ongoing peritoneal dialysis.    < end of copied text >      PREVIOUS DIAGNOSTIC TESTING:    Echocardiogram: < from: Transesophageal Echocardiogram (07.15.21 @ 18:43) >  Conclusions:  1. Tethered mitral valve leaflets with normal opening.  Mitral annular calcification. Severe mitral regurgitation.  There are 2 major jets  seen one at at the medial sapect of  P2 and one at the lateral aspect of P2. MR 3D ERO 0.27sqcm  and 0.23sqcm.  By PISA ERO 0.27sq cm (Radius 0.7 , alising  velocity 34cm/sec, Vmax 424cm/s)  also 2nd jet ERO 0.2sqcm  (radius 0.62) Regurgitant volume 55-60cc.  2. Calcified trileaflet aortic valve with normal opening.  Mild-moderate aortic regurgitation.  3. Eccentric left ventricular hypertrophy (dilated left  ventricle with normal relative wall thickness). Moderate  left ventricular enlargement. EDV 198cc, ESV 141cc  4. Severe global leftventricular systolic dysfunction.  5. Right ventricular enlargement with decreased right  ventricular systolic function.  6. Dilated  TV annulus There are two jets seen  with vena  contracta 0.4cm and 0.5q cm. Severe tricuspid  regurgitation.  7. Overall thickened valves seen however, no evidence of  valvular vegetation is seen.    < end of copied text >    < from: TTE with Doppler (w/Cont) (07.07.21 @ 10:34) >  Conclusions:  1. Moderate-severe mitral regurgitation.  2. Severely dilated left atrium.  LA volume index = 53  cc/m2.  3. Moderate left ventricular enlargement.  4. Severe global left ventricular systolic dysfunction.  Endocardial visualization enhanced with intravenous  injection of Ultrasonic Enhancing Agent (Definity).  No  left ventricular thrombus.  5. Severe  diastolic dysfunction (Stage III).   Increased  E/e'  is consistent with elevated left ventricular filling  pressure.  6. Moderate right atrial enlargement.  7. Right ventricular enlargement with decreased right  ventricular systolic function.  8. Normal tricuspid valve. Mild-moderate tricuspid  regurgitation.  9. Estimated pulmonary artery systolic pressure equals 57  mm Hg, assuming right atrial pressure equals 8 mm Hg,  consistent with moderate pulmonary pressures.  *** Compared with echocardiogram of 12/30/2020, further  decline in LV systolic function.    < end of copied text >

## 2021-07-31 NOTE — PROGRESS NOTE ADULT - SUBJECTIVE AND OBJECTIVE BOX
I know Lavell Miles well having seen him before in both the hospital and the office. Several months ago when he was somewhat better than now, he had been offered a Subcutaneous ICD but did not want it. He has significantly deteriorated since then. His recent echo showed an LVEf of 10-15% with severe dilatation, severe MR, severe TR and PASP of > 60. He is essentially bed bound with limited walking from bed to bathroom. He is NYHA Class III at best. He is chronically on PD for ESRD. His nutritional status is poor with frequent anorexia most likely due to heart failure. I had a long rico and difficult discussion with him and his wife, who is quite insightful. He is not a candidate for an ICD any longer and will likely need palliative care. She comprehends that his longevity is limited to less than one year. 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  Discussed with Dr. Beltran Best as well. Total time spent 70 minutes

## 2021-07-31 NOTE — PROGRESS NOTE ADULT - SUBJECTIVE AND OBJECTIVE BOX
[facility]-Corrina Tadeo NP- PROGRESS NOTE    Patient seen and examined in bed in NAD. Offers no complaints. Poor appetite.       Hospital Medications:   MEDICATIONS  (STANDING):  aspirin enteric coated 81 milliGRAM(s) Oral daily  atorvastatin 80 milliGRAM(s) Oral at bedtime  cadexomer iodine 0.9% Gel 1 Application(s) Topical daily  chlorhexidine 0.12% Liquid 15 milliLiter(s) Oral Mucosa two times a day  chlorhexidine 2% Cloths 1 Application(s) Topical <User Schedule>  chlorhexidine 2% Cloths 1 Application(s) Topical daily  dextrose 40% Gel 15 Gram(s) Oral once  dextrose 5%. 1000 milliLiter(s) (50 mL/Hr) IV Continuous <Continuous>  dextrose 5%. 1000 milliLiter(s) (100 mL/Hr) IV Continuous <Continuous>  dextrose 50% Injectable 25 Gram(s) IV Push once  dextrose 50% Injectable 12.5 Gram(s) IV Push once  dextrose 50% Injectable 25 Gram(s) IV Push once  dronabinol 5 milliGRAM(s) Oral two times a day  ferrous    sulfate 325 milliGRAM(s) Oral three times a day  folic acid 1 milliGRAM(s) Oral daily  gentamicin 0.1% Ointment 1 Application(s) Topical <User Schedule>  glucagon  Injectable 1 milliGRAM(s) IntraMuscular once  heparin   Injectable 5000 Unit(s) SubCutaneous every 12 hours  insulin glargine Injectable (LANTUS) 5 Unit(s) SubCutaneous at bedtime  insulin lispro (ADMELOG) corrective regimen sliding scale   SubCutaneous three times a day before meals  insulin lispro (ADMELOG) corrective regimen sliding scale   SubCutaneous at bedtime  lidocaine   4% Patch 1 Patch Transdermal daily  melatonin 5 milliGRAM(s) Oral at bedtime  midodrine 30 milliGRAM(s) Oral every 8 hours  Nephro-kristi 1 Tablet(s) Oral daily  pantoprazole  Injectable 40 milliGRAM(s) IV Push daily  sevelamer carbonate 1600 milliGRAM(s) Oral three times a day  sodium chloride 0.9%. 1000 milliLiter(s) (50 mL/Hr) IV Continuous <Continuous>  thiamine IVPB 500 milliGRAM(s) IV Intermittent daily  ticagrelor 60 milliGRAM(s) Oral every 12 hours      REVIEW OF SYSTEMS:  As per HPI, 8 out of 10 ROS were unremarkable    VITALS:  T(F): 97.6 (07-31-21 @ 17:01), Max: 98 (07-31-21 @ 12:00)  HR: 87 (07-31-21 @ 17:01)  BP: 97/68 (07-31-21 @ 17:01)  RR: 18 (07-31-21 @ 17:01)  SpO2: 96% (07-31-21 @ 17:01)  Wt(kg): --    07-30 @ 07:01  -  07-31 @ 07:00  --------------------------------------------------------  IN: 5700 mL / OUT: 5550 mL / NET: 150 mL          PHYSICAL EXAM:  Constitutional: NAD  HEENT: PERRL  Neck: No JVD  Respiratory: CTAB, no wheezes, rales or rhonchi  Cardiovascular: S1, S2, RRR  Gastrointestinal: BS+, soft, NT/ND  Extremities: No peripheral edema  Neurological: A/O x 3, no focal deficits  Psychiatric: Normal mood, normal affect  : No CVA tenderness. No keys.   Skin: No rashes  Vascular Access:    LABS:      07-31    137  |  96  |  43<H>  ----------------------------<  112<H>  3.4<L>   |  25  |  10.17<H>  07-30    132<L>  |  94<L>  |  42<H>  ----------------------------<  149<H>  4.2   |  22  |  10.03<H>  07-29    135  |  94<L>  |  41<H>  ----------------------------<  107<H>  3.6   |  23  |  10.56<H>    Ca    8.4      31 Jul 2021 07:09  Phos  4.7     07-31  Mg     2.0     07-31  Mg     2.2     07-30  Mg     2.1     07-29    TPro  6.3  /  Alb  2.2<L>  /  TBili  0.8  /  DBili  x   /  AST  26  /  ALT  12  /  AlkPhos  126<H>  07-31  TPro  6.1  /  Alb  1.9<L>  /  TBili  0.8  /  DBili  x   /  AST  24  /  ALT  12  /  AlkPhos  129<H>  07-30  TPro  6.6  /  Alb  2.3<L>  /  TBili  0.8  /  DBili  x   /  AST  21  /  ALT  12  /  AlkPhos  131<H>  07-29                            13.1   11.24 )-----------( 211      ( 31 Jul 2021 07:17 )             43.4       Urine Studies:        RADIOLOGY & ADDITIONAL STUDIES:

## 2021-07-31 NOTE — PROGRESS NOTE ADULT - ASSESSMENT
56 m with    Sepsis resolved  - off antibiotic   - toe gangrene  - Podiatry follow  - ID follow   - KLAUDIA noted     COPD  - 2L NC overnight because he becomes apneic, sats well  - Sats well on RA while awake    CAD s/p CABG   - Hypotension likely in the setting of septic shock   - midodrine dose increased to 30 mg TID to match home dose  - Previous echo with reduced EF  - c/w DAPT  - Cardiology follow    CHF cr systolic  - cardiology follow    AICD  - EP eval noted. Not a good candidate for AICD    Peritoneal Dialysis   - nephrology follow PD, recommend 4x a day  - patient on midodrine 30 q8h at home, continue here  - Holding home metoprolol 25 q12hr in setting of hypotension    Diabetes   - HA1C 6.1 on 7/6 suggesting prediabetes  - Tujeo 60 units at bedtime and Victoza at home, started on 30 units at bedtime, adjust as needed based on patient PO intake  - added 2 units insulin pre-meal    Hiccups  - GI follow  - hold Ativan 2 to sedation    Incontinence dermatitis  - wound care    Toe gangrene  - Podiatry follow  - Vascular follow  - PONCHO/PVR noted  - Angiogram if agrees    Finger gangrenous area  - local care    Vertebral fracture T12  - Ortho eval noted   - MRI spine noted  - No intervention    Confusion   - possible delirium  - Psych follow  - Neurology follow  - EEG noted    Dysphagia  - GI follow  - MBS  - ENT follow    functional quadriplegia   - supportive care     Palliative follow re [facility]     DCP in progress    Pipe Beck MD pager [phone]

## 2021-07-31 NOTE — PROGRESS NOTE ADULT - ASSESSMENT
56 M PMH ESRD on PD, DM on insulin, CHF EF 25-30% CAD s/p CABG x4 pw with hypotension with fever. Encephalopathy likely related to cefepime ? no obvious source of sepsis apparent as of now, KLAUDIA: Overall thickened valves seen however, no evidence of valvular vegetation is seen.     Hypotension:  on Midodrine for hemodynamic support:      ESRD on PD. Patient TW  61.5kg  Hyponatremia- Na+ improved.  Poor po protein intake  ID-Blood cx and PD fluid cx - off IV antibiotics   Hyperphosphatemia: c/ w binders as prescribed   Failure to thrive     RECOMMENDATIONS   - CAPD today and only 3 exchanges  - Palliative care follow up noted.  Functional quadriplegic   - continue Renvela to 1,600 mg PO TID;   - continue Midodrine  30mg po q 8 hours   - No peg per family   - On Eugene leslie as he refused Benjamín SNIDER nurse [place]

## 2021-07-31 NOTE — PROGRESS NOTE ADULT - SUBJECTIVE AND OBJECTIVE BOX
CARDIOLOGY FOLLOW UP - Dr. Best  DATE OF SERVICE: 07-31-21      no acute events.     REVIEW OF SYSTEMS:   CONSTITUTIONAL: No fever, weight loss, or fatigue   RESPIRATORY:  No cough, wheezing, chills or hemoptysis; No SOB  CARDIOVASCULAR: No chest pain, palpitations, passing out, dizziness, or leg swelling   GASTROINTESTINAL: No abdominal or epigastric pain. No nausea, vomiting, or hematemesis, no diarreha, or constipation, No melena or hematochezia   VASCULAR: no edema.       PHYSICAL EXAM:  T(C): 36.5 (07-31-21 @ 06:22), Max: 36.7 (07-30-21 @ 16:30)  HR: 95 (07-31-21 @ 06:22) (81 - 95)  BP: 102/70 (07-31-21 @ 06:22) (98/66 - 109/75)  RR: 18 (07-31-21 @ 06:22) (18 - 18)  SpO2: 99% (07-31-21 @ 06:22) (97% - 100%)  Wt(kg): --  I&O's Summary    30 Jul 2021 07:01  -  31 Jul 2021 07:00  --------------------------------------------------------  IN: 5700 mL / OUT: 5550 mL / NET: 150 mL        Appearance: Normal	  Cardiovascular: Normal S1 S2,RRR, No JVD, No murmurs  Respiratory: Lungs clear to auscultation	  Gastrointestinal:  Soft, Non-tender, + BS	  Extremities: Normal range of motion, No clubbing, cyanosis or edema      HOME MEDICATIONS:  aspirin 81 mg oral delayed release tablet: 1 tab(s) orally once a day (29 Dec 2020 18:37)  atorvastatin 80 mg oral tablet: 1 tab(s) orally once a day (at bedtime) (29 Dec 2020 18:37)  epoetin martine: injectable once a month (29 Dec 2020 18:37)  ferrous sulfate 325 mg (65 mg elemental iron) oral tablet: 1 tab(s) orally 3 times a day (29 Dec 2020 18:37)  gabapentin 100 mg oral capsule: 1 cap(s) orally once a day (29 Dec 2020 18:37)  nortriptyline 25 mg oral capsule: 1 cap(s) orally once a day (at bedtime) (29 Dec 2020 18:37)  pantoprazole 40 mg oral delayed release tablet: 1 tab(s) orally once a day (before a meal) (29 Dec 2020 18:37)  Toujeo SoloStar 300 units/mL subcutaneous solution: 60 unit(s) subcutaneous once a day (at bedtime) (08 Jan 2021 12:41)      MEDICATIONS  (STANDING):  aspirin enteric coated 81 milliGRAM(s) Oral daily  atorvastatin 80 milliGRAM(s) Oral at bedtime  cadexomer iodine 0.9% Gel 1 Application(s) Topical daily  chlorhexidine 0.12% Liquid 15 milliLiter(s) Oral Mucosa two times a day  chlorhexidine 2% Cloths 1 Application(s) Topical <User Schedule>  chlorhexidine 2% Cloths 1 Application(s) Topical daily  dextrose 40% Gel 15 Gram(s) Oral once  dextrose 5%. 1000 milliLiter(s) (50 mL/Hr) IV Continuous <Continuous>  dextrose 5%. 1000 milliLiter(s) (100 mL/Hr) IV Continuous <Continuous>  dextrose 50% Injectable 25 Gram(s) IV Push once  dextrose 50% Injectable 12.5 Gram(s) IV Push once  dextrose 50% Injectable 25 Gram(s) IV Push once  dronabinol 5 milliGRAM(s) Oral two times a day  ferrous    sulfate 325 milliGRAM(s) Oral three times a day  folic acid 1 milliGRAM(s) Oral daily  gentamicin 0.1% Ointment 1 Application(s) Topical <User Schedule>  glucagon  Injectable 1 milliGRAM(s) IntraMuscular once  heparin   Injectable 5000 Unit(s) SubCutaneous every 12 hours  insulin glargine Injectable (LANTUS) 5 Unit(s) SubCutaneous at bedtime  insulin lispro (ADMELOG) corrective regimen sliding scale   SubCutaneous three times a day before meals  insulin lispro (ADMELOG) corrective regimen sliding scale   SubCutaneous at bedtime  lidocaine   4% Patch 1 Patch Transdermal daily  melatonin 5 milliGRAM(s) Oral at bedtime  midodrine 30 milliGRAM(s) Oral every 8 hours  Nephro-kristi 1 Tablet(s) Oral daily  pantoprazole  Injectable 40 milliGRAM(s) IV Push daily  sevelamer carbonate 1600 milliGRAM(s) Oral three times a day  sodium chloride 0.9%. 1000 milliLiter(s) (50 mL/Hr) IV Continuous <Continuous>  thiamine IVPB 500 milliGRAM(s) IV Intermittent daily  ticagrelor 60 milliGRAM(s) Oral every 12 hours      TELEMETRY: 	    ECG:  	  RADIOLOGY:   DIAGNOSTIC TESTING:  [ ] Echocardiogram:  [ ]  Catheterization:  [ ] Stress Test:    OTHER: 	    LABS:	 	                                13.1   11.24 )-----------( 211      ( 31 Jul 2021 07:17 )             43.4     07-31    137  |  96  |  43<H>  ----------------------------<  112<H>  3.4<L>   |  25  |  10.17<H>    Ca    8.4      31 Jul 2021 07:09  Phos  4.7     07-31  Mg     2.0     07-31    TPro  6.3  /  Alb  2.2<L>  /  TBili  0.8  /  DBili  x   /  AST  26  /  ALT  12  /  AlkPhos  126<H>  07-31

## 2021-07-31 NOTE — PROGRESS NOTE ADULT - SUBJECTIVE AND OBJECTIVE BOX
Patient is a 56y old  Male who presents with a chief complaint of Hypotension (31 Jul 2021 16:10)      SUBJECTIVE / OVERNIGHT EVENTS: weak. + hiccups.  Review of Systems  chest pain no  palpitations no  sob no  nausea no  headache no    MEDICATIONS  (STANDING):  aspirin enteric coated 81 milliGRAM(s) Oral daily  atorvastatin 80 milliGRAM(s) Oral at bedtime  cadexomer iodine 0.9% Gel 1 Application(s) Topical daily  chlorhexidine 0.12% Liquid 15 milliLiter(s) Oral Mucosa two times a day  chlorhexidine 2% Cloths 1 Application(s) Topical <User Schedule>  chlorhexidine 2% Cloths 1 Application(s) Topical daily  dextrose 40% Gel 15 Gram(s) Oral once  dextrose 5%. 1000 milliLiter(s) (100 mL/Hr) IV Continuous <Continuous>  dextrose 5%. 1000 milliLiter(s) (50 mL/Hr) IV Continuous <Continuous>  dextrose 50% Injectable 25 Gram(s) IV Push once  dextrose 50% Injectable 12.5 Gram(s) IV Push once  dextrose 50% Injectable 25 Gram(s) IV Push once  dronabinol 5 milliGRAM(s) Oral two times a day  ferrous    sulfate 325 milliGRAM(s) Oral three times a day  folic acid 1 milliGRAM(s) Oral daily  gentamicin 0.1% Ointment 1 Application(s) Topical <User Schedule>  glucagon  Injectable 1 milliGRAM(s) IntraMuscular once  heparin   Injectable 5000 Unit(s) SubCutaneous every 12 hours  insulin glargine Injectable (LANTUS) 5 Unit(s) SubCutaneous at bedtime  insulin lispro (ADMELOG) corrective regimen sliding scale   SubCutaneous three times a day before meals  insulin lispro (ADMELOG) corrective regimen sliding scale   SubCutaneous at bedtime  lidocaine   4% Patch 1 Patch Transdermal daily  melatonin 5 milliGRAM(s) Oral at bedtime  midodrine 30 milliGRAM(s) Oral every 8 hours  Nephro-kristi 1 Tablet(s) Oral daily  pantoprazole  Injectable 40 milliGRAM(s) IV Push daily  sevelamer carbonate 1600 milliGRAM(s) Oral three times a day  sodium chloride 0.9%. 1000 milliLiter(s) (50 mL/Hr) IV Continuous <Continuous>  thiamine IVPB 500 milliGRAM(s) IV Intermittent daily  ticagrelor 60 milliGRAM(s) Oral every 12 hours    MEDICATIONS  (PRN):  acetaminophen   Tablet .. 650 milliGRAM(s) Oral every 6 hours PRN Moderate Pain (4 - 6)  valproate sodium IVPB 125 milliGRAM(s) IV Intermittent every 8 hours PRN agitation      Vital Signs Last 24 Hrs  T(C): 36.4 (31 Jul 2021 17:01), Max: 36.7 (31 Jul 2021 12:00)  T(F): 97.6 (31 Jul 2021 17:01), Max: 98 (31 Jul 2021 12:00)  HR: 87 (31 Jul 2021 17:01) (85 - 95)  BP: 97/68 (31 Jul 2021 17:01) (97/68 - 109/75)  BP(mean): --  RR: 18 (31 Jul 2021 17:01) (18 - 18)  SpO2: 96% (31 Jul 2021 17:01) (95% - 100%)    PHYSICAL EXAM:  GENERAL: NAD, frail  HEAD:  Atraumatic, Normocephalic  EYES: EOMI, PERRLA, conjunctiva and sclera clear  NECK: Supple, No JVD  CHEST/LUNG: Clear to auscultation bilaterally; No wheeze  HEART: Regular rate and rhythm; No murmurs, rubs, or gallops  ABDOMEN: Soft, Nontender, Nondistended; Bowel sounds present PD catheter  EXTREMITIES:  2+ Peripheral Pulses, No clubbing, cyanosis, or edema 2nd R toe gangrene R thumb gangrene  NEUROLOGY: non-focal  SKIN: No rashes or lesions    LABS:                        13.1   11.24 )-----------( 211      ( 31 Jul 2021 07:17 )             43.4     07-31    137  |  96  |  43<H>  ----------------------------<  112<H>  3.4<L>   |  25  |  10.17<H>    Ca    8.4      31 Jul 2021 07:09  Phos  4.7     07-31  Mg     2.0     07-31    TPro  6.3  /  Alb  2.2<L>  /  TBili  0.8  /  DBili  x   /  AST  26  /  ALT  12  /  AlkPhos  126<H>  07-31                RADIOLOGY & ADDITIONAL TESTS:    Imaging Personally Reviewed:    Consultant(s) Notes Reviewed:      Care Discussed with Consultants/Other Providers:

## 2021-07-31 NOTE — PROGRESS NOTE ADULT - ASSESSMENT
CATH 11/30/2020:  COMPLICATIONS: The stent was deployed without difficulty. On removal of the  balloon, the shaft broke and the tip of the balloon remained in the vessel. Several attempts were made to snare the balloon unsuccessfully. Dr. Verdugo was notifed who will take the patient to the operating room.  DIAGNOSTIC RECOMMENDATIONS: The patient should continue with the present medications. The patients vessel was stented without difficulty On removal of the balloon, the shaft broke with the baloon stuck in the left main. All attempts to snare the balloon out failed and the patient was taken to the OR by Dr. Arango to remove the balloon  introp eleonora 11/30/20: mild to mod MR, < from: Intra-Operative Transesophageal Echo (11.30.20 @ 17:02) >  Severe global left ventricular systolic dysfunction. Inferior and inferolateral akinesis.  severe hypokinesis of other segments.  Severe global hypokinesia.  Normal left ventricular internal dimensions and wall thicknesses. Moderate diastolic dysfunction (Stage II)  echo 12/17/20: EF 32%, mod MR, Severe global left ventricular systolic dysfunction, moderate pulmonary pressures  echo 12/20/20: EF (Visual Estimate):  25-30 % mild MR, Akinesis of the inferolateral, anterolateral, apical laterlal, inferior, and inferoseptal walls. Severe left ventricular enlargement. No left ventricular thrombus is seen.  Echo 7/7/21: EF 16%, mod - sev MR, mod AS, severe global lv sys dysfx, severe diastolic dysfx, mod pulm htn   ELEONORA 7/15/21: EF 24.5%,  Tethered mitral valve leaflets with normal opening. sev MR, mild to mod AR,  eccentric left ventricular hypertrophy; Dilated  TV annulus There are two jets seen  with venacontracta 0.4cm and 0.5q cm. Severe tricuspid regurgitation.  no evidence of valvular vegetation is seen. evere global leftventricular systolic dysfunction.        a/p  56 M well known to practice with PMH ESRD on PD, DM on insulin, CHF EF 25-30% CAD s/p CABG x4, underwent cardiac cath and stent and wire broke in heart s/p sternotomy p/w septic shock.    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  -med f/u    #Ischemic Cardiomyopathy. Chronic systolic HF  -Repeat echo noted with EF 16%, mod - sev MR, mod AS, severe global lv sys dysfx, severe diastolic dysfx, mod pulm htn ; eleonora with ef 24.5%   -pt and family now wants to get eval for ICD -- EP eval pending   -no bb, acei/arb given hx of orthostatic bp   -continue midodrine for bp support   -cont vol removal w PD     # CAD, s/p CABG, s/p PCI, s/p redo open heart for stent balloon retrieval   -hst elevated, likely demand ischemia in setting of septic shock, ESRD   -c/w asa, Brilinta, statin     # ESRD  -on PD  -renal f/u    #AMS  -neuro psych f/u    #GOC  -palliative f/u     dvt ppx     CATH 11/30/2020:  COMPLICATIONS: The stent was deployed without difficulty. On removal of the  balloon, the shaft broke and the tip of the balloon remained in the vessel. Several attempts were made to snare the balloon unsuccessfully. Dr. Verdugo was notifed who will take the patient to the operating room.  DIAGNOSTIC RECOMMENDATIONS: The patient should continue with the present medications. The patients vessel was stented without difficulty On removal of the balloon, the shaft broke with the baloon stuck in the left main. All attempts to snare the balloon out failed and the patient was taken to the OR by Dr. Arango to remove the balloon  introp eleonora 11/30/20: mild to mod MR, < from: Intra-Operative Transesophageal Echo (11.30.20 @ 17:02) >  Severe global left ventricular systolic dysfunction. Inferior and inferolateral akinesis.  severe hypokinesis of other segments.  Severe global hypokinesia.  Normal left ventricular internal dimensions and wall thicknesses. Moderate diastolic dysfunction (Stage II)  echo 12/17/20: EF 32%, mod MR, Severe global left ventricular systolic dysfunction, moderate pulmonary pressures  echo 12/20/20: EF (Visual Estimate):  25-30 % mild MR, Akinesis of the inferolateral, anterolateral, apical laterlal, inferior, and inferoseptal walls. Severe left ventricular enlargement. No left ventricular thrombus is seen.  Echo 7/7/21: EF 16%, mod - sev MR, mod AS, severe global lv sys dysfx, severe diastolic dysfx, mod pulm htn   ELEONORA 7/15/21: EF 24.5%,  Tethered mitral valve leaflets with normal opening. sev MR, mild to mod AR,  eccentric left ventricular hypertrophy; Dilated  TV annulus There are two jets seen  with venacontracta 0.4cm and 0.5q cm. Severe tricuspid regurgitation.  no evidence of valvular vegetation is seen. evere global leftventricular systolic dysfunction.        a/p  56 M well known to practice with PMH ESRD on PD, DM on insulin, CHF EF 25-30% CAD s/p CABG x4, underwent cardiac cath and stent and wire broke in heart s/p sternotomy p/w septic shock.    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  -med f/u    #Ischemic Cardiomyopathy. Chronic systolic HF  -Repeat echo noted with EF 16%, mod - sev MR, mod AS, severe global lv sys dysfx, severe diastolic dysfx, mod pulm htn ; eleonora with ef 24.5%   -pt and family now wants to get eval for ICD  -eps eval noted, pt not candidate for icd   -no bb, acei/arb given hx of orthostatic bp   -continue midodrine for bp support   -cont vol removal w PD     # CAD, s/p CABG, s/p PCI, s/p redo open heart for stent balloon retrieval   -hst elevated, likely demand ischemia in setting of septic shock, ESRD   -c/w asa, Brilinta, statin     # ESRD  -on PD  -renal f/u    #AMS  -neuro psych f/u    #GOC  -palliative f/u     dvt ppx

## 2021-08-01 NOTE — PROGRESS NOTE ADULT - SUBJECTIVE AND OBJECTIVE BOX
Patient is a 56y old  Male who presents with a chief complaint of Hypotension (01 Aug 2021 08:33)      SUBJECTIVE / OVERNIGHT EVENTS: No new complaints.   Review of Systems  chest pain no  palpitations no  sob no  nausea no  headache no    MEDICATIONS  (STANDING):  aspirin enteric coated 81 milliGRAM(s) Oral daily  atorvastatin 80 milliGRAM(s) Oral at bedtime  cadexomer iodine 0.9% Gel 1 Application(s) Topical daily  chlorhexidine 0.12% Liquid 15 milliLiter(s) Oral Mucosa two times a day  chlorhexidine 2% Cloths 1 Application(s) Topical <User Schedule>  chlorhexidine 2% Cloths 1 Application(s) Topical daily  dextrose 40% Gel 15 Gram(s) Oral once  dextrose 5%. 1000 milliLiter(s) (50 mL/Hr) IV Continuous <Continuous>  dextrose 5%. 1000 milliLiter(s) (100 mL/Hr) IV Continuous <Continuous>  dextrose 50% Injectable 25 Gram(s) IV Push once  dextrose 50% Injectable 12.5 Gram(s) IV Push once  dextrose 50% Injectable 25 Gram(s) IV Push once  dronabinol 5 milliGRAM(s) Oral two times a day  ferrous    sulfate 325 milliGRAM(s) Oral three times a day  folic acid 1 milliGRAM(s) Oral daily  gentamicin 0.1% Ointment 1 Application(s) Topical <User Schedule>  glucagon  Injectable 1 milliGRAM(s) IntraMuscular once  heparin   Injectable 5000 Unit(s) SubCutaneous every 12 hours  insulin glargine Injectable (LANTUS) 5 Unit(s) SubCutaneous at bedtime  insulin lispro (ADMELOG) corrective regimen sliding scale   SubCutaneous three times a day before meals  insulin lispro (ADMELOG) corrective regimen sliding scale   SubCutaneous at bedtime  lidocaine   4% Patch 1 Patch Transdermal daily  melatonin 5 milliGRAM(s) Oral at bedtime  midodrine 30 milliGRAM(s) Oral every 8 hours  Nephro-kristi 1 Tablet(s) Oral daily  pantoprazole  Injectable 40 milliGRAM(s) IV Push daily  sevelamer carbonate 1600 milliGRAM(s) Oral three times a day  sodium chloride 0.9%. 1000 milliLiter(s) (50 mL/Hr) IV Continuous <Continuous>  thiamine IVPB 500 milliGRAM(s) IV Intermittent daily  ticagrelor 60 milliGRAM(s) Oral every 12 hours    MEDICATIONS  (PRN):  acetaminophen   Tablet .. 650 milliGRAM(s) Oral every 6 hours PRN Moderate Pain (4 - 6)  valproate sodium IVPB 125 milliGRAM(s) IV Intermittent every 8 hours PRN agitation      Vital Signs Last 24 Hrs  T(C): 37 (01 Aug 2021 12:30), Max: 37 (01 Aug 2021 12:30)  T(F): 98.6 (01 Aug 2021 12:30), Max: 98.6 (01 Aug 2021 12:30)  HR: 94 (01 Aug 2021 12:30) (85 - 94)  BP: 112/80 (01 Aug 2021 12:30) (96/65 - 112/80)  BP(mean): --  RR: 20 (01 Aug 2021 12:30) (16 - 20)  SpO2: 95% (01 Aug 2021 12:30) (95% - 100%)    PHYSICAL EXAM:  GENERAL: NAD, frail  HEAD:  Atraumatic, Normocephalic  EYES: EOMI, PERRLA, conjunctiva and sclera clear  NECK: Supple, No JVD  CHEST/LUNG: Clear to auscultation bilaterally; No wheeze  HEART: Regular rate and rhythm; No murmurs, rubs, or gallops  ABDOMEN: Soft, Nontender, Nondistended; Bowel sounds present  EXTREMITIES:  gangrene R 2nd toe and R thumb  NEUROLOGY: non-focal  SKIN: No rashes or lesions    LABS:                        11.8   11.72 )-----------( 233      ( 01 Aug 2021 06:31 )             40.6     08-01    132<L>  |  92<L>  |  42<H>  ----------------------------<  126<H>  3.6   |  22  |  9.57<H>    Ca    8.2<L>      01 Aug 2021 06:31  Phos  4.7     07-31  Mg     2.0     07-31    TPro  6.0  /  Alb  1.8<L>  /  TBili  0.8  /  DBili  x   /  AST  27  /  ALT  12  /  AlkPhos  129<H>  08-01                RADIOLOGY & ADDITIONAL TESTS:    Imaging Personally Reviewed:    Consultant(s) Notes Reviewed:      Care Discussed with Consultants/Other Providers:

## 2021-08-01 NOTE — PROGRESS NOTE ADULT - SUBJECTIVE AND OBJECTIVE BOX
CARDIOLOGY FOLLOW UP - Dr. Best  DATE OF SERVICE: 08-01-21    CC no cp/sob  EP eval noted     REVIEW OF SYSTEMS:   CONSTITUTIONAL: No fever, weight loss, or fatigue   RESPIRATORY:  No cough, wheezing, chills or hemoptysis; No SOB  CARDIOVASCULAR: No chest pain, palpitations, passing out, dizziness, or leg swelling   GASTROINTESTINAL: No abdominal or epigastric pain. No nausea, vomiting, or hematemesis, no diarreha, or constipation, No melena or hematochezia   VASCULAR: no edema.       PHYSICAL EXAM:  T(C): 36.6 (08-01-21 @ 08:24), Max: 36.8 (08-01-21 @ 01:03)  HR: 88 (08-01-21 @ 08:24) (85 - 95)  BP: 102/71 (08-01-21 @ 08:24) (96/65 - 106/74)  RR: 20 (08-01-21 @ 08:24) (16 - 20)  SpO2: 100% (08-01-21 @ 08:24) (95% - 100%)  Wt(kg): --  I&O's Summary    31 Jul 2021 07:01  -  01 Aug 2021 07:00  --------------------------------------------------------  IN: 2500 mL / OUT: 5300 mL / NET: -2800 mL        Appearance: Normal	  Cardiovascular: Normal S1 S2,RRR, No JVD, No murmurs  Respiratory: Lungs clear to auscultation	  Gastrointestinal:  Soft, Non-tender, + BS	  Extremities: Normal range of motion, No clubbing, cyanosis or edema      HOME MEDICATIONS:  aspirin 81 mg oral delayed release tablet: 1 tab(s) orally once a day (29 Dec 2020 18:37)  atorvastatin 80 mg oral tablet: 1 tab(s) orally once a day (at bedtime) (29 Dec 2020 18:37)  epoetin martine: injectable once a month (29 Dec 2020 18:37)  ferrous sulfate 325 mg (65 mg elemental iron) oral tablet: 1 tab(s) orally 3 times a day (29 Dec 2020 18:37)  gabapentin 100 mg oral capsule: 1 cap(s) orally once a day (29 Dec 2020 18:37)  nortriptyline 25 mg oral capsule: 1 cap(s) orally once a day (at bedtime) (29 Dec 2020 18:37)  pantoprazole 40 mg oral delayed release tablet: 1 tab(s) orally once a day (before a meal) (29 Dec 2020 18:37)  Toujeo SoloStar 300 units/mL subcutaneous solution: 60 unit(s) subcutaneous once a day (at bedtime) (08 Jan 2021 12:41)      MEDICATIONS  (STANDING):  aspirin enteric coated 81 milliGRAM(s) Oral daily  atorvastatin 80 milliGRAM(s) Oral at bedtime  cadexomer iodine 0.9% Gel 1 Application(s) Topical daily  chlorhexidine 0.12% Liquid 15 milliLiter(s) Oral Mucosa two times a day  chlorhexidine 2% Cloths 1 Application(s) Topical <User Schedule>  chlorhexidine 2% Cloths 1 Application(s) Topical daily  dextrose 40% Gel 15 Gram(s) Oral once  dextrose 5%. 1000 milliLiter(s) (50 mL/Hr) IV Continuous <Continuous>  dextrose 5%. 1000 milliLiter(s) (100 mL/Hr) IV Continuous <Continuous>  dextrose 50% Injectable 25 Gram(s) IV Push once  dextrose 50% Injectable 12.5 Gram(s) IV Push once  dextrose 50% Injectable 25 Gram(s) IV Push once  dronabinol 5 milliGRAM(s) Oral two times a day  ferrous    sulfate 325 milliGRAM(s) Oral three times a day  folic acid 1 milliGRAM(s) Oral daily  gentamicin 0.1% Ointment 1 Application(s) Topical <User Schedule>  glucagon  Injectable 1 milliGRAM(s) IntraMuscular once  heparin   Injectable 5000 Unit(s) SubCutaneous every 12 hours  insulin glargine Injectable (LANTUS) 5 Unit(s) SubCutaneous at bedtime  insulin lispro (ADMELOG) corrective regimen sliding scale   SubCutaneous three times a day before meals  insulin lispro (ADMELOG) corrective regimen sliding scale   SubCutaneous at bedtime  lidocaine   4% Patch 1 Patch Transdermal daily  melatonin 5 milliGRAM(s) Oral at bedtime  midodrine 30 milliGRAM(s) Oral every 8 hours  Nephro-kristi 1 Tablet(s) Oral daily  pantoprazole  Injectable 40 milliGRAM(s) IV Push daily  sevelamer carbonate 1600 milliGRAM(s) Oral three times a day  sodium chloride 0.9%. 1000 milliLiter(s) (50 mL/Hr) IV Continuous <Continuous>  thiamine IVPB 500 milliGRAM(s) IV Intermittent daily  ticagrelor 60 milliGRAM(s) Oral every 12 hours      TELEMETRY: 	    ECG:  	  RADIOLOGY:   DIAGNOSTIC TESTING:  [ ] Echocardiogram:  [ ]  Catheterization:  [ ] Stress Test:    OTHER: 	    LABS:	 	                                11.8   11.72 )-----------( 233      ( 01 Aug 2021 06:31 )             40.6     08-01    132<L>  |  92<L>  |  42<H>  ----------------------------<  126<H>  3.6   |  22  |  9.57<H>    Ca    8.2<L>      01 Aug 2021 06:31  Phos  4.7     07-31  Mg     2.0     07-31    TPro  6.0  /  Alb  1.8<L>  /  TBili  0.8  /  DBili  x   /  AST  27  /  ALT  12  /  AlkPhos  129<H>  08-01

## 2021-08-01 NOTE — PROGRESS NOTE ADULT - SUBJECTIVE AND OBJECTIVE BOX
[facility]      Seen earlier today, and examined.  - Today, patient is without complaints.           *****MEDICATIONS: Current medication reviewed and documented.    MEDICATIONS  (STANDING):  aspirin enteric coated 81 milliGRAM(s) Oral daily  atorvastatin 80 milliGRAM(s) Oral at bedtime  cadexomer iodine 0.9% Gel 1 Application(s) Topical daily  chlorhexidine 0.12% Liquid 15 milliLiter(s) Oral Mucosa two times a day  chlorhexidine 2% Cloths 1 Application(s) Topical <User Schedule>  chlorhexidine 2% Cloths 1 Application(s) Topical daily  dextrose 40% Gel 15 Gram(s) Oral once  dextrose 5%. 1000 milliLiter(s) (50 mL/Hr) IV Continuous <Continuous>  dextrose 5%. 1000 milliLiter(s) (100 mL/Hr) IV Continuous <Continuous>  dextrose 50% Injectable 25 Gram(s) IV Push once  dextrose 50% Injectable 12.5 Gram(s) IV Push once  dextrose 50% Injectable 25 Gram(s) IV Push once  dronabinol 5 milliGRAM(s) Oral two times a day  ferrous    sulfate 325 milliGRAM(s) Oral three times a day  folic acid 1 milliGRAM(s) Oral daily  gentamicin 0.1% Ointment 1 Application(s) Topical <User Schedule>  glucagon  Injectable 1 milliGRAM(s) IntraMuscular once  heparin   Injectable 5000 Unit(s) SubCutaneous every 12 hours  insulin glargine Injectable (LANTUS) 5 Unit(s) SubCutaneous at bedtime  insulin lispro (ADMELOG) corrective regimen sliding scale   SubCutaneous three times a day before meals  insulin lispro (ADMELOG) corrective regimen sliding scale   SubCutaneous at bedtime  lidocaine   4% Patch 1 Patch Transdermal daily  melatonin 5 milliGRAM(s) Oral at bedtime  midodrine 30 milliGRAM(s) Oral every 8 hours  Nephro-kristi 1 Tablet(s) Oral daily  pantoprazole  Injectable 40 milliGRAM(s) IV Push daily  sevelamer carbonate 1600 milliGRAM(s) Oral three times a day  sodium chloride 0.9%. 1000 milliLiter(s) (50 mL/Hr) IV Continuous <Continuous>  thiamine IVPB 500 milliGRAM(s) IV Intermittent daily  ticagrelor 60 milliGRAM(s) Oral every 12 hours    MEDICATIONS  (PRN):  acetaminophen   Tablet .. 650 milliGRAM(s) Oral every 6 hours PRN Moderate Pain (4 - 6)  valproate sodium IVPB 125 milliGRAM(s) IV Intermittent every 8 hours PRN agitation          ***** VITAL SIGNS:  T(F): 97.9 (21 @ 17:00), Max: 98.6 (21 @ 12:30)  HR: 87 (21 @ 17:00) (85 - 94)  BP: 101/69 (21 @ 17:00) (96/65 - 112/80)  RR: 18 (21 @ 17:00) (16 - 20)  SpO2: 99% (21 @ 17:00) (95% - 100%)  Wt(kg): --  ,   I&O's Summary    2021 07:01  -  01 Aug 2021 07:00  --------------------------------------------------------  IN: 2500 mL / OUT: 5300 mL / NET: -2800 mL             *****PHYSICAL EXAM:   alert oriented x 2 attention comprehension are fair.  Able to name, repeat.   EOmi fundi not visualized   Tongue is midline  Moving  both upper ext            *****LAB AND IMAGIN.8   11.72 )-----------( 233      ( 01 Aug 2021 06:31 )             40.6               08    132<L>  |  92<L>  |  42<H>  ----------------------------<  126<H>  3.6   |  22  |  9.57<H>    Ca    8.2<L>      01 Aug 2021 06:31  Phos  4.7       Mg     2.0         TPro  6.0  /  Alb  1.8<L>  /  TBili  0.8  /  DBili  x   /  AST  27  /  ALT  12  /  AlkPhos  129<H>  08-01                         [All pertinent recent Imaging/Reports reviewed]           *****A S S E S S M E N T   A N D   P L A N :    55M PMH ESRD on PD, DM on insulin, CHF EF 25-30% CAD s/p CABG x4,  Patient states that for the last 2 days PTA he has been having increased cough and sweating. He endorsed chills but no measured fevers at home. He denies any sick contacts or recent travel. Patient states that he fell few days ago with no head trauma. Of note patient has right toe gangrenous lesion that has been present for several months, He denies any trauma or pain at the site.       In the ED, Patient was found to be hypotensive to 70s. Patient was given midodrine 10mg x1 and bedside POCUS was completed showing poor cardiac function with diffuse hypokinesis. Patient was unable to maintain adequate SBPs  so required micu admission and pressor support.   Called to eval ams       Problem/Recommendations 1:  ams likely multifactorial metabolic encephalopathy started immediately after cefepime, also other factors include poor po intake, sleep wake cycle disruption hyponatremia   correct metabolic derangements   nephrovite added   thiamine 500 iv q h       limit sedatives   7/15 mental status with improvement    seemed lethargic  gagging     mental status improved    mental status remains stable, still with processing delay     Problem/Recommendations 2:  ecchymosis of the finger tips   r/o emboli   derm eval     eleonora no evidence for endocarditis   poor po intake         more alert and responsive    alert responsive    more alert sitting up in bed   as per wife, pt ate a meal that she brought in      lethargic pt reports poor sleep intake   melatonin 5mg for sleep augmentation    feeling ok       Problem/Recommendations 3: poor nutritional intake severe protein caloric malnutrition   encourage po intake   marinol as per gi     nutrition consult ? nephrovite for supplementation   thiamine daily       oob to chair to mobilize      Thank you for allowing me to participate in the care of this patient. Will continue to follow patient periodically. Please do not hesitate to call me if you have any  questions or if there has been a change in patients neurological status     ________________  Grisel Lainez MD  [facility] (PN)[facility]  [phone]      33 minutes spent on total encounter; more than 50 % of the visit was  spent counseling about plan of care, compliance to diet/exercise and medication regimen and or  coordinating care by the attending physician.      It is advised that stroke patients follow up with CARLYLE Pena @ [phone] in 1- 2 weeks.   Others please follow up with Dr. Michael Nissenbaum [phone]

## 2021-08-01 NOTE — PROGRESS NOTE ADULT - SUBJECTIVE AND OBJECTIVE BOX
[facility]-Corrina Tadeo NP- PROGRESS NOTE    Patient seen and examined in bed. Noc/o SOB or CP. Poor appetite.       Hospital Medications:   MEDICATIONS  (STANDING):  aspirin enteric coated 81 milliGRAM(s) Oral daily  atorvastatin 80 milliGRAM(s) Oral at bedtime  cadexomer iodine 0.9% Gel 1 Application(s) Topical daily  chlorhexidine 0.12% Liquid 15 milliLiter(s) Oral Mucosa two times a day  chlorhexidine 2% Cloths 1 Application(s) Topical daily  chlorhexidine 2% Cloths 1 Application(s) Topical <User Schedule>  dextrose 40% Gel 15 Gram(s) Oral once  dextrose 5%. 1000 milliLiter(s) (50 mL/Hr) IV Continuous <Continuous>  dextrose 5%. 1000 milliLiter(s) (100 mL/Hr) IV Continuous <Continuous>  dextrose 50% Injectable 25 Gram(s) IV Push once  dextrose 50% Injectable 12.5 Gram(s) IV Push once  dextrose 50% Injectable 25 Gram(s) IV Push once  dronabinol 5 milliGRAM(s) Oral two times a day  ferrous    sulfate 325 milliGRAM(s) Oral three times a day  folic acid 1 milliGRAM(s) Oral daily  gentamicin 0.1% Ointment 1 Application(s) Topical <User Schedule>  glucagon  Injectable 1 milliGRAM(s) IntraMuscular once  heparin   Injectable 5000 Unit(s) SubCutaneous every 12 hours  insulin glargine Injectable (LANTUS) 5 Unit(s) SubCutaneous at bedtime  insulin lispro (ADMELOG) corrective regimen sliding scale   SubCutaneous three times a day before meals  insulin lispro (ADMELOG) corrective regimen sliding scale   SubCutaneous at bedtime  lidocaine   4% Patch 1 Patch Transdermal daily  melatonin 5 milliGRAM(s) Oral at bedtime  midodrine 30 milliGRAM(s) Oral every 8 hours  Nephro-kristi 1 Tablet(s) Oral daily  pantoprazole  Injectable 40 milliGRAM(s) IV Push daily  sevelamer carbonate 1600 milliGRAM(s) Oral three times a day  sodium chloride 0.9%. 1000 milliLiter(s) (50 mL/Hr) IV Continuous <Continuous>  thiamine IVPB 500 milliGRAM(s) IV Intermittent daily  ticagrelor 60 milliGRAM(s) Oral every 12 hours      REVIEW OF SYSTEMS:  As per HPI, 8 out of 10 ROS were unremarkable    VITALS:  T(F): 97.9 (08-01-21 @ 17:00), Max: 98.6 (08-01-21 @ 12:30)  HR: 87 (08-01-21 @ 17:00)  BP: 101/69 (08-01-21 @ 17:00)  RR: 18 (08-01-21 @ 17:00)  SpO2: 99% (08-01-21 @ 17:00)  Wt(kg): --    07-31 @ 07:01  -  08-01 @ 07:00  --------------------------------------------------------  IN: 2500 mL / OUT: 5300 mL / NET: -2800 mL          PHYSICAL EXAM:  Constitutional: NAD  HEENT: PERRL  Neck: No JVD  Respiratory: CTAB, no wheezes, rales or rhonchi  Cardiovascular: S1, S2, RRR  Gastrointestinal: BS+, soft, NT/ND, + PD cath  Extremities: No peripheral edema  Neurological: A/O x 3, no focal deficits  Psychiatric: Normal mood, normal affect  : No CVA tenderness. No keys.   Skin: No rashes    LABS:      08-01    132<L>  |  92<L>  |  42<H>  ----------------------------<  126<H>  3.6   |  22  |  9.57<H>  07-31    137  |  96  |  43<H>  ----------------------------<  112<H>  3.4<L>   |  25  |  10.17<H>  07-30    132<L>  |  94<L>  |  42<H>  ----------------------------<  149<H>  4.2   |  22  |  10.03<H>    Ca    8.2<L>      01 Aug 2021 06:31  Phos  4.7     07-31  Mg     2.0     07-31  Mg     2.2     07-30    TPro  6.0  /  Alb  1.8<L>  /  TBili  0.8  /  DBili  x   /  AST  27  /  ALT  12  /  AlkPhos  129<H>  08-01  TPro  6.3  /  Alb  2.2<L>  /  TBili  0.8  /  DBili  x   /  AST  26  /  ALT  12  /  AlkPhos  126<H>  07-31  TPro  6.1  /  Alb  1.9<L>  /  TBili  0.8  /  DBili  x   /  AST  24  /  ALT  12  /  AlkPhos  129<H>  07-30                            11.8   11.72 )-----------( 233      ( 01 Aug 2021 06:31 )             40.6

## 2021-08-01 NOTE — PROGRESS NOTE ADULT - ASSESSMENT
56 M PMH ESRD on PD, DM on insulin, CHF EF 25-30% CAD s/p CABG x4 pw with hypotension with fever. Encephalopathy likely related to cefepime ? no obvious source of sepsis apparent as of now, KLAUDIA: Overall thickened valves seen however, no evidence of valvular vegetation is seen.     Hypotension:  on Midodrine for hemodynamic support:      ESRD on PD. Patient TW  61.5kg  Hyponatremia- Na+ trending down again.  Poor po protein intake  ID- off IV antibiotics   Hyperphosphatemia: c/ w binders as prescribed   Failure to thrive     RECOMMENDATIONS   - CAPD today and only 3 exchanges  - Palliative care follow up noted.  Functional quadriplegic   - continue Renvela to 1,600 mg PO TID;   - continue Midodrine 30mg po q 8 hours   - No peg per family. On marinol   - On Glucerna shakes as he refused Nepro

## 2021-08-01 NOTE — PROGRESS NOTE ADULT - ASSESSMENT
CATH 11/30/2020:  COMPLICATIONS: The stent was deployed without difficulty. On removal of the  balloon, the shaft broke and the tip of the balloon remained in the vessel. Several attempts were made to snare the balloon unsuccessfully. Dr. Verdugo was notifed who will take the patient to the operating room.  DIAGNOSTIC RECOMMENDATIONS: The patient should continue with the present medications. The patients vessel was stented without difficulty On removal of the balloon, the shaft broke with the baloon stuck in the left main. All attempts to snare the balloon out failed and the patient was taken to the OR by Dr. Arango to remove the balloon  introp eleonora 11/30/20: mild to mod MR, < from: Intra-Operative Transesophageal Echo (11.30.20 @ 17:02) >  Severe global left ventricular systolic dysfunction. Inferior and inferolateral akinesis.  severe hypokinesis of other segments.  Severe global hypokinesia.  Normal left ventricular internal dimensions and wall thicknesses. Moderate diastolic dysfunction (Stage II)  echo 12/17/20: EF 32%, mod MR, Severe global left ventricular systolic dysfunction, moderate pulmonary pressures  echo 12/20/20: EF (Visual Estimate):  25-30 % mild MR, Akinesis of the inferolateral, anterolateral, apical laterlal, inferior, and inferoseptal walls. Severe left ventricular enlargement. No left ventricular thrombus is seen.  Echo 7/7/21: EF 16%, mod - sev MR, mod AS, severe global lv sys dysfx, severe diastolic dysfx, mod pulm htn   ELEONORA 7/15/21: EF 24.5%,  Tethered mitral valve leaflets with normal opening. sev MR, mild to mod AR,  eccentric left ventricular hypertrophy; Dilated  TV annulus There are two jets seen  with venacontracta 0.4cm and 0.5q cm. Severe tricuspid regurgitation.  no evidence of valvular vegetation is seen. evere global leftventricular systolic dysfunction.        a/p  56 M well known to practice with PMH ESRD on PD, DM on insulin, CHF EF 25-30% CAD s/p CABG x4, underwent cardiac cath and stent and wire broke in heart s/p sternotomy p/w septic shock.    #Septic Shock, resolved   -Bcx NGTD 7/10   -s/p iv abx, pressors   -right 2nd toe gangrene, pod f/u, vascular f/u, ID f/u   -ELENOORA: no evidence of valvular vegetation is seen.  -med f/u    #Ischemic Cardiomyopathy. Chronic systolic HF  -Repeat echo noted with EF 16%, mod - sev MR, mod AS, severe global lv sys dysfx, severe diastolic dysfx, mod pulm htn ; eleonora with ef 24.5%   -pt and family now wants to get eval for ICD  -eps eval noted, pt not candidate for icd   -no bb, acei/arb given hx of orthostatic bp   -continue midodrine for bp support   -cont vol removal w PD     # CAD, s/p CABG, s/p PCI, s/p redo open heart for stent balloon retrieval   -hst elevated, likely demand ischemia in setting of septic shock, ESRD   -c/w asa, Brilinta, statin     # ESRD  -on PD  -renal f/u    #AMS  -neuro psych f/u    #GOC  -palliative f/u     dvt ppx

## 2021-08-01 NOTE — PROGRESS NOTE ADULT - ASSESSMENT
56 m with    Sepsis resolved  - off antibiotic   - toe gangrene  - Podiatry follow  - ID follow   - KLAUDIA noted     COPD  - 2L NC overnight because he becomes apneic, sats well  - Sats well on RA while awake    CAD s/p CABG   - Hypotension likely in the setting of septic shock   - midodrine dose increased to 30 mg TID to match home dose  - Previous echo with reduced EF  - c/w DAPT  - Cardiology follow    CHF cr systolic  - cardiology follow    AICD  - EP eval noted. Not a good candidate for AICD    Peritoneal Dialysis   - nephrology follow PD, recommend 4x a day  - patient on midodrine 30 q8h at home, continue here  - Holding home metoprolol 25 q12hr in setting of hypotension    Diabetes   - HA1C 6.1 on 7/6 suggesting prediabetes  - Tujeo 60 units at bedtime and Victoza at home, started on 30 units at bedtime, adjust as needed based on patient PO intake  - added 2 units insulin pre-meal    Hiccups  - GI follow  - hold Ativan 2 to sedation    Incontinence dermatitis  - wound care    Toe gangrene  - Podiatry follow  - Vascular follow  - PONCHO/PVR noted  - Angiogram if agrees    Finger gangrenous area  - local care    Vertebral fracture T12  - Ortho eval noted   - MRI spine noted  - No intervention    Confusion   - possible delirium  - Psych follow  - Neurology follow  - EEG noted    Dysphagia  - GI follow  - MBS  - ENT follow    functional quadriplegia   - supportive care     Palliative follow re [facility]     DCP in progress    Pipe Beck MD pager [phone]

## 2021-08-02 NOTE — PROGRESS NOTE ADULT - SUBJECTIVE AND OBJECTIVE BOX
NEPHROLOGY-[facility] [phone]        Patient seen and examined in bed.  He seemed lucid this am         MEDICATIONS  (STANDING):  aspirin enteric coated 81 milliGRAM(s) Oral daily  atorvastatin 80 milliGRAM(s) Oral at bedtime  cadexomer iodine 0.9% Gel 1 Application(s) Topical daily  chlorhexidine 0.12% Liquid 15 milliLiter(s) Oral Mucosa two times a day  chlorhexidine 2% Cloths 1 Application(s) Topical <User Schedule>  chlorhexidine 2% Cloths 1 Application(s) Topical daily  dextrose 40% Gel 15 Gram(s) Oral once  dextrose 5%. 1000 milliLiter(s) (50 mL/Hr) IV Continuous <Continuous>  dextrose 5%. 1000 milliLiter(s) (100 mL/Hr) IV Continuous <Continuous>  dextrose 50% Injectable 25 Gram(s) IV Push once  dextrose 50% Injectable 12.5 Gram(s) IV Push once  dextrose 50% Injectable 25 Gram(s) IV Push once  dronabinol 5 milliGRAM(s) Oral two times a day  ferrous    sulfate 325 milliGRAM(s) Oral three times a day  folic acid 1 milliGRAM(s) Oral daily  gentamicin 0.1% Ointment 1 Application(s) Topical <User Schedule>  glucagon  Injectable 1 milliGRAM(s) IntraMuscular once  heparin   Injectable 5000 Unit(s) SubCutaneous every 12 hours  insulin glargine Injectable (LANTUS) 5 Unit(s) SubCutaneous at bedtime  insulin lispro (ADMELOG) corrective regimen sliding scale   SubCutaneous three times a day before meals  insulin lispro (ADMELOG) corrective regimen sliding scale   SubCutaneous at bedtime  lidocaine   4% Patch 1 Patch Transdermal daily  melatonin 5 milliGRAM(s) Oral at bedtime  midodrine 30 milliGRAM(s) Oral every 8 hours  Nephro-kristi 1 Tablet(s) Oral daily  pantoprazole  Injectable 40 milliGRAM(s) IV Push daily  sevelamer carbonate 1600 milliGRAM(s) Oral three times a day  sodium chloride 0.9%. 1000 milliLiter(s) (50 mL/Hr) IV Continuous <Continuous>  thiamine IVPB 500 milliGRAM(s) IV Intermittent daily  ticagrelor 60 milliGRAM(s) Oral every 12 hours      VITAL:  T(C): , Max: 36.8 (08-01-21 @ 21:00)  T(F): , Max: 98.3 (08-01-21 @ 21:00)  HR: 95 (08-02-21 @ 13:00)  BP: 105/74 (08-02-21 @ 13:00)  BP(mean): --  RR: 18 (08-02-21 @ 13:00)  SpO2: 96% (08-02-21 @ 13:00)  Wt(kg): --    I and O's:    08-01 @ 07:01  -  08-02 @ 07:00  --------------------------------------------------------  IN: 2500 mL / OUT: 5400 mL / NET: -2900 mL          PHYSICAL EXAM:    Constitutional: NAD  Neck:  No JVD  Respiratory: CTAB/L  Cardiovascular: S1 and S2  Gastrointestinal: BS+, soft, NT/ND  Extremities: No peripheral edema  Neurological: A/O x 3, no focal deficits  Psychiatric: Normal mood, normal affect  : No Johnson  Skin: No rashes  Access: Not applicable    LABS:                        13.1   10.69 )-----------( 207      ( 02 Aug 2021 06:25 )             43.5     08-02    135  |  94<L>  |  42<H>  ----------------------------<  110<H>  3.7   |  26  |  9.79<H>    Ca    8.3<L>      02 Aug 2021 06:25    TPro  6.0  /  Alb  1.8<L>  /  TBili  0.8  /  DBili  x   /  AST  27  /  ALT  12  /  AlkPhos  129<H>  08-01          Urine Studies:          RADIOLOGY & ADDITIONAL STUDIES:

## 2021-08-02 NOTE — PROGRESS NOTE ADULT - SUBJECTIVE AND OBJECTIVE BOX
Patient is a 56y old  Male who presents with a chief complaint of Hypotension (02 Aug 2021 13:16)      SUBJECTIVE / OVERNIGHT EVENTS: No new complaints.   Review of Systems  chest pain no  palpitations no  sob no  nausea no  headache no    MEDICATIONS  (STANDING):  aspirin enteric coated 81 milliGRAM(s) Oral daily  atorvastatin 80 milliGRAM(s) Oral at bedtime  cadexomer iodine 0.9% Gel 1 Application(s) Topical daily  chlorhexidine 0.12% Liquid 15 milliLiter(s) Oral Mucosa two times a day  chlorhexidine 2% Cloths 1 Application(s) Topical <User Schedule>  chlorhexidine 2% Cloths 1 Application(s) Topical daily  dextrose 40% Gel 15 Gram(s) Oral once  dextrose 5%. 1000 milliLiter(s) (50 mL/Hr) IV Continuous <Continuous>  dextrose 5%. 1000 milliLiter(s) (100 mL/Hr) IV Continuous <Continuous>  dextrose 50% Injectable 25 Gram(s) IV Push once  dextrose 50% Injectable 12.5 Gram(s) IV Push once  dextrose 50% Injectable 25 Gram(s) IV Push once  ferrous    sulfate 325 milliGRAM(s) Oral three times a day  folic acid 1 milliGRAM(s) Oral daily  gentamicin 0.1% Ointment 1 Application(s) Topical <User Schedule>  glucagon  Injectable 1 milliGRAM(s) IntraMuscular once  heparin   Injectable 5000 Unit(s) SubCutaneous every 12 hours  insulin glargine Injectable (LANTUS) 5 Unit(s) SubCutaneous at bedtime  insulin lispro (ADMELOG) corrective regimen sliding scale   SubCutaneous three times a day before meals  insulin lispro (ADMELOG) corrective regimen sliding scale   SubCutaneous at bedtime  lidocaine   4% Patch 1 Patch Transdermal daily  melatonin 5 milliGRAM(s) Oral at bedtime  midodrine 30 milliGRAM(s) Oral every 8 hours  Nephro-kristi 1 Tablet(s) Oral daily  pantoprazole  Injectable 40 milliGRAM(s) IV Push daily  sevelamer carbonate 1600 milliGRAM(s) Oral three times a day  sodium chloride 0.9%. 1000 milliLiter(s) (50 mL/Hr) IV Continuous <Continuous>  thiamine IVPB 500 milliGRAM(s) IV Intermittent daily  ticagrelor 60 milliGRAM(s) Oral every 12 hours    MEDICATIONS  (PRN):  acetaminophen   Tablet .. 650 milliGRAM(s) Oral every 6 hours PRN Moderate Pain (4 - 6)  valproate sodium IVPB 125 milliGRAM(s) IV Intermittent every 8 hours PRN agitation      Vital Signs Last 24 Hrs  T(C): 36.2 (02 Aug 2021 17:06), Max: 36.8 (01 Aug 2021 21:00)  T(F): 97.2 (02 Aug 2021 17:06), Max: 98.3 (01 Aug 2021 21:00)  HR: 96 (02 Aug 2021 17:48) (80 - 97)  BP: 100/62 (02 Aug 2021 17:48) (95/64 - 106/74)  BP(mean): --  RR: 18 (02 Aug 2021 17:06) (18 - 18)  SpO2: 96% (02 Aug 2021 17:06) (96% - 100%)    PHYSICAL EXAM:  GENERAL: NAD, frail  HEAD:  Atraumatic, Normocephalic  EYES: EOMI, PERRLA, conjunctiva and sclera clear  NECK: Supple, No JVD  CHEST/LUNG: Clear to auscultation bilaterally; No wheeze  HEART: Regular rate and rhythm; No murmurs, rubs, or gallops  ABDOMEN: Soft, Nontender, Nondistended; Bowel sounds present PD catheter  EXTREMITIES:  2nd R toe and R thumb gangrenous   PSYCH: AAOx3  NEUROLOGY: non-focal  SKIN: No rashes or lesions    LABS:                        13.1   10.69 )-----------( 207      ( 02 Aug 2021 06:25 )             43.5     08-02    135  |  94<L>  |  42<H>  ----------------------------<  110<H>  3.7   |  26  |  9.79<H>    Ca    8.3<L>      02 Aug 2021 06:25    TPro  6.0  /  Alb  1.8<L>  /  TBili  0.8  /  DBili  x   /  AST  27  /  ALT  12  /  AlkPhos  129<H>  08-01                RADIOLOGY & ADDITIONAL TESTS:    Imaging Personally Reviewed:    Consultant(s) Notes Reviewed:      Care Discussed with Consultants/Other Providers:

## 2021-08-02 NOTE — PROGRESS NOTE ADULT - ASSESSMENT
56 M PMH ESRD on PD, DM on insulin, CHF EF 25-30% CAD s/p CABG x4 pw with hypotension with fever. Encephalopathy likely related to cefepime ? no obvious source of sepsis apparent as of now, KLAUDIA: Overall thickened valves seen however, no evidence of valvular vegetation is seen.     Hypotension:  on Midodrine for hemodynamic support:      ESRD on PD. Patient TW  61.5kg  Hyponatremia- Stable   ID- off IV antibiotics   Hyperphosphatemia: c/ w binders as prescribed   Failure to thrive     RECOMMENDATIONS   - CAPD today and only 3 exchanges  - Palliative care follow up noted.  Functional quadriplegic   - continue Renvela to 1,600 mg PO TID;   - continue Midodrine 30mg po q 8 hours   - No peg per family. On marinol.  Albumin is low at 1.8.  He needs to eat more   - On Glucerna shakes as he refused Nepro    [facility]   [phone]

## 2021-08-02 NOTE — PROGRESS NOTE ADULT - ASSESSMENT
56 m with    Sepsis resolved  - off antibiotic   - toe gangrene  - Podiatry follow  - ID follow   - KLAUDIA noted     COPD  - 2L NC overnight because he becomes apneic, sats well  - Sats well on RA while awake    CAD s/p CABG   - Hypotension likely in the setting of septic shock   - midodrine dose increased to 30 mg TID to match home dose  - Previous echo with reduced EF  - c/w DAPT  - Cardiology follow    CHF cr systolic  - cardiology follow    AICD  - EP eval noted. Not a good candidate for AICD    Peritoneal Dialysis   - nephrology follow PD, recommend 4x a day  - patient on midodrine 30 q8h at home, continue here  - Holding home metoprolol 25 q12hr in setting of hypotension    Diabetes   - HA1C 6.1 on 7/6 suggesting prediabetes  - Tujeo 60 units at bedtime and Victoza at home, started on 30 units at bedtime, adjust as needed based on patient PO intake  - added 2 units insulin pre-meal    Hiccups  - GI follow  - hold Ativan 2 to sedation    Incontinence dermatitis  - wound care    Toe gangrene  - Podiatry follow  - Vascular follow  - PONCHO/PVR noted  - Angiogram if agrees    Finger gangrenous area  - local care    Vertebral fracture T12  - Ortho eval noted   - MRI spine noted  - No intervention    Confusion   - possible delirium  - Psych follow  - Neurology follow  - EEG noted    Dysphagia  - GI follow  - MBS  - ENT follow    functional quadriplegia   - supportive care     Palliative follow re [facility]     DCP in progress.     d/w  wife. Unable to care for patient at home.     Pipe Beck MD pager [phone]

## 2021-08-02 NOTE — PROGRESS NOTE ADULT - ASSESSMENT
CATH 11/30/2020:  COMPLICATIONS: The stent was deployed without difficulty. On removal of the  balloon, the shaft broke and the tip of the balloon remained in the vessel. Several attempts were made to snare the balloon unsuccessfully. Dr. Verdugo was notifed who will take the patient to the operating room.  DIAGNOSTIC RECOMMENDATIONS: The patient should continue with the present medications. The patients vessel was stented without difficulty On removal of the balloon, the shaft broke with the baloon stuck in the left main. All attempts to snare the balloon out failed and the patient was taken to the OR by Dr. Arango to remove the balloon  introp eleonora 11/30/20: mild to mod MR, < from: Intra-Operative Transesophageal Echo (11.30.20 @ 17:02) >  Severe global left ventricular systolic dysfunction. Inferior and inferolateral akinesis.  severe hypokinesis of other segments.  Severe global hypokinesia.  Normal left ventricular internal dimensions and wall thicknesses. Moderate diastolic dysfunction (Stage II)  echo 12/17/20: EF 32%, mod MR, Severe global left ventricular systolic dysfunction, moderate pulmonary pressures  echo 12/20/20: EF (Visual Estimate):  25-30 % mild MR, Akinesis of the inferolateral, anterolateral, apical laterlal, inferior, and inferoseptal walls. Severe left ventricular enlargement. No left ventricular thrombus is seen.  Echo 7/7/21: EF 16%, mod - sev MR, mod AS, severe global lv sys dysfx, severe diastolic dysfx, mod pulm htn   ELEONORA 7/15/21: EF 24.5%,  Tethered mitral valve leaflets with normal opening. sev MR, mild to mod AR,  eccentric left ventricular hypertrophy; Dilated  TV annulus There are two jets seen  with venacontracta 0.4cm and 0.5q cm. Severe tricuspid regurgitation.  no evidence of valvular vegetation is seen. evere global leftventricular systolic dysfunction.    a/p  56 M well known to practice with PMH ESRD on PD, DM on insulin, CHF EF 25-30% CAD s/p CABG x4, underwent cardiac cath and stent and wire broke in heart s/p sternotomy p/w septic shock.    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  -med f/u    #Ischemic Cardiomyopathy. Chronic systolic HF  -Repeat echo noted with EF 16%, mod - sev MR, mod AS, severe global lv sys dysfx, severe diastolic dysfx, mod pulm htn ; eleonora with ef 24.5%   -eps eval noted, pt not candidate for icd   -no bb, acei/arb given hx of orthostatic bp   -continue midodrine for bp support   -cont vol removal w PD     # CAD, s/p CABG, s/p PCI, s/p redo open heart for stent balloon retrieval   -hst elevated, likely demand ischemia in setting of septic shock, ESRD   -c/w asa, Brilinta, statin     # ESRD  -on PD  -renal f/u    #AMS  -improved   -neuro psych f/u    #GOC  -palliative f/u     dvt ppx  DCP per primary team

## 2021-08-02 NOTE — PROGRESS NOTE ADULT - SUBJECTIVE AND OBJECTIVE BOX
CARDIOLOGY FOLLOW UP - Dr. Best  Date of Service: 8/2/21  CC: denies cp, sob, and palpitations     Review of Systems:  Constitutional: No fever, weight loss, or fatigue  Respiratory: No cough, wheezing, or hemoptysis, no shortness of breath  Cardiovascular: No chest pain, palpitations, passing out, dizziness, or leg swelling  Gastrointestinal: No abd or epigastric pain.  No nausea, vomiting, or hematemesis; no diarrhea or constipation, no melena or hematochezia  Vascular: no edema       PHYSICAL EXAM:  T(C): 36.6 (08-02-21 @ 13:00), Max: 36.8 (08-01-21 @ 21:00)  HR: 95 (08-02-21 @ 13:00) (80 - 95)  BP: 105/74 (08-02-21 @ 13:00) (96/66 - 106/74)  RR: 18 (08-02-21 @ 13:00) (18 - 20)  SpO2: 96% (08-02-21 @ 13:00) (96% - 100%)  Wt(kg): --  I&O's Summary    01 Aug 2021 07:01  -  02 Aug 2021 07:00  --------------------------------------------------------  IN: 2500 mL / OUT: 5400 mL / NET: -2900 mL        Appearance: Normal	  Cardiovascular: Normal S1 S2,RRR, No JVD, No murmurs  Respiratory: Lungs clear to auscultation	  Gastrointestinal:  Soft, Non-tender, + BS	  Extremities: Normal range of motion, No clubbing, cyanosis or edema      Home Medications:  aspirin 81 mg oral delayed release tablet: 1 tab(s) orally once a day (29 Dec 2020 18:37)  atorvastatin 80 mg oral tablet: 1 tab(s) orally once a day (at bedtime) (29 Dec 2020 18:37)  epoetin martine: injectable once a month (29 Dec 2020 18:37)  ferrous sulfate 325 mg (65 mg elemental iron) oral tablet: 1 tab(s) orally 3 times a day (29 Dec 2020 18:37)  gabapentin 100 mg oral capsule: 1 cap(s) orally once a day (29 Dec 2020 18:37)  nortriptyline 25 mg oral capsule: 1 cap(s) orally once a day (at bedtime) (29 Dec 2020 18:37)  pantoprazole 40 mg oral delayed release tablet: 1 tab(s) orally once a day (before a meal) (29 Dec 2020 18:37)  Toujeo SoloStar 300 units/mL subcutaneous solution: 60 unit(s) subcutaneous once a day (at bedtime) (08 Jan 2021 12:41)      MEDICATIONS  (STANDING):  aspirin enteric coated 81 milliGRAM(s) Oral daily  atorvastatin 80 milliGRAM(s) Oral at bedtime  cadexomer iodine 0.9% Gel 1 Application(s) Topical daily  chlorhexidine 0.12% Liquid 15 milliLiter(s) Oral Mucosa two times a day  chlorhexidine 2% Cloths 1 Application(s) Topical <User Schedule>  chlorhexidine 2% Cloths 1 Application(s) Topical daily  dextrose 40% Gel 15 Gram(s) Oral once  dextrose 5%. 1000 milliLiter(s) (50 mL/Hr) IV Continuous <Continuous>  dextrose 5%. 1000 milliLiter(s) (100 mL/Hr) IV Continuous <Continuous>  dextrose 50% Injectable 25 Gram(s) IV Push once  dextrose 50% Injectable 12.5 Gram(s) IV Push once  dextrose 50% Injectable 25 Gram(s) IV Push once  dronabinol 5 milliGRAM(s) Oral two times a day  ferrous    sulfate 325 milliGRAM(s) Oral three times a day  folic acid 1 milliGRAM(s) Oral daily  gentamicin 0.1% Ointment 1 Application(s) Topical <User Schedule>  glucagon  Injectable 1 milliGRAM(s) IntraMuscular once  heparin   Injectable 5000 Unit(s) SubCutaneous every 12 hours  insulin glargine Injectable (LANTUS) 5 Unit(s) SubCutaneous at bedtime  insulin lispro (ADMELOG) corrective regimen sliding scale   SubCutaneous three times a day before meals  insulin lispro (ADMELOG) corrective regimen sliding scale   SubCutaneous at bedtime  lidocaine   4% Patch 1 Patch Transdermal daily  melatonin 5 milliGRAM(s) Oral at bedtime  midodrine 30 milliGRAM(s) Oral every 8 hours  Nephro-kristi 1 Tablet(s) Oral daily  pantoprazole  Injectable 40 milliGRAM(s) IV Push daily  sevelamer carbonate 1600 milliGRAM(s) Oral three times a day  sodium chloride 0.9%. 1000 milliLiter(s) (50 mL/Hr) IV Continuous <Continuous>  thiamine IVPB 500 milliGRAM(s) IV Intermittent daily  ticagrelor 60 milliGRAM(s) Oral every 12 hours      TELEMETRY: 	    ECG:  	  RADIOLOGY:   DIAGNOSTIC TESTING:  [ ] Echocardiogram:  [ ]  Catheterization:  [ ] Stress Test:    OTHER: 	    LABS:	 	                            13.1   10.69 )-----------( 207      ( 02 Aug 2021 06:25 )             43.5     08-02    135  |  94<L>  |  42<H>  ----------------------------<  110<H>  3.7   |  26  |  9.79<H>    Ca    8.3<L>      02 Aug 2021 06:25    TPro  6.0  /  Alb  1.8<L>  /  TBili  0.8  /  DBili  x   /  AST  27  /  ALT  12  /  AlkPhos  129<H>  08-01

## 2021-08-03 NOTE — PROGRESS NOTE ADULT - ASSESSMENT
56 m with    Sepsis resolved  - off antibiotic   - toe gangrene  - Podiatry follow  - ID follow   - KLAUDIA noted     COPD  - 2L NC overnight because he becomes apneic, sats well  - Sats well on RA while awake    CAD s/p CABG   - Hypotension likely in the setting of septic shock   - midodrine dose increased to 30 mg TID to match home dose  - Previous echo with reduced EF  - c/w DAPT  - Cardiology follow    CHF cr systolic  - cardiology follow    AICD  - EP eval noted. Not a good candidate for AICD    Peritoneal Dialysis   - nephrology follow PD, recommend 4x a day  - patient on midodrine 30 q8h at home, continue here  - Holding home metoprolol 25 q12hr in setting of hypotension    Diabetes   - HA1C 6.1 on 7/6 suggesting prediabetes  - Tujeo 60 units at bedtime and Victoza at home, started on 30 units at bedtime, adjust as needed based on patient PO intake  - added 2 units insulin pre-meal    Hiccups  - GI follow  - hold Ativan 2 to sedation    Incontinence dermatitis  - wound care    Toe gangrene  - Podiatry follow  - Vascular follow  - PONCHO/PVR noted  - Angiogram if agrees    Finger gangrenous area  - local care    Vertebral fracture T12  - Ortho eval noted   - MRI spine noted  - No intervention    Confusion   - possible delirium  - Psych follow  - Neurology follow  - EEG noted    Dysphagia  - GI follow  - MBS  - ENT follow    functional quadriplegia   - supportive care     Palliative follow re [facility]     DCP in progress.     d/w  wife. Unable to care for patient at home.     Pipe Beck MD pager [phone]

## 2021-08-03 NOTE — PROGRESS NOTE ADULT - SUBJECTIVE AND OBJECTIVE BOX
CARDIOLOGY FOLLOW UP - Dr. Best  Date of Service: 8/3/21  CC: denies cp, sob, and palpitations     Review of Systems:  Constitutional: No fever, weight loss, or fatigue  Respiratory: No cough, wheezing, or hemoptysis, no shortness of breath  Cardiovascular: No chest pain, palpitations, passing out, dizziness, or leg swelling  Gastrointestinal: No abd or epigastric pain.  No nausea, vomiting, or hematemesis; no diarrhea or constipation, no melena or hematochezia  Vascular: no edema       PHYSICAL EXAM:  T(C): 36.7 (08-03-21 @ 12:00), Max: 37 (08-03-21 @ 09:00)  HR: 72 (08-03-21 @ 12:00) (71 - 97)  BP: 97/65 (08-03-21 @ 12:00) (95/64 - 115/79)  RR: 18 (08-03-21 @ 12:00) (18 - 18)  SpO2: 96% (08-03-21 @ 12:00) (96% - 100%)  Wt(kg): --  I&O's Summary    02 Aug 2021 07:01  -  03 Aug 2021 07:00  --------------------------------------------------------  IN: 3310 mL / OUT: 5400 mL / NET: -2090 mL        Appearance: Normal	  Cardiovascular: Normal S1 S2,RRR, No JVD, No murmurs  Respiratory: Lungs clear to auscultation	  Gastrointestinal:  Soft, Non-tender, + BS	  Extremities: Normal range of motion, No clubbing, cyanosis or edema      Home Medications:  aspirin 81 mg oral delayed release tablet: 1 tab(s) orally once a day (29 Dec 2020 18:37)  atorvastatin 80 mg oral tablet: 1 tab(s) orally once a day (at bedtime) (29 Dec 2020 18:37)  epoetin martine: injectable once a month (29 Dec 2020 18:37)  ferrous sulfate 325 mg (65 mg elemental iron) oral tablet: 1 tab(s) orally 3 times a day (29 Dec 2020 18:37)  gabapentin 100 mg oral capsule: 1 cap(s) orally once a day (29 Dec 2020 18:37)  nortriptyline 25 mg oral capsule: 1 cap(s) orally once a day (at bedtime) (29 Dec 2020 18:37)  pantoprazole 40 mg oral delayed release tablet: 1 tab(s) orally once a day (before a meal) (29 Dec 2020 18:37)  Toujeo SoloStar 300 units/mL subcutaneous solution: 60 unit(s) subcutaneous once a day (at bedtime) (08 Jan 2021 12:41)      MEDICATIONS  (STANDING):  aspirin enteric coated 81 milliGRAM(s) Oral daily  atorvastatin 80 milliGRAM(s) Oral at bedtime  cadexomer iodine 0.9% Gel 1 Application(s) Topical daily  chlorhexidine 0.12% Liquid 15 milliLiter(s) Oral Mucosa two times a day  chlorhexidine 2% Cloths 1 Application(s) Topical <User Schedule>  chlorhexidine 2% Cloths 1 Application(s) Topical daily  dextrose 40% Gel 15 Gram(s) Oral once  dextrose 5%. 1000 milliLiter(s) (50 mL/Hr) IV Continuous <Continuous>  dextrose 5%. 1000 milliLiter(s) (100 mL/Hr) IV Continuous <Continuous>  dextrose 50% Injectable 25 Gram(s) IV Push once  dextrose 50% Injectable 12.5 Gram(s) IV Push once  dextrose 50% Injectable 25 Gram(s) IV Push once  dronabinol 5 milliGRAM(s) Oral two times a day  ferrous    sulfate 325 milliGRAM(s) Oral three times a day  folic acid 1 milliGRAM(s) Oral daily  gentamicin 0.1% Ointment 1 Application(s) Topical <User Schedule>  glucagon  Injectable 1 milliGRAM(s) IntraMuscular once  heparin   Injectable 5000 Unit(s) SubCutaneous every 12 hours  insulin glargine Injectable (LANTUS) 5 Unit(s) SubCutaneous at bedtime  insulin lispro (ADMELOG) corrective regimen sliding scale   SubCutaneous three times a day before meals  insulin lispro (ADMELOG) corrective regimen sliding scale   SubCutaneous at bedtime  lidocaine   4% Patch 1 Patch Transdermal daily  lidocaine   4% Patch 1 Patch Transdermal daily  melatonin 5 milliGRAM(s) Oral at bedtime  midodrine 30 milliGRAM(s) Oral every 8 hours  Nephro-kristi 1 Tablet(s) Oral daily  pantoprazole  Injectable 40 milliGRAM(s) IV Push daily  sevelamer carbonate 1600 milliGRAM(s) Oral three times a day  sodium chloride 0.9%. 1000 milliLiter(s) (50 mL/Hr) IV Continuous <Continuous>  thiamine IVPB 500 milliGRAM(s) IV Intermittent daily  ticagrelor 60 milliGRAM(s) Oral every 12 hours      TELEMETRY: 	    ECG:  	  RADIOLOGY:   DIAGNOSTIC TESTING:  [ ] Echocardiogram:  [ ]  Catheterization:  [ ] Stress Test:    OTHER: 	    LABS:	 	                            12.8   11.16 )-----------( 196      ( 03 Aug 2021 06:17 )             43.4     08-03    129<L>  |  94<L>  |  42<H>  ----------------------------<  131<H>  3.8   |  20<L>  |  9.20<H>    Ca    8.3<L>      03 Aug 2021 06:17

## 2021-08-03 NOTE — PROGRESS NOTE ADULT - SUBJECTIVE AND OBJECTIVE BOX
NEPHROLOGY-[facility] [phone]        Patient seen and examined in bed.  He is not eating well         MEDICATIONS  (STANDING):  aspirin enteric coated 81 milliGRAM(s) Oral daily  atorvastatin 80 milliGRAM(s) Oral at bedtime  cadexomer iodine 0.9% Gel 1 Application(s) Topical daily  chlorhexidine 0.12% Liquid 15 milliLiter(s) Oral Mucosa two times a day  chlorhexidine 2% Cloths 1 Application(s) Topical <User Schedule>  chlorhexidine 2% Cloths 1 Application(s) Topical daily  dextrose 40% Gel 15 Gram(s) Oral once  dextrose 5%. 1000 milliLiter(s) (50 mL/Hr) IV Continuous <Continuous>  dextrose 5%. 1000 milliLiter(s) (100 mL/Hr) IV Continuous <Continuous>  dextrose 50% Injectable 25 Gram(s) IV Push once  dextrose 50% Injectable 12.5 Gram(s) IV Push once  dextrose 50% Injectable 25 Gram(s) IV Push once  dronabinol 5 milliGRAM(s) Oral two times a day  ferrous    sulfate 325 milliGRAM(s) Oral three times a day  folic acid 1 milliGRAM(s) Oral daily  gentamicin 0.1% Ointment 1 Application(s) Topical <User Schedule>  glucagon  Injectable 1 milliGRAM(s) IntraMuscular once  heparin   Injectable 5000 Unit(s) SubCutaneous every 12 hours  insulin glargine Injectable (LANTUS) 5 Unit(s) SubCutaneous at bedtime  insulin lispro (ADMELOG) corrective regimen sliding scale   SubCutaneous three times a day before meals  insulin lispro (ADMELOG) corrective regimen sliding scale   SubCutaneous at bedtime  lidocaine   4% Patch 1 Patch Transdermal daily  lidocaine   4% Patch 1 Patch Transdermal daily  melatonin 5 milliGRAM(s) Oral at bedtime  midodrine 30 milliGRAM(s) Oral every 8 hours  Nephro-kristi 1 Tablet(s) Oral daily  pantoprazole  Injectable 40 milliGRAM(s) IV Push daily  sevelamer carbonate 1600 milliGRAM(s) Oral three times a day  sodium chloride 0.9%. 1000 milliLiter(s) (50 mL/Hr) IV Continuous <Continuous>  thiamine IVPB 500 milliGRAM(s) IV Intermittent daily  ticagrelor 60 milliGRAM(s) Oral every 12 hours      VITAL:  T(C): , Max: 37 (08-03-21 @ 09:00)  T(F): , Max: 98.6 (08-03-21 @ 09:00)  HR: 96 (08-03-21 @ 09:00)  BP: 102/66 (08-03-21 @ 09:00)  BP(mean): --  RR: 18 (08-03-21 @ 09:00)  SpO2: 96% (08-03-21 @ 09:00)  Wt(kg): --    I and O's:    08-02 @ 07:01  -  08-03 @ 07:00  --------------------------------------------------------  IN: 3310 mL / OUT: 5400 mL / NET: -2090 mL          PHYSICAL EXAM:    Constitutional: NAD; cachetic   Neck:  No JVD  Respiratory: CTAB/L  Cardiovascular: S1 and S2  Gastrointestinal: BS+, soft, NT/ND + PD  Extremities: No peripheral edema  Neurological: A/O x 3, no focal deficits  Psychiatric: Normal mood, normal affect  : No Johnson  Skin: No rashes  Access: Not applicable    LABS:                        12.8   11.16 )-----------( 196      ( 03 Aug 2021 06:17 )             43.4     08-03    129<L>  |  94<L>  |  42<H>  ----------------------------<  131<H>  3.8   |  20<L>  |  9.20<H>    Ca    8.3<L>      03 Aug 2021 06:17            Urine Studies:          RADIOLOGY & ADDITIONAL STUDIES:             No significant past surgical history

## 2021-08-03 NOTE — PROGRESS NOTE ADULT - ASSESSMENT
56 M PMH ESRD on PD, DM on insulin, CHF EF 25-30% CAD s/p CABG x4 pw with hypotension with fever. Encephalopathy likely related to cefepime ? no obvious source of sepsis apparent as of now, KLAUDIA: Overall thickened valves seen however, no evidence of valvular vegetation is seen.     Hypotension:  on Midodrine for hemodynamic support:      ESRD on PD. Patient TW  61.5kg  Hyponatremia- Stable   ID- off IV antibiotics   Hyperphosphatemia: c/ w binders as prescribed   Failure to thrive     RECOMMENDATIONS   - CAPD today and only 3 exchanges  - Palliative care follow up noted.  Functional quadriplegic   - continue Renvela to 1,600 mg PO TID;   - continue Midodrine 30mg po q 8 hours   - No peg per family. On marinol.  Albumin is low at 1.8.  He needs to eat more   - On Glucerna shakes as he refused Nepro    [facility]ayed [facility]   [phone]

## 2021-08-03 NOTE — PROGRESS NOTE ADULT - SUBJECTIVE AND OBJECTIVE BOX
Patient is a 56y old  Male who presents with a chief complaint of Hypotension (03 Aug 2021 13:53)      SUBJECTIVE / OVERNIGHT EVENTS: No new complaints.   Review of Systems  chest pain no  palpitations no  sob no  nausea no  headache no    MEDICATIONS  (STANDING):  aspirin enteric coated 81 milliGRAM(s) Oral daily  atorvastatin 80 milliGRAM(s) Oral at bedtime  cadexomer iodine 0.9% Gel 1 Application(s) Topical daily  chlorhexidine 0.12% Liquid 15 milliLiter(s) Oral Mucosa two times a day  chlorhexidine 2% Cloths 1 Application(s) Topical <User Schedule>  chlorhexidine 2% Cloths 1 Application(s) Topical daily  dextrose 40% Gel 15 Gram(s) Oral once  dextrose 5%. 1000 milliLiter(s) (50 mL/Hr) IV Continuous <Continuous>  dextrose 5%. 1000 milliLiter(s) (100 mL/Hr) IV Continuous <Continuous>  dextrose 50% Injectable 25 Gram(s) IV Push once  dextrose 50% Injectable 12.5 Gram(s) IV Push once  dextrose 50% Injectable 25 Gram(s) IV Push once  dronabinol 5 milliGRAM(s) Oral two times a day  ferrous    sulfate 325 milliGRAM(s) Oral three times a day  folic acid 1 milliGRAM(s) Oral daily  gentamicin 0.1% Ointment 1 Application(s) Topical <User Schedule>  glucagon  Injectable 1 milliGRAM(s) IntraMuscular once  heparin   Injectable 5000 Unit(s) SubCutaneous every 12 hours  insulin glargine Injectable (LANTUS) 5 Unit(s) SubCutaneous at bedtime  insulin lispro (ADMELOG) corrective regimen sliding scale   SubCutaneous three times a day before meals  insulin lispro (ADMELOG) corrective regimen sliding scale   SubCutaneous at bedtime  lidocaine   4% Patch 1 Patch Transdermal daily  lidocaine   4% Patch 1 Patch Transdermal daily  melatonin 5 milliGRAM(s) Oral at bedtime  midodrine 30 milliGRAM(s) Oral every 8 hours  Nephro-kristi 1 Tablet(s) Oral daily  pantoprazole  Injectable 40 milliGRAM(s) IV Push daily  sevelamer carbonate 1600 milliGRAM(s) Oral three times a day  sodium chloride 0.9%. 1000 milliLiter(s) (50 mL/Hr) IV Continuous <Continuous>  thiamine IVPB 500 milliGRAM(s) IV Intermittent daily  ticagrelor 60 milliGRAM(s) Oral every 12 hours    MEDICATIONS  (PRN):  acetaminophen   Tablet .. 650 milliGRAM(s) Oral every 6 hours PRN Moderate Pain (4 - 6)  valproate sodium IVPB 125 milliGRAM(s) IV Intermittent every 8 hours PRN agitation      Vital Signs Last 24 Hrs  T(C): 37.1 (03 Aug 2021 13:00), Max: 37.1 (03 Aug 2021 13:00)  T(F): 98.8 (03 Aug 2021 13:00), Max: 98.8 (03 Aug 2021 13:00)  HR: 77 (03 Aug 2021 13:00) (71 - 96)  BP: 98/67 (03 Aug 2021 13:00) (95/64 - 115/79)  BP(mean): --  RR: 18 (03 Aug 2021 13:00) (18 - 18)  SpO2: 96% (03 Aug 2021 13:00) (96% - 100%)    PHYSICAL EXAM:  GENERAL: NAD  HEAD:  Atraumatic, Normocephalic  EYES: EOMI, PERRLA, conjunctiva and sclera clear  NECK: Supple, No JVD  CHEST/LUNG: Clear to auscultation bilaterally; No wheeze  HEART: Regular rate and rhythm; No murmurs, rubs, or gallops  ABDOMEN: Soft, Nontender, Nondistended; Bowel sounds present PD catheter  EXTREMITIES:  2+ Peripheral Pulses, No clubbing, cyanosis, or edema 2nd toe and R thumb gangrene.  PSYCH: confused  NEUROLOGY: non-focal  SKIN: No rashes or lesions    LABS:                        12.8   11.16 )-----------( 196      ( 03 Aug 2021 06:17 )             43.4     08-03    129<L>  |  94<L>  |  42<H>  ----------------------------<  131<H>  3.8   |  20<L>  |  9.20<H>    Ca    8.3<L>      03 Aug 2021 06:17                  RADIOLOGY & ADDITIONAL TESTS:    Imaging Personally Reviewed:    Consultant(s) Notes Reviewed:      Care Discussed with Consultants/Other Providers:

## 2021-08-03 NOTE — PROGRESS NOTE ADULT - ASSESSMENT
CATH 11/30/2020:  COMPLICATIONS: The stent was deployed without difficulty. On removal of the  balloon, the shaft broke and the tip of the balloon remained in the vessel. Several attempts were made to snare the balloon unsuccessfully. Dr. Verdugo was notifed who will take the patient to the operating room.  DIAGNOSTIC RECOMMENDATIONS: The patient should continue with the present medications. The patients vessel was stented without difficulty On removal of the balloon, the shaft broke with the baloon stuck in the left main. All attempts to snare the balloon out failed and the patient was taken to the OR by Dr. Arango to remove the balloon  introp eleonora 11/30/20: mild to mod MR, < from: Intra-Operative Transesophageal Echo (11.30.20 @ 17:02) >  Severe global left ventricular systolic dysfunction. Inferior and inferolateral akinesis.  severe hypokinesis of other segments.  Severe global hypokinesia.  Normal left ventricular internal dimensions and wall thicknesses. Moderate diastolic dysfunction (Stage II)  echo 12/17/20: EF 32%, mod MR, Severe global left ventricular systolic dysfunction, moderate pulmonary pressures  echo 12/20/20: EF (Visual Estimate):  25-30 % mild MR, Akinesis of the inferolateral, anterolateral, apical laterlal, inferior, and inferoseptal walls. Severe left ventricular enlargement. No left ventricular thrombus is seen.  Echo 7/7/21: EF 16%, mod - sev MR, mod AS, severe global lv sys dysfx, severe diastolic dysfx, mod pulm htn   ELEONORA 7/15/21: EF 24.5%,  Tethered mitral valve leaflets with normal opening. sev MR, mild to mod AR,  eccentric left ventricular hypertrophy; Dilated  TV annulus There are two jets seen  with venacontracta 0.4cm and 0.5q cm. Severe tricuspid regurgitation.  no evidence of valvular vegetation is seen. evere global leftventricular systolic dysfunction.    a/p  56 M well known to practice with PMH ESRD on PD, DM on insulin, CHF EF 25-30% CAD s/p CABG x4, underwent cardiac cath and stent and wire broke in heart s/p sternotomy p/w septic shock.    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  -med f/u    #Ischemic Cardiomyopathy. Chronic systolic HF  -Repeat echo noted with EF 16%, mod - sev MR, mod AS, severe global lv sys dysfx, severe diastolic dysfx, mod pulm htn ; eleonora with ef 24.5%   -eps eval noted, pt not candidate for icd   -no bb, acei/arb given hx of orthostatic bp   -continue midodrine for bp support   -cont vol removal w PD     # CAD, s/p CABG, s/p PCI, s/p redo open heart for stent balloon retrieval   -hst elevated, likely demand ischemia in setting of septic shock, ESRD   -c/w asa, Brilinta, statin     # ESRD  -on PD  -hyponatremia mgmt per renal   -renal f/u    #AMS  -improved   -neuro psych f/u    #GOC  -palliative f/u     dvt ppx  DCP per primary team

## 2021-08-03 NOTE — CHART NOTE - NSCHARTNOTEFT_GEN_A_CORE
FM planned for Thursday at 1pm.         Garrick Madera MD   Geriatrics and Palliative Care ([facility]) Consult Service    of Geriatric and Palliative Medicine  [facility]      Please page the following number for clinical matters between the hours of 9 am and 5 pm from Monday through Friday : [phone]    After 5pm and on weekends, please see the contact information below:    In the event of newly developing, evolving, or worsening symptoms, please contact the Palliative Medicine team via pager (if the patient is at [facility] #[phone] or if the patient is at [facility] #[phone]) The Geriatric and Palliative Medicine service has coverage 24 hours a day/ 7 days a week to provide medical recommendations regarding symptom management needs via telephone

## 2021-08-04 NOTE — PROGRESS NOTE ADULT - SUBJECTIVE AND OBJECTIVE BOX
Patient is a 56y old  Male who presents with a chief complaint of Hypotension (04 Aug 2021 12:54)      SUBJECTIVE / OVERNIGHT EVENTS: Comfortable without new complaints.   Review of Systems  chest pain no  palpitations no  sob no  nausea no  headache no    MEDICATIONS  (STANDING):  aspirin enteric coated 81 milliGRAM(s) Oral daily  atorvastatin 80 milliGRAM(s) Oral at bedtime  cadexomer iodine 0.9% Gel 1 Application(s) Topical daily  chlorhexidine 0.12% Liquid 15 milliLiter(s) Oral Mucosa two times a day  chlorhexidine 2% Cloths 1 Application(s) Topical <User Schedule>  chlorhexidine 2% Cloths 1 Application(s) Topical daily  dextrose 40% Gel 15 Gram(s) Oral once  dextrose 5%. 1000 milliLiter(s) (50 mL/Hr) IV Continuous <Continuous>  dextrose 5%. 1000 milliLiter(s) (100 mL/Hr) IV Continuous <Continuous>  dextrose 50% Injectable 25 Gram(s) IV Push once  dextrose 50% Injectable 12.5 Gram(s) IV Push once  dextrose 50% Injectable 25 Gram(s) IV Push once  dronabinol 5 milliGRAM(s) Oral two times a day  ferrous    sulfate 325 milliGRAM(s) Oral three times a day  folic acid 1 milliGRAM(s) Oral daily  gentamicin 0.1% Ointment 1 Application(s) Topical <User Schedule>  glucagon  Injectable 1 milliGRAM(s) IntraMuscular once  heparin   Injectable 5000 Unit(s) SubCutaneous every 12 hours  insulin glargine Injectable (LANTUS) 5 Unit(s) SubCutaneous at bedtime  insulin lispro (ADMELOG) corrective regimen sliding scale   SubCutaneous three times a day before meals  insulin lispro (ADMELOG) corrective regimen sliding scale   SubCutaneous at bedtime  lidocaine   4% Patch 1 Patch Transdermal daily  lidocaine   4% Patch 1 Patch Transdermal daily  melatonin 5 milliGRAM(s) Oral at bedtime  midodrine 30 milliGRAM(s) Oral every 8 hours  Nephro-kristi 1 Tablet(s) Oral daily  pantoprazole  Injectable 40 milliGRAM(s) IV Push daily  sevelamer carbonate 1600 milliGRAM(s) Oral three times a day  sodium chloride 0.9%. 1000 milliLiter(s) (50 mL/Hr) IV Continuous <Continuous>  thiamine IVPB 500 milliGRAM(s) IV Intermittent daily  ticagrelor 60 milliGRAM(s) Oral every 12 hours    MEDICATIONS  (PRN):  acetaminophen   Tablet .. 650 milliGRAM(s) Oral every 6 hours PRN Moderate Pain (4 - 6)  valproate sodium IVPB 125 milliGRAM(s) IV Intermittent every 8 hours PRN agitation      Vital Signs Last 24 Hrs  T(C): 36.7 (04 Aug 2021 16:30), Max: 37 (04 Aug 2021 09:00)  T(F): 98.1 (04 Aug 2021 16:30), Max: 98.6 (04 Aug 2021 09:00)  HR: 91 (04 Aug 2021 16:30) (84 - 96)  BP: 111/75 (04 Aug 2021 16:30) (99/76 - 116/82)  BP(mean): --  RR: 18 (04 Aug 2021 16:30) (18 - 18)  SpO2: 96% (04 Aug 2021 16:30) (96% - 100%)    PHYSICAL EXAM:  GENERAL: NAD  HEAD:  Atraumatic, Normocephalic  EYES: EOMI, PERRLA, conjunctiva and sclera clear  NECK: Supple, No JVD  CHEST/LUNG: Clear to auscultation bilaterally; No wheeze  HEART: Regular rate and rhythm; No murmurs, rubs, or gallops  ABDOMEN: Soft, Nontender, Nondistended; Bowel sounds present PD catheter  EXTREMITIES:  2+ Peripheral Pulses, No clubbing, cyanosis, or edema 2nd toe and R thumb gangrene.  PSYCH: confused  NEUROLOGY: non-focal  SKIN: No rashes or lesions    LABS:                        12.0   9.85  )-----------( 236      ( 04 Aug 2021 06:17 )             39.9     08-04    133<L>  |  95<L>  |  52<H>  ----------------------------<  106<H>  3.8   |  25  |  9.59<H>    Ca    8.5      04 Aug 2021 06:16                  RADIOLOGY & ADDITIONAL TESTS:    Imaging Personally Reviewed:    Consultant(s) Notes Reviewed:      Care Discussed with Consultants/Other Providers:

## 2021-08-04 NOTE — PROGRESS NOTE ADULT - ASSESSMENT
CATH 11/30/2020:  COMPLICATIONS: The stent was deployed without difficulty. On removal of the  balloon, the shaft broke and the tip of the balloon remained in the vessel. Several attempts were made to snare the balloon unsuccessfully. Dr. Verdugo was notifed who will take the patient to the operating room.  DIAGNOSTIC RECOMMENDATIONS: The patient should continue with the present medications. The patients vessel was stented without difficulty On removal of the balloon, the shaft broke with the baloon stuck in the left main. All attempts to snare the balloon out failed and the patient was taken to the OR by Dr. Arango to remove the balloon  introp eleonora 11/30/20: mild to mod MR, < from: Intra-Operative Transesophageal Echo (11.30.20 @ 17:02) >  Severe global left ventricular systolic dysfunction. Inferior and inferolateral akinesis.  severe hypokinesis of other segments.  Severe global hypokinesia.  Normal left ventricular internal dimensions and wall thicknesses. Moderate diastolic dysfunction (Stage II)  echo 12/17/20: EF 32%, mod MR, Severe global left ventricular systolic dysfunction, moderate pulmonary pressures  echo 12/20/20: EF (Visual Estimate):  25-30 % mild MR, Akinesis of the inferolateral, anterolateral, apical laterlal, inferior, and inferoseptal walls. Severe left ventricular enlargement. No left ventricular thrombus is seen.  Echo 7/7/21: EF 16%, mod - sev MR, mod AS, severe global lv sys dysfx, severe diastolic dysfx, mod pulm htn   ELEONORA 7/15/21: EF 24.5%,  Tethered mitral valve leaflets with normal opening. sev MR, mild to mod AR,  eccentric left ventricular hypertrophy; Dilated  TV annulus There are two jets seen  with venacontracta 0.4cm and 0.5q cm. Severe tricuspid regurgitation.  no evidence of valvular vegetation is seen. evere global leftventricular systolic dysfunction.    a/p  56 M well known to practice with PMH ESRD on PD, DM on insulin, CHF EF 25-30% CAD s/p CABG x4, underwent cardiac cath and stent and wire broke in heart s/p sternotomy p/w septic shock.    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  -med f/u    #Ischemic Cardiomyopathy. Chronic systolic HF  -Repeat echo noted with EF 16%, mod - sev MR, mod AS, severe global lv sys dysfx, severe diastolic dysfx, mod pulm htn ; eleonora with ef 24.5%   -eps eval noted, pt not candidate for icd   -no bb, acei/arb given hx of orthostatic bp   -continue midodrine for bp support   -cont vol removal w PD     # CAD, s/p CABG, s/p PCI, s/p redo open heart for stent balloon retrieval   -hst elevated, likely demand ischemia in setting of septic shock, ESRD   -c/w asa, Brilinta, statin     # ESRD  -on PD  -hyponatremia mgmt per renal   -renal f/u    #AMS  -improved   -neuro psych f/u    #GOC  -palliative f/u     dvt ppx

## 2021-08-04 NOTE — PROGRESS NOTE ADULT - SUBJECTIVE AND OBJECTIVE BOX
NEPHROLOGY-[facility] [phone]        Patient seen and examined in bed.  He was about the same         MEDICATIONS  (STANDING):  aspirin enteric coated 81 milliGRAM(s) Oral daily  atorvastatin 80 milliGRAM(s) Oral at bedtime  cadexomer iodine 0.9% Gel 1 Application(s) Topical daily  chlorhexidine 0.12% Liquid 15 milliLiter(s) Oral Mucosa two times a day  chlorhexidine 2% Cloths 1 Application(s) Topical <User Schedule>  chlorhexidine 2% Cloths 1 Application(s) Topical daily  dextrose 40% Gel 15 Gram(s) Oral once  dextrose 5%. 1000 milliLiter(s) (50 mL/Hr) IV Continuous <Continuous>  dextrose 5%. 1000 milliLiter(s) (100 mL/Hr) IV Continuous <Continuous>  dextrose 50% Injectable 25 Gram(s) IV Push once  dextrose 50% Injectable 12.5 Gram(s) IV Push once  dextrose 50% Injectable 25 Gram(s) IV Push once  dronabinol 5 milliGRAM(s) Oral two times a day  ferrous    sulfate 325 milliGRAM(s) Oral three times a day  folic acid 1 milliGRAM(s) Oral daily  gentamicin 0.1% Ointment 1 Application(s) Topical <User Schedule>  glucagon  Injectable 1 milliGRAM(s) IntraMuscular once  heparin   Injectable 5000 Unit(s) SubCutaneous every 12 hours  insulin glargine Injectable (LANTUS) 5 Unit(s) SubCutaneous at bedtime  insulin lispro (ADMELOG) corrective regimen sliding scale   SubCutaneous three times a day before meals  insulin lispro (ADMELOG) corrective regimen sliding scale   SubCutaneous at bedtime  lidocaine   4% Patch 1 Patch Transdermal daily  lidocaine   4% Patch 1 Patch Transdermal daily  melatonin 5 milliGRAM(s) Oral at bedtime  midodrine 30 milliGRAM(s) Oral every 8 hours  Nephro-kristi 1 Tablet(s) Oral daily  pantoprazole  Injectable 40 milliGRAM(s) IV Push daily  sevelamer carbonate 1600 milliGRAM(s) Oral three times a day  sodium chloride 0.9%. 1000 milliLiter(s) (50 mL/Hr) IV Continuous <Continuous>  thiamine IVPB 500 milliGRAM(s) IV Intermittent daily  ticagrelor 60 milliGRAM(s) Oral every 12 hours      VITAL:  T(C): , Max: 37.1 (08-03-21 @ 13:00)  T(F): , Max: 98.8 (08-03-21 @ 13:00)  HR: 91 (08-04-21 @ 09:00)  BP: 116/73 (08-04-21 @ 09:00)  BP(mean): --  RR: 18 (08-04-21 @ 09:00)  SpO2: 96% (08-04-21 @ 09:00)  Wt(kg): --    I and O's:    08-03 @ 07:01  -  08-04 @ 07:00  --------------------------------------------------------  IN: 3140 mL / OUT: 5500 mL / NET: -2360 mL          PHYSICAL EXAM:    Constitutional: NAD; cachetic   Neck:  No JVD  Respiratory: CTAB/L  Cardiovascular: S1 and S2  Gastrointestinal: BS+, soft, NT/ND  Extremities: No peripheral edema  Neurological: A/O x 3, no focal deficits  Psychiatric: Normal mood, normal affect  : No Johnson  Skin: No rashes  Access: Not applicable    LABS:                        12.0   9.85  )-----------( 236      ( 04 Aug 2021 06:17 )             39.9     08-04    133<L>  |  95<L>  |  52<H>  ----------------------------<  106<H>  3.8   |  25  |  9.59<H>    Ca    8.5      04 Aug 2021 06:16            Urine Studies:          RADIOLOGY & ADDITIONAL STUDIES:

## 2021-08-04 NOTE — PROGRESS NOTE ADULT - ASSESSMENT
56 m with    Sepsis resolved  - off antibiotic   - toe gangrene  - Podiatry follow  - ID follow   - KLAUDIA noted     COPD  - 2L NC overnight because he becomes apneic, sats well  - Sats well on RA while awake    CAD s/p CABG   - Hypotension likely in the setting of septic shock   - midodrine dose increased to 30 mg TID to match home dose  - Previous echo with reduced EF  - c/w DAPT  - Cardiology follow    CHF cr systolic  - cardiology follow    AICD  - EP eval noted. Not a good candidate for AICD    Peritoneal Dialysis   - nephrology follow PD, recommend 4x a day  - patient on midodrine 30 q8h at home, continue here  - Holding home metoprolol 25 q12hr in setting of hypotension    Diabetes   - HA1C 6.1 on 7/6 suggesting prediabetes  - Tujeo 60 units at bedtime and Victoza at home, started on 30 units at bedtime, adjust as needed based on patient PO intake  - added 2 units insulin pre-meal    Hiccups  - GI follow  - hold Ativan 2 to sedation    Incontinence dermatitis  - wound care    Toe gangrene  - Podiatry follow  - Vascular follow  - PONCHO/PVR noted  - Angiogram if agrees    Finger gangrenous area  - local care    Vertebral fracture T12  - Ortho eval noted   - MRI spine noted  - No intervention    Confusion   - possible delirium  - Psych follow  - Neurology follow  - EEG noted    Dysphagia  - GI follow  - MBS  - ENT follow    functional quadriplegia   - supportive care     Palliative follow re [facility]     DCP in progress.     Pipe Beck MD pager [phone]

## 2021-08-04 NOTE — PROGRESS NOTE ADULT - ASSESSMENT
56 M PMH ESRD on PD, DM on insulin, CHF EF 25-30% CAD s/p CABG x4 pw with hypotension with fever. Encephalopathy likely related to cefepime ? no obvious source of sepsis apparent as of now, KLAUDIA: Overall thickened valves seen however, no evidence of valvular vegetation is seen.     Hypotension:  on Midodrine for hemodynamic support:      ESRD on PD. Patient TW  61.5kg  Hyponatremia- Stable   ID- off IV antibiotics   Hyperphosphatemia: c/ w binders as prescribed   Failure to thrive     RECOMMENDATIONS   - CAPD today and now will increase to 4 exchanges again.  SBP improved  - Palliative care follow up noted.  Functional quadriplegic   - continue Renvela to 1,600 mg PO TID;   - continue Midodrine 30mg po q 8 hours   - No peg per family. On marinol.  Albumin is low at 1.8.  He needs to eat more   - On Glucerna shakes as he refused Nepro    Sayed [facility]   [phone]

## 2021-08-05 NOTE — PROGRESS NOTE ADULT - ASSESSMENT
CATH 11/30/2020:  COMPLICATIONS: The stent was deployed without difficulty. On removal of the  balloon, the shaft broke and the tip of the balloon remained in the vessel. Several attempts were made to snare the balloon unsuccessfully. Dr. Verdugo was notifed who will take the patient to the operating room.  DIAGNOSTIC RECOMMENDATIONS: The patient should continue with the present medications. The patients vessel was stented without difficulty On removal of the balloon, the shaft broke with the baloon stuck in the left main. All attempts to snare the balloon out failed and the patient was taken to the OR by Dr. Arango to remove the balloon  introp eleonora 11/30/20: mild to mod MR, < from: Intra-Operative Transesophageal Echo (11.30.20 @ 17:02) >  Severe global left ventricular systolic dysfunction. Inferior and inferolateral akinesis.  severe hypokinesis of other segments.  Severe global hypokinesia.  Normal left ventricular internal dimensions and wall thicknesses. Moderate diastolic dysfunction (Stage II)  echo 12/17/20: EF 32%, mod MR, Severe global left ventricular systolic dysfunction, moderate pulmonary pressures  echo 12/20/20: EF (Visual Estimate):  25-30 % mild MR, Akinesis of the inferolateral, anterolateral, apical laterlal, inferior, and inferoseptal walls. Severe left ventricular enlargement. No left ventricular thrombus is seen.  Echo 7/7/21: EF 16%, mod - sev MR, mod AS, severe global lv sys dysfx, severe diastolic dysfx, mod pulm htn   ELEONORA 7/15/21: EF 24.5%,  Tethered mitral valve leaflets with normal opening. sev MR, mild to mod AR,  eccentric left ventricular hypertrophy; Dilated  TV annulus There are two jets seen  with venacontracta 0.4cm and 0.5q cm. Severe tricuspid regurgitation.  no evidence of valvular vegetation is seen. evere global leftventricular systolic dysfunction.    a/p  56 M well known to practice with PMH ESRD on PD, DM on insulin, CHF EF 25-30% CAD s/p CABG x4, underwent cardiac cath and stent and wire broke in heart s/p sternotomy p/w septic shock.    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  -med f/u    #Ischemic Cardiomyopathy. Chronic systolic HF  -Repeat echo noted with EF 16%, mod - sev MR, mod AS, severe global lv sys dysfx, severe diastolic dysfx, mod pulm htn ; eleonora with ef 24.5%   -eps eval noted, pt not candidate for icd   -no bb, acei/arb given hx of orthostatic bp   -continue midodrine for bp support   -cont vol removal w PD     # CAD, s/p CABG, s/p PCI, s/p redo open heart for stent balloon retrieval   -hst elevated, likely demand ischemia in setting of septic shock, ESRD   -c/w asa, Brilinta, statin     # ESRD  -on PD  -hyponatremia improved    -renal f/u    #AMS  -improved   -neuro psych f/u    #GOC  -palliative f/u     dvt ppx

## 2021-08-05 NOTE — PROGRESS NOTE ADULT - SUBJECTIVE AND OBJECTIVE BOX
Patient is a 56y old  Male who presents with a chief complaint of Hypotension (05 Aug 2021 12:12)      SUBJECTIVE / OVERNIGHT EVENTS: Comfortable without new complaints.   Review of Systems  chest pain no  palpitations no  sob no  nausea no  headache no    MEDICATIONS  (STANDING):  aspirin enteric coated 81 milliGRAM(s) Oral daily  atorvastatin 80 milliGRAM(s) Oral at bedtime  cadexomer iodine 0.9% Gel 1 Application(s) Topical daily  chlorhexidine 0.12% Liquid 15 milliLiter(s) Oral Mucosa two times a day  chlorhexidine 2% Cloths 1 Application(s) Topical <User Schedule>  chlorhexidine 2% Cloths 1 Application(s) Topical daily  dextrose 40% Gel 15 Gram(s) Oral once  dextrose 5%. 1000 milliLiter(s) (50 mL/Hr) IV Continuous <Continuous>  dextrose 5%. 1000 milliLiter(s) (100 mL/Hr) IV Continuous <Continuous>  dextrose 50% Injectable 25 Gram(s) IV Push once  dextrose 50% Injectable 12.5 Gram(s) IV Push once  dextrose 50% Injectable 25 Gram(s) IV Push once  dronabinol 5 milliGRAM(s) Oral two times a day  ferrous    sulfate 325 milliGRAM(s) Oral three times a day  folic acid 1 milliGRAM(s) Oral daily  gentamicin 0.1% Ointment 1 Application(s) Topical <User Schedule>  glucagon  Injectable 1 milliGRAM(s) IntraMuscular once  heparin   Injectable 5000 Unit(s) SubCutaneous every 12 hours  insulin glargine Injectable (LANTUS) 5 Unit(s) SubCutaneous at bedtime  insulin lispro (ADMELOG) corrective regimen sliding scale   SubCutaneous three times a day before meals  insulin lispro (ADMELOG) corrective regimen sliding scale   SubCutaneous at bedtime  lidocaine   4% Patch 1 Patch Transdermal daily  lidocaine   4% Patch 1 Patch Transdermal daily  melatonin 5 milliGRAM(s) Oral at bedtime  midodrine 30 milliGRAM(s) Oral every 8 hours  Nephro-kristi 1 Tablet(s) Oral daily  pantoprazole  Injectable 40 milliGRAM(s) IV Push daily  sevelamer carbonate 1600 milliGRAM(s) Oral three times a day  sodium chloride 0.9%. 1000 milliLiter(s) (50 mL/Hr) IV Continuous <Continuous>  thiamine IVPB 500 milliGRAM(s) IV Intermittent daily  ticagrelor 60 milliGRAM(s) Oral every 12 hours    MEDICATIONS  (PRN):  acetaminophen   Tablet .. 650 milliGRAM(s) Oral every 6 hours PRN Moderate Pain (4 - 6)  valproate sodium IVPB 125 milliGRAM(s) IV Intermittent every 8 hours PRN agitation      Vital Signs Last 24 Hrs  T(C): 36.4 (05 Aug 2021 12:15), Max: 36.8 (04 Aug 2021 21:04)  T(F): 97.6 (05 Aug 2021 12:15), Max: 98.2 (04 Aug 2021 21:04)  HR: 97 (05 Aug 2021 12:15) (82 - 99)  BP: 106/71 (05 Aug 2021 12:15) (97/63 - 111/75)  BP(mean): --  RR: 18 (05 Aug 2021 12:15) (18 - 18)  SpO2: 99% (05 Aug 2021 12:15) (95% - 99%)    PHYSICAL EXAM:  GENERAL: NAD  HEAD:  Atraumatic, Normocephalic  EYES: EOMI, PERRLA, conjunctiva and sclera clear  NECK: Supple, No JVD  CHEST/LUNG: Clear to auscultation bilaterally; No wheeze  HEART: Regular rate and rhythm; No murmurs, rubs, or gallops  ABDOMEN: Soft, Nontender, Nondistended; Bowel sounds present PD catheter  EXTREMITIES:  2+ Peripheral Pulses, No clubbing, cyanosis, or edema 2nd toe and R thumb gangrene.  PSYCH: confused  NEUROLOGY: non-focal  SKIN: No rashes or lesions    LABS:                        12.0   9.85  )-----------( 236      ( 04 Aug 2021 06:17 )             39.9     08-05    136  |  97  |  50<H>  ----------------------------<  163<H>  3.4<L>   |  23  |  8.86<H>    Ca    7.7<L>      05 Aug 2021 06:26                  RADIOLOGY & ADDITIONAL TESTS:    Imaging Personally Reviewed:    Consultant(s) Notes Reviewed:      Care Discussed with Consultants/Other Providers:

## 2021-08-05 NOTE — PROGRESS NOTE ADULT - ASSESSMENT
56 M PMH ESRD on PD, DM on insulin, CHF EF 25-30% CAD s/p CABG x4 pw with hypotension with fever. Encephalopathy likely related to cefepime ? no obvious source of sepsis apparent as of now, KLAUDIA: Overall thickened valves seen however, no evidence of valvular vegetation is seen.     Hypotension:  on Midodrine for hemodynamic support:      ESRD on PD. Patient TW  61.5kg  Hypokalemia   ID- off IV antibiotics   Hyperphosphatemia: c/ w binders as prescribed   Failure to thrive     RECOMMENDATIONS   - CAPD today but will reduce the exchanges to 3 exchanges again as the BP dropped   - Palliative care follow up noted.  Functional quadriplegic   - continue Renvela to 1,600 mg PO TID;  Phos in am ordered   - continue Midodrine 30mg po q 8 hours   - No peg per family. On marinol.    He needs to eat more   - On Glucerna shakes as he refused Nepro  -KCL elixir 20 meq po x 1     Sayed [facility]   [phone]

## 2021-08-05 NOTE — GOALS OF CARE CONVERSATION - ADVANCED CARE PLANNING - CONVERSATION DETAILS
full note to f/u   d/w the patient and his family about his advanced illness and very poor prognosis. He stated that being comfortable and going home was his priority. He agreed with a hospice referral for home hospice but only if being able to continue PD.     We also discussed about code status and though he and his wife were looking for him being full code, after my inputs (basically that due to his medical issues it is unlikely that he will survive a cardiac arrest even if CPR is provided. Furthermore, if surviving, he will be facing being alive depending on life sustaining treatment and likely not being able to leave the hospital), they wanted to further discuss among them to make a final decision; however, for now he is full code. If an acute situation presents, please call his wife to re-address code status.         Garrick Madera MD   Geriatrics and Palliative Care (GAP) Consult Service    of Geriatric and Palliative Medicine  [facility]      Please page the following number for clinical matters between the hours of 9 am and 5 pm from Monday through Friday : [phone]    After 5pm and on weekends, please see the contact information below:    In the event of newly developing, evolving, or worsening symptoms, please contact the Palliative Medicine team via pager (if the patient is at [facility] #[phone] or if the patient is at [facility] #[phone]) The Geriatric and Palliative Medicine service has coverage 24 hours a day/ 7 days a week to provide medical recommendations regarding symptom management needs via telephone d/w the patient and his family about his advanced illnesses (End stage HF EF on 7/7 16 % with stage III diastolic dysfunction and not a candidate for AICD, ESRD on PD and PVD) and very poor prognosis. We further discuss about the natural progressive of his disease, where even with optical medical care, his condition will continue to decline with high risk for complication and for increase symptomatic burden. We discuss about overall goals, quantity vs. quality of life. He stated that being comfortable and going home was his priority. He agreed with a hospice referral for home hospice but only if being able to continue PD.     We also discussed about code status and though he and his wife initially were looking for him being full code, after my inputs (basically that due to his medical issues it is unlikely that he will survive a cardiac arrest even if CPR is provided. Furthermore, if surviving, he will be facing being alive depending on life sustaining treatment and likely not being able to leave the hospital which is actually against his wishes as indicated above), they wanted to further discuss among them to make a final decision; however, for now he is full code. If an acute situation presents, please call his wife to re-address code status.         Garrick Madera MD   Geriatrics and Palliative Care (GAP) Consult Service    of Geriatric and Palliative Medicine  [facility]      Please page the following number for clinical matters between the hours of 9 am and 5 pm from Monday through Friday : [phone]    After 5pm and on weekends, please see the contact information below:    In the event of newly developing, evolving, or worsening symptoms, please contact the Palliative Medicine team via pager (if the patient is at [facility] #[phone] or if the patient is at [facility] #[phone]) The Geriatric and Palliative Medicine service has coverage 24 hours a day/ 7 days a week to provide medical recommendations regarding symptom management needs via telephone

## 2021-08-05 NOTE — PROGRESS NOTE ADULT - SUBJECTIVE AND OBJECTIVE BOX
[facility] Neurological Care [facility]      Seen earlier today, and examined.  - Today, patient is without complaints.           *****MEDICATIONS: Current medication reviewed and documented.    MEDICATIONS  (STANDING):  aspirin enteric coated 81 milliGRAM(s) Oral daily  atorvastatin 80 milliGRAM(s) Oral at bedtime  cadexomer iodine 0.9% Gel 1 Application(s) Topical daily  chlorhexidine 0.12% Liquid 15 milliLiter(s) Oral Mucosa two times a day  chlorhexidine 2% Cloths 1 Application(s) Topical <User Schedule>  chlorhexidine 2% Cloths 1 Application(s) Topical daily  dextrose 40% Gel 15 Gram(s) Oral once  dextrose 5%. 1000 milliLiter(s) (50 mL/Hr) IV Continuous <Continuous>  dextrose 5%. 1000 milliLiter(s) (100 mL/Hr) IV Continuous <Continuous>  dextrose 50% Injectable 25 Gram(s) IV Push once  dextrose 50% Injectable 12.5 Gram(s) IV Push once  dextrose 50% Injectable 25 Gram(s) IV Push once  dronabinol 5 milliGRAM(s) Oral two times a day  ferrous    sulfate 325 milliGRAM(s) Oral three times a day  folic acid 1 milliGRAM(s) Oral daily  gentamicin 0.1% Ointment 1 Application(s) Topical <User Schedule>  glucagon  Injectable 1 milliGRAM(s) IntraMuscular once  heparin   Injectable 5000 Unit(s) SubCutaneous every 12 hours  insulin glargine Injectable (LANTUS) 5 Unit(s) SubCutaneous at bedtime  insulin lispro (ADMELOG) corrective regimen sliding scale   SubCutaneous three times a day before meals  insulin lispro (ADMELOG) corrective regimen sliding scale   SubCutaneous at bedtime  lidocaine   4% Patch 1 Patch Transdermal daily  lidocaine   4% Patch 1 Patch Transdermal daily  melatonin 5 milliGRAM(s) Oral at bedtime  midodrine 30 milliGRAM(s) Oral every 8 hours  Nephro-kristi 1 Tablet(s) Oral daily  pantoprazole  Injectable 40 milliGRAM(s) IV Push daily  sevelamer carbonate 1600 milliGRAM(s) Oral three times a day  sodium chloride 0.9%. 1000 milliLiter(s) (50 mL/Hr) IV Continuous <Continuous>  thiamine IVPB 500 milliGRAM(s) IV Intermittent daily  ticagrelor 60 milliGRAM(s) Oral every 12 hours    MEDICATIONS  (PRN):  acetaminophen   Tablet .. 650 milliGRAM(s) Oral every 6 hours PRN Moderate Pain (4 - 6)  valproate sodium IVPB 125 milliGRAM(s) IV Intermittent every 8 hours PRN agitation          ***** VITAL SIGNS:  T(F): 97.8 (21 @ 20:34), Max: 98.2 (21 @ 15:30)  HR: 97 (21 @ 20:34) (77 - 99)  BP: 92/59 (21 @ 20:34) (92/59 - 106/71)  RR: 18 (21 @ 20:34) (18 - 18)  SpO2: 98% (21 @ 20:34) (98% - 99%)  Wt(kg): --  ,   I&O's Summary    04 Aug 2021 07:01  -  05 Aug 2021 07:00  --------------------------------------------------------  IN: 5840 mL / OUT: 7900 mL / NET: -2060 mL    05 Aug 2021 07:  -  05 Aug 2021 23:22  --------------------------------------------------------  IN: 5440 mL / OUT: 5100 mL / NET: 340 mL             *****PHYSICAL EXAM: answers questions    alert oriented x 2  attention comprehension are fair.  Able to name, repeat.   EOmi fundi not visualized   no nystagmus VFF to confrontation  Tongue is midline  Palate elevates symmetrically   Moving all 4 ext spontaneously   Gait not assessed.            *****LAB AND IMAGIN.0   9.85  )-----------( 236      ( 04 Aug 2021 06:17 )             39.9               08-    136  |  97  |  50<H>  ----------------------------<  163<H>  3.4<L>   |  23  |  8.86<H>    Ca    7.7<L>      05 Aug 2021 06:26                           [All pertinent recent Imaging/Reports reviewed]           *****A S S E S S M E N T   A N D   P L A N :        55M PMH ESRD on PD, DM on insulin, CHF EF 25-30% CAD s/p CABG x4,  Patient states that for the last 2 days PTA he has been having increased cough and sweating. He endorsed chills but no measured fevers at home. He denies any sick contacts or recent travel. Patient states that he fell few days ago with no head trauma. Of note patient has right toe gangrenous lesion that has been present for several months, He denies any trauma or pain at the site.       In the ED, Patient was found to be hypotensive to 70s. Patient was given midodrine 10mg x1 and bedside POCUS was completed showing poor cardiac function with diffuse hypokinesis. Patient was unable to maintain adequate SBPs  so required micu admission and pressor support.   Called to eval ams       Problem/Recommendations 1:  ams likely multifactorial metabolic encephalopathy started immediately after cefepime, also other factors include poor po intake, sleep wake cycle disruption hyponatremia   correct metabolic derangements   nephrovite added   thiamine 500 iv q h       limit sedatives   7/15 mental status with improvement    seemed lethargic  gagging     mental status improved    mental status remains stable, still with processing delay     Problem/Recommendations 2:  ecchymosis of the finger tips   r/o emboli   derm eval     eleonora no evidence for endocarditis   poor po intake         more alert and responsive    alert responsive    more alert sitting up in bed   as per wife, pt ate a meal that she brought in      lethargic pt reports poor sleep intake   melatonin 5mg for sleep augmentation    feeling ok       Problem/Recommendations 3: poor nutritional intake severe protein caloric malnutrition   encourage po intake   marinol as per gi     nutrition consult ? nephrovite for supplementation   thiamine daily       oob to chair to mobilize  Thank you for allowing me to participate in the care of this patient. Will continue to follow patient periodically. Please do not hesitate to call me if you have any  questions or if there has been a change in patients neurological status     ________________  Grisel Lainez MD  [facility] Neurological [place] [facility] ([facility]  [phone]      33 minutes spent on total encounter; more than 50 % of the visit was  spent counseling about plan of care, compliance to diet/exercise and medication regimen and or  coordinating care by the attending physician.      It is advised that stroke patients follow up with CARLYLE Pena @ [phone] in 1- 2 weeks.   Others please follow up with Dr. Michael Nissenbaum [phone]

## 2021-08-05 NOTE — PROGRESS NOTE ADULT - SUBJECTIVE AND OBJECTIVE BOX
NEPHROLOGY-[facility] [phone]        Patient seen and examined in bed.  BP was softer then yesterday         MEDICATIONS  (STANDING):  aspirin enteric coated 81 milliGRAM(s) Oral daily  atorvastatin 80 milliGRAM(s) Oral at bedtime  cadexomer iodine 0.9% Gel 1 Application(s) Topical daily  chlorhexidine 0.12% Liquid 15 milliLiter(s) Oral Mucosa two times a day  chlorhexidine 2% Cloths 1 Application(s) Topical <User Schedule>  chlorhexidine 2% Cloths 1 Application(s) Topical daily  dextrose 40% Gel 15 Gram(s) Oral once  dextrose 5%. 1000 milliLiter(s) (50 mL/Hr) IV Continuous <Continuous>  dextrose 5%. 1000 milliLiter(s) (100 mL/Hr) IV Continuous <Continuous>  dextrose 50% Injectable 25 Gram(s) IV Push once  dextrose 50% Injectable 12.5 Gram(s) IV Push once  dextrose 50% Injectable 25 Gram(s) IV Push once  dronabinol 5 milliGRAM(s) Oral two times a day  ferrous    sulfate 325 milliGRAM(s) Oral three times a day  folic acid 1 milliGRAM(s) Oral daily  gentamicin 0.1% Ointment 1 Application(s) Topical <User Schedule>  glucagon  Injectable 1 milliGRAM(s) IntraMuscular once  heparin   Injectable 5000 Unit(s) SubCutaneous every 12 hours  insulin glargine Injectable (LANTUS) 5 Unit(s) SubCutaneous at bedtime  insulin lispro (ADMELOG) corrective regimen sliding scale   SubCutaneous three times a day before meals  insulin lispro (ADMELOG) corrective regimen sliding scale   SubCutaneous at bedtime  lidocaine   4% Patch 1 Patch Transdermal daily  lidocaine   4% Patch 1 Patch Transdermal daily  melatonin 5 milliGRAM(s) Oral at bedtime  midodrine 30 milliGRAM(s) Oral every 8 hours  Nephro-kristi 1 Tablet(s) Oral daily  pantoprazole  Injectable 40 milliGRAM(s) IV Push daily  sevelamer carbonate 1600 milliGRAM(s) Oral three times a day  sodium chloride 0.9%. 1000 milliLiter(s) (50 mL/Hr) IV Continuous <Continuous>  thiamine IVPB 500 milliGRAM(s) IV Intermittent daily  ticagrelor 60 milliGRAM(s) Oral every 12 hours      VITAL:  T(C): , Max: 36.8 (08-04-21 @ 21:04)  T(F): , Max: 98.2 (08-04-21 @ 21:04)  HR: 99 (08-05-21 @ 05:00)  BP: 103/69 (08-05-21 @ 05:00)  BP(mean): --  RR: 18 (08-05-21 @ 05:00)  SpO2: 99% (08-05-21 @ 05:00)  Wt(kg): --    I and O's:    08-04 @ 07:01  -  08-05 @ 07:00  --------------------------------------------------------  IN: 5840 mL / OUT: 7900 mL / NET: -2060 mL          PHYSICAL EXAM:    Constitutional: NAD  Neck:  No JVD  Respiratory: CTAB/L  Cardiovascular: S1 and S2  Gastrointestinal: BS+, soft, NT/ND+ PD catheter   Extremities: No peripheral edema  Neurological:  , no focal deficits  Psychiatric: Normal mood, normal affect  : No Johnson  Skin: No rashes  Access: Not applicable    LABS:                        12.0   9.85  )-----------( 236      ( 04 Aug 2021 06:17 )             39.9     08-05    136  |  97  |  50<H>  ----------------------------<  163<H>  3.4<L>   |  23  |  8.86<H>    Ca    7.7<L>      05 Aug 2021 06:26            Urine Studies:          RADIOLOGY & ADDITIONAL STUDIES:

## 2021-08-05 NOTE — PROGRESS NOTE ADULT - SUBJECTIVE AND OBJECTIVE BOX
CARDIOLOGY FOLLOW UP - Dr. Best  Date of Service: 8/5/21  CC: denies cp, sob, and palpitations     Review of Systems:  Constitutional: No fever, weight loss, or fatigue  Respiratory: No cough, wheezing, or hemoptysis, no shortness of breath  Cardiovascular: No chest pain, palpitations, passing out, dizziness, or leg swelling  Gastrointestinal: No abd or epigastric pain.  No nausea, vomiting, or hematemesis; no diarrhea or constipation, no melena or hematochezia  Vascular: no edema       PHYSICAL EXAM:  T(C): 36.5 (08-05-21 @ 09:00), Max: 36.8 (08-04-21 @ 21:04)  HR: 96 (08-05-21 @ 09:00) (82 - 99)  BP: 100/66 (08-05-21 @ 09:00) (97/63 - 111/75)  RR: 18 (08-05-21 @ 09:00) (18 - 18)  SpO2: 99% (08-05-21 @ 09:00) (95% - 99%)  Wt(kg): --  I&O's Summary    04 Aug 2021 07:01  -  05 Aug 2021 07:00  --------------------------------------------------------  IN: 5840 mL / OUT: 7900 mL / NET: -2060 mL        Appearance: Normal	  Cardiovascular: Normal S1 S2,RRR, No JVD, No murmurs  Respiratory: Lungs clear to auscultation	  Gastrointestinal:  Soft, Non-tender, + BS	  Extremities: Normal range of motion, No clubbing, cyanosis or edema      Home Medications:  aspirin 81 mg oral delayed release tablet: 1 tab(s) orally once a day (29 Dec 2020 18:37)  atorvastatin 80 mg oral tablet: 1 tab(s) orally once a day (at bedtime) (29 Dec 2020 18:37)  epoetin martine: injectable once a month (29 Dec 2020 18:37)  ferrous sulfate 325 mg (65 mg elemental iron) oral tablet: 1 tab(s) orally 3 times a day (29 Dec 2020 18:37)  gabapentin 100 mg oral capsule: 1 cap(s) orally once a day (29 Dec 2020 18:37)  nortriptyline 25 mg oral capsule: 1 cap(s) orally once a day (at bedtime) (29 Dec 2020 18:37)  pantoprazole 40 mg oral delayed release tablet: 1 tab(s) orally once a day (before a meal) (29 Dec 2020 18:37)  Toujeo SoloStar 300 units/mL subcutaneous solution: 60 unit(s) subcutaneous once a day (at bedtime) (08 Jan 2021 12:41)      MEDICATIONS  (STANDING):  aspirin enteric coated 81 milliGRAM(s) Oral daily  atorvastatin 80 milliGRAM(s) Oral at bedtime  cadexomer iodine 0.9% Gel 1 Application(s) Topical daily  chlorhexidine 0.12% Liquid 15 milliLiter(s) Oral Mucosa two times a day  chlorhexidine 2% Cloths 1 Application(s) Topical <User Schedule>  chlorhexidine 2% Cloths 1 Application(s) Topical daily  dextrose 40% Gel 15 Gram(s) Oral once  dextrose 5%. 1000 milliLiter(s) (50 mL/Hr) IV Continuous <Continuous>  dextrose 5%. 1000 milliLiter(s) (100 mL/Hr) IV Continuous <Continuous>  dextrose 50% Injectable 25 Gram(s) IV Push once  dextrose 50% Injectable 12.5 Gram(s) IV Push once  dextrose 50% Injectable 25 Gram(s) IV Push once  dronabinol 5 milliGRAM(s) Oral two times a day  ferrous    sulfate 325 milliGRAM(s) Oral three times a day  folic acid 1 milliGRAM(s) Oral daily  gentamicin 0.1% Ointment 1 Application(s) Topical <User Schedule>  glucagon  Injectable 1 milliGRAM(s) IntraMuscular once  heparin   Injectable 5000 Unit(s) SubCutaneous every 12 hours  insulin glargine Injectable (LANTUS) 5 Unit(s) SubCutaneous at bedtime  insulin lispro (ADMELOG) corrective regimen sliding scale   SubCutaneous three times a day before meals  insulin lispro (ADMELOG) corrective regimen sliding scale   SubCutaneous at bedtime  lidocaine   4% Patch 1 Patch Transdermal daily  lidocaine   4% Patch 1 Patch Transdermal daily  melatonin 5 milliGRAM(s) Oral at bedtime  midodrine 30 milliGRAM(s) Oral every 8 hours  Nephro-kristi 1 Tablet(s) Oral daily  pantoprazole  Injectable 40 milliGRAM(s) IV Push daily  sevelamer carbonate 1600 milliGRAM(s) Oral three times a day  sodium chloride 0.9%. 1000 milliLiter(s) (50 mL/Hr) IV Continuous <Continuous>  thiamine IVPB 500 milliGRAM(s) IV Intermittent daily  ticagrelor 60 milliGRAM(s) Oral every 12 hours      TELEMETRY: 	    ECG:  	  RADIOLOGY:   DIAGNOSTIC TESTING:  [ ] Echocardiogram:  [ ]  Catheterization:  [ ] Stress Test:    OTHER: 	    LABS:	 	                            12.0   9.85  )-----------( 236      ( 04 Aug 2021 06:17 )             39.9     08-05    136  |  97  |  50<H>  ----------------------------<  163<H>  3.4<L>   |  23  |  8.86<H>    Ca    7.7<L>      05 Aug 2021 06:26

## 2021-08-05 NOTE — PROGRESS NOTE ADULT - ASSESSMENT
56 m with    Sepsis resolved  - off antibiotic   - toe gangrene  - Podiatry follow  - ID follow   - KLAUDIA noted     COPD  - 2L NC overnight because he becomes apneic, sats well  - Sats well on RA while awake    CAD s/p CABG   - Hypotension likely in the setting of septic shock   - midodrine dose increased to 30 mg TID to match home dose  - Previous echo with reduced EF  - c/w DAPT  - Cardiology follow    CHF cr systolic  - cardiology follow    AICD  - EP eval noted. Not a good candidate for AICD    Peritoneal Dialysis   - nephrology follow PD, recommend 4x a day  - patient on midodrine 30 q8h at home, continue here  - Holding home metoprolol 25 q12hr in setting of hypotension    Diabetes   - HA1C 6.1 on 7/6 suggesting prediabetes  - Tujeo 60 units at bedtime and Victoza at home, started on 30 units at bedtime, adjust as needed based on patient PO intake  - added 2 units insulin pre-meal    Hiccups  - GI follow  - hold Ativan 2 to sedation    Incontinence dermatitis  - wound care    Toe gangrene  - Podiatry follow  - Vascular follow  - PONCHO/PVR noted  - Angiogram if agrees    Finger gangrenous area  - local care    Vertebral fracture T12  - Ortho eval noted   - MRI spine noted  - No intervention    Confusion   - possible delirium  - Psych follow  - Neurology follow  - EEG noted    Dysphagia  - GI follow  - MBS  - ENT follow    functional quadriplegia   - supportive care     Palliative follow re [facility]     DCP in progress.     Pipe Beck MD pager [phone]

## 2021-08-06 NOTE — PROGRESS NOTE ADULT - ASSESSMENT
56 M PMH ESRD on PD, DM on insulin, CHF EF 25-30% CAD s/p CABG x4 pw with hypotension with fever. Encephalopathy likely related to cefepime ? no obvious source of sepsis apparent as of now, KLAUDIA: Overall thickened valves seen however, no evidence of valvular vegetation is seen.     Hypotension:  on Midodrine for hemodynamic support:      ESRD on PD. Patient TW  61.5kg  Hypokalemia   ID- off IV antibiotics   Hyperphosphatemia: c/ w binders as prescribed   Failure to thrive     RECOMMENDATIONS   - CAPD today  3 exchanges again as the BP dropped  - Palliative care follow up noted.  Functional quadriplegic and again the discussion was brought up re code status and hospice   - continue Renvela to 1,600 mg PO TID;  Phos this  am    - continue Midodrine 30mg po q 8 hours   - No peg per family. On marinol.    He needs to eat more   - On Glucerna shakes as he refused Nepro  -KCL elixir 20 meq po x 1     [facility]   [phone]  56 M PMH ESRD on PD, DM on insulin, CHF EF 25-30% CAD s/p CABG x4 pw with hypotension with fever. Encephalopathy likely related to cefepime ? no obvious source of sepsis apparent as of now, KLAUDIA: Overall thickened valves seen however, no evidence of valvular vegetation is seen.     Hypotension:  on Midodrine for hemodynamic support:      ESRD on PD. Patient TW  61.5kg  ID- off IV antibiotics   Hyperphosphatemia: c/ w binders as prescribed   Failure to thrive     RECOMMENDATIONS   - CAPD today  3 exchanges again as the BP dropped  - Palliative care follow up noted.  Functional quadriplegic and again the discussion was brought up re code status and hospice   - continue Renvela to 1,600 mg PO TID;  Phos this  am    - continue Midodrine 30mg po q 8 hours   - No peg per family. On marinol.    He needs to eat more   - On Glucerna shakes as he refused Nepro  -Gentle IVF for 8 hours     [facility]   [phone]

## 2021-08-06 NOTE — PROGRESS NOTE ADULT - ASSESSMENT
56 m with    Sepsis resolved  - off antibiotic   - toe gangrene  - Podiatry follow  - ID follow   - KLAUDIA noted     COPD  - 2L NC overnight because he becomes apneic, sats well  - Sats well on RA while awake    CAD s/p CABG   - Hypotension likely in the setting of septic shock   - midodrine dose increased to 30 mg TID to match home dose  - Previous echo with reduced EF  - c/w DAPT  - Cardiology follow    CHF cr systolic  - cardiology follow    AICD  - EP eval noted. Not a good candidate for AICD    Peritoneal Dialysis   - nephrology follow PD, recommend 4x a day  - patient on midodrine 30 q8h at home, continue here  - Holding home metoprolol 25 q12hr in setting of hypotension    Diabetes   - HA1C 6.1 on 7/6 suggesting prediabetes  - Tujeo 60 units at bedtime and Victoza at home, started on 30 units at bedtime, adjust as needed based on patient PO intake  - added 2 units insulin pre-meal    Hiccups  - GI follow  - hold Ativan 2 to sedation    Incontinence dermatitis  - wound care    Toe gangrene  - Podiatry follow  - Vascular follow  - PONCHO/PVR noted  - Angiogram if agrees    Finger gangrenous area  - local care    Vertebral fracture T12  - Ortho eval noted   - MRI spine noted  - No intervention    Confusion   - possible delirium  - Psych follow  - Neurology follow  - EEG noted    Dysphagia  - GI follow  - MBS  - ENT follow    functional quadriplegia   - supportive care     Palliative follow re GOC     DCP home in progress.     d/w wife     Pipe Beck MD pager [phone]

## 2021-08-06 NOTE — PROGRESS NOTE ADULT - ASSESSMENT
CATH 11/30/2020:  COMPLICATIONS: The stent was deployed without difficulty. On removal of the  balloon, the shaft broke and the tip of the balloon remained in the vessel. Several attempts were made to snare the balloon unsuccessfully. Dr. Verdugo was notifed who will take the patient to the operating room.  DIAGNOSTIC RECOMMENDATIONS: The patient should continue with the present medications. The patients vessel was stented without difficulty On removal of the balloon, the shaft broke with the baloon stuck in the left main. All attempts to snare the balloon out failed and the patient was taken to the OR by Dr. Arango to remove the balloon  introp eloenora 11/30/20: mild to mod MR, < from: Intra-Operative Transesophageal Echo (11.30.20 @ 17:02) >  Severe global left ventricular systolic dysfunction. Inferior and inferolateral akinesis.  severe hypokinesis of other segments.  Severe global hypokinesia.  Normal left ventricular internal dimensions and wall thicknesses. Moderate diastolic dysfunction (Stage II)  echo 12/17/20: EF 32%, mod MR, Severe global left ventricular systolic dysfunction, moderate pulmonary pressures  echo 12/20/20: EF (Visual Estimate):  25-30 % mild MR, Akinesis of the inferolateral, anterolateral, apical laterlal, inferior, and inferoseptal walls. Severe left ventricular enlargement. No left ventricular thrombus is seen.  Echo 7/7/21: EF 16%, mod - sev MR, mod AS, severe global lv sys dysfx, severe diastolic dysfx, mod pulm htn   ELEONORA 7/15/21: EF 24.5%,  Tethered mitral valve leaflets with normal opening. sev MR, mild to mod AR,  eccentric left ventricular hypertrophy; Dilated  TV annulus There are two jets seen  with venacontracta 0.4cm and 0.5q cm. Severe tricuspid regurgitation.  no evidence of valvular vegetation is seen. evere global leftventricular systolic dysfunction.    a/p  56 M well known to practice with PMH ESRD on PD, DM on insulin, CHF EF 25-30% CAD s/p CABG x4, underwent cardiac cath and stent and wire broke in heart s/p sternotomy p/w septic shock.    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  -med f/u    #Ischemic Cardiomyopathy. Chronic systolic HF  -Repeat echo noted with EF 16%, mod - sev MR, mod AS, severe global lv sys dysfx, severe diastolic dysfx, mod pulm htn ; eleonora with ef 24.5%   -eps eval noted, pt not candidate for icd   -no bb, acei/arb given hx of orthostatic bp   -continue midodrine for bp support   -cont vol removal w PD     # CAD, s/p CABG, s/p PCI, s/p redo open heart for stent balloon retrieval   -hst elevated, likely demand ischemia in setting of septic shock, ESRD   -c/w asa, Brilinta, statin     # ESRD  -on PD  -hyponatremia improved    -renal f/u    #Hypotension  -gentle ivf per renal  -cont mido as ordered  -cont to trend     #AMS  -improved   -neuro psych f/u    #GOC  -palliative f/u     dvt ppx

## 2021-08-06 NOTE — PROVIDER CONTACT NOTE (OTHER) - BACKGROUND
PMH ESRD on PD, DM. Admitted for septic shock of unclear etiology s/p MICU w/ pressors. BP now 85/59. Midodrine administered at bedtime followed by emesis x 1 an hour later

## 2021-08-06 NOTE — PROGRESS NOTE ADULT - SUBJECTIVE AND OBJECTIVE BOX
ENT ISSUE/POD: dysphagia    HPI: 54yo male seen previously by ENT for dysphagia, found to have a white lesion on the laryngeal surface of the left arytenoid, that appeared similar to a granuloma. Pt was seen at bedside today for re-evaluation. He still c/o dysphagia, only able to tolerate liquids. He is on a dysphagia 1 diet with thin liquids. Pt also c/o nausea. Pt denies fever, chills, vomiting, HA, SOB, odynophagia, hemoptysis.        PAST MEDICAL & SURGICAL HISTORY:  Diabetes    CAD, multiple vessel    ESRD (end stage renal disease) on dialysis    HTN (hypertension)    S/P CABG (coronary artery bypass graft)      Allergies    doxazosin (Other)    Intolerances      MEDICATIONS  (STANDING):  aspirin enteric coated 81 milliGRAM(s) Oral daily  atorvastatin 80 milliGRAM(s) Oral at bedtime  cadexomer iodine 0.9% Gel 1 Application(s) Topical daily  chlorhexidine 0.12% Liquid 15 milliLiter(s) Oral Mucosa two times a day  chlorhexidine 2% Cloths 1 Application(s) Topical daily  chlorhexidine 2% Cloths 1 Application(s) Topical <User Schedule>  dextrose 40% Gel 15 Gram(s) Oral once  dextrose 5%. 1000 milliLiter(s) (50 mL/Hr) IV Continuous <Continuous>  dextrose 5%. 1000 milliLiter(s) (100 mL/Hr) IV Continuous <Continuous>  dextrose 50% Injectable 25 Gram(s) IV Push once  dextrose 50% Injectable 12.5 Gram(s) IV Push once  dextrose 50% Injectable 25 Gram(s) IV Push once  dronabinol 5 milliGRAM(s) Oral two times a day  ferrous    sulfate 325 milliGRAM(s) Oral three times a day  folic acid 1 milliGRAM(s) Oral daily  gentamicin 0.1% Ointment 1 Application(s) Topical <User Schedule>  glucagon  Injectable 1 milliGRAM(s) IntraMuscular once  heparin   Injectable 5000 Unit(s) SubCutaneous every 12 hours  insulin glargine Injectable (LANTUS) 5 Unit(s) SubCutaneous at bedtime  insulin lispro (ADMELOG) corrective regimen sliding scale   SubCutaneous three times a day before meals  insulin lispro (ADMELOG) corrective regimen sliding scale   SubCutaneous at bedtime  lidocaine   4% Patch 1 Patch Transdermal daily  lidocaine   4% Patch 1 Patch Transdermal daily  melatonin 5 milliGRAM(s) Oral at bedtime  midodrine 30 milliGRAM(s) Oral every 8 hours  Nephro-kristi 1 Tablet(s) Oral daily  pantoprazole  Injectable 40 milliGRAM(s) IV Push daily  sevelamer carbonate 1600 milliGRAM(s) Oral three times a day  sodium chloride 0.9%. 1000 milliLiter(s) (50 mL/Hr) IV Continuous <Continuous>  sodium chloride 0.9%. 1000 milliLiter(s) (50 mL/Hr) IV Continuous <Continuous>  thiamine IVPB 500 milliGRAM(s) IV Intermittent daily  ticagrelor 60 milliGRAM(s) Oral every 12 hours    MEDICATIONS  (PRN):  acetaminophen   Tablet .. 650 milliGRAM(s) Oral every 6 hours PRN Moderate Pain (4 - 6)  valproate sodium IVPB 125 milliGRAM(s) IV Intermittent every 8 hours PRN agitation      Social History: see consult note    Family history: see consult note    ROS:   ENT: all negative except as noted in HPI   Pulm: denies SOB, cough, hemoptysis  Neuro: denies numbness/tingling, loss of sensation  Endo: denies heat/cold intolerance, excessive sweating      Vital Signs Last 24 Hrs  T(C): 36.7 (06 Aug 2021 09:00), Max: 36.8 (05 Aug 2021 15:30)  T(F): 98.1 (06 Aug 2021 09:00), Max: 98.2 (05 Aug 2021 15:30)  HR: 87 (06 Aug 2021 09:00) (77 - 97)  BP: 96/66 (06 Aug 2021 09:00) (85/59 - 106/71)  BP(mean): --  RR: 18 (06 Aug 2021 09:00) (18 - 18)  SpO2: 97% (06 Aug 2021 09:00) (95% - 99%)     08-05    136  |  97  |  50<H>  ----------------------------<  163<H>  3.4<L>   |  23  |  8.86<H>    Ca    7.7<L>      05 Aug 2021 06:26         PHYSICAL EXAM:  Gen: NAD  Skin: No rashes, bruises, or lesions  Head: Normocephalic, Atraumatic  Face: no edema, erythema, or fluctuance. Parotid glands soft without mass  Eyes: no scleral injection  Nose: Nares bilaterally patent, no discharge  Mouth: + dry blood around lips and pooling inside lower lip. + small excoriation noted to mucosal surface of lower lip, pressure held with gauze, bleeding controlled. No Stridor / Drooling / Trismus.  Mucosa moist, tongue/uvula midline, oropharynx clear  Neck: Flat, supple, no lymphadenopathy, trachea midline, no masses  Lymphatic: No lymphadenopathy  Resp: breathing easily, no stridor  Neuro: facial nerve intact, no facial droop      Fiberoptic Indirect laryngoscopy:  (Scope #2 used)    Reason for Laryngoscopy: dysphagia, re-evaluation of arytenoid lesion    Patient was unable to cooperate with mirror.  + White lesion on the laryngeal surface of the left arytenoid, unchanged from previous scope. + Pachydermia consistent with LPR, Nasopharynx, oropharynx, and hypopharynx clear, no bleeding. Tongue base, posterior pharyngeal wall, vallecula, epiglottis, and subglottis appear normal. No erythema, edema, pooling of secretions, masses or lesions. Airway patent, no foreign body visualized. No glottic/supraglottic edema. True vocal cords, right arytenoid, vestibular folds, ventricles, pyriform sinuses, and aryepiglottic folds appear normal bilaterally. Vocal cords mobile bilaterally.

## 2021-08-06 NOTE — PROVIDER CONTACT NOTE (OTHER) - ACTION/TREATMENT ORDERED:
As per Dr. Rosario Drain pt now. Shouldn't affect BP since fluid is being pulled from peritoneal space and not vascular space. If BP drops Bolus 250cc NS. Primary team ACP Lino notified of neph rec

## 2021-08-06 NOTE — PROVIDER CONTACT NOTE (OTHER) - REASON
pt due for Manual PD drain BP: 85/59 HR - 89 pt filled with 2.5L of 1.5% Dianeal @ 2100. Concern about BP dropping when draining pt

## 2021-08-06 NOTE — PROGRESS NOTE ADULT - ASSESSMENT
55yo male seen previously by  ENT for dysphagia, found to have white granulomatous lesion on laryngeal surface of left arytenoid. Pt was re-evaluated today. Indirect laryngoscopy shows unchanged white lesion on left arytenoid. On exam, pt was found to have oral bleeding, with excoriation noted on mucosal surface of lower lip. Bleeding controlled with holding pressure.

## 2021-08-06 NOTE — PROGRESS NOTE ADULT - SUBJECTIVE AND OBJECTIVE BOX
NEPHROLOGY-[facility] [phone]        Patient seen and examined in bed.  Hypotensive last night         MEDICATIONS  (STANDING):  aspirin enteric coated 81 milliGRAM(s) Oral daily  atorvastatin 80 milliGRAM(s) Oral at bedtime  cadexomer iodine 0.9% Gel 1 Application(s) Topical daily  chlorhexidine 0.12% Liquid 15 milliLiter(s) Oral Mucosa two times a day  chlorhexidine 2% Cloths 1 Application(s) Topical <User Schedule>  chlorhexidine 2% Cloths 1 Application(s) Topical daily  dextrose 40% Gel 15 Gram(s) Oral once  dextrose 5%. 1000 milliLiter(s) (50 mL/Hr) IV Continuous <Continuous>  dextrose 5%. 1000 milliLiter(s) (100 mL/Hr) IV Continuous <Continuous>  dextrose 50% Injectable 25 Gram(s) IV Push once  dextrose 50% Injectable 12.5 Gram(s) IV Push once  dextrose 50% Injectable 25 Gram(s) IV Push once  dronabinol 5 milliGRAM(s) Oral two times a day  ferrous    sulfate 325 milliGRAM(s) Oral three times a day  folic acid 1 milliGRAM(s) Oral daily  gentamicin 0.1% Ointment 1 Application(s) Topical <User Schedule>  glucagon  Injectable 1 milliGRAM(s) IntraMuscular once  heparin   Injectable 5000 Unit(s) SubCutaneous every 12 hours  insulin glargine Injectable (LANTUS) 5 Unit(s) SubCutaneous at bedtime  insulin lispro (ADMELOG) corrective regimen sliding scale   SubCutaneous three times a day before meals  insulin lispro (ADMELOG) corrective regimen sliding scale   SubCutaneous at bedtime  lidocaine   4% Patch 1 Patch Transdermal daily  lidocaine   4% Patch 1 Patch Transdermal daily  melatonin 5 milliGRAM(s) Oral at bedtime  midodrine 30 milliGRAM(s) Oral every 8 hours  Nephro-kristi 1 Tablet(s) Oral daily  pantoprazole  Injectable 40 milliGRAM(s) IV Push daily  sevelamer carbonate 1600 milliGRAM(s) Oral three times a day  sodium chloride 0.9%. 1000 milliLiter(s) (50 mL/Hr) IV Continuous <Continuous>  thiamine IVPB 500 milliGRAM(s) IV Intermittent daily  ticagrelor 60 milliGRAM(s) Oral every 12 hours      VITAL:  T(C): , Max: 36.8 (08-05-21 @ 15:30)  T(F): , Max: 98.2 (08-05-21 @ 15:30)  HR: 87 (08-06-21 @ 09:00)  BP: 96/66 (08-06-21 @ 09:00)  BP(mean): --  RR: 18 (08-06-21 @ 09:00)  SpO2: 97% (08-06-21 @ 09:00)  Wt(kg): --    I and O's:    08-05 @ 07:01  -  08-06 @ 07:00  --------------------------------------------------------  IN: 5500 mL / OUT: 5100 mL / NET: 400 mL          PHYSICAL EXAM:    Constitutional: NAD; cachetic   Neck:  No JVD  Respiratory: poor effort   Cardiovascular: S1 and S2  Gastrointestinal: BS+, soft, NT/ND + PD catheter   Extremities: No peripheral edema  Neurological: A/O x 3, no focal deficits  Psychiatric: Normal mood, normal affect  : No Johnson  Skin: No rashes  Access: Not applicable    LABS:    08-05    136  |  97  |  50<H>  ----------------------------<  163<H>  3.4<L>   |  23  |  8.86<H>    Ca    7.7<L>      05 Aug 2021 06:26            Urine Studies:          RADIOLOGY & ADDITIONAL STUDIES:

## 2021-08-06 NOTE — PROGRESS NOTE ADULT - SUBJECTIVE AND OBJECTIVE BOX
Patient is a 56y old  Male who presents with a chief complaint of Hypotension (06 Aug 2021 13:04)      SUBJECTIVE / OVERNIGHT EVENTS: Comfortable without new complaints. Wife at bedside  Review of Systems  chest pain no  palpitations no  sob no  nausea no  headache no    MEDICATIONS  (STANDING):  aspirin enteric coated 81 milliGRAM(s) Oral daily  atorvastatin 80 milliGRAM(s) Oral at bedtime  cadexomer iodine 0.9% Gel 1 Application(s) Topical daily  chlorhexidine 0.12% Liquid 15 milliLiter(s) Oral Mucosa two times a day  chlorhexidine 2% Cloths 1 Application(s) Topical daily  chlorhexidine 2% Cloths 1 Application(s) Topical <User Schedule>  dextrose 40% Gel 15 Gram(s) Oral once  dextrose 5%. 1000 milliLiter(s) (100 mL/Hr) IV Continuous <Continuous>  dextrose 5%. 1000 milliLiter(s) (50 mL/Hr) IV Continuous <Continuous>  dextrose 50% Injectable 25 Gram(s) IV Push once  dextrose 50% Injectable 12.5 Gram(s) IV Push once  dextrose 50% Injectable 25 Gram(s) IV Push once  dronabinol 5 milliGRAM(s) Oral two times a day  ferrous    sulfate 325 milliGRAM(s) Oral three times a day  folic acid 1 milliGRAM(s) Oral daily  gentamicin 0.1% Ointment 1 Application(s) Topical <User Schedule>  glucagon  Injectable 1 milliGRAM(s) IntraMuscular once  heparin   Injectable 5000 Unit(s) SubCutaneous every 12 hours  insulin glargine Injectable (LANTUS) 5 Unit(s) SubCutaneous at bedtime  insulin lispro (ADMELOG) corrective regimen sliding scale   SubCutaneous three times a day before meals  insulin lispro (ADMELOG) corrective regimen sliding scale   SubCutaneous at bedtime  lidocaine   4% Patch 1 Patch Transdermal daily  lidocaine   4% Patch 1 Patch Transdermal daily  melatonin 5 milliGRAM(s) Oral at bedtime  midodrine 30 milliGRAM(s) Oral every 8 hours  Nephro-kristi 1 Tablet(s) Oral daily  pantoprazole  Injectable 40 milliGRAM(s) IV Push daily  sevelamer carbonate 1600 milliGRAM(s) Oral three times a day  sodium chloride 0.9%. 1000 milliLiter(s) (50 mL/Hr) IV Continuous <Continuous>  sodium chloride 0.9%. 1000 milliLiter(s) (50 mL/Hr) IV Continuous <Continuous>  thiamine IVPB 500 milliGRAM(s) IV Intermittent daily  ticagrelor 60 milliGRAM(s) Oral every 12 hours    MEDICATIONS  (PRN):  acetaminophen   Tablet .. 650 milliGRAM(s) Oral every 6 hours PRN Moderate Pain (4 - 6)  valproate sodium IVPB 125 milliGRAM(s) IV Intermittent every 8 hours PRN agitation      Vital Signs Last 24 Hrs  T(C): 36.4 (06 Aug 2021 16:23), Max: 36.8 (06 Aug 2021 01:04)  T(F): 97.5 (06 Aug 2021 16:23), Max: 98.2 (06 Aug 2021 01:04)  HR: 97 (06 Aug 2021 16:23) (87 - 97)  BP: 89/56 (06 Aug 2021 16:23) (85/59 - 97/64)  BP(mean): --  RR: 18 (06 Aug 2021 16:23) (18 - 18)  SpO2: 97% (06 Aug 2021 16:23) (95% - 100%)    PHYSICAL EXAM:  GENERAL: NAD, well-developed  HEAD:  Atraumatic, Normocephalic  EYES: EOMI, PERRLA, conjunctiva and sclera clear  NECK: Supple, No JVD  CHEST/LUNG: Clear to auscultation bilaterally; No wheeze  HEART: Regular rate and rhythm; No murmurs, rubs, or gallops  ABDOMEN: Soft, Nontender, Nondistended; Bowel sounds present PD cath.  EXTREMITIES:  2+ Peripheral Pulses, No clubbing, cyanosis, or edema  NEUROLOGY: non-focal  SKIN: No rashes or lesions    LABS:    08-05    136  |  97  |  50<H>  ----------------------------<  163<H>  3.4<L>   |  23  |  8.86<H>    Ca    7.7<L>      05 Aug 2021 06:26                  RADIOLOGY & ADDITIONAL TESTS:    Imaging Personally Reviewed:    Consultant(s) Notes Reviewed:      Care Discussed with Consultants/Other Providers:

## 2021-08-06 NOTE — PROGRESS NOTE ADULT - PROBLEM SELECTOR PLAN 1
- recommend biopsy, however pt is a poor surgical candidate. May be able to get awake biopsy from laryngology specialist  - call with questions/concerns [phone] - recommend biopsy, however pt is a poor surgical candidate. May be able to get awake biopsy from laryngology specialist   - patient can be discharged and follow up outpatient with Dr. Ashutosh Simmons (Laryngologist) JACK.  - call with questions/concerns [phone]

## 2021-08-06 NOTE — PROGRESS NOTE ADULT - SUBJECTIVE AND OBJECTIVE BOX
CARDIOLOGY FOLLOW UP - Dr. Best  Date of Service: 8/6/21  CC: events noted, bp remains low, asymptomatic  , no cp/sob     Review of Systems:  Constitutional: No fever, weight loss, or fatigue  Respiratory: No cough, wheezing, or hemoptysis, no shortness of breath  Cardiovascular: No chest pain, palpitations, passing out, dizziness, or leg swelling  Gastrointestinal: No abd or epigastric pain.  No nausea, vomiting, or hematemesis; no diarrhea or constipation, no melena or hematochezia  Vascular: no edema       PHYSICAL EXAM:  T(C): 36.5 (08-06-21 @ 12:09), Max: 36.8 (08-05-21 @ 15:30)  HR: 94 (08-06-21 @ 12:09) (77 - 97)  BP: 88/62 (08-06-21 @ 12:09) (85/59 - 97/64)  RR: 18 (08-06-21 @ 12:09) (18 - 18)  SpO2: 100% (08-06-21 @ 12:09) (95% - 100%)  Wt(kg): --  I&O's Summary    05 Aug 2021 07:01  -  06 Aug 2021 07:00  --------------------------------------------------------  IN: 5500 mL / OUT: 5100 mL / NET: 400 mL        Appearance: Normal	  Cardiovascular: Normal S1 S2,RRR, No JVD, No murmurs  Respiratory: Lungs clear to auscultation	  Gastrointestinal:  Soft, Non-tender, + BS	  Extremities: Normal range of motion, No clubbing, cyanosis or edema      Home Medications:  aspirin 81 mg oral delayed release tablet: 1 tab(s) orally once a day (29 Dec 2020 18:37)  atorvastatin 80 mg oral tablet: 1 tab(s) orally once a day (at bedtime) (29 Dec 2020 18:37)  epoetin martine: injectable once a month (29 Dec 2020 18:37)  ferrous sulfate 325 mg (65 mg elemental iron) oral tablet: 1 tab(s) orally 3 times a day (29 Dec 2020 18:37)  gabapentin 100 mg oral capsule: 1 cap(s) orally once a day (29 Dec 2020 18:37)  nortriptyline 25 mg oral capsule: 1 cap(s) orally once a day (at bedtime) (29 Dec 2020 18:37)  pantoprazole 40 mg oral delayed release tablet: 1 tab(s) orally once a day (before a meal) (29 Dec 2020 18:37)  Toujeo SoloStar 300 units/mL subcutaneous solution: 60 unit(s) subcutaneous once a day (at bedtime) (08 Jan 2021 12:41)      MEDICATIONS  (STANDING):  aspirin enteric coated 81 milliGRAM(s) Oral daily  atorvastatin 80 milliGRAM(s) Oral at bedtime  cadexomer iodine 0.9% Gel 1 Application(s) Topical daily  chlorhexidine 0.12% Liquid 15 milliLiter(s) Oral Mucosa two times a day  chlorhexidine 2% Cloths 1 Application(s) Topical <User Schedule>  chlorhexidine 2% Cloths 1 Application(s) Topical daily  dextrose 40% Gel 15 Gram(s) Oral once  dextrose 5%. 1000 milliLiter(s) (50 mL/Hr) IV Continuous <Continuous>  dextrose 5%. 1000 milliLiter(s) (100 mL/Hr) IV Continuous <Continuous>  dextrose 50% Injectable 25 Gram(s) IV Push once  dextrose 50% Injectable 12.5 Gram(s) IV Push once  dextrose 50% Injectable 25 Gram(s) IV Push once  dronabinol 5 milliGRAM(s) Oral two times a day  ferrous    sulfate 325 milliGRAM(s) Oral three times a day  folic acid 1 milliGRAM(s) Oral daily  gentamicin 0.1% Ointment 1 Application(s) Topical <User Schedule>  glucagon  Injectable 1 milliGRAM(s) IntraMuscular once  heparin   Injectable 5000 Unit(s) SubCutaneous every 12 hours  insulin glargine Injectable (LANTUS) 5 Unit(s) SubCutaneous at bedtime  insulin lispro (ADMELOG) corrective regimen sliding scale   SubCutaneous three times a day before meals  insulin lispro (ADMELOG) corrective regimen sliding scale   SubCutaneous at bedtime  lidocaine   4% Patch 1 Patch Transdermal daily  lidocaine   4% Patch 1 Patch Transdermal daily  melatonin 5 milliGRAM(s) Oral at bedtime  midodrine 30 milliGRAM(s) Oral every 8 hours  Nephro-kristi 1 Tablet(s) Oral daily  pantoprazole  Injectable 40 milliGRAM(s) IV Push daily  sevelamer carbonate 1600 milliGRAM(s) Oral three times a day  sodium chloride 0.9%. 1000 milliLiter(s) (50 mL/Hr) IV Continuous <Continuous>  sodium chloride 0.9%. 1000 milliLiter(s) (50 mL/Hr) IV Continuous <Continuous>  thiamine IVPB 500 milliGRAM(s) IV Intermittent daily  ticagrelor 60 milliGRAM(s) Oral every 12 hours      TELEMETRY: 	    ECG:  	  RADIOLOGY:   DIAGNOSTIC TESTING:  [ ] Echocardiogram:  [ ]  Catheterization:  [ ] Stress Test:    OTHER: 	    LABS:	 	        08-05    136  |  97  |  50<H>  ----------------------------<  163<H>  3.4<L>   |  23  |  8.86<H>    Ca    7.7<L>      05 Aug 2021 06:26

## 2021-08-06 NOTE — CHART NOTE - NSCHARTNOTEFT_GEN_A_CORE
Notified by RN that patient is hypotensive with SBP in 80s and PD exchange was held x 2, as per hold parameters.  Patient seen and examined.  Patient without complaints.  No change in status noted.  Discussed with Dr. Dale, who ordered NS @50cc/hr x 8 hours.  Dr. Beck made aware.  No further interventions ordered at this time.  Signout given to night ACP who will continue to monitor.

## 2021-08-06 NOTE — PROVIDER CONTACT NOTE (OTHER) - ACTION/TREATMENT ORDERED:
CARLYLE Tripp notified, message sent to nephrologist. PD exchange held as per parameters. Will continue to monitor.

## 2021-08-06 NOTE — PROVIDER CONTACT NOTE (OTHER) - BACKGROUND
PMH ESRD on PD, DM. Admitted for septic shock of unclear etiology s/p MICU w/ pressors. BP now 85/59. Midodrine administered at bedtime followed by emesis x 1 an hour later PMH ESRD on PD, DM. Admitted for septic shock of unclear etiology s/p MICU w/ pressors.

## 2021-08-07 NOTE — CONSULT NOTE ADULT - CONSULT REASON
help in management/ floor transfer
R second toe dry gangrene
fever
skin changes
ICD evaluation
hypotension
ams
Dysphagia
R 2nd toe gangrene
age indeterminate t12 VCF
ESRD on PD
Leukocytosis & lethargy
hiccups
GOC

## 2021-08-07 NOTE — CONSULT NOTE ADULT - PROVIDER SPECIALTY LIST ADULT
Vascular Surgery
Wound Care
Infectious Disease
Orthopedics
Podiatry
Gastroenterology
Infectious Disease
Nephrology
Cardiology
Electrophysiology
Internal Medicine
Neurology
ENT
Palliative Care

## 2021-08-07 NOTE — CONSULT NOTE ADULT - REASON FOR ADMISSION
Hypotension

## 2021-08-07 NOTE — PROGRESS NOTE ADULT - ASSESSMENT
CATH 11/30/2020:  COMPLICATIONS: The stent was deployed without difficulty. On removal of the  balloon, the shaft broke and the tip of the balloon remained in the vessel. Several attempts were made to snare the balloon unsuccessfully. Dr. Verdugo was notifed who will take the patient to the operating room.  DIAGNOSTIC RECOMMENDATIONS: The patient should continue with the present medications. The patients vessel was stented without difficulty On removal of the balloon, the shaft broke with the baloon stuck in the left main. All attempts to snare the balloon out failed and the patient was taken to the OR by Dr. Arango to remove the balloon  introp eleonora 11/30/20: mild to mod MR, < from: Intra-Operative Transesophageal Echo (11.30.20 @ 17:02) >  Severe global left ventricular systolic dysfunction. Inferior and inferolateral akinesis.  severe hypokinesis of other segments.  Severe global hypokinesia.  Normal left ventricular internal dimensions and wall thicknesses. Moderate diastolic dysfunction (Stage II)  echo 12/17/20: EF 32%, mod MR, Severe global left ventricular systolic dysfunction, moderate pulmonary pressures  echo 12/20/20: EF (Visual Estimate):  25-30 % mild MR, Akinesis of the inferolateral, anterolateral, apical laterlal, inferior, and inferoseptal walls. Severe left ventricular enlargement. No left ventricular thrombus is seen.  Echo 7/7/21: EF 16%, mod - sev MR, mod AS, severe global lv sys dysfx, severe diastolic dysfx, mod pulm htn   ELEONORA 7/15/21: EF 24.5%,  Tethered mitral valve leaflets with normal opening. sev MR, mild to mod AR,  eccentric left ventricular hypertrophy; Dilated  TV annulus There are two jets seen  with venacontracta 0.4cm and 0.5q cm. Severe tricuspid regurgitation.  no evidence of valvular vegetation is seen. evere global leftventricular systolic dysfunction.    a/p  56 M well known to practice with PMH ESRD on PD, DM on insulin, CHF EF 25-30% CAD s/p CABG x4, underwent cardiac cath and stent and wire broke in heart s/p sternotomy p/w septic shock.    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  -med f/u    #Ischemic Cardiomyopathy. Chronic systolic HF  -Repeat echo noted with EF 16%, mod - sev MR, mod AS, severe global lv sys dysfx, severe diastolic dysfx, mod pulm htn ; eleonora with ef 24.5%   -eps eval noted, pt not candidate for icd   -no bb, acei/arb given hx of orthostatic bp   -continue midodrine for bp support   -cont vol removal w PD     # CAD, s/p CABG, s/p PCI, s/p redo open heart for stent balloon retrieval   -hst elevated, likely demand ischemia in setting of septic shock, ESRD   -c/w asa, Brilinta, statin     # ESRD  -on PD  -hyponatremia improved    -renal f/u    #Hypotension  -gentle ivf per renal  -cont mido as ordered  -cont to trend     #AMS  -improved   -neuro psych f/u    #GOC  -palliative f/u     dvt ppx

## 2021-08-07 NOTE — CHART NOTE - NSCHARTNOTEFT_GEN_A_CORE
Called by RN for patient with hypotension.  Of note, patient has been hypotensive, is on midodrine 30mG 3 times daily which he received earlier this morning.  Patient is awake, alert, and has no complaints.  +S1 and S2, lungs CTA, extremities without edema.  Started NS bolus at 50mL/hr with no effect 1 hour into infusion.  Spoke with Dr. Best, recommended 250mL IVF bolus and starting 0.1mg florinef daily.  Plan for discharge has been cancelled for today as d/w attending.  CM informed.     Maty Ortega NP  [phone] Called by RN for patient with hypotension.  Of note, patient has been hypotensive, is on midodrine 30mG 3 times daily which he received earlier this morning.  Patient is awake, alert, and has no complaints.  +S1 and S2, lungs CTA, extremities without edema.  Started NS bolus at 50mL/hr with no effect 1 hour into infusion.  Spoke with Dr. Best, recommended 250mL IVF bolus and starting 0.1mg florinef daily.  Plan for discharge has been cancelled for today as d/w attending.  CM informed.  Leukocytosis noted, will add procalcitonin to am labs.    Maty Ortega NP  [phone] Called by RN for patient with hypotension.  Of note, patient has been hypotensive, is on midodrine 30mG 3 times daily which he received earlier this morning.  Patient is awake, alert, and has no complaints.  +S1 and S2, lungs CTA, extremities without edema.  Started NS bolus at 50mL/hr with no effect 1 hour into infusion.  Spoke with Dr. Best, recommended 250mL IVF bolus and starting 0.1mg florinef daily.  Plan for discharge has been cancelled for today as d/w attending.  CM informed.  Leukocytosis noted, will add procalcitonin to am labs and order CXR.  Patient with ESRD on PD, aneuric per RN.    Maty Ortega NP  [phone]

## 2021-08-07 NOTE — CHART NOTE - NSCHARTNOTEFT_GEN_A_CORE
RN to PA- Pt had 1 episode of epistaxis this AM. Pt also had an episode of epistaxis yesterday morning (8/6/21).           pt had 1 episode of light epistaxis this morning  PMH ESRD on PD, DM. Admitted for septic shock of unclear etiology s/p MICU w/ pressors.  Pt A&Ox 3. Denies discomfort at this time. bleeding controlled with light pressure. Aspiration precautions In place and provided applied ice packs for 5 mins  will continue to monitor closely and notify provider if epistaxis increases. pt has call bell within reach and will monitor and will hold morning dose of heparin      Vital Signs Last 24 Hrs  T(C): 37 (07 Aug 2021 05:38), Max: 37.4 (07 Aug 2021 00:45)  T(F): 98.6 (07 Aug 2021 05:38), Max: 99.4 (07 Aug 2021 00:45)  HR: 90 (07 Aug 2021 05:38) (81 - 97)  BP: 90/60 (07 Aug 2021 05:38) (80/51 - 96/66)  BP(mean): --  RR: 18 (07 Aug 2021 05:38) (18 - 18)  SpO2: 95% (07 Aug 2021 05:38) (95% - 100%) RN to PA- Pt had 1 episode of epistaxis this AM via right nostril. Pt also had an episode of epistaxis yesterday morning (8/6/21). Pt was blowing his nose into a tissue, which exacerbated the nosebleed. No active bleeding at this time. Pt denies any discomfort at this time. Bleeding controlled. Will hold AM heparin SQ. Hgb and platelet stable from 08/04/21. CBC still pending from this morning.       Vital Signs Last 24 Hrs  T(C): 37 (07 Aug 2021 05:38), Max: 37.4 (07 Aug 2021 00:45)  T(F): 98.6 (07 Aug 2021 05:38), Max: 99.4 (07 Aug 2021 00:45)  HR: 90 (07 Aug 2021 05:38) (81 - 97)  BP: 90/60 (07 Aug 2021 05:38) (80/51 - 96/66)  BP(mean): --  RR: 18 (07 Aug 2021 05:38) (18 - 18)  SpO2: 95% (07 Aug 2021 05:38) (95% - 100%)    Complete Blood Count in AM (08.04.21 @ 06:17)    Nucleated RBC: 0 /100 WBCs    WBC Count: 9.85 K/uL    RBC Count: 4.11 M/uL    Hemoglobin: 12.0 g/dL    Hematocrit: 39.9 %    Mean Cell Volume: 97.1 fl    Mean Cell Hemoglobin: 29.2 pg    Mean Cell Hemoglobin Conc: 30.1 gm/dL    Red Cell Distrib Width: 19.7 %    Platelet Count - Automated: 236 K/uL    Basic Metabolic Panel in AM (08.05.21 @ 06:26)    Sodium, Serum: 136 mmol/L    Potassium, Serum: 3.4 mmol/L    Chloride, Serum: 97 mmol/L    Carbon Dioxide, Serum: 23 mmol/L    Anion Gap, Serum: 16 mmol/L    Blood Urea Nitrogen, Serum: 50 mg/dL    Creatinine, Serum: 8.86 mg/dL    Glucose, Serum: 163 mg/dL    Calcium, Total Serum: 7.7 mg/dL    eGFR if Non : 6: Interpretative comment  The units for eGFR are mL/min/1.73M2 (normalized body surface area). The  eGFR is calculated from a serum creatinine using the CKD-EPI equation.  Other variables required for calculation are race, age and sex. Among  patients with chronic kidney disease (CKD), the eGFR is useful in  determining the stage of disease according to KDOQI CKD classification.  All eGFR results are reported numerically with the following  interpretation.          GFR                    With                 Without     (ml/min/1.73 m2)    Kidney Damage       Kidney Damage        >= 90                    Stage 1                     Normal        60-89                    Stage 2                     Decreased GFR        30-59     Stage 3                     Stage 3        15-29                    Stage 4                     Stage 4        < 15                      Stage 5                     Stage 5  Each stage of CKD assumes that the associated GFR level has been in  effect for at least 3 months. Determination of stages one and two (with  eGFR > 59 ml/min/m2) requires estimation of kidney damage for at least 3  months as defined by structural or functional abnormalities.  Limitations: All estimates of GFR will be less accurate for patients at  extremes of muscle mass (including but not limited to frail elderly,  critically ill, or cancer patients), those with unusual diets, and those  with conditions associated with reduced secretion or extrarenal  elimination of creatinine. The eGFR equation is not recommended for use  in patients with unstable creatinine levels. mL/min/1.73M2    eGFR if African American: 7 mL/min/1.73M2      Plan:   D/w attending, Dr. Beck- agrees to hold AM heparin sq                                             - call ENT  - Called ENT- will f/u in AM  - Will hold AM heparin SQ  - AM CBC pending- will relay to day team to f/u AM CBC.    Karissa Tiwari PA-C  [phone]

## 2021-08-07 NOTE — PROVIDER CONTACT NOTE (OTHER) - ACTION/TREATMENT ORDERED:
will continue to monitor closely and notify provider if epistaxis increases. pt has call bell within reach and will monitor and will hold morning dose of heparin

## 2021-08-07 NOTE — PROGRESS NOTE ADULT - SUBJECTIVE AND OBJECTIVE BOX
CC: events noted, hypotensive, epistaxis    TELEMETRY:     PHYSICAL EXAM:    T(C): 37 (08-07-21 @ 05:38), Max: 37.4 (08-07-21 @ 00:45)  HR: 90 (08-07-21 @ 05:38) (81 - 97)  BP: 90/60 (08-07-21 @ 05:38) (80/51 - 96/66)  RR: 18 (08-07-21 @ 05:38) (18 - 18)  SpO2: 95% (08-07-21 @ 05:38) (95% - 100%)  Wt(kg): --  I&O's Summary    06 Aug 2021 07:01  -  07 Aug 2021 07:00  --------------------------------------------------------  IN: 520 mL / OUT: 0 mL / NET: 520 mL        Appearance: Normal	  Cardiovascular: Normal S1 S2,RRR, No JVD, No murmurs  Respiratory: Lungs clear to auscultation	  Gastrointestinal:  Soft, Non-tender, + BS	  Extremities: Normal range of motion, No clubbing, cyanosis or edema  Vascular: Peripheral pulses palpable 2+ bilaterally     LABS:	 	                          10.4   15.17 )-----------( 192      ( 07 Aug 2021 06:40 )             35.7     08-07    134<L>  |  94<L>  |  60<H>  ----------------------------<  113<H>  4.7   |  23  |  10.37<H>    Ca    8.7      07 Aug 2021 06:40  Phos  5.3     08-07  Mg     2.3     08-07            CARDIAC MARKERS:        MEDICATIONS  (STANDING):  aspirin enteric coated 81 milliGRAM(s) Oral daily  atorvastatin 80 milliGRAM(s) Oral at bedtime  cadexomer iodine 0.9% Gel 1 Application(s) Topical daily  chlorhexidine 0.12% Liquid 15 milliLiter(s) Oral Mucosa two times a day  chlorhexidine 2% Cloths 1 Application(s) Topical <User Schedule>  chlorhexidine 2% Cloths 1 Application(s) Topical daily  dextrose 40% Gel 15 Gram(s) Oral once  dextrose 5%. 1000 milliLiter(s) (50 mL/Hr) IV Continuous <Continuous>  dextrose 5%. 1000 milliLiter(s) (100 mL/Hr) IV Continuous <Continuous>  dextrose 50% Injectable 25 Gram(s) IV Push once  dextrose 50% Injectable 12.5 Gram(s) IV Push once  dextrose 50% Injectable 25 Gram(s) IV Push once  dronabinol 5 milliGRAM(s) Oral two times a day  ferrous    sulfate 325 milliGRAM(s) Oral three times a day  folic acid 1 milliGRAM(s) Oral daily  gentamicin 0.1% Ointment 1 Application(s) Topical <User Schedule>  glucagon  Injectable 1 milliGRAM(s) IntraMuscular once  heparin   Injectable 5000 Unit(s) SubCutaneous every 12 hours  insulin glargine Injectable (LANTUS) 5 Unit(s) SubCutaneous at bedtime  insulin lispro (ADMELOG) corrective regimen sliding scale   SubCutaneous three times a day before meals  insulin lispro (ADMELOG) corrective regimen sliding scale   SubCutaneous at bedtime  lidocaine   4% Patch 1 Patch Transdermal daily  lidocaine   4% Patch 1 Patch Transdermal daily  melatonin 5 milliGRAM(s) Oral at bedtime  midodrine 30 milliGRAM(s) Oral every 8 hours  Nephro-kristi 1 Tablet(s) Oral daily  pantoprazole  Injectable 40 milliGRAM(s) IV Push daily  sevelamer carbonate 1600 milliGRAM(s) Oral three times a day  sodium chloride 0.9%. 1000 milliLiter(s) (50 mL/Hr) IV Continuous <Continuous>  sodium chloride 0.9%. 1000 milliLiter(s) (50 mL/Hr) IV Continuous <Continuous>  thiamine IVPB 500 milliGRAM(s) IV Intermittent daily  ticagrelor 60 milliGRAM(s) Oral every 12 hours

## 2021-08-07 NOTE — PROVIDER CONTACT NOTE (OTHER) - ASSESSMENT
Pt A&Ox 3. Denies discomfort at this time. bleeding controlled with light pressure. Aspiration precautions In place and provided applied ice packs for 5 mins

## 2021-08-07 NOTE — PROGRESS NOTE ADULT - ASSESSMENT
seen and examined at bedside  hypotensive again  lethargic  a/w 50cc NS for 2 hours and rechecking BP  PD on hold for today  on room air    acetaminophen   Tablet .. 650 milliGRAM(s) Oral every 6 hours PRN  aspirin enteric coated 81 milliGRAM(s) Oral daily  atorvastatin 80 milliGRAM(s) Oral at bedtime  cadexomer iodine 0.9% Gel 1 Application(s) Topical daily  chlorhexidine 0.12% Liquid 15 milliLiter(s) Oral Mucosa two times a day  chlorhexidine 2% Cloths 1 Application(s) Topical <User Schedule>  chlorhexidine 2% Cloths 1 Application(s) Topical daily  dextrose 40% Gel 15 Gram(s) Oral once  dextrose 5%. 1000 milliLiter(s) IV Continuous <Continuous>  dextrose 5%. 1000 milliLiter(s) IV Continuous <Continuous>  dextrose 50% Injectable 25 Gram(s) IV Push once  dextrose 50% Injectable 12.5 Gram(s) IV Push once  dextrose 50% Injectable 25 Gram(s) IV Push once  dronabinol 5 milliGRAM(s) Oral two times a day  ferrous    sulfate 325 milliGRAM(s) Oral three times a day  fludroCORTISONE 0.1 milliGRAM(s) Oral daily  folic acid 1 milliGRAM(s) Oral daily  gentamicin 0.1% Ointment 1 Application(s) Topical <User Schedule>  glucagon  Injectable 1 milliGRAM(s) IntraMuscular once  heparin   Injectable 5000 Unit(s) SubCutaneous every 12 hours  insulin glargine Injectable (LANTUS) 5 Unit(s) SubCutaneous at bedtime  insulin lispro (ADMELOG) corrective regimen sliding scale   SubCutaneous three times a day before meals  insulin lispro (ADMELOG) corrective regimen sliding scale   SubCutaneous at bedtime  lidocaine   4% Patch 1 Patch Transdermal daily  lidocaine   4% Patch 1 Patch Transdermal daily  melatonin 5 milliGRAM(s) Oral at bedtime  midodrine 30 milliGRAM(s) Oral every 8 hours  Nephro-kristi 1 Tablet(s) Oral daily  pantoprazole  Injectable 40 milliGRAM(s) IV Push daily  sevelamer carbonate 1600 milliGRAM(s) Oral three times a day  sodium chloride 0.9%. 1000 milliLiter(s) IV Continuous <Continuous>  sodium chloride 0.9%. 1000 milliLiter(s) IV Continuous <Continuous>  thiamine IVPB 500 milliGRAM(s) IV Intermittent daily  ticagrelor 60 milliGRAM(s) Oral every 12 hours  valproate sodium IVPB 125 milliGRAM(s) IV Intermittent every 8 hours PRN      VITAL:  T(C): , Max: 37.4 (08-07-21 @ 00:45)  T(F): , Max: 99.4 (08-07-21 @ 00:45)  HR: 92 (08-07-21 @ 09:38)  BP: 78/38 (08-07-21 @ 10:50)  BP(mean): --  RR: 18 (08-07-21 @ 09:38)  SpO2: 98% (08-07-21 @ 09:38)  Wt(kg): --    08-06-21 @ 07:01  -  08-07-21 @ 07:00  --------------------------------------------------------  IN: 520 mL / OUT: 0 mL / NET: 520 mL        PHYSICAL EXAM:  Constitutional: NAD; cachetic   Neck:  No JVD  Respiratory: poor effort   Cardiovascular: S1 and S2  Gastrointestinal: BS+, soft, NT/ND + PD catheter   Extremities: No peripheral edema  Neurological: A/O x 3, no focal deficits  Psychiatric: Normal mood, normal affect  : No Johnson  Skin: No rashes  Access: Not applicable    LABS:                          10.4   15.17 )-----------( 192      ( 07 Aug 2021 06:40 )             35.7     Na(134)/K(4.7)/Cl(94)/HCO3(23)/BUN(60)/Cr(10.37)Glu(113)/Ca(8.7)/Mg(2.3)/PO4(5.3)    08-07 @ 06:40  Na(136)/K(3.4)/Cl(97)/HCO3(23)/BUN(50)/Cr(8.86)Glu(163)/Ca(7.7)/Mg(--)/PO4(--)    08-05 @ 06:26            ASSESSMENT/PLAN  56 M PMH ESRD on PD, DM on insulin, CHF EF 25-30% CAD s/p CABG x4 pw with hypotension with fever. Encephalopathy likely related to cefepime ? no obvious source of sepsis apparent as of now, KLAUDIA: Overall thickened valves seen however, no evidence of valvular vegetation is seen.     Hypotension:  on Midodrine for hemodynamic support:      ESRD on PD. Patient TW  61.5kg  ID- off IV antibiotics   hyponatremia-mild  Hyperphosphatemia: c/ w binders as prescribed   Failure to thrive     RECOMMENDATIONS   - HOLD CAPD today as BP is very low   - Palliative care follow up noted.  Functional quadriplegic and again the discussion was brought up re code status and hospice   - continue Renvela to 1,600 mg PO TID;  Phos this  am    - continue Midodrine 30mg po q 8 hours   - No peg per family. On marinol.    He needs to eat more   - On Glucerna shakes as he refused Nepro  -Gentle IVF for 8 hours  if BP continues to be low and not eating      Modesto Wayne NP-BC  [employer]  [phone]

## 2021-08-07 NOTE — CONSULT NOTE ADULT - CONSULT REQUESTED BY NAME
Medicine
Medicine
Dr Beck
MICU
med
ER
office pt
Dr Beck
Dr. Philip
MD Kiran
Dr blue
Dr. Beck
medicine
[id number]

## 2021-08-07 NOTE — PROGRESS NOTE ADULT - SUBJECTIVE AND OBJECTIVE BOX
ENT ISSUE/POD: epistaxis    HPI: 54yo male seen previously by ENT for dysphagia, found to have a white lesion on the laryngeal surface of the left arytenoid, that appeared similar to a granuloma. Pt was seen at bedside today for re-evaluation. He still c/o dysphagia, only able to tolerate liquids. He is on a dysphagia 1 diet with thin liquids. Pt also c/o nausea. ENT was called today to see pt for epistaxis. Pt denies fever, chills, vomiting, HA, SOB, odynophagia.        PAST MEDICAL & SURGICAL HISTORY:  Diabetes    CAD, multiple vessel    ESRD (end stage renal disease) on dialysis    HTN (hypertension)    S/P CABG (coronary artery bypass graft)      Allergies    doxazosin (Other)    Intolerances      MEDICATIONS  (STANDING):  aspirin enteric coated 81 milliGRAM(s) Oral daily  atorvastatin 80 milliGRAM(s) Oral at bedtime  cadexomer iodine 0.9% Gel 1 Application(s) Topical daily  cefTRIAXone   IVPB 1000 milliGRAM(s) IV Intermittent every 24 hours  chlorhexidine 0.12% Liquid 15 milliLiter(s) Oral Mucosa two times a day  chlorhexidine 2% Cloths 1 Application(s) Topical <User Schedule>  chlorhexidine 2% Cloths 1 Application(s) Topical daily  dextrose 40% Gel 15 Gram(s) Oral once  dextrose 5%. 1000 milliLiter(s) (50 mL/Hr) IV Continuous <Continuous>  dextrose 5%. 1000 milliLiter(s) (100 mL/Hr) IV Continuous <Continuous>  dextrose 50% Injectable 25 Gram(s) IV Push once  dextrose 50% Injectable 12.5 Gram(s) IV Push once  dextrose 50% Injectable 25 Gram(s) IV Push once  dronabinol 5 milliGRAM(s) Oral two times a day  ferrous    sulfate 325 milliGRAM(s) Oral three times a day  fludroCORTISONE 0.1 milliGRAM(s) Oral daily  folic acid 1 milliGRAM(s) Oral daily  gentamicin 0.1% Ointment 1 Application(s) Topical <User Schedule>  glucagon  Injectable 1 milliGRAM(s) IntraMuscular once  heparin   Injectable 5000 Unit(s) SubCutaneous every 12 hours  insulin glargine Injectable (LANTUS) 5 Unit(s) SubCutaneous at bedtime  insulin lispro (ADMELOG) corrective regimen sliding scale   SubCutaneous three times a day before meals  insulin lispro (ADMELOG) corrective regimen sliding scale   SubCutaneous at bedtime  lidocaine   4% Patch 1 Patch Transdermal daily  lidocaine   4% Patch 1 Patch Transdermal daily  melatonin 5 milliGRAM(s) Oral at bedtime  midodrine 30 milliGRAM(s) Oral every 8 hours  Nephro-kristi 1 Tablet(s) Oral daily  pantoprazole  Injectable 40 milliGRAM(s) IV Push daily  sevelamer carbonate 1600 milliGRAM(s) Oral three times a day  sodium chloride 0.9%. 1000 milliLiter(s) (50 mL/Hr) IV Continuous <Continuous>  sodium chloride 0.9%. 1000 milliLiter(s) (50 mL/Hr) IV Continuous <Continuous>  thiamine IVPB 500 milliGRAM(s) IV Intermittent daily  ticagrelor 60 milliGRAM(s) Oral every 12 hours    MEDICATIONS  (PRN):  acetaminophen   Tablet .. 650 milliGRAM(s) Oral every 6 hours PRN Moderate Pain (4 - 6)  valproate sodium IVPB 125 milliGRAM(s) IV Intermittent every 8 hours PRN agitation      Social History: see consult    Family history: see consult    ROS:   ENT: all negative except as noted in HPI   Pulm: denies SOB, cough, hemoptysis  Neuro: denies numbness/tingling, loss of sensation  Endo: denies heat/cold intolerance, excessive sweating      Vital Signs Last 24 Hrs  T(C): 37.1 (07 Aug 2021 14:20), Max: 37.4 (07 Aug 2021 00:45)  T(F): 98.8 (07 Aug 2021 14:20), Max: 99.4 (07 Aug 2021 00:45)  HR: 94 (07 Aug 2021 14:20) (81 - 94)  BP: 85/53 (07 Aug 2021 14:20) (78/38 - 96/65)  BP(mean): --  RR: 18 (07 Aug 2021 14:20) (18 - 18)  SpO2: 99% (07 Aug 2021 14:20) (95% - 99%)                          10.4   15.17 )-----------( 192      ( 07 Aug 2021 06:40 )             35.7    08-07    134<L>  |  94<L>  |  60<H>  ----------------------------<  113<H>  4.7   |  23  |  10.37<H>    Ca    8.7      07 Aug 2021 06:40  Phos  5.3     08-07  Mg     2.3     08-07         PHYSICAL EXAM:  Gen: NAD  Skin: No rashes, bruises, or lesions  Head: Normocephalic, Atraumatic  Face: no edema, erythema, or fluctuance. Parotid glands soft without mass  Eyes: no scleral injection  Nose: Dry blood in left nare, no active bleeding, no discharge, R. nare dry, patent  Mouth: No Stridor / Drooling / Trismus.  Mucosa moist, tongue/uvula midline, oropharynx clear, no bleeding  Neck: Flat, supple, no lymphadenopathy, trachea midline, no masses  Lymphatic: No lymphadenopathy  Resp: breathing easily, no stridor  Neuro: facial nerve intact, no facial droop

## 2021-08-07 NOTE — CONSULT NOTE ADULT - SUBJECTIVE AND OBJECTIVE BOX
RESEE: pt noted lethargic, hypotensive & WBC escalated    Patient reports - "how much longer before I go", does not answer any other questions. Lethargic     REVIEW OF SYSTEMS:  unobtainable - not answering any other questions     Antimicrobials Day #  :    Other Medications Reviewed    T(F): 98.9 (08-07-21 @ 13:00), Max: 99.4 (08-07-21 @ 00:45)  HR: 93 (08-07-21 @ 13:00)  BP: 94/67 (08-07-21 @ 13:00)  RR: 18 (08-07-21 @ 13:00)  SpO2: 98% (08-07-21 @ 13:00)  Wt(kg): --    PHYSICAL EXAM:  General: alert, no acute distress  Eyes:  anicteric, no conjunctival injection, no discharge  Oropharynx: no lesions or injection 	  Neck: supple, without adenopathy  Lungs: clear to auscultation  Heart: regular rate and rhythm; no murmurs  Abdomen: soft, nondistended, nontender, without mass or organomegaly. PD catheter site is clean   Skin: no lesions  Extremities: no clubbing, cyanosis, or edema. Right thumb & right foot 2nd toe with dry gangrene  Neurologic: lethargic, difficult to be awaken, moves all extremities    LAB RESULTS:                        10.4   15.17 )-----------( 192      ( 07 Aug 2021 06:40 )             35.7     08-07    134<L>  |  94<L>  |  60<H>  ----------------------------<  113<H>  4.7   |  23  |  10.37<H>    Ca    8.7      07 Aug 2021 06:40  Phos  5.3     08-07  Mg     2.3     08-07          MICROBIOLOGY:  RECENT CULTURES:        RADIOLOGY REVIEWED:

## 2021-08-07 NOTE — CONSULT NOTE ADULT - NSICDXPILOT_GEN_ALL_CORE
[place]

## 2021-08-07 NOTE — CONSULT NOTE ADULT - ASSESSMENT
56y male with ESRD on dialysis via PD, DM,CAD s/p CABG, had cardiac stent placement in 2020, cath complicated by broken wire requiring sternotomy for wire retrieval, hospitalized twice after that in Dec for near syncope r/out sepsis & then in Jan for hypotension, fever, weak but no specific infection found. He has known to be poorly interactive & hypotensive at baseline in the 90/60s on Midodrine.   Readmitted on 7/06/21 with fever, hypotension, feeling weak, report of cough.   Required pressors x1 day in MICU, treated with cefepime from 7/06 - 7/11/21 for subtle infiltrates on CT chest.   MRI spine not consistent with infection.   MRI Brain: old lacunar basal ganglia infarcts  KLAUDIA: No evidence of valvular vegetation is seen, severe global left ventricular systolic dysfunction   7/10 & 7/14/21 - Blood cx finalized as no growth   Peritoneal fluid no growth   ID signed off on 7/20/21    He has continued to have episodes of hypotension, despite being on Midodrine, but as of 8/06/21 also requiring fluid boluses  Now with rise in WBC, lethargic, poorly interactive, exam non focal for active focus of infection.  Dry gangrene of right thumb & right 2nd - not infected    PLAN:  Low suspicion for sepsis,   Surveillance blood cx x 2 to exclude occult infection  Vancomycin & Ceftriaxone for short course pending cx - reassess the need for any antibiotics in the next 48 - 72 hrs  Will follow, thanks

## 2021-08-07 NOTE — PROGRESS NOTE ADULT - ASSESSMENT
56 m with    Sepsis resolved  - off antibiotic   - toe gangrene  - Podiatry follow  - ID follow   - KLAUDIA noted     Epistaxis  - ENT evaluation    COPD  - 2L NC overnight because he becomes apneic, sats well  - Sats well on RA while awake    CAD s/p CABG   - Hypotension  - midodrine dose increased to 30 mg TID   - Previous echo with reduced EF  - c/w DAPT  - Cardiology follow    CHF cr systolic  - cardiology follow    AICD  - EP eval noted. Not a good candidate for AICD    Peritoneal Dialysis   - nephrology follow PD, recommend 4x a day  - patient on midodrine 30 q8h at home, continue here  - Holding home metoprolol 25 q12hr in setting of hypotension    Diabetes   - HA1C 6.1 on 7/6 suggesting prediabetes  - Tujeo 60 units at bedtime and Victoza at home, started on 30 units at bedtime, adjust as needed based on patient PO intake  - added 2 units insulin pre-meal    Hiccups  - GI follow  - hold Ativan 2 to sedation    Incontinence dermatitis  - wound care    Toe gangrene  - Podiatry follow  - Vascular follow  - PONCHO/PVR noted  - Angiogram if agrees    Finger gangrenous area  - local care    Vertebral fracture T12  - Ortho eval noted   - MRI spine noted  - No intervention    Confusion   - possible delirium  - Psych follow  - Neurology follow  - EEG noted    Dysphagia  - GI follow  - MBS  - ENT follow    functional quadriplegia   - supportive care     Palliative follow re GOC     DCP home in progress.     Pipe Beck MD pager [phone]

## 2021-08-07 NOTE — CONSULT NOTE ADULT - CONSULT REQUESTED DATE/TIME
06-Jul-2021 14:30
10-Jul-2021 13:07
10-Jul-2021 15:23
30-Jul-2021 12:32
06-Jul-2021
06-Jul-2021 12:35
09-Jul-2021 12:10
09-Jul-2021 12:48
07-Jul-2021 16:35
14-Jul-2021 23:31
23-Jul-2021 11:34
07-Aug-2021 14:11
09-Jul-2021 16:49
21-Jul-2021 11:46

## 2021-08-07 NOTE — PROVIDER CONTACT NOTE (OTHER) - BACKGROUND
----- Message from Akshat Mooney sent at 4/25/2019  9:09 AM CDT -----  Contact: Cheryl Chambers  Placed call to podCheryl want to speak with a nurse regarding clarification on erythromycin 250 mg please call back at [phone] reference [id number]   PMH ESRD on PD, DM. Admitted for septic shock of unclear etiology s/p MICU w/ pressors.

## 2021-08-07 NOTE — PROVIDER CONTACT NOTE (OTHER) - ACTION/TREATMENT ORDERED:
ACP Maty notified, administer NS 50 ml for 2 hours, reassess manual BP in one hour. Will continue to monitor.

## 2021-08-07 NOTE — PROGRESS NOTE ADULT - ASSESSMENT
57yo male seen previously by  ENT for dysphagia, found to have white granulomatous lesion on laryngeal surface of left arytenoid. Pt was re-evaluated today. Indirect laryngoscopy shows unchanged white lesion on left arytenoid. On exam, pt was found to have oral bleeding, with excoriation noted on mucosal surface of lower lip. Bleeding controlled with holding pressure. No epistaxis on exam

## 2021-08-07 NOTE — PROGRESS NOTE ADULT - SUBJECTIVE AND OBJECTIVE BOX
Patient is a 56y old  Male who presents with a chief complaint of Hypotension (07 Aug 2021 11:11)      SUBJECTIVE / OVERNIGHT EVENTS: Had epistaxis resolved. Hypotensive today.  Review of Systems  chest pain no  palpitations no  sob no  nausea no  headache no    MEDICATIONS  (STANDING):  aspirin enteric coated 81 milliGRAM(s) Oral daily  atorvastatin 80 milliGRAM(s) Oral at bedtime  cadexomer iodine 0.9% Gel 1 Application(s) Topical daily  chlorhexidine 0.12% Liquid 15 milliLiter(s) Oral Mucosa two times a day  chlorhexidine 2% Cloths 1 Application(s) Topical <User Schedule>  chlorhexidine 2% Cloths 1 Application(s) Topical daily  dextrose 40% Gel 15 Gram(s) Oral once  dextrose 5%. 1000 milliLiter(s) (50 mL/Hr) IV Continuous <Continuous>  dextrose 5%. 1000 milliLiter(s) (100 mL/Hr) IV Continuous <Continuous>  dextrose 50% Injectable 25 Gram(s) IV Push once  dextrose 50% Injectable 12.5 Gram(s) IV Push once  dextrose 50% Injectable 25 Gram(s) IV Push once  dronabinol 5 milliGRAM(s) Oral two times a day  ferrous    sulfate 325 milliGRAM(s) Oral three times a day  fludroCORTISONE 0.1 milliGRAM(s) Oral daily  folic acid 1 milliGRAM(s) Oral daily  gentamicin 0.1% Ointment 1 Application(s) Topical <User Schedule>  glucagon  Injectable 1 milliGRAM(s) IntraMuscular once  heparin   Injectable 5000 Unit(s) SubCutaneous every 12 hours  insulin glargine Injectable (LANTUS) 5 Unit(s) SubCutaneous at bedtime  insulin lispro (ADMELOG) corrective regimen sliding scale   SubCutaneous three times a day before meals  insulin lispro (ADMELOG) corrective regimen sliding scale   SubCutaneous at bedtime  lidocaine   4% Patch 1 Patch Transdermal daily  lidocaine   4% Patch 1 Patch Transdermal daily  melatonin 5 milliGRAM(s) Oral at bedtime  midodrine 30 milliGRAM(s) Oral every 8 hours  Nephro-kristi 1 Tablet(s) Oral daily  pantoprazole  Injectable 40 milliGRAM(s) IV Push daily  sevelamer carbonate 1600 milliGRAM(s) Oral three times a day  sodium chloride 0.9%. 1000 milliLiter(s) (50 mL/Hr) IV Continuous <Continuous>  sodium chloride 0.9%. 1000 milliLiter(s) (50 mL/Hr) IV Continuous <Continuous>  thiamine IVPB 500 milliGRAM(s) IV Intermittent daily  ticagrelor 60 milliGRAM(s) Oral every 12 hours    MEDICATIONS  (PRN):  acetaminophen   Tablet .. 650 milliGRAM(s) Oral every 6 hours PRN Moderate Pain (4 - 6)  valproate sodium IVPB 125 milliGRAM(s) IV Intermittent every 8 hours PRN agitation      Vital Signs Last 24 Hrs  T(C): 37.2 (07 Aug 2021 13:00), Max: 37.4 (07 Aug 2021 00:45)  T(F): 98.9 (07 Aug 2021 13:00), Max: 99.4 (07 Aug 2021 00:45)  HR: 93 (07 Aug 2021 13:00) (81 - 97)  BP: 94/67 (07 Aug 2021 13:00) (78/38 - 96/65)  BP(mean): --  RR: 18 (07 Aug 2021 13:00) (18 - 18)  SpO2: 98% (07 Aug 2021 13:00) (95% - 98%)    PHYSICAL EXAM:  GENERAL: weak  HEAD:  Atraumatic, Normocephalic  EYES: EOMI, PERRLA, conjunctiva and sclera clear  NECK: Supple, No JVD  CHEST/LUNG: Clear to auscultation bilaterally; No wheeze  HEART: Regular rate and rhythm; No murmurs, rubs, or gallops  ABDOMEN: Soft, Nontender, Nondistended; Bowel sounds present PD cath.  EXTREMITIES:  2+ Peripheral Pulses, No clubbing, cyanosis, or edema  NEUROLOGY: non-focal  SKIN: No rashes or lesions    LABS:                        10.4   15.17 )-----------( 192      ( 07 Aug 2021 06:40 )             35.7     08-07    134<L>  |  94<L>  |  60<H>  ----------------------------<  113<H>  4.7   |  23  |  10.37<H>    Ca    8.7      07 Aug 2021 06:40  Phos  5.3     08-07  Mg     2.3     08-07                  RADIOLOGY & ADDITIONAL TESTS:    Imaging Personally Reviewed:    Consultant(s) Notes Reviewed:      Care Discussed with Consultants/Other Providers:

## 2021-08-08 NOTE — CHART NOTE - NSCHARTNOTEFT_GEN_A_CORE
BP trends noted.  MAP > 65 at present and patient remains clinically unchanged.  Will hold off on IVF at this time.  May need IVF if SBP < 80.     Maty Ortega NP  [phone] BP trends noted.  MAP > 65 at present and patient remains clinically unchanged.  Will hold off on IVF at this time.  May need IVF if SBP < 80.  d/w Dr. Beck.    Maty Ortega NP  [phone]

## 2021-08-08 NOTE — PROGRESS NOTE ADULT - SUBJECTIVE AND OBJECTIVE BOX
CC: events noted, remains hypotensive,     TELEMETRY:     PHYSICAL EXAM:    T(C): 36.2 (08-08-21 @ 06:10), Max: 37.2 (08-07-21 @ 13:00)  HR: 83 (08-08-21 @ 06:10) (83 - 94)  BP: 88/54 (08-08-21 @ 06:10) (78/38 - 94/67)  RR: 18 (08-08-21 @ 06:10) (18 - 18)  SpO2: 100% (08-08-21 @ 06:10) (98% - 100%)  Wt(kg): --  I&O's Summary    07 Aug 2021 07:01  -  08 Aug 2021 07:00  --------------------------------------------------------  IN: 470 mL / OUT: 0 mL / NET: 470 mL        Appearance: Normal	  Cardiovascular: Normal S1 S2,RRR, No JVD, No murmurs  Respiratory: Lungs clear to auscultation	  Gastrointestinal:  Soft, Non-tender, + BS	  Extremities: Normal range of motion, No clubbing, cyanosis or edema  Vascular: Peripheral pulses palpable 2+ bilaterally     LABS:	 	                          10.4   15.17 )-----------( 192      ( 07 Aug 2021 06:40 )             35.7     08-07    134<L>  |  94<L>  |  60<H>  ----------------------------<  113<H>  4.7   |  23  |  10.37<H>    Ca    8.7      07 Aug 2021 06:40  Phos  5.3     08-07  Mg     2.3     08-07            CARDIAC MARKERS:

## 2021-08-08 NOTE — PROGRESS NOTE ADULT - ASSESSMENT
CATH 11/30/2020:  COMPLICATIONS: The stent was deployed without difficulty. On removal of the  balloon, the shaft broke and the tip of the balloon remained in the vessel. Several attempts were made to snare the balloon unsuccessfully. Dr. Verdugo was notifed who will take the patient to the operating room.  DIAGNOSTIC RECOMMENDATIONS: The patient should continue with the present medications. The patients vessel was stented without difficulty On removal of the balloon, the shaft broke with the baloon stuck in the left main. All attempts to snare the balloon out failed and the patient was taken to the OR by Dr. Arango to remove the balloon  introp eleonora 11/30/20: mild to mod MR, < from: Intra-Operative Transesophageal Echo (11.30.20 @ 17:02) >  Severe global left ventricular systolic dysfunction. Inferior and inferolateral akinesis.  severe hypokinesis of other segments.  Severe global hypokinesia.  Normal left ventricular internal dimensions and wall thicknesses. Moderate diastolic dysfunction (Stage II)  echo 12/17/20: EF 32%, mod MR, Severe global left ventricular systolic dysfunction, moderate pulmonary pressures  echo 12/20/20: EF (Visual Estimate):  25-30 % mild MR, Akinesis of the inferolateral, anterolateral, apical laterlal, inferior, and inferoseptal walls. Severe left ventricular enlargement. No left ventricular thrombus is seen.  Echo 7/7/21: EF 16%, mod - sev MR, mod AS, severe global lv sys dysfx, severe diastolic dysfx, mod pulm htn   ELEONORA 7/15/21: EF 24.5%,  Tethered mitral valve leaflets with normal opening. sev MR, mild to mod AR,  eccentric left ventricular hypertrophy; Dilated  TV annulus There are two jets seen  with venacontracta 0.4cm and 0.5q cm. Severe tricuspid regurgitation.  no evidence of valvular vegetation is seen. evere global leftventricular systolic dysfunction.    a/p  56 M well known to practice with PMH ESRD on PD, DM on insulin, CHF EF 25-30% CAD s/p CABG x4, underwent cardiac cath and stent and wire broke in heart s/p sternotomy p/w septic shock.    #Septic Shock, resolved   -rule out recurrent sepsis  -followup blood cx  -cont abx for now   -right 2nd toe gangrene, pod f/u, vascular f/u, ID f/u   -ELEONORA: no evidence of valvular vegetation is seen.  -med f/u    #Ischemic Cardiomyopathy. Chronic systolic HF  -Repeat echo noted with EF 16%, mod - sev MR, mod AS, severe global lv sys dysfx, severe diastolic dysfx, mod pulm htn ; eleonora with ef 24.5%   -eps eval noted, pt not candidate for icd   -no bb, acei/arb given hx of orthostatic bp   -continue midodrine for bp support   -cont vol removal w PD     # CAD, s/p CABG, s/p PCI, s/p redo open heart for stent balloon retrieval   -hst elevated, likely demand ischemia in setting of septic shock, ESRD   -c/w asa, Brilinta, statin     # ESRD  -on PD  -hyponatremia improved    -renal f/u    #Hypotension  -gentle ivf prn fluid boluses  -cont mido as ordered  -cont to trend     #AMS  -intermittent lethargy  -neuro psych f/u    #GOC  -palliative f/u     dvt ppx

## 2021-08-08 NOTE — PROGRESS NOTE ADULT - SUBJECTIVE AND OBJECTIVE BOX
CC: f/u for hypotension with leukocytosis    Patient reports nothing. Alert, refuses to speak by nodding "No". Denies pain or breathing issues    REVIEW OF SYSTEMS:  unobtainable - awake & in NAD - refuses to speak, cooperative to exam     Antimicrobials Day #  : 2  cefTRIAXone   IVPB 1000 milliGRAM(s) IV Intermittent every 24 hours    Other Medications Reviewed    T(F): 97.7 (08-08-21 @ 09:00), Max: 98.9 (08-07-21 @ 13:00)  HR: 63 (08-08-21 @ 09:00)  BP: 84/56 (08-08-21 @ 09:05)  RR: 18 (08-08-21 @ 09:00)  SpO2: 100% (08-08-21 @ 09:00)  Wt(kg): --    PHYSICAL EXAM:  General: alert, no acute distress  Eyes:  anicteric, no conjunctival injection, no discharge  Oropharynx: no lesions or injection 	  Neck: supple, without adenopathy  Lungs: clear to auscultation  Heart: regular rate and rhythm; no murmurs  Abdomen: soft, nondistended, nontender, without mass or organomegaly. PD catheter site is clean   Skin: no lesions  Extremities: no clubbing, cyanosis, or edema. Right thumb & right foot 2nd toe with dry gangrene  Neurologic: lethargic, difficult to be awaken, moves all extremities      LAB RESULTS:                        9.5    10.44 )-----------( 201      ( 08 Aug 2021 10:45 )             31.7     08-08    132<L>  |  92<L>  |  73<H>  ----------------------------<  160<H>  4.3   |  21<L>  |  11.32<H>    Ca    8.4      08 Aug 2021 10:45  Phos  5.3     08-07  Mg     2.3     08-07    TPro  6.2  /  Alb  2.0<L>  /  TBili  0.6  /  DBili  x   /  AST  19  /  ALT  11  /  AlkPhos  113  08-08    LIVER FUNCTIONS - ( 08 Aug 2021 10:45 )  Alb: 2.0 g/dL / Pro: 6.2 g/dL / ALK PHOS: 113 U/L / ALT: 11 U/L / AST: 19 U/L / GGT: x             MICROBIOLOGY:  RECENT CULTURES:      RADIOLOGY REVIEWED:  < from: Xray Chest 1 View- PORTABLE-Urgent (Xray Chest 1 View- PORTABLE-Urgent .) (08.07.21 @ 12:27) >  The lungs are clear    < end of copied text >

## 2021-08-08 NOTE — PROGRESS NOTE ADULT - SUBJECTIVE AND OBJECTIVE BOX
Patient is a 56y old  Male who presents with a chief complaint of Hypotension (08 Aug 2021 11:50)      SUBJECTIVE / OVERNIGHT EVENTS: No new complaints. Weak.  Review of Systems  chest pain no  palpitations no  sob no  nausea no  headache no    MEDICATIONS  (STANDING):  aspirin enteric coated 81 milliGRAM(s) Oral daily  atorvastatin 80 milliGRAM(s) Oral at bedtime  BACItracin   Ointment 1 Application(s) Topical two times a day  cadexomer iodine 0.9% Gel 1 Application(s) Topical daily  cefTRIAXone   IVPB 1000 milliGRAM(s) IV Intermittent every 24 hours  chlorhexidine 0.12% Liquid 15 milliLiter(s) Oral Mucosa two times a day  chlorhexidine 2% Cloths 1 Application(s) Topical <User Schedule>  chlorhexidine 2% Cloths 1 Application(s) Topical daily  dextrose 40% Gel 15 Gram(s) Oral once  dextrose 5%. 1000 milliLiter(s) (50 mL/Hr) IV Continuous <Continuous>  dextrose 5%. 1000 milliLiter(s) (100 mL/Hr) IV Continuous <Continuous>  dextrose 50% Injectable 25 Gram(s) IV Push once  dextrose 50% Injectable 12.5 Gram(s) IV Push once  dextrose 50% Injectable 25 Gram(s) IV Push once  dronabinol 5 milliGRAM(s) Oral two times a day  ferrous    sulfate 325 milliGRAM(s) Oral three times a day  fludroCORTISONE 0.1 milliGRAM(s) Oral daily  folic acid 1 milliGRAM(s) Oral daily  gentamicin 0.1% Ointment 1 Application(s) Topical <User Schedule>  glucagon  Injectable 1 milliGRAM(s) IntraMuscular once  heparin   Injectable 5000 Unit(s) SubCutaneous every 12 hours  insulin glargine Injectable (LANTUS) 5 Unit(s) SubCutaneous at bedtime  insulin lispro (ADMELOG) corrective regimen sliding scale   SubCutaneous three times a day before meals  insulin lispro (ADMELOG) corrective regimen sliding scale   SubCutaneous at bedtime  lidocaine   4% Patch 1 Patch Transdermal daily  lidocaine   4% Patch 1 Patch Transdermal daily  melatonin 5 milliGRAM(s) Oral at bedtime  midodrine 30 milliGRAM(s) Oral every 8 hours  Nephro-kristi 1 Tablet(s) Oral daily  pantoprazole  Injectable 40 milliGRAM(s) IV Push daily  sevelamer carbonate 1600 milliGRAM(s) Oral three times a day  sodium chloride 0.9%. 1000 milliLiter(s) (50 mL/Hr) IV Continuous <Continuous>  sodium chloride 0.9%. 1000 milliLiter(s) (50 mL/Hr) IV Continuous <Continuous>  thiamine IVPB 500 milliGRAM(s) IV Intermittent daily  ticagrelor 60 milliGRAM(s) Oral every 12 hours    MEDICATIONS  (PRN):  acetaminophen   Tablet .. 650 milliGRAM(s) Oral every 6 hours PRN Moderate Pain (4 - 6)  sodium chloride 0.65% Nasal 2 Spray(s) Both Nostrils three times a day PRN Nasal Congestion  valproate sodium IVPB 125 milliGRAM(s) IV Intermittent every 8 hours PRN agitation      Vital Signs Last 24 Hrs  T(C): 36.6 (08 Aug 2021 12:09), Max: 36.9 (07 Aug 2021 18:21)  T(F): 97.9 (08 Aug 2021 12:09), Max: 98.4 (07 Aug 2021 18:21)  HR: 81 (08 Aug 2021 12:09) (63 - 89)  BP: 88/60 (08 Aug 2021 12:09) (81/56 - 91/60)  BP(mean): --  RR: 18 (08 Aug 2021 12:09) (18 - 18)  SpO2: 100% (08 Aug 2021 12:09) (98% - 100%)    PHYSICAL EXAM:  GENERAL: NAD  HEAD:  Atraumatic, Normocephalic  EYES: EOMI, PERRLA, conjunctiva and sclera clear  NECK: Supple, No JVD  CHEST/LUNG: Clear to auscultation bilaterally; No wheeze  HEART: Regular rate and rhythm; No murmurs, rubs, or gallops  ABDOMEN: Soft, Nontender, Nondistended; Bowel sounds present PD catheter  EXTREMITIES:  R thumb with distal gangrene R 2nd toe with gangrene.   NEUROLOGY: non-focal  SKIN: No rashes or lesions    LABS:                        9.5    10.44 )-----------( 201      ( 08 Aug 2021 10:45 )             31.7     08-08    132<L>  |  92<L>  |  73<H>  ----------------------------<  160<H>  4.3   |  21<L>  |  11.32<H>    Ca    8.4      08 Aug 2021 10:45  Phos  5.3     08-07  Mg     2.3     08-07    TPro  6.2  /  Alb  2.0<L>  /  TBili  0.6  /  DBili  x   /  AST  19  /  ALT  11  /  AlkPhos  113  08-08                RADIOLOGY & ADDITIONAL TESTS:    Imaging Personally Reviewed:    Consultant(s) Notes Reviewed:      Care Discussed with Consultants/Other Providers:

## 2021-08-08 NOTE — PROGRESS NOTE ADULT - ASSESSMENT
56 m with    Sepsis  - Ceftriaxone  - ID follow   - toe gangrene  - Podiatry follow  - ID follow   - KLAUDIA noted     Epistaxis  - ENT evaluation    COPD  - 2L NC overnight because he becomes apneic, sats well  - Sats well on RA while awake    CAD s/p CABG   - Hypotension  - midodrine dose increased to 30 mg TID   - Previous echo with reduced EF  - c/w DAPT  - Cardiology follow    CHF cr systolic  - cardiology follow    AICD  - EP eval noted. Not a good candidate for AICD    Peritoneal Dialysis   - nephrology follow PD, recommend 4x a day  - patient on midodrine 30 q8h at home, continue here  - Holding home metoprolol 25 q12hr in setting of hypotension    Diabetes   - HA1C 6.1 on 7/6 suggesting prediabetes  - Tujeo 60 units at bedtime and Victoza at home, started on 30 units at bedtime, adjust as needed based on patient PO intake  - added 2 units insulin pre-meal    Hiccups  - GI follow  - hold Ativan 2 to sedation    Incontinence dermatitis  - wound care    Toe gangrene  - Podiatry follow  - Vascular follow  - PONCHO/PVR noted  - Angiogram if agrees    Finger gangrenous area  - local care    Vertebral fracture T12  - Ortho eval noted   - MRI spine noted  - No intervention    Confusion   - possible delirium  - Psych follow  - Neurology follow  - EEG noted    Dysphagia  - GI follow  - MBS  - ENT follow    functional quadriplegia   - supportive care     Palliative follow re GOC     DCP home in progress.     Pipe Beck MD pager [phone]

## 2021-08-08 NOTE — PROGRESS NOTE ADULT - ASSESSMENT
56y male with ESRD on dialysis via PD, DM,CAD s/p CABG, had cardiac stent placement in 2020, cath complicated by broken wire requiring sternotomy for wire retrieval, hospitalized twice after that in Dec for near syncope r/out sepsis & then in Jan for hypotension, fever, weak but no specific infection found. He has known to be poorly interactive & hypotensive at baseline in the 90/60s on Midodrine.   Readmitted on 7/06/21 with fever, hypotension, feeling weak, report of cough.   Required pressors x1 day in MICU, treated with cefepime from 7/06 - 7/11/21 for subtle infiltrates on CT chest.   MRI spine not consistent with infection.   MRI Brain: old lacunar basal ganglia infarcts  KLAUDIA: No evidence of valvular vegetation is seen, severe global left ventricular systolic dysfunction   7/10 & 7/14/21 - Blood cx finalized as no growth   Peritoneal fluid no growth   ID signed off on 7/20/21    He has continued to have episodes of hypotension, despite being on Midodrine, but as of 8/06/21 he also required fluid boluses  With rise in WBC & increasing lethargy, cx repeated & started on empiric antibiotics  Dry gangrene of right thumb & right 2nd - not infected  Today more alert, refuses to talk, cooperative to exam, withdrawn in NAD  CXR without infiltrate  WBC back to normal  Hypotension persists & suspect is from global ventricular dysfunction noted on TTE & not sepsis    PLAN:  Low suspicion for sepsis   Follow drawn blood cx to exclude occult infection  Limit Ceftriaxone to another 24 hrs / reassess the need for antibiotics.

## 2021-08-09 NOTE — PROGRESS NOTE ADULT - SUBJECTIVE AND OBJECTIVE BOX
SUBJECTIVE AND OBJECTIVE:  INTERVAL HPI/OVERNIGHT EVENTS:      DNR on chart: No   Allergies    doxazosin (Other)    Intolerances    MEDICATIONS  (STANDING):  aspirin enteric coated 81 milliGRAM(s) Oral daily  atorvastatin 80 milliGRAM(s) Oral at bedtime  BACItracin   Ointment 1 Application(s) Topical two times a day  cadexomer iodine 0.9% Gel 1 Application(s) Topical daily  cefTRIAXone   IVPB 1000 milliGRAM(s) IV Intermittent every 24 hours  chlorhexidine 0.12% Liquid 15 milliLiter(s) Oral Mucosa two times a day  chlorhexidine 2% Cloths 1 Application(s) Topical <User Schedule>  chlorhexidine 2% Cloths 1 Application(s) Topical daily  dextrose 40% Gel 15 Gram(s) Oral once  dextrose 5%. 1000 milliLiter(s) (100 mL/Hr) IV Continuous <Continuous>  dextrose 5%. 1000 milliLiter(s) (50 mL/Hr) IV Continuous <Continuous>  dextrose 50% Injectable 25 Gram(s) IV Push once  dextrose 50% Injectable 12.5 Gram(s) IV Push once  dextrose 50% Injectable 25 Gram(s) IV Push once  dronabinol 5 milliGRAM(s) Oral two times a day  ferrous    sulfate 325 milliGRAM(s) Oral three times a day  fludroCORTISONE 0.1 milliGRAM(s) Oral daily  folic acid 1 milliGRAM(s) Oral daily  gentamicin 0.1% Ointment 1 Application(s) Topical <User Schedule>  glucagon  Injectable 1 milliGRAM(s) IntraMuscular once  heparin   Injectable 5000 Unit(s) SubCutaneous every 12 hours  insulin glargine Injectable (LANTUS) 5 Unit(s) SubCutaneous at bedtime  insulin lispro (ADMELOG) corrective regimen sliding scale   SubCutaneous three times a day before meals  insulin lispro (ADMELOG) corrective regimen sliding scale   SubCutaneous at bedtime  lidocaine   4% Patch 1 Patch Transdermal daily  lidocaine   4% Patch 1 Patch Transdermal daily  melatonin 5 milliGRAM(s) Oral at bedtime  midodrine 30 milliGRAM(s) Oral every 8 hours  Nephro-kristi 1 Tablet(s) Oral daily  pantoprazole  Injectable 40 milliGRAM(s) IV Push daily  sevelamer carbonate 1600 milliGRAM(s) Oral three times a day  sodium chloride 0.9%. 1000 milliLiter(s) (60 mL/Hr) IV Continuous <Continuous>  sodium chloride 0.9%. 1000 milliLiter(s) (50 mL/Hr) IV Continuous <Continuous>  sodium chloride 0.9%. 1000 milliLiter(s) (50 mL/Hr) IV Continuous <Continuous>  thiamine IVPB 500 milliGRAM(s) IV Intermittent daily  ticagrelor 60 milliGRAM(s) Oral every 12 hours    MEDICATIONS  (PRN):  acetaminophen   Tablet .. 650 milliGRAM(s) Oral every 6 hours PRN Moderate Pain (4 - 6)  sodium chloride 0.65% Nasal 2 Spray(s) Both Nostrils three times a day PRN Nasal Congestion  valproate sodium IVPB 125 milliGRAM(s) IV Intermittent every 8 hours PRN agitation    ITEMS UNCHECKED ARE NOT PRESENT    PRESENT SYMPTOMS: [ ]Unable to obtain due to poor mentation   Source if other than patient:  [ ]Family   [ ]Team     Pain:  [ ]yes [x ]no  QOL impact -   Location -                    Aggravating factors -  Quality -  Radiation -  Timing-  Severity (0-10 scale):  Minimal acceptable level (0-10 scale):     Dyspnea:                           [ ]Mild [ ]Moderate [ ]Severe  Anxiety:                             [ ]Mild [ ]Moderate [ ]Severe  Fatigue:                             [ ]Mild [ ]Moderate [ ]Severe  Nausea:                             [ ]Mild [ ]Moderate [ ]Severe  Loss of appetite:              [ ]Mild [ ]Moderate [ ]Severe  Constipation:                    [ ]Mild [ ]Moderate [ ]Severe    CPOT:    https://www.[facility].org/getattachment/[email or web address]/Critical-Care-Pain-Observation-Tool-(CPOT)    PAIN AD Score:	  http://geriatrictoolkit.[place].[facility]/cog/painad.pdf (Ctrl + left click to view)    Other Symptoms:  [x ]All other review of systems negative     Palliative Performance Status Version 2:   40      %      http://npcrc.org/files/news/palliative_performance_scale_ppsv2.pdf  PHYSICAL EXAM:  Vital Signs Last 24 Hrs  T(C): 36.8 (09 Aug 2021 12:51), Max: 36.8 (09 Aug 2021 12:51)  T(F): 98.2 (09 Aug 2021 12:51), Max: 98.2 (09 Aug 2021 12:51)  HR: 96 (09 Aug 2021 15:27) (61 - 96)  BP: 97/66 (09 Aug 2021 15:27) (85/65 - 97/66)  BP(mean): --  RR: 18 (09 Aug 2021 12:51) (18 - 18)  SpO2: 98% (09 Aug 2021 12:51) (98% - 100%)     GENERAL:  [ x]Alert  [x ]Oriented x  2 [ ]Lethargic  [ ]Cachexia  [ ]Unarousable  [x ]Verbal  [ ]Non-Verbal  Behavioral:   [ ]Anxiety  [ ]Delirium [ ]Agitation [ ]Other  HEENT:  [ ]Normal   [ ]Dry mouth   [ ]ET Tube/Trach  [ ]Oral lesions  PULMONARY:   [x]Clear [ ]Tachypnea  [ ]Audible excessive secretions   [ ]Rhonchi        [ ]Right [ ]Left [ ]Bilateral  [ ]Crackles        [ ]Right [ ]Left [ ]Bilateral  [ ]Wheezing     [ ]Right [ ]Left [ ]Bilateral  [ ]Diminished BS [ ] Right [ ]Left [ ]Bilateral  CARDIOVASCULAR:    [x ]Regular [ ]Irregular [ ]Tachy  [ ]Dhaval [ ]Murmur [ ]Other  GASTROINTESTINAL:  [x ]Soft  [ ]Distended   [x ]+BS  [ ]Non tender [ ]Tender  [ ]PEG [ ]OGT/ NGT   Last BM:  Tunneled catheter.     GENITOURINARY:  [ ]Normal [ ]Incontinent   [x ]Oliguria/Anuria   [ ]Johnson  MUSCULOSKELETAL:   [ ]Normal   [x ]Weakness  [ ]Bed/Wheelchair bound [ ]Edema  NEUROLOGIC:   [x ]No focal deficits  [ ] Cognitive impairment  [ ] Dysphagia [ ]Dysarthria [ ] Paresis [ ]Other   SKIN:   [x ]Normal  [ ]Rash   [ ]Pressure ulcer(s) [ ]y [ ]n present on admission    CRITICAL CARE:  [ ]Shock Present  [ ]Septic [ ]Cardiogenic [ ]Neurologic [ ]Hypovolemic  [ ]Vasopressors [ ]Inotropes  [ ]Respiratory failure present [ ]Mechanical Ventilation [ ]Non-invasive ventilatory support [ ]High-Flow   [ ]Acute  [ ]Chronic [ ]Hypoxic  [ ]Hypercarbic [ ]Other  [ ]Other organ failure     LABS:                                   9.5    10.44 )-----------( 201      ( 08 Aug 2021 10:45 )             31.7   08-09    129<L>  |  90<L>  |  76<H>  ----------------------------<  113<H>  5.4<H>   |  20<L>  |  12.04<H>    Ca    8.5      09 Aug 2021 06:12    TPro  6.2  /  Alb  2.0<L>  /  TBili  0.6  /  DBili  x   /  AST  19  /  ALT  11  /  AlkPhos  113  08-08        RADIOLOGY & ADDITIONAL STUDIES: Reviewed.     Protein Calorie Malnutrition Present: [ ]mild [ ]moderate [ ]severe [ ]underweight [ ]morbid obesity  https://www.andeal.org/vault/2440/web/files/ONC/Table_Clinical%20Characteristics%20to%20Document%20Malnutrition-White%20JV%20et%20al%202012.pdf    Height (cm): 170.2 (07-15-21 @ 15:14), 170.2 (12-29-20 @ 13:29), 170.2 (12-14-20 @ 09:52)  Weight (kg): 63.5 (07-15-21 @ 15:14), 81.6 (12-29-20 @ 13:29), 76.2 (12-14-20 @ 09:52)  BMI (kg/m2): 21.9 (07-15-21 @ 15:14), 28.2 (12-29-20 @ 13:29), 26.3 (12-14-20 @ 09:52)    [ ]PPSV2 < or = 30%  [ ]significant weight loss [ ]poor nutritional intake [ ]anasarca    [ ]Artificial Nutrition    REFERRALS:   [ ]Chaplaincy  [ ]Hospice  [ ]Child Life  [ ]Social Work  [ ]Case management [ ]Holistic Therapy     Goals of Care Document:     language line [facility] translators ID [id number] and [id number]  SUBJECTIVE AND OBJECTIVE:  INTERVAL HPI/OVERNIGHT EVENTS:  The patient denied any pain or dyspnea. He was c/o weakness. Not able to appropriately tolerate PD due to hypotension.    DNR on chart: No   Allergies    doxazosin (Other)    Intolerances    MEDICATIONS  (STANDING):  aspirin enteric coated 81 milliGRAM(s) Oral daily  atorvastatin 80 milliGRAM(s) Oral at bedtime  BACItracin   Ointment 1 Application(s) Topical two times a day  cadexomer iodine 0.9% Gel 1 Application(s) Topical daily  cefTRIAXone   IVPB 1000 milliGRAM(s) IV Intermittent every 24 hours  chlorhexidine 0.12% Liquid 15 milliLiter(s) Oral Mucosa two times a day  chlorhexidine 2% Cloths 1 Application(s) Topical <User Schedule>  chlorhexidine 2% Cloths 1 Application(s) Topical daily  dextrose 40% Gel 15 Gram(s) Oral once  dextrose 5%. 1000 milliLiter(s) (100 mL/Hr) IV Continuous <Continuous>  dextrose 5%. 1000 milliLiter(s) (50 mL/Hr) IV Continuous <Continuous>  dextrose 50% Injectable 25 Gram(s) IV Push once  dextrose 50% Injectable 12.5 Gram(s) IV Push once  dextrose 50% Injectable 25 Gram(s) IV Push once  dronabinol 5 milliGRAM(s) Oral two times a day  ferrous    sulfate 325 milliGRAM(s) Oral three times a day  fludroCORTISONE 0.1 milliGRAM(s) Oral daily  folic acid 1 milliGRAM(s) Oral daily  gentamicin 0.1% Ointment 1 Application(s) Topical <User Schedule>  glucagon  Injectable 1 milliGRAM(s) IntraMuscular once  heparin   Injectable 5000 Unit(s) SubCutaneous every 12 hours  insulin glargine Injectable (LANTUS) 5 Unit(s) SubCutaneous at bedtime  insulin lispro (ADMELOG) corrective regimen sliding scale   SubCutaneous three times a day before meals  insulin lispro (ADMELOG) corrective regimen sliding scale   SubCutaneous at bedtime  lidocaine   4% Patch 1 Patch Transdermal daily  lidocaine   4% Patch 1 Patch Transdermal daily  melatonin 5 milliGRAM(s) Oral at bedtime  midodrine 30 milliGRAM(s) Oral every 8 hours  Nephro-kristi 1 Tablet(s) Oral daily  pantoprazole  Injectable 40 milliGRAM(s) IV Push daily  sevelamer carbonate 1600 milliGRAM(s) Oral three times a day  sodium chloride 0.9%. 1000 milliLiter(s) (60 mL/Hr) IV Continuous <Continuous>  sodium chloride 0.9%. 1000 milliLiter(s) (50 mL/Hr) IV Continuous <Continuous>  sodium chloride 0.9%. 1000 milliLiter(s) (50 mL/Hr) IV Continuous <Continuous>  thiamine IVPB 500 milliGRAM(s) IV Intermittent daily  ticagrelor 60 milliGRAM(s) Oral every 12 hours    MEDICATIONS  (PRN):  acetaminophen   Tablet .. 650 milliGRAM(s) Oral every 6 hours PRN Moderate Pain (4 - 6)  sodium chloride 0.65% Nasal 2 Spray(s) Both Nostrils three times a day PRN Nasal Congestion  valproate sodium IVPB 125 milliGRAM(s) IV Intermittent every 8 hours PRN agitation    ITEMS UNCHECKED ARE NOT PRESENT    PRESENT SYMPTOMS: [ ]Unable to obtain due to poor mentation   Source if other than patient:  [ ]Family   [ ]Team     Pain:  [ ]yes [x ]no  QOL impact -   Location -                    Aggravating factors -  Quality -  Radiation -  Timing-  Severity (0-10 scale):  Minimal acceptable level (0-10 scale):     Dyspnea:                           [ ]Mild [ ]Moderate [ ]Severe  Anxiety:                             [ ]Mild [ ]Moderate [ ]Severe  Fatigue:                             [ ]Mild [ ]Moderate [ ]Severe  Nausea:                             [ ]Mild [ ]Moderate [ ]Severe  Loss of appetite:              [ ]Mild [ ]Moderate [ ]Severe  Constipation:                    [ ]Mild [ ]Moderate [ ]Severe    CPOT:    https://www.[facility].org/getattachment/[email or web address]/Critical-Care-Pain-Observation-Tool-(CPOT)    PAIN AD Score:	  http://geriatrictoolkit.[place].[facility]/cog/painad.pdf (Ctrl + left click to view)    Other Symptoms:  [x ]All other review of systems negative     Palliative Performance Status Version 2:   40      %      http://[facility].org/files/news/palliative_performance_scale_[email or web address].pdf  PHYSICAL EXAM:  Vital Signs Last 24 Hrs  T(C): 36.8 (09 Aug 2021 12:51), Max: 36.8 (09 Aug 2021 12:51)  T(F): 98.2 (09 Aug 2021 12:51), Max: 98.2 (09 Aug 2021 12:51)  HR: 96 (09 Aug 2021 15:27) (61 - 96)  BP: 97/66 (09 Aug 2021 15:27) (85/65 - 97/66)  BP(mean): --  RR: 18 (09 Aug 2021 12:51) (18 - 18)  SpO2: 98% (09 Aug 2021 12:51) (98% - 100%)     GENERAL:  [ x]Alert  [x ]Oriented x  2 [ ]Lethargic  [ ]Cachexia  [ ]Unarousable  [x ]Verbal  [ ]Non-Verbal  Behavioral:   [ ]Anxiety  [ ]Delirium [ ]Agitation [ ]Other  HEENT:  [ ]Normal   [ ]Dry mouth   [ ]ET Tube/Trach  [ ]Oral lesions  PULMONARY:   [x]Clear [ ]Tachypnea  [ ]Audible excessive secretions   [ ]Rhonchi        [ ]Right [ ]Left [ ]Bilateral  [ ]Crackles        [ ]Right [ ]Left [ ]Bilateral  [ ]Wheezing     [ ]Right [ ]Left [ ]Bilateral  [ ]Diminished BS [ ] Right [ ]Left [ ]Bilateral  CARDIOVASCULAR:    [x ]Regular [ ]Irregular [ ]Tachy  [ ]Dhaval [ ]Murmur [ ]Other  GASTROINTESTINAL:  [x ]Soft  [ ]Distended   [x ]+BS  [ ]Non tender [ ]Tender  [ ]PEG [ ]OGT/ NGT   Last BM:  Tunneled catheter.     GENITOURINARY:  [ ]Normal [ ]Incontinent   [x ]Oliguria/Anuria   [ ]Johnson  MUSCULOSKELETAL:   [ ]Normal   [x ]Weakness  [ ]Bed/Wheelchair bound [ ]Edema  NEUROLOGIC:   [x ]No focal deficits  [ ] Cognitive impairment  [ ] Dysphagia [ ]Dysarthria [ ] Paresis [ ]Other   SKIN:   [x ]Normal  [ ]Rash   [ ]Pressure ulcer(s) [ ]y [ ]n present on admission    CRITICAL CARE:  [ ]Shock Present  [ ]Septic [ ]Cardiogenic [ ]Neurologic [ ]Hypovolemic  [ ]Vasopressors [ ]Inotropes  [ ]Respiratory failure present [ ]Mechanical Ventilation [ ]Non-invasive ventilatory support [ ]High-Flow   [ ]Acute  [ ]Chronic [ ]Hypoxic  [ ]Hypercarbic [ ]Other  [ ]Other organ failure     LABS:                                   9.5    10.44 )-----------( 201      ( 08 Aug 2021 10:45 )             31.7   08-09    129<L>  |  90<L>  |  76<H>  ----------------------------<  113<H>  5.4<H>   |  20<L>  |  12.04<H>    Ca    8.5      09 Aug 2021 06:12    TPro  6.2  /  Alb  2.0<L>  /  TBili  0.6  /  DBili  x   /  AST  19  /  ALT  11  /  AlkPhos  113  08-08        RADIOLOGY & ADDITIONAL STUDIES: Reviewed.     Protein Calorie Malnutrition Present: [ ]mild [ ]moderate [ ]severe [ ]underweight [ ]morbid obesity  https://www.andeal.org/vault/2440/web/files/ONC/Table_Clinical%20Characteristics%20to%20Document%20Malnutrition-White%20JV%20et%20al%569882.pdf    Height (cm): 170.2 (07-15-21 @ 15:14), 170.2 (12-29-20 @ 13:29), 170.2 (12-14-20 @ 09:52)  Weight (kg): 63.5 (07-15-21 @ 15:14), 81.6 (12-29-20 @ 13:29), 76.2 (12-14-20 @ 09:52)  BMI (kg/m2): 21.9 (07-15-21 @ 15:14), 28.2 (12-29-20 @ 13:29), 26.3 (12-14-20 @ 09:52)    [ ]PPSV2 < or = 30%  [ ]significant weight loss [ ]poor nutritional intake [ ]anasarca    [ ]Artificial Nutrition    REFERRALS:   [ ]Chaplaincy  [ ]Hospice  [ ]Child Life  [ ]Social Work  [ ]Case management [ ]Holistic Therapy     Goals of Care Document:     language line [facility] translators ID [id number] and [id number]  SUBJECTIVE AND OBJECTIVE:  INTERVAL HPI/OVERNIGHT EVENTS:  The patient denied any pain or dyspnea. He was c/o weakness. Not able to appropriately tolerate PD due to hypotension.    DNR on chart: No   Allergies    doxazosin (Other)    Intolerances    MEDICATIONS  (STANDING):  aspirin enteric coated 81 milliGRAM(s) Oral daily  atorvastatin 80 milliGRAM(s) Oral at bedtime  BACItracin   Ointment 1 Application(s) Topical two times a day  cadexomer iodine 0.9% Gel 1 Application(s) Topical daily  cefTRIAXone   IVPB 1000 milliGRAM(s) IV Intermittent every 24 hours  chlorhexidine 0.12% Liquid 15 milliLiter(s) Oral Mucosa two times a day  chlorhexidine 2% Cloths 1 Application(s) Topical <User Schedule>  chlorhexidine 2% Cloths 1 Application(s) Topical daily  dextrose 40% Gel 15 Gram(s) Oral once  dextrose 5%. 1000 milliLiter(s) (100 mL/Hr) IV Continuous <Continuous>  dextrose 5%. 1000 milliLiter(s) (50 mL/Hr) IV Continuous <Continuous>  dextrose 50% Injectable 25 Gram(s) IV Push once  dextrose 50% Injectable 12.5 Gram(s) IV Push once  dextrose 50% Injectable 25 Gram(s) IV Push once  dronabinol 5 milliGRAM(s) Oral two times a day  ferrous    sulfate 325 milliGRAM(s) Oral three times a day  fludroCORTISONE 0.1 milliGRAM(s) Oral daily  folic acid 1 milliGRAM(s) Oral daily  gentamicin 0.1% Ointment 1 Application(s) Topical <User Schedule>  glucagon  Injectable 1 milliGRAM(s) IntraMuscular once  heparin   Injectable 5000 Unit(s) SubCutaneous every 12 hours  insulin glargine Injectable (LANTUS) 5 Unit(s) SubCutaneous at bedtime  insulin lispro (ADMELOG) corrective regimen sliding scale   SubCutaneous three times a day before meals  insulin lispro (ADMELOG) corrective regimen sliding scale   SubCutaneous at bedtime  lidocaine   4% Patch 1 Patch Transdermal daily  lidocaine   4% Patch 1 Patch Transdermal daily  melatonin 5 milliGRAM(s) Oral at bedtime  midodrine 30 milliGRAM(s) Oral every 8 hours  Nephro-kristi 1 Tablet(s) Oral daily  pantoprazole  Injectable 40 milliGRAM(s) IV Push daily  sevelamer carbonate 1600 milliGRAM(s) Oral three times a day  sodium chloride 0.9%. 1000 milliLiter(s) (60 mL/Hr) IV Continuous <Continuous>  sodium chloride 0.9%. 1000 milliLiter(s) (50 mL/Hr) IV Continuous <Continuous>  sodium chloride 0.9%. 1000 milliLiter(s) (50 mL/Hr) IV Continuous <Continuous>  thiamine IVPB 500 milliGRAM(s) IV Intermittent daily  ticagrelor 60 milliGRAM(s) Oral every 12 hours    MEDICATIONS  (PRN):  acetaminophen   Tablet .. 650 milliGRAM(s) Oral every 6 hours PRN Moderate Pain (4 - 6)  sodium chloride 0.65% Nasal 2 Spray(s) Both Nostrils three times a day PRN Nasal Congestion  valproate sodium IVPB 125 milliGRAM(s) IV Intermittent every 8 hours PRN agitation    ITEMS UNCHECKED ARE NOT PRESENT    PRESENT SYMPTOMS: [ ]Unable to obtain due to poor mentation   Source if other than patient:  [ ]Family   [ ]Team     Pain:  [ ]yes [x ]no  QOL impact -   Location -                    Aggravating factors -  Quality -  Radiation -  Timing-  Severity (0-10 scale):  Minimal acceptable level (0-10 scale):     Dyspnea:                           [ ]Mild [ ]Moderate [ ]Severe  Anxiety:                             [ ]Mild [ ]Moderate [ ]Severe  Fatigue:                             [ ]Mild [ ]Moderate [ ]Severe  Nausea:                             [ ]Mild [ ]Moderate [ ]Severe  Loss of appetite:              [ ]Mild [ ]Moderate [ ]Severe  Constipation:                    [ ]Mild [ ]Moderate [ ]Severe    CPOT:    https://www.[facility].org/getattachment/[email or web address]/Critical-Care-Pain-Observation-Tool-(CPOT)    PAIN AD Score:	  http://geriatrictoolkit.[place].[facility]/cog/painad.pdf (Ctrl + left click to view)    Other Symptoms:  [x ]All other review of systems negative     Palliative Performance Status Version 2:   40      %      http://[facility].org/files/news/palliative_performance_scale_ppsv2.pdf  PHYSICAL EXAM:  Vital Signs Last 24 Hrs  T(C): 36.8 (09 Aug 2021 12:51), Max: 36.8 (09 Aug 2021 12:51)  T(F): 98.2 (09 Aug 2021 12:51), Max: 98.2 (09 Aug 2021 12:51)  HR: 96 (09 Aug 2021 15:27) (61 - 96)  BP: 97/66 (09 Aug 2021 15:27) (85/65 - 97/66)  BP(mean): --  RR: 18 (09 Aug 2021 12:51) (18 - 18)  SpO2: 98% (09 Aug 2021 12:51) (98% - 100%)     GENERAL:  [ x]Alert  [x ]Oriented x  2 [ ]Lethargic  [ ]Cachexia  [ ]Unarousable  [x ]Verbal  [ ]Non-Verbal  Behavioral:   [ ]Anxiety  [ ]Delirium [ ]Agitation [ ]Other  HEENT:  [ ]Normal   [ ]Dry mouth   [ ]ET Tube/Trach  [ ]Oral lesions  PULMONARY:   [x]Clear [ ]Tachypnea  [ ]Audible excessive secretions   [ ]Rhonchi        [ ]Right [ ]Left [ ]Bilateral  [ ]Crackles        [ ]Right [ ]Left [ ]Bilateral  [ ]Wheezing     [ ]Right [ ]Left [ ]Bilateral  [ ]Diminished BS [ ] Right [ ]Left [ ]Bilateral  CARDIOVASCULAR:    [x ]Regular [ ]Irregular [ ]Tachy  [ ]Dhaval [ ]Murmur [ ]Other  GASTROINTESTINAL:  [x ]Soft  [ ]Distended   [x ]+BS  [ ]Non tender [ ]Tender  [ ]PEG [ ]OGT/ NGT   Last BM: 8/9  Tunneled catheter.     GENITOURINARY:  [ ]Normal [ ]Incontinent   [x ]Oliguria/Anuria   [ ]Johnson  MUSCULOSKELETAL:   [ ]Normal   [x ]Weakness  [ ]Bed/Wheelchair bound [ ]Edema  NEUROLOGIC:   [x ]No focal deficits  [ ] Cognitive impairment  [ ] Dysphagia [ ]Dysarthria [ ] Paresis [ ]Other   SKIN:   [x ]Normal  [ ]Rash   [ ]Pressure ulcer(s) [ ]y [ ]n present on admission    CRITICAL CARE:  [ ]Shock Present  [ ]Septic [ ]Cardiogenic [ ]Neurologic [ ]Hypovolemic  [ ]Vasopressors [ ]Inotropes  [ ]Respiratory failure present [ ]Mechanical Ventilation [ ]Non-invasive ventilatory support [ ]High-Flow   [ ]Acute  [ ]Chronic [ ]Hypoxic  [ ]Hypercarbic [ ]Other  [ ]Other organ failure     LABS:                                   9.5    10.44 )-----------( 201      ( 08 Aug 2021 10:45 )             31.7   08-09    129<L>  |  90<L>  |  76<H>  ----------------------------<  113<H>  5.4<H>   |  20<L>  |  12.04<H>    Ca    8.5      09 Aug 2021 06:12    TPro  6.2  /  Alb  2.0<L>  /  TBili  0.6  /  DBili  x   /  AST  19  /  ALT  11  /  AlkPhos  113  08-08        RADIOLOGY & ADDITIONAL STUDIES: Reviewed.     Protein Calorie Malnutrition Present: [ ]mild [ ]moderate [ ]severe [ ]underweight [ ]morbid obesity  https://www.andeal.org/vault/2440/web/files/ONC/Table_Clinical%20Characteristics%20to%20Document%20Malnutrition-White%20JV%20et%20al%202012.pdf    Height (cm): 170.2 (07-15-21 @ 15:14), 170.2 (12-29-20 @ 13:29), 170.2 (12-14-20 @ 09:52)  Weight (kg): 63.5 (07-15-21 @ 15:14), 81.6 (12-29-20 @ 13:29), 76.2 (12-14-20 @ 09:52)  BMI (kg/m2): 21.9 (07-15-21 @ 15:14), 28.2 (12-29-20 @ 13:29), 26.3 (12-14-20 @ 09:52)    [ ]PPSV2 < or = 30%  [ ]significant weight loss [ ]poor nutritional intake [ ]anasarca    [ ]Artificial Nutrition    REFERRALS:   [ ]Chaplaincy  [ ]Hospice  [ ]Child Life  [ ]Social Work  [ ]Case management [ ]Holistic Therapy     Goals of Care Document:

## 2021-08-09 NOTE — PROGRESS NOTE ADULT - PROBLEM SELECTOR PLAN 3
Continue Marinol. .   Wife and patient (as per wife) are not looking for a PEG. Nor he appears to be a candidate due to PD

## 2021-08-09 NOTE — PROGRESS NOTE ADULT - PROBLEM SELECTOR PLAN 2
d/w Dr. Dale that indicated the patient's main issues is her poor caloric intake which is driving him to be hypotensive and therefore not being able to tolerate PD. Dr. Dale indicated he will continue to offer PD as along as the patient is able to tolerate it.   [facility] refused this patient's case since he was on PD. Not a candidate for advanced therapies. Not even for AICD.

## 2021-08-09 NOTE — CHART NOTE - NSCHARTNOTEFT_GEN_A_CORE
Nutrition Follow Up Note  Patient seen for: malnutrition follow up.     Chart reviewed, events noted. Pt is a 56 M PMH ESRD on PD, DM on insulin, CHF EF 25-30% CAD s/p CABG x4 pw with hypotension with fever. Encephalopathy likely related to cefepime ? no obvious source of sepsis apparent as of now, KLAUDIA: Overall thickened valves seen however, no evidence of valvular vegetation is seen.   - PD on hold at this time for hypotension.    Source: [] Patient       [x] EMR        [x] RN/RN Manager        [x] Family at bedside       [] Other:    -If unable to interview patient: [] Trach/Vent/BiPAP  [] Disoriented/confused/inappropriate to interview    Diet Order:   Diet, Renal Restrictions:   For patients receiving Renal Replacement - No Protein Restr, No Conc K, No Conc Phos, Low Sodium  Dysphagia 1, Pureed, Thin Liquids ([id number])  Supplement Feeding Modality:  Oral  Glucerna Shake Cans or Servings Per Day:  1       Frequency:  Daily (21)    - Is current order appropriate/adequate? [x] Yes  []  No:     - PO intake :   [] >75%  Adequate    [] 50-75%  Fair       [x] <50%  Poor    - Nutrition-related concerns:  - Family at bedside; per pt's wife pt dislikes institutional foods as well as textures/consistencies (most recent SLP evaluation ). Poor PO intake, reportedly consuming only fruit from home. Pt apparently asking for pigs feet and other cultural foods. Family asking for items outside of currently ordered dysphagia diet - encouraged adherence for patient safety, RN/RN manager aware.  - Pt started on Marinol 5mg BID 2/2 poor PO. Per most recent Nephrology note PEG not within pt's GOC.  - Pt on Nephro-kristi, folic acid, thiamine and ferrous sulfate supplementation in house    GI:  Last BM .   Bowel Regimen? [] Yes   [x] No    Weights: Pt 70.1kg on admission ()  Daily Weight in k.3 (-), Weight in k.2 (), Weight in k.6 (), Weight in k.7 (), Weight in k.6 (), Weight in k.8 (), Weight in k.1 ()  lowest weight in house 62.2kg suggesting ~7.9kg weight loss x 1 month (11%)    Nutritionally Pertinent MEDICATIONS  (STANDING):  atorvastatin  cefTRIAXone   IVPB  dextrose 40% Gel  dextrose 5%.  dextrose 5%.  dextrose 50% Injectable  dextrose 50% Injectable  dextrose 50% Injectable  ferrous    sulfate  fludroCORTISONE  folic acid  glucagon  Injectable  insulin glargine Injectable (LANTUS)  insulin lispro (ADMELOG) corrective regimen sliding scale  insulin lispro (ADMELOG) corrective regimen sliding scale  midodrine  Nephro-kristi  pantoprazole  Injectable  sodium chloride 0.9%.  sodium chloride 0.9%.  sodium chloride 0.9%.  thiamine IVPB    Pertinent Labs:  @ 06:12: Na 129<L>, BUN 76<H>, Cr 12.04<H>, <H>, K+ 5.4<H>, Phos --, Mg --, Alk Phos --, ALT/SGPT --, AST/SGOT --, HbA1c --    A1C with Estimated Average Glucose Result: 6.1 % (21 @ 03:53)    Finger Sticks:  POCT Blood Glucose.: 182 mg/dL ( @ 13:02)  POCT Blood Glucose.: 165 mg/dL ( @ 08:27)  POCT Blood Glucose.: 242 mg/dL ( @ 21:27)  POCT Blood Glucose.: 276 mg/dL ( @ 17:33)    Skin per nursing documentation: Right buttock DTI, Left buttock DTI, Right medial Heel DTI, stage II left knee, DTI right lateral heel  Edema: No noted edema as per flow sheets.     Estimated Needs:   [x] recalculated: based on lowest weight in house 62.2kg  Estimated Energy Needs: 1866-2177kcal (30-35kcal/kg)  Estimated Protein Needs: 87-99g protein (1.4-1.6g/kg  Defer fluid needs to team.     Previous Nutrition Diagnosis: Moderate Malnutrition   Nutrition Diagnosis is: [x] ongoing  [] resolved [] not applicable     New Nutrition Diagnosis: [x] Severe chronic malnutrition related to persistent inadequate protein energy intake in setting of increased physiological demand for nutrients 2/2 RRT as evidenced by pt meeting <75% EER > 1 month and 11% weight loss x 1 month    Nutrition Care Plan:  [x] In Progress  [] Achieved  [] Not applicable    Nutrition Interventions:    Recommendations:      1. If feasible consider diet liberalization to regular to promote PO intake in setting of severe malnutrition. Recommend updated SLP evaluation to re-assess swallowing capabilities.  2. Discontinue Glucerna, trial Nepro 2x daily (850 kcals, 38 g protein) and monitor for intake/tolerance. Provide encouragement with supplement at meal times.  3. Continue micronutrient supplementation as currently ordered unless otherwise contraindicated.  4. Continue to address GOC pertaining to nutrition - consider alternate routes of nutrition if within pt's GOC.   5. Marinol per team.     Monitoring and Evaluation:   Continue to monitor nutritional intake, tolerance to diet prescription, weights, labs, skin integrity    RD remains available upon request and will follow up per protocol    Sudha Santamaria MS, RD, CDN Pager# [phone]

## 2021-08-09 NOTE — PROGRESS NOTE ADULT - SUBJECTIVE AND OBJECTIVE BOX
CARDIOLOGY FOLLOW UP - Dr. Best  DATE OF SERVICE: 8/9/21      no acute events   family at bedside   hypotension noted       REVIEW OF SYSTEMS:  CONSTITUTIONAL: No fever, weight loss, or fatigue  RESPIRATORY: No cough, wheezing, chills or hemoptysis; No Shortness of Breath  CARDIOVASCULAR: No chest pain, palpitations, passing out, dizziness, or leg swelling  GASTROINTESTINAL: No abdominal or epigastric pain. No nausea, vomiting, or hematemesis; No diarrhea or constipation. No melena or hematochezia.  VASCULAR: No edema     PHYSICAL EXAM:  T(C): 36.8 (08-09-21 @ 12:51), Max: 36.8 (08-09-21 @ 12:51)  HR: 61 (08-09-21 @ 12:51) (61 - 84)  BP: 85/65 (08-09-21 @ 12:51) (85/65 - 92/60)  RR: 18 (08-09-21 @ 12:51) (18 - 18)  SpO2: 98% (08-09-21 @ 12:51) (98% - 100%)  Wt(kg): --  I&O's Summary    08 Aug 2021 07:01  -  09 Aug 2021 07:00  --------------------------------------------------------  IN: 320 mL / OUT: 0 mL / NET: 320 mL        Appearance: Normal	  Cardiovascular: Normal S1 S2,RRR, No JVD, No murmurs  Respiratory: Lungs clear to auscultation	  Gastrointestinal:  Soft, Non-tender, + BS	  Extremities: Normal range of motion, No clubbing, cyanosis or edema      Home Medications:  aspirin 81 mg oral delayed release tablet: 1 tab(s) orally once a day (29 Dec 2020 18:37)  atorvastatin 80 mg oral tablet: 1 tab(s) orally once a day (at bedtime) (29 Dec 2020 18:37)  epoetin martine: injectable once a month (29 Dec 2020 18:37)  ferrous sulfate 325 mg (65 mg elemental iron) oral tablet: 1 tab(s) orally 3 times a day (29 Dec 2020 18:37)  gabapentin 100 mg oral capsule: 1 cap(s) orally once a day (29 Dec 2020 18:37)  nortriptyline 25 mg oral capsule: 1 cap(s) orally once a day (at bedtime) (29 Dec 2020 18:37)  pantoprazole 40 mg oral delayed release tablet: 1 tab(s) orally once a day (before a meal) (29 Dec 2020 18:37)  Toujeo SoloStar 300 units/mL subcutaneous solution: 60 unit(s) subcutaneous once a day (at bedtime) (08 Jan 2021 12:41)      MEDICATIONS  (STANDING):  aspirin enteric coated 81 milliGRAM(s) Oral daily  atorvastatin 80 milliGRAM(s) Oral at bedtime  BACItracin   Ointment 1 Application(s) Topical two times a day  cadexomer iodine 0.9% Gel 1 Application(s) Topical daily  cefTRIAXone   IVPB 1000 milliGRAM(s) IV Intermittent every 24 hours  chlorhexidine 0.12% Liquid 15 milliLiter(s) Oral Mucosa two times a day  chlorhexidine 2% Cloths 1 Application(s) Topical <User Schedule>  chlorhexidine 2% Cloths 1 Application(s) Topical daily  dextrose 40% Gel 15 Gram(s) Oral once  dextrose 5%. 1000 milliLiter(s) (50 mL/Hr) IV Continuous <Continuous>  dextrose 5%. 1000 milliLiter(s) (100 mL/Hr) IV Continuous <Continuous>  dextrose 50% Injectable 25 Gram(s) IV Push once  dextrose 50% Injectable 12.5 Gram(s) IV Push once  dextrose 50% Injectable 25 Gram(s) IV Push once  dronabinol 5 milliGRAM(s) Oral two times a day  ferrous    sulfate 325 milliGRAM(s) Oral three times a day  fludroCORTISONE 0.1 milliGRAM(s) Oral daily  folic acid 1 milliGRAM(s) Oral daily  gentamicin 0.1% Ointment 1 Application(s) Topical <User Schedule>  glucagon  Injectable 1 milliGRAM(s) IntraMuscular once  heparin   Injectable 5000 Unit(s) SubCutaneous every 12 hours  insulin glargine Injectable (LANTUS) 5 Unit(s) SubCutaneous at bedtime  insulin lispro (ADMELOG) corrective regimen sliding scale   SubCutaneous three times a day before meals  insulin lispro (ADMELOG) corrective regimen sliding scale   SubCutaneous at bedtime  lidocaine   4% Patch 1 Patch Transdermal daily  lidocaine   4% Patch 1 Patch Transdermal daily  melatonin 5 milliGRAM(s) Oral at bedtime  midodrine 30 milliGRAM(s) Oral every 8 hours  Nephro-kristi 1 Tablet(s) Oral daily  pantoprazole  Injectable 40 milliGRAM(s) IV Push daily  sevelamer carbonate 1600 milliGRAM(s) Oral three times a day  sodium chloride 0.9%. 1000 milliLiter(s) (60 mL/Hr) IV Continuous <Continuous>  sodium chloride 0.9%. 1000 milliLiter(s) (50 mL/Hr) IV Continuous <Continuous>  sodium chloride 0.9%. 1000 milliLiter(s) (50 mL/Hr) IV Continuous <Continuous>  thiamine IVPB 500 milliGRAM(s) IV Intermittent daily  ticagrelor 60 milliGRAM(s) Oral every 12 hours      TELEMETRY: 	    ECG:  	  RADIOLOGY:   DIAGNOSTIC TESTING:  [ ] Echocardiogram:  [ ]  Catheterization:  [ ] Stress Test:    OTHER: 	    LABS:	 	                            9.5    10.44 )-----------( 201      ( 08 Aug 2021 10:45 )             31.7     08-09    129<L>  |  90<L>  |  76<H>  ----------------------------<  113<H>  5.4<H>   |  20<L>  |  12.04<H>    Ca    8.5      09 Aug 2021 06:12    TPro  6.2  /  Alb  2.0<L>  /  TBili  0.6  /  DBili  x   /  AST  19  /  ALT  11  /  AlkPhos  113  08-08

## 2021-08-09 NOTE — PROGRESS NOTE ADULT - ASSESSMENT
ASSESSMENT/PLAN  56 M PMH ESRD on PD, DM on insulin, CHF EF 25-30% CAD s/p CABG x4 pw with hypotension with fever. Encephalopathy likely related to cefepime ? no obvious source of sepsis apparent as of now, KLAUDIA: Overall thickened valves seen however, no evidence of valvular vegetation is seen.     Hypotension:  on Midodrine for hemodynamic support:      ESRD on PD. Patient TW  61.5kg  ID- on IV antibiotics   hyponatremia-mild;  Hyperkalemia   Hyperphosphatemia: c/ w binders as prescribed   Failure to thrive     RECOMMENDATIONS   -  No choice but resume the PD given the hyperkalemia   - Palliative care follow up noted.  Functional quadriplegic and again the discussion was brought up re code status and hospice   - continue Renvela to 1,600 mg PO TID;  Phos this  am    - continue Midodrine 30mg po q 8 hours ;  DC the Florinef as will not work as on HD   - No peg per family. On marinol.    He needs to eat more   --Gentle IVF for 10 hours        [email or web address]  [phone]

## 2021-08-09 NOTE — PROVIDER CONTACT NOTE (OTHER) - BACKGROUND
Pt hypotensive, admitted to the MICU with pressors on septic shock. Pmh of ESRD on PD, (on hold d/t low BP), DM on insulin, CHF EF 25-30% CAD s/p CABG x4

## 2021-08-09 NOTE — PROGRESS NOTE ADULT - PROBLEM SELECTOR PLAN 5
See GOC note dated 8/5. See [facility] note dated 8/5.  Today, the patient was really weak and not really able to participate in a discussion about his illness and Rx options. I called his wife (C[place]onese  [phone]) but she did not answer. A voice mail was left for her to call me back. See [facility] note dated 8/5.  Today, the patient was really weak and not really able to participate in a discussion about his illness and Rx options. I called his wife ([place]onese  [phone]) but she did not answer. A voice mail was left for her to call me back. Up to now, he is full code. However, if a life threatening situation presents, please call his wife, since a discussion about the lack of benefit of CPR was already started (see [facility] note dated 8/5) See [facility] note dated 8/5.  Today, the patient was really weak and not really able to participate in a discussion about his illness and Rx options. I called his wife (C[place]onese  [phone]) but she did not answer. A voice mail was left for her to call me back. Up to now, he is full code. However, if a life threatening situation presents, please call his wife, since a discussion about the lack of benefit of CPR was already started (see [facility] note dated 8/5)

## 2021-08-09 NOTE — PROGRESS NOTE ADULT - PROBLEM SELECTOR PLAN 1
d/w Dr. Dale that indicated the patient's main issue is his poor caloric intake which is driving him to be hypotensive and therefore not being able to tolerate PD. Dr. Dale indicated he will continue to offer PD as along as the patient is able to tolerate it.   [facility] refused this patient's case since he was on PD.

## 2021-08-09 NOTE — PROGRESS NOTE ADULT - ASSESSMENT
CATH 11/30/2020:  COMPLICATIONS: The stent was deployed without difficulty. On removal of the  balloon, the shaft broke and the tip of the balloon remained in the vessel. Several attempts were made to snare the balloon unsuccessfully. Dr. Verdugo was notifed who will take the patient to the operating room.  DIAGNOSTIC RECOMMENDATIONS: The patient should continue with the present medications. The patients vessel was stented without difficulty On removal of the balloon, the shaft broke with the baloon stuck in the left main. All attempts to snare the balloon out failed and the patient was taken to the OR by Dr. Arango to remove the balloon  introp eleonora 11/30/20: mild to mod MR, < from: Intra-Operative Transesophageal Echo (11.30.20 @ 17:02) >  Severe global left ventricular systolic dysfunction. Inferior and inferolateral akinesis.  severe hypokinesis of other segments.  Severe global hypokinesia.  Normal left ventricular internal dimensions and wall thicknesses. Moderate diastolic dysfunction (Stage II)  echo 12/17/20: EF 32%, mod MR, Severe global left ventricular systolic dysfunction, moderate pulmonary pressures  echo 12/20/20: EF (Visual Estimate):  25-30 % mild MR, Akinesis of the inferolateral, anterolateral, apical laterlal, inferior, and inferoseptal walls. Severe left ventricular enlargement. No left ventricular thrombus is seen.  Echo 7/7/21: EF 16%, mod - sev MR, mod AS, severe global lv sys dysfx, severe diastolic dysfx, mod pulm htn   ELEONORA 7/15/21: EF 24.5%,  Tethered mitral valve leaflets with normal opening. sev MR, mild to mod AR,  eccentric left ventricular hypertrophy; Dilated  TV annulus There are two jets seen  with venacontracta 0.4cm and 0.5q cm. Severe tricuspid regurgitation.  no evidence of valvular vegetation is seen. evere global leftventricular systolic dysfunction.    a/p  56 M well known to practice with PMH ESRD on PD, DM on insulin, CHF EF 25-30% CAD s/p CABG x4, underwent cardiac cath and stent and wire broke in heart s/p sternotomy p/w septic shock.    #Septic Shock, resolved   -rule out recurrent sepsis  -followup blood cx  -cont abx for now   -right 2nd toe gangrene, pod f/u, vascular f/u, ID f/u   -ELEONORA: no evidence of valvular vegetation is seen.  -med f/u    #Ischemic Cardiomyopathy. Chronic systolic HF  -Repeat echo noted with EF 16%, mod - sev MR, mod AS, severe global lv sys dysfx, severe diastolic dysfx, mod pulm htn ; eleonora with ef 24.5%   -eps eval noted, pt not candidate for icd   -no bb, acei/arb given hx of orthostatic bp   -continue midodrine for bp support   -cont vol removal w PD     # CAD, s/p CABG, s/p PCI, s/p redo open heart for stent balloon retrieval   -hst elevated, likely demand ischemia in setting of septic shock, ESRD   -c/w asa, Brilinta, statin     # ESRD  -on PD  -renal f/u    #Hypotension  -cont mido as ordered-- on florinef   -cont to trend     #AMS  -intermittent lethargy  -neuro psych f/u    #GOC  -palliative f/u     dvt ppx

## 2021-08-09 NOTE — PROVIDER CONTACT NOTE (OTHER) - ASSESSMENT
Pt is A&Ox4, lethargic but arousable. VS as per flowsheet. Pt currently refusing all PO evening medications including Midodrine 30mg. Pt is s/p IVF completed at 2000. Pt is A&Ox4, lethargic but arousable. VS as per flowsheet. Pt currently refusing all PO evening medications including Midodrine 30mg. Pt is s/p IVF completed at 2000. PD currently on hold d/t low SBP, MD Dale aware.

## 2021-08-09 NOTE — PROGRESS NOTE ADULT - SUBJECTIVE AND OBJECTIVE BOX
Patient is a 56y old  Male who presents with a chief complaint of Hypotension (09 Aug 2021 16:04)      SUBJECTIVE / OVERNIGHT EVENTS: No new complaints.   Review of Systems  chest pain no  palpitations no  sob no  nausea no  headache no    MEDICATIONS  (STANDING):  aspirin enteric coated 81 milliGRAM(s) Oral daily  atorvastatin 80 milliGRAM(s) Oral at bedtime  BACItracin   Ointment 1 Application(s) Topical two times a day  cadexomer iodine 0.9% Gel 1 Application(s) Topical daily  cefTRIAXone   IVPB 1000 milliGRAM(s) IV Intermittent every 24 hours  chlorhexidine 0.12% Liquid 15 milliLiter(s) Oral Mucosa two times a day  chlorhexidine 2% Cloths 1 Application(s) Topical <User Schedule>  chlorhexidine 2% Cloths 1 Application(s) Topical daily  dextrose 40% Gel 15 Gram(s) Oral once  dextrose 5%. 1000 milliLiter(s) (100 mL/Hr) IV Continuous <Continuous>  dextrose 5%. 1000 milliLiter(s) (50 mL/Hr) IV Continuous <Continuous>  dextrose 50% Injectable 25 Gram(s) IV Push once  dextrose 50% Injectable 12.5 Gram(s) IV Push once  dextrose 50% Injectable 25 Gram(s) IV Push once  dronabinol 5 milliGRAM(s) Oral two times a day  ferrous    sulfate 325 milliGRAM(s) Oral three times a day  fludroCORTISONE 0.1 milliGRAM(s) Oral daily  folic acid 1 milliGRAM(s) Oral daily  gentamicin 0.1% Ointment 1 Application(s) Topical <User Schedule>  glucagon  Injectable 1 milliGRAM(s) IntraMuscular once  heparin   Injectable 5000 Unit(s) SubCutaneous every 12 hours  insulin glargine Injectable (LANTUS) 5 Unit(s) SubCutaneous at bedtime  insulin lispro (ADMELOG) corrective regimen sliding scale   SubCutaneous three times a day before meals  insulin lispro (ADMELOG) corrective regimen sliding scale   SubCutaneous at bedtime  lidocaine   4% Patch 1 Patch Transdermal daily  lidocaine   4% Patch 1 Patch Transdermal daily  melatonin 5 milliGRAM(s) Oral at bedtime  midodrine 30 milliGRAM(s) Oral every 8 hours  Nephro-kristi 1 Tablet(s) Oral daily  pantoprazole  Injectable 40 milliGRAM(s) IV Push daily  sevelamer carbonate 1600 milliGRAM(s) Oral three times a day  sodium chloride 0.9%. 1000 milliLiter(s) (60 mL/Hr) IV Continuous <Continuous>  sodium chloride 0.9%. 1000 milliLiter(s) (50 mL/Hr) IV Continuous <Continuous>  sodium chloride 0.9%. 1000 milliLiter(s) (50 mL/Hr) IV Continuous <Continuous>  thiamine IVPB 500 milliGRAM(s) IV Intermittent daily  ticagrelor 60 milliGRAM(s) Oral every 12 hours    MEDICATIONS  (PRN):  acetaminophen   Tablet .. 650 milliGRAM(s) Oral every 6 hours PRN Moderate Pain (4 - 6)  sodium chloride 0.65% Nasal 2 Spray(s) Both Nostrils three times a day PRN Nasal Congestion  valproate sodium IVPB 125 milliGRAM(s) IV Intermittent every 8 hours PRN agitation      Vital Signs Last 24 Hrs  T(C): 36.8 (09 Aug 2021 21:05), Max: 36.8 (09 Aug 2021 12:51)  T(F): 98.3 (09 Aug 2021 21:05), Max: 98.3 (09 Aug 2021 21:05)  HR: 92 (09 Aug 2021 21:05) (61 - 96)  BP: 94/62 (09 Aug 2021 21:05) (85/65 - 97/66)  BP(mean): --  RR: 18 (09 Aug 2021 21:05) (18 - 18)  SpO2: 96% (09 Aug 2021 21:05) (96% - 100%)    PHYSICAL EXAM:  GENERAL: NAD, cachectic   HEAD:  Atraumatic, Normocephalic  EYES: EOMI, PERRLA, conjunctiva and sclera clear  NECK: Supple, No JVD  CHEST/LUNG: Clear to auscultation bilaterally; No wheeze  HEART: Regular rate and rhythm; No murmurs, rubs, or gallops  ABDOMEN: Soft, Nontender, Nondistended; Bowel sounds present PD catheter  EXTREMITIES:  2+ Peripheral Pulses, No clubbing, cyanosis, or edema  PSYCH: confused  NEUROLOGY: non-focal  SKIN: No rashes or lesions    LABS:                        9.5    10.44 )-----------( 201      ( 08 Aug 2021 10:45 )             31.7     08-09    129<L>  |  90<L>  |  76<H>  ----------------------------<  113<H>  5.4<H>   |  20<L>  |  12.04<H>    Ca    8.5      09 Aug 2021 06:12    TPro  6.2  /  Alb  2.0<L>  /  TBili  0.6  /  DBili  x   /  AST  19  /  ALT  11  /  AlkPhos  113  08-08              Culture - Blood (collected 08 Aug 2021 14:44)  Source: .Blood Blood  Preliminary Report (09 Aug 2021 15:02):    No growth to date.    Culture - Blood (collected 07 Aug 2021 21:04)  Source: .Blood Blood  Preliminary Report (08 Aug 2021 22:01):    No growth to date.        RADIOLOGY & ADDITIONAL TESTS:    Imaging Personally Reviewed:    Consultant(s) Notes Reviewed:      Care Discussed with Consultants/Other Providers:

## 2021-08-09 NOTE — DIETITIAN NUTRITION RISK NOTIFICATION - MALNUTRITION EVALUATION AS DEMONSTRATED BY (ADULTS > 20 YEARS OF AGE)
Weight loss.../Loss of subcutaneous fat.../Loss of muscle...
Weight loss.../Inadequate energy intake...

## 2021-08-09 NOTE — PROGRESS NOTE ADULT - ASSESSMENT
55 yo Male h/o CAD, CHF (EF 24.5%. Severe MR. Also with RV dysfunction), COPD, on PD 2/2 ESRD p/w hypotension 2/2 sepsis likely 2/2 toe gangrene.  Palliative care called for GOC and ACP.  57 yo Male h/o CAD, CHF (EF 24.5%. Severe MR. Also with RV dysfunction), COPD, on PD 2/2 ESRD p/w hypotension 2/2 sepsis likely 2/2 toe gangrene.  Palliative care called for GOC and ACP.

## 2021-08-09 NOTE — DIETITIAN NUTRITION RISK NOTIFICATION - TREATMENT: THE FOLLOWING DIET HAS BEEN RECOMMENDED
Diet, Renal Restrictions:   For patients receiving Renal Replacement - No Protein Restr, No Conc K, No Conc Phos, Low Sodium  Dysphagia 1, Pureed, Thin Liquids ([id number])  Supplement Feeding Modality:  Oral  Nepro Cans or Servings Per Day:  2       Frequency:  Daily (08-09-21 @ 16:44) [Pending Verification By Attending]  Diet, Renal Restrictions:   For patients receiving Renal Replacement - No Protein Restr, No Conc K, No Conc Phos, Low Sodium  Dysphagia 1, Pureed, Thin Liquids ([id number])  Supplement Feeding Modality:  Oral  Glucerna Shake Cans or Servings Per Day:  1       Frequency:  Daily (07-30-21 @ 13:16) [Active]      
Diet, Renal Restrictions:   For patients receiving Renal Replacement - No Protein Restr, No Conc K, No Conc Phos, Low Sodium (07-06-21 @ 09:57) [Active]

## 2021-08-09 NOTE — PROGRESS NOTE ADULT - PROBLEM SELECTOR PLAN 6
Will continue to f/u for ACP.         Garrick Madera MD   Geriatrics and Palliative Care ([facility]) Consult Service    of Geriatric and Palliative Medicine  [facility]      Please page the following number for clinical matters between the hours of 9 am and 5 pm from Monday through Friday : [phone]    After 5pm and on weekends, please see the contact information below:    In the event of newly developing, evolving, or worsening symptoms, please contact the Palliative Medicine team via pager (if the patient is at [facility] #[phone] or if the patient is at [facility] #[phone]) The Geriatric and Palliative Medicine service has coverage 24 hours a day/ 7 days a week to provide medical recommendations regarding symptom management needs via telephone

## 2021-08-09 NOTE — PROGRESS NOTE ADULT - ASSESSMENT
56 m with    Sepsis  - Ceftriaxone  - ID follow   - toe gangrene  - Podiatry follow  - ID follow   - KLAUDIA noted     Epistaxis  - ENT evaluation    COPD  - 2L NC overnight because he becomes apneic, sats well  - Sats well on RA while awake    CAD s/p CABG   - Hypotension  - midodrine dose increased to 30 mg TID   - Previous echo with reduced EF  - c/w DAPT  - Cardiology follow    CHF cr systolic  - cardiology follow    AICD  - EP eval noted. Not a good candidate for AICD    Peritoneal Dialysis   - nephrology follow PD, recommend 4x a day. Holding when hypotensive.  - patient on midodrine 30 q8h at home, continue here  - Holding home metoprolol 25 q12hr in setting of hypotension    Diabetes   - HA1C 6.1 on 7/6 suggesting prediabetes  - Tujeo 60 units at bedtime and Victoza at home, started on 30 units at bedtime, adjust as needed based on patient PO intake  - added 2 units insulin pre-meal    Hiccups  - GI follow  - hold Ativan 2 to sedation    Incontinence dermatitis  - wound care    Toe gangrene  - Podiatry follow  - Vascular follow  - PONCHO/PVR noted  - Angiogram if agrees    Finger gangrenous area  - local care    Vertebral fracture T12  - Ortho eval noted   - MRI spine noted  - No intervention    Confusion   - possible delirium  - Psych follow  - Neurology follow  - EEG noted    Dysphagia  - GI follow  - MBS  - ENT follow    functional quadriplegia   - supportive care     Palliative follow re GOC . Appreciate efforts and help.    DCP in progress.     Pipe Beck MD pager [phone]

## 2021-08-09 NOTE — PROGRESS NOTE ADULT - NSICDXPILOT_GEN_ALL_CORE
[place]
[employer] [employer]
[place]it[place]am[place]
Sel[place]
Bucks[place]
Cat[place]
Mill[place]
P[place]
Ravenna
Rohwer
Som[place]
Upham
Valier
Pe[place]
Bridge[place]
Milled[place]

## 2021-08-09 NOTE — PROGRESS NOTE ADULT - SUBJECTIVE AND OBJECTIVE BOX
NEPHROLOGY-[facility] [phone]        Patient seen and examined in bed.  He was hypotensive yesterday         MEDICATIONS  (STANDING):  aspirin enteric coated 81 milliGRAM(s) Oral daily  atorvastatin 80 milliGRAM(s) Oral at bedtime  BACItracin   Ointment 1 Application(s) Topical two times a day  cadexomer iodine 0.9% Gel 1 Application(s) Topical daily  cefTRIAXone   IVPB 1000 milliGRAM(s) IV Intermittent every 24 hours  chlorhexidine 0.12% Liquid 15 milliLiter(s) Oral Mucosa two times a day  chlorhexidine 2% Cloths 1 Application(s) Topical <User Schedule>  chlorhexidine 2% Cloths 1 Application(s) Topical daily  dextrose 40% Gel 15 Gram(s) Oral once  dextrose 5%. 1000 milliLiter(s) (50 mL/Hr) IV Continuous <Continuous>  dextrose 5%. 1000 milliLiter(s) (100 mL/Hr) IV Continuous <Continuous>  dextrose 50% Injectable 25 Gram(s) IV Push once  dextrose 50% Injectable 12.5 Gram(s) IV Push once  dextrose 50% Injectable 25 Gram(s) IV Push once  dronabinol 5 milliGRAM(s) Oral two times a day  ferrous    sulfate 325 milliGRAM(s) Oral three times a day  fludroCORTISONE 0.1 milliGRAM(s) Oral daily  folic acid 1 milliGRAM(s) Oral daily  gentamicin 0.1% Ointment 1 Application(s) Topical <User Schedule>  glucagon  Injectable 1 milliGRAM(s) IntraMuscular once  heparin   Injectable 5000 Unit(s) SubCutaneous every 12 hours  insulin glargine Injectable (LANTUS) 5 Unit(s) SubCutaneous at bedtime  insulin lispro (ADMELOG) corrective regimen sliding scale   SubCutaneous three times a day before meals  insulin lispro (ADMELOG) corrective regimen sliding scale   SubCutaneous at bedtime  lidocaine   4% Patch 1 Patch Transdermal daily  lidocaine   4% Patch 1 Patch Transdermal daily  melatonin 5 milliGRAM(s) Oral at bedtime  midodrine 30 milliGRAM(s) Oral every 8 hours  Nephro-kristi 1 Tablet(s) Oral daily  pantoprazole  Injectable 40 milliGRAM(s) IV Push daily  sevelamer carbonate 1600 milliGRAM(s) Oral three times a day  sodium chloride 0.9%. 1000 milliLiter(s) (60 mL/Hr) IV Continuous <Continuous>  sodium chloride 0.9%. 1000 milliLiter(s) (50 mL/Hr) IV Continuous <Continuous>  sodium chloride 0.9%. 1000 milliLiter(s) (50 mL/Hr) IV Continuous <Continuous>  thiamine IVPB 500 milliGRAM(s) IV Intermittent daily  ticagrelor 60 milliGRAM(s) Oral every 12 hours      VITAL:  T(C): , Max: 36.6 (08-08-21 @ 12:09)  T(F): , Max: 97.9 (08-08-21 @ 12:09)  HR: 84 (08-09-21 @ 04:44)  BP: 92/60 (08-09-21 @ 04:44)  BP(mean): --  RR: 18 (08-09-21 @ 04:44)  SpO2: 99% (08-09-21 @ 04:44)  Wt(kg): --    I and O's:    08-08 @ 07:01  -  08-09 @ 07:00  --------------------------------------------------------  IN: 320 mL / OUT: 0 mL / NET: 320 mL          PHYSICAL EXAM:    Constitutional: NAD; cachetic   Neck:  No JVD  Respiratory: CTAB/L  Cardiovascular: S1 and S2  Gastrointestinal: BS+, soft, NT/ND + PD   Extremities: No peripheral edema  Neurological: A/O x 3, no focal deficits  Psychiatric:    : No Johnson  Skin: No rashes  Access: Not applicable    LABS:                        9.5    10.44 )-----------( 201      ( 08 Aug 2021 10:45 )             31.7     08-09    129<L>  |  90<L>  |  76<H>  ----------------------------<  113<H>  5.4<H>   |  20<L>  |  12.04<H>    Ca    8.5      09 Aug 2021 06:12    TPro  6.2  /  Alb  2.0<L>  /  TBili  0.6  /  DBili  x   /  AST  19  /  ALT  11  /  AlkPhos  113  08-08          Urine Studies:          RADIOLOGY & ADDITIONAL STUDIES:

## 2021-08-10 NOTE — PROGRESS NOTE ADULT - SUBJECTIVE AND OBJECTIVE BOX
CC: f/u for     Patient reports    REVIEW OF SYSTEMS:  All other review of systems negative (Comprehensive ROS)    Antimicrobials Day #  :4  cefTRIAXone   IVPB 1000 milliGRAM(s) IV Intermittent every 24 hours    Other Medications Reviewed    T(F): 98.1 (08-10-21 @ 16:00), Max: 98.3 (08-09-21 @ 21:05)  HR: 77 (08-10-21 @ 16:00)  BP: 88/69 (08-10-21 @ 16:00)  RR: 18 (08-10-21 @ 16:00)  SpO2: 98% (08-10-21 @ 16:00)  Wt(kg): --    PHYSICAL EXAM:  General: sleepy, no acute distress  Eyes:  anicteric, no conjunctival injection, no discharge  Oropharynx: no lesions or injection 	  Neck: supple, without adenopathy  Lungs: clear to auscultation  Heart: regular rate and rhythm; no murmur, rubs or gallops  Abdomen: soft, nondistended, nontender, without mass or organomegaly, catheter in place  Skin: no lesions  Extremities: no clubbing, cyanosis, or edema  Neurologic: awake, moves all extremities    LAB RESULTS:                        10.7   7.97  )-----------( 208      ( 10 Aug 2021 06:24 )             35.9     08-10    128<L>  |  89<L>  |  80<H>  ----------------------------<  133<H>  5.6<H>   |  18<L>  |  13.11<H>    Ca    8.6      10 Aug 2021 06:24          MICROBIOLOGY:  RECENT CULTURES:  08-08 @ 14:44 .Blood Blood     No growth to date.      08-07 @ 21:04 .Blood Blood     No growth to date.          RADIOLOGY REVIEWED:  < from: MR Head w/wo IV Cont (07.16.21 @ 17:44) >  EXAM:  MR ANGIO BRAIN                          EXAM:  MR BRAIN WAW IC                            PROCEDURE DATE:  07/16/2021            INTERPRETATION:  MR brain with and without gadolinium    CLINICAL INFORMATION:   Altered mental status, sepsis    TECHNIQUE:   Sagittal and axial T1-weighted images, axial FLAIR images, axial T2-weighted images, axial gradient echo images and axial diffusion weighted images of the brain were obtained. Following 6 cc of Gadavist administration, 1.5 cc discarded,isotropic volumetric and fast spin echo T1-weighted images were obtained; this data was reformatted using image post processing software in multiple imaging planes.    FINDINGS:   No prior similar studies are available for review.    The brain demonstrates minimal periventricular white matter ischemia. Old lacunar infarctions are seen within the BILATERAL basal ganglia. Following gadolinium administration no abnormal enhancement occurs.  No acute cerebral cortical infarct is found.   No intracranial hemorrhage is recognized.  No mass effect is found in the brain.    The ventricles, sulci and basal cisterns show global atrophy.    The vertebral and internal carotid arteries demonstrate expected flow voids indicating their patency.    The orbits are unremarkable.  The paranasal sinuses are clear.  The nasal cavity appears intact.  The nasopharynx is symmetric.  The central skull base and petrous temporal bones are intact.  The calvarium appears unremarkable.        MR angiography of the intracranial circulation.  (Non gadolinium technique.)      CLINICAL INFORMATION:  stroke; vascular stenosis.    TECHNIQUE:  MR angiography of intracranial circulation was performed using three-dimensional time of flight technique. Post processing angiographic reconstruction of images was performed. This data set was reconstructed as maximum intensity pixel images and displayed in multiple rotations.    FINDINGS:   No comparison similar studies are available.    The anterior circulation demonstrates intact petrous, cavernous and clinoid segments of the internal carotid arteries.  The anterior cerebral arteries are intact and symmetric. An anterior communicating artery is demonstrated.  The middle cerebral arteries demonstrate intact initial M1 and M2 segments. There are symmetric intact peripheral Sylvian branches.    The posterior circulation demonstrates intact vertebral, basilar and posterior cerebral arteries.    No abnormal vessel termination, vascular malformation or intracranial aneurysm is recognized.        IMPRESSION:    MRI BRAIN: minimal periventricular white matter ischemia. Old lacunar infarctions are seen within the BILATERAL basal ganglia.    MRA brain:   Unremarkable MR angiography of the intracranial circulation.      < end of copied text >        Assessment:  56y male with ESRD on dialysis via PD, DM,CAD s/p CABG, had cardiac stent placement in 2020, cath complicated by broken wire requiring sternotomy for wire retrieval, hospitalized twice after that in Dec for near syncope r/out sepsis & then in Jan for hypotension, fever, weak but no specific infection found. He has known to be poorly interactive & hypotensive at baseline in the 90/60s on Midodrine.   Readmitted on 7/06/21 with fever, hypotension, feeling weak, report of cough.   Required pressors x1 day in MICU, treated with cefepime from 7/06 - 7/11/21 for subtle infiltrates on CT chest.   MRI spine not consistent with infection.   MRI Brain: old lacunar basal ganglia infarcts  KLAUDIA: No evidence of valvular vegetation is seen, severe global left ventricular systolic dysfunction   blood and peritoneal fluid cx are negative  He has continued to have episodes of hypotension, despite being on Midodrine, but as of 8/06/21 he also required fluid boluses  With rise in WBC & increasing lethargy, cx repeated & started on empiric antibiotics  Dry gangrene of right thumb & right 2nd - not infected  wbc is now normal, cultures are negative, no infection is found  PLAN:    stop ctx and monitor off further antibiotics

## 2021-08-10 NOTE — PROGRESS NOTE ADULT - SUBJECTIVE AND OBJECTIVE BOX
NEPHROLOGY[facility] [phone]        Patient seen and examined in bed.  Last night the PD was held due to hypotension and he refused the Midodrine         MEDICATIONS  (STANDING):  aspirin enteric coated 81 milliGRAM(s) Oral daily  atorvastatin 80 milliGRAM(s) Oral at bedtime  BACItracin   Ointment 1 Application(s) Topical two times a day  cadexomer iodine 0.9% Gel 1 Application(s) Topical daily  cefTRIAXone   IVPB 1000 milliGRAM(s) IV Intermittent every 24 hours  chlorhexidine 0.12% Liquid 15 milliLiter(s) Oral Mucosa two times a day  chlorhexidine 2% Cloths 1 Application(s) Topical <User Schedule>  chlorhexidine 2% Cloths 1 Application(s) Topical daily  dextrose 40% Gel 15 Gram(s) Oral once  dextrose 5%. 1000 milliLiter(s) (100 mL/Hr) IV Continuous <Continuous>  dextrose 5%. 1000 milliLiter(s) (50 mL/Hr) IV Continuous <Continuous>  dextrose 50% Injectable 25 Gram(s) IV Push once  dextrose 50% Injectable 12.5 Gram(s) IV Push once  dextrose 50% Injectable 25 Gram(s) IV Push once  dronabinol 5 milliGRAM(s) Oral two times a day  ferrous    sulfate 325 milliGRAM(s) Oral three times a day  fludroCORTISONE 0.1 milliGRAM(s) Oral daily  folic acid 1 milliGRAM(s) Oral daily  gentamicin 0.1% Ointment 1 Application(s) Topical <User Schedule>  glucagon  Injectable 1 milliGRAM(s) IntraMuscular once  heparin   Injectable 5000 Unit(s) SubCutaneous every 12 hours  insulin glargine Injectable (LANTUS) 5 Unit(s) SubCutaneous at bedtime  insulin lispro (ADMELOG) corrective regimen sliding scale   SubCutaneous three times a day before meals  insulin lispro (ADMELOG) corrective regimen sliding scale   SubCutaneous at bedtime  lidocaine   4% Patch 1 Patch Transdermal daily  lidocaine   4% Patch 1 Patch Transdermal daily  melatonin 5 milliGRAM(s) Oral at bedtime  midodrine 30 milliGRAM(s) Oral every 8 hours  Nephro-kristi 1 Tablet(s) Oral daily  pantoprazole  Injectable 40 milliGRAM(s) IV Push daily  sevelamer carbonate 1600 milliGRAM(s) Oral three times a day  sodium chloride 0.9%. 1000 milliLiter(s) (60 mL/Hr) IV Continuous <Continuous>  sodium chloride 0.9%. 1000 milliLiter(s) (50 mL/Hr) IV Continuous <Continuous>  sodium chloride 0.9%. 1000 milliLiter(s) (50 mL/Hr) IV Continuous <Continuous>  thiamine IVPB 500 milliGRAM(s) IV Intermittent daily  ticagrelor 60 milliGRAM(s) Oral every 12 hours      VITAL:  T(C): , Max: 36.8 (08-09-21 @ 12:51)  T(F): , Max: 98.3 (08-09-21 @ 21:05)  HR: 86 (08-10-21 @ 09:00)  BP: 88/66 (08-10-21 @ 09:00)  BP(mean): --  RR: 18 (08-10-21 @ 09:00)  SpO2: 98% (08-10-21 @ 09:00)  Wt(kg): --    I and O's:    08-09 @ 07:01  -  08-10 @ 07:00  --------------------------------------------------------  IN: 760 mL / OUT: 0 mL / NET: 760 mL          PHYSICAL EXAM:    Constitutional: NAD  Neck:  No JVD  Respiratory: CTAB/L  Cardiovascular: S1 and S2  Gastrointestinal: BS+, soft, NT/ND  Extremities: No peripheral edema  Neurological: A/O x 3, no focal deficits  Psychiatric: Normal mood, normal affect  : No Johnson  Skin: No rashes  Access: Not applicable    LABS:                        10.7   7.97  )-----------( 208      ( 10 Aug 2021 06:24 )             35.9     08-10    128<L>  |  89<L>  |  80<H>  ----------------------------<  133<H>  5.6<H>   |  18<L>  |  13.11<H>    Ca    8.6      10 Aug 2021 06:24    TPro  6.2  /  Alb  2.0<L>  /  TBili  0.6  /  DBili  x   /  AST  19  /  ALT  11  /  AlkPhos  113  08-08          Urine Studies:          RADIOLOGY & ADDITIONAL STUDIES:      < from: Xray Chest 1 View- PORTABLE-Urgent (Xray Chest 1 View- PORTABLE-Urgent .) (08.07.21 @ 12:27) >    EXAM:  XR CHEST PORTABLE URGENT 1V                            PROCEDURE DATE:  08/07/2021            INTERPRETATION:  Clinical Information: Sepsis    Technique: AP chest x-ray(s).    Comparison: 07/06/2021    Findings/  Impression: Status post CABG. The lungs are clear. Old right-sided rib fractures.      --- End of Report ---                ELEONORA SCHWAB MD; Attending Interventional Radiologist  This document has been electronically signed. Aug  7 2021  2:55PM    < end of copied text >

## 2021-08-10 NOTE — PROGRESS NOTE ADULT - ASSESSMENT
56 m with    Sepsis  - Ceftriaxone  - ID follow   - toe gangrene  - Podiatry follow  - ID follow   - KLAUDIA noted     Epistaxis  - ENT evaluation    COPD  - 2L NC overnight because he becomes apneic, sats well  - Sats well on RA while awake    CAD s/p CABG   - Hypotension  - midodrine dose increased to 30 mg TID   - Previous echo with reduced EF  - c/w DAPT  - Cardiology follow    CHF cr systolic  - cardiology follow    AICD  - EP eval noted. Not a good candidate for AICD    Peritoneal Dialysis   - nephrology follow PD, recommend 4x a day. Holding when hypotensive.  - patient on midodrine 30 q8h at home, continue here  - Holding home metoprolol 25 q12hr in setting of hypotension    Diabetes   - HA1C 6.1 on 7/6 suggesting prediabetes  - Tujeo 60 units at bedtime and Victoza at home, started on 30 units at bedtime, adjust as needed based on patient PO intake  - added 2 units insulin pre-meal    Hiccups  - GI follow  - hold Ativan 2 to sedation    Incontinence dermatitis  - wound care    Toe gangrene  - Podiatry follow  - Vascular follow  - PONCHO/PVR noted  - Angiogram if agrees    Finger gangrenous area  - local care    Vertebral fracture T12  - Ortho eval noted   - MRI spine noted  - No intervention    Confusion   - possible delirium  - Psych follow  - Neurology follow  - EEG noted    Dysphagia  - GI follow  - MBS  - ENT follow    functional quadriplegia   - supportive care     Palliative follow re GOC . Appreciate efforts and help.    DCP in progress.     Pipe Beck MD pager [phone]

## 2021-08-10 NOTE — PROGRESS NOTE ADULT - SUBJECTIVE AND OBJECTIVE BOX
Patient is a 56y old  Male who presents with a chief complaint of Hypotension (10 Aug 2021 10:30)      SUBJECTIVE / OVERNIGHT EVENTS: Weak.  Review of Systems  chest pain no  palpitations no  sob no  nausea no  headache no    MEDICATIONS  (STANDING):  aspirin enteric coated 81 milliGRAM(s) Oral daily  atorvastatin 80 milliGRAM(s) Oral at bedtime  BACItracin   Ointment 1 Application(s) Topical two times a day  cadexomer iodine 0.9% Gel 1 Application(s) Topical daily  cefTRIAXone   IVPB 1000 milliGRAM(s) IV Intermittent every 24 hours  chlorhexidine 0.12% Liquid 15 milliLiter(s) Oral Mucosa two times a day  chlorhexidine 2% Cloths 1 Application(s) Topical <User Schedule>  chlorhexidine 2% Cloths 1 Application(s) Topical daily  dextrose 40% Gel 15 Gram(s) Oral once  dextrose 5%. 1000 milliLiter(s) (50 mL/Hr) IV Continuous <Continuous>  dextrose 5%. 1000 milliLiter(s) (100 mL/Hr) IV Continuous <Continuous>  dextrose 50% Injectable 25 Gram(s) IV Push once  dextrose 50% Injectable 12.5 Gram(s) IV Push once  dextrose 50% Injectable 25 Gram(s) IV Push once  dronabinol 5 milliGRAM(s) Oral two times a day  ferrous    sulfate 325 milliGRAM(s) Oral three times a day  fludroCORTISONE 0.1 milliGRAM(s) Oral daily  folic acid 1 milliGRAM(s) Oral daily  gentamicin 0.1% Ointment 1 Application(s) Topical <User Schedule>  glucagon  Injectable 1 milliGRAM(s) IntraMuscular once  heparin   Injectable 5000 Unit(s) SubCutaneous every 12 hours  insulin glargine Injectable (LANTUS) 5 Unit(s) SubCutaneous at bedtime  insulin lispro (ADMELOG) corrective regimen sliding scale   SubCutaneous at bedtime  insulin lispro (ADMELOG) corrective regimen sliding scale   SubCutaneous three times a day before meals  lidocaine   4% Patch 1 Patch Transdermal daily  lidocaine   4% Patch 1 Patch Transdermal daily  melatonin 5 milliGRAM(s) Oral at bedtime  midodrine 30 milliGRAM(s) Oral every 8 hours  Nephro-kristi 1 Tablet(s) Oral daily  pantoprazole  Injectable 40 milliGRAM(s) IV Push daily  sevelamer carbonate 1600 milliGRAM(s) Oral three times a day  sodium chloride 0.9%. 1000 milliLiter(s) (60 mL/Hr) IV Continuous <Continuous>  sodium chloride 0.9%. 1000 milliLiter(s) (50 mL/Hr) IV Continuous <Continuous>  sodium chloride 0.9%. 1000 milliLiter(s) (50 mL/Hr) IV Continuous <Continuous>  thiamine IVPB 500 milliGRAM(s) IV Intermittent daily  ticagrelor 60 milliGRAM(s) Oral every 12 hours    MEDICATIONS  (PRN):  acetaminophen   Tablet .. 650 milliGRAM(s) Oral every 6 hours PRN Moderate Pain (4 - 6)  sodium chloride 0.65% Nasal 2 Spray(s) Both Nostrils three times a day PRN Nasal Congestion  valproate sodium IVPB 125 milliGRAM(s) IV Intermittent every 8 hours PRN agitation      Vital Signs Last 24 Hrs  T(C): 36.6 (10 Aug 2021 13:00), Max: 36.8 (09 Aug 2021 21:05)  T(F): 97.8 (10 Aug 2021 13:00), Max: 98.3 (09 Aug 2021 21:05)  HR: 79 (10 Aug 2021 13:00) (64 - 92)  BP: 87/62 (10 Aug 2021 13:00) (87/62 - 98/67)  BP(mean): --  RR: 18 (10 Aug 2021 13:00) (18 - 18)  SpO2: 97% (10 Aug 2021 13:00) (96% - 100%)    PHYSICAL EXAM:  GENERAL: NAD, frail  HEAD:  Atraumatic, Normocephalic  EYES: EOMI, PERRLA, conjunctiva and sclera clear  NECK: Supple, No JVD  CHEST/LUNG: Clear to auscultation bilaterally; No wheeze  HEART: Regular rate and rhythm; No murmurs, rubs, or gallops  ABDOMEN: Soft, Nontender, Nondistended; Bowel sounds present PD catheter  EXTREMITIES:  2+ Peripheral Pulses, No clubbing, cyanosis, or edema 2nd r toe and r thumb with gangrene.  PSYCH confused  NEUROLOGY: non-focal  SKIN: No rashes or lesions    LABS:                        10.7   7.97  )-----------( 208      ( 10 Aug 2021 06:24 )             35.9     08-10    128<L>  |  89<L>  |  80<H>  ----------------------------<  133<H>  5.6<H>   |  18<L>  |  13.11<H>    Ca    8.6      10 Aug 2021 06:24                Culture - Blood (collected 08 Aug 2021 14:44)  Source: .Blood Blood  Preliminary Report (09 Aug 2021 15:02):    No growth to date.    Culture - Blood (collected 07 Aug 2021 21:04)  Source: .Blood Blood  Preliminary Report (08 Aug 2021 22:01):    No growth to date.        RADIOLOGY & ADDITIONAL TESTS:    Imaging Personally Reviewed:    Consultant(s) Notes Reviewed:      Care Discussed with Consultants/Other Providers:

## 2021-08-10 NOTE — PROVIDER CONTACT NOTE (OTHER) - ASSESSMENT
Pt is A&Ox4, lethargic but arousable. Pt currently refusing all PO evening medications including Midodrine 30mg. Pt submitted MOLST form earlier today. As per GOC note, pt and family prefers comfort care.

## 2021-08-10 NOTE — PROGRESS NOTE ADULT - ASSESSMENT
ASSESSMENT/PLAN  56 M PMH ESRD on PD, DM on insulin, CHF EF 25-30% CAD s/p CABG x4 pw with hypotension with fever. Encephalopathy likely related to cefepime ? no obvious source of sepsis apparent as of now, KLAUDIA: Overall thickened valves seen however, no evidence of valvular vegetation is seen.     Hypotension:  on Midodrine for hemodynamic support:      ESRD on PD. Patient TW  61.5kg  ID- on IV antibiotics   hyponatremia-mild;  Hyperkalemia   Hyperphosphatemia: c/ w binders as prescribed   Failure to thrive     RECOMMENDATIONS   -  PD to resume only if SBP allows.  Transition to HD will provide more hypotension and is not an option....He is not eating and is extremely catabolic   - Palliative care follow up noted.  Functional quadriplegic and again the discussion was brought up re code status and hospice (issue is that he will need to come off HD)  - continue Renvela to 1,600 mg PO TID;     - continue Midodrine 30mg po q 8 hours ;  DC the [facility] as will not work as on ESRD   - No peg per family. On marinol.    He needs to eat more   --Resume fluids and be complaint with the midodrine   -IV abx     Soon he may not be a candidate for PD due to hypotension and failure to thrive     DW Dr Beck and Dr [place]       [facility]  [phone]

## 2021-08-10 NOTE — GOALS OF CARE CONVERSATION - ADVANCED CARE PLANNING - CONVERSATION DETAILS
Before meeting with the patient, I discussed the case with Dr. Dale (nephro). Dr. Dale stated the patient was refusing Midodrine. He also indicated that HD and ICU level of care were not reasonable options since, due to the patient's underlying issues, these therapies were not going to provide a bridge to improvement, an end point.     I d/w the patient and his family about the patient's current issues with hypotension that were limiting his capacity to get dialysis. We also discussed with te patient about his reasons for refusing midodrine. He indicated he was refusing it since he was tired of taking too many pills. I indicated the patient's hypotension was 2/2 to his poor oral intake and cardiac issues and that unless his BP was to improve, PD was not going to be possible. In fact Dr. Dale was concerned that if by tomorrow, the patient was not able to be dialyzed that then his chances to be able to continue PD were going to be really limited, if not impossible. The patient was then faced with two kinds of approach to care. 1) Aggressive care with ICU transferring, intubation, and resuscitation. Therefore, putting quantity of life over quality of life 2) Continuing current plan of care, getting PD if able to tolerate but not scalation of care (No ICU, no IV pressors). If not able to continue PD then focusing on symptoms relief an probably PCU or inpatient hospice. Therefore putting quality of life over quantity of life. The patient opted for option 2. He was pretty clear into that at this point, his comfort is a priority. He also agree with DNR/I.

## 2021-08-10 NOTE — PROGRESS NOTE ADULT - SUBJECTIVE AND OBJECTIVE BOX
CARDIOLOGY FOLLOW UP - Dr. Best  DATE OF SERVICE: 08-10-21     CC events noted, unable to perform PD due to hypotension and patient refusing midodrine.   continues to refuse meds this am     REVIEW OF SYSTEMS:   CONSTITUTIONAL: No fever, weight loss, or fatigue   RESPIRATORY:  No cough, wheezing, chills or hemoptysis; No SOB  CARDIOVASCULAR: No chest pain, palpitations, passing out, dizziness, or leg swelling   GASTROINTESTINAL: No abdominal or epigastric pain. No nausea, vomiting, or hematemesis, no diarreha, or constipation, No melena or hematochezia   VASCULAR: no edema.       PHYSICAL EXAM:  T(C): 36.3 (08-10-21 @ 09:00), Max: 36.8 (08-09-21 @ 12:51)  HR: 86 (08-10-21 @ 09:00) (61 - 96)  BP: 88/66 (08-10-21 @ 09:00) (85/65 - 98/67)  RR: 18 (08-10-21 @ 09:00) (18 - 18)  SpO2: 98% (08-10-21 @ 09:00) (96% - 100%)  Wt(kg): --  I&O's Summary    09 Aug 2021 07:01  -  10 Aug 2021 07:00  --------------------------------------------------------  IN: 760 mL / OUT: 0 mL / NET: 760 mL        Appearance: Normal	  Cardiovascular: Normal S1 S2,RRR, No JVD, No murmurs  Respiratory: Lungs clear to auscultation	  Gastrointestinal:  Soft, Non-tender, + BS	  Extremities: Normal range of motion, No clubbing, cyanosis or edema      HOME MEDICATIONS:  aspirin 81 mg oral delayed release tablet: 1 tab(s) orally once a day (29 Dec 2020 18:37)  atorvastatin 80 mg oral tablet: 1 tab(s) orally once a day (at bedtime) (29 Dec 2020 18:37)  epoetin martine: injectable once a month (29 Dec 2020 18:37)  ferrous sulfate 325 mg (65 mg elemental iron) oral tablet: 1 tab(s) orally 3 times a day (29 Dec 2020 18:37)  gabapentin 100 mg oral capsule: 1 cap(s) orally once a day (29 Dec 2020 18:37)  nortriptyline 25 mg oral capsule: 1 cap(s) orally once a day (at bedtime) (29 Dec 2020 18:37)  pantoprazole 40 mg oral delayed release tablet: 1 tab(s) orally once a day (before a meal) (29 Dec 2020 18:37)  Toujeo SoloStar 300 units/mL subcutaneous solution: 60 unit(s) subcutaneous once a day (at bedtime) (08 Jan 2021 12:41)      MEDICATIONS  (STANDING):  aspirin enteric coated 81 milliGRAM(s) Oral daily  atorvastatin 80 milliGRAM(s) Oral at bedtime  BACItracin   Ointment 1 Application(s) Topical two times a day  cadexomer iodine 0.9% Gel 1 Application(s) Topical daily  cefTRIAXone   IVPB 1000 milliGRAM(s) IV Intermittent every 24 hours  chlorhexidine 0.12% Liquid 15 milliLiter(s) Oral Mucosa two times a day  chlorhexidine 2% Cloths 1 Application(s) Topical <User Schedule>  chlorhexidine 2% Cloths 1 Application(s) Topical daily  dextrose 40% Gel 15 Gram(s) Oral once  dextrose 5%. 1000 milliLiter(s) (100 mL/Hr) IV Continuous <Continuous>  dextrose 5%. 1000 milliLiter(s) (50 mL/Hr) IV Continuous <Continuous>  dextrose 50% Injectable 25 Gram(s) IV Push once  dextrose 50% Injectable 12.5 Gram(s) IV Push once  dextrose 50% Injectable 25 Gram(s) IV Push once  dronabinol 5 milliGRAM(s) Oral two times a day  ferrous    sulfate 325 milliGRAM(s) Oral three times a day  fludroCORTISONE 0.1 milliGRAM(s) Oral daily  folic acid 1 milliGRAM(s) Oral daily  gentamicin 0.1% Ointment 1 Application(s) Topical <User Schedule>  glucagon  Injectable 1 milliGRAM(s) IntraMuscular once  heparin   Injectable 5000 Unit(s) SubCutaneous every 12 hours  insulin glargine Injectable (LANTUS) 5 Unit(s) SubCutaneous at bedtime  insulin lispro (ADMELOG) corrective regimen sliding scale   SubCutaneous three times a day before meals  insulin lispro (ADMELOG) corrective regimen sliding scale   SubCutaneous at bedtime  lidocaine   4% Patch 1 Patch Transdermal daily  lidocaine   4% Patch 1 Patch Transdermal daily  melatonin 5 milliGRAM(s) Oral at bedtime  midodrine 30 milliGRAM(s) Oral every 8 hours  Nephro-kristi 1 Tablet(s) Oral daily  pantoprazole  Injectable 40 milliGRAM(s) IV Push daily  sevelamer carbonate 1600 milliGRAM(s) Oral three times a day  sodium chloride 0.9%. 1000 milliLiter(s) (60 mL/Hr) IV Continuous <Continuous>  sodium chloride 0.9%. 1000 milliLiter(s) (50 mL/Hr) IV Continuous <Continuous>  sodium chloride 0.9%. 1000 milliLiter(s) (50 mL/Hr) IV Continuous <Continuous>  thiamine IVPB 500 milliGRAM(s) IV Intermittent daily  ticagrelor 60 milliGRAM(s) Oral every 12 hours      TELEMETRY: 	    ECG:  	  RADIOLOGY:   DIAGNOSTIC TESTING:  [ ] Echocardiogram:  [ ]  Catheterization:  [ ] Stress Test:    OTHER: 	    LABS:	 	                                10.7   7.97  )-----------( 208      ( 10 Aug 2021 06:24 )             35.9     08-10    128<L>  |  89<L>  |  80<H>  ----------------------------<  133<H>  5.6<H>   |  18<L>  |  13.11<H>    Ca    8.6      10 Aug 2021 06:24    TPro  6.2  /  Alb  2.0<L>  /  TBili  0.6  /  DBili  x   /  AST  19  /  ALT  11  /  AlkPhos  113  08-08

## 2021-08-10 NOTE — CHART NOTE - NSCHARTNOTEFT_GEN_A_CORE
I again called the patient's wife x 2 ( ID # [phone]) but she did not answer. A message to call back was left. I also tried calling his son but he also did not answer. A message for him to call me back was also left.         Garrick Madera MD   Geriatrics and Palliative Care (GAP) Consult Service    of Geriatric and Palliative Medicine  [facility]      Please page the following number for clinical matters between the hours of 9 am and 5 pm from Monday through Friday : [phone]    After 5pm and on weekends, please see the contact information below:    In the event of newly developing, evolving, or worsening symptoms, please contact the Palliative Medicine team via pager (if the patient is at [facility] #[phone] or if the patient is at [facility] #[phone]) The Geriatric and Palliative Medicine service has coverage 24 hours a day/ 7 days a week to provide medical recommendations regarding symptom management needs via telephone

## 2021-08-10 NOTE — PROGRESS NOTE ADULT - SUBJECTIVE AND OBJECTIVE BOX
[facility]    ** please note this is a delayed entry note**   Seen earlier today, and examined.  - Today, patient is without complaints.           *****MEDICATIONS: Current medication reviewed and documented.    MEDICATIONS  (STANDING):  aspirin enteric coated 81 milliGRAM(s) Oral daily  atorvastatin 80 milliGRAM(s) Oral at bedtime  BACItracin   Ointment 1 Application(s) Topical two times a day  cadexomer iodine 0.9% Gel 1 Application(s) Topical daily  cefTRIAXone   IVPB 1000 milliGRAM(s) IV Intermittent every 24 hours  chlorhexidine 0.12% Liquid 15 milliLiter(s) Oral Mucosa two times a day  chlorhexidine 2% Cloths 1 Application(s) Topical <User Schedule>  chlorhexidine 2% Cloths 1 Application(s) Topical daily  dextrose 40% Gel 15 Gram(s) Oral once  dextrose 5%. 1000 milliLiter(s) (50 mL/Hr) IV Continuous <Continuous>  dextrose 5%. 1000 milliLiter(s) (100 mL/Hr) IV Continuous <Continuous>  dextrose 50% Injectable 25 Gram(s) IV Push once  dextrose 50% Injectable 12.5 Gram(s) IV Push once  dextrose 50% Injectable 25 Gram(s) IV Push once  ferrous    sulfate 325 milliGRAM(s) Oral three times a day  fludroCORTISONE 0.1 milliGRAM(s) Oral daily  folic acid 1 milliGRAM(s) Oral daily  gentamicin 0.1% Ointment 1 Application(s) Topical <User Schedule>  glucagon  Injectable 1 milliGRAM(s) IntraMuscular once  heparin   Injectable 5000 Unit(s) SubCutaneous every 12 hours  insulin glargine Injectable (LANTUS) 5 Unit(s) SubCutaneous at bedtime  insulin lispro (ADMELOG) corrective regimen sliding scale   SubCutaneous at bedtime  insulin lispro (ADMELOG) corrective regimen sliding scale   SubCutaneous three times a day before meals  lidocaine   4% Patch 1 Patch Transdermal daily  lidocaine   4% Patch 1 Patch Transdermal daily  melatonin 5 milliGRAM(s) Oral at bedtime  midodrine 30 milliGRAM(s) Oral every 8 hours  Nephro-rkisti 1 Tablet(s) Oral daily  pantoprazole  Injectable 40 milliGRAM(s) IV Push daily  sevelamer carbonate 1600 milliGRAM(s) Oral three times a day  sodium chloride 0.9%. 1000 milliLiter(s) (60 mL/Hr) IV Continuous <Continuous>  sodium chloride 0.9%. 1000 milliLiter(s) (50 mL/Hr) IV Continuous <Continuous>  sodium chloride 0.9%. 1000 milliLiter(s) (50 mL/Hr) IV Continuous <Continuous>  thiamine IVPB 500 milliGRAM(s) IV Intermittent daily  ticagrelor 60 milliGRAM(s) Oral every 12 hours    MEDICATIONS  (PRN):  acetaminophen   Tablet .. 650 milliGRAM(s) Oral every 6 hours PRN Moderate Pain (4 - 6)  sodium chloride 0.65% Nasal 2 Spray(s) Both Nostrils three times a day PRN Nasal Congestion  valproate sodium IVPB 125 milliGRAM(s) IV Intermittent every 8 hours PRN agitation          ***** VITAL SIGNS:   VSS         *****PHYSICAL EXAM:   alert  not cooperative with exam            *****LAB AND IMAGING:                        10.7   7.97  )-----------( 208      ( 10 Aug 2021 06:24 )             35.9               08-10    128<L>  |  89<L>  |  80<H>  ----------------------------<  133<H>  5.6<H>   |  18<L>  |  13.11<H>    Ca    8.6      10 Aug 2021 06:24                           [All pertinent recent Imaging/Reports reviewed]           *****A S S E S S M E N T   A N D   P L A N :          55M PMH ESRD on PD, DM on insulin, CHF EF 25-30% CAD s/p CABG x4,  Patient states that for the last 2 days PTA he has been having increased cough and sweating. He endorsed chills but no measured fevers at home. He denies any sick contacts or recent travel. Patient states that he fell few days ago with no head trauma. Of note patient has right toe gangrenous lesion that has been present for several months, He denies any trauma or pain at the site.       In the ED, Patient was found to be hypotensive to 70s. Patient was given midodrine 10mg x1 and bedside POCUS was completed showing poor cardiac function with diffuse hypokinesis. Patient was unable to maintain adequate SBPs  so required micu admission and pressor support.   Called to eval ams       Problem/Recommendations 1:  ams likely multifactorial metabolic encephalopathy started immediately after cefepime, also other factors include poor po intake, sleep wake cycle disruption hyponatremia   correct metabolic derangements   nephrovite added   thiamine 500 iv q h       limit sedatives   7/15 mental status with improvement   7/19 seemed lethargic  gagging    7/20 mental status improved   7/21 mental status remains stable, still with processing delay     Problem/Recommendations 2:  ecchymosis of the finger tips   r/o emboli   derm eval     eleonora no evidence for endocarditis  7/21 poor po intake        7/23 more alert and responsive   7/24 alert responsive   7/26 more alert sitting up in bed   as per wife, pt ate a meal that she brought in     7/28 lethargic pt reports poor sleep intake   melatonin 5mg for sleep augmentation   8/1 feeling ok   8/10 pt is hypotensive, unable to get PD continues to refuse meds etc   palliative on board goc discussion     Problem/Recommendations 3: poor nutritional intake severe protein caloric malnutrition   encourage po intake   marinol as per gi     nutrition consult ? nephrovite for supplementation   thiamine daily       Thank you for allowing me to participate in the care of this patient. Will continue to follow patient periodically. Please do not hesitate to call me if you have any  questions or if there has been a change in patients neurological status     ________________  Grisel Lainez MD  [facility] ([facility]  [phone]      33 minutes spent on total encounter; more than 50 % of the visit was  spent counseling about plan of care, compliance to diet/exercise and medication regimen and or  coordinating care by the attending physician.      It is advised that stroke patients follow up with CARLYLE Pena @ [phone] in 1- 2 weeks.   Others please follow up with Dr. Michael Nissenbaum [phone]

## 2021-08-11 NOTE — PROVIDER CONTACT NOTE (OTHER) - ASSESSMENT
Pt is A&Ox4, lethargic but arousable. Pt currently refusing all PO evening medications including Midodrine 30mg. Pt submitted MOLST form yesterday. As per GOC note, pt and family prefers comfort care. Pt is A&Ox3, lethargic but arousable. Pt currently refusing all PO evening medications including Midodrine 30mg. Pt submitted MOLST form yesterday. As per GOC note, pt and family prefers comfort care.

## 2021-08-11 NOTE — PROGRESS NOTE ADULT - ASSESSMENT
56 m with    Sepsis resolved  - DC Ceftriaxone  - ID follow   - toe gangrene  - Podiatry follow  - ID follow   - KLAUDIA noted     Epistaxis resolved  - ENT evaluation noted    COPD  - 2L NC overnight because he becomes apneic, sats well  - Sats well on RA while awake    CAD s/p CABG   - Hypotension  - midodrine dose increased to 30 mg TID   - Previous echo with reduced EF  - c/w DAPT  - Cardiology follow    CHF cr systolic  - cardiology follow    AICD  - EP eval noted. Not a good candidate for AICD    Peritoneal Dialysis   - nephrology follow PD, recommend 4x a day. Holding when hypotensive.  - patient on midodrine 30 q8h at home, continue here  - Holding home metoprolol 25 q12hr in setting of hypotension    Diabetes   - HA1C 6.1 on 7/6 suggesting prediabetes  - Tujeo 60 units at bedtime and Victoza at home, started on 30 units at bedtime, adjust as needed based on patient PO intake  - added 2 units insulin pre-meal    Hiccups  - GI follow  - hold Ativan 2 to sedation    Incontinence dermatitis  - wound care    Toe gangrene  - Podiatry follow  - Vascular follow  - PONCHO/PVR noted  - Angiogram if agrees    Finger gangrenous area  - local care    Vertebral fracture T12  - Ortho eval noted   - MRI spine noted  - No intervention    Confusion   - possible delirium  - Psych follow  - Neurology follow  - EEG noted    Dysphagia  - GI follow  - MBS  - ENT follow    functional quadriplegia   - supportive care     Palliative follow re GOC . Appreciate efforts and help.    Possible transfer to PCU.      Pipe Beck MD pager [phone]  56 m with    Sepsis resolved  - DC Ceftriaxone  - ID follow   - toe gangrene  - Podiatry follow  - ID follow   - KLAUDIA noted     Epistaxis resolved  - ENT evaluation noted    COPD  - 2L NC overnight because he becomes apneic, sats well  - Sats well on RA while awake    CAD s/p CABG   - Hypotension  - midodrine dose increased to 30 mg TID   - Previous echo with reduced EF  - c/w DAPT  - Cardiology follow    CHF cr systolic  - cardiology follow    AICD  - EP eval noted. Not a good candidate for AICD    Peritoneal Dialysis   - nephrology follow PD, recommend 4x a day. Holding when hypotensive.  - patient on midodrine 30 q8h at home, continue here  - Holding home metoprolol 25 q12hr in setting of hypotension    Diabetes   - HA1C 6.1 on 7/6 suggesting prediabetes  - Tujeo 60 units at bedtime and Victoza at home, started on 30 units at bedtime, adjust as needed based on patient PO intake  - added 2 units insulin pre-meal    Hiccups  - GI follow  - hold Ativan 2 to sedation    Incontinence dermatitis  - wound care    Toe gangrene  - Podiatry follow  - Vascular follow  - PONCHO/PVR noted  - Angiogram if agrees    Finger gangrenous area  - local care    Vertebral fracture T12  - Ortho eval noted   - MRI spine noted  - No intervention    Confusion   - possible delirium  - Psych follow  - Neurology follow  - EEG noted    Dysphagia  - GI follow  - MBS  - ENT follow    functional quadriplegia   - supportive care     Palliative follow re GOC . Appreciate efforts and help.    Possible transfer to PCU.      d/w wife at length    Pipe Beck MD pager [phone]

## 2021-08-11 NOTE — PROGRESS NOTE ADULT - SUBJECTIVE AND OBJECTIVE BOX
CARDIOLOGY FOLLOW UP - Dr. Best  DATE OF SERVICE: 08-11-21    CC remains hypotensive, continues to refuse medication.   [facility] reviewed     REVIEW OF SYSTEMS:   CONSTITUTIONAL: No fever, weight loss, or fatigue   RESPIRATORY:  No cough, wheezing, chills or hemoptysis; No SOB  CARDIOVASCULAR: No chest pain, palpitations, passing out, dizziness, or leg swelling   GASTROINTESTINAL: No abdominal or epigastric pain. No nausea, vomiting, or hematemesis, no diarreha, or constipation, No melena or hematochezia   VASCULAR: no edema.       PHYSICAL EXAM:  T(C): 36.8 (08-11-21 @ 09:00), Max: 36.8 (08-11-21 @ 09:00)  HR: 90 (08-11-21 @ 09:00) (77 - 90)  BP: 86/62 (08-11-21 @ 09:00) (86/62 - 90/63)  RR: 18 (08-11-21 @ 09:00) (18 - 18)  SpO2: 96% (08-11-21 @ 09:00) (96% - 99%)  Wt(kg): --  I&O's Summary    10 Aug 2021 07:01  -  11 Aug 2021 07:00  --------------------------------------------------------  IN: 690 mL / OUT: 0 mL / NET: 690 mL        Appearance: Nad  Cardiovascular: Normal S1 S2,RRR, No JVD, No murmurs  Respiratory: Lungs clear to auscultation	  Gastrointestinal:  Soft, Non-tender, + BS	  Extremities: Normal range of motion, No clubbing, cyanosis or edema      HOME MEDICATIONS:  aspirin 81 mg oral delayed release tablet: 1 tab(s) orally once a day (29 Dec 2020 18:37)  atorvastatin 80 mg oral tablet: 1 tab(s) orally once a day (at bedtime) (29 Dec 2020 18:37)  epoetin martine: injectable once a month (29 Dec 2020 18:37)  ferrous sulfate 325 mg (65 mg elemental iron) oral tablet: 1 tab(s) orally 3 times a day (29 Dec 2020 18:37)  gabapentin 100 mg oral capsule: 1 cap(s) orally once a day (29 Dec 2020 18:37)  nortriptyline 25 mg oral capsule: 1 cap(s) orally once a day (at bedtime) (29 Dec 2020 18:37)  pantoprazole 40 mg oral delayed release tablet: 1 tab(s) orally once a day (before a meal) (29 Dec 2020 18:37)  Toujeo SoloStar 300 units/mL subcutaneous solution: 60 unit(s) subcutaneous once a day (at bedtime) (08 Jan 2021 12:41)      MEDICATIONS  (STANDING):  aspirin enteric coated 81 milliGRAM(s) Oral daily  atorvastatin 80 milliGRAM(s) Oral at bedtime  BACItracin   Ointment 1 Application(s) Topical two times a day  cadexomer iodine 0.9% Gel 1 Application(s) Topical daily  cefTRIAXone   IVPB 1000 milliGRAM(s) IV Intermittent every 24 hours  chlorhexidine 0.12% Liquid 15 milliLiter(s) Oral Mucosa two times a day  chlorhexidine 2% Cloths 1 Application(s) Topical <User Schedule>  chlorhexidine 2% Cloths 1 Application(s) Topical daily  dextrose 40% Gel 15 Gram(s) Oral once  dextrose 5%. 1000 milliLiter(s) (50 mL/Hr) IV Continuous <Continuous>  dextrose 5%. 1000 milliLiter(s) (100 mL/Hr) IV Continuous <Continuous>  dextrose 50% Injectable 25 Gram(s) IV Push once  dextrose 50% Injectable 12.5 Gram(s) IV Push once  dextrose 50% Injectable 25 Gram(s) IV Push once  dronabinol 5 milliGRAM(s) Oral two times a day  ferrous    sulfate 325 milliGRAM(s) Oral three times a day  fludroCORTISONE 0.1 milliGRAM(s) Oral daily  folic acid 1 milliGRAM(s) Oral daily  gentamicin 0.1% Ointment 1 Application(s) Topical <User Schedule>  glucagon  Injectable 1 milliGRAM(s) IntraMuscular once  heparin   Injectable 5000 Unit(s) SubCutaneous every 12 hours  insulin glargine Injectable (LANTUS) 5 Unit(s) SubCutaneous at bedtime  insulin lispro (ADMELOG) corrective regimen sliding scale   SubCutaneous three times a day before meals  insulin lispro (ADMELOG) corrective regimen sliding scale   SubCutaneous at bedtime  lidocaine   4% Patch 1 Patch Transdermal daily  lidocaine   4% Patch 1 Patch Transdermal daily  melatonin 5 milliGRAM(s) Oral at bedtime  midodrine 30 milliGRAM(s) Oral every 8 hours  Nephro-kristi 1 Tablet(s) Oral daily  pantoprazole  Injectable 40 milliGRAM(s) IV Push daily  sevelamer carbonate 1600 milliGRAM(s) Oral three times a day  sodium chloride 0.9%. 1000 milliLiter(s) (50 mL/Hr) IV Continuous <Continuous>  sodium chloride 0.9%. 1000 milliLiter(s) (60 mL/Hr) IV Continuous <Continuous>  sodium chloride 0.9%. 1000 milliLiter(s) (50 mL/Hr) IV Continuous <Continuous>  thiamine IVPB 500 milliGRAM(s) IV Intermittent daily  ticagrelor 60 milliGRAM(s) Oral every 12 hours      TELEMETRY: 	    ECG:  	  RADIOLOGY:   DIAGNOSTIC TESTING:  [ ] Echocardiogram:  [ ]  Catheterization:  [ ] Stress Test:    OTHER: 	    LABS:	 	                                10.7   7.97  )-----------( 208      ( 10 Aug 2021 06:24 )             35.9     08-10    128<L>  |  89<L>  |  80<H>  ----------------------------<  133<H>  5.6<H>   |  18<L>  |  13.11<H>    Ca    8.6      10 Aug 2021 06:24

## 2021-08-11 NOTE — PROGRESS NOTE ADULT - PROBLEM SELECTOR PLAN 7
Juan is already f/u.   Will continue to f/u for ACP.         Garrick Madera MD   Geriatrics and Palliative Care (GAP) Consult Service    of Geriatric and Palliative Medicine  [facility]      Please page the following number for clinical matters between the hours of 9 am and 5 pm from Monday through Friday : [phone]    After 5pm and on weekends, please see the contact information below:    In the event of newly developing, evolving, or worsening symptoms, please contact the Palliative Medicine team via pager (if the patient is at [facility] #[phone] or if the patient is at [facility] #[phone]) The Geriatric and Palliative Medicine service has coverage 24 hours a day/ 7 days a week to provide medical recommendations regarding symptom management needs via telephone Last HgB A1c 6.1. Currently on Lantus and RISS. Will DC lantus and keep RISS for now. However, if FS < 200 over 24 hours will DC RISS.

## 2021-08-11 NOTE — PROGRESS NOTE ADULT - PROBLEM SELECTOR PLAN 6
See GOC noted dated 8/11.   Patient is DNR/I. On Midodrine. Will continue for now. However, no IV pressors will be provided if not able to tolerate Midodrine PO. This was d/w the patient and his wife and they understood.

## 2021-08-11 NOTE — PROGRESS NOTE ADULT - PROBLEM SELECTOR PLAN 4
On Midodrine. Will continue for now. However, no IV pressors will be provided if not able to tolerate Midodrine PO. Will start Diphenhydramine 12.5mg IV q 6 PRN and also Calamine q 6 hours.

## 2021-08-11 NOTE — PROGRESS NOTE ADULT - TIME-BASED BILLING (NON-CRITICAL CARE)
Time-based billing (NON-critical care)

## 2021-08-11 NOTE — PROVIDER CONTACT NOTE (OTHER) - ACTION/TREATMENT ORDERED:
Continuing to monitor BP and encourage Pt to take medications, Pt still refusing injections, labs and all PO meds at this time.

## 2021-08-11 NOTE — PROGRESS NOTE ADULT - ASSESSMENT
ASSESSMENT/PLAN  56 M PMH ESRD on PD, DM on insulin, CHF EF 25-30% CAD s/p CABG x4 pw with hypotension with fever. Encephalopathy likely related to cefepime ? no obvious source of sepsis apparent as of now, KLAUDIA: Overall thickened valves seen however, no evidence of valvular vegetation is seen.     Hypotension:  on Midodrine for hemodynamic support:      ESRD on PD. Patient TW  61.5kg  ID- on IV antibiotics   hyponatremia-mild;  Hyperkalemia   Hyperphosphatemia: c/ w binders as prescribed   Failure to thrive   Hypotension     RECOMMENDATIONS   -  PD can NOT BE RESUMED due to hypotension....He is not eating and is extremely catabolic   - Palliative care follow up noted.  Functional quadriplegic and the pt clearly DOES NOT WANT AGGRESSIVE CARE   - continue Renvela to 1,600 mg PO TID;     -   DC the Florinef as will not work as on ESRD   - No peg per family. On marinol.    He needs to eat more   -IV abx     DW Dr Beck and Dr Madera and the Wife       Sayed Phonetime  [phone]

## 2021-08-11 NOTE — PROGRESS NOTE ADULT - SUBJECTIVE AND OBJECTIVE BOX
CC: f/u for  hypotension, leukocytosis  Patient reports nothing     REVIEW OF SYSTEMS:  All other review of systems negative (Comprehensive ROS) cannot get    Antimicrobials Day #  :5/5  cefTRIAXone   IVPB 1000 milliGRAM(s) IV Intermittent every 24 hours    Other Medications Reviewed    T(F): 98.2 (08-11-21 @ 17:00), Max: 98.2 (08-11-21 @ 09:00)  HR: 98 (08-11-21 @ 17:00)  BP: 80/59 (08-11-21 @ 17:00)  RR: 18 (08-11-21 @ 17:00)  SpO2: 98% (08-11-21 @ 17:00)  Wt(kg): --    PHYSICAL EXAM:  General: more alert, no acute distress  Eyes:  anicteric, no conjunctival injection, no discharge  Oropharynx: no lesions or injection 	  Neck: supple, without adenopathy  Lungs: clear to auscultation  Heart: regular rate and rhythm; no murmur, rubs or gallops  Abdomen: soft, nondistended, nontender, without mass or organomegaly, catheter clean  Skin: no lesions  Extremities: no clubbing, cyanosis, or edema  Neurologic: alert,  moves all extremities    LAB RESULTS:                        10.7   7.97  )-----------( 208      ( 10 Aug 2021 06:24 )             35.9     08-10    128<L>  |  89<L>  |  80<H>  ----------------------------<  133<H>  5.6<H>   |  18<L>  |  13.11<H>    Ca    8.6      10 Aug 2021 06:24          MICROBIOLOGY:  RECENT CULTURES:  08-08 @ 14:44 .Blood Blood     No growth to date.      08-07 @ 21:04 .Blood Blood     No growth to date.          RADIOLOGY REVIEWED:    < from: MR Head w/wo IV Cont (07.16.21 @ 17:44) >    EXAM:  MR ANGIO BRAIN                          EXAM:  MR BRAIN WAW IC                            PROCEDURE DATE:  07/16/2021            INTERPRETATION:  MR brain with and without gadolinium    CLINICAL INFORMATION:   Altered mental status, sepsis    TECHNIQUE:   Sagittal and axial T1-weighted images, axial FLAIR images, axial T2-weighted images, axial gradient echo images and axial diffusion weighted images of the brain were obtained. Following 6 cc of Gadavist administration, 1.5 cc discarded,isotropic volumetric and fast spin echo T1-weighted images were obtained; this data was reformatted using image post processing software in multiple imaging planes.    FINDINGS:   No prior similar studies are available for review.    The brain demonstrates minimal periventricular white matter ischemia. Old lacunar infarctions are seen within the BILATERAL basal ganglia. Following gadolinium administration no abnormal enhancement occurs.  No acute cerebral cortical infarct is found.   No intracranial hemorrhage is recognized.  No mass effect is found in the brain.    The ventricles, sulci and basal cisterns show global atrophy.    The vertebral and internal carotid arteries demonstrate expected flow voids indicating their patency.    The orbits are unremarkable.  The paranasal sinuses are clear.  The nasal cavity appears intact.  The nasopharynx is symmetric.  The central skull base and petrous temporal bones are intact.  The calvarium appears unremarkable.        MR angiography of the intracranial circulation.  (Non gadolinium technique.)      CLINICAL INFORMATION:  stroke; vascular stenosis.    TECHNIQUE:  MR angiography of intracranial circulation was performed using three-dimensional time of flight technique. Post processing angiographic reconstruction of images was performed. This data set was reconstructed as maximum intensity pixel images and displayed in multiple rotations.    FINDINGS:   No comparison similar studies are available.    The anterior circulation demonstrates intact petrous, cavernous and clinoid segments of the internal carotid arteries.  The anterior cerebral arteries are intact and symmetric. An anterior communicating artery is demonstrated.  The middle cerebral arteries demonstrate intact initial M1 and M2 segments. There are symmetric intact peripheral Sylvian branches.    The posterior circulation demonstrates intact vertebral, basilar and posterior cerebral arteries.    No abnormal vessel termination, vascular malformation or intracranial aneurysm is recognized.        IMPRESSION:    MRI BRAIN: minimal periventricular white matter ischemia. Old lacunar infarctions are seen within the BILATERAL basal ganglia.    MRA brain:   Unremarkable MR angiography of the intracranial circulation.    < end of copied text >    < from: Transesophageal Echocardiogram (07.15.21 @ 18:43) >    Patient name: BELLA MARTINS  YOB: 1965   Age: 56 (M)   MR#: [id number]  Study Date: 7/15/2021  Location: [address] [place], [place] [id number]: Dyllan Diez M.D.  Study quality: Technically good  Referring Physician: Pipe Beck MD  BloodPressure: 103/72 mmHg  Height: 170 cm  Weight: 66 kg  BSA: 1.8 m2  ------------------------------------------------------------------------  PROCEDURE: Transesophageal and transthoracic  echocardiograms with 2-D, M-Mode and complete spectral and  color flow Doppler were performed.  Informed consent was  first obtained for ELEONORA. The patient was sedated - see  anesthesia record.  The procedure was monitored with  automatic blood pressure monitoring, ECG tracings and pulse  oximetry.  The transesophageal probe was placed in the  esophagus posterior to the heart without complications.  Real-time and reconstructed 3-dimensional imaging was  performed with Workstation. Color Doppler analysis was  carried out using both 2D and 3D mapping. Patient was  injected with 10 cc's of aerosolized saline. Patient was  injected with 10 cc's of aerosolized saline.  INDICATION: Endocarditis, valve unspecified (I38)  ------------------------------------------------------------------------  Dimensions:    Normal Values:  LA:            2.0 - 4.0 cm  Ao:     3.0    2.0 - 3.8 cm  SEPTUM: 1.1    0.6 - 1.2 cm  PWT:           0.6 - 1.1 cm  LVIDd:         3.0 - 5.6 cm  LVIDs:         1.8 - 4.0 cm  EF (3D Quantification): 24.5 %  ------------------------------------------------------------------------  Observations:  Mitral Valve: Tethered mitral valve leaflets with normal  opening. Mitral annular calcification. Severe mitral  regurgitation. There are 2 major jets  seen one at at the  medial sapect of P2 and one at the lateral aspect of P2. MR  3D ERO 0.27sqcm and 0.23sqcm.  By PISA ERO 0.27sq cm  (Radius 0.7 , alising velocity 34cm/sec, Vmax 424cm/s)  also 2nd jet ERO 0.2sqcm (radius 0.62) Regurgitant volume  55-60cc.  Aortic Valve/Aorta: Calcified trileaflet aortic valve with  normal opening. Estimated aortic valve area equals 2.6  sqcm. Mild-moderate aortic regurgitation.  Aortic Root: 3 cm.  Left Atrium: No left atrial or left atrial appendage  thrombus. Decreased left atrial appendage velocities noted.  Left Ventricle: Severe global left ventricular systolic  dysfunction. Eccentric left ventricular hypertrophy  (dilated left ventricle with normal relative wall  thickness). Moderate left ventricular enlargement. EDV  198cc, ESV 141cc  IncreasedE/e'  is consistent with  elevated left ventricular filling pressure.  Right Heart: right atrial enlargement. Right ventricular  enlargement with decreased right ventricular systolic  function. Dilated  TV annulus There are two jets seen  with  vena contracta 0.4cm and 0.5q cm. Severe tricuspid  regurgitation. Normal pulmonic valve. Minimal pulmonic  regurgitation.  Pericardium/Pleura: Normal pericardium with no pericardial  effusion.  Hemodynamic: Estimated right atrial pressure is 8 mm Hg.  Estimated right ventricular systolic pressure equals 54 mm  Hg, assuming right atrial pressure equals 8 mm Hg,  consistent with moderate pulmonary hypertension. Contrast  injection demonstrates no evidence of a patent foramen  ovale.  ------------------------------------------------------------------------  Conclusions:  1. Tethered mitral valve leaflets with normal opening.  Mitral annular calcification. Severe mitral regurgitation.  There are 2 major jets  seen one at at the medial sapect of  P2 and one at the lateral aspect of P2. MR 3D ERO 0.27sqcm  and 0.23sqcm.  By PISA ERO 0.27sq cm (Radius 0.7 , alising  velocity 34cm/sec, Vmax 424cm/s)  also 2nd jet ERO 0.2sqcm  (radius 0.62) Regurgitant volume 55-60cc.  2. Calcified trileaflet aortic valve with normal opening.  Mild-moderate aortic regurgitation.  3. Eccentric left ventricular hypertrophy (dilated left  ventricle with normal relative wall thickness). Moderate  left ventricular enlargement. EDV 198cc, ESV 141cc  4. Severe global leftventricular systolic dysfunction.  5. Right ventricular enlargement with decreased right  ventricular systolic function.  6. Dilated  TV annulus There are two jets seen  with vena  contracta 0.4cm and 0.5q cm. Severe tricuspid  regurgitation.  7. Overall thickened valves seen however, no evidence of  valvular vegetation is seen.  ------------------------------------------------------------------------    < end of copied text >          Assessment:  patient with cad, dm, esrd on pd, very poor ef, recent ohs for piece of catheter in chest after pci, recurrent stays for hypotension and concern raised  for sepsis but no infection found specifically each time. Had a rise of wbc last week, empiric ct commenced but no infetion found. Suspect chronic low flow state due to poor cardiac function . blood cx, mri brain, eleonora, ct cap not suggestive of infection  Plan:  stop ctx and monitor off antibiotics  f/u final cultures  supportive care

## 2021-08-11 NOTE — PROGRESS NOTE ADULT - ASSESSMENT
55 yo Male h/o CAD, CHF (EF 24.5%. Severe MR. Also with RV dysfunction), COPD, on PD 2/2 ESRD p/w hypotension 2/2 sepsis likely 2/2 toe gangrene.  Palliative care called for GOC and ACP.

## 2021-08-11 NOTE — PROVIDER CONTACT NOTE (OTHER) - BACKGROUND
Pt BP 80/59 and Pt refusing all medications including midodrine. Pt has no had PD in 5 days. Pt A/Ox2-3 and awake and alert aysmptomatic of low BP. Pt DNR and DNI, BP 80/59 and Pt refusing all medications including midodrine. Pt has no had PD in 5 days. Pt A/Ox2-3 and awake and alert aysmptomatic of low BP.

## 2021-08-11 NOTE — PROGRESS NOTE ADULT - PROBLEM SELECTOR PLAN 1
No plans for any further PD.   At this point and as indicated on my GOC noted dated 8/10, goals are for preventing and treating any distressful symptoms. Will transfer to PCU for symptoms monitoring and Rx. May consider hospice eval if meeting inpatient hospice criteria.  Will add Dilaudid IV 0.2mg q 2PRN pain or dyspnea, Glyco 0.4mg IV q 6 PRN No plans for any further PD.   At this point and as indicated on my GOC noted dated 8/10, goals are for preventing and treating any distressful symptoms. Will transfer to PCU for symptoms monitoring and Rx. May consider hospice eval if meeting inpatient hospice criteria.  Will add Dilaudid IV 0.2mg q 2PRN pain or dyspnea, Glyco 0.4mg IV q 6 PRN secretions, and Ativan 0.2mg IV q 6PRN for intractable symptoms.

## 2021-08-11 NOTE — PROVIDER CONTACT NOTE (OTHER) - ACTION/TREATMENT ORDERED:
as per MEGA Monroe, reschedule meds to 0800 as per MEGA Monroe, reschedule meds to 0800. Will endorse to day RN to try to do AM labs and V/S

## 2021-08-11 NOTE — PROGRESS NOTE ADULT - PROBLEM SELECTOR PLAN 3
Continue Marinol. .   Wife and patient (as per wife) are not looking for a PEG. Nor he appears to be a candidate due to PD Will start Dilaudid 0.2mg IV q 2 PRN

## 2021-08-11 NOTE — PROGRESS NOTE ADULT - SUBJECTIVE AND OBJECTIVE BOX
Patient is a 56y old  Male who presents with a chief complaint of Hypotension (11 Aug 2021 18:30)      SUBJECTIVE / OVERNIGHT EVENTS: Comfortable without new complaints. Wife at bedside.  Review of Systems  chest pain no  palpitations no  sob no  nausea no  headache no    MEDICATIONS  (STANDING):  aspirin enteric coated 81 milliGRAM(s) Oral daily  BACItracin   Ointment 1 Application(s) Topical two times a day  cadexomer iodine 0.9% Gel 1 Application(s) Topical daily  calamine/zinc oxide Lotion 1 Application(s) Topical every 6 hours  chlorhexidine 0.12% Liquid 15 milliLiter(s) Oral Mucosa two times a day  chlorhexidine 2% Cloths 1 Application(s) Topical <User Schedule>  chlorhexidine 2% Cloths 1 Application(s) Topical daily  dronabinol 5 milliGRAM(s) Oral two times a day  fludroCORTISONE 0.1 milliGRAM(s) Oral daily  gentamicin 0.1% Ointment 1 Application(s) Topical <User Schedule>  glucagon  Injectable 1 milliGRAM(s) IntraMuscular once  heparin   Injectable 5000 Unit(s) SubCutaneous every 12 hours  insulin lispro (ADMELOG) corrective regimen sliding scale   SubCutaneous at bedtime  lidocaine   4% Patch 1 Patch Transdermal daily  lidocaine   4% Patch 1 Patch Transdermal daily  melatonin 5 milliGRAM(s) Oral at bedtime  midodrine 30 milliGRAM(s) Oral every 8 hours  Nephro-kristi 1 Tablet(s) Oral daily  pantoprazole  Injectable 40 milliGRAM(s) IV Push daily  sevelamer carbonate 1600 milliGRAM(s) Oral three times a day  sodium chloride 0.9%. 1000 milliLiter(s) (60 mL/Hr) IV Continuous <Continuous>  sodium chloride 0.9%. 1000 milliLiter(s) (50 mL/Hr) IV Continuous <Continuous>  sodium chloride 0.9%. 1000 milliLiter(s) (50 mL/Hr) IV Continuous <Continuous>  ticagrelor 60 milliGRAM(s) Oral every 12 hours    MEDICATIONS  (PRN):  acetaminophen   Tablet .. 650 milliGRAM(s) Oral every 6 hours PRN Moderate Pain (4 - 6)  diphenhydrAMINE   Injectable 12.5 milliGRAM(s) IV Push every 6 hours PRN Pruritus  HYDROmorphone  Injectable 0.2 milliGRAM(s) IV Push every 2 hours PRN Moderate to severe pain  HYDROmorphone  Injectable 0.2 milliGRAM(s) IV Push every 2 hours PRN Labored breathing or dyspnea  LORazepam   Injectable 0.2 milliGRAM(s) IV Push every 6 hours PRN Anxiety, agitation, or intractable respiratory distress.  sodium chloride 0.65% Nasal 2 Spray(s) Both Nostrils three times a day PRN Nasal Congestion  valproate sodium IVPB 125 milliGRAM(s) IV Intermittent every 8 hours PRN agitation      Vital Signs Last 24 Hrs  T(C): 36.8 (11 Aug 2021 17:00), Max: 36.8 (11 Aug 2021 09:00)  T(F): 98.2 (11 Aug 2021 17:00), Max: 98.2 (11 Aug 2021 09:00)  HR: 98 (11 Aug 2021 17:00) (89 - 98)  BP: 80/59 (11 Aug 2021 17:00) (80/59 - 90/63)  BP(mean): --  RR: 18 (11 Aug 2021 17:00) (18 - 18)  SpO2: 98% (11 Aug 2021 17:00) (96% - 100%)    PHYSICAL EXAM:  GENERAL: NAD frail   HEAD:  Atraumatic, Normocephalic  EYES: EOMI, PERRLA, conjunctiva and sclera clear  NECK: Supple, No JVD  CHEST/LUNG: Clear to auscultation bilaterally; No wheeze  HEART: Regular rate and rhythm; No murmurs, rubs, or gallops  ABDOMEN: Soft, Nontender, Nondistended; Bowel sounds present  EXTREMITIES:  2ndr toe gangrene R thumb gangrene.    PSYCH: confused  NEUROLOGY: non-focal  SKIN: No rashes or lesions    LABS:                        10.7   7.97  )-----------( 208      ( 10 Aug 2021 06:24 )             35.9     08-10    128<L>  |  89<L>  |  80<H>  ----------------------------<  133<H>  5.6<H>   |  18<L>  |  13.11<H>    Ca    8.6      10 Aug 2021 06:24                  RADIOLOGY & ADDITIONAL TESTS:    Imaging Personally Reviewed:    Consultant(s) Notes Reviewed:      Care Discussed with Consultants/Other Providers:

## 2021-08-11 NOTE — PROVIDER CONTACT NOTE (OTHER) - SITUATION
Discussed the importance of blood sugar control in the prevention of ocular complications. Pt BP 80/59 and Pt refusing all medications including midodrine. Pt A/Ox2-3 and awake and alert aysmptomatic of low BP.

## 2021-08-11 NOTE — PROGRESS NOTE ADULT - SUBJECTIVE AND OBJECTIVE BOX
language line Cantonese translators ID   SUBJECTIVE AND OBJECTIVE:  INTERVAL HPI/OVERNIGHT EVENTS:  The patient denied any pain or dyspnea. He was c/o weakness. Not able to appropriately tolerate PD due to hypotension. As noticed in Nephrology notes, "PD can NOT BE RESUMED due to hypotension....He is not eating and is extremely catabolic."     DNR on chart: No   Allergies    doxazosin (Other)    Intolerances      MEDICATIONS  (STANDING):  aspirin enteric coated 81 milliGRAM(s) Oral daily  atorvastatin 80 milliGRAM(s) Oral at bedtime  BACItracin   Ointment 1 Application(s) Topical two times a day  cadexomer iodine 0.9% Gel 1 Application(s) Topical daily  cefTRIAXone   IVPB 1000 milliGRAM(s) IV Intermittent every 24 hours  chlorhexidine 0.12% Liquid 15 milliLiter(s) Oral Mucosa two times a day  chlorhexidine 2% Cloths 1 Application(s) Topical <User Schedule>  chlorhexidine 2% Cloths 1 Application(s) Topical daily  dextrose 40% Gel 15 Gram(s) Oral once  dextrose 5%. 1000 milliLiter(s) (100 mL/Hr) IV Continuous <Continuous>  dextrose 5%. 1000 milliLiter(s) (50 mL/Hr) IV Continuous <Continuous>  dextrose 50% Injectable 25 Gram(s) IV Push once  dextrose 50% Injectable 12.5 Gram(s) IV Push once  dextrose 50% Injectable 25 Gram(s) IV Push once  dronabinol 5 milliGRAM(s) Oral two times a day  ferrous    sulfate 325 milliGRAM(s) Oral three times a day  fludroCORTISONE 0.1 milliGRAM(s) Oral daily  folic acid 1 milliGRAM(s) Oral daily  gentamicin 0.1% Ointment 1 Application(s) Topical <User Schedule>  glucagon  Injectable 1 milliGRAM(s) IntraMuscular once  heparin   Injectable 5000 Unit(s) SubCutaneous every 12 hours  insulin glargine Injectable (LANTUS) 5 Unit(s) SubCutaneous at bedtime  insulin lispro (ADMELOG) corrective regimen sliding scale   SubCutaneous three times a day before meals  insulin lispro (ADMELOG) corrective regimen sliding scale   SubCutaneous at bedtime  lidocaine   4% Patch 1 Patch Transdermal daily  lidocaine   4% Patch 1 Patch Transdermal daily  melatonin 5 milliGRAM(s) Oral at bedtime  midodrine 30 milliGRAM(s) Oral every 8 hours  Nephro-kristi 1 Tablet(s) Oral daily  pantoprazole  Injectable 40 milliGRAM(s) IV Push daily  sevelamer carbonate 1600 milliGRAM(s) Oral three times a day  sodium chloride 0.9%. 1000 milliLiter(s) (50 mL/Hr) IV Continuous <Continuous>  sodium chloride 0.9%. 1000 milliLiter(s) (60 mL/Hr) IV Continuous <Continuous>  sodium chloride 0.9%. 1000 milliLiter(s) (50 mL/Hr) IV Continuous <Continuous>  thiamine IVPB 500 milliGRAM(s) IV Intermittent daily  ticagrelor 60 milliGRAM(s) Oral every 12 hours    MEDICATIONS  (PRN):  acetaminophen   Tablet .. 650 milliGRAM(s) Oral every 6 hours PRN Moderate Pain (4 - 6)  sodium chloride 0.65% Nasal 2 Spray(s) Both Nostrils three times a day PRN Nasal Congestion  valproate sodium IVPB 125 milliGRAM(s) IV Intermittent every 8 hours PRN agitation    ITEMS UNCHECKED ARE NOT PRESENT    PRESENT SYMPTOMS: [ ]Unable to obtain due to poor mentation   Source if other than patient:  [ ]Family   [ ]Team     Pain:  [ ]yes [x ]no  QOL impact -   Location -                    Aggravating factors -  Quality -  Radiation -  Timing-  Severity (0-10 scale):  Minimal acceptable level (0-10 scale):     Dyspnea:                           [ ]Mild [ ]Moderate [ ]Severe  Anxiety:                             [ ]Mild [ ]Moderate [ ]Severe  Fatigue:                             [ ]Mild [ ]Moderate [ ]Severe  Nausea:                             [ ]Mild [ ]Moderate [ ]Severe  Loss of appetite:              [ ]Mild [ ]Moderate [ ]Severe  Constipation:                    [ ]Mild [ ]Moderate [ ]Severe    CPOT:    https://www.[facility].org/getattachment/[email or web address]/Critical-Care-Pain-Observation-Tool-(CPOT)    PAIN AD Score:	  http://geriatrictoolkit.[place].[facility]/cog/painad.pdf (Ctrl + left click to view)    Other Symptoms:  [x ]All other review of systems negative     Palliative Performance Status Version 2:   40      %      http://npcrc.org/files/news/palliative_performance_scale_ppsv2.pdf  PHYSICAL EXAM:  Vital Signs Last 24 Hrs  T(C): 36.8 (11 Aug 2021 09:00), Max: 36.8 (11 Aug 2021 09:00)  T(F): 98.2 (11 Aug 2021 09:00), Max: 98.2 (11 Aug 2021 09:00)  HR: 90 (11 Aug 2021 09:00) (77 - 90)  BP: 86/62 (11 Aug 2021 09:00) (86/62 - 90/63)  BP(mean): --  RR: 18 (11 Aug 2021 09:00) (18 - 18)  SpO2: 96% (11 Aug 2021 09:00) (96% - 99%)     GENERAL:  [ x]Alert  [x ]Oriented x  2 [ ]Lethargic  [ ]Cachexia  [ ]Unarousable  [x ]Verbal  [ ]Non-Verbal  Behavioral:   [ ]Anxiety  [ ]Delirium [ ]Agitation [ ]Other  HEENT:  [ ]Normal   [ ]Dry mouth   [ ]ET Tube/Trach  [ ]Oral lesions  PULMONARY:   [x]Clear [ ]Tachypnea  [ ]Audible excessive secretions   [ ]Rhonchi        [ ]Right [ ]Left [ ]Bilateral  [ ]Crackles        [ ]Right [ ]Left [ ]Bilateral  [ ]Wheezing     [ ]Right [ ]Left [ ]Bilateral  [ ]Diminished BS [ ] Right [ ]Left [ ]Bilateral  CARDIOVASCULAR:    [x ]Regular [ ]Irregular [ ]Tachy  [ ]Dhaval [ ]Murmur [ ]Other  GASTROINTESTINAL:  [x ]Soft  [ ]Distended   [x ]+BS  [ ]Non tender [ ]Tender  [ ]PEG [ ]OGT/ NGT   Last BM: 8/11  Tunneled catheter.     GENITOURINARY:  [ ]Normal [ ]Incontinent   [x ]Oliguria/Anuria   [ ]Johnson  MUSCULOSKELETAL:   [ ]Normal   [x ]Weakness  [ ]Bed/Wheelchair bound [ ]Edema  NEUROLOGIC:   [x ]No focal deficits  [ ] Cognitive impairment  [ ] Dysphagia [ ]Dysarthria [ ] Paresis [ ]Other   SKIN:   [x ]Normal  [ ]Rash   [ ]Pressure ulcer(s) [ ]y [ ]n present on admission    CRITICAL CARE:  [ ]Shock Present  [ ]Septic [ ]Cardiogenic [ ]Neurologic [ ]Hypovolemic  [ ]Vasopressors [ ]Inotropes  [ ]Respiratory failure present [ ]Mechanical Ventilation [ ]Non-invasive ventilatory support [ ]High-Flow   [ ]Acute  [ ]Chronic [ ]Hypoxic  [ ]Hypercarbic [ ]Other  [ ]Other organ failure     LABS:                                                     10.7   7.97  )-----------( 208      ( 10 Aug 2021 06:24 )             35.9   08-10    128<L>  |  89<L>  |  80<H>  ----------------------------<  133<H>  5.6<H>   |  18<L>  |  13.11<H>    Ca    8.6      10 Aug 2021 06:24        RADIOLOGY & ADDITIONAL STUDIES: Reviewed.     Protein Calorie Malnutrition Present: [ ]mild [ ]moderate [ ]severe [ ]underweight [ ]morbid obesity  https://www.andeal.org/vault/2440/web/files/ONC/Table_Clinical%20Characteristics%20to%20Document%20Malnutrition-White%20JV%20et%20al%202012.pdf    Height (cm): 170.2 (07-15-21 @ 15:14), 170.2 (12-29-20 @ 13:29), 170.2 (12-14-20 @ 09:52)  Weight (kg): 63.5 (07-15-21 @ 15:14), 81.6 (12-29-20 @ 13:29), 76.2 (12-14-20 @ 09:52)  BMI (kg/m2): 21.9 (07-15-21 @ 15:14), 28.2 (12-29-20 @ 13:29), 26.3 (12-14-20 @ 09:52)    [ ]PPSV2 < or = 30%  [ ]significant weight loss [ ]poor nutritional intake [ ]anasarca    [ ]Artificial Nutrition    REFERRALS:   [ ]Chaplaincy  [ ]Hospice  [ ]Child Life  [ ]Social Work  [ ]Case management [ ]Holistic Therapy     Goals of Care Document:     language line [facility] translators ID [id number]  SUBJECTIVE AND OBJECTIVE:  INTERVAL HPI/OVERNIGHT EVENTS:  The patient indicated mail pain over his legs. He was c/o mild episodic dyspnea. However, his main complaint was generalized pruritus. He was also c/o weakness. Again, he was not able to appropriately tolerate PD due to hypotension. As noticed in Nephrology notes, "PD can NOT BE RESUMED due to hypotension....He is not eating and is extremely catabolic." He was seen and examined with his wife and daughter by his bedside.     DNR on chart: No   Allergies    doxazosin (Other)    Intolerances      MEDICATIONS  (STANDING):  aspirin enteric coated 81 milliGRAM(s) Oral daily  atorvastatin 80 milliGRAM(s) Oral at bedtime  BACItracin   Ointment 1 Application(s) Topical two times a day  cadexomer iodine 0.9% Gel 1 Application(s) Topical daily  cefTRIAXone   IVPB 1000 milliGRAM(s) IV Intermittent every 24 hours  chlorhexidine 0.12% Liquid 15 milliLiter(s) Oral Mucosa two times a day  chlorhexidine 2% Cloths 1 Application(s) Topical <User Schedule>  chlorhexidine 2% Cloths 1 Application(s) Topical daily  dextrose 40% Gel 15 Gram(s) Oral once  dextrose 5%. 1000 milliLiter(s) (100 mL/Hr) IV Continuous <Continuous>  dextrose 5%. 1000 milliLiter(s) (50 mL/Hr) IV Continuous <Continuous>  dextrose 50% Injectable 25 Gram(s) IV Push once  dextrose 50% Injectable 12.5 Gram(s) IV Push once  dextrose 50% Injectable 25 Gram(s) IV Push once  dronabinol 5 milliGRAM(s) Oral two times a day  ferrous    sulfate 325 milliGRAM(s) Oral three times a day  fludroCORTISONE 0.1 milliGRAM(s) Oral daily  folic acid 1 milliGRAM(s) Oral daily  gentamicin 0.1% Ointment 1 Application(s) Topical <User Schedule>  glucagon  Injectable 1 milliGRAM(s) IntraMuscular once  heparin   Injectable 5000 Unit(s) SubCutaneous every 12 hours  insulin glargine Injectable (LANTUS) 5 Unit(s) SubCutaneous at bedtime  insulin lispro (ADMELOG) corrective regimen sliding scale   SubCutaneous three times a day before meals  insulin lispro (ADMELOG) corrective regimen sliding scale   SubCutaneous at bedtime  lidocaine   4% Patch 1 Patch Transdermal daily  lidocaine   4% Patch 1 Patch Transdermal daily  melatonin 5 milliGRAM(s) Oral at bedtime  midodrine 30 milliGRAM(s) Oral every 8 hours  Nephro-kristi 1 Tablet(s) Oral daily  pantoprazole  Injectable 40 milliGRAM(s) IV Push daily  sevelamer carbonate 1600 milliGRAM(s) Oral three times a day  sodium chloride 0.9%. 1000 milliLiter(s) (50 mL/Hr) IV Continuous <Continuous>  sodium chloride 0.9%. 1000 milliLiter(s) (60 mL/Hr) IV Continuous <Continuous>  sodium chloride 0.9%. 1000 milliLiter(s) (50 mL/Hr) IV Continuous <Continuous>  thiamine IVPB 500 milliGRAM(s) IV Intermittent daily  ticagrelor 60 milliGRAM(s) Oral every 12 hours    MEDICATIONS  (PRN):  acetaminophen   Tablet .. 650 milliGRAM(s) Oral every 6 hours PRN Moderate Pain (4 - 6)  sodium chloride 0.65% Nasal 2 Spray(s) Both Nostrils three times a day PRN Nasal Congestion  valproate sodium IVPB 125 milliGRAM(s) IV Intermittent every 8 hours PRN agitation    ITEMS UNCHECKED ARE NOT PRESENT    PRESENT SYMPTOMS: [ ]Unable to obtain due to poor mentation   Source if other than patient:  [ ]Family   [ ]Team     Pain:  [x ]yes [ ]no  QOL impact - not able to be comfortable.   Location -                  legs   Aggravating factors - none   Quality - soreness   Radiation - none   Timing- intermittent   Severity (0-10 scale): mild  Minimal acceptable level (0-10 scale): mild to moderate     Dyspnea:                           [x ]Mild [ ]Moderate [ ]Severe but not at this time.   Anxiety:                             [ ]Mild [ ]Moderate [ ]Severe  Fatigue:                             [ ]Mild [ ]Moderate [ ]Severe  Nausea:                             [ ]Mild [ ]Moderate [ ]Severe  Loss of appetite:              [ ]Mild [ ]Moderate [ ]Severe  Constipation:                    [ ]Mild [ ]Moderate [ ]Severe    CPOT:    https://www.[facility].org/getattachment/[email or web address]/Critical-Care-Pain-Observation-Tool-(CPOT)    PAIN AD Score:	  http://geriatrictoolkit.[place].edu/cog/painad.pdf (Ctrl + left click to view)    Other Symptoms:  [x ]All other review of systems negative but as per subjective/objective.     Palliative Performance Status Version 2:   30      %      http://npcrc.org/files/news/palliative_performance_scale_ppsv2.pdf  PHYSICAL EXAM:  Vital Signs Last 24 Hrs  T(C): 36.8 (11 Aug 2021 09:00), Max: 36.8 (11 Aug 2021 09:00)  T(F): 98.2 (11 Aug 2021 09:00), Max: 98.2 (11 Aug 2021 09:00)  HR: 90 (11 Aug 2021 09:00) (77 - 90)  BP: 86/62 (11 Aug 2021 09:00) (86/62 - 90/63)  BP(mean): --  RR: 18 (11 Aug 2021 09:00) (18 - 18)  SpO2: 96% (11 Aug 2021 09:00) (96% - 99%)     GENERAL:  [ x]Alert  [x ]Oriented x  2 [ ]Lethargic  [ ]Cachexia  [ ]Unarousable  [x ]Verbal  [ ]Non-Verbal  Behavioral:   [ ]Anxiety  [ ]Delirium [ ]Agitation [ ]Other  HEENT:  [ ]Normal   [ ]Dry mouth   [ ]ET Tube/Trach  [ ]Oral lesions  PULMONARY:   [x]Clear [ ]Tachypnea  [ ]Audible excessive secretions   [ ]Rhonchi        [ ]Right [ ]Left [ ]Bilateral  [ ]Crackles        [ ]Right [ ]Left [ ]Bilateral  [ ]Wheezing     [ ]Right [ ]Left [ ]Bilateral  [ ]Diminished BS [ ] Right [ ]Left [ ]Bilateral  CARDIOVASCULAR:    [ ]Regular [ ]Irregular [x ]Tachy  [ ]Dhaval [ ]Murmur [ ]Other  GASTROINTESTINAL:  [x ]Soft  [ ]Distended   [x ]+BS  [ ]Non tender [ ]Tender  [ ]PEG [ ]OGT/ NGT   Last BM: 8/11  Tunneled catheter.     GENITOURINARY:  [ ]Normal [ ]Incontinent   [x ]Oliguria/Anuria   [ ]Johnson  MUSCULOSKELETAL:   [ ]Normal   [x ]Weakness  [ ]Bed/Wheelchair bound [ ]Edema  NEUROLOGIC:   [x ]No focal deficits  [ ] Cognitive impairment  [ ] Dysphagia [ ]Dysarthria [ ] Paresis [ ]Other   SKIN:   [x ]Normal  [ ]Rash   [ ]Pressure ulcer(s) [ ]y [ ]n present on admission    CRITICAL CARE:  [ ]Shock Present  [ ]Septic [ ]Cardiogenic [ ]Neurologic [ ]Hypovolemic  [ ]Vasopressors [ ]Inotropes  [ ]Respiratory failure present [ ]Mechanical Ventilation [ ]Non-invasive ventilatory support [ ]High-Flow   [ ]Acute  [ ]Chronic [ ]Hypoxic  [ ]Hypercarbic [ ]Other  [ ]Other organ failure     LABS:                                                     10.7   7.97  )-----------( 208      ( 10 Aug 2021 06:24 )             35.9   08-10    128<L>  |  89<L>  |  80<H>  ----------------------------<  133<H>  5.6<H>   |  18<L>  |  13.11<H>    Ca    8.6      10 Aug 2021 06:24        RADIOLOGY & ADDITIONAL STUDIES: Reviewed.     Protein Calorie Malnutrition Present: [ ]mild [ ]moderate [ ]severe [ ]underweight [ ]morbid obesity  https://www.andeal.org/vault/2440/web/files/ONC/Table_Clinical%20Characteristics%20to%20Document%20Malnutrition-White%20JV%20et%20al%260868.pdf    Height (cm): 170.2 (07-15-21 @ 15:14), 170.2 (12-29-20 @ 13:29), 170.2 (12-14-20 @ 09:52)  Weight (kg): 63.5 (07-15-21 @ 15:14), 81.6 (12-29-20 @ 13:29), 76.2 (12-14-20 @ 09:52)  BMI (kg/m2): 21.9 (07-15-21 @ 15:14), 28.2 (12-29-20 @ 13:29), 26.3 (12-14-20 @ 09:52)    [ ]PPSV2 < or = 30%  [ ]significant weight loss [ ]poor nutritional intake [ ]anasarca    [ ]Artificial Nutrition    REFERRALS:   [ ]Chaplaincy  [ ]Hospice  [ ]Child Life  [ ]Social Work  [ ]Case management [ ]Holistic Therapy     Goals of Care Document:

## 2021-08-11 NOTE — PROGRESS NOTE ADULT - PROBLEM SELECTOR PLAN 10
Hernanlissy is already f/u.   As per last ID's note, will stop Abx.   Will transfer to PCU when a bed becomes available.         Garrick Madera MD   Geriatrics and Palliative Care (GAP) Consult Service    of Geriatric and Palliative Medicine  [facility]      Please page the following number for clinical matters between the hours of 9 am and 5 pm from Monday through Friday : [phone]    After 5pm and on weekends, please see the contact information below:    In the event of newly developing, evolving, or worsening symptoms, please contact the Palliative Medicine team via pager (if the patient is at [facility] #[phone] or if the patient is at [facility] #[phone]) The Geriatric and Palliative Medicine service has coverage 24 hours a day/ 7 days a week to provide medical recommendations regarding symptom management needs via telephone Juan is already f/u.     Will transfer to PCU when a bed becomes available.         Garrick Madera MD   Geriatrics and Palliative Care (GAP) Consult Service    of Geriatric and Palliative Medicine  [facility]      Please page the following number for clinical matters between the hours of 9 am and 5 pm from Monday through Friday : [phone]    After 5pm and on weekends, please see the contact information below:    In the event of newly developing, evolving, or worsening symptoms, please contact the Palliative Medicine team via pager (if the patient is at [facility] #[phone] or if the patient is at [facility] #[phone]) The Geriatric and Palliative Medicine service has coverage 24 hours a day/ 7 days a week to provide medical recommendations regarding symptom management needs via telephone

## 2021-08-11 NOTE — PROGRESS NOTE ADULT - PROBLEM SELECTOR PLAN 9
See [facility] noted dated 8/11.   Today (8/11) I f/u with the patient and his wife. They understand PD is not possible due to hypotension. At this point the patient is looking for optimizing his symptomatic management. He understand his prognosis is of hours to days. Will continue oral pressors if able to take them. Will need to i  Patient is DNR/I. See [facility] noted dated 8/11.   Today (8/11) I f/u with the patient and his wife. They understand PD is not possible due to hypotension. At this point the patient is looking for optimizing his symptomatic management. He understand his prognosis is of hours to days. Will continue oral pressors if able to take them. As per last ID's note, will stop Abx.   Patient is DNR/I.

## 2021-08-11 NOTE — PROGRESS NOTE ADULT - TIME BILLING
Issues pertaining to PD and ensuing hypotension   Failure to thrive
.
.
End of life care and the logistics of stopping HD in a pt with failure to thrive and cachexia   The pt's wishes have been made clear to the wife and he is likely to be going to palliative unit
Lucien Rooney MD, FACP, FACG, AGAF  [facility]  [phone]     After hours and weekend coverage GI service : [phone]
Agree with above NP note  cv stable  KLAUDIA w/o evidence of endocarditis   cont mido as ordered  eps f/u noted  not candidate for icd for primary prevention
Agree with above NP note  cv stable  KLAUDIA w/o evidence of endocarditis   cont mido as ordered  eps f/u noted  not candidate for icd for primary prevention
Agree with above NP note  cv stable  KLAUDIA w/o evidence of endocarditis   cont mido as ordered  family open to icd eval   eps f/u noted  not candidate for icd for primary prevention
Agree with above NP note  cv stable  KLAUDIA w/o evidence of endocarditis   cont mido as ordered  refusing icd for primary prevention
Agree with above NP note.  cv stable  cont current tx  hypotension noted, cont midodrine as needed
Agree with above NP note.  cv stable  cont current tx  hypotension noted, cont midodrine as needed
Agree with above NP note  cv stable  KLAUDIA w/o evidence of endocarditis   cont mido as ordered  eps f/u noted  not candidate for icd for primary prevention
Agree with above NP note.  cv stable  KLAUDIA w/o evidence of endocarditis   cont mido as ordered
Agree with above NP note.  mgmt of delirium per medicine/psych  cv stable  cont mido as ordered  eps eval for icd
agree with the above assessment and plan by CARLYLE Cosby.  Cont broad spectrum IV abx  Wean off vasopressor support per MICU  Cont mido  BB/Acei on hold  cont vol removal w PD  ECHO w severe LV dysfunction, mod AS
Agree with above NP note  cv stable  KLAUDIA w/o evidence of endocarditis   cont mido as ordered  eps f/u noted  not candidate for icd for primary prevention
Agree with above NP note  cv stable  KLAUDIA w/o evidence of endocarditis   cont mido as ordered  refusing icd for primary prevention
Agree with above NP note  cv stable  KLAUDIA w/o evidence of endocarditis   cont mido as ordered  refusing icd for primary prevention
Agree with above NP note.  cv stable  KLAUDIA w/o evidence of endocarditis   cont mido as ordered
Agree with above NP note.  cv stable  KLAUDIA w/o evidence of endocarditis   cont mido as ordered
Agree with above NP note.  cv stable  KLAUDIA w/o evidence of endocarditis   cont mido as ordered  eps eval for icd when mental status improved  pt has declined icd in the past
Agree with above NP note.  cv stable  cont abx  s/p vasopressor support   Cont mido  BB/Acei on hold  cont vol removal w PD  ECHO w severe LV dysfunction, mod AS  eps eval for eventual icd if agrees as outpt
Agree with above NP note.  cv stable  cont current tx  hypotension noted, cont midodrine as needed
Agree with above NP note.  mgmt of delirium per medicine/psych  cv stable  cont mido as ordered  eps eval for icd when mental status improves(pt has declined icd in the past?
Patient seen and examined.  Agree with above NP note.  cv stable  cont mido as ordered  eps eval for icd
Agree with above NP note  cv stable  KLAUDIA w/o evidence of endocarditis   cont mido as ordered  eps f/u noted  not candidate for icd for primary prevention
Agree with above NP note  cv stable  KLAUDIA w/o evidence of endocarditis   cont mido as ordered  refusing icd for primary prevention
Agree with above NP note.  cv stable  cont iv abx  s/p vasopressor support   Cont mido  BB/Acei on hold  cont vol removal w PD  ECHO w severe LV dysfunction, mod AS  eps eval for eventual icd if agrees

## 2021-08-11 NOTE — PROGRESS NOTE ADULT - SUBJECTIVE AND OBJECTIVE BOX
[facility] [phone]        Patient seen and examined in bed.  He was hypotensive all night and cont to remains so         MEDICATIONS  (STANDING):  aspirin enteric coated 81 milliGRAM(s) Oral daily  atorvastatin 80 milliGRAM(s) Oral at bedtime  BACItracin   Ointment 1 Application(s) Topical two times a day  cadexomer iodine 0.9% Gel 1 Application(s) Topical daily  cefTRIAXone   IVPB 1000 milliGRAM(s) IV Intermittent every 24 hours  chlorhexidine 0.12% Liquid 15 milliLiter(s) Oral Mucosa two times a day  chlorhexidine 2% Cloths 1 Application(s) Topical <User Schedule>  chlorhexidine 2% Cloths 1 Application(s) Topical daily  dextrose 40% Gel 15 Gram(s) Oral once  dextrose 5%. 1000 milliLiter(s) (100 mL/Hr) IV Continuous <Continuous>  dextrose 5%. 1000 milliLiter(s) (50 mL/Hr) IV Continuous <Continuous>  dextrose 50% Injectable 25 Gram(s) IV Push once  dextrose 50% Injectable 12.5 Gram(s) IV Push once  dextrose 50% Injectable 25 Gram(s) IV Push once  ferrous    sulfate 325 milliGRAM(s) Oral three times a day  fludroCORTISONE 0.1 milliGRAM(s) Oral daily  folic acid 1 milliGRAM(s) Oral daily  gentamicin 0.1% Ointment 1 Application(s) Topical <User Schedule>  glucagon  Injectable 1 milliGRAM(s) IntraMuscular once  heparin   Injectable 5000 Unit(s) SubCutaneous every 12 hours  insulin glargine Injectable (LANTUS) 5 Unit(s) SubCutaneous at bedtime  insulin lispro (ADMELOG) corrective regimen sliding scale   SubCutaneous three times a day before meals  insulin lispro (ADMELOG) corrective regimen sliding scale   SubCutaneous at bedtime  lidocaine   4% Patch 1 Patch Transdermal daily  lidocaine   4% Patch 1 Patch Transdermal daily  melatonin 5 milliGRAM(s) Oral at bedtime  midodrine 30 milliGRAM(s) Oral every 8 hours  Nephro-kristi 1 Tablet(s) Oral daily  pantoprazole  Injectable 40 milliGRAM(s) IV Push daily  sevelamer carbonate 1600 milliGRAM(s) Oral three times a day  sodium chloride 0.9%. 1000 milliLiter(s) (50 mL/Hr) IV Continuous <Continuous>  sodium chloride 0.9%. 1000 milliLiter(s) (60 mL/Hr) IV Continuous <Continuous>  sodium chloride 0.9%. 1000 milliLiter(s) (50 mL/Hr) IV Continuous <Continuous>  thiamine IVPB 500 milliGRAM(s) IV Intermittent daily  ticagrelor 60 milliGRAM(s) Oral every 12 hours      VITAL:  T(C): , Max: 36.8 (08-11-21 @ 09:00)  T(F): , Max: 98.2 (08-11-21 @ 09:00)  HR: 90 (08-11-21 @ 09:00)  BP: 86/62 (08-11-21 @ 09:00)  BP(mean): --  RR: 18 (08-11-21 @ 09:00)  SpO2: 96% (08-11-21 @ 09:00)  Wt(kg): --    I and O's:    08-10 @ 07:01  -  08-11 @ 07:00  --------------------------------------------------------  IN: 690 mL / OUT: 0 mL / NET: 690 mL          PHYSICAL EXAM:    Constitutional: NAD; cachetic   Neck:  No JVD  Respiratory: CTAB/L  Cardiovascular: S1 and S2  Gastrointestinal: BS+, soft, NT/ND + PD   Extremities: No peripheral edema  Neurological: A/O x 3, no focal deficits  Psychiatric: Normal mood, normal affect  : No Johnson  Skin: No rashes  Access: Not applicable    LABS:                        10.7   7.97  )-----------( 208      ( 10 Aug 2021 06:24 )             35.9     08-10    128<L>  |  89<L>  |  80<H>  ----------------------------<  133<H>  5.6<H>   |  18<L>  |  13.11<H>    Ca    8.6      10 Aug 2021 06:24            Urine Studies:          RADIOLOGY & ADDITIONAL STUDIES:

## 2021-08-12 NOTE — PROGRESS NOTE ADULT - ASSESSMENT
55 yo Male h/o CAD, CHF (EF 24.5%. Severe MR. Also with RV dysfunction), COPD, on PD 2/2 ESRD p/w hypotension 2/2 sepsis likely 2/2 toe gangrene.  Palliative care called for GOC and ACP.  57 yo Male h/o CAD, CHF (EF 24.5%. Severe MR. Also with RV dysfunction), COPD, on PD 2/2 ESRD p/w hypotension 2/2 sepsis likely 2/2 toe gangrene. Transferred to PCU.

## 2021-08-12 NOTE — PROGRESS NOTE ADULT - PROBLEM SELECTOR PLAN 3
Will start Dilaudid 0.2mg IV q 2 PRN - Dilaudid 0.2mg IV q 2 PRN Patient needs assistance with all ADLs. PPSv2 30%.

## 2021-08-12 NOTE — PROGRESS NOTE ADULT - ASSESSMENT
56 m with    Sepsis resolved    Epistaxis resolved  - ENT evaluation noted    COPD  - stable    CAD s/p CABG   - Hypotension  - midodrine dose increased to 30 mg TID   - Previous echo with reduced EF  - c/w DAPT    CHF cr systolic end stage  -     AICD  - EP eval noted. Not a good candidate for AICD    Peritoneal Dialysis   - on hold 2 to hypotension and GOC    Diabetes   - BS control    Hiccups  - Ativan prn    Incontinence dermatitis  - wound care    Toe gangrene  - local care    Finger gangrenous area  - local care    Vertebral fracture T12  - Ortho eval noted   - MRI spine noted  - No intervention  - pain control    Confusion   - possible delirium  - Psych follow  - Neurology follow  - EEG noted    functional quadriplegia   - supportive care     Palliative care team .    DNR    PCU care.      d/w wife at length    Pipe Beck MD pager [phone]

## 2021-08-12 NOTE — PROGRESS NOTE ADULT - SUBJECTIVE AND OBJECTIVE BOX
GAP TEAM PALLIATIVE CARE UNIT PROGRESS NOTE:      [  ] Patient on hospice program.    INDICATION FOR PALLIATIVE CARE UNIT SERVICES:    INTERVAL HPI/OVERNIGHT EVENTS:    DNR on chart: Yes  Yes      Allergies    doxazosin (Other)    Intolerances    MEDICATIONS  (STANDING):  aspirin enteric coated 81 milliGRAM(s) Oral daily  BACItracin   Ointment 1 Application(s) Topical two times a day  cadexomer iodine 0.9% Gel 1 Application(s) Topical daily  calamine/zinc oxide Lotion 1 Application(s) Topical every 6 hours  chlorhexidine 0.12% Liquid 15 milliLiter(s) Oral Mucosa two times a day  chlorhexidine 2% Cloths 1 Application(s) Topical <User Schedule>  chlorhexidine 2% Cloths 1 Application(s) Topical daily  dronabinol 5 milliGRAM(s) Oral two times a day  fludroCORTISONE 0.1 milliGRAM(s) Oral daily  gentamicin 0.1% Ointment 1 Application(s) Topical <User Schedule>  glucagon  Injectable 1 milliGRAM(s) IntraMuscular once  lidocaine   4% Patch 1 Patch Transdermal daily  lidocaine   4% Patch 1 Patch Transdermal daily  melatonin 5 milliGRAM(s) Oral at bedtime  midodrine 30 milliGRAM(s) Oral every 8 hours  pantoprazole  Injectable 40 milliGRAM(s) IV Push daily  sevelamer carbonate 1600 milliGRAM(s) Oral three times a day  ticagrelor 60 milliGRAM(s) Oral every 12 hours    MEDICATIONS  (PRN):  acetaminophen   Tablet .. 650 milliGRAM(s) Oral every 6 hours PRN Moderate Pain (4 - 6)  diphenhydrAMINE   Injectable 12.5 milliGRAM(s) IV Push every 6 hours PRN Pruritus  HYDROmorphone  Injectable 0.2 milliGRAM(s) IV Push every 2 hours PRN Moderate to severe pain  HYDROmorphone  Injectable 0.2 milliGRAM(s) IV Push every 2 hours PRN Labored breathing or dyspnea  LORazepam   Injectable 0.2 milliGRAM(s) IV Push every 6 hours PRN Anxiety, agitation, or intractable respiratory distress.  sodium chloride 0.65% Nasal 2 Spray(s) Both Nostrils three times a day PRN Nasal Congestion  valproate sodium IVPB 125 milliGRAM(s) IV Intermittent every 8 hours PRN agitation    ITEMS UNCHECKED ARE NOT PRESENT    PRESENT SYMPTOMS: [ ]Unable to obtain due to poor mentation   Source if other than patient:  [ ]Family   [ ]Team     Pain: [ ] yes [ ] no  QOL impact -   Location -                    Aggravating factors -  Quality -  Radiation -  Timing-  Severity (0-10 scale):  Minimal acceptable level (0-10 scale):     Dyspnea:                           [ ]Mild [ ]Moderate [ ]Severe  Anxiety:                             [ ]Mild [ ]Moderate [ ]Severe  Fatigue:                             [ ]Mild [ ]Moderate [ ]Severe  Nausea:                             [ ]Mild [ ]Moderate [ ]Severe  Loss of appetite:              [ ]Mild [ ]Moderate [ ]Severe  Constipation:                    [ ]Mild [ ]Moderate [ ]Severe    PAINAD Score:    [email or web address] (Ctrl +  left click to view)  		  Other Symptoms:  [ ]All other review of systems negative     Palliative Performance Status Version 2:         %         http://npcrc.org/files/news/palliative_performance_scale_[email or web address].pdf  PHYSICAL EXAM:  Vital Signs Last 24 Hrs  T(C): 36.4 (12 Aug 2021 08:33), Max: 36.8 (11 Aug 2021 13:12)  T(F): 97.5 (12 Aug 2021 08:33), Max: 98.2 (11 Aug 2021 13:12)  HR: 93 (12 Aug 2021 08:33) (93 - 98)  BP: 88/67 (12 Aug 2021 08:33) (80/59 - 90/60)  BP(mean): --  RR: 20 (12 Aug 2021 08:33) (18 - 20)  SpO2: 96% (12 Aug 2021 08:33) (96% - 100%) I&O's Summary  GENERAL:  [ ]Alert  [ ]Oriented x   [ ]Lethargic  [ ]Cachexia  [ ]Unarousable  [ ]Verbal  [ ]Non-Verbal  Behavioral:   [ ] Anxiety  [ ] Delirium [ ] Agitation [ ] Other  HEENT:  [ ]Normal   [ ]Dry mouth   [ ]ET Tube/Trach  [ ]Oral lesions  PULMONARY:   [ ]Clear [ ]Tachypnea  [ ]Audible excessive secretions   [ ]Rhonchi        [ ]Right [ ]Left [ ]Bilateral  [ ]Crackles        [ ]Right [ ]Left [ ]Bilateral  [ ]Wheezing     [ ]Right [ ]Left [ ]Bilateral  [ ]Diminished BS [ ]Right [ ]Left [ ]Bilateral    CARDIOVASCULAR:    [ ]Regular [ ]Irregular [ ]Tachy  [ ]Dhaval [ ]Murmur [ ]Other  GASTROINTESTINAL:  [ ]Soft  [ ]Distended   [ ]+BS  [ ]Non tender [ ]Tender  [ ]PEG [ ]OGT/ NGT   Last BM:    GENITOURINARY:  [ ]Normal [ ] Incontinent   [ ]Oliguria/Anuria   [ ]Johnson  MUSCULOSKELETAL:   [ ]Normal   [ ]Weakness  [ ]Bed/Wheelchair bound [ ]Edema  NEUROLOGIC:   [ ]No focal deficits  [ ] Cognitive impairment  [ ] Dysphagia [ ]Dysarthria [ ] Paresis [ ]Other   SKIN:   [ ]Normal  [ ]Rash     [ ]Pressure ulcer(s)  [ ]y [ ]n  Present on admission      CRITICAL CARE:  [ ] Shock Present  [ ]Septic [ ]Cardiogenic [ ]Neurologic [ ]Hypovolemic  [ ]  Vasopressors [ ]  Inotropes   [ ] Respiratory failure present [ ] Mechanical Ventilation [ ] Non-invasive ventilatory support [ ] High-Flow  [ ] Acute  [ ] Chronic [ ] Hypoxic  [ ] Hypercarbic [ ] Other  [ ] Other organ failure     LABS:            RADIOLOGY & ADDITIONAL STUDIES:    PROTEIN CALORIE MALNUTRITION: [ ] mild [ ] moderate [ ] severe  [ ] underweight [ ] morbid obesity    https://www.andeal.org/vault/2440/web/files/ONC/Table_Clinical%20Characteristics%20to%20Document%20Malnutrition-White%20JV%20et%20al%762238.pdf    Height (cm): 170.2 (07-15-21 @ 15:14), 170.2 (12-29-20 @ 13:29), 170.2 (12-14-20 @ 09:52)  Weight (kg): 63.5 (07-15-21 @ 15:14), 81.6 (12-29-20 @ 13:29), 76.2 (12-14-20 @ 09:52)  BMI (kg/m2): 21.9 (07-15-21 @ 15:14), 28.2 (12-29-20 @ 13:29), 26.3 (12-14-20 @ 09:52)    [ ] PPSV2 < or = 30% [ ] significant weight loss [ ] poor nutritional intake [ ] anasarca   Artificial Nutrition [ ]     REFERRALS:   [ ]Chaplaincy  [ ] Hospice  [ ]Child Life  [ ]Social Work  [ ]Case management [ ]Holistic Therapy [ ] Physical Therapy [ ] Dietary   Goals of Care Document: BETHEL Madera (08-10-21 @ 18:23)   GAP TEAM PALLIATIVE CARE UNIT PROGRESS NOTE:      [  ] Patient on hospice program.    INDICATION FOR PALLIATIVE CARE UNIT SERVICES:    INTERVAL HPI/OVERNIGHT EVENTS:  - prns - zofran x1, dilaudid x1  - last BM - aug 11    DNR on chart: Yes  Yes      Allergies    doxazosin (Other)    Intolerances    MEDICATIONS  (STANDING):  aspirin enteric coated 81 milliGRAM(s) Oral daily  BACItracin   Ointment 1 Application(s) Topical two times a day  cadexomer iodine 0.9% Gel 1 Application(s) Topical daily  calamine/zinc oxide Lotion 1 Application(s) Topical every 6 hours  chlorhexidine 0.12% Liquid 15 milliLiter(s) Oral Mucosa two times a day  chlorhexidine 2% Cloths 1 Application(s) Topical <User Schedule>  chlorhexidine 2% Cloths 1 Application(s) Topical daily  dronabinol 5 milliGRAM(s) Oral two times a day  fludroCORTISONE 0.1 milliGRAM(s) Oral daily  gentamicin 0.1% Ointment 1 Application(s) Topical <User Schedule>  glucagon  Injectable 1 milliGRAM(s) IntraMuscular once  lidocaine   4% Patch 1 Patch Transdermal daily  lidocaine   4% Patch 1 Patch Transdermal daily  melatonin 5 milliGRAM(s) Oral at bedtime  midodrine 30 milliGRAM(s) Oral every 8 hours  pantoprazole  Injectable 40 milliGRAM(s) IV Push daily  sevelamer carbonate 1600 milliGRAM(s) Oral three times a day  ticagrelor 60 milliGRAM(s) Oral every 12 hours    MEDICATIONS  (PRN):  acetaminophen   Tablet .. 650 milliGRAM(s) Oral every 6 hours PRN Moderate Pain (4 - 6)  diphenhydrAMINE   Injectable 12.5 milliGRAM(s) IV Push every 6 hours PRN Pruritus  HYDROmorphone  Injectable 0.2 milliGRAM(s) IV Push every 2 hours PRN Moderate to severe pain  HYDROmorphone  Injectable 0.2 milliGRAM(s) IV Push every 2 hours PRN Labored breathing or dyspnea  LORazepam   Injectable 0.2 milliGRAM(s) IV Push every 6 hours PRN Anxiety, agitation, or intractable respiratory distress.  sodium chloride 0.65% Nasal 2 Spray(s) Both Nostrils three times a day PRN Nasal Congestion  valproate sodium IVPB 125 milliGRAM(s) IV Intermittent every 8 hours PRN agitation    ITEMS UNCHECKED ARE NOT PRESENT    PRESENT SYMPTOMS: [ ]Unable to obtain due to poor mentation   Source if other than patient:  [ ]Family   [ ]Team       Dyspnea:                           [ ]Mild [ ]Moderate [ ]Severe  Anxiety:                             [ ]Mild [ ]Moderate [ ]Severe  Fatigue:                             [ ]Mild [ ]Moderate [ ]Severe  Nausea:                             [ ]Mild [ ]Moderate [ ]Severe  Loss of appetite:              [ ]Mild [ ]Moderate [ ]Severe  Constipation:                    [ ]Mild [ ]Moderate [ ]Severe    PAINAD Score:    [email or web address] (Ctrl +  left click to view)  		  Other Symptoms:  [ ]All other review of systems negative     Palliative Performance Status Version 2:         %         http://npcrc.org/files/news/palliative_performance_scale_[email or web address].pdf  PHYSICAL EXAM:  Vital Signs Last 24 Hrs  T(C): 36.4 (12 Aug 2021 08:33), Max: 36.8 (11 Aug 2021 13:12)  T(F): 97.5 (12 Aug 2021 08:33), Max: 98.2 (11 Aug 2021 13:12)  HR: 93 (12 Aug 2021 08:33) (93 - 98)  BP: 88/67 (12 Aug 2021 08:33) (80/59 - 90/60)  BP(mean): --  RR: 20 (12 Aug 2021 08:33) (18 - 20)  SpO2: 96% (12 Aug 2021 08:33) (96% - 100%) I&O's Summary  GENERAL:  [ X]Alert  [ ]Oriented x   [ ]Lethargic  [ X]Cachexia  [ ]Unarousable  [ ]Verbal  [ ]Non-Verbal  Behavioral:   [ ] Anxiety  [ ] Delirium [ ] Agitation [ ] Other  HEENT:  [ ]Normal   [ ]Dry mouth   [ ]ET Tube/Trach  [ ]Oral lesions  PULMONARY:   [ ]Clear [ ]Tachypnea  [ ]Audible excessive secretions   [ ]Rhonchi        [ ]Right [ ]Left [ ]Bilateral  [ ]Crackles        [ ]Right [ ]Left [ ]Bilateral  [ ]Wheezing     [ ]Right [ ]Left [ ]Bilateral  [ ]Diminished BS [ ]Right [ ]Left [ ]Bilateral    CARDIOVASCULAR:    [ ]Regular [ ]Irregular [ ]Tachy  [ ]Dhaval [ ]Murmur [ ]Other  GASTROINTESTINAL:  [ ]Soft  [ ]Distended   [ ]+BS  [ ]Non tender [ ]Tender  [ ]PEG [ ]OGT/ NGT   Last BM:    GENITOURINARY:  [ ]Normal [ ] Incontinent   [ ]Oliguria/Anuria   [ ]Johnson  MUSCULOSKELETAL:   [ ]Normal   [ ]Weakness  [ ]Bed/Wheelchair bound [ ]Edema  NEUROLOGIC:   [ ]No focal deficits  [ ] Cognitive impairment  [ ] Dysphagia [ ]Dysarthria [ ] Paresis [ ]Other   SKIN:   [ ]Normal  [ ]Rash     [ ]Pressure ulcer(s)  [ ]y [ ]n  Present on admission      CRITICAL CARE:  [ ] Shock Present  [ ]Septic [ ]Cardiogenic [ ]Neurologic [ ]Hypovolemic  [ ]  Vasopressors [ ]  Inotropes   [ ] Respiratory failure present [ ] Mechanical Ventilation [ ] Non-invasive ventilatory support [ ] High-Flow  [ ] Acute  [ ] Chronic [ ] Hypoxic  [ ] Hypercarbic [ ] Other  [ ] Other organ failure     LABS:            RADIOLOGY & ADDITIONAL STUDIES:    PROTEIN CALORIE MALNUTRITION: [ ] mild [ ] moderate [ ] severe  [ ] underweight [ ] morbid obesity    https://www.andeal.org/vault/2440/web/files/ONC/Table_Clinical%20Characteristics%20to%20Document%20Malnutrition-White%20JV%20et%20al%650546.pdf    Height (cm): 170.2 (07-15-21 @ 15:14), 170.2 (12-29-20 @ 13:29), 170.2 (12-14-20 @ 09:52)  Weight (kg): 63.5 (07-15-21 @ 15:14), 81.6 (12-29-20 @ 13:29), 76.2 (12-14-20 @ 09:52)  BMI (kg/m2): 21.9 (07-15-21 @ 15:14), 28.2 (12-29-20 @ 13:29), 26.3 (12-14-20 @ 09:52)    [ ] PPSV2 < or = 30% [ ] significant weight loss [ ] poor nutritional intake [ ] anasarca   Artificial Nutrition [ ]     REFERRALS:   [ ]Chaplaincy  [ ] Hospice  [ ]Child Life  [ ]Social Work  [ ]Case management [ ]Holistic Therapy [ ] Physical Therapy [ ] Dietary   Goals of Care Document: BETHEL Madera (08-10-21 @ 18:23)   GAP TEAM PALLIATIVE CARE UNIT PROGRESS NOTE:      [  ] Patient on hospice program.    INDICATION FOR PALLIATIVE CARE UNIT SERVICES:    INTERVAL HPI/OVERNIGHT EVENTS:  - prns - zofran x1, dilaudid x1  - last BM - aug 11    DNR on chart: Yes  Yes      Allergies    doxazosin (Other)    Intolerances    MEDICATIONS  (STANDING):  aspirin enteric coated 81 milliGRAM(s) Oral daily  BACItracin   Ointment 1 Application(s) Topical two times a day  cadexomer iodine 0.9% Gel 1 Application(s) Topical daily  calamine/zinc oxide Lotion 1 Application(s) Topical every 6 hours  chlorhexidine 0.12% Liquid 15 milliLiter(s) Oral Mucosa two times a day  chlorhexidine 2% Cloths 1 Application(s) Topical <User Schedule>  chlorhexidine 2% Cloths 1 Application(s) Topical daily  dronabinol 5 milliGRAM(s) Oral two times a day  fludroCORTISONE 0.1 milliGRAM(s) Oral daily  gentamicin 0.1% Ointment 1 Application(s) Topical <User Schedule>  glucagon  Injectable 1 milliGRAM(s) IntraMuscular once  lidocaine   4% Patch 1 Patch Transdermal daily  lidocaine   4% Patch 1 Patch Transdermal daily  melatonin 5 milliGRAM(s) Oral at bedtime  midodrine 30 milliGRAM(s) Oral every 8 hours  pantoprazole  Injectable 40 milliGRAM(s) IV Push daily  sevelamer carbonate 1600 milliGRAM(s) Oral three times a day  ticagrelor 60 milliGRAM(s) Oral every 12 hours    MEDICATIONS  (PRN):  acetaminophen   Tablet .. 650 milliGRAM(s) Oral every 6 hours PRN Moderate Pain (4 - 6)  diphenhydrAMINE   Injectable 12.5 milliGRAM(s) IV Push every 6 hours PRN Pruritus  HYDROmorphone  Injectable 0.2 milliGRAM(s) IV Push every 2 hours PRN Moderate to severe pain  HYDROmorphone  Injectable 0.2 milliGRAM(s) IV Push every 2 hours PRN Labored breathing or dyspnea  LORazepam   Injectable 0.2 milliGRAM(s) IV Push every 6 hours PRN Anxiety, agitation, or intractable respiratory distress.  sodium chloride 0.65% Nasal 2 Spray(s) Both Nostrils three times a day PRN Nasal Congestion  valproate sodium IVPB 125 milliGRAM(s) IV Intermittent every 8 hours PRN agitation    ITEMS UNCHECKED ARE NOT PRESENT    PRESENT SYMPTOMS: [ ]Unable to obtain due to poor mentation   Source if other than patient:  [ ]Family   [ ]Team       Dyspnea:                           [ ]Mild [ ]Moderate [ ]Severe  Anxiety:                             [ ]Mild [ ]Moderate [ ]Severe  Fatigue:                             [ ]Mild [ ]Moderate [ ]Severe  Nausea:                             [ ]Mild [ ]Moderate [ ]Severe  Loss of appetite:              [ ]Mild [ ]Moderate [ ]Severe  Constipation:                    [ ]Mild [ ]Moderate [ ]Severe    PAINAD Score:    [email or web address] (Ctrl +  left click to view)  		  Other Symptoms:  [ ]All other review of systems negative     Palliative Performance Status Version 2:         %         http://npcrc.org/files/news/palliative_performance_scale_[email or web address].pdf  PHYSICAL EXAM:  Vital Signs Last 24 Hrs  T(C): 36.4 (12 Aug 2021 08:33), Max: 36.8 (11 Aug 2021 13:12)  T(F): 97.5 (12 Aug 2021 08:33), Max: 98.2 (11 Aug 2021 13:12)  HR: 93 (12 Aug 2021 08:33) (93 - 98)  BP: 88/67 (12 Aug 2021 08:33) (80/59 - 90/60)  BP(mean): --  RR: 20 (12 Aug 2021 08:33) (18 - 20)  SpO2: 96% (12 Aug 2021 08:33) (96% - 100%) I&O's Summary  GENERAL: sleeping with family at bedside  [ X]Alert  [ ]Oriented x   [ ]Lethargic  [ X]Cachexia  [ ]Unarousable  [ ]Verbal  [ ]Non-Verbal  Behavioral:   [ ] Anxiety  [ ] Delirium [ ] Agitation [ ] Other  HEENT:  [ ]Normal   [ ]Dry mouth   [ ]ET Tube/Trach  [ ]Oral lesions  PULMONARY:   [X]Clear [ ]Tachypnea  [ ]Audible excessive secretions   [ ]Rhonchi        [ ]Right [ ]Left [ ]Bilateral  [ ]Crackles        [ ]Right [ ]Left [ ]Bilateral  [ ]Wheezing     [ ]Right [ ]Left [ ]Bilateral  [ ]Diminished BS [ ]Right [ ]Left [ ]Bilateral    CARDIOVASCULAR:    [X ]Regular [ ]Irregular [ ]Tachy  [ ]Dhaval [ ]Murmur [ ]Other  GASTROINTESTINAL:  [X ]Soft  [ ]Distended   [ ]+BS  [ ]Non tender [ ]Tender  [ ]PEG [ ]OGT/ NGT   Last BM:    GENITOURINARY:  [ ]Normal [ ] Incontinent   [X ]Oliguria/Anuria   [ ]Johnson  MUSCULOSKELETAL:   [ ]Normal   [ ]Weakness  [ ]Bed/Wheelchair bound [ ]Edema  NEUROLOGIC:   [ ]No focal deficits  [ ] Cognitive impairment  [ ] Dysphagia [ ]Dysarthria [ ] Paresis [ ]Other   SKIN:   [ ]Normal  [ ]Rash     [ ]Pressure ulcer(s)  [ ]y [ ]n  Present on admission      CRITICAL CARE:  [ ] Shock Present  [ ]Septic [ ]Cardiogenic [ ]Neurologic [ ]Hypovolemic  [ ]  Vasopressors [ ]  Inotropes   [ ] Respiratory failure present [ ] Mechanical Ventilation [ ] Non-invasive ventilatory support [ ] High-Flow  [ ] Acute  [ ] Chronic [ ] Hypoxic  [ ] Hypercarbic [ ] Other  [ ] Other organ failure     LABS:            RADIOLOGY & ADDITIONAL STUDIES:    PROTEIN CALORIE MALNUTRITION: [ ] mild [ ] moderate [ ] severe  [ ] underweight [ ] morbid obesity    https://www.andeal.org/vault/2440/web/files/ONC/Table_Clinical%20Characteristics%20to%20Document%20Malnutrition-White%20JV%20et%20al%742388.pdf    Height (cm): 170.2 (07-15-21 @ 15:14), 170.2 (12-29-20 @ 13:29), 170.2 (12-14-20 @ 09:52)  Weight (kg): 63.5 (07-15-21 @ 15:14), 81.6 (12-29-20 @ 13:29), 76.2 (12-14-20 @ 09:52)  BMI (kg/m2): 21.9 (07-15-21 @ 15:14), 28.2 (12-29-20 @ 13:29), 26.3 (12-14-20 @ 09:52)    [ ] PPSV2 < or = 30% [ ] significant weight loss [ ] poor nutritional intake [ ] anasarca   Artificial Nutrition [ ]     REFERRALS:   [ ]Chaplaincy  [ ] Hospice  [ ]Child Life  [ ]Social Work  [ ]Case management [ ]Holistic Therapy [ ] Physical Therapy [ ] Dietary   Goals of Care Document: BETHEL Madera (08-10-21 @ 18:23)   GAP TEAM PALLIATIVE CARE UNIT PROGRESS NOTE:      [  ] Patient on hospice program.    INDICATION FOR PALLIATIVE CARE UNIT SERVICES: symptom management in setting of ESRD, hypotension, no longer dialysis candidate     INTERVAL HPI/OVERNIGHT EVENTS:  - prns - zofran x1, dilaudid x1  - last BM - today am    DNR on chart: Yes  Yes      Allergies    doxazosin (Other)    Intolerances    MEDICATIONS  (STANDING):  aspirin enteric coated 81 milliGRAM(s) Oral daily  BACItracin   Ointment 1 Application(s) Topical two times a day  cadexomer iodine 0.9% Gel 1 Application(s) Topical daily  calamine/zinc oxide Lotion 1 Application(s) Topical every 6 hours  chlorhexidine 0.12% Liquid 15 milliLiter(s) Oral Mucosa two times a day  chlorhexidine 2% Cloths 1 Application(s) Topical <User Schedule>  chlorhexidine 2% Cloths 1 Application(s) Topical daily  dronabinol 5 milliGRAM(s) Oral two times a day  fludroCORTISONE 0.1 milliGRAM(s) Oral daily  gentamicin 0.1% Ointment 1 Application(s) Topical <User Schedule>  glucagon  Injectable 1 milliGRAM(s) IntraMuscular once  lidocaine   4% Patch 1 Patch Transdermal daily  lidocaine   4% Patch 1 Patch Transdermal daily  melatonin 5 milliGRAM(s) Oral at bedtime  midodrine 30 milliGRAM(s) Oral every 8 hours  pantoprazole  Injectable 40 milliGRAM(s) IV Push daily  sevelamer carbonate 1600 milliGRAM(s) Oral three times a day  ticagrelor 60 milliGRAM(s) Oral every 12 hours    MEDICATIONS  (PRN):  acetaminophen   Tablet .. 650 milliGRAM(s) Oral every 6 hours PRN Moderate Pain (4 - 6)  diphenhydrAMINE   Injectable 12.5 milliGRAM(s) IV Push every 6 hours PRN Pruritus  HYDROmorphone  Injectable 0.2 milliGRAM(s) IV Push every 2 hours PRN Moderate to severe pain  HYDROmorphone  Injectable 0.2 milliGRAM(s) IV Push every 2 hours PRN Labored breathing or dyspnea  LORazepam   Injectable 0.2 milliGRAM(s) IV Push every 6 hours PRN Anxiety, agitation, or intractable respiratory distress.  sodium chloride 0.65% Nasal 2 Spray(s) Both Nostrils three times a day PRN Nasal Congestion  valproate sodium IVPB 125 milliGRAM(s) IV Intermittent every 8 hours PRN agitation    ITEMS UNCHECKED ARE NOT PRESENT    PRESENT SYMPTOMS: [ ]Unable to obtain due to poor mentation   Source if other than patient:  [ ]Family   [ ]Team       Dyspnea:                           [ ]Mild [ ]Moderate [ ]Severe  Anxiety:                             [ ]Mild [ ]Moderate [ ]Severe  Fatigue:                             [ ]Mild [ ]Moderate [ ]Severe  Nausea:                             [ ]Mild [ ]Moderate [ ]Severe  Loss of appetite:              [ ]Mild [ ]Moderate [ ]Severe  Constipation:                    [ ]Mild [ ]Moderate [ ]Severe    PAINAD Score:     [email or web address] (Ctrl +  left click to view)  		  Other Symptoms:  [X ]All other review of systems negative     Palliative Performance Status Version 2:    30    %         [email or web address]/[email or web address]_[email or web address]  PHYSICAL EXAM:  Vital Signs Last 24 Hrs  T(C): 36.4 (12 Aug 2021 08:33), Max: 36.8 (11 Aug 2021 13:12)  T(F): 97.5 (12 Aug 2021 08:33), Max: 98.2 (11 Aug 2021 13:12)  HR: 93 (12 Aug 2021 08:33) (93 - 98)  BP: 88/67 (12 Aug 2021 08:33) (80/59 - 90/60)  BP(mean): --  RR: 20 (12 Aug 2021 08:33) (18 - 20)  SpO2: 96% (12 Aug 2021 08:33) (96% - 100%) I&O's Summary  GENERAL: sleeping with family at bedside  [ X]Alert  [ ]Oriented x   [ ]Lethargic  [ X]Cachexia  [ ]Unarousable  [ ]Verbal  [ ]Non-Verbal  Behavioral:   [ ] Anxiety  [ ] Delirium [ ] Agitation [ ] Other  HEENT:  [ ]Normal   [ ]Dry mouth   [ ]ET Tube/Trach  [ ]Oral lesions  PULMONARY:   [X]Clear [ ]Tachypnea  [ ]Audible excessive secretions   [ ]Rhonchi        [ ]Right [ ]Left [ ]Bilateral  [ ]Crackles        [ ]Right [ ]Left [ ]Bilateral  [ ]Wheezing     [ ]Right [ ]Left [ ]Bilateral  [ ]Diminished BS [ ]Right [ ]Left [ ]Bilateral    CARDIOVASCULAR:    [X ]Regular [ ]Irregular [ ]Tachy  [ ]Dhaval [ ]Murmur [ ]Other  GASTROINTESTINAL:  [X ]Soft  [ ]Distended   [ ]+BS  [ ]Non tender [ ]Tender  [ ]PEG [ ]OGT/ NGT   Last BM:  8/12 am  GENITOURINARY:  [ ]Normal [ ] Incontinent   [X ]Oliguria/Anuria   [ ]Johnson  MUSCULOSKELETAL:   [ ]Normal   [ ]Weakness  [ ]Bed/Wheelchair bound [ ]Edema  NEUROLOGIC:   [ ]No focal deficits  [ ] Cognitive impairment  [ ] Dysphagia [ ]Dysarthria [ ] Paresis [ ]Other   SKIN:   [ ]Normal  [ ]Rash     [ ]Pressure ulcer(s)  [ ]y [ ]n  Present on admission      CRITICAL CARE:  [ ] Shock Present  [ ]Septic [ ]Cardiogenic [ ]Neurologic [ ]Hypovolemic  [ ]  Vasopressors [ ]  Inotropes   [ ] Respiratory failure present [ ] Mechanical Ventilation [ ] Non-invasive ventilatory support [ ] High-Flow  [ ] Acute  [ ] Chronic [ ] Hypoxic  [ ] Hypercarbic [ ] Other  [ ] Other organ failure - renal failure    LABS: no new labs            RADIOLOGY & ADDITIONAL STUDIES: no new    PROTEIN CALORIE MALNUTRITION: [ ] mild [ ] moderate [X ] severe (please see nutritionist note)  [ ] underweight [ ] morbid obesity    https://www.andeal.org/vault/2440/web/files/ONC/Table_Clinical%20Characteristics%20to%20Document%20Malnutrition-White%20JV%20et%20al%538507.pdf    Height (cm): 170.2 (07-15-21 @ 15:14), 170.2 (12-29-20 @ 13:29), 170.2 (12-14-20 @ 09:52)  Weight (kg): 63.5 (07-15-21 @ 15:14), 81.6 (12-29-20 @ 13:29), 76.2 (12-14-20 @ 09:52)  BMI (kg/m2): 21.9 (07-15-21 @ 15:14), 28.2 (12-29-20 @ 13:29), 26.3 (12-14-20 @ 09:52)    [X ] PPSV2 < or = 30% [ ] significant weight loss [X ] poor nutritional intake [ ] anasarca   Artificial Nutrition [ ]     REFERRALS:   [ X]Chaplaincy  [ ] Hospice  [ ]Child Life  [X ]Social Work  [ ]Case management [ ]Holistic Therapy [ ] Physical Therapy [ ] Dietary   Goals of Care Document: BETHEL Madera (08-10-21 @ 18:23)

## 2021-08-12 NOTE — PROGRESS NOTE ADULT - PROBLEM SELECTOR PLAN 6
On Midodrine. Will continue for now. However, no IV pressors will be provided if not able to tolerate Midodrine PO. This was d/w the patient and his wife and they understood. Continue Marinol. .

## 2021-08-12 NOTE — PROGRESS NOTE ADULT - PROBLEM SELECTOR PLAN 7
Last HgB A1c 6.1. Currently on Lantus and RISS. Will DC lantus and keep RISS for now. However, if FS < 200 over 24 hours will DC RISS. Last HgB A1c 6.1. Currently on Lantus and RISS. Will DC lantus and keep RISS for now.   - sliding scale insulin discontinued On Midodrine. Will continue for now. However, no IV pressors will be provided if not able to tolerate Midodrine PO. This was d/w the patient and his wife and they understood.

## 2021-08-12 NOTE — PROGRESS NOTE ADULT - PROBLEM SELECTOR PLAN 2
Not a candidate for advanced therapies. Not even for AICD. Not a candidate for advanced therapies including AICD - Diphenhydramine 12.5mg IV q 6 PRN and also Calamine q 6 hours.

## 2021-08-12 NOTE — PROGRESS NOTE ADULT - SUBJECTIVE AND OBJECTIVE BOX
Patient is a 56y old  Male who presents with a chief complaint of Hypotension (12 Aug 2021 10:20)      SUBJECTIVE / OVERNIGHT EVENTS: Comfortable   Review of Systems  chest pain no  palpitations no  sob no  nausea no  headache no    MEDICATIONS  (STANDING):  aspirin enteric coated 81 milliGRAM(s) Oral daily  BACItracin   Ointment 1 Application(s) Topical two times a day  cadexomer iodine 0.9% Gel 1 Application(s) Topical daily  calamine/zinc oxide Lotion 1 Application(s) Topical every 6 hours  chlorhexidine 0.12% Liquid 15 milliLiter(s) Oral Mucosa two times a day  chlorhexidine 2% Cloths 1 Application(s) Topical <User Schedule>  chlorhexidine 2% Cloths 1 Application(s) Topical daily  dronabinol 5 milliGRAM(s) Oral two times a day  fludroCORTISONE 0.1 milliGRAM(s) Oral daily  gentamicin 0.1% Ointment 1 Application(s) Topical <User Schedule>  glucagon  Injectable 1 milliGRAM(s) IntraMuscular once  lidocaine   4% Patch 1 Patch Transdermal daily  lidocaine   4% Patch 1 Patch Transdermal daily  melatonin 5 milliGRAM(s) Oral at bedtime  midodrine 30 milliGRAM(s) Oral every 8 hours  pantoprazole  Injectable 40 milliGRAM(s) IV Push daily  sevelamer carbonate 1600 milliGRAM(s) Oral three times a day  ticagrelor 60 milliGRAM(s) Oral every 12 hours    MEDICATIONS  (PRN):  acetaminophen   Tablet .. 650 milliGRAM(s) Oral every 6 hours PRN Moderate Pain (4 - 6)  diphenhydrAMINE   Injectable 12.5 milliGRAM(s) IV Push every 6 hours PRN Pruritus  HYDROmorphone  Injectable 0.2 milliGRAM(s) IV Push every 2 hours PRN Moderate to severe pain  HYDROmorphone  Injectable 0.2 milliGRAM(s) IV Push every 2 hours PRN Labored breathing or dyspnea  LORazepam   Injectable 0.2 milliGRAM(s) IV Push every 6 hours PRN Anxiety, agitation, or intractable respiratory distress.  ondansetron Injectable 4 milliGRAM(s) IV Push every 6 hours PRN Nausea and/or Vomiting  sodium chloride 0.65% Nasal 2 Spray(s) Both Nostrils three times a day PRN Nasal Congestion  valproate sodium IVPB 125 milliGRAM(s) IV Intermittent every 8 hours PRN agitation      Vital Signs Last 24 Hrs  T(C): 36.4 (12 Aug 2021 08:33), Max: 36.8 (11 Aug 2021 17:00)  T(F): 97.5 (12 Aug 2021 08:33), Max: 98.2 (11 Aug 2021 17:00)  HR: 93 (12 Aug 2021 08:33) (93 - 98)  BP: 88/67 (12 Aug 2021 08:33) (80/59 - 89/65)  BP(mean): --  RR: 20 (12 Aug 2021 08:33) (18 - 20)  SpO2: 96% (12 Aug 2021 08:33) (96% - 100%)    PHYSICAL EXAM:  GENERAL: frail  HEAD:  Atraumatic, Normocephalic  EYES: EOMI, PERRLA, conjunctiva and sclera clear  NECK: Supple, No JVD  CHEST/LUNG: Clear to auscultation bilaterally; No wheeze  HEART: Regular rate and rhythm; No murmurs, rubs, or gallops  ABDOMEN: Soft, Nontender, Nondistended; Bowel sounds present PD catheter  EXTREMITIES:  2+ Peripheral Pulses, No clubbing, cyanosis, or edema  PSYCH: AAOx3  NEUROLOGY: non-focal  SKIN: No rashes or lesions    LABS:                      RADIOLOGY & ADDITIONAL TESTS:    Imaging Personally Reviewed:    Consultant(s) Notes Reviewed:      Care Discussed with Consultants/Other Providers:

## 2021-08-12 NOTE — PROGRESS NOTE ADULT - PROVIDER SPECIALTY LIST ADULT
Cardiology
Infectious Disease
Internal Medicine
MICU
Nephrology
Neurology
Neurology
Cardiology
Electrophysiology
Gastroenterology
Infectious Disease
Infectious Disease
Internal Medicine
Nephrology
Neurology
Palliative Care
Cardiology
Gastroenterology
Infectious Disease
Internal Medicine
Nephrology
Neurology
Cardiology
Gastroenterology
Infectious Disease
Infectious Disease
Internal Medicine
Nephrology
Neurology
Cardiology
ENT
Infectious Disease
Internal Medicine
MICU
Nephrology
Gastroenterology
Internal Medicine
Nephrology
Vascular Surgery
ENT
Cardiology
Palliative Care

## 2021-08-12 NOTE — PROGRESS NOTE ADULT - NUTRITIONAL ASSESSMENT
This patient has been assessed with a concern for Malnutrition and has been determined to have a diagnosis/diagnoses of Moderate protein-calorie malnutrition.    This patient is being managed with:   Diet Renal Restrictions-  For patients receiving Renal Replacement - No Protein Restr No Conc K No Conc Phos Low Sodium  Dysphagia 1 Pureed Thin Liquids ([id number])  Supplement Feeding Modality:  Oral  Nepro Cans or Servings Per Day:  2       Frequency:  Daily  Entered: Jul 23 2021  1:55PM    
This patient has been assessed with a concern for Malnutrition and has been determined to have a diagnosis/diagnoses of Moderate protein-calorie malnutrition.    This patient is being managed with:   Diet Renal Restrictions-  For patients receiving Renal Replacement - No Protein Restr No Conc K No Conc Phos Low Sodium  Dysphagia 1 Pureed Thin Liquids ([id number])  Supplement Feeding Modality:  Oral  Glucerna Shake Cans or Servings Per Day:  1       Frequency:  Daily  Entered: Jul 30 2021  1:16PM    
This patient has been assessed with a concern for Malnutrition and has been determined to have a diagnosis/diagnoses of Moderate protein-calorie malnutrition.    This patient is being managed with:   Diet Renal Restrictions-  For patients receiving Renal Replacement - No Protein Restr No Conc K No Conc Phos Low Sodium  Dysphagia 1 Pureed Thin Liquids ([id number])  Supplement Feeding Modality:  Oral  Glucerna Shake Cans or Servings Per Day:  1       Frequency:  Daily  Entered: Jul 30 2021  1:16PM    
This patient has been assessed with a concern for Malnutrition and has been determined to have a diagnosis/diagnoses of Moderate protein-calorie malnutrition.    This patient is being managed with:   Diet Renal Restrictions-  For patients receiving Renal Replacement - No Protein Restr No Conc K No Conc Phos Low Sodium  Dysphagia 1 Pureed Thin Liquids ([id number])  Supplement Feeding Modality:  Oral  Glucerna Shake Cans or Servings Per Day:  1       Frequency:  Daily  Entered: Jul 30 2021  1:16PM    
This patient has been assessed with a concern for Malnutrition and has been determined to have a diagnosis/diagnoses of Moderate protein-calorie malnutrition.    This patient is being managed with:   Diet Renal Restrictions-  For patients receiving Renal Replacement - No Protein Restr No Conc K No Conc Phos Low Sodium  Dysphagia 1 Pureed Thin Liquids ([id number])  Supplement Feeding Modality:  Oral  Glucerna Shake Cans or Servings Per Day:  1       Frequency:  Daily  Entered: Jul 30 2021  1:16PM    
This patient has been assessed with a concern for Malnutrition and has been determined to have a diagnosis/diagnoses of Moderate protein-calorie malnutrition.    This patient is being managed with:   Diet Renal Restrictions-  For patients receiving Renal Replacement - No Protein Restr No Conc K No Conc Phos Low Sodium  Dysphagia 1 Pureed Thin Liquids ([id number])  Supplement Feeding Modality:  Oral  Glucerna Shake Cans or Servings Per Day:  1       Frequency:  Daily  Entered: Jul 30 2021  1:16PM    
This patient has been assessed with a concern for Malnutrition and has been determined to have a diagnosis/diagnoses of Moderate protein-calorie malnutrition.    This patient is being managed with:   Diet Renal Restrictions-  For patients receiving Renal Replacement - No Protein Restr No Conc K No Conc Phos Low Sodium  Entered: Jul 6 2021  9:57AM    
This patient has been assessed with a concern for Malnutrition and has been determined to have a diagnosis/diagnoses of Severe protein-calorie malnutrition.    This patient is being managed with:   Diet Renal Restrictions-  For patients receiving Renal Replacement - No Protein Restr No Conc K No Conc Phos Low Sodium  Dysphagia 1 Pureed Thin Liquids ([id number])  Supplement Feeding Modality:  Oral  Nepro Cans or Servings Per Day:  2       Frequency:  Daily  Entered: Aug  9 2021  4:43PM    Diet Renal Restrictions-  For patients receiving Renal Replacement - No Protein Restr No Conc K No Conc Phos Low Sodium  Dysphagia 1 Pureed Thin Liquids ([id number])  Supplement Feeding Modality:  Oral  Glucerna Shake Cans or Servings Per Day:  1       Frequency:  Daily  Entered: Jul 30 2021  1:16PM    The following pending diet order is being considered for treatment of Severe protein-calorie malnutrition:null
This patient has been assessed with a concern for Malnutrition and has been determined to have a diagnosis/diagnoses of Moderate protein-calorie malnutrition.    This patient is being managed with:   Diet Renal Restrictions-  For patients receiving Renal Replacement - No Protein Restr No Conc K No Conc Phos Low Sodium  Dysphagia 1 Pureed Thin Liquids ([id number])  Supplement Feeding Modality:  Oral  Nepro Cans or Servings Per Day:  2       Frequency:  Daily  Entered: Jul 23 2021  1:55PM    
This patient has been assessed with a concern for Malnutrition and has been determined to have a diagnosis/diagnoses of Moderate protein-calorie malnutrition.    This patient is being managed with:   Diet Renal Restrictions-  For patients receiving Renal Replacement - No Protein Restr No Conc K No Conc Phos Low Sodium  Entered: Jul 6 2021  9:57AM    
This patient has been assessed with a concern for Malnutrition and has been determined to have a diagnosis/diagnoses of Moderate protein-calorie malnutrition.    This patient is being managed with:   Diet Renal Restrictions-  For patients receiving Renal Replacement - No Protein Restr No Conc K No Conc Phos Low Sodium  Entered: Jul 6 2021  9:57AM    
This patient has been assessed with a concern for Malnutrition and has been determined to have a diagnosis/diagnoses of Moderate protein-calorie malnutrition.    This patient is being managed with:   Diet Renal Restrictions-  For patients receiving Renal Replacement - No Protein Restr No Conc K No Conc Phos Low Sodium  Supplement Feeding Modality:  Oral  Nepro Cans or Servings Per Day:  2       Frequency:  Daily  Entered: Jul 18 2021  2:41PM    
This patient has been assessed with a concern for Malnutrition and has been determined to have a diagnosis/diagnoses of Severe protein-calorie malnutrition.    This patient is being managed with:   Diet Renal Restrictions-  For patients receiving Renal Replacement - No Protein Restr No Conc K No Conc Phos Low Sodium  Dysphagia 1 Pureed Thin Liquids ([id number])  Supplement Feeding Modality:  Oral  Nepro Cans or Servings Per Day:  2       Frequency:  Daily  Entered: Aug  9 2021  4:43PM    Diet Renal Restrictions-  For patients receiving Renal Replacement - No Protein Restr No Conc K No Conc Phos Low Sodium  Dysphagia 1 Pureed Thin Liquids ([id number])  Supplement Feeding Modality:  Oral  Glucerna Shake Cans or Servings Per Day:  1       Frequency:  Daily  Entered: Jul 30 2021  1:16PM    The following pending diet order is being considered for treatment of Severe protein-calorie malnutrition:null
This patient has been assessed with a concern for Malnutrition and has been determined to have a diagnosis/diagnoses of Severe protein-calorie malnutrition.    This patient is being managed with:   Diet Renal Restrictions-  For patients receiving Renal Replacement - No Protein Restr No Conc K No Conc Phos Low Sodium  Dysphagia 1 Pureed Thin Liquids ([id number])  Supplement Feeding Modality:  Oral  Nepro Cans or Servings Per Day:  2       Frequency:  Daily  Entered: Aug  9 2021  4:43PM    Diet Renal Restrictions-  For patients receiving Renal Replacement - No Protein Restr No Conc K No Conc Phos Low Sodium  Dysphagia 1 Pureed Thin Liquids ([id number])  Supplement Feeding Modality:  Oral  Glucerna Shake Cans or Servings Per Day:  1       Frequency:  Daily  Entered: Jul 30 2021  1:16PM    The following pending diet order is being considered for treatment of Severe protein-calorie malnutrition:null
This patient has been assessed with a concern for Malnutrition and has been determined to have a diagnosis/diagnoses of Moderate protein-calorie malnutrition.    This patient is being managed with:   Diet NPO after Midnight-     NPO Start Date: 14-Jul-2021   NPO Start Time: 23:59  Entered: Jul 14 2021  5:49PM    Diet Renal Restrictions-  For patients receiving Renal Replacement - No Protein Restr No Conc K No Conc Phos Low Sodium  Entered: Jul 6 2021  9:57AM    
This patient has been assessed with a concern for Malnutrition and has been determined to have a diagnosis/diagnoses of Moderate protein-calorie malnutrition.    This patient is being managed with:   Diet Renal Restrictions-  For patients receiving Renal Replacement - No Protein Restr No Conc K No Conc Phos Low Sodium  Dysphagia 1 Pureed Thin Liquids ([id number])  Supplement Feeding Modality:  Oral  Glucerna Shake Cans or Servings Per Day:  1       Frequency:  Daily  Entered: Jul 30 2021  1:16PM    
This patient has been assessed with a concern for Malnutrition and has been determined to have a diagnosis/diagnoses of Moderate protein-calorie malnutrition.    This patient is being managed with:   Diet Renal Restrictions-  For patients receiving Renal Replacement - No Protein Restr No Conc K No Conc Phos Low Sodium  Dysphagia 1 Pureed Thin Liquids ([id number])  Supplement Feeding Modality:  Oral  Glucerna Shake Cans or Servings Per Day:  1       Frequency:  Daily  Entered: Jul 30 2021  1:16PM    
This patient has been assessed with a concern for Malnutrition and has been determined to have a diagnosis/diagnoses of Moderate protein-calorie malnutrition.    This patient is being managed with:   Diet Renal Restrictions-  For patients receiving Renal Replacement - No Protein Restr No Conc K No Conc Phos Low Sodium  Dysphagia 1 Pureed Thin Liquids ([id number])  Supplement Feeding Modality:  Oral  Nepro Cans or Servings Per Day:  2       Frequency:  Daily  Entered: Jul 23 2021  1:55PM    
This patient has been assessed with a concern for Malnutrition and has been determined to have a diagnosis/diagnoses of Moderate protein-calorie malnutrition.    This patient is being managed with:   Diet Renal Restrictions-  For patients receiving Renal Replacement - No Protein Restr No Conc K No Conc Phos Low Sodium  Entered: Jul 6 2021  9:57AM    
This patient has been assessed with a concern for Malnutrition and has been determined to have a diagnosis/diagnoses of Severe protein-calorie malnutrition.    This patient is being managed with:   Diet Renal Restrictions-  For patients receiving Renal Replacement - No Protein Restr No Conc K No Conc Phos Low Sodium  Dysphagia 1 Pureed Thin Liquids ([id number])  Supplement Feeding Modality:  Oral  Nepro Cans or Servings Per Day:  2       Frequency:  Daily  Entered: Aug  9 2021  4:43PM    Diet Renal Restrictions-  For patients receiving Renal Replacement - No Protein Restr No Conc K No Conc Phos Low Sodium  Dysphagia 1 Pureed Thin Liquids ([id number])  Supplement Feeding Modality:  Oral  Glucerna Shake Cans or Servings Per Day:  1       Frequency:  Daily  Entered: Jul 30 2021  1:16PM    The following pending diet order is being considered for treatment of Severe protein-calorie malnutrition:null
This patient has been assessed with a concern for Malnutrition and has been determined to have a diagnosis/diagnoses of Moderate protein-calorie malnutrition.    This patient is being managed with:   Diet Renal Restrictions-  For patients receiving Renal Replacement - No Protein Restr No Conc K No Conc Phos Low Sodium  Dysphagia 1 Pureed Thin Liquids ([id number])  Supplement Feeding Modality:  Oral  Glucerna Shake Cans or Servings Per Day:  1       Frequency:  Daily  Entered: Jul 30 2021  1:16PM    
This patient has been assessed with a concern for Malnutrition and has been determined to have a diagnosis/diagnoses of Moderate protein-calorie malnutrition.    This patient is being managed with:   Diet Renal Restrictions-  For patients receiving Renal Replacement - No Protein Restr No Conc K No Conc Phos Low Sodium  Dysphagia 1 Pureed Thin Liquids ([id number])  Supplement Feeding Modality:  Oral  Glucerna Shake Cans or Servings Per Day:  1       Frequency:  Daily  Entered: Jul 30 2021  1:16PM    
This patient has been assessed with a concern for Malnutrition and has been determined to have a diagnosis/diagnoses of Moderate protein-calorie malnutrition.    This patient is being managed with:   Diet Renal Restrictions-  For patients receiving Renal Replacement - No Protein Restr No Conc K No Conc Phos Low Sodium  Dysphagia 1 Pureed Thin Liquids ([id number])  Supplement Feeding Modality:  Oral  Nepro Cans or Servings Per Day:  2       Frequency:  Daily  Entered: Jul 23 2021  1:55PM    
This patient has been assessed with a concern for Malnutrition and has been determined to have a diagnosis/diagnoses of Moderate protein-calorie malnutrition.    This patient is being managed with:   Diet Renal Restrictions-  For patients receiving Renal Replacement - No Protein Restr No Conc K No Conc Phos Low Sodium  Dysphagia 1 Pureed Thin Liquids ([id number])  Supplement Feeding Modality:  Oral  Glucerna Shake Cans or Servings Per Day:  1       Frequency:  Daily  Entered: Jul 30 2021  1:16PM    
This patient has been assessed with a concern for Malnutrition and has been determined to have a diagnosis/diagnoses of Moderate protein-calorie malnutrition.    This patient is being managed with:   Diet Renal Restrictions-  For patients receiving Renal Replacement - No Protein Restr No Conc K No Conc Phos Low Sodium  Dysphagia 1 Pureed Thin Liquids ([id number])  Supplement Feeding Modality:  Oral  Nepro Cans or Servings Per Day:  2       Frequency:  Daily  Entered: Jul 23 2021  1:55PM    
This patient has been assessed with a concern for Malnutrition and has been determined to have a diagnosis/diagnoses of Moderate protein-calorie malnutrition.    This patient is being managed with:   Diet Renal Restrictions-  For patients receiving Renal Replacement - No Protein Restr No Conc K No Conc Phos Low Sodium  Dysphagia 1 Pureed Thin Liquids ([id number])  Supplement Feeding Modality:  Oral  Glucerna Shake Cans or Servings Per Day:  1       Frequency:  Daily  Entered: Jul 30 2021  1:16PM    
This patient has been assessed with a concern for Malnutrition and has been determined to have a diagnosis/diagnoses of Moderate protein-calorie malnutrition.    This patient is being managed with:   Diet Renal Restrictions-  For patients receiving Renal Replacement - No Protein Restr No Conc K No Conc Phos Low Sodium  Dysphagia 1 Pureed Thin Liquids ([id number])  Supplement Feeding Modality:  Oral  Glucerna Shake Cans or Servings Per Day:  1       Frequency:  Daily  Entered: Jul 30 2021  1:16PM    
This patient has been assessed with a concern for Malnutrition and has been determined to have a diagnosis/diagnoses of Moderate protein-calorie malnutrition.    This patient is being managed with:   Diet Renal Restrictions-  For patients receiving Renal Replacement - No Protein Restr No Conc K No Conc Phos Low Sodium  Entered: Jul 6 2021  9:57AM    
This patient has been assessed with a concern for Malnutrition and has been determined to have a diagnosis/diagnoses of Moderate protein-calorie malnutrition.    This patient is being managed with:   Diet Renal Restrictions-  For patients receiving Renal Replacement - No Protein Restr No Conc K No Conc Phos Low Sodium  Supplement Feeding Modality:  Oral  Nepro Cans or Servings Per Day:  2       Frequency:  Daily  Entered: Jul 18 2021  2:41PM    
This patient has been assessed with a concern for Malnutrition and has been determined to have a diagnosis/diagnoses of Moderate protein-calorie malnutrition.    This patient is being managed with:   Diet Renal Restrictions-  For patients receiving Renal Replacement - No Protein Restr No Conc K No Conc Phos Low Sodium  Supplement Feeding Modality:  Oral  Nepro Cans or Servings Per Day:  2       Frequency:  Daily  Entered: Jul 18 2021  2:41PM    Diet Renal Restrictions-  For patients receiving Renal Replacement - No Protein Restr No Conc K No Conc Phos Low Sodium  Entered: Jul 6 2021  9:57AM    The following pending diet order is being considered for treatment of Moderate protein-calorie malnutrition:null
This patient has been assessed with a concern for Malnutrition and has been determined to have a diagnosis/diagnoses of Moderate protein-calorie malnutrition.    This patient is being managed with:   Diet Renal Restrictions-  For patients receiving Renal Replacement - No Protein Restr No Conc K No Conc Phos Low Sodium  Supplement Feeding Modality:  Oral  Nepro Cans or Servings Per Day:  2       Frequency:  Daily  Entered: Jul 18 2021  2:41PM    Diet Renal Restrictions-  For patients receiving Renal Replacement - No Protein Restr No Conc K No Conc Phos Low Sodium  Entered: Jul 6 2021  9:57AM    The following pending diet order is being considered for treatment of Moderate protein-calorie malnutrition:null
This patient has been assessed with a concern for Malnutrition and has been determined to have a diagnosis/diagnoses of Severe protein-calorie malnutrition.    This patient is being managed with:   Diet Renal Restrictions-  For patients receiving Renal Replacement - No Protein Restr No Conc K No Conc Phos Low Sodium  Dysphagia 1 Pureed Thin Liquids ([id number])  Supplement Feeding Modality:  Oral  Nepro Cans or Servings Per Day:  2       Frequency:  Daily  Entered: Aug  9 2021  4:43PM    Diet Renal Restrictions-  For patients receiving Renal Replacement - No Protein Restr No Conc K No Conc Phos Low Sodium  Dysphagia 1 Pureed Thin Liquids ([id number])  Supplement Feeding Modality:  Oral  Glucerna Shake Cans or Servings Per Day:  1       Frequency:  Daily  Entered: Jul 30 2021  1:16PM    The following pending diet order is being considered for treatment of Severe protein-calorie malnutrition:null
This patient has been assessed with a concern for Malnutrition and has been determined to have a diagnosis/diagnoses of Moderate protein-calorie malnutrition.    This patient is being managed with:   Diet Renal Restrictions-  For patients receiving Renal Replacement - No Protein Restr No Conc K No Conc Phos Low Sodium  Dysphagia 1 Pureed Thin Liquids ([id number])  Supplement Feeding Modality:  Oral  Glucerna Shake Cans or Servings Per Day:  1       Frequency:  Daily  Entered: Jul 30 2021  1:16PM    
This patient has been assessed with a concern for Malnutrition and has been determined to have a diagnosis/diagnoses of Moderate protein-calorie malnutrition.    This patient is being managed with:   Diet Renal Restrictions-  For patients receiving Renal Replacement - No Protein Restr No Conc K No Conc Phos Low Sodium  Dysphagia 1 Pureed Thin Liquids ([id number])  Supplement Feeding Modality:  Oral  Glucerna Shake Cans or Servings Per Day:  1       Frequency:  Daily  Entered: Jul 30 2021  1:16PM    
This patient has been assessed with a concern for Malnutrition and has been determined to have a diagnosis/diagnoses of Moderate protein-calorie malnutrition.    This patient is being managed with:   Diet Renal Restrictions-  For patients receiving Renal Replacement - No Protein Restr No Conc K No Conc Phos Low Sodium  Dysphagia 1 Pureed Thin Liquids ([id number])  Supplement Feeding Modality:  Oral  Glucerna Shake Cans or Servings Per Day:  1       Frequency:  Daily  Entered: Jul 30 2021  1:16PM    
This patient has been assessed with a concern for Malnutrition and has been determined to have a diagnosis/diagnoses of Moderate protein-calorie malnutrition.    This patient is being managed with:   Diet Renal Restrictions-  For patients receiving Renal Replacement - No Protein Restr No Conc K No Conc Phos Low Sodium  Dysphagia 1 Pureed Thin Liquids ([id number])  Supplement Feeding Modality:  Oral  Nepro Cans or Servings Per Day:  2       Frequency:  Daily  Entered: Jul 23 2021  1:55PM    
This patient has been assessed with a concern for Malnutrition and has been determined to have a diagnosis/diagnoses of Moderate protein-calorie malnutrition.    This patient is being managed with:   Diet Renal Restrictions-  For patients receiving Renal Replacement - No Protein Restr No Conc K No Conc Phos Low Sodium  Dysphagia 1 Pureed Thin Liquids ([id number])  Supplement Feeding Modality:  Oral  Nepro Cans or Servings Per Day:  2       Frequency:  Daily  Entered: Jul 23 2021  1:55PM    
This patient has been assessed with a concern for Malnutrition and has been determined to have a diagnosis/diagnoses of Moderate protein-calorie malnutrition.    This patient is being managed with:   Diet Renal Restrictions-  For patients receiving Renal Replacement - No Protein Restr No Conc K No Conc Phos Low Sodium  Dysphagia 1 Pureed Thin Liquids ([id number])  Supplement Feeding Modality:  Oral  Glucerna Shake Cans or Servings Per Day:  1       Frequency:  Daily  Entered: Jul 30 2021  1:16PM    
This patient has been assessed with a concern for Malnutrition and has been determined to have a diagnosis/diagnoses of Moderate protein-calorie malnutrition.    This patient is being managed with:   Diet Renal Restrictions-  For patients receiving Renal Replacement - No Protein Restr No Conc K No Conc Phos Low Sodium  Dysphagia 1 Pureed Thin Liquids ([id number])  Supplement Feeding Modality:  Oral  Glucerna Shake Cans or Servings Per Day:  1       Frequency:  Daily  Entered: Jul 30 2021  1:16PM    
This patient has been assessed with a concern for Malnutrition and has been determined to have a diagnosis/diagnoses of Moderate protein-calorie malnutrition.    This patient is being managed with:   Diet Renal Restrictions-  For patients receiving Renal Replacement - No Protein Restr No Conc K No Conc Phos Low Sodium  Entered: Jul 6 2021  9:57AM    
This patient has been assessed with a concern for Malnutrition and has been determined to have a diagnosis/diagnoses of Moderate protein-calorie malnutrition.    This patient is being managed with:   Diet Renal Restrictions-  For patients receiving Renal Replacement - No Protein Restr No Conc K No Conc Phos Low Sodium  Supplement Feeding Modality:  Oral  Nepro Cans or Servings Per Day:  2       Frequency:  Daily  Entered: Jul 18 2021  2:41PM    Diet Renal Restrictions-  For patients receiving Renal Replacement - No Protein Restr No Conc K No Conc Phos Low Sodium  Entered: Jul 6 2021  9:57AM    The following pending diet order is being considered for treatment of Moderate protein-calorie malnutrition:null
This patient has been assessed with a concern for Malnutrition and has been determined to have a diagnosis/diagnoses of Severe protein-calorie malnutrition.    This patient is being managed with:   Diet Renal Restrictions-  For patients receiving Renal Replacement - No Protein Restr No Conc K No Conc Phos Low Sodium  Dysphagia 1 Pureed Thin Liquids ([id number])  Supplement Feeding Modality:  Oral  Nepro Cans or Servings Per Day:  2       Frequency:  Daily  Entered: Aug  9 2021  4:43PM    Diet Renal Restrictions-  For patients receiving Renal Replacement - No Protein Restr No Conc K No Conc Phos Low Sodium  Dysphagia 1 Pureed Thin Liquids ([id number])  Supplement Feeding Modality:  Oral  Glucerna Shake Cans or Servings Per Day:  1       Frequency:  Daily  Entered: Jul 30 2021  1:16PM    The following pending diet order is being considered for treatment of Severe protein-calorie malnutrition:null

## 2021-08-12 NOTE — CHART NOTE - NSCHARTNOTEFT_GEN_A_CORE
Nutrition Follow Up Note  Patient seen for: Malnutrition Follow Up     Chart reviewed, events noted. Pt c overall poor prognosis, now in PCU.     Source: [x] Patient       [x] EMR        [x] RN        [] Family at bedside       [] Other:    -If unable to interview patient: [] Trach/Vent/BiPAP  [] Disoriented/confused/inappropriate to interview    Diet Order:   Diet, Regular (21)  Diet was changed as per RN and RD discussion c team prior note writing    - Is current order appropriate/adequate? [x] Yes-given comfort care     - Nutrition-related concerns:      -as per RN, pt has only been having some fruits brought from home, requesting same in house      -team agreeable to change in diet at this time to fulfill pt's request       -pt reports he had not been taking Glucerna shakes either since they caused him to have diarrhea when he had tried to take it       -pt declined any other supplements or food preferences except for fruit  GI:  Last BM .   Bowel Regimen? [] Yes   [x] No      Weights:   Daily Weight in k.3 (), Weight in k.2 (), Weight in k.6 (); would continue to monitor     Nutritionally Pertinent MEDICATIONS  (STANDING):  fludroCORTISONE  glucagon  Injectable  midodrine  pantoprazole  Injectable    Pertinent Labs:   A1C with Estimated Average Glucose Result: 6.1 % (21 @ 03:53)    Finger Sticks:  POCT Blood Glucose.: 120 mg/dL ( @ 08:23)  POCT Blood Glucose.: 142 mg/dL ( @ 22:21)  POCT Blood Glucose.: 171 mg/dL ( @ 17:01)      Skin per nursing documentation: zahida. buttock DTI, right medial heel DTI and suspected DTI on right lateral heel  Edema: none at this time     Estimated Needs:   [x] no change since previous assessment    Previous Nutrition Diagnosis: severe malnutrition   Nutrition Diagnosis is: [x] ongoing     New Nutrition Diagnosis: [x] Not applicable    Nutrition Care Plan:  [] In Progress  [x] Achieved-given GOC   [] Not applicable       Recommendations:      1. Continue c current diet as per pt request.   2. Will provide fruits c meals as per request.  3. RD remains available for further nutritional interventions as needed.     Monitoring and Evaluation:   Continue to monitor nutritional intake, tolerance to diet prescription, weights, labs, skin integrity      RD remains available upon request and will follow up per protocol  Shelly Jiménez MS RD CDN [facility], Pager #[phone]

## 2021-08-12 NOTE — PROGRESS NOTE ADULT - PROBLEM SELECTOR PLAN 9
See [facility] noted dated 8/11.   Today (8/11) I f/u with the patient and his wife. They understand PD is not possible due to hypotension. At this point the patient is looking for optimizing his symptomatic management. He understand his prognosis is of hours to days. Will continue oral pressors if able to take them. As per last ID's note, will stop Abx.   Patient is DNR/I. See [facility] noted dated 8/11.   8/11 - I f/u with the patient and his wife. They understand PD is not possible due to hypotension. At this point the patient is looking for optimizing his symptomatic management. He understand his prognosis is of hours to days. Will continue oral pressors if able to take them. As per last ID's note, will stop Abx.   Patient is DNR/I  8/12 - transferred to PCU. no further dialysis, pressors, DNR/DNI. Juan is already f/u.   Currently in PCU. No further diaylsis, pressors, DNR/DNI. Pt is off insulin. midodrine if willing.   Continue symptom management in PCU.

## 2021-08-12 NOTE — PROVIDER CONTACT NOTE (OTHER) - ASSESSMENT
no response to external stimuli  no spontaneous respirations  no apical heart rate  negative pupillary response to light

## 2021-08-12 NOTE — DISCHARGE NOTE FOR THE EXPIRED PATIENT - HOSPITAL COURSE
56M PMHx ESRD on PD, DM on insulin, CHF EF 25-30% CAD s/p CABG x4, underwent cardiac cath and stent and wire broke in heart s/p aortotomy p/w cough and diaphoresis found to be hypotensive and admitted to the MICU with pressors on septic shock. Palliative consulted for GOC. Patient transferred to the PCU for symptom management and EOL care.

## 2021-08-12 NOTE — PROGRESS NOTE ADULT - PROBLEM SELECTOR PLAN 1
No plans for any further PD.   At this point and as indicated on my GOC noted dated 8/10, goals are for preventing and treating any distressful symptoms. Will transfer to PCU for symptoms monitoring and Rx. May consider hospice eval if meeting inpatient hospice criteria.  Will add Dilaudid IV 0.2mg q 2PRN pain or dyspnea, Glyco 0.4mg IV q 6 PRN secretions, and Ativan 0.2mg IV q 6PRN for intractable symptoms. No plans for any further PD due to hypotension per nephrology.   At this point and as indicated on my GOC noted dated 8/10, goals are for preventing and treating any distressful symptoms. Will transfer to PCU for symptoms monitoring and Rx. May consider hospice eval if meeting inpatient hospice criteria.  - Dilaudid IV 0.2mg q 2PRN pain or dyspnea, Glyco 0.4mg IV q 6 PRN secretions, and Ativan 0.2mg IV q 6PRN for intractable symptoms. - continue Dilaudid 0.2mg IV q 2 PRN (1 required within last 24 hrs)  - bowel regimen

## 2021-08-12 NOTE — PROGRESS NOTE ADULT - PROBLEM SELECTOR PLAN 10
Juan is already f/u.     Will transfer to PCU when a bed becomes available.         Garrick Madera MD   Geriatrics and Palliative Care (GAP) Consult Service    of Geriatric and Palliative Medicine  [facility]      Please page the following number for clinical matters between the hours of 9 am and 5 pm from Monday through Friday : [phone]    After 5pm and on weekends, please see the contact information below:    In the event of newly developing, evolving, or worsening symptoms, please contact the Palliative Medicine team via pager (if the patient is at [facility] #[phone] or if the patient is at [facility] #[phone]) The Geriatric and Palliative Medicine service has coverage 24 hours a day/ 7 days a week to provide medical recommendations regarding symptom management needs via telephone Juan is already f/u.   Currently in [facility]. Juan is already f/u.   Currently in PCU. No further diaylsis, pressors, DNR/DNI. Juan is already f/u.   Currently in PCU. No further diaylsis, pressors, DNR/DNI. Pt is off insulin. midodrine if willing. Will discuss with family tomorrow.

## 2021-08-12 NOTE — PROVIDER CONTACT NOTE (OTHER) - NAME OF MD/NP/PA/DO NOTIFIED:
CARLYLE Abarca
Dr. Abraham Morales [facility]
MEGA Monroe
MEGA Monroe
MEGA Rader
NEYMAR Goldman
ACP Maty
ACP Maty
ACP Shini
CARLYLE Luna
CARLYLE Tripp
CARLYLE Tripp
Dr Michael
ACP Maty
MD Dale
Shelly Mays NP
Dr Dale
Dr. Moore from outpatient nephrology clinic
MEGA Gaytan
NP Kristin/ MD Dale
Nephrology MD Dale Sayed
pt C/O pill stuck in throat after administration of Brilinta this morning w / sips of water
Cheryl TADEO
MEGA Rao
MEGA Santana
MEGA Santana
NEYMAR Wallace
NP Radha
ACP Maty
Tevin Cordero NP
CARLYLE Tripp
MEGA Rao
NEYMAR Joiner
dr cloud
ACP Maty
MEGA Hancock
Missy AYOUB

## 2021-08-12 NOTE — PROVIDER CONTACT NOTE (OTHER) - DATE AND TIME:
06-Jul-2021 18:03
08-Aug-2021 09:05
09-Aug-2021 22:30
14-Jul-2021 08:45
17-Jul-2021 18:15
24-Jul-2021 08:40
06-Aug-2021 16:30
10-Jul-2021 06:05
11-Aug-2021 17:10
17-Jul-2021 20:30
23-Jul-2021 01:15
06-Jul-2021 12:49
08-Aug-2021 12:12
10-Jul-2021 12:35
12-Jul-2021 13:46
23-Jul-2021 06:00
25-Jul-2021 08:45
06-Aug-2021 18:27
09-Jul-2021 23:10
18-Jul-2021 22:00
19-Jul-2021 22:34
26-Jul-2021 09:10
10-Aug-2021 21:00
10-Aug-2021 21:00
18-Jul-2021 05:40
21-Jul-2021 06:50
23-Jul-2021 06:00
06-Aug-2021 18:27
07-Aug-2021 10:55
07-Aug-2021 21:30
08-Aug-2021 17:02
11-Jul-2021 13:00
06-Aug-2021 01:15
06-Aug-2021 17:52
07-Aug-2021 09:40
12-Aug-2021 15:35

## 2021-08-12 NOTE — PROGRESS NOTE ADULT - PROBLEM SELECTOR PLAN 4
Will start Diphenhydramine 12.5mg IV q 6 PRN and also Calamine q 6 hours. - Diphenhydramine 12.5mg IV q 6 PRN and also Calamine q 6 hours. No plans for any further PD due to hypotension per nephrology.   symptom management, comfort care

## 2021-08-12 NOTE — DISCHARGE NOTE FOR THE EXPIRED PATIENT - SECONDARY DIAGNOSIS.
ESRD (end stage renal disease) on dialysis End stage heart failure Gangrene of toe of right foot Lesion of larynx

## 2021-08-12 NOTE — PROGRESS NOTE ADULT - REASON FOR ADMISSION
Hypotension

## 2021-08-13 LAB
CULTURE RESULTS: SIGNIFICANT CHANGE UP
SPECIMEN SOURCE: SIGNIFICANT CHANGE UP

## 2021-10-25 NOTE — CONSULT NOTE ADULT - PROBLEM SELECTOR RECOMMENDATION 2
no rashes , no suspicious lesions , no jaundice present 
BP variable, w/ hypotension on admission, but improving

## 2022-08-29 NOTE — DISCHARGE NOTE NURSING/CASE MANAGEMENT/SOCIAL WORK - NSSCNAMETXT_GEN_ALL_CORE
[facility]. Initial visit will be day after discharge home. A nurse will call prior to home visit
Yes - the patient is able to be screened

## 2022-11-12 NOTE — REASON FOR VISIT
[Follow-Up - From Hospitalization] : follow-up of a recent hospitalization for [FreeTextEntry1] : Cardiologist: Maximo Best MD  PAST SURGICAL HISTORY:  Abrasion of right hand, initial encounter s/p excison of glass right hand    bunionectomy b/L 2007    H/O carpal tunnel repair and trigger finger repair (L) 5/2017    S/P rotator cuff surgery 2017    S/P tendon repair nerve and tendon repair left hand

## 2022-11-15 NOTE — BH CONSULTATION LIAISON ASSESSMENT NOTE - NSBHCHARTREVIEWLAB_PSY_A_CORE FT
37.1
36.5
                      12.1   8.79  )-----------( 242      ( 12 Jul 2021 07:10 )             41.1     07-12    130<L>  |  90<L>  |  51<H>  ----------------------------<  140<H>  3.7   |  22  |  10.60<H>    Ca    8.8      12 Jul 2021 07:10  Phos  6.2     07-12  Mg     2.4     07-12

## 2022-11-23 NOTE — CHART NOTE - NSCHARTNOTEFT_GEN_A_CORE
np note- soft BP    pt has a Hx of CAD, s/p CABG on Toprol xl 50mg QD. pt admitted for sepsis with soft BP. d/w dr. Beck, changed toprol 50 to lopressor 12.5mg BID w/ hold off parameters. will continue monitoring BP.    Np. Isidro Ponce   [address] Warm/Dry

## 2022-12-13 NOTE — CONSULT NOTE ADULT - CONSULT REQUESTED BY NAME
Called and spoke with Holly, health representative for [facility].     Discussed echo results, and [facility] would like referral for cardiology evaluation.  Routed to Dr. Hernandez to place referral. 
Dr. Beck
Dr. Beck
Medicine
Pipe Beck
Primary
Primary NP
Dr. Beck
Primary
 used

## 2022-12-21 NOTE — CONSULT NOTE ADULT - CONSULT REQUESTED BY NAME
Dr Arango Reconstruction After Skin Cancer Removal  Oscar Evans MD  Otolaryngology, [facility]  [address] [facility] [address], [place] [address]  Office: [phone]  Cell (after-hours concerns): [phone]    What to Expect:  Soreness / Tenderness around the area which will decrease over time. The initial swelling will improve over a week then the additional swelling will take a few weeks to improve.  Occasional bruising immediately over the incision line  Swelling (edema) will occur over the first few days of healing, but this should be soft, not discolored, and should not be much more tender  Sutures will need to be removed.     Care for your wound:  Keep the wound dry (out of the shower/bath) for the first 24-36 hours. You may put ointment on the wound during this time.  After 1-2 days, you may get the wound wet, but do not soak it. You may gently clean the incision with warm soapy water.   If crusting builds up over the sutures, You may use diluted hydrogen peroxide (1/2 distilled water and 1/2 peroxide) to gently rub the crusting away. This can be done 3-4 times per day as needed.  Keep the wound moisturized with antibiotic ointment for 2-3 days, then transition to Aquaphor or Vaseline. Moisturization is important to minimize scar.  Applying ice to the wound can help decrease the swelling.     Activity:  Light activity for 2-3 days. You may slowly increase your activity following this, but generally keep it light for the first 7-10 days. Avoid strenuous exercise for the first week.   Avoid pressure to the incision    Medications:  Resume your normal home medications. Ok to resume tomorrow evening or Friday morning  Avoid Motrin or Advil products for the first week. It is OK for an occasional dose of this in between Tylenol or narcotic, but do not take scheduled Advil unless this has been Ok'd by Dr. Evans.   If you have been prescribed a pain medication (narcotic), use it sparingly and wean yourself from it over the  first few days.  Do not take the following herbal supplements: fish oil, garlic, ginko biloba for 2 weeks. Avoid all supplements for 2 weeks if able, as these can sometimes have medical side effects that can impair wound healing.    Diet:  Soft diet for 3-5 days    Call the office if:  Redness or firm swelling develop over the wound. A small amount of soft swelling is normal, especially after 3-4 days, but if it is hard, painful, or very red, notify Dr. Evans's office.  Temperature > 101  Drainage from wound  If the wound opens up.

## 2022-12-29 NOTE — SWALLOW BEDSIDE ASSESSMENT ADULT - DATE
"Chief Complaint  Cough and Chest Pain (Bilateral base of lungs)    Subjective        Jaime Lyle presents to [facility]  History of Present Illness    Patient presents with productive cough, wheezing and back pain.  He reports a history of pneumonia.  Patient denies fever but has had some body aches.  He has had 2 negative COVID-19 tests.  Patient did have chest x-ray yesterday which shows no active disease.  Reports symptoms started 10 days ago and are not improving.    Objective   Vital Signs:  /70   Pulse 73   Ht 180.3 cm (71\")   Wt 126 kg (278 lb)   SpO2 97%   BMI 38.77 kg/m²   Estimated body mass index is 38.77 kg/m² as calculated from the following:    Height as of this encounter: 180.3 cm (71\").    Weight as of this encounter: 126 kg (278 lb).          Physical Exam  Constitutional:       Appearance: Normal appearance.   HENT:      Head: Normocephalic.   Cardiovascular:      Rate and Rhythm: Normal rate and regular rhythm.   Pulmonary:      Effort: Pulmonary effort is normal.      Breath sounds: Normal breath sounds.   Abdominal:      General: Abdomen is flat. Bowel sounds are normal.      Palpations: Abdomen is soft.   Musculoskeletal:         General: Normal range of motion.      Cervical back: Neck supple.      Right lower leg: No edema.      Left lower leg: No edema.   Skin:     General: Skin is warm and dry.   Neurological:      Mental Status: He is alert and oriented to person, place, and time.      Gait: Gait is intact.   Psychiatric:         Attention and Perception: Attention normal.         Mood and Affect: Mood normal.         Speech: Speech normal.        Result Review :                Assessment and Plan   Diagnoses and all orders for this visit:    1. Cough, unspecified type (Primary)  -     POCT Influenza A/B    2. Bronchitis  Assessment & Plan:  1.  Patient is negative for Flu A/B  2.  Reviewed chest x-ray with patient  3.  Start cefdinir 300 mg "
twice daily x7 days  4.  Medrol dose pack  5.  Albuterol as needed for dyspnea or wheezing  6.  Call if symptoms persist or worsen      Other orders  -     albuterol sulfate  (90 Base) MCG/ACT inhaler; Inhale 2 puffs Every 4 (Four) Hours As Needed for Wheezing.  Dispense: 8 g; Refill: 0  -     methylPREDNISolone (MEDROL) 4 MG dose pack; Take as directed on package instructions.  Dispense: 21 tablet; Refill: 0  -     cefdinir (OMNICEF) 300 MG capsule; Take 1 capsule by mouth 2 (Two) Times a Day.  Dispense: 14 capsule; Refill: 0         I spent 20 minutes caring for Jaime on this date of service. This time includes time spent by me in the following activities:preparing for the visit, reviewing tests, obtaining and/or reviewing a separately obtained history, performing a medically appropriate examination and/or evaluation , counseling and educating the patient/family/caregiver, ordering medications, tests, or procedures, documenting information in the medical record and care coordination  Follow Up   Return if symptoms worsen or fail to improve.  Patient was given instructions and counseling regarding his condition or for health maintenance advice. Please see specific information pulled into the AVS if appropriate.       
24-Jul-2021
13-Jul-2021

## 2023-03-30 NOTE — ED CLERICAL - NSCLERICAL TASK_GEN_ALL_ED
Pt states headache and cough x's 1 week.      Niraj Vale  03/29/23 2025 Pre-Hospital Care Report (PCR)

## 2023-08-14 NOTE — DIETITIAN INITIAL EVALUATION ADULT. - NS FNS CHANGE IN WEIGHT
Loss Low Dose Naltrexone Pregnancy And Lactation Text: Naltrexone is pregnancy category C.  There have been no adequate and well-controlled studies in pregnant women.  It should be used in pregnancy only if the potential benefit justifies the potential risk to the fetus.   Limited data indicates that naltrexone is minimally excreted into breastmilk.

## 2023-08-19 NOTE — ED PROVIDER NOTE - OBJECTIVE STATEMENT
19-Aug-2023 55M PMH ESRD on PD, DM, CAD s/p CABG x4, underwent cardiac cath and stent and wire broke in heart s/p sternotomy during recent admission 11/30-12/11, recently discharged 1 wk ago for 55M PMH ESRD on PD, DM, CAD s/p CABG x4, underwent cardiac cath and stent and wire broke in heart s/p sternotomy during recent admission 11/30-12/11, recently discharged 1 wk ago for dizziness and transient fever, was treated for culture negative sepsis, BIBEMS for generalized weakness and hypotension x 1 wk. Since recent cardiac surgery, pt has been c/o generalized weakness. Wife/son states that pt has not been getting out of bed at all at home. Was previously ambulatory. Was seen by PT today, and was found to be hypotensive to SBP 80s, prompting ED evaluation. Of note, wife states she's been checking pt's BPs at home and have been intermittently low to SBP 80-90s. Pt also had a presyncopal episode while on the toilet yesterday. No head trauma or LOC. Wife states looks like pt's eyes rolled back. Pt denies CP, SOB. No f/c, URI sx, abd pain, NVD, urinary sx. Has had poor appetite.

## 2023-11-02 NOTE — DISCHARGE NOTE PROVIDER - NSDCQMPCI_CARD_ALL_CORE
At 0638 patient had a brief 6 beat run of vtach while sleeping then rhythm went back to NSR. Resident notified via paging system.    No

## 2024-02-20 NOTE — DIETITIAN INITIAL EVALUATION ADULT. - PHYSCIAL ASSESSMENT
Running Temp., 101.0, Sore throat.  Please call [facility]. # [phone]   101% IBW  Skin: Per 7/9 wound care note, pt with "Incontinence Dermatitis, Incontinence of stool and urine, PAD w/ h/o hypotension on pressors- necrotic toes and fingers"

## 2024-03-04 NOTE — GOALS OF CARE CONVERSATION - ADVANCED CARE PLANNING - DOES PATIENT HAVE ADVANCE DIRECTIVE
Problem: Infection  Goal: Absence of Infection Signs and Symptoms  Outcome: Ongoing, Progressing  Intervention: Prevent or Manage Infection  Flowsheets (Taken 3/4/2024 1116)  Infection Management: aseptic technique maintained     Problem: Adult Inpatient Plan of Care  Goal: Plan of Care Review  Outcome: Ongoing, Progressing  Goal: Patient-Specific Goal (Individualized)  Outcome: Ongoing, Progressing  Goal: Absence of Hospital-Acquired Illness or Injury  Outcome: Ongoing, Progressing  Goal: Optimal Comfort and Wellbeing  Outcome: Ongoing, Progressing  Goal: Readiness for Transition of Care  Outcome: Ongoing, Progressing  Intervention: Mutually Develop Transition Plan  Flowsheets (Taken 3/4/2024 1116)  Transportation Anticipated: family or friend will provide     Problem: Fall Injury Risk  Goal: Absence of Fall and Fall-Related Injury  Outcome: Ongoing, Progressing  Intervention: Identify and Manage Contributors  Flowsheets (Taken 3/4/2024 1116)  Self-Care Promotion: safe use of adaptive equipment encouraged  Medication Review/Management: medications reviewed     
No
No

## 2024-03-27 NOTE — CONSULT NOTE ADULT - PROVIDER SPECIALTY LIST ADULT
Nephrology Home meds: Amlodipine 10mg, olmesartan 40mg   - C/w home meds Home meds: Amlodipine 10mg, olmesartan 40mg   - C/w amlodipine  - Holding losartan iso moderate BPs, resume when appropriate

## 2024-05-06 NOTE — DISCHARGE NOTE PROVIDER - NSDCHOSPICE_GEN_A_CORE
No Is This A New Presentation, Or A Follow-Up?: Skin Lesion What Type Of Note Output Would You Prefer (Optional)?: Bullet Format How Severe Is Your Skin Lesion?: mild Has Your Skin Lesion Been Treated?: not been treated Additional History: Tried and failed OTC wart pad/peel, cryo pens multiple treatments.

## 2024-07-26 NOTE — PROVIDER CONTACT NOTE (OTHER) - SITUATION
Pt's blood pressure 88/61, 89/60, CARLYLE Luna. CARLYLE Luna will discuss with Nephrology and stated to hold 1400 PD exchange until further notice.
Pt due for PD- no order available
Pt hypoglycemic- FS 49 and repeat 56
clarification of peritoneal dialysis initially ordered by Dr. Dale with order notes to "leave pt dry overnight" pt ordered for 4 exchanges. only 2 exchanges completed during day with last xch @ 1600
Pt BP sustaining 80s/60s. Manually 85/62, pt hypoxic to 85% on RA
Pt FS currently 187 - pt hypoglycemic earlier to 49, FS now 187 after glutose 15 and 12.5 dextrose 50%
Pt received 15g of glutose gel for FS of 56, pt drank apple juice and FS now 65
[] : Resident
[FreeTextEntry3] : I, Dr. Arnaud Aceves, saw and evaluated this patient in the presence of the resident. I discussed the management with the resident. I reviewed the note and I agree with the documented plan of care with the following confirmations/corrections/additions: None.
